# Patient Record
Sex: FEMALE | Race: WHITE | HISPANIC OR LATINO | Employment: OTHER | URBAN - METROPOLITAN AREA
[De-identification: names, ages, dates, MRNs, and addresses within clinical notes are randomized per-mention and may not be internally consistent; named-entity substitution may affect disease eponyms.]

---

## 2017-04-24 ENCOUNTER — TRANSCRIBE ORDERS (OUTPATIENT)
Dept: ADMINISTRATIVE | Facility: HOSPITAL | Age: 72
End: 2017-04-24

## 2017-04-24 DIAGNOSIS — R92.8 ABNORMAL MAMMOGRAM: Primary | ICD-10-CM

## 2017-05-01 ENCOUNTER — HOSPITAL ENCOUNTER (OUTPATIENT)
Dept: MAMMOGRAPHY | Facility: HOSPITAL | Age: 72
Discharge: HOME | End: 2017-05-01
Attending: INTERNAL MEDICINE | Admitting: INTERNAL MEDICINE

## 2017-05-01 DIAGNOSIS — R92.8 ABNORMAL MAMMOGRAM: ICD-10-CM

## 2017-05-01 PROCEDURE — G0206 DX MAMMO INCL CAD UNI: HCPCS

## 2017-06-26 ENCOUNTER — TRANSCRIBE ORDERS (OUTPATIENT)
Dept: ADMINISTRATIVE | Facility: HOSPITAL | Age: 72
End: 2017-06-26

## 2017-06-26 DIAGNOSIS — R06.09 DYSPNEA ON EXERTION: Primary | ICD-10-CM

## 2017-06-30 ENCOUNTER — HOSPITAL ENCOUNTER (OUTPATIENT)
Dept: CARDIOLOGY | Facility: HOSPITAL | Age: 72
Discharge: HOME | End: 2017-06-30
Attending: INTERNAL MEDICINE | Admitting: INTERNAL MEDICINE

## 2017-06-30 VITALS — WEIGHT: 202 LBS | BODY MASS INDEX: 39.66 KG/M2 | HEIGHT: 60 IN

## 2017-06-30 DIAGNOSIS — R06.09 DYSPNEA ON EXERTION: ICD-10-CM

## 2017-06-30 LAB
BH CV ECHO MEAS - ACS: 1.8 CM
BH CV ECHO MEAS - AO MAX PG (FULL): 3.1 MMHG
BH CV ECHO MEAS - AO MAX PG: 8.5 MMHG
BH CV ECHO MEAS - AO MEAN PG (FULL): 2 MMHG
BH CV ECHO MEAS - AO MEAN PG: 5 MMHG
BH CV ECHO MEAS - AO ROOT AREA (BSA CORRECTED): 1.7
BH CV ECHO MEAS - AO ROOT AREA: 8 CM^2
BH CV ECHO MEAS - AO ROOT DIAM: 3.2 CM
BH CV ECHO MEAS - AO V2 MAX: 146 CM/SEC
BH CV ECHO MEAS - AO V2 MEAN: 105 CM/SEC
BH CV ECHO MEAS - AO V2 VTI: 30.2 CM
BH CV ECHO MEAS - AVA(I,A): 3.5 CM^2
BH CV ECHO MEAS - AVA(I,D): 3.5 CM^2
BH CV ECHO MEAS - AVA(V,A): 3.3 CM^2
BH CV ECHO MEAS - AVA(V,D): 3.3 CM^2
BH CV ECHO MEAS - BSA(HAYCOCK): 2 M^2
BH CV ECHO MEAS - BSA: 1.9 M^2
BH CV ECHO MEAS - BZI_BMI: 39.5 KILOGRAMS/M^2
BH CV ECHO MEAS - BZI_METRIC_HEIGHT: 152.4 CM
BH CV ECHO MEAS - BZI_METRIC_WEIGHT: 91.6 KG
BH CV ECHO MEAS - CONTRAST EF 4CH: 53.6 ML/M^2
BH CV ECHO MEAS - EDV(CUBED): 29.8 ML
BH CV ECHO MEAS - EDV(MOD-SP4): 28 ML
BH CV ECHO MEAS - EDV(TEICH): 37.9 ML
BH CV ECHO MEAS - EF(CUBED): 59.2 %
BH CV ECHO MEAS - EF(MOD-SP4): 53.6 %
BH CV ECHO MEAS - EF(TEICH): 52.2 %
BH CV ECHO MEAS - ESV(CUBED): 12.2 ML
BH CV ECHO MEAS - ESV(MOD-SP4): 13 ML
BH CV ECHO MEAS - ESV(TEICH): 18.1 ML
BH CV ECHO MEAS - FS: 25.8 %
BH CV ECHO MEAS - IVS/LVPW: 0.92
BH CV ECHO MEAS - IVSD: 1.1 CM
BH CV ECHO MEAS - LA DIMENSION: 4.1 CM
BH CV ECHO MEAS - LA/AO: 1.3
BH CV ECHO MEAS - LAT PEAK E' VEL: 7.8 CM/SEC
BH CV ECHO MEAS - LV DIASTOLIC VOL/BSA (35-75): 14.9 ML/M^2
BH CV ECHO MEAS - LV MASS(C)D: 106.8 GRAMS
BH CV ECHO MEAS - LV MASS(C)DI: 57 GRAMS/M^2
BH CV ECHO MEAS - LV MAX PG: 5.5 MMHG
BH CV ECHO MEAS - LV MEAN PG: 3 MMHG
BH CV ECHO MEAS - LV SYSTOLIC VOL/BSA (12-30): 6.9 ML/M^2
BH CV ECHO MEAS - LV V1 MAX: 117 CM/SEC
BH CV ECHO MEAS - LV V1 MEAN: 79.5 CM/SEC
BH CV ECHO MEAS - LV V1 VTI: 25.2 CM
BH CV ECHO MEAS - LVIDD: 3.1 CM
BH CV ECHO MEAS - LVIDS: 2.3 CM
BH CV ECHO MEAS - LVLD AP4: 6.8 CM
BH CV ECHO MEAS - LVLS AP4: 6.1 CM
BH CV ECHO MEAS - LVOT AREA (M): 4.2 CM^2
BH CV ECHO MEAS - LVOT AREA: 4.2 CM^2
BH CV ECHO MEAS - LVOT DIAM: 2.3 CM
BH CV ECHO MEAS - LVPWD: 1.2 CM
BH CV ECHO MEAS - MED PEAK E' VEL: 5 CM/SEC
BH CV ECHO MEAS - MV A DUR: 0.17 SEC
BH CV ECHO MEAS - MV A MAX VEL: 121 CM/SEC
BH CV ECHO MEAS - MV DEC SLOPE: 337 CM/SEC^2
BH CV ECHO MEAS - MV DEC TIME: 0.17 SEC
BH CV ECHO MEAS - MV E MAX VEL: 83.9 CM/SEC
BH CV ECHO MEAS - MV E/A: 0.69
BH CV ECHO MEAS - MV MAX PG: 6.7 MMHG
BH CV ECHO MEAS - MV MEAN PG: 3 MMHG
BH CV ECHO MEAS - MV P1/2T MAX VEL: 93.7 CM/SEC
BH CV ECHO MEAS - MV P1/2T: 81.4 MSEC
BH CV ECHO MEAS - MV V2 MAX: 129 CM/SEC
BH CV ECHO MEAS - MV V2 MEAN: 78.4 CM/SEC
BH CV ECHO MEAS - MV V2 VTI: 25.7 CM
BH CV ECHO MEAS - MVA P1/2T LCG: 2.3 CM^2
BH CV ECHO MEAS - MVA(P1/2T): 2.7 CM^2
BH CV ECHO MEAS - MVA(VTI): 4.1 CM^2
BH CV ECHO MEAS - PA MAX PG (FULL): 3.2 MMHG
BH CV ECHO MEAS - PA MAX PG: 5.5 MMHG
BH CV ECHO MEAS - PA V2 MAX: 117 CM/SEC
BH CV ECHO MEAS - PULM A REVS DUR: 0.13 SEC
BH CV ECHO MEAS - PULM A REVS VEL: 25.3 CM/SEC
BH CV ECHO MEAS - PULM DIAS VEL: 30.8 CM/SEC
BH CV ECHO MEAS - PULM S/D: 1.7
BH CV ECHO MEAS - PULM SYS VEL: 52.3 CM/SEC
BH CV ECHO MEAS - PVA(V,A): 2.4 CM^2
BH CV ECHO MEAS - PVA(V,D): 2.4 CM^2
BH CV ECHO MEAS - QP/QS: 0.55
BH CV ECHO MEAS - RV MAX PG: 2.2 MMHG
BH CV ECHO MEAS - RV MEAN PG: 1 MMHG
BH CV ECHO MEAS - RV V1 MAX: 74.6 CM/SEC
BH CV ECHO MEAS - RV V1 MEAN: 54.7 CM/SEC
BH CV ECHO MEAS - RV V1 VTI: 15.2 CM
BH CV ECHO MEAS - RVDD: 2.4 CM
BH CV ECHO MEAS - RVOT AREA: 3.8 CM^2
BH CV ECHO MEAS - RVOT DIAM: 2.2 CM
BH CV ECHO MEAS - SI(AO): 129.6 ML/M^2
BH CV ECHO MEAS - SI(CUBED): 9.4 ML/M^2
BH CV ECHO MEAS - SI(LVOT): 55.9 ML/M^2
BH CV ECHO MEAS - SI(MOD-SP4): 8 ML/M^2
BH CV ECHO MEAS - SI(TEICH): 10.6 ML/M^2
BH CV ECHO MEAS - SV(AO): 242.9 ML
BH CV ECHO MEAS - SV(CUBED): 17.6 ML
BH CV ECHO MEAS - SV(LVOT): 104.7 ML
BH CV ECHO MEAS - SV(MOD-SP4): 15 ML
BH CV ECHO MEAS - SV(RVOT): 57.8 ML
BH CV ECHO MEAS - SV(TEICH): 19.8 ML
BH CV ECHO MEAS - TAPSE (>1.6): 1.5 CM2
BH CV XLRA - RV BASE: 2.6 CM
BH CV XLRA - RV LENGTH: 6.1 CM
BH CV XLRA - RV MID: 2.2 CM
LEFT ATRIUM VOLUME INDEX: 23 ML/M2
LV EF 2D ECHO EST: 60 %

## 2017-06-30 PROCEDURE — 93306 TTE W/DOPPLER COMPLETE: CPT | Performed by: INTERNAL MEDICINE

## 2017-06-30 PROCEDURE — 93306 TTE W/DOPPLER COMPLETE: CPT

## 2017-06-30 PROCEDURE — 0399T HC MYOCARDL STRAIN IMAG QUAN ASSMT PER SESS: CPT

## 2017-08-16 ENCOUNTER — LAB (OUTPATIENT)
Dept: LAB | Facility: HOSPITAL | Age: 72
End: 2017-08-16
Attending: INTERNAL MEDICINE

## 2017-08-16 ENCOUNTER — OFFICE VISIT (OUTPATIENT)
Dept: CARDIOLOGY | Facility: CLINIC | Age: 72
End: 2017-08-16

## 2017-08-16 VITALS
BODY MASS INDEX: 39.07 KG/M2 | HEIGHT: 60 IN | WEIGHT: 199 LBS | DIASTOLIC BLOOD PRESSURE: 78 MMHG | SYSTOLIC BLOOD PRESSURE: 122 MMHG | HEART RATE: 94 BPM

## 2017-08-16 DIAGNOSIS — R06.02 SOB (SHORTNESS OF BREATH): ICD-10-CM

## 2017-08-16 DIAGNOSIS — R94.31 ABNORMAL ECG: ICD-10-CM

## 2017-08-16 DIAGNOSIS — R07.2 PRECORDIAL PAIN: ICD-10-CM

## 2017-08-16 DIAGNOSIS — R07.2 PRECORDIAL PAIN: Primary | ICD-10-CM

## 2017-08-16 DIAGNOSIS — R93.1 ABNORMAL ECHOCARDIOGRAM: ICD-10-CM

## 2017-08-16 LAB
ALBUMIN SERPL-MCNC: 3.5 G/DL (ref 3.5–5.2)
ALBUMIN/GLOB SERPL: 1.1 G/DL
ALP SERPL-CCNC: 86 U/L (ref 40–129)
ALT SERPL W P-5'-P-CCNC: 15 U/L (ref 5–33)
ANION GAP SERPL CALCULATED.3IONS-SCNC: 13.2 MMOL/L
AST SERPL-CCNC: 12 U/L (ref 5–32)
BILIRUB SERPL-MCNC: 0.3 MG/DL (ref 0.2–1.2)
BUN BLD-MCNC: 31 MG/DL (ref 8–23)
BUN/CREAT SERPL: 30.4 (ref 7–25)
CALCIUM SPEC-SCNC: 9.1 MG/DL (ref 8.8–10.5)
CHLORIDE SERPL-SCNC: 102 MMOL/L (ref 98–107)
CO2 SERPL-SCNC: 25.8 MMOL/L (ref 22–29)
CREAT BLD-MCNC: 1.02 MG/DL (ref 0.57–1)
GFR SERPL CREATININE-BSD FRML MDRD: 53 ML/MIN/1.73
GLOBULIN UR ELPH-MCNC: 3.3 GM/DL
GLUCOSE BLD-MCNC: 86 MG/DL (ref 65–99)
NT-PROBNP SERPL-MCNC: 88.8 PG/ML (ref 5–125)
POTASSIUM BLD-SCNC: 3.5 MMOL/L (ref 3.5–5.2)
PROT SERPL-MCNC: 6.8 G/DL (ref 6–8.5)
SODIUM BLD-SCNC: 141 MMOL/L (ref 136–145)

## 2017-08-16 PROCEDURE — 80053 COMPREHEN METABOLIC PANEL: CPT

## 2017-08-16 PROCEDURE — 93000 ELECTROCARDIOGRAM COMPLETE: CPT | Performed by: INTERNAL MEDICINE

## 2017-08-16 PROCEDURE — 83880 ASSAY OF NATRIURETIC PEPTIDE: CPT

## 2017-08-16 PROCEDURE — 99204 OFFICE O/P NEW MOD 45 MIN: CPT | Performed by: INTERNAL MEDICINE

## 2017-08-16 PROCEDURE — 36415 COLL VENOUS BLD VENIPUNCTURE: CPT

## 2017-08-16 NOTE — PROGRESS NOTES
Subjective:     Encounter Date:2017      Patient ID: Blanca Lam is a 72 y.o. female.    Chief Complaint:  History of Present Illness    Dear Dr. Hall,    I had the pleasure of seeing this patient in the office today for initial evaluation.  She comes in with recent complaints of shortness of breath, swelling, and now an echocardiogram that showed grade 1 diastolic dysfunction.    Patient states that she has been on steroids because of temporal arteritis. This patient has no known cardiac history.  This patient has no history of coronary artery disease, congestive heart failure, rheumatic fever, rheumatic heart disease, congenital heart disease or heart murmur.  This patient has never required invasive cardiovascular evaluation.  She did start getting swelling in her face, hands, and feet after she was placed on steroids.  She does have some episodes of chest discomfort.  She is thought this was probably heartburn.  It does seem to be associated with activity.  She has not had it at rest.  She denies any sensation of bitterness or acid taste in her throat.  No sensation of palpitations or heart racing.  No presyncope or syncope.    She had an echocardiogram performed.  This was performed on .  She had grade 1 diastolic dysfunction with normal left ventricular systolic function and ejection fraction of 60%.  Mild mitral stenosis was seen with a mean gradient across the mitral valve with 3 mmHg.    The following portions of the patient's history were reviewed and updated as appropriate: allergies, current medications, past family history, past medical history, past social history, past surgical history and problem list.    Past Medical History:   Diagnosis Date   • Anemia    • Hyperlipidemia        Past Surgical History:   Procedure Laterality Date   • BREAST BIOPSY     •  SECTION         Social History     Social History   • Marital status:      Spouse name: N/A   • Number of  children: N/A   • Years of education: N/A     Occupational History   • Not on file.     Social History Main Topics   • Smoking status: Never Smoker   • Smokeless tobacco: Not on file      Comment: caffine use   • Alcohol use No   • Drug use: No   • Sexual activity: Not on file     Other Topics Concern   • Not on file     Social History Narrative       Review of Systems   Constitution: Negative for chills, decreased appetite, fever and night sweats.   HENT: Negative for ear discharge, ear pain, hearing loss, nosebleeds and sore throat.    Eyes: Negative for blurred vision, double vision and pain.   Cardiovascular: Negative for cyanosis.   Respiratory: Negative for hemoptysis and sputum production.    Endocrine: Negative for cold intolerance and heat intolerance.   Hematologic/Lymphatic: Negative for adenopathy.   Skin: Negative for dry skin, itching, nail changes, rash and suspicious lesions.   Musculoskeletal: Negative for arthritis, gout, muscle cramps, muscle weakness, myalgias and neck pain.   Gastrointestinal: Negative for anorexia, bowel incontinence, constipation, diarrhea, dysphagia, hematemesis and jaundice.   Genitourinary: Negative for bladder incontinence, dysuria, flank pain, frequency, hematuria and nocturia.   Neurological: Negative for focal weakness, numbness, paresthesias and seizures.   Psychiatric/Behavioral: Negative for altered mental status, hallucinations, hypervigilance, suicidal ideas and thoughts of violence.   Allergic/Immunologic: Negative for persistent infections.         ECG 12 Lead  Date/Time: 8/16/2017 2:32 PM  Performed by: GARRY SEAMAN III.  Authorized by: GARRY SEAMAN III   Comparison: compared with previous ECG   Similar to previous ECG  Rhythm: sinus rhythm  Rate: normal  Conduction: conduction normal  ST Segments: ST segments normal  T flattening: I and aVL  QRS axis: normal  Other: no other findings  Clinical impression: abnormal ECG               Objective:     Vitals:  "   08/16/17 1339   BP: 122/78   Pulse: 94   Weight: 199 lb (90.3 kg)   Height: 60\" (152.4 cm)         Physical Exam   Constitutional: She is oriented to person, place, and time. She appears well-developed and well-nourished. No distress.   HENT:   Head: Normocephalic and atraumatic.   Nose: Nose normal.   Mouth/Throat: Oropharynx is clear and moist.   Eyes: Conjunctivae and EOM are normal. Pupils are equal, round, and reactive to light. Right eye exhibits no discharge. Left eye exhibits no discharge.   Neck: Normal range of motion. Neck supple. No tracheal deviation present. No thyromegaly present.   Cardiovascular: Normal rate, regular rhythm, S1 normal, S2 normal and normal pulses.  Exam reveals no S3.    Murmur heard.  High-pitched blowing holosystolic murmur is present  at the apex  Pulmonary/Chest: Effort normal and breath sounds normal. No stridor. No respiratory distress. She exhibits no tenderness.   Abdominal: Soft. Bowel sounds are normal. She exhibits no distension and no mass. There is no tenderness. There is no rebound and no guarding.   Musculoskeletal: Normal range of motion. She exhibits no tenderness or deformity.   Lymphadenopathy:     She has no cervical adenopathy.   Neurological: She is alert and oriented to person, place, and time. She has normal reflexes.   Skin: Skin is warm and dry. No rash noted. She is not diaphoretic. No erythema.   Psychiatric: She has a normal mood and affect. Thought content normal.       Lab Review:             Performed        Assessment:          Diagnosis Plan   1. Precordial pain  Stress Test With Myocardial Perfusion One Day    BNP    Comprehensive Metabolic Panel   2. Abnormal ECG  Stress Test With Myocardial Perfusion One Day    BNP    Comprehensive Metabolic Panel   3. SOB (shortness of breath)  BNP    Comprehensive Metabolic Panel   4. Abnormal echocardiogram            Plan:       1.  This patient has chest discomfort with both typical and atypical " components-a stress Cardiolite will be obtained  2.  Patient has grade 1 diastolic dysfunction with some increased swelling.  However, the swelling seemed to occur after she was started on steroids.  I'm in to get blood work to check a CMP and BNP to investigate this further  3.  Obesity  4.  Diabetes mellitus  5.  Hypertension-blood pressures under good control on current regimen  6.  Hyperlipidemia on lipid-lowering therapy    Thank you very much for allowing us to participate in the care of this pleasant patient.  Please don't hesitate to call if I can be of assistance in any way.      Current Outpatient Prescriptions:   •  atorvastatin (LIPITOR) 20 MG tablet, Take 20 mg by mouth Daily., Disp: , Rfl:   •  bisoprolol (ZEBeta) 5 MG tablet, Take 5 mg by mouth Daily., Disp: , Rfl:   •  furosemide (LASIX) 40 MG tablet, Take 40 mg by mouth 2 (Two) Times a Day., Disp: , Rfl:   •  Insulin Degludec (TRESIBA FLEXTOUCH SC), Inject  under the skin., Disp: , Rfl:   •  Insulin Lispro (HUMALOG PEN SC), Inject  under the skin., Disp: , Rfl:   •  levocetirizine (XYZAL) 5 MG tablet, Take 5 mg by mouth Every Evening., Disp: , Rfl:   •  metFORMIN (GLUCOPHAGE) 1000 MG tablet, Take 1,000 mg by mouth 2 (Two) Times a Day With Meals., Disp: , Rfl:   •  montelukast (SINGULAIR) 10 MG tablet, Take 10 mg by mouth Every Night., Disp: , Rfl:   •  omeprazole (priLOSEC) 20 MG capsule, Take 20 mg by mouth Daily., Disp: , Rfl:   •  potassium chloride (K-DUR,KLOR-CON) 20 MEQ CR tablet, Take 20 mEq by mouth 2 (Two) Times a Day., Disp: , Rfl:   •  predniSONE (DELTASONE) 2.5 MG tablet, Take 2.5 mg by mouth Daily., Disp: , Rfl:   •  valsartan-hydrochlorothiazide (DIOVAN-HCT) 160-12.5 MG per tablet, Take 1 tablet by mouth., Disp: , Rfl:   •  vitamin B-12 (CYANOCOBALAMIN) 1000 MCG tablet, Take 1,000 mcg by mouth Daily., Disp: , Rfl:          EMR Dragon/Transcription disclaimer:    Much of this encounter note is an electronic transcription/translation of  spoken language to printed text. The electronic translation of spoken language may permit erroneous, or at times, nonsensical words or phrases to be inadvertently transcribed; Although I have reviewed the note for such errors, some may still exist.

## 2017-08-22 ENCOUNTER — HOSPITAL ENCOUNTER (OUTPATIENT)
Dept: CARDIOLOGY | Facility: HOSPITAL | Age: 72
Discharge: HOME | End: 2017-08-22
Attending: INTERNAL MEDICINE | Admitting: INTERNAL MEDICINE

## 2017-08-22 DIAGNOSIS — I31.39 PERICARDIAL EFFUSION: Primary | ICD-10-CM

## 2017-08-22 DIAGNOSIS — R07.2 PRECORDIAL PAIN: ICD-10-CM

## 2017-08-22 DIAGNOSIS — R94.31 ABNORMAL ECG: ICD-10-CM

## 2017-08-22 LAB
BH CV NUCLEAR PRIOR STUDY: 2
BH CV STRESS BP STAGE 1: NORMAL
BH CV STRESS DURATION MIN STAGE 1: 3
BH CV STRESS DURATION SEC STAGE 1: 0
BH CV STRESS DURATION SEC STAGE 2: 30
BH CV STRESS GRADE STAGE 1: 10
BH CV STRESS GRADE STAGE 2: 12
BH CV STRESS HR STAGE 1: 130
BH CV STRESS HR STAGE 2: 138
BH CV STRESS METS STAGE 1: 5
BH CV STRESS METS STAGE 2: 7.5
BH CV STRESS PROTOCOL 1: NORMAL
BH CV STRESS RECOVERY BP: NORMAL MMHG
BH CV STRESS RECOVERY HR: 99 BPM
BH CV STRESS SPEED STAGE 1: 1.7
BH CV STRESS SPEED STAGE 2: 2.5
BH CV STRESS STAGE 1: 1
BH CV STRESS STAGE 2: 2
LV EF NUC BP: 78 %
MAXIMAL PREDICTED HEART RATE: 149 BPM
PERCENT MAX PREDICTED HR: 92.62 %
STRESS BASELINE BP: NORMAL MMHG
STRESS BASELINE HR: 90 BPM
STRESS PERCENT HR: 109 %
STRESS POST ESTIMATED WORKLOAD: 4 METS
STRESS POST EXERCISE DUR MIN: 3 MIN
STRESS POST EXERCISE DUR SEC: 30 SEC
STRESS POST PEAK BP: NORMAL MMHG
STRESS POST PEAK HR: 138 BPM
STRESS TARGET HR: 127 BPM

## 2017-08-22 PROCEDURE — 93017 CV STRESS TEST TRACING ONLY: CPT

## 2017-08-22 PROCEDURE — 78452 HT MUSCLE IMAGE SPECT MULT: CPT

## 2017-08-22 PROCEDURE — 93016 CV STRESS TEST SUPVJ ONLY: CPT | Performed by: INTERNAL MEDICINE

## 2017-08-22 PROCEDURE — A9502 TC99M TETROFOSMIN: HCPCS | Performed by: INTERNAL MEDICINE

## 2017-08-22 PROCEDURE — 78452 HT MUSCLE IMAGE SPECT MULT: CPT | Performed by: INTERNAL MEDICINE

## 2017-08-22 PROCEDURE — 93018 CV STRESS TEST I&R ONLY: CPT | Performed by: INTERNAL MEDICINE

## 2017-08-22 PROCEDURE — 0 TECHNETIUM TETROFOSMIN KIT: Performed by: INTERNAL MEDICINE

## 2017-08-22 RX ADMIN — TETROFOSMIN 1 DOSE: 1.38 INJECTION, POWDER, LYOPHILIZED, FOR SOLUTION INTRAVENOUS at 07:15

## 2017-08-22 RX ADMIN — TETROFOSMIN 1 DOSE: 1.38 INJECTION, POWDER, LYOPHILIZED, FOR SOLUTION INTRAVENOUS at 08:25

## 2017-08-29 ENCOUNTER — HOSPITAL ENCOUNTER (OUTPATIENT)
Dept: CT IMAGING | Facility: HOSPITAL | Age: 72
Discharge: HOME | End: 2017-08-29
Attending: INTERNAL MEDICINE | Admitting: INTERNAL MEDICINE

## 2017-08-29 DIAGNOSIS — I31.39 PERICARDIAL EFFUSION: ICD-10-CM

## 2017-08-29 PROCEDURE — 71250 CT THORAX DX C-: CPT

## 2017-10-30 ENCOUNTER — HOSPITAL ENCOUNTER (OUTPATIENT)
Dept: GENERAL RADIOLOGY | Facility: HOSPITAL | Age: 72
Discharge: HOME | End: 2017-10-30
Attending: INTERNAL MEDICINE | Admitting: INTERNAL MEDICINE

## 2017-10-30 DIAGNOSIS — R07.9 CHEST PAIN: ICD-10-CM

## 2017-10-30 PROCEDURE — 71101 X-RAY EXAM UNILAT RIBS/CHEST: CPT

## 2018-01-17 ENCOUNTER — TRANSCRIBE ORDERS (OUTPATIENT)
Dept: ADMINISTRATIVE | Facility: HOSPITAL | Age: 73
End: 2018-01-17

## 2018-01-17 DIAGNOSIS — Z12.31 SCREENING MAMMOGRAM, ENCOUNTER FOR: Primary | ICD-10-CM

## 2018-02-05 ENCOUNTER — APPOINTMENT (OUTPATIENT)
Dept: MAMMOGRAPHY | Facility: HOSPITAL | Age: 73
End: 2018-02-05
Attending: INTERNAL MEDICINE

## 2018-02-12 ENCOUNTER — LAB (OUTPATIENT)
Dept: LAB | Facility: HOSPITAL | Age: 73
End: 2018-02-12

## 2018-02-12 ENCOUNTER — HOSPITAL ENCOUNTER (OUTPATIENT)
Dept: GENERAL RADIOLOGY | Facility: HOSPITAL | Age: 73
Discharge: HOME | End: 2018-02-12

## 2018-02-12 ENCOUNTER — TRANSCRIBE ORDERS (OUTPATIENT)
Dept: ADMINISTRATIVE | Facility: HOSPITAL | Age: 73
End: 2018-02-12

## 2018-02-12 ENCOUNTER — HOSPITAL ENCOUNTER (OUTPATIENT)
Dept: MAMMOGRAPHY | Facility: HOSPITAL | Age: 73
Discharge: HOME | End: 2018-02-12
Attending: INTERNAL MEDICINE | Admitting: INTERNAL MEDICINE

## 2018-02-12 ENCOUNTER — HOSPITAL ENCOUNTER (OUTPATIENT)
Dept: GENERAL RADIOLOGY | Facility: HOSPITAL | Age: 73
Discharge: HOME | End: 2018-02-12
Attending: INTERNAL MEDICINE

## 2018-02-12 DIAGNOSIS — R70.0 ELEVATED ERYTHROCYTE SEDIMENTATION RATE: ICD-10-CM

## 2018-02-12 DIAGNOSIS — R79.82 ELEVATED C-REACTIVE PROTEIN (CRP): ICD-10-CM

## 2018-02-12 DIAGNOSIS — M54.50 LOW BACK PAIN: ICD-10-CM

## 2018-02-12 DIAGNOSIS — M31.0 GOODPASTURE'S SYNDROME (HCC): Primary | ICD-10-CM

## 2018-02-12 DIAGNOSIS — R52 PAIN: ICD-10-CM

## 2018-02-12 DIAGNOSIS — M31.0 GOODPASTURE'S SYNDROME (HCC): ICD-10-CM

## 2018-02-12 DIAGNOSIS — Z12.31 SCREENING MAMMOGRAM, ENCOUNTER FOR: ICD-10-CM

## 2018-02-12 LAB
ALBUMIN SERPL-MCNC: 3.6 G/DL (ref 3.5–5.2)
ALBUMIN/GLOB SERPL: 1 G/DL
ALP SERPL-CCNC: 103 U/L (ref 39–117)
ALT SERPL W P-5'-P-CCNC: 16 U/L (ref 1–33)
ANION GAP SERPL CALCULATED.3IONS-SCNC: 13.7 MMOL/L
AST SERPL-CCNC: 9 U/L (ref 1–32)
BILIRUB SERPL-MCNC: 0.5 MG/DL (ref 0.1–1.2)
BUN BLD-MCNC: 25 MG/DL (ref 8–23)
BUN/CREAT SERPL: 22.9 (ref 7–25)
CALCIUM SPEC-SCNC: 9.4 MG/DL (ref 8.6–10.5)
CHLORIDE SERPL-SCNC: 95 MMOL/L (ref 98–107)
CO2 SERPL-SCNC: 26.3 MMOL/L (ref 22–29)
CREAT BLD-MCNC: 1.09 MG/DL (ref 0.57–1)
CRP SERPL-MCNC: 5.53 MG/DL (ref 0–0.5)
ERYTHROCYTE [SEDIMENTATION RATE] IN BLOOD: 46 MM/HR (ref 0–30)
GFR SERPL CREATININE-BSD FRML MDRD: 49 ML/MIN/1.73
GLOBULIN UR ELPH-MCNC: 3.5 GM/DL
GLUCOSE BLD-MCNC: 310 MG/DL (ref 65–99)
POTASSIUM BLD-SCNC: 4.4 MMOL/L (ref 3.5–5.2)
PROT SERPL-MCNC: 7.1 G/DL (ref 6–8.5)
SODIUM BLD-SCNC: 135 MMOL/L (ref 136–145)

## 2018-02-12 PROCEDURE — 72110 X-RAY EXAM L-2 SPINE 4/>VWS: CPT

## 2018-02-12 PROCEDURE — 85652 RBC SED RATE AUTOMATED: CPT

## 2018-02-12 PROCEDURE — 71101 X-RAY EXAM UNILAT RIBS/CHEST: CPT

## 2018-02-12 PROCEDURE — 86140 C-REACTIVE PROTEIN: CPT

## 2018-02-12 PROCEDURE — 80053 COMPREHEN METABOLIC PANEL: CPT

## 2018-02-12 PROCEDURE — 36415 COLL VENOUS BLD VENIPUNCTURE: CPT

## 2018-02-12 PROCEDURE — 77067 SCR MAMMO BI INCL CAD: CPT

## 2018-03-02 ENCOUNTER — HOSPITAL ENCOUNTER (EMERGENCY)
Facility: HOSPITAL | Age: 73
Discharge: HOME | End: 2018-03-03
Attending: EMERGENCY MEDICINE | Admitting: EMERGENCY MEDICINE

## 2018-03-02 DIAGNOSIS — R73.9 HYPERGLYCEMIA: ICD-10-CM

## 2018-03-02 DIAGNOSIS — S20.219A CONTUSION OF CHEST WALL, UNSPECIFIED LATERALITY, INITIAL ENCOUNTER: Primary | ICD-10-CM

## 2018-03-02 DIAGNOSIS — S30.1XXA CONTUSION OF ABDOMINAL WALL, INITIAL ENCOUNTER: ICD-10-CM

## 2018-03-02 PROCEDURE — 99283 EMERGENCY DEPT VISIT LOW MDM: CPT

## 2018-03-03 ENCOUNTER — APPOINTMENT (OUTPATIENT)
Dept: CT IMAGING | Facility: HOSPITAL | Age: 73
End: 2018-03-03

## 2018-03-03 ENCOUNTER — APPOINTMENT (OUTPATIENT)
Dept: GENERAL RADIOLOGY | Facility: HOSPITAL | Age: 73
End: 2018-03-03

## 2018-03-03 VITALS
BODY MASS INDEX: 37.89 KG/M2 | SYSTOLIC BLOOD PRESSURE: 153 MMHG | DIASTOLIC BLOOD PRESSURE: 57 MMHG | WEIGHT: 193 LBS | OXYGEN SATURATION: 95 % | RESPIRATION RATE: 16 BRPM | HEIGHT: 60 IN | HEART RATE: 101 BPM | TEMPERATURE: 98 F

## 2018-03-03 LAB
ALBUMIN SERPL-MCNC: 3.2 G/DL (ref 3.5–5.2)
ALBUMIN/GLOB SERPL: 1 G/DL
ALP SERPL-CCNC: 101 U/L (ref 39–117)
ALT SERPL W P-5'-P-CCNC: 10 U/L (ref 1–33)
ANION GAP SERPL CALCULATED.3IONS-SCNC: 11 MMOL/L
AST SERPL-CCNC: 11 U/L (ref 1–32)
BASOPHILS # BLD AUTO: 0.03 10*3/MM3 (ref 0–0.2)
BASOPHILS NFR BLD AUTO: 0.3 % (ref 0–1.5)
BILIRUB SERPL-MCNC: 0.2 MG/DL (ref 0.1–1.2)
BUN BLD-MCNC: 16 MG/DL (ref 8–23)
BUN/CREAT SERPL: 17.8 (ref 7–25)
CALCIUM SPEC-SCNC: 8.6 MG/DL (ref 8.6–10.5)
CHLORIDE SERPL-SCNC: 101 MMOL/L (ref 98–107)
CO2 SERPL-SCNC: 30 MMOL/L (ref 22–29)
CREAT BLD-MCNC: 0.9 MG/DL (ref 0.57–1)
DEPRECATED RDW RBC AUTO: 49.7 FL (ref 37–54)
EOSINOPHIL # BLD AUTO: 0.14 10*3/MM3 (ref 0–0.7)
EOSINOPHIL NFR BLD AUTO: 1.2 % (ref 0.3–6.2)
ERYTHROCYTE [DISTWIDTH] IN BLOOD BY AUTOMATED COUNT: 14.6 % (ref 11.7–13)
GFR SERPL CREATININE-BSD FRML MDRD: 62 ML/MIN/1.73
GLOBULIN UR ELPH-MCNC: 3.2 GM/DL
GLUCOSE BLD-MCNC: 201 MG/DL (ref 65–99)
HCT VFR BLD AUTO: 36.2 % (ref 35.6–45.5)
HGB BLD-MCNC: 10.5 G/DL (ref 11.9–15.5)
IMM GRANULOCYTES # BLD: 0.06 10*3/MM3 (ref 0–0.03)
IMM GRANULOCYTES NFR BLD: 0.5 % (ref 0–0.5)
LIPASE SERPL-CCNC: 20 U/L (ref 13–60)
LYMPHOCYTES # BLD AUTO: 1.81 10*3/MM3 (ref 0.9–4.8)
LYMPHOCYTES NFR BLD AUTO: 15.3 % (ref 19.6–45.3)
MCH RBC QN AUTO: 27 PG (ref 26.9–32)
MCHC RBC AUTO-ENTMCNC: 29 G/DL (ref 32.4–36.3)
MCV RBC AUTO: 93.1 FL (ref 80.5–98.2)
MONOCYTES # BLD AUTO: 0.7 10*3/MM3 (ref 0.2–1.2)
MONOCYTES NFR BLD AUTO: 5.9 % (ref 5–12)
NEUTROPHILS # BLD AUTO: 9.08 10*3/MM3 (ref 1.9–8.1)
NEUTROPHILS NFR BLD AUTO: 76.8 % (ref 42.7–76)
PLATELET # BLD AUTO: 289 10*3/MM3 (ref 140–500)
PMV BLD AUTO: 9.8 FL (ref 6–12)
POTASSIUM BLD-SCNC: 3.4 MMOL/L (ref 3.5–5.2)
PROT SERPL-MCNC: 6.4 G/DL (ref 6–8.5)
RBC # BLD AUTO: 3.89 10*6/MM3 (ref 3.9–5.2)
SODIUM BLD-SCNC: 142 MMOL/L (ref 136–145)
WBC NRBC COR # BLD: 11.82 10*3/MM3 (ref 4.5–10.7)

## 2018-03-03 PROCEDURE — 63710000001 PREDNISONE PER 5 MG: Performed by: EMERGENCY MEDICINE

## 2018-03-03 PROCEDURE — 74177 CT ABD & PELVIS W/CONTRAST: CPT

## 2018-03-03 PROCEDURE — 0 IOPAMIDOL 61 % SOLUTION: Performed by: EMERGENCY MEDICINE

## 2018-03-03 PROCEDURE — 83690 ASSAY OF LIPASE: CPT | Performed by: EMERGENCY MEDICINE

## 2018-03-03 PROCEDURE — 80053 COMPREHEN METABOLIC PANEL: CPT | Performed by: EMERGENCY MEDICINE

## 2018-03-03 PROCEDURE — 96361 HYDRATE IV INFUSION ADD-ON: CPT

## 2018-03-03 PROCEDURE — 94799 UNLISTED PULMONARY SVC/PX: CPT

## 2018-03-03 PROCEDURE — 85025 COMPLETE CBC W/AUTO DIFF WBC: CPT | Performed by: EMERGENCY MEDICINE

## 2018-03-03 PROCEDURE — 71046 X-RAY EXAM CHEST 2 VIEWS: CPT

## 2018-03-03 PROCEDURE — 96360 HYDRATION IV INFUSION INIT: CPT

## 2018-03-03 RX ORDER — PREDNISONE 10 MG/1
10 TABLET ORAL ONCE
Status: COMPLETED | OUTPATIENT
Start: 2018-03-03 | End: 2018-03-03

## 2018-03-03 RX ORDER — ACETAMINOPHEN 325 MG/1
650 TABLET ORAL ONCE
Status: COMPLETED | OUTPATIENT
Start: 2018-03-03 | End: 2018-03-03

## 2018-03-03 RX ORDER — SODIUM CHLORIDE 9 MG/ML
125 INJECTION, SOLUTION INTRAVENOUS CONTINUOUS
Status: DISCONTINUED | OUTPATIENT
Start: 2018-03-03 | End: 2018-03-03 | Stop reason: HOSPADM

## 2018-03-03 RX ORDER — SODIUM CHLORIDE 0.9 % (FLUSH) 0.9 %
10 SYRINGE (ML) INJECTION AS NEEDED
Status: DISCONTINUED | OUTPATIENT
Start: 2018-03-03 | End: 2018-03-03 | Stop reason: HOSPADM

## 2018-03-03 RX ADMIN — PREDNISONE 10 MG: 10 TABLET ORAL at 01:16

## 2018-03-03 RX ADMIN — ACETAMINOPHEN 650 MG: 325 TABLET, FILM COATED ORAL at 01:16

## 2018-03-03 RX ADMIN — SODIUM CHLORIDE 125 ML/HR: 9 INJECTION, SOLUTION INTRAVENOUS at 01:20

## 2018-03-03 RX ADMIN — IOPAMIDOL 85 ML: 612 INJECTION, SOLUTION INTRAVENOUS at 02:47

## 2018-03-03 NOTE — DISCHARGE INSTRUCTIONS
You are advised to follow closely with Dr Valenzuela in 2-3 days for recheck, final results of lab work and imaging testing, and further testing/treatment as needed.    Hold metformin for 3 days after your CT scan today  Take tylenol for your discomfort.    Please return to the emergency department immediately with chest pain different than usual for you, shortness of air, persistent or increasing abdominal pain, persistent vomiting/fever, blood in emesis or stool, lightheadedness/fainting, problems with speech, one sided weakness/numbness, new incontinence, problems with vision, altered mental status or for worsening of symptoms or other concerns.

## 2018-03-03 NOTE — ED NOTES
Pt reports she was walking and tripped over her feet landing on her abdomen and face.  Pt denies LOC.  Pt complains on pain to her cheek and all over her abdomen.  Pt reports 3 months ago she fell and fractured her ribs     Andressa Rodriguez RN  03/02/18 2008

## 2018-03-03 NOTE — ED PROVIDER NOTES
" EMERGENCY DEPARTMENT ENCOUNTER    CHIEF COMPLAINT  Chief Complaint: injuries s/p fall  History given by: pt and family  History limited by: nothing  Room Number:   PMD: Praneeth Valenzuela MD      HPI:  Pt is a 72 y.o. female who presents to ED s/p fall earlier this afternoon after tripping on a flat sidewalk. Pt reports hitting her chest and L cheek when she fell, but denies LOC, new neck pain, new weakness/numbness, SOA.  Pt states that her CP is worsened with exertion, when she is laying down, and inspiration. Pt also c/o diffuse abd pain. Pt has hx of fractured ribs several months ago. Pt reports she has not taken her usual dose of prednisone and insulin tonight.    Duration:  S/p fall  Onset: sudden  Timing: constant  Location: abd,  chest  Radiation: none stated  Quality: \"pain\"  Intensity/Severity: moderate  Progression: unchanged  Associated Symptoms: CP, abd pain  Aggravating Factors: Pt states that her CP is worsened with exertion, when she is laying down, and inspiration.   Alleviating Factors: None stated  Previous Episodes: Pt does not report any previous episodes.  Treatment before arrival: Pt does not report any treatment PTA.    PAST MEDICAL HISTORY  Active Ambulatory Problems     Diagnosis Date Noted   • No Active Ambulatory Problems     Resolved Ambulatory Problems     Diagnosis Date Noted   • No Resolved Ambulatory Problems     Past Medical History:   Diagnosis Date   • Anemia    • Asthma    • Diabetes mellitus    • Hyperlipidemia        PAST SURGICAL HISTORY  Past Surgical History:   Procedure Laterality Date   • BREAST BIOPSY     •  SECTION     • CHOLECYSTECTOMY         FAMILY HISTORY  Family History   Problem Relation Age of Onset   • Hypertension Father    • Diabetes Father    • Cancer Father    • Cancer Sister    • Diabetes Paternal Grandmother    • Hypertension Paternal Grandfather        SOCIAL HISTORY  Social History     Social History   • Marital status:      Spouse " name: N/A   • Number of children: N/A   • Years of education: N/A     Occupational History   • Not on file.     Social History Main Topics   • Smoking status: Never Smoker   • Smokeless tobacco: Not on file      Comment: caffine use   • Alcohol use No   • Drug use: No   • Sexual activity: Not on file     Other Topics Concern   • Not on file     Social History Narrative   • No narrative on file       ALLERGIES  Sulfa antibiotics    REVIEW OF SYSTEMS  Review of Systems   Constitutional: Negative for fever.   HENT: Negative for sore throat.    Eyes: Negative.    Respiratory: Negative for cough and shortness of breath.    Cardiovascular: Positive for chest pain.   Gastrointestinal: Positive for abdominal pain (diffuse). Negative for diarrhea and vomiting.   Genitourinary: Negative for dysuria.   Musculoskeletal: Negative for neck pain.   Skin: Negative for rash.   Allergic/Immunologic: Negative.    Neurological: Negative for weakness, numbness and headaches.   Hematological: Negative.    Psychiatric/Behavioral: Negative.    All other systems reviewed and are negative.      PHYSICAL EXAM  ED Triage Vitals   Temp Heart Rate Resp BP SpO2   03/02/18 1938 03/02/18 1938 03/02/18 1938 03/02/18 2008 03/02/18 1938   97.4 °F (36.3 °C) 104 16 161/68 99 %      Temp src Heart Rate Source Patient Position BP Location FiO2 (%)   03/02/18 1938 03/02/18 1938 03/02/18 2008 03/02/18 2008 --   Tympanic Monitor Sitting Right arm        Physical Exam   Constitutional: She is oriented to person, place, and time.  Non-toxic appearance. She appears distressed (mild).   HENT:   Head: Normocephalic and atraumatic.   No obvious bruising swelling/deformity of face   Eyes: EOM are normal. Pupils are equal, round, and reactive to light.   Neck: Normal range of motion, full passive range of motion without pain and phonation normal. Neck supple. No spinous process tenderness present.   Cardiovascular: Normal rate, regular rhythm, normal heart sounds  and intact distal pulses.    No murmur heard.  Pulses:       Posterior tibial pulses are 2+ on the right side, and 2+ on the left side.   Pulmonary/Chest: Effort normal and breath sounds normal. No respiratory distress. She exhibits bony tenderness (anterior/inferior ribs bilat). She exhibits no mass, no crepitus and no deformity.   No ecchymosis to trunk   Abdominal: Soft. Bowel sounds are normal. There is tenderness (upper mild). There is no rebound and no guarding.   Musculoskeletal: Normal range of motion. She exhibits edema (1+ edema bilaterally in lower extremities).   Neurological: She is alert and oriented to person, place, and time. She has normal sensation and normal strength.   Skin: Skin is warm and dry. No rash noted.   Psychiatric: Mood and affect normal.   Nursing note and vitals reviewed.      LAB RESULTS  Lab Results (last 24 hours)     Procedure Component Value Units Date/Time    CBC & Differential [[id number]] Collected:  03/03/18 0115    Specimen:  Blood Updated:  03/03/18 0139    Narrative:       The following orders were created for panel order CBC & Differential.  Procedure                               Abnormality         Status                     ---------                               -----------         ------                     CBC Auto Differential[[id number]]        Abnormal            Final result                 Please view results for these tests on the individual orders.    Comprehensive Metabolic Panel [[id number]]  (Abnormal) Collected:  03/03/18 0115    Specimen:  Blood Updated:  03/03/18 0156     Glucose 201 (H) mg/dL      BUN 16 mg/dL      Creatinine 0.90 mg/dL      Sodium 142 mmol/L      Potassium 3.4 (L) mmol/L      Chloride 101 mmol/L      CO2 30.0 (H) mmol/L      Calcium 8.6 mg/dL      Total Protein 6.4 g/dL      Albumin 3.20 (L) g/dL      ALT (SGPT) 10 U/L      AST (SGOT) 11 U/L      Alkaline Phosphatase 101 U/L      Total Bilirubin 0.2 mg/dL      eGFR Non  Amer 62  mL/min/1.73      Globulin 3.2 gm/dL      A/G Ratio 1.0 g/dL      BUN/Creatinine Ratio 17.8     Anion Gap 11.0 mmol/L     Narrative:       The MDRD GFR formula is only valid for adults with stable renal function between ages 18 and 70.    Lipase [[id number]]  (Normal) Collected:  03/03/18 0115    Specimen:  Blood Updated:  03/03/18 0156     Lipase 20 U/L     CBC Auto Differential [[id number]]  (Abnormal) Collected:  03/03/18 0115    Specimen:  Blood Updated:  03/03/18 0139     WBC 11.82 (H) 10*3/mm3      RBC 3.89 (L) 10*6/mm3      Hemoglobin 10.5 (L) g/dL      Hematocrit 36.2 %      MCV 93.1 fL      MCH 27.0 pg      MCHC 29.0 (L) g/dL      RDW 14.6 (H) %      RDW-SD 49.7 fl      MPV 9.8 fL      Platelets 289 10*3/mm3      Neutrophil % 76.8 (H) %      Lymphocyte % 15.3 (L) %      Monocyte % 5.9 %      Eosinophil % 1.2 %      Basophil % 0.3 %      Immature Grans % 0.5 %      Neutrophils, Absolute 9.08 (H) 10*3/mm3      Lymphocytes, Absolute 1.81 10*3/mm3      Monocytes, Absolute 0.70 10*3/mm3      Eosinophils, Absolute 0.14 10*3/mm3      Basophils, Absolute 0.03 10*3/mm3      Immature Grans, Absolute 0.06 (H) 10*3/mm3           I ordered the above labs and reviewed the results    RADIOLOGY  CT Abdomen Pelvis With Contrast   Final Result   IMPRESSION :    1. Mild diverticulosis, no acute inflammatory process demonstrated           This report was finalized on 3/3/2018 2:58 AM by Cameron Wyatt MD.          XR Chest 2 View   Final Result   Under aeration with minimal bibasilar atelectasis       This report was finalized on 3/3/2018 1:15 AM by Cameron Wyatt MD.               I ordered the above noted radiological studies. Interpreted by radiologist. Reviewed by me in PACS.       PROCEDURES  Procedures      PROGRESS AND CONSULTS  ED Course     0046 Pt was offered morphine for her pain, but she declined and also reports her nephrologist telling her that she should avoid anti-inflammatories.    0050 Ordered blood work, CXR,  CT abd/pelvis after bedside evaluation. Ordered tylenol for pain.    0315 Rechecked pt who is resting comfortably. Notified pt of unremarkable labs and imaging results, which show no acute fractures.      MEDICAL DECISION MAKING  Results were reviewed/discussed with the patient and they were also made aware of online access. Pt also made aware that some labs, such as cultures, will not be resulted during ER visit and follow up with PMD is necessary.     MDM  Number of Diagnoses or Management Options  Contusion of abdominal wall, initial encounter:   Contusion of chest wall, unspecified laterality, initial encounter:   Hyperglycemia:      Amount and/or Complexity of Data Reviewed  Clinical lab tests: ordered and reviewed (glucose=201)  Tests in the radiology section of CPT®: ordered and reviewed (CT abd/pelvis show nothing acute.)  Decide to obtain previous medical records or to obtain history from someone other than the patient: yes  Obtain history from someone other than the patient: yes ()  Review and summarize past medical records: yes (Pt has chronic swelling in both legs.)    Patient Progress  Patient progress: stable         DIAGNOSIS  Final diagnoses:   Contusion of chest wall, unspecified laterality, initial encounter   Contusion of abdominal wall, initial encounter   Hyperglycemia       DISPOSITION  DISCHARGE    Patient discharged in stable condition.    Reviewed implications of results, diagnosis, meds, responsibility to follow up, warning signs and symptoms of possible worsening, potential complications and reasons to return to ER.    Patient/Family voiced understanding of above instructions.    Discussed plan for discharge, as there is no emergent indication for admission.  Pt/family is agreeable and understands need for follow up and repeat testing.  Pt is aware that discharge does not mean that nothing is wrong but it indicates no emergency is present that requires admission and they must  continue care with follow-up as given below or physician of their choice.     FOLLOW-UP  Praneeth Valenzuela MD  [id number]  [place] [id number] [phone]    Schedule an appointment as soon as possible for a visit in 3 days  EVEN IF WELL         Medication List      Stop          furosemide 40 MG tablet   Commonly known as:  LASIX       potassium chloride 20 MEQ CR tablet   Commonly known as:  K-DUR,KLOR-CON               Latest Documented Vital Signs:  As of 3:11 AM  BP- 148/73 HR- 96 Temp- 98 °F (36.7 °C) O2 sat- 99%    --  Documentation assistance provided by marilu Hardin for Dr. Ruiz.  Information recorded by the scribe was done at my direction and has been verified and validated by me.     Jing Hardin  03/03/18 [id number]       Aubree Ruiz MD  03/05/18 [id number]

## 2018-03-03 NOTE — ED NOTES
"Pt ambulatory to room 38 with c/o mechanical fall prior to arrival.  Pt states she tripped over her own feet today, fell forward, onto abdomen and left cheek.  Pt had previous fall and sustained fractured left ribs at that time.  Pt c/o pain to bilateral ribs, and initially midsternal (this has resolved, but rib pain continues). Pt states she has a glucose \"monitor\" and is worried about that.  Pt states     Jihan Tolbert RN  03/03/18 0007    "

## 2018-03-13 ENCOUNTER — OFFICE VISIT (OUTPATIENT)
Dept: SURGERY | Facility: CLINIC | Age: 73
End: 2018-03-13

## 2018-03-13 VITALS — HEART RATE: 94 BPM | WEIGHT: 195.5 LBS | OXYGEN SATURATION: 95 % | BODY MASS INDEX: 38.38 KG/M2 | HEIGHT: 60 IN

## 2018-03-13 DIAGNOSIS — E66.09 OBESITY DUE TO EXCESS CALORIES WITH SERIOUS COMORBIDITY, UNSPECIFIED CLASSIFICATION: ICD-10-CM

## 2018-03-13 DIAGNOSIS — D12.6 ADENOMATOUS POLYP OF COLON, UNSPECIFIED PART OF COLON: Primary | ICD-10-CM

## 2018-03-13 PROCEDURE — 99204 OFFICE O/P NEW MOD 45 MIN: CPT | Performed by: SURGERY

## 2018-03-13 NOTE — PROGRESS NOTES
CC: Evaluate for diarrhea and nausea     HPI: The patient is a very pleasant 72-year-old Chinese-speaking female that was referred to me by Dr. Valenzuela for evaluation of diarrhea and nausea.  The patient has been having nausea that can happen at any time of the day and is usually related with spicy foods and meat intake.  She does not have any nausea with healthy diet.  She reports no episodes of vomiting.  She reports she has been taking omeprazole 20 mg now for several months that has not helped her.  She also reports loose bowel movements that happened after eating meats or spicy food.  She says she has diarrhea but she'll usually have one or 2 loose bowel movements that are self limited.  She denies any recent change in bowel habits.  She has otherwise regular bowel movements of 203 bowel movements a day.  She has not noticed any melena or bright red blood per rectum.  She reports no abdominal pain but some bloating.  She has history of intermittent episodes of rectal spasm that are usually self limiting and get better with Tylenol.  She is diabetic and her blood sugars has been poorly controlled.  The patient has been on prednisone since September 2016 when she was diagnosed with temporal artery arteritis.  Her initial dose was prednisone 60 mg a has been slowly weaned down to 10 mg daily.  She has a strong family history of GI cancers including gastric, colon cancer.  Her closest family member with colon cancer was her grandfather.  She reports having a colonoscopy 5 years ago where several polyps were removed.  She was recommended to have a colonoscopy in 2 years but is was unable to be performed due to the patient's respiratory status.     PMH: Hypertension, asthma, hereditary spherocytosis, anxiety, depression, temporary artery arteritis, poorly controlled type 2 diabetes     PSH: Cholecystectomy, hysterectomy, C-sections ×3, breast biopsy    MEDS: Bisoprolol, levocitrizine, metformin, montelukast,  omeprazole 20 mg twice a day, prednisone 10 mg daily, valsartan-hydrochlorothiazide, vitamin B12, atorvastatin.     ALL: Sulfa antibiotics and aspirin    FH and SH: She has family history of esophageal cancer on her father, throat cancer and her sister, colon cancer in her grandfather, gastric cancer in one of her calcium.  Heart disease of her mother.  She has gone through menopause.  The patient is  and has 3 kids.  She does not smoke or drink any alcohol intake any drugs.     ROS:   Constitutional: denies any weight changes or weakness.  Reports fatigue  Eyes: : denies blurred or double vision  Cardiovascular: denies chest pain, palpitations, edemas.   Respiratory: denies cough, sputum, reports shortness of breath  Gastrointestinal: Denies reflux or regurgitation  Genitourinary: denies dysuria, frequency.  Endocrine: denies cold intolerance, lethargy and flushing.  Hem: denies excessive bruising and postop bleeding.  Musculoskeletal: denies weakness, reports joint and back pain  Neuro: denies seizures, CVA, paresthesia, or peripheral neuropathy.   Skin: denies change in nevi, rashes, masses.  Psychiatric: Reports agitation, decreased concentration, anxiety, sleep disturbance E, depression     PE: The patient is afebrile, vital signs are stable  Alert and oriented ×3, no acute distress.  Head is normocephalic and atraumatic.  Neck is supple there is no thyroimegaly or lymphadenopathy  Chest is clear bilaterally there is no added sounds  Regular rate and rhythm, no murmurs  Abdomen is obese and soft and nontender, is nondistended, bowel sounds are positive.  There is no rebound or guarding is and there is no peritoneal signs.  There is a well-healed right upper quadrant incision there is no evidence of hernia.  There is a well-healed infraumbilical incision there is no evidence of hernias  No clubbing cyanosis or edema in lower or upper extremities  Rectal exam was deferred    Diagnostic studies:   3/13/18:    Glucose 65 - 99 mg/dL 201     BUN 8 - 23 mg/dL 16    Creatinine 0.57 - 1.00 mg/dL 0.90    Sodium 136 - 145 mmol/L 142    Potassium 3.5 - 5.2 mmol/L 3.4     Chloride 98 - 107 mmol/L 101    CO2 22.0 - 29.0 mmol/L 30.0     Calcium 8.6 - 10.5 mg/dL 8.6    Total Protein 6.0 - 8.5 g/dL 6.4    Albumin 3.50 - 5.20 g/dL 3.20     ALT (SGPT) 1 - 33 U/L 10    AST (SGOT) 1 - 32 U/L 11    Alkaline Phosphatase 39 - 117 U/L 101    Total Bilirubin 0.1 - 1.2 mg/dL 0.2      WBC 4.50 - 10.70 10*3/mm3 11.82     RBC 3.90 - 5.20 10*6/mm3 3.89     Hemoglobin 11.9 - 15.5 g/dL 10.5     Hematocrit 35.6 - 45.5 % 36.2     Assessment and plan    The patient is a very pleasant 72-year-old female with mainly nausea related to food intake of spicy foods and meats.  She has intermittent loose bowel movements that are likely diet related.  She does not have history of peptic ulcer disease but she is a high risk due to chronic steroids use.  Some of the symptoms may be explained by peptic ulcer disease but can also be caused by uncontrolled diabetes.  She has strong family history of cancer and specifically 1 second-degree relative with colon cancer.  She has history of colon polyps.  She has uncontrolled diabetes and I discussed with her about the need to improve this.  I think the patient needs to stick to a healthy diet and lose some weight.  She needs to have better control of her blood glucose.  I discussed with her about the need to perform an upper endoscopy and colonoscopy for surveillance of colon polyps.      Indications, risks and procedure were discussed with Her including but not limited to bleeding, infection, possibility of perforation and possible polypectomy. All of their questions were answered and  would like to proceed with the above recommendations.  Any additional follow-up will be discussed with Her after the results have been reviewed.    - Patient to start taking Metamucil 1 tablespoon with water daily  - Plan for upper  endoscopy and colonoscopy  - Better glucose control avoiding fatty foods and carbohydrates  - The patient verbalized understanding and agree with the plan     Ashwin Alvarado MD  General, Minimally Invasive and Endoscopic Surgery  [facility]     [address], [address]  [place], [place], [address]  P: [phone]  F: [phone]

## 2018-04-06 ENCOUNTER — HOSPITAL ENCOUNTER (EMERGENCY)
Facility: HOSPITAL | Age: 73
Discharge: HOME | End: 2018-04-06
Attending: EMERGENCY MEDICINE | Admitting: EMERGENCY MEDICINE

## 2018-04-06 VITALS
HEIGHT: 59 IN | RESPIRATION RATE: 18 BRPM | SYSTOLIC BLOOD PRESSURE: 162 MMHG | BODY MASS INDEX: 38.91 KG/M2 | DIASTOLIC BLOOD PRESSURE: 68 MMHG | OXYGEN SATURATION: 100 % | TEMPERATURE: 98.7 F | HEART RATE: 72 BPM | WEIGHT: 193 LBS

## 2018-04-06 DIAGNOSIS — Z79.4 TYPE 2 DIABETES MELLITUS WITH COMPLICATION, WITH LONG-TERM CURRENT USE OF INSULIN (HCC): ICD-10-CM

## 2018-04-06 DIAGNOSIS — R73.9 HYPERGLYCEMIA: Primary | ICD-10-CM

## 2018-04-06 DIAGNOSIS — E11.8 TYPE 2 DIABETES MELLITUS WITH COMPLICATION, WITH LONG-TERM CURRENT USE OF INSULIN (HCC): ICD-10-CM

## 2018-04-06 LAB
ALBUMIN SERPL-MCNC: 4.1 G/DL (ref 3.5–5.2)
ALBUMIN/GLOB SERPL: 1.2 G/DL
ALP SERPL-CCNC: 128 U/L (ref 39–117)
ALT SERPL W P-5'-P-CCNC: 10 U/L (ref 1–33)
ANION GAP SERPL CALCULATED.3IONS-SCNC: 13 MMOL/L
AST SERPL-CCNC: 10 U/L (ref 1–32)
B-OH-BUTYR SERPL-SCNC: 0.08 MMOL/L (ref 0.02–0.27)
BACTERIA UR QL AUTO: ABNORMAL /HPF
BASOPHILS # BLD AUTO: 0.02 10*3/MM3 (ref 0–0.2)
BASOPHILS NFR BLD AUTO: 0.1 % (ref 0–1.5)
BILIRUB SERPL-MCNC: 0.3 MG/DL (ref 0.1–1.2)
BILIRUB UR QL STRIP: NEGATIVE
BUN BLD-MCNC: 22 MG/DL (ref 8–23)
BUN/CREAT SERPL: 23.7 (ref 7–25)
CALCIUM SPEC-SCNC: 9.4 MG/DL (ref 8.6–10.5)
CHLORIDE SERPL-SCNC: 96 MMOL/L (ref 98–107)
CLARITY UR: CLEAR
CO2 SERPL-SCNC: 28 MMOL/L (ref 22–29)
COLOR UR: YELLOW
CREAT BLD-MCNC: 0.93 MG/DL (ref 0.57–1)
DEPRECATED RDW RBC AUTO: 48.1 FL (ref 37–54)
EOSINOPHIL # BLD AUTO: 0.03 10*3/MM3 (ref 0–0.7)
EOSINOPHIL NFR BLD AUTO: 0.2 % (ref 0.3–6.2)
ERYTHROCYTE [DISTWIDTH] IN BLOOD BY AUTOMATED COUNT: 14.4 % (ref 11.7–13)
GFR SERPL CREATININE-BSD FRML MDRD: 59 ML/MIN/1.73
GLOBULIN UR ELPH-MCNC: 3.3 GM/DL
GLUCOSE BLD-MCNC: 324 MG/DL (ref 65–99)
GLUCOSE BLDC GLUCOMTR-MCNC: 202 MG/DL (ref 70–130)
GLUCOSE BLDC GLUCOMTR-MCNC: 252 MG/DL (ref 70–130)
GLUCOSE UR STRIP-MCNC: ABNORMAL MG/DL
HCT VFR BLD AUTO: 37.9 % (ref 35.6–45.5)
HGB BLD-MCNC: 11.2 G/DL (ref 11.9–15.5)
HGB UR QL STRIP.AUTO: NEGATIVE
HOLD SPECIMEN: NORMAL
HOLD SPECIMEN: NORMAL
HYALINE CASTS UR QL AUTO: ABNORMAL /LPF
IMM GRANULOCYTES # BLD: 0.09 10*3/MM3 (ref 0–0.03)
IMM GRANULOCYTES NFR BLD: 0.6 % (ref 0–0.5)
KETONES UR QL STRIP: NEGATIVE
LEUKOCYTE ESTERASE UR QL STRIP.AUTO: ABNORMAL
LYMPHOCYTES # BLD AUTO: 1.07 10*3/MM3 (ref 0.9–4.8)
LYMPHOCYTES NFR BLD AUTO: 6.9 % (ref 19.6–45.3)
MCH RBC QN AUTO: 27.1 PG (ref 26.9–32)
MCHC RBC AUTO-ENTMCNC: 29.6 G/DL (ref 32.4–36.3)
MCV RBC AUTO: 91.5 FL (ref 80.5–98.2)
MONOCYTES # BLD AUTO: 0.47 10*3/MM3 (ref 0.2–1.2)
MONOCYTES NFR BLD AUTO: 3.1 % (ref 5–12)
NEUTROPHILS # BLD AUTO: 13.72 10*3/MM3 (ref 1.9–8.1)
NEUTROPHILS NFR BLD AUTO: 89.1 % (ref 42.7–76)
NITRITE UR QL STRIP: NEGATIVE
PH UR STRIP.AUTO: 5.5 [PH] (ref 5–8)
PLATELET # BLD AUTO: 319 10*3/MM3 (ref 140–500)
PMV BLD AUTO: 10.1 FL (ref 6–12)
POTASSIUM BLD-SCNC: 4.9 MMOL/L (ref 3.5–5.2)
PROT SERPL-MCNC: 7.4 G/DL (ref 6–8.5)
PROT UR QL STRIP: ABNORMAL
RBC # BLD AUTO: 4.14 10*6/MM3 (ref 3.9–5.2)
RBC # UR: ABNORMAL /HPF
REF LAB TEST METHOD: ABNORMAL
SODIUM BLD-SCNC: 137 MMOL/L (ref 136–145)
SP GR UR STRIP: 1.03 (ref 1–1.03)
SQUAMOUS #/AREA URNS HPF: ABNORMAL /HPF
UROBILINOGEN UR QL STRIP: ABNORMAL
WBC NRBC COR # BLD: 15.4 10*3/MM3 (ref 4.5–10.7)
WBC UR QL AUTO: ABNORMAL /HPF
WHOLE BLOOD HOLD SPECIMEN: NORMAL
WHOLE BLOOD HOLD SPECIMEN: NORMAL

## 2018-04-06 PROCEDURE — 80053 COMPREHEN METABOLIC PANEL: CPT

## 2018-04-06 PROCEDURE — 82010 KETONE BODYS QUAN: CPT

## 2018-04-06 PROCEDURE — 82962 GLUCOSE BLOOD TEST: CPT

## 2018-04-06 PROCEDURE — 99284 EMERGENCY DEPT VISIT MOD MDM: CPT

## 2018-04-06 PROCEDURE — 63710000001 INSULIN REGULAR HUMAN PER 5 UNITS: Performed by: EMERGENCY MEDICINE

## 2018-04-06 PROCEDURE — 81001 URINALYSIS AUTO W/SCOPE: CPT

## 2018-04-06 PROCEDURE — 85025 COMPLETE CBC W/AUTO DIFF WBC: CPT

## 2018-04-06 PROCEDURE — 96360 HYDRATION IV INFUSION INIT: CPT

## 2018-04-06 RX ORDER — SODIUM CHLORIDE 0.9 % (FLUSH) 0.9 %
10 SYRINGE (ML) INJECTION AS NEEDED
Status: DISCONTINUED | OUTPATIENT
Start: 2018-04-06 | End: 2018-04-06 | Stop reason: HOSPADM

## 2018-04-06 RX ADMIN — SODIUM CHLORIDE 1000 ML: 9 INJECTION, SOLUTION INTRAVENOUS at 10:01

## 2018-04-06 RX ADMIN — HUMAN INSULIN 4 UNITS: 100 INJECTION, SOLUTION SUBCUTANEOUS at 10:01

## 2018-04-06 NOTE — ED PROVIDER NOTES
" EMERGENCY DEPARTMENT ENCOUNTER    CHIEF COMPLAINT  Chief Complaint: Hyperglycemia  History given by: Pt with  in room  History limited by: Nothing  Room Number: 34/34  PMD: Praneeth Valenzuela MD      HPI:  Pt is a 72 y.o. female with a hx of diabetes who presents complaining of hyperglycemia over the last few days. Pt reports that at 0300 this morning she measured her BS at 368 and on recheck at 0700 it was still elevated. Pt also c/o polydipsia and polyuria. Pt denies dysuria, abd pain, fever, CP, or SOA.  She reports that she is \"naturally a nervous person\" and when she saw her BS she decided to come to the ER. She reports that she is not compliant with a strict diabetic diet, and does not eat at a consistent hour. Yesterday, she had coffee and bread for breakfast at 1100, and rice, salad, and water for her last meal of the day at 1700. She reports takes 1000 mg of metformin twice per day as well as 25 mg with breakfast, 20 mg at lunch, and 20 mg at dinner. She also reports taking toujeo 42 at night before bed. 1-2 weeks she was changed from tresiba to tuojeo by Dr. Valenzuela. She reports that her HgA1c was 19 a few months ago. She also reports that she has been taking prednisone for temporal arteritis for the lat 1.5 years. No visual change or change in frequent headaches.      Duration:  Since early this morning  Onset: gradual  Timing: constant  Quality: BS of 368 at 0300 this morning  Intensity/Severity: mild to moderate  Progression: unchanged  Associated Symptoms: polydipsia, polyuria, anxiety  Aggravating Factors: none  Alleviating Factors: none  Previous Episodes: Pt reports a hx of diabetes  Treatment before arrival: Pt reports that 1-2 weeks ago she was changed from tresiba to tuojeo by Dr. Valenzuela    PAST MEDICAL HISTORY  Active Ambulatory Problems     Diagnosis Date Noted   • No Active Ambulatory Problems     Resolved Ambulatory Problems     Diagnosis Date Noted   • No Resolved Ambulatory Problems "     Past Medical History:   Diagnosis Date   • Anemia    • Anxiety and depression    • Asthma    • Colon polyp    • Diabetes mellitus    • Hyperlipidemia        PAST SURGICAL HISTORY  Past Surgical History:   Procedure Laterality Date   • BREAST BIOPSY Left     benign   •  SECTION      x3   • CHOLECYSTECTOMY     • HYSTERECTOMY         FAMILY HISTORY  Family History   Problem Relation Age of Onset   • Hypertension Father    • Diabetes Father    • Cancer Father    • Cancer Sister    • Diabetes Paternal Grandmother    • Hypertension Paternal Grandfather    • Heart disease Mother        SOCIAL HISTORY  Social History     Social History   • Marital status:      Spouse name: N/A   • Number of children: N/A   • Years of education: N/A     Occupational History   • Not on file.     Social History Main Topics   • Smoking status: Never Smoker   • Smokeless tobacco: Not on file      Comment: caffine use   • Alcohol use No   • Drug use: No   • Sexual activity: Defer     Other Topics Concern   • Not on file     Social History Narrative   • No narrative on file       ALLERGIES  Aspirin and Sulfa antibiotics    REVIEW OF SYSTEMS  Review of Systems   Constitutional: Negative for fever.   HENT: Negative for sore throat.    Eyes: Negative.    Respiratory: Negative for cough and shortness of breath.    Cardiovascular: Negative for chest pain.   Gastrointestinal: Negative for abdominal pain, diarrhea and vomiting.   Endocrine: Positive for polydipsia and polyuria.   Genitourinary: Negative for dysuria.   Musculoskeletal: Negative for neck pain.   Skin: Negative for rash.   Allergic/Immunologic: Negative.    Neurological: Negative for weakness, numbness and headaches.   Hematological: Negative.    Psychiatric/Behavioral: The patient is nervous/anxious.    All other systems reviewed and are negative.      PHYSICAL EXAM  ED Triage Vitals   Temp Heart Rate Resp BP SpO2   18 0753 18 0753 18 0753 18 0757  04/06/18 0753   98.7 °F (37.1 °C) 97 20 163/76 99 %      Temp src Heart Rate Source Patient Position BP Location FiO2 (%)   04/06/18 0753 -- 04/06/18 0859 04/06/18 0859 --   Tympanic  Sitting Right arm        Physical Exam   Constitutional: She is oriented to person, place, and time and well-developed, well-nourished, and in no distress. No distress.   HENT:   Head: Normocephalic and atraumatic.   Eyes: EOM are normal. Pupils are equal, round, and reactive to light.   Neck: Normal range of motion. Neck supple.   Cardiovascular: Normal rate, regular rhythm and normal heart sounds.    Pulmonary/Chest: Effort normal and breath sounds normal. No respiratory distress.   Abdominal: Soft. There is no tenderness. There is no rebound and no guarding.   Obese   Musculoskeletal: Normal range of motion. She exhibits no edema.   Neurological: She is alert and oriented to person, place, and time. She has normal sensation and normal strength.   Skin: Skin is warm and dry. No rash noted.   Psychiatric: Mood and affect normal.   Nursing note and vitals reviewed.      LAB RESULTS  Lab Results (last 24 hours)     Procedure Component Value Units Date/Time    CBC & Differential [[id number]] Collected:  04/06/18 0814    Specimen:  Blood Updated:  04/06/18 0830    Narrative:       The following orders were created for panel order CBC & Differential.  Procedure                               Abnormality         Status                     ---------                               -----------         ------                     CBC Auto Differential[[id number]]        Abnormal            Final result                 Please view results for these tests on the individual orders.    Comprehensive Metabolic Panel [[id number]]  (Abnormal) Collected:  04/06/18 0814    Specimen:  Blood Updated:  04/06/18 0846     Glucose 324 (H) mg/dL      BUN 22 mg/dL      Creatinine 0.93 mg/dL      Sodium 137 mmol/L      Potassium 4.9 mmol/L      Chloride 96 (L) mmol/L       CO2 28.0 mmol/L      Calcium 9.4 mg/dL      Total Protein 7.4 g/dL      Albumin 4.10 g/dL      ALT (SGPT) 10 U/L      AST (SGOT) 10 U/L      Alkaline Phosphatase 128 (H) U/L      Total Bilirubin 0.3 mg/dL      eGFR Non African Amer 59 (L) mL/min/1.73      Globulin 3.3 gm/dL      A/G Ratio 1.2 g/dL      BUN/Creatinine Ratio 23.7     Anion Gap 13.0 mmol/L     Narrative:       The MDRD GFR formula is only valid for adults with stable renal function between ages 18 and 70.    Ketone Bodies, Serum (Not performed at [facility]) [[id number]] Collected:  04/06/18 0814    Specimen:  Blood Updated:  04/06/18 0847    Narrative:       The following orders were created for panel order Ketone Bodies, Serum (Not performed at [facility]).  Procedure                               Abnormality         Status                     ---------                               -----------         ------                     Beta Hydroxybutyrate Alex...[[id number]]  Normal              Final result                 Please view results for these tests on the individual orders.    Urinalysis With / Microscopic If Indicated - Urine, Clean Catch [[id number]]  (Abnormal) Collected:  04/06/18 0814    Specimen:  Urine from Urine, Clean Catch Updated:  04/06/18 0830     Color, UA Yellow     Appearance, UA Clear     pH, UA 5.5     Specific Gravity, UA 1.026     Glucose, UA >=1000 mg/dL (3+) (A)     Ketones, UA Negative     Bilirubin, UA Negative     Blood, UA Negative     Protein, UA 30 mg/dL (1+) (A)     Leuk Esterase, UA Small (1+) (A)     Nitrite, UA Negative     Urobilinogen, UA 0.2 E.U./dL    CBC Auto Differential [[id number]]  (Abnormal) Collected:  04/06/18 0814    Specimen:  Blood Updated:  04/06/18 0830     WBC 15.40 (H) 10*3/mm3      RBC 4.14 10*6/mm3      Hemoglobin 11.2 (L) g/dL      Hematocrit 37.9 %      MCV 91.5 fL      MCH 27.1 pg      MCHC 29.6 (L) g/dL      RDW 14.4 (H) %      RDW-SD 48.1 fl      MPV 10.1 fL      Platelets 319 10*3/mm3       Neutrophil % 89.1 (H) %      Lymphocyte % 6.9 (L) %      Monocyte % 3.1 (L) %      Eosinophil % 0.2 (L) %      Basophil % 0.1 %      Immature Grans % 0.6 (H) %      Neutrophils, Absolute 13.72 (H) 10*3/mm3      Lymphocytes, Absolute 1.07 10*3/mm3      Monocytes, Absolute 0.47 10*3/mm3      Eosinophils, Absolute 0.03 10*3/mm3      Basophils, Absolute 0.02 10*3/mm3      Immature Grans, Absolute 0.09 (H) 10*3/mm3     Beta Hydroxybutyrate Quantitative [[id number]]  (Normal) Collected:  04/06/18 0814    Specimen:  Blood Updated:  04/06/18 0847     Beta-Hydroxybutyrate Quant 0.080 mmol/L     Urinalysis, Microscopic Only - Urine, Clean Catch [[id number]]  (Abnormal) Collected:  04/06/18 0814    Specimen:  Urine from Urine, Clean Catch Updated:  04/06/18 0830     RBC, UA 0-2 /HPF      WBC, UA 6-12 (A) /HPF      Bacteria, UA None Seen /HPF      Squamous Epithelial Cells, UA 3-6 (A) /HPF      Hyaline Casts, UA 0-2 /LPF      Methodology Automated Microscopy    POC Glucose Once [[id number]]  (Abnormal) Collected:  04/06/18 0953    Specimen:  Blood Updated:  04/06/18 0954     Glucose 252 (H) mg/dL     Narrative:       Meter: [id number] : [id number] Arturo James RN    POC Glucose Once [[id number]]  (Abnormal) Collected:  04/06/18 1103    Specimen:  Blood Updated:  04/06/18 1105     Glucose 202 (H) mg/dL     Narrative:       Meter: [id number] : [id number] Arturo James RN          I ordered the above labs and reviewed the results    PROCEDURES  Procedures      PROGRESS AND CONSULTS  ED Course     0914 - Instructed pt about strategies for maintaining blood sugar levels. D/w pt the plan to consult with Dr. Valenzuela    0936 - IVF and insulin ordered.    1015 - Consult placed with Dr. Valenzuela (PCP).    1052 - Spoke with Dr. Valenzuela (PCP) who reports that he will see the pt after discharge in 3 days.     1054 - Rechecked pt. Pt is resting comfortably in NAD. D/w pt the consult with Dr. Valenzuela and plans to discharge home.  Instructed pt to stick to regular meal times and to avoid sweet foods and to stop taking triseba. Pt understands and agrees with plan. All questions answered.       MEDICAL DECISION MAKING  Results were reviewed/discussed with the patient and they were also made aware of online access. Pt also made aware that some labs, such as cultures, will not be resulted during ER visit and follow up with PMD is necessary.     MDM  Number of Diagnoses or Management Options     Amount and/or Complexity of Data Reviewed  Clinical lab tests: ordered and reviewed (Glucose - 324, 252)  Decide to obtain previous medical records or to obtain history from someone other than the patient: yes  Review and summarize past medical records: yes (Pt's glucose was 201 one month ago, and 300 two months ago.)  Discuss the patient with other providers: yes (Dr. Valenzuela (PCP))           DIAGNOSIS  Final diagnoses:   Hyperglycemia   Type 2 diabetes mellitus with complication, with long-term current use of insulin       DISPOSITION  DISCHARGE    Patient discharged in stable condition.    Reviewed implications of results, diagnosis, meds, responsibility to follow up, warning signs and symptoms of possible worsening, potential complications and reasons to return to ER.    Patient/Family voiced understanding of above instructions.    Discussed plan for discharge, as there is no emergent indication for admission. Patient referred to primary care provider for BP management due to today's BP. Pt/family is agreeable and understands need for follow up and repeat testing.  Pt is aware that discharge does not mean that nothing is wrong but it indicates no emergency is present that requires admission and they must continue care with follow-up as given below or physician of their choice.     FOLLOW-UP  Praneeth Valenzuela MD  [address] [id number] [phone]    In 3 days  He will see you on Monday. Call to get appointment. Return if any  pain, chest pain, shortness of breath, fever, any concerns         Medication List      Stop    TRESIBA FLEXTOUCH SC              Latest Documented Vital Signs:  As of 11:08 AM  BP- 164/74 HR- 77 Temp- 98.7 °F (37.1 °C) (Tympanic) O2 sat- 99%    --  Documentation assistance provided by marilu Jones for Dr. Evangelista.  Information recorded by the scribe was done at my direction and has been verified and validated by me.       Grey Jones  04/06/18 1100       Andrew Evangelista MD  04/06/18 1144

## 2018-04-10 ENCOUNTER — PATIENT OUTREACH (OUTPATIENT)
Dept: CASE MANAGEMENT | Facility: OTHER | Age: 73
End: 2018-04-10

## 2018-04-10 NOTE — OUTREACH NOTE
Had follow-up appointment with Dr. Valenzuela 4/9/18.  No questions related to medications or diet regimen.  Blood sugar this morning 115.  No concerns or issues voiced today.

## 2018-04-25 ENCOUNTER — TRANSCRIBE ORDERS (OUTPATIENT)
Dept: LAB | Facility: HOSPITAL | Age: 73
End: 2018-04-25

## 2018-04-25 ENCOUNTER — LAB (OUTPATIENT)
Dept: LAB | Facility: HOSPITAL | Age: 73
End: 2018-04-25
Attending: INTERNAL MEDICINE

## 2018-04-25 DIAGNOSIS — M31.5 GIANT CELL ARTERITIS WITH POLYMYALGIA RHEUMATICA (HCC): ICD-10-CM

## 2018-04-25 DIAGNOSIS — Z79.52 LONG TERM CURRENT USE OF SYSTEMIC STEROIDS: ICD-10-CM

## 2018-04-25 DIAGNOSIS — E08.618: ICD-10-CM

## 2018-04-25 DIAGNOSIS — E08.618: Primary | ICD-10-CM

## 2018-04-25 LAB
ALBUMIN SERPL-MCNC: 3.5 G/DL (ref 3.5–5.2)
ALBUMIN/GLOB SERPL: 1 G/DL
ALP SERPL-CCNC: 92 U/L (ref 39–117)
ALT SERPL W P-5'-P-CCNC: 10 U/L (ref 1–33)
ANION GAP SERPL CALCULATED.3IONS-SCNC: 10.7 MMOL/L
AST SERPL-CCNC: 13 U/L (ref 1–32)
BILIRUB SERPL-MCNC: 0.3 MG/DL (ref 0.1–1.2)
BUN BLD-MCNC: 20 MG/DL (ref 8–23)
BUN/CREAT SERPL: 21.7 (ref 7–25)
CALCIUM SPEC-SCNC: 9.6 MG/DL (ref 8.6–10.5)
CHLORIDE SERPL-SCNC: 98 MMOL/L (ref 98–107)
CO2 SERPL-SCNC: 28.3 MMOL/L (ref 22–29)
CREAT BLD-MCNC: 0.92 MG/DL (ref 0.57–1)
CRP SERPL-MCNC: 4.01 MG/DL (ref 0–0.5)
DEPRECATED RDW RBC AUTO: 47.5 FL (ref 37–54)
ERYTHROCYTE [DISTWIDTH] IN BLOOD BY AUTOMATED COUNT: 14.2 % (ref 11.7–13)
ERYTHROCYTE [SEDIMENTATION RATE] IN BLOOD: 44 MM/HR (ref 0–30)
GFR SERPL CREATININE-BSD FRML MDRD: 60 ML/MIN/1.73
GLOBULIN UR ELPH-MCNC: 3.5 GM/DL
GLUCOSE BLD-MCNC: 107 MG/DL (ref 65–99)
HBA1C MFR BLD: 9.1 % (ref 4.8–5.6)
HCT VFR BLD AUTO: 37.8 % (ref 35.6–45.5)
HGB BLD-MCNC: 11 G/DL (ref 11.9–15.5)
MCH RBC QN AUTO: 26.6 PG (ref 26.9–32)
MCHC RBC AUTO-ENTMCNC: 29.1 G/DL (ref 32.4–36.3)
MCV RBC AUTO: 91.5 FL (ref 80.5–98.2)
PLATELET # BLD AUTO: 297 10*3/MM3 (ref 140–500)
PMV BLD AUTO: 10.5 FL (ref 6–12)
POTASSIUM BLD-SCNC: 4.3 MMOL/L (ref 3.5–5.2)
PROT SERPL-MCNC: 7 G/DL (ref 6–8.5)
RBC # BLD AUTO: 4.13 10*6/MM3 (ref 3.9–5.2)
SODIUM BLD-SCNC: 137 MMOL/L (ref 136–145)
WBC NRBC COR # BLD: 13.05 10*3/MM3 (ref 4.5–10.7)

## 2018-04-25 PROCEDURE — 83036 HEMOGLOBIN GLYCOSYLATED A1C: CPT

## 2018-04-25 PROCEDURE — 85652 RBC SED RATE AUTOMATED: CPT

## 2018-04-25 PROCEDURE — 36415 COLL VENOUS BLD VENIPUNCTURE: CPT

## 2018-04-25 PROCEDURE — 80053 COMPREHEN METABOLIC PANEL: CPT

## 2018-04-25 PROCEDURE — 86140 C-REACTIVE PROTEIN: CPT

## 2018-04-25 PROCEDURE — 85027 COMPLETE CBC AUTOMATED: CPT

## 2018-09-18 ENCOUNTER — TRANSCRIBE ORDERS (OUTPATIENT)
Dept: ADMINISTRATIVE | Facility: HOSPITAL | Age: 73
End: 2018-09-18

## 2018-09-18 DIAGNOSIS — M81.0 OSTEOPOROSIS OF FOREARM: Primary | ICD-10-CM

## 2018-09-21 PROBLEM — M81.0 OSTEOPOROSIS: Status: ACTIVE | Noted: 2018-09-21

## 2018-09-25 ENCOUNTER — HOSPITAL ENCOUNTER (OUTPATIENT)
Dept: INFUSION THERAPY | Facility: HOSPITAL | Age: 73
Discharge: HOME | End: 2018-09-25
Attending: INTERNAL MEDICINE

## 2018-09-28 ENCOUNTER — HOSPITAL ENCOUNTER (OUTPATIENT)
Dept: GENERAL RADIOLOGY | Facility: HOSPITAL | Age: 73
Discharge: HOME | End: 2018-09-28
Attending: INTERNAL MEDICINE | Admitting: INTERNAL MEDICINE

## 2018-09-28 ENCOUNTER — HOSPITAL ENCOUNTER (OUTPATIENT)
Dept: GENERAL RADIOLOGY | Facility: HOSPITAL | Age: 73
Discharge: HOME | End: 2018-09-28
Attending: INTERNAL MEDICINE

## 2018-09-28 DIAGNOSIS — M25.552 LEFT HIP PAIN: ICD-10-CM

## 2018-09-28 DIAGNOSIS — M54.5 LOW BACK PAIN, UNSPECIFIED BACK PAIN LATERALITY, UNSPECIFIED CHRONICITY, WITH SCIATICA PRESENCE UNSPECIFIED: ICD-10-CM

## 2018-09-28 PROCEDURE — 72110 X-RAY EXAM L-2 SPINE 4/>VWS: CPT

## 2018-09-28 PROCEDURE — 73502 X-RAY EXAM HIP UNI 2-3 VIEWS: CPT

## 2018-10-10 ENCOUNTER — EPISODE CHANGES (OUTPATIENT)
Dept: CASE MANAGEMENT | Facility: OTHER | Age: 73
End: 2018-10-10

## 2018-10-29 ENCOUNTER — HOSPITAL ENCOUNTER (EMERGENCY)
Facility: HOSPITAL | Age: 73
Discharge: HOME | End: 2018-10-29
Attending: EMERGENCY MEDICINE | Admitting: EMERGENCY MEDICINE

## 2018-10-29 ENCOUNTER — APPOINTMENT (OUTPATIENT)
Dept: CARDIOLOGY | Facility: HOSPITAL | Age: 73
End: 2018-10-29

## 2018-10-29 VITALS
HEART RATE: 99 BPM | OXYGEN SATURATION: 98 % | SYSTOLIC BLOOD PRESSURE: 159 MMHG | DIASTOLIC BLOOD PRESSURE: 70 MMHG | WEIGHT: 190.04 LBS | HEIGHT: 60 IN | TEMPERATURE: 96.7 F | BODY MASS INDEX: 37.31 KG/M2 | RESPIRATION RATE: 16 BRPM

## 2018-10-29 DIAGNOSIS — I80.9 THROMBOPHLEBITIS: Primary | ICD-10-CM

## 2018-10-29 LAB
BH CV LOWER VASCULAR LEFT COMMON FEMORAL AUGMENT: NORMAL
BH CV LOWER VASCULAR LEFT COMMON FEMORAL COMPETENT: NORMAL
BH CV LOWER VASCULAR LEFT COMMON FEMORAL COMPRESS: NORMAL
BH CV LOWER VASCULAR LEFT COMMON FEMORAL PHASIC: NORMAL
BH CV LOWER VASCULAR LEFT COMMON FEMORAL SPONT: NORMAL
BH CV LOWER VASCULAR RIGHT COMMON FEMORAL AUGMENT: NORMAL
BH CV LOWER VASCULAR RIGHT COMMON FEMORAL COMPETENT: NORMAL
BH CV LOWER VASCULAR RIGHT COMMON FEMORAL COMPRESS: NORMAL
BH CV LOWER VASCULAR RIGHT COMMON FEMORAL PHASIC: NORMAL
BH CV LOWER VASCULAR RIGHT COMMON FEMORAL SPONT: NORMAL
BH CV LOWER VASCULAR RIGHT DISTAL FEMORAL COMPRESS: NORMAL
BH CV LOWER VASCULAR RIGHT GASTRONEMIUS COMPRESS: NORMAL
BH CV LOWER VASCULAR RIGHT GREATER SAPH AK COMPRESS: NORMAL
BH CV LOWER VASCULAR RIGHT GREATER SAPH BK COMPRESS: NORMAL
BH CV LOWER VASCULAR RIGHT LESSER SAPH COMPRESS: NORMAL
BH CV LOWER VASCULAR RIGHT MID FEMORAL AUGMENT: NORMAL
BH CV LOWER VASCULAR RIGHT MID FEMORAL COMPETENT: NORMAL
BH CV LOWER VASCULAR RIGHT MID FEMORAL COMPRESS: NORMAL
BH CV LOWER VASCULAR RIGHT MID FEMORAL PHASIC: NORMAL
BH CV LOWER VASCULAR RIGHT MID FEMORAL SPONT: NORMAL
BH CV LOWER VASCULAR RIGHT PERONEAL COMPRESS: NORMAL
BH CV LOWER VASCULAR RIGHT POPLITEAL AUGMENT: NORMAL
BH CV LOWER VASCULAR RIGHT POPLITEAL COMPETENT: NORMAL
BH CV LOWER VASCULAR RIGHT POPLITEAL COMPRESS: NORMAL
BH CV LOWER VASCULAR RIGHT POPLITEAL PHASIC: NORMAL
BH CV LOWER VASCULAR RIGHT POPLITEAL SPONT: NORMAL
BH CV LOWER VASCULAR RIGHT POSTERIOR TIBIAL COMPRESS: NORMAL
BH CV LOWER VASCULAR RIGHT PROXIMAL FEMORAL COMPRESS: NORMAL
BH CV LOWER VASCULAR RIGHT SAPHENOFEMORAL JUNCTION AUGMENT: NORMAL
BH CV LOWER VASCULAR RIGHT SAPHENOFEMORAL JUNCTION COMPETENT: NORMAL
BH CV LOWER VASCULAR RIGHT SAPHENOFEMORAL JUNCTION COMPRESS: NORMAL
BH CV LOWER VASCULAR RIGHT SAPHENOFEMORAL JUNCTION PHASIC: NORMAL
BH CV LOWER VASCULAR RIGHT SAPHENOFEMORAL JUNCTION SPONT: NORMAL

## 2018-10-29 PROCEDURE — 99283 EMERGENCY DEPT VISIT LOW MDM: CPT

## 2018-10-29 PROCEDURE — 93971 EXTREMITY STUDY: CPT

## 2019-01-25 ENCOUNTER — TRANSCRIBE ORDERS (OUTPATIENT)
Dept: ADMINISTRATIVE | Facility: HOSPITAL | Age: 74
End: 2019-01-25

## 2019-01-25 ENCOUNTER — LAB (OUTPATIENT)
Dept: LAB | Facility: HOSPITAL | Age: 74
End: 2019-01-25
Attending: INTERNAL MEDICINE

## 2019-01-25 DIAGNOSIS — IMO0002 TYPE II DIABETES MELLITUS WITH RENAL MANIFESTATIONS, UNCONTROLLED: ICD-10-CM

## 2019-01-25 DIAGNOSIS — I12.9 HYPERTENSIVE NEPHROPATHY: Primary | ICD-10-CM

## 2019-01-25 DIAGNOSIS — E55.9 VITAMIN D DEFICIENCY: ICD-10-CM

## 2019-01-25 DIAGNOSIS — I12.9 HYPERTENSIVE NEPHROPATHY: ICD-10-CM

## 2019-01-25 DIAGNOSIS — Z12.31 VISIT FOR SCREENING MAMMOGRAM: Primary | ICD-10-CM

## 2019-01-25 LAB
25(OH)D3 SERPL-MCNC: 18 NG/ML (ref 30–100)
ANION GAP SERPL CALCULATED.3IONS-SCNC: 10.6 MMOL/L
BACTERIA UR QL AUTO: NORMAL /HPF
BASOPHILS # BLD AUTO: 0.03 10*3/MM3 (ref 0–0.2)
BASOPHILS NFR BLD AUTO: 0.2 % (ref 0–1.5)
BILIRUB UR QL STRIP: NEGATIVE
BUN BLD-MCNC: 21 MG/DL (ref 8–23)
BUN/CREAT SERPL: 19.8 (ref 7–25)
CALCIUM SPEC-SCNC: 9 MG/DL (ref 8.6–10.5)
CHLORIDE SERPL-SCNC: 99 MMOL/L (ref 98–107)
CLARITY UR: CLEAR
CO2 SERPL-SCNC: 29.4 MMOL/L (ref 22–29)
COLOR UR: YELLOW
CREAT BLD-MCNC: 1.06 MG/DL (ref 0.57–1)
CREAT UR-MCNC: 73 MG/DL
DEPRECATED RDW RBC AUTO: 48.3 FL (ref 37–54)
EOSINOPHIL # BLD AUTO: 0.1 10*3/MM3 (ref 0–0.7)
EOSINOPHIL NFR BLD AUTO: 0.7 % (ref 0.3–6.2)
ERYTHROCYTE [DISTWIDTH] IN BLOOD BY AUTOMATED COUNT: 14.4 % (ref 11.7–13)
GFR SERPL CREATININE-BSD FRML MDRD: 51 ML/MIN/1.73
GLUCOSE BLD-MCNC: 115 MG/DL (ref 65–99)
GLUCOSE UR STRIP-MCNC: NEGATIVE MG/DL
HCT VFR BLD AUTO: 38.9 % (ref 35.6–45.5)
HGB BLD-MCNC: 11.8 G/DL (ref 11.9–15.5)
HGB UR QL STRIP.AUTO: NEGATIVE
HYALINE CASTS UR QL AUTO: NORMAL /LPF
IMM GRANULOCYTES # BLD AUTO: 0.05 10*3/MM3 (ref 0–0.03)
IMM GRANULOCYTES NFR BLD AUTO: 0.4 % (ref 0–0.5)
KETONES UR QL STRIP: NEGATIVE
LEUKOCYTE ESTERASE UR QL STRIP.AUTO: ABNORMAL
LYMPHOCYTES # BLD AUTO: 1.55 10*3/MM3 (ref 0.9–4.8)
LYMPHOCYTES NFR BLD AUTO: 11.3 % (ref 19.6–45.3)
MCH RBC QN AUTO: 27.8 PG (ref 26.9–32)
MCHC RBC AUTO-ENTMCNC: 30.3 G/DL (ref 32.4–36.3)
MCV RBC AUTO: 91.7 FL (ref 80.5–98.2)
MONOCYTES # BLD AUTO: 0.73 10*3/MM3 (ref 0.2–1.2)
MONOCYTES NFR BLD AUTO: 5.3 % (ref 5–12)
NEUTROPHILS # BLD AUTO: 11.26 10*3/MM3 (ref 1.9–8.1)
NEUTROPHILS NFR BLD AUTO: 82.5 % (ref 42.7–76)
NITRITE UR QL STRIP: NEGATIVE
PH UR STRIP.AUTO: 6.5 [PH] (ref 5–8)
PLATELET # BLD AUTO: 315 10*3/MM3 (ref 140–500)
PMV BLD AUTO: 10.5 FL (ref 6–12)
POTASSIUM BLD-SCNC: 4.1 MMOL/L (ref 3.5–5.2)
PROT UR QL STRIP: ABNORMAL
PROT UR-MCNC: 31 MG/DL
PROT/CREAT UR: 424.7 MG/G CREA (ref 0–200)
RBC # BLD AUTO: 4.24 10*6/MM3 (ref 3.9–5.2)
RBC # UR: NORMAL /HPF
REF LAB TEST METHOD: NORMAL
SODIUM BLD-SCNC: 139 MMOL/L (ref 136–145)
SP GR UR STRIP: 1.01 (ref 1–1.03)
SQUAMOUS #/AREA URNS HPF: NORMAL /HPF
UROBILINOGEN UR QL STRIP: ABNORMAL
WBC NRBC COR # BLD: 13.67 10*3/MM3 (ref 4.5–10.7)
WBC UR QL AUTO: NORMAL /HPF

## 2019-01-25 PROCEDURE — 81001 URINALYSIS AUTO W/SCOPE: CPT

## 2019-01-25 PROCEDURE — 82570 ASSAY OF URINE CREATININE: CPT

## 2019-01-25 PROCEDURE — 80048 BASIC METABOLIC PNL TOTAL CA: CPT

## 2019-01-25 PROCEDURE — 85025 COMPLETE CBC W/AUTO DIFF WBC: CPT

## 2019-01-25 PROCEDURE — 82306 VITAMIN D 25 HYDROXY: CPT

## 2019-01-25 PROCEDURE — 84156 ASSAY OF PROTEIN URINE: CPT

## 2019-01-25 PROCEDURE — 36415 COLL VENOUS BLD VENIPUNCTURE: CPT

## 2019-02-28 ENCOUNTER — HOSPITAL ENCOUNTER (OUTPATIENT)
Dept: MAMMOGRAPHY | Facility: HOSPITAL | Age: 74
Discharge: HOME | End: 2019-02-28
Attending: INTERNAL MEDICINE | Admitting: INTERNAL MEDICINE

## 2019-02-28 DIAGNOSIS — Z12.31 VISIT FOR SCREENING MAMMOGRAM: ICD-10-CM

## 2019-02-28 PROCEDURE — 77063 BREAST TOMOSYNTHESIS BI: CPT

## 2019-02-28 PROCEDURE — 77067 SCR MAMMO BI INCL CAD: CPT

## 2019-04-01 ENCOUNTER — TRANSCRIBE ORDERS (OUTPATIENT)
Dept: ADMINISTRATIVE | Facility: HOSPITAL | Age: 74
End: 2019-04-01

## 2019-04-01 DIAGNOSIS — R92.8 ABNORMAL MAMMOGRAM: Primary | ICD-10-CM

## 2019-04-01 DIAGNOSIS — Z12.31 VISIT FOR SCREENING MAMMOGRAM: Primary | ICD-10-CM

## 2019-04-15 ENCOUNTER — APPOINTMENT (OUTPATIENT)
Dept: MAMMOGRAPHY | Facility: HOSPITAL | Age: 74
End: 2019-04-15

## 2019-04-17 ENCOUNTER — LAB (OUTPATIENT)
Dept: LAB | Facility: HOSPITAL | Age: 74
End: 2019-04-17

## 2019-04-17 ENCOUNTER — TRANSCRIBE ORDERS (OUTPATIENT)
Dept: LAB | Facility: HOSPITAL | Age: 74
End: 2019-04-17

## 2019-04-17 DIAGNOSIS — E78.5 HYPERLIPIDEMIA, UNSPECIFIED HYPERLIPIDEMIA TYPE: ICD-10-CM

## 2019-04-17 DIAGNOSIS — E55.9 VITAMIN D DEFICIENCY, UNSPECIFIED: ICD-10-CM

## 2019-04-17 DIAGNOSIS — K72.01: ICD-10-CM

## 2019-04-17 DIAGNOSIS — E11.65 TYPE II DIABETES MELLITUS WITH HYPEROSMOLARITY, UNCONTROLLED (HCC): ICD-10-CM

## 2019-04-17 DIAGNOSIS — E11.65 TYPE II DIABETES MELLITUS WITH HYPEROSMOLARITY, UNCONTROLLED (HCC): Primary | ICD-10-CM

## 2019-04-17 DIAGNOSIS — M31.5 GIANT CELL ARTERITIS WITH POLYMYALGIA RHEUMATICA (HCC): ICD-10-CM

## 2019-04-17 DIAGNOSIS — Z79.52 LONG TERM CURRENT USE OF SYSTEMIC STEROIDS: ICD-10-CM

## 2019-04-17 DIAGNOSIS — R79.82 ELEVATED C-REACTIVE PROTEIN (CRP): Primary | ICD-10-CM

## 2019-04-17 DIAGNOSIS — R70.0 ELEVATED ERYTHROCYTE SEDIMENTATION RATE: ICD-10-CM

## 2019-04-17 DIAGNOSIS — E11.00 TYPE II DIABETES MELLITUS WITH HYPEROSMOLARITY, UNCONTROLLED (HCC): Primary | ICD-10-CM

## 2019-04-17 DIAGNOSIS — E11.00 TYPE II DIABETES MELLITUS WITH HYPEROSMOLARITY, UNCONTROLLED (HCC): ICD-10-CM

## 2019-04-17 DIAGNOSIS — R79.82 ELEVATED C-REACTIVE PROTEIN (CRP): ICD-10-CM

## 2019-04-17 LAB
25(OH)D3 SERPL-MCNC: 19.4 NG/ML (ref 30–100)
ALBUMIN SERPL-MCNC: 3.6 G/DL (ref 3.5–5.2)
ALBUMIN/GLOB SERPL: 1 G/DL
ALP SERPL-CCNC: 91 U/L (ref 39–117)
ALT SERPL W P-5'-P-CCNC: 15 U/L (ref 1–33)
ANION GAP SERPL CALCULATED.3IONS-SCNC: 11.7 MMOL/L
AST SERPL-CCNC: 15 U/L (ref 1–32)
BACTERIA UR QL AUTO: ABNORMAL /HPF
BASOPHILS # BLD AUTO: 0.02 10*3/MM3 (ref 0–0.2)
BASOPHILS NFR BLD AUTO: 0.2 % (ref 0–1.5)
BILIRUB SERPL-MCNC: 0.4 MG/DL (ref 0.2–1.2)
BILIRUB UR QL STRIP: NEGATIVE
BUN BLD-MCNC: 21 MG/DL (ref 8–23)
BUN/CREAT SERPL: 17.1 (ref 7–25)
CALCIUM SPEC-SCNC: 10.2 MG/DL (ref 8.6–10.5)
CHLORIDE SERPL-SCNC: 97 MMOL/L (ref 98–107)
CHOLEST SERPL-MCNC: 177 MG/DL (ref 0–200)
CLARITY UR: CLEAR
CO2 SERPL-SCNC: 28.3 MMOL/L (ref 22–29)
COLOR UR: YELLOW
CREAT BLD-MCNC: 1.23 MG/DL (ref 0.57–1)
CRP SERPL-MCNC: 2.59 MG/DL (ref 0–0.5)
DEPRECATED RDW RBC AUTO: 45.1 FL (ref 37–54)
EOSINOPHIL # BLD AUTO: 0.1 10*3/MM3 (ref 0–0.4)
EOSINOPHIL NFR BLD AUTO: 1.1 % (ref 0.3–6.2)
ERYTHROCYTE [DISTWIDTH] IN BLOOD BY AUTOMATED COUNT: 13.6 % (ref 12.3–15.4)
GFR SERPL CREATININE-BSD FRML MDRD: 43 ML/MIN/1.73
GLOBULIN UR ELPH-MCNC: 3.7 GM/DL
GLUCOSE BLD-MCNC: 219 MG/DL (ref 65–99)
GLUCOSE UR STRIP-MCNC: NEGATIVE MG/DL
HBA1C MFR BLD: 9.35 % (ref 4.8–5.6)
HCT VFR BLD AUTO: 39.2 % (ref 34–46.6)
HDLC SERPL-MCNC: 44 MG/DL (ref 40–60)
HGB BLD-MCNC: 11.6 G/DL (ref 12–15.9)
HGB UR QL STRIP.AUTO: NEGATIVE
HYALINE CASTS UR QL AUTO: ABNORMAL /LPF
IMM GRANULOCYTES # BLD AUTO: 0.06 10*3/MM3 (ref 0–0.05)
IMM GRANULOCYTES NFR BLD AUTO: 0.7 % (ref 0–0.5)
KETONES UR QL STRIP: NEGATIVE
LDLC SERPL CALC-MCNC: 95 MG/DL (ref 0–100)
LDLC/HDLC SERPL: 2.16 {RATIO}
LEUKOCYTE ESTERASE UR QL STRIP.AUTO: ABNORMAL
LYMPHOCYTES # BLD AUTO: 1.05 10*3/MM3 (ref 0.7–3.1)
LYMPHOCYTES NFR BLD AUTO: 11.9 % (ref 19.6–45.3)
MCH RBC QN AUTO: 26.7 PG (ref 26.6–33)
MCHC RBC AUTO-ENTMCNC: 29.6 G/DL (ref 31.5–35.7)
MCV RBC AUTO: 90.3 FL (ref 79–97)
MONOCYTES # BLD AUTO: 0.55 10*3/MM3 (ref 0.1–0.9)
MONOCYTES NFR BLD AUTO: 6.3 % (ref 5–12)
NEUTROPHILS # BLD AUTO: 7.02 10*3/MM3 (ref 1.4–7)
NEUTROPHILS NFR BLD AUTO: 79.8 % (ref 42.7–76)
NITRITE UR QL STRIP: NEGATIVE
NRBC BLD AUTO-RTO: 0 /100 WBC (ref 0–0)
PH UR STRIP.AUTO: <=5 [PH] (ref 5–8)
PLATELET # BLD AUTO: 274 10*3/MM3 (ref 140–450)
PMV BLD AUTO: 10.4 FL (ref 6–12)
POTASSIUM BLD-SCNC: 4.8 MMOL/L (ref 3.5–5.2)
PROT SERPL-MCNC: 7.3 G/DL (ref 6–8.5)
PROT UR QL STRIP: ABNORMAL
RBC # BLD AUTO: 4.34 10*6/MM3 (ref 3.77–5.28)
RBC # UR: ABNORMAL /HPF
REF LAB TEST METHOD: ABNORMAL
SODIUM BLD-SCNC: 137 MMOL/L (ref 136–145)
SP GR UR STRIP: 1.02 (ref 1–1.03)
SQUAMOUS #/AREA URNS HPF: ABNORMAL /HPF
TRIGL SERPL-MCNC: 189 MG/DL (ref 0–150)
UROBILINOGEN UR QL STRIP: ABNORMAL
VLDLC SERPL-MCNC: 37.8 MG/DL (ref 5–40)
WBC NRBC COR # BLD: 8.8 10*3/MM3 (ref 3.4–10.8)
WBC UR QL AUTO: ABNORMAL /HPF

## 2019-04-17 PROCEDURE — 81001 URINALYSIS AUTO W/SCOPE: CPT

## 2019-04-17 PROCEDURE — 85025 COMPLETE CBC W/AUTO DIFF WBC: CPT

## 2019-04-17 PROCEDURE — 87086 URINE CULTURE/COLONY COUNT: CPT

## 2019-04-17 PROCEDURE — 83036 HEMOGLOBIN GLYCOSYLATED A1C: CPT

## 2019-04-17 PROCEDURE — 86140 C-REACTIVE PROTEIN: CPT

## 2019-04-17 PROCEDURE — 80053 COMPREHEN METABOLIC PANEL: CPT

## 2019-04-17 PROCEDURE — 36415 COLL VENOUS BLD VENIPUNCTURE: CPT

## 2019-04-17 PROCEDURE — 80061 LIPID PANEL: CPT

## 2019-04-17 PROCEDURE — 82306 VITAMIN D 25 HYDROXY: CPT

## 2019-04-18 LAB — BACTERIA SPEC AEROBE CULT: NORMAL

## 2019-04-24 ENCOUNTER — TRANSCRIBE ORDERS (OUTPATIENT)
Dept: PHYSICAL THERAPY | Facility: HOSPITAL | Age: 74
End: 2019-04-24

## 2019-04-24 DIAGNOSIS — M62.81 MUSCLE WEAKNESS (GENERALIZED): Primary | ICD-10-CM

## 2019-04-30 ENCOUNTER — CONSULT (OUTPATIENT)
Dept: ONCOLOGY | Facility: CLINIC | Age: 74
End: 2019-04-30

## 2019-04-30 ENCOUNTER — LAB (OUTPATIENT)
Dept: LAB | Facility: HOSPITAL | Age: 74
End: 2019-04-30

## 2019-04-30 VITALS
TEMPERATURE: 98.4 F | SYSTOLIC BLOOD PRESSURE: 146 MMHG | HEIGHT: 59 IN | WEIGHT: 194.2 LBS | DIASTOLIC BLOOD PRESSURE: 77 MMHG | RESPIRATION RATE: 12 BRPM | BODY MASS INDEX: 39.15 KG/M2 | HEART RATE: 72 BPM | OXYGEN SATURATION: 98 %

## 2019-04-30 DIAGNOSIS — D58.2 OTHER HEMOGLOBINOPATHIES (HCC): ICD-10-CM

## 2019-04-30 DIAGNOSIS — D63.1 ANEMIA DUE TO STAGE 3 CHRONIC KIDNEY DISEASE (HCC): ICD-10-CM

## 2019-04-30 DIAGNOSIS — M48.02 SPINAL STENOSIS, CERVICAL REGION: Primary | ICD-10-CM

## 2019-04-30 DIAGNOSIS — R27.0 ATAXIA: ICD-10-CM

## 2019-04-30 DIAGNOSIS — R10.84 GENERALIZED ABDOMINAL PAIN: ICD-10-CM

## 2019-04-30 DIAGNOSIS — M80.00XD AGE-RELATED OSTEOPOROSIS WITH CURRENT PATHOLOGICAL FRACTURE WITH ROUTINE HEALING, SUBSEQUENT ENCOUNTER: Primary | ICD-10-CM

## 2019-04-30 DIAGNOSIS — D64.9 FATIGUE ASSOCIATED WITH ANEMIA: ICD-10-CM

## 2019-04-30 DIAGNOSIS — N18.30 ANEMIA DUE TO STAGE 3 CHRONIC KIDNEY DISEASE (HCC): ICD-10-CM

## 2019-04-30 LAB
ALBUMIN SERPL-MCNC: 3.8 G/DL (ref 3.5–5.2)
ALBUMIN/GLOB SERPL: 1.2 G/DL (ref 1.1–2.4)
ALP SERPL-CCNC: 115 U/L (ref 38–116)
ALT SERPL W P-5'-P-CCNC: 14 U/L (ref 0–33)
ANION GAP SERPL CALCULATED.3IONS-SCNC: 11.1 MMOL/L
AST SERPL-CCNC: 13 U/L (ref 0–32)
BASOPHILS # BLD AUTO: 0.05 10*3/MM3 (ref 0–0.2)
BASOPHILS NFR BLD AUTO: 0.4 % (ref 0–1.5)
BILIRUB SERPL-MCNC: 0.4 MG/DL (ref 0.2–1.2)
BUN BLD-MCNC: 24 MG/DL (ref 6–20)
BUN/CREAT SERPL: 19.8 (ref 7.3–30)
CALCIUM SPEC-SCNC: 9.6 MG/DL (ref 8.5–10.2)
CHLORIDE SERPL-SCNC: 97 MMOL/L (ref 98–107)
CO2 SERPL-SCNC: 29.9 MMOL/L (ref 22–29)
CREAT BLD-MCNC: 1.21 MG/DL (ref 0.6–1.1)
DEPRECATED RDW RBC AUTO: 43.6 FL (ref 37–54)
EOSINOPHIL # BLD AUTO: 0.15 10*3/MM3 (ref 0–0.4)
EOSINOPHIL NFR BLD AUTO: 1.1 % (ref 0.3–6.2)
ERYTHROCYTE [DISTWIDTH] IN BLOOD BY AUTOMATED COUNT: 13.4 % (ref 12.3–15.4)
ERYTHROCYTE [SEDIMENTATION RATE] IN BLOOD: 40 MM/HR (ref 0–30)
FERRITIN SERPL-MCNC: 25.8 NG/ML (ref 13–150)
GFR SERPL CREATININE-BSD FRML MDRD: 44 ML/MIN/1.73
GLOBULIN UR ELPH-MCNC: 3.2 GM/DL (ref 1.8–3.5)
GLUCOSE BLD-MCNC: 233 MG/DL (ref 74–124)
HCT VFR BLD AUTO: 38.5 % (ref 34–46.6)
HGB BLD-MCNC: 12 G/DL (ref 12–15.9)
HGB RETIC QN AUTO: 31.2 PG (ref 29.8–36.1)
IMM GRANULOCYTES # BLD AUTO: 0.1 10*3/MM3 (ref 0–0.05)
IMM GRANULOCYTES NFR BLD AUTO: 0.7 % (ref 0–0.5)
IMM RETICS NFR: 9.1 % (ref 3–15.8)
IRON 24H UR-MRATE: 48 MCG/DL (ref 37–145)
IRON SATN MFR SERPL: 13 % (ref 14–48)
LDH SERPL-CCNC: 197 U/L (ref 99–259)
LYMPHOCYTES # BLD AUTO: 1.57 10*3/MM3 (ref 0.7–3.1)
LYMPHOCYTES NFR BLD AUTO: 11.6 % (ref 19.6–45.3)
MCH RBC QN AUTO: 27.5 PG (ref 26.6–33)
MCHC RBC AUTO-ENTMCNC: 31.2 G/DL (ref 31.5–35.7)
MCV RBC AUTO: 88.3 FL (ref 79–97)
MONOCYTES # BLD AUTO: 0.71 10*3/MM3 (ref 0.1–0.9)
MONOCYTES NFR BLD AUTO: 5.3 % (ref 5–12)
NEUTROPHILS # BLD AUTO: 10.91 10*3/MM3 (ref 1.7–7)
NEUTROPHILS NFR BLD AUTO: 80.9 % (ref 42.7–76)
NRBC BLD AUTO-RTO: 0 /100 WBC (ref 0–0.2)
PLATELET # BLD AUTO: 254 10*3/MM3 (ref 140–450)
PMV BLD AUTO: 10.1 FL (ref 6–12)
POTASSIUM BLD-SCNC: 4.3 MMOL/L (ref 3.5–4.7)
PROT SERPL-MCNC: 7 G/DL (ref 6.3–8)
RBC # BLD AUTO: 4.36 10*6/MM3 (ref 3.77–5.28)
RETICS/RBC NFR AUTO: 1.19 % (ref 0.7–1.9)
SODIUM BLD-SCNC: 138 MMOL/L (ref 134–145)
TIBC SERPL-MCNC: 360 MCG/DL (ref 249–505)
TRANSFERRIN SERPL-MCNC: 257 MG/DL (ref 200–360)
VIT B12 BLD-MCNC: 384 PG/ML (ref 211–946)
WBC NRBC COR # BLD: 13.49 10*3/MM3 (ref 3.4–10.8)

## 2019-04-30 PROCEDURE — 82607 VITAMIN B-12: CPT | Performed by: INTERNAL MEDICINE

## 2019-04-30 PROCEDURE — 83615 LACTATE (LD) (LDH) ENZYME: CPT | Performed by: INTERNAL MEDICINE

## 2019-04-30 PROCEDURE — 85652 RBC SED RATE AUTOMATED: CPT | Performed by: INTERNAL MEDICINE

## 2019-04-30 PROCEDURE — 82728 ASSAY OF FERRITIN: CPT | Performed by: INTERNAL MEDICINE

## 2019-04-30 PROCEDURE — 84466 ASSAY OF TRANSFERRIN: CPT | Performed by: INTERNAL MEDICINE

## 2019-04-30 PROCEDURE — 99205 OFFICE O/P NEW HI 60 MIN: CPT | Performed by: INTERNAL MEDICINE

## 2019-04-30 PROCEDURE — 83540 ASSAY OF IRON: CPT | Performed by: INTERNAL MEDICINE

## 2019-04-30 PROCEDURE — 36415 COLL VENOUS BLD VENIPUNCTURE: CPT | Performed by: INTERNAL MEDICINE

## 2019-04-30 PROCEDURE — 85025 COMPLETE CBC W/AUTO DIFF WBC: CPT | Performed by: INTERNAL MEDICINE

## 2019-04-30 PROCEDURE — 85046 RETICYTE/HGB CONCENTRATE: CPT | Performed by: INTERNAL MEDICINE

## 2019-04-30 PROCEDURE — 80053 COMPREHEN METABOLIC PANEL: CPT | Performed by: INTERNAL MEDICINE

## 2019-04-30 RX ORDER — LOSARTAN POTASSIUM AND HYDROCHLOROTHIAZIDE 12.5; 5 MG/1; MG/1
1 TABLET ORAL DAILY
COMMUNITY
End: 2019-06-24

## 2019-05-01 LAB
ALBUMIN SERPL-MCNC: 3.2 G/DL (ref 2.9–4.4)
ALBUMIN/GLOB SERPL: 1.1 {RATIO} (ref 0.7–1.7)
ALPHA1 GLOB FLD ELPH-MCNC: 0.3 G/DL (ref 0–0.4)
ALPHA2 GLOB SERPL ELPH-MCNC: 0.9 G/DL (ref 0.4–1)
B-GLOBULIN SERPL ELPH-MCNC: 1.3 G/DL (ref 0.7–1.3)
ETHNIC BACKGROUND STATED: 18.4 MIU/ML (ref 2.6–18.5)
FOLATE BLD-MCNC: 402.3 NG/ML
FOLATE RBC-MCNC: 1096 NG/ML
GAMMA GLOB SERPL ELPH-MCNC: 0.7 G/DL (ref 0.4–1.8)
GLOBULIN SER CALC-MCNC: 3.1 G/DL (ref 2.2–3.9)
HCT VFR BLD AUTO: 36.7 % (ref 34–46.6)
HGB A MFR BLD: 98.1 % (ref 96.4–98.8)
HGB A2 MFR BLD COLUMN CHROM: 1.9 % (ref 1.8–3.2)
HGB C MFR BLD: 0 %
HGB F MFR BLD: 0 % (ref 0–2)
HGB FRACT BLD-IMP: NORMAL
HGB S BLD QL SOLY: NEGATIVE
HGB S MFR BLD: 0 %
IGA SERPL-MCNC: 313 MG/DL (ref 64–422)
IGG SERPL-MCNC: 724 MG/DL (ref 700–1600)
IGM SERPL-MCNC: 72 MG/DL (ref 26–217)
INTERPRETATION SERPL IEP-IMP: ABNORMAL
KAPPA LC SERPL-MCNC: 38.2 MG/L (ref 3.3–19.4)
KAPPA LC/LAMBDA SER: 1.46 {RATIO} (ref 0.26–1.65)
LAMBDA LC FREE SERPL-MCNC: 26.2 MG/L (ref 5.7–26.3)
Lab: ABNORMAL
M-SPIKE: ABNORMAL G/DL
PROT SERPL-MCNC: 6.3 G/DL (ref 6–8.5)

## 2019-05-03 ENCOUNTER — APPOINTMENT (OUTPATIENT)
Dept: MAMMOGRAPHY | Facility: HOSPITAL | Age: 74
End: 2019-05-03

## 2019-05-06 ENCOUNTER — HOSPITAL ENCOUNTER (OUTPATIENT)
Dept: PHYSICAL THERAPY | Facility: HOSPITAL | Age: 74
Setting detail: THERAPIES SERIES
Discharge: HOME | End: 2019-05-06

## 2019-05-06 DIAGNOSIS — R26.89 BALANCE PROBLEMS: ICD-10-CM

## 2019-05-06 DIAGNOSIS — M62.81 MUSCLE WEAKNESS (GENERALIZED): Primary | ICD-10-CM

## 2019-05-06 DIAGNOSIS — Z91.81 AT RISK FOR FALLS: ICD-10-CM

## 2019-05-06 LAB
ALBUMIN 24H MFR UR ELPH: 58 %
ALPHA1 GLOB 24H MFR UR ELPH: 5.3 %
ALPHA2 GLOB 24H MFR UR ELPH: 9.4 %
B-GLOBULIN MFR UR ELPH: 19.5 %
GAMMA GLOB 24H MFR UR ELPH: 7.8 %
HIV 1 & 2 AB SER-IMP: ABNORMAL
INTERPRETATION UR IFE-IMP: ABNORMAL
M PROTEIN 24H MFR UR ELPH: ABNORMAL %
PROT 24H UR-MRATE: 498 MG/24 HR (ref 30–150)
PROT UR-MCNC: 38.3 MG/DL

## 2019-05-06 PROCEDURE — 97162 PT EVAL MOD COMPLEX 30 MIN: CPT | Performed by: PHYSICAL THERAPIST

## 2019-05-06 PROCEDURE — 97110 THERAPEUTIC EXERCISES: CPT | Performed by: PHYSICAL THERAPIST

## 2019-05-06 NOTE — THERAPY EVALUATION
Outpatient Physical Therapy Ortho Initial Evaluation  [facility]     Patient Name: Blanca Lam  : 1945  MRN: [id number]  Today's Date: 2019      Visit Date: 2019    Patient Active Problem List   Diagnosis   • Osteoporosis        Past Medical History:   Diagnosis Date   • Anemia    • Anxiety and depression    • Asthma    • CKD (chronic kidney disease)    • Colon polyp    • Diabetes mellitus (CMS/HCC)     Type II   • Hyperlipidemia    • Hypertension    • Temporal arteritis (CMS/HCC)    • Thrombophlebitis         Past Surgical History:   Procedure Laterality Date   • BREAST BIOPSY Left     benign   •  SECTION      x3   • CHOLECYSTECTOMY     • HYSTERECTOMY         Visit Dx:     ICD-10-CM ICD-9-CM   1. Muscle weakness (generalized) M62.81 728.87   2. At risk for falls Z91.81 V15.88   3. Balance problems R26.89 781.99         Patient History     Row Name 19 1500             History    Chief Complaint  Balance Problems;Muscle weakness  -LC      Date Current Problem(s) Began  -- chronic  -LC      Brief Description of Current Complaint  Pt reports worsening balance deficits over the past year. She is very unsteady with all standing activities. She reports she has fallen 5 times in the past year resulting in several bruises and soft tissue soreness. She does not have symptoms of vertigo or dizziness. She reports just extreme feeling of weakness in bilateral lower extremities. She does not use walker currently, but reports she will be getting rollator soon because of multiple falls. Pt is attending PT to try to improve activity tolerance, strength, and safety with ADL's.  -LC      Patient/Caregiver Goals  Improve mobility;Improve strength;Other (comment) reduce risk of falls  -      Patient's Rating of General Health  Fair  -      Occupation/sports/leisure activities  walking  -      How has patient tried to help current problem?  trying to be very cautious  -         Pain      Pain Location  Generalized  -LC      Pain at Present  0  -LC      Pain at Best  0  -LC      Pain at Worst  0  -LC      What Performance Factors Make the Current Problem(s) WORSE?  all standing activities, prolonged activities  -LC      Difficulties with ADL's?  sit to stand, walking, ascending/descending stairs, transfers, response to unexpected challenges.  -LC         Fall Risk Assessment    Any falls in the past year:  Yes  -LC      Number of falls reported in the last 12 months  5  -LC      Factors that contributed to the fall:  Lost balance  -LC      Does patient have a fear of falling  Yes (comment)  -LC         Daily Activities    Primary Language  Omani  -LC      Action taken if English not primary language  patient speaks English fluently  -LC      Pt Participated in POC and Goals  Yes  -LC         Safety    Are you being hurt, hit, or frightened by anyone at home or in your life?  No  -LC      Are you being neglected by a caregiver  No  -LC        User Key  (r) = Recorded By, (t) = Taken By, (c) = Cosigned By    Initials Name Provider Type    LC Matheus Soria, PT DPT Physical Therapist          PT Ortho     Row Name 05/06/19 1600       MMT (Manual Muscle Testing)    Rt Lower Ext  Rt Hip Flexion;Rt Hip Extension;Rt Hip ABduction;Rt Hip ADduction;Rt Knee Extension;Rt Knee Flexion  -LC    Lt Lower Ext  Lt Hip Flexion;Lt Hip Extension;Lt Hip ABduction;Lt Hip ADduction;Lt Knee Extension;Lt Knee Flexion  -LC       MMT Right Lower Ext    Rt Hip Flexion MMT, Gross Movement  (3-/5) fair minus  -LC    Rt Hip Extension MMT, Gross Movement  (3-/5) fair minus  -LC    Rt Hip ABduction MMT, Gross Movement  (3-/5) fair minus  -LC    Rt Hip ADduction MMT, Gross Movement  (3-/5) fair minus  -LC    Rt Knee Extension MMT, Gross Movement  (3-/5) fair minus  -LC    Rt Knee Flexion MMT, Gross Movement  (3-/5) fair minus  -LC       MMT Left Lower Ext    Lt Hip Flexion MMT, Gross Movement  (3-/5) fair minus  -LC    Lt Hip  Extension MMT, Gross Movement  (3-/5) fair minus  -LC    Lt Hip ABduction MMT, Gross Movement  (3-/5) fair minus  -LC    Lt Hip ADduction MMT, Gross Movement  (3-/5) fair minus  -LC    Lt Knee Extension MMT, Gross Movement  (3-/5) fair minus  -LC    Lt Knee Flexion MMT, Gross Movement  (3-/5) fair minus  -LC       Balance Skills Training    Standing-Level of Assistance  Contact guard;Minimum assistance  -LC    Static Standing Balance Support  Right upper extremity supported;Left upper extremity supported  -LC    Standing-Balance Activities  Feet together;Forward lean;Reaching for objects;Retrieve object from floor;Single Limb Stance;Tandem Stance  -LC    Gait Balance-Level of Assistance  Close supervision  -LC       Gait/Stairs Assessment/Training    Parker Level (Gait)  supervision  -LC    Pattern (Gait)  step-to  -LC    Deviations/Abnormal Patterns (Gait)  base of support, wide;celestina decreased;festinating/shuffling;gait speed decreased  -LC    Bilateral Gait Deviations  weight shift ability decreased  -LC      User Key  (r) = Recorded By, (t) = Taken By, (c) = Cosigned By    Initials Name Provider Type    Matheus Holman, PT DPT Physical Therapist                      Therapy Education  Given: HEP, Symptoms/condition management, Pain management  Program: New  How Provided: Demonstration, Verbal, Written  Provided to: Patient  Level of Understanding: Teach back education performed, Verbalized, Demonstrated     PT OP Goals     Row Name 05/06/19 1600          PT Short Term Goals    STG 1  Pt will be independent with HEP to improve BLE strength, increase balance static and dynamic, and reduce her risk of falls.  -     STG 1 Progress  New  -     STG 2  Pt will be able to retieve object from floor without assistance.  -     STG 2 Progress  New  -     STG 3  Pt will be able to perform 5 sit to stand with no use of BUEs.  -     STG 3 Progress  New  -        Long Term Goals    LTG 1  Pt will have  "NG score > 60%  -     LTG 1 Progress  New  -     LTG 2  Pt will be able to maintain eyes cloesd static stand for >15 seconds without LOB.  -     LTG 2 Progress  New  -     LTG 3  Pt will be able to alternate feet on 6\" step without LOB.  -LC     LTG 3 Progress  New  -LC        Time Calculation    PT Goal Re-Cert Due Date  08/06/19  -       User Key  (r) = Recorded By, (t) = Taken By, (c) = Cosigned By    Initials Name Provider Type    Matheus Holman, PT DPT Physical Therapist          PT Assessment/Plan     Row Name 05/06/19 1605          PT Assessment    Functional Limitations  Impaired gait;Decreased safety during functional activities;Impaired locomotion;Performance in leisure activities;Limitations in functional capacity and performance  -     Impairments  Balance;Gait;Endurance;Impaired muscle power;Muscle strength  -     Assessment Comments  Pt is 73 y.o. female who presents to PT with chronic balance disturbances, generalized BLE weakness, and history of falls. She reports 5 falls recently and she wants to attend PT to try to improve balance and overall strenght. NG balance test places her at 22/56 which is at high risk for falls. she has BLE MMT grossly 3-/5. During ambulation she has toe out gait pattern with short shuffling steps and poor toe clearance. She has signficantly decresaed weight shifting ability in static and dynamic standing. Pt responded well to HEP and reported some improved walking ability upon standing. She was educated on use of RW and appropriate fitting for rollator she may be getting. She was issued written copy of HEP. Signs and symptoms indicate general BLE weakness and balance deficit. Pt's status is currently evolving.  -     Please refer to paper survey for additional self-reported information  Yes  -LC     Rehab Potential  Good  -LC     Patient/caregiver participated in establishment of treatment plan and goals  Yes  -LC     Patient would benefit from " skilled therapy intervention  Yes  -        PT Plan    PT Frequency  2x/week  -     Predicted Duration of Therapy Intervention (Therapy Eval)  4 to 6 weeks  -     Planned CPT's?  PT EVAL MOD COMPLELITY: 41004;PT RE-EVAL: 49622;PT THER ACT EA 15 MIN: 94633;PT NEUROMUSC RE-EDUCATION EA 15 MIN: 61340;PT MANUAL THERAPY EA 15 MIN: 17024;PT THER PROC EA 15 MIN: 99873;PT GAIT TRAINING EA 15 MIN: 12567  -     Physical Therapy Interventions (Optional Details)  balance training;gait training;home exercise program;manual therapy techniques;neuromuscular re-education;ROM (Range of Motion);stair training;stretching;patient/family education;strengthening  -     PT Plan Comments  Pt requires skilled therapy to improve overall strength, balance, and reduced risk of falls to improve her independence and safety with ADL's.  -       User Key  (r) = Recorded By, (t) = Taken By, (c) = Cosigned By    Initials Name Provider Type    Matheus Holman, PT DPT Physical Therapist            Exercises     Row Name 05/06/19 1600             Total Minutes    39230 - PT Therapeutic Exercise Minutes  15  -LC         Exercise 1    Exercise Name 1  SLR  -LC      Sets 1  2  -LC      Reps 1  6  -LC         Exercise 2    Exercise Name 2  heel slides supine  -LC      Sets 2  2  -LC      Reps 2  6  -LC         Exercise 3    Exercise Name 3  hip ADD hook  -LC      Sets 3  2  -LC      Reps 3  6  -LC      Time 3  3  -LC         Exercise 4    Exercise Name 4  hip ABD blue  -LC      Sets 4  2  -LC      Reps 4  6  -LC        User Key  (r) = Recorded By, (t) = Taken By, (c) = Cosigned By    Initials Name Provider Type    Matheus Holman, PT DPT Physical Therapist                        Outcome Measure Options: Hilario Balance, Tinetti  Hilario Balance Scale  Sitting to Standing: able to stand independently using hands  Standing Unsupported: able to stand 2 minutes with supervision  Sitting with Back Unsupported but Feet Supported on Floor or on Stool:  able to sit safely and securely for 2 minutes  Standing to Sitting: uses back of legs against chair to control descent  Transfers: able to transfer safely definite need of hands  Standing Unsupported with Eyes Closed: needs help to keep from falling  Standing Unsupported with Feet Together: needs help to attain position but able to stand 15 seconds feet together  Reaching Forward with Outstretched Arm While Standing: can reach forward 5 cm (2 inches)   Object From the Floor From a Standing Position: unable to  but reaches 2-5 cm(1-2 inches) from object and keeps balance independently  Turning to Look Behind Over Left and Right Shoulders While Standing: turns sideways only but maintains balance  Turn 360 Degrees: needs assistance while turning  Place Alternate Foot on Step or Stool While Standing Unsupported: needs assistance to keep from falling/unable to try  Standing Unsupported with One Foot in Front: loses balance while stepping or standing  Standing on One Leg: unable to try of needs assist to prevent fall  Hilario Total Score: 22         Time Calculation:     Start Time: 1500  Stop Time: 1545  Time Calculation (min): 45 min  Total Timed Code Minutes- PT: 45 minute(s)     Therapy Charges for Today     Code Description Service Date Service Provider Modifiers Qty    82152094651 HC PT THER PROC EA 15 MIN 5/6/2019 Matheus Soria, PT DPT GP 1    15810991529 HC PT EVAL MOD COMPLEXITY 2 5/6/2019 Matheus Soria, PT DPT GP 1          PT G-Codes  Outcome Measure Options: Hilario Balance, Tinetti  Hilario Total Score: 22         TREY BergeronT  5/6/2019

## 2019-05-08 ENCOUNTER — HOSPITAL ENCOUNTER (OUTPATIENT)
Dept: MRI IMAGING | Facility: HOSPITAL | Age: 74
Discharge: HOME | End: 2019-05-08
Admitting: INTERNAL MEDICINE

## 2019-05-08 ENCOUNTER — HOSPITAL ENCOUNTER (OUTPATIENT)
Dept: CT IMAGING | Facility: HOSPITAL | Age: 74
Discharge: HOME | End: 2019-05-08

## 2019-05-08 DIAGNOSIS — R27.0 ATAXIA: ICD-10-CM

## 2019-05-08 DIAGNOSIS — R10.84 GENERALIZED ABDOMINAL PAIN: ICD-10-CM

## 2019-05-08 DIAGNOSIS — M48.02 SPINAL STENOSIS, CERVICAL REGION: ICD-10-CM

## 2019-05-08 DIAGNOSIS — N18.30 ANEMIA DUE TO STAGE 3 CHRONIC KIDNEY DISEASE (HCC): ICD-10-CM

## 2019-05-08 DIAGNOSIS — D63.1 ANEMIA DUE TO STAGE 3 CHRONIC KIDNEY DISEASE (HCC): ICD-10-CM

## 2019-05-08 DIAGNOSIS — D64.9 FATIGUE ASSOCIATED WITH ANEMIA: ICD-10-CM

## 2019-05-08 PROCEDURE — 0 DIATRIZOATE MEGLUMINE & SODIUM PER 1 ML: Performed by: INTERNAL MEDICINE

## 2019-05-08 PROCEDURE — 74176 CT ABD & PELVIS W/O CONTRAST: CPT

## 2019-05-08 PROCEDURE — 72141 MRI NECK SPINE W/O DYE: CPT

## 2019-05-08 RX ADMIN — DIATRIZOATE MEGLUMINE AND DIATRIZOATE SODIUM 30 ML: 660; 100 LIQUID ORAL; RECTAL at 07:50

## 2019-05-09 ENCOUNTER — HOSPITAL ENCOUNTER (OUTPATIENT)
Dept: ULTRASOUND IMAGING | Facility: HOSPITAL | Age: 74
Discharge: HOME | End: 2019-05-09

## 2019-05-09 ENCOUNTER — HOSPITAL ENCOUNTER (OUTPATIENT)
Dept: MAMMOGRAPHY | Facility: HOSPITAL | Age: 74
Discharge: HOME | End: 2019-05-09
Admitting: INTERNAL MEDICINE

## 2019-05-09 DIAGNOSIS — R92.8 ABNORMAL MAMMOGRAM: ICD-10-CM

## 2019-05-09 PROCEDURE — 76642 ULTRASOUND BREAST LIMITED: CPT

## 2019-05-09 PROCEDURE — 77065 DX MAMMO INCL CAD UNI: CPT

## 2019-05-13 ENCOUNTER — HOSPITAL ENCOUNTER (OUTPATIENT)
Dept: PHYSICAL THERAPY | Facility: HOSPITAL | Age: 74
Setting detail: THERAPIES SERIES
Discharge: HOME | End: 2019-05-13

## 2019-05-13 DIAGNOSIS — M62.81 MUSCLE WEAKNESS (GENERALIZED): Primary | ICD-10-CM

## 2019-05-13 DIAGNOSIS — R26.89 BALANCE PROBLEMS: ICD-10-CM

## 2019-05-13 DIAGNOSIS — Z91.81 AT RISK FOR FALLS: ICD-10-CM

## 2019-05-13 PROCEDURE — 97110 THERAPEUTIC EXERCISES: CPT | Performed by: PHYSICAL THERAPIST

## 2019-05-13 PROCEDURE — 97112 NEUROMUSCULAR REEDUCATION: CPT | Performed by: PHYSICAL THERAPIST

## 2019-05-13 NOTE — THERAPY TREATMENT NOTE
Outpatient Physical Therapy Ortho Treatment Note  [facility]     Patient Name: Blanca Lam  : 1945  MRN: [id number]  Today's Date: 2019      Visit Date: 2019    Visit Dx:    ICD-10-CM ICD-9-CM   1. Muscle weakness (generalized) M62.81 728.87   2. At risk for falls Z91.81 V15.88   3. Balance problems R26.89 781.99       Patient Active Problem List   Diagnosis   • Osteoporosis        Past Medical History:   Diagnosis Date   • Anemia    • Anxiety and depression    • Asthma    • CKD (chronic kidney disease)    • Colon polyp    • Diabetes mellitus (CMS/HCC)     Type II   • Hyperlipidemia    • Hypertension    • Temporal arteritis (CMS/HCC)    • Thrombophlebitis         Past Surgical History:   Procedure Laterality Date   • BREAST BIOPSY Left     benign   •  SECTION      x3   • CHOLECYSTECTOMY     • HYSTERECTOMY         PT Ortho     Row Name 19 1500       Subjective Comments    Subjective Comments  Pt is very happy with HEP. she has received   -LC       Subjective Pain    Able to rate subjective pain?  yes  -LC    Pre-Treatment Pain Level  0  -LC    Post-Treatment Pain Level  0  -LC       MMT Right Lower Ext    Rt Hip Flexion MMT, Gross Movement  (3/5) fair  -LC    Rt Hip Extension MMT, Gross Movement  (3/5) fair  -LC    Rt Hip ABduction MMT, Gross Movement  (3/5) fair  -LC    Rt Hip ADduction MMT, Gross Movement  (3/5) fair  -LC    Rt Knee Extension MMT, Gross Movement  (3/5) fair  -LC    Rt Knee Flexion MMT, Gross Movement  (3/5) fair  -LC       MMT Left Lower Ext    Lt Hip Flexion MMT, Gross Movement  (3/5) fair  -LC    Lt Hip Extension MMT, Gross Movement  (3/5) fair  -LC    Lt Hip ABduction MMT, Gross Movement  (3/5) fair  -LC    Lt Hip ADduction MMT, Gross Movement  (3/5) fair  -LC    Lt Knee Extension MMT, Gross Movement  (3/5) fair  -LC    Lt Knee Flexion MMT, Gross Movement  (3/5) fair  -LC      User Key  (r) = Recorded By, (t) = Taken By, (c) = Cosigned By     Initials Name Provider Type    Matheus Holman, PT DPT Physical Therapist                      PT Assessment/Plan     Row Name 05/13/19 1601          PT Assessment    Functional Limitations  Impaired gait;Decreased safety during functional activities;Impaired locomotion;Performance in leisure activities;Limitations in functional capacity and performance  -     Impairments  Balance;Gait;Endurance;Impaired muscle power;Muscle strength  -     Assessment Comments  Pt brought rollator to therapy today and PT helped her adjust height to be appropriate. Discussed correct ambulation with rollator and she performed very well. She reports that she feels much less scared during walking and sit to stand. She tolerated new standing therex well and was issued written copy of knee exercises.  -        PT Plan    PT Plan Comments  Progress to increased dynamic stability standing therex and improved reaction to unexpected challenges.  -       User Key  (r) = Recorded By, (t) = Taken By, (c) = Cosigned By    Initials Name Provider Type    Matheus Holman, PT DPT Physical Therapist            Exercises     Row Name 05/13/19 1500             Subjective Comments    Subjective Comments  Pt is very happy with HEP. she has received   -         Subjective Pain    Able to rate subjective pain?  yes  -      Pre-Treatment Pain Level  0  -      Post-Treatment Pain Level  0  -         Total Minutes    40519 - PT Therapeutic Exercise Minutes  30  -      74646 -  PT Neuromuscular Reeducation Minutes  15  -        User Key  (r) = Recorded By, (t) = Taken By, (c) = Cosigned By    Initials Name Provider Type    Matheus Holman, PT DPT Physical Therapist                       PT OP Goals     Row Name 05/13/19 1500          PT Short Term Goals    STG 1  Pt will be independent with HEP to improve BLE strength, increase balance static and dynamic, and reduce her risk of falls.  -     STG 1 Progress  Progressing  -     STG  "2  Pt will be able to retieve object from floor without assistance.  -     STG 2 Progress  Progressing  -     STG 3  Pt will be able to perform 5 sit to stand with no use of BUEs.  -     STG 3 Progress  Progressing  -LC        Long Term Goals    LTG 1  Pt will have NG score > 60%  -     LTG 1 Progress  Progressing  -LC     LTG 2  Pt will be able to maintain eyes cloesd static stand for >15 seconds without LOB.  -     LTG 2 Progress  Progressing  -LC     LTG 3  Pt will be able to alternate feet on 6\" step without LOB.  -     LTG 3 Progress  Progressing  -LC       User Key  (r) = Recorded By, (t) = Taken By, (c) = Cosigned By    Initials Name Provider Type    Matheus Holman, PT DPT Physical Therapist                         Time Calculation:   Start Time: 1410  Stop Time: 1450  Time Calculation (min): 40 min  Total Timed Code Minutes- PT: 40 minute(s)  Therapy Charges for Today     Code Description Service Date Service Provider Modifiers Qty    54409254383  PT NEUROMUSC RE EDUCATION EA 15 MIN 5/13/2019 Matheus Soria, PT DPT GP 1    96949829675 HC PT THER PROC EA 15 MIN 5/13/2019 Matheus Soria, PT DPT GP 2                    Matheus Soria PT DPT  5/13/2019     "

## 2019-05-16 ENCOUNTER — HOSPITAL ENCOUNTER (OUTPATIENT)
Dept: PHYSICAL THERAPY | Facility: HOSPITAL | Age: 74
Setting detail: THERAPIES SERIES
Discharge: HOME | End: 2019-05-16

## 2019-05-16 DIAGNOSIS — Z91.81 AT RISK FOR FALLS: ICD-10-CM

## 2019-05-16 DIAGNOSIS — M62.81 MUSCLE WEAKNESS (GENERALIZED): Primary | ICD-10-CM

## 2019-05-16 DIAGNOSIS — R26.89 BALANCE PROBLEMS: ICD-10-CM

## 2019-05-16 PROCEDURE — 97110 THERAPEUTIC EXERCISES: CPT | Performed by: PHYSICAL THERAPIST

## 2019-05-16 NOTE — THERAPY TREATMENT NOTE
Outpatient Physical Therapy Ortho Treatment Note  [facility]     Patient Name: Blanca Lam  : 1945  MRN: [id number]  Today's Date: 2019      Visit Date: 2019    Visit Dx:    ICD-10-CM ICD-9-CM   1. Muscle weakness (generalized) M62.81 728.87   2. At risk for falls Z91.81 V15.88   3. Balance problems R26.89 781.99       Patient Active Problem List   Diagnosis   • Osteoporosis        Past Medical History:   Diagnosis Date   • Anemia    • Anxiety and depression    • Asthma    • CKD (chronic kidney disease)    • Colon polyp    • Diabetes mellitus (CMS/HCC)     Type II   • Hyperlipidemia    • Hypertension    • Temporal arteritis (CMS/HCC)    • Thrombophlebitis         Past Surgical History:   Procedure Laterality Date   • BREAST BIOPSY Left     benign   •  SECTION      x3   • CHOLECYSTECTOMY     • HYSTERECTOMY         PT Ortho     Row Name 19 1500       Subjective Comments    Subjective Comments  Pt continues to feel much more secure using rollator. She reports that she has found out she may have to pursue surgery regarding mass in L breast. She will keep PT update on progress of upcoming appointments as she may have to end therapy early due to surgery. She does feel much more safe walking.  -LC       Subjective Pain    Able to rate subjective pain?  yes  -LC    Pre-Treatment Pain Level  0  -LC    Post-Treatment Pain Level  0  -LC      User Key  (r) = Recorded By, (t) = Taken By, (c) = Cosigned By    Initials Name Provider Type    LC Matheus Soria, PT DPT Physical Therapist                      PT Assessment/Plan     Row Name 19 4430          PT Assessment    Functional Limitations  Impaired gait;Decreased safety during functional activities;Impaired locomotion;Performance in leisure activities;Limitations in functional capacity and performance  -     Impairments  Balance;Gait;Endurance;Impaired muscle power;Muscle strength  -LC     Assessment Comments  Pt is doing  excellent walking with rollator, upright, eyes up and scanning environment, good toe clearance and stride length. She felt a little upset today over recent medical diagnosis and did not feel up to performing standing dynamic balance exercises due to level of concentration required. She did tolerated BLE strengthening well with no pain and only muscle fatigue after multiple repetitions. Pt reports she is much less afraid of falling and feels much more stabl.e  -LC        PT Plan    PT Plan Comments  Continue to progress to improve BLE strength and dynamic stability in standing and transfers.  -LC       User Key  (r) = Recorded By, (t) = Taken By, (c) = Cosigned By    Initials Name Provider Type    Matheus Holmna, PT DPT Physical Therapist            Exercises     Row Name 05/16/19 1500             Subjective Comments    Subjective Comments  Pt continues to feel much more secure using rollator. She reports that she has found out she may have to pursue surgery regarding mass in L breast. She will keep PT update on progress of upcoming appointments as she may have to end therapy early due to surgery. She does feel much more safe walking.  -LC         Subjective Pain    Able to rate subjective pain?  yes  -LC      Pre-Treatment Pain Level  0  -LC      Post-Treatment Pain Level  0  -LC         Total Minutes    20634 - PT Therapeutic Exercise Minutes  45  -LC         Exercise 1    Exercise Name 1  Nu step  -LC      Time 1  5 min  -LC         Exercise 2    Exercise Name 2  SAQ 2#  -LC      Sets 2  2  -LC      Reps 2  10  -LC         Exercise 3    Exercise Name 3  hook hip ADD  -LC      Sets 3  2  -LC      Reps 3  10  -LC      Time 3  3  -LC         Exercise 4    Exercise Name 4  hook hip ABD blue  -LC      Sets 4  2  -LC      Reps 4  10  -LC         Exercise 5    Exercise Name 5  HS curl on ball  -LC      Sets 5  2  -LC      Reps 5  10  -LC         Exercise 6    Exercise Name 6  glute set on ball  -LC      Sets 6  2   "-LC      Reps 6  10  -LC         Exercise 7    Exercise Name 7  pelvic tilt  -LC      Sets 7  2  -LC      Reps 7  5  -LC      Time 7  5  -LC         Exercise 8    Exercise Name 8  pelvic tilt with march  -LC      Sets 8  4  -LC      Reps 8  5  -LC         Exercise 9    Exercise Name 9  isometric crunch into ball  -LC      Sets 9  2  -LC      Reps 9  5  -LC      Time 9  5  -LC        User Key  (r) = Recorded By, (t) = Taken By, (c) = Cosigned By    Initials Name Provider Type    Matheus Holman, PT DPT Physical Therapist                       PT OP Goals     Row Name 05/16/19 1500          PT Short Term Goals    STG 1  Pt will be independent with HEP to improve BLE strength, increase balance static and dynamic, and reduce her risk of falls.  -     STG 1 Progress  Progressing  -     STG 2  Pt will be able to retieve object from floor without assistance.  -     STG 2 Progress  Progressing  -     STG 3  Pt will be able to perform 5 sit to stand with no use of BUEs.  -     STG 3 Progress  Progressing  -        Long Term Goals    LTG 1  Pt will have NG score > 60%  -     LTG 1 Progress  Progressing  -LC     LTG 2  Pt will be able to maintain eyes cloesd static stand for >15 seconds without LOB.  -     LTG 2 Progress  Progressing  -     LTG 3  Pt will be able to alternate feet on 6\" step without LOB.  -     LTG 3 Progress  Progressing  -       User Key  (r) = Recorded By, (t) = Taken By, (c) = Cosigned By    Initials Name Provider Type    Matheus Holman, PT DPT Physical Therapist          Therapy Education  Given: HEP, Symptoms/condition management, Pain management  Program: Progressed  How Provided: Verbal, Demonstration, Written  Provided to: Patient  Level of Understanding: Teach back education performed, Verbalized, Demonstrated              Time Calculation:   Start Time: 1500  Stop Time: 1545  Time Calculation (min): 45 min  Total Timed Code Minutes- PT: 45 minute(s)  Therapy Charges " for Today     Code Description Service Date Service Provider Modifiers Qty    35327012337 HC PT THER PROC EA 15 MIN 5/16/2019 Matheus Soria, PT DPT GP 3                    Matheus Soria PT DPT  5/16/2019

## 2019-05-17 DIAGNOSIS — N63.20 MASS OF LEFT BREAST: ICD-10-CM

## 2019-05-17 DIAGNOSIS — N63.0 BREAST NODULE: Primary | ICD-10-CM

## 2019-05-20 ENCOUNTER — TELEPHONE (OUTPATIENT)
Dept: ONCOLOGY | Facility: CLINIC | Age: 74
End: 2019-05-20

## 2019-05-20 ENCOUNTER — APPOINTMENT (OUTPATIENT)
Dept: PHYSICAL THERAPY | Facility: HOSPITAL | Age: 74
End: 2019-05-20

## 2019-05-20 NOTE — TELEPHONE ENCOUNTER
----- Message from Yuliya Chin sent at 5/20/2019  8:32 AM EDT -----  Regarding: FW: abn US      ----- Message -----  From: Yuliya Chin  Sent: 5/17/2019   1:16 PM  To: Jean-Claude Farnsworth MD  Subject: abn US                                           Abn US they don't know what to do and they wan to know if they should  cancel procedure in [place]? I told them I will call them back.

## 2019-05-23 ENCOUNTER — APPOINTMENT (OUTPATIENT)
Dept: PHYSICAL THERAPY | Facility: HOSPITAL | Age: 74
End: 2019-05-23

## 2019-05-23 ENCOUNTER — HOSPITAL ENCOUNTER (OUTPATIENT)
Dept: ULTRASOUND IMAGING | Facility: HOSPITAL | Age: 74
Discharge: HOME | End: 2019-05-23
Admitting: RADIOLOGY

## 2019-05-23 VITALS
HEART RATE: 90 BPM | SYSTOLIC BLOOD PRESSURE: 160 MMHG | RESPIRATION RATE: 20 BRPM | OXYGEN SATURATION: 99 % | WEIGHT: 198 LBS | BODY MASS INDEX: 38.87 KG/M2 | HEIGHT: 60 IN | TEMPERATURE: 97 F | DIASTOLIC BLOOD PRESSURE: 74 MMHG

## 2019-05-23 DIAGNOSIS — N63.20 MASS OF LEFT BREAST: ICD-10-CM

## 2019-05-23 DIAGNOSIS — N63.0 BREAST NODULE: ICD-10-CM

## 2019-05-23 PROCEDURE — 88342 IMHCHEM/IMCYTCHM 1ST ANTB: CPT | Performed by: SURGERY

## 2019-05-23 PROCEDURE — 88341 IMHCHEM/IMCYTCHM EA ADD ANTB: CPT | Performed by: SURGERY

## 2019-05-23 PROCEDURE — 88360 TUMOR IMMUNOHISTOCHEM/MANUAL: CPT | Performed by: SURGERY

## 2019-05-23 PROCEDURE — 88305 TISSUE EXAM BY PATHOLOGIST: CPT | Performed by: SURGERY

## 2019-05-23 RX ORDER — LIDOCAINE HYDROCHLORIDE AND EPINEPHRINE 10; 10 MG/ML; UG/ML
10 INJECTION, SOLUTION INFILTRATION; PERINEURAL ONCE
Status: COMPLETED | OUTPATIENT
Start: 2019-05-23 | End: 2019-05-23

## 2019-05-23 RX ORDER — LIDOCAINE HYDROCHLORIDE 10 MG/ML
10 INJECTION, SOLUTION INFILTRATION; PERINEURAL ONCE
Status: COMPLETED | OUTPATIENT
Start: 2019-05-23 | End: 2019-05-23

## 2019-05-23 RX ORDER — DIAZEPAM 5 MG/1
5 TABLET ORAL ONCE AS NEEDED
Status: DISCONTINUED | OUTPATIENT
Start: 2019-05-23 | End: 2019-05-24 | Stop reason: HOSPADM

## 2019-05-23 RX ADMIN — LIDOCAINE HYDROCHLORIDE 10 ML: 10 INJECTION, SOLUTION INFILTRATION; PERINEURAL at 10:51

## 2019-05-23 RX ADMIN — LIDOCAINE HYDROCHLORIDE AND EPINEPHRINE 10 ML: 10; 10 INJECTION, SOLUTION INFILTRATION; PERINEURAL at 10:52

## 2019-05-23 NOTE — NURSING NOTE
Biopsy site to left lateral breast clear with Skin Affix dry and intact. No firmness or swelling noted at or around biopsy site. Denies pain. Ice pack with protective covering applied to biopsy site. Discharge instructions discussed with understanding voiced by patient, her  and her daughter. Copies provided to patient/. No distress noted. To home via private vehicle accompanied by her family.

## 2019-05-23 NOTE — H&P
Name: Blanca Lam ADMIT: 2019   : 1945  PCP: Praneeth Valenzuela MD    MRN: [id number] LOS: 0 days   AGE/SEX: 73 y.o. female  ROOM: Room/bed info not found       Chief complaint left breast calcifications    Present Illness or Internal History:  Patient is a 73 y.o. female presents with left breast calcifications and lesion.     Past Surgical History:  Past Surgical History:   Procedure Laterality Date   • BREAST BIOPSY Left     benign   •  SECTION      x3   • CHOLECYSTECTOMY     • HYSTERECTOMY         Past Medical History:  Past Medical History:   Diagnosis Date   • Anemia    • Anxiety and depression    • Asthma    • CKD (chronic kidney disease)    • Colon polyp    • Diabetes mellitus (CMS/HCC)     Type II   • Hyperlipidemia    • Hypertension    • Temporal arteritis (CMS/HCC)    • Thrombophlebitis        Home Medications:    (Not in a hospital admission)    Allergies:  Aspirin and Sulfa antibiotics    Family History:  Family History   Problem Relation Age of Onset   • Hypertension Father    • Stomach cancer Father    • Diabetes Father    • Esophageal cancer Father    • Throat cancer Sister    • Diabetes Paternal Grandmother    • Hypertension Paternal Grandfather    • Heart disease Mother        Social History:  Social History     Tobacco Use   • Smoking status: Never Smoker   • Tobacco comment: caffine use   Substance Use Topics   • Alcohol use: No   • Drug use: No        Objective     Physical Exam:    AOx3 in NAD, CN II-XII grossly intact    Vital Signs  Temp:  [97 °F (36.1 °C)] 97 °F (36.1 °C)  Heart Rate:  [88] 88  Resp:  [20] 20  BP: (154)/(88) 154/88    Anticipated Surgical Procedure:  Ultrasound guided left breast biopsy with clip placement    The risks, benefits and alternatives of this procedure have been discussed with the patient or responsible party: Yes        Daniel Gutierrez Jr., MD  19  11:15 AM

## 2019-05-24 LAB
CYTO UR: NORMAL
LAB AP CASE REPORT: NORMAL
LAB AP CLINICAL INFORMATION: NORMAL
LAB AP DIAGNOSIS COMMENT: NORMAL
LAB AP SPECIAL STAINS: NORMAL
LAB AP SYNOPTIC CHECKLIST: NORMAL
PATH REPORT.FINAL DX SPEC: NORMAL
PATH REPORT.GROSS SPEC: NORMAL

## 2019-05-28 ENCOUNTER — APPOINTMENT (OUTPATIENT)
Dept: PHYSICAL THERAPY | Facility: HOSPITAL | Age: 74
End: 2019-05-28

## 2019-05-31 ENCOUNTER — PREP FOR SURGERY (OUTPATIENT)
Dept: OTHER | Facility: HOSPITAL | Age: 74
End: 2019-05-31

## 2019-05-31 ENCOUNTER — APPOINTMENT (OUTPATIENT)
Dept: PHYSICAL THERAPY | Facility: HOSPITAL | Age: 74
End: 2019-05-31

## 2019-05-31 ENCOUNTER — OFFICE VISIT (OUTPATIENT)
Dept: SURGERY | Facility: CLINIC | Age: 74
End: 2019-05-31

## 2019-05-31 VITALS
HEIGHT: 60 IN | HEART RATE: 76 BPM | DIASTOLIC BLOOD PRESSURE: 80 MMHG | OXYGEN SATURATION: 98 % | WEIGHT: 194 LBS | SYSTOLIC BLOOD PRESSURE: 172 MMHG | BODY MASS INDEX: 38.09 KG/M2

## 2019-05-31 DIAGNOSIS — D05.12 BREAST NEOPLASM, TIS (DCIS), LEFT: Primary | ICD-10-CM

## 2019-05-31 DIAGNOSIS — M81.0 OSTEOPOROSIS, UNSPECIFIED OSTEOPOROSIS TYPE, UNSPECIFIED PATHOLOGICAL FRACTURE PRESENCE: ICD-10-CM

## 2019-05-31 PROCEDURE — 99215 OFFICE O/P EST HI 40 MIN: CPT | Performed by: SURGERY

## 2019-05-31 RX ORDER — DIAZEPAM 5 MG/1
10 TABLET ORAL ONCE
Status: CANCELLED | OUTPATIENT
Start: 2019-07-03 | End: 2019-05-31

## 2019-05-31 RX ORDER — METHYLPREDNISOLONE SODIUM SUCCINATE 125 MG/2ML
125 INJECTION, POWDER, LYOPHILIZED, FOR SOLUTION INTRAMUSCULAR; INTRAVENOUS ONCE
Status: CANCELLED | OUTPATIENT
Start: 2019-07-02

## 2019-05-31 RX ORDER — CEFAZOLIN SODIUM 2 G/100ML
2 INJECTION, SOLUTION INTRAVENOUS ONCE
Status: CANCELLED | OUTPATIENT
Start: 2019-07-03 | End: 2019-05-31

## 2019-05-31 RX ORDER — LIDOCAINE AND PRILOCAINE 25; 25 MG/G; MG/G
CREAM TOPICAL ONCE
Status: CANCELLED | OUTPATIENT
Start: 2019-07-03 | End: 2019-05-31

## 2019-05-31 RX ORDER — OXYCODONE HCL 10 MG/1
10 TABLET, FILM COATED, EXTENDED RELEASE ORAL ONCE
Status: CANCELLED | OUTPATIENT
Start: 2019-07-03 | End: 2019-05-31

## 2019-05-31 RX ORDER — ACETAMINOPHEN 500 MG
1000 TABLET ORAL ONCE
Status: CANCELLED | OUTPATIENT
Start: 2019-07-03 | End: 2019-05-31

## 2019-05-31 RX ORDER — SODIUM CHLORIDE 0.9 % (FLUSH) 0.9 %
3 SYRINGE (ML) INJECTION EVERY 12 HOURS SCHEDULED
Status: CANCELLED | OUTPATIENT
Start: 2019-07-03

## 2019-05-31 RX ORDER — SODIUM CHLORIDE 0.9 % (FLUSH) 0.9 %
3-10 SYRINGE (ML) INJECTION AS NEEDED
Status: CANCELLED | OUTPATIENT
Start: 2019-07-03

## 2019-05-31 NOTE — PROGRESS NOTES
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 female who happens to be  but has a very good recognition of the English language.  She is accompanied by her  and daughter.  She underwent a screening mammogram 2/28/2019 showing interval development of segmental microcalcifications in the left upper outer quadrant.  This involves a large area over 7-1/2 cm, but was tomography, and compared to her films from last year was a dramatic change.  She had a biopsy 10 years ago via stereotactic, and the clip is noted, but not in the area of the calcifications.  This was followed by diagnostic mammogram on 5/9/2019, and ultrasound showing an ultrasound correlate, with ultrasound-guided biopsy showing the path as noted above.    The past that showed both comedonecrosis and high-grade DCIS.  I reviewed the films with the family today, showing them the large area of the involvement, on the mammogram, and the ultrasound findings as well, showing the irregularity amongst the routine appearance of normal breast.    She is very concerned about her overall health and the ability to go through surgery.  She does have diabetes that is not well controlled with a hemoglobin A1c of 9.3, with her glucose is now running in the 300 range.  She is on steroids for what sounds like temporal arteritis, but has weaned down from 60 a day to 7-1/2 a day.  This clearly will impact her ability to heal, both diabetes and the steroids.  She has hypertension, hemolytic anemia, and vitamin D deficiency.  She does have asthma as well, but says it is better since she is been on steroids.    She does not have any family history of breast cancer reported.  She does have a duodenal polyp, 1.4 cm, which I was able to confirm, when I looked through care everywhere (without LOC) and then the media section to find a scanned  in report.  This is in the second portion of the duodenum.  I have told her that we can address this after we take care of the breast issue, biopsy of that showing adenomatous changes.  I told her I would have to see what it looks like in order to make a recommendation.  She was planning to go back to [place] to have this evaluated more fully.    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 Daniel Gutierrez, [facility]   •  SECTION N/A     x3   • CHOLECYSTECTOMY N/A    • COLONOSCOPY W/ POLYPECTOMY N/A 2019    Enlarged folds in the antrum: biopsied; duodenal, transverse colon x2, splenic flexure, descending colon and sigmoid colon polyps: biopsied (path: tubular adenoma x6)-Dr. Zak De Jesus, [facility]   • PARTIAL HYSTERECTOMY Bilateral     ovaries still in tact     Family History   Problem Relation Age of Onset   • Hypertension Father    • Stomach cancer Father    • Diabetes Father    • Esophageal cancer Father    • Throat cancer Sister    • Diabetes Paternal Grandmother    • Hypertension Paternal Grandfather    • Colon cancer Paternal Grandfather    • Heart disease Mother    • Colon cancer Paternal Uncle    • Colon cancer Paternal Uncle    • Colon cancer Paternal Uncle    • Ovarian cancer Cousin    • Stomach cancer Cousin      Social History     Socioeconomic History   •  Marital status:      Spouse name: Not on file   • Number of children: 3   • Years of education: College   • Highest education level: Not on file   Occupational History   • Occupation: POSTAL SERVICE     Employer: RETIRED   Social Needs   • Financial resource strain: Not hard at all   • Food insecurity:     Worry: Never true     Inability: Never true   • Transportation needs:     Medical: No     Non-medical: No   Tobacco Use   • Smoking status: Never Smoker   • Smokeless tobacco: Never Used   • Tobacco comment: caffine use   Substance and Sexual Activity   • Alcohol use: No   • Drug use: No   • Sexual activity: No     Occupation/Additional Social Hx: Accompanied by her daughter and       Current Outpatient Medications:   •  atorvastatin (LIPITOR) 20 MG tablet, Take 20 mg by mouth Daily., Disp: , Rfl:   •  bisoprolol (ZEBeta) 5 MG tablet, Take 5 mg by mouth Daily., Disp: , Rfl:   •  Cholecalciferol (VITAMIN D3 PO), Take 1 tablet by mouth Daily., Disp: , Rfl:   •  Insulin Glargine (TOUJEO SOLOSTAR SC), Inject 40 Units under the skin Every Night., Disp: , Rfl:   •  Insulin Lispro (HUMALOG PEN SC), Inject  under the skin into the appropriate area as directed 3 (Three) Times a Day With Meals. 25 units at breakfast, 20 units at lunch, 22 units at dinner, Disp: , Rfl:   •  levocetirizine (XYZAL) 5 MG tablet, Take 5 mg by mouth Every Evening., Disp: , Rfl:   •  losartan-hydrochlorothiazide (HYZAAR) 50-12.5 MG per tablet, Take 1 tablet by mouth Daily., Disp: , Rfl:   •  montelukast (SINGULAIR) 10 MG tablet, Take 10 mg by mouth Every Night., Disp: , Rfl:   •  omeprazole (priLOSEC) 20 MG capsule, Take 40 mg by mouth Daily., Disp: , Rfl:   •  predniSONE (DELTASONE) 2.5 MG tablet, Take 7.5 mg by mouth Daily., Disp: , Rfl:   •  valsartan-hydrochlorothiazide (DIOVAN-HCT) 160-12.5 MG per tablet, Take 1 tablet by mouth Daily., Disp: , Rfl:   •  vitamin B-12 (CYANOCOBALAMIN) 1000 MCG tablet, Take 1,000 mcg by mouth  "Daily., Disp: , Rfl:     Allergies   Allergen Reactions   • Aspirin Unknown (See Comments)     Low tolerance   • Sulfa Antibiotics Unknown (See Comments)     Happened as a child     Preventative Medicine  Colonoscopy: 2019  Review of Systems   Constitutional: Positive for fatigue.   HENT: Positive for congestion, postnasal drip and sinus pressure.    Eyes: Positive for visual disturbance.   Respiratory: Positive for shortness of breath.    Cardiovascular: Positive for leg swelling.   Gastrointestinal: Positive for diarrhea.   Genitourinary: Positive for frequency.   Musculoskeletal: Positive for back pain, myalgias, neck pain and neck stiffness.   Skin: Positive for rash.   Allergic/Immunologic: Positive for environmental allergies.   Neurological: Positive for tremors and weakness.   Psychiatric/Behavioral: Positive for agitation, confusion and decreased concentration. The patient is nervous/anxious.    All other systems reviewed and are negative.      Vitals:    05/31/19 1036   BP: 172/80   BP Location: Right arm   Patient Position: Sitting   Pulse: 76   SpO2: 98%   Weight: 88 kg (194 lb)   Height: 152.4 cm (60\")       PHYSICAL EXAM:    /80 (BP Location: Right arm, Patient Position: Sitting)   Pulse 76   Ht 152.4 cm (60\")   Wt 88 kg (194 lb)   SpO2 98%   BMI 37.89 kg/m²   Body mass index is 37.89 kg/m².    Constitutional: well developed, well nourished, Rounded face, consistent with steroid use  Eyes: sclera nonicteric, conjunctiva not injected   ENMT: Hearing intact, trachea midline, thyroid without masses, supraclavicular area with fatty deposition  CVS: RRR, no murmur, peripheral edema 1-2+  Respiratory: CTA, normal respiratory effort   Gastrointestinal: no hepatosplenomegaly, abdomen soft, nontender  Genitourinary: inguinal hernia not detected  Musculoskeletal: gait slowed with a walker use, muscle mass diminished  Skin: warm and dry, no rashes visible  Neurological: awake and alert, seems to have " reasonable capacity for understanding for medical decision making  Psychiatric: appears to have reasonable judgement, pleasant  Lymphatics: no cervical adenopathy or axillary adenopathy  Breast: With palpable firmness in the left upper outer quadrant, incision quite far from the areola for her percutaneous biopsy, may be 10 to 11 cm, see, left breast larger than the right, with truncal adipose extending around to the breast    Radiographic Findings: As above    Lab Findings: As above, hemoglobin A1c 9.3    Pamphlet reviewed: Breast cancer both in [place] and English    IMPRESSION:  · Large area of involvement left upper outer quadrant, greater than 7 cm, DCIS, high-grade with comedonecrosis.  We discussed that this is a sampling biopsy, and there might be torsten invasive cancer in the resected specimen, but as of now, this is not an invasive cancer.  We also discussed the fact that even though DCIS technically cannot have spread to the axilla, with the large nature of the involvement, and the high-grade nature, a sentinel node biopsy is appropriate we also discussed the fact that due to the large area of involvement that a lumpectomy would be not appropriate due to the risk of a deformed breast with a large area to accumulate fluid her blood and increased risk of infection.  · Asthma, improved on daily steroids  · Arteritis, on daily dose of 7.5 prednisone  · Diabetes poorly controlled with hemoglobin A1c of 9.3, on insulin glargine 40 units under the skin every night, insulin Lis pro under the skin 25 units at breakfast, 20 units at lunch, 22 units at dinner  · Hypertension on Zebeta 5 mg daily, Hyzaar 50-12-1/2, Diovan--12  · Vitamin D deficiency  · Hemolytic anemia.  · 1.4 cm duodenal polyp, second portion.  I have offered to follow that for her here with recommended treatment after an EGD or she can return to [place]..    PLAN:  · Recommended left mastectomy with sentinel node biopsy, possible axillary  dissection.  She will likely need a V-Y plasty closure as well.  I think this is her best option to avoid a large seroma rather than a lumpectomy.  · Solu-Medrol 125 and holding preop  · Asked that she see Dr. Valenzuela, and try to get her diabetes under better control prior to surgery to reduce her risk of perioperative infection.  · See Dr. Farnsworth next week, for confirmation of our approach and then they will call in to schedule.    Tahmina James MD  05/31/19  1:51 PM    Greater than 1 hour spent with over half in counseling

## 2019-06-03 ENCOUNTER — APPOINTMENT (OUTPATIENT)
Dept: LAB | Facility: HOSPITAL | Age: 74
End: 2019-06-03

## 2019-06-03 ENCOUNTER — OFFICE VISIT (OUTPATIENT)
Dept: ONCOLOGY | Facility: CLINIC | Age: 74
End: 2019-06-03

## 2019-06-03 VITALS
SYSTOLIC BLOOD PRESSURE: 150 MMHG | DIASTOLIC BLOOD PRESSURE: 81 MMHG | OXYGEN SATURATION: 96 % | WEIGHT: 194.7 LBS | HEART RATE: 80 BPM | TEMPERATURE: 98 F | HEIGHT: 59 IN | BODY MASS INDEX: 39.25 KG/M2 | RESPIRATION RATE: 16 BRPM

## 2019-06-03 DIAGNOSIS — D50.8 OTHER IRON DEFICIENCY ANEMIA: Primary | ICD-10-CM

## 2019-06-03 DIAGNOSIS — D05.12 BREAST NEOPLASM, TIS (DCIS), LEFT: ICD-10-CM

## 2019-06-03 DIAGNOSIS — R79.82 ELEVATED C-REACTIVE PROTEIN (CRP): Primary | ICD-10-CM

## 2019-06-03 PROBLEM — E11.21 DIABETIC NEPHROPATHY ASSOCIATED WITH TYPE 2 DIABETES MELLITUS (HCC): Status: ACTIVE | Noted: 2019-06-03

## 2019-06-03 PROBLEM — D50.9 IRON DEFICIENCY ANEMIA: Status: ACTIVE | Noted: 2019-06-03

## 2019-06-03 PROBLEM — N18.9 CKD (CHRONIC KIDNEY DISEASE): Status: ACTIVE | Noted: 2019-06-03

## 2019-06-03 LAB
BASOPHILS # BLD AUTO: 0.06 10*3/MM3 (ref 0–0.2)
BASOPHILS NFR BLD AUTO: 0.5 % (ref 0–1.5)
DEPRECATED RDW RBC AUTO: 44.1 FL (ref 37–54)
EOSINOPHIL # BLD AUTO: 0.15 10*3/MM3 (ref 0–0.4)
EOSINOPHIL NFR BLD AUTO: 1.2 % (ref 0.3–6.2)
ERYTHROCYTE [DISTWIDTH] IN BLOOD BY AUTOMATED COUNT: 13.6 % (ref 12.3–15.4)
HCT VFR BLD AUTO: 37.7 % (ref 34–46.6)
HGB BLD-MCNC: 11.6 G/DL (ref 12–15.9)
IMM GRANULOCYTES # BLD AUTO: 0.1 10*3/MM3 (ref 0–0.05)
IMM GRANULOCYTES NFR BLD AUTO: 0.8 % (ref 0–0.5)
LYMPHOCYTES # BLD AUTO: 1.52 10*3/MM3 (ref 0.7–3.1)
LYMPHOCYTES NFR BLD AUTO: 12 % (ref 19.6–45.3)
MCH RBC QN AUTO: 27.4 PG (ref 26.6–33)
MCHC RBC AUTO-ENTMCNC: 30.8 G/DL (ref 31.5–35.7)
MCV RBC AUTO: 89.1 FL (ref 79–97)
MONOCYTES # BLD AUTO: 0.79 10*3/MM3 (ref 0.1–0.9)
MONOCYTES NFR BLD AUTO: 6.3 % (ref 5–12)
NEUTROPHILS # BLD AUTO: 10 10*3/MM3 (ref 1.7–7)
NEUTROPHILS NFR BLD AUTO: 79.2 % (ref 42.7–76)
NRBC BLD AUTO-RTO: 0 /100 WBC (ref 0–0.2)
PLATELET # BLD AUTO: 210 10*3/MM3 (ref 140–450)
PMV BLD AUTO: 10.6 FL (ref 6–12)
RBC # BLD AUTO: 4.23 10*6/MM3 (ref 3.77–5.28)
WBC NRBC COR # BLD: 12.62 10*3/MM3 (ref 3.4–10.8)

## 2019-06-03 PROCEDURE — 36416 COLLJ CAPILLARY BLOOD SPEC: CPT | Performed by: INTERNAL MEDICINE

## 2019-06-03 PROCEDURE — 99215 OFFICE O/P EST HI 40 MIN: CPT | Performed by: INTERNAL MEDICINE

## 2019-06-03 PROCEDURE — 85025 COMPLETE CBC W/AUTO DIFF WBC: CPT | Performed by: INTERNAL MEDICINE

## 2019-06-03 NOTE — PROGRESS NOTES
Subjective     REASON FOR FOLLOW UP:  ANEMIA, FATIGUE, FREQUENT FALLS, AFRAID OF WALKING, ABDOMINAL PAIN AND CONTINUO'S  TURMOIL IN BOWEL FUNCTION AND DIGESTION      History of Present Illness I have been asked by Dr. Valenzuela to see this patient in consultation today who is originally from [place] and living in the [place] for most of her life and presents to the office in company of her . 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 She never required splenectomy. The patient states that she has had for the last 2 years significant headache presumed to be related to temporal artery inflammation, irritation, temporal arteritis, polymyalgia rheumatic even though a temporal artery biopsy was never performed. The patient has been on chronic steroid therapy by Rheumatologist. In any event, the patient besides the frequent headaches also complains of profound fatigue to the point that the only that she wants to do all day long is lay in bed and sleep. This is a new progressive symptomatology that has developed in the last several months and is making her life very much miserable. She does not want to do anything or walk around because the other phenomenon associated with the fatigue is tremendous instability to walk. She has had multiple falls with trauma. Fortunately, no fracture and she complains of sensation that she does not know where her feet are. She has tremendous inability to turn, to take steps and to change positions abruptly. The patient denies any vertigo or dizziness. She denies any visual changes. She has not had any nose alterations, epistaxis or alteration in nasal passages. She denies any alteration in her gums or her teeth. She has no difficulty swallowing. She has not had any choking sensation. She has had asthma in the past but not  recently while she has been taking prednisone for her presumed temporal arteritis. The patient denies any heartburn but she complains of persistent indigestion with turmoil in her gastrointestinal tract all the time, flatulence, sensation of fullness, discomfort, distention and irregularity in bowel activity. All this in spite of previous colonoscopy according to her being negative or normal. She has had removal of polyps in the stomach and she has another polyp in the duodenum that needs to be removed eventually. The patient denies any passage of blood in the stool, any passage of blood in her intestine. The patient denies any urinary symptomatology. She has not had any hematuria or vaginal bleeding. She complains of fatigue, throughout back pain. She denies any numbness or tingling in her feet or her hands. She does not know where her feet are; therefore, she falls frequently and for simple reasons. She is afraid of walking because she is afraid of falling. Her  has continued insisting to her to keep looking down when she is walking because it seems she has probably motor sensory deficit.     The patient denies specifically any alteration in the neck like neck pain or crepitation. She denies any back pain in the thoracic spine or lumbar spine. She denies any symptoms that would suggest any cauda equina syndrome.     The patient denies any fevers, chills or adenopathies. She has not had any rashes in the skin and she has not had any joint pain with the exception of occasional knee discomfort. She has no stiffness in elbows, shoulders, wrists or hands. No local inflammation. She denies any pruritus. She complains of some element of hair loss.    The patient was further reviewed along with her  and her daughter on 06/03/2019.  Since then the patient has undergone routine mammogram that documented calcifications with abnormalities in the left breast.  This triggered an ultrasound of the left breast and  this triggered a biopsy that showed DCIS high-grade.  It seems to me that the area of the DCIS is large and the patient has already been seen by Tahmina James MD and in anticipate of the need for a left-sided mastectomy.      Physically, the patient is in shock with so many things happening to her.  Her diabetes is out of control and her anxiety level is through the roof.  She does not know what she needs to do first.  In the meantime, we have multiple tests to be discussed today regarding radiological analysis of the MRI of the cervical spine, CT scan of the abdomen and pelvis, the laboratory workup and so forth that were done during the previous visit.      Physically the patient, besides being in shock, the worst other part is diabetes control is poor.  She is checking her sugar from time to time and hemoglobin A1c, as we mentioned before, is in the 9.3 category.      The patient has not had any difficulty with her walking now that she has a walker and is using flip-flops.  That is a major mistake in somebody who has peripheral neuropathy.    The patient has still has turmoil in the stomach that she relates very clearly to the turmoil that she has in her mind.  If her mind is good her stomach is good, if her mind is bad her stomach is bad.  It is very likely related to psychosomatic issues.    Otherwise, the patient has not had any fevers or infections, no cardiovascular problems. No respiratory issues at this time.  She is using the walker and actually feels very safe in her walking with this device at this time.        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 Daniel Gutierrez, [facility]   •  SECTION N/A     x3   • CHOLECYSTECTOMY N/A    • COLONOSCOPY W/ POLYPECTOMY N/A 2019    Enlarged folds in the antrum: biopsied; duodenal, transverse colon x2, splenic flexure, descending colon and sigmoid colon polyps: biopsied (path: tubular adenoma x6)-Dr. Zak De Jesus, [facility]   • PARTIAL HYSTERECTOMY Bilateral     ovaries still in tact        Current Outpatient Medications on File Prior to Visit   Medication Sig Dispense Refill   • atorvastatin (LIPITOR) 20 MG tablet Take 20 mg by mouth Daily.     • bisoprolol (ZEBeta) 5 MG tablet Take 5 mg by mouth Daily.     • Cholecalciferol (VITAMIN D3 PO) Take 1 tablet by mouth Daily.     • Insulin Glargine (TOUJEO SOLOSTAR SC) Inject 40 Units under the skin Every Night.     • Insulin Lispro (HUMALOG PEN SC) Inject  under the skin into the appropriate area as directed 3 (Three) Times a Day With Meals. 25 units at breakfast, 20 units at lunch, 22 units at dinner     • levocetirizine (XYZAL) 5 MG tablet Take 5 mg by mouth Every Evening.     • losartan-hydrochlorothiazide (HYZAAR) 50-12.5 MG per tablet Take 1 tablet by mouth Daily. Pt now taking 100-50mg     • montelukast (SINGULAIR) 10 MG tablet Take 10 mg by mouth Every Night.     • omeprazole (priLOSEC) 20 MG capsule Take 40 mg by mouth Daily.     • predniSONE (DELTASONE) 2.5 MG tablet Take 7.5 mg by mouth Daily.     • valsartan-hydrochlorothiazide (DIOVAN-HCT) 160-12.5 MG per tablet Take 1 tablet by mouth Daily.     • vitamin B-12 (CYANOCOBALAMIN) 1000 MCG tablet Take 1,000 mcg by mouth Daily.       No current facility-administered medications on file prior to visit.         ALLERGIES:     Allergies   Allergen Reactions   • Aspirin Unknown (See Comments)     Low tolerance   • Sulfa Antibiotics Unknown (See Comments)     Happened as a child        Social History     Socioeconomic History   • Marital status:      Spouse name: Not on file   • Number of children: 3   • Years of education: College   • Highest education level: Not on file   Occupational History   • Occupation: POSTAL SERVICE     Employer: RETIRED   Social Needs   • Financial resource strain: Not hard at all   • Food insecurity:     Worry: Never true     Inability: Never true   • Transportation needs:     Medical: No     Non-medical: No   Tobacco Use   • Smoking status: Never Smoker   • Smokeless tobacco: Never Used   • Tobacco comment: caffine use   Substance and Sexual Activity   • Alcohol use: No   • Drug use: No   • Sexual activity: No        Family History   Problem Relation Age of Onset   • Hypertension Father    • Stomach cancer Father    • Diabetes Father    • Esophageal cancer Father    • Throat cancer Sister    • Diabetes Paternal Grandmother    • Hypertension Paternal Grandfather    • Colon cancer Paternal Grandfather    • Heart disease Mother    • Colon cancer Paternal Uncle    • Colon cancer Paternal Uncle    • Colon cancer Paternal Uncle    • Ovarian cancer Cousin    • Stomach cancer Cousin         Review of Systems           General: no fever, no chills,  fatigue,no weight changes, no lack of appetite.  Eyes: no epiphora, xerophthalmia,conjunctivitis, pain, glaucoma, blurred vision, blindness, secretion, photophobia, proptosis, diplopia.  Ears: no otorrhea, tinnitus, otorrhagia, deafness, pain, vertigo.  Nose: no rhinorrhea, no epistaxis, no alteration in perception of odors, no sinuses pressure.  Mouth: no alteration in gums or teeth,  No ulcers, no difficulty with mastication or deglut ion, no odynophagia.  Neck: no masses or pain, no thyroid alterations, no pain in muscles or arteries, no carotid odynia, no  "crepitation.  Respiratory: no cough, no sputum production,no dyspnea,no trepopnea, no pleuritic pain,no hemoptysis.  Heart: no syncope, no irregularity, no palpitations, no angina,no orthopnea,no paroxysmal nocturnal dyspnea.  Vascular Venous: no tenderness,no edema,no palpable cords,no postphlebitic syndrome, no skin changes no ulcerations.  Vascular Arterial: no distal ischemia, noclaudication, no gangrene, no neuropathic ischemic pain, no skin ulcers, no paleness no cyanosis.  GI: no dysphagia, no odynophagia, no regurgitation, no heartburn,no indigestion,no nausea,no vomiting,no hematemesis ,no melena,no jaundice,no distention, no obstipation,no enterorrhagia,no proctalgia,no anal  lesions, no changes in bowel habits.  : no frequency, no hesitancy, no hematuria, no discharge,no  pain.  Musculoskeletal: no muscle or tendon pain or inflammation,no  joint pain, no edema, no functional limitation,no fasciculations, no mass.  Neurologic: no headache, no seizures, noalterations on Craneal nerves,  motor deficit,  sensory deficit, normal coordination, no alteration in memory,normal orientation, calculation,normal writting, verbal and written language.  Skin: no rashes,no pruritus no localized lesions.  Psychiatric: SEVERE anxiety, no depression,no agitation, no delusions, proper insight.      Objective     Vitals:    06/03/19 1044   BP: 150/81   Pulse: 80   Resp: 16   Temp: 98 °F (36.7 °C)   TempSrc: Oral   SpO2: 96%   Weight: 88.3 kg (194 lb 11.2 oz)   Height: 149 cm (58.66\")   PainSc: 0-No pain     Current Status 6/3/2019   ECOG score 1       Physical Exam        GENERAL:  Well-developed, well-nourished  Patient  in no acute distress.   SKIN:  Warm, dry ,NO rashes,NO purpura ,NO petechiae.  HEENT:  Pupils were equal and reactive to light and accomodation, conjunctivas non injected, no pterigion, normal extraocular movements, normal visual acuity.   Mouth mucosa was moist, no exudates in oropharynx, normal gum line, " normal roof of the mouth and pillars, normal papillations of the tongue.  NECK:  Supple with good range of motion; no thyromegaly or masses, no JVD or bruits, no cervical adenopathies.No carotid arteries pain, no carotid abnormal pulsation , NO arterial dance.  LYMPHATICS:  No cervical, NO supraclavicular, NO axillary,NO epitrochlear , NO inguinal adenopathy.  CHEST:  Normal excursion of both gina thoraces, normal voice fremitus, no subcutaneous emphysema, normal axillas, no rashes or acanthosis nigricans. Lungs clear to percussion and auscultation, normal breath sounds bilaterally, no wheezing,NO crackles NO ronchi, NO stridor, NO rubs.  CARDIAC AND VASCULAR:  normal rate and regular rhythm, without murmurs,NO rubs NO S3 NO S4 right or left . Normal femoral, popliteal, pedis, brachial and carotid pulses.  INSPECTION of  breast documented symmetry of the tissue per se and location and size of the nipple,no retractions or inversion of the nipple, normal skin without lesions, no erythema or nodules,no paud'orange, no prominence of superficial veins or chest wall collateral circulation.PALPATION of the breast documented normal skin turgor, no induration, alteration in local temperature, or pain, no palpable masses or nodules, normal mobility of the tissues,no fixation of the tissue or parenchyma to the chest wall, no alteration at the tail of the breasts or axillas, no adenopathies. Surgical site was well healed.No lymphedema in either extremity.  ABDOMEN:  Soft, nontender with no organomegaly or masses, no ascites, no collateral circulation,no distention,no Elio sign, no abdominal pain, no inguinal hernias,no umbilical hernia, no abdominal bruits. Normal bowel sounds.  GENITAL: Not  Performed.  EXTREMITIES  AND SPINE:  No clubbing, cyanosis or edema, no deformities or pain .No kyphosis, scoliosis, deformities or pain in spine, ribs or pelvic bone.  NEUROLOGICAL:  Patient was awake, alert, oriented to time, person  and place.ROMBERG POSITIVE              RECENT LABS:  Hematology WBC   Date Value Ref Range Status   06/03/2019 12.62 (H) 3.40 - 10.80 10*3/mm3 Final     RBC   Date Value Ref Range Status   06/03/2019 4.23 3.77 - 5.28 10*6/mm3 Final     Hemoglobin   Date Value Ref Range Status   06/03/2019 11.6 (L) 12.0 - 15.9 g/dL Final     Hematocrit   Date Value Ref Range Status   06/03/2019 37.7 34.0 - 46.6 % Final     Platelets   Date Value Ref Range Status   06/03/2019 210 140 - 450 10*3/mm3 Final        MRI OF THE CERVICAL SPINE WITHOUT CONTRAST 05/08/2019     CLINICAL HISTORY: Spinal stenosis cervical region. The patient has  anemia due to stage 3 chronic kidney disease, fatigue associated with  anemia, has ataxia.     TECHNIQUE: Sagittal T1, gradient echo T2 and fat-suppressed fast  spin-echo T2-weighted images were obtained of the cervical spine. In  addition axial gradient echo T2-weighted images were obtained from the  skull base to T1 and oblique sagittal T2-weighted images were obtained  angled through the right and left cervical neural foramina.     COMPARISON: There are no prior cervical spine imaging studies from  [facility] for comparison.     FINDINGS: The craniocervical junction is normal in appearance. The  cervical cord and upper thoracic cord are normal in signal intensity.     There is some mild T2 hyperintensity in the body of the C2 cervical  vertebra.  It is T1 isointense to hyperintense. It is probably due to an  underlying benign vertebral body hemangioma in the C2 vertebra of no  significance.     At C2-3, the disc space, facets and uncovertebral joints are normal with  no canal or foraminal narrowing.     At C3-4, there is a small posterior central disc osteophyte complex that  abuts and minimally deforms the ventral surface of the cord mildly  narrowing the central portion of the canal. The facets and uncovertebral  joints are normal with no foraminal narrowing.     At C4-5, there is a  small posterior central disc osteophyte complex that  abuts the ventral cord mildly narrowing the central portion of the  canal. Facets and uncovertebral joints are normal and there is no  foraminal narrowing.     At C5-6, there is very minimal posterior endplate spurring. The facets  and uncovertebral joints are normal. There is no canal or foraminal  narrowing.     At C6-7, the disc space, facets and uncovertebral joints are normal with  no canal or foraminal narrowing.     At C7-T1, the disc space, facets and uncovertebral joints are normal  with no canal or foraminal narrowing.     IMPRESSION:  1. There are small posterior central disc osteophyte complexes that abut  and mildly deform the ventral surface of the cord resulting in mild  canal narrowing at C3-4 and C4-5.  2. There is a 12 mm area of T2 hyperintensity in the central to left  side of the C2 cervical vertebra. It is isointense to mildly  hyperintense on the T1-weighted images thus it is most consistent with a  benign vertebral body hemangioma of no significance.  3. The remainder of the cervical spine MRI is normal.     This report was finalized on 5/9/2019 9:43 AM by Dr. Azael Roque M.D.   CT ABDOMEN AND PELVIS WITHOUT IV CONTRAST     HISTORY: 73-year-old female with abdominal pain and nausea. History of  anemia and stage 3 renal disease.     TECHNIQUE: CT abdomen and pelvis without IV contrast but with use of  oral contrast.     COMPARISON:  CT abdomen and pelvis 03/03/2018.     FINDINGS: Lung bases appear clear and there is no basilar effusion.  Liver, spleen, adrenal glands, and pancreas are with a normal CT  appearance. Kidneys appear within normal limits and there is no  hydronephrosis or evidence for change. There is no bowel dilatation or  evidence for bowel obstruction. Mild sigmoid diverticulosis is present.  There is no ascites. Within the L5 vertebral body there is an area of  sclerosis that was also evident on prior MRI 06/2011  supporting benign  etiology. Chronic compression deformity is present at T12.     IMPRESSION:  1. No evidence for acute abnormality of the abdomen or pelvis.  2. Chronic compression deformity at T12. Sclerotic lesion L5 vertebral  body is chronic and without change.     Radiation dose reduction techniques were utilized, including automated  exposure control and exposure modulation based on body size.     This report was finalized on 5/8/2019 4:55 PM by Dr. Tomi Dowd  Tissue Pathology Exam: [id number]   Order: [id number]   Collected:  5/23/2019 10:56   Status:  Final result   Visible to patient:  Yes (MyChart)   Dx:  Breast nodule   Component    Clinical Information    Left breast mass and calcifications.   Final Diagnosis   1.  Left Breast, 2 o'clock, 7 cm from the nipple, core biopsies for a mass with calcifications:  HIGH GRADE DUCTAL CARCINOMA IN SITU WITH APOCRINE FEATURES.               A.  Architectural types/Grade:  Solid and comedo, high nuclear grade (III).                B.  DCIS present in all cores with largest focus measuring 8.0mm.               C.  Central expansive comedonecrosis and associated microcalcifications identified.                D.  No evidence of invasion identified.      swm/brb    Electronically signed by Mabel Valle MD on 5/24/2019 at 1337   Synoptic Checklist   Breast Biomarker Reporting Template   Breast Bmk - All Specimens   Test(s) Performed     Estrogen Receptor (ER) Results  Negative (less than 1%)      Internal control cells present and stain as expected    Test Type  Food and Drug Administration (FDA) cleared (test / vendor): ventana    Primary Antibody  SP1    Scoring System  Leoncio    Proportion Score  0    Intensity Score  0    Total Leoncio Score  0    Test(s) Performed     Progesterone Receptor (PgR) Status  Negative (less than 1%)      Internal control cells present and stain as expected    Test Type  Food and Drug Administration (FDA) cleared (test /  "vendor): ventana    Primary Antibody  1E2    Scoring System  Leoncio    Proportion Score  0    Intensity Score  0    Total Leoncio Score  0    Cold Ischemia and Fixation Times  Meet requirements specified in latest version of the ASCO / CAP Guidelines    Cold Ischemia Time (minutes)  0 min   Fixation Time (hours)  10 hours   Testing Performed on Block Number(s)  1A    METHODS   Fixative  Formalin    Image Analysis  Not performed    .      Comment    This case was shared internally with Dr. Horner, who concurs.      SWM/brb   Gross Description    1. Received in formalin labeled \"left breast 2:00 7 cm from nipple\" are four fibrofatty cylindrical tissues ranging from 1.2 x 0.2 cm to 1.8 x 0.2 cm, which are inked black and submitted en toto as 1A.     Time collected: 10:56  Cold ischemia time:  0 seconds  Total 10% formalin fixation time:  10 hours      JAP/USO/GUERA/rosie    Special Stains    1. Utilizing appropriate controls, multiple immunohistochemical stains are performed including AE1/AE3, P63, smooth muscle myosin, ER, and AR.  Cytokeratin (AE1/3) stains benign ducts and DCIS without evidence of infiltrating tumor cells.  A P63 positive and smooth muscle positive myoepithelial layer are present around DCIS.  The results of hormone receptors are listed below in synoptic format.     ER / AR Scoring Guide:  Nuclear staining of tumor cells equal to or greater than 1%, of any intensity, is considered a positive result with less than 1% considered negative, when controls are adequate.  ER/AR Disclaimer: All breast markers (Confirm anti-estrogen receptor clone SP1 and Confirm anti-progesterone receptor clone 1E2) are performed utilizing the ultra-View DAB kit on the Benchmark Ultra platform, all manufactured by [employer], [place], [place].  Reagents utilized are FDA approved for in vitro diagnostic use.  These tests are not intended as a confirmation of the original diagnosis.  They should only be used in conjunction " with other established risk factors, clinical and diagnostic procedures.                 Assessment/Plan    1.  Patient’s anemia is a manifestation of multifactorial issues including iron deficiency with a low iron and low ferritin and total iron binding capacity that is elevated. This patient requires therapy with IV Injectafer and she will be scheduled to come to the office to receive this on 2 different occasions.  The degree of SPHEROCYTIC anemia she has is minimal, at least by peripheral blood examination and I do not require any other intervention for this purpose.  Her B12 level, folic acid level and reticulated hemoglobin were normal.  Her hemoglobin electrophoresis was normal and this will be watched in absence of any other intervention besides repeated ferritin and iron profile in 6 weeks.  2.  Patient has high-grade DCIS of the left breast with an extensive area that is compromised by this.  The tumor is ER negative, OH negative but analysis for receptors will be repeated on the surgical specimen from the mastectomy.  She has already talked with Dr. James in this regard and she will scheduled to have a mastectomy in the next several weeks once the patient achieves better diabetes control.    3.  Disbalance.  This is on the basis of peripheral neuropathy.  MRI of the cervical spine disclosed no evidence of spinal stenosis.  She now uses a walker.  The patient is in flip-flops today and I pinpointed to her that the worst thing she could have in the background of neuropathy is using this kind of shoes.  She needs to be fully protected regarding shoes and regarding her feet at this time and she understood this clearly.    4.  Turmoil in her gastrointestinal tract for a long time.  If this is psychosomatic or not remains to be seen. At least the CT scan of the abdomen and pelvis I have reviewed has nothing to do with anything in particular when showing no abnormalities in the pancreas, kidneys, liver, spleen  or any other intraabdominal organs.  5.  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  She will require reassessment of this and in the future she will require an upper GI endoscopy for removal.   6.  Terrible control of diabetes with hemoglobin A1c of 9.3.  An appointment was made to see Dr. Valenzuela in preparation of FreeStyle Michell and modification of her medication regimen.  She must decrease her blood sugar and A1c before proceeding with surgery for her breast cancer.  7.  Patient has multinodular goiter.  This is not an issue at this time.  This issue can wait for a long time before the patient needs to have any procedure or pertinent testing for this in some way or the other.    8.  Chronic kidney disease with creatinine of 1.3.  She has proteinuria 497 mg of protein in urine of 24-hours and with no monoclonal protein leakage.    9.  Serum protein electrophoresis that discloses no evidence of monoclonal protein.  10. History of temporal arteritis.  She is on chronic prednisone therapy.  Stress doses of prednisone will be given to her before proceeding with surgery for her mastectomy.      I discussed all these facts with the patient, her daughter and her  in detail and they were ready to proceed.  I made her an appointment to return to see me in 4-6 weeks and review a CBC at that time and go from there.      I discussed this with her in detail as well as the radiological analysis done by the radiologist at [facility].    11.  Patient’s anxiety has no definitive solution besides trying to put things in an organized way and take priorities regarding health care.  I did not prescribe any medicine for this.

## 2019-06-04 ENCOUNTER — LAB (OUTPATIENT)
Dept: LAB | Facility: HOSPITAL | Age: 74
End: 2019-06-04

## 2019-06-04 DIAGNOSIS — D50.8 OTHER IRON DEFICIENCY ANEMIA: ICD-10-CM

## 2019-06-04 DIAGNOSIS — D05.12 BREAST NEOPLASM, TIS (DCIS), LEFT: ICD-10-CM

## 2019-06-04 LAB
ALBUMIN SERPL-MCNC: 3.7 G/DL (ref 3.5–5.2)
ALBUMIN/GLOB SERPL: 1.2 G/DL (ref 1.1–2.4)
ALP SERPL-CCNC: 101 U/L (ref 38–116)
ALT SERPL W P-5'-P-CCNC: 13 U/L (ref 0–33)
ANION GAP SERPL CALCULATED.3IONS-SCNC: 11.2 MMOL/L
AST SERPL-CCNC: 11 U/L (ref 0–32)
BASOPHILS # BLD AUTO: 0.04 10*3/MM3 (ref 0–0.2)
BASOPHILS NFR BLD AUTO: 0.4 % (ref 0–1.5)
BILIRUB SERPL-MCNC: 0.3 MG/DL (ref 0.2–1.2)
BUN BLD-MCNC: 32 MG/DL (ref 6–20)
BUN/CREAT SERPL: 24.6 (ref 7.3–30)
CALCIUM SPEC-SCNC: 9.4 MG/DL (ref 8.5–10.2)
CHLORIDE SERPL-SCNC: 100 MMOL/L (ref 98–107)
CO2 SERPL-SCNC: 28.8 MMOL/L (ref 22–29)
CREAT BLD-MCNC: 1.3 MG/DL (ref 0.6–1.1)
DEPRECATED RDW RBC AUTO: 45.9 FL (ref 37–54)
EOSINOPHIL # BLD AUTO: 0.13 10*3/MM3 (ref 0–0.4)
EOSINOPHIL NFR BLD AUTO: 1.3 % (ref 0.3–6.2)
ERYTHROCYTE [DISTWIDTH] IN BLOOD BY AUTOMATED COUNT: 13.6 % (ref 12.3–15.4)
FERRITIN SERPL-MCNC: 28 NG/ML (ref 13–150)
GFR SERPL CREATININE-BSD FRML MDRD: 40 ML/MIN/1.73
GLOBULIN UR ELPH-MCNC: 3.1 GM/DL (ref 1.8–3.5)
GLUCOSE BLD-MCNC: 201 MG/DL (ref 74–124)
HCT VFR BLD AUTO: 38 % (ref 34–46.6)
HGB BLD-MCNC: 11.4 G/DL (ref 12–15.9)
IMM GRANULOCYTES # BLD AUTO: 0.06 10*3/MM3 (ref 0–0.05)
IMM GRANULOCYTES NFR BLD AUTO: 0.6 % (ref 0–0.5)
IRON 24H UR-MRATE: 42 MCG/DL (ref 37–145)
IRON SATN MFR SERPL: 11 % (ref 14–48)
LYMPHOCYTES # BLD AUTO: 1.14 10*3/MM3 (ref 0.7–3.1)
LYMPHOCYTES NFR BLD AUTO: 11.5 % (ref 19.6–45.3)
MCH RBC QN AUTO: 27.5 PG (ref 26.6–33)
MCHC RBC AUTO-ENTMCNC: 30 G/DL (ref 31.5–35.7)
MCV RBC AUTO: 91.8 FL (ref 79–97)
MONOCYTES # BLD AUTO: 0.49 10*3/MM3 (ref 0.1–0.9)
MONOCYTES NFR BLD AUTO: 4.9 % (ref 5–12)
NEUTROPHILS # BLD AUTO: 8.08 10*3/MM3 (ref 1.7–7)
NEUTROPHILS NFR BLD AUTO: 81.3 % (ref 42.7–76)
NRBC BLD AUTO-RTO: 0 /100 WBC (ref 0–0.2)
PLATELET # BLD AUTO: 268 10*3/MM3 (ref 140–450)
PMV BLD AUTO: 9.7 FL (ref 6–12)
POTASSIUM BLD-SCNC: 4.5 MMOL/L (ref 3.5–4.7)
PROT SERPL-MCNC: 6.8 G/DL (ref 6.3–8)
RBC # BLD AUTO: 4.14 10*6/MM3 (ref 3.77–5.28)
SODIUM BLD-SCNC: 140 MMOL/L (ref 134–145)
TIBC SERPL-MCNC: 391 MCG/DL (ref 249–505)
TRANSFERRIN SERPL-MCNC: 279 MG/DL (ref 200–360)
WBC NRBC COR # BLD: 9.94 10*3/MM3 (ref 3.4–10.8)

## 2019-06-04 PROCEDURE — 85025 COMPLETE CBC W/AUTO DIFF WBC: CPT | Performed by: INTERNAL MEDICINE

## 2019-06-04 PROCEDURE — 36415 COLL VENOUS BLD VENIPUNCTURE: CPT | Performed by: INTERNAL MEDICINE

## 2019-06-04 PROCEDURE — 82728 ASSAY OF FERRITIN: CPT | Performed by: INTERNAL MEDICINE

## 2019-06-04 PROCEDURE — 80053 COMPREHEN METABOLIC PANEL: CPT | Performed by: INTERNAL MEDICINE

## 2019-06-04 PROCEDURE — 83540 ASSAY OF IRON: CPT | Performed by: INTERNAL MEDICINE

## 2019-06-04 PROCEDURE — 84466 ASSAY OF TRANSFERRIN: CPT | Performed by: INTERNAL MEDICINE

## 2019-06-06 ENCOUNTER — INFUSION (OUTPATIENT)
Dept: ONCOLOGY | Facility: HOSPITAL | Age: 74
End: 2019-06-06

## 2019-06-06 VITALS
DIASTOLIC BLOOD PRESSURE: 78 MMHG | HEART RATE: 77 BPM | RESPIRATION RATE: 16 BRPM | SYSTOLIC BLOOD PRESSURE: 133 MMHG | WEIGHT: 192.4 LBS | BODY MASS INDEX: 39.31 KG/M2

## 2019-06-06 DIAGNOSIS — D50.8 OTHER IRON DEFICIENCY ANEMIA: Primary | ICD-10-CM

## 2019-06-06 PROCEDURE — 96374 THER/PROPH/DIAG INJ IV PUSH: CPT | Performed by: INTERNAL MEDICINE

## 2019-06-06 PROCEDURE — 25010000002 FERRIC CARBOXYMALTOSE 750 MG/15ML SOLUTION 15 ML VIAL: Performed by: INTERNAL MEDICINE

## 2019-06-06 PROCEDURE — 63710000001 PROCHLORPERAZINE MALEATE PER 5 MG: Performed by: INTERNAL MEDICINE

## 2019-06-06 RX ORDER — SODIUM CHLORIDE 9 MG/ML
250 INJECTION, SOLUTION INTRAVENOUS ONCE
Status: COMPLETED | OUTPATIENT
Start: 2019-06-06 | End: 2019-06-06

## 2019-06-06 RX ORDER — PROCHLORPERAZINE MALEATE 5 MG/1
10 TABLET ORAL ONCE
Status: COMPLETED | OUTPATIENT
Start: 2019-06-06 | End: 2019-06-06

## 2019-06-06 RX ADMIN — PROCHLORPERAZINE MALEATE 10 MG: 5 TABLET, FILM COATED ORAL at 12:48

## 2019-06-06 RX ADMIN — FERRIC CARBOXYMALTOSE INJECTION 750 MG: 50 INJECTION, SOLUTION INTRAVENOUS at 13:11

## 2019-06-06 RX ADMIN — SODIUM CHLORIDE 250 ML: 9 INJECTION, SOLUTION INTRAVENOUS at 12:45

## 2019-06-07 ENCOUNTER — TELEPHONE (OUTPATIENT)
Dept: OTHER | Facility: HOSPITAL | Age: 74
End: 2019-06-07

## 2019-06-07 NOTE — TELEPHONE ENCOUNTER
Referral received from Shayy Clark RN. Called Blanca and left a message introducing myself and navigational services. Asked her to call me back at her convenience to set up an appointment time to go over resources. I left my contact information.

## 2019-06-12 ENCOUNTER — TELEPHONE (OUTPATIENT)
Dept: OTHER | Facility: HOSPITAL | Age: 74
End: 2019-06-12

## 2019-06-12 NOTE — TELEPHONE ENCOUNTER
I called Blanca again and left a message introducing myself and navigational services. Asked her to call me back at her convenience and left my contact information.

## 2019-06-13 ENCOUNTER — INFUSION (OUTPATIENT)
Dept: ONCOLOGY | Facility: HOSPITAL | Age: 74
End: 2019-06-13

## 2019-06-13 VITALS
DIASTOLIC BLOOD PRESSURE: 69 MMHG | HEART RATE: 79 BPM | TEMPERATURE: 98.6 F | SYSTOLIC BLOOD PRESSURE: 156 MMHG | BODY MASS INDEX: 39.96 KG/M2 | WEIGHT: 195.6 LBS

## 2019-06-13 DIAGNOSIS — D50.8 OTHER IRON DEFICIENCY ANEMIA: Primary | ICD-10-CM

## 2019-06-13 PROCEDURE — 96374 THER/PROPH/DIAG INJ IV PUSH: CPT | Performed by: INTERNAL MEDICINE

## 2019-06-13 PROCEDURE — 63710000001 PROCHLORPERAZINE MALEATE PER 5 MG: Performed by: INTERNAL MEDICINE

## 2019-06-13 PROCEDURE — 25010000002 FERRIC CARBOXYMALTOSE 750 MG/15ML SOLUTION 15 ML VIAL: Performed by: INTERNAL MEDICINE

## 2019-06-13 RX ORDER — PROCHLORPERAZINE MALEATE 5 MG/1
10 TABLET ORAL ONCE
Status: COMPLETED | OUTPATIENT
Start: 2019-06-13 | End: 2019-06-13

## 2019-06-13 RX ORDER — SODIUM CHLORIDE 9 MG/ML
250 INJECTION, SOLUTION INTRAVENOUS ONCE
Status: COMPLETED | OUTPATIENT
Start: 2019-06-13 | End: 2019-06-13

## 2019-06-13 RX ADMIN — SODIUM CHLORIDE 250 ML: 9 INJECTION, SOLUTION INTRAVENOUS at 11:52

## 2019-06-13 RX ADMIN — PROCHLORPERAZINE MALEATE 10 MG: 5 TABLET, FILM COATED ORAL at 11:52

## 2019-06-13 RX ADMIN — FERRIC CARBOXYMALTOSE INJECTION 750 MG: 50 INJECTION, SOLUTION INTRAVENOUS at 11:57

## 2019-06-14 ENCOUNTER — APPOINTMENT (OUTPATIENT)
Dept: PHYSICAL THERAPY | Facility: HOSPITAL | Age: 74
End: 2019-06-14

## 2019-06-14 ENCOUNTER — OFFICE VISIT (OUTPATIENT)
Dept: ONCOLOGY | Facility: CLINIC | Age: 74
End: 2019-06-14

## 2019-06-14 ENCOUNTER — APPOINTMENT (OUTPATIENT)
Dept: LAB | Facility: HOSPITAL | Age: 74
End: 2019-06-14

## 2019-06-14 ENCOUNTER — TELEPHONE (OUTPATIENT)
Dept: OTHER | Facility: HOSPITAL | Age: 74
End: 2019-06-14

## 2019-06-14 VITALS
WEIGHT: 196.3 LBS | HEART RATE: 78 BPM | OXYGEN SATURATION: 97 % | DIASTOLIC BLOOD PRESSURE: 70 MMHG | SYSTOLIC BLOOD PRESSURE: 129 MMHG | TEMPERATURE: 98.3 F | HEIGHT: 59 IN | RESPIRATION RATE: 14 BRPM | BODY MASS INDEX: 39.57 KG/M2

## 2019-06-14 DIAGNOSIS — D50.0 IRON DEFICIENCY ANEMIA DUE TO CHRONIC BLOOD LOSS: Primary | ICD-10-CM

## 2019-06-14 DIAGNOSIS — D05.12 BREAST NEOPLASM, TIS (DCIS), LEFT: ICD-10-CM

## 2019-06-14 PROCEDURE — 99214 OFFICE O/P EST MOD 30 MIN: CPT | Performed by: INTERNAL MEDICINE

## 2019-06-14 PROCEDURE — G0463 HOSPITAL OUTPT CLINIC VISIT: HCPCS | Performed by: INTERNAL MEDICINE

## 2019-06-14 NOTE — TELEPHONE ENCOUNTER
Blanca's  called me back today and I introduced myself and navigational services. He stated the plan is to have a mastectomy on July 3rd. He stated they do not anticipate needing chemo or radiation, but will know for sure after the surgery pathology comes back. I asked how she is doing and he stated she is doing well, that some days are better than others. He stated they had a good understanding of their pathology and treatment options presented to them and they feel comfortable with their plan of care. He had no further questions at this time.    We discussed integrative therapies and other services at [facility] including the opportunity to speak with our Oncology Dietitian or Oncology Social Worker. He verbalized interest in receiving a navigation folder outlining services. I verified their mailing address and will send out a navigation folder with the following information:     [facility] Support Network(Serbian),  Sharing Our Stories Breast Cancer Support Group, Cancer and Restorative Exercise (CARE), [facility], Together for Breast Cancer Survival,  For Women Facing Breast Cancer(Serbian), Road to Recovery, [facility], Cancer [facility], Massage Therapy, Reiki Therapy, [facility] [place] (Serbian), Cancer Nutrition, Survivorship Clinic, [facility], [facility] (Serbian), What you need to know about having a Mastectomy (Serbian).       He verbalized appreciation for navigational services and he has my contact information and will call with any questions that arise.

## 2019-06-14 NOTE — PROGRESS NOTES
Subjective     REASON FOR FOLLOW UP:  ANEMIA, FATIGUE, FREQUENT FALLS, AFRAID OF WALKING, ABDOMINAL PAIN AND CONTINUO'S  TURMOIL IN BOWEL FUNCTION AND DIGESTION      History of Present Illness This patient is here today in company of her  by mistake. She got confused about her appointments but she wanted to be seen anyway because she had further questions in regard her mastectomy. In the interim she has undergone 2 Injectafer infusions and actually she feels better. Her sugar control is not ideal and we went on a long range today in regard discussion about these facts. Otherwise no other new problems. She already has appointment for her mastectomy on 2019.     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 Daniel Gutierrez, [facility]   •  SECTION N/A     x3   • CHOLECYSTECTOMY N/A    • COLONOSCOPY W/ POLYPECTOMY N/A 2019    Enlarged folds in the antrum: biopsied; duodenal, transverse colon x2, splenic flexure, descending colon and sigmoid colon polyps: biopsied (path: tubular adenoma x6)-[facility]. Zak De Jesus, [facility]   • PARTIAL HYSTERECTOMY Bilateral     ovaries still in tact        Current Outpatient Medications on File Prior to Visit   Medication Sig Dispense Refill   • Ascorbic Acid (VITAMIN C PO) Take  by mouth Every Other Day.     • atorvastatin  (LIPITOR) 20 MG tablet Take 20 mg by mouth Daily.     • bisoprolol (ZEBeta) 5 MG tablet Take 5 mg by mouth Daily.     • Cholecalciferol (VITAMIN D3 PO) Take 1 tablet by mouth Daily.     • Insulin Glargine (TOUJEO SOLOSTAR SC) Inject 40 Units under the skin Every Night.     • Insulin Lispro (HUMALOG PEN SC) Inject  under the skin into the appropriate area as directed 3 (Three) Times a Day With Meals. 25 units at breakfast, 20 units at lunch, 22 units at dinner     • levocetirizine (XYZAL) 5 MG tablet Take 5 mg by mouth Every Evening.     • losartan-hydrochlorothiazide (HYZAAR) 50-12.5 MG per tablet Take 1 tablet by mouth Daily. Pt now taking 100-50mg     • montelukast (SINGULAIR) 10 MG tablet Take 10 mg by mouth Every Night.     • omeprazole (priLOSEC) 20 MG capsule Take 40 mg by mouth Daily.     • predniSONE (DELTASONE) 2.5 MG tablet Take 7.5 mg by mouth Daily.     • valsartan-hydrochlorothiazide (DIOVAN-HCT) 160-12.5 MG per tablet Take 1 tablet by mouth Daily.     • [DISCONTINUED] vitamin B-12 (CYANOCOBALAMIN) 1000 MCG tablet Take 1,000 mcg by mouth Daily.       Current Facility-Administered Medications on File Prior to Visit   Medication Dose Route Frequency Provider Last Rate Last Dose   • [COMPLETED] ferric carboxymaltose (INJECTAFER) 750 mg in sodium chloride 0.9 % 100 mL IVPB  750 mg Intravenous Once Jean-Claude Farnsworth MD   Stopped at 06/13/19 1213   • [COMPLETED] prochlorperazine (COMPAZINE) tablet 10 mg  10 mg Oral Once Jean-Claude Farnsworth MD   10 mg at 06/13/19 1152   • [COMPLETED] sodium chloride 0.9 % infusion 250 mL  250 mL Intravenous Once Jean-Claude Farnsworth MD   Stopped at 06/13/19 1250        ALLERGIES:    Allergies   Allergen Reactions   • Aspirin Unknown (See Comments)     Low tolerance   • Sulfa Antibiotics Unknown (See Comments)     Happened as a child        Social History     Socioeconomic History   • Marital status:      Spouse name: Not on file   • Number of children: 3   • Years of education:  College   • Highest education level: Not on file   Occupational History   • Occupation: POSTAL SERVICE     Employer: RETIRED   Social Needs   • Financial resource strain: Not hard at all   • Food insecurity:     Worry: Never true     Inability: Never true   • Transportation needs:     Medical: No     Non-medical: No   Tobacco Use   • Smoking status: Never Smoker   • Smokeless tobacco: Never Used   • Tobacco comment: caffine use   Substance and Sexual Activity   • Alcohol use: No   • Drug use: No   • Sexual activity: No        Family History   Problem Relation Age of Onset   • Hypertension Father    • Stomach cancer Father    • Diabetes Father    • Esophageal cancer Father    • Throat cancer Sister    • Diabetes Paternal Grandmother    • Hypertension Paternal Grandfather    • Colon cancer Paternal Grandfather    • Heart disease Mother    • Colon cancer Paternal Uncle    • Colon cancer Paternal Uncle    • Colon cancer Paternal Uncle    • Ovarian cancer Cousin    • Stomach cancer Cousin         Review of Systems       General: no fever, no chills, no fatigue,no weight changes, no lack of appetite.  Eyes: no epiphora, xerophthalmia,conjunctivitis, pain, glaucoma, blurred vision, blindness, secretion, photophobia, proptosis, diplopia.  Ears: no otorrhea, tinnitus, otorrhagia, deafness, pain, vertigo.  Nose: no rhinorrhea, no epistaxis, no alteration in perception of odors, no sinuses pressure.  Mouth: no alteration in gums or teeth,  No ulcers, no difficulty with mastication or deglut ion, no odynophagia.  Neck: no masses or pain, no thyroid alterations, no pain in muscles or arteries, no carotid odynia, no crepitation.  Respiratory: no cough, no sputum production,no dyspnea,no trepopnea, no pleuritic pain,no hemoptysis.  Heart: no syncope, no irregularity, no palpitations, no angina,no orthopnea,no paroxysmal nocturnal dyspnea.  Vascular Venous: no tenderness,no edema,no palpable cords,no postphlebitic syndrome, no skin  "changes no ulcerations.  Vascular Arterial: no distal ischemia, noclaudication, no gangrene, no neuropathic ischemic pain, no skin ulcers, no paleness no cyanosis.  GI: no dysphagia, no odynophagia, no regurgitation, no heartburn,no indigestion,no nausea,no vomiting,no hematemesis ,no melena,no jaundice,no distention, no obstipation,no enterorrhagia,no proctalgia,no anal  lesions, no changes in bowel habits.  : no frequency, no hesitancy, no hematuria, no discharge,no  pain.  Musculoskeletal: no muscle or tendon pain or inflammation,no  joint pain, no edema, no functional limitation,no fasciculations, no mass.  Neurologic: no headache, no seizures, noalterations on Craneal nerves, no motor deficit, no sensory deficit, normal coordination, no alteration in memory,normal orientation, calculation,normal writting, verbal and written language.  Skin: no rashes,no pruritus no localized lesions.  Psychiatric: no anxiety, no depression,no agitation, no delusions, proper insight.        Objective     Vitals:    06/14/19 1124   BP: 129/70   Pulse: 78   Resp: 14   Temp: 98.3 °F (36.8 °C)   TempSrc: Oral   SpO2: 97%   Weight: 89 kg (196 lb 4.8 oz)   Height: 149 cm (58.66\")   PainSc: 0-No pain     Current Status 6/14/2019   ECOG score 1       Physical Exam        GENERAL:  Well-developed, well-nourished  Patient  in no acute distress.   SKIN:  Warm, dry ,NO rashes,NO purpura ,NO petechiae.  HEENT:  Pupils were equal and reactive to light and accomodation, conjunctivas non injected, no pterigion, normal extraocular movements, normal visual acuity.   Mouth mucosa was moist, no exudates in oropharynx, normal gum line, normal roof of the mouth and pillars, normal papillations of the tongue.  NECK:  Supple with good range of motion; no thyromegaly or masses, no JVD or bruits, no cervical adenopathies.No carotid arteries pain, no carotid abnormal pulsation , NO arterial dance.  LYMPHATICS:  No cervical, NO supraclavicular, NO " axillary,NO epitrochlear , NO inguinal adenopathy.  CHEST:  Normal excursion of both gina thoraces, normal voice fremitus, no subcutaneous emphysema, normal axillas, no rashes or acanthosis nigricans. Lungs clear to percussion and auscultation, normal breath sounds bilaterally, no wheezing,NO crackles NO ronchi, NO stridor, NO rubs.  CARDIAC AND VASCULAR:  normal rate and regular rhythm, without murmurs,NO rubs NO S3 NO S4 right or left . Normal femoral, popliteal, pedis, brachial and carotid pulses.  ABDOMEN:  Soft, nontender with no organomegaly or masses, no ascites, no collateral circulation,no distention,no Elio sign, no abdominal pain, no inguinal hernias,no umbilical hernia, no abdominal bruits. Normal bowel sounds.  GENITAL: Not  Performed.  EXTREMITIES  AND SPINE:  No clubbing, cyanosis or edema, no deformities or pain .No kyphosis, scoliosis, deformities or pain in spine, ribs or pelvic bone.  NEUROLOGICAL:  Patient was awake, alert, oriented to time, person and place.                        RECENT LABS:  Hematology WBC   Date Value Ref Range Status   06/04/2019 9.94 3.40 - 10.80 10*3/mm3 Final     RBC   Date Value Ref Range Status   06/04/2019 4.14 3.77 - 5.28 10*6/mm3 Final     Hemoglobin   Date Value Ref Range Status   06/04/2019 11.4 (L) 12.0 - 15.9 g/dL Final     Hematocrit   Date Value Ref Range Status   06/04/2019 38.0 34.0 - 46.6 % Final     Platelets   Date Value Ref Range Status   06/04/2019 268 140 - 450 10*3/mm3 Final            Assessment/Plan    1.  Patient’s anemia is a manifestation of multifactorial issues including iron deficiency with a low iron and low ferritin and total iron binding capacity that is elevated. This patient requires therapy with IV Injectafer and she will be scheduled to come to the office to receive this on 2 different occasions.  The degree of SPHEROCYTIC anemia she has is minimal, at least by peripheral blood examination and I do not require any other intervention  for this purpose.  Her B12 level, folic acid level and reticulated hemoglobin were normal.  Her hemoglobin electrophoresis was normal and this will be watched in absence of any other intervention besides repeated ferritin and iron profile in 6 weeks.  2.  Patient has high-grade DCIS of the left breast with an extensive area that is compromised by this.  The tumor is ER negative, RI negative but analysis for receptors will be repeated on the surgical specimen from the mastectomy.  She has already talked with Dr. James in this regard and she will scheduled to have a mastectomy in the next several weeks once the patient achieves better diabetes control.    3.  Disbalance.  This is on the basis of peripheral neuropathy.  MRI of the cervical spine disclosed no evidence of spinal stenosis.  She now uses a walker.  The patient is in flip-flops today and I pinpointed to her that the worst thing she could have in the background of neuropathy is using this kind of shoes.  She needs to be fully protected regarding shoes and regarding her feet at this time and she understood this clearly.    4.  Turmoil in her gastrointestinal tract for a long time.  If this is psychosomatic or not remains to be seen. At least the CT scan of the abdomen and pelvis I have reviewed has nothing to do with anything in particular when showing no abnormalities in the pancreas, kidneys, liver, spleen or any other intraabdominal organs.  5.  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  She will require reassessment of this and in the future she will require an upper GI endoscopy for removal.   6.  Terrible control of diabetes with hemoglobin A1c of 9.3.  An appointment was made to see Dr. Valenzuela in preparation of FreeStyle Michell and modification of her medication regimen.  She must decrease her blood sugar and A1c before proceeding with surgery for her breast cancer.  7.  Patient has multinodular  goiter.  This is not an issue at this time.  This issue can wait for a long time before the patient needs to have any procedure or pertinent testing for this in some way or the other.    8.  Chronic kidney disease with creatinine of 1.3.  She has proteinuria 497 mg of protein in urine of 24-hours and with no monoclonal protein leakage.    9.  Serum protein electrophoresis that discloses no evidence of monoclonal protein.  10. History of temporal arteritis.  She is on chronic prednisone therapy.  Stress doses of prednisone will be given to her before proceeding with surgery for her mastectomy.        On 06/14/2019 the patient had questions in regard to breast reconstruction. With diabetes and poor control of this I think this can become a hassle but if she wants to think about more she will need to talk with Tahmina James MD to be referred to be seen by plastic surgeon before she makes the final decision about her surgery on 07/03/2019. She is now into the idea that maybe she does not want to pursue anything else at this point.     I had a lengthy conversation today about the sinful foods of her life in regard to diabetes management. She eats quite an amount of bread and carbs all day long and I pointed out to her that this by the end of the day is going to be the food that is going to get her into more trouble if she does not correct this problem altogether. She eats copious amount of vegetables and she does not eat too much sweets. She does not eat chocolate neither, cokes or things of this nature and she does not do a lot of physical activity because of her immobility. Therefore burning calories is not an option for her. The only option is restriction of calories especially carb calories. I insisted in regard to eliminate bread from her diet, decrease the amount of potatoes that she eats and modify the amount of cheese that she eats. Maybe this will lead her into having proper sugar control.     I will review her  back after her surgery. She will have a repeat ferritin, iron profile, CBC at that point. I discussed all of these facts with her and her .

## 2019-06-17 ENCOUNTER — APPOINTMENT (OUTPATIENT)
Dept: PHYSICAL THERAPY | Facility: HOSPITAL | Age: 74
End: 2019-06-17

## 2019-06-17 ENCOUNTER — DOCUMENTATION (OUTPATIENT)
Dept: PHYSICAL THERAPY | Facility: HOSPITAL | Age: 74
End: 2019-06-17

## 2019-06-17 DIAGNOSIS — M62.81 MUSCLE WEAKNESS (GENERALIZED): Primary | ICD-10-CM

## 2019-06-17 DIAGNOSIS — Z91.81 AT RISK FOR FALLS: ICD-10-CM

## 2019-06-17 DIAGNOSIS — R26.89 BALANCE PROBLEMS: ICD-10-CM

## 2019-06-17 NOTE — THERAPY DISCHARGE NOTE
Outpatient Physical Therapy Ortho Progress Note/Discharge Summary       Patient Name: Blanca Lam  : 1945  MRN: [id number]  Today's Date: 2019      Visit Date: 2019    Visit Dx:    ICD-10-CM ICD-9-CM   1. Muscle weakness (generalized) M62.81 728.87   2. At risk for falls Z91.81 V15.88   3. 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 Daniel Gutierrez, [facility]   •  SECTION N/A     x3   • CHOLECYSTECTOMY N/A    • COLONOSCOPY W/ POLYPECTOMY N/A 2019    Enlarged folds in the antrum: biopsied; duodenal, transverse colon x2, splenic flexure, descending colon and sigmoid colon polyps: biopsied (path: tubular adenoma x6)-Dr. Zak De Jesus, [facility]   • PARTIAL HYSTERECTOMY Bilateral     ovaries still in tact                       PT Assessment/Plan     Row Name 19 0797          PT Assessment    Functional Limitations  Impaired gait;Decreased safety during functional activities;Impaired locomotion;Performance in leisure activities;Limitations in functional capacity and  "performance  -     Impairments  Balance;Gait;Endurance;Impaired muscle power;Muscle strength  -     Assessment Comments  Pt made good progress and was independent with HEP. She requires surgery for another medical condition and needs to cancel all remaining appointments. She will D/C from PT. Pt does understand importance of HEP and would like to resume therapy at a later date as she felt she was progressing.  -        PT Plan    PT Plan Comments  D/C to HEP  -       User Key  (r) = Recorded By, (t) = Taken By, (c) = Cosigned By    Initials Name Provider Type    Matheus Holman, PT DPT Physical Therapist                                 PT OP Goals     Row Name 06/17/19 1300          PT Short Term Goals    STG 1  Pt will be independent with HEP to improve BLE strength, increase balance static and dynamic, and reduce her risk of falls.  -     STG 1 Progress  Met  -     STG 2  Pt will be able to retieve object from floor without assistance.  -     STG 2 Progress  Progressing  -     STG 3  Pt will be able to perform 5 sit to stand with no use of BUEs.  -     STG 3 Progress  Progressing  -        Long Term Goals    LTG 1  Pt will have NG score > 60%  -     LTG 1 Progress  Progressing  -     LTG 2  Pt will be able to maintain eyes cloesd static stand for >15 seconds without LOB.  -     LTG 2 Progress  Progressing  -     LTG 3  Pt will be able to alternate feet on 6\" step without LOB.  -     LTG 3 Progress  Progressing  -       User Key  (r) = Recorded By, (t) = Taken By, (c) = Cosigned By    Initials Name Provider Type    Matheus Holman, PT DPT Physical Therapist                         Time Calculation:                OP PT Discharge Summary  Date of Discharge: 06/17/19  Reason for Discharge: Independent, Patient/Caregiver request  Outcomes Achieved: Patient able to partially acheive established goals  Discharge Destination: Home with home program      Matheus Soria, PT " DPT  6/17/2019

## 2019-06-20 ENCOUNTER — APPOINTMENT (OUTPATIENT)
Dept: PHYSICAL THERAPY | Facility: HOSPITAL | Age: 74
End: 2019-06-20

## 2019-06-24 ENCOUNTER — APPOINTMENT (OUTPATIENT)
Dept: PREADMISSION TESTING | Facility: HOSPITAL | Age: 74
End: 2019-06-24

## 2019-06-24 ENCOUNTER — HOSPITAL ENCOUNTER (OUTPATIENT)
Dept: GENERAL RADIOLOGY | Facility: HOSPITAL | Age: 74
Discharge: HOME | End: 2019-06-24
Admitting: SURGERY

## 2019-06-24 VITALS
WEIGHT: 192 LBS | SYSTOLIC BLOOD PRESSURE: 141 MMHG | HEART RATE: 80 BPM | TEMPERATURE: 97.9 F | HEIGHT: 60 IN | RESPIRATION RATE: 18 BRPM | DIASTOLIC BLOOD PRESSURE: 62 MMHG | BODY MASS INDEX: 37.69 KG/M2 | OXYGEN SATURATION: 99 %

## 2019-06-24 DIAGNOSIS — D05.12 BREAST NEOPLASM, TIS (DCIS), LEFT: ICD-10-CM

## 2019-06-24 LAB
ANION GAP SERPL CALCULATED.3IONS-SCNC: 7.5 MMOL/L
BUN BLD-MCNC: 32 MG/DL (ref 8–23)
BUN/CREAT SERPL: 25.6 (ref 7–25)
CALCIUM SPEC-SCNC: 8.8 MG/DL (ref 8.6–10.5)
CHLORIDE SERPL-SCNC: 102 MMOL/L (ref 98–107)
CO2 SERPL-SCNC: 26.5 MMOL/L (ref 22–29)
CREAT BLD-MCNC: 1.25 MG/DL (ref 0.57–1)
GFR SERPL CREATININE-BSD FRML MDRD: 42 ML/MIN/1.73
GLUCOSE BLD-MCNC: 310 MG/DL (ref 65–99)
POTASSIUM BLD-SCNC: 4.2 MMOL/L (ref 3.5–5.2)
SODIUM BLD-SCNC: 136 MMOL/L (ref 136–145)

## 2019-06-24 PROCEDURE — 93005 ELECTROCARDIOGRAM TRACING: CPT

## 2019-06-24 PROCEDURE — 36415 COLL VENOUS BLD VENIPUNCTURE: CPT

## 2019-06-24 PROCEDURE — 93010 ELECTROCARDIOGRAM REPORT: CPT | Performed by: INTERNAL MEDICINE

## 2019-06-24 PROCEDURE — 71046 X-RAY EXAM CHEST 2 VIEWS: CPT

## 2019-06-24 PROCEDURE — 80048 BASIC METABOLIC PNL TOTAL CA: CPT | Performed by: SURGERY

## 2019-06-24 RX ORDER — LOSARTAN POTASSIUM AND HYDROCHLOROTHIAZIDE 25; 100 MG/1; MG/1
1 TABLET ORAL NIGHTLY
COMMUNITY
End: 2019-12-16

## 2019-06-24 NOTE — DISCHARGE INSTRUCTIONS
Take the following medications the morning of surgery with a small sip of water:  NONE      General Instructions: CLEAR LIQUIDS UNTIL 6:00 AM MORNING OF SURGERY  • Do not eat solid food after midnight the night before surgery.  • You may drink clear liquids day of surgery but must stop at least one hour before your hospital arrival time.  • It is beneficial for you to have a clear drink that contains carbohydrates the day of surgery.  We suggest a 12 to 20 ounce bottle of Gatorade or Powerade for non-diabetic patients or a 12 to 20 ounce bottle of G2 or Powerade Zero for diabetic patients. (Pediatric patients, are not advised to drink a 12 to 20 ounce carbohydrate drink)    Clear liquids are liquids you can see through.  Nothing red in color.     Plain water                               Sports drinks  Sodas                                   Gelatin (Jell-O)  Fruit juices without pulp such as white grape juice and apple juice  Popsicles that contain no fruit or yogurt  Tea or coffee (no cream or milk added)  Gatorade / Powerade  G2 / Powerade Zero    • Infants may have breast milk up to four hours before surgery.  • Infants drinking formula may drink formula up to six hours before surgery.   • Patients who avoid smoking, chewing tobacco and alcohol for 4 weeks prior to surgery have a reduced risk of post-operative complications.  Quit smoking as many days before surgery as you can.  • Do not smoke, use chewing tobacco or drink alcohol the day of surgery.   • If applicable bring your C-PAP/ BI-PAP machine.  • Bring any papers given to you in the doctor’s office.  • Wear clean comfortable clothes and socks.  • Do not wear contact lenses, false eyelashes or make-up.  Bring a case for your glasses.   • Bring crutches or walker if applicable.  • Remove all piercings.  Leave jewelry and any other valuables at home.  • Hair extensions with metal clips must be removed prior to surgery.  • The Pre-Admission Testing nurse  will instruct you to bring medications if unable to obtain an accurate list in Pre-Admission Testing.        If you were given a blood bank ID arm band remember to bring it with you the day of surgery.    Preventing a Surgical Site Infection: CLOTHS GIVEN TO PATIENT  • For 2 to 3 days before surgery, avoid shaving with a razor because the razor can irritate skin and make it easier to develop an infection.    • Any areas of open skin can increase the risk of a post-operative wound infection by allowing bacteria to enter and travel throughout the body.  Notify your surgeon if you have any skin wounds / rashes even if it is not near the expected surgical site.  The area will need assessed to determine if surgery should be delayed until it is healed.  • The night prior to surgery sleep in a clean bed with clean clothing.  Do not allow pets to sleep with you.  • Shower on the morning of surgery using a fresh bar of anti-bacterial soap (such as Dial) and clean washcloth.  Dry with a clean towel and dress in clean clothing.  • Ask your surgeon if you will be receiving antibiotics prior to surgery.  • Make sure you, your family, and all healthcare providers clean their hands with soap and water or an alcohol based hand  before caring for you or your wound.    Day of surgery: 7/3/2019 ARRIVAL TIME 7:00 AM  Upon arrival, a Pre-op nurse and Anesthesiologist will review your health history, obtain vital signs, and answer questions you may have.  The only belongings needed at this time will be a list of your home medications and if applicable your C-PAP/BI-PAP machine.  If you are staying overnight your family can leave the rest of your belongings in the car and bring them to your room later.  A Pre-op nurse will start an IV and you may receive medication in preparation for surgery, including something to help you relax.  Your family will be able to see you in the Pre-op area.  While you are in surgery your family  should notify the waiting room  if they leave the waiting room area and provide a contact phone number.    Please be aware that surgery does come with discomfort.  We want to make every effort to control your discomfort so please discuss any uncontrolled symptoms with your nurse.   Your doctor will most likely have prescribed pain medications.      If you are going home after surgery you will receive individualized written care instructions before being discharged.  A responsible adult must drive you to and from the hospital on the day of your surgery and stay with you for 24 hours.    If you are staying overnight following surgery, you will be transported to your hospital room following the recovery period.  [facility] has all private rooms.    You have received a list of surgical assistants for your reference.  If you have any questions please call Pre-Admission Testing at [phone].  Deductibles and co-payments are collected on the day of service. Please be prepared to pay the required co-pay, deductible or deposit on the day of service as defined by your plan.  2% CHLORAHEXIDINE GLUCONATE* CLOTH  Preparing or “prepping” skin before surgery can reduce the risk of infection at the surgical site. To make the process easier, [facility] has chosen disposable cloths moistened with a rinse-free, 2% Chlorhexidine Gluconate (CHG) antiseptic solution. The steps below outline the prepping process and should be carefully followed.        Use the prep cloth on the area that is circled in the diagram             Directions Night before Surgery  1) Shower using a fresh bar of anti-bacterial soap (such as Dial) and clean washcloth.  Use a clean towel to completely dry your skin.  2) Do not use any lotions, oils or creams on your skin.  3) Open the package and remove 1 cloth, wipe your skin for 30 seconds in a circular motion.  Allow to dry for 3 minutes.  4) Repeat #3 with second  cloth.  5) Do not touch your eyes, ears, or mouth with the prep cloth.  6) Allow the wet prep solution to air dry.  7) Discard the prep cloth and wash your hands with soap and water.   8) Dress in clean bed clothes and sleep on fresh clean bed sheets.   9) You may experience some temporary itching after the prep.    Directions Day of Surgery  1) Repeat steps 1,2,3,4,5,6,7, and 9.   2) Dress in clean clothes before coming to the hospital.

## 2019-06-26 NOTE — PROGRESS NOTES
Mabel (surgery office liaison),    Please call patient to inform her that her glucose was extremely high on this lab check.  Encourage her to be sure she does not have foods with sugar on the two days prior to surgery.  She will be cancelled on the day of surgery if it is this high.

## 2019-06-27 ENCOUNTER — TELEPHONE (OUTPATIENT)
Dept: SURGERY | Facility: CLINIC | Age: 74
End: 2019-06-27

## 2019-06-27 NOTE — TELEPHONE ENCOUNTER
----- Message from Tahmina James MD sent at 6/26/2019  8:00 AM EDT -----  Mabel (surgery office liaison),    Please call patient to inform her that her glucose was extremely high on this lab check.  Encourage her to be sure she does not have foods with sugar on the two days prior to surgery.  She will be cancelled on the day of surgery if it is this high.

## 2019-07-03 ENCOUNTER — HOSPITAL ENCOUNTER (OUTPATIENT)
Facility: HOSPITAL | Age: 74
Discharge: HOME | End: 2019-07-04
Attending: SURGERY | Admitting: SURGERY

## 2019-07-03 ENCOUNTER — HOSPITAL ENCOUNTER (OUTPATIENT)
Dept: NUCLEAR MEDICINE | Facility: HOSPITAL | Age: 74
Discharge: HOME | End: 2019-07-03

## 2019-07-03 ENCOUNTER — ANESTHESIA (OUTPATIENT)
Dept: PERIOP | Facility: HOSPITAL | Age: 74
End: 2019-07-03

## 2019-07-03 ENCOUNTER — ANESTHESIA EVENT (OUTPATIENT)
Dept: PERIOP | Facility: HOSPITAL | Age: 74
End: 2019-07-03

## 2019-07-03 DIAGNOSIS — D05.12 BREAST NEOPLASM, TIS (DCIS), LEFT: ICD-10-CM

## 2019-07-03 PROBLEM — I77.6 ARTERITIS: Status: ACTIVE | Noted: 2019-07-03

## 2019-07-03 PROBLEM — D84.9 IMMUNOSUPPRESSION: Status: ACTIVE | Noted: 2019-07-03

## 2019-07-03 LAB
GLUCOSE BLDC GLUCOMTR-MCNC: 206 MG/DL (ref 70–130)
GLUCOSE BLDC GLUCOMTR-MCNC: 218 MG/DL (ref 70–130)
GLUCOSE BLDC GLUCOMTR-MCNC: 219 MG/DL (ref 70–130)
GLUCOSE BLDC GLUCOMTR-MCNC: 228 MG/DL (ref 70–130)
GLUCOSE BLDC GLUCOMTR-MCNC: 262 MG/DL (ref 70–130)
GLUCOSE BLDC GLUCOMTR-MCNC: 265 MG/DL (ref 70–130)
GLUCOSE BLDC GLUCOMTR-MCNC: 292 MG/DL (ref 70–130)
HBA1C MFR BLD: 8.16 % (ref 4.8–5.6)

## 2019-07-03 PROCEDURE — 25010000002 FENTANYL CITRATE (PF) 100 MCG/2ML SOLUTION: Performed by: NURSE ANESTHETIST, CERTIFIED REGISTERED

## 2019-07-03 PROCEDURE — 25010000002 NEOSTIGMINE PER 0.5 MG: Performed by: NURSE ANESTHETIST, CERTIFIED REGISTERED

## 2019-07-03 PROCEDURE — 88331 PATH CONSLTJ SURG 1 BLK 1SPC: CPT | Performed by: SURGERY

## 2019-07-03 PROCEDURE — 14301 TIS TRNFR ANY 30.1-60 SQ CM: CPT | Performed by: PHYSICIAN ASSISTANT

## 2019-07-03 PROCEDURE — 63710000001 MONTELUKAST 10 MG TABLET: Performed by: SURGERY

## 2019-07-03 PROCEDURE — 38525 BIOPSY/REMOVAL LYMPH NODES: CPT | Performed by: SURGERY

## 2019-07-03 PROCEDURE — 83036 HEMOGLOBIN GLYCOSYLATED A1C: CPT | Performed by: SURGERY

## 2019-07-03 PROCEDURE — 25010000002 PROPOFOL 10 MG/ML EMULSION: Performed by: NURSE ANESTHETIST, CERTIFIED REGISTERED

## 2019-07-03 PROCEDURE — 25010000003 CEFAZOLIN IN DEXTROSE 2-4 GM/100ML-% SOLUTION: Performed by: SURGERY

## 2019-07-03 PROCEDURE — A9541 TC99M SULFUR COLLOID: HCPCS | Performed by: SURGERY

## 2019-07-03 PROCEDURE — 38792 RA TRACER ID OF SENTINL NODE: CPT

## 2019-07-03 PROCEDURE — 63710000001 ATORVASTATIN 20 MG TABLET: Performed by: SURGERY

## 2019-07-03 PROCEDURE — 0 TECHNETIUM FILTERED SULFUR COLLOID: Performed by: SURGERY

## 2019-07-03 PROCEDURE — 88360 TUMOR IMMUNOHISTOCHEM/MANUAL: CPT | Performed by: SURGERY

## 2019-07-03 PROCEDURE — A9270 NON-COVERED ITEM OR SERVICE: HCPCS | Performed by: SURGERY

## 2019-07-03 PROCEDURE — 25010000002 PROMETHAZINE PER 50 MG: Performed by: NURSE ANESTHETIST, CERTIFIED REGISTERED

## 2019-07-03 PROCEDURE — 25010000002 ONDANSETRON PER 1 MG: Performed by: NURSE ANESTHETIST, CERTIFIED REGISTERED

## 2019-07-03 PROCEDURE — 88341 IMHCHEM/IMCYTCHM EA ADD ANTB: CPT | Performed by: SURGERY

## 2019-07-03 PROCEDURE — A9270 NON-COVERED ITEM OR SERVICE: HCPCS | Performed by: NURSE ANESTHETIST, CERTIFIED REGISTERED

## 2019-07-03 PROCEDURE — 63710000001 INSULIN LISPRO (HUMAN) PER 5 UNITS: Performed by: SURGERY

## 2019-07-03 PROCEDURE — 63710000001 HYDROCHLOROTHIAZIDE 25 MG TABLET 1,000 EACH BOTTLE: Performed by: SURGERY

## 2019-07-03 PROCEDURE — 25010000002 METHYLPREDNISOLONE PER 125 MG: Performed by: SURGERY

## 2019-07-03 PROCEDURE — 63710000001 BISOPROLOL 5 MG TABLET: Performed by: SURGERY

## 2019-07-03 PROCEDURE — 19303 MAST SIMPLE COMPLETE: CPT | Performed by: PHYSICIAN ASSISTANT

## 2019-07-03 PROCEDURE — 88307 TISSUE EXAM BY PATHOLOGIST: CPT | Performed by: SURGERY

## 2019-07-03 PROCEDURE — 63710000001 INSULIN REGULAR HUMAN PER 5 UNITS: Performed by: SURGERY

## 2019-07-03 PROCEDURE — 88342 IMHCHEM/IMCYTCHM 1ST ANTB: CPT | Performed by: SURGERY

## 2019-07-03 PROCEDURE — 25010000003 CEFAZOLIN 1-4 GM/50ML-% SOLUTION: Performed by: SURGERY

## 2019-07-03 PROCEDURE — 63710000001 LOSARTAN 50 MG TABLET 90 EACH BOTTLE: Performed by: SURGERY

## 2019-07-03 PROCEDURE — 88305 TISSUE EXAM BY PATHOLOGIST: CPT | Performed by: SURGERY

## 2019-07-03 PROCEDURE — 63710000001 INSULIN GLARGINE PER 5 UNITS: Performed by: SURGERY

## 2019-07-03 PROCEDURE — 19303 MAST SIMPLE COMPLETE: CPT | Performed by: SURGERY

## 2019-07-03 PROCEDURE — 82962 GLUCOSE BLOOD TEST: CPT

## 2019-07-03 PROCEDURE — 38900 IO MAP OF SENT LYMPH NODE: CPT | Performed by: SURGERY

## 2019-07-03 PROCEDURE — 63710000001 INSULIN ISOPHANE HUMAN PER 5 UNITS: Performed by: NURSE ANESTHETIST, CERTIFIED REGISTERED

## 2019-07-03 PROCEDURE — 14301 TIS TRNFR ANY 30.1-60 SQ CM: CPT | Performed by: SURGERY

## 2019-07-03 DEVICE — CLIP LIGAT VASC HORIZON TI SM/WD RD 6CT: Type: IMPLANTABLE DEVICE | Site: BREAST | Status: FUNCTIONAL

## 2019-07-03 DEVICE — CLIP LIGAT VASC HORIZON TI MD BLU 6CT: Type: IMPLANTABLE DEVICE | Site: BREAST | Status: FUNCTIONAL

## 2019-07-03 RX ORDER — ROCURONIUM BROMIDE 10 MG/ML
INJECTION, SOLUTION INTRAVENOUS AS NEEDED
Status: DISCONTINUED | OUTPATIENT
Start: 2019-07-03 | End: 2019-07-03 | Stop reason: SURG

## 2019-07-03 RX ORDER — ACETAMINOPHEN 500 MG
1000 TABLET ORAL ONCE
Status: COMPLETED | OUTPATIENT
Start: 2019-07-03 | End: 2019-07-03

## 2019-07-03 RX ORDER — MIDAZOLAM HYDROCHLORIDE 1 MG/ML
1 INJECTION INTRAMUSCULAR; INTRAVENOUS
Status: DISCONTINUED | OUTPATIENT
Start: 2019-07-03 | End: 2019-07-03 | Stop reason: HOSPADM

## 2019-07-03 RX ORDER — ATORVASTATIN CALCIUM 20 MG/1
20 TABLET, FILM COATED ORAL EVERY OTHER DAY
Status: DISCONTINUED | OUTPATIENT
Start: 2019-07-03 | End: 2019-07-04 | Stop reason: HOSPADM

## 2019-07-03 RX ORDER — MEPERIDINE HYDROCHLORIDE 25 MG/ML
12.5 INJECTION INTRAMUSCULAR; INTRAVENOUS; SUBCUTANEOUS
Status: DISCONTINUED | OUTPATIENT
Start: 2019-07-03 | End: 2019-07-03 | Stop reason: HOSPADM

## 2019-07-03 RX ORDER — HYDROMORPHONE HYDROCHLORIDE 1 MG/ML
0.5 INJECTION, SOLUTION INTRAMUSCULAR; INTRAVENOUS; SUBCUTANEOUS
Status: DISCONTINUED | OUTPATIENT
Start: 2019-07-03 | End: 2019-07-04 | Stop reason: HOSPADM

## 2019-07-03 RX ORDER — NALOXONE HCL 0.4 MG/ML
0.1 VIAL (ML) INJECTION
Status: DISCONTINUED | OUTPATIENT
Start: 2019-07-03 | End: 2019-07-04 | Stop reason: HOSPADM

## 2019-07-03 RX ORDER — NICOTINE POLACRILEX 4 MG
15 LOZENGE BUCCAL
Status: DISCONTINUED | OUTPATIENT
Start: 2019-07-03 | End: 2019-07-04 | Stop reason: HOSPADM

## 2019-07-03 RX ORDER — EPHEDRINE SULFATE 50 MG/ML
5 INJECTION, SOLUTION INTRAVENOUS ONCE AS NEEDED
Status: DISCONTINUED | OUTPATIENT
Start: 2019-07-03 | End: 2019-07-03 | Stop reason: HOSPADM

## 2019-07-03 RX ORDER — METHYLPREDNISOLONE SODIUM SUCCINATE 125 MG/2ML
125 INJECTION, POWDER, LYOPHILIZED, FOR SOLUTION INTRAMUSCULAR; INTRAVENOUS ONCE
Status: DISCONTINUED | OUTPATIENT
Start: 2019-07-03 | End: 2019-07-10

## 2019-07-03 RX ORDER — SODIUM CHLORIDE, SODIUM LACTATE, POTASSIUM CHLORIDE, CALCIUM CHLORIDE 600; 310; 30; 20 MG/100ML; MG/100ML; MG/100ML; MG/100ML
50 INJECTION, SOLUTION INTRAVENOUS CONTINUOUS
Status: DISCONTINUED | OUTPATIENT
Start: 2019-07-03 | End: 2019-07-04 | Stop reason: HOSPADM

## 2019-07-03 RX ORDER — FENTANYL CITRATE 50 UG/ML
INJECTION, SOLUTION INTRAMUSCULAR; INTRAVENOUS AS NEEDED
Status: DISCONTINUED | OUTPATIENT
Start: 2019-07-03 | End: 2019-07-03 | Stop reason: SURG

## 2019-07-03 RX ORDER — CEFAZOLIN SODIUM 1 G/50ML
1 INJECTION, SOLUTION INTRAVENOUS EVERY 8 HOURS
Status: COMPLETED | OUTPATIENT
Start: 2019-07-03 | End: 2019-07-04

## 2019-07-03 RX ORDER — MIDAZOLAM HYDROCHLORIDE 1 MG/ML
2 INJECTION INTRAMUSCULAR; INTRAVENOUS
Status: DISCONTINUED | OUTPATIENT
Start: 2019-07-03 | End: 2019-07-03 | Stop reason: HOSPADM

## 2019-07-03 RX ORDER — SODIUM CHLORIDE 0.9 % (FLUSH) 0.9 %
1-10 SYRINGE (ML) INJECTION AS NEEDED
Status: DISCONTINUED | OUTPATIENT
Start: 2019-07-03 | End: 2019-07-03 | Stop reason: HOSPADM

## 2019-07-03 RX ORDER — SODIUM CHLORIDE 0.9 % (FLUSH) 0.9 %
3 SYRINGE (ML) INJECTION EVERY 12 HOURS SCHEDULED
Status: DISCONTINUED | OUTPATIENT
Start: 2019-07-03 | End: 2019-07-03 | Stop reason: HOSPADM

## 2019-07-03 RX ORDER — PROMETHAZINE HYDROCHLORIDE 25 MG/ML
6.25 INJECTION, SOLUTION INTRAMUSCULAR; INTRAVENOUS
Status: DISCONTINUED | OUTPATIENT
Start: 2019-07-03 | End: 2019-07-03 | Stop reason: HOSPADM

## 2019-07-03 RX ORDER — HYDROCODONE BITARTRATE AND ACETAMINOPHEN 7.5; 325 MG/1; MG/1
1 TABLET ORAL ONCE AS NEEDED
Status: DISCONTINUED | OUTPATIENT
Start: 2019-07-03 | End: 2019-07-03 | Stop reason: HOSPADM

## 2019-07-03 RX ORDER — LABETALOL HYDROCHLORIDE 5 MG/ML
5 INJECTION, SOLUTION INTRAVENOUS
Status: DISCONTINUED | OUTPATIENT
Start: 2019-07-03 | End: 2019-07-03 | Stop reason: HOSPADM

## 2019-07-03 RX ORDER — ACETAMINOPHEN 325 MG/1
650 TABLET ORAL ONCE AS NEEDED
Status: DISCONTINUED | OUTPATIENT
Start: 2019-07-03 | End: 2019-07-03 | Stop reason: HOSPADM

## 2019-07-03 RX ORDER — OXYCODONE AND ACETAMINOPHEN 7.5; 325 MG/1; MG/1
1 TABLET ORAL ONCE AS NEEDED
Status: DISCONTINUED | OUTPATIENT
Start: 2019-07-03 | End: 2019-07-03 | Stop reason: HOSPADM

## 2019-07-03 RX ORDER — ONDANSETRON 2 MG/ML
4 INJECTION INTRAMUSCULAR; INTRAVENOUS ONCE AS NEEDED
Status: COMPLETED | OUTPATIENT
Start: 2019-07-03 | End: 2019-07-03

## 2019-07-03 RX ORDER — DIPHENHYDRAMINE HYDROCHLORIDE 50 MG/ML
12.5 INJECTION INTRAMUSCULAR; INTRAVENOUS
Status: DISCONTINUED | OUTPATIENT
Start: 2019-07-03 | End: 2019-07-03 | Stop reason: HOSPADM

## 2019-07-03 RX ORDER — LIDOCAINE HYDROCHLORIDE 20 MG/ML
INJECTION, SOLUTION INFILTRATION; PERINEURAL AS NEEDED
Status: DISCONTINUED | OUTPATIENT
Start: 2019-07-03 | End: 2019-07-03 | Stop reason: SURG

## 2019-07-03 RX ORDER — ONDANSETRON 4 MG/1
4 TABLET, ORALLY DISINTEGRATING ORAL EVERY 6 HOURS PRN
Status: DISCONTINUED | OUTPATIENT
Start: 2019-07-03 | End: 2019-07-04 | Stop reason: HOSPADM

## 2019-07-03 RX ORDER — METHYLPREDNISOLONE SODIUM SUCCINATE 125 MG/2ML
125 INJECTION, POWDER, LYOPHILIZED, FOR SOLUTION INTRAMUSCULAR; INTRAVENOUS ONCE
Status: COMPLETED | OUTPATIENT
Start: 2019-07-03 | End: 2019-07-03

## 2019-07-03 RX ORDER — MORPHINE SULFATE 2 MG/ML
4 INJECTION, SOLUTION INTRAMUSCULAR; INTRAVENOUS
Status: DISCONTINUED | OUTPATIENT
Start: 2019-07-03 | End: 2019-07-04 | Stop reason: HOSPADM

## 2019-07-03 RX ORDER — MONTELUKAST SODIUM 10 MG/1
10 TABLET ORAL NIGHTLY
Status: DISCONTINUED | OUTPATIENT
Start: 2019-07-03 | End: 2019-07-04 | Stop reason: HOSPADM

## 2019-07-03 RX ORDER — PROMETHAZINE HYDROCHLORIDE 25 MG/1
25 SUPPOSITORY RECTAL ONCE AS NEEDED
Status: DISCONTINUED | OUTPATIENT
Start: 2019-07-03 | End: 2019-07-03 | Stop reason: HOSPADM

## 2019-07-03 RX ORDER — MAGNESIUM HYDROXIDE 1200 MG/15ML
LIQUID ORAL AS NEEDED
Status: DISCONTINUED | OUTPATIENT
Start: 2019-07-03 | End: 2019-07-03 | Stop reason: HOSPADM

## 2019-07-03 RX ORDER — CETIRIZINE HYDROCHLORIDE 10 MG/1
5 TABLET ORAL DAILY
Status: DISCONTINUED | OUTPATIENT
Start: 2019-07-03 | End: 2019-07-04 | Stop reason: HOSPADM

## 2019-07-03 RX ORDER — FENTANYL CITRATE 50 UG/ML
50 INJECTION, SOLUTION INTRAMUSCULAR; INTRAVENOUS
Status: DISCONTINUED | OUTPATIENT
Start: 2019-07-03 | End: 2019-07-03 | Stop reason: HOSPADM

## 2019-07-03 RX ORDER — FAMOTIDINE 10 MG/ML
20 INJECTION, SOLUTION INTRAVENOUS ONCE
Status: COMPLETED | OUTPATIENT
Start: 2019-07-03 | End: 2019-07-03

## 2019-07-03 RX ORDER — LIDOCAINE HYDROCHLORIDE 10 MG/ML
0.5 INJECTION, SOLUTION EPIDURAL; INFILTRATION; INTRACAUDAL; PERINEURAL ONCE AS NEEDED
Status: DISCONTINUED | OUTPATIENT
Start: 2019-07-03 | End: 2019-07-03 | Stop reason: HOSPADM

## 2019-07-03 RX ORDER — PANTOPRAZOLE SODIUM 40 MG/1
40 TABLET, DELAYED RELEASE ORAL
Status: DISCONTINUED | OUTPATIENT
Start: 2019-07-04 | End: 2019-07-04 | Stop reason: HOSPADM

## 2019-07-03 RX ORDER — HYDROCODONE BITARTRATE AND ACETAMINOPHEN 10; 325 MG/1; MG/1
1 TABLET ORAL EVERY 6 HOURS PRN
Status: DISCONTINUED | OUTPATIENT
Start: 2019-07-03 | End: 2019-07-04 | Stop reason: HOSPADM

## 2019-07-03 RX ORDER — ONDANSETRON 2 MG/ML
INJECTION INTRAMUSCULAR; INTRAVENOUS AS NEEDED
Status: DISCONTINUED | OUTPATIENT
Start: 2019-07-03 | End: 2019-07-03 | Stop reason: SURG

## 2019-07-03 RX ORDER — HYDRALAZINE HYDROCHLORIDE 20 MG/ML
5 INJECTION INTRAMUSCULAR; INTRAVENOUS
Status: DISCONTINUED | OUTPATIENT
Start: 2019-07-03 | End: 2019-07-03 | Stop reason: HOSPADM

## 2019-07-03 RX ORDER — OXYCODONE HCL 10 MG/1
10 TABLET, FILM COATED, EXTENDED RELEASE ORAL ONCE
Status: COMPLETED | OUTPATIENT
Start: 2019-07-03 | End: 2019-07-03

## 2019-07-03 RX ORDER — HYDROMORPHONE HYDROCHLORIDE 1 MG/ML
0.5 INJECTION, SOLUTION INTRAMUSCULAR; INTRAVENOUS; SUBCUTANEOUS
Status: DISCONTINUED | OUTPATIENT
Start: 2019-07-03 | End: 2019-07-03 | Stop reason: HOSPADM

## 2019-07-03 RX ORDER — SODIUM CHLORIDE, SODIUM LACTATE, POTASSIUM CHLORIDE, CALCIUM CHLORIDE 600; 310; 30; 20 MG/100ML; MG/100ML; MG/100ML; MG/100ML
9 INJECTION, SOLUTION INTRAVENOUS CONTINUOUS
Status: DISCONTINUED | OUTPATIENT
Start: 2019-07-03 | End: 2019-07-03

## 2019-07-03 RX ORDER — SODIUM CHLORIDE 0.9 % (FLUSH) 0.9 %
3-10 SYRINGE (ML) INJECTION AS NEEDED
Status: DISCONTINUED | OUTPATIENT
Start: 2019-07-03 | End: 2019-07-03 | Stop reason: HOSPADM

## 2019-07-03 RX ORDER — NALOXONE HCL 0.4 MG/ML
0.2 VIAL (ML) INJECTION AS NEEDED
Status: DISCONTINUED | OUTPATIENT
Start: 2019-07-03 | End: 2019-07-03 | Stop reason: HOSPADM

## 2019-07-03 RX ORDER — INSULIN GLARGINE 100 [IU]/ML
40 INJECTION, SOLUTION SUBCUTANEOUS NIGHTLY
Status: DISCONTINUED | OUTPATIENT
Start: 2019-07-03 | End: 2019-07-04 | Stop reason: HOSPADM

## 2019-07-03 RX ORDER — PROMETHAZINE HYDROCHLORIDE 25 MG/ML
12.5 INJECTION, SOLUTION INTRAMUSCULAR; INTRAVENOUS ONCE AS NEEDED
Status: DISCONTINUED | OUTPATIENT
Start: 2019-07-03 | End: 2019-07-03 | Stop reason: HOSPADM

## 2019-07-03 RX ORDER — PROPOFOL 10 MG/ML
VIAL (ML) INTRAVENOUS AS NEEDED
Status: DISCONTINUED | OUTPATIENT
Start: 2019-07-03 | End: 2019-07-03 | Stop reason: SURG

## 2019-07-03 RX ORDER — FLUMAZENIL 0.1 MG/ML
0.2 INJECTION INTRAVENOUS AS NEEDED
Status: DISCONTINUED | OUTPATIENT
Start: 2019-07-03 | End: 2019-07-03 | Stop reason: HOSPADM

## 2019-07-03 RX ORDER — BISOPROLOL FUMARATE 5 MG/1
10 TABLET, FILM COATED ORAL NIGHTLY
Status: DISCONTINUED | OUTPATIENT
Start: 2019-07-03 | End: 2019-07-04 | Stop reason: HOSPADM

## 2019-07-03 RX ORDER — DEXTROSE MONOHYDRATE 25 G/50ML
25 INJECTION, SOLUTION INTRAVENOUS
Status: DISCONTINUED | OUTPATIENT
Start: 2019-07-03 | End: 2019-07-04 | Stop reason: HOSPADM

## 2019-07-03 RX ORDER — PROMETHAZINE HYDROCHLORIDE 25 MG/1
25 TABLET ORAL ONCE AS NEEDED
Status: DISCONTINUED | OUTPATIENT
Start: 2019-07-03 | End: 2019-07-03 | Stop reason: HOSPADM

## 2019-07-03 RX ORDER — CEFAZOLIN SODIUM 2 G/100ML
2 INJECTION, SOLUTION INTRAVENOUS ONCE
Status: COMPLETED | OUTPATIENT
Start: 2019-07-03 | End: 2019-07-03

## 2019-07-03 RX ORDER — DIAZEPAM 5 MG/1
10 TABLET ORAL ONCE
Status: COMPLETED | OUTPATIENT
Start: 2019-07-03 | End: 2019-07-03

## 2019-07-03 RX ORDER — HYDROCODONE BITARTRATE AND ACETAMINOPHEN 5; 325 MG/1; MG/1
1 TABLET ORAL EVERY 6 HOURS PRN
Status: DISCONTINUED | OUTPATIENT
Start: 2019-07-03 | End: 2019-07-04 | Stop reason: HOSPADM

## 2019-07-03 RX ORDER — DIPHENHYDRAMINE HCL 25 MG
25 CAPSULE ORAL
Status: DISCONTINUED | OUTPATIENT
Start: 2019-07-03 | End: 2019-07-03 | Stop reason: HOSPADM

## 2019-07-03 RX ORDER — LIDOCAINE AND PRILOCAINE 25; 25 MG/G; MG/G
CREAM TOPICAL ONCE
Status: COMPLETED | OUTPATIENT
Start: 2019-07-03 | End: 2019-07-03

## 2019-07-03 RX ORDER — NALOXONE HCL 0.4 MG/ML
0.4 VIAL (ML) INJECTION
Status: DISCONTINUED | OUTPATIENT
Start: 2019-07-03 | End: 2019-07-04 | Stop reason: HOSPADM

## 2019-07-03 RX ORDER — GLYCOPYRROLATE 0.2 MG/ML
INJECTION INTRAMUSCULAR; INTRAVENOUS AS NEEDED
Status: DISCONTINUED | OUTPATIENT
Start: 2019-07-03 | End: 2019-07-03 | Stop reason: SURG

## 2019-07-03 RX ADMIN — CEFAZOLIN SODIUM 2 G: 2 INJECTION, SOLUTION INTRAVENOUS at 09:41

## 2019-07-03 RX ADMIN — SODIUM CHLORIDE, POTASSIUM CHLORIDE, SODIUM LACTATE AND CALCIUM CHLORIDE 50 ML/HR: 600; 310; 30; 20 INJECTION, SOLUTION INTRAVENOUS at 18:15

## 2019-07-03 RX ADMIN — ONDANSETRON 4 MG: 2 INJECTION INTRAMUSCULAR; INTRAVENOUS at 12:15

## 2019-07-03 RX ADMIN — FENTANYL CITRATE 50 MCG: 50 INJECTION INTRAMUSCULAR; INTRAVENOUS at 10:26

## 2019-07-03 RX ADMIN — INSULIN GLARGINE 40 UNITS: 100 INJECTION, SOLUTION SUBCUTANEOUS at 21:53

## 2019-07-03 RX ADMIN — CEFAZOLIN SODIUM 1 G: 1 INJECTION, SOLUTION INTRAVENOUS at 18:27

## 2019-07-03 RX ADMIN — INSULIN HUMAN 4 UNITS: 100 INJECTION, SOLUTION PARENTERAL at 09:31

## 2019-07-03 RX ADMIN — TECHNETIUM TC 99M SULFUR COLLOID 1 DOSE: KIT at 08:43

## 2019-07-03 RX ADMIN — SODIUM CHLORIDE, POTASSIUM CHLORIDE, SODIUM LACTATE AND CALCIUM CHLORIDE 9 ML/HR: 600; 310; 30; 20 INJECTION, SOLUTION INTRAVENOUS at 08:52

## 2019-07-03 RX ADMIN — FAMOTIDINE 20 MG: 10 INJECTION INTRAVENOUS at 08:51

## 2019-07-03 RX ADMIN — LIDOCAINE HYDROCHLORIDE 100 MG: 20 INJECTION, SOLUTION INFILTRATION; PERINEURAL at 09:49

## 2019-07-03 RX ADMIN — NEOSTIGMINE METHYLSULFATE 4 MG: 1 INJECTION INTRAMUSCULAR; INTRAVENOUS; SUBCUTANEOUS at 11:19

## 2019-07-03 RX ADMIN — ACETAMINOPHEN 1000 MG: 500 TABLET, FILM COATED ORAL at 07:29

## 2019-07-03 RX ADMIN — INSULIN HUMAN 4 UNITS: 100 INJECTION, SOLUTION PARENTERAL at 08:25

## 2019-07-03 RX ADMIN — PROMETHAZINE HYDROCHLORIDE 6.25 MG: 25 INJECTION INTRAMUSCULAR; INTRAVENOUS at 12:37

## 2019-07-03 RX ADMIN — OXYCODONE HYDROCHLORIDE 10 MG: 10 TABLET, FILM COATED, EXTENDED RELEASE ORAL at 07:29

## 2019-07-03 RX ADMIN — BISOPROLOL FUMARATE 10 MG: 5 TABLET ORAL at 20:29

## 2019-07-03 RX ADMIN — LOSARTAN POTASSIUM: 50 TABLET, FILM COATED ORAL at 20:28

## 2019-07-03 RX ADMIN — DIAZEPAM 10 MG: 5 TABLET ORAL at 07:50

## 2019-07-03 RX ADMIN — ONDANSETRON 4 MG: 2 INJECTION INTRAMUSCULAR; INTRAVENOUS at 11:13

## 2019-07-03 RX ADMIN — INSULIN HUMAN 4 UNITS: 100 INJECTION, SUSPENSION SUBCUTANEOUS at 11:43

## 2019-07-03 RX ADMIN — INSULIN HUMAN 4 UNITS: 100 INJECTION, SOLUTION PARENTERAL at 09:04

## 2019-07-03 RX ADMIN — INSULIN HUMAN 8 UNITS: 100 INJECTION, SOLUTION PARENTERAL at 12:26

## 2019-07-03 RX ADMIN — SODIUM CHLORIDE, POTASSIUM CHLORIDE, SODIUM LACTATE AND CALCIUM CHLORIDE: 600; 310; 30; 20 INJECTION, SOLUTION INTRAVENOUS at 11:26

## 2019-07-03 RX ADMIN — INSULIN LISPRO 22 UNITS: 100 INJECTION, SOLUTION INTRAVENOUS; SUBCUTANEOUS at 18:26

## 2019-07-03 RX ADMIN — INSULIN LISPRO 8 UNITS: 100 INJECTION, SOLUTION INTRAVENOUS; SUBCUTANEOUS at 21:52

## 2019-07-03 RX ADMIN — METHYLPREDNISOLONE SODIUM SUCCINATE 125 MG: 125 INJECTION, POWDER, FOR SOLUTION INTRAMUSCULAR; INTRAVENOUS at 07:50

## 2019-07-03 RX ADMIN — FENTANYL CITRATE 100 MCG: 50 INJECTION INTRAMUSCULAR; INTRAVENOUS at 09:44

## 2019-07-03 RX ADMIN — LIDOCAINE AND PRILOCAINE: 25; 25 CREAM TOPICAL at 07:29

## 2019-07-03 RX ADMIN — ROCURONIUM BROMIDE 40 MG: 10 INJECTION INTRAVENOUS at 09:49

## 2019-07-03 RX ADMIN — ATORVASTATIN CALCIUM 20 MG: 20 TABLET, FILM COATED ORAL at 20:29

## 2019-07-03 RX ADMIN — PROPOFOL 150 MG: 10 INJECTION, EMULSION INTRAVENOUS at 09:49

## 2019-07-03 RX ADMIN — LABETALOL 20 MG/4 ML (5 MG/ML) INTRAVENOUS SYRINGE 5 MG: at 12:32

## 2019-07-03 RX ADMIN — GLYCOPYRROLATE 0.4 MG: 0.2 INJECTION INTRAMUSCULAR; INTRAVENOUS at 11:19

## 2019-07-03 NOTE — ANESTHESIA PROCEDURE NOTES
Airway  Urgency: elective    Date/Time: 7/3/2019 9:53 AM  Airway not difficult    General Information and Staff    Patient location during procedure: OR  Anesthesiologist: Gorge Huang MD  CRNA: Monisha Reyes CRNA    Indications and Patient Condition  Indications for airway management: airway protection    Preoxygenated: yes  MILS not maintained throughout  Mask difficulty assessment: 1 - vent by mask    Final Airway Details  Final airway type: endotracheal airway      Successful airway: ETT  Cuffed: yes   Successful intubation technique: direct laryngoscopy  Facilitating devices/methods: anterior pressure/BURP  Endotracheal tube insertion site: oral  Blade: Vanessa  Blade size: 3  ETT size (mm): 7.0  Cormack-Lehane Classification: grade I - full view of glottis  Placement verified by: chest auscultation and capnometry   Cuff volume (mL): 6  Measured from: lips  ETT to lips (cm): 21  Number of attempts at approach: 1    Additional Comments  Pt preoxygenated prior to induction, easy mask airway, atraumatic intubation,+ ETCO2, + bs bilat,  ETT secured and connected to ventilator.

## 2019-07-03 NOTE — ANESTHESIA PREPROCEDURE EVALUATION
Anesthesia Evaluation     Patient summary reviewed   NPO Solid Status: > 8 hours  NPO Liquid Status: > 4 hours           Airway   Mallampati: III  TM distance: >3 FB  Small opening  Dental      Pulmonary    (+) asthma,   Cardiovascular     ECG reviewed  Rhythm: regular  Rate: normal    (+) hypertension 2 medications or greater, PVD,       Neuro/Psych    ROS Comment: Temporal arteritis.  GI/Hepatic/Renal/Endo    (+) obesity,   renal disease CRI, diabetes mellitus poorly controlled using insulin,     Musculoskeletal     Abdominal    Substance History      OB/GYN          Other      history of cancer active                    Anesthesia Plan    ASA 3     general     intravenous induction   Anesthetic plan, all risks, benefits, and alternatives have been provided, discussed and informed consent has been obtained with: patient.

## 2019-07-03 NOTE — ANESTHESIA POSTPROCEDURE EVALUATION
Patient: Blanca Lam    Procedure Summary     Date:  07/03/19 Room / Location:  [facility] OR 06 / [facility] MAIN OR    Anesthesia Start:  0940 Anesthesia Stop:  1207    Procedure:  LEFT BREAST MASTECTOMY WITH SENTINEL NODE BIOPSY AND , v-y plasty closure (Left Breast) Diagnosis:       Breast neoplasm, Tis (DCIS), left      (Breast neoplasm, Tis (DCIS), left [D05.12])    Surgeon:  Tahmina James MD Provider:  Gorge Huang MD    Anesthesia Type:  general ASA Status:  3          Anesthesia Type: general  Last vitals  BP   169/70 (07/03/19 1220)   Temp   36.9 °C (98.4 °F) (07/03/19 1201)   Pulse   76 (07/03/19 1220)   Resp   16 (07/03/19 1220)     SpO2   97 % (07/03/19 1220)     Post Anesthesia Care and Evaluation    Patient location during evaluation: PACU  Patient participation: complete - patient participated  Level of consciousness: awake and alert  Pain management: adequate  Airway patency: patent  Anesthetic complications: No anesthetic complications    Cardiovascular status: acceptable  Respiratory status: acceptable  Hydration status: acceptable    Comments: --------------------            07/03/19               1220     --------------------   BP:       169/70     Pulse:      76       Resp:       16       Temp:                SpO2:      97%      --------------------

## 2019-07-04 VITALS
SYSTOLIC BLOOD PRESSURE: 164 MMHG | RESPIRATION RATE: 16 BRPM | DIASTOLIC BLOOD PRESSURE: 76 MMHG | WEIGHT: 193.5 LBS | HEIGHT: 60 IN | TEMPERATURE: 98 F | HEART RATE: 73 BPM | BODY MASS INDEX: 37.99 KG/M2 | OXYGEN SATURATION: 97 %

## 2019-07-04 LAB
GLUCOSE BLDC GLUCOMTR-MCNC: 118 MG/DL (ref 70–130)
GLUCOSE BLDC GLUCOMTR-MCNC: 94 MG/DL (ref 70–130)

## 2019-07-04 PROCEDURE — A9270 NON-COVERED ITEM OR SERVICE: HCPCS | Performed by: SURGERY

## 2019-07-04 PROCEDURE — 25010000003 CEFAZOLIN 1-4 GM/50ML-% SOLUTION: Performed by: SURGERY

## 2019-07-04 PROCEDURE — 63710000001 INSULIN LISPRO (HUMAN) PER 5 UNITS: Performed by: SURGERY

## 2019-07-04 PROCEDURE — 63710000001 CETIRIZINE 10 MG TABLET: Performed by: SURGERY

## 2019-07-04 PROCEDURE — 63710000001 PANTOPRAZOLE 40 MG TABLET DELAYED-RELEASE: Performed by: SURGERY

## 2019-07-04 PROCEDURE — 82962 GLUCOSE BLOOD TEST: CPT

## 2019-07-04 RX ORDER — HYDROCODONE BITARTRATE AND ACETAMINOPHEN 5; 325 MG/1; MG/1
1 TABLET ORAL EVERY 4 HOURS PRN
Qty: 16 TABLET | Refills: 0 | Status: SHIPPED | OUTPATIENT
Start: 2019-07-04 | End: 2019-07-10

## 2019-07-04 RX ADMIN — PANTOPRAZOLE SODIUM 40 MG: 40 TABLET, DELAYED RELEASE ORAL at 06:56

## 2019-07-04 RX ADMIN — CEFAZOLIN SODIUM 1 G: 1 INJECTION, SOLUTION INTRAVENOUS at 02:37

## 2019-07-04 RX ADMIN — INSULIN LISPRO 25 UNITS: 100 INJECTION, SOLUTION INTRAVENOUS; SUBCUTANEOUS at 08:05

## 2019-07-04 RX ADMIN — CETIRIZINE HYDROCHLORIDE 5 MG: 10 TABLET, FILM COATED ORAL at 08:05

## 2019-07-09 LAB
CYTO UR: NORMAL
LAB AP CASE REPORT: NORMAL
LAB AP CLINICAL INFORMATION: NORMAL
LAB AP DIAGNOSIS COMMENT: NORMAL
LAB AP SPECIAL STAINS: NORMAL
LAB AP SYNOPTIC CHECKLIST: NORMAL
Lab: NORMAL
PATH REPORT.FINAL DX SPEC: NORMAL
PATH REPORT.GROSS SPEC: NORMAL

## 2019-07-10 ENCOUNTER — OFFICE VISIT (OUTPATIENT)
Dept: SURGERY | Facility: CLINIC | Age: 74
End: 2019-07-10

## 2019-07-10 DIAGNOSIS — D05.12 BREAST NEOPLASM, TIS (DCIS), LEFT: Primary | ICD-10-CM

## 2019-07-10 PROCEDURE — 99024 POSTOP FOLLOW-UP VISIT: CPT | Performed by: SURGERY

## 2019-07-10 NOTE — PROGRESS NOTES
SURGERY: ALANNA  Post op Visit  Blancalourdes Lam  07/10/19    Ms Lam presents today after having undergone Surgery 7/3/2019, of a left mastectomy with sentinel node, closure with V-Y plasty.  This was done for extensive DCIS, but her final pathology did in fact show a microinvasion.  Her sentinel node on permanent was negative.  Her margins are close but negative.    Today she looks great.  Her drain outputs are reasonable and those are going to be removed.  She is only a week out and therefore I am going to take only every other staple out, which she were to return next week to get the remainder out.    She is going to follow-up with Dr. Yu, and I believe he is already talked to her about sending her for endoscopic treatment of the duodenal polyp which I think is very reasonable.  I will be available as needed.    Tahmina James MD  6:36 PM  7/10/2019

## 2019-07-17 ENCOUNTER — LAB (OUTPATIENT)
Dept: LAB | Facility: HOSPITAL | Age: 74
End: 2019-07-17

## 2019-07-17 ENCOUNTER — OFFICE VISIT (OUTPATIENT)
Dept: ONCOLOGY | Facility: CLINIC | Age: 74
End: 2019-07-17

## 2019-07-17 ENCOUNTER — CLINICAL SUPPORT (OUTPATIENT)
Dept: SURGERY | Facility: CLINIC | Age: 74
End: 2019-07-17

## 2019-07-17 VITALS
DIASTOLIC BLOOD PRESSURE: 79 MMHG | OXYGEN SATURATION: 97 % | WEIGHT: 190.8 LBS | BODY MASS INDEX: 38.47 KG/M2 | TEMPERATURE: 98.6 F | HEIGHT: 59 IN | SYSTOLIC BLOOD PRESSURE: 161 MMHG | HEART RATE: 76 BPM | RESPIRATION RATE: 14 BRPM

## 2019-07-17 DIAGNOSIS — D50.8 OTHER IRON DEFICIENCY ANEMIA: ICD-10-CM

## 2019-07-17 DIAGNOSIS — D05.12 BREAST NEOPLASM, TIS (DCIS), LEFT: ICD-10-CM

## 2019-07-17 DIAGNOSIS — R73.9 HYPERGLYCEMIA: ICD-10-CM

## 2019-07-17 DIAGNOSIS — D05.12 BREAST NEOPLASM, TIS (DCIS), LEFT: Primary | ICD-10-CM

## 2019-07-17 LAB
ALBUMIN SERPL-MCNC: 3.8 G/DL (ref 3.5–5.2)
ALBUMIN/GLOB SERPL: 1.2 G/DL (ref 1.1–2.4)
ALP SERPL-CCNC: 115 U/L (ref 38–116)
ALT SERPL W P-5'-P-CCNC: 15 U/L (ref 0–33)
ANION GAP SERPL CALCULATED.3IONS-SCNC: 12.1 MMOL/L (ref 5–15)
AST SERPL-CCNC: 12 U/L (ref 0–32)
BASOPHILS # BLD AUTO: 0.05 10*3/MM3 (ref 0–0.2)
BASOPHILS NFR BLD AUTO: 0.5 % (ref 0–1.5)
BILIRUB SERPL-MCNC: 0.3 MG/DL (ref 0.2–1.2)
BUN BLD-MCNC: 38 MG/DL (ref 6–20)
BUN/CREAT SERPL: 29.5 (ref 7.3–30)
CALCIUM SPEC-SCNC: 9.5 MG/DL (ref 8.5–10.2)
CHLORIDE SERPL-SCNC: 101 MMOL/L (ref 98–107)
CO2 SERPL-SCNC: 27.9 MMOL/L (ref 22–29)
CREAT BLD-MCNC: 1.29 MG/DL (ref 0.6–1.1)
DEPRECATED RDW RBC AUTO: 55 FL (ref 37–54)
EOSINOPHIL # BLD AUTO: 0.16 10*3/MM3 (ref 0–0.4)
EOSINOPHIL NFR BLD AUTO: 1.5 % (ref 0.3–6.2)
ERYTHROCYTE [DISTWIDTH] IN BLOOD BY AUTOMATED COUNT: 15.5 % (ref 12.3–15.4)
FERRITIN SERPL-MCNC: 849.5 NG/ML (ref 13–150)
GFR SERPL CREATININE-BSD FRML MDRD: 41 ML/MIN/1.73
GLOBULIN UR ELPH-MCNC: 3.2 GM/DL (ref 1.8–3.5)
GLUCOSE BLD-MCNC: 149 MG/DL (ref 74–124)
HCT VFR BLD AUTO: 41.2 % (ref 34–46.6)
HGB BLD-MCNC: 12.7 G/DL (ref 12–15.9)
HGB RETIC QN AUTO: 36.5 PG (ref 29.8–36.1)
IMM GRANULOCYTES # BLD AUTO: 0.15 10*3/MM3 (ref 0–0.05)
IMM GRANULOCYTES NFR BLD AUTO: 1.4 % (ref 0–0.5)
IMM RETICS NFR: 6.5 % (ref 3–15.8)
IRON 24H UR-MRATE: 73 MCG/DL (ref 37–145)
IRON SATN MFR SERPL: 26 % (ref 14–48)
LYMPHOCYTES # BLD AUTO: 1.07 10*3/MM3 (ref 0.7–3.1)
LYMPHOCYTES NFR BLD AUTO: 9.9 % (ref 19.6–45.3)
MCH RBC QN AUTO: 29.8 PG (ref 26.6–33)
MCHC RBC AUTO-ENTMCNC: 30.8 G/DL (ref 31.5–35.7)
MCV RBC AUTO: 96.7 FL (ref 79–97)
MONOCYTES # BLD AUTO: 0.7 10*3/MM3 (ref 0.1–0.9)
MONOCYTES NFR BLD AUTO: 6.5 % (ref 5–12)
NEUTROPHILS # BLD AUTO: 8.68 10*3/MM3 (ref 1.7–7)
NEUTROPHILS NFR BLD AUTO: 80.2 % (ref 42.7–76)
NRBC BLD AUTO-RTO: 0 /100 WBC (ref 0–0.2)
PLATELET # BLD AUTO: 251 10*3/MM3 (ref 140–450)
PMV BLD AUTO: 9.3 FL (ref 6–12)
POTASSIUM BLD-SCNC: 4.5 MMOL/L (ref 3.5–4.7)
PROT SERPL-MCNC: 7 G/DL (ref 6.3–8)
RBC # BLD AUTO: 4.26 10*6/MM3 (ref 3.77–5.28)
RETICS/RBC NFR AUTO: 1.49 % (ref 0.7–1.9)
SODIUM BLD-SCNC: 141 MMOL/L (ref 134–145)
TIBC SERPL-MCNC: 276 MCG/DL (ref 249–505)
TRANSFERRIN SERPL-MCNC: 197 MG/DL (ref 200–360)
WBC NRBC COR # BLD: 10.81 10*3/MM3 (ref 3.4–10.8)

## 2019-07-17 PROCEDURE — 82728 ASSAY OF FERRITIN: CPT | Performed by: INTERNAL MEDICINE

## 2019-07-17 PROCEDURE — 99215 OFFICE O/P EST HI 40 MIN: CPT | Performed by: INTERNAL MEDICINE

## 2019-07-17 PROCEDURE — 84466 ASSAY OF TRANSFERRIN: CPT | Performed by: INTERNAL MEDICINE

## 2019-07-17 PROCEDURE — 80053 COMPREHEN METABOLIC PANEL: CPT | Performed by: INTERNAL MEDICINE

## 2019-07-17 PROCEDURE — 36415 COLL VENOUS BLD VENIPUNCTURE: CPT | Performed by: INTERNAL MEDICINE

## 2019-07-17 PROCEDURE — 85025 COMPLETE CBC W/AUTO DIFF WBC: CPT | Performed by: INTERNAL MEDICINE

## 2019-07-17 PROCEDURE — 85046 RETICYTE/HGB CONCENTRATE: CPT | Performed by: INTERNAL MEDICINE

## 2019-07-17 PROCEDURE — 83540 ASSAY OF IRON: CPT | Performed by: INTERNAL MEDICINE

## 2019-07-17 NOTE — PROGRESS NOTES
Subjective     REASON FOR FOLLOW UP:  ANEMIA, FATIGUE, FREQUENT FALLS, AFRAID OF WALKING, ABDOMINAL PAIN AND CONTINUO'S  TURMOIL IN BOWEL FUNCTION AND DIGESTION,DCIS OF THE LEFT BREAST SP MASTECTOMY      History of Present Illness This patient returns today to the office in company of her  after she has undergone a left-sided mastectomy to treat DCIS of the left breast. The surgery underwent without any difficulties or complications and so far, she has been healing fine with no infections or significant pain. All the tubing has removed from the surgical site. The patient is starting to do some physical activity with the left shoulder. She has not had any need for antibiotics or pain medication. Other than that, she has modified her appetite, decreasing the amount of bread that she was eating to try to gain control back on her diabetes. Her appetite is excellent. Her bowel activity and urination remain normal. She has not had any cardiovascular or respiratory issues. Still some neuropathy in her feet and some balance issues pertinent to the neuropathy. The patient denies any other new issues. Her energy level is better though after she received her IV iron infusion.        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 Daniel Gutierrez, [facility]   • CATARACT EXTRACTION     •  SECTION N/A     x3   • CHOLECYSTECTOMY N/A    • COLONOSCOPY W/ POLYPECTOMY N/A 2019    Enlarged folds in the antrum: biopsied; duodenal, transverse colon x2, splenic flexure, descending colon and sigmoid colon polyps: biopsied (path: tubular adenoma x6)-Dr. Zak De Jesus, [facility]   • MASTECTOMY WITH SENTINEL NODE BIOPSY AND AXILLARY NODE DISSECTION Left 7/3/2019    Procedure: LEFT BREAST MASTECTOMY WITH SENTINEL NODE BIOPSY AND , v-y plasty closure;  Surgeon: Tahmina James MD;  Location: [facility];  Service: General   • PARTIAL HYSTERECTOMY Bilateral     ovaries still in tact        Current Outpatient Medications on File Prior to Visit   Medication Sig Dispense Refill   • atorvastatin (LIPITOR) 20 MG tablet Take 20 mg by mouth Every Other Day.     • bisoprolol (ZEBeta) 10 MG tablet Take 10 mg by mouth Every Night.     • Insulin Glargine (TOUJEO SOLOSTAR SC) Inject 40 Units under the skin into the appropriate area as directed Every Night. INSTRUCTED PT TO FOLLOW MD INSTRUCTIONS REGARDING DOSING BEFORE SURGERY     • Insulin Lispro (HUMALOG PEN SC) Inject  under the skin into the appropriate area as directed 3 (Three) Times a Day With Meals. 25 units at breakfast, 20 units at lunch, 22 units at dinner/INSTRUCTED PT TO FOLLOW MD INSTRUCTIONS REGARDING DOSING BEFORE SURGERY     • levocetirizine (XYZAL) 5 MG tablet Take 5 mg by mouth Every Evening.     • losartan-hydrochlorothiazide (HYZAAR) 100-25 MG per tablet Take 1 tablet by mouth Every Night.     • montelukast (SINGULAIR) 10 MG tablet Take 10 mg by mouth Every Night.     • NON FORMULARY Apply 1 each topically to the appropriate area as directed Daily. Cream to foot rash, unsure of name     • omeprazole (priLOSEC) 20 MG capsule Take 20 mg by mouth 2 (Two) Times a Day As Needed.     • predniSONE (DELTASONE) 2.5 MG tablet Take 7.5 mg by mouth Every Evening.        No current facility-administered medications on file prior to visit.         ALLERGIES:    Allergies   Allergen Reactions   • Aspirin Nausea Only     Low tolerance, abdominal pain   • Sulfa Antibiotics Unknown (See Comments)     Happened as a child        Social History     Socioeconomic History   • Marital status:      Spouse name: Not on file   • Number of children: 3   • Years of education: College   • Highest education level: Not on file   Occupational History   • Occupation: POSTAL SERVICE     Employer: RETIRED   Social Needs   • Financial resource strain: Not hard at all   • Food insecurity:     Worry: Never true     Inability: Never true   • Transportation needs:     Medical: No     Non-medical: No   Tobacco Use   • Smoking status: Never Smoker   • Smokeless tobacco: Never Used   • Tobacco comment: caffine use   Substance and Sexual Activity   • Alcohol use: No   • Drug use: No        Family History   Problem Relation Age of Onset   • Hypertension Father    • Stomach cancer Father    • Diabetes Father    • Esophageal cancer Father    • Throat cancer Sister    • Diabetes Paternal Grandmother    • Hypertension Paternal Grandfather    • Colon cancer Paternal Grandfather    • Heart disease Mother    • Colon cancer Paternal Uncle    • Colon cancer Paternal Uncle    • Colon cancer Paternal Uncle    • Ovarian cancer Cousin    • Stomach cancer Cousin    • Malig Hyperthermia Neg Hx         Review of Systems       General: no fever, no chills, no fatigue,no weight changes, no lack of appetite.  Eyes: no epiphora, xerophthalmia,conjunctivitis, pain, glaucoma, blurred vision, blindness, secretion, photophobia, proptosis, diplopia.  Ears: no otorrhea, tinnitus, otorrhagia, deafness, pain, vertigo.  Nose: no rhinorrhea, no epistaxis, no alteration in perception of odors, no sinuses pressure.  Mouth: no alteration in gums or teeth,  No ulcers, no difficulty with mastication or deglut ion, no odynophagia.  Neck:  "no masses or pain, no thyroid alterations, no pain in muscles or arteries, no carotid odynia, no crepitation.  Respiratory: no cough, no sputum production,no dyspnea,no trepopnea, no pleuritic pain,no hemoptysis.  Heart: no syncope, no irregularity, no palpitations, no angina,no orthopnea,no paroxysmal nocturnal dyspnea.  Vascular Venous: no tenderness,no edema,no palpable cords,no postphlebitic syndrome, no skin changes no ulcerations.  Vascular Arterial: no distal ischemia, noclaudication, no gangrene, no neuropathic ischemic pain, no skin ulcers, no paleness no cyanosis.  GI: no dysphagia, no odynophagia, no regurgitation, no heartburn,no indigestion,no nausea,no vomiting,no hematemesis ,no melena,no jaundice,no distention, no obstipation,no enterorrhagia,no proctalgia,no anal  lesions, no changes in bowel habits.  : no frequency, no hesitancy, no hematuria, no discharge,no  pain.  Musculoskeletal: no muscle or tendon pain or inflammation,no  joint pain, no edema, no functional limitation,no fasciculations, no mass.  Neurologic: no headache, no seizures, noalterations on Craneal nerves, no motor deficit, no sensory deficit, normal coordination, no alteration in memory,normal orientation, calculation,normal writting, verbal and written language.  Skin: no rashes,no pruritus no localized lesions.  Psychiatric: no anxiety, no depression,no agitation, no delusions, proper insight.          Objective     Vitals:    07/17/19 1512   BP: 161/79   Pulse: 76   Resp: 14   Temp: 98.6 °F (37 °C)   TempSrc: Oral   SpO2: 97%   Weight: 86.5 kg (190 lb 12.8 oz)   Height: 149 cm (58.66\")   PainSc: 8  Comment: back pain     Current Status 7/17/2019   ECOG score 0       Physical Exam    GENERAL:  Well-developed, well-nourished  Patient  in no acute distress.   SKIN:  Warm, dry ,NO rashes,NO purpura ,NO petechiae.  HEENT:  Pupils were equal and reactive to light and accomodation, conjunctivas non injected, no pterigion, normal " extraocular movements, normal visual acuity.   Mouth mucosa was moist, no exudates in oropharynx, normal gum line, normal roof of the mouth and pillars, normal papillations of the tongue.  NECK:  Supple with good range of motion; no thyromegaly or masses, no JVD or bruits, no cervical adenopathies.No carotid arteries pain, no carotid abnormal pulsation , NO arterial dance.  LYMPHATICS:  No cervical, NO supraclavicular, NO axillary,NO epitrochlear , NO inguinal adenopathy.  CHEST:  Normal excursion of both gina thoraces, normal voice fremitus, no subcutaneous emphysema, normal axillas, no rashes or acanthosis nigricans. Lungs clear to percussion and auscultation, normal breath sounds bilaterally, no wheezing,NO crackles NO ronchi, NO stridor, NO rubs.  CARDIAC AND VASCULAR:  normal rate and regular rhythm, without murmurs,NO rubs NO S3 NO S4 right or left . Normal femoral, popliteal, pedis, brachial and carotid pulses.  INSPECTION of  breast documented symmetry of the tissue per se and location and size of the nipple,no retractions or inversion of the nipple, normal skin without lesions, no erythema or nodules,no paud'orange, no prominence of superficial veins or chest wall collateral circulation.PALPATION of the breast documented normal skin turgor, no induration, alteration in local temperature, or pain, no palpable masses or nodules, normal mobility of the tissues,no fixation of the tissue or parenchyma to the chest wall, no alteration at the tail of the breasts or axillas, no adenopathies. LEFT MASTECTOMYSurgical site was well healed.No lymphedema in either extremity.  ABDOMEN:  Soft, nontender with no organomegaly or masses, no ascites, no collateral circulation,no distention,no Elio sign, no abdominal pain, no inguinal hernias,no umbilical hernia, no abdominal bruits. Normal bowel sounds.  GENITAL: Not  Performed.  EXTREMITIES  AND SPINE:  No clubbing, cyanosis or edema, no deformities or pain .No kyphosis,  scoliosis, deformities or pain in spine, ribs or pelvic bone.  NEUROLOGICAL:  Patient was awake, alert, oriented to time, person and place.NEUROPATHY IN FEET, POSITIVE ROMBERG                                  RECENT LABS:  Hematology WBC   Date Value Ref Range Status   07/17/2019 10.81 (H) 3.40 - 10.80 10*3/mm3 Final     RBC   Date Value Ref Range Status   07/17/2019 4.26 3.77 - 5.28 10*6/mm3 Final     Hemoglobin   Date Value Ref Range Status   07/17/2019 12.7 12.0 - 15.9 g/dL Final     Hematocrit   Date Value Ref Range Status   07/17/2019 41.2 34.0 - 46.6 % Final     Platelets   Date Value Ref Range Status   07/17/2019 251 140 - 450 10*3/mm3 Final      Tissue Pathology Exam: [id number]   Order: [id number]   Collected:  7/3/2019 10:33   Status:  Final result   Visible to patient:  Yes (MyChart)   Dx:  Breast neoplasm, Tis (DCIS), left   Component    Clinical Information    neoprobe # : [id number]  Or number- [id number]   Final Diagnosis   1. Left Breast [place] Lymph Node:               A. Single benign lymph node.               B. Multiple step sections are negative for tumor.     2. Left Simple Mastectomy (875 Grams):               A. SINGLE FOCUS OF MICROINVASIVE DUCTAL CARCINOMA (LESS THAN 1 MM, Tmi).               B. Extensive intraductal carcinoma, comedo type (high nuclear grade involving a region approximating 7.0 x 5.5 x                   5.0 cm in the upper outer quadrant).               C. DCIS extends to within 0.5 mm of the anterior margin of the upper outer quadrant.               D. 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               E. DCIS involving the lactiferous ducts.               F. Extensive dystrophic calcification within DCIS.               G. Cancerization of the lobules.               H. For receptor studies, see previous pathology report (ER/LA negative).                                                                                                                                             PT1mi,N0,Mx  3. Wide Excision, Skin of Left Breast:               A. Epidermal inclusion cyst, benign.               B. Benign hyperkeratosis.               C. Negative for neoplasm.     dma/jse       Electronically signed by Grey Novak MD on 7/9/2019 at 0821   Preliminary result electronically signed by Grey Novak MD on 7/5/2019 at 1440   Synoptic Checklist                 Assessment/Plan      1. This patient has undergone a left-sided mastectomy for DCIS of the left breast. The patient had a large tumor that was high grade with negative margins of resection, minimal microinvasion and negative sentinel lymph nodes. The patient’s estrogen receptor and progesterone receptor were negative and she was HER2 negative as well at the time of the original diagnosis. Under these premises, I think the patient is done with the treatment for this and I do not recommend any form of adjuvant chemotherapy, radiation treatment and obviously no hormonal therapy. There is no role for Herceptin or medicines of this nature either.     I suggested in a few more days to initiate a program of exercises to improve range of motion for the left shoulder. I talked with her about the need for protection of her left upper extremity when she is working in the garden or cooking to minimize any potential for lymphedema or cellulitis.     I discussed with her the need for her eventually in the future to have a repeat breast examination on the right side, mammogram and why not even ultrasound given the fact that was a lobular component of disease.   2. From the point of view of her anemia, iron deficiency, the patient has been replaced with IV iron. The hemoglobin has substantially improved and her ferritin and iron profile also have improved. The patient feels better in this regard. This will be reevaluated when she returns back in 2 months.   3. The patient has a polyp in the duodenum that eventually  will require removal. My advice to her is to wait until she recovers from her mastectomy, allow things to heal and improve and eventually proceed with endoscopy. She has been told by Dr. James that she has a candidate for the GI individual that will perform this kind of endoscopic resection. This will need to be discussed with Dr. James.     Finally, I had a lengthy conversation with the patient and her  in regard to proper management of her diabetes. I pointed out to her that none of the other things that we are doing, anemia, breast cancer management and so forth will have a positive impact on her unless that she has proper care of diabetes and minimizes her hemoglobin A1c. If this does not happen anything else that is done for any other disease that she has probably will become worthless. She recognizes the need to work into this process.     I will review her back in 2 months with a CBC, ferritin and iron profile. She asked me to do a hemoglobin A1c at that time and we will be glad to perform this.

## 2019-07-17 NOTE — PROGRESS NOTES
Pt returned to the office today for remainder of staples to be removed .All staples were removed and steri-strips were applied. Removed SARA drain site bandage, applied bacitracin ointment and large band aid to site.

## 2019-07-19 ENCOUNTER — TELEPHONE (OUTPATIENT)
Dept: ONCOLOGY | Facility: CLINIC | Age: 74
End: 2019-07-19

## 2019-07-19 NOTE — TELEPHONE ENCOUNTER
----- Message from Jean-Claude Farnsworth MD sent at 7/17/2019  5:28 PM EDT -----  Call her iron levels were great

## 2019-07-29 ENCOUNTER — LAB (OUTPATIENT)
Dept: LAB | Facility: HOSPITAL | Age: 74
End: 2019-07-29

## 2019-07-29 ENCOUNTER — TRANSCRIBE ORDERS (OUTPATIENT)
Dept: ADMINISTRATIVE | Facility: HOSPITAL | Age: 74
End: 2019-07-29

## 2019-07-29 DIAGNOSIS — E55.9 VITAMIN D DEFICIENCY: ICD-10-CM

## 2019-07-29 DIAGNOSIS — N18.30 CHRONIC KIDNEY DISEASE, STAGE III (MODERATE) (HCC): ICD-10-CM

## 2019-07-29 DIAGNOSIS — D63.1 ANEMIA IN END-STAGE RENAL DISEASE (HCC): ICD-10-CM

## 2019-07-29 DIAGNOSIS — I12.9 HYPERTENSIVE NEPHROPATHY: ICD-10-CM

## 2019-07-29 DIAGNOSIS — R70.0 ELEVATED SEDIMENTATION RATE: ICD-10-CM

## 2019-07-29 DIAGNOSIS — N18.6 ANEMIA IN END-STAGE RENAL DISEASE (HCC): ICD-10-CM

## 2019-07-29 DIAGNOSIS — N06.0 ISOLATED PROTEINURIA WITH MINOR GLOMERULAR ABNORMALITY: ICD-10-CM

## 2019-07-29 DIAGNOSIS — R79.82 ELEVATED C-REACTIVE PROTEIN (CRP): ICD-10-CM

## 2019-07-29 DIAGNOSIS — M31.5 GIANT CELL ARTERITIS WITH POLYMYALGIA RHEUMATICA (HCC): ICD-10-CM

## 2019-07-29 DIAGNOSIS — Z79.52 LONG TERM CURRENT USE OF SYSTEMIC STEROIDS: ICD-10-CM

## 2019-07-29 DIAGNOSIS — N18.30 CHRONIC KIDNEY DISEASE, STAGE III (MODERATE) (HCC): Primary | ICD-10-CM

## 2019-07-29 DIAGNOSIS — M31.5 GIANT CELL ARTERITIS WITH POLYMYALGIA RHEUMATICA (HCC): Primary | ICD-10-CM

## 2019-07-29 LAB
25(OH)D3 SERPL-MCNC: 21.2 NG/ML (ref 30–100)
ALBUMIN SERPL-MCNC: 3.9 G/DL (ref 3.5–5.2)
ALBUMIN/GLOB SERPL: 1.1 G/DL
ALP SERPL-CCNC: 125 U/L (ref 39–117)
ALT SERPL W P-5'-P-CCNC: 16 U/L (ref 1–33)
ANION GAP SERPL CALCULATED.3IONS-SCNC: 9.9 MMOL/L (ref 5–15)
AST SERPL-CCNC: 15 U/L (ref 1–32)
BACTERIA UR QL AUTO: NORMAL /HPF
BASOPHILS # BLD AUTO: 0.04 10*3/MM3 (ref 0–0.2)
BASOPHILS NFR BLD AUTO: 0.4 % (ref 0–1.5)
BILIRUB SERPL-MCNC: 0.4 MG/DL (ref 0.2–1.2)
BILIRUB UR QL STRIP: NEGATIVE
BUN BLD-MCNC: 25 MG/DL (ref 8–23)
BUN/CREAT SERPL: 24.3 (ref 7–25)
CALCIUM SPEC-SCNC: 9.4 MG/DL (ref 8.6–10.5)
CHLORIDE SERPL-SCNC: 100 MMOL/L (ref 98–107)
CLARITY UR: CLEAR
CO2 SERPL-SCNC: 28.1 MMOL/L (ref 22–29)
COLOR UR: YELLOW
CREAT BLD-MCNC: 1.03 MG/DL (ref 0.57–1)
CREAT UR-MCNC: 99.9 MG/DL
CRP SERPL-MCNC: 2.91 MG/DL (ref 0–0.5)
DEPRECATED RDW RBC AUTO: 54.3 FL (ref 37–54)
EOSINOPHIL # BLD AUTO: 0.1 10*3/MM3 (ref 0–0.4)
EOSINOPHIL NFR BLD AUTO: 0.9 % (ref 0.3–6.2)
ERYTHROCYTE [DISTWIDTH] IN BLOOD BY AUTOMATED COUNT: 14.9 % (ref 12.3–15.4)
FERRITIN SERPL-MCNC: 666 NG/ML (ref 13–150)
FOLATE SERPL-MCNC: 11.6 NG/ML (ref 4.78–24.2)
GFR SERPL CREATININE-BSD FRML MDRD: 53 ML/MIN/1.73
GLOBULIN UR ELPH-MCNC: 3.5 GM/DL
GLUCOSE BLD-MCNC: 202 MG/DL (ref 65–99)
GLUCOSE UR STRIP-MCNC: NEGATIVE MG/DL
HCT VFR BLD AUTO: 42.3 % (ref 34–46.6)
HGB BLD-MCNC: 12.7 G/DL (ref 12–15.9)
HGB UR QL STRIP.AUTO: NEGATIVE
HYALINE CASTS UR QL AUTO: NORMAL /LPF
IMM GRANULOCYTES # BLD AUTO: 0.07 10*3/MM3 (ref 0–0.05)
IMM GRANULOCYTES NFR BLD AUTO: 0.7 % (ref 0–0.5)
IRON 24H UR-MRATE: 83 MCG/DL (ref 37–145)
IRON SATN MFR SERPL: 28 % (ref 20–50)
KETONES UR QL STRIP: NEGATIVE
LEUKOCYTE ESTERASE UR QL STRIP.AUTO: NEGATIVE
LYMPHOCYTES # BLD AUTO: 1.02 10*3/MM3 (ref 0.7–3.1)
LYMPHOCYTES NFR BLD AUTO: 9.5 % (ref 19.6–45.3)
MCH RBC QN AUTO: 29.7 PG (ref 26.6–33)
MCHC RBC AUTO-ENTMCNC: 30 G/DL (ref 31.5–35.7)
MCV RBC AUTO: 98.8 FL (ref 79–97)
MONOCYTES # BLD AUTO: 0.61 10*3/MM3 (ref 0.1–0.9)
MONOCYTES NFR BLD AUTO: 5.7 % (ref 5–12)
NEUTROPHILS # BLD AUTO: 8.92 10*3/MM3 (ref 1.7–7)
NEUTROPHILS NFR BLD AUTO: 82.8 % (ref 42.7–76)
NITRITE UR QL STRIP: NEGATIVE
NRBC BLD AUTO-RTO: 0 /100 WBC (ref 0–0.2)
PH UR STRIP.AUTO: 5.5 [PH] (ref 5–8)
PLATELET # BLD AUTO: 267 10*3/MM3 (ref 140–450)
PMV BLD AUTO: 10.2 FL (ref 6–12)
POTASSIUM BLD-SCNC: 4.4 MMOL/L (ref 3.5–5.2)
PROT SERPL-MCNC: 7.4 G/DL (ref 6–8.5)
PROT UR QL STRIP: ABNORMAL
PROT UR-MCNC: 48 MG/DL
RBC # BLD AUTO: 4.28 10*6/MM3 (ref 3.77–5.28)
RBC # UR: NORMAL /HPF
REF LAB TEST METHOD: NORMAL
SODIUM BLD-SCNC: 138 MMOL/L (ref 136–145)
SP GR UR STRIP: 1.02 (ref 1–1.03)
SQUAMOUS #/AREA URNS HPF: NORMAL /HPF
TIBC SERPL-MCNC: 298 MCG/DL (ref 298–536)
TRANSFERRIN SERPL-MCNC: 200 MG/DL (ref 200–360)
UROBILINOGEN UR QL STRIP: ABNORMAL
WBC NRBC COR # BLD: 10.76 10*3/MM3 (ref 3.4–10.8)
WBC UR QL AUTO: NORMAL /HPF

## 2019-07-29 PROCEDURE — 86140 C-REACTIVE PROTEIN: CPT

## 2019-07-29 PROCEDURE — 84466 ASSAY OF TRANSFERRIN: CPT

## 2019-07-29 PROCEDURE — 82746 ASSAY OF FOLIC ACID SERUM: CPT

## 2019-07-29 PROCEDURE — 36415 COLL VENOUS BLD VENIPUNCTURE: CPT

## 2019-07-29 PROCEDURE — 82728 ASSAY OF FERRITIN: CPT

## 2019-07-29 PROCEDURE — 82570 ASSAY OF URINE CREATININE: CPT

## 2019-07-29 PROCEDURE — 80053 COMPREHEN METABOLIC PANEL: CPT

## 2019-07-29 PROCEDURE — 83540 ASSAY OF IRON: CPT

## 2019-07-29 PROCEDURE — 82306 VITAMIN D 25 HYDROXY: CPT

## 2019-07-29 PROCEDURE — 84156 ASSAY OF PROTEIN URINE: CPT

## 2019-07-29 PROCEDURE — 85025 COMPLETE CBC W/AUTO DIFF WBC: CPT

## 2019-07-29 PROCEDURE — 81001 URINALYSIS AUTO W/SCOPE: CPT

## 2019-09-11 ENCOUNTER — OFFICE VISIT (OUTPATIENT)
Dept: ONCOLOGY | Facility: CLINIC | Age: 74
End: 2019-09-11

## 2019-09-11 ENCOUNTER — LAB (OUTPATIENT)
Dept: LAB | Facility: HOSPITAL | Age: 74
End: 2019-09-11

## 2019-09-11 VITALS
OXYGEN SATURATION: 95 % | WEIGHT: 193.8 LBS | DIASTOLIC BLOOD PRESSURE: 91 MMHG | RESPIRATION RATE: 16 BRPM | SYSTOLIC BLOOD PRESSURE: 177 MMHG | HEART RATE: 83 BPM | HEIGHT: 59 IN | BODY MASS INDEX: 39.07 KG/M2 | TEMPERATURE: 98.6 F

## 2019-09-11 DIAGNOSIS — D05.12 BREAST NEOPLASM, TIS (DCIS), LEFT: Primary | ICD-10-CM

## 2019-09-11 DIAGNOSIS — D50.0 IRON DEFICIENCY ANEMIA DUE TO CHRONIC BLOOD LOSS: ICD-10-CM

## 2019-09-11 DIAGNOSIS — R73.09 OTHER ABNORMAL GLUCOSE: ICD-10-CM

## 2019-09-11 DIAGNOSIS — K31.89 DUODENAL MASS: Primary | ICD-10-CM

## 2019-09-11 DIAGNOSIS — R73.9 HYPERGLYCEMIA: ICD-10-CM

## 2019-09-11 DIAGNOSIS — D50.8 OTHER IRON DEFICIENCY ANEMIA: ICD-10-CM

## 2019-09-11 DIAGNOSIS — D05.12 BREAST NEOPLASM, TIS (DCIS), LEFT: ICD-10-CM

## 2019-09-11 PROBLEM — D64.9 ANEMIA: Status: ACTIVE | Noted: 2019-09-11

## 2019-09-11 LAB
ALBUMIN SERPL-MCNC: 3.5 G/DL (ref 3.5–5.2)
ALBUMIN/GLOB SERPL: 1 G/DL (ref 1.1–2.4)
ALP SERPL-CCNC: 107 U/L (ref 38–116)
ALT SERPL W P-5'-P-CCNC: 11 U/L (ref 0–33)
ANION GAP SERPL CALCULATED.3IONS-SCNC: 14.5 MMOL/L (ref 5–15)
AST SERPL-CCNC: 12 U/L (ref 0–32)
BASOPHILS # BLD AUTO: 0.07 10*3/MM3 (ref 0–0.2)
BASOPHILS NFR BLD AUTO: 0.6 % (ref 0–1.5)
BILIRUB SERPL-MCNC: 0.3 MG/DL (ref 0.2–1.2)
BUN BLD-MCNC: 33 MG/DL (ref 6–20)
BUN/CREAT SERPL: 27.7 (ref 7.3–30)
CALCIUM SPEC-SCNC: 9.1 MG/DL (ref 8.5–10.2)
CHLORIDE SERPL-SCNC: 101 MMOL/L (ref 98–107)
CO2 SERPL-SCNC: 21.5 MMOL/L (ref 22–29)
CREAT BLD-MCNC: 1.19 MG/DL (ref 0.6–1.1)
DEPRECATED RDW RBC AUTO: 49.8 FL (ref 37–54)
EOSINOPHIL # BLD AUTO: 0.19 10*3/MM3 (ref 0–0.4)
EOSINOPHIL NFR BLD AUTO: 1.5 % (ref 0.3–6.2)
ERYTHROCYTE [DISTWIDTH] IN BLOOD BY AUTOMATED COUNT: 13.2 % (ref 12.3–15.4)
FERRITIN SERPL-MCNC: 631.8 NG/ML (ref 13–150)
GFR SERPL CREATININE-BSD FRML MDRD: 44 ML/MIN/1.73
GLOBULIN UR ELPH-MCNC: 3.5 GM/DL (ref 1.8–3.5)
GLUCOSE BLD-MCNC: 219 MG/DL (ref 74–124)
HBA1C MFR BLD: 8.37 % (ref 4.8–5.6)
HCT VFR BLD AUTO: 44 % (ref 34–46.6)
HGB BLD-MCNC: 13.3 G/DL (ref 12–15.9)
HGB RETIC QN AUTO: 35.9 PG (ref 29.8–36.1)
IMM GRANULOCYTES # BLD AUTO: 0.08 10*3/MM3 (ref 0–0.05)
IMM GRANULOCYTES NFR BLD AUTO: 0.6 % (ref 0–0.5)
IMM RETICS NFR: 4 % (ref 3–15.8)
IRON 24H UR-MRATE: 77 MCG/DL (ref 37–145)
IRON SATN MFR SERPL: 28 % (ref 14–48)
LYMPHOCYTES # BLD AUTO: 1.04 10*3/MM3 (ref 0.7–3.1)
LYMPHOCYTES NFR BLD AUTO: 8.4 % (ref 19.6–45.3)
MCH RBC QN AUTO: 31.1 PG (ref 26.6–33)
MCHC RBC AUTO-ENTMCNC: 30.2 G/DL (ref 31.5–35.7)
MCV RBC AUTO: 102.8 FL (ref 79–97)
MONOCYTES # BLD AUTO: 0.76 10*3/MM3 (ref 0.1–0.9)
MONOCYTES NFR BLD AUTO: 6.1 % (ref 5–12)
NEUTROPHILS # BLD AUTO: 10.23 10*3/MM3 (ref 1.7–7)
NEUTROPHILS NFR BLD AUTO: 82.8 % (ref 42.7–76)
NRBC BLD AUTO-RTO: 0 /100 WBC (ref 0–0.2)
PLATELET # BLD AUTO: 229 10*3/MM3 (ref 140–450)
PMV BLD AUTO: 9.7 FL (ref 6–12)
POTASSIUM BLD-SCNC: 4.5 MMOL/L (ref 3.5–4.7)
PROT SERPL-MCNC: 7 G/DL (ref 6.3–8)
RBC # BLD AUTO: 4.28 10*6/MM3 (ref 3.77–5.28)
RETICS/RBC NFR AUTO: 1.32 % (ref 0.7–1.9)
SODIUM BLD-SCNC: 137 MMOL/L (ref 134–145)
TIBC SERPL-MCNC: 280 MCG/DL (ref 249–505)
TRANSFERRIN SERPL-MCNC: 200 MG/DL (ref 200–360)
WBC NRBC COR # BLD: 12.37 10*3/MM3 (ref 3.4–10.8)

## 2019-09-11 PROCEDURE — 82728 ASSAY OF FERRITIN: CPT | Performed by: INTERNAL MEDICINE

## 2019-09-11 PROCEDURE — 83036 HEMOGLOBIN GLYCOSYLATED A1C: CPT | Performed by: INTERNAL MEDICINE

## 2019-09-11 PROCEDURE — 80053 COMPREHEN METABOLIC PANEL: CPT | Performed by: INTERNAL MEDICINE

## 2019-09-11 PROCEDURE — G0463 HOSPITAL OUTPT CLINIC VISIT: HCPCS | Performed by: INTERNAL MEDICINE

## 2019-09-11 PROCEDURE — 99215 OFFICE O/P EST HI 40 MIN: CPT | Performed by: INTERNAL MEDICINE

## 2019-09-11 PROCEDURE — 85046 RETICYTE/HGB CONCENTRATE: CPT | Performed by: INTERNAL MEDICINE

## 2019-09-11 PROCEDURE — 36415 COLL VENOUS BLD VENIPUNCTURE: CPT | Performed by: INTERNAL MEDICINE

## 2019-09-11 PROCEDURE — 85025 COMPLETE CBC W/AUTO DIFF WBC: CPT | Performed by: INTERNAL MEDICINE

## 2019-09-11 PROCEDURE — 83540 ASSAY OF IRON: CPT | Performed by: INTERNAL MEDICINE

## 2019-09-11 PROCEDURE — 84466 ASSAY OF TRANSFERRIN: CPT | Performed by: INTERNAL MEDICINE

## 2019-09-11 RX ORDER — PREDNISONE 1 MG/1
7 TABLET ORAL DAILY
COMMUNITY

## 2019-09-11 RX ORDER — VALSARTAN AND HYDROCHLOROTHIAZIDE 160; 25 MG/1; MG/1
TABLET ORAL
COMMUNITY
End: 2020-08-26

## 2019-09-11 NOTE — PROGRESS NOTES
Subjective     REASON FOR FOLLOW UP:  ANEMIA, FATIGUE, FREQUENT FALLS, AFRAID OF WALKING, ABDOMINAL PAIN AND CONTINUO'S  TURMOIL IN BOWEL FUNCTION AND DIGESTION,DCIS OF THE LEFT BREAST SP MASTECTOMY      History of Present Illness This patient returns today to the office in company of her  stating that from the point of view of her left-sided mastectomy she has been feeling fine with sensation sometimes of phantom nipple. She has not had any alterations at the surgical site and the healing has been completed. Her range of motion in the left shoulder is fine. She continues having turmoil in the stomach with nausea, sometimes vomiting and abdominal pain and cramping. No hematemesis. No melena. She is not able to eat appropriately because of the persistency of the symptomatology. Urination is ongoing with no difficulties. She has no fevers or infection. Energy level is acceptable and she has no pica.            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 Daniel Gutierrez, [facility]   • CATARACT EXTRACTION     •  SECTION N/A     x3   • CHOLECYSTECTOMY N/A    • COLONOSCOPY W/ POLYPECTOMY N/A 2019    Enlarged folds in the antrum: biopsied; duodenal, transverse colon x2, splenic flexure, descending  colon and sigmoid colon polyps: biopsied (path: tubular adenoma x6)-Dr. Zak De Jesus, [facility]   • MASTECTOMY WITH SENTINEL NODE BIOPSY AND AXILLARY NODE DISSECTION Left 7/3/2019    Procedure: LEFT BREAST MASTECTOMY WITH SENTINEL NODE BIOPSY AND , v-y plasty closure;  Surgeon: Tahmina James MD;  Location: [facility];  Service: General   • PARTIAL HYSTERECTOMY Bilateral     ovaries still in tact        Current Outpatient Medications on File Prior to Visit   Medication Sig Dispense Refill   • atorvastatin (LIPITOR) 20 MG tablet Take 20 mg by mouth Every Other Day.     • bisoprolol (ZEBeta) 10 MG tablet Take 10 mg by mouth Every Night.     • Insulin Glargine (TOUJEO SOLOSTAR SC) Inject 40 Units under the skin into the appropriate area as directed Every Night. INSTRUCTED PT TO FOLLOW MD INSTRUCTIONS REGARDING DOSING BEFORE SURGERY     • Insulin Lispro (HUMALOG PEN SC) Inject  under the skin into the appropriate area as directed 3 (Three) Times a Day With Meals. 25 units at breakfast, 20 units at lunch, 22 units at dinner/INSTRUCTED PT TO FOLLOW MD INSTRUCTIONS REGARDING DOSING BEFORE SURGERY     • levocetirizine (XYZAL) 5 MG tablet Take 5 mg by mouth Every Evening.     • montelukast (SINGULAIR) 10 MG tablet Take 10 mg by mouth Every Night.     • NON FORMULARY Apply 1 each topically to the appropriate area as directed Daily. Cream to foot rash, unsure of name     • omeprazole (priLOSEC) 20 MG capsule Take 20 mg by mouth 2 (Two) Times a Day As Needed.     • predniSONE (DELTASONE) 2.5 MG tablet Take 7.5 mg by mouth Every Evening.     • predniSONE (DELTASONE) 5 MG tablet prednisone 5 mg tablet     • valsartan-hydrochlorothiazide (DIOVAN-HCT) 160-25 MG per tablet valsartan 160 mg-hydrochlorothiazide 25 mg tablet     • losartan-hydrochlorothiazide (HYZAAR) 100-25 MG per tablet Take 1 tablet by mouth Every Night.       No current facility-administered medications on file prior to visit.          ALLERGIES:    Allergies   Allergen Reactions   • Aspirin Nausea Only     Low tolerance, abdominal pain   • Sulfa Antibiotics Unknown (See Comments)     Happened as a child        Social History     Socioeconomic History   • Marital status:      Spouse name: Not on file   • Number of children: 3   • Years of education: College   • Highest education level: Not on file   Occupational History   • Occupation: POSTAL SERVICE     Employer: RETIRED   Social Needs   • Financial resource strain: Not hard at all   • Food insecurity:     Worry: Never true     Inability: Never true   • Transportation needs:     Medical: No     Non-medical: No   Tobacco Use   • Smoking status: Never Smoker   • Smokeless tobacco: Never Used   • Tobacco comment: caffine use   Substance and Sexual Activity   • Alcohol use: No   • Drug use: No        Family History   Problem Relation Age of Onset   • Hypertension Father    • Stomach cancer Father    • Diabetes Father    • Esophageal cancer Father    • Throat cancer Sister    • Diabetes Paternal Grandmother    • Hypertension Paternal Grandfather    • Colon cancer Paternal Grandfather    • Heart disease Mother    • Colon cancer Paternal Uncle    • Colon cancer Paternal Uncle    • Colon cancer Paternal Uncle    • Ovarian cancer Cousin    • Stomach cancer Cousin    • Malig Hyperthermia Neg Hx         Review of Systems       General: no fever, no chills,  fatigue,no weight changes, no lack of appetite.  Eyes: no epiphora, xerophthalmia,conjunctivitis, pain, glaucoma, blurred vision, blindness, secretion, photophobia, proptosis, diplopia.  Ears: no otorrhea, tinnitus, otorrhagia, deafness, pain, vertigo.  Nose: no rhinorrhea, no epistaxis, no alteration in perception of odors, no sinuses pressure.  Mouth: no alteration in gums or teeth,  No ulcers, no difficulty with mastication or deglut ion, no odynophagia.  Neck: no masses or pain, no thyroid alterations, no pain in muscles or arteries, no  "carotid odynia, no crepitation.  Respiratory: no cough, no sputum production,no dyspnea,no trepopnea, no pleuritic pain,no hemoptysis.  Heart: no syncope, no irregularity, no palpitations, no angina,no orthopnea,no paroxysmal nocturnal dyspnea.  Vascular Venous: no tenderness,no edema,no palpable cords,no postphlebitic syndrome, no skin changes no ulcerations.  Vascular Arterial: no distal ischemia, noclaudication, no gangrene, no neuropathic ischemic pain, no skin ulcers, no paleness no cyanosis.  GI: no dysphagia, no odynophagia, no regurgitation, no heartburn, indigestion, nausea,no vomiting,no hematemesis ,no melena,no jaundice, distention, no obstipation,no enterorrhagia,no proctalgia,no anal  lesions, no changes in bowel habits.abdominal epigastric cramping and belching burping all the time  : no frequency, no hesitancy, no hematuria, no discharge,no  pain.  Musculoskeletal: no muscle or tendon pain or inflammation,no  joint pain, no edema, no functional limitation,no fasciculations, no mass.  Neurologic: no headache, no seizures, noalterations on Craneal nerves, no motor deficit, no sensory deficit, normal coordination, no alteration in memory,normal orientation, calculation,normal writting, verbal and written language.  Skin: no rashes,no pruritus no localized lesions.  Psychiatric: no anxiety, no depression,no agitation, no delusions, proper insight.            Objective     Vitals:    09/11/19 1546   BP: 177/91   Pulse: 83   Resp: 16   Temp: 98.6 °F (37 °C)   TempSrc: Oral   SpO2: 95%   Weight: 87.9 kg (193 lb 12.8 oz)   Height: 149 cm (58.66\")   PainSc: 0-No pain     Current Status 9/11/2019   ECOG score 1       Physical Exam    GENERAL:  Well-developed, well-nourished  Patient  in no acute distress.   SKIN:  Warm, dry ,NO rashes,NO purpura ,NO petechiae.  HEENT:  Pupils were equal and reactive to light and accomodation, conjunctivas non injected, no pterigion, normal extraocular movements, normal " visual acuity.   Mouth mucosa was moist, no exudates in oropharynx, normal gum line, normal roof of the mouth and pillars, normal papillations of the tongue.  NECK:  Supple with good range of motion; no thyromegaly or masses, no JVD or bruits, no cervical adenopathies.No carotid arteries pain, no carotid abnormal pulsation , NO arterial dance.  LYMPHATICS:  No cervical, NO supraclavicular, NO axillary,NO epitrochlear , NO inguinal adenopathy.  CHEST:  Normal excursion of both gina thoraces, normal voice fremitus, no subcutaneous emphysema, normal axillas, no rashes or acanthosis nigricans. Lungs clear to percussion and auscultation, normal breath sounds bilaterally, no wheezing,NO crackles NO ronchi, NO stridor, NO rubs.  INSPECTION of  breast documented symmetry of the tissue per se and location and size of the nipple,no retractions or inversion of the nipple, normal skin without lesions, no erythema or nodules,no paud'orange, no prominence of superficial veins or chest wall collateral circulation.PALPATION of the breast documented normal skin turgor, no induration, alteration in local temperature, or pain, no palpable masses or nodules, normal mobility of the tissues,no fixation of the tissue or parenchyma to the chest wall, no alteration at the tail of the breasts or axillas, no adenopathies. mastectomySurgical site was well healed.No lymphedema in either extremity.  CARDIAC AND VASCULAR:  normal rate and regular rhythm, without murmurs,NO rubs NO S3 NO S4 right or left . Normal femoral, popliteal, pedis, brachial and carotid pulses.  ABDOMEN:  Soft, nontender with no organomegaly or masses, no ascites, no collateral circulation,no distention,no [place] sign, no abdominal pain, no inguinal hernias,no umbilical hernia, no abdominal bruits. Normal bowel sounds.  GENITAL: Not  Performed.  EXTREMITIES  AND SPINE:  No clubbing, cyanosis or edema, no deformities or pain .No kyphosis, scoliosis, deformities or pain in  spine, ribs or pelvic bone.  NEUROLOGICAL:  Patient was awake, alert, oriented to time, person and place.                                        RECENT LABS:  Hematology WBC   Date Value Ref Range Status   09/11/2019 12.37 (H) 3.40 - 10.80 10*3/mm3 Final     RBC   Date Value Ref Range Status   09/11/2019 4.28 3.77 - 5.28 10*6/mm3 Final     Hemoglobin   Date Value Ref Range Status   09/11/2019 13.3 12.0 - 15.9 g/dL Final     Hematocrit   Date Value Ref Range Status   09/11/2019 44.0 34.0 - 46.6 % Final     Platelets   Date Value Ref Range Status   09/11/2019 229 140 - 450 10*3/mm3 Final          Component      Latest Ref Rng & Units 6/3/2019 6/4/2019 7/17/2019 7/29/2019                WBC      3.40 - 10.80 10*3/mm3 12.62 (H) 9.94 10.81 (H) 10.76   RBC      3.77 - 5.28 10*6/mm3 4.23 4.14 4.26 4.28   Hemoglobin      12.0 - 15.9 g/dL 11.6 (L) 11.4 (L) 12.7 12.7   Hematocrit      34.0 - 46.6 % 37.7 38.0 41.2 42.3   MCV      79.0 - 97.0 fL 89.1 91.8 96.7 98.8 (H)   MCH      26.6 - 33.0 pg 27.4 27.5 29.8 29.7   MCHC      31.5 - 35.7 g/dL 30.8 (L) 30.0 (L) 30.8 (L) 30.0 (L)   RDW      12.3 - 15.4 % 13.6 13.6 15.5 (H) 14.9   RDW-SD      37.0 - 54.0 fl 44.1 45.9 55.0 (H) 54.3 (H)   MPV      6.0 - 12.0 fL 10.6 9.7 9.3 10.2   Platelets      140 - 450 10*3/mm3 210 268 251 267   Neutrophil Rel %      42.7 - 76.0 % 79.2 (H) 81.3 (H) 80.2 (H) 82.8 (H)   Lymphocyte Rel %      19.6 - 45.3 % 12.0 (L) 11.5 (L) 9.9 (L) 9.5 (L)   Monocyte Rel %      5.0 - 12.0 % 6.3 4.9 (L) 6.5 5.7   Eosinophil Rel %      0.3 - 6.2 % 1.2 1.3 1.5 0.9   Basophil Rel %      0.0 - 1.5 % 0.5 0.4 0.5 0.4   Immature Granulocyte Rel %      0.0 - 0.5 % 0.8 (H) 0.6 (H) 1.4 (H) 0.7 (H)   Neutrophils Absolute      1.70 - 7.00 10*3/mm3 10.00 (H) 8.08 (H) 8.68 (H) 8.92 (H)   Lymphocytes Absolute      0.70 - 3.10 10*3/mm3 1.52 1.14 1.07 1.02   Monocytes Absolute      0.10 - 0.90 10*3/mm3 0.79 0.49 0.70 0.61     Component      Latest Ref Rng & Units 9/11/2019              WBC      3.40 - 10.80 10*3/mm3 12.37 (H)   RBC      3.77 - 5.28 10*6/mm3 4.28   Hemoglobin      12.0 - 15.9 g/dL 13.3   Hematocrit      34.0 - 46.6 % 44.0   MCV      79.0 - 97.0 fL 102.8 (H)   MCH      26.6 - 33.0 pg 31.1   MCHC      31.5 - 35.7 g/dL 30.2 (L)   RDW      12.3 - 15.4 % 13.2   RDW-SD      37.0 - 54.0 fl 49.8   MPV      6.0 - 12.0 fL 9.7   Platelets      140 - 450 10*3/mm3 229   Neutrophil Rel %      42.7 - 76.0 % 82.8 (H)   Lymphocyte Rel %      19.6 - 45.3 % 8.4 (L)   Monocyte Rel %      5.0 - 12.0 % 6.1   Eosinophil Rel %      0.3 - 6.2 % 1.5   Basophil Rel %      0.0 - 1.5 % 0.6   Immature Granulocyte Rel %      0.0 - 0.5 % 0.6 (H)   Neutrophils Absolute      1.70 - 7.00 10*3/mm3 10.23 (H)   Lymphocytes Absolute      0.70 - 3.10 10*3/mm3 1.04   Monocytes Absolute      0.10 - 0.90 10*3/mm3 0.76           Assessment/Plan      1. This patient has undergone a left-sided mastectomy for DCIS of the left breast. The patient had a large tumor that was high grade with negative margins of resection, minimal microinvasion and negative sentinel lymph nodes. The patient’s estrogen receptor and progesterone receptor were negative and she was HER2 negative as well at the time of the original diagnosis. Under these premises, I think the patient is done with the treatment for this and I do not recommend any form of adjuvant chemotherapy, radiation treatment and obviously no hormonal therapy. There is no role for Herceptin or medicines of this nature either.           I discussed with her the need for her eventually in the future to have a repeat breast examination on the right side, mammogram and why not even ultrasound given the fact that was a lobular component of disease.     2. From the point of view of her anemia, iron deficiency, the patient has been replaced with IV iron. The hemoglobin has substantially improved and her ferritin and iron profile also have improved. The patient feels better in this regard.  This will be reevaluated when she returns back in 3 months.     3. The patient has a polyp in the duodenum that eventually will require removal. I discussed with Dr. James on the telephone the fact that I think she is becoming more symptomatic of this and it is time to do something about it. Dr. James is going to call the patient and schedule her to be seen for this endeavor.     Hopefully when she returns in 3 months this will be taken care of.     I discussed all these facts with the patient and her  in detail. I gave her copies of the reports of the CBC. She requested for me to do a hemoglobin A1c. This will be done and shared with her primary physician as well as discussed with the patient on the telephone.

## 2019-09-13 ENCOUNTER — TELEPHONE (OUTPATIENT)
Dept: ONCOLOGY | Facility: CLINIC | Age: 74
End: 2019-09-13

## 2019-09-13 NOTE — TELEPHONE ENCOUNTER
I spoke to the pt and informed her that her sugar is up and needs to get control of her diabetes. I did inform her that her iron is good. I advised that Dr. James's office will be calling soon to set up her endoscopy. The pt and her  v/u on all of this.

## 2019-10-01 ENCOUNTER — OFFICE VISIT (OUTPATIENT)
Dept: GASTROENTEROLOGY | Facility: CLINIC | Age: 74
End: 2019-10-01

## 2019-10-01 VITALS
BODY MASS INDEX: 38.42 KG/M2 | SYSTOLIC BLOOD PRESSURE: 122 MMHG | TEMPERATURE: 98 F | DIASTOLIC BLOOD PRESSURE: 70 MMHG | HEIGHT: 59 IN | WEIGHT: 190.6 LBS

## 2019-10-01 DIAGNOSIS — D13.2 DUODENAL ADENOMA: Primary | ICD-10-CM

## 2019-10-01 DIAGNOSIS — K58.0 IRRITABLE BOWEL SYNDROME WITH DIARRHEA: ICD-10-CM

## 2019-10-01 PROCEDURE — 99204 OFFICE O/P NEW MOD 45 MIN: CPT | Performed by: INTERNAL MEDICINE

## 2019-10-01 RX ORDER — ALBUTEROL SULFATE 90 UG/1
AEROSOL, METERED RESPIRATORY (INHALATION) EVERY 6 HOURS
COMMUNITY

## 2019-10-01 NOTE — H&P (VIEW-ONLY)
Chief Complaint   Patient presents with   • Duodenal polyp   • Bloated   • Constipation   • Diarrhea     Blanca Lam is a 74 y.o. female who presents with a history of duodenal adenoma and chronic diarrhea  HPI     74-year-old female with history of colon polyps in the past as well as diabetes hyperlipidemia hypertension status post mastectomy for breast cancer who was found with a duodenal adenoma.  Adenomatous polyp was biopsied but not taken.  Patient also with chronic complaints of diarrhea to soft serve type stools intermittently mixed with no bowel movements whatsoever.  Patient denies ever having hard stools.  Patient denies weight loss no fever chills here for further recommendations.    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, grade III, ER/DE negative   • CKD (chronic kidney disease), stage III (CMS/HCC)    • Colon polyp 03/12/2019   • Diabetes mellitus, type 2 (CMS/HCC)    • Hyperlipidemia    • Hypertension    • Swelling     IN LOWER EXTREMITIES   • Temporal arteritis (CMS/HCC)    • Thrombophlebitis        Current Outpatient Medications:   •  albuterol sulfate HFA (PROAIR HFA) 108 (90 Base) MCG/ACT inhaler, Every 6 (Six) Hours., Disp: , Rfl:   •  atorvastatin (LIPITOR) 20 MG tablet, Take 20 mg by mouth Every Other Day., Disp: , Rfl:   •  bisoprolol (ZEBeta) 10 MG tablet, Take 10 mg by mouth Every Night., Disp: , Rfl:   •  Insulin Glargine (TOUJEO SOLOSTAR SC), Inject 40 Units under the skin into the appropriate area as directed Every Night. INSTRUCTED PT TO FOLLOW MD INSTRUCTIONS REGARDING DOSING BEFORE SURGERY, Disp: , Rfl:   •  Insulin Lispro (HUMALOG PEN SC), Inject  under the skin into the appropriate area as directed 3 (Three) Times a Day With Meals. 25 units at breakfast, 20 units at lunch, 22 units at  dinner/INSTRUCTED PT TO FOLLOW MD INSTRUCTIONS REGARDING DOSING BEFORE SURGERY, Disp: , Rfl:   •  levocetirizine (XYZAL) 5 MG tablet, Take 5 mg by mouth Every Evening., Disp: , Rfl:   •  montelukast (SINGULAIR) 10 MG tablet, Take 10 mg by mouth Every Night., Disp: , Rfl:   •  NON FORMULARY, Apply 1 each topically to the appropriate area as directed Daily. Cream to foot rash, unsure of name, Disp: , Rfl:   •  omeprazole (priLOSEC) 20 MG capsule, Take 20 mg by mouth 2 (Two) Times a Day As Needed., Disp: , Rfl:   •  predniSONE (DELTASONE) 2.5 MG tablet, Take 7.5 mg by mouth Every Evening., Disp: , Rfl:   •  predniSONE (DELTASONE) 5 MG tablet, prednisone 5 mg tablet, Disp: , Rfl:   •  valsartan-hydrochlorothiazide (DIOVAN-HCT) 160-25 MG per tablet, valsartan 160 mg-hydrochlorothiazide 25 mg tablet, Disp: , Rfl:   •  losartan-hydrochlorothiazide (HYZAAR) 100-25 MG per tablet, Take 1 tablet by mouth Every Night., Disp: , Rfl:   •  rifaximin (XIFAXAN) 550 MG tablet, Take 1 tablet by mouth Every 8 (Eight) Hours., Disp: 42 tablet, Rfl: 2  Allergies   Allergen Reactions   • Aspirin Nausea Only     Low tolerance, abdominal pain   • Sulfa Antibiotics Unknown (See Comments)     Happened as a child     Social History     Socioeconomic History   • Marital status:      Spouse name: Not on file   • Number of children: 3   • Years of education: College   • Highest education level: Not on file   Occupational History   • Occupation: POSTAL SERVICE     Employer: RETIRED   Social Needs   • Financial resource strain: Not hard at all   • Food insecurity:     Worry: Never true     Inability: Never true   • Transportation needs:     Medical: No     Non-medical: No   Tobacco Use   • Smoking status: Never Smoker   • Smokeless tobacco: Never Used   • Tobacco comment: caffine use   Substance and Sexual Activity   • Alcohol use: No   • Drug use: No     Family History   Problem Relation Age of Onset   • Hypertension Father    • Stomach  cancer Father    • Diabetes Father    • Esophageal cancer Father    • Throat cancer Sister    • Diabetes Paternal Grandmother    • Hypertension Paternal Grandfather    • Colon cancer Paternal Grandfather    • Heart disease Mother    • Colon cancer Paternal Uncle    • Colon cancer Paternal Uncle    • Colon cancer Paternal Uncle    • Ovarian cancer Cousin    • Stomach cancer Cousin    • Malig Hyperthermia Neg Hx      Review of Systems   Constitutional: Negative.    HENT: Negative.    Eyes: Negative.    Respiratory: Negative.    Cardiovascular: Negative.    Gastrointestinal: Positive for diarrhea. Negative for abdominal distention, abdominal pain, anal bleeding, blood in stool, constipation, nausea, rectal pain and vomiting.   Endocrine: Negative.    Musculoskeletal: Negative.    Skin: Negative.    Allergic/Immunologic: Negative.    Hematological: Negative.      Vitals:    10/01/19 1400   BP: 122/70   Temp: 98 °F (36.7 °C)     Physical Exam   Constitutional: She is oriented to person, place, and time. She appears well-developed and well-nourished.   HENT:   Head: Normocephalic and atraumatic.   Eyes: Pupils are equal, round, and reactive to light. No scleral icterus.   Neck: Normal range of motion.   Cardiovascular: Normal rate, regular rhythm and normal heart sounds. Exam reveals no gallop and no friction rub.   No murmur heard.  Pulmonary/Chest: Effort normal and breath sounds normal. She has no wheezes. She has no rales.   Abdominal: Soft. Bowel sounds are normal. She exhibits no shifting dullness, no distension, no pulsatile liver, no fluid wave, no abdominal bruit, no ascites, no pulsatile midline mass and no mass. There is no hepatosplenomegaly. There is no tenderness. There is no rigidity and no guarding. No hernia.   Musculoskeletal: Normal range of motion. She exhibits no edema.   Lymphadenopathy:     She has no cervical adenopathy.   Neurological: She is alert and oriented to person, place, and time. No  cranial nerve deficit.   Skin: Skin is warm and dry. No rash noted.   Psychiatric: She has a normal mood and affect. Her behavior is normal.   Nursing note and vitals reviewed.    Diagnoses and all orders for this visit:    Duodenal adenoma  -     Case Request; Standing  -     Implement Anesthesia orders day of procedure.; Standing  -     Obtain informed consent; Standing  -     Case Request    Irritable bowel syndrome with diarrhea    Other orders  -     albuterol sulfate HFA (PROAIR HFA) 108 (90 Base) MCG/ACT inhaler; Every 6 (Six) Hours.  -     rifaximin (XIFAXAN) 550 MG tablet; Take 1 tablet by mouth Every 8 (Eight) Hours.    Patient 74-year-old female with history of breast cancer found with a duodenal adenoma status post biopsy referred now for polypectomy.  Patient also reports chronic but she caused diarrhea and constipation though actually it is diarrhea to loose stools intermittently with no stools at all.  Patient reports chronically had issues but the symptoms got much worse after the diagnosis of breast cancer.  Patient's duodenal adenoma was found in March but treatment delayed because of the breast cancer.  At this point would recommend patient trial Xifaxan 550 3 times daily for 14 days for IBS with diarrhea as well as arrange polypectomy.  We will arrange this under possible ERCP in case pancreatic stent needs to be placed to protect her ampulla.

## 2019-10-01 NOTE — PROGRESS NOTES
Chief Complaint   Patient presents with   • Duodenal polyp   • Bloated   • Constipation   • Diarrhea     Blanca Lam is a 74 y.o. female who presents with a history of duodenal adenoma and chronic diarrhea  HPI     74-year-old female with history of colon polyps in the past as well as diabetes hyperlipidemia hypertension status post mastectomy for breast cancer who was found with a duodenal adenoma.  Adenomatous polyp was biopsied but not taken.  Patient also with chronic complaints of diarrhea to soft serve type stools intermittently mixed with no bowel movements whatsoever.  Patient denies ever having hard stools.  Patient denies weight loss no fever chills here for further recommendations.    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   • CKD (chronic kidney disease), stage III (CMS/HCC)    • Colon polyp 03/12/2019   • Diabetes mellitus, type 2 (CMS/HCC)    • Hyperlipidemia    • Hypertension    • Swelling     IN LOWER EXTREMITIES   • Temporal arteritis (CMS/HCC)    • Thrombophlebitis        Current Outpatient Medications:   •  albuterol sulfate HFA (PROAIR HFA) 108 (90 Base) MCG/ACT inhaler, Every 6 (Six) Hours., Disp: , Rfl:   •  atorvastatin (LIPITOR) 20 MG tablet, Take 20 mg by mouth Every Other Day., Disp: , Rfl:   •  bisoprolol (ZEBeta) 10 MG tablet, Take 10 mg by mouth Every Night., Disp: , Rfl:   •  Insulin Glargine (TOUJEO SOLOSTAR SC), Inject 40 Units under the skin into the appropriate area as directed Every Night. INSTRUCTED PT TO FOLLOW MD INSTRUCTIONS REGARDING DOSING BEFORE SURGERY, Disp: , Rfl:   •  Insulin Lispro (HUMALOG PEN SC), Inject  under the skin into the appropriate area as directed 3 (Three) Times a Day With Meals. 25 units at breakfast, 20 units at lunch, 22 units at  dinner/INSTRUCTED PT TO FOLLOW MD INSTRUCTIONS REGARDING DOSING BEFORE SURGERY, Disp: , Rfl:   •  levocetirizine (XYZAL) 5 MG tablet, Take 5 mg by mouth Every Evening., Disp: , Rfl:   •  montelukast (SINGULAIR) 10 MG tablet, Take 10 mg by mouth Every Night., Disp: , Rfl:   •  NON FORMULARY, Apply 1 each topically to the appropriate area as directed Daily. Cream to foot rash, unsure of name, Disp: , Rfl:   •  omeprazole (priLOSEC) 20 MG capsule, Take 20 mg by mouth 2 (Two) Times a Day As Needed., Disp: , Rfl:   •  predniSONE (DELTASONE) 2.5 MG tablet, Take 7.5 mg by mouth Every Evening., Disp: , Rfl:   •  predniSONE (DELTASONE) 5 MG tablet, prednisone 5 mg tablet, Disp: , Rfl:   •  valsartan-hydrochlorothiazide (DIOVAN-HCT) 160-25 MG per tablet, valsartan 160 mg-hydrochlorothiazide 25 mg tablet, Disp: , Rfl:   •  losartan-hydrochlorothiazide (HYZAAR) 100-25 MG per tablet, Take 1 tablet by mouth Every Night., Disp: , Rfl:   •  rifaximin (XIFAXAN) 550 MG tablet, Take 1 tablet by mouth Every 8 (Eight) Hours., Disp: 42 tablet, Rfl: 2  Allergies   Allergen Reactions   • Aspirin Nausea Only     Low tolerance, abdominal pain   • Sulfa Antibiotics Unknown (See Comments)     Happened as a child     Social History     Socioeconomic History   • Marital status:      Spouse name: Not on file   • Number of children: 3   • Years of education: College   • Highest education level: Not on file   Occupational History   • Occupation: POSTAL SERVICE     Employer: RETIRED   Social Needs   • Financial resource strain: Not hard at all   • Food insecurity:     Worry: Never true     Inability: Never true   • Transportation needs:     Medical: No     Non-medical: No   Tobacco Use   • Smoking status: Never Smoker   • Smokeless tobacco: Never Used   • Tobacco comment: caffine use   Substance and Sexual Activity   • Alcohol use: No   • Drug use: No     Family History   Problem Relation Age of Onset   • Hypertension Father    • Stomach  cancer Father    • Diabetes Father    • Esophageal cancer Father    • Throat cancer Sister    • Diabetes Paternal Grandmother    • Hypertension Paternal Grandfather    • Colon cancer Paternal Grandfather    • Heart disease Mother    • Colon cancer Paternal Uncle    • Colon cancer Paternal Uncle    • Colon cancer Paternal Uncle    • Ovarian cancer Cousin    • Stomach cancer Cousin    • Malig Hyperthermia Neg Hx      Review of Systems   Constitutional: Negative.    HENT: Negative.    Eyes: Negative.    Respiratory: Negative.    Cardiovascular: Negative.    Gastrointestinal: Positive for diarrhea. Negative for abdominal distention, abdominal pain, anal bleeding, blood in stool, constipation, nausea, rectal pain and vomiting.   Endocrine: Negative.    Musculoskeletal: Negative.    Skin: Negative.    Allergic/Immunologic: Negative.    Hematological: Negative.      Vitals:    10/01/19 1400   BP: 122/70   Temp: 98 °F (36.7 °C)     Physical Exam   Constitutional: She is oriented to person, place, and time. She appears well-developed and well-nourished.   HENT:   Head: Normocephalic and atraumatic.   Eyes: Pupils are equal, round, and reactive to light. No scleral icterus.   Neck: Normal range of motion.   Cardiovascular: Normal rate, regular rhythm and normal heart sounds. Exam reveals no gallop and no friction rub.   No murmur heard.  Pulmonary/Chest: Effort normal and breath sounds normal. She has no wheezes. She has no rales.   Abdominal: Soft. Bowel sounds are normal. She exhibits no shifting dullness, no distension, no pulsatile liver, no fluid wave, no abdominal bruit, no ascites, no pulsatile midline mass and no mass. There is no hepatosplenomegaly. There is no tenderness. There is no rigidity and no guarding. No hernia.   Musculoskeletal: Normal range of motion. She exhibits no edema.   Lymphadenopathy:     She has no cervical adenopathy.   Neurological: She is alert and oriented to person, place, and time. No  cranial nerve deficit.   Skin: Skin is warm and dry. No rash noted.   Psychiatric: She has a normal mood and affect. Her behavior is normal.   Nursing note and vitals reviewed.    Diagnoses and all orders for this visit:    Duodenal adenoma  -     Case Request; Standing  -     Implement Anesthesia orders day of procedure.; Standing  -     Obtain informed consent; Standing  -     Case Request    Irritable bowel syndrome with diarrhea    Other orders  -     albuterol sulfate HFA (PROAIR HFA) 108 (90 Base) MCG/ACT inhaler; Every 6 (Six) Hours.  -     rifaximin (XIFAXAN) 550 MG tablet; Take 1 tablet by mouth Every 8 (Eight) Hours.    Patient 74-year-old female with history of breast cancer found with a duodenal adenoma status post biopsy referred now for polypectomy.  Patient also reports chronic but she caused diarrhea and constipation though actually it is diarrhea to loose stools intermittently with no stools at all.  Patient reports chronically had issues but the symptoms got much worse after the diagnosis of breast cancer.  Patient's duodenal adenoma was found in March but treatment delayed because of the breast cancer.  At this point would recommend patient trial Xifaxan 550 3 times daily for 14 days for IBS with diarrhea as well as arrange polypectomy.  We will arrange this under possible ERCP in case pancreatic stent needs to be placed to protect her ampulla.

## 2019-10-02 ENCOUNTER — TELEPHONE (OUTPATIENT)
Dept: GASTROENTEROLOGY | Facility: CLINIC | Age: 74
End: 2019-10-02

## 2019-10-02 NOTE — TELEPHONE ENCOUNTER
Returned patient's phone call. Patient states Xifaxan is not covered under her insurance plan. Her oop cost would be $2000. Advised will send an update to see if we can change the medication. She verb understanding.

## 2019-10-02 NOTE — TELEPHONE ENCOUNTER
Per verbal conversation with Nelida BARCENAS MA, she was able to get an approval through the patient's insurance for Xifaxan. oop cost for the patient is $8.50.  Patient called, advised Xifaxan has been approved and the oop will be $8.50. She verb understanding and will  the medication.

## 2019-10-02 NOTE — TELEPHONE ENCOUNTER
I actually have enough samples of Xifaxan for an entire course of therapy.  She can come by a pickup samples at the .

## 2019-10-02 NOTE — TELEPHONE ENCOUNTER
----- Message from Isabella Murillo sent at 10/2/2019  3:01 PM EDT -----  Regarding: FW: VOICEMAIL   Patient has questions regarding medication that Dr. Handley prescribed        ----- Message -----  From: Nuria Karimi  Sent: 10/2/2019   2:06 PM  To: Gur[facility]  Subject: VOICEMAIL                                        NO INFO LEFT

## 2019-10-09 PROBLEM — C50.919 PRIMARY MALIGNANT NEOPLASM OF FEMALE BREAST (HCC): Status: ACTIVE | Noted: 2019-05-28

## 2019-10-10 ENCOUNTER — OFFICE VISIT (OUTPATIENT)
Dept: OTHER | Facility: HOSPITAL | Age: 74
End: 2019-10-10

## 2019-10-10 VITALS
WEIGHT: 191 LBS | HEART RATE: 81 BPM | TEMPERATURE: 97.3 F | RESPIRATION RATE: 18 BRPM | DIASTOLIC BLOOD PRESSURE: 72 MMHG | SYSTOLIC BLOOD PRESSURE: 158 MMHG | BODY MASS INDEX: 38.58 KG/M2 | OXYGEN SATURATION: 97 %

## 2019-10-10 DIAGNOSIS — D05.12 BREAST NEOPLASM, TIS (DCIS), LEFT: Primary | ICD-10-CM

## 2019-10-10 PROCEDURE — 99215 OFFICE O/P EST HI 40 MIN: CPT | Performed by: NURSE PRACTITIONER

## 2019-10-10 PROCEDURE — G0463 HOSPITAL OUTPT CLINIC VISIT: HCPCS | Performed by: NURSE PRACTITIONER

## 2019-10-10 NOTE — PROGRESS NOTES
[facility] MULTIDISCIPLINARY CLINIC  SURVIVORSHIP VISIT    Blanca Lam is a pleasant 74 y.o. 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 Here today with her spouse Ashwin in our Cancer Survivorship Clinic, to review survivorship care plan.    TREATMENT HISTORY:        Primary malignant neoplasm of female breast (CMS/HCC)    5/23/2019 Initial Diagnosis     Primary malignant neoplasm of female breast (CMS/HCC)         5/23/2019 Biopsy     1.  Left Breast, 2 o'clock, 7 cm from the nipple, core biopsies for a mass with calcifications:  HIGH GRADE DUCTAL CARCINOMA IN SITU WITH APOCRINE FEATURES.               A.  Architectural types/Grade:  Solid and comedo, high nuclear grade (III).                B.  DCIS present in all cores with largest focus measuring 8.0mm.               C.  Central expansive comedonecrosis and associated microcalcifications identified.                D.  No evidence of invasion identified.     Estrogen receptor: Negative  Progesterone receptor: Negative         7/3/2019 Surgery     Left breast mastectomy with sentinel node biopsy and v-y plasty closure    1. Left Breast [place] Lymph Node:               A. Single benign lymph node.               B. Multiple step sections are negative for tumor.     2. Left Simple Mastectomy (875 Grams):               A. SINGLE FOCUS OF MICROINVASIVE DUCTAL CARCINOMA (LESS THAN 1 MM, Tmi).               B. Extensive intraductal carcinoma, comedo type (high nuclear grade involving a region approximating 7.0 x 5.5 x                   5.0 cm in the upper outer quadrant).               C. DCIS extends to within 0.5 mm of the anterior margin of the upper outer quadrant.               D. 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               E. DCIS involving the lactiferous ducts.                F. Extensive dystrophic calcification within DCIS.               G. Cancerization of the lobules.               H. For receptor studies, see previous pathology report (ER/OK negative).                                                                                                                                            PT1mi,N0,Mx  3. Wide Excision, Skin of Left Breast:               A. Epidermal inclusion cyst, benign.               B. Benign hyperkeratosis.               C. Negative for neoplasm.     pTmi pN0            Allergies as of 10/10/2019 - Reviewed 10/01/2019   Allergen Reaction Noted   • Aspirin Nausea Only 03/13/2018   • Sulfa antibiotics Unknown (See Comments) 08/08/2017       MEDICATIONS:  I have reviewed the medication list with the patient and I updated it in the electronic medical record. Medication dosages and frequencies were confirmed to be accurate with the patient.      Review of Systems   Constitutional: Positive for fatigue. Negative for activity change, appetite change and unexpected weight change.   Respiratory: Positive for shortness of breath. Negative for chest tightness.    Cardiovascular: Positive for leg swelling. Negative for chest pain.   Gastrointestinal: Negative for abdominal pain, blood in stool, constipation, diarrhea and nausea.   Genitourinary: Negative for dysuria and hematuria.   Musculoskeletal: Positive for joint swelling. Negative for arthralgias.   Neurological: Negative for dizziness, light-headedness and numbness.   Psychiatric/Behavioral: Positive for decreased concentration and sleep disturbance. Negative for dysphoric mood. The patient is not nervous/anxious.        DISTRESS QUESTIONNAIRE: 1/10 EFFECT TO LEVEL OF FUNCTIONING: none  Patient identified areas of distress: see flowsheet      /72   Pulse 81   Temp 97.3 °F (36.3 °C) (Oral)   Resp 18   Wt 86.6 kg (191 lb)   SpO2 97% Comment: room air  BMI 38.58 kg/m²     Wt Readings from Last 3  Encounters:   10/10/19 86.6 kg (191 lb)   10/01/19 86.5 kg (190 lb 9.6 oz)   09/11/19 87.9 kg (193 lb 12.8 oz)       Pain Score    10/10/19 1300   PainSc: 0-No pain         Physical Exam   Constitutional: She is oriented to person, place, and time. She appears well-developed and well-nourished. She is cooperative.   HENT:   Head: Normocephalic and atraumatic.   Mouth/Throat: Oropharynx is clear and moist and mucous membranes are normal.   Cardiovascular: Normal rate, regular rhythm, intact distal pulses and normal pulses.   1+ bilateral ankle and pretibial edema   Pulmonary/Chest: Effort normal. No respiratory distress.   Abdominal: Soft. Normal appearance.   Musculoskeletal: Normal range of motion.   Neurological: She is alert and oriented to person, place, and time.   Skin: Skin is warm, dry and intact.   Psychiatric: She has a normal mood and affect. Her speech is normal and behavior is normal. Judgment and thought content normal. Cognition and memory are normal.   Vitals reviewed.        Problem List Items Addressed This Visit        Active Problems    Breast neoplasm, Tis (DCIS), left - Primary            SURVIVORSHIP DISCUSSION HELD TODAY:   Primary patient goal(s): wellness     Management of disease and treatment related effects: Patient reports recovering well from left mastectomy.  No issues with limitation in range of motion.  No difficulties with pain.  We did review the lymphedema causes, prevention, and early signs and symptoms.  We discussed that with sentinel node biopsy and no radiation risk is likely extremely low (patient had one negative sentinel lymph node), however this is lifelong.  Patient states she sometimes experiences swelling generalized from chronic steroid therapy to manage her temporal arteritis however explained lymphedema related edema would be unilateral in nature on her surgical side.  I provided her with some Telugu language information from the American Cancer Society regarding  lymphedema prevention, as well as general exercises to maintain mobility after breast surgery.    In general both patient and her spouse Are pleased withTreatment course and outcome.    Patient has been experiencing some bloating and alternating bouts of constipation and diarrhea likely related to duodenal polyp.  She has been started on Xifaxan and is scheduled for polypectomy and possible ERCP tomorrow with Dr. Handley.  Does report her appetite is stable.  Bowels move today, normal soft consistency.  Denies blood in the urine or stools.  Denies abdominal pain.    Patient affirms some fatigue, memory changes and interruptions in sleep regularity which predate her mastectomy and are no worse than her baseline after surgery.    Psychosocial and spiritual: Patient reports distress score of 1.  She and her spouse are from [place].  [place] about 3 [place] ago to visit adult daughter and grandchildren and it was at that time that patient developed the temporal arteritis and she has yet to achieve clearance to return home.  Patient and spouse have adopted, currently renting a home here.  They have an adult son and patient's mother-in-law back home, and a community of tight knit neighbors who are looking after their home.      Advance Care Planning   Advance Care Planning Discussion:    Patient does not have advance care planning complete. Written information provided regarding advance care planning and appropriateness for all healthy adults, choosing a healthcare surrogate and upcoming Advance Care Planning classes offered monthly at the [facility].         Maintaining personal wellness: Discussed the value of regular physical exercise in the context of managing fatigue, sleep interruptions, and cognitive changes.  I provided them with some written information regarding the live strong program at the [facility] which she may explore.  We also discussed opportunities for community and support  available at [facility].  I provided her with multiple Lebanese language breast cancer survivorship resources and have encouraged her to call anytime in the future for additional information or support.             Discussed NCCN recommendations for all cancer survivors of 150 minutes/week moderate intensity exercise, achieve and maintain a healthy BMI, plants-based whole-foods diet, avoid tobacco and second hand smoke, avoid alcohol or minimize alcohol intake - no more than 1 drink in a day for women, 2 drinks in a day for men.     No orders of the defined types were placed in this encounter.      After review of the Survivorship Treatment Summary & Care Plan, the patient verbalized understanding of recommendations for follow-up. As outlined in the care plan, they were advised to continue with follow-up care in accordance with the NCCN surveillance guidelines while transitioning back to their primary care physician for continued general preventive and healthcare needs. We discussed the importance of healthy eating, exercise and weight management. We reviewed current guidelines for routine screening of other cancers.     A copy of the Survivorship Treatment Summary & Care Plan for Ms. Lam (see below) was provided to and forwarded to the providers identified on the care team.      Greater than 40 minutes spent with patient, more than half of that spent face-to-face counseling patient extensively on treatment summary, surveillance for recurrence, late and long-term effects of disease and treatment, intimacy and self-image, preventative screening for other cancers, achieving/maintaining healthy BMI and link to risk reduction, adherence to current therapies, management of anxiety/uncertainty and self care strategies.         Breast Cancer Survivorship Plan      General Information   Patient name Blanca Lam   Date of birth 1945    Phone Home Phone [phone]   Mobile [phone]      Email  [email or web address]      Cancer Treatment Team     Patient Care Team:  Jean-Claude Farnsworth MD as Consulting Physician (Hematology and Oncology)  Tahmina James MD as Surgeon (General Surgery)  Haile Handley MD as Consulting Physician (Gastroenterology)    Provider Phone numbers  Care Team Provider: Praneeth Valenzuela MD,  ([phone])  Care Team Provider: Jean-Claude Farnsworth MD,  ([phone])  Care Team Provider: Tahmina Brown MD,  ([phone])  Care Team Provider: Gretchen Mcginnis MD,  ([phone])  Care Team Provider: Tahmina James MD,  ([phone])  Care Team Provider: Haile Handley MD,  ([phone])     Post Treatment Care Team   Primary Care Physician Praneeth Valenzuela MD  [address] [address]   [phone]         Background Information   Medical history Past Medical History:   • Anemia   • Anxiety and depression   • Asthma   • Balance problem   • Breast cancer (CMS/HCC)    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, grade III, ER/NC negative   • CKD (chronic kidney disease), stage III (CMS/HCC)   • Colon polyp   • Diabetes mellitus, type 2 (CMS/HCC)   • Hyperlipidemia   • Hypertension   • Swelling    IN LOWER EXTREMITIES   • Temporal arteritis (CMS/HCC)   • Thrombophlebitis      Surgical history Past Surgical History:   • BREAST BIOPSY    benign pathology   • BREAST BIOPSY    Ultasound guided mammotome vacuum assisted left breast biopsy with placement of a metallic clip-Dr. Daniel Gutierrez, [facility]   • CATARACT EXTRACTION   •  SECTION    x3   • CHOLECYSTECTOMY   • COLONOSCOPY W/ POLYPECTOMY    Enlarged folds in the antrum: biopsied; duodenal, transverse colon x2, splenic flexure, descending colon and sigmoid colon polyps: biopsied (path: tubular adenoma x6)-Dr. Zak De Jesus, [facility]   • MASTECTOMY WITH SENTINEL NODE BIOPSY AND AXILLARY NODE DISSECTION    Procedure: LEFT BREAST MASTECTOMY WITH  SENTINEL NODE BIOPSY AND , v-y plasty closure;  Surgeon: Tahmina James MD;  Location: [facility] OR;  Service: General   • PARTIAL HYSTERECTOMY    ovaries still in tact      Tobacco use Social History     Tobacco Use   Smoking Status Never Smoker   Smokeless Tobacco Never Used   Tobacco Comment    caffine use      Family oncology history Cancer-related family history includes Colon cancer in her paternal grandfather, paternal uncle, paternal uncle, and paternal uncle; Esophageal cancer in her father; Ovarian cancer in her cousin; Stomach cancer in her cousin and father; Throat cancer in her sister.      Oncology Information      Primary malignant neoplasm of female breast (CMS/HCC)    5/23/2019 Initial Diagnosis     Primary malignant neoplasm of female breast (CMS/HCC)           5/23/2019 Biopsy     1.  Left Breast, 2 o'clock, 7 cm from the nipple, core biopsies for a mass with calcifications:  HIGH GRADE DUCTAL CARCINOMA IN SITU WITH APOCRINE FEATURES.               A.  Architectural types/Grade:  Solid and comedo, high nuclear grade (III).                B.  DCIS present in all cores with largest focus measuring 8.0mm.               C.  Central expansive comedonecrosis and associated microcalcifications identified.                D.  No evidence of invasion identified.     Estrogen receptor: Negative  Progesterone receptor: Negative           7/3/2019 Surgery     Left breast mastectomy with sentinel node biopsy and v-y plasty closure    1. Left Breast [place] Lymph Node:               A. Single benign lymph node.               B. Multiple step sections are negative for tumor.     2. Left Simple Mastectomy (875 Grams):               A. SINGLE FOCUS OF MICROINVASIVE DUCTAL CARCINOMA (LESS THAN 1 MM, Tmi).               B. Extensive intraductal carcinoma, comedo type (high nuclear grade involving a region approximating 7.0 x 5.5 x                   5.0 cm in the upper outer quadrant).               C. DCIS  extends to within 0.5 mm of the anterior margin of the upper outer quadrant.               D. 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               E. DCIS involving the lactiferous ducts.               F. Extensive dystrophic calcification within DCIS.               G. Cancerization of the lobules.               H. For receptor studies, see previous pathology report (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).                                                                                                                                            PT1mi,N0,Mx  3. Wide Excision, Skin of Left Breast:               A. Epidermal inclusion cyst, benign.               B. Benign hyperkeratosis.               C. Negative for neoplasm.     pTmi pN0                Complications during Therapy:   No concerns stated   Modification to Treatment Plan:  No Modifications      Lifetime Dose Tracking:   No doses have been documented on this patient for the following tracked chemicals: Doxorubicin, Epirubicin, Idarubicin, Daunorubicin, Mitoxantrone, Bleomycin, Mitomycin, Doxorubicin Liposomal             [No treatment plan]         Persistent Treatment-Associated Adverse Effects at Completion of Therapy   It is important to recognize that not every person experiences the following adverse events after treatment.  You may not have any of these issues, a few or many adverse effects.  Experiences are highly variable.  Please discuss any adverse effects of cancer treatment with your cancer care team.      After Surgical Therapy   Breast Conserving Surgery (Lumpectomy)     Breast conserving surgery (lumpectomy) involves the removal of the breast mass (cancer lump) and a surrounding area of normal tissue. After surgery, there may be pain and soreness in the chest, underarm or shoulder which should get better over time. Nerves may be damaged in the breast and areas of lymph node removal which may result in numbness and other changes in  sensation. Ask your doctor or nurse if you have issues with pain, numbness or tingling, or any changes in function or mobility.      Breast conserving surgery allows women to keep their breast but the breast may look different than it did before surgery. The breast may be smaller and may be different in size and shape. There will be a scar from the surgery and scar tissue may feel different. Radiation therapy can also affect the way the breast looks and feels. How you think and feel about your body is important and coping with changes after breast surgery takes time.  It's important to look at your scar, which should become less red and swollen over time. It's important to touch your scar, too.  Your physician may give you instructions about massaging the scar to help with healing and to soften scar tissue. If you have a partner, let your partner look at and feel the scar when you're ready.  Working through feelings about the cancer and changes as a result of surgery may take time and support. Talk with your doctor or nurse about any issues with body image and coping.      Survivors of breast cancer should speak with their health care provider regarding the possibility of a genetic or family syndrome. If there does appear to be a family history or possible genetic syndrome, genetic counseling and testing may be recommended.      [place] Node Biopsy   Removal of the sentinel lymph node is the removal of the first lymph node to which cancer cells are most likely to spread. After surgery, there may be pain and soreness in the area where the node was removed.  Nerves may be damaged which may result in numbness or other changes in sensation. While sentinel node biopsy (as opposed to a lymph node dissection where more lymph nodes are removed) decreases the risk of developing lymphedema, the risk is not completely gone.     Lymphedema   Removal of lymph nodes can slow the normal flow of lymph in the area which can lead to  swelling in that limb, also called lymphedema.  Survivors who also received radiation therapy to the area where a lymph node was removed may be at increased risk of developing lymphedema. In some cases, the lymphedema can occur years after cancer therapy was completed. Lymphedema can cause pain or discomfort, disfigurement, change in function, and increased risk of infection in the affected area and closest limb. Signs of lymphedema can include a feeling of fullness or heaviness, changes in the skin (red, thick, stiff), aching, tightness, and difficulty moving or flexing nearby joints.  Other signs could be that your jewelry or clothes, like socks, pants or sleeves, may begin to feel tight on the affected limb. As signs of lymphedema could develop months or years after treatment, continue to monitor for signs and notify your doctor.      Several steps can be taken to help prevent and control lymphedema. Survivors should protect the potentially affected limb by avoiding cuts, scrapes, burns, insect bites, shots/vaccines, blood draws, and IV sticks in order to decrease the risk of developing an infection in the limb. In addition, the survivor should protect the limb from the sun to avoid sunburn.  Lastly, survivors should avoid tight clothing and jewelry, and blood pressures in the affected limb that might further slow the normal flow of lymph.      Survivors of cancer, including those at risk for lymphedema, can and should exercise. Survivors should start slow and gradually increase intensity while monitoring your limb for changes in swelling or redness. If either occurs, stop exercising and notify your doctor for further direction.            After Chemotherapy   No chemotherapy received.      After Radiation Therapy   No radiation treatments received.      Hormone Therapy   Not receiving hormone therapy      Care of your Venous Access Device   No Venous Access Device currently in place      General After Cancer  Treatment        It is not uncommon for cancer to impact other areas of your life such as relationships, work and mental health.  If you develop financial concerns, resources are sometimes available to assist in these areas.  Depression and anxiety can present either during or after cancer diagnosis and treatment.  It is important to discuss with your physician any of these concerns so these resources can be made available to you.      General Cancer Support & Resources    [facility]    Cancer Resource Center & Multidisciplinary Clinic:  [address] [place] [address]  [address]  [place] [address]  [phone]    Survivorship Clinical Nurse Specialist  Noemi Phillipkavya APR - [phone]    Oncology Social Worker:  Lexy Gan [facility] - [phone]     Psychiatric Nurse Practitioner:  Laurita Kemp APR - [phone]    Financial Counselor and Contact Information:  [facility] - [phone]    Oncology Dietician Contact Information:  [address] - [phone]     Common Concerns After Treatment    Managing Anxiety or Depression:  Referral to support group, e.g. American Cancer Society (www.cancer.org, 139.625.9383), Cancer Support Community (www.wellnessandcancer.org, 958.882.2791)    Referral to a community provider for cognitive behavioral therapy or counseling.    Psychiatric care or counseling and/or initiation of pharmacotherapy are available at the [facility] . Please call [phone] or talk to a member of your treatment team about a referral.    For anonymous information, resource requests or support you can also call the 24-hour Crisis and Information Center at [phone]      Fear of Cancer Coming Back:  Patients need to know that these feelings are normal, and they won’t cause the cancer to come back.    • Referral for cognitive behavioral therapy or counseling    • Referral to support group, e.g. American Cancer  Society (www.cancer.org, 764.569.2928), Cancer Support Community (www.wellnessandcancer.org, 188.580.6079)      Fatigue:  Fatigue is one of the most common problems in patients with cancer. It may be related to the disease itself or cancer treatment and may continue beyond completion of treatment among long-term cancer survivors. Among people with cancer, 80% to 100% report fatigue.     Fatigue may be an isolated problem or occur as one part of a cluster of symptoms, such as pain, depression, dyspnea, anorexia, and sleep problems.    If you are taking an immunotherapy, fatigue may be a symptom of an endocrine disorder secondary to immunotherapy, such as thyroid or pituitary disorder, not just general cancer-related fatigue. It is important to report any changes to your doctor (ONS, 2017)    Regular physical activity (goal of 150 minutes of activity per week) is the most important thing you can do to manage fatigue. Other treatments that are likely to be helpful include:    · Progressive muscle relaxation and/or relaxation breathing with or without imagery or distraction  · Cognitive behavioral therapy for sleep  · Yoga  · Meditation, mindfulness based stress reduction, and cognitive behavioral stress management  · Cognitive behavioral therapy for fatigue, depression, and pain with or without hypnosis  · Evaluation for other causes of fatigue including hypothyroidism, anemia and depression      Financial Concerns:  The financial challenges that people with cancer and their families face are very real.     For hospital bills, you may want to talk with a hospital financial counselor. You may be able to work out a monthly payment plan or even get a reduced rate. You may also want to stay in touch with your insurance company to make sure costs are covered.    For information about resources that are available you can go to the NCI database, “Organizations That Offer Support Services,” at http://www.cancer.gov, search  terms “financial assistance.”     Or call the [facility] toll-free 0-480-7-CANCER ([phone]) to ask for help.      Managing Insomnia  · Mindfulness based stress reduction  · Practice good sleep hygiene (reduce/eliminate TV and electronic device screen time one hour before bed)  · Avoid stimulants like caffeine and nicotine close to bedtime  · Avoid alcohol close to bedtime. While alcohol is well-known to help you fall asleep faster, too much close to bedtime can disrupt sleep in the second half of the night as the body begins to process the alcohol.     · Evaluation and management of other symptoms (hot flashes, anxiety, and pain)  · Regular physical activity (goal of 150 minutes of activity per week)  · The supplement melatonin may help with sleep disruption    More information at www.sleepfoundation.org      Concerns about Sexuality, Intimacy and Body Image  · Ask your treatment team about referrals to counseling and/or support groups to address body image concerns.  · Non-hormonal remedies for vaginal dryness for sexual activity include water-based vaginal lubricants (Astroglide, [place] Jelly). Avoid products with spermicides or additives as they can be irritating.  · Some people use a non-hormonal vaginal moisturizer 1-3 times per week to improve general comfort (Replens, coconut oil).  · Discuss pain with intercourse or sexual activity with your doctor.    Look Good Feel Better is a non-medical, brand-neutral public service program that teaches beauty techniques to people with cancer to help them manage the appearance-related side effects of cancer treatment. The program includes lessons on skin and nail care, cosmetics, wigs and turbans, accessories and styling, helping people with cancer to find some normalcy in a life that is by no means normal.    For more information and helpful videos visit:  http://lookgoodfeelbetter.org/       About Lymphedema:  According to the [employer] [place], anywhere from 5-17%  "of women who have sentinel lymph node biopsy develops lymphedema. Among women who have axillary lymph node dissection, the percentage is higher - from 20-53% - and risk increases with the number of nodes taken out.     · Referral to lymphedema physical therapy specialist for lymphatic massage or proper fitting of compression garments or bandages  · Weight training and regular exercise  · Achieve and maintain a healthy weight    Tips to reduce the risk of injury or infection to the arm:  · Treat infections of the at-risk arm and hand right away.  · Wear gloves when doing house or garden work.  · Keep skin clean and well-moisturized.  · Use the arm not at risk when having blood drawn, getting injections or having blood pressure taken.  · Avoid sunburn and excess heat from saunas, hot baths, tanning and other sources.  · Don't cut nail cuticles.  · Use insect repellant when outdoors.  · Avoid injuries, including scratches and bruises, to the at-risk arm.  · Rest the at-risk arm in an elevated position (above the heart or shoulder).     If you have an infection, injury or any of the symptoms listed above, see your health care provider.        Changes in Memory or \"Brain Fog\"  Patients should know that 25% of cancer patients have cognitive dysfunction (changes in thinking or memory) after treatment and it usually gets better over time    Some things you can do to manage \"brain fog\" or memory problems include:  · Mental exercises (e.g., word games, crossword puzzles)   · Setting up and following routines  · Repeating information  · Keeping a memory journal  · Avoiding multitasking  · Exercising  · Maintaining a healthy diet.   · There is some evidence that Group Cognitive Training is also effective  · Rule out depression, sleep disturbance      Prevention and Wellness:  · Achieve and maintain a healthy body weight as weight gain is a risk factor for development or recurrence of some cancers (BMI between " 20-25m2).  · Current Body mass index is 38.5 kg/m².  · Regular physical activity (preferably daily). Strive for at least 150 minutes of moderate or 75 minutes of vigorous activity per week.  · Maintain a diet high in vegetables, fruits and whole grains and low in sugars and fats. Limit red meat and avoid processed foods.  · Avoid all tobacco and second hand smoke.  · Avoid all alcohol intake.  · If you do drink, minimize alcohol exposure - no more than one drink in a day for women and two drinks in a day for men.  · Continue all standard non-cancer related healthcare with your primary care provider. Any new, unusual, and/or persistent symptoms should be brought to the attention of your provider.            Surveillance    How Frequent?    Medical Oncology visits 1 - 4 times per year as clinically appropriate for 5 years, then annually      Lab tests As directed    Imaging exams      Mammography Annual clinical breast exam needed    Schedule a mammogram one year after the first mammogram that led to diagnosis, but no earlier than six months after radiation therapy. Obtain a mammogram every six to 12 months thereafter.    Per Dr Farnsworth, plan for annual right breast mammogram as well as right breast ultrasound    Lymphedema Monitor for lymphedema    Genetic Counseling Periodic screening for changes in family history and referral to genetic counseling as indicated    Gynecologic assessment Pap smear: Beginning at age 30, every three years if the last three PAP tests in a row were normal. Every year if the last PAP was abnormal. every 3 years as long as the last 3 were normal, every year if abnormal.     Women age 70 and older who have had 3 or more normal PAP tests in a row, and no abnormal PAP tests results in the last 10 years, may choose to stop having PAP tests.    Pelvic exam:  Continue to visit a gynecologist annually.      Bone Density study Bone densitometry (DEXA scan) every 1 to 2 years after initiation of  aromatase inhibitor (if applicable) and/or at age 65 or older.    Calcium and Vitamin D.    Weight-bearing exercise most days of the week e.g. moderate intensity walking (can have a conversation but cannot sing).    Avoid all smoke and second hand smoke.     Bisphosphonate, if indicated by your doctor based on bone densitometry (DEXA scan) results.      Immunizations       Influenza       Herpes Zoster       Pneumococcal Yearly  Once  As appropriate    Tobacco Cessation The patient is not currently a tobacco user.  Counseling given: Not Answered  Comment: caffine use

## 2019-10-11 ENCOUNTER — ANESTHESIA (OUTPATIENT)
Dept: GASTROENTEROLOGY | Facility: HOSPITAL | Age: 74
End: 2019-10-11

## 2019-10-11 ENCOUNTER — APPOINTMENT (OUTPATIENT)
Dept: GENERAL RADIOLOGY | Facility: HOSPITAL | Age: 74
End: 2019-10-11

## 2019-10-11 ENCOUNTER — ANESTHESIA EVENT (OUTPATIENT)
Dept: GASTROENTEROLOGY | Facility: HOSPITAL | Age: 74
End: 2019-10-11

## 2019-10-11 ENCOUNTER — HOSPITAL ENCOUNTER (OUTPATIENT)
Facility: HOSPITAL | Age: 74
Setting detail: HOSPITAL OUTPATIENT SURGERY
Discharge: HOME | End: 2019-10-11
Attending: INTERNAL MEDICINE | Admitting: INTERNAL MEDICINE

## 2019-10-11 VITALS
RESPIRATION RATE: 14 BRPM | BODY MASS INDEX: 31.65 KG/M2 | HEIGHT: 65 IN | TEMPERATURE: 98 F | DIASTOLIC BLOOD PRESSURE: 68 MMHG | HEART RATE: 70 BPM | SYSTOLIC BLOOD PRESSURE: 165 MMHG | WEIGHT: 190 LBS | OXYGEN SATURATION: 96 %

## 2019-10-11 DIAGNOSIS — D13.2 DUODENAL ADENOMA: ICD-10-CM

## 2019-10-11 LAB — GLUCOSE BLDC GLUCOMTR-MCNC: 202 MG/DL (ref 70–130)

## 2019-10-11 PROCEDURE — 25010000002 PROPOFOL 10 MG/ML EMULSION: Performed by: NURSE ANESTHETIST, CERTIFIED REGISTERED

## 2019-10-11 PROCEDURE — C1769 GUIDE WIRE: HCPCS | Performed by: INTERNAL MEDICINE

## 2019-10-11 PROCEDURE — 43251 EGD REMOVE LESION SNARE: CPT | Performed by: INTERNAL MEDICINE

## 2019-10-11 PROCEDURE — 88305 TISSUE EXAM BY PATHOLOGIST: CPT | Performed by: INTERNAL MEDICINE

## 2019-10-11 PROCEDURE — 82962 GLUCOSE BLOOD TEST: CPT

## 2019-10-11 RX ORDER — PROPOFOL 10 MG/ML
VIAL (ML) INTRAVENOUS CONTINUOUS PRN
Status: DISCONTINUED | OUTPATIENT
Start: 2019-10-11 | End: 2019-10-11 | Stop reason: SURG

## 2019-10-11 RX ORDER — PROPOFOL 10 MG/ML
VIAL (ML) INTRAVENOUS AS NEEDED
Status: DISCONTINUED | OUTPATIENT
Start: 2019-10-11 | End: 2019-10-11 | Stop reason: SURG

## 2019-10-11 RX ORDER — LIDOCAINE HYDROCHLORIDE 20 MG/ML
INJECTION, SOLUTION INFILTRATION; PERINEURAL AS NEEDED
Status: DISCONTINUED | OUTPATIENT
Start: 2019-10-11 | End: 2019-10-11 | Stop reason: SURG

## 2019-10-11 RX ORDER — SODIUM CHLORIDE 9 MG/ML
30 INJECTION, SOLUTION INTRAVENOUS CONTINUOUS PRN
Status: DISCONTINUED | OUTPATIENT
Start: 2019-10-11 | End: 2019-10-11 | Stop reason: HOSPADM

## 2019-10-11 RX ADMIN — LIDOCAINE HYDROCHLORIDE 40 MG: 20 INJECTION, SOLUTION INFILTRATION; PERINEURAL at 14:43

## 2019-10-11 RX ADMIN — PROPOFOL 300 MCG/KG/MIN: 10 INJECTION, EMULSION INTRAVENOUS at 14:43

## 2019-10-11 RX ADMIN — SODIUM CHLORIDE 30 ML/HR: 9 INJECTION, SOLUTION INTRAVENOUS at 13:26

## 2019-10-11 RX ADMIN — PROPOFOL 50 MG: 10 INJECTION, EMULSION INTRAVENOUS at 14:43

## 2019-10-11 NOTE — ANESTHESIA POSTPROCEDURE EVALUATION
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])    Surgeon:  Haile Handley MD Provider:  Rui Cruz MD    Anesthesia Type:  MAC ASA Status:  3          Anesthesia Type: MAC  Last vitals  BP   148/64 (10/11/19 1507)   Temp   36.7 °C (98 °F) (10/11/19 1315)   Pulse   75 (10/11/19 1507)   Resp   14 (10/11/19 1507)     SpO2   98 % (10/11/19 1507)     Post Anesthesia Care and Evaluation    Patient location during evaluation: bedside  Patient participation: complete - patient participated  Level of consciousness: awake and alert  Pain management: adequate  Airway patency: patent  Anesthetic complications: No anesthetic complications    Cardiovascular status: acceptable  Respiratory status: acceptable  Hydration status: acceptable    Comments: /64 (BP Location: Right arm, Patient Position: Sitting)   Pulse 75   Temp 36.7 °C (98 °F) (Oral)   Resp 14   Ht 165.1 cm (65\")   Wt 86.2 kg (190 lb)   SpO2 98%   BMI 31.62 kg/m²       "

## 2019-10-11 NOTE — ANESTHESIA PREPROCEDURE EVALUATION
Anesthesia Evaluation     Patient summary reviewed   NPO Solid Status: > 8 hours  NPO Liquid Status: > 4 hours           Airway   Mallampati: III  TM distance: >3 FB  Neck ROM: full  No difficulty expected  Dental - normal exam     Pulmonary     breath sounds clear to auscultation  Cardiovascular   Exercise tolerance: good (4-7 METS)    Rhythm: regular  Rate: normal        Neuro/Psych  GI/Hepatic/Renal/Endo      Musculoskeletal     Abdominal    Substance History      OB/GYN          Other                        Anesthesia Plan    ASA 3     MAC     intravenous induction   Anesthetic plan, all risks, benefits, and alternatives have been provided, discussed and informed consent has been obtained with: patient and spouse/significant other.

## 2019-10-11 NOTE — BRIEF OP NOTE
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      A Small Intestine, Duodenum Polyp · TISSUE PATHOLOGY EXAM   Haile Handley MD 10/11/19 [id number]      Description: polyps                Drains:   Closed/Suction Drain 1 Left Chest Bulb 19 Fr. (Active)       Closed/Suction Drain 2 Left Chest Bulb 19 Fr. (Active)       Findings: Polyps x3 first portion of the duodenum removed snare cautery.  Ampulla was within normal limits with no evidence of adenomatous change.  EGD with polypectomy was performed revealed mild gastritis otherwise negative.    Complications: None      Haile Handley MD     Date: 10/11/2019  Time: 3:11 PM

## 2019-10-14 LAB
CYTO UR: NORMAL
LAB AP CASE REPORT: NORMAL
PATH REPORT.FINAL DX SPEC: NORMAL
PATH REPORT.GROSS SPEC: NORMAL

## 2019-10-18 ENCOUNTER — TELEPHONE (OUTPATIENT)
Dept: GASTROENTEROLOGY | Facility: CLINIC | Age: 74
End: 2019-10-18

## 2019-10-18 DIAGNOSIS — K31.7 POLYP OF DUODENUM: ICD-10-CM

## 2019-10-18 DIAGNOSIS — K29.70 GASTRITIS WITHOUT BLEEDING, UNSPECIFIED CHRONICITY, UNSPECIFIED GASTRITIS TYPE: Primary | ICD-10-CM

## 2019-10-18 NOTE — TELEPHONE ENCOUNTER
----- Message from aHile Handley MD sent at 10/14/2019  1:53 PM EDT -----  Biopsies all benign.  Repeat EGD in 3 months to confirm healing

## 2019-10-22 PROBLEM — K31.7 POLYP OF DUODENUM: Status: ACTIVE | Noted: 2019-10-22

## 2019-10-22 PROBLEM — K29.70 GASTRITIS WITHOUT BLEEDING: Status: ACTIVE | Noted: 2019-10-22

## 2019-10-22 NOTE — TELEPHONE ENCOUNTER
Pt has not read her [employer] message.     Called and spoke with pt's  (ok per LARRY). Advised of the note from Dr Handley. He verb understanding and is agreeable with the plan.

## 2019-11-09 ENCOUNTER — HOSPITAL ENCOUNTER (OUTPATIENT)
Dept: GENERAL RADIOLOGY | Facility: HOSPITAL | Age: 74
Discharge: HOME | End: 2019-11-09

## 2019-11-09 DIAGNOSIS — J45.991 ASTHMA, COUGH VARIANT: ICD-10-CM

## 2019-11-09 PROCEDURE — 71046 X-RAY EXAM CHEST 2 VIEWS: CPT

## 2019-11-13 ENCOUNTER — TRANSCRIBE ORDERS (OUTPATIENT)
Dept: ADMINISTRATIVE | Facility: HOSPITAL | Age: 74
End: 2019-11-13

## 2019-11-13 ENCOUNTER — LAB (OUTPATIENT)
Dept: LAB | Facility: HOSPITAL | Age: 74
End: 2019-11-13

## 2019-11-13 DIAGNOSIS — R79.82 ELEVATED C-REACTIVE PROTEIN (CRP): ICD-10-CM

## 2019-11-13 DIAGNOSIS — Z79.52 LONG TERM CURRENT USE OF SYSTEMIC STEROIDS: ICD-10-CM

## 2019-11-13 DIAGNOSIS — R70.0 ELEVATED ERYTHROCYTE SEDIMENTATION RATE: ICD-10-CM

## 2019-11-13 DIAGNOSIS — M31.5 POLYMYALGIA ARTERITICA (HCC): Primary | ICD-10-CM

## 2019-11-13 DIAGNOSIS — M31.5 POLYMYALGIA ARTERITICA (HCC): ICD-10-CM

## 2019-11-13 LAB
ALBUMIN SERPL-MCNC: 3.8 G/DL (ref 3.5–5.2)
ALBUMIN/GLOB SERPL: 1.1 G/DL
ALP SERPL-CCNC: 114 U/L (ref 39–117)
ALT SERPL W P-5'-P-CCNC: 20 U/L (ref 1–33)
ANION GAP SERPL CALCULATED.3IONS-SCNC: 11.3 MMOL/L (ref 5–15)
AST SERPL-CCNC: 13 U/L (ref 1–32)
BASOPHILS # BLD AUTO: 0.06 10*3/MM3 (ref 0–0.2)
BASOPHILS NFR BLD AUTO: 0.4 % (ref 0–1.5)
BILIRUB SERPL-MCNC: 0.3 MG/DL (ref 0.2–1.2)
BUN BLD-MCNC: 24 MG/DL (ref 8–23)
BUN/CREAT SERPL: 20.9 (ref 7–25)
CALCIUM SPEC-SCNC: 9.4 MG/DL (ref 8.6–10.5)
CHLORIDE SERPL-SCNC: 98 MMOL/L (ref 98–107)
CO2 SERPL-SCNC: 26.7 MMOL/L (ref 22–29)
CREAT BLD-MCNC: 1.15 MG/DL (ref 0.57–1)
CRP SERPL-MCNC: 3.56 MG/DL (ref 0–0.5)
DEPRECATED RDW RBC AUTO: 42.3 FL (ref 37–54)
EOSINOPHIL # BLD AUTO: 0.31 10*3/MM3 (ref 0–0.4)
EOSINOPHIL NFR BLD AUTO: 2.3 % (ref 0.3–6.2)
ERYTHROCYTE [DISTWIDTH] IN BLOOD BY AUTOMATED COUNT: 12 % (ref 12.3–15.4)
ERYTHROCYTE [SEDIMENTATION RATE] IN BLOOD: 42 MM/HR (ref 0–30)
GFR SERPL CREATININE-BSD FRML MDRD: 46 ML/MIN/1.73
GLOBULIN UR ELPH-MCNC: 3.5 GM/DL
GLUCOSE BLD-MCNC: 190 MG/DL (ref 65–99)
HCT VFR BLD AUTO: 41 % (ref 34–46.6)
HGB BLD-MCNC: 13 G/DL (ref 12–15.9)
IMM GRANULOCYTES # BLD AUTO: 0.15 10*3/MM3 (ref 0–0.05)
IMM GRANULOCYTES NFR BLD AUTO: 1.1 % (ref 0–0.5)
LYMPHOCYTES # BLD AUTO: 1.17 10*3/MM3 (ref 0.7–3.1)
LYMPHOCYTES NFR BLD AUTO: 8.7 % (ref 19.6–45.3)
MCH RBC QN AUTO: 30.4 PG (ref 26.6–33)
MCHC RBC AUTO-ENTMCNC: 31.7 G/DL (ref 31.5–35.7)
MCV RBC AUTO: 95.8 FL (ref 79–97)
MONOCYTES # BLD AUTO: 0.75 10*3/MM3 (ref 0.1–0.9)
MONOCYTES NFR BLD AUTO: 5.6 % (ref 5–12)
NEUTROPHILS # BLD AUTO: 10.99 10*3/MM3 (ref 1.7–7)
NEUTROPHILS NFR BLD AUTO: 81.9 % (ref 42.7–76)
NRBC BLD AUTO-RTO: 0 /100 WBC (ref 0–0.2)
PLATELET # BLD AUTO: 264 10*3/MM3 (ref 140–450)
PMV BLD AUTO: 9.8 FL (ref 6–12)
POTASSIUM BLD-SCNC: 4.3 MMOL/L (ref 3.5–5.2)
PROT SERPL-MCNC: 7.3 G/DL (ref 6–8.5)
RBC # BLD AUTO: 4.28 10*6/MM3 (ref 3.77–5.28)
SODIUM BLD-SCNC: 136 MMOL/L (ref 136–145)
WBC NRBC COR # BLD: 13.43 10*3/MM3 (ref 3.4–10.8)

## 2019-11-13 PROCEDURE — 36415 COLL VENOUS BLD VENIPUNCTURE: CPT

## 2019-11-13 PROCEDURE — 85652 RBC SED RATE AUTOMATED: CPT

## 2019-11-13 PROCEDURE — 86140 C-REACTIVE PROTEIN: CPT

## 2019-11-13 PROCEDURE — 80053 COMPREHEN METABOLIC PANEL: CPT

## 2019-11-13 PROCEDURE — 85025 COMPLETE CBC W/AUTO DIFF WBC: CPT

## 2019-11-14 ENCOUNTER — TRANSCRIBE ORDERS (OUTPATIENT)
Dept: ADMINISTRATIVE | Facility: HOSPITAL | Age: 74
End: 2019-11-14

## 2019-11-14 DIAGNOSIS — E04.2 MULTINODULAR GOITER: Primary | ICD-10-CM

## 2019-11-20 ENCOUNTER — HOSPITAL ENCOUNTER (OUTPATIENT)
Dept: ULTRASOUND IMAGING | Facility: HOSPITAL | Age: 74
Discharge: HOME | End: 2019-11-20
Admitting: INTERNAL MEDICINE

## 2019-11-20 DIAGNOSIS — E04.2 MULTINODULAR GOITER: ICD-10-CM

## 2019-11-20 PROCEDURE — 76536 US EXAM OF HEAD AND NECK: CPT

## 2019-11-21 ENCOUNTER — TELEPHONE (OUTPATIENT)
Dept: ONCOLOGY | Facility: CLINIC | Age: 74
End: 2019-11-21

## 2019-11-21 NOTE — TELEPHONE ENCOUNTER
Dr. Farnsworth he is ok with vascular performing her temporal artery biopsy. Message left for Erica at [facility] letting her know.

## 2019-12-16 ENCOUNTER — LAB (OUTPATIENT)
Dept: LAB | Facility: HOSPITAL | Age: 74
End: 2019-12-16

## 2019-12-16 ENCOUNTER — OFFICE VISIT (OUTPATIENT)
Dept: ONCOLOGY | Facility: CLINIC | Age: 74
End: 2019-12-16

## 2019-12-16 VITALS
TEMPERATURE: 98 F | HEIGHT: 59 IN | WEIGHT: 189.7 LBS | DIASTOLIC BLOOD PRESSURE: 90 MMHG | BODY MASS INDEX: 38.24 KG/M2 | SYSTOLIC BLOOD PRESSURE: 153 MMHG | RESPIRATION RATE: 14 BRPM | HEART RATE: 104 BPM | OXYGEN SATURATION: 97 %

## 2019-12-16 DIAGNOSIS — D05.12 BREAST NEOPLASM, TIS (DCIS), LEFT: ICD-10-CM

## 2019-12-16 DIAGNOSIS — D50.8 OTHER IRON DEFICIENCY ANEMIA: ICD-10-CM

## 2019-12-16 DIAGNOSIS — D50.0 IRON DEFICIENCY ANEMIA DUE TO CHRONIC BLOOD LOSS: ICD-10-CM

## 2019-12-16 DIAGNOSIS — E11.21 DIABETIC NEPHROPATHY ASSOCIATED WITH TYPE 2 DIABETES MELLITUS (HCC): Primary | ICD-10-CM

## 2019-12-16 DIAGNOSIS — C50.919 PRIMARY MALIGNANT NEOPLASM OF FEMALE BREAST (HCC): Primary | ICD-10-CM

## 2019-12-16 LAB
ALBUMIN SERPL-MCNC: 3.8 G/DL (ref 3.5–5.2)
ALBUMIN/GLOB SERPL: 1.2 G/DL (ref 1.1–2.4)
ALP SERPL-CCNC: 112 U/L (ref 38–116)
ALT SERPL W P-5'-P-CCNC: 16 U/L (ref 0–33)
ANION GAP SERPL CALCULATED.3IONS-SCNC: 15.3 MMOL/L (ref 5–15)
AST SERPL-CCNC: 17 U/L (ref 0–32)
BASOPHILS # BLD AUTO: 0.05 10*3/MM3 (ref 0–0.2)
BASOPHILS NFR BLD AUTO: 0.4 % (ref 0–1.5)
BILIRUB SERPL-MCNC: 0.3 MG/DL (ref 0.2–1.2)
BUN BLD-MCNC: 30 MG/DL (ref 6–20)
BUN/CREAT SERPL: 25.9 (ref 7.3–30)
CALCIUM SPEC-SCNC: 9.1 MG/DL (ref 8.5–10.2)
CHLORIDE SERPL-SCNC: 102 MMOL/L (ref 98–107)
CO2 SERPL-SCNC: 22.7 MMOL/L (ref 22–29)
CREAT BLD-MCNC: 1.16 MG/DL (ref 0.6–1.1)
DEPRECATED RDW RBC AUTO: 46.3 FL (ref 37–54)
EOSINOPHIL # BLD AUTO: 0.19 10*3/MM3 (ref 0–0.4)
EOSINOPHIL NFR BLD AUTO: 1.7 % (ref 0.3–6.2)
ERYTHROCYTE [DISTWIDTH] IN BLOOD BY AUTOMATED COUNT: 12.3 % (ref 12.3–15.4)
FERRITIN SERPL-MCNC: 527.9 NG/ML (ref 13–150)
GFR SERPL CREATININE-BSD FRML MDRD: 46 ML/MIN/1.73
GLOBULIN UR ELPH-MCNC: 3.1 GM/DL (ref 1.8–3.5)
GLUCOSE BLD-MCNC: 191 MG/DL (ref 74–124)
HBA1C MFR BLD: 8.3 % (ref 4.8–5.6)
HCT VFR BLD AUTO: 40 % (ref 34–46.6)
HGB BLD-MCNC: 12.3 G/DL (ref 12–15.9)
HGB RETIC QN AUTO: 35.5 PG (ref 29.8–36.1)
IMM GRANULOCYTES # BLD AUTO: 0.1 10*3/MM3 (ref 0–0.05)
IMM GRANULOCYTES NFR BLD AUTO: 0.9 % (ref 0–0.5)
IMM RETICS NFR: 5.5 % (ref 3–15.8)
IRON 24H UR-MRATE: 64 MCG/DL (ref 37–145)
IRON SATN MFR SERPL: 23 % (ref 14–48)
LYMPHOCYTES # BLD AUTO: 1.11 10*3/MM3 (ref 0.7–3.1)
LYMPHOCYTES NFR BLD AUTO: 9.7 % (ref 19.6–45.3)
MCH RBC QN AUTO: 31.3 PG (ref 26.6–33)
MCHC RBC AUTO-ENTMCNC: 30.8 G/DL (ref 31.5–35.7)
MCV RBC AUTO: 101.8 FL (ref 79–97)
MONOCYTES # BLD AUTO: 0.66 10*3/MM3 (ref 0.1–0.9)
MONOCYTES NFR BLD AUTO: 5.8 % (ref 5–12)
NEUTROPHILS # BLD AUTO: 9.31 10*3/MM3 (ref 1.7–7)
NEUTROPHILS NFR BLD AUTO: 81.5 % (ref 42.7–76)
NRBC BLD AUTO-RTO: 0 /100 WBC (ref 0–0.2)
PLATELET # BLD AUTO: 258 10*3/MM3 (ref 140–450)
PMV BLD AUTO: 9.3 FL (ref 6–12)
POTASSIUM BLD-SCNC: 4.6 MMOL/L (ref 3.5–4.7)
PROT SERPL-MCNC: 6.9 G/DL (ref 6.3–8)
RBC # BLD AUTO: 3.93 10*6/MM3 (ref 3.77–5.28)
RETICS/RBC NFR AUTO: 1.44 % (ref 0.7–1.9)
SODIUM BLD-SCNC: 140 MMOL/L (ref 134–145)
TIBC SERPL-MCNC: 277 MCG/DL (ref 249–505)
TRANSFERRIN SERPL-MCNC: 198 MG/DL (ref 200–360)
WBC NRBC COR # BLD: 11.42 10*3/MM3 (ref 3.4–10.8)

## 2019-12-16 PROCEDURE — 83540 ASSAY OF IRON: CPT

## 2019-12-16 PROCEDURE — 85046 RETICYTE/HGB CONCENTRATE: CPT

## 2019-12-16 PROCEDURE — 82728 ASSAY OF FERRITIN: CPT

## 2019-12-16 PROCEDURE — 36415 COLL VENOUS BLD VENIPUNCTURE: CPT

## 2019-12-16 PROCEDURE — 85025 COMPLETE CBC W/AUTO DIFF WBC: CPT

## 2019-12-16 PROCEDURE — 84466 ASSAY OF TRANSFERRIN: CPT

## 2019-12-16 PROCEDURE — 80053 COMPREHEN METABOLIC PANEL: CPT

## 2019-12-16 PROCEDURE — 83036 HEMOGLOBIN GLYCOSYLATED A1C: CPT | Performed by: INTERNAL MEDICINE

## 2019-12-16 PROCEDURE — 99215 OFFICE O/P EST HI 40 MIN: CPT | Performed by: INTERNAL MEDICINE

## 2019-12-16 RX ORDER — ONDANSETRON 4 MG/1
TABLET, FILM COATED ORAL
COMMUNITY
End: 2021-05-04

## 2019-12-16 NOTE — PROGRESS NOTES
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 In the interim she has had an upper GI endoscopy by Haile Handley MD and also she has had bilateral temporal artery biopsies by Dr. GOVEA. She has had a diagnosis of vasculitis and actually the temporal artery biopsies have confirmed this and no question, she has temporal arteritis. She has not been seen back by her rheumatologist related to this. She is expecting to see her in 03/2020 and I do not believe it is necessary to wait for so long. In fact we have provided her with a quick appointment to see her rheumatologist under these premises.     In any event, the patient is having minimal pain at the postsurgical site close to the ears in the head. No infection. Her appetite is good. She has gotten rid of all the cokes and coffee that she was taking and since then her stomach and gastrointestinal turmoil has had a dramatic improvement. She has not had any nausea or vomiting. Minimal epigastric pain. Her bowel activity has become more regular. Urination is normal. No urinary tract infections.     In regard to her breast tissues, her mastectomy site has completely healed and her right breast has not had any new abnormalities.          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 Daniel Gutierrez, [facility]   • CATARACT EXTRACTION     •  SECTION N/A     x3   • CHOLECYSTECTOMY N/A    • COLONOSCOPY W/ POLYPECTOMY N/A 2019    Enlarged folds in the antrum: biopsied; duodenal, transverse colon x2, splenic flexure, descending colon and sigmoid colon polyps: biopsied (path: tubular adenoma x6)-Dr. Zak De Jesus, [facility]   • ENDOSCOPY  10/11/2019    Procedure: ESOPHAGOGASTRODUODENOSCOPY with hot snare polypectomy;  Surgeon: Haile Handley MD;  Location: [facility] ENDOSCOPY;  Service: Gastroenterology   • MASTECTOMY WITH SENTINEL NODE BIOPSY AND AXILLARY NODE DISSECTION Left 7/3/2019    Procedure: LEFT BREAST MASTECTOMY WITH SENTINEL NODE BIOPSY AND , v-y plasty closure;  Surgeon: Tahmina James MD;  Location: [facility] MAIN OR;  Service: General   • PARTIAL HYSTERECTOMY Bilateral     ovaries still in tact   • TEMPORAL ARTERY BIOPSY Bilateral 2019        Current Outpatient Medications on File Prior to Visit   Medication Sig Dispense Refill   • albuterol sulfate HFA (PROAIR HFA) 108 (90 Base) MCG/ACT inhaler Every 6 (Six) Hours.     • atorvastatin (LIPITOR) 20 MG tablet Take 20 mg by mouth Every Other Day.     • bisoprolol (ZEBeta) 10 MG tablet Take 10 mg by mouth Every Night.     • Insulin Glargine (TOUJEO SOLOSTAR SC) Inject 40 Units under the skin into the appropriate area as directed Every Night. INSTRUCTED PT TO FOLLOW MD INSTRUCTIONS REGARDING DOSING BEFORE SURGERY     • Insulin Lispro (HUMALOG PEN SC) Inject  under the skin into the appropriate area as directed 3 (Three) Times a Day With Meals. 25 units at breakfast, 20 units at lunch, 22 units at dinner/INSTRUCTED PT TO FOLLOW MD INSTRUCTIONS REGARDING DOSING BEFORE  SURGERY     • levocetirizine (XYZAL) 5 MG tablet Take 5 mg by mouth Every Evening.     • montelukast (SINGULAIR) 10 MG tablet Take 10 mg by mouth Every Night.     • NON FORMULARY Apply 1 each topically to the appropriate area as directed Daily. Cream to foot rash, unsure of name     • omeprazole (priLOSEC) 20 MG capsule Take 20 mg by mouth 2 (Two) Times a Day As Needed.     • ondansetron (ZOFRAN) 4 MG tablet ondansetron HCl 4 mg tablet     • predniSONE (DELTASONE) 2.5 MG tablet Take 7.5 mg by mouth Every Evening.     • predniSONE (DELTASONE) 5 MG tablet prednisone 5 mg tablet     • valsartan-hydrochlorothiazide (DIOVAN-HCT) 160-25 MG per tablet valsartan 160 mg-hydrochlorothiazide 25 mg tablet     • [DISCONTINUED] losartan-hydrochlorothiazide (HYZAAR) 100-25 MG per tablet Take 1 tablet by mouth Every Night.     • [DISCONTINUED] rifaximin (XIFAXAN) 550 MG tablet Take 1 tablet by mouth Every 8 (Eight) Hours. 42 tablet 2     No current facility-administered medications on file prior to visit.         ALLERGIES:    Allergies   Allergen Reactions   • Aspirin Nausea Only     Low tolerance, abdominal pain   • Sulfa Antibiotics Unknown (See Comments)     Happened as a child        Social History     Socioeconomic History   • Marital status:      Spouse name: Ashwin   • Number of children: 3   • Years of education: College   • Highest education level: Not on file   Occupational History   • Occupation: POSTAL SERVICE     Employer: RETIRED   Social Needs   • Financial resource strain: Not hard at all   • Food insecurity:     Worry: Never true     Inability: Never true   • Transportation needs:     Medical: No     Non-medical: No   Tobacco Use   • Smoking status: Never Smoker   • Smokeless tobacco: Never Used   • Tobacco comment: caffine use   Substance and Sexual Activity   • Alcohol use: No     Frequency: Monthly or less     Drinks per session: 1 or 2     Binge frequency: Never     Comment: one or two small drinks a  year   • Drug use: No   • Sexual activity: Defer     Comment: Spouse, Ashwin        Family History   Problem Relation Age of Onset   • Hypertension Father    • Stomach cancer Father    • Diabetes Father    • Esophageal cancer Father    • Throat cancer Sister    • Diabetes Paternal Grandmother    • Hypertension Paternal Grandfather    • Colon cancer Paternal Grandfather    • Heart disease Mother    • Colon cancer Paternal Uncle    • Colon cancer Paternal Uncle    • Colon cancer Paternal Uncle    • Ovarian cancer Cousin    • Stomach cancer Cousin    • Malig Hyperthermia Neg Hx         Review of Systems       General: no fever, no chills,  fatigue,no weight changes, no lack of appetite.  Eyes: no epiphora, xerophthalmia,conjunctivitis, pain, glaucoma, blurred vision, blindness, secretion, photophobia, proptosis, diplopia.  Ears: no otorrhea, tinnitus, otorrhagia, deafness, pain, vertigo.  Nose: no rhinorrhea, no epistaxis, no alteration in perception of odors, no sinuses pressure.  Mouth: no alteration in gums or teeth,  No ulcers, no difficulty with mastication or deglut ion, no odynophagia.  Neck: no masses or pain, no thyroid alterations, no pain in muscles or arteries, no carotid odynia, no crepitation.  Respiratory: no cough, no sputum production,no dyspnea,no trepopnea, no pleuritic pain,no hemoptysis.  Heart: no syncope, no irregularity, no palpitations, no angina,no orthopnea,no paroxysmal nocturnal dyspnea.  Vascular Venous: no tenderness,no edema,no palpable cords,no postphlebitic syndrome, no skin changes no ulcerations.  Vascular Arterial: no distal ischemia, noclaudication, no gangrene, no neuropathic ischemic pain, no skin ulcers, no paleness no cyanosis.  GI: no dysphagia, no odynophagia, no regurgitation, no heartburn,LESS indigestion,no nausea,no vomiting,no hematemesis ,no melena,no jaundice,no distention, no obstipation,no enterorrhagia,no proctalgia,no anal  lesions, no changes in bowel  "habits.  : no frequency, no hesitancy, no hematuria, no discharge,no  pain.  Musculoskeletal: no muscle or tendon pain or inflammation,no  joint pain, no edema, no functional limitation,no fasciculations, no mass.  Neurologic: no headache, no seizures, noalterations on Craneal nerves, no motor deficit, no sensory deficit, normal coordination, no alteration in memory,normal orientation, calculation,normal writting, verbal and written language.  Skin: no rashes,no pruritus no localized lesions.  Psychiatric: no anxiety, no depression,no agitation, no delusions, proper insight.                Objective     Vitals:    12/16/19 1456   BP: 153/90   Pulse: 104   Resp: 14   Temp: 98 °F (36.7 °C)   TempSrc: Oral   SpO2: 97%   Weight: 86 kg (189 lb 11.2 oz)   Height: 149 cm (58.66\")   PainSc: 3  Comment: pain in thr RT side of temple where she had surgery     Current Status 12/16/2019   ECOG score 0       Physical Exam      GENERAL:  Well-developed, well-nourished  Patient  in no acute distress.   SKIN:  Warm, dry ,NO rashes,NO purpura ,NO petechiae.  HEENT:  Pupils were equal and reactive to light and accomodation, conjunctivas non injected, no pterigion, normal extraocular movements, normal visual acuity.   Mouth mucosa was moist, no exudates in oropharynx, normal gum line, normal roof of the mouth and pillars, normal papillations of the tongue.SURGICAL SITES TEMPORAL AREAS ARE WELL HEALED  NECK:  Supple with good range of motion; no thyromegaly or masses, no JVD or bruits, no cervical adenopathies.No carotid arteries pain, no carotid abnormal pulsation , NO arterial dance.  LYMPHATICS:  No cervical, NO supraclavicular, NO axillary,NO epitrochlear , NO inguinal adenopathy.  CHEST:  Normal excursion of both gina thoraces, normal voice fremitus, no subcutaneous emphysema, normal axillas, no rashes or acanthosis nigricans. Lungs clear to percussion and auscultation, normal breath sounds bilaterally, no wheezing,NO crackles " NO ronchi, NO stridor, NO rubs.  CARDIAC AND VASCULAR:  normal rate and regular rhythm, without murmurs,NO rubs NO S3 NO S4 right or left . Normal femoral, popliteal, pedis, brachial and carotid pulses.  INSPECTION of  breast documented symmetry of the tissue per se and location and size of the nipple,no retractions or inversion of the nipple, normal skin without lesions, no erythema or nodules,no paud'orange, no prominence of superficial veins or chest wall collateral circulation.PALPATION of the breast documented normal skin turgor, no induration, alteration in local temperature, or pain, no palpable masses or nodules, normal mobility of the tissues,no fixation of the tissue or parenchyma to the chest wall, no alteration at the tail of the breasts or axillas, no adenopathies.LEFT BREAST MASTECTOMY Surgical site was well healed.No lymphedema in either extremity.  ABDOMEN:  Soft, nontender with no organomegaly or masses, no ascites, no collateral circulation,no distention,no [place] sign, no abdominal pain, no inguinal hernias,no umbilical hernia, no abdominal bruits. Normal bowel sounds.  GENITAL: Not  Performed.  EXTREMITIES  AND SPINE:  No clubbing, cyanosis or edema, no deformities or pain .No kyphosis, scoliosis, deformities or pain in spine, ribs or pelvic bone.  NEUROLOGICAL:  Patient was awake, alert, oriented to time, person and place.                                        RECENT LABS:  Hematology WBC   Date Value Ref Range Status   12/16/2019 11.42 (H) 3.40 - 10.80 10*3/mm3 Final     RBC   Date Value Ref Range Status   12/16/2019 3.93 3.77 - 5.28 10*6/mm3 Final     Hemoglobin   Date Value Ref Range Status   12/16/2019 12.3 12.0 - 15.9 g/dL Final     Hematocrit   Date Value Ref Range Status   12/16/2019 40.0 34.0 - 46.6 % Final     Platelets   Date Value Ref Range Status   12/16/2019 258 140 - 450 10*3/mm3 Final          Tissue Pathology Exam: [id number]   Order: [id number]   Status:  Final result   Visible  "to patient:  Yes (MyChart) Next appt:  None Dx:  Duodenal adenoma   Component    Case Report   Surgical Pathology Report                         Case: [id number]                                   Authorizing Provider:  Haile Handley MD    Collected:           10/11/2019 02:55 PM           Ordering Location:     [facility]  Received:            10/11/2019 09:42 PM                                  ENDO SUITES                                                                   Pathologist:           Mabel Valle MD                                                     Specimen:    Small Intestine, Duodenum, polyps                                                          Final Diagnosis   1.  Duodenum, Biopsy:                A.  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                 B.  Predominately retained villous architecture without increase in villous lymphocytes.                C.  Negative for dysplasia.       swm/brb   Electronically signed by Mabel Valle MD on 10/14/2019 at 1205   Gross Description    1. Received in formalin labeled \"small intestine, duodenum, polyps\" are 2 soft tan tissue fragments measuring 0.2 cm in and 0.7 cm in greatest dimension.  The margin of the largest fragment is inked black and the fragment is trisected.  The specimen is entirely submitted in cassette 1A.                      Assessment/Plan      1. This patient has undergone a left-sided mastectomy for DCIS of the left breast. The patient had a large tumor that was high grade with negative margins of resection, minimal microinvasion and negative sentinel lymph nodes. The patient’s estrogen receptor and progesterone receptor were negative and she was HER2 negative as well at the time of the original diagnosis. Under these premises, I think the patient is done with the treatment for this and I do not recommend any form of adjuvant chemotherapy, " radiation treatment and obviously no hormonal therapy. There is no role for Herceptin or medicines of this nature either.           I discussed with her the need for her eventually in the future to have a repeat breast examination on the right side, mammogram and why not even ultrasound given the fact that was a lobular component of disease.     2. From the point of view of her anemia, iron deficiency, the patient has been replaced with IV iron. The hemoglobin has substantially improved and her ferritin and iron profile also have improved. The patient feels better in this regard. This will be reevaluated when she returns back in 4 months.     3. The patient has a polyp in the duodenum . Since the previous visit, the patient has undergone endoscopy and removal of this. She had duodenitis as documented by the pathology, minimal Brunner gland hyperplasia but no obvious malignancy was found. The patient has had dramatic improvement in her upper GI symptoms since she stopped drinking coffee and drinking so many cokes through the day. Her abdominal examination besides showing obesity shows no areas of pain, tenderness or organomegaly. Therefore, I recommended for her to continue doing her diet in absence of all this junk food and this coffee that was basically killing her upper GI tract.     In regard to her temporal artery biopsies, 1 of them is positive for temporal arteritis, 1 of them is negative. The pathology report from [facility] has been placed in Epic. This was done by Dr. GOVEA a few days ago. The patient is supposed to see her rheumatologist in 03/2020. I do not think she needs to wait for 3 or 4 months to get this thing to happen. She has been on prednisone and I do believe that she needs to be seen by her rheumatologist ASAP and decide what to do with this pathological diagnosis. Very likely she will require modification in her medication regimen for this.     In regard to her thyroid function and  diabetes control, the status of this is unknown. She will be called at home with the report of her glucose and her TSH that have been obtained today.     I am planning to review her back in 4 months with a CBC, ferritin, iron, TIBC, CMP, reticulated hemoglobin as well as TSH and go from there.     I have not prescribed any medication for her at this time besides the need for her to take care of her personal issues.     I discussed all these facts with the patient and her  in detail.     I reviewed records from [facility], in regard to her temporal arteritis biopsy that were placed in Epic.

## 2019-12-17 ENCOUNTER — TELEPHONE (OUTPATIENT)
Dept: ONCOLOGY | Facility: CLINIC | Age: 74
End: 2019-12-17

## 2019-12-17 NOTE — TELEPHONE ENCOUNTER
----- Message from Jean-Claude Farnsworth MD sent at 12/16/2019  5:48 PM EST -----  CALL HER HB A1C IS STILL VERY HIGH SEND REPORT TO HER PRIMARY CARE

## 2020-01-08 ENCOUNTER — LAB (OUTPATIENT)
Dept: LAB | Facility: HOSPITAL | Age: 75
End: 2020-01-08

## 2020-01-08 ENCOUNTER — TRANSCRIBE ORDERS (OUTPATIENT)
Dept: LAB | Facility: HOSPITAL | Age: 75
End: 2020-01-08

## 2020-01-08 DIAGNOSIS — N25.81 SECONDARY HYPERPARATHYROIDISM (HCC): ICD-10-CM

## 2020-01-08 DIAGNOSIS — N18.30 CKD (CHRONIC KIDNEY DISEASE), STAGE III (HCC): Primary | ICD-10-CM

## 2020-01-08 DIAGNOSIS — I12.9 HYPERTENSIVE KIDNEY DISEASE: ICD-10-CM

## 2020-01-08 DIAGNOSIS — E55.9 VITAMIN D DEFICIENCY: ICD-10-CM

## 2020-01-08 DIAGNOSIS — N18.30 CKD (CHRONIC KIDNEY DISEASE), STAGE III (HCC): ICD-10-CM

## 2020-01-08 LAB
25(OH)D3 SERPL-MCNC: 19.9 NG/ML (ref 30–100)
ANION GAP SERPL CALCULATED.3IONS-SCNC: 12.4 MMOL/L (ref 5–15)
BILIRUB UR QL STRIP: NEGATIVE
BUN BLD-MCNC: 29 MG/DL (ref 8–23)
BUN/CREAT SERPL: 25.2 (ref 7–25)
CALCIUM SPEC-SCNC: 9.6 MG/DL (ref 8.6–10.5)
CHLORIDE SERPL-SCNC: 100 MMOL/L (ref 98–107)
CLARITY UR: CLEAR
CO2 SERPL-SCNC: 27.6 MMOL/L (ref 22–29)
COLOR UR: YELLOW
CREAT BLD-MCNC: 1.15 MG/DL (ref 0.57–1)
CREAT UR-MCNC: 72.8 MG/DL
DEPRECATED RDW RBC AUTO: 41.8 FL (ref 37–54)
ERYTHROCYTE [DISTWIDTH] IN BLOOD BY AUTOMATED COUNT: 11.8 % (ref 12.3–15.4)
GFR SERPL CREATININE-BSD FRML MDRD: 46 ML/MIN/1.73
GLUCOSE BLD-MCNC: 114 MG/DL (ref 65–99)
GLUCOSE UR STRIP-MCNC: NEGATIVE MG/DL
HCT VFR BLD AUTO: 39.1 % (ref 34–46.6)
HGB BLD-MCNC: 12.7 G/DL (ref 12–15.9)
HGB UR QL STRIP.AUTO: NEGATIVE
KETONES UR QL STRIP: NEGATIVE
LEUKOCYTE ESTERASE UR QL STRIP.AUTO: NEGATIVE
MCH RBC QN AUTO: 31.4 PG (ref 26.6–33)
MCHC RBC AUTO-ENTMCNC: 32.5 G/DL (ref 31.5–35.7)
MCV RBC AUTO: 96.5 FL (ref 79–97)
NITRITE UR QL STRIP: NEGATIVE
PH UR STRIP.AUTO: <=5 [PH] (ref 5–8)
PHOSPHATE SERPL-MCNC: 2.8 MG/DL (ref 2.5–4.5)
PLATELET # BLD AUTO: 260 10*3/MM3 (ref 140–450)
PMV BLD AUTO: 10.1 FL (ref 6–12)
POTASSIUM BLD-SCNC: 4.7 MMOL/L (ref 3.5–5.2)
PROT UR QL STRIP: NEGATIVE
PROT UR-MCNC: 18 MG/DL
PTH-INTACT SERPL-MCNC: 57 PG/ML (ref 15–65)
RBC # BLD AUTO: 4.05 10*6/MM3 (ref 3.77–5.28)
SODIUM BLD-SCNC: 140 MMOL/L (ref 136–145)
SP GR UR STRIP: 1.02 (ref 1–1.03)
UROBILINOGEN UR QL STRIP: NORMAL
WBC NRBC COR # BLD: 11.48 10*3/MM3 (ref 3.4–10.8)

## 2020-01-08 PROCEDURE — 82570 ASSAY OF URINE CREATININE: CPT

## 2020-01-08 PROCEDURE — 81003 URINALYSIS AUTO W/O SCOPE: CPT

## 2020-01-08 PROCEDURE — 83970 ASSAY OF PARATHORMONE: CPT

## 2020-01-08 PROCEDURE — 84100 ASSAY OF PHOSPHORUS: CPT

## 2020-01-08 PROCEDURE — 36415 COLL VENOUS BLD VENIPUNCTURE: CPT

## 2020-01-08 PROCEDURE — 85027 COMPLETE CBC AUTOMATED: CPT

## 2020-01-08 PROCEDURE — 84156 ASSAY OF PROTEIN URINE: CPT

## 2020-01-08 PROCEDURE — 82306 VITAMIN D 25 HYDROXY: CPT

## 2020-01-08 PROCEDURE — 80048 BASIC METABOLIC PNL TOTAL CA: CPT

## 2020-01-29 ENCOUNTER — ANESTHESIA EVENT (OUTPATIENT)
Dept: GASTROENTEROLOGY | Facility: HOSPITAL | Age: 75
End: 2020-01-29

## 2020-01-29 ENCOUNTER — HOSPITAL ENCOUNTER (OUTPATIENT)
Facility: HOSPITAL | Age: 75
Setting detail: HOSPITAL OUTPATIENT SURGERY
Discharge: HOME | End: 2020-01-29
Attending: INTERNAL MEDICINE | Admitting: INTERNAL MEDICINE

## 2020-01-29 ENCOUNTER — ANESTHESIA (OUTPATIENT)
Dept: GASTROENTEROLOGY | Facility: HOSPITAL | Age: 75
End: 2020-01-29

## 2020-01-29 VITALS
SYSTOLIC BLOOD PRESSURE: 152 MMHG | BODY MASS INDEX: 36.66 KG/M2 | RESPIRATION RATE: 16 BRPM | HEART RATE: 84 BPM | HEIGHT: 60 IN | TEMPERATURE: 98.6 F | OXYGEN SATURATION: 99 % | WEIGHT: 186.7 LBS | DIASTOLIC BLOOD PRESSURE: 69 MMHG

## 2020-01-29 LAB — GLUCOSE BLDC GLUCOMTR-MCNC: 225 MG/DL (ref 70–130)

## 2020-01-29 PROCEDURE — S0260 H&P FOR SURGERY: HCPCS | Performed by: INTERNAL MEDICINE

## 2020-01-29 PROCEDURE — 82962 GLUCOSE BLOOD TEST: CPT

## 2020-01-29 PROCEDURE — 43235 EGD DIAGNOSTIC BRUSH WASH: CPT | Performed by: INTERNAL MEDICINE

## 2020-01-29 PROCEDURE — 25010000002 PROPOFOL 10 MG/ML EMULSION: Performed by: NURSE ANESTHETIST, CERTIFIED REGISTERED

## 2020-01-29 RX ORDER — BUDESONIDE AND FORMOTEROL FUMARATE DIHYDRATE 160; 4.5 UG/1; UG/1
2 AEROSOL RESPIRATORY (INHALATION)
COMMUNITY
End: 2023-04-06

## 2020-01-29 RX ORDER — SODIUM CHLORIDE 0.9 % (FLUSH) 0.9 %
10 SYRINGE (ML) INJECTION AS NEEDED
Status: DISCONTINUED | OUTPATIENT
Start: 2020-01-29 | End: 2020-01-29 | Stop reason: HOSPADM

## 2020-01-29 RX ORDER — SODIUM CHLORIDE 9 MG/ML
30 INJECTION, SOLUTION INTRAVENOUS CONTINUOUS PRN
Status: DISCONTINUED | OUTPATIENT
Start: 2020-01-29 | End: 2020-01-29 | Stop reason: HOSPADM

## 2020-01-29 RX ORDER — LIDOCAINE HYDROCHLORIDE 20 MG/ML
INJECTION, SOLUTION INFILTRATION; PERINEURAL AS NEEDED
Status: DISCONTINUED | OUTPATIENT
Start: 2020-01-29 | End: 2020-01-29 | Stop reason: SURG

## 2020-01-29 RX ORDER — PROPOFOL 10 MG/ML
VIAL (ML) INTRAVENOUS CONTINUOUS PRN
Status: DISCONTINUED | OUTPATIENT
Start: 2020-01-29 | End: 2020-01-29 | Stop reason: SURG

## 2020-01-29 RX ADMIN — SODIUM CHLORIDE 30 ML/HR: 9 INJECTION, SOLUTION INTRAVENOUS at 10:32

## 2020-01-29 RX ADMIN — PROPOFOL 180 MCG/KG/MIN: 10 INJECTION, EMULSION INTRAVENOUS at 10:43

## 2020-01-29 RX ADMIN — LIDOCAINE HYDROCHLORIDE 100 MG: 20 INJECTION, SOLUTION INFILTRATION; PERINEURAL at 10:43

## 2020-01-29 NOTE — ANESTHESIA POSTPROCEDURE EVALUATION
Patient: Blanca Lam    Procedure Summary     Date:  01/29/20 Room / Location:   [facility]    Anesthesia Start:  1038 Anesthesia Stop:  1055    Procedure:  ESOPHAGOGASTRODUODENOSCOPY (N/A Esophagus) Diagnosis:       Gastritis without bleeding, unspecified chronicity, unspecified gastritis type      Polyp of duodenum      (Gastritis without bleeding, unspecified chronicity, unspecified gastritis type [K29.70])      (Polyp of duodenum [K31.7])    Surgeon:  Haile Handley MD Provider:  Kay Carpenter MD    Anesthesia Type:  MAC ASA Status:  3          Anesthesia Type: MAC    Vitals  Vitals Value Taken Time   /69 1/29/2020 11:14 AM   Temp     Pulse 84 1/29/2020 11:14 AM   Resp 16 1/29/2020 11:14 AM   SpO2 99 % 1/29/2020 11:14 AM           Post Anesthesia Care and Evaluation    Patient location during evaluation: PACU  Patient participation: complete - patient participated  Level of consciousness: awake and alert  Pain management: adequate  Airway patency: patent  Anesthetic complications: No anesthetic complications  PONV Status: none  Cardiovascular status: acceptable  Respiratory status: acceptable  Hydration status: acceptable

## 2020-01-29 NOTE — ANESTHESIA PREPROCEDURE EVALUATION
Anesthesia Evaluation     Patient summary reviewed and Nursing notes reviewed   NPO Solid Status: > 8 hours  NPO Liquid Status: > 8 hours           Airway   Mallampati: II  TM distance: >3 FB  Neck ROM: full  No difficulty expected  Dental - normal exam     Pulmonary - normal exam    breath sounds clear to auscultation  (+) asthma,sleep apnea,   Cardiovascular - normal exam    Rhythm: regular  Rate: normal    (+) hypertension 2 medications or greater,       Neuro/Psych  (+) psychiatric history Anxiety and Depression,     GI/Hepatic/Renal/Endo    (+)   renal disease CRI, diabetes mellitus type 2,     ROS Comment: Duodenal adenoma    Musculoskeletal (-) negative ROS    Abdominal   (+) obese,    Substance History - negative use     OB/GYN negative ob/gyn ROS         Other      history of cancer      Other Comment: Tempora arteritis                  Anesthesia Plan    ASA 3     MAC     intravenous induction     Anesthetic plan, all risks, benefits, and alternatives have been provided, discussed and informed consent has been obtained with: patient.

## 2020-04-14 ENCOUNTER — OFFICE VISIT (OUTPATIENT)
Dept: ONCOLOGY | Facility: CLINIC | Age: 75
End: 2020-04-14

## 2020-04-14 ENCOUNTER — APPOINTMENT (OUTPATIENT)
Dept: LAB | Facility: HOSPITAL | Age: 75
End: 2020-04-14

## 2020-04-14 ENCOUNTER — TELEPHONE (OUTPATIENT)
Dept: ONCOLOGY | Facility: CLINIC | Age: 75
End: 2020-04-14

## 2020-04-14 DIAGNOSIS — C50.919 PRIMARY MALIGNANT NEOPLASM OF FEMALE BREAST (HCC): Primary | ICD-10-CM

## 2020-04-14 DIAGNOSIS — D13.2 DUODENAL ADENOMA: ICD-10-CM

## 2020-04-14 DIAGNOSIS — K31.7 POLYP OF DUODENUM: ICD-10-CM

## 2020-04-14 DIAGNOSIS — D50.8 OTHER IRON DEFICIENCY ANEMIA: ICD-10-CM

## 2020-04-14 DIAGNOSIS — D64.9 FATIGUE ASSOCIATED WITH ANEMIA: ICD-10-CM

## 2020-04-14 DIAGNOSIS — R27.0 ATAXIA: ICD-10-CM

## 2020-04-14 DIAGNOSIS — D05.12 BREAST NEOPLASM, TIS (DCIS), LEFT: ICD-10-CM

## 2020-04-14 PROCEDURE — 99441 PR PHYS/QHP TELEPHONE EVALUATION 5-10 MIN: CPT | Performed by: INTERNAL MEDICINE

## 2020-04-14 NOTE — PROGRESS NOTES
Subjective     REASON FOR FOLLOW UP:  ANEMIA, FATIGUE, FREQUENT FALLS, AFRAID OF WALKING, ABDOMINAL PAIN AND CONTINUO'S  TURMOIL IN BOWEL FUNCTION AND DIGESTION,DCIS OF THE LEFT BREAST SP MASTECTOMY      History of Present Illness   You have chosen to receive care through a telephone visit. Do you consent to use a telephone visit for your medical care today? YES   I have performed an eVisit on this patient today. Overall she continues doing fine. She has had no issues in regard her surgical scar from the mastectomy and she has not developed any lymphedema in the left upper extremity. Her appetite has remained good, she is drinking minimal if any cokes and no coffee and actually her diabetes care seems to be that is better. Her weight is stable. Her bowel activity is normal, no passage of blood in the stools, urination is normal. No cardiovascular or respiratory issues. Her blood pressure has been swinging up and down depending on emotions given coronavirus issues. She is watching a lot of television and I have asked her to stop doing this.    Overall no other new problems. Her  is the one that is going out for groceries, he is taking all of the precautions in regard to coronavirus prevention.       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 Daniel Gutierrez, [facility]   •  SECTION N/A     x3   • CHOLECYSTECTOMY N/A    • COLONOSCOPY W/ POLYPECTOMY N/A 2019    Enlarged folds in the antrum: biopsied; duodenal, transverse colon x2, splenic flexure, descending colon and sigmoid colon polyps: biopsied (path: tubular adenoma x6)-Dr. Zak De Jesus, [facility]   • ENDOSCOPY  10/11/2019    Procedure: ESOPHAGOGASTRODUODENOSCOPY with hot snare polypectomy;  Surgeon: Haile Handley MD;  Location: [facility] ENDOSCOPY;  Service: Gastroenterology   • ENDOSCOPY N/A 2020    Procedure: ESOPHAGOGASTRODUODENOSCOPY;  Surgeon: Haile Handley MD;  Location: [facility] ENDOSCOPY;  Service: Gastroenterology   • MASTECTOMY WITH SENTINEL NODE BIOPSY AND AXILLARY NODE DISSECTION Left 7/3/2019    Procedure: LEFT BREAST MASTECTOMY WITH SENTINEL NODE BIOPSY AND , v-y plasty closure;  Surgeon: Tahmina James MD;  Location: [facility] MAIN OR;  Service: General   • SUBTOTAL HYSTERECTOMY Bilateral     ovaries still in tact   • TEMPORAL ARTERY BIOPSY Bilateral 2019        Current Outpatient Medications on File Prior to Visit   Medication Sig Dispense Refill   • albuterol sulfate HFA (PROAIR HFA) 108 (90 Base) MCG/ACT inhaler Every 6 (Six) Hours.     • atorvastatin (LIPITOR) 20 MG tablet Take 20 mg by mouth Every Other Day.     • bisoprolol (ZEBeta) 10 MG tablet Take 10 mg by mouth Every Night.     • budesonide-formoterol (SYMBICORT) 160-4.5 MCG/ACT inhaler Inhale 2 puffs 2 (Two) Times a Day.     • Insulin Glargine (TOUJEO SOLOSTAR SC) Inject 40 Units under the skin into the appropriate area as directed Every Night. INSTRUCTED PT TO FOLLOW MD INSTRUCTIONS REGARDING DOSING BEFORE SURGERY     • Insulin Lispro (HUMALOG PEN SC) Inject  under the skin into the appropriate area as directed 3 (Three) Times a Day With Meals. 25 units at breakfast, 20 units at lunch, 22 units at dinner/INSTRUCTED PT TO FOLLOW MD INSTRUCTIONS  REGARDING DOSING BEFORE SURGERY     • levocetirizine (XYZAL) 5 MG tablet Take 5 mg by mouth Every Evening.     • montelukast (SINGULAIR) 10 MG tablet Take 10 mg by mouth Every Night.     • NON FORMULARY Apply 1 each topically to the appropriate area as directed Daily. Cream to foot rash, unsure of name     • omeprazole (priLOSEC) 20 MG capsule Take 20 mg by mouth 2 (Two) Times a Day As Needed.     • ondansetron (ZOFRAN) 4 MG tablet ondansetron HCl 4 mg tablet     • predniSONE (DELTASONE) 2.5 MG tablet Take 7.5 mg by mouth Every Evening.     • predniSONE (DELTASONE) 5 MG tablet prednisone 5 mg tablet     • valsartan-hydrochlorothiazide (DIOVAN-HCT) 160-25 MG per tablet valsartan 160 mg-hydrochlorothiazide 25 mg tablet       No current facility-administered medications on file prior to visit.         ALLERGIES:    Allergies   Allergen Reactions   • Aspirin Nausea Only     Low tolerance, abdominal pain   • Sulfa Antibiotics Unknown (See Comments)     Happened as a child        Social History     Socioeconomic History   • Marital status:      Spouse name: Ashwin   • Number of children: 3   • Years of education: College   • Highest education level: Not on file   Occupational History   • Occupation: POSTAL SERVICE     Employer: RETIRED   Social Needs   • Financial resource strain: Not hard at all   • Food insecurity:     Worry: Never true     Inability: Never true   • Transportation needs:     Medical: No     Non-medical: No   Tobacco Use   • Smoking status: Never Smoker   • Smokeless tobacco: Never Used   • Tobacco comment: caffine use   Substance and Sexual Activity   • Alcohol use: No     Frequency: Monthly or less     Drinks per session: 1 or 2     Binge frequency: Never     Comment: one or two small drinks a year   • Drug use: No   • Sexual activity: Defer     Comment: Spouse, Ashwin        Family History   Problem Relation Age of Onset   • Hypertension Father    • Stomach cancer Father    • Diabetes Father     • Esophageal cancer Father    • Throat cancer Sister    • Diabetes Paternal Grandmother    • Hypertension Paternal Grandfather    • Colon cancer Paternal Grandfather    • Heart disease Mother    • Colon cancer Paternal Uncle    • Colon cancer Paternal Uncle    • Colon cancer Paternal Uncle    • Ovarian cancer Cousin    • Stomach cancer Cousin    • Malig Hyperthermia Neg Hx         Review of Systems           General: no fever, no chills, no fatigue,no weight changes, no lack of appetite.  Eyes: no epiphora, xerophthalmia,conjunctivitis, pain, glaucoma, blurred vision, blindness, secretion, photophobia, proptosis, diplopia.  Ears: no otorrhea, tinnitus, otorrhagia, deafness, pain, vertigo.  Nose: no rhinorrhea, no epistaxis, no alteration in perception of odors, no sinuses pressure.  Mouth: no alteration in gums or teeth,  No ulcers, no difficulty with mastication or deglut ion, no odynophagia.  Neck: no masses or pain, no thyroid alterations, no pain in muscles or arteries, no carotid odynia, no crepitation.  Respiratory: no cough, no sputum production,no dyspnea,no trepopnea, no pleuritic pain,no hemoptysis.  Heart: no syncope, no irregularity, no palpitations, no angina,no orthopnea,no paroxysmal nocturnal dyspnea.  Vascular Venous: no tenderness,no edema,no palpable cords,no postphlebitic syndrome, no skin changes no ulcerations.  Vascular Arterial: no distal ischemia, noclaudication, no gangrene, no neuropathic ischemic pain, no skin ulcers, no paleness no cyanosis.  GI: no dysphagia, no odynophagia, no regurgitation, no heartburn,no indigestion,no nausea,no vomiting,no hematemesis ,no melena,no jaundice,no distention, no obstipation,no enterorrhagia,no proctalgia,no anal  lesions, no changes in bowel habits.  : no frequency, no hesitancy, no hematuria, no discharge,no  pain.  Musculoskeletal: no muscle or tendon pain or inflammation,no  joint pain, no edema, no functional limitation,no fasciculations, no  mass.  Neurologic: no headache, no seizures, noalterations on Craneal nerves, no motor deficit, no sensory deficit, normal coordination, no alteration in memory,normal orientation, calculation,normal writting, verbal and written language.  Skin: no rashes,no pruritus no localized lesions.  Psychiatric:  anxiety, no depression,no agitation, no delusions, proper insight.                Objective     There were no vitals filed for this visit.  Current Status 12/16/2019   ECOG score 0       Physical Exam  NONE TELEPHONIC VISIT, SURGICAL SITE OF MASTECTOMY NORMAL NO LYMPHEDEMA                                          RECENT LABS:  Hematology WBC   Date Value Ref Range Status   01/08/2020 11.48 (H) 3.40 - 10.80 10*3/mm3 Final     RBC   Date Value Ref Range Status   01/08/2020 4.05 3.77 - 5.28 10*6/mm3 Final     Hemoglobin   Date Value Ref Range Status   01/08/2020 12.7 12.0 - 15.9 g/dL Final     Hematocrit   Date Value Ref Range Status   01/08/2020 39.1 34.0 - 46.6 % Final     Platelets   Date Value Ref Range Status   01/08/2020 260 140 - 450 10*3/mm3 Final                      Assessment/Plan      1. This patient has undergone a left-sided mastectomy for DCIS of the left breast. The patient had a large tumor that was high grade with negative margins of resection, minimal microinvasion and negative sentinel lymph nodes. The patient’s estrogen receptor and progesterone receptor were negative and she was HER2 negative as well at the time of the original diagnosis. Under these premises, I think the patient is done with the treatment for this and I do not recommend any form of adjuvant chemotherapy, radiation treatment and obviously no hormonal therapy. There is no role for Herceptin or medicines of this nature either.           I discussed with her the need for her eventually in the future to have a repeat breast examination on the right side, mammogram and why not even ultrasound given the fact that was a lobular component  of disease.     2. From the point of view of her anemia, iron deficiency, the patient has been replaced with IV iron. The hemoglobin has substantially improved and her ferritin and iron profile also have improved. The patient feels better in this regard. This will be reevaluated when she returns back in 3 MONTHS.     3. The patient has a polyp in the duodenum . Since the previous visit, the patient has undergone endoscopy and removal of this. She had duodenitis as documented by the pathology, minimal Brunner gland hyperplasia but no obvious malignancy was found. The patient has had dramatic improvement in her upper GI symptoms since she stopped drinking coffee and drinking so many cokes through the day. Her abdominal examination besides showing obesity shows no areas of pain, tenderness or organomegaly. Therefore, I recommended for her to continue doing her diet in absence of all this junk food and this coffee that was basically killing her upper GI tract.     In regard to her temporal artery biopsies, 1 of them is positive for temporal arteritis, 1 of them is negative. [facility]. This was done by Dr. GOVEA a few days ago. The patient is supposed to see her rheumatologist in 03/2020.    She was seen by rheumatology after the previous visit in 03/2020 and she was placed on prednisone that she continues taking at a low dose without any major difficulties. She has not had any other new health issues at this time. Overall she feels very well.    I discussed with her the need to review her back in a couple of months to check on her anemia issues and also to review her issues pertinent to her previous history of breast cancer.    Otherwise no other intervention from my point of view.     Duration of the eVisit 8 minutes 40 seconds.       I DISCUSSED WITH PATIENT IN DETAIL FORMS TO DECREASE CHANCES OF CORONAVIRUS INFECTION INCLUDING ISOLATION, PROPER HAND  LINDSAY, AVOID PUBLIC PLACES  WITH CROWDS, FOLLOW  CDC RECOMENDATIONS, AND KEEP PERSONAL AND SOCIAL RESPONSIBILITY.WARE A MASK IN PUBLIC OR MD VISITS.  PATIENT IS AWARE THIS INFECTION COULD HAVE SEVERE CONSEQUENCES TO PERSONAL HEALTH AND FAMILY RAMIFICATIONS OF THIS.

## 2020-06-26 ENCOUNTER — OFFICE VISIT (OUTPATIENT)
Dept: CARDIOLOGY | Facility: CLINIC | Age: 75
End: 2020-06-26

## 2020-06-26 VITALS
WEIGHT: 198 LBS | HEIGHT: 60 IN | BODY MASS INDEX: 38.87 KG/M2 | SYSTOLIC BLOOD PRESSURE: 142 MMHG | DIASTOLIC BLOOD PRESSURE: 80 MMHG | HEART RATE: 79 BPM

## 2020-06-26 DIAGNOSIS — R06.02 SOB (SHORTNESS OF BREATH): Primary | ICD-10-CM

## 2020-06-26 DIAGNOSIS — R07.2 PRECORDIAL PAIN: ICD-10-CM

## 2020-06-26 DIAGNOSIS — E66.01 MORBIDLY OBESE (HCC): ICD-10-CM

## 2020-06-26 DIAGNOSIS — I77.6 ARTERITIS (HCC): ICD-10-CM

## 2020-06-26 DIAGNOSIS — Z99.89 OSA ON CPAP: ICD-10-CM

## 2020-06-26 DIAGNOSIS — E11.21 DIABETIC NEPHROPATHY ASSOCIATED WITH TYPE 2 DIABETES MELLITUS (HCC): ICD-10-CM

## 2020-06-26 DIAGNOSIS — G47.33 OSA ON CPAP: ICD-10-CM

## 2020-06-26 PROCEDURE — 99214 OFFICE O/P EST MOD 30 MIN: CPT | Performed by: INTERNAL MEDICINE

## 2020-06-26 NOTE — PROGRESS NOTES
Subjective:     Encounter Date: 06/26/20        Patient ID: Blanca Lam is a 75 y.o. female.    Chief Complaint:  History of Present Illness    Dear Dr. Hall,    I had the pleasure of seeing this patient in the office today for evaluation.    I saw her approximately 3 years ago.  At that time we performed a echocardiogram and a Lexiscan Cardiolite.  At that point studies were normal except for grade 1 diastolic dysfunction.    Patient comes in because of worsening issues with dyspnea with activity.  She does get pressure and if fullness in her precordium when she gets the shortness of breath.  This tends to bother her when she is walking with her walker.  She does not tend to feel it when she sitting down or laying down.  She has had ongoing lower extremity edema.  She denies any orthopnea or PND.  She has not had a cough.  Sometimes she feels little lightheaded when she stands up.  No presyncope or syncope.  She has been very fatigued lately.  She was diagnosed with breast cancer in 2019.  She denies any palpitations or tachycardia.  She has not felt any irregularity to her heartbeat.    She had a left mastectomy for breast cancer in July 2019.  She follows with Dr. Farnsworth.     Interpretation Summary     · Left ventricular wall thickness is consistent with mild concentric hypertrophy.  · Left ventricular systolic function is normal. Estimated EF = 60%.  · Left atrial cavity size is borderline dilated.  · Left ventricular diastolic dysfunction (grade I) consistent with impaired relaxation.  · Mild mitral valve stenosis is present       She had an echocardiogram performed.  This was performed on June 30.  She had grade 1 diastolic dysfunction with normal left ventricular systolic function and ejection fraction of 60%.  Mild mitral stenosis was seen with a mean gradient across the mitral valve with 3 mmHg.    The following portions of the patient's history were reviewed and updated as appropriate:  allergies, current medications, past family history, past medical history, past social history, past surgical history and problem list.    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 Daniel Gutierrez, [facility]   •  SECTION N/A     x3   • CHOLECYSTECTOMY N/A    • COLONOSCOPY W/ POLYPECTOMY N/A 2019    Enlarged folds in the antrum: biopsied; duodenal, transverse colon x2, splenic flexure, descending colon and sigmoid colon polyps: biopsied (path: tubular adenoma x6)-Dr. Zak De Jesus, [facility]   • ENDOSCOPY  10/11/2019    Procedure: ESOPHAGOGASTRODUODENOSCOPY with hot snare polypectomy;  Surgeon: Haile Handley MD;  Location: [facility] ENDOSCOPY;  Service: Gastroenterology   • ENDOSCOPY N/A 2020    Procedure: ESOPHAGOGASTRODUODENOSCOPY;  Surgeon: Haile Handley MD;  Location: [facility] ENDOSCOPY;  Service: Gastroenterology   • MASTECTOMY WITH SENTINEL NODE BIOPSY AND AXILLARY NODE DISSECTION Left 7/3/2019    Procedure: LEFT BREAST MASTECTOMY WITH SENTINEL NODE BIOPSY AND , v-y plasty closure;  Surgeon: Tahmina James MD;  Location: [facility] MAIN OR;  Service: General   • SUBTOTAL HYSTERECTOMY Bilateral     ovaries still in tact   • TEMPORAL ARTERY BIOPSY Bilateral 2019       Social  History     Socioeconomic History   • Marital status:      Spouse name: Ashwin   • Number of children: 3   • Years of education: College   • Highest education level: Not on file   Occupational History   • Occupation: POSTAL SERVICE     Employer: RETIRED   Social Needs   • Financial resource strain: Not hard at all   • Food insecurity:     Worry: Never true     Inability: Never true   • Transportation needs:     Medical: No     Non-medical: No   Tobacco Use   • Smoking status: Never Smoker   • Smokeless tobacco: Never Used   • Tobacco comment: caffine use   Substance and Sexual Activity   • Alcohol use: No     Frequency: Monthly or less     Drinks per session: 1 or 2     Binge frequency: Never     Comment: one or two small drinks a year   • Drug use: No   • Sexual activity: Defer     Comment: Spouse, Ashwin       Review of Systems   Constitution: Negative for chills, decreased appetite, fever and night sweats.   HENT: Negative for ear discharge, ear pain, hearing loss, nosebleeds and sore throat.    Eyes: Negative for blurred vision, double vision and pain.   Cardiovascular: Negative for cyanosis.   Respiratory: Negative for hemoptysis and sputum production.    Endocrine: Negative for cold intolerance and heat intolerance.   Hematologic/Lymphatic: Negative for adenopathy.   Skin: Negative for dry skin, itching, nail changes, rash and suspicious lesions.   Musculoskeletal: Negative for arthritis, gout, muscle cramps, muscle weakness, myalgias and neck pain.   Gastrointestinal: Negative for anorexia, bowel incontinence, constipation, diarrhea, dysphagia, hematemesis and jaundice.   Genitourinary: Negative for bladder incontinence, dysuria, flank pain, frequency, hematuria and nocturia.   Neurological: Negative for focal weakness, numbness, paresthesias and seizures.   Psychiatric/Behavioral: Negative for altered mental status, hallucinations, hypervigilance, suicidal ideas and thoughts of violence.  "  Allergic/Immunologic: Negative for persistent infections.       Procedures       Objective:     Vitals:    06/26/20 1404   BP: 142/80   Pulse: 79   Weight: 89.8 kg (198 lb)   Height: 152.4 cm (60\")         Physical Exam   Constitutional: She is oriented to person, place, and time. She appears well-developed and well-nourished. No distress.   HENT:   Head: Normocephalic and atraumatic.   Nose: Nose normal.   Mouth/Throat: Oropharynx is clear and moist.   Eyes: Pupils are equal, round, and reactive to light. Conjunctivae and EOM are normal. Right eye exhibits no discharge. Left eye exhibits no discharge.   Neck: Normal range of motion. Neck supple. No tracheal deviation present. No thyromegaly present.   Cardiovascular: Normal rate, regular rhythm, S1 normal, S2 normal and normal pulses. Exam reveals no S3.   Murmur heard.  High-pitched blowing holosystolic murmur is present at the apex.  Pulmonary/Chest: Effort normal and breath sounds normal. No stridor. No respiratory distress. She exhibits no tenderness.   Abdominal: Soft. Bowel sounds are normal. She exhibits no distension and no mass. There is no tenderness. There is no rebound and no guarding.   Musculoskeletal: Normal range of motion. She exhibits edema. She exhibits no tenderness or deformity.   Lymphadenopathy:     She has no cervical adenopathy.   Neurological: She is alert and oriented to person, place, and time. She has normal reflexes.   Skin: Skin is warm and dry. No rash noted. She is not diaphoretic. No erythema.   Psychiatric: She has a normal mood and affect. Thought content normal.       Lab Review:             Performed        Assessment:          Diagnosis Plan   1. SOB (shortness of breath)     2. Precordial pain     3. Diabetic nephropathy associated with type 2 diabetes mellitus (CMS/HCC)     4. Arteritis (CMS/HCC)     5. Morbidly obese (CMS/HCC)     6. ANAYELI on CPAP            Plan:       1.  This patient has chest discomfort with both " typical and atypical components-a stress Cardiolite will be obtained  2.  Patient has grade 1 diastolic dysfunction with some increased swelling.  Now with worsened CARR, check an echocardiogram.  3.  Obesity  4.  Diabetes mellitus  5.  Hypertension-blood pressures under good control on current regimen  6.  Hyperlipidemia on lipid-lowering therapy    Thank you very much for allowing us to participate in the care of this pleasant patient.  Please don't hesitate to call if I can be of assistance in any way.      Current Outpatient Medications:   •  albuterol sulfate HFA (PROAIR HFA) 108 (90 Base) MCG/ACT inhaler, Every 6 (Six) Hours., Disp: , Rfl:   •  atorvastatin (LIPITOR) 20 MG tablet, Take 20 mg by mouth Every Other Day., Disp: , Rfl:   •  bisoprolol (ZEBeta) 10 MG tablet, Take 10 mg by mouth Every Night., Disp: , Rfl:   •  budesonide-formoterol (SYMBICORT) 160-4.5 MCG/ACT inhaler, Inhale 2 puffs 2 (Two) Times a Day., Disp: , Rfl:   •  Insulin Glargine (TOUJEO SOLOSTAR SC), Inject 40 Units under the skin into the appropriate area as directed Every Night. INSTRUCTED PT TO FOLLOW MD INSTRUCTIONS REGARDING DOSING BEFORE SURGERY, Disp: , Rfl:   •  Insulin Lispro (HUMALOG PEN SC), Inject  under the skin into the appropriate area as directed 3 (Three) Times a Day With Meals. 25 units at breakfast, 20 units at lunch, 22 units at dinner/INSTRUCTED PT TO FOLLOW MD INSTRUCTIONS REGARDING DOSING BEFORE SURGERY, Disp: , Rfl:   •  levocetirizine (XYZAL) 5 MG tablet, Take 5 mg by mouth Every Evening., Disp: , Rfl:   •  montelukast (SINGULAIR) 10 MG tablet, Take 10 mg by mouth Every Night., Disp: , Rfl:   •  NON FORMULARY, Apply 1 each topically to the appropriate area as directed Daily. Cream to foot rash, unsure of name, Disp: , Rfl:   •  omeprazole (priLOSEC) 20 MG capsule, Take 20 mg by mouth 2 (Two) Times a Day As Needed., Disp: , Rfl:   •  ondansetron (ZOFRAN) 4 MG tablet, ondansetron HCl 4 mg tablet, Disp: , Rfl:   •   predniSONE (DELTASONE) 2.5 MG tablet, Take 7.5 mg by mouth Every Evening., Disp: , Rfl:   •  predniSONE (DELTASONE) 5 MG tablet, prednisone 5 mg tablet, Disp: , Rfl:   •  valsartan-hydrochlorothiazide (DIOVAN-HCT) 160-25 MG per tablet, valsartan 160 mg-hydrochlorothiazide 25 mg tablet, Disp: , Rfl:          EMR Dragon/Transcription disclaimer:    Much of this encounter note is an electronic transcription/translation of spoken language to printed text. The electronic translation of spoken language may permit erroneous, or at times, nonsensical words or phrases to be inadvertently transcribed; Although I have reviewed the note for such errors, some may still exist.

## 2020-06-29 ENCOUNTER — LAB (OUTPATIENT)
Dept: LAB | Facility: HOSPITAL | Age: 75
End: 2020-06-29

## 2020-06-29 ENCOUNTER — OFFICE VISIT (OUTPATIENT)
Dept: ONCOLOGY | Facility: CLINIC | Age: 75
End: 2020-06-29

## 2020-06-29 VITALS
BODY MASS INDEX: 39.03 KG/M2 | DIASTOLIC BLOOD PRESSURE: 82 MMHG | HEIGHT: 60 IN | HEART RATE: 78 BPM | TEMPERATURE: 98.5 F | OXYGEN SATURATION: 99 % | WEIGHT: 198.8 LBS | RESPIRATION RATE: 18 BRPM | SYSTOLIC BLOOD PRESSURE: 180 MMHG

## 2020-06-29 DIAGNOSIS — D50.0 IRON DEFICIENCY ANEMIA DUE TO CHRONIC BLOOD LOSS: ICD-10-CM

## 2020-06-29 DIAGNOSIS — R27.0 ATAXIA: ICD-10-CM

## 2020-06-29 DIAGNOSIS — K31.7 POLYP OF DUODENUM: ICD-10-CM

## 2020-06-29 DIAGNOSIS — D64.9 FATIGUE ASSOCIATED WITH ANEMIA: ICD-10-CM

## 2020-06-29 DIAGNOSIS — D05.12 BREAST NEOPLASM, TIS (DCIS), LEFT: ICD-10-CM

## 2020-06-29 DIAGNOSIS — D50.8 OTHER IRON DEFICIENCY ANEMIA: ICD-10-CM

## 2020-06-29 DIAGNOSIS — D13.2 DUODENAL ADENOMA: ICD-10-CM

## 2020-06-29 DIAGNOSIS — C50.919 PRIMARY MALIGNANT NEOPLASM OF FEMALE BREAST (HCC): Primary | ICD-10-CM

## 2020-06-29 DIAGNOSIS — C50.919 PRIMARY MALIGNANT NEOPLASM OF FEMALE BREAST (HCC): ICD-10-CM

## 2020-06-29 LAB
ALBUMIN SERPL-MCNC: 3.8 G/DL (ref 3.5–5.2)
ALBUMIN/GLOB SERPL: 1.2 G/DL (ref 1.1–2.4)
ALP SERPL-CCNC: 104 U/L (ref 38–116)
ALT SERPL W P-5'-P-CCNC: 15 U/L (ref 0–33)
ANION GAP SERPL CALCULATED.3IONS-SCNC: 10.1 MMOL/L (ref 5–15)
AST SERPL-CCNC: 12 U/L (ref 0–32)
BASOPHILS # BLD AUTO: 0.04 10*3/MM3 (ref 0–0.2)
BASOPHILS NFR BLD AUTO: 0.3 % (ref 0–1.5)
BILIRUB SERPL-MCNC: 0.3 MG/DL (ref 0.2–1.2)
BUN SERPL-MCNC: 33 MG/DL (ref 6–20)
BUN/CREAT SERPL: 18.5 (ref 7.3–30)
CALCIUM SPEC-SCNC: 9.3 MG/DL (ref 8.5–10.2)
CHLORIDE SERPL-SCNC: 103 MMOL/L (ref 98–107)
CO2 SERPL-SCNC: 26.9 MMOL/L (ref 22–29)
CREAT SERPL-MCNC: 1.78 MG/DL (ref 0.6–1.1)
DEPRECATED RDW RBC AUTO: 46.2 FL (ref 37–54)
EOSINOPHIL # BLD AUTO: 0.18 10*3/MM3 (ref 0–0.4)
EOSINOPHIL NFR BLD AUTO: 1.4 % (ref 0.3–6.2)
ERYTHROCYTE [DISTWIDTH] IN BLOOD BY AUTOMATED COUNT: 12.5 % (ref 12.3–15.4)
FERRITIN SERPL-MCNC: 350.7 NG/ML (ref 13–150)
GFR SERPL CREATININE-BSD FRML MDRD: 28 ML/MIN/1.73
GLOBULIN UR ELPH-MCNC: 3.1 GM/DL (ref 1.8–3.5)
GLUCOSE SERPL-MCNC: 93 MG/DL (ref 74–124)
HCT VFR BLD AUTO: 38.2 % (ref 34–46.6)
HGB BLD-MCNC: 11.8 G/DL (ref 12–15.9)
HGB RETIC QN AUTO: 35.5 PG (ref 29.8–36.1)
IMM GRANULOCYTES # BLD AUTO: 0.11 10*3/MM3 (ref 0–0.05)
IMM GRANULOCYTES NFR BLD AUTO: 0.8 % (ref 0–0.5)
IMM RETICS NFR: 12.6 % (ref 3–15.8)
IRON 24H UR-MRATE: 54 MCG/DL (ref 37–145)
IRON SATN MFR SERPL: 19 % (ref 14–48)
LYMPHOCYTES # BLD AUTO: 1.08 10*3/MM3 (ref 0.7–3.1)
LYMPHOCYTES NFR BLD AUTO: 8.3 % (ref 19.6–45.3)
MCH RBC QN AUTO: 30.9 PG (ref 26.6–33)
MCHC RBC AUTO-ENTMCNC: 30.9 G/DL (ref 31.5–35.7)
MCV RBC AUTO: 100 FL (ref 79–97)
MONOCYTES # BLD AUTO: 0.9 10*3/MM3 (ref 0.1–0.9)
MONOCYTES NFR BLD AUTO: 6.9 % (ref 5–12)
NEUTROPHILS NFR BLD AUTO: 10.75 10*3/MM3 (ref 1.7–7)
NEUTROPHILS NFR BLD AUTO: 82.3 % (ref 42.7–76)
NRBC BLD AUTO-RTO: 0 /100 WBC (ref 0–0.2)
PLATELET # BLD AUTO: 227 10*3/MM3 (ref 140–450)
PMV BLD AUTO: 9.8 FL (ref 6–12)
POTASSIUM SERPL-SCNC: 4.8 MMOL/L (ref 3.5–4.7)
PROT SERPL-MCNC: 6.9 G/DL (ref 6.3–8)
RBC # BLD AUTO: 3.82 10*6/MM3 (ref 3.77–5.28)
RETICS/RBC NFR AUTO: 1.38 % (ref 0.7–1.9)
SODIUM SERPL-SCNC: 140 MMOL/L (ref 134–145)
TIBC SERPL-MCNC: 290 MCG/DL (ref 249–505)
TRANSFERRIN SERPL-MCNC: 207 MG/DL (ref 200–360)
WBC # BLD AUTO: 13.06 10*3/MM3 (ref 3.4–10.8)

## 2020-06-29 PROCEDURE — 84466 ASSAY OF TRANSFERRIN: CPT

## 2020-06-29 PROCEDURE — 99215 OFFICE O/P EST HI 40 MIN: CPT | Performed by: INTERNAL MEDICINE

## 2020-06-29 PROCEDURE — 80053 COMPREHEN METABOLIC PANEL: CPT

## 2020-06-29 PROCEDURE — 36415 COLL VENOUS BLD VENIPUNCTURE: CPT

## 2020-06-29 PROCEDURE — 85025 COMPLETE CBC W/AUTO DIFF WBC: CPT

## 2020-06-29 PROCEDURE — 82728 ASSAY OF FERRITIN: CPT

## 2020-06-29 PROCEDURE — 83540 ASSAY OF IRON: CPT

## 2020-06-29 PROCEDURE — 85046 RETICYTE/HGB CONCENTRATE: CPT

## 2020-06-29 RX ORDER — CETIRIZINE HYDROCHLORIDE 10 MG/1
10 TABLET ORAL DAILY
COMMUNITY
End: 2021-05-04

## 2020-06-29 NOTE — PROGRESS NOTES
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 SIMILAR QUESTIONING TODAY INCLUDING, CHILLS, FEVER, NEW COUGH, SHORTNESS OF BREATH, DIARRHEA,DIFFUSE BODY ACHES  AND CHANGES IN SMELL OR TASTE WERE NEGATIVE.DURING THE VISIT WITH THE PATIENT TODAY , PATIENT HAD FACE MASK, I HAD PROPPER PROTECTIVE EQUIPMENT, AND I DID HAND HYGIENE WITH SOAP AND WATER BEFORE AND AFTER THE VISIT.  This patient returns today to the office with fatigue and anxiety because her sister is being operated on at [facility] in [place] 11 hour surgery for head and neck cancer. Obviously this is giving her a tremendous degree of stress and anxiety. Other than that she has been seen by Cyrus Song MD, Cardiology and she is going to proceed with echocardiogram and stress testing this week given the fact of shortness of breath in the background of hypertension and diabetes. She is not experiencing any chest pain. Her appetite is acceptable, her weight is stable, her bowel function is normal with no melena. Urination is ongoing with no difficulties. She remains on prednisone 7.5 mg a day for her temporal arteritis. Otherwise the patient has no other new issues.       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 Daniel Gutierrez, [facility]   •  SECTION N/A     x3   • CHOLECYSTECTOMY N/A    • COLONOSCOPY W/ POLYPECTOMY N/A 2019    Enlarged folds in the antrum: biopsied; duodenal, transverse colon x2, splenic flexure, descending colon and sigmoid colon polyps: biopsied (path: tubular adenoma x6)-Dr. Zak De Jesus, [facility]   • ENDOSCOPY  10/11/2019    Procedure: ESOPHAGOGASTRODUODENOSCOPY with hot snare polypectomy;  Surgeon: Haile Handley MD;  Location: [facility] ENDOSCOPY;  Service: Gastroenterology   • ENDOSCOPY N/A 2020    Procedure: ESOPHAGOGASTRODUODENOSCOPY;  Surgeon: Haile Handley MD;  Location: [facility] ENDOSCOPY;  Service: Gastroenterology   • MASTECTOMY WITH SENTINEL NODE BIOPSY AND AXILLARY NODE DISSECTION Left 7/3/2019    Procedure: LEFT BREAST MASTECTOMY WITH SENTINEL NODE BIOPSY AND , v-y plasty closure;  Surgeon: Tahmina James MD;  Location: [facility] MAIN OR;  Service: General   • SUBTOTAL HYSTERECTOMY Bilateral     ovaries still in tact   • TEMPORAL ARTERY BIOPSY Bilateral 2019        Current Outpatient Medications on File Prior to Visit   Medication Sig Dispense Refill   • albuterol sulfate HFA (PROAIR HFA) 108 (90 Base) MCG/ACT inhaler Every 6 (Six) Hours.     • atorvastatin (LIPITOR) 20 MG tablet Take 20 mg by mouth Every Other Day.     • bisoprolol (ZEBeta) 10 MG tablet Take 10 mg by mouth Every Night.     • budesonide-formoterol (SYMBICORT) 160-4.5 MCG/ACT inhaler Inhale 2 puffs 2 (Two) Times a Day.     • cetirizine (zyrTEC) 10 MG tablet Take 10 mg by mouth Daily.     • Insulin Glargine (TOUJEO SOLOSTAR SC) Inject 40 Units  under the skin into the appropriate area as directed Every Night. INSTRUCTED PT TO FOLLOW MD INSTRUCTIONS REGARDING DOSING BEFORE SURGERY     • Insulin Lispro (HUMALOG PEN SC) Inject  under the skin into the appropriate area as directed 3 (Three) Times a Day With Meals. 25 units at breakfast, 20 units at lunch, 22 units at dinner/INSTRUCTED PT TO FOLLOW MD INSTRUCTIONS REGARDING DOSING BEFORE SURGERY     • montelukast (SINGULAIR) 10 MG tablet Take 10 mg by mouth Every Night.     • NON FORMULARY Apply 1 each topically to the appropriate area as directed Daily. Cream to foot rash, unsure of name     • omeprazole (priLOSEC) 20 MG capsule Take 20 mg by mouth 2 (Two) Times a Day As Needed.     • ondansetron (ZOFRAN) 4 MG tablet ondansetron HCl 4 mg tablet     • predniSONE (DELTASONE) 2.5 MG tablet Take 7.5 mg by mouth Every Evening.     • predniSONE (DELTASONE) 5 MG tablet prednisone 5 mg tablet     • valsartan-hydrochlorothiazide (DIOVAN-HCT) 160-25 MG per tablet valsartan 160 mg-hydrochlorothiazide 25 mg tablet     • [DISCONTINUED] levocetirizine (XYZAL) 5 MG tablet Take 5 mg by mouth Every Evening.       No current facility-administered medications on file prior to visit.         ALLERGIES:    Allergies   Allergen Reactions   • Aspirin Nausea Only     Low tolerance, abdominal pain   • Sulfa Antibiotics Unknown (See Comments)     Happened as a child        Social History     Socioeconomic History   • Marital status:      Spouse name: Ashwin   • Number of children: 3   • Years of education: College   • Highest education level: Not on file   Occupational History   • Occupation: POSTAL SERVICE     Employer: RETIRED   Social Needs   • Financial resource strain: Not hard at all   • Food insecurity:     Worry: Never true     Inability: Never true   • Transportation needs:     Medical: No     Non-medical: No   Tobacco Use   • Smoking status: Never Smoker   • Smokeless tobacco: Never Used   • Tobacco comment: caffine  use   Substance and Sexual Activity   • Alcohol use: No     Frequency: Monthly or less     Drinks per session: 1 or 2     Binge frequency: Never     Comment: one or two small drinks a year   • Drug use: No   • Sexual activity: Defer     Comment: Spouse, Ashwin        Family History   Problem Relation Age of Onset   • Hypertension Father    • Stomach cancer Father    • Diabetes Father    • Esophageal cancer Father    • Throat cancer Sister    • Diabetes Paternal Grandmother    • Hypertension Paternal Grandfather    • Colon cancer Paternal Grandfather    • Heart disease Mother    • Colon cancer Paternal Uncle    • Colon cancer Paternal Uncle    • Colon cancer Paternal Uncle    • Ovarian cancer Cousin    • Stomach cancer Cousin    • Malig Hyperthermia Neg Hx         Review of Systems               General: no fever, no chills,  fatigue,no weight changes, no lack of appetite.  Eyes: no epiphora, xerophthalmia,conjunctivitis, pain, glaucoma, blurred vision, blindness, secretion, photophobia, proptosis, diplopia.  Ears: no otorrhea, tinnitus, otorrhagia, deafness, pain, vertigo.  Nose: no rhinorrhea, no epistaxis, no alteration in perception of odors, no sinuses pressure.  Mouth: no alteration in gums or teeth,  No ulcers, no difficulty with mastication or deglut ion, no odynophagia.  Neck: no masses or pain, no thyroid alterations, no pain in muscles or arteries, no carotid odynia, no crepitation.  Respiratory: no cough, no sputum production, dyspnea,no trepopnea, no pleuritic pain,no hemoptysis.  Heart: no syncope, no irregularity, no palpitations, no angina,no orthopnea,no paroxysmal nocturnal dyspnea.  Vascular Venous: no tenderness,no edema,no palpable cords,no postphlebitic syndrome, no skin changes no ulcerations.  Vascular Arterial: no distal ischemia, noclaudication, no gangrene, no neuropathic ischemic pain, no skin ulcers, no paleness no cyanosis.  GI: no dysphagia, no odynophagia, no regurgitation, no  "heartburn,no indigestion,no nausea,no vomiting,no hematemesis ,no melena,no jaundice,no distention, no obstipation,no enterorrhagia,no proctalgia,no anal  lesions, no changes in bowel habits.  : no frequency, no hesitancy, no hematuria, no discharge,no  pain.  Musculoskeletal: no muscle or tendon pain or inflammation,no  joint pain, no edema, no functional limitation,no fasciculations, no mass.  Neurologic: no headache, no seizures, noalterations on Craneal nerves, no motor deficit, no sensory deficit, normal coordination, no alteration in memory,normal orientation, calculation,normal writting, verbal and written language.  Skin: no rashes,no pruritus no localized lesions.  Psychiatric:  anxiety, no depression,no agitation, no delusions, proper insight.              Objective     Vitals:    06/29/20 1531   BP: 180/82   Pulse: 78   Resp: 18   Temp: 98.5 °F (36.9 °C)   TempSrc: Skin   SpO2: 99%   Weight: 90.2 kg (198 lb 12.8 oz)   Height: 152.4 cm (60\")   PainSc: 0-No pain     Current Status 6/29/2020   ECOG score 0       Physical Exam    Patient screened negative for depression based on a PHQ-9 score of 1 on 6/29/2020.        This patient's ACP documentation is up to date, and there's nothing further left to document.    GENERAL ASPECT: IN GOOD HEALTH WALKING THROUGH TE OFFICE NO DIFFICULTIES, NO PAIN.PROPER NUTRITION.WELL KEPT PHYSICALLY.  EYES : NOT JAUNDICED, PUPILS WERE EQUAL.  HEART: REGULAR NO MURMURS , NO GALLOPS, NO RUBS.  ABDOMEN: NOT DISTENDED, NO PAIN, NO LIVER OR SPLEEN ENLARGEMENT, NO ASCITES, NO COLLATERAL CIRCULATION.  EXTREMITIES: NO EDEMA, NO PAIN, NO CLUBBING, NO DEFORMITIES.  NEUROLOGIC: NORMAL CONVERSATION, MEMORY , SPEECH AND GAIT.  SKIN: NO LESIONS.    INSPECTION of  breast documented symmetry of the tissue per se and location and size of the nipple,no retractions or inversion of the nipple, normal skin without lesions, no erythema or nodules,no paud'orange, no prominence of superficial veins " or chest wall collateral circulation.PALPATION of the breast documented normal skin turgor, no induration, alteration in local temperature, or pain, no palpable masses or nodules, normal mobility of the tissues,no fixation of the tissue or parenchyma to the chest wall, no alteration at the tail of the breasts or axillas, no adenopathies. Left mastectomy Surgical site was well healed.No lymphedema in either extremity.        RECENT LABS:  Hematology WBC   Date Value Ref Range Status   06/29/2020 13.06 (H) 3.40 - 10.80 10*3/mm3 Final     RBC   Date Value Ref Range Status   06/29/2020 3.82 3.77 - 5.28 10*6/mm3 Final     Hemoglobin   Date Value Ref Range Status   06/29/2020 11.8 (L) 12.0 - 15.9 g/dL Final     Hematocrit   Date Value Ref Range Status   06/29/2020 38.2 34.0 - 46.6 % Final     Platelets   Date Value Ref Range Status   06/29/2020 227 140 - 450 10*3/mm3 Final                      Assessment/Plan      1. This patient has undergone a left-sided mastectomy for DCIS of the left breast. The patient had a large tumor that was high grade with negative margins of resection, minimal microinvasion and negative sentinel lymph nodes. The patient’s estrogen receptor and progesterone receptor were negative and she was HER2 negative as well at the time of the original diagnosis. Under these premises, I think the patient is done with the treatment for this and I do not recommend any form of adjuvant chemotherapy, radiation treatment and obviously no hormonal therapy. There is no role for Herceptin or medicines of this nature either.           I discussed with her the need for her eventually in the future to have a repeat breast examination on the right side, mammogram and why not even ultrasound given the fact that was a lobular component of disease.     2. From the point of view of her anemia, iron deficiency, the patient has been replaced with IV iron. The hemoglobin has substantially improved and her ferritin and iron  profile also have improved. The patient feels better in this regard. This will be reevaluated when she returns back in 3 MONTHS.     3. The patient has a polyp in the duodenum . Since the previous visit, the patient has undergone endoscopy and removal of this. She had duodenitis as documented by the pathology, minimal Brunner gland hyperplasia but no obvious malignancy was found. The patient has had dramatic improvement in her upper GI symptoms since she stopped drinking coffee and drinking so many cokes through the day. Her abdominal examination besides showing obesity shows no areas of pain, tenderness or organomegaly. Therefore, I recommended for her to continue doing her diet in absence of all this junk food and this coffee that was basically killing her upper GI tract.     In regard to her temporal artery biopsies, 1 of them is positive for temporal arteritis, 1 of them is negative. The pathology report from [facility] has been placed in Epic. This was done by Dr. GOVEA a few days ago. The patient is supposed to see her rheumatologist in 03/2020.    She was seen by rheumatology after the previous visit in 03/2020 and she was placed on prednisone that she continues taking at a low dose without any major difficulties.   On 06/29/2020 given the shortness of breath that she has been experiencing in absence of any asthma, Cyrus Sogn MD, Cardiology has asked the patient to proceed with stress testing. This will be performed this week.     From the point of view of my issues including her iron deficiency anemia, hemolytic anemia, breast cancer, I find no other issues pertinent to her care that requires to be addressed at this point. I would like to review her back in 4 months with a CBC, CMP, reticulocyte count and sedimentation rate. She also will follow up with her dermatologist in regard to her temporal arteritis. She remains again on prednisone 7.5 mg oral daily.             I DISCUSSED WITH PATIENT IN  DETAIL FORMS TO DECREASE CHANCES OF CORONAVIRUS INFECTION INCLUDING ISOLATION, PROPER HAND HIGIENE, AVOID PUBLIC PLACES  WITH CROWDS, FOLLOW  CDC RECOMENDATIONS, AND KEEP PERSONAL AND SOCIAL RESPONSIBILITY.WARE A MASK IN PUBLIC OR MD VISITS.  PATIENT IS AWARE THIS INFECTION COULD HAVE SEVERE CONSEQUENCES TO PERSONAL HEALTH AND FAMILY RAMIFICATIONS OF THIS.

## 2020-07-01 DIAGNOSIS — C50.919 PRIMARY MALIGNANT NEOPLASM OF FEMALE BREAST (HCC): Primary | ICD-10-CM

## 2020-07-01 DIAGNOSIS — R79.89 ELEVATED SERUM CREATININE: ICD-10-CM

## 2020-07-02 ENCOUNTER — HOSPITAL ENCOUNTER (OUTPATIENT)
Dept: CARDIOLOGY | Facility: HOSPITAL | Age: 75
Discharge: HOME | End: 2020-07-02
Admitting: INTERNAL MEDICINE

## 2020-07-02 ENCOUNTER — TELEPHONE (OUTPATIENT)
Dept: CARDIOLOGY | Facility: CLINIC | Age: 75
End: 2020-07-02

## 2020-07-02 ENCOUNTER — TELEPHONE (OUTPATIENT)
Dept: ONCOLOGY | Facility: CLINIC | Age: 75
End: 2020-07-02

## 2020-07-02 VITALS
HEIGHT: 60 IN | WEIGHT: 198 LBS | SYSTOLIC BLOOD PRESSURE: 180 MMHG | DIASTOLIC BLOOD PRESSURE: 90 MMHG | OXYGEN SATURATION: 96 % | BODY MASS INDEX: 38.87 KG/M2 | HEART RATE: 98 BPM

## 2020-07-02 DIAGNOSIS — R07.2 PRECORDIAL PAIN: ICD-10-CM

## 2020-07-02 DIAGNOSIS — I77.6 ARTERITIS (HCC): ICD-10-CM

## 2020-07-02 DIAGNOSIS — E66.01 MORBIDLY OBESE (HCC): ICD-10-CM

## 2020-07-02 DIAGNOSIS — E11.21 DIABETIC NEPHROPATHY ASSOCIATED WITH TYPE 2 DIABETES MELLITUS (HCC): ICD-10-CM

## 2020-07-02 DIAGNOSIS — R06.02 SOB (SHORTNESS OF BREATH): ICD-10-CM

## 2020-07-02 DIAGNOSIS — G47.33 OSA ON CPAP: ICD-10-CM

## 2020-07-02 DIAGNOSIS — Z99.89 OSA ON CPAP: ICD-10-CM

## 2020-07-02 LAB
AORTIC ARCH: 2.3 CM
ASCENDING AORTA: 2.8 CM
BH CV ECHO MEAS - ACS: 1.8 CM
BH CV ECHO MEAS - AO MAX PG (FULL): 4.6 MMHG
BH CV ECHO MEAS - AO MAX PG: 9.5 MMHG
BH CV ECHO MEAS - AO MEAN PG (FULL): 2.7 MMHG
BH CV ECHO MEAS - AO MEAN PG: 5.7 MMHG
BH CV ECHO MEAS - AO ROOT AREA (BSA CORRECTED): 1.9
BH CV ECHO MEAS - AO ROOT AREA: 9.4 CM^2
BH CV ECHO MEAS - AO ROOT DIAM: 3.5 CM
BH CV ECHO MEAS - AO V2 MAX: 154.3 CM/SEC
BH CV ECHO MEAS - AO V2 MEAN: 113.9 CM/SEC
BH CV ECHO MEAS - AO V2 VTI: 35.8 CM
BH CV ECHO MEAS - ASC AORTA: 2.8 CM
BH CV ECHO MEAS - AVA(I,A): 1.6 CM^2
BH CV ECHO MEAS - AVA(I,D): 1.6 CM^2
BH CV ECHO MEAS - AVA(V,A): 1.8 CM^2
BH CV ECHO MEAS - AVA(V,D): 1.8 CM^2
BH CV ECHO MEAS - BSA(HAYCOCK): 2 M^2
BH CV ECHO MEAS - BSA: 1.9 M^2
BH CV ECHO MEAS - BZI_BMI: 38.7 KILOGRAMS/M^2
BH CV ECHO MEAS - BZI_METRIC_HEIGHT: 152.4 CM
BH CV ECHO MEAS - BZI_METRIC_WEIGHT: 89.8 KG
BH CV ECHO MEAS - EDV(MOD-SP2): 76 ML
BH CV ECHO MEAS - EDV(MOD-SP4): 74 ML
BH CV ECHO MEAS - EDV(TEICH): 55.1 ML
BH CV ECHO MEAS - EF(CUBED): 60.8 %
BH CV ECHO MEAS - EF(MOD-BP): 65.1 %
BH CV ECHO MEAS - EF(MOD-SP2): 64.5 %
BH CV ECHO MEAS - EF(MOD-SP4): 63.5 %
BH CV ECHO MEAS - EF(TEICH): 53.3 %
BH CV ECHO MEAS - ESV(MOD-SP2): 27 ML
BH CV ECHO MEAS - ESV(MOD-SP4): 27 ML
BH CV ECHO MEAS - ESV(TEICH): 25.7 ML
BH CV ECHO MEAS - FS: 26.8 %
BH CV ECHO MEAS - IVS/LVPW: 1.1
BH CV ECHO MEAS - IVSD: 1.3 CM
BH CV ECHO MEAS - LAT PEAK E' VEL: 5.9 CM/SEC
BH CV ECHO MEAS - LV DIASTOLIC VOL/BSA (35-75): 39.8 ML/M^2
BH CV ECHO MEAS - LV MASS(C)D: 144.6 GRAMS
BH CV ECHO MEAS - LV MASS(C)DI: 77.8 GRAMS/M^2
BH CV ECHO MEAS - LV MAX PG: 4.9 MMHG
BH CV ECHO MEAS - LV MEAN PG: 3 MMHG
BH CV ECHO MEAS - LV SYSTOLIC VOL/BSA (12-30): 14.5 ML/M^2
BH CV ECHO MEAS - LV V1 MAX: 111.1 CM/SEC
BH CV ECHO MEAS - LV V1 MEAN: 81.8 CM/SEC
BH CV ECHO MEAS - LV V1 VTI: 23.6 CM
BH CV ECHO MEAS - LVIDD: 3.6 CM
BH CV ECHO MEAS - LVIDS: 2.6 CM
BH CV ECHO MEAS - LVLD AP2: 7.4 CM
BH CV ECHO MEAS - LVLD AP4: 7.8 CM
BH CV ECHO MEAS - LVLS AP2: 6.2 CM
BH CV ECHO MEAS - LVLS AP4: 5.9 CM
BH CV ECHO MEAS - LVOT AREA (M): 2.5 CM^2
BH CV ECHO MEAS - LVOT AREA: 2.5 CM^2
BH CV ECHO MEAS - LVOT DIAM: 1.8 CM
BH CV ECHO MEAS - LVPWD: 1.2 CM
BH CV ECHO MEAS - MED PEAK E' VEL: 5.8 CM/SEC
BH CV ECHO MEAS - MV A DUR: 0.1 SEC
BH CV ECHO MEAS - MV A MAX VEL: 133.8 CM/SEC
BH CV ECHO MEAS - MV DEC SLOPE: 533.6 CM/SEC^2
BH CV ECHO MEAS - MV DEC TIME: 0.18 SEC
BH CV ECHO MEAS - MV E MAX VEL: 93 CM/SEC
BH CV ECHO MEAS - MV E/A: 0.69
BH CV ECHO MEAS - MV MAX PG: 8.8 MMHG
BH CV ECHO MEAS - MV MEAN PG: 4.5 MMHG
BH CV ECHO MEAS - MV P1/2T MAX VEL: 106.7 CM/SEC
BH CV ECHO MEAS - MV P1/2T: 58.6 MSEC
BH CV ECHO MEAS - MV V2 MAX: 148.6 CM/SEC
BH CV ECHO MEAS - MV V2 MEAN: 101.7 CM/SEC
BH CV ECHO MEAS - MV V2 VTI: 36 CM
BH CV ECHO MEAS - MVA P1/2T LCG: 2.1 CM^2
BH CV ECHO MEAS - MVA(P1/2T): 3.8 CM^2
BH CV ECHO MEAS - MVA(VTI): 1.6 CM^2
BH CV ECHO MEAS - PA ACC TIME: 0.08 SEC
BH CV ECHO MEAS - PA MAX PG (FULL): 2.9 MMHG
BH CV ECHO MEAS - PA MAX PG: 5.5 MMHG
BH CV ECHO MEAS - PA PR(ACCEL): 44.5 MMHG
BH CV ECHO MEAS - PA V2 MAX: 116.7 CM/SEC
BH CV ECHO MEAS - PULM A REVS DUR: 0.07 SEC
BH CV ECHO MEAS - PULM A REVS VEL: 27 CM/SEC
BH CV ECHO MEAS - PULM DIAS VEL: 45 CM/SEC
BH CV ECHO MEAS - PULM S/D: 1.8
BH CV ECHO MEAS - PULM SYS VEL: 78.8 CM/SEC
BH CV ECHO MEAS - PVA(V,A): 2.4 CM^2
BH CV ECHO MEAS - PVA(V,D): 2.4 CM^2
BH CV ECHO MEAS - QP/QS: 1
BH CV ECHO MEAS - RAP SYSTOLE: 3 MMHG
BH CV ECHO MEAS - RV BASE (<4.1) - OBSOLETE: 2.2 CM
BH CV ECHO MEAS - RV LENGTH (<8.5) - OBSOLETE: 6.5 CM
BH CV ECHO MEAS - RV MAX PG: 2.6 MMHG
BH CV ECHO MEAS - RV MEAN PG: 1.6 MMHG
BH CV ECHO MEAS - RV V1 MAX: 80.1 CM/SEC
BH CV ECHO MEAS - RV V1 MEAN: 59.7 CM/SEC
BH CV ECHO MEAS - RV V1 VTI: 17.3 CM
BH CV ECHO MEAS - RVOT AREA: 3.5 CM^2
BH CV ECHO MEAS - RVOT DIAM: 2.1 CM
BH CV ECHO MEAS - RVSP: 26.4 MMHG
BH CV ECHO MEAS - SI(AO): 180.9 ML/M^2
BH CV ECHO MEAS - SI(CUBED): 15.5 ML/M^2
BH CV ECHO MEAS - SI(LVOT): 31.2 ML/M^2
BH CV ECHO MEAS - SI(MOD-SP2): 26.4 ML/M^2
BH CV ECHO MEAS - SI(MOD-SP4): 25.3 ML/M^2
BH CV ECHO MEAS - SI(TEICH): 15.8 ML/M^2
BH CV ECHO MEAS - SUP REN AO DIAM: 2.2 CM
BH CV ECHO MEAS - SV(AO): 336.2 ML
BH CV ECHO MEAS - SV(CUBED): 28.8 ML
BH CV ECHO MEAS - SV(LVOT): 57.9 ML
BH CV ECHO MEAS - SV(MOD-SP2): 49 ML
BH CV ECHO MEAS - SV(MOD-SP4): 47 ML
BH CV ECHO MEAS - SV(RVOT): 60.7 ML
BH CV ECHO MEAS - SV(TEICH): 29.4 ML
BH CV ECHO MEAS - TAPSE (>1.6): 1.9 CM2
BH CV ECHO MEAS - TR MAX VEL: 241.9 CM/SEC
BH CV ECHO MEASUREMENTS AVERAGE E/E' RATIO: 15.9
BH CV NUCLEAR PRIOR STUDY: 3
BH CV STRESS BP STAGE 1: NORMAL
BH CV STRESS COMMENTS STAGE 1: NORMAL
BH CV STRESS DOSE REGADENOSON STAGE 1: 0.4
BH CV STRESS DURATION MIN STAGE 1: 0
BH CV STRESS DURATION SEC STAGE 1: 10
BH CV STRESS HR STAGE 1: 113
BH CV STRESS PROTOCOL 1: NORMAL
BH CV STRESS RECOVERY BP: NORMAL MMHG
BH CV STRESS RECOVERY HR: 107 BPM
BH CV STRESS STAGE 1: 1
BH CV VAS BP RIGHT ARM: NORMAL MMHG
BH CV XLRA - RV BASE: 2.2 CM
BH CV XLRA - RV LENGTH: 6.5 CM
BH CV XLRA - RV MID: 2.3 CM
BH CV XLRA - TDI S': 12 CM/SEC
LEFT ATRIUM VOLUME INDEX: 22 ML/M2
LV EF 2D ECHO EST: 65 %
LV EF NUC BP: 76 %
MAXIMAL PREDICTED HEART RATE: 146 BPM
MAXIMAL PREDICTED HEART RATE: 146 BPM
PERCENT MAX PREDICTED HR: 77.4 %
SINUS: 3 CM
STJ: 2.8 CM
STRESS BASELINE BP: NORMAL MMHG
STRESS BASELINE HR: 87 BPM
STRESS PERCENT HR: 91 %
STRESS POST EXERCISE DUR SEC: 10 SEC
STRESS POST PEAK BP: NORMAL MMHG
STRESS POST PEAK HR: 113 BPM
STRESS TARGET HR: 124 BPM
STRESS TARGET HR: 124 BPM

## 2020-07-02 PROCEDURE — 0 RUBIDIUM CHLORIDE: Performed by: INTERNAL MEDICINE

## 2020-07-02 PROCEDURE — 93018 CV STRESS TEST I&R ONLY: CPT | Performed by: INTERNAL MEDICINE

## 2020-07-02 PROCEDURE — 93306 TTE W/DOPPLER COMPLETE: CPT

## 2020-07-02 PROCEDURE — 93356 MYOCRD STRAIN IMG SPCKL TRCK: CPT | Performed by: INTERNAL MEDICINE

## 2020-07-02 PROCEDURE — 25010000002 REGADENOSON 0.4 MG/5ML SOLUTION: Performed by: INTERNAL MEDICINE

## 2020-07-02 PROCEDURE — 93356 MYOCRD STRAIN IMG SPCKL TRCK: CPT

## 2020-07-02 PROCEDURE — 93016 CV STRESS TEST SUPVJ ONLY: CPT | Performed by: INTERNAL MEDICINE

## 2020-07-02 PROCEDURE — A9555 RB82 RUBIDIUM: HCPCS | Performed by: INTERNAL MEDICINE

## 2020-07-02 PROCEDURE — 93306 TTE W/DOPPLER COMPLETE: CPT | Performed by: INTERNAL MEDICINE

## 2020-07-02 PROCEDURE — 78492 MYOCRD IMG PET MLT RST&STRS: CPT | Performed by: INTERNAL MEDICINE

## 2020-07-02 PROCEDURE — 93017 CV STRESS TEST TRACING ONLY: CPT

## 2020-07-02 PROCEDURE — 78492 MYOCRD IMG PET MLT RST&STRS: CPT

## 2020-07-02 RX ADMIN — REGADENOSON 0.4 MG: 0.08 INJECTION, SOLUTION INTRAVENOUS at 10:34

## 2020-07-02 NOTE — TELEPHONE ENCOUNTER
Pt states that she held her bisoprolol this morning. She forgot her HR reading this morning. But her BP reading yesterday 155/65 HR:88. Her PCP add HCTZ 25  MG BID but she did not start that the medication. She is waiting on you to ok it.      Thanks  Monica VALENTIN

## 2020-07-02 NOTE — TELEPHONE ENCOUNTER
Pt is here for her testing and she is c/o her BP being elevated this past few days. Today BP reading 198/72.    Current med list  Bisoprolol 10 mg PM  Valsartan- [id number] MG tab QD      Thanks  Monica VALENTIN

## 2020-07-06 ENCOUNTER — LAB (OUTPATIENT)
Dept: LAB | Facility: HOSPITAL | Age: 75
End: 2020-07-06

## 2020-07-06 ENCOUNTER — TELEPHONE (OUTPATIENT)
Dept: CARDIOLOGY | Facility: CLINIC | Age: 75
End: 2020-07-06

## 2020-07-06 DIAGNOSIS — R79.89 ELEVATED SERUM CREATININE: ICD-10-CM

## 2020-07-06 DIAGNOSIS — C50.919 PRIMARY MALIGNANT NEOPLASM OF FEMALE BREAST (HCC): ICD-10-CM

## 2020-07-06 LAB
ALBUMIN UR-MCNC: 13.1 MG/DL
ANION GAP SERPL CALCULATED.3IONS-SCNC: 10.5 MMOL/L (ref 5–15)
BILIRUB UR QL STRIP: NEGATIVE
BUN SERPL-MCNC: 30 MG/DL (ref 6–20)
BUN/CREAT SERPL: 25.2 (ref 7.3–30)
CALCIUM SPEC-SCNC: 9.6 MG/DL (ref 8.5–10.2)
CHLORIDE SERPL-SCNC: 105 MMOL/L (ref 98–107)
CLARITY UR: CLEAR
CO2 SERPL-SCNC: 26.5 MMOL/L (ref 22–29)
COLOR UR: YELLOW
CREAT SERPL-MCNC: 1.19 MG/DL (ref 0.6–1.1)
GFR SERPL CREATININE-BSD FRML MDRD: 44 ML/MIN/1.73
GLUCOSE SERPL-MCNC: 94 MG/DL (ref 74–124)
GLUCOSE UR STRIP-MCNC: NEGATIVE MG/DL
HGB UR QL STRIP.AUTO: NEGATIVE
KETONES UR QL STRIP: NEGATIVE
LEUKOCYTE ESTERASE UR QL STRIP.AUTO: NEGATIVE
NITRITE UR QL STRIP: NEGATIVE
PH UR STRIP.AUTO: 5.5 [PH] (ref 4.5–8)
POTASSIUM SERPL-SCNC: 4.8 MMOL/L (ref 3.5–4.7)
PROT UR QL STRIP: ABNORMAL
SODIUM SERPL-SCNC: 142 MMOL/L (ref 134–145)
SP GR UR STRIP: 1.02 (ref 1–1.03)
UROBILINOGEN UR QL STRIP: ABNORMAL

## 2020-07-06 PROCEDURE — 81003 URINALYSIS AUTO W/O SCOPE: CPT

## 2020-07-06 PROCEDURE — 36415 COLL VENOUS BLD VENIPUNCTURE: CPT

## 2020-07-06 PROCEDURE — 80048 BASIC METABOLIC PNL TOTAL CA: CPT

## 2020-07-06 PROCEDURE — 82043 UR ALBUMIN QUANTITATIVE: CPT | Performed by: INTERNAL MEDICINE

## 2020-07-06 RX ORDER — BISOPROLOL FUMARATE 10 MG/1
20 TABLET, FILM COATED ORAL DAILY
Qty: 60 TABLET | Refills: 6 | Status: SHIPPED | OUTPATIENT
Start: 2020-07-06 | End: 2021-04-08

## 2020-07-06 NOTE — TELEPHONE ENCOUNTER
Dr Song,  I called to discuss results and she mentioned she is still having issues with her bp.    July 2nd am 142/60 hr 95 pm 145/58 86  July 3rd am 157/67 hr 80 pm 147/69 hr 79  July 4th am 155/65 82     Pm 138/57 hr 87  July 5th am 150/70 81 1252 160/70 hr 82 2036 196/74 94, 2243 166/74 86  This am 191/77 87    No c/o headache or chest pain.  She is just feeling nauseous.    Cardiac meds reviewed  Bisoprolol 10mg nightly  Valsartan-hydrochlorothiazide 160-25mg daily  She also stated she is taking hydrochlorothiazide 25mg daily (not on med list)    Does she need a med adjustment?  Thanks  Marilyn Lees RN  Triage nurse

## 2020-07-06 NOTE — TELEPHONE ENCOUNTER
----- Message from Cyrus Song III, MD sent at 7/2/2020  4:40 PM EDT -----  Please call- normal results

## 2020-07-06 NOTE — TELEPHONE ENCOUNTER
Previous bisoprolol to 20 mg daily (2 tablets taken at the same time) and have her discontinue the additional hydrochlorothiazide tablet.  Report back in a week

## 2020-07-08 ENCOUNTER — TELEPHONE (OUTPATIENT)
Dept: ONCOLOGY | Facility: CLINIC | Age: 75
End: 2020-07-08

## 2020-07-08 NOTE — TELEPHONE ENCOUNTER
----- Message from Jean-Claude Farnsworth MD sent at 7/7/2020  5:58 PM EDT -----  CALL HER KIDNEY FUNCTION BACK TO BASELINE, GREAT, URINE TEST WAS FINE

## 2020-07-14 NOTE — TELEPHONE ENCOUNTER
Pt is calling back today with BP reading since she started bisoprolol to 20 mg daily (2 tablets taken at the same time) and discontinue the additional hydrochlorothiazide tablet.     7/7/20: 7:29 AM: 167/77 P: 75            10:15 PM: 143/66 P: 87    7/8/20: 7:01 AM: 154/66 P: 78    7/9/20: 9:05 AM: 163/75 P: 80            11:40 PM: 159/64 P: 78    7/10/20: 10:44 AM: 151/69 P: 83                10:53 PM: 127/73 P: 85    7/11/20: 10:41 AM: 178/76 P: 86    7/12/20: 9:30 AM: 165/81 P: 72    11:36 AM: 194/82 P: 76    7/13/20: 9:30 /94 P: 74    11:18PM: 170/85 P: 78    7/14/20: 8:34 AM: 165/69 P: 72     1:23 PM: 185/76 P: 82    Pt states that she is nausea and slight chest pressure.    Please Advise    PT #: [phone]    Thanks Ivonne

## 2020-07-15 RX ORDER — HYDRALAZINE HYDROCHLORIDE 25 MG/1
20 TABLET, FILM COATED ORAL 2 TIMES DAILY
COMMUNITY
End: 2020-07-31 | Stop reason: SDUPTHER

## 2020-07-15 NOTE — TELEPHONE ENCOUNTER
Spoke to patient and son.  There was a mixup in what the patient was taking due to some language difficulties.  Patient son stated Dr. child was started her on hydralazine 25 mg twice a day.  When patient spoke to Marilyn Lees, Marilyn thought she was taking an extra hydrochlorothiazide 25 mg once a day and that is not the case.  Dr. Ashford had started the hydralazine and it was not on her med list therefore we could not see it.  Patient did stop the hydralazine however.  Since doing so her blood pressures went up.  I have spoken to the patient and the son and we both repeated back that patient will continue her valsartan/HCTZ in the morning she will take 2, 10 mg tablets of her bisoprolol at night.  She will restart hydralazine 25 mg twice a day beginning this evening.  I have asked her to call me back in a week to 10 days with her blood pressures.  She and her son voiced understanding.

## 2020-07-24 ENCOUNTER — TRANSCRIBE ORDERS (OUTPATIENT)
Dept: ADMINISTRATIVE | Facility: HOSPITAL | Age: 75
End: 2020-07-24

## 2020-07-24 ENCOUNTER — LAB (OUTPATIENT)
Dept: LAB | Facility: HOSPITAL | Age: 75
End: 2020-07-24

## 2020-07-24 DIAGNOSIS — E66.1 DRUG-INDUCED OBESITY, UNSPECIFIED CLASSIFICATION, UNSPECIFIED WHETHER SERIOUS COMORBIDITY PRESENT: ICD-10-CM

## 2020-07-24 DIAGNOSIS — Z00.00 ROUTINE GENERAL MEDICAL EXAMINATION AT A HEALTH CARE FACILITY: ICD-10-CM

## 2020-07-24 DIAGNOSIS — Z13.6 SCREENING FOR ISCHEMIC HEART DISEASE: ICD-10-CM

## 2020-07-24 DIAGNOSIS — M31.6 GIANT CELL ARTERITIS (HCC): ICD-10-CM

## 2020-07-24 DIAGNOSIS — E11.65 TYPE II DIABETES MELLITUS WITH HYPEROSMOLARITY, UNCONTROLLED (HCC): ICD-10-CM

## 2020-07-24 DIAGNOSIS — E11.00 TYPE II DIABETES MELLITUS WITH HYPEROSMOLARITY, UNCONTROLLED (HCC): ICD-10-CM

## 2020-07-24 DIAGNOSIS — M31.5 POLYMYALGIA ARTERITICA (HCC): ICD-10-CM

## 2020-07-24 DIAGNOSIS — R79.9 ABNORMAL FINDING OF BLOOD CHEMISTRY, UNSPECIFIED: ICD-10-CM

## 2020-07-24 DIAGNOSIS — M31.5 POLYMYALGIA ARTERITICA (HCC): Primary | ICD-10-CM

## 2020-07-24 DIAGNOSIS — Z13.6 SCREENING FOR ISCHEMIC HEART DISEASE: Primary | ICD-10-CM

## 2020-07-24 LAB
ALBUMIN SERPL-MCNC: 3.6 G/DL (ref 3.5–5.2)
ALBUMIN UR-MCNC: 11.6 MG/DL
ALBUMIN/GLOB SERPL: 1.2 G/DL
ALP SERPL-CCNC: 100 U/L (ref 39–117)
ALT SERPL W P-5'-P-CCNC: 15 U/L (ref 1–33)
ANION GAP SERPL CALCULATED.3IONS-SCNC: 7.4 MMOL/L (ref 5–15)
AST SERPL-CCNC: 9 U/L (ref 1–32)
BACTERIA UR QL AUTO: ABNORMAL /HPF
BASOPHILS # BLD AUTO: 0.05 10*3/MM3 (ref 0–0.2)
BASOPHILS NFR BLD AUTO: 0.5 % (ref 0–1.5)
BILIRUB CONJ SERPL-MCNC: <0.2 MG/DL (ref 0–0.3)
BILIRUB SERPL-MCNC: 0.3 MG/DL (ref 0–1.2)
BILIRUB UR QL STRIP: NEGATIVE
BUN SERPL-MCNC: 32 MG/DL (ref 8–23)
BUN/CREAT SERPL: 26.9 (ref 7–25)
CALCIUM SPEC-SCNC: 9.1 MG/DL (ref 8.6–10.5)
CHLORIDE SERPL-SCNC: 103 MMOL/L (ref 98–107)
CLARITY UR: CLEAR
CO2 SERPL-SCNC: 27.6 MMOL/L (ref 22–29)
COLOR UR: YELLOW
CREAT SERPL-MCNC: 1.19 MG/DL (ref 0.57–1)
CREAT UR-MCNC: 82.4 MG/DL
DEPRECATED RDW RBC AUTO: 40.4 FL (ref 37–54)
EOSINOPHIL # BLD AUTO: 0.16 10*3/MM3 (ref 0–0.4)
EOSINOPHIL NFR BLD AUTO: 1.5 % (ref 0.3–6.2)
ERYTHROCYTE [DISTWIDTH] IN BLOOD BY AUTOMATED COUNT: 11.8 % (ref 12.3–15.4)
ERYTHROCYTE [SEDIMENTATION RATE] IN BLOOD: 34 MM/HR (ref 0–30)
GFR SERPL CREATININE-BSD FRML MDRD: 44 ML/MIN/1.73
GLOBULIN UR ELPH-MCNC: 3 GM/DL
GLUCOSE SERPL-MCNC: 169 MG/DL (ref 65–99)
GLUCOSE UR STRIP-MCNC: NEGATIVE MG/DL
HBA1C MFR BLD: 8.2 % (ref 4.8–5.6)
HCT VFR BLD AUTO: 37.8 % (ref 34–46.6)
HGB BLD-MCNC: 12.3 G/DL (ref 12–15.9)
HGB UR QL STRIP.AUTO: ABNORMAL
HYALINE CASTS UR QL AUTO: ABNORMAL /LPF
IMM GRANULOCYTES # BLD AUTO: 0.1 10*3/MM3 (ref 0–0.05)
IMM GRANULOCYTES NFR BLD AUTO: 0.9 % (ref 0–0.5)
KETONES UR QL STRIP: NEGATIVE
LEUKOCYTE ESTERASE UR QL STRIP.AUTO: NEGATIVE
LYMPHOCYTES # BLD AUTO: 0.97 10*3/MM3 (ref 0.7–3.1)
LYMPHOCYTES NFR BLD AUTO: 9 % (ref 19.6–45.3)
MCH RBC QN AUTO: 30.4 PG (ref 26.6–33)
MCHC RBC AUTO-ENTMCNC: 32.5 G/DL (ref 31.5–35.7)
MCV RBC AUTO: 93.6 FL (ref 79–97)
MICROALBUMIN/CREAT UR: 140.8 MG/G
MONOCYTES # BLD AUTO: 0.57 10*3/MM3 (ref 0.1–0.9)
MONOCYTES NFR BLD AUTO: 5.3 % (ref 5–12)
NEUTROPHILS NFR BLD AUTO: 8.92 10*3/MM3 (ref 1.7–7)
NEUTROPHILS NFR BLD AUTO: 82.8 % (ref 42.7–76)
NITRITE UR QL STRIP: NEGATIVE
NRBC BLD AUTO-RTO: 0 /100 WBC (ref 0–0.2)
PH UR STRIP.AUTO: 5.5 [PH] (ref 5–8)
PLATELET # BLD AUTO: 230 10*3/MM3 (ref 140–450)
PMV BLD AUTO: 10.2 FL (ref 6–12)
POTASSIUM SERPL-SCNC: 4.9 MMOL/L (ref 3.5–5.2)
PROT SERPL-MCNC: 6.6 G/DL (ref 6–8.5)
PROT UR QL STRIP: NEGATIVE
RBC # BLD AUTO: 4.04 10*6/MM3 (ref 3.77–5.28)
RBC # UR: ABNORMAL /HPF
REF LAB TEST METHOD: ABNORMAL
SODIUM SERPL-SCNC: 138 MMOL/L (ref 136–145)
SP GR UR STRIP: 1.02 (ref 1–1.03)
SQUAMOUS #/AREA URNS HPF: ABNORMAL /HPF
TSH SERPL DL<=0.05 MIU/L-ACNC: 0.61 UIU/ML (ref 0.27–4.2)
UROBILINOGEN UR QL STRIP: ABNORMAL
WBC # BLD AUTO: 10.77 10*3/MM3 (ref 3.4–10.8)
WBC UR QL AUTO: ABNORMAL /HPF

## 2020-07-24 PROCEDURE — 84443 ASSAY THYROID STIM HORMONE: CPT | Performed by: INTERNAL MEDICINE

## 2020-07-24 PROCEDURE — 85652 RBC SED RATE AUTOMATED: CPT

## 2020-07-24 PROCEDURE — 80061 LIPID PANEL: CPT

## 2020-07-24 PROCEDURE — 82248 BILIRUBIN DIRECT: CPT

## 2020-07-24 PROCEDURE — 80053 COMPREHEN METABOLIC PANEL: CPT

## 2020-07-24 PROCEDURE — 82570 ASSAY OF URINE CREATININE: CPT

## 2020-07-24 PROCEDURE — 82043 UR ALBUMIN QUANTITATIVE: CPT

## 2020-07-24 PROCEDURE — 36415 COLL VENOUS BLD VENIPUNCTURE: CPT

## 2020-07-24 PROCEDURE — 81001 URINALYSIS AUTO W/SCOPE: CPT | Performed by: INTERNAL MEDICINE

## 2020-07-24 PROCEDURE — 83036 HEMOGLOBIN GLYCOSYLATED A1C: CPT

## 2020-07-24 PROCEDURE — 85025 COMPLETE CBC W/AUTO DIFF WBC: CPT | Performed by: INTERNAL MEDICINE

## 2020-07-24 PROCEDURE — 86803 HEPATITIS C AB TEST: CPT

## 2020-07-25 LAB
CHOLEST SERPL-MCNC: 155 MG/DL (ref 100–199)
CHOLEST/HDLC SERPL: 3.7 RATIO (ref 0–4.4)
HCV AB S/CO SERPL IA: <0.1 S/CO RATIO (ref 0–0.9)
HDLC SERPL-MCNC: 42 MG/DL
LABORATORY COMMENT REPORT: NORMAL
LDLC SERPL CALC-MCNC: 86 MG/DL (ref 0–99)
NONHDLC SERPL-MCNC: 113 MG/DL (ref 0–129)
TRIGL SERPL-MCNC: 136 MG/DL (ref 0–149)

## 2020-07-31 ENCOUNTER — TELEPHONE (OUTPATIENT)
Dept: CARDIOLOGY | Facility: CLINIC | Age: 75
End: 2020-07-31

## 2020-07-31 RX ORDER — HYDRALAZINE HYDROCHLORIDE 25 MG/1
50 TABLET, FILM COATED ORAL 2 TIMES DAILY
Qty: 120 TABLET | Refills: 3 | Status: SHIPPED | OUTPATIENT
Start: 2020-07-31 | End: 2020-10-19 | Stop reason: DRUGHIGH

## 2020-07-31 NOTE — TELEPHONE ENCOUNTER
The patient's spouse is calling to provide a list of her recent blood pressures.  Please contact Mr. Suárezyes at [phone]

## 2020-07-31 NOTE — TELEPHONE ENCOUNTER
This is Dr Song pt. Called and s/w pt .       See 7/6/2020 telephone encounter.      Thanks  Monica VALENTIN

## 2020-07-31 NOTE — TELEPHONE ENCOUNTER
Pt  is calling for BP updates     BP readings    7- 9AM 166/73 HR:70 12:38RQ014/66 HR:79     7- 10AM 162/70 HR:67  12:15PM 159/65 HR:75    7-  9:45 /77 HR:71 12PM 141/65 HR:75    7- 9:30 /79 HR:81   12:30PM 155/90 HR:80    7- 9:50 /77 HR:72   12 /65 HR:76    7- 9AM 166/75 HR:74 12:40 /62 HR:80    7-  8:22 /80 HR:70 12:21 /63 HR:86 12:30AM 151/61 HR:80    7- 11:03 AM  163/76 HR:76 10:30PM  161/76 HR:71      Current med list  Valsartan- HCTZ 160-25 MG QD AM  BISOPROLOL 10 MG  2 TAB QD PM  HYDRALAZINE 25 MG BID      THANKS  Monica VALENTIN

## 2020-08-17 ENCOUNTER — TRANSCRIBE ORDERS (OUTPATIENT)
Dept: ADMINISTRATIVE | Facility: HOSPITAL | Age: 75
End: 2020-08-17

## 2020-08-17 ENCOUNTER — LAB (OUTPATIENT)
Dept: LAB | Facility: HOSPITAL | Age: 75
End: 2020-08-17

## 2020-08-17 DIAGNOSIS — N18.30 CHRONIC KIDNEY DISEASE, STAGE III (MODERATE) (HCC): ICD-10-CM

## 2020-08-17 DIAGNOSIS — I12.9 HYPERTENSIVE NEPHROPATHY: ICD-10-CM

## 2020-08-17 DIAGNOSIS — E55.9 VITAMIN D DEFICIENCY: ICD-10-CM

## 2020-08-17 DIAGNOSIS — N18.30 CHRONIC KIDNEY DISEASE, STAGE III (MODERATE) (HCC): Primary | ICD-10-CM

## 2020-08-17 LAB
25(OH)D3 SERPL-MCNC: 22.6 NG/ML (ref 30–100)
ANION GAP SERPL CALCULATED.3IONS-SCNC: 9.6 MMOL/L (ref 5–15)
BACTERIA UR QL AUTO: ABNORMAL /HPF
BILIRUB UR QL STRIP: NEGATIVE
BUN SERPL-MCNC: 29 MG/DL (ref 8–23)
BUN/CREAT SERPL: 26.4 (ref 7–25)
CALCIUM SPEC-SCNC: 9.3 MG/DL (ref 8.6–10.5)
CHLORIDE SERPL-SCNC: 103 MMOL/L (ref 98–107)
CLARITY UR: CLEAR
CO2 SERPL-SCNC: 26.4 MMOL/L (ref 22–29)
COLOR UR: YELLOW
CREAT SERPL-MCNC: 1.1 MG/DL (ref 0.57–1)
DEPRECATED RDW RBC AUTO: 42.3 FL (ref 37–54)
ERYTHROCYTE [DISTWIDTH] IN BLOOD BY AUTOMATED COUNT: 12 % (ref 12.3–15.4)
GFR SERPL CREATININE-BSD FRML MDRD: 49 ML/MIN/1.73
GLUCOSE SERPL-MCNC: 189 MG/DL (ref 65–99)
GLUCOSE UR STRIP-MCNC: NEGATIVE MG/DL
HCT VFR BLD AUTO: 38.1 % (ref 34–46.6)
HGB BLD-MCNC: 12.2 G/DL (ref 12–15.9)
HGB UR QL STRIP.AUTO: NEGATIVE
HYALINE CASTS UR QL AUTO: ABNORMAL /LPF
KETONES UR QL STRIP: NEGATIVE
LEUKOCYTE ESTERASE UR QL STRIP.AUTO: NEGATIVE
MCH RBC QN AUTO: 30.8 PG (ref 26.6–33)
MCHC RBC AUTO-ENTMCNC: 32 G/DL (ref 31.5–35.7)
MCV RBC AUTO: 96.2 FL (ref 79–97)
NITRITE UR QL STRIP: NEGATIVE
PH UR STRIP.AUTO: 5.5 [PH] (ref 5–8)
PLATELET # BLD AUTO: 232 10*3/MM3 (ref 140–450)
PMV BLD AUTO: 10.1 FL (ref 6–12)
POTASSIUM SERPL-SCNC: 5 MMOL/L (ref 3.5–5.2)
PROT UR QL STRIP: ABNORMAL
RBC # BLD AUTO: 3.96 10*6/MM3 (ref 3.77–5.28)
RBC # UR: ABNORMAL /HPF
REF LAB TEST METHOD: ABNORMAL
SODIUM SERPL-SCNC: 139 MMOL/L (ref 136–145)
SP GR UR STRIP: 1.02 (ref 1–1.03)
SQUAMOUS #/AREA URNS HPF: ABNORMAL /HPF
UROBILINOGEN UR QL STRIP: ABNORMAL
WBC # BLD AUTO: 10.9 10*3/MM3 (ref 3.4–10.8)
WBC UR QL AUTO: ABNORMAL /HPF

## 2020-08-17 PROCEDURE — 82306 VITAMIN D 25 HYDROXY: CPT

## 2020-08-17 PROCEDURE — 80048 BASIC METABOLIC PNL TOTAL CA: CPT

## 2020-08-17 PROCEDURE — 85027 COMPLETE CBC AUTOMATED: CPT

## 2020-08-17 PROCEDURE — 81001 URINALYSIS AUTO W/SCOPE: CPT

## 2020-08-17 PROCEDURE — 36415 COLL VENOUS BLD VENIPUNCTURE: CPT

## 2020-08-19 ENCOUNTER — TELEPHONE (OUTPATIENT)
Dept: ONCOLOGY | Facility: CLINIC | Age: 75
End: 2020-08-19

## 2020-08-19 ENCOUNTER — TRANSCRIBE ORDERS (OUTPATIENT)
Dept: ADMINISTRATIVE | Facility: HOSPITAL | Age: 75
End: 2020-08-19

## 2020-08-19 DIAGNOSIS — Z12.31 ENCOUNTER FOR SCREENING MAMMOGRAM FOR MALIGNANT NEOPLASM OF BREAST: ICD-10-CM

## 2020-08-19 DIAGNOSIS — R10.12 LEFT UPPER QUADRANT PAIN: Primary | ICD-10-CM

## 2020-08-19 DIAGNOSIS — C50.919 PRIMARY MALIGNANT NEOPLASM OF FEMALE BREAST (HCC): Primary | ICD-10-CM

## 2020-08-19 DIAGNOSIS — Z85.3 PERSONAL HISTORY OF MALIGNANT NEOPLASM OF BREAST: ICD-10-CM

## 2020-08-25 ENCOUNTER — HOSPITAL ENCOUNTER (OUTPATIENT)
Dept: CT IMAGING | Facility: HOSPITAL | Age: 75
Discharge: HOME | End: 2020-08-25
Admitting: INTERNAL MEDICINE

## 2020-08-25 DIAGNOSIS — R10.12 LEFT UPPER QUADRANT PAIN: ICD-10-CM

## 2020-08-25 PROCEDURE — 74176 CT ABD & PELVIS W/O CONTRAST: CPT

## 2020-08-26 DIAGNOSIS — I10 BENIGN ESSENTIAL HTN: Primary | ICD-10-CM

## 2020-08-26 RX ORDER — VALSARTAN AND HYDROCHLOROTHIAZIDE 320; 25 MG/1; MG/1
1 TABLET, FILM COATED ORAL DAILY
Qty: 30 TABLET | Refills: 11 | Status: SHIPPED | OUTPATIENT
Start: 2020-08-26 | End: 2022-10-03

## 2020-08-27 ENCOUNTER — OFFICE VISIT (OUTPATIENT)
Dept: GASTROENTEROLOGY | Facility: CLINIC | Age: 75
End: 2020-08-27

## 2020-08-27 VITALS — HEIGHT: 60 IN | WEIGHT: 198.4 LBS | TEMPERATURE: 97 F | BODY MASS INDEX: 38.95 KG/M2

## 2020-08-27 DIAGNOSIS — K31.7 POLYP OF DUODENUM: Primary | ICD-10-CM

## 2020-08-27 PROCEDURE — 99213 OFFICE O/P EST LOW 20 MIN: CPT | Performed by: INTERNAL MEDICINE

## 2020-08-27 NOTE — PROGRESS NOTES
Chief Complaint   Patient presents with   • Constipation   • Diarrhea       Blanca Lam is a  75 y.o. female here for a follow up visit for diarrhea and history of duodenal adenoma.    HPI     Patient 75-year-old female with history of diabetes, hypertension, temporal arteritis and duodenal adenoma here for follow-up.  Patient is due this year for repeat EGD to evaluate for any further polyp tissue.  Patient also complaining of difficulty tolerating her diet.  Patient reports some left upper quadrant pain some constipation and diarrhea.  Patient did undergo CT and labs now here for further recommendations.    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, grade III, ER/[place] negative   • CKD (chronic kidney disease), stage III (CMS/HCC)    • Colon polyp 03/12/2019   • Diabetes mellitus, type 2 (CMS/HCC)    • Hypertension    • Sleep apnea    • Swelling     IN LOWER EXTREMITIES   • Temporal arteritis (CMS/HCC)    • Thrombophlebitis          Current Outpatient Medications:   •  albuterol sulfate HFA (PROAIR HFA) 108 (90 Base) MCG/ACT inhaler, Every 6 (Six) Hours., Disp: , Rfl:   •  atorvastatin (LIPITOR) 20 MG tablet, Take 20 mg by mouth Every Other Day., Disp: , Rfl:   •  bisoprolol (ZEBeta) 10 MG tablet, Take 2 tablets by mouth Daily., Disp: 60 tablet, Rfl: 6  •  budesonide-formoterol (SYMBICORT) 160-4.5 MCG/ACT inhaler, Inhale 2 puffs 2 (Two) Times a Day., Disp: , Rfl:   •  cetirizine (zyrTEC) 10 MG tablet, Take 10 mg by mouth Daily., Disp: , Rfl:   •  hydrALAZINE (APRESOLINE) 25 MG tablet, Take 2 tablets by mouth 2 (two) times a day., Disp: 120 tablet, Rfl: 3  •  Insulin Glargine (TOUJEO SOLOSTAR SC), Inject 40 Units under the skin into the appropriate area as directed Every Night. INSTRUCTED PT TO FOLLOW MD INSTRUCTIONS REGARDING  DOSING BEFORE SURGERY, Disp: , Rfl:   •  Insulin Lispro (HUMALOG PEN SC), Inject  under the skin into the appropriate area as directed 3 (Three) Times a Day With Meals. 25 units at breakfast, 20 units at lunch, 22 units at dinner/INSTRUCTED PT TO FOLLOW MD INSTRUCTIONS REGARDING DOSING BEFORE SURGERY, Disp: , Rfl:   •  montelukast (SINGULAIR) 10 MG tablet, Take 10 mg by mouth Every Night., Disp: , Rfl:   •  NON FORMULARY, Apply 1 each topically to the appropriate area as directed Daily. Cream to foot rash, unsure of name, Disp: , Rfl:   •  ondansetron (ZOFRAN) 4 MG tablet, ondansetron HCl 4 mg tablet, Disp: , Rfl:   •  predniSONE (DELTASONE) 2.5 MG tablet, Take 7.5 mg by mouth Every Evening., Disp: , Rfl:   •  predniSONE (DELTASONE) 5 MG tablet, prednisone 5 mg tablet, Disp: , Rfl:   •  valsartan-hydrochlorothiazide (DIOVAN-HCT) 320-25 MG per tablet, Take 1 tablet by mouth Daily., Disp: 30 tablet, Rfl: 11    Allergies   Allergen Reactions   • Aspirin Nausea Only     Low tolerance, abdominal pain   • Sulfa Antibiotics Unknown (See Comments)     Happened as a child       Social History     Socioeconomic History   • Marital status:      Spouse name: Ashwin   • Number of children: 3   • Years of education: College   • Highest education level: Not on file   Occupational History   • Occupation: POSTAL SERVICE     Employer: RETIRED   Social Needs   • Financial resource strain: Not hard at all   • Food insecurity:     Worry: Never true     Inability: Never true   • Transportation needs:     Medical: No     Non-medical: No   Tobacco Use   • Smoking status: Never Smoker   • Smokeless tobacco: Never Used   • Tobacco comment: caffine use   Substance and Sexual Activity   • Alcohol use: No     Frequency: Monthly or less     Drinks per session: 1 or 2     Binge frequency: Never     Comment: one or two small drinks a year   • Drug use: No   • Sexual activity: Defer     Comment: Spouse, Ashwin       Family History    Problem Relation Age of Onset   • Hypertension Father    • Stomach cancer Father    • Diabetes Father    • Esophageal cancer Father    • Throat cancer Sister    • Diabetes Paternal Grandmother    • Hypertension Paternal Grandfather    • Colon cancer Paternal Grandfather    • Heart disease Mother    • Colon cancer Paternal Uncle    • Colon cancer Paternal Uncle    • Colon cancer Paternal Uncle    • Ovarian cancer Cousin    • Stomach cancer Cousin    • Malig Hyperthermia Neg Hx        Review of Systems   Constitutional: Negative.    Respiratory: Negative.    Cardiovascular: Negative.    Gastrointestinal: Positive for abdominal pain and diarrhea. Negative for abdominal distention, anal bleeding, blood in stool, constipation, nausea and vomiting.   Musculoskeletal: Negative.    Skin: Negative.    Hematological: Negative.        Vitals:    08/27/20 1108   Temp: 97 °F (36.1 °C)       Physical Exam   Constitutional: She is oriented to person, place, and time. She appears well-developed and well-nourished.   HENT:   Head: Normocephalic and atraumatic.   Eyes: Pupils are equal, round, and reactive to light. No scleral icterus.   Cardiovascular: Normal rate, regular rhythm and normal heart sounds.   Pulmonary/Chest: Effort normal and breath sounds normal. No respiratory distress.   Abdominal: Soft. Bowel sounds are normal. She exhibits no distension and no mass. There is no tenderness. No hernia.   Neurological: She is alert and oriented to person, place, and time.   Skin: Skin is warm and dry. No rash noted.   Psychiatric: She has a normal mood and affect. Her behavior is normal.   Vitals reviewed.      Lab on 08/17/2020   Component Date Value Ref Range Status   • Color, UA 08/17/2020 Yellow  Yellow, Straw Final   • Appearance, UA 08/17/2020 Clear  Clear Final   • pH, UA 08/17/2020 5.5  5.0 - 8.0 Final   • Specific Gravity, UA 08/17/2020 1.019  1.005 - 1.030 Final   • Glucose, UA 08/17/2020 Negative  Negative Final   •  Ketones, UA 08/17/2020 Negative  Negative Final   • Bilirubin, UA 08/17/2020 Negative  Negative Final   • Blood, UA 08/17/2020 Negative  Negative Final   • Protein, UA 08/17/2020 30 mg/dL (1+)* Negative Final   • Leuk Esterase, UA 08/17/2020 Negative  Negative Final   • Nitrite, UA 08/17/2020 Negative  Negative Final   • Urobilinogen, UA 08/17/2020 0.2 E.U./dL  0.2 - 1.0 E.U./dL Final   • WBC 08/17/2020 10.90* 3.40 - 10.80 10*3/mm3 Final   • RBC 08/17/2020 3.96  3.77 - 5.28 10*6/mm3 Final   • Hemoglobin 08/17/2020 12.2  12.0 - 15.9 g/dL Final   • Hematocrit 08/17/2020 38.1  34.0 - 46.6 % Final   • MCV 08/17/2020 96.2  79.0 - 97.0 fL Final   • MCH 08/17/2020 30.8  26.6 - 33.0 pg Final   • MCHC 08/17/2020 32.0  31.5 - 35.7 g/dL Final   • RDW 08/17/2020 12.0* 12.3 - 15.4 % Final   • RDW-SD 08/17/2020 42.3  37.0 - 54.0 fl Final   • MPV 08/17/2020 10.1  6.0 - 12.0 fL Final   • Platelets 08/17/2020 232  140 - 450 10*3/mm3 Final   • Glucose 08/17/2020 189* 65 - 99 mg/dL Final   • BUN 08/17/2020 29* 8 - 23 mg/dL Final   • Creatinine 08/17/2020 1.10* 0.57 - 1.00 mg/dL Final   • Sodium 08/17/2020 139  136 - 145 mmol/L Final   • Potassium 08/17/2020 5.0  3.5 - 5.2 mmol/L Final   • Chloride 08/17/2020 103  98 - 107 mmol/L Final   • CO2 08/17/2020 26.4  22.0 - 29.0 mmol/L Final   • Calcium 08/17/2020 9.3  8.6 - 10.5 mg/dL Final   • eGFR Non African Amer 08/17/2020 49* >60 mL/min/1.73 Final   • BUN/Creatinine Ratio 08/17/2020 26.4* 7.0 - 25.0 Final   • Anion Gap 08/17/2020 9.6  5.0 - 15.0 mmol/L Final   • 25 Hydroxy, Vitamin D 08/17/2020 22.6* 30.0 - 100.0 ng/ml Final   • RBC, UA 08/17/2020 0-2  None Seen, 0-2 /HPF Final   • WBC, UA 08/17/2020 3-5* None Seen, 0-2 /HPF Final   • Bacteria, UA 08/17/2020 1+* None Seen /HPF Final   • Squamous Epithelial Cells, UA 08/17/2020 3-6* None Seen, 0-2 /HPF Final   • Hyaline Casts, UA 08/17/2020 None Seen  None Seen /LPF Final   • Methodology 08/17/2020 Automated Microscopy   Final   Lab on  07/24/2020   Component Date Value Ref Range Status   • Sed Rate 07/24/2020 34* 0 - 30 mm/hr Final   • Microalbumin/Creatinine Ratio 07/24/2020 140.8  mg/g Final   • Creatinine, Urine 07/24/2020 82.4  mg/dL Final   • Microalbumin, Urine 07/24/2020 11.6  mg/dL Final   • Hemoglobin A1C 07/24/2020 8.20* 4.80 - 5.60 % Final   • Hepatitis C Ab 07/24/2020 <0.1  0.0 - 0.9 s/co ratio Final   • Glucose 07/24/2020 169* 65 - 99 mg/dL Final   • BUN 07/24/2020 32* 8 - 23 mg/dL Final   • Creatinine 07/24/2020 1.19* 0.57 - 1.00 mg/dL Final   • Sodium 07/24/2020 138  136 - 145 mmol/L Final   • Potassium 07/24/2020 4.9  3.5 - 5.2 mmol/L Final   • Chloride 07/24/2020 103  98 - 107 mmol/L Final   • CO2 07/24/2020 27.6  22.0 - 29.0 mmol/L Final   • Calcium 07/24/2020 9.1  8.6 - 10.5 mg/dL Final   • Total Protein 07/24/2020 6.6  6.0 - 8.5 g/dL Final   • Albumin 07/24/2020 3.60  3.50 - 5.20 g/dL Final   • ALT (SGPT) 07/24/2020 15  1 - 33 U/L Final   • AST (SGOT) 07/24/2020 9  1 - 32 U/L Final   • Alkaline Phosphatase 07/24/2020 100  39 - 117 U/L Final   • Total Bilirubin 07/24/2020 0.3  0.0 - 1.2 mg/dL Final   • eGFR Non African Amer 07/24/2020 44* >60 mL/min/1.73 Final   • Globulin 07/24/2020 3.0  gm/dL Final   • A/G Ratio 07/24/2020 1.2  g/dL Final   • BUN/Creatinine Ratio 07/24/2020 26.9* 7.0 - 25.0 Final   • Anion Gap 07/24/2020 7.4  5.0 - 15.0 mmol/L Final   • Total Cholesterol 07/24/2020 155  100 - 199 mg/dL Final   • Triglycerides 07/24/2020 136  0 - 149 mg/dL Final   • HDL Cholesterol 07/24/2020 42  >39 mg/dL Final   • LDL Cholesterol  07/24/2020 86  0 - 99 mg/dL Final   • Chol/HDL Ratio 07/24/2020 3.7  0.0 - 4.4 ratio Final   • Non HDL Cholesterol (LDL+VLDL) 07/24/2020 113  0 - 129 mg/dL Final   • Bilirubin, Direct 07/24/2020 <0.2  0.0 - 0.3 mg/dL Final   • Comment 07/24/2020 Comment   Final   Transcribe Orders on 07/24/2020   Component Date Value Ref Range Status   • TSH 07/24/2020 0.614  0.270 - 4.200 uIU/mL Final   • Color, UA  07/24/2020 Yellow  Yellow, Straw Final   • Appearance, UA 07/24/2020 Clear  Clear Final   • pH, UA 07/24/2020 5.5  5.0 - 8.0 Final   • Specific Gravity, UA 07/24/2020 1.025  1.005 - 1.030 Final   • Glucose, UA 07/24/2020 Negative  Negative Final   • Ketones, UA 07/24/2020 Negative  Negative Final   • Bilirubin, UA 07/24/2020 Negative  Negative Final   • Blood, UA 07/24/2020 Trace* Negative Final   • Protein, UA 07/24/2020 Negative  Negative Final   • Leuk Esterase, UA 07/24/2020 Negative  Negative Final   • Nitrite, UA 07/24/2020 Negative  Negative Final   • Urobilinogen, UA 07/24/2020 0.2 E.U./dL  0.2 - 1.0 E.U./dL Final   • WBC 07/24/2020 10.77  3.40 - 10.80 10*3/mm3 Final   • RBC 07/24/2020 4.04  3.77 - 5.28 10*6/mm3 Final   • Hemoglobin 07/24/2020 12.3  12.0 - 15.9 g/dL Final   • Hematocrit 07/24/2020 37.8  34.0 - 46.6 % Final   • MCV 07/24/2020 93.6  79.0 - 97.0 fL Final   • MCH 07/24/2020 30.4  26.6 - 33.0 pg Final   • MCHC 07/24/2020 32.5  31.5 - 35.7 g/dL Final   • RDW 07/24/2020 11.8* 12.3 - 15.4 % Final   • RDW-SD 07/24/2020 40.4  37.0 - 54.0 fl Final   • MPV 07/24/2020 10.2  6.0 - 12.0 fL Final   • Platelets 07/24/2020 230  140 - 450 10*3/mm3 Final   • Neutrophil % 07/24/2020 82.8* 42.7 - 76.0 % Final   • Lymphocyte % 07/24/2020 9.0* 19.6 - 45.3 % Final   • Monocyte % 07/24/2020 5.3  5.0 - 12.0 % Final   • Eosinophil % 07/24/2020 1.5  0.3 - 6.2 % Final   • Basophil % 07/24/2020 0.5  0.0 - 1.5 % Final   • Immature Grans % 07/24/2020 0.9* 0.0 - 0.5 % Final   • Neutrophils, Absolute 07/24/2020 8.92* 1.70 - 7.00 10*3/mm3 Final   • Lymphocytes, Absolute 07/24/2020 0.97  0.70 - 3.10 10*3/mm3 Final   • Monocytes, Absolute 07/24/2020 0.57  0.10 - 0.90 10*3/mm3 Final   • Eosinophils, Absolute 07/24/2020 0.16  0.00 - 0.40 10*3/mm3 Final   • Basophils, Absolute 07/24/2020 0.05  0.00 - 0.20 10*3/mm3 Final   • Immature Grans, Absolute 07/24/2020 0.10* 0.00 - 0.05 10*3/mm3 Final   • nRBC 07/24/2020 0.0  0.0 - 0.2 /100  WBC Final   • RBC, UA 07/24/2020 0-2  None Seen, 0-2 /HPF Final   • WBC, UA 07/24/2020 3-5* None Seen, 0-2 /HPF Final   • Bacteria, UA 07/24/2020 Trace* None Seen /HPF Final   • Squamous Epithelial Cells, UA 07/24/2020 3-6* None Seen, 0-2 /HPF Final   • Hyaline Casts, UA 07/24/2020 None Seen  None Seen /LPF Final   • Methodology 07/24/2020 Automated Microscopy   Final   Lab on 07/06/2020   Component Date Value Ref Range Status   • Glucose 07/06/2020 94  74 - 124 mg/dL Final   • BUN 07/06/2020 30* 6 - 20 mg/dL Final   • Creatinine 07/06/2020 1.19* 0.60 - 1.10 mg/dL Final   • Sodium 07/06/2020 142  134 - 145 mmol/L Final   • Potassium 07/06/2020 4.8* 3.5 - 4.7 mmol/L Final   • Chloride 07/06/2020 105  98 - 107 mmol/L Final   • CO2 07/06/2020 26.5  22.0 - 29.0 mmol/L Final   • Calcium 07/06/2020 9.6  8.5 - 10.2 mg/dL Final   • eGFR Non African Amer 07/06/2020 44* >60 mL/min/1.73 Final   • BUN/Creatinine Ratio 07/06/2020 25.2  7.3 - 30.0 Final   • Anion Gap 07/06/2020 10.5  5.0 - 15.0 mmol/L Final   • Microalbumin, Urine 07/06/2020 13.1  mg/dL Final   • Color, UA 07/06/2020 Yellow  Yellow, Straw Final   • Appearance, UA 07/06/2020 Clear  Clear Final   • pH, UA 07/06/2020 5.5  4.5 - 8.0 Final   • Specific Gravity, UA 07/06/2020 1.020  1.002 - 1.030 Final   • Glucose, UA 07/06/2020 Negative  Negative Final   • Ketones, UA 07/06/2020 Negative  Negative Final   • Bilirubin, UA 07/06/2020 Negative  Negative Final   • Blood, UA 07/06/2020 Negative  Negative Final   • Protein, UA 07/06/2020 30 mg/dL (1+)* Negative Final   • Leuk Esterase, UA 07/06/2020 Negative  Negative Final   • Nitrite, UA 07/06/2020 Negative  Negative Final   • Urobilinogen, UA 07/06/2020 0.2 E.U./dL  0.2 - 1.0 E.U./dL Final   Hospital Outpatient Visit on 07/02/2020   Component Date Value Ref Range Status   • BSA 07/02/2020 1.9  m^2 Final   • IVSd 07/02/2020 1.3  cm Final   • LVIDd 07/02/2020 3.6  cm Final   • LVIDs 07/02/2020 2.6  cm Final   • LVPWd  07/02/2020 1.2  cm Final   • IVS/LVPW 07/02/2020 1.1   Final   • FS 07/02/2020 26.8  % Final   • EDV(Teich) 07/02/2020 55.1  ml Final   • ESV(Teich) 07/02/2020 25.7  ml Final   • EF(Teich) 07/02/2020 53.3  % Final   • EF(cubed) 07/02/2020 60.8  % Final   • LV mass(C)d 07/02/2020 144.6  grams Final   • LV mass(C)dI 07/02/2020 77.8  grams/m^2 Final   • SV(Teich) 07/02/2020 29.4  ml Final   • SI(Teich) 07/02/2020 15.8  ml/m^2 Final   • SV(cubed) 07/02/2020 28.8  ml Final   • SI(cubed) 07/02/2020 15.5  ml/m^2 Final   • Ao root diam 07/02/2020 3.5  cm Final   • Ao root area 07/02/2020 9.4  cm^2 Final   • ACS 07/02/2020 1.8  cm Final   • asc Aorta Diam 07/02/2020 2.8  cm Final   • LVOT diam 07/02/2020 1.8  cm Final   • LVOT area 07/02/2020 2.5  cm^2 Final   • LVOT area(traced) 07/02/2020 2.5  cm^2 Final   • RVOT diam 07/02/2020 2.1  cm Final   • RVOT area 07/02/2020 3.5  cm^2 Final   • LVLd ap4 07/02/2020 7.8  cm Final   • EDV(MOD-sp4) 07/02/2020 74.0  ml Final   • LVLs ap4 07/02/2020 5.9  cm Final   • ESV(MOD-sp4) 07/02/2020 27.0  ml Final   • EF(MOD-sp4) 07/02/2020 63.5  % Final   • LVLd ap2 07/02/2020 7.4  cm Final   • EDV(MOD-sp2) 07/02/2020 76.0  ml Final   • LVLs ap2 07/02/2020 6.2  cm Final   • ESV(MOD-sp2) 07/02/2020 27.0  ml Final   • EF(MOD-sp2) 07/02/2020 64.5  % Final   • SV(MOD-sp4) 07/02/2020 47.0  ml Final   • SI(MOD-sp4) 07/02/2020 25.3  ml/m^2 Final   • SV(MOD-sp2) 07/02/2020 49.0  ml Final   • SI(MOD-sp2) 07/02/2020 26.4  ml/m^2 Final   • Ao root area (BSA corrected) 07/02/2020 1.9   Final   • LV Mccaulye Vol (BSA corrected) 07/02/2020 39.8  ml/m^2 Final   • LV Sys Vol (BSA corrected) 07/02/2020 14.5  ml/m^2 Final   • EF(MOD-bp) 07/02/2020 65.1  % Final   • MV A dur 07/02/2020 0.1  sec Final   • MV E max charlie 07/02/2020 93.0  cm/sec Final   • MV A max charlie 07/02/2020 133.8  cm/sec Final   • MV E/A 07/02/2020 0.69   Final   • MV V2 max 07/02/2020 148.6  cm/sec Final   • MV max PG 07/02/2020 8.8  mmHg Final   •  MV V2 mean 07/02/2020 101.7  cm/sec Final   • MV mean PG 07/02/2020 4.5  mmHg Final   • MV V2 VTI 07/02/2020 36.0  cm Final   • MVA(VTI) 07/02/2020 1.6  cm^2 Final   • MV P1/2t max charlie 07/02/2020 106.7  cm/sec Final   • MV P1/2t 07/02/2020 58.6  msec Final   • MVA(P1/2t) 07/02/2020 3.8  cm^2 Final   • MV dec slope 07/02/2020 533.6  cm/sec^2 Final   • MV dec time 07/02/2020 0.18  sec Final   • Ao pk charlie 07/02/2020 154.3  cm/sec Final   • Ao max PG 07/02/2020 9.5  mmHg Final   • Ao max PG (full) 07/02/2020 4.6  mmHg Final   • Ao V2 mean 07/02/2020 113.9  cm/sec Final   • Ao mean PG 07/02/2020 5.7  mmHg Final   • Ao mean PG (full) 07/02/2020 2.7  mmHg Final   • Ao V2 VTI 07/02/2020 35.8  cm Final   • LEW(I,A) 07/02/2020 1.6  cm^2 Final   • LEW(I,D) 07/02/2020 1.6  cm^2 Final   • LEW(V,A) 07/02/2020 1.8  cm^2 Final   • LEW(V,D) 07/02/2020 1.8  cm^2 Final   • LV V1 max PG 07/02/2020 4.9  mmHg Final   • LV V1 mean PG 07/02/2020 3.0  mmHg Final   • LV V1 max 07/02/2020 111.1  cm/sec Final   • LV V1 mean 07/02/2020 81.8  cm/sec Final   • LV V1 VTI 07/02/2020 23.6  cm Final   • SV(Ao) 07/02/2020 336.2  ml Final   • SI(Ao) 07/02/2020 180.9  ml/m^2 Final   • SV(LVOT) 07/02/2020 57.9  ml Final   • SV(RVOT) 07/02/2020 60.7  ml Final   • SI(LVOT) 07/02/2020 31.2  ml/m^2 Final   • PA V2 max 07/02/2020 116.7  cm/sec Final   • PA max PG 07/02/2020 5.5  mmHg Final   • PA max PG (full) 07/02/2020 2.9  mmHg Final   • BH CV ECHO SHILA - PVA(V,A) 07/02/2020 2.4  cm^2 Final   • BH CV ECHO SHILA - PVA(V,D) 07/02/2020 2.4  cm^2 Final   • PA acc time 07/02/2020 0.08  sec Final   • RV V1 max PG 07/02/2020 2.6  mmHg Final   • RV V1 mean PG 07/02/2020 1.6  mmHg Final   • RV V1 max 07/02/2020 80.1  cm/sec Final   • RV V1 mean 07/02/2020 59.7  cm/sec Final   • RV V1 VTI 07/02/2020 17.3  cm Final   • TR max charlie 07/02/2020 241.9  cm/sec Final   • RVSP(TR) 07/02/2020 26.4  mmHg Final   • RAP systole 07/02/2020 3.0  mmHg Final   • PA pr(Accel)  07/02/2020 44.5  mmHg Final   • Pulm Sys Abhay 07/02/2020 78.8  cm/sec Final   • Pulm M[place]y Abhay 07/02/2020 45.0  cm/sec Final   • Pulm S/D 07/02/2020 1.8   Final   • Qp/Qs 07/02/2020 1.0   Final   • Pulm A Revs Dur 07/02/2020 0.07  sec Final   • Pulm A Revs Abhay 07/02/2020 27.0  cm/sec Final   • MVA P1/2T LCG 07/02/2020 2.1  cm^2 Final   • RV BASE (<4.1) - obsolete 07/02/2020 2.2  cm Final   • RV LENGTH (<8.5) - obsolete 07/02/2020 6.5  cm Final   • RV Mid 07/02/2020 2.3  cm Final   • Lat Peak E' Abhay 07/02/2020 5.9  cm/sec Final   • Med Peak E' Abhay 07/02/2020 5.8  cm/sec Final   • BH CV ECHO SHILA - BZI_BMI 07/02/2020 38.7  kilograms/m^2 Final   • BH CV ECHO SHILA - BSA([place]OCK) 07/02/2020 2.0  m^2 Final   • BH CV ECHO SHILA - BZI_METRIC_WEIGHT 07/02/2020 89.8  kg Final   • BH CV ECHO SHILA - BZI_METRIC_HEIGHT 07/02/2020 152.4  cm Final   • Avg E/e' ratio 07/02/2020 15.90   Final   • Target HR (85%) 07/02/2020 124  bpm Final   • Max. Pred. HR (100%) 07/02/2020 146  bpm Final   • BH CV VAS BP RIGHT ARM 07/02/2020 180/90  mmHg Final   • RV S' 07/02/2020 12.00  cm/sec Final   • RV Base 07/02/2020 2.20  cm Final   • RV Length 07/02/2020 6.50  cm Final   • Sinus 07/02/2020 3.00  cm Final   • STJ 07/02/2020 2.80  cm Final   • Ascending aorta 07/02/2020 2.80  cm Final   • Aortic arch 07/02/2020 2.30  cm Final   • LA Volume Index 07/02/2020 22.0  mL/m2 Final   • Abdo Ao Diam 07/02/2020 2.20  cm Final   • TAPSE (>1.6) 07/02/2020 1.90  cm2 Final   • Echo EF Estimated 07/02/2020 65  % Final   Hospital Outpatient Visit on 07/02/2020   Component Date Value Ref Range Status   • Nuclear Prior Study 07/02/2020 3   Final   •  CV STRESS PROTOCOL 1 07/02/2020 Pharmacologic   Final   • Stage 1 07/02/2020 1   Final   • HR Stage 1 07/02/2020 113   Final   • BP Stage 1 07/02/2020 185/74   Final   • Duration Min Stage 1 07/02/2020 0   Final   • Duration Sec Stage 1 07/02/2020 10   Final   • Stress Dose Regadenoson Stage 1 07/02/2020 0.4   Final    • Stress Comments Stage 1 07/02/2020 10 sec bolus injection   Final   • Baseline HR 07/02/2020 87  bpm Final   • Baseline BP 07/02/2020 198/72  mmHg Final   • Peak HR 07/02/2020 113  bpm Final   • Percent Max Pred HR 07/02/2020 77.40  % Final   • Percent Target HR 07/02/2020 91  % Final   • Peak BP 07/02/2020 185/74  mmHg Final   • Recovery HR 07/02/2020 107  bpm Final   • Recovery BP 07/02/2020 182/77  mmHg Final   • Target HR (85%) 07/02/2020 124  bpm Final   • Max. Pred. HR (100%) 07/02/2020 146  bpm Final   • Exercise duration (sec) 07/02/2020 10  sec Final   • Nuc Stress EF 07/02/2020 76  % Final   Lab on 06/29/2020   Component Date Value Ref Range Status   • Glucose 06/29/2020 93  74 - 124 mg/dL Final   • BUN 06/29/2020 33* 6 - 20 mg/dL Final   • Creatinine 06/29/2020 1.78* 0.60 - 1.10 mg/dL Final   • Sodium 06/29/2020 140  134 - 145 mmol/L Final   • Potassium 06/29/2020 4.8* 3.5 - 4.7 mmol/L Final   • Chloride 06/29/2020 103  98 - 107 mmol/L Final   • CO2 06/29/2020 26.9  22.0 - 29.0 mmol/L Final   • Calcium 06/29/2020 9.3  8.5 - 10.2 mg/dL Final   • Total Protein 06/29/2020 6.9  6.3 - 8.0 g/dL Final   • Albumin 06/29/2020 3.80  3.50 - 5.20 g/dL Final   • ALT (SGPT) 06/29/2020 15  0 - 33 U/L Final   • AST (SGOT) 06/29/2020 12  0 - 32 U/L Final   • Alkaline Phosphatase 06/29/2020 104  38 - 116 U/L Final   • Total Bilirubin 06/29/2020 0.3  0.2 - 1.2 mg/dL Final   • eGFR Non African Amer 06/29/2020 28* >60 mL/min/1.73 Final   • Globulin 06/29/2020 3.1  1.8 - 3.5 gm/dL Final   • A/G Ratio 06/29/2020 1.2  1.1 - 2.4 g/dL Final   • BUN/Creatinine Ratio 06/29/2020 18.5  7.3 - 30.0 Final   • Anion Gap 06/29/2020 10.1  5.0 - 15.0 mmol/L Final   • Iron 06/29/2020 54  37 - 145 mcg/dL Final   • Iron Saturation 06/29/2020 19  14 - 48 % Final   • Transferrin 06/29/2020 207  200 - 360 mg/dL Final   • TIBC 06/29/2020 290  249 - 505 mcg/dL Final   • Immature Reticulocyte Fraction 06/29/2020 12.6  3.0 - 15.8 % Final   •  Reticulocyte % 06/29/2020 1.38  0.70 - 1.90 % Final   • Reticulocyte Hgb 06/29/2020 35.5  29.8 - 36.1 pg Final   • Ferritin 06/29/2020 350.70* 13.00 - 150.00 ng/mL Final   • WBC 06/29/2020 13.06* 3.40 - 10.80 10*3/mm3 Final   • RBC 06/29/2020 3.82  3.77 - 5.28 10*6/mm3 Final   • Hemoglobin 06/29/2020 11.8* 12.0 - 15.9 g/dL Final   • Hematocrit 06/29/2020 38.2  34.0 - 46.6 % Final   • MCV 06/29/2020 100.0* 79.0 - 97.0 fL Final   • MCH 06/29/2020 30.9  26.6 - 33.0 pg Final   • MCHC 06/29/2020 30.9* 31.5 - 35.7 g/dL Final   • RDW 06/29/2020 12.5  12.3 - 15.4 % Final   • RDW-SD 06/29/2020 46.2  37.0 - 54.0 fl Final   • MPV 06/29/2020 9.8  6.0 - 12.0 fL Final   • Platelets 06/29/2020 227  140 - 450 10*3/mm3 Final   • Neutrophil % 06/29/2020 82.3* 42.7 - 76.0 % Final   • Lymphocyte % 06/29/2020 8.3* 19.6 - 45.3 % Final   • Monocyte % 06/29/2020 6.9  5.0 - 12.0 % Final   • Eosinophil % 06/29/2020 1.4  0.3 - 6.2 % Final   • Basophil % 06/29/2020 0.3  0.0 - 1.5 % Final   • Immature Grans % 06/29/2020 0.8* 0.0 - 0.5 % Final   • Neutrophils, Absolute 06/29/2020 10.75* 1.70 - 7.00 10*3/mm3 Final   • Lymphocytes, Absolute 06/29/2020 1.08  0.70 - 3.10 10*3/mm3 Final   • Monocytes, Absolute 06/29/2020 0.90  0.10 - 0.90 10*3/mm3 Final   • Eosinophils, Absolute 06/29/2020 0.18  0.00 - 0.40 10*3/mm3 Final   • Basophils, Absolute 06/29/2020 0.04  0.00 - 0.20 10*3/mm3 Final   • Immature Grans, Absolute 06/29/2020 0.11* 0.00 - 0.05 10*3/mm3 Final   • nRBC 06/29/2020 0.0  0.0 - 0.2 /100 WBC Final       Blanca was seen today for constipation and diarrhea.    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  Patient due for repeat EGD to confirm no residual polyp.  Patient also complaining of alternating dyspepsia and diarrhea.  Patient reports had colonoscopy and EGD together  about a year and a half ago there were unremarkable but CT was sent for this epigastric left upper quadrant discomfort and difficulty eating foods.  Report was negative but on review there is significant amount of fatty replacement in the head of the pancreas.  Will arrange EGD to evaluate the bowel but also recommend trial of Creon to see if pancreatic exocrine insufficiency could be part of her symptom issue.  We will follow-up clinically based on findings.

## 2020-09-02 ENCOUNTER — TRANSCRIBE ORDERS (OUTPATIENT)
Dept: SLEEP MEDICINE | Facility: HOSPITAL | Age: 75
End: 2020-09-02

## 2020-09-02 DIAGNOSIS — Z01.818 OTHER SPECIFIED PRE-OPERATIVE EXAMINATION: Primary | ICD-10-CM

## 2020-09-03 ENCOUNTER — APPOINTMENT (OUTPATIENT)
Dept: SLEEP MEDICINE | Facility: HOSPITAL | Age: 75
End: 2020-09-03

## 2020-09-03 ENCOUNTER — HOSPITAL ENCOUNTER (OUTPATIENT)
Dept: ULTRASOUND IMAGING | Facility: HOSPITAL | Age: 75
Discharge: HOME | End: 2020-09-03

## 2020-09-03 ENCOUNTER — HOSPITAL ENCOUNTER (OUTPATIENT)
Dept: MAMMOGRAPHY | Facility: HOSPITAL | Age: 75
Discharge: HOME | End: 2020-09-03
Admitting: INTERNAL MEDICINE

## 2020-09-03 DIAGNOSIS — Z85.3 PERSONAL HISTORY OF MALIGNANT NEOPLASM OF BREAST: ICD-10-CM

## 2020-09-03 DIAGNOSIS — C50.919 PRIMARY MALIGNANT NEOPLASM OF FEMALE BREAST (HCC): ICD-10-CM

## 2020-09-03 DIAGNOSIS — Z12.31 ENCOUNTER FOR SCREENING MAMMOGRAM FOR MALIGNANT NEOPLASM OF BREAST: ICD-10-CM

## 2020-09-03 PROCEDURE — 77063 BREAST TOMOSYNTHESIS BI: CPT

## 2020-09-03 PROCEDURE — 76641 ULTRASOUND BREAST COMPLETE: CPT

## 2020-09-03 PROCEDURE — 77067 SCR MAMMO BI INCL CAD: CPT

## 2020-09-07 ENCOUNTER — LAB (OUTPATIENT)
Dept: LAB | Facility: HOSPITAL | Age: 75
End: 2020-09-07

## 2020-09-07 DIAGNOSIS — Z01.818 OTHER SPECIFIED PRE-OPERATIVE EXAMINATION: ICD-10-CM

## 2020-09-07 PROCEDURE — U0004 COV-19 TEST NON-CDC HGH THRU: HCPCS

## 2020-09-07 PROCEDURE — C9803 HOPD COVID-19 SPEC COLLECT: HCPCS

## 2020-09-08 LAB — SARS-COV-2 RNA RESP QL NAA+PROBE: NOT DETECTED

## 2020-09-09 ENCOUNTER — ANESTHESIA (OUTPATIENT)
Dept: GASTROENTEROLOGY | Facility: HOSPITAL | Age: 75
End: 2020-09-09

## 2020-09-09 ENCOUNTER — HOSPITAL ENCOUNTER (OUTPATIENT)
Facility: HOSPITAL | Age: 75
Setting detail: HOSPITAL OUTPATIENT SURGERY
Discharge: HOME | End: 2020-09-09
Attending: INTERNAL MEDICINE | Admitting: INTERNAL MEDICINE

## 2020-09-09 ENCOUNTER — ANESTHESIA EVENT (OUTPATIENT)
Dept: GASTROENTEROLOGY | Facility: HOSPITAL | Age: 75
End: 2020-09-09

## 2020-09-09 VITALS
RESPIRATION RATE: 16 BRPM | TEMPERATURE: 98.4 F | DIASTOLIC BLOOD PRESSURE: 80 MMHG | HEART RATE: 74 BPM | BODY MASS INDEX: 38.28 KG/M2 | SYSTOLIC BLOOD PRESSURE: 142 MMHG | WEIGHT: 196 LBS | OXYGEN SATURATION: 99 %

## 2020-09-09 DIAGNOSIS — K31.7 POLYP OF DUODENUM: ICD-10-CM

## 2020-09-09 LAB — GLUCOSE BLDC GLUCOMTR-MCNC: 220 MG/DL (ref 70–130)

## 2020-09-09 PROCEDURE — 88305 TISSUE EXAM BY PATHOLOGIST: CPT | Performed by: INTERNAL MEDICINE

## 2020-09-09 PROCEDURE — 43239 EGD BIOPSY SINGLE/MULTIPLE: CPT | Performed by: INTERNAL MEDICINE

## 2020-09-09 PROCEDURE — 25010000002 PROPOFOL 10 MG/ML EMULSION: Performed by: NURSE ANESTHETIST, CERTIFIED REGISTERED

## 2020-09-09 PROCEDURE — 82962 GLUCOSE BLOOD TEST: CPT

## 2020-09-09 RX ORDER — SODIUM CHLORIDE 9 MG/ML
30 INJECTION, SOLUTION INTRAVENOUS CONTINUOUS PRN
Status: DISCONTINUED | OUTPATIENT
Start: 2020-09-09 | End: 2020-09-09 | Stop reason: HOSPADM

## 2020-09-09 RX ORDER — PROMETHAZINE HYDROCHLORIDE 25 MG/1
25 SUPPOSITORY RECTAL ONCE AS NEEDED
Status: DISCONTINUED | OUTPATIENT
Start: 2020-09-09 | End: 2020-09-09 | Stop reason: HOSPADM

## 2020-09-09 RX ORDER — LIDOCAINE HYDROCHLORIDE 20 MG/ML
INJECTION, SOLUTION INFILTRATION; PERINEURAL AS NEEDED
Status: DISCONTINUED | OUTPATIENT
Start: 2020-09-09 | End: 2020-09-09 | Stop reason: SURG

## 2020-09-09 RX ORDER — PROPOFOL 10 MG/ML
VIAL (ML) INTRAVENOUS CONTINUOUS PRN
Status: DISCONTINUED | OUTPATIENT
Start: 2020-09-09 | End: 2020-09-09 | Stop reason: SURG

## 2020-09-09 RX ORDER — PANTOPRAZOLE SODIUM 40 MG/1
40 TABLET, DELAYED RELEASE ORAL DAILY
Qty: 90 TABLET | Refills: 3 | Status: SHIPPED | OUTPATIENT
Start: 2020-09-09 | End: 2021-05-04

## 2020-09-09 RX ORDER — PROPOFOL 10 MG/ML
VIAL (ML) INTRAVENOUS AS NEEDED
Status: DISCONTINUED | OUTPATIENT
Start: 2020-09-09 | End: 2020-09-09 | Stop reason: SURG

## 2020-09-09 RX ORDER — NORTRIPTYLINE HYDROCHLORIDE 10 MG/1
10 CAPSULE ORAL NIGHTLY
Qty: 90 CAPSULE | Refills: 3 | Status: SHIPPED | OUTPATIENT
Start: 2020-09-09 | End: 2021-05-04

## 2020-09-09 RX ORDER — PROMETHAZINE HYDROCHLORIDE 25 MG/1
25 TABLET ORAL ONCE AS NEEDED
Status: DISCONTINUED | OUTPATIENT
Start: 2020-09-09 | End: 2020-09-09 | Stop reason: HOSPADM

## 2020-09-09 RX ADMIN — LIDOCAINE HYDROCHLORIDE 100 MG: 20 INJECTION, SOLUTION INFILTRATION; PERINEURAL at 08:15

## 2020-09-09 RX ADMIN — SODIUM CHLORIDE 30 ML/HR: 9 INJECTION, SOLUTION INTRAVENOUS at 07:48

## 2020-09-09 RX ADMIN — PROPOFOL 50 MG: 10 INJECTION, EMULSION INTRAVENOUS at 08:15

## 2020-09-09 RX ADMIN — PROPOFOL 150 MCG/KG/MIN: 10 INJECTION, EMULSION INTRAVENOUS at 08:15

## 2020-09-09 NOTE — BRIEF OP NOTE
ESOPHAGOGASTRODUODENOSCOPY  Progress Note    Blanca I Ortirooseveltez  9/9/2020    Pre-op Diagnosis:   Polyp of duodenum [K31.7]       Post-Op Diagnosis Codes:     * Polyp of duodenum [K31.7]     * Gastritis [K29.70]    Procedure/CPT® Codes:        Procedure(s):  ESOPHAGOGASTRODUODENOSCOPY    Surgeon(s):  Haile Handley MD    Anesthesia: Monitored Anesthesia Care    Staff:   Endo Technician: Tessa Cao  Endo Nurse: Jennyfer Perez RN         Estimated Blood Loss: minimal    Urine Voided: * No values recorded between 9/9/2020  7:52 AM and 9/9/2020  8:23 AM *    Specimens:                Specimens     ID Source Type Tests Collected By Collected At Frozen?      A Small Intestine, Duodenum Polyp · TISSUE PATHOLOGY EXAM   Haile Handley MD 9/9/20 0822      Description: duodenal polyp    This specimen was not marked as sent.    B Stomach Tissue · TISSUE PATHOLOGY EXAM   Haile Handley MD 9/9/20 0823      Description: antral biopsies    This specimen was not marked as sent.                Drains:   Closed/Suction Drain 1 Left Chest Bulb 19 Fr. (Active)       Closed/Suction Drain 2 Left Chest Bulb 19 Fr. (Active)       Findings: EGD with biopsy revealed duodenal polyp, antral gastritis with normal retroflexion in normal esophagus.    Complications: None          Haile Handley MD     Date: 9/9/2020  Time: 08:24

## 2020-09-09 NOTE — ANESTHESIA PREPROCEDURE EVALUATION
Anesthesia Evaluation     Patient summary reviewed and Nursing notes reviewed   NPO Solid Status: > 8 hours  NPO Liquid Status: > 8 hours           Airway   Mallampati: II  TM distance: >3 FB  Neck ROM: full  No difficulty expected  Dental - normal exam     Pulmonary - normal exam    breath sounds clear to auscultation  (+) asthma,sleep apnea,   Cardiovascular - normal exam    Rhythm: regular  Rate: normal    (+) hypertension 2 medications or greater,       Neuro/Psych  (+) psychiatric history Anxiety and Depression,     GI/Hepatic/Renal/Endo    (+) obesity, morbid obesity,  renal disease CRI, diabetes mellitus type 2,     ROS Comment: Duodenal adenoma    Musculoskeletal (-) negative ROS    Abdominal   (+) obese,    Substance History - negative use     OB/GYN negative ob/gyn ROS         Other      history of cancer      Other Comment: Tempora arteritis                    Anesthesia Plan    ASA 3     MAC     intravenous induction     Anesthetic plan, all risks, benefits, and alternatives have been provided, discussed and informed consent has been obtained with: patient.

## 2020-09-09 NOTE — DISCHARGE INSTRUCTIONS
For the next 24 hours patient needs to be with a responsible adult.    For 24 hours DO NOT drive, operate machinery, appliances, drink alcohol, make important decisions or sign legal documents.    Start with a light or bland diet if you are feeling sick to your stomach otherwise advance to regular diet as tolerated.    Follow recommendations on procedure report if provided by your doctor.    Call Dr Handley for problems [phone]    Problems may include but not limited to: large amounts of bleeding, trouble breathing, repeated vomiting, severe unrelieved pain, fever or chills.

## 2020-09-09 NOTE — ANESTHESIA POSTPROCEDURE EVALUATION
Patient: Blanca DE LA VEGA Ornickez    Procedure Summary     Date:  09/09/20 Room / Location:  [facility] ENDOSCOPY 8 /  [facility]    Anesthesia Start:  0812 Anesthesia Stop:  0834    Procedure:  ESOPHAGOGASTRODUODENOSCOPY WITH BIOPSIES AND COLD BIOPSY POLYPECTOMY (N/A Esophagus) Diagnosis:       Polyp of duodenum      Gastritis      (Polyp of duodenum [K31.7])    Surgeon:  Haile Handley MD Provider:  Rui Call MD    Anesthesia Type:  MAC ASA Status:  3          Anesthesia Type: MAC    Vitals  Vitals Value Taken Time   /80 9/9/2020  8:52 AM   Temp     Pulse 74 9/9/2020  8:52 AM   Resp 16 9/9/2020  8:52 AM   SpO2 99 % 9/9/2020  8:52 AM           Post Anesthesia Care and Evaluation    Patient location during evaluation: bedside  Patient participation: complete - patient participated  Level of consciousness: awake and alert  Pain management: adequate  Airway patency: patent  Anesthetic complications: No anesthetic complications    Cardiovascular status: acceptable  Respiratory status: acceptable  Hydration status: acceptable    Comments: /80 (BP Location: Right arm, Patient Position: Sitting)   Pulse 74   Temp 36.9 °C (98.4 °F) (Oral)   Resp 16   Wt 88.9 kg (196 lb)   SpO2 99%   BMI 38.28 kg/m²

## 2020-09-16 ENCOUNTER — TRANSCRIBE ORDERS (OUTPATIENT)
Dept: ADMINISTRATIVE | Facility: HOSPITAL | Age: 75
End: 2020-09-16

## 2020-09-16 ENCOUNTER — LAB (OUTPATIENT)
Dept: LAB | Facility: HOSPITAL | Age: 75
End: 2020-09-16

## 2020-09-16 DIAGNOSIS — N18.30 CKD (CHRONIC KIDNEY DISEASE) STAGE 3, GFR 30-59 ML/MIN (HCC): Primary | ICD-10-CM

## 2020-09-16 DIAGNOSIS — N18.30 CKD (CHRONIC KIDNEY DISEASE) STAGE 3, GFR 30-59 ML/MIN (HCC): ICD-10-CM

## 2020-09-16 LAB
ANION GAP SERPL CALCULATED.3IONS-SCNC: 8.3 MMOL/L (ref 5–15)
BUN SERPL-MCNC: 58 MG/DL (ref 8–23)
BUN/CREAT SERPL: 39.7 (ref 7–25)
CALCIUM SPEC-SCNC: 9.3 MG/DL (ref 8.6–10.5)
CHLORIDE SERPL-SCNC: 95 MMOL/L (ref 98–107)
CO2 SERPL-SCNC: 29.7 MMOL/L (ref 22–29)
CREAT SERPL-MCNC: 1.46 MG/DL (ref 0.57–1)
GFR SERPL CREATININE-BSD FRML MDRD: 35 ML/MIN/1.73
GLUCOSE SERPL-MCNC: 279 MG/DL (ref 65–99)
POTASSIUM SERPL-SCNC: 5.5 MMOL/L (ref 3.5–5.2)
SODIUM SERPL-SCNC: 133 MMOL/L (ref 136–145)

## 2020-09-16 PROCEDURE — 80048 BASIC METABOLIC PNL TOTAL CA: CPT

## 2020-09-16 PROCEDURE — 36415 COLL VENOUS BLD VENIPUNCTURE: CPT

## 2020-10-19 ENCOUNTER — LAB (OUTPATIENT)
Dept: LAB | Facility: HOSPITAL | Age: 75
End: 2020-10-19

## 2020-10-19 ENCOUNTER — TELEPHONE (OUTPATIENT)
Dept: ONCOLOGY | Facility: CLINIC | Age: 75
End: 2020-10-19

## 2020-10-19 ENCOUNTER — OFFICE VISIT (OUTPATIENT)
Dept: ONCOLOGY | Facility: CLINIC | Age: 75
End: 2020-10-19

## 2020-10-19 VITALS
WEIGHT: 199.8 LBS | SYSTOLIC BLOOD PRESSURE: 148 MMHG | HEIGHT: 60 IN | DIASTOLIC BLOOD PRESSURE: 76 MMHG | TEMPERATURE: 97.9 F | RESPIRATION RATE: 20 BRPM | HEART RATE: 74 BPM | OXYGEN SATURATION: 100 % | BODY MASS INDEX: 39.23 KG/M2

## 2020-10-19 DIAGNOSIS — D50.0 IRON DEFICIENCY ANEMIA DUE TO CHRONIC BLOOD LOSS: ICD-10-CM

## 2020-10-19 DIAGNOSIS — C50.919 PRIMARY MALIGNANT NEOPLASM OF FEMALE BREAST (HCC): Primary | ICD-10-CM

## 2020-10-19 DIAGNOSIS — K31.7 POLYP OF DUODENUM: ICD-10-CM

## 2020-10-19 DIAGNOSIS — D05.12 BREAST NEOPLASM, TIS (DCIS), LEFT: ICD-10-CM

## 2020-10-19 DIAGNOSIS — D50.8 OTHER IRON DEFICIENCY ANEMIA: ICD-10-CM

## 2020-10-19 DIAGNOSIS — D13.2 DUODENAL ADENOMA: ICD-10-CM

## 2020-10-19 DIAGNOSIS — C50.919 PRIMARY MALIGNANT NEOPLASM OF FEMALE BREAST (HCC): ICD-10-CM

## 2020-10-19 LAB
ALBUMIN SERPL-MCNC: 3.7 G/DL (ref 3.5–5.2)
ALBUMIN/GLOB SERPL: 1.2 G/DL (ref 1.1–2.4)
ALP SERPL-CCNC: 98 U/L (ref 38–116)
ALT SERPL W P-5'-P-CCNC: 14 U/L (ref 0–33)
ANION GAP SERPL CALCULATED.3IONS-SCNC: 8.2 MMOL/L (ref 5–15)
AST SERPL-CCNC: 14 U/L (ref 0–32)
BASOPHILS # BLD AUTO: 0.04 10*3/MM3 (ref 0–0.2)
BASOPHILS NFR BLD AUTO: 0.3 % (ref 0–1.5)
BILIRUB SERPL-MCNC: 0.3 MG/DL (ref 0.2–1.2)
BUN SERPL-MCNC: 34 MG/DL (ref 6–20)
BUN/CREAT SERPL: 25.8 (ref 7.3–30)
CALCIUM SPEC-SCNC: 9 MG/DL (ref 8.5–10.2)
CHLORIDE SERPL-SCNC: 103 MMOL/L (ref 98–107)
CO2 SERPL-SCNC: 26.8 MMOL/L (ref 22–29)
CREAT SERPL-MCNC: 1.32 MG/DL (ref 0.6–1.1)
DEPRECATED RDW RBC AUTO: 45.7 FL (ref 37–54)
EOSINOPHIL # BLD AUTO: 0.17 10*3/MM3 (ref 0–0.4)
EOSINOPHIL NFR BLD AUTO: 1.4 % (ref 0.3–6.2)
ERYTHROCYTE [DISTWIDTH] IN BLOOD BY AUTOMATED COUNT: 12.3 % (ref 12.3–15.4)
ERYTHROCYTE [SEDIMENTATION RATE] IN BLOOD: 52 MM/HR (ref 0–30)
FERRITIN SERPL-MCNC: 299.5 NG/ML (ref 13–150)
GFR SERPL CREATININE-BSD FRML MDRD: 39 ML/MIN/1.73
GLOBULIN UR ELPH-MCNC: 3 GM/DL (ref 1.8–3.5)
GLUCOSE SERPL-MCNC: 137 MG/DL (ref 74–124)
HCT VFR BLD AUTO: 38.6 % (ref 34–46.6)
HGB BLD-MCNC: 11.7 G/DL (ref 12–15.9)
HGB RETIC QN AUTO: 35.5 PG (ref 29.8–36.1)
IMM GRANULOCYTES # BLD AUTO: 0.1 10*3/MM3 (ref 0–0.05)
IMM GRANULOCYTES NFR BLD AUTO: 0.8 % (ref 0–0.5)
IMM RETICS NFR: 7.4 % (ref 3–15.8)
IRON 24H UR-MRATE: 75 MCG/DL (ref 37–145)
IRON SATN MFR SERPL: 25 % (ref 14–48)
LYMPHOCYTES # BLD AUTO: 0.88 10*3/MM3 (ref 0.7–3.1)
LYMPHOCYTES NFR BLD AUTO: 7.4 % (ref 19.6–45.3)
MCH RBC QN AUTO: 30.5 PG (ref 26.6–33)
MCHC RBC AUTO-ENTMCNC: 30.3 G/DL (ref 31.5–35.7)
MCV RBC AUTO: 100.8 FL (ref 79–97)
MONOCYTES # BLD AUTO: 0.76 10*3/MM3 (ref 0.1–0.9)
MONOCYTES NFR BLD AUTO: 6.4 % (ref 5–12)
NEUTROPHILS NFR BLD AUTO: 83.7 % (ref 42.7–76)
NEUTROPHILS NFR BLD AUTO: 9.94 10*3/MM3 (ref 1.7–7)
NRBC BLD AUTO-RTO: 0 /100 WBC (ref 0–0.2)
PLATELET # BLD AUTO: 224 10*3/MM3 (ref 140–450)
PMV BLD AUTO: 9.4 FL (ref 6–12)
POTASSIUM SERPL-SCNC: 4.4 MMOL/L (ref 3.5–4.7)
PROT SERPL-MCNC: 6.7 G/DL (ref 6.3–8)
RBC # BLD AUTO: 3.83 10*6/MM3 (ref 3.77–5.28)
RETICS # AUTO: 0.05 10*6/MM3 (ref 0.02–0.13)
RETICS/RBC NFR AUTO: 1.43 % (ref 0.7–1.9)
SODIUM SERPL-SCNC: 138 MMOL/L (ref 134–145)
TIBC SERPL-MCNC: 300 MCG/DL (ref 249–505)
TRANSFERRIN SERPL-MCNC: 214 MG/DL (ref 200–360)
WBC # BLD AUTO: 11.89 10*3/MM3 (ref 3.4–10.8)

## 2020-10-19 PROCEDURE — 85025 COMPLETE CBC W/AUTO DIFF WBC: CPT

## 2020-10-19 PROCEDURE — 36415 COLL VENOUS BLD VENIPUNCTURE: CPT

## 2020-10-19 PROCEDURE — 85652 RBC SED RATE AUTOMATED: CPT | Performed by: INTERNAL MEDICINE

## 2020-10-19 PROCEDURE — 80053 COMPREHEN METABOLIC PANEL: CPT

## 2020-10-19 PROCEDURE — 99214 OFFICE O/P EST MOD 30 MIN: CPT | Performed by: INTERNAL MEDICINE

## 2020-10-19 PROCEDURE — 85046 RETICYTE/HGB CONCENTRATE: CPT

## 2020-10-19 PROCEDURE — 82728 ASSAY OF FERRITIN: CPT

## 2020-10-19 PROCEDURE — 83540 ASSAY OF IRON: CPT

## 2020-10-19 PROCEDURE — 84466 ASSAY OF TRANSFERRIN: CPT

## 2020-10-19 RX ORDER — HYDRALAZINE HYDROCHLORIDE 50 MG/1
100 TABLET, FILM COATED ORAL
COMMUNITY
End: 2021-07-20

## 2020-10-19 RX ORDER — BUMETANIDE 1 MG/1
1 TABLET ORAL EVERY OTHER DAY
COMMUNITY
Start: 2020-08-21 | End: 2022-07-05

## 2020-10-19 NOTE — PROGRESS NOTES
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 SIMILAR QUESTIONING TODAY INCLUDING, CHILLS, FEVER, NEW COUGH, SHORTNESS OF BREATH, DIARRHEA,DIFFUSE BODY ACHES  AND CHANGES IN SMELL OR TASTE WERE NEGATIVE.DURING THE VISIT WITH THE PATIENT TODAY , PATIENT HAD FACE MASK, I HAD PROPPER PROTECTIVE EQUIPMENT, AND I DID HAND HYGIENE WITH SOAP AND WATER BEFORE AND AFTER THE VISIT.                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 Daniel Gutierrez, [facility]   •  SECTION N/A     x3   • CHOLECYSTECTOMY N/A    • COLONOSCOPY     • COLONOSCOPY W/ POLYPECTOMY N/A 2019    Enlarged folds in the antrum: biopsied; duodenal, transverse colon x2, splenic flexure, descending colon and sigmoid colon polyps: biopsied (path: tubular adenoma x6)-Dr. Zak De Jesus, [facility]   • ENDOSCOPY  10/11/2019    Procedure: ESOPHAGOGASTRODUODENOSCOPY with hot snare  polypectomy;  Surgeon: Haile Handley MD;  Location: [facility] ENDOSCOPY;  Service: Gastroenterology   • ENDOSCOPY N/A 1/29/2020    Procedure: ESOPHAGOGASTRODUODENOSCOPY;  Surgeon: Haile Handley MD;  Location: [facility] ENDOSCOPY;  Service: Gastroenterology   • ENDOSCOPY N/A 9/9/2020    Procedure: ESOPHAGOGASTRODUODENOSCOPY WITH BIOPSIES AND COLD BIOPSY POLYPECTOMY;  Surgeon: Haile Handley MD;  Location: [facility] ENDOSCOPY;  Service: Gastroenterology;  Laterality: N/A;  pre: dyspepsia and diarrhea  post: duodenal polyp and gastritis   • MASTECTOMY Left    • MASTECTOMY WITH SENTINEL NODE BIOPSY AND AXILLARY NODE DISSECTION Left 7/3/2019    Procedure: LEFT BREAST MASTECTOMY WITH SENTINEL NODE BIOPSY AND , v-y plasty closure;  Surgeon: Tahmina James MD;  Location: [facility] MAIN OR;  Service: General   • SUBTOTAL HYSTERECTOMY Bilateral     ovaries still in tact   • TEMPORAL ARTERY BIOPSY Bilateral 12/05/2019        Current Outpatient Medications on File Prior to Visit   Medication Sig Dispense Refill   • albuterol sulfate HFA (PROAIR HFA) 108 (90 Base) MCG/ACT inhaler Every 6 (Six) Hours.     • atorvastatin (LIPITOR) 20 MG tablet Take 20 mg by mouth Every Other Day.     • bisoprolol (ZEBeta) 10 MG tablet Take 2 tablets by mouth Daily. 60 tablet 6   • budesonide-formoterol (SYMBICORT) 160-4.5 MCG/ACT inhaler Inhale 2 puffs 2 (Two) Times a Day.     • bumetanide (BUMEX) 1 MG tablet bumetanide 1 mg tablet     • cetirizine (zyrTEC) 10 MG tablet Take 10 mg by mouth Daily.     • hydrALAZINE (APRESOLINE) 50 MG tablet 100 mg.     • Insulin Glargine (TOUJEO SOLOSTAR SC) Inject 40 Units under the skin into the appropriate area as directed Every Night. INSTRUCTED PT TO FOLLOW MD INSTRUCTIONS REGARDING DOSING BEFORE SURGERY     • Insulin Lispro (HUMALOG PEN SC) Inject  under the skin into the appropriate area as directed 3 (Three) Times a Day With Meals. 25 units at breakfast, 20 units at lunch, 22 units at  dinner/INSTRUCTED PT TO FOLLOW MD INSTRUCTIONS REGARDING DOSING BEFORE SURGERY     • montelukast (SINGULAIR) 10 MG tablet Take 10 mg by mouth Every Night.     • NON FORMULARY Apply 1 each topically to the appropriate area as directed Daily. Cream to foot rash, unsure of name     • nortriptyline (PAMELOR) 10 MG capsule Take 1 capsule by mouth Every Night. 90 capsule 3   • ondansetron (ZOFRAN) 4 MG tablet ondansetron HCl 4 mg tablet     • Pancrelipase, Lip-Prot-Amyl, (ZENPEP PO) Take  by mouth.     • pantoprazole (PROTONIX) 40 MG EC tablet Take 1 tablet by mouth Daily. 90 tablet 3   • predniSONE (DELTASONE) 2.5 MG tablet Take 7.5 mg by mouth Every Evening.     • predniSONE (DELTASONE) 5 MG tablet prednisone 5 mg tablet     • valsartan-hydrochlorothiazide (DIOVAN-HCT) 320-25 MG per tablet Take 1 tablet by mouth Daily. 30 tablet 11   • [DISCONTINUED] hydrALAZINE (APRESOLINE) 25 MG tablet Take 2 tablets by mouth 2 (two) times a day. 120 tablet 3   • [DISCONTINUED] Pancrelipase, Lip-Prot-Amyl, 94454-70227 units capsule delayed-release particles Take  by mouth.       No current facility-administered medications on file prior to visit.         ALLERGIES:    Allergies   Allergen Reactions   • Aspirin Nausea Only     Low tolerance, abdominal pain   • Sulfa Antibiotics Unknown (See Comments)     Happened as a child        Social History     Socioeconomic History   • Marital status:      Spouse name: Ashwin   • Number of children: 3   • Years of education: College   • Highest education level: Not on file   Occupational History   • Occupation: POSTAL SERVICE     Employer: RETIRED   Social Needs   • Financial resource strain: Not hard at all   • Food insecurity     Worry: Never true     Inability: Never true   • Transportation needs     Medical: No     Non-medical: No   Tobacco Use   • Smoking status: Never Smoker   • Smokeless tobacco: Never Used   • Tobacco comment: caffine use   Substance and Sexual Activity   •  Alcohol use: No     Frequency: Monthly or less     Drinks per session: 1 or 2     Binge frequency: Never     Comment: one or two small drinks a year   • Drug use: No   • Sexual activity: Defer     Comment: Spouse, Ashwin        Family History   Problem Relation Age of Onset   • Hypertension Father    • Stomach cancer Father    • Diabetes Father    • Esophageal cancer Father    • Throat cancer Sister    • Diabetes Paternal Grandmother    • Hypertension Paternal Grandfather    • Colon cancer Paternal Grandfather    • Heart disease Mother    • Colon cancer Paternal Uncle    • Colon cancer Paternal Uncle    • Colon cancer Paternal Uncle    • Ovarian cancer Cousin    • Stomach cancer Cousin    • Malig Hyperthermia Neg Hx         Review of Systems   General: no fever, no chills, no fatigue,no weight changes, no lack of appetite.  Eyes: no epiphora, xerophthalmia,conjunctivitis, pain, glaucoma, blurred vision, blindness, secretion, photophobia, proptosis, diplopia.  Ears: no otorrhea, tinnitus, otorrhagia, deafness, pain, vertigo.  Nose: no rhinorrhea, no epistaxis, no alteration in perception of odors, no sinuses pressure.  Mouth: no alteration in gums or teeth,  No ulcers, no difficulty with mastication or deglut ion, no odynophagia.  Neck: no masses or pain, no thyroid alterations, no pain in muscles or arteries, no carotid odynia, no crepitation.  Respiratory: no cough, no sputum production,no dyspnea,no trepopnea, no pleuritic pain,no hemoptysis.  Heart: no syncope, no irregularity, no palpitations, no angina,no orthopnea,no paroxysmal nocturnal dyspnea.  Vascular Venous: no tenderness,no edema,no palpable cords,no postphlebitic syndrome, no skin changes no ulcerations.  Vascular Arterial: no distal ischemia, noclaudication, no gangrene, no neuropathic ischemic pain, no skin ulcers, no paleness no cyanosis.  GI: no dysphagia, no odynophagia, no regurgitation, no heartburn,no indigestion,no nausea,no vomiting,no  "hematemesis ,no melena,no jaundice,no distention, no obstipation,no enterorrhagia,no proctalgia,no anal  lesions, no changes in bowel habits.  : no frequency, no hesitancy, no hematuria, no discharge,no  pain.  Musculoskeletal: no muscle or tendon pain or inflammation,no  joint pain, no edema, no functional limitation,no fasciculations, no mass.  Neurologic: no headache, no seizures, noalterations on Craneal nerves, no motor deficit, no sensory deficit, normal coordination, no alteration in memory,normal orientation, calculation,normal writting, verbal and written language.  Skin: no rashes,no pruritus no localized lesions.  Psychiatric: no anxiety, no depression,no agitation, no delusions, proper insight.                            Objective     Vitals:    10/19/20 1339   BP: 148/76   Pulse: 74   Resp: 20   Temp: 97.9 °F (36.6 °C)   TempSrc: Temporal   SpO2: 100%   Weight: 90.6 kg (199 lb 12.8 oz)   Height: 152.4 cm (60\")   PainSc: 0-No pain     Current Status 10/19/2020   ECOG score 0       Physical Exam  I HAVE PERSONALLY REVIEWED THE HISTORY OF THE PRESENT ILLNESS, PAST MEDICAL HISTORY, FAMILY HISTORY, SOCIAL HISTORY, ALLERGIES, MEDICATIONS STATED ABOVE IN THE OFFICE NOTE FROM TODAY.        GENERAL:  Well-developed, well-nourished  Patient  in no acute distress.   SKIN:  Warm, dry ,NO rashes,NO purpura ,NO petechiae.  HEENT:  Pupils were equal and reactive to light and accomodation, conjunctivae noninjected, no pterigion, normal extraocular movements, normal visual acuity.   NECK:  Supple with good range of motion; no thyromegaly or masses, no JVD or bruits, no cervical adenopathies.No carotid artery pain, no carotid abnormal pulsation , NO arterial dance.  LYMPHATICS:  No cervical, NO supraclavicular, NO axillary,NO epitrochlear , NO inguinal adenopathy.  CARDIAC   normal rate and regular rhythm, without murmur,NO rubs NO S3 NO S4 right or left . Normal femoral, popliteal, pedis, brachial and carotid " pulses.  INSPECTION of  breast documented symmetry of the tissue per se and location and size of the nipple,no retractions or inversion of the nipple, normal skin without lesions, no erythema or nodules,no paud'orange, no prominence of superficial veins or chest wall collateral circulation.PALPATION of the breast documented normal skin turgor, no induration, alteration in local temperature, or pain, no palpable masses or nodules, normal mobility of the tissues,no fixation of the tissue or parenchyma to the chest wall, no alteration at the tail of the breasts or axillas, no adenopathies. LEFT MASTECTOMY Surgical site was well healed.No lymphedema in either extremity.  VASCULAR ARTERIAL: normal carotids,brachial,radial,femoral,popliteal, pedis pulses , no bruits.no paleness or cyanosis, no pain, no edema, no numbness, no gangrene.  VASCULAR VENOUS: no cyanosis, collateral circulation, varicosities, edema, palpable cords, pain, erythema.  ABDOMEN:  Soft, nontender with no hepatomegaly, no splenomegaly,no masses, no ascites, no collateral circulation,no distention,no Elio sign, no abdominal pain, no inguinal hernias,no umbilical hernia, no abdominal bruits. Normal bowel sounds.  GENITAL: Not  Performed.  EXTREMITIES  AND SPINE:  No clubbing, cyanosis or edema, no deformities or pain .No kyphosis, scoliosis, deformities or pain in spine, ribs or pelvic bone.  NEUROLOGICAL:  Patient was awake, alert, oriented to time, person and place.                        RECENT LABS:  Hematology WBC   Date Value Ref Range Status   10/19/2020 11.89 (H) 3.40 - 10.80 10*3/mm3 Final     RBC   Date Value Ref Range Status   10/19/2020 3.83 3.77 - 5.28 10*6/mm3 Final     Hemoglobin   Date Value Ref Range Status   10/19/2020 11.7 (L) 12.0 - 15.9 g/dL Final     Hematocrit   Date Value Ref Range Status   10/19/2020 38.6 34.0 - 46.6 % Final     Platelets   Date Value Ref Range Status   10/19/2020 224 140 - 450 10*3/mm3 Final            Tissue  Pathology Exam: [id number]  Order: [id number]  Status:  Final result   Visible to patient:  Yes (MyChart)   Next appt:  None   Dx:  Polyp of duodenum  Specimen Information: A: Small Intestine, Duodenum; Polyp    B: Stomach; Tissue        Component    Case Report   Surgical Pathology Report                         Case: [id number]                                   Authorizing Provider:  Haile Handley MD    Collected:           09/09/2020 08:22 AM           Ordering Location:     [facility]  Received:            09/09/2020 09:50 AM                                  ENDO SUITES                                                                   Pathologist:           Azael Horner MD                                                          Specimens:   1) - Small Intestine, Duodenum, duodenal polyp                                                       2) - Stomach, antral biopsies                                                              Final Diagnosis   1. Duodenum, Biopsy:                A. Tubular adenoma.     2. Antrum, Biopsy: Benign gastric mucosa with                A. Mild chronic inflammation               B. Focal reactive changes.               C. No intestinal metaplasia.               D. No Helicobacter pylori.      mahogany/pkm    Electronically signed by Azael Horner MD on 9/10/2020 at 0904           Examination: Unilateral right digital mammography with R2 computer  detection and digital Tomosynthesis and limited right breast sonography     HISTORY: 75-year-old asymptomatic female status post prior left  mastectomy for breast cancer     FINDINGS: Unilateral right digital CC and MLO mammographic and  tomosynthesis images were obtained. Comparison is made to prior  mammography dated 2/28/2019 and 2/12/2018. Scattered fibroglandular  densities are seen throughout the right breast in a pattern that is not  appreciably changed. I see no new or dominant masses or  suspicious  calcifications. There is no evidence for architectural distortion or  deformity.     ULTRASOUND: Sonographic evaluation of the entirety of the right breast  and right axilla was performed. No sonographic abnormality is  appreciated.     IMPRESSION:  There are no findings suspicious for malignancy in the right  breast. Routine follow-up mammography is recommended.     BI-RADS Category 1: Negative.     This report was finalized on 9/3/2020 1:04 PM by Dr. Daniel Gutierrez M.D.      CT ABDOMEN AND PELVIS WITHOUT CONTRAST     HISTORY: Left upper quadrant abdominal pain.     TECHNIQUE: Axial CT images of the abdomen and pelvis were obtained  without administration of intravenous contrast. The patient was not  given oral contrast. Coronal and sagittal reformats were obtained.     COMPARISON: 05/08/2019     FINDINGS: The spleen is normal in size and attenuation. No perisplenic  fluid is identified. The pancreas is normal without ductal dilatation.  Noncontrast attenuation of the liver is normal. Bilateral adrenal glands  are normal. Both kidneys are normal in size and attenuation. No renal  calculi or hydronephrosis. Urinary bladder is partially distended and  normal. Uterus and adnexa are unremarkable for age. The small and large  bowel loops demonstrate normal caliber. Colonic diverticulosis is  present. Appendix is normal. No pathological retroperitoneal  lymphadenopathy. No ascites is seen. Stable sclerotic abnormality within  the L5 vertebral body. Chronic compression deformity within T12 is  without interim change.     IMPRESSION:  No acute abnormality within the abdomen and pelvis.     Radiation dose reduction techniques were utilized, including automated  exposure control and exposure modulation based on body size.     This report was finalized on 8/26/2020 1:39 PM by Dr. Emmanuelle Lim M.D.    Assessment/Plan      1. This patient has undergone a left-sided mastectomy for DCIS of the left breast. The  patient had a large tumor that was high grade with negative margins of resection, minimal microinvasion and negative sentinel lymph nodes. The patient’s estrogen receptor and progesterone receptor were negative and she was HER2 negative as well at the time of the original diagnosis. Under these premises, I think the patient is done with the treatment for this and I do not recommend any form of adjuvant chemotherapy, radiation treatment and obviously no hormonal therapy. There is no role for Herceptin or medicines of this nature either.     The patient was reviewed on 10/19/2020. Her right breast examination is normal. Her left mastectomy site is normal and her right side ultrasound and mammogram are normal. Therefore from the breast cancer point of view I think her breast cancer is not an issue. The left breast was the one that was compromised, she had a total mastectomy and that took care of the problem. She is not undergoing any form of adjuvant therapy given the characteristics of the disease as stated above.             2. From the point of view of her anemia, iron deficiency, the patient has been replaced with IV iron. As far as the patient can tell on review on 10/19/2020 she has not had any evidence of blood loss in the gastrointestinal tract. Her diet is appropriate but the only thing that she is not following through is that she is back onto diet soft drinks Pepsi for example. She is not drinking any coffee that she used to drink copious amount of it. According to her, her diet again remains appropriate. It calls my attention the hemoglobin of 11.7, her reticulated hemoglobin is normal at 35 therefore probably anemia from today is reflection of some other condition.    The patient has mentioned to me today that her creatinine and BUN have bumped up recently. She was seen by Gretchen Mcginnis MD, Nephrologist, Bumex has been given to her 3 times a week and her degree of swelling in her lower extremities has  improved. This raises the question if the patient is carrying her diet appropriately. I went through this in detail with her and it seems to be that that is the case. Therefore the patient will be advised in regard to the creatinine and the rest of the chemistry profile once that the report becomes available and this will be shared with her Nephrologist, Gretchen Mcginnis MD.     Today on her physical examination the patient has 1-2+ edema in both lower extremities and she has significant puffiness in her upper and lower lids leading me to believe that she has copious amount of fluid retention and the hypertension that she has is a reflection of excess volume. She expresses on the other hand that when she takes diuretics this makes her ill and she has to lie in bed because it makes her dizzy. Therefore she is between a rock and a hard place from this point of view.     The patient asked me the favor to review the report of the upper endoscopy done recently by Vega Handley MD. He found that the patient had a normal esophagus, maybe mild gastritis and a polyp was removed from the duodenum. This represented a tubular adenoma and the pathologist did not mention any dysplasia. She had minimal degree of gastritis. The patient has no symptoms pertinent to this. I gave her a copy of the report of the pathology.    Therefore I will see how things evolve. I will review her back in 4 months with a CBC, CMP, reticulated hemoglobin and ferritin, iron profile.     The patient will be called at home with the report of the rest of the test done on her blood today.                I DISCUSSED WITH PATIENT IN DETAIL FORMS TO DECREASE CHANCES OF CORONAVIRUS INFECTION INCLUDING ISOLATION, PROPER HAND HIGIENE, AVOID PUBLIC PLACES  WITH CROWDS, FOLLOW  CDC RECOMENDATIONS, AND KEEP PERSONAL AND SOCIAL RESPONSIBILITY.WARE A MASK IN PUBLIC OR MD VISITS.  PATIENT IS AWARE THIS INFECTION COULD HAVE SEVERE CONSEQUENCES TO PERSONAL HEALTH AND  FAMILY RAMIFICATIONS OF THIS.

## 2020-10-19 NOTE — TELEPHONE ENCOUNTER
Blood         Component   Ref Range & Units 13:24  (10/19/20) 1mo ago  (9/16/20) 1mo ago  (9/9/20)   Glucose   74 - 124 mg/dL 137High   279High  R  220High  R    BUN   6 - 20 mg/dL 34High   58High  R     Creatinine   0.60 - 1.10 mg/dL 1.32High   1.46High  R     Sodium   134 - 145 mmol/L 138  133Low  R     Potassium   3.5 - 4.7 mmol/L 4.4  5.5High  R     Chloride   98 - 107 mmol/L 103  95Low      CO2   22.0 - 29.0 mmol/L 26.8  29.7High      Calcium   8.5 - 10.2 mg/dL 9.0  9.3 R     Total Protein   6.3 - 8.0 g/dL 6.7      Albumin   3.50 - 5.20 g/dL 3.70      ALT (SGPT)   0 - 33 U/L 14      AST (SGOT)   0 - 32 U/L 14      Alkaline Phosphatase   38 - 116 U/L 98      Total Bilirubin   0.2 - 1.2 mg/dL 0.3      eGFR Non African Amer   >60 mL/min/1.73 39Low   35Low      Globulin   1.8 - 3.5 gm/dL 3.0              I have called this patient today and notified her that her BUN and creatinine are better and her potassium level is also better in comparison of how she was a month ago.     In regard to her ferritin her level remains excellent and there is no need for infusion. Her total iron is normal and her TIBC and reticulated hemoglobin are normal.     From my point of view I find no need for her to have any other infusion with IV iron.    I will send the report of this note and my previous note today to her Nephrologist, Gretchen Mcginnis MD.

## 2020-11-16 ENCOUNTER — TRANSCRIBE ORDERS (OUTPATIENT)
Dept: ADMINISTRATIVE | Facility: HOSPITAL | Age: 75
End: 2020-11-16

## 2020-11-16 ENCOUNTER — LAB (OUTPATIENT)
Dept: LAB | Facility: HOSPITAL | Age: 75
End: 2020-11-16

## 2020-11-16 DIAGNOSIS — I13.10 MALIGNANT HYPERTENSIVE HEART AND CHRONIC KIDNEY DISEASE STAGE III (HCC): Primary | ICD-10-CM

## 2020-11-16 DIAGNOSIS — N18.30 MALIGNANT HYPERTENSIVE HEART AND CHRONIC KIDNEY DISEASE STAGE III (HCC): ICD-10-CM

## 2020-11-16 DIAGNOSIS — N18.30 MALIGNANT HYPERTENSIVE HEART AND CHRONIC KIDNEY DISEASE STAGE III (HCC): Primary | ICD-10-CM

## 2020-11-16 DIAGNOSIS — I13.10 MALIGNANT HYPERTENSIVE HEART AND CHRONIC KIDNEY DISEASE STAGE III (HCC): ICD-10-CM

## 2020-11-16 LAB
ALBUMIN SERPL-MCNC: 4 G/DL (ref 3.5–5.2)
ALBUMIN/GLOB SERPL: 1.4 G/DL
ALP SERPL-CCNC: 106 U/L (ref 39–117)
ALT SERPL W P-5'-P-CCNC: 14 U/L (ref 1–33)
ANION GAP SERPL CALCULATED.3IONS-SCNC: 7.6 MMOL/L (ref 5–15)
AST SERPL-CCNC: 12 U/L (ref 1–32)
BACTERIA UR QL AUTO: ABNORMAL /HPF
BILIRUB SERPL-MCNC: 0.4 MG/DL (ref 0–1.2)
BILIRUB UR QL STRIP: NEGATIVE
BUN SERPL-MCNC: 32 MG/DL (ref 8–23)
BUN/CREAT SERPL: 27.6 (ref 7–25)
CALCIUM SPEC-SCNC: 9.1 MG/DL (ref 8.6–10.5)
CHLORIDE SERPL-SCNC: 99 MMOL/L (ref 98–107)
CLARITY UR: ABNORMAL
CO2 SERPL-SCNC: 27.4 MMOL/L (ref 22–29)
COLOR UR: YELLOW
CREAT SERPL-MCNC: 1.16 MG/DL (ref 0.57–1)
CREAT UR-MCNC: 128.6 MG/DL
GFR SERPL CREATININE-BSD FRML MDRD: 46 ML/MIN/1.73
GLOBULIN UR ELPH-MCNC: 2.9 GM/DL
GLUCOSE SERPL-MCNC: 200 MG/DL (ref 65–99)
GLUCOSE UR STRIP-MCNC: NEGATIVE MG/DL
HGB UR QL STRIP.AUTO: NEGATIVE
HYALINE CASTS UR QL AUTO: ABNORMAL /LPF
KETONES UR QL STRIP: NEGATIVE
LEUKOCYTE ESTERASE UR QL STRIP.AUTO: ABNORMAL
NITRITE UR QL STRIP: NEGATIVE
PH UR STRIP.AUTO: <=5 [PH] (ref 5–8)
PHOSPHATE SERPL-MCNC: 3.3 MG/DL (ref 2.5–4.5)
POTASSIUM SERPL-SCNC: 5 MMOL/L (ref 3.5–5.2)
PROT SERPL-MCNC: 6.9 G/DL (ref 6–8.5)
PROT UR QL STRIP: ABNORMAL
PROT UR-MCNC: 25 MG/DL
PTH-INTACT SERPL-MCNC: 64.9 PG/ML (ref 15–65)
RBC # UR: ABNORMAL /HPF
REF LAB TEST METHOD: ABNORMAL
SODIUM SERPL-SCNC: 134 MMOL/L (ref 136–145)
SP GR UR STRIP: 1.02 (ref 1–1.03)
SQUAMOUS #/AREA URNS HPF: ABNORMAL /HPF
UROBILINOGEN UR QL STRIP: ABNORMAL
WBC UR QL AUTO: ABNORMAL /HPF

## 2020-11-16 PROCEDURE — 83970 ASSAY OF PARATHORMONE: CPT

## 2020-11-16 PROCEDURE — 84100 ASSAY OF PHOSPHORUS: CPT

## 2020-11-16 PROCEDURE — 84156 ASSAY OF PROTEIN URINE: CPT

## 2020-11-16 PROCEDURE — 82570 ASSAY OF URINE CREATININE: CPT

## 2020-11-16 PROCEDURE — 81001 URINALYSIS AUTO W/SCOPE: CPT

## 2020-11-16 PROCEDURE — 36415 COLL VENOUS BLD VENIPUNCTURE: CPT

## 2020-11-16 PROCEDURE — 80053 COMPREHEN METABOLIC PANEL: CPT

## 2020-12-08 ENCOUNTER — TELEPHONE (OUTPATIENT)
Dept: GASTROENTEROLOGY | Facility: CLINIC | Age: 75
End: 2020-12-08

## 2020-12-08 NOTE — TELEPHONE ENCOUNTER
----- Message from Haile Handley MD sent at 9/30/2020  3:24 PM EDT -----  Polyp benign repeat: 5 years as discussed

## 2020-12-08 NOTE — TELEPHONE ENCOUNTER
Called and spoke with patient and .  They verbally understand results.  Updated health maintenance.

## 2021-01-28 ENCOUNTER — TELEPHONE (OUTPATIENT)
Dept: CARDIOLOGY | Facility: CLINIC | Age: 76
End: 2021-01-28

## 2021-02-16 ENCOUNTER — APPOINTMENT (OUTPATIENT)
Dept: LAB | Facility: HOSPITAL | Age: 76
End: 2021-02-16

## 2021-02-22 ENCOUNTER — APPOINTMENT (OUTPATIENT)
Dept: LAB | Facility: HOSPITAL | Age: 76
End: 2021-02-22

## 2021-03-03 DIAGNOSIS — Z23 IMMUNIZATION DUE: ICD-10-CM

## 2021-03-15 ENCOUNTER — TRANSCRIBE ORDERS (OUTPATIENT)
Dept: ADMINISTRATIVE | Facility: HOSPITAL | Age: 76
End: 2021-03-15

## 2021-03-15 ENCOUNTER — LAB (OUTPATIENT)
Dept: LAB | Facility: HOSPITAL | Age: 76
End: 2021-03-15

## 2021-03-15 DIAGNOSIS — R82.90 NONSPECIFIC FINDING ON EXAMINATION OF URINE: Primary | ICD-10-CM

## 2021-03-15 DIAGNOSIS — N18.30 MALIGNANT HYPERTENSIVE HEART AND CHRONIC KIDNEY DISEASE STAGE III (HCC): Primary | ICD-10-CM

## 2021-03-15 DIAGNOSIS — I13.10 MALIGNANT HYPERTENSIVE HEART AND CHRONIC KIDNEY DISEASE STAGE III (HCC): Primary | ICD-10-CM

## 2021-03-15 DIAGNOSIS — N18.30 MALIGNANT HYPERTENSIVE HEART AND CHRONIC KIDNEY DISEASE STAGE III (HCC): ICD-10-CM

## 2021-03-15 DIAGNOSIS — I13.10 MALIGNANT HYPERTENSIVE HEART AND CHRONIC KIDNEY DISEASE STAGE III (HCC): ICD-10-CM

## 2021-03-15 DIAGNOSIS — R82.90 UNSPECIFIED ABNORMAL FINDINGS IN URINE: ICD-10-CM

## 2021-03-15 LAB
ALBUMIN SERPL-MCNC: 3.9 G/DL (ref 3.5–5.2)
ALBUMIN/GLOB SERPL: 1.2 G/DL
ALP SERPL-CCNC: 121 U/L (ref 39–117)
ALT SERPL W P-5'-P-CCNC: 18 U/L (ref 1–33)
ANION GAP SERPL CALCULATED.3IONS-SCNC: 7.6 MMOL/L (ref 5–15)
AST SERPL-CCNC: 20 U/L (ref 1–32)
BASOPHILS # BLD AUTO: 0.05 10*3/MM3 (ref 0–0.2)
BASOPHILS NFR BLD AUTO: 0.4 % (ref 0–1.5)
BILIRUB SERPL-MCNC: 0.3 MG/DL (ref 0–1.2)
BILIRUB UR QL STRIP: NEGATIVE
BUN SERPL-MCNC: 30 MG/DL (ref 8–23)
BUN/CREAT SERPL: 26.5 (ref 7–25)
CALCIUM SPEC-SCNC: 8.8 MG/DL (ref 8.6–10.5)
CHLORIDE SERPL-SCNC: 105 MMOL/L (ref 98–107)
CLARITY UR: CLEAR
CO2 SERPL-SCNC: 24.4 MMOL/L (ref 22–29)
COLOR UR: YELLOW
CREAT SERPL-MCNC: 1.13 MG/DL (ref 0.57–1)
DEPRECATED RDW RBC AUTO: 41 FL (ref 37–54)
EOSINOPHIL # BLD AUTO: 0.11 10*3/MM3 (ref 0–0.4)
EOSINOPHIL NFR BLD AUTO: 1 % (ref 0.3–6.2)
ERYTHROCYTE [DISTWIDTH] IN BLOOD BY AUTOMATED COUNT: 12.1 % (ref 12.3–15.4)
GFR SERPL CREATININE-BSD FRML MDRD: 47 ML/MIN/1.73
GLOBULIN UR ELPH-MCNC: 3.2 GM/DL
GLUCOSE SERPL-MCNC: 165 MG/DL (ref 65–99)
GLUCOSE UR STRIP-MCNC: NEGATIVE MG/DL
HCT VFR BLD AUTO: 35.8 % (ref 34–46.6)
HGB BLD-MCNC: 11.6 G/DL (ref 12–15.9)
HGB UR QL STRIP.AUTO: NEGATIVE
IMM GRANULOCYTES # BLD AUTO: 0.21 10*3/MM3 (ref 0–0.05)
IMM GRANULOCYTES NFR BLD AUTO: 1.8 % (ref 0–0.5)
KETONES UR QL STRIP: NEGATIVE
LEUKOCYTE ESTERASE UR QL STRIP.AUTO: NEGATIVE
LYMPHOCYTES # BLD AUTO: 1.03 10*3/MM3 (ref 0.7–3.1)
LYMPHOCYTES NFR BLD AUTO: 8.9 % (ref 19.6–45.3)
MCH RBC QN AUTO: 30.1 PG (ref 26.6–33)
MCHC RBC AUTO-ENTMCNC: 32.4 G/DL (ref 31.5–35.7)
MCV RBC AUTO: 92.7 FL (ref 79–97)
MONOCYTES # BLD AUTO: 0.65 10*3/MM3 (ref 0.1–0.9)
MONOCYTES NFR BLD AUTO: 5.6 % (ref 5–12)
NEUTROPHILS NFR BLD AUTO: 82.3 % (ref 42.7–76)
NEUTROPHILS NFR BLD AUTO: 9.51 10*3/MM3 (ref 1.7–7)
NITRITE UR QL STRIP: NEGATIVE
NRBC BLD AUTO-RTO: 0 /100 WBC (ref 0–0.2)
PH UR STRIP.AUTO: <=5 [PH] (ref 5–8)
PHOSPHATE SERPL-MCNC: 3.2 MG/DL (ref 2.5–4.5)
PLATELET # BLD AUTO: 268 10*3/MM3 (ref 140–450)
PMV BLD AUTO: 9.8 FL (ref 6–12)
POTASSIUM SERPL-SCNC: 5.3 MMOL/L (ref 3.5–5.2)
PROT SERPL-MCNC: 7.1 G/DL (ref 6–8.5)
PROT UR QL STRIP: ABNORMAL
PTH-INTACT SERPL-MCNC: 62.5 PG/ML (ref 15–65)
RBC # BLD AUTO: 3.86 10*6/MM3 (ref 3.77–5.28)
SODIUM SERPL-SCNC: 137 MMOL/L (ref 136–145)
SP GR UR STRIP: 1.02 (ref 1–1.03)
UROBILINOGEN UR QL STRIP: ABNORMAL
WBC # BLD AUTO: 11.56 10*3/MM3 (ref 3.4–10.8)

## 2021-03-15 PROCEDURE — 81003 URINALYSIS AUTO W/O SCOPE: CPT

## 2021-03-15 PROCEDURE — 80053 COMPREHEN METABOLIC PANEL: CPT

## 2021-03-15 PROCEDURE — 84100 ASSAY OF PHOSPHORUS: CPT

## 2021-03-15 PROCEDURE — 83970 ASSAY OF PARATHORMONE: CPT

## 2021-03-15 PROCEDURE — 36415 COLL VENOUS BLD VENIPUNCTURE: CPT

## 2021-03-15 PROCEDURE — 87086 URINE CULTURE/COLONY COUNT: CPT | Performed by: INTERNAL MEDICINE

## 2021-03-15 PROCEDURE — 85025 COMPLETE CBC W/AUTO DIFF WBC: CPT

## 2021-03-16 LAB — BACTERIA SPEC AEROBE CULT: NORMAL

## 2021-03-17 ENCOUNTER — APPOINTMENT (OUTPATIENT)
Dept: LAB | Facility: HOSPITAL | Age: 76
End: 2021-03-17

## 2021-03-30 ENCOUNTER — OFFICE VISIT (OUTPATIENT)
Dept: ONCOLOGY | Facility: CLINIC | Age: 76
End: 2021-03-30

## 2021-03-30 ENCOUNTER — LAB (OUTPATIENT)
Dept: LAB | Facility: HOSPITAL | Age: 76
End: 2021-03-30

## 2021-03-30 VITALS
BODY MASS INDEX: 39.83 KG/M2 | OXYGEN SATURATION: 95 % | HEIGHT: 60 IN | RESPIRATION RATE: 20 BRPM | SYSTOLIC BLOOD PRESSURE: 147 MMHG | DIASTOLIC BLOOD PRESSURE: 81 MMHG | HEART RATE: 77 BPM | TEMPERATURE: 97.7 F | WEIGHT: 202.9 LBS

## 2021-03-30 DIAGNOSIS — D05.12 BREAST NEOPLASM, TIS (DCIS), LEFT: ICD-10-CM

## 2021-03-30 DIAGNOSIS — C50.919 PRIMARY MALIGNANT NEOPLASM OF FEMALE BREAST (HCC): ICD-10-CM

## 2021-03-30 DIAGNOSIS — R06.09 DYSPNEA ON EXERTION: ICD-10-CM

## 2021-03-30 DIAGNOSIS — D50.0 IRON DEFICIENCY ANEMIA DUE TO CHRONIC BLOOD LOSS: ICD-10-CM

## 2021-03-30 DIAGNOSIS — C50.919 PRIMARY MALIGNANT NEOPLASM OF FEMALE BREAST (HCC): Primary | ICD-10-CM

## 2021-03-30 DIAGNOSIS — I11.0 HYPERTENSIVE HEART DISEASE WITH HEART FAILURE (HCC): ICD-10-CM

## 2021-03-30 DIAGNOSIS — D13.2 DUODENAL ADENOMA: ICD-10-CM

## 2021-03-30 DIAGNOSIS — D50.8 OTHER IRON DEFICIENCY ANEMIA: ICD-10-CM

## 2021-03-30 LAB
ALBUMIN SERPL-MCNC: 3.7 G/DL (ref 3.5–5.2)
ALBUMIN/GLOB SERPL: 1.3 G/DL (ref 1.1–2.4)
ALP SERPL-CCNC: 113 U/L (ref 38–116)
ALT SERPL W P-5'-P-CCNC: 12 U/L (ref 0–33)
ANION GAP SERPL CALCULATED.3IONS-SCNC: 10.3 MMOL/L (ref 5–15)
AST SERPL-CCNC: 15 U/L (ref 0–32)
BASOPHILS # BLD AUTO: 0.07 10*3/MM3 (ref 0–0.2)
BASOPHILS NFR BLD AUTO: 0.5 % (ref 0–1.5)
BILIRUB SERPL-MCNC: 0.3 MG/DL (ref 0.2–1.2)
BUN SERPL-MCNC: 38 MG/DL (ref 6–20)
BUN/CREAT SERPL: 23.9 (ref 7.3–30)
CALCIUM SPEC-SCNC: 9.1 MG/DL (ref 8.5–10.2)
CHLORIDE SERPL-SCNC: 102 MMOL/L (ref 98–107)
CO2 SERPL-SCNC: 24.7 MMOL/L (ref 22–29)
CREAT SERPL-MCNC: 1.59 MG/DL (ref 0.6–1.1)
DEPRECATED RDW RBC AUTO: 44.3 FL (ref 37–54)
EOSINOPHIL # BLD AUTO: 0.22 10*3/MM3 (ref 0–0.4)
EOSINOPHIL NFR BLD AUTO: 1.6 % (ref 0.3–6.2)
ERYTHROCYTE [DISTWIDTH] IN BLOOD BY AUTOMATED COUNT: 12.7 % (ref 12.3–15.4)
FERRITIN SERPL-MCNC: 398.3 NG/ML (ref 13–150)
GFR SERPL CREATININE-BSD FRML MDRD: 32 ML/MIN/1.73
GLOBULIN UR ELPH-MCNC: 2.8 GM/DL (ref 1.8–3.5)
GLUCOSE SERPL-MCNC: 104 MG/DL (ref 74–124)
HCT VFR BLD AUTO: 38.3 % (ref 34–46.6)
HGB BLD-MCNC: 11.9 G/DL (ref 12–15.9)
HGB RETIC QN AUTO: 32.9 PG (ref 29.8–36.1)
IMM GRANULOCYTES # BLD AUTO: 0.14 10*3/MM3 (ref 0–0.05)
IMM GRANULOCYTES NFR BLD AUTO: 1 % (ref 0–0.5)
IMM RETICS NFR: 10.4 % (ref 3–15.8)
IRON 24H UR-MRATE: 56 MCG/DL (ref 37–145)
IRON SATN MFR SERPL: 18 % (ref 14–48)
LYMPHOCYTES # BLD AUTO: 1.32 10*3/MM3 (ref 0.7–3.1)
LYMPHOCYTES NFR BLD AUTO: 9.8 % (ref 19.6–45.3)
MCH RBC QN AUTO: 29.8 PG (ref 26.6–33)
MCHC RBC AUTO-ENTMCNC: 31.1 G/DL (ref 31.5–35.7)
MCV RBC AUTO: 95.8 FL (ref 79–97)
MONOCYTES # BLD AUTO: 1 10*3/MM3 (ref 0.1–0.9)
MONOCYTES NFR BLD AUTO: 7.4 % (ref 5–12)
NEUTROPHILS NFR BLD AUTO: 10.74 10*3/MM3 (ref 1.7–7)
NEUTROPHILS NFR BLD AUTO: 79.7 % (ref 42.7–76)
NRBC BLD AUTO-RTO: 0 /100 WBC (ref 0–0.2)
NT-PROBNP SERPL-MCNC: 309.7 PG/ML (ref 0–900)
PLATELET # BLD AUTO: 226 10*3/MM3 (ref 140–450)
PMV BLD AUTO: 10.1 FL (ref 6–12)
POTASSIUM SERPL-SCNC: 5.4 MMOL/L (ref 3.5–4.7)
PROT SERPL-MCNC: 6.5 G/DL (ref 6.3–8)
RBC # BLD AUTO: 4 10*6/MM3 (ref 3.77–5.28)
RETICS # AUTO: 0.06 10*6/MM3 (ref 0.02–0.13)
RETICS/RBC NFR AUTO: 1.48 % (ref 0.7–1.9)
SODIUM SERPL-SCNC: 137 MMOL/L (ref 134–145)
TIBC SERPL-MCNC: 314 MCG/DL (ref 249–505)
TRANSFERRIN SERPL-MCNC: 224 MG/DL (ref 200–360)
WBC # BLD AUTO: 13.49 10*3/MM3 (ref 3.4–10.8)

## 2021-03-30 PROCEDURE — 83880 ASSAY OF NATRIURETIC PEPTIDE: CPT | Performed by: INTERNAL MEDICINE

## 2021-03-30 PROCEDURE — 99215 OFFICE O/P EST HI 40 MIN: CPT | Performed by: INTERNAL MEDICINE

## 2021-03-30 PROCEDURE — 83540 ASSAY OF IRON: CPT

## 2021-03-30 PROCEDURE — 82728 ASSAY OF FERRITIN: CPT

## 2021-03-30 PROCEDURE — 84466 ASSAY OF TRANSFERRIN: CPT

## 2021-03-30 PROCEDURE — 80053 COMPREHEN METABOLIC PANEL: CPT

## 2021-03-30 PROCEDURE — 85046 RETICYTE/HGB CONCENTRATE: CPT

## 2021-03-30 PROCEDURE — 36415 COLL VENOUS BLD VENIPUNCTURE: CPT

## 2021-03-30 PROCEDURE — 85025 COMPLETE CBC W/AUTO DIFF WBC: CPT

## 2021-03-30 NOTE — PROGRESS NOTES
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 SIMILAR QUESTIONING TODAY INCLUDING, CHILLS, FEVER, NEW COUGH, SHORTNESS OF BREATH, DIARRHEA,DIFFUSE BODY ACHES  AND CHANGES IN SMELL OR TASTE WERE NEGATIVE.DURING THE VISIT WITH THE PATIENT TODAY , PATIENT HAD FACE MASK, I HAD PROPPER PROTECTIVE EQUIPMENT, AND I DID HAND HYGIENE WITH SOAP AND WATER BEFORE AND AFTER THE VISIT.    This patient returns today to the office for followup in company of her  stating that she has continued having more fluid accumulation in her legs. The diuretic makes her very ill and she believes the hydralazine that she takes makes her very ill. She has significant shortness of breath with minimal exertion. She has orthopnea and she has not had any cough or wheezing. She has not had any upper respiratory symptomatology. She denies any chest pain or palpitations. She is profoundly weak and walking 10-15 feet takes all her air out of her body. She remains sitting in the chair most of the time. Taking a shower and getting dressed is another major event. She has good bowel activity. Continues having good appetite. She has proper urination even though she admits the volume of urine is not large enough to help her to get rid of so much fluid. The patient is requesting me to change her cardiologist because formerly she has seen a different cardiologist and she is not in agreement in regard to what the plain general plan of care has been.     She has completed also 2 COVID vaccinations. She is not using excessive amount of salt. She used to be very heavy on this but this is  not the case. Her blood sugar is staying around 150. She used to be very heavy in sugar consumption but that is not the case either anymore.                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 Daniel Gutierrez, [facility]   •  SECTION N/A     x3   • CHOLECYSTECTOMY N/A    • COLONOSCOPY     • COLONOSCOPY W/ POLYPECTOMY N/A 2019    Enlarged folds in the antrum: biopsied; duodenal, transverse colon x2, splenic flexure, descending colon and sigmoid colon polyps: biopsied (path: tubular adenoma x6)-Dr. Zak De Jesus, [facility]   • ENDOSCOPY  10/11/2019    Procedure: ESOPHAGOGASTRODUODENOSCOPY with hot snare polypectomy;  Surgeon: Haile Hadnley MD;  Location: [facility] ENDOSCOPY;  Service: Gastroenterology   • ENDOSCOPY N/A 2020    Procedure: ESOPHAGOGASTRODUODENOSCOPY;  Surgeon: Haile Handley MD;  Location: [facility] ENDOSCOPY;  Service: Gastroenterology   • ENDOSCOPY N/A 2020    Procedure: ESOPHAGOGASTRODUODENOSCOPY WITH BIOPSIES AND COLD BIOPSY POLYPECTOMY;  Surgeon: Haile Handley MD;  Location: [facility] ENDOSCOPY;  Service: Gastroenterology;  Laterality: N/A;  pre: dyspepsia and diarrhea  post: duodenal polyp and gastritis   • MASTECTOMY Left    • MASTECTOMY WITH SENTINEL  NODE BIOPSY AND AXILLARY NODE DISSECTION Left 7/3/2019    Procedure: LEFT BREAST MASTECTOMY WITH SENTINEL NODE BIOPSY AND , v-y plasty closure;  Surgeon: Tahmina James MD;  Location: [facility];  Service: General   • SUBTOTAL HYSTERECTOMY Bilateral     ovaries still in tact   • TEMPORAL ARTERY BIOPSY Bilateral 12/05/2019        Current Outpatient Medications on File Prior to Visit   Medication Sig Dispense Refill   • albuterol sulfate HFA (PROAIR HFA) 108 (90 Base) MCG/ACT inhaler Every 6 (Six) Hours.     • atorvastatin (LIPITOR) 20 MG tablet Take 20 mg by mouth Every Other Day.     • bisoprolol (ZEBeta) 10 MG tablet Take 2 tablets by mouth Daily. 60 tablet 6   • budesonide-formoterol (SYMBICORT) 160-4.5 MCG/ACT inhaler Inhale 2 puffs 2 (Two) Times a Day.     • bumetanide (BUMEX) 1 MG tablet bumetanide 1 mg tablet     • cetirizine (zyrTEC) 10 MG tablet Take 10 mg by mouth Daily.     • hydrALAZINE (APRESOLINE) 50 MG tablet 100 mg.     • Insulin Glargine (TOUJEO SOLOSTAR SC) Inject 40 Units under the skin into the appropriate area as directed Every Night. INSTRUCTED PT TO FOLLOW MD INSTRUCTIONS REGARDING DOSING BEFORE SURGERY     • Insulin Lispro (HUMALOG PEN SC) Inject  under the skin into the appropriate area as directed 3 (Three) Times a Day With Meals. 25 units at breakfast, 20 units at lunch, 22 units at dinner/INSTRUCTED PT TO FOLLOW MD INSTRUCTIONS REGARDING DOSING BEFORE SURGERY     • montelukast (SINGULAIR) 10 MG tablet Take 10 mg by mouth Every Night.     • NON FORMULARY Apply 1 each topically to the appropriate area as directed Daily. Cream to foot rash, unsure of name     • nortriptyline (PAMELOR) 10 MG capsule Take 1 capsule by mouth Every Night. 90 capsule 3   • ondansetron (ZOFRAN) 4 MG tablet ondansetron HCl 4 mg tablet     • Pancrelipase, Lip-Prot-Amyl, (ZENPEP PO) Take  by mouth.     • pantoprazole (PROTONIX) 40 MG EC tablet Take 1 tablet by mouth Daily. 90 tablet 3   • predniSONE (DELTASONE)  "2.5 MG tablet Take 7.5 mg by mouth Every Evening.     • predniSONE (DELTASONE) 5 MG tablet prednisone 5 mg tablet     • valsartan-hydrochlorothiazide (DIOVAN-HCT) 320-25 MG per tablet Take 1 tablet by mouth Daily. 30 tablet 11     No current facility-administered medications on file prior to visit.        ALLERGIES:    Allergies   Allergen Reactions   • Aspirin Nausea Only     Low tolerance, abdominal pain   • Sulfa Antibiotics Unknown (See Comments)     Happened as a child        Social History     Socioeconomic History   • Marital status:      Spouse name: Ashwin   • Number of children: 3   • Years of education: College   • Highest education level: Not on file   Tobacco Use   • Smoking status: Never Smoker   • Smokeless tobacco: Never Used   • Tobacco comment: caffine use   Substance and Sexual Activity   • Alcohol use: No     Comment: one or two small drinks a year   • Drug use: No   • Sexual activity: Defer     Comment: Spouse, Ashwin        Family History   Problem Relation Age of Onset   • Hypertension Father    • Stomach cancer Father    • Diabetes Father    • Esophageal cancer Father    • Throat cancer Sister    • Diabetes Paternal Grandmother    • Hypertension Paternal Grandfather    • Colon cancer Paternal Grandfather    • Heart disease Mother    • Colon cancer Paternal Uncle    • Colon cancer Paternal Uncle    • Colon cancer Paternal Uncle    • Ovarian cancer Cousin    • Stomach cancer Cousin    • Malig Hyperthermia Neg Hx           Objective     Vitals:    03/30/21 1505   BP: 147/81   Pulse: 77   Resp: 20   Temp: 97.7 °F (36.5 °C)   TempSrc: Temporal   SpO2: 95%   Weight: 92 kg (202 lb 14.4 oz)   Height: 152.4 cm (60\")   PainSc: 0-No pain     Current Status 3/30/2021   ECOG score 0       Physical Exam    I HAVE PERSONALLY REVIEWED THE HISTORY OF THE PRESENT ILLNESS, PAST MEDICAL HISTORY, FAMILY HISTORY, SOCIAL HISTORY, ALLERGIES, MEDICATIONS STATED ABOVE IN THE OFFICE NOTE FROM TODAY.    Patient " screened  for depression based on a PHQ-9 score of 0 on 3/30/2021. GENERAL:  Well-developed, well-nourished  Patient  in no acute distress.   SKIN:  Warm, dry ,NO rashes,NO purpura ,NO petechiae.  HEENT:  Pupils were equal and reactive to light and accomodation, conjunctivae noninjected, no pterygium, normal extraocular movements, normal visual acuity.   NECK:  Supple with good range of motion; no thyromegaly or masses, no JVD or bruits, no cervical adenopathies.No carotid artery pain, no carotid abnormal pulsation , NO arterial dance.  LYMPHATICS:  No cervical, NO supraclavicular, NO axillary,NO epitrochlear , NO inguinal adenopathy.  CARDIAC   normal rate and regular rhythm, without murmur,VERY MUFFLED SOUNDS,NO rubs, SUGGESTION OF S3  . Normal femoral, popliteal, pedis, brachial and carotid pulses.  LUNGS: GOOD BS BILATERALLY NO WHEEZING OR CRACKLES.  INSPECTION of  breast documented symmetry of the tissue per se and location and size of the nipple,no retractions or inversion of the nipple, normal skin without lesions, no erythema or nodules,no peau d'orange, no prominence of superficial veins or chest wall collateral circulation.PALPATION of the breast documented normal skin turgor, no induration, alteration in local temperature, or pain, no palpable masses or nodules, normal mobility of the tissues,no fixation of the tissue or parenchyma to the chest wall, no alteration at the tail of the breasts or axillas, no adenopathies. LEFT MASTECTOMY Surgical site was well healed.No lymphedema in either extremity.  VASCULAR ARTERIAL: normal carotids,brachial,radial,femoral,popliteal, pedis pulses , no bruits.no paleness or cyanosis, no pain,  no numbness, no gangrene.  VASCULAR VENOUS: no cyanosis, collateral circulation, varicosities, palpable cords, pain, erythema.  ABDOMEN:  Soft, nontender with no hepatomegaly, no splenomegaly,no masses, no ascites, no collateral circulation,no distention,no [place] sign, no abdominal  pain, no inguinal hernias,no umbilical hernia, no abdominal bruits. Normal bowel sounds.  GENITAL: Not  Performed.  EXTREMITIES  AND SPINE:  No clubbing, cyanosis 2+ BOTH LE edema, no deformities or pain .No kyphosis, scoliosis, deformities or pain in spine, ribs or pelvic bone.  NEUROLOGICAL:  Patient was awake, alert, oriented to time, person and place.                        RECENT LABS:  Hematology WBC   Date Value Ref Range Status   03/30/2021 13.49 (H) 3.40 - 10.80 10*3/mm3 Final     RBC   Date Value Ref Range Status   03/30/2021 4.00 3.77 - 5.28 10*6/mm3 Final     Hemoglobin   Date Value Ref Range Status   03/30/2021 11.9 (L) 12.0 - 15.9 g/dL Final     Hematocrit   Date Value Ref Range Status   03/30/2021 38.3 34.0 - 46.6 % Final     Platelets   Date Value Ref Range Status   03/30/2021 226 140 - 450 10*3/mm3 Final                  Assessment/Plan      1. This patient has undergone a left-sided mastectomy for DCIS of the left breast. The patient had a large tumor that was high grade with negative margins of resection, minimal microinvasion and negative sentinel lymph nodes. The patient’s estrogen receptor and progesterone receptor were negative and she was HER2 negative as well at the time of the original diagnosis. Under these premises, I think the patient is done with the treatment for this and I do not recommend any form of adjuvant chemotherapy, radiation treatment and obviously no hormonal therapy. There is no role for Herceptin or medicines of this nature either.     The patient was further reviewed on 03/30/2021 in regard to her breast cancer. Her right breast examination is normal. Her mammogram is up-to-date and been normal and her left mastectomy site is normal. I see nothing to suggest breast cancer recurrence. As we mentioned before the patient never required adjuvant therapy because she had triple negative disease.               2. From the point of view of her anemia, iron deficiency, the patient  has been replaced with IV iron. As far as the patient can tell on review on 10/19/2020 she has not had any evidence of blood loss in the gastrointestinal tract. Her diet is appropriate but the only thing that she is not following through is that she is back onto diet soft drinks Pepsi for example. She is not drinking any coffee that she used to drink copious amount of it. According to her, her diet again remains appropriate. It calls my attention the hemoglobin of 11.7, her reticulated hemoglobin is normal at 35 therefore probably anemia from today is reflection of some other condition.    2. Her hemoglobin and MCV are stable. The patient has no obvious blood loss in the genitourinary tract or gastrointestinal tract. Her reticulated hemoglobin is stable at 32 and I doubt the anemia is a contributing factor to her process. We have a ferritin level and iron profile pending today. She will be called at home with the report of this.     3. The patient has very significant degree of shortness of breath upon minimal exertion. I asked her to walk. It took her almost 3 minutes to walk 30 feet. She was very short winded. We started with an O2 saturation of 100% on room air and a heart rate of 73. Her O2 saturation dropped to 97 and heart rate never raised.     The patient has significant fluid accumulation in her lower extremities. She takes Bumex and this made her ill and she takes the hydralazine and according to her this makes her ill. The most typical thing that leads me to think that this patient with orthopnea, the fluid accumulation, the muffled sounds in the heart, the possible S3 is that all this is a constellation of symptoms consistent with congestive heart failure. The patient does not want to see Dr. Song. According to her he did not do too much for her in the past. She wants for me to refer her to see a new cardiologist by [facility]. They have in mind somebody who is able to speak U[place] and she  will prefer somebody in that arena. She will let us know who is this individual at some point.     I want for her to go ahead and have a proBNP done today. She will be informed about this number and will share this with cardiology doctors as well. I think she needs to be seen very soon by Cardiology in regard to all these issues.     I insisted for her in regard to proper sodium restriction and she is doing that and the  confirms that that is the case. I do believe that the pandemia also has triggered deconditioning on her and she agrees with that as well.     I will see her back in 4 months with a CBC, ferritin, iron, TIBC, CMP. I find no need for radiological assessment at this point in regard to her previous history of breast cancer. Again, she is up to date in her mammogram.    All these issues were discussed with the patient and her  in the room. She has completed 2 COVID vaccinations already.

## 2021-03-31 ENCOUNTER — TELEPHONE (OUTPATIENT)
Dept: ONCOLOGY | Facility: CLINIC | Age: 76
End: 2021-03-31

## 2021-04-01 ENCOUNTER — TELEPHONE (OUTPATIENT)
Dept: CARDIOLOGY | Facility: CLINIC | Age: 76
End: 2021-04-01

## 2021-04-01 NOTE — TELEPHONE ENCOUNTER
Dr. Song,    Ms. Ordonezernestowil is asking to switch to Dr. Rhodes as she would feel mor comfortable with a female cardiologist.  Are you ok with this change?        Dr. Rhodes,    Are you ok with taking her on as a patient?      Thank you,  Ruchi

## 2021-04-06 ENCOUNTER — TELEPHONE (OUTPATIENT)
Dept: ONCOLOGY | Facility: CLINIC | Age: 76
End: 2021-04-06

## 2021-04-06 NOTE — TELEPHONE ENCOUNTER
Caller: TRAVIS TORREZ    Relationship to patient: SELF    Best call back number: [phone]    Patient is needing: REQUESTING PHONE CALL FROM TEJAL MEJIA. DR. MCLEOD'S NURSE. PT SAYS SHE IS HAVING AN ISSUE WITH CARDIOLOGY.

## 2021-04-08 RX ORDER — BISOPROLOL FUMARATE 10 MG/1
TABLET, FILM COATED ORAL
Qty: 60 TABLET | Refills: 0 | Status: SHIPPED | OUTPATIENT
Start: 2021-04-08 | End: 2022-07-20 | Stop reason: SDUPTHER

## 2021-05-04 ENCOUNTER — OFFICE VISIT (OUTPATIENT)
Dept: CARDIOLOGY | Facility: CLINIC | Age: 76
End: 2021-05-04

## 2021-05-04 VITALS
HEIGHT: 60 IN | WEIGHT: 203 LBS | HEART RATE: 83 BPM | SYSTOLIC BLOOD PRESSURE: 150 MMHG | RESPIRATION RATE: 18 BRPM | BODY MASS INDEX: 39.85 KG/M2 | OXYGEN SATURATION: 97 % | DIASTOLIC BLOOD PRESSURE: 80 MMHG

## 2021-05-04 DIAGNOSIS — D50.0 IRON DEFICIENCY ANEMIA DUE TO CHRONIC BLOOD LOSS: ICD-10-CM

## 2021-05-04 DIAGNOSIS — Z79.4 TYPE 2 DIABETES MELLITUS WITH STAGE 3B CHRONIC KIDNEY DISEASE, WITH LONG-TERM CURRENT USE OF INSULIN (HCC): ICD-10-CM

## 2021-05-04 DIAGNOSIS — E66.01 MORBIDLY OBESE (HCC): Chronic | ICD-10-CM

## 2021-05-04 DIAGNOSIS — I10 ESSENTIAL HYPERTENSION: Chronic | ICD-10-CM

## 2021-05-04 DIAGNOSIS — N18.32 STAGE 3B CHRONIC KIDNEY DISEASE (HCC): Chronic | ICD-10-CM

## 2021-05-04 DIAGNOSIS — R06.09 DYSPNEA ON EXERTION: Primary | Chronic | ICD-10-CM

## 2021-05-04 DIAGNOSIS — N18.32 TYPE 2 DIABETES MELLITUS WITH STAGE 3B CHRONIC KIDNEY DISEASE, WITH LONG-TERM CURRENT USE OF INSULIN (HCC): ICD-10-CM

## 2021-05-04 DIAGNOSIS — E78.5 HYPERLIPIDEMIA, UNSPECIFIED HYPERLIPIDEMIA TYPE: ICD-10-CM

## 2021-05-04 DIAGNOSIS — E11.22 TYPE 2 DIABETES MELLITUS WITH STAGE 3B CHRONIC KIDNEY DISEASE, WITH LONG-TERM CURRENT USE OF INSULIN (HCC): ICD-10-CM

## 2021-05-04 PROBLEM — N18.9 CKD (CHRONIC KIDNEY DISEASE): Chronic | Status: ACTIVE | Noted: 2019-06-03

## 2021-05-04 PROCEDURE — 99215 OFFICE O/P EST HI 40 MIN: CPT | Performed by: INTERNAL MEDICINE

## 2021-05-04 PROCEDURE — G2212 PROLONG OUTPT/OFFICE VIS: HCPCS | Performed by: INTERNAL MEDICINE

## 2021-05-05 RX ORDER — DOXAZOSIN MESYLATE 1 MG/1
1 TABLET ORAL NIGHTLY
Qty: 30 TABLET | Refills: 5 | Status: SHIPPED | OUTPATIENT
Start: 2021-05-05 | End: 2021-10-27

## 2021-05-05 RX ORDER — DOXAZOSIN MESYLATE 1 MG/1
1 TABLET ORAL NIGHTLY
COMMUNITY
End: 2021-05-05

## 2021-05-05 NOTE — TELEPHONE ENCOUNTER
Per CNA, he spoke with Dr. Mcginnis (nephro) and we will be starting Doxazosin 1mg qd. Pt notified as well. Eli

## 2021-05-05 NOTE — PROGRESS NOTES
Chief Complaint  Establish Care (Palpatations, SOB)    Subjective    History of Present Illness      I saw Blanca Lam today for cardiovascular evaluation.  She is a pleasant 75-year-old female who observes the CDC guidelines during the coronavirus pandemic.  She has received her COVID-19 vaccine.  She is accompanied by her  who assists in interpretation as she is from [place] and relocated to the [place] in 2014.  Her primary complaint is that of dyspnea on exertion fatigue and occasional dizziness.  Blanca has a history of breast carcinoma in 2019.  She underwent left mastectomy July 2019.  She has a diagnosis of temporal arteritis by bilateral biopsy 12/5/2019.  She is maintained on prednisone 7-1/2 mg daily.  She has a history of hypertension, type 2 diabetes but no history of hyperlipidemia tobacco use or family history of premature heart disease.  She reports progressive symptoms of dyspnea on exertion and fatigue.  She sleeps with 2-3 pillows and does not experience orthopnea or PND.  She has chronic bilateral lower extremity edema.  She does report occasional discomfort in her chest that she describes as a pressure this seems to occur primarily at rest.  She does not report any exertional chest discomfort.  She is free of syncope but does report mild dizziness and occasional palpitations.  She has had approximately 20 pound weight gain over the last year.  She does have obstructive sleep apnea and uses CPAP which was initiated approximately a year ago.  She is obese with a BMI of 39.65.  Her blood pressure is elevated in the office today 150/80.  She has known history of asthma and is followed by Dr. Lang of pulmonary medicine.  She has a history of chronic kidney disease 3B.  She is anemic with a hemoglobin of 11.9 on 3/30/21.   Previous cardiovascular evaluation includes echocardiogram obtained 7/2/2020 which demonstrated preserved left ventricular function grade 1 diastolic dysfunction,  "no significant valvular abnormality, the estimated right ventricular systolic pressure less than 35 mmHg.  Nuclear stress test on 7/2/2020 also revealed preserved left ventricular function no evidence of ischemia.    Objective   Vital Signs:   /80 (BP Location: Right arm, Patient Position: Sitting, Cuff Size: Large Adult)   Pulse 83   Resp 18   Ht 152.4 cm (60\")   Wt 92.1 kg (203 lb)   SpO2 97%   BMI 39.65 kg/m²     Constitutional:       Appearance: Well-developed. Morbidly obese.   Eyes:      Conjunctiva/sclera: Conjunctivae normal.      Pupils: Pupils are equal, round, and reactive to light.   HENT:      Head: Normocephalic and atraumatic.   Neck:      Thyroid: No thyromegaly.   Pulmonary:      Effort: Pulmonary effort is normal. No respiratory distress.      Breath sounds: Decreased air movement present. No wheezing. No rales.   Cardiovascular:      Normal rate. Regular rhythm. distant S1. distant S2.      S4 Gallop. No S3 gallop. Midsystolic click click.   Edema:     Peripheral edema present.     Pretibial: bilateral 1+ edema of the pretibial area.  Abdominal:      General: Bowel sounds are normal. There is no distension.      Palpations: Abdomen is soft. There is no abdominal mass.      Tenderness: There is no abdominal tenderness.   Musculoskeletal: Normal range of motion.      Cervical back: Neck supple. Skin:     General: Skin is warm and dry.      Findings: No erythema.   Neurological:      Mental Status: Alert and oriented to person, place, and time.         Result Review :     Common labs    Common Labsle 11/16/20 3/15/21 3/15/21 3/30/21 3/30/21     1048 1048 1443 1509   Glucose 200 (A)  165 (A) 104    BUN 32 (A)  30 (A) 38 (A)    Creatinine 1.16 (A)  1.13 (A) 1.59 (A)    eGFR Non  Am 46 (A)  47 (A) 32 (A)    Sodium 134 (A)  137 137    Potassium 5.0  5.3 (A) 5.4 (A)    Chloride 99  105 102    Calcium 9.1  8.8 9.1    Albumin 4.00  3.90 3.70    Total Bilirubin 0.4  0.3 0.3    Alkaline " Phosphatase 106  121 (A) 113    AST (SGOT) 12  20 15    ALT (SGPT) 14  18 12    WBC  11.56 (A)   13.49 (A)   Hemoglobin  11.6 (A)   11.9 (A)   Hematocrit  35.8   38.3   Platelets  268   226   (A) Abnormal value       Comments are available for some flowsheets but are not being displayed.           Data reviewed: Cardiology studies Echocardiogram and nuclear stress test from 7/2/2020          Assessment and Plan    1. Dyspnea on exertion  Multifactorial including age, weight and deconditioning but also reactive airway disease, uncontrolled hypertension with diastolic dysfunction and anemia.  - Adult Transthoracic Echo Complete W/ Cont if Necessary Per Protocol; Future    2. Essential hypertension  Target less than 130/80    3. Hyperlipidemia, unspecified hyperlipidemia type  Controlled    4. Type 2 diabetes mellitus with stage 3b chronic kidney disease, with long-term current use of insulin (CMS/[facility])  Diet and insulin control and encourage weight reduction    5. Morbidly obese (CMS/[facility])  Encourage weight reduction    6. Stage 3b chronic kidney disease (CMS/[facility])      7. Iron deficiency anemia     8.  Temporal arteritis on chronic steroid therapy    9.  Asthma    10.  Breast carcinoma status post left mastectomy    I have recommended a blood pressure of less than 130/80.  I made no changes today but will discuss her case with her nephrologist Dr. Davis.  Her hydralazine she is currently taking 25 mg twice a day as opposed to 50 mg 3 times a day.  She associates nausea continue hydralazine.  She uses CPAP consistently.  I have explained to her that there are many reasons for her fatigue and exertional dyspnea as outlined above.  I have explained to her the significance of diastolic dysfunction.  I have recommended salt restriction is close to 2000 mg a day as possible.  I will obtain an echocardiogram to reassess left ventricular size wall thickness systolic and diastolic function, valvular function and right-sided  pressures.  I certainly feel she would benefit from weight reduction and blood pressure control under 130/80.  I will avoid amlodipine secondary to her lower extremity edema.  I will not advance her diuretic therapy without nephrology's approval.  I feel further afterload reduction will be of benefit to her but her valsartan and HCTZ is at a maximum dose currently and she is not tolerating hydralazine.  We may consider Terazosin starting at 2 mg daily, as a peripheral alpha blocking agent.  I will plan to see her in follow-up in 3 weeks but will communicate with her via telephone for medication adjustments.      I spent 75 minutes caring for Blanca on this date of service. This time includes time spent by me in the following activities:preparing for the visit, reviewing tests, obtaining and/or reviewing a separately obtained history, performing a medically appropriate examination and/or evaluation , counseling and educating the patient/family/caregiver, ordering medications, tests, or procedures, referring and communicating with other health care professionals , documenting information in the medical record, independently interpreting results and communicating that information with the patient/family/caregiver and care coordination  Follow Up   No follow-ups on file.  Patient was given instructions and counseling regarding her condition or for health maintenance advice. Please see specific information pulled into the AVS if appropriate.

## 2021-06-09 ENCOUNTER — HOSPITAL ENCOUNTER (OUTPATIENT)
Dept: CARDIOLOGY | Facility: HOSPITAL | Age: 76
Discharge: HOME | End: 2021-06-09
Admitting: INTERNAL MEDICINE

## 2021-06-09 VITALS
BODY MASS INDEX: 39.85 KG/M2 | DIASTOLIC BLOOD PRESSURE: 75 MMHG | HEART RATE: 81 BPM | HEIGHT: 60 IN | WEIGHT: 203 LBS | SYSTOLIC BLOOD PRESSURE: 170 MMHG

## 2021-06-09 DIAGNOSIS — R06.09 DYSPNEA ON EXERTION: ICD-10-CM

## 2021-06-09 LAB
AORTIC ARCH: 2.5 CM
AORTIC DIMENSIONLESS INDEX: 0.7 (DI)
BH CV ECHO MEAS - ACS: 1.8 CM
BH CV ECHO MEAS - AO ARCH DIAM (PROXIMAL TRANS.): 2.5 CM
BH CV ECHO MEAS - AO MAX PG (FULL): 7.1 MMHG
BH CV ECHO MEAS - AO MAX PG: 13.7 MMHG
BH CV ECHO MEAS - AO MEAN PG (FULL): 4 MMHG
BH CV ECHO MEAS - AO MEAN PG: 7 MMHG
BH CV ECHO MEAS - AO ROOT AREA (BSA CORRECTED): 1.7
BH CV ECHO MEAS - AO ROOT AREA: 8 CM^2
BH CV ECHO MEAS - AO ROOT DIAM: 3.2 CM
BH CV ECHO MEAS - AO V2 MAX: 185 CM/SEC
BH CV ECHO MEAS - AO V2 MEAN: 127 CM/SEC
BH CV ECHO MEAS - AO V2 VTI: 40.2 CM
BH CV ECHO MEAS - ASC AORTA: 2.6 CM
BH CV ECHO MEAS - AVA(I,A): 1.8 CM^2
BH CV ECHO MEAS - AVA(I,D): 1.8 CM^2
BH CV ECHO MEAS - AVA(V,A): 1.8 CM^2
BH CV ECHO MEAS - AVA(V,D): 1.8 CM^2
BH CV ECHO MEAS - BSA(HAYCOCK): 2 M^2
BH CV ECHO MEAS - BSA: 1.9 M^2
BH CV ECHO MEAS - BZI_BMI: 39.6 KILOGRAMS/M^2
BH CV ECHO MEAS - BZI_METRIC_HEIGHT: 152.4 CM
BH CV ECHO MEAS - BZI_METRIC_WEIGHT: 92.1 KG
BH CV ECHO MEAS - EDV(CUBED): 97.3 ML
BH CV ECHO MEAS - EDV(MOD-SP2): 41 ML
BH CV ECHO MEAS - EDV(MOD-SP4): 44 ML
BH CV ECHO MEAS - EDV(TEICH): 97.3 ML
BH CV ECHO MEAS - EF(CUBED): 77.4 %
BH CV ECHO MEAS - EF(MOD-BP): 70.3 %
BH CV ECHO MEAS - EF(MOD-SP2): 70.7 %
BH CV ECHO MEAS - EF(MOD-SP4): 68.2 %
BH CV ECHO MEAS - EF(TEICH): 69.6 %
BH CV ECHO MEAS - ESV(CUBED): 22 ML
BH CV ECHO MEAS - ESV(MOD-SP2): 12 ML
BH CV ECHO MEAS - ESV(MOD-SP4): 14 ML
BH CV ECHO MEAS - ESV(TEICH): 29.6 ML
BH CV ECHO MEAS - FS: 39.1 %
BH CV ECHO MEAS - IVS/LVPW: 0.82
BH CV ECHO MEAS - IVSD: 0.9 CM
BH CV ECHO MEAS - LAT PEAK E' VEL: 7.1 CM/SEC
BH CV ECHO MEAS - LV DIASTOLIC VOL/BSA (35-75): 23.4 ML/M^2
BH CV ECHO MEAS - LV MASS(C)D: 158.8 GRAMS
BH CV ECHO MEAS - LV MASS(C)DI: 84.5 GRAMS/M^2
BH CV ECHO MEAS - LV MAX PG: 6.6 MMHG
BH CV ECHO MEAS - LV MEAN PG: 3 MMHG
BH CV ECHO MEAS - LV SYSTOLIC VOL/BSA (12-30): 7.5 ML/M^2
BH CV ECHO MEAS - LV V1 MAX: 128 CM/SEC
BH CV ECHO MEAS - LV V1 MEAN: 82.1 CM/SEC
BH CV ECHO MEAS - LV V1 VTI: 28.3 CM
BH CV ECHO MEAS - LVIDD: 4.6 CM
BH CV ECHO MEAS - LVIDS: 2.8 CM
BH CV ECHO MEAS - LVLD AP2: 6.7 CM
BH CV ECHO MEAS - LVLD AP4: 6.3 CM
BH CV ECHO MEAS - LVLS AP2: 5.4 CM
BH CV ECHO MEAS - LVLS AP4: 5.4 CM
BH CV ECHO MEAS - LVOT AREA (M): 2.5 CM^2
BH CV ECHO MEAS - LVOT AREA: 2.5 CM^2
BH CV ECHO MEAS - LVOT DIAM: 1.8 CM
BH CV ECHO MEAS - LVPWD: 1.1 CM
BH CV ECHO MEAS - MED PEAK E' VEL: 7.3 CM/SEC
BH CV ECHO MEAS - MR MAX PG: 39.4 MMHG
BH CV ECHO MEAS - MR MAX VEL: 314 CM/SEC
BH CV ECHO MEAS - MV A DUR: 0.11 SEC
BH CV ECHO MEAS - MV A MAX VEL: 154 CM/SEC
BH CV ECHO MEAS - MV DEC SLOPE: 542 CM/SEC^2
BH CV ECHO MEAS - MV DEC TIME: 273 SEC
BH CV ECHO MEAS - MV E MAX VEL: 146 CM/SEC
BH CV ECHO MEAS - MV E/A: 0.95
BH CV ECHO MEAS - MV MAX PG: 9.9 MMHG
BH CV ECHO MEAS - MV MEAN PG: 5 MMHG
BH CV ECHO MEAS - MV P1/2T MAX VEL: 164 CM/SEC
BH CV ECHO MEAS - MV P1/2T: 88.6 MSEC
BH CV ECHO MEAS - MV V2 MAX: 157 CM/SEC
BH CV ECHO MEAS - MV V2 MEAN: 104 CM/SEC
BH CV ECHO MEAS - MV V2 VTI: 42 CM
BH CV ECHO MEAS - MVA P1/2T LCG: 1.3 CM^2
BH CV ECHO MEAS - MVA(P1/2T): 2.5 CM^2
BH CV ECHO MEAS - MVA(VTI): 1.7 CM^2
BH CV ECHO MEAS - PA ACC TIME: 0.07 SEC
BH CV ECHO MEAS - PA MAX PG (FULL): 0.47 MMHG
BH CV ECHO MEAS - PA MAX PG: 5.9 MMHG
BH CV ECHO MEAS - PA PR(ACCEL): 47.1 MMHG
BH CV ECHO MEAS - PA V2 MAX: 121 CM/SEC
BH CV ECHO MEAS - PULM A REVS DUR: 0.12 SEC
BH CV ECHO MEAS - PULM A REVS VEL: 37.6 CM/SEC
BH CV ECHO MEAS - PULM DIAS VEL: 49 CM/SEC
BH CV ECHO MEAS - PULM S/D: 1.6
BH CV ECHO MEAS - PULM SYS VEL: 80.5 CM/SEC
BH CV ECHO MEAS - PVA(V,A): 2.2 CM^2
BH CV ECHO MEAS - PVA(V,D): 2.2 CM^2
BH CV ECHO MEAS - QP/QS: 0.69
BH CV ECHO MEAS - RAP SYSTOLE: 3 MMHG
BH CV ECHO MEAS - RV MAX PG: 5.4 MMHG
BH CV ECHO MEAS - RV MEAN PG: 3 MMHG
BH CV ECHO MEAS - RV V1 MAX: 116 CM/SEC
BH CV ECHO MEAS - RV V1 MEAN: 78 CM/SEC
BH CV ECHO MEAS - RV V1 VTI: 21.8 CM
BH CV ECHO MEAS - RVOT AREA: 2.3 CM^2
BH CV ECHO MEAS - RVOT DIAM: 1.7 CM
BH CV ECHO MEAS - RVSP: 41 MMHG
BH CV ECHO MEAS - SI(AO): 172.1 ML/M^2
BH CV ECHO MEAS - SI(CUBED): 40.1 ML/M^2
BH CV ECHO MEAS - SI(LVOT): 38.3 ML/M^2
BH CV ECHO MEAS - SI(MOD-SP2): 15.4 ML/M^2
BH CV ECHO MEAS - SI(MOD-SP4): 16 ML/M^2
BH CV ECHO MEAS - SI(TEICH): 36.1 ML/M^2
BH CV ECHO MEAS - SV(AO): 323.3 ML
BH CV ECHO MEAS - SV(CUBED): 75.4 ML
BH CV ECHO MEAS - SV(LVOT): 72 ML
BH CV ECHO MEAS - SV(MOD-SP2): 29 ML
BH CV ECHO MEAS - SV(MOD-SP4): 30 ML
BH CV ECHO MEAS - SV(RVOT): 49.5 ML
BH CV ECHO MEAS - SV(TEICH): 67.8 ML
BH CV ECHO MEAS - TAPSE (>1.6): 1.6 CM
BH CV ECHO MEAS - TR MAX PG: 38 MMHG
BH CV ECHO MEAS - TR MAX VEL: 308 CM/SEC
BH CV ECHO MEASUREMENTS AVERAGE E/E' RATIO: 20.28
BH CV XLRA - RV BASE: 2.8 CM
BH CV XLRA - RV LENGTH: 5.2 CM
BH CV XLRA - RV MID: 2.6 CM
BH CV XLRA - TDI S': 12.2 CM/SEC
LEFT ATRIUM VOLUME INDEX: 20.6 ML/M2
MAXIMAL PREDICTED HEART RATE: 146 BPM
STRESS TARGET HR: 124 BPM

## 2021-06-09 PROCEDURE — 93306 TTE W/DOPPLER COMPLETE: CPT | Performed by: INTERNAL MEDICINE

## 2021-06-09 PROCEDURE — 82270 OCCULT BLOOD FECES: CPT

## 2021-06-09 PROCEDURE — 93306 TTE W/DOPPLER COMPLETE: CPT

## 2021-06-10 ENCOUNTER — LAB (OUTPATIENT)
Dept: LAB | Facility: HOSPITAL | Age: 76
End: 2021-06-10

## 2021-06-10 ENCOUNTER — TRANSCRIBE ORDERS (OUTPATIENT)
Dept: ADMINISTRATIVE | Facility: HOSPITAL | Age: 76
End: 2021-06-10

## 2021-06-10 DIAGNOSIS — D64.9 ANEMIA, UNSPECIFIED TYPE: Primary | ICD-10-CM

## 2021-06-10 LAB
HEMOCCULT STL QL: NEGATIVE
HEMOCCULT STL QL: NEGATIVE

## 2021-06-10 PROCEDURE — 82270 OCCULT BLOOD FECES: CPT

## 2021-06-21 ENCOUNTER — OFFICE VISIT (OUTPATIENT)
Dept: CARDIOLOGY | Facility: CLINIC | Age: 76
End: 2021-06-21

## 2021-06-21 VITALS
SYSTOLIC BLOOD PRESSURE: 110 MMHG | RESPIRATION RATE: 18 BRPM | WEIGHT: 209.2 LBS | DIASTOLIC BLOOD PRESSURE: 52 MMHG | OXYGEN SATURATION: 99 % | BODY MASS INDEX: 41.07 KG/M2 | HEART RATE: 82 BPM | HEIGHT: 60 IN

## 2021-06-21 DIAGNOSIS — M31.6 TEMPORAL ARTERITIS (HCC): ICD-10-CM

## 2021-06-21 DIAGNOSIS — R06.09 DYSPNEA ON EXERTION: ICD-10-CM

## 2021-06-21 DIAGNOSIS — N18.32 TYPE 2 DIABETES MELLITUS WITH STAGE 3B CHRONIC KIDNEY DISEASE, WITH LONG-TERM CURRENT USE OF INSULIN (HCC): ICD-10-CM

## 2021-06-21 DIAGNOSIS — Z79.4 TYPE 2 DIABETES MELLITUS WITH STAGE 3B CHRONIC KIDNEY DISEASE, WITH LONG-TERM CURRENT USE OF INSULIN (HCC): ICD-10-CM

## 2021-06-21 DIAGNOSIS — I27.20 PULMONARY HYPERTENSION (HCC): ICD-10-CM

## 2021-06-21 DIAGNOSIS — E11.22 TYPE 2 DIABETES MELLITUS WITH STAGE 3B CHRONIC KIDNEY DISEASE, WITH LONG-TERM CURRENT USE OF INSULIN (HCC): ICD-10-CM

## 2021-06-21 DIAGNOSIS — G47.33 OSA ON CPAP: ICD-10-CM

## 2021-06-21 DIAGNOSIS — E66.01 MORBIDLY OBESE (HCC): ICD-10-CM

## 2021-06-21 DIAGNOSIS — N18.32 STAGE 3B CHRONIC KIDNEY DISEASE (HCC): ICD-10-CM

## 2021-06-21 DIAGNOSIS — R60.0 BILATERAL LOWER EXTREMITY EDEMA: ICD-10-CM

## 2021-06-21 DIAGNOSIS — I10 ESSENTIAL HYPERTENSION: Primary | ICD-10-CM

## 2021-06-21 DIAGNOSIS — E78.5 HYPERLIPIDEMIA, UNSPECIFIED HYPERLIPIDEMIA TYPE: ICD-10-CM

## 2021-06-21 DIAGNOSIS — D50.0 IRON DEFICIENCY ANEMIA DUE TO CHRONIC BLOOD LOSS: ICD-10-CM

## 2021-06-21 DIAGNOSIS — Z99.89 OSA ON CPAP: ICD-10-CM

## 2021-06-21 PROCEDURE — 99215 OFFICE O/P EST HI 40 MIN: CPT | Performed by: INTERNAL MEDICINE

## 2021-06-21 NOTE — PROGRESS NOTES
"Chief Complaint  Hospital Follow Up Visit ([facility])    Subjective    History of Present Illness      I saw Blanca Harden today for continued cardiovascular care.  She is a pleasant 76-year-old female who continues to report dyspnea on exertion and generalized fatigue.  She has chronic bilateral lower extremity edema.  She does not report any current chest pain pressure tightness.  Nuclear stress test from 7/2/2020 demonstrated preserved left ventricular function no evidence of ischemia, low risk study.Echocardiogram reviewed from 6/9/2021 revealed normal left ventricular contractility, grade 1 diastolic dysfunction, estimated right ventricular systolic pressure 35 to 45 mmHg no significant valvular abnormality.    Blanca has obstructive sleep apnea and uses CPAP.  She has a history of asthma followed by pulmonary medicine.  She has chronic kidney disease followed by nephrology.  She is morbidly obese with a BMI of 40.86.  She has a history of temporal arteritis on chronic steroid therapy.  Her blood pressure is well controlled.  Her weight is increased from 203 to 209.2 pounds    Objective   Vital Signs:   /52 (BP Location: Left arm, Patient Position: Sitting)   Pulse 82   Resp 18   Ht 152.4 cm (60\")   Wt 94.9 kg (209 lb 3.2 oz)   SpO2 99%   BMI 40.86 kg/m²     Constitutional:       Appearance: Well-developed. Morbidly obese.   Eyes:      Conjunctiva/sclera: Conjunctivae normal.      Pupils: Pupils are equal, round, and reactive to light.   HENT:      Head: Normocephalic and atraumatic.   Neck:      Thyroid: No thyromegaly.   Pulmonary:      Effort: Pulmonary effort is normal. No respiratory distress.      Breath sounds: Decreased air movement present. No wheezing. No rales.   Cardiovascular:      Normal rate. Regular rhythm. distant S1. distant S2.      S4 Gallop. No S3 gallop. Midsystolic click click.   Edema:     Peripheral edema present.  Abdominal:      General: Bowel sounds are normal. There is " no distension.      Palpations: Abdomen is soft. There is no abdominal mass.      Tenderness: There is no abdominal tenderness.   Musculoskeletal: Normal range of motion.      Cervical back: Neck supple. Skin:     General: Skin is warm and dry.      Findings: No erythema.   Neurological:      Mental Status: Alert and oriented to person, place, and time.     No change in today's exam  Result Review :     Common labs    Common Labsle 11/16/20 3/15/21 3/15/21 3/30/21 3/30/21     1048 1048 1443 1509   Glucose 200 (A)  165 (A) 104    BUN 32 (A)  30 (A) 38 (A)    Creatinine 1.16 (A)  1.13 (A) 1.59 (A)    eGFR Non  Am 46 (A)  47 (A) 32 (A)    Sodium 134 (A)  137 137    Potassium 5.0  5.3 (A) 5.4 (A)    Chloride 99  105 102    Calcium 9.1  8.8 9.1    Albumin 4.00  3.90 3.70    Total Bilirubin 0.4  0.3 0.3    Alkaline Phosphatase 106  121 (A) 113    AST (SGOT) 12  20 15    ALT (SGPT) 14  18 12    WBC  11.56 (A)   13.49 (A)   Hemoglobin  11.6 (A)   11.9 (A)   Hematocrit  35.8   38.3   Platelets  268   226   (A) Abnormal value       Comments are available for some flowsheets but are not being displayed.                     Assessment and Plan    1. Essential hypertension  Controlled    2. Hyperlipidemia, unspecified hyperlipidemia type  Controlled, with grade 1A diastolic dysfunction    3. Type 2 diabetes mellitus with stage 3b chronic kidney disease, with long-term current use of insulin (CMS/HCC)  Weight reduction    4. Morbidly obese (CMS/HCC)  Weight reduction    5. ANAYELI on CPAP  Weight reduction    6. Dyspnea on exertion  Multifactorial including age weight deconditioning hypervolemia and pulmonary hypertension    7. Temporal arteritis (CMS/HCC)  Stable    8. Stage 3b chronic kidney disease (CMS/HCC)  Stable    9. Iron deficiency anemia due to chronic blood loss  Stable    10. Bilateral lower extremity edema  Persistent    11. Pulmonary hypertension (CMS/HCC)  Mild to moderate    Blanca has chronic bilateral lower  extremity edema and persistent respiratory insufficiency which is multifactorial in its etiology.  I discussed at length the importance of salt restriction is close to 2000 mg a day as possible and the reduction in her fluid intake by 10 to 12 ounces daily.  Of encouraged her to keep her lower extremities elevated while seated.  I have encouraged her to utilize her compression stockings while ambulating.  She is to see her nephrologist in the near future and will discuss advancing her diuretic therapy.  She will continue to follow up with pulmonary medicine for treatment of her asthma.  She is demonstrated preserved left ventricular function, no significant valvular abnormality or evidence of ischemia.  I will see her in follow-up in 3 months sooner if needed.      I spent 40 minutes caring for Blanca on this date of service. This time includes time spent by me in the following activities:preparing for the visit, reviewing tests, performing a medically appropriate examination and/or evaluation , counseling and educating the patient/family/caregiver, ordering medications, tests, or procedures, referring and communicating with other health care professionals , documenting information in the medical record and care coordination  Follow Up   No follow-ups on file.  Patient was given instructions and counseling regarding her condition or for health maintenance advice. Please see specific information pulled into the AVS if appropriate.

## 2021-07-20 ENCOUNTER — OFFICE VISIT (OUTPATIENT)
Dept: ONCOLOGY | Facility: CLINIC | Age: 76
End: 2021-07-20

## 2021-07-20 ENCOUNTER — LAB (OUTPATIENT)
Dept: LAB | Facility: HOSPITAL | Age: 76
End: 2021-07-20

## 2021-07-20 VITALS
SYSTOLIC BLOOD PRESSURE: 131 MMHG | HEART RATE: 75 BPM | BODY MASS INDEX: 41.11 KG/M2 | DIASTOLIC BLOOD PRESSURE: 72 MMHG | TEMPERATURE: 97.5 F | HEIGHT: 60 IN | WEIGHT: 209.4 LBS | RESPIRATION RATE: 20 BRPM | OXYGEN SATURATION: 96 %

## 2021-07-20 DIAGNOSIS — C50.919 PRIMARY MALIGNANT NEOPLASM OF FEMALE BREAST (HCC): Primary | ICD-10-CM

## 2021-07-20 DIAGNOSIS — D05.12 BREAST NEOPLASM, TIS (DCIS), LEFT: ICD-10-CM

## 2021-07-20 DIAGNOSIS — C50.919 PRIMARY MALIGNANT NEOPLASM OF FEMALE BREAST (HCC): ICD-10-CM

## 2021-07-20 DIAGNOSIS — D50.0 IRON DEFICIENCY ANEMIA DUE TO CHRONIC BLOOD LOSS: ICD-10-CM

## 2021-07-20 DIAGNOSIS — D50.9 IRON DEFICIENCY ANEMIA, UNSPECIFIED IRON DEFICIENCY ANEMIA TYPE: ICD-10-CM

## 2021-07-20 LAB
ALBUMIN SERPL-MCNC: 3.6 G/DL (ref 3.5–5.2)
ALBUMIN/GLOB SERPL: 1.2 G/DL (ref 1.1–2.4)
ALP SERPL-CCNC: 108 U/L (ref 38–116)
ALT SERPL W P-5'-P-CCNC: 14 U/L (ref 0–33)
ANION GAP SERPL CALCULATED.3IONS-SCNC: 10.5 MMOL/L (ref 5–15)
AST SERPL-CCNC: 12 U/L (ref 0–32)
BASOPHILS # BLD AUTO: 0.04 10*3/MM3 (ref 0–0.2)
BASOPHILS NFR BLD AUTO: 0.4 % (ref 0–1.5)
BILIRUB SERPL-MCNC: 0.3 MG/DL (ref 0.2–1.2)
BUN SERPL-MCNC: 42 MG/DL (ref 6–20)
BUN/CREAT SERPL: 31.1 (ref 7.3–30)
CALCIUM SPEC-SCNC: 9 MG/DL (ref 8.5–10.2)
CHLORIDE SERPL-SCNC: 102 MMOL/L (ref 98–107)
CO2 SERPL-SCNC: 24.5 MMOL/L (ref 22–29)
CREAT SERPL-MCNC: 1.35 MG/DL (ref 0.6–1.1)
DEPRECATED RDW RBC AUTO: 45.4 FL (ref 37–54)
EOSINOPHIL # BLD AUTO: 0.15 10*3/MM3 (ref 0–0.4)
EOSINOPHIL NFR BLD AUTO: 1.3 % (ref 0.3–6.2)
ERYTHROCYTE [DISTWIDTH] IN BLOOD BY AUTOMATED COUNT: 12.7 % (ref 12.3–15.4)
FERRITIN SERPL-MCNC: 237 NG/ML (ref 13–150)
GFR SERPL CREATININE-BSD FRML MDRD: 38 ML/MIN/1.73
GLOBULIN UR ELPH-MCNC: 3 GM/DL (ref 1.8–3.5)
GLUCOSE SERPL-MCNC: 194 MG/DL (ref 74–124)
HCT VFR BLD AUTO: 35.4 % (ref 34–46.6)
HGB BLD-MCNC: 10.8 G/DL (ref 12–15.9)
HGB RETIC QN AUTO: 34 PG (ref 29.8–36.1)
IMM GRANULOCYTES # BLD AUTO: 0.09 10*3/MM3 (ref 0–0.05)
IMM GRANULOCYTES NFR BLD AUTO: 0.8 % (ref 0–0.5)
IMM RETICS NFR: 7.5 % (ref 3–15.8)
IRON 24H UR-MRATE: 72 MCG/DL (ref 37–145)
IRON SATN MFR SERPL: 24 % (ref 14–48)
LYMPHOCYTES # BLD AUTO: 0.78 10*3/MM3 (ref 0.7–3.1)
LYMPHOCYTES NFR BLD AUTO: 6.8 % (ref 19.6–45.3)
MCH RBC QN AUTO: 29.8 PG (ref 26.6–33)
MCHC RBC AUTO-ENTMCNC: 30.5 G/DL (ref 31.5–35.7)
MCV RBC AUTO: 97.5 FL (ref 79–97)
MONOCYTES # BLD AUTO: 0.74 10*3/MM3 (ref 0.1–0.9)
MONOCYTES NFR BLD AUTO: 6.5 % (ref 5–12)
NEUTROPHILS NFR BLD AUTO: 84.2 % (ref 42.7–76)
NEUTROPHILS NFR BLD AUTO: 9.59 10*3/MM3 (ref 1.7–7)
NRBC BLD AUTO-RTO: 0 /100 WBC (ref 0–0.2)
PLATELET # BLD AUTO: 217 10*3/MM3 (ref 140–450)
PMV BLD AUTO: 9.6 FL (ref 6–12)
POTASSIUM SERPL-SCNC: 4.5 MMOL/L (ref 3.5–4.7)
PROT SERPL-MCNC: 6.6 G/DL (ref 6.3–8)
RBC # BLD AUTO: 3.63 10*6/MM3 (ref 3.77–5.28)
RETICS # AUTO: 0.04 10*6/MM3 (ref 0.02–0.13)
RETICS/RBC NFR AUTO: 1.09 % (ref 0.7–1.9)
SODIUM SERPL-SCNC: 137 MMOL/L (ref 134–145)
TIBC SERPL-MCNC: 294 MCG/DL (ref 249–505)
TRANSFERRIN SERPL-MCNC: 210 MG/DL (ref 200–360)
WBC # BLD AUTO: 11.39 10*3/MM3 (ref 3.4–10.8)

## 2021-07-20 PROCEDURE — 85046 RETICYTE/HGB CONCENTRATE: CPT

## 2021-07-20 PROCEDURE — 36415 COLL VENOUS BLD VENIPUNCTURE: CPT

## 2021-07-20 PROCEDURE — 82728 ASSAY OF FERRITIN: CPT

## 2021-07-20 PROCEDURE — 99214 OFFICE O/P EST MOD 30 MIN: CPT | Performed by: INTERNAL MEDICINE

## 2021-07-20 PROCEDURE — 84466 ASSAY OF TRANSFERRIN: CPT

## 2021-07-20 PROCEDURE — 83540 ASSAY OF IRON: CPT

## 2021-07-20 PROCEDURE — 80053 COMPREHEN METABOLIC PANEL: CPT

## 2021-07-20 PROCEDURE — 85025 COMPLETE CBC W/AUTO DIFF WBC: CPT

## 2021-07-20 NOTE — PROGRESS NOTES
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 She is not on oxygen yet. She has been seen by Dr. Power, Cardiologist. She was extremely satisfied with the evaluation and echocardiogram has been done to be reviewed below. She has significant degree of fatigue. She remains anemic. Her appetite is acceptable, no nausea or vomiting, no passage of blood in the stools, no hematuria, no vaginal bleeding. Her diet seems to be that is right at this point with not too much salt excess. The patient is not eating sugary food that she loves to have anymore given her diabetes issues. The patient denies any other new problems at this time with no fever or chills. In the surgical site of the mastectomy she has not had any new problems.                 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 Daniel Gutierrez, [facility]   •  SECTION N/A      x3   • CHOLECYSTECTOMY N/A    • COLONOSCOPY     • COLONOSCOPY W/ POLYPECTOMY N/A 03/12/2019    Enlarged folds in the antrum: biopsied; duodenal, transverse colon x2, splenic flexure, descending colon and sigmoid colon polyps: biopsied (path: tubular adenoma x6)-Dr. Zak De Jesus, [facility]   • ENDOSCOPY  10/11/2019    Procedure: ESOPHAGOGASTRODUODENOSCOPY with hot snare polypectomy;  Surgeon: Haile Handley MD;  [place] [facility] [place] [facility];  Service: Gastroenterology   • ENDOSCOPY N/A 1/29/2020    Procedure: ESOPHAGOGASTRODUODENOSCOPY;  Surgeon: Haile Handley MD;  [place] [facility];  Service: Gastroenterology   • ENDOSCOPY N/A 9/9/2020    Procedure: ESOPHAGOGASTRODUODENOSCOPY WITH BIOPSIES AND COLD BIOPSY POLYPECTOMY;  Surgeon: Haile Handley MD;  [place] [facility];  Service: Gastroenterology;  Laterality: N/A;  pre: dyspepsia and diarrhea  post: duodenal polyp and gastritis   • MASTECTOMY Left    • MASTECTOMY WITH SENTINEL NODE BIOPSY AND AXILLARY NODE DISSECTION Left 7/3/2019    Procedure: LEFT BREAST MASTECTOMY WITH SENTINEL NODE BIOPSY AND , v-y plasty closure;  Surgeon: Tahmina James MD;  [place] [facility] [place] [facility];  Service: General   • SUBTOTAL HYSTERECTOMY Bilateral     ovaries still in tact   • TEMPORAL ARTERY BIOPSY Bilateral 12/05/2019        Current Outpatient Medications on File Prior to Visit   Medication Sig Dispense Refill   • albuterol sulfate HFA (PROAIR HFA) 108 (90 Base) MCG/ACT inhaler Every 6 (Six) Hours.     • atorvastatin (LIPITOR) 20 MG tablet Take 20 mg by mouth Every Other Day.     • bisoprolol (ZEBeta) 10 MG tablet Take 2 tablets by mouth once daily 60 tablet 0   • budesonide-formoterol (SYMBICORT) 160-4.5 MCG/ACT inhaler Inhale 2 puffs 2 (Two) Times a Day.     • bumetanide (BUMEX) 1 MG tablet bumetanide 1 mg tablet     • doxazosin (Cardura) 1 MG tablet Take 1 tablet by mouth Every Night. 30 tablet 5   • fecal microbiota (FMT) Use utd 1  each 0   • Insulin Glargine (TOUJEO SOLOSTAR SC) Inject 40 Units under the skin into the appropriate area as directed Every Night. INSTRUCTED PT TO FOLLOW MD INSTRUCTIONS REGARDING DOSING BEFORE SURGERY     • Insulin Lispro (HUMALOG PEN SC) Inject  under the skin into the appropriate area as directed 3 (Three) Times a Day With Meals. 25 units at breakfast, 20 units at lunch, 22 units at dinner/INSTRUCTED PT TO FOLLOW MD INSTRUCTIONS REGARDING DOSING BEFORE SURGERY     • NON FORMULARY Apply 1 each topically to the appropriate area as directed Daily. Cream to foot rash, unsure of name     • predniSONE (DELTASONE) 2.5 MG tablet Take 7.5 mg by mouth Every Evening.     • predniSONE (DELTASONE) 5 MG tablet prednisone 5 mg tablet     • valsartan-hydrochlorothiazide (DIOVAN-HCT) 320-25 MG per tablet Take 1 tablet by mouth Daily. 30 tablet 11   • [DISCONTINUED] hydrALAZINE (APRESOLINE) 50 MG tablet 100 mg.       No current facility-administered medications on file prior to visit.        ALLERGIES:    Allergies   Allergen Reactions   • Aspirin Nausea Only     Low tolerance, abdominal pain   • Sulfa Antibiotics Unknown (See Comments)     Happened as a child        Social History     Socioeconomic History   • Marital status:      Spouse name: Ashwin   • Number of children: 3   • Years of education: College   • Highest education level: Not on file   Tobacco Use   • Smoking status: Never Smoker   • Smokeless tobacco: Never Used   • Tobacco comment: caffine use   Substance and Sexual Activity   • Alcohol use: No     Comment: one or two small drinks a year   • Drug use: No   • Sexual activity: Defer     Comment: Spouse, Ashwin        Family History   Problem Relation Age of Onset   • Hypertension Father    • Stomach cancer Father    • Diabetes Father    • Esophageal cancer Father    • Throat cancer Sister    • Diabetes Paternal Grandmother    • Hypertension Paternal Grandfather    • Colon cancer Paternal Grandfather    •  "Heart disease Mother    • Colon cancer Paternal Uncle    • Colon cancer Paternal Uncle    • Colon cancer Paternal Uncle    • Ovarian cancer Cousin    • Stomach cancer Cousin    • Malig Hyperthermia Neg Hx           Objective     Vitals:    07/20/21 1503   BP: 131/72   Pulse: 75   Resp: 20   Temp: 97.5 °F (36.4 °C)   TempSrc: Temporal   SpO2: 96%   Weight: 95 kg (209 lb 6.4 oz)   Height: 152.4 cm (60\")   PainSc: 0-No pain     Current Status 7/20/2021   ECOG score 0       Physical Exam    I HAVE PERSONALLY REVIEWED THE HISTORY OF THE PRESENT ILLNESS, PAST MEDICAL HISTORY, FAMILY HISTORY, SOCIAL HISTORY, ALLERGIES, MEDICATIONS STATED ABOVE IN THE OFFICE NOTE FROM TODAY.        GENERAL:  Well-developed, well-nourished  Patient  in no acute distress.   SKIN:  Warm, dry ,NO rashes,NO purpura ,NO petechiae.  HEENT:  Pupils were equal and reactive to light and accomodation, conjunctivae noninjected, no pterygium, normal extraocular movements, normal visual acuity.   NECK:  Supple with good range of motion; no thyromegaly or masses, no JVD or bruits, no cervical adenopathies.No carotid artery pain, no carotid abnormal pulsation , NO arterial dance.  LYMPHATICS:  No cervical, NO supraclavicular, NO axillary,NO epitrochlear , NO inguinal adenopathy.  CARDIAC   normal rate and regular rhythm, without murmur,NO rubs NO S3 NO S4 right or left .  LUNGS: DECREASED BS BILATERAL NO WHEEZING OR CRACKLES OR RUBS  INSPECTION of  breast documented symmetry of the tissue per se and location and size of the nipple,no retractions or inversion of the nipple, normal skin without lesions, no erythema or nodules,no peau d'orange, no prominence of superficial veins or chest wall collateral circulation.PALPATION of the breast documented normal skin turgor, no induration, alteration in local temperature, or pain, no palpable masses or nodules, normal mobility of the tissues,no fixation of the tissue or parenchyma to the chest wall, no alteration " at the tail of the breasts or axillas, no adenopathies. LEFT MASTECTOMY Surgical site was well healed.No lymphedema in either extremity.  VASCULAR VENOUS: no cyanosis, collateral circulation, varicosities,  palpable cords, pain, erythema.  ABDOMEN:  Soft, nontender with no hepatomegaly, no splenomegaly,no masses, no ascites, no collateral circulation,no distention,no Elio sign, no abdominal pain, no inguinal hernias,no umbilical hernia, no abdominal bruits. Normal bowel sounds.  GENITAL: Not  Performed.  EXTREMITIES  AND SPINE:  No clubbing, cyanosis or 3 + BILATERAL edema, no deformities or pain .No kyphosis, scoliosis, deformities or pain in spine, ribs or pelvic bone.  NEUROLOGICAL:  Patient was awake, alert, oriented to time, person and place.                      RECENT LABS:  Hematology WBC   Date Value Ref Range Status   07/20/2021 11.39 (H) 3.40 - 10.80 10*3/mm3 Final     RBC   Date Value Ref Range Status   07/20/2021 3.63 (L) 3.77 - 5.28 10*6/mm3 Final     Hemoglobin   Date Value Ref Range Status   07/20/2021 10.8 (L) 12.0 - 15.9 g/dL Final     Hematocrit   Date Value Ref Range Status   07/20/2021 35.4 34.0 - 46.6 % Final     Platelets   Date Value Ref Range Status   07/20/2021 217 140 - 450 10*3/mm3 Final                  Assessment/Plan      1. This patient has undergone a left-sided mastectomy for DCIS of the left breast. The patient had a large tumor that was high grade with negative margins of resection, minimal microinvasion and negative sentinel lymph nodes. The patient’s estrogen receptor and progesterone receptor were negative and she was HER2 negative as well at the time of the original diagnosis. Under these premises, I think the patient is done with the treatment for this and I do not recommend any form of adjuvant chemotherapy, radiation treatment and obviously no hormonal therapy. There is no role for Herceptin or medicines of this nature either.     The patient was further reviewed on  03/30/2021 in regard to her breast cancer. Her right breast examination is normal. Her mammogram is up-to-date and been normal and her left mastectomy site is normal. I see nothing to suggest breast cancer recurrence. As we mentioned before the patient never required adjuvant therapy because she had triple negative disease.         The patient was further reviewed in regard to her breast issue on 07/20/2021. Her mastectomy on the left side is well healed, the right breast is unremarkable, there is no lymphedema in either extremity and the patient is doing fine from this point of view.     ·       2. From the point of view of her anemia, iron deficiency, the patient has been replaced with IV iron. As far as the patient can tell on review on 10/19/2020 she has not had any evidence of blood loss in the gastrointestinal tract. Her diet is appropriate but the only thing that she is not following through is that she is back onto diet soft drinks Pepsi for example. She is not drinking any coffee that she used to drink copious amount of it. According to her, her diet again remains appropriate. It calls my attention the hemoglobin of 11.7, her reticulated hemoglobin is normal at 35 therefore probably anemia from today is reflection of some other condition.  The patient remains anemic today and we have a multitude of testing including ferritin, iron, TIBC, B12, folic acid and reticulated hemoglobin to further analyze. This will be discussed with her on the telephone once that these reports become available. Also I have asked the lab to run peripheral blood slide for me to review given her previous history of hemolytic anemia. I will review her chemistry profile, bilirubin and along with the retic count this will give me an idea if hemolytic anemia is an issue at this point.     ·     3. The patient has very significant degree of shortness of breath upon minimal exertion. I asked her to walk. It took her almost 3 minutes to  walk 30 feet. She was very short winded. We started with an O2 saturation of 100% on room air and a heart rate of 73. Her O2 saturation dropped to 97 and heart rate never raised.     I reviewed this issue with her again today. Her cardiac auscultation is very much normal and her echocardiogram done not too long ago did not document too much of major abnormality besides pulmonary hypertension. Her lung auscultation is abnormal with decreased breath sounds bilaterally. I do not think she has effusions but I think she needs to be seen by pulmonology. I asked her to review this information with her pulmonologist as soon as possible. I still wonder why this patient is not receiving at least oxygen at nighttime given the fact that she has sleep apnea and she has very likely chronic lung disease.  She dd not know that she has pulmonary hypertension and this will be a first start in the treatment of this condition anyway.     Depending on the laboratory testing today we will decide how to proceed in regard the management of her anemia. Otherwise tentative appointment to come back and see us in 4 months with a CBC and a CMP. I have not modified any medicines that she is taking neither provided her any new medicines of any nature.     I discussed all of these facts with the patient and her  in the room.     ·

## 2021-07-23 DIAGNOSIS — D50.9 IRON DEFICIENCY ANEMIA, UNSPECIFIED IRON DEFICIENCY ANEMIA TYPE: Primary | ICD-10-CM

## 2021-07-23 LAB
FOLATE SERPL-MCNC: 10.7 NG/ML (ref 4.78–24.2)
VIT B12 BLD-MCNC: 406 PG/ML (ref 211–946)

## 2021-07-23 PROCEDURE — 82607 VITAMIN B-12: CPT | Performed by: INTERNAL MEDICINE

## 2021-07-23 PROCEDURE — 82746 ASSAY OF FOLIC ACID SERUM: CPT | Performed by: INTERNAL MEDICINE

## 2021-07-23 PROCEDURE — 36415 COLL VENOUS BLD VENIPUNCTURE: CPT | Performed by: INTERNAL MEDICINE

## 2021-07-26 ENCOUNTER — TELEPHONE (OUTPATIENT)
Dept: ONCOLOGY | Facility: CLINIC | Age: 76
End: 2021-07-26

## 2021-07-26 LAB — EPO SERPL-ACNC: 12.7 MIU/ML (ref 2.6–18.5)

## 2021-07-26 NOTE — TELEPHONE ENCOUNTER
I have called this patient on the telephone today to notify that her laboratory testing that we have done on her included normal ferritin, normal iron, normal reticulated hemoglobin and normal B12, normal folic acid. Her erythropoietin level is a little bit depressed, 12. Her creatinine is 1.8; therefore part of her anemia is anemia of chronic kidney disease. She has no need for any intervention including no need for initiation of erythropoietin therapy. She wants for me to share this information with her nephrologist. We will be glad to do this. We will see her in the next appointment. She will continue taking her multivitamin.     Finally, I also pointed out to her that I reviewed her peripheral blood examination given her previous history of hemolytic anemia, I do not see anything that suggests ongoing hemolysis. The morphology of the red cells is normal. The morphology of the white cells is normal with occasional hypersegmented poly. There is no nucleated red cells in circulation, there is no schistocytes, there is no ovalocytes and there is no spherocytes.

## 2021-08-10 ENCOUNTER — TELEPHONE (OUTPATIENT)
Dept: ONCOLOGY | Facility: CLINIC | Age: 76
End: 2021-08-10

## 2021-08-10 ENCOUNTER — TRANSCRIBE ORDERS (OUTPATIENT)
Dept: ADMINISTRATIVE | Facility: HOSPITAL | Age: 76
End: 2021-08-10

## 2021-08-10 DIAGNOSIS — Z12.31 VISIT FOR SCREENING MAMMOGRAM: Primary | ICD-10-CM

## 2021-08-10 NOTE — TELEPHONE ENCOUNTER
Caller: Blanca Johnson    Relationship to patient: Self    Best call back number: [phone]    Chief complaint: CANC./RADHA. DUE TO MAMMO. NOT RIRI. UNTIL 11/12/2021.    Type of visit: LAB/FOLLOW UP 2    Requested date: AFTER 11/12/2021    If rescheduling, when is the original appointment: 11/9/2021

## 2021-09-13 ENCOUNTER — TRANSCRIBE ORDERS (OUTPATIENT)
Dept: ADMINISTRATIVE | Facility: HOSPITAL | Age: 76
End: 2021-09-13

## 2021-09-13 ENCOUNTER — LAB (OUTPATIENT)
Dept: LAB | Facility: HOSPITAL | Age: 76
End: 2021-09-13

## 2021-09-13 DIAGNOSIS — N18.30 MALIGNANT HYPERTENSIVE HEART AND CHRONIC KIDNEY DISEASE STAGE III (HCC): Primary | ICD-10-CM

## 2021-09-13 DIAGNOSIS — I13.10 MALIGNANT HYPERTENSIVE HEART AND CHRONIC KIDNEY DISEASE STAGE III (HCC): Primary | ICD-10-CM

## 2021-09-13 DIAGNOSIS — I13.10 MALIGNANT HYPERTENSIVE HEART AND CHRONIC KIDNEY DISEASE STAGE III (HCC): ICD-10-CM

## 2021-09-13 DIAGNOSIS — N18.30 MALIGNANT HYPERTENSIVE HEART AND CHRONIC KIDNEY DISEASE STAGE III (HCC): ICD-10-CM

## 2021-09-13 LAB
ALBUMIN SERPL-MCNC: 3.8 G/DL (ref 3.5–5.2)
ALBUMIN/GLOB SERPL: 1.3 G/DL
ALP SERPL-CCNC: 112 U/L (ref 39–117)
ALT SERPL W P-5'-P-CCNC: 16 U/L (ref 1–33)
ANION GAP SERPL CALCULATED.3IONS-SCNC: 10.5 MMOL/L (ref 5–15)
AST SERPL-CCNC: 13 U/L (ref 1–32)
BASOPHILS # BLD AUTO: 0.05 10*3/MM3 (ref 0–0.2)
BASOPHILS NFR BLD AUTO: 0.5 % (ref 0–1.5)
BILIRUB SERPL-MCNC: 0.3 MG/DL (ref 0–1.2)
BILIRUB UR QL STRIP: NEGATIVE
BUN SERPL-MCNC: 35 MG/DL (ref 8–23)
BUN/CREAT SERPL: 28.5 (ref 7–25)
CALCIUM SPEC-SCNC: 8.9 MG/DL (ref 8.6–10.5)
CHLORIDE SERPL-SCNC: 101 MMOL/L (ref 98–107)
CLARITY UR: CLEAR
CO2 SERPL-SCNC: 23.5 MMOL/L (ref 22–29)
COLOR UR: YELLOW
CREAT SERPL-MCNC: 1.23 MG/DL (ref 0.57–1)
DEPRECATED RDW RBC AUTO: 41.8 FL (ref 37–54)
EOSINOPHIL # BLD AUTO: 0.11 10*3/MM3 (ref 0–0.4)
EOSINOPHIL NFR BLD AUTO: 1 % (ref 0.3–6.2)
ERYTHROCYTE [DISTWIDTH] IN BLOOD BY AUTOMATED COUNT: 12.4 % (ref 12.3–15.4)
GFR SERPL CREATININE-BSD FRML MDRD: 43 ML/MIN/1.73
GLOBULIN UR ELPH-MCNC: 3 GM/DL
GLUCOSE SERPL-MCNC: 163 MG/DL (ref 65–99)
GLUCOSE UR STRIP-MCNC: NEGATIVE MG/DL
HCT VFR BLD AUTO: 34 % (ref 34–46.6)
HGB BLD-MCNC: 10.8 G/DL (ref 12–15.9)
HGB UR QL STRIP.AUTO: NEGATIVE
IMM GRANULOCYTES # BLD AUTO: 0.1 10*3/MM3 (ref 0–0.05)
IMM GRANULOCYTES NFR BLD AUTO: 0.9 % (ref 0–0.5)
KETONES UR QL STRIP: NEGATIVE
LEUKOCYTE ESTERASE UR QL STRIP.AUTO: NEGATIVE
LYMPHOCYTES # BLD AUTO: 0.82 10*3/MM3 (ref 0.7–3.1)
LYMPHOCYTES NFR BLD AUTO: 7.7 % (ref 19.6–45.3)
MCH RBC QN AUTO: 29.4 PG (ref 26.6–33)
MCHC RBC AUTO-ENTMCNC: 31.8 G/DL (ref 31.5–35.7)
MCV RBC AUTO: 92.6 FL (ref 79–97)
MONOCYTES # BLD AUTO: 0.54 10*3/MM3 (ref 0.1–0.9)
MONOCYTES NFR BLD AUTO: 5.1 % (ref 5–12)
NEUTROPHILS NFR BLD AUTO: 84.8 % (ref 42.7–76)
NEUTROPHILS NFR BLD AUTO: 9.03 10*3/MM3 (ref 1.7–7)
NITRITE UR QL STRIP: NEGATIVE
NRBC BLD AUTO-RTO: 0 /100 WBC (ref 0–0.2)
PH UR STRIP.AUTO: 6 [PH] (ref 5–8)
PHOSPHATE SERPL-MCNC: 3.3 MG/DL (ref 2.5–4.5)
PLATELET # BLD AUTO: 226 10*3/MM3 (ref 140–450)
PMV BLD AUTO: 9.8 FL (ref 6–12)
POTASSIUM SERPL-SCNC: 4.8 MMOL/L (ref 3.5–5.2)
PROT SERPL-MCNC: 6.8 G/DL (ref 6–8.5)
PROT UR QL STRIP: ABNORMAL
PTH-INTACT SERPL-MCNC: 89.4 PG/ML (ref 15–65)
RBC # BLD AUTO: 3.67 10*6/MM3 (ref 3.77–5.28)
SODIUM SERPL-SCNC: 135 MMOL/L (ref 136–145)
SP GR UR STRIP: 1.02 (ref 1–1.03)
UROBILINOGEN UR QL STRIP: ABNORMAL
WBC # BLD AUTO: 10.65 10*3/MM3 (ref 3.4–10.8)

## 2021-09-13 PROCEDURE — 83970 ASSAY OF PARATHORMONE: CPT

## 2021-09-13 PROCEDURE — 80053 COMPREHEN METABOLIC PANEL: CPT

## 2021-09-13 PROCEDURE — 81003 URINALYSIS AUTO W/O SCOPE: CPT | Performed by: INTERNAL MEDICINE

## 2021-09-13 PROCEDURE — 85025 COMPLETE CBC W/AUTO DIFF WBC: CPT | Performed by: INTERNAL MEDICINE

## 2021-09-13 PROCEDURE — 84100 ASSAY OF PHOSPHORUS: CPT

## 2021-09-13 PROCEDURE — 36415 COLL VENOUS BLD VENIPUNCTURE: CPT

## 2021-09-21 ENCOUNTER — OFFICE VISIT (OUTPATIENT)
Dept: CARDIOLOGY | Facility: CLINIC | Age: 76
End: 2021-09-21

## 2021-09-21 VITALS
SYSTOLIC BLOOD PRESSURE: 122 MMHG | HEART RATE: 82 BPM | DIASTOLIC BLOOD PRESSURE: 60 MMHG | RESPIRATION RATE: 20 BRPM | WEIGHT: 208 LBS | BODY MASS INDEX: 40.84 KG/M2 | OXYGEN SATURATION: 97 % | HEIGHT: 60 IN

## 2021-09-21 DIAGNOSIS — Z79.4 TYPE 2 DIABETES MELLITUS WITH STAGE 3B CHRONIC KIDNEY DISEASE, WITH LONG-TERM CURRENT USE OF INSULIN (HCC): ICD-10-CM

## 2021-09-21 DIAGNOSIS — Z99.89 OSA ON CPAP: ICD-10-CM

## 2021-09-21 DIAGNOSIS — I10 ESSENTIAL HYPERTENSION: Primary | ICD-10-CM

## 2021-09-21 DIAGNOSIS — E66.01 MORBIDLY OBESE (HCC): ICD-10-CM

## 2021-09-21 DIAGNOSIS — I27.20 PULMONARY HYPERTENSION (HCC): ICD-10-CM

## 2021-09-21 DIAGNOSIS — E78.5 HYPERLIPIDEMIA, UNSPECIFIED HYPERLIPIDEMIA TYPE: ICD-10-CM

## 2021-09-21 DIAGNOSIS — N18.32 TYPE 2 DIABETES MELLITUS WITH STAGE 3B CHRONIC KIDNEY DISEASE, WITH LONG-TERM CURRENT USE OF INSULIN (HCC): ICD-10-CM

## 2021-09-21 DIAGNOSIS — E11.22 TYPE 2 DIABETES MELLITUS WITH STAGE 3B CHRONIC KIDNEY DISEASE, WITH LONG-TERM CURRENT USE OF INSULIN (HCC): ICD-10-CM

## 2021-09-21 DIAGNOSIS — G47.33 OSA ON CPAP: ICD-10-CM

## 2021-09-21 DIAGNOSIS — N18.32 STAGE 3B CHRONIC KIDNEY DISEASE (HCC): ICD-10-CM

## 2021-09-21 PROCEDURE — 99214 OFFICE O/P EST MOD 30 MIN: CPT | Performed by: INTERNAL MEDICINE

## 2021-09-21 NOTE — PROGRESS NOTES
"Chief Complaint  No chief complaint on file.    Subjective    History of Present Illness      I saw Blanca Harden today for continued cardiovascular care.  She is a very pleasant 76-year-old female with mild to moderate pulmonary hypertension and chronic bilateral lower extremity edema.  She feels that her lower extremity edema is somewhat improved.  She denies chest pain pressure tightness but states her dyspnea on exertion persists but is stable.  She does not report any significant orthopnea or PND.  She denies syncope near syncope or palpitations.  Her blood pressure is controlled.  She remains morbidly obese with a BMI of 40.62.  Her weight is stable at 208 pounds.  She has known obstructive sleep apnea and uses CPAP she utilizes compression stockings and keeps her lower extremities elevated when she can while seated.  She is followed by nephrology and pulmonary medicine for treatment of her asthma.    Objective   Vital Signs:   /60 (BP Location: Left arm, Patient Position: Sitting, Cuff Size: Large Adult)   Pulse 82   Resp 20   Ht 152.4 cm (60\")   Wt 94.3 kg (208 lb)   SpO2 97%   BMI 40.62 kg/m²     Constitutional:       Appearance: Well-developed. Morbidly obese.   Eyes:      Conjunctiva/sclera: Conjunctivae normal.      Pupils: Pupils are equal, round, and reactive to light.   HENT:      Head: Normocephalic and atraumatic.   Neck:      Thyroid: No thyromegaly.   Pulmonary:      Effort: Pulmonary effort is normal. No respiratory distress.      Breath sounds: Decreased air movement present. No wheezing. No rales.   Cardiovascular:      Normal rate. Regular rhythm. distant S1. distant S2.      S4 Gallop. No S3 gallop. Midsystolic click click.   Edema:     Peripheral edema present.  Abdominal:      General: Bowel sounds are normal. There is no distension.      Palpations: Abdomen is soft. There is no abdominal mass.      Tenderness: There is no abdominal tenderness.   Musculoskeletal: Normal " range of motion.      Cervical back: Neck supple. Skin:     General: Skin is warm and dry.      Findings: No erythema.   Neurological:      Mental Status: Alert and oriented to person, place, and time.         Result Review :     Common labs    Common Labsle 3/30/21 3/30/21 7/20/21 7/20/21 9/13/21 9/13/21    1443 1509 1426 1426 1057 1057   Glucose 104   194 (A)  163 (A)   BUN 38 (A)   42 (A)  35 (A)   Creatinine 1.59 (A)   1.35 (A)  1.23 (A)   eGFR Non  Am 32 (A)   38 (A)  43 (A)   Sodium 137   137  135 (A)   Potassium 5.4 (A)   4.5  4.8   Chloride 102   102  101   Calcium 9.1   9.0  8.9   Albumin 3.70   3.60  3.80   Total Bilirubin 0.3   0.3  0.3   Alkaline Phosphatase 113   108  112   AST (SGOT) 15   12  13   ALT (SGPT) 12   14  16   WBC  13.49 (A) 11.39 (A)  10.65    Hemoglobin  11.9 (A) 10.8 (A)  10.8 (A)    Hematocrit  38.3 35.4  34.0    Platelets  226 217  226    (A) Abnormal value       Comments are available for some flowsheets but are not being displayed.                     Assessment and Plan    1. Essential hypertension  Stable    2. Hyperlipidemia, unspecified hyperlipidemia type  Stable    3. Type 2 diabetes mellitus with stage 3b chronic kidney disease, with long-term current use of insulin (CMS/HCC)  Weight reduction    4. ANAYELI on CPAP  Weight reduction    5. Stage 3b chronic kidney disease (CMS/HCC)      6. Pulmonary hypertension (CMS/HCC)  Persistent but stable    7. Morbidly obese (CMS/HCC)  Encourage weight reduction    Blanca overall is stable.  She is to follow-up with her nephrologist this week.  She apparently has been found to have an elevated parathyroid level.  I have encouraged her to restrict her salt intake is close to 2000 mg a day as possible and maintain her fluid intake no more than 64 ounces daily.  Plan to see him in follow-up in 6 months sooner if needed.        Follow Up   No follow-ups on file.  Patient was given instructions and counseling regarding her condition or for  health maintenance advice. Please see specific information pulled into the AVS if appropriate.

## 2021-10-15 ENCOUNTER — HOSPITAL ENCOUNTER (OUTPATIENT)
Dept: GENERAL RADIOLOGY | Facility: HOSPITAL | Age: 76
Discharge: HOME | End: 2021-10-15
Admitting: INTERNAL MEDICINE

## 2021-10-15 DIAGNOSIS — M25.552 PAIN IN LEFT HIP: ICD-10-CM

## 2021-10-15 PROCEDURE — 73502 X-RAY EXAM HIP UNI 2-3 VIEWS: CPT

## 2021-10-27 RX ORDER — DOXAZOSIN MESYLATE 1 MG/1
TABLET ORAL
Qty: 30 TABLET | Refills: 5 | Status: SHIPPED | OUTPATIENT
Start: 2021-10-27 | End: 2022-04-26

## 2021-10-27 NOTE — TELEPHONE ENCOUNTER
Patient needs refill of:   - Doxazosin 1 MG   Please send to [facility] pharmacy on file     ThanksAbhinav

## 2021-11-12 ENCOUNTER — HOSPITAL ENCOUNTER (OUTPATIENT)
Dept: MAMMOGRAPHY | Facility: HOSPITAL | Age: 76
Discharge: HOME | End: 2021-11-12
Admitting: INTERNAL MEDICINE

## 2021-11-12 DIAGNOSIS — Z12.31 VISIT FOR SCREENING MAMMOGRAM: ICD-10-CM

## 2021-11-12 PROCEDURE — 77063 BREAST TOMOSYNTHESIS BI: CPT

## 2021-11-12 PROCEDURE — 77067 SCR MAMMO BI INCL CAD: CPT

## 2021-11-16 ENCOUNTER — LAB (OUTPATIENT)
Dept: LAB | Facility: HOSPITAL | Age: 76
End: 2021-11-16

## 2021-11-16 ENCOUNTER — OFFICE VISIT (OUTPATIENT)
Dept: ONCOLOGY | Facility: CLINIC | Age: 76
End: 2021-11-16

## 2021-11-16 VITALS
HEIGHT: 60 IN | WEIGHT: 207.8 LBS | HEART RATE: 87 BPM | BODY MASS INDEX: 40.8 KG/M2 | RESPIRATION RATE: 16 BRPM | DIASTOLIC BLOOD PRESSURE: 72 MMHG | TEMPERATURE: 96.3 F | SYSTOLIC BLOOD PRESSURE: 135 MMHG | OXYGEN SATURATION: 94 %

## 2021-11-16 DIAGNOSIS — D05.12 BREAST NEOPLASM, TIS (DCIS), LEFT: ICD-10-CM

## 2021-11-16 DIAGNOSIS — D50.0 IRON DEFICIENCY ANEMIA DUE TO CHRONIC BLOOD LOSS: ICD-10-CM

## 2021-11-16 DIAGNOSIS — C50.919 PRIMARY MALIGNANT NEOPLASM OF FEMALE BREAST (HCC): ICD-10-CM

## 2021-11-16 DIAGNOSIS — D05.12 BREAST NEOPLASM, TIS (DCIS), LEFT: Primary | ICD-10-CM

## 2021-11-16 LAB
ALBUMIN SERPL-MCNC: 3.6 G/DL (ref 3.5–5.2)
ALBUMIN/GLOB SERPL: 1.2 G/DL (ref 1.1–2.4)
ALP SERPL-CCNC: 99 U/L (ref 38–116)
ALT SERPL W P-5'-P-CCNC: 10 U/L (ref 0–33)
ANION GAP SERPL CALCULATED.3IONS-SCNC: 11.8 MMOL/L (ref 5–15)
AST SERPL-CCNC: 11 U/L (ref 0–32)
BASOPHILS # BLD AUTO: 0.04 10*3/MM3 (ref 0–0.2)
BASOPHILS NFR BLD AUTO: 0.4 % (ref 0–1.5)
BILIRUB SERPL-MCNC: 0.3 MG/DL (ref 0.2–1.2)
BUN SERPL-MCNC: 33 MG/DL (ref 6–20)
BUN/CREAT SERPL: 23.2 (ref 7.3–30)
CALCIUM SPEC-SCNC: 9 MG/DL (ref 8.5–10.2)
CHLORIDE SERPL-SCNC: 104 MMOL/L (ref 98–107)
CO2 SERPL-SCNC: 25.2 MMOL/L (ref 22–29)
CREAT SERPL-MCNC: 1.42 MG/DL (ref 0.6–1.1)
DEPRECATED RDW RBC AUTO: 44.8 FL (ref 37–54)
EOSINOPHIL # BLD AUTO: 0.11 10*3/MM3 (ref 0–0.4)
EOSINOPHIL NFR BLD AUTO: 1.1 % (ref 0.3–6.2)
ERYTHROCYTE [DISTWIDTH] IN BLOOD BY AUTOMATED COUNT: 12.8 % (ref 12.3–15.4)
FERRITIN SERPL-MCNC: 210.2 NG/ML (ref 13–150)
GFR SERPL CREATININE-BSD FRML MDRD: 36 ML/MIN/1.73
GLOBULIN UR ELPH-MCNC: 3 GM/DL (ref 1.8–3.5)
GLUCOSE SERPL-MCNC: 211 MG/DL (ref 74–124)
HCT VFR BLD AUTO: 35.9 % (ref 34–46.6)
HGB BLD-MCNC: 10.9 G/DL (ref 12–15.9)
HGB RETIC QN AUTO: 32.7 PG (ref 29.8–36.1)
IMM GRANULOCYTES # BLD AUTO: 0.09 10*3/MM3 (ref 0–0.05)
IMM GRANULOCYTES NFR BLD AUTO: 0.9 % (ref 0–0.5)
IMM RETICS NFR: 9.3 % (ref 3–15.8)
IRON 24H UR-MRATE: 46 MCG/DL (ref 37–145)
IRON SATN MFR SERPL: 16 % (ref 14–48)
LYMPHOCYTES # BLD AUTO: 0.73 10*3/MM3 (ref 0.7–3.1)
LYMPHOCYTES NFR BLD AUTO: 7 % (ref 19.6–45.3)
MCH RBC QN AUTO: 29.1 PG (ref 26.6–33)
MCHC RBC AUTO-ENTMCNC: 30.4 G/DL (ref 31.5–35.7)
MCV RBC AUTO: 95.7 FL (ref 79–97)
MONOCYTES # BLD AUTO: 0.48 10*3/MM3 (ref 0.1–0.9)
MONOCYTES NFR BLD AUTO: 4.6 % (ref 5–12)
NEUTROPHILS NFR BLD AUTO: 8.97 10*3/MM3 (ref 1.7–7)
NEUTROPHILS NFR BLD AUTO: 86 % (ref 42.7–76)
NRBC BLD AUTO-RTO: 0 /100 WBC (ref 0–0.2)
PLATELET # BLD AUTO: 236 10*3/MM3 (ref 140–450)
PMV BLD AUTO: 9.5 FL (ref 6–12)
POTASSIUM SERPL-SCNC: 4.5 MMOL/L (ref 3.5–4.7)
PROT SERPL-MCNC: 6.6 G/DL (ref 6.3–8)
RBC # BLD AUTO: 3.75 10*6/MM3 (ref 3.77–5.28)
RETICS # AUTO: 0.05 10*6/MM3 (ref 0.02–0.13)
RETICS/RBC NFR AUTO: 1.25 % (ref 0.7–1.9)
SODIUM SERPL-SCNC: 141 MMOL/L (ref 134–145)
TIBC SERPL-MCNC: 288 MCG/DL (ref 249–505)
TRANSFERRIN SERPL-MCNC: 206 MG/DL (ref 200–360)
WBC # BLD AUTO: 10.42 10*3/MM3 (ref 3.4–10.8)

## 2021-11-16 PROCEDURE — 99214 OFFICE O/P EST MOD 30 MIN: CPT | Performed by: INTERNAL MEDICINE

## 2021-11-16 PROCEDURE — 82728 ASSAY OF FERRITIN: CPT

## 2021-11-16 PROCEDURE — 85025 COMPLETE CBC W/AUTO DIFF WBC: CPT

## 2021-11-16 PROCEDURE — 84466 ASSAY OF TRANSFERRIN: CPT

## 2021-11-16 PROCEDURE — 83540 ASSAY OF IRON: CPT

## 2021-11-16 PROCEDURE — 85046 RETICYTE/HGB CONCENTRATE: CPT

## 2021-11-16 PROCEDURE — 36415 COLL VENOUS BLD VENIPUNCTURE: CPT

## 2021-11-16 PROCEDURE — 80053 COMPREHEN METABOLIC PANEL: CPT

## 2021-11-16 RX ORDER — UREA 200 MG/G
GEL TOPICAL
COMMUNITY
End: 2022-07-05

## 2021-11-16 RX ORDER — OMEPRAZOLE 20 MG/1
40 CAPSULE, DELAYED RELEASE ORAL
COMMUNITY
Start: 2021-08-23

## 2021-11-16 RX ORDER — POTASSIUM CHLORIDE 20 MEQ/1
TABLET, EXTENDED RELEASE ORAL DAILY
COMMUNITY
End: 2022-07-05

## 2021-11-16 RX ORDER — LEFLUNOMIDE 10 MG/1
TABLET ORAL
COMMUNITY
End: 2022-07-20 | Stop reason: SDDI

## 2021-11-16 RX ORDER — TORSEMIDE 20 MG/1
TABLET ORAL
COMMUNITY
Start: 2021-10-11

## 2021-11-16 NOTE — PROGRESS NOTES
Subjective     REASON FOR FOLLOW UP:  ANEMIA, FATIGUE, FREQUENT FALLS, AFRAID OF WALKING, ABDOMINAL PAIN AND CONTINUO'S  TURMOIL IN BOWEL FUNCTION AND DIGESTION,DCIS OF THE LEFT BREAST SP MASTECTOMY      History of Present Illness DURING THE VISIT WITH THE PATIENT TODAY , PATIENT HAD FACE MASK, I HAD PROPER PROTECTIVE EQUIPMENT, AND I DID HAND HYGIENE WITH SOAP AND WATER BEFORE AND AFTER THE VISIT.      This patient returns today to the office for follow up of the above diagnosis. She is here stating that she feels still fatigued and tired and having shortness of breath upon exertion. She has been seen by Dmitry Lang MD, pulmonary function is slowly declining. She is not having any issues in regard to her sleep apnea machine. It is functioning fine. She has minimal if any cough, no shortness of breath, no chest pain, no palpitation. Minimal swelling in her legs. Her  states that she continues eating too much bread and the portions of food that she gets are too large. Her weight is not changing 207 pounds. She denies any issues in the left chest wall and she has had a recent right sided mammogram. Her kidney function remains marginal as well and she remains to be seen by nephrologist. She remains on prednisone for her temporal arteritis by dermatologist.                       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 Daniel Gutierrez, [facility]   •  SECTION N/A     x3   • CHOLECYSTECTOMY N/A    • COLONOSCOPY     • COLONOSCOPY W/ POLYPECTOMY N/A 2019    Enlarged folds in the antrum: biopsied; duodenal, transverse colon x2, splenic flexure, descending colon and sigmoid colon polyps: biopsied (path: tubular adenoma x6)-Dr. Zak De Jesus, [facility]   • ENDOSCOPY  10/11/2019    Procedure: ESOPHAGOGASTRODUODENOSCOPY with hot snare polypectomy;  Surgeon: Haile Handley MD;  Location: [facility] ENDOSCOPY;  Service: Gastroenterology   • ENDOSCOPY N/A 2020    Procedure: ESOPHAGOGASTRODUODENOSCOPY;  Surgeon: Haile Handley MD;  Location: [facility] ENDOSCOPY;  Service: Gastroenterology   • ENDOSCOPY N/A 2020    Procedure: ESOPHAGOGASTRODUODENOSCOPY WITH BIOPSIES AND COLD BIOPSY POLYPECTOMY;  Surgeon: Haile Handley MD;  Location: [facility] ENDOSCOPY;  Service: Gastroenterology;  Laterality: N/A;  pre: dyspepsia and diarrhea  post: duodenal polyp and gastritis   • MASTECTOMY Left    • MASTECTOMY WITH SENTINEL NODE BIOPSY AND AXILLARY NODE DISSECTION Left 7/3/2019    Procedure: LEFT BREAST MASTECTOMY WITH SENTINEL NODE BIOPSY AND , v-y plasty closure;  Surgeon: Tahmina James MD;  Location: [facility] MAIN OR;  Service: General   • SUBTOTAL HYSTERECTOMY Bilateral     ovaries still in tact   • TEMPORAL ARTERY BIOPSY Bilateral 2019        Current Outpatient Medications on File Prior to Visit   Medication Sig Dispense Refill   • albuterol sulfate HFA (PROAIR HFA) 108 (90 Base) MCG/ACT inhaler Every 6 (Six) Hours.     • atorvastatin (LIPITOR) 20 MG tablet Take 20 mg by mouth Every Other Day.     • bisoprolol (ZEBeta) 10 MG tablet Take 2 tablets by mouth once daily (Patient taking differently: Take 10 mg by mouth 2 (Two) Times a Day.) 60 tablet 0   • budesonide-formoterol (SYMBICORT) 160-4.5 MCG/ACT inhaler  Inhale 2 puffs 2 (Two) Times a Day.     • bumetanide (BUMEX) 1 MG tablet Take 1 mg by mouth Every Other Day.     • doxazosin (CARDURA) 1 MG tablet TAKE 1 TABLET BY MOUTH ONCE DAILY AT NIGHT 30 tablet 5   • Insulin Glargine (TOUJEO SOLOSTAR SC) Inject 40 Units under the skin into the appropriate area as directed Every Night. INSTRUCTED PT TO FOLLOW MD INSTRUCTIONS REGARDING DOSING BEFORE SURGERY     • Insulin Lispro (HUMALOG PEN SC) Inject  under the skin into the appropriate area as directed 3 (Three) Times a Day With Meals. 25 units at breakfast, 20 units at lunch, 22 units at dinner/INSTRUCTED PT TO FOLLOW MD INSTRUCTIONS REGARDING DOSING BEFORE SURGERY     • leflunomide (ARAVA) 10 MG tablet leflunomide 10 mg tablet   TAKE 1 TABLET BY MOUTH ONCE DAILY     • NON FORMULARY Apply 1 each topically to the appropriate area as directed Daily. Cream to foot rash, unsure of name     • omeprazole (priLOSEC) 20 MG capsule      • potassium chloride (K-DUR,KLOR-CON) 20 MEQ CR tablet Daily.     • predniSONE (DELTASONE) 2.5 MG tablet Take 2.5 mg by mouth Daily.     • predniSONE (DELTASONE) 5 MG tablet prednisone 5 mg tablet     • torsemide (DEMADEX) 20 MG tablet      • urea (CARMOL) 20 % cream urea 20 % topical cream   APPLY 1 GRAM ON THE SKIN DAILY.     • valsartan-hydrochlorothiazide (DIOVAN-HCT) 320-25 MG per tablet Take 1 tablet by mouth Daily. 30 tablet 11     No current facility-administered medications on file prior to visit.        ALLERGIES:    Allergies   Allergen Reactions   • Aspirin Nausea Only     Low tolerance, abdominal pain   • Sulfa Antibiotics Unknown (See Comments)     Happened as a child        Social History     Socioeconomic History   • Marital status:      Spouse name: Ashwin   • Number of children: 3   • Years of education: College   Tobacco Use   • Smoking status: Never Smoker   • Smokeless tobacco: Never Used   • Tobacco comment: caffine use   Substance and Sexual Activity   • Alcohol use: No  "    Comment: one or two small drinks a year   • Drug use: No   • Sexual activity: Defer     Comment: Spouse, Ashwin        Family History   Problem Relation Age of Onset   • Hypertension Father    • Stomach cancer Father    • Diabetes Father    • Esophageal cancer Father    • Throat cancer Sister    • Diabetes Paternal Grandmother    • Hypertension Paternal Grandfather    • Colon cancer Paternal Grandfather    • Heart disease Mother    • Colon cancer Paternal Uncle    • Colon cancer Paternal Uncle    • Colon cancer Paternal Uncle    • Ovarian cancer Cousin    • Stomach cancer Cousin    • Malig Hyperthermia Neg Hx           Objective     Vitals:    11/16/21 1354   BP: 135/72   Pulse: 87   Resp: 16   Temp: 96.3 °F (35.7 °C)   TempSrc: Temporal   SpO2: 94%   Weight: 94.3 kg (207 lb 12.8 oz)   Height: 152.4 cm (60\")   PainSc: 0-No pain     Current Status 11/16/2021   ECOG score 1       Physical Exam      I HAVE PERSONALLY REVIEWED THE HISTORY OF THE PRESENT ILLNESS, PAST MEDICAL HISTORY, FAMILY HISTORY, SOCIAL HISTORY, ALLERGIES, MEDICATIONS STATED ABOVE IN THE  NOTE FROM TODAY.        GENERAL:  Well-developed, well-nourished  Patient  in no acute distress.   SKIN:  Warm, dry ,NO rashes,NO purpura ,NO petechiae.  HEENT:  Pupils were equal and reactive to light and accomodation, conjunctivae noninjected, no pterygium, normal extraocular movements, normal visual acuity.   NECK:  Supple with good range of motion; no thyromegaly , no other masses, no JVD or bruits, no cervical adenopathies.No carotid artery pain, no carotid abnormal pulsation , NO arterial dance.  LYMPHATICS:  No cervical, NO supraclavicular, NO axillary,NO epitrochlear , NO inguinal adenopathy.  CARDIAC   normal rate and regular rhythm, without murmur,NO rubs NO S3 NO S4 right or left .  LUNGS: normal breath sounds bilateral, no wheezing, rhonchi, crackles or rubs.LEFT CHEST WALL NO MASSES OR NODULARITY NO AXILLARY ADENOPATHIES, LEFT MASTECTOMY  VASCULAR " VENOUS: no cyanosis, collateral circulation, varicosities, 1+ BOTH LE edema, NO palpable cords, pain, erythema.  ABDOMEN:  Soft, nontender with no hepatomegaly, no splenomegaly,no masses, no ascites, no collateral circulation,no distention,no Elio sign.  EXTREMITIES  AND SPINE:  No clubbing, cyanosis or edema, no deformities , no pain .No kyphosis, scoliosis, no other deformities, no pain in spine, no pain in ribs , no pain inpelvic bone.  NEUROLOGICAL:  Patient was awake, alert, oriented to time, person and place.                        RECENT LABS:  Hematology WBC   Date Value Ref Range Status   11/16/2021 10.42 3.40 - 10.80 10*3/mm3 Final     RBC   Date Value Ref Range Status   11/16/2021 3.75 (L) 3.77 - 5.28 10*6/mm3 Final     Hemoglobin   Date Value Ref Range Status   11/16/2021 10.9 (L) 12.0 - 15.9 g/dL Final     Hematocrit   Date Value Ref Range Status   11/16/2021 35.9 34.0 - 46.6 % Final     Platelets   Date Value Ref Range Status   11/16/2021 236 140 - 450 10*3/mm3 Final          Narrative & Impression   Examination: Unilateral right digital mammography with R2 computer  detection and digital Tomosynthesis     HISTORY: 76-year-old asymptomatic female status post prior left  mastectomy for breast cancer     FINDINGS: Unilateral right digital CC and MLO mammographic and  tomosynthesis images were obtained. Comparison is made to prior  mammography dated 9/3/2020 and 2/28/2019. Scattered fibroglandular  densities are seen throughout the right breast in a pattern that is not  appreciably changed. I see no new or dominant masses or suspicious  calcifications. There is no evidence for architectural distortion or  deformity.     IMPRESSION:  There are no findings suspicious for malignancy in the right  breast. Routine follow-up mammography is recommended.     BI-RADS Category 1: Negative.     This report was finalized on 11/12/2021 3:20 PM by Dr. Daniel Gutierrez M.D.                Assessment/Plan      1. This  patient has undergone a left-sided mastectomy for DCIS of the left breast. The patient had a large tumor that was high grade with negative margins of resection, minimal microinvasion and negative sentinel lymph nodes. The patient’s estrogen receptor and progesterone receptor were negative and she was HER2 negative as well at the time of the original diagnosis. Under these premises, I think the patient is done with the treatment for this and I do not recommend any form of adjuvant chemotherapy, radiation treatment and obviously no hormonal therapy. There is no role for Herceptin or medicines of this nature either.     The patient was further reviewed on 03/30/2021 in regard to her breast cancer. Her right breast examination is normal. Her mammogram is up-to-date and been normal and her left mastectomy site is normal. I see nothing to suggest breast cancer recurrence. As we mentioned before the patient never required adjuvant therapy because she had triple negative disease.         The patient was further reviewed in regard to her breast issue on 07/20/2021. Her mastectomy on the left side is well healed, the right breast is unremarkable, there is no lymphedema in either extremity and the patient is doing fine from this point of view.   The patient is reviewed on 11/16/2021 in regard to her left side mastectomy for DCIS of the left breast. So far the mastectomy site is unremarkable, surgical site is well healed. There is no lymphedema in the left upper extremity, the left axilla is normal, right breast is normal. Right breast mammogram recently performed as stated above disclosed no abnormalities. This will be watched in absence of any other intervention.       ·       2. From the point of view of her anemia, iron deficiency, the patient has been replaced with IV iron. As far as the patient can tell on review on 10/19/2020 she has not had any evidence of blood loss in the gastrointestinal tract. Her diet is  appropriate but the only thing that she is not following through is that she is back onto diet soft drinks Pepsi for example. She is not drinking any coffee that she used to drink copious amount of it. According to her, her diet again remains appropriate. It calls my attention the hemoglobin of 11.7, her reticulated hemoglobin is normal at 35 therefore probably anemia from today is reflection of some other condition.  The patient remains anemic today and we have a multitude of testing including ferritin, iron, TIBC, B12, folic acid and reticulated hemoglobin to further analyze. This will be discussed with her on the telephone once that these reports become available. Also I have asked the lab to run peripheral blood slide for me to review given her previous history of hemolytic anemia. I will review her chemistry profile, bilirubin and along with the retic count this will give me an idea if hemolytic anemia is an issue at this point.   On 11/16/2021 the patient remains mildly anemic with a hemoglobin of 10.9. Six months ago we did ferritin, iron, TIBC, B12, folic acid levels all of them normal. Her erythropoietin level was low consistent with anemia of chronic kidney disease. She has not undergone any erythropoietin therapy at this point.       ·     3. The patient has very significant degree of shortness of breath upon minimal exertion. I asked her to walk. It took her almost 3 minutes to walk 30 feet. She was very short winded. We started with an O2 saturation of 100% on room air and a heart rate of 73. Her O2 saturation dropped to 97 and heart rate never raised.     I reviewed this issue with her again today. Her cardiac auscultation is very much normal and her echocardiogram done not too long ago did not document too much of major abnormality besides pulmonary hypertension. Her lung auscultation is abnormal with decreased breath sounds bilaterally. I do not think she has effusions but I think she needs to be  seen by pulmonology. I asked her to review this information with her pulmonologist as soon as possible. I still wonder why this patient is not receiving at least oxygen at nighttime given the fact that she has sleep apnea and she has very likely chronic lung disease.  She dd not know that she has pulmonary hypertension and this will be a first start in the treatment of this condition anyway.   On 11/16/2021 the cardiac auscultation remains unremarkable, no obvious gallops. Pulmonary auscultation shows decrease of breath sounds bilaterally. We advised her to do exercises that maybe in the long run could have impact on her and I again advised her that it will be tremendously consequential to her to lose 20-30 pounds of weight. This will allow less oxygen consumption by fat and more oxygen consumption by her normal weight, normal other parts including muscles, heart, kidneys and so forth. In the long run this could have positive consequences on her.     I will review her back in 6 months with a CBC and CMP at that time.     I find no reason for this patient to proceed with erythropoietin therapy with a hemoglobin of 10.9.            I discussed all of these facts with the patient and her  in the room.     ·

## 2022-02-14 ENCOUNTER — LAB (OUTPATIENT)
Dept: LAB | Facility: HOSPITAL | Age: 77
End: 2022-02-14

## 2022-02-14 ENCOUNTER — TRANSCRIBE ORDERS (OUTPATIENT)
Dept: ADMINISTRATIVE | Facility: HOSPITAL | Age: 77
End: 2022-02-14

## 2022-02-14 DIAGNOSIS — R20.2 TINGLING: Primary | ICD-10-CM

## 2022-02-14 DIAGNOSIS — M31.5 GIANT CELL ARTERITIS WITH POLYMYALGIA RHEUMATICA: ICD-10-CM

## 2022-02-14 DIAGNOSIS — E11.65 TYPE II DIABETES MELLITUS WITH HYPEROSMOLARITY, UNCONTROLLED: ICD-10-CM

## 2022-02-14 DIAGNOSIS — E11.00 TYPE II DIABETES MELLITUS WITH HYPEROSMOLARITY, UNCONTROLLED: ICD-10-CM

## 2022-02-14 DIAGNOSIS — N18.30 STAGE 3 CHRONIC KIDNEY DISEASE, UNSPECIFIED WHETHER STAGE 3A OR 3B CKD: Primary | ICD-10-CM

## 2022-02-14 DIAGNOSIS — D64.9 ANEMIA, UNSPECIFIED TYPE: ICD-10-CM

## 2022-02-14 DIAGNOSIS — N18.30 STAGE 3 CHRONIC KIDNEY DISEASE, UNSPECIFIED WHETHER STAGE 3A OR 3B CKD: ICD-10-CM

## 2022-02-14 DIAGNOSIS — Z79.52 LONG TERM CURRENT USE OF SYSTEMIC STEROIDS: ICD-10-CM

## 2022-02-14 DIAGNOSIS — M31.5 GIANT CELL ARTERITIS WITH POLYMYALGIA RHEUMATICA: Primary | ICD-10-CM

## 2022-02-14 DIAGNOSIS — R20.2 TINGLING: ICD-10-CM

## 2022-02-14 LAB
25(OH)D3 SERPL-MCNC: 16.4 NG/ML (ref 30–100)
ALBUMIN SERPL-MCNC: 4 G/DL (ref 3.5–5.2)
ALBUMIN/GLOB SERPL: 1.4 G/DL
ALP SERPL-CCNC: 110 U/L (ref 39–117)
ALT SERPL W P-5'-P-CCNC: 10 U/L (ref 1–33)
ANION GAP SERPL CALCULATED.3IONS-SCNC: 9 MMOL/L (ref 5–15)
AST SERPL-CCNC: 12 U/L (ref 1–32)
BACTERIA UR QL AUTO: ABNORMAL /HPF
BASOPHILS # BLD AUTO: 0.03 10*3/MM3 (ref 0–0.2)
BASOPHILS NFR BLD AUTO: 0.3 % (ref 0–1.5)
BILIRUB SERPL-MCNC: 0.5 MG/DL (ref 0–1.2)
BILIRUB UR QL STRIP: NEGATIVE
BUN SERPL-MCNC: 24 MG/DL (ref 8–23)
BUN/CREAT SERPL: 21.1 (ref 7–25)
CALCIUM SPEC-SCNC: 8.8 MG/DL (ref 8.6–10.5)
CHLORIDE SERPL-SCNC: 102 MMOL/L (ref 98–107)
CLARITY UR: CLEAR
CO2 SERPL-SCNC: 25 MMOL/L (ref 22–29)
COLOR UR: YELLOW
CREAT SERPL-MCNC: 1.14 MG/DL (ref 0.57–1)
CREAT UR-MCNC: 112 MG/DL
DEPRECATED RDW RBC AUTO: 42.7 FL (ref 37–54)
EOSINOPHIL # BLD AUTO: 0.08 10*3/MM3 (ref 0–0.4)
EOSINOPHIL NFR BLD AUTO: 0.9 % (ref 0.3–6.2)
ERYTHROCYTE [DISTWIDTH] IN BLOOD BY AUTOMATED COUNT: 12.3 % (ref 12.3–15.4)
ERYTHROCYTE [SEDIMENTATION RATE] IN BLOOD: 32 MM/HR (ref 0–30)
FERRITIN SERPL-MCNC: 167 NG/ML (ref 13–150)
FOLATE SERPL-MCNC: 14.8 NG/ML (ref 4.78–24.2)
GFR SERPL CREATININE-BSD FRML MDRD: 46 ML/MIN/1.73
GLOBULIN UR ELPH-MCNC: 2.9 GM/DL
GLUCOSE SERPL-MCNC: 217 MG/DL (ref 65–99)
GLUCOSE UR STRIP-MCNC: NEGATIVE MG/DL
HBA1C MFR BLD: 7.8 % (ref 4.8–5.6)
HCT VFR BLD AUTO: 35.7 % (ref 34–46.6)
HGB BLD-MCNC: 11.1 G/DL (ref 12–15.9)
HGB UR QL STRIP.AUTO: NEGATIVE
HYALINE CASTS UR QL AUTO: ABNORMAL /LPF
IMM GRANULOCYTES # BLD AUTO: 0.08 10*3/MM3 (ref 0–0.05)
IMM GRANULOCYTES NFR BLD AUTO: 0.9 % (ref 0–0.5)
IRON 24H UR-MRATE: 47 MCG/DL (ref 37–145)
IRON SATN MFR SERPL: 14 % (ref 20–50)
KETONES UR QL STRIP: NEGATIVE
LEUKOCYTE ESTERASE UR QL STRIP.AUTO: NEGATIVE
LYMPHOCYTES # BLD AUTO: 0.59 10*3/MM3 (ref 0.7–3.1)
LYMPHOCYTES NFR BLD AUTO: 6.4 % (ref 19.6–45.3)
MCH RBC QN AUTO: 29.3 PG (ref 26.6–33)
MCHC RBC AUTO-ENTMCNC: 31.1 G/DL (ref 31.5–35.7)
MCV RBC AUTO: 94.2 FL (ref 79–97)
MONOCYTES # BLD AUTO: 0.45 10*3/MM3 (ref 0.1–0.9)
MONOCYTES NFR BLD AUTO: 4.9 % (ref 5–12)
NEUTROPHILS NFR BLD AUTO: 7.99 10*3/MM3 (ref 1.7–7)
NEUTROPHILS NFR BLD AUTO: 86.6 % (ref 42.7–76)
NITRITE UR QL STRIP: NEGATIVE
NRBC BLD AUTO-RTO: 0 /100 WBC (ref 0–0.2)
PH UR STRIP.AUTO: <=5 [PH] (ref 5–8)
PLATELET # BLD AUTO: 222 10*3/MM3 (ref 140–450)
PMV BLD AUTO: 10.3 FL (ref 6–12)
POTASSIUM SERPL-SCNC: 4.7 MMOL/L (ref 3.5–5.2)
PROT ?TM UR-MCNC: 39.7 MG/DL
PROT SERPL-MCNC: 6.9 G/DL (ref 6–8.5)
PROT UR QL STRIP: ABNORMAL
PROT/CREAT UR: 354.5 MG/G CREA (ref 0–200)
PTH-INTACT SERPL-MCNC: 54.5 PG/ML (ref 15–65)
RBC # BLD AUTO: 3.79 10*6/MM3 (ref 3.77–5.28)
RBC # UR STRIP: ABNORMAL /HPF
REF LAB TEST METHOD: ABNORMAL
SODIUM SERPL-SCNC: 136 MMOL/L (ref 136–145)
SP GR UR STRIP: 1.02 (ref 1–1.03)
SQUAMOUS #/AREA URNS HPF: ABNORMAL /HPF
TIBC SERPL-MCNC: 346 MCG/DL (ref 298–536)
TRANSFERRIN SERPL-MCNC: 232 MG/DL (ref 200–360)
UROBILINOGEN UR QL STRIP: ABNORMAL
VIT B12 BLD-MCNC: 431 PG/ML (ref 211–946)
WBC # UR STRIP: ABNORMAL /HPF
WBC NRBC COR # BLD: 9.22 10*3/MM3 (ref 3.4–10.8)

## 2022-02-14 PROCEDURE — 81001 URINALYSIS AUTO W/SCOPE: CPT

## 2022-02-14 PROCEDURE — 82728 ASSAY OF FERRITIN: CPT

## 2022-02-14 PROCEDURE — 82570 ASSAY OF URINE CREATININE: CPT

## 2022-02-14 PROCEDURE — 84156 ASSAY OF PROTEIN URINE: CPT

## 2022-02-14 PROCEDURE — 84466 ASSAY OF TRANSFERRIN: CPT

## 2022-02-14 PROCEDURE — 83036 HEMOGLOBIN GLYCOSYLATED A1C: CPT

## 2022-02-14 PROCEDURE — 83970 ASSAY OF PARATHORMONE: CPT

## 2022-02-14 PROCEDURE — 83540 ASSAY OF IRON: CPT

## 2022-02-14 PROCEDURE — 85025 COMPLETE CBC W/AUTO DIFF WBC: CPT

## 2022-02-14 PROCEDURE — 80053 COMPREHEN METABOLIC PANEL: CPT

## 2022-02-14 PROCEDURE — 36415 COLL VENOUS BLD VENIPUNCTURE: CPT

## 2022-02-14 PROCEDURE — 82306 VITAMIN D 25 HYDROXY: CPT

## 2022-02-14 PROCEDURE — 85652 RBC SED RATE AUTOMATED: CPT

## 2022-02-14 PROCEDURE — 82607 VITAMIN B-12: CPT

## 2022-02-14 PROCEDURE — 82746 ASSAY OF FOLIC ACID SERUM: CPT

## 2022-03-18 NOTE — PROGRESS NOTES
"Chief Complaint  No chief complaint on file.    Subjective    History of Present Illness      I saw Blanca Harden today for continued cardiovascular care.  She is a very sweet 76-year-old female who uses a walker for ambulation.  She is now on supplemental oxygen at 2 L by nasal cannula during sleep with CPAP and with increased activity.  She has had some chest discomfort which is in the low parasternal region and reproduced with palpation most consistent with musculoskeletal etiology.  She does not report any specific exertional chest pain pressure or tightness.  Her blood pressure is well controlled.  Her lipids are monitored by her primary care physician, Dr. Praneeth Valenzuela.  She remains morbidly obese with a BMI of 40.82.Previous echocardiogram obtained 6/9/2021 revealed preserved left ventricular contractility, right ventricular systolic pressure mildly elevated at 35 to 45 mmHg, grade 1 diastolic dysfunction no significant valvular abnormality.  Stress test with PET imaging revealed preserved left ventricular function no evidence of ischemia, low risk study on 7/2/2020.    Objective   Vital Signs:   /68   Pulse 80   Ht 152.4 cm (60\")   Wt 94.8 kg (209 lb)   BMI 40.82 kg/m²     Constitutional:       Appearance: Well-developed. Morbidly obese.   Eyes:      Conjunctiva/sclera: Conjunctivae normal.      Pupils: Pupils are equal, round, and reactive to light.   HENT:      Head: Normocephalic and atraumatic.   Neck:      Thyroid: No thyromegaly.   Pulmonary:      Effort: Pulmonary effort is normal. No respiratory distress.      Breath sounds: Normal breath sounds. No wheezing. No rales.   Cardiovascular:      Normal rate. Regular rhythm. distant S1. distant S2.      S4 Gallop. No S3 gallop. No rub.   Edema:     Peripheral edema absent.   Abdominal:      General: Bowel sounds are normal. There is no distension.      Palpations: Abdomen is soft. There is no abdominal mass.      Tenderness: There is no " abdominal tenderness.   Musculoskeletal: Normal range of motion.      Cervical back: Neck supple. Skin:     General: Skin is warm and dry.      Findings: No erythema.   Neurological:      Mental Status: Alert and oriented to person, place, and time.         Result Review :     Common labs    Common Labsle 9/13/21 9/13/21 11/16/21 11/16/21 2/14/22 2/14/22 2/14/22    1057 1057 1341 1341 1139 1139 1140   Glucose  163 (A)  211 (A)  217 (A)    BUN  35 (A)  33 (A)  24 (A)    Creatinine  1.23 (A)  1.42 (A)  1.14 (A)    eGFR Non  Am  43 (A)  36 (A)  46 (A)    Sodium  135 (A)  141  136    Potassium  4.8  4.5  4.7    Chloride  101  104  102    Calcium  8.9  9.0  8.8    Albumin  3.80  3.60  4.00    Total Bilirubin  0.3  0.3  0.5    Alkaline Phosphatase  112  99  110    AST (SGOT)  13  11  12    ALT (SGPT)  16  10  10    WBC 10.65  10.42  9.22     Hemoglobin 10.8 (A)  10.9 (A)  11.1 (A)     Hematocrit 34.0  35.9  35.7     Platelets 226  236  222     Hemoglobin A1C       7.80 (A)   (A) Abnormal value                      Assessment and Plan    1. Chest pain, atypical  Most consistent with musculoskeletal etiology    2. Essential hypertension  Controlled    3. Pulmonary hypertension (HCC)  Mild to moderate    4. Bilateral lower extremity edema  Stable    5. Hyperlipidemia, unspecified hyperlipidemia type  Stable    6. Type 2 diabetes mellitus with stage 3b chronic kidney disease, with long-term current use of insulin (HCC)  Weight reduction    7. Morbidly obese (HCC)  Weight reduction    8. Obstructive sleep apnea on CPAP  Weight reduction    9. Stage 3a chronic kidney disease (HCC)      Blanca remains relatively stable from the cardiovascular standpoint.  I have encouraged her to continue to restrict her salt and fluid intake.  She will continue her current medical regimen.  We discussed further noninvasive ischemic assessment but she is reluctant to pursue any further testing at present.  I will see her in follow-up in  3 months sooner if needed.        Follow Up   Return in about 3 months (around 6/21/2022).  Patient was given instructions and counseling regarding her condition or for health maintenance advice. Please see specific information pulled into the AVS if appropriate.

## 2022-03-21 ENCOUNTER — OFFICE VISIT (OUTPATIENT)
Dept: CARDIOLOGY | Facility: CLINIC | Age: 77
End: 2022-03-21

## 2022-03-21 VITALS
BODY MASS INDEX: 41.03 KG/M2 | SYSTOLIC BLOOD PRESSURE: 128 MMHG | DIASTOLIC BLOOD PRESSURE: 68 MMHG | WEIGHT: 209 LBS | HEIGHT: 60 IN | HEART RATE: 80 BPM

## 2022-03-21 DIAGNOSIS — N18.31 STAGE 3A CHRONIC KIDNEY DISEASE: ICD-10-CM

## 2022-03-21 DIAGNOSIS — N18.32 TYPE 2 DIABETES MELLITUS WITH STAGE 3B CHRONIC KIDNEY DISEASE, WITH LONG-TERM CURRENT USE OF INSULIN: ICD-10-CM

## 2022-03-21 DIAGNOSIS — I27.20 PULMONARY HYPERTENSION: ICD-10-CM

## 2022-03-21 DIAGNOSIS — Z99.89 OBSTRUCTIVE SLEEP APNEA ON CPAP: ICD-10-CM

## 2022-03-21 DIAGNOSIS — G47.33 OBSTRUCTIVE SLEEP APNEA ON CPAP: ICD-10-CM

## 2022-03-21 DIAGNOSIS — R60.0 BILATERAL LOWER EXTREMITY EDEMA: ICD-10-CM

## 2022-03-21 DIAGNOSIS — I10 ESSENTIAL HYPERTENSION: ICD-10-CM

## 2022-03-21 DIAGNOSIS — E66.01 MORBIDLY OBESE: ICD-10-CM

## 2022-03-21 DIAGNOSIS — Z79.4 TYPE 2 DIABETES MELLITUS WITH STAGE 3B CHRONIC KIDNEY DISEASE, WITH LONG-TERM CURRENT USE OF INSULIN: ICD-10-CM

## 2022-03-21 DIAGNOSIS — E11.22 TYPE 2 DIABETES MELLITUS WITH STAGE 3B CHRONIC KIDNEY DISEASE, WITH LONG-TERM CURRENT USE OF INSULIN: ICD-10-CM

## 2022-03-21 DIAGNOSIS — R07.89 CHEST PAIN, ATYPICAL: Primary | ICD-10-CM

## 2022-03-21 DIAGNOSIS — E78.5 HYPERLIPIDEMIA, UNSPECIFIED HYPERLIPIDEMIA TYPE: ICD-10-CM

## 2022-03-21 PROCEDURE — 99214 OFFICE O/P EST MOD 30 MIN: CPT | Performed by: INTERNAL MEDICINE

## 2022-03-21 RX ORDER — VALSARTAN AND HYDROCHLOROTHIAZIDE 160; 25 MG/1; MG/1
1 TABLET ORAL DAILY
COMMUNITY
End: 2022-07-05

## 2022-04-26 RX ORDER — DOXAZOSIN MESYLATE 1 MG/1
TABLET ORAL
Qty: 30 TABLET | Refills: 5 | Status: SHIPPED | OUTPATIENT
Start: 2022-04-26 | End: 2022-05-18 | Stop reason: DRUGHIGH

## 2022-04-26 NOTE — TELEPHONE ENCOUNTER
22-Dr Sb Power  Pt: Blanca Lam  : 1945  Phone: [phone]  Reason for Call:  Pt. Needs refill on Doxazosin, 1mg. Tabs.  Please send to [facility]  P: [phone]

## 2022-05-12 ENCOUNTER — LAB (OUTPATIENT)
Dept: LAB | Facility: HOSPITAL | Age: 77
End: 2022-05-12

## 2022-05-12 ENCOUNTER — OFFICE VISIT (OUTPATIENT)
Dept: ONCOLOGY | Facility: CLINIC | Age: 77
End: 2022-05-12

## 2022-05-12 VITALS
DIASTOLIC BLOOD PRESSURE: 67 MMHG | BODY MASS INDEX: 41.31 KG/M2 | RESPIRATION RATE: 16 BRPM | HEIGHT: 60 IN | WEIGHT: 210.4 LBS | HEART RATE: 91 BPM | TEMPERATURE: 97.8 F | OXYGEN SATURATION: 95 % | SYSTOLIC BLOOD PRESSURE: 170 MMHG

## 2022-05-12 DIAGNOSIS — R60.0 BILATERAL LOWER EXTREMITY EDEMA: ICD-10-CM

## 2022-05-12 DIAGNOSIS — D50.0 IRON DEFICIENCY ANEMIA DUE TO CHRONIC BLOOD LOSS: Primary | ICD-10-CM

## 2022-05-12 DIAGNOSIS — D05.12 BREAST NEOPLASM, TIS (DCIS), LEFT: ICD-10-CM

## 2022-05-12 DIAGNOSIS — R94.31 ABNORMAL ELECTROCARDIOGRAM (ECG) (EKG): ICD-10-CM

## 2022-05-12 DIAGNOSIS — I13.0 HYPERTENSIVE HEART AND CHRONIC KIDNEY DISEASE WITH HEART FAILURE AND STAGE 1 THROUGH STAGE 4 CHRONIC KIDNEY DISEASE, OR UNSPECIFIED CHRONIC KIDNEY DISEASE: ICD-10-CM

## 2022-05-12 DIAGNOSIS — D50.0 IRON DEFICIENCY ANEMIA DUE TO CHRONIC BLOOD LOSS: ICD-10-CM

## 2022-05-12 LAB
ALBUMIN SERPL-MCNC: 3.6 G/DL (ref 3.5–5.2)
ALBUMIN/GLOB SERPL: 1.2 G/DL (ref 1.1–2.4)
ALP SERPL-CCNC: 129 U/L (ref 38–116)
ALT SERPL W P-5'-P-CCNC: 18 U/L (ref 0–33)
ANION GAP SERPL CALCULATED.3IONS-SCNC: 9.7 MMOL/L (ref 5–15)
AST SERPL-CCNC: 13 U/L (ref 0–32)
BASOPHILS # BLD AUTO: 0.04 10*3/MM3 (ref 0–0.2)
BASOPHILS NFR BLD AUTO: 0.4 % (ref 0–1.5)
BILIRUB SERPL-MCNC: 0.2 MG/DL (ref 0.2–1.2)
BUN SERPL-MCNC: 23 MG/DL (ref 6–20)
BUN/CREAT SERPL: 19.8 (ref 7.3–30)
CALCIUM SPEC-SCNC: 9.2 MG/DL (ref 8.5–10.2)
CHLORIDE SERPL-SCNC: 102 MMOL/L (ref 98–107)
CO2 SERPL-SCNC: 25.3 MMOL/L (ref 22–29)
CREAT SERPL-MCNC: 1.16 MG/DL (ref 0.6–1.1)
CRP SERPL-MCNC: 2.23 MG/DL (ref 0–0.5)
DEPRECATED RDW RBC AUTO: 46 FL (ref 37–54)
EGFRCR SERPLBLD CKD-EPI 2021: 49.3 ML/MIN/1.73
EOSINOPHIL # BLD AUTO: 0.18 10*3/MM3 (ref 0–0.4)
EOSINOPHIL NFR BLD AUTO: 1.7 % (ref 0.3–6.2)
ERYTHROCYTE [DISTWIDTH] IN BLOOD BY AUTOMATED COUNT: 13 % (ref 12.3–15.4)
FERRITIN SERPL-MCNC: 121.7 NG/ML (ref 13–150)
GLOBULIN UR ELPH-MCNC: 3 GM/DL (ref 1.8–3.5)
GLUCOSE SERPL-MCNC: 283 MG/DL (ref 74–124)
HCT VFR BLD AUTO: 34.5 % (ref 34–46.6)
HGB BLD-MCNC: 10.7 G/DL (ref 12–15.9)
HGB RETIC QN AUTO: 33.5 PG (ref 29.8–36.1)
IMM GRANULOCYTES # BLD AUTO: 0.1 10*3/MM3 (ref 0–0.05)
IMM GRANULOCYTES NFR BLD AUTO: 0.9 % (ref 0–0.5)
IMM RETICS NFR: 12 % (ref 3–15.8)
IRON 24H UR-MRATE: 43 MCG/DL (ref 37–145)
IRON SATN MFR SERPL: 15 % (ref 14–48)
LYMPHOCYTES # BLD AUTO: 0.68 10*3/MM3 (ref 0.7–3.1)
LYMPHOCYTES NFR BLD AUTO: 6.2 % (ref 19.6–45.3)
MCH RBC QN AUTO: 30.4 PG (ref 26.6–33)
MCHC RBC AUTO-ENTMCNC: 31 G/DL (ref 31.5–35.7)
MCV RBC AUTO: 98 FL (ref 79–97)
MONOCYTES # BLD AUTO: 0.68 10*3/MM3 (ref 0.1–0.9)
MONOCYTES NFR BLD AUTO: 6.2 % (ref 5–12)
NEUTROPHILS NFR BLD AUTO: 84.6 % (ref 42.7–76)
NEUTROPHILS NFR BLD AUTO: 9.22 10*3/MM3 (ref 1.7–7)
NRBC BLD AUTO-RTO: 0 /100 WBC (ref 0–0.2)
NT-PROBNP SERPL-MCNC: 540 PG/ML (ref 0–1800)
PLATELET # BLD AUTO: 223 10*3/MM3 (ref 140–450)
PMV BLD AUTO: 9.6 FL (ref 6–12)
POTASSIUM SERPL-SCNC: 4.4 MMOL/L (ref 3.5–4.7)
PROT SERPL-MCNC: 6.6 G/DL (ref 6.3–8)
RBC # BLD AUTO: 3.52 10*6/MM3 (ref 3.77–5.28)
RETICS # AUTO: 0.05 10*6/MM3 (ref 0.02–0.13)
RETICS/RBC NFR AUTO: 1.36 % (ref 0.7–1.9)
SODIUM SERPL-SCNC: 137 MMOL/L (ref 134–145)
TIBC SERPL-MCNC: 281 MCG/DL (ref 249–505)
TRANSFERRIN SERPL-MCNC: 201 MG/DL (ref 200–360)
WBC NRBC COR # BLD: 10.9 10*3/MM3 (ref 3.4–10.8)

## 2022-05-12 PROCEDURE — 82728 ASSAY OF FERRITIN: CPT

## 2022-05-12 PROCEDURE — 83540 ASSAY OF IRON: CPT

## 2022-05-12 PROCEDURE — 99214 OFFICE O/P EST MOD 30 MIN: CPT | Performed by: INTERNAL MEDICINE

## 2022-05-12 PROCEDURE — 85025 COMPLETE CBC W/AUTO DIFF WBC: CPT

## 2022-05-12 PROCEDURE — 86140 C-REACTIVE PROTEIN: CPT | Performed by: INTERNAL MEDICINE

## 2022-05-12 PROCEDURE — 36415 COLL VENOUS BLD VENIPUNCTURE: CPT

## 2022-05-12 PROCEDURE — 84466 ASSAY OF TRANSFERRIN: CPT

## 2022-05-12 PROCEDURE — 85046 RETICYTE/HGB CONCENTRATE: CPT

## 2022-05-12 PROCEDURE — 83880 ASSAY OF NATRIURETIC PEPTIDE: CPT | Performed by: INTERNAL MEDICINE

## 2022-05-12 PROCEDURE — 80053 COMPREHEN METABOLIC PANEL: CPT

## 2022-05-12 RX ORDER — DOXAZOSIN MESYLATE 1 MG/1
1 TABLET ORAL DAILY
COMMUNITY
End: 2022-05-18 | Stop reason: DRUGHIGH

## 2022-05-12 RX ORDER — ERGOCALCIFEROL 1.25 MG/1
50000 CAPSULE ORAL WEEKLY
COMMUNITY
Start: 2022-02-21 | End: 2022-07-05

## 2022-05-12 RX ORDER — DOXYCYCLINE HYCLATE 50 MG/1
CAPSULE, GELATIN COATED ORAL
COMMUNITY
Start: 2022-02-21 | End: 2022-07-05

## 2022-05-12 RX ORDER — LEFLUNOMIDE 10 MG/1
1 TABLET ORAL DAILY
COMMUNITY
Start: 2022-01-25 | End: 2022-07-05

## 2022-05-12 NOTE — PROGRESS NOTES
Subjective     REASON FOR FOLLOW UP:  ANEMIA, FATIGUE, FREQUENT FALLS, AFRAID OF WALKING, ABDOMINAL PAIN AND CONTINUO'S  TURMOIL IN BOWEL FUNCTION AND DIGESTION,DCIS OF THE LEFT BREAST SP MASTECTOMY      History of Present Illness DURING THE VISIT WITH THE PATIENT TODAY , PATIENT HAD FACE MASK, I HAD PROPER PROTECTIVE EQUIPMENT, AND I DID HAND HYGIENE WITH SOAP AND WATER BEFORE AND AFTER THE VISIT.    This patient returns today to the office in company of her  complaining that she has developed very significant amount of shortness of breath to the point that she is now receiving oxygen. She has no cough, no wheezing, no sputum production, no pleuritic pain or hemoptysis. She also complains of pain in the epigastric area that is worse upon certain movements and better upon resting. She complains of progressive amount of swelling in her lower extremities to the point that in spite of taking diuretics she has developed now fluid blister in her left lower extremity. She is profoundly fatigued. She is barely able to do anything and in order to take a shower it takes her 1-1/2 hours or so to do the whole thing and to get dressed.     She denies any fevers or chills. Her appetite is acceptable but she has a lot of epigastric tenderness, especially when she takes vitamin D. This makes her very ill on the stomach. The patient refuses to take this medicine. She denies the passage of any blood in the stool. Urination in volume is appropriate but the swelling in the legs is progressively worse.     She has not had any other new rashes.                    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 Daniel Gutierrez, [facility]   •  SECTION N/A     x3   • CHOLECYSTECTOMY N/A    • COLONOSCOPY     • COLONOSCOPY W/ POLYPECTOMY N/A 2019    Enlarged folds in the antrum: biopsied; duodenal, transverse colon x2, splenic flexure, descending colon and sigmoid colon polyps: biopsied (path: tubular adenoma x6)-Dr. Zak De Jesus, [facility]   • ENDOSCOPY  10/11/2019    Procedure: ESOPHAGOGASTRODUODENOSCOPY with hot snare polypectomy;  Surgeon: Haile Handley MD;  Location: [facility] ENDOSCOPY;  Service: Gastroenterology   • ENDOSCOPY N/A 2020    Procedure: ESOPHAGOGASTRODUODENOSCOPY;  Surgeon: Haile Handley MD;  Location: [facility] ENDOSCOPY;  Service: Gastroenterology   • ENDOSCOPY N/A 2020    Procedure: ESOPHAGOGASTRODUODENOSCOPY WITH BIOPSIES AND COLD BIOPSY POLYPECTOMY;  Surgeon: Haile Handley MD;  Location: [facility] ENDOSCOPY;  Service: Gastroenterology;  Laterality: N/A;  pre: dyspepsia and diarrhea  post: duodenal polyp and gastritis   • MASTECTOMY Left    • MASTECTOMY WITH SENTINEL NODE BIOPSY AND AXILLARY NODE DISSECTION Left 7/3/2019    Procedure: LEFT BREAST MASTECTOMY WITH SENTINEL NODE BIOPSY AND , v-y plasty closure;  Surgeon: Tahmina James MD;  Location: [facility] OR;  Service: General   • SUBTOTAL HYSTERECTOMY Bilateral     ovaries still in tact   • TEMPORAL ARTERY BIOPSY Bilateral 2019        Current Outpatient Medications on File Prior to Visit   Medication Sig Dispense Refill   • albuterol sulfate  (90 Base) MCG/ACT inhaler Every 6 (Six) Hours.     • atorvastatin (LIPITOR) 20 MG  tablet Take 20 mg by mouth Every Other Day.     • bisoprolol (ZEBeta) 10 MG tablet Take 2 tablets by mouth once daily (Patient taking differently: Take 20 mg by mouth Every Night.) 60 tablet 0   • budesonide-formoterol (SYMBICORT) 160-4.5 MCG/ACT inhaler Inhale 2 puffs 2 (Two) Times a Day.     • bumetanide (BUMEX) 1 MG tablet Take 1 mg by mouth Every Other Day.     • doxazosin (CARDURA) 1 MG tablet TAKE 1 TABLET BY MOUTH ONCE DAILY AT NIGHT 30 tablet 5   • doxazosin (CARDURA) 1 MG tablet Take 1 tablet by mouth Daily.     • ferrous gluconate (FERGON) 324 MG tablet ferrous gluconate 324 mg (38 mg iron) tablet   TAKE 1 TABLET BY MOUTH ONCE DAILY WITH BREAKFAST     • Insulin Glargine (TOUJEO SOLOSTAR SC) Inject 40 Units under the skin into the appropriate area as directed Every Night. INSTRUCTED PT TO FOLLOW MD INSTRUCTIONS REGARDING DOSING BEFORE SURGERY     • Insulin Lispro (HUMALOG PEN SC) Inject  under the skin into the appropriate area as directed 3 (Three) Times a Day With Meals. 25 units at breakfast, 20 units at lunch, 22 units at dinner/INSTRUCTED PT TO FOLLOW MD INSTRUCTIONS REGARDING DOSING BEFORE SURGERY     • leflunomide (ARAVA) 10 MG tablet leflunomide 10 mg tablet   TAKE 1 TABLET BY MOUTH ONCE DAILY     • leflunomide (ARAVA) 10 MG tablet Take 1 tablet by mouth Daily.     • NON FORMULARY Apply 1 each topically to the appropriate area as directed Daily. Cream to foot rash, unsure of name     • omeprazole (priLOSEC) 20 MG capsule      • potassium chloride (K-DUR,KLOR-CON) 20 MEQ CR tablet Daily.     • predniSONE (DELTASONE) 2.5 MG tablet Take 2.5 mg by mouth Daily.     • predniSONE (DELTASONE) 5 MG tablet prednisone 5 mg tablet     • predniSONE 2 MG tablet delayed-release Take  by mouth.     • torsemide (DEMADEX) 20 MG tablet      • urea (CARMOL) 20 % cream urea 20 % topical cream   APPLY 1 GRAM ON THE SKIN DAILY.     • valsartan-hydrochlorothiazide (DIOVAN-HCT) 160-25 MG per tablet Take 1 tablet by mouth  "Daily.     • vitamin D (ERGOCALCIFEROL) 1.25 MG (41915 UT) capsule capsule Take 50,000 Units by mouth 1 (One) Time Per Week.     • valsartan-hydrochlorothiazide (DIOVAN-HCT) 320-25 MG per tablet Take 1 tablet by mouth Daily. 30 tablet 11     No current facility-administered medications on file prior to visit.        ALLERGIES:    Allergies   Allergen Reactions   • Aspirin Nausea Only     Low tolerance, abdominal pain   • Sulfa Antibiotics Unknown (See Comments)     Happened as a child        Social History     Socioeconomic History   • Marital status:      Spouse name: Ashwin   • Number of children: 3   • Years of education: College   Tobacco Use   • Smoking status: Never Smoker   • Smokeless tobacco: Never Used   • Tobacco comment: caffine use   Substance and Sexual Activity   • Alcohol use: No     Comment: one or two small drinks a year   • Drug use: No   • Sexual activity: Defer     Comment: Spouse, Ashwin        Family History   Problem Relation Age of Onset   • Hypertension Father    • Stomach cancer Father    • Diabetes Father    • Esophageal cancer Father    • Throat cancer Sister    • Diabetes Paternal Grandmother    • Hypertension Paternal Grandfather    • Colon cancer Paternal Grandfather    • Heart disease Mother    • Colon cancer Paternal Uncle    • Colon cancer Paternal Uncle    • Colon cancer Paternal Uncle    • Ovarian cancer Cousin    • Stomach cancer Cousin    • Malig Hyperthermia Neg Hx           Objective     Vitals:    05/12/22 1429   BP: 170/67   Pulse: 91   Resp: 16   Temp: 97.8 °F (36.6 °C)   TempSrc: Temporal   SpO2: 95%   Weight: 95.4 kg (210 lb 6.4 oz)   Height: 152.4 cm (60\")   PainSc: 0-No pain     Current Status 11/16/2021   ECOG score 1       Physical Exam      I HAVE PERSONALLY REVIEWED THE HISTORY OF THE PRESENT ILLNESS, PAST MEDICAL HISTORY, FAMILY HISTORY, SOCIAL HISTORY, ALLERGIES, MEDICATIONS STATED ABOVE IN THE  NOTE FROM TODAY.        GENERAL:  Well-developed, " well-nourished  Patient  in no acute distress.   SKIN:  Warm, dry ,NO purpura ,NO petechiae, no rash.  HEENT:  Pupils were equal and reactive to light and accomodation, conjunctivae noninjected, no pterygium, normal extraocular movements, normal visual acuity.   NECK:  Supple with good range of motion; no thyromegaly , no other masses, no JVD or bruits,.No carotid artery pain, no carotid abnormal pulsation , NO arterial dance.  LYMPHATICS:  No cervical, NO supraclavicular, NO axillary,NO epitrochlear , NO inguinal adenopathies.  CARDIAC   normal rate , regular rhythm, without murmur,NO rubs , typical  S 3, NO S4   LUNGS: normal breath sounds bilateral, no wheezing, NO rhonchi, NO crackles ,NO rubs.  INSPECTION of  breast documented symmetry of the tissue per se and location and size of the nipple,no retractions or inversion of the nipple, normal skin without lesions, no erythema or nodules,no peau d'orange, no prominence of superficial veins or chest wall collateral circulation.PALPATION of the breast documented normal skin turgor, no induration, alteration in local temperature, or pain, no palpable masses or nodules, normal mobility of the tissues,no fixation of the tissue or parenchyma to the chest wall, no alteration at the tail of the breasts or axillas, no adenopathies. Left mastectomy Surgical site was well healed.No lymphedema in either extremity.  VASCULAR VENOUS: no cyanosis, NO collateral circulation, NO varicosities, 3+bilateral edema, NO palpable cords, NO pain,NO erythema, NO pigmentation of the skin.  ABDOMEN:  Soft, NO pain,no hepatomegaly, no splenomegaly,no masses, no ascites, no collateral circulation,no distention,no Elio sign.  EXTREMITIES  AND SPINE:  No clubbing, no cyanosis ,no deformities , no pain .No kyphosis,  no pain in spine, no pain in ribs , no pain in pelvic bone.  NEUROLOGICAL:  Patient was awake, alert, oriented to time, person and place.                          RECENT  LABS:  Hematology WBC   Date Value Ref Range Status   05/12/2022 10.90 (H) 3.40 - 10.80 10*3/mm3 Final     RBC   Date Value Ref Range Status   05/12/2022 3.52 (L) 3.77 - 5.28 10*6/mm3 Final     Hemoglobin   Date Value Ref Range Status   05/12/2022 10.7 (L) 12.0 - 15.9 g/dL Final     Hematocrit   Date Value Ref Range Status   05/12/2022 34.5 34.0 - 46.6 % Final     Platelets   Date Value Ref Range Status   05/12/2022 223 140 - 450 10*3/mm3 Final            Assessment & Plan      1. This patient has undergone a left-sided mastectomy for DCIS of the left breast. The patient had a large tumor that was high grade with negative margins of resection, minimal microinvasion and negative sentinel lymph nodes. The patient’s estrogen receptor and progesterone receptor were negative and she was HER2 negative as well at the time of the original diagnosis. Under these premises, I think the patient is done with the treatment for this and I do not recommend any form of adjuvant chemotherapy, radiation treatment and obviously no hormonal therapy. There is no role for Herceptin or medicines of this nature either.     The patient was further reviewed on 03/30/2021 in regard to her breast cancer. Her right breast examination is normal. Her mammogram is up-to-date and been normal and her left mastectomy site is normal. I see nothing to suggest breast cancer recurrence. As we mentioned before the patient never required adjuvant therapy because she had triple negative disease.         The patient was further reviewed in regard to her breast issue on 07/20/2021. Her mastectomy on the left side is well healed, the right breast is unremarkable, there is no lymphedema in either extremity and the patient is doing fine from this point of view.   The patient is reviewed on 11/16/2021 in regard to her left side mastectomy for DCIS of the left breast. So far the mastectomy site is unremarkable, surgical site is well healed. There is no lymphedema  in the left upper extremity, the left axilla is normal, right breast is normal. Right breast mammogram recently performed as stated above disclosed no abnormalities. This will be watched in absence of any other intervention.   The patient was reviewed on 05/12/2022. I find no symptoms or signs that would indicate breast cancer recurrence. Her right breast exam is normal. Her left side mastectomy discloses no areas of induration, tenderness, nodularity. Her left axilla is unremarkable. She has no lymphedema in either upper extremity. She will be watched in absence of any other intervention.        ·       2. From the point of view of her anemia, iron deficiency, the patient has been replaced with IV iron. As far as the patient can tell on review on 10/19/2020 she has not had any evidence of blood loss in the gastrointestinal tract. Her diet is appropriate but the only thing that she is not following through is that she is back onto diet soft drinks Pepsi for example. She is not drinking any coffee that she used to drink copious amount of it. According to her, her diet again remains appropriate. It calls my attention the hemoglobin of 11.7, her reticulated hemoglobin is normal at 35 therefore probably anemia from today is reflection of some other condition.  The patient remains anemic today and we have a multitude of testing including ferritin, iron, TIBC, B12, folic acid and reticulated hemoglobin to further analyze. This will be discussed with her on the telephone once that these reports become available. Also I have asked the lab to run peripheral blood slide for me to review given her previous history of hemolytic anemia. I will review her chemistry profile, bilirubin and along with the retic count this will give me an idea if hemolytic anemia is an issue at this point.   On 11/16/2021 the patient remains mildly anemic with a hemoglobin of 10.9. Six months ago we did ferritin, iron, TIBC, B12, folic acid levels  all of them normal. Her erythropoietin level was low consistent with anemia of chronic kidney disease. She has not undergone any erythropoietin therapy at this point.   On 05/12/2022, the patient has become anemic again in spite of not losing blood in the gastrointestinal tract. Reticulated hemoglobin is 33; therefore it is likely that she does not have iron deficiency anemia but her ferritin, iron profile are pending and she has had B12 and folic acid levels measured not too far ago that were normal. This leads me to believe that very likely the anemia component of this patient at this time is probably associated with chronic illness, inflammatory condition and chronic kidney disease, especially if we account for the progressive amount of anasarca that this patient has. She will be called at home with the report of the rest of laboratory testing pertinent to this.        ·     3. The patient has very significant degree of shortness of breath upon minimal exertion. I asked her to walk. It took her almost 3 minutes to walk 30 feet. She was very short winded. We started with an O2 saturation of 100% on room air and a heart rate of 73. Her O2 saturation dropped to 97 and heart rate never raised.     I reviewed this issue with her again today. Her cardiac auscultation is very much normal and her echocardiogram done not too long ago did not document too much of major abnormality besides pulmonary hypertension. Her lung auscultation is abnormal with decreased breath sounds bilaterally. I do not think she has effusions but I think she needs to be seen by pulmonology. I asked her to review this information with her pulmonologist as soon as possible. I still wonder why this patient is not receiving at least oxygen at nighttime given the fact that she has sleep apnea and she has very likely chronic lung disease.  She dd not know that she has pulmonary hypertension and this will be a first start in the treatment of this  condition anyway.   On 11/16/2021 the cardiac auscultation remains unremarkable, no obvious gallops. Pulmonary auscultation shows decrease of breath sounds bilaterally. We advised her to do exercises that maybe in the long run could have impact on her and I again advised her that it will be tremendously consequential to her to lose 20-30 pounds of weight. This will allow less oxygen consumption by fat and more oxygen consumption by her normal weight, normal other parts including muscles, heart, kidneys and so forth. In the long run this could have positive consequences on her.     I will review her back in 6 months with a CBC and CMP at that time.     I find no reason for this patient to proceed with erythropoietin therapy with a hemoglobin of 10.9.   The patient on 05/12/2022 is more short of breath than usual. She is barely able to walk 10 feet without becoming hypoxemic and she has in her cardiac auscultation today a gallop that is more than typical. Her lung auscultation is clear. She has no crackles. She has 3+ edema in both lower extremities in spite of having elastic support in place and she has a blister in the left lower extremity with no obvious secondary infection. I do believe that her shortness of breath is very worrisome for congestive heart failure, fluid accumulation, anasarca and is very worrisome as well in the background of chronic kidney disease.     On top of that she is having pain in the epigastric area that sometimes looks to me and sounds to me like angina and I think she needs to be seen by Cardiology right away. I sent a note to Dr. Power, the patient’s cardiologist, to review this. I went ahead and ordered an echocardiogram for completeness and I ordered a proBNP today. I did not change any medicines on her. My office will be calling Dr. Power’ office to arrange for a follow-up visit quit.        4.The patient states the swelling in the lower extremities along with the blister in  the left lower extremity is very worrisome for her in the background of diabetes. She has elastic support and the legs are huge. She has probably in her body no less than 20 pounds worth of water, I will guess. I would not be surprised if she needs to end up in the hospital with aggressive management of water and profuse diuresis that is necessary.     I went ahead and referred her to be seen by Vascular Surgery. Given the fact that she has had diabetes for so long, she will require Doppler studies of the lower extremities, both for the venous system and also for the arterial system.     I will review her back in a couple of months with a CBC, reticulated hemoglobin, ferritin, iron, TIBC and CMP.     I will forward to Dr. Power, her proBNP.     This patient is very ill. I would not be surprised if she needs to end up in the hospital.     I discussed all these facts with the patient and her  in the room.

## 2022-05-13 ENCOUNTER — HOSPITAL ENCOUNTER (OUTPATIENT)
Dept: CARDIOLOGY | Facility: HOSPITAL | Age: 77
Discharge: HOME | End: 2022-05-13
Admitting: INTERNAL MEDICINE

## 2022-05-13 ENCOUNTER — TELEPHONE (OUTPATIENT)
Dept: CARDIOLOGY | Facility: CLINIC | Age: 77
End: 2022-05-13

## 2022-05-13 VITALS — BODY MASS INDEX: 41.23 KG/M2 | WEIGHT: 210 LBS | HEART RATE: 99 BPM | HEIGHT: 60 IN

## 2022-05-13 DIAGNOSIS — R94.31 ABNORMAL ELECTROCARDIOGRAM (ECG) (EKG): ICD-10-CM

## 2022-05-13 DIAGNOSIS — D50.0 IRON DEFICIENCY ANEMIA DUE TO CHRONIC BLOOD LOSS: ICD-10-CM

## 2022-05-13 DIAGNOSIS — D05.12 BREAST NEOPLASM, TIS (DCIS), LEFT: ICD-10-CM

## 2022-05-13 PROCEDURE — 25010000002 PERFLUTREN (DEFINITY) 8.476 MG IN SODIUM CHLORIDE (PF) 0.9 % 10 ML INJECTION: Performed by: INTERNAL MEDICINE

## 2022-05-13 PROCEDURE — 93356 MYOCRD STRAIN IMG SPCKL TRCK: CPT | Performed by: INTERNAL MEDICINE

## 2022-05-13 PROCEDURE — 93356 MYOCRD STRAIN IMG SPCKL TRCK: CPT

## 2022-05-13 PROCEDURE — 93306 TTE W/DOPPLER COMPLETE: CPT

## 2022-05-13 PROCEDURE — 93306 TTE W/DOPPLER COMPLETE: CPT | Performed by: INTERNAL MEDICINE

## 2022-05-13 RX ADMIN — PERFLUTREN 1.5 ML: 6.52 INJECTION, SUSPENSION INTRAVENOUS at 15:40

## 2022-05-13 NOTE — TELEPHONE ENCOUNTER
CNA pt    Patient was seen by her oncologist Dr. Farnsworth yesterday and was told she should see her cardiologist as soon as she can. She is experiencing SOB, lower extremity edema, fatigue and fluid blisters in her lower legs. Would like to know if she can be seen as soon as possible. Is it okay to double book patient on Monday 05/17/2022?

## 2022-05-16 ENCOUNTER — LAB (OUTPATIENT)
Dept: LAB | Facility: HOSPITAL | Age: 77
End: 2022-05-16

## 2022-05-16 ENCOUNTER — TRANSCRIBE ORDERS (OUTPATIENT)
Dept: ADMINISTRATIVE | Facility: HOSPITAL | Age: 77
End: 2022-05-16

## 2022-05-16 DIAGNOSIS — E55.9 VITAMIN D DEFICIENCY: Primary | ICD-10-CM

## 2022-05-16 DIAGNOSIS — N18.6 ANEMIA IN END-STAGE RENAL DISEASE: ICD-10-CM

## 2022-05-16 DIAGNOSIS — D63.1 ANEMIA IN END-STAGE RENAL DISEASE: ICD-10-CM

## 2022-05-16 DIAGNOSIS — N18.30 STAGE 3 CHRONIC KIDNEY DISEASE, UNSPECIFIED WHETHER STAGE 3A OR 3B CKD: ICD-10-CM

## 2022-05-16 DIAGNOSIS — E55.9 VITAMIN D DEFICIENCY: ICD-10-CM

## 2022-05-16 LAB
25(OH)D3 SERPL-MCNC: 19.4 NG/ML (ref 30–100)
ALBUMIN SERPL-MCNC: 3.5 G/DL (ref 3.5–5.2)
ANION GAP SERPL CALCULATED.3IONS-SCNC: 11.9 MMOL/L (ref 5–15)
AORTIC ARCH: 2.5 CM
ASCENDING AORTA: 2.2 CM
BACTERIA UR QL AUTO: ABNORMAL /HPF
BH CV ECHO LEFT VENTRICLE GLOBAL LONGITUDINAL STRAIN: -21.8 %
BH CV ECHO MEAS - ACS: 1.54 CM
BH CV ECHO MEAS - AO MAX PG: 20.2 MMHG
BH CV ECHO MEAS - AO MEAN PG: 11.6 MMHG
BH CV ECHO MEAS - AO ROOT DIAM: 3 CM
BH CV ECHO MEAS - AO V2 MAX: 224.7 CM/SEC
BH CV ECHO MEAS - AO V2 VTI: 42.2 CM
BH CV ECHO MEAS - AVA(I,D): 1.46 CM2
BH CV ECHO MEAS - EDV(CUBED): 171.1 ML
BH CV ECHO MEAS - EDV(MOD-SP2): 70 ML
BH CV ECHO MEAS - EDV(MOD-SP4): 88 ML
BH CV ECHO MEAS - EF(MOD-BP): 66.7 %
BH CV ECHO MEAS - EF(MOD-SP2): 62.9 %
BH CV ECHO MEAS - EF(MOD-SP4): 68.2 %
BH CV ECHO MEAS - ESV(CUBED): 39.2 ML
BH CV ECHO MEAS - ESV(MOD-SP2): 26 ML
BH CV ECHO MEAS - ESV(MOD-SP4): 28 ML
BH CV ECHO MEAS - FS: 38.8 %
BH CV ECHO MEAS - IVS/LVPW: 1.01 CM
BH CV ECHO MEAS - IVSD: 1.17 CM
BH CV ECHO MEAS - LAT PEAK E' VEL: 5.8 CM/SEC
BH CV ECHO MEAS - LV DIASTOLIC VOL/BSA (35-75): 46.2 CM2
BH CV ECHO MEAS - LV MASS(C)D: 265.4 GRAMS
BH CV ECHO MEAS - LV MAX PG: 6.4 MMHG
BH CV ECHO MEAS - LV MEAN PG: 3.4 MMHG
BH CV ECHO MEAS - LV SYSTOLIC VOL/BSA (12-30): 14.7 CM2
BH CV ECHO MEAS - LV V1 MAX: 126.5 CM/SEC
BH CV ECHO MEAS - LV V1 VTI: 25.7 CM
BH CV ECHO MEAS - LVIDD: 5.6 CM
BH CV ECHO MEAS - LVIDS: 3.4 CM
BH CV ECHO MEAS - LVOT AREA: 2.4 CM2
BH CV ECHO MEAS - LVOT DIAM: 1.75 CM
BH CV ECHO MEAS - LVPWD: 1.16 CM
BH CV ECHO MEAS - MED PEAK E' VEL: 5.1 CM/SEC
BH CV ECHO MEAS - MR MAX PG: 83.7 MMHG
BH CV ECHO MEAS - MR MAX VEL: 457.4 CM/SEC
BH CV ECHO MEAS - MV A DUR: 0.11 SEC
BH CV ECHO MEAS - MV A MAX VEL: 159.7 CM/SEC
BH CV ECHO MEAS - MV DEC SLOPE: 630.5 CM/SEC2
BH CV ECHO MEAS - MV DEC TIME: 0.31 MSEC
BH CV ECHO MEAS - MV E MAX VEL: 119 CM/SEC
BH CV ECHO MEAS - MV E/A: 0.75
BH CV ECHO MEAS - MV MAX PG: 10.6 MMHG
BH CV ECHO MEAS - MV MEAN PG: 5.3 MMHG
BH CV ECHO MEAS - MV P1/2T: 61.2 MSEC
BH CV ECHO MEAS - MV V2 VTI: 40.2 CM
BH CV ECHO MEAS - MVA(P1/2T): 3.6 CM2
BH CV ECHO MEAS - MVA(VTI): 1.53 CM2
BH CV ECHO MEAS - PA ACC TIME: 0.11 SEC
BH CV ECHO MEAS - PA PR(ACCEL): 31.5 MMHG
BH CV ECHO MEAS - PA V2 MAX: 115.4 CM/SEC
BH CV ECHO MEAS - PULM A REVS DUR: 0.11 SEC
BH CV ECHO MEAS - PULM A REVS VEL: 28 CM/SEC
BH CV ECHO MEAS - PULM DIAS VEL: 46.3 CM/SEC
BH CV ECHO MEAS - PULM S/D: 1.64
BH CV ECHO MEAS - PULM SYS VEL: 75.7 CM/SEC
BH CV ECHO MEAS - QP/QS: 1.21
BH CV ECHO MEAS - RAP SYSTOLE: 3 MMHG
BH CV ECHO MEAS - RV MAX PG: 3.5 MMHG
BH CV ECHO MEAS - RV V1 MAX: 93.4 CM/SEC
BH CV ECHO MEAS - RV V1 VTI: 16.6 CM
BH CV ECHO MEAS - RVOT DIAM: 2.39 CM
BH CV ECHO MEAS - RVSP: 37.7 MMHG
BH CV ECHO MEAS - SI(MOD-SP2): 23.1 ML/M2
BH CV ECHO MEAS - SI(MOD-SP4): 31.5 ML/M2
BH CV ECHO MEAS - SUP REN AO DIAM: 2 CM
BH CV ECHO MEAS - SV(LVOT): 61.6 ML
BH CV ECHO MEAS - SV(MOD-SP2): 44 ML
BH CV ECHO MEAS - SV(MOD-SP4): 60 ML
BH CV ECHO MEAS - SV(RVOT): 74.5 ML
BH CV ECHO MEAS - TAPSE (>1.6): 2.07 CM
BH CV ECHO MEAS - TR MAX PG: 34.7 MMHG
BH CV ECHO MEAS - TR MAX VEL: 294.5 CM/SEC
BH CV ECHO MEASUREMENTS AVERAGE E/E' RATIO: 21.83
BH CV XLRA - RV BASE: 2.6 CM
BH CV XLRA - RV LENGTH: 6.4 CM
BH CV XLRA - RV MID: 2.18 CM
BH CV XLRA - TDI S': 14 CM/SEC
BILIRUB UR QL STRIP: NEGATIVE
BUN SERPL-MCNC: 31 MG/DL (ref 8–23)
BUN/CREAT SERPL: 24.4 (ref 7–25)
CALCIUM SPEC-SCNC: 8.8 MG/DL (ref 8.6–10.5)
CHLORIDE SERPL-SCNC: 101 MMOL/L (ref 98–107)
CLARITY UR: CLEAR
CO2 SERPL-SCNC: 23.1 MMOL/L (ref 22–29)
COLOR UR: YELLOW
CREAT SERPL-MCNC: 1.27 MG/DL (ref 0.57–1)
CREAT UR-MCNC: 89.7 MG/DL
DEPRECATED RDW RBC AUTO: 41.8 FL (ref 37–54)
EGFRCR SERPLBLD CKD-EPI 2021: 44.2 ML/MIN/1.73
ERYTHROCYTE [DISTWIDTH] IN BLOOD BY AUTOMATED COUNT: 12.1 % (ref 12.3–15.4)
FERRITIN SERPL-MCNC: 110 NG/ML (ref 13–150)
GLUCOSE SERPL-MCNC: 187 MG/DL (ref 65–99)
GLUCOSE UR STRIP-MCNC: NEGATIVE MG/DL
HCT VFR BLD AUTO: 34.7 % (ref 34–46.6)
HGB BLD-MCNC: 10.8 G/DL (ref 12–15.9)
HGB UR QL STRIP.AUTO: NEGATIVE
HYALINE CASTS UR QL AUTO: ABNORMAL /LPF
IRON 24H UR-MRATE: 40 MCG/DL (ref 37–145)
IRON SATN MFR SERPL: 12 % (ref 20–50)
KETONES UR QL STRIP: NEGATIVE
LEFT ATRIUM VOLUME INDEX: 22 ML/M2
LEUKOCYTE ESTERASE UR QL STRIP.AUTO: ABNORMAL
MAXIMAL PREDICTED HEART RATE: 145 BPM
MCH RBC QN AUTO: 29.3 PG (ref 26.6–33)
MCHC RBC AUTO-ENTMCNC: 31.1 G/DL (ref 31.5–35.7)
MCV RBC AUTO: 94.3 FL (ref 79–97)
NITRITE UR QL STRIP: NEGATIVE
PH UR STRIP.AUTO: 5.5 [PH] (ref 5–8)
PHOSPHATE SERPL-MCNC: 3.8 MG/DL (ref 2.5–4.5)
PLATELET # BLD AUTO: 221 10*3/MM3 (ref 140–450)
PMV BLD AUTO: 9.8 FL (ref 6–12)
POTASSIUM SERPL-SCNC: 4.8 MMOL/L (ref 3.5–5.2)
PROT ?TM UR-MCNC: 27.6 MG/DL
PROT UR QL STRIP: ABNORMAL
PROT/CREAT UR: 307.7 MG/G CREA (ref 0–200)
RBC # BLD AUTO: 3.68 10*6/MM3 (ref 3.77–5.28)
RBC # UR STRIP: ABNORMAL /HPF
REF LAB TEST METHOD: ABNORMAL
SINUS: 2.7 CM
SODIUM SERPL-SCNC: 136 MMOL/L (ref 136–145)
SP GR UR STRIP: 1.02 (ref 1–1.03)
SQUAMOUS #/AREA URNS HPF: ABNORMAL /HPF
STJ: 2.48 CM
STRESS TARGET HR: 123 BPM
TIBC SERPL-MCNC: 326 MCG/DL (ref 298–536)
TRANSFERRIN SERPL-MCNC: 219 MG/DL (ref 200–360)
UROBILINOGEN UR QL STRIP: ABNORMAL
WBC # UR STRIP: ABNORMAL /HPF
WBC NRBC COR # BLD: 11.69 10*3/MM3 (ref 3.4–10.8)

## 2022-05-16 PROCEDURE — 84156 ASSAY OF PROTEIN URINE: CPT

## 2022-05-16 PROCEDURE — 36415 COLL VENOUS BLD VENIPUNCTURE: CPT

## 2022-05-16 PROCEDURE — 82728 ASSAY OF FERRITIN: CPT

## 2022-05-16 PROCEDURE — 82570 ASSAY OF URINE CREATININE: CPT

## 2022-05-16 PROCEDURE — 82306 VITAMIN D 25 HYDROXY: CPT

## 2022-05-16 PROCEDURE — 81001 URINALYSIS AUTO W/SCOPE: CPT

## 2022-05-16 PROCEDURE — 80069 RENAL FUNCTION PANEL: CPT | Performed by: NURSE PRACTITIONER

## 2022-05-16 PROCEDURE — 85027 COMPLETE CBC AUTOMATED: CPT

## 2022-05-16 PROCEDURE — 84466 ASSAY OF TRANSFERRIN: CPT

## 2022-05-16 PROCEDURE — 83540 ASSAY OF IRON: CPT

## 2022-05-17 ENCOUNTER — TELEPHONE (OUTPATIENT)
Dept: ONCOLOGY | Facility: CLINIC | Age: 77
End: 2022-05-17

## 2022-05-17 NOTE — PROGRESS NOTES
"Chief Complaint  No chief complaint on file.    Subjective    History of Present Illness      I saw Blanca Harden today for continued cardiovascular care.  She is a sweet 76-year-old female who reports occasional chest tightness.  In addition she has increased dyspnea on exertion and lower extremity bilateral weeping edema.  She denies syncope near syncope or palpitations.  She does have obstructive sleep apnea and uses CPAP routinely.  Her blood pressure is elevated in the office today at 158/82.  She has a history of a decreased hemoglobin and is followed by Dr. Jean-Claude Farnsworth, hematologist.Hemoglobin 5/16/2022 was 10.8 and stable.  Creatinine 1.1, blood sugar elevated 286, proBNP 580.  Potassium 4.8, BUN 31.    Echocardiogram 5/13/2022 revealed left ventricular ejection fraction 66.7%, grade 1A diastolic dysfunction with high left atrial pressure, right ventricular systolic pressure 37.7 mmHg, no significant valvular abnormality.    Blanca reports drinking less than 48 ounces daily and attempts to restrict her salt intake.  Her BMI is 41.01 her weight has not changed.  She is currently not on diuretic therapy.    Objective   Vital Signs:   /82   Pulse 84   Ht 152.4 cm (60\")   Wt 95.3 kg (210 lb)   BMI 41.01 kg/m²     Constitutional:       Appearance: Well-developed. Morbidly obese.   Eyes:      Conjunctiva/sclera: Conjunctivae normal.      Pupils: Pupils are equal, round, and reactive to light.   HENT:      Head: Normocephalic and atraumatic.   Neck:      Thyroid: No thyromegaly.   Pulmonary:      Effort: Pulmonary effort is normal. No respiratory distress.      Breath sounds: Normal breath sounds. No wheezing. No rales.   Cardiovascular:      Normal rate. Regular rhythm.      S4 Gallop. No S3 gallop. No rub.   Edema:     Peripheral edema present.     Pretibial: bilateral 1+ edema of the pretibial area.  Abdominal:      General: Bowel sounds are normal. There is no distension.      Palpations: " Abdomen is soft. There is no abdominal mass.      Tenderness: There is no abdominal tenderness.   Musculoskeletal: Normal range of motion.      Cervical back: Neck supple. Skin:     General: Skin is warm and dry.      Findings: No erythema.   Neurological:      Mental Status: Alert and oriented to person, place, and time.         Result Review :     Common labs    Common Labsle 2/14/22 2/14/22 2/14/22 5/12/22 5/12/22 5/16/22 5/16/22    1139 1139 1140 1422 1422 0906 0906   Glucose  217 (A)   283 (A)  187 (A)   BUN  24 (A)   23 (A)  31 (A)   Creatinine  1.14 (A)   1.16 (A)  1.27 (A)   eGFR Non  Am  46 (A)        Sodium  136   137  136   Potassium  4.7   4.4  4.8   Chloride  102   102  101   Calcium  8.8   9.2  8.8   Albumin  4.00   3.60  3.50   Total Bilirubin  0.5   0.2     Alkaline Phosphatase  110   129 (A)     AST (SGOT)  12   13     ALT (SGPT)  10   18     WBC 9.22   10.90 (A)  11.69 (A)    Hemoglobin 11.1 (A)   10.7 (A)  10.8 (A)    Hematocrit 35.7   34.5  34.7    Platelets 222   223  221    Hemoglobin A1C   7.80 (A)       (A) Abnormal value                      Assessment and Plan    1. Dyspnea on exertion  Progressive with increased fluid retention, diastolic dysfunction  - Stress Test With Myocardial Perfusion One Day; Future    2. Bilateral lower extremity edema  Continue salt and fluid restriction, resume Demadex 20 mg daily, patient to follow-up with nephrologist Dr. Gretchen Mcginnis this Friday, continue compression stockings    3. Pulmonary hypertension (HCC)  Mild to moderate    4. Essential hypertension  Target less than 130/80  - Stress Test With Myocardial Perfusion One Day; Future  - doxazosin (Cardura) 2 MG tablet; Take 1 tablet by mouth Every Night.  Dispense: 90 tablet; Refill: 3    5. Hyperlipidemia, unspecified hyperlipidemia type  Low-cholesterol low saturated fat weight reduction diet continue atorvastatin 20 mg daily    6. Type 2 diabetes mellitus with stage 3b chronic kidney disease,  with long-term current use of insulin (HCC)  Weight reduction  - Stress Test With Myocardial Perfusion One Day; Future    7. Morbidly obese (HCC)  Weight reduction    8. Obstructive sleep apnea on CPAP  Weight reduction    9. Stage 3a chronic kidney disease ([facility])  Follow-up with nephrology  - Stress Test With Myocardial Perfusion One Day; Future    10.  Recurrent chest discomfort    Blanca reports recurrent chest discomfort, dyspnea on exertion, increased lower extremity edema.  We will obtain a Lexiscan Cardiolite noninvasive ischemic assessment.  I have reemphasized importance of salt and fluid restriction less than 48 ounces daily.  We will reinstitute Demadex 20 mg daily.  She will follow-up with nephrology this week.  I have advanced her Cardura to 2 mg daily for further blood pressure control.  I have encouraged her to continue utilizing compression stockings and keeping her lower extremities elevated while seated.  I will see her in follow-up in 1 month sooner if needed.      I spent 45 minutes caring for Blanca on this date of service. This time includes time spent by me in the following activities:preparing for the visit, reviewing tests, performing a medically appropriate examination and/or evaluation , counseling and educating the patient/family/caregiver, ordering medications, tests, or procedures, referring and communicating with other health care professionals , documenting information in the medical record, independently interpreting results and communicating that information with the patient/family/caregiver and care coordination  Follow Up   No follow-ups on file.  Patient was given instructions and counseling regarding her condition or for health maintenance advice. Please see specific information pulled into the AVS if appropriate.

## 2022-05-17 NOTE — TELEPHONE ENCOUNTER
I have called this patient to go over laboratory testing that was done in the office a few days ago along with echocardiogram that has become available in regard to report.     First of all her creatinine remains at 1.1 that for her is very good. Her blood sugar was 286. Her albumin level remains normal. Her proBNP was in the 580. All this is not consistent with congestive heart failure and it is difficult to know why she has so much fluid accumulation in her lower extremities. She will require to be seen by Nephrology and now she has an appointment to be seen by Dr. Power, the patient's cardiologist tomorrow.     I also discussed with her the fact that her iron and ferritin level were normal. The reticulated hemoglobin was normal. At this point she has no evidence of iron deficiency anemia and she has no need for IV iron therapy.     I also reviewed with her her echocardiogram as posted in cardiological procedures that does not look as bad as it could be. She has evidence of pulmonary hypertension and part of the problem is there. The left ventricular ejection fraction is not that bad and there is no obvious pericardial effusion. Again, this information will be available to Dr. Power tomorrow.     I discussed all these facts with the patient to try to bring some clarity into all of her medical problems. She was very appreciative of this phone call.

## 2022-05-18 ENCOUNTER — OFFICE VISIT (OUTPATIENT)
Dept: CARDIOLOGY | Facility: CLINIC | Age: 77
End: 2022-05-18

## 2022-05-18 VITALS
DIASTOLIC BLOOD PRESSURE: 82 MMHG | HEART RATE: 84 BPM | HEIGHT: 60 IN | BODY MASS INDEX: 41.23 KG/M2 | SYSTOLIC BLOOD PRESSURE: 158 MMHG | WEIGHT: 210 LBS

## 2022-05-18 DIAGNOSIS — I10 ESSENTIAL HYPERTENSION: ICD-10-CM

## 2022-05-18 DIAGNOSIS — E11.22 TYPE 2 DIABETES MELLITUS WITH STAGE 3B CHRONIC KIDNEY DISEASE, WITH LONG-TERM CURRENT USE OF INSULIN: ICD-10-CM

## 2022-05-18 DIAGNOSIS — R06.09 DYSPNEA ON EXERTION: Primary | ICD-10-CM

## 2022-05-18 DIAGNOSIS — I27.20 PULMONARY HYPERTENSION: ICD-10-CM

## 2022-05-18 DIAGNOSIS — E78.5 HYPERLIPIDEMIA, UNSPECIFIED HYPERLIPIDEMIA TYPE: ICD-10-CM

## 2022-05-18 DIAGNOSIS — R60.0 BILATERAL LOWER EXTREMITY EDEMA: ICD-10-CM

## 2022-05-18 DIAGNOSIS — Z99.89 OBSTRUCTIVE SLEEP APNEA ON CPAP: ICD-10-CM

## 2022-05-18 DIAGNOSIS — N18.32 TYPE 2 DIABETES MELLITUS WITH STAGE 3B CHRONIC KIDNEY DISEASE, WITH LONG-TERM CURRENT USE OF INSULIN: ICD-10-CM

## 2022-05-18 DIAGNOSIS — E66.01 MORBIDLY OBESE: ICD-10-CM

## 2022-05-18 DIAGNOSIS — N18.31 STAGE 3A CHRONIC KIDNEY DISEASE: ICD-10-CM

## 2022-05-18 DIAGNOSIS — Z79.4 TYPE 2 DIABETES MELLITUS WITH STAGE 3B CHRONIC KIDNEY DISEASE, WITH LONG-TERM CURRENT USE OF INSULIN: ICD-10-CM

## 2022-05-18 DIAGNOSIS — G47.33 OBSTRUCTIVE SLEEP APNEA ON CPAP: ICD-10-CM

## 2022-05-18 PROCEDURE — 99215 OFFICE O/P EST HI 40 MIN: CPT | Performed by: INTERNAL MEDICINE

## 2022-05-18 RX ORDER — DOXAZOSIN 2 MG/1
2 TABLET ORAL NIGHTLY
Qty: 90 TABLET | Refills: 3 | Status: SHIPPED | OUTPATIENT
Start: 2022-05-18 | End: 2022-07-20 | Stop reason: SDUPTHER

## 2022-05-23 ENCOUNTER — TELEPHONE (OUTPATIENT)
Dept: ONCOLOGY | Facility: CLINIC | Age: 77
End: 2022-05-23

## 2022-05-23 NOTE — TELEPHONE ENCOUNTER
Caller: TRAVIS    Relationship to patient: SELF    Best call back number: [phone]    Patient is needing: TO CANCEL 6-2-2022 VASCULAR APPT. PT CANNOT GO TO [place]. PT REQUEST CALL FROM RN.

## 2022-05-23 NOTE — TELEPHONE ENCOUNTER
Patient has a referral to see vascular surgeon in [place] on 6/2/2022.  Patient states that she cannot go to [place] for appointment and wants appointment cancelled.  She wants to discuss with Dr. Farnsworth nurse. Please advise.

## 2022-07-05 ENCOUNTER — HOSPITAL ENCOUNTER (OUTPATIENT)
Dept: NUCLEAR MEDICINE | Facility: HOSPITAL | Age: 77
Discharge: HOME | End: 2022-07-05

## 2022-07-05 ENCOUNTER — TELEPHONE (OUTPATIENT)
Dept: CARDIOLOGY | Facility: CLINIC | Age: 77
End: 2022-07-05

## 2022-07-05 DIAGNOSIS — E11.22 TYPE 2 DIABETES MELLITUS WITH STAGE 3B CHRONIC KIDNEY DISEASE, WITH LONG-TERM CURRENT USE OF INSULIN: ICD-10-CM

## 2022-07-05 DIAGNOSIS — N18.31 STAGE 3A CHRONIC KIDNEY DISEASE: ICD-10-CM

## 2022-07-05 DIAGNOSIS — N18.32 TYPE 2 DIABETES MELLITUS WITH STAGE 3B CHRONIC KIDNEY DISEASE, WITH LONG-TERM CURRENT USE OF INSULIN: ICD-10-CM

## 2022-07-05 DIAGNOSIS — I10 ESSENTIAL HYPERTENSION: ICD-10-CM

## 2022-07-05 DIAGNOSIS — Z79.4 TYPE 2 DIABETES MELLITUS WITH STAGE 3B CHRONIC KIDNEY DISEASE, WITH LONG-TERM CURRENT USE OF INSULIN: ICD-10-CM

## 2022-07-05 DIAGNOSIS — R06.09 DYSPNEA ON EXERTION: ICD-10-CM

## 2022-07-05 LAB
BH CV REST NUCLEAR ISOTOPE DOSE: 11.1 MCI
BH CV STRESS COMMENTS STAGE 1: NORMAL
BH CV STRESS DOSE REGADENOSON STAGE 1: 0.4
BH CV STRESS DURATION MIN STAGE 1: 0
BH CV STRESS DURATION SEC STAGE 1: 10
BH CV STRESS NUCLEAR ISOTOPE DOSE: 32 MCI
BH CV STRESS PROTOCOL 1: NORMAL
BH CV STRESS RECOVERY BP: NORMAL MMHG
BH CV STRESS RECOVERY HR: 85 BPM
BH CV STRESS STAGE 1: 1
MAXIMAL PREDICTED HEART RATE: 144 BPM
PERCENT MAX PREDICTED HR: 72.92 %
STRESS BASELINE BP: NORMAL MMHG
STRESS BASELINE HR: 74 BPM
STRESS PERCENT HR: 86 %
STRESS POST PEAK BP: NORMAL MMHG
STRESS POST PEAK HR: 105 BPM
STRESS TARGET HR: 122 BPM

## 2022-07-05 PROCEDURE — A9500 TC99M SESTAMIBI: HCPCS | Performed by: INTERNAL MEDICINE

## 2022-07-05 PROCEDURE — 78452 HT MUSCLE IMAGE SPECT MULT: CPT

## 2022-07-05 PROCEDURE — 93018 CV STRESS TEST I&R ONLY: CPT | Performed by: INTERNAL MEDICINE

## 2022-07-05 PROCEDURE — 93017 CV STRESS TEST TRACING ONLY: CPT

## 2022-07-05 PROCEDURE — 0 TECHNETIUM SESTAMIBI: Performed by: INTERNAL MEDICINE

## 2022-07-05 PROCEDURE — 78452 HT MUSCLE IMAGE SPECT MULT: CPT | Performed by: INTERNAL MEDICINE

## 2022-07-05 PROCEDURE — 25010000002 REGADENOSON 0.4 MG/5ML SOLUTION: Performed by: INTERNAL MEDICINE

## 2022-07-05 RX ADMIN — TECHNETIUM TC 99M SESTAMIBI 1 DOSE: 1 INJECTION INTRAVENOUS at 09:20

## 2022-07-05 RX ADMIN — REGADENOSON 0.4 MG: 0.08 INJECTION, SOLUTION INTRAVENOUS at 09:20

## 2022-07-05 RX ADMIN — TECHNETIUM TC 99M SESTAMIBI 1 DOSE: 1 INJECTION INTRAVENOUS at 07:49

## 2022-07-05 NOTE — TELEPHONE ENCOUNTER
Sandra, can you please call pt with the below information regarding her stress test results?    Thank you,  Eli        ----- Message from Sb Power MD sent at 7/5/2022 12:27 PM EDT -----  Please tell patient stress test showed normal pumping function no evidence of inadequate blood flow.

## 2022-07-06 ENCOUNTER — TRANSCRIBE ORDERS (OUTPATIENT)
Dept: WOUND CARE | Facility: HOSPITAL | Age: 77
End: 2022-07-06

## 2022-07-06 DIAGNOSIS — I87.2 VENOUS INSUFFICIENCY: Primary | ICD-10-CM

## 2022-07-11 ENCOUNTER — TRANSCRIBE ORDERS (OUTPATIENT)
Dept: ADMINISTRATIVE | Facility: HOSPITAL | Age: 77
End: 2022-07-11

## 2022-07-11 ENCOUNTER — LAB (OUTPATIENT)
Dept: LAB | Facility: HOSPITAL | Age: 77
End: 2022-07-11

## 2022-07-11 DIAGNOSIS — N18.30 STAGE 3 CHRONIC KIDNEY DISEASE, UNSPECIFIED WHETHER STAGE 3A OR 3B CKD: ICD-10-CM

## 2022-07-11 DIAGNOSIS — N18.30 STAGE 3 CHRONIC KIDNEY DISEASE, UNSPECIFIED WHETHER STAGE 3A OR 3B CKD: Primary | ICD-10-CM

## 2022-07-11 LAB
ALBUMIN SERPL-MCNC: 3.5 G/DL (ref 3.5–5.2)
ALBUMIN/GLOB SERPL: 1.2 G/DL
ALP SERPL-CCNC: 102 U/L (ref 39–117)
ALT SERPL W P-5'-P-CCNC: 14 U/L (ref 1–33)
ANION GAP SERPL CALCULATED.3IONS-SCNC: 10.6 MMOL/L (ref 5–15)
AST SERPL-CCNC: 11 U/L (ref 1–32)
BASOPHILS # BLD AUTO: 0.04 10*3/MM3 (ref 0–0.2)
BASOPHILS NFR BLD AUTO: 0.3 % (ref 0–1.5)
BILIRUB SERPL-MCNC: 0.4 MG/DL (ref 0–1.2)
BILIRUB UR QL STRIP: NEGATIVE
BUN SERPL-MCNC: 40 MG/DL (ref 8–23)
BUN/CREAT SERPL: 28.6 (ref 7–25)
CALCIUM SPEC-SCNC: 8.6 MG/DL (ref 8.6–10.5)
CHLORIDE SERPL-SCNC: 101 MMOL/L (ref 98–107)
CLARITY UR: CLEAR
CO2 SERPL-SCNC: 24.4 MMOL/L (ref 22–29)
COLOR UR: YELLOW
CREAT SERPL-MCNC: 1.4 MG/DL (ref 0.57–1)
CREAT UR-MCNC: 99.5 MG/DL
DEPRECATED RDW RBC AUTO: 43.3 FL (ref 37–54)
EGFRCR SERPLBLD CKD-EPI 2021: 39.1 ML/MIN/1.73
EOSINOPHIL # BLD AUTO: 0.13 10*3/MM3 (ref 0–0.4)
EOSINOPHIL NFR BLD AUTO: 1.1 % (ref 0.3–6.2)
ERYTHROCYTE [DISTWIDTH] IN BLOOD BY AUTOMATED COUNT: 12.7 % (ref 12.3–15.4)
GLOBULIN UR ELPH-MCNC: 2.9 GM/DL
GLUCOSE SERPL-MCNC: 212 MG/DL (ref 65–99)
GLUCOSE UR STRIP-MCNC: NEGATIVE MG/DL
HCT VFR BLD AUTO: 34.2 % (ref 34–46.6)
HGB BLD-MCNC: 10.9 G/DL (ref 12–15.9)
HGB UR QL STRIP.AUTO: NEGATIVE
IMM GRANULOCYTES # BLD AUTO: 0.12 10*3/MM3 (ref 0–0.05)
IMM GRANULOCYTES NFR BLD AUTO: 1 % (ref 0–0.5)
KETONES UR QL STRIP: NEGATIVE
LEUKOCYTE ESTERASE UR QL STRIP.AUTO: NEGATIVE
LYMPHOCYTES # BLD AUTO: 0.76 10*3/MM3 (ref 0.7–3.1)
LYMPHOCYTES NFR BLD AUTO: 6.6 % (ref 19.6–45.3)
MCH RBC QN AUTO: 29.7 PG (ref 26.6–33)
MCHC RBC AUTO-ENTMCNC: 31.9 G/DL (ref 31.5–35.7)
MCV RBC AUTO: 93.2 FL (ref 79–97)
MONOCYTES # BLD AUTO: 0.57 10*3/MM3 (ref 0.1–0.9)
MONOCYTES NFR BLD AUTO: 4.9 % (ref 5–12)
NEUTROPHILS NFR BLD AUTO: 86.1 % (ref 42.7–76)
NEUTROPHILS NFR BLD AUTO: 9.94 10*3/MM3 (ref 1.7–7)
NITRITE UR QL STRIP: NEGATIVE
NRBC BLD AUTO-RTO: 0 /100 WBC (ref 0–0.2)
PH UR STRIP.AUTO: 5.5 [PH] (ref 5–8)
PHOSPHATE SERPL-MCNC: 3.4 MG/DL (ref 2.5–4.5)
PLATELET # BLD AUTO: 213 10*3/MM3 (ref 140–450)
PMV BLD AUTO: 9.7 FL (ref 6–12)
POTASSIUM SERPL-SCNC: 5.4 MMOL/L (ref 3.5–5.2)
PROT ?TM UR-MCNC: 12.8 MG/DL
PROT SERPL-MCNC: 6.4 G/DL (ref 6–8.5)
PROT UR QL STRIP: NEGATIVE
PROT/CREAT UR: 128.6 MG/G CREA (ref 0–200)
PTH-INTACT SERPL-MCNC: 73.8 PG/ML (ref 15–65)
RBC # BLD AUTO: 3.67 10*6/MM3 (ref 3.77–5.28)
SODIUM SERPL-SCNC: 136 MMOL/L (ref 136–145)
SP GR UR STRIP: 1.02 (ref 1–1.03)
UROBILINOGEN UR QL STRIP: NORMAL
WBC NRBC COR # BLD: 11.56 10*3/MM3 (ref 3.4–10.8)

## 2022-07-11 PROCEDURE — 36415 COLL VENOUS BLD VENIPUNCTURE: CPT

## 2022-07-11 PROCEDURE — 84100 ASSAY OF PHOSPHORUS: CPT

## 2022-07-11 PROCEDURE — 83970 ASSAY OF PARATHORMONE: CPT

## 2022-07-11 PROCEDURE — 81003 URINALYSIS AUTO W/O SCOPE: CPT

## 2022-07-11 PROCEDURE — 82570 ASSAY OF URINE CREATININE: CPT

## 2022-07-11 PROCEDURE — 80053 COMPREHEN METABOLIC PANEL: CPT

## 2022-07-11 PROCEDURE — 84156 ASSAY OF PROTEIN URINE: CPT

## 2022-07-11 PROCEDURE — 85025 COMPLETE CBC W/AUTO DIFF WBC: CPT

## 2022-07-15 ENCOUNTER — HOSPITAL ENCOUNTER (OUTPATIENT)
Dept: CARDIOLOGY | Facility: HOSPITAL | Age: 77
Discharge: HOME | End: 2022-07-15

## 2022-07-15 DIAGNOSIS — I87.2 VENOUS INSUFFICIENCY: ICD-10-CM

## 2022-07-15 LAB
BH CV LEFT LOWER VAS GSV BELOW KNEE REFLUX TIME: 1203 SEC
BH CV LEFT LOWER VAS GSV KNEE REFLUX TIME: 1049 SEC
BH CV LEFT LOWER VAS GSV KNEE TRANSVERSE DIAMETER: 0.3 CM
BH CV LEFT LOWER VAS GSV MID CALF TRANS DIAMETER: 0.12 CM
BH CV LEFT LOWER VAS GSV MID REFLUX TIME: 675 SEC
BH CV LEFT LOWER VAS GSV MID TRANSVERSE DIAMETER: 0.4 CM
BH CV LEFT LOWER VAS GSVBELOW KNEE TRANSVERSE DIAMETER: 0.3 CM
BH CV LOW VAS LEFT GREATER SAPH BK VESSEL: 1
BH CV LOW VAS LEFT GSV MID THIGH VESSEL: 1
BH CV LOW VAS RIGHT GREATER SAPH AK VESSEL: 1
BH CV LOW VAS RIGHT GSV DIST CALF VESSEL: 1
BH CV LOW VAS RIGHT GSV DIST THIGH VESSEL: 1
BH CV LOW VAS RIGHT GSV MID CALF VESSEL: 1
BH CV LOW VAS RIGHT GSV MID THIGH VESSEL: 1
BH CV LOWER ARTERIAL LEFT ABI RATIO: 1.02
BH CV LOWER ARTERIAL LEFT DORSALIS PEDIS SYS MAX: NORMAL
BH CV LOWER ARTERIAL LEFT POST TIBIAL SYS MAX: 202
BH CV LOWER ARTERIAL LEFT TBI RATIO: 0.74
BH CV LOWER ARTERIAL RIGHT ABI RATIO: 0.93
BH CV LOWER ARTERIAL RIGHT DORSALIS PEDIS SYS MAX: NORMAL
BH CV LOWER ARTERIAL RIGHT POST TIBIAL SYS MAX: 182
BH CV LOWER ARTERIAL RIGHT TBI RATIO: 0.61
BH CV LOWER VAS LEFT GSV DIST THIGH COMPRESSIBILTY: NORMAL
BH CV LOWER VAS LEFT GSV MID CALF COMPRESSIBILTY: NORMAL
BH CV LOWER VAS LEFT GSV MID THIGH COMPRESSIBILTY: NORMAL
BH CV LOWER VAS RIGHT GSV DIST THIGH COMPRESSIBILTY: NORMAL
BH CV LOWER VAS RIGHT GSV MID CALF COMPRESSIBILTY: NORMAL
BH CV LOWER VAS RIGHT GSV MID THIGH COMPRESSIBILTY: NORMAL
BH CV LOWER VASCULAR LEFT COMMON FEMORAL AUGMENT: NORMAL
BH CV LOWER VASCULAR LEFT COMMON FEMORAL COMPETENT: NORMAL
BH CV LOWER VASCULAR LEFT COMMON FEMORAL COMPRESS: NORMAL
BH CV LOWER VASCULAR LEFT COMMON FEMORAL PHASIC: NORMAL
BH CV LOWER VASCULAR LEFT COMMON FEMORAL SPONT: NORMAL
BH CV LOWER VASCULAR LEFT DISTAL FEMORAL COMPRESS: NORMAL
BH CV LOWER VASCULAR LEFT GASTRONEMIUS COMPRESS: NORMAL
BH CV LOWER VASCULAR LEFT GREATER SAPH AK COMPETENT: NORMAL
BH CV LOWER VASCULAR LEFT GREATER SAPH AK COMPRESS: NORMAL
BH CV LOWER VASCULAR LEFT GREATER SAPH BK COMPETENT: NORMAL
BH CV LOWER VASCULAR LEFT GREATER SAPH BK COMPRESS: NORMAL
BH CV LOWER VASCULAR LEFT GSV DIST THIGH COMPETENT: NORMAL
BH CV LOWER VASCULAR LEFT GSV MID CALF COMPETENT: NORMAL
BH CV LOWER VASCULAR LEFT GSV MID THIGH COMPETENT: NORMAL
BH CV LOWER VASCULAR LEFT MID FEMORAL AUGMENT: NORMAL
BH CV LOWER VASCULAR LEFT MID FEMORAL COMPETENT: NORMAL
BH CV LOWER VASCULAR LEFT MID FEMORAL COMPRESS: NORMAL
BH CV LOWER VASCULAR LEFT MID FEMORAL PHASIC: NORMAL
BH CV LOWER VASCULAR LEFT MID FEMORAL SPONT: NORMAL
BH CV LOWER VASCULAR LEFT PERONEAL COMPRESS: NORMAL
BH CV LOWER VASCULAR LEFT POPLITEAL AUGMENT: NORMAL
BH CV LOWER VASCULAR LEFT POPLITEAL COMPETENT: NORMAL
BH CV LOWER VASCULAR LEFT POPLITEAL COMPRESS: NORMAL
BH CV LOWER VASCULAR LEFT POPLITEAL PHASIC: NORMAL
BH CV LOWER VASCULAR LEFT POPLITEAL SPONT: NORMAL
BH CV LOWER VASCULAR LEFT POSTERIOR TIBIAL COMPRESS: NORMAL
BH CV LOWER VASCULAR LEFT PROFUNDA FEMORAL COMPRESS: NORMAL
BH CV LOWER VASCULAR LEFT PROXIMAL FEMORAL COMPRESS: NORMAL
BH CV LOWER VASCULAR LEFT SAPHENOFEMORAL JUNCTION AUGMENT: NORMAL
BH CV LOWER VASCULAR LEFT SAPHENOFEMORAL JUNCTION COMPETENT: NORMAL
BH CV LOWER VASCULAR LEFT SAPHENOFEMORAL JUNCTION COMPRESS: NORMAL
BH CV LOWER VASCULAR LEFT SAPHENOFEMORAL JUNCTION PHASIC: NORMAL
BH CV LOWER VASCULAR LEFT SAPHENOFEMORAL JUNCTION SPONT: NORMAL
BH CV LOWER VASCULAR RIGHT COMMON FEMORAL AUGMENT: NORMAL
BH CV LOWER VASCULAR RIGHT COMMON FEMORAL COMPETENT: NORMAL
BH CV LOWER VASCULAR RIGHT COMMON FEMORAL COMPRESS: NORMAL
BH CV LOWER VASCULAR RIGHT COMMON FEMORAL PHASIC: NORMAL
BH CV LOWER VASCULAR RIGHT COMMON FEMORAL SPONT: NORMAL
BH CV LOWER VASCULAR RIGHT DISTAL FEMORAL COMPRESS: NORMAL
BH CV LOWER VASCULAR RIGHT GASTRONEMIUS COMPRESS: NORMAL
BH CV LOWER VASCULAR RIGHT GREATER SAPH AK COMPETENT: NORMAL
BH CV LOWER VASCULAR RIGHT GSV DIST THIGH COMPETENT: NORMAL
BH CV LOWER VASCULAR RIGHT GSV MID CALF COMPETENT: NORMAL
BH CV LOWER VASCULAR RIGHT GSV MID THIGH COMPETENT: NORMAL
BH CV LOWER VASCULAR RIGHT LESSER SAPH COMPETENT: NORMAL
BH CV LOWER VASCULAR RIGHT LESSER SAPH COMPRESS: NORMAL
BH CV LOWER VASCULAR RIGHT MID FEMORAL AUGMENT: NORMAL
BH CV LOWER VASCULAR RIGHT MID FEMORAL COMPETENT: NORMAL
BH CV LOWER VASCULAR RIGHT MID FEMORAL COMPRESS: NORMAL
BH CV LOWER VASCULAR RIGHT MID FEMORAL PHASIC: NORMAL
BH CV LOWER VASCULAR RIGHT MID FEMORAL SPONT: NORMAL
BH CV LOWER VASCULAR RIGHT PERONEAL COMPRESS: NORMAL
BH CV LOWER VASCULAR RIGHT POPLITEAL AUGMENT: NORMAL
BH CV LOWER VASCULAR RIGHT POPLITEAL COMPETENT: NORMAL
BH CV LOWER VASCULAR RIGHT POPLITEAL COMPRESS: NORMAL
BH CV LOWER VASCULAR RIGHT POPLITEAL PHASIC: NORMAL
BH CV LOWER VASCULAR RIGHT POPLITEAL SPONT: NORMAL
BH CV LOWER VASCULAR RIGHT POSTERIOR TIBIAL COMPRESS: NORMAL
BH CV LOWER VASCULAR RIGHT PROFUNDA FEMORAL COMPRESS: NORMAL
BH CV LOWER VASCULAR RIGHT PROXIMAL FEMORAL COMPRESS: NORMAL
BH CV LOWER VASCULAR RIGHT SAPHENOFEMORAL JUNCTION AUGMENT: NORMAL
BH CV LOWER VASCULAR RIGHT SAPHENOFEMORAL JUNCTION COMPETENT: NORMAL
BH CV LOWER VASCULAR RIGHT SAPHENOFEMORAL JUNCTION COMPRESS: NORMAL
BH CV LOWER VASCULAR RIGHT SAPHENOFEMORAL JUNCTION PHASIC: NORMAL
BH CV LOWER VASCULAR RIGHT SAPHENOFEMORAL JUNCTION SPONT: NORMAL
BH CV RIGHT LOWER VAS GSV KNEE REFLUX TIME: 1394 SEC
BH CV RIGHT LOWER VAS GSV KNEE TRANS DIAMETER: 0.41 CM
BH CV RIGHT LOWER VAS GSV MID CALF REFLUX TIME: 1020 SEC
BH CV RIGHT LOWER VAS GSV MID CALF TRANS DIAMETER: 0.32 CM
BH CV RIGHT LOWER VAS GSV MID THIGH REFLUX TIME: 1394 SEC
BH CV RIGHT LOWER VAS GSV MID THIGH TRANS DIAMETER: 0.43 CM
BH CV RIGHT LOWER VAS GSV PROX THIGH REFLUX TIME: 1614 SEC
BH CV RIGHT LOWER VAS GSV PROX THIGH TRANS DIAMETER: 0.47 CM
BH CV VAS RIGHT GSV PROXIMAL HIDDEN LRR COMPRESSIBILTY: NORMAL
MAXIMAL PREDICTED HEART RATE: 144 BPM
MAXIMAL PREDICTED HEART RATE: 144 BPM
STRESS TARGET HR: 122 BPM
STRESS TARGET HR: 122 BPM
UPPER ARTERIAL RIGHT ARM BRACHIAL SYS MAX: 195 MMHG

## 2022-07-15 PROCEDURE — 93970 EXTREMITY STUDY: CPT

## 2022-07-15 PROCEDURE — 93922 UPR/L XTREMITY ART 2 LEVELS: CPT

## 2022-07-20 ENCOUNTER — LAB (OUTPATIENT)
Dept: LAB | Facility: HOSPITAL | Age: 77
End: 2022-07-20

## 2022-07-20 ENCOUNTER — OFFICE VISIT (OUTPATIENT)
Dept: ONCOLOGY | Facility: CLINIC | Age: 77
End: 2022-07-20

## 2022-07-20 VITALS
DIASTOLIC BLOOD PRESSURE: 76 MMHG | TEMPERATURE: 96.6 F | RESPIRATION RATE: 16 BRPM | SYSTOLIC BLOOD PRESSURE: 147 MMHG | HEIGHT: 60 IN | WEIGHT: 205.7 LBS | BODY MASS INDEX: 40.38 KG/M2 | OXYGEN SATURATION: 97 % | HEART RATE: 79 BPM

## 2022-07-20 DIAGNOSIS — R60.0 BILATERAL LOWER EXTREMITY EDEMA: ICD-10-CM

## 2022-07-20 DIAGNOSIS — D05.12 BREAST NEOPLASM, TIS (DCIS), LEFT: ICD-10-CM

## 2022-07-20 DIAGNOSIS — D50.0 IRON DEFICIENCY ANEMIA DUE TO CHRONIC BLOOD LOSS: Primary | ICD-10-CM

## 2022-07-20 DIAGNOSIS — Z12.31 ENCOUNTER FOR SCREENING MAMMOGRAM FOR MALIGNANT NEOPLASM OF BREAST: ICD-10-CM

## 2022-07-20 DIAGNOSIS — D50.0 IRON DEFICIENCY ANEMIA DUE TO CHRONIC BLOOD LOSS: ICD-10-CM

## 2022-07-20 LAB
ALBUMIN SERPL-MCNC: 3.7 G/DL (ref 3.5–5.2)
ALBUMIN/GLOB SERPL: 1.2 G/DL (ref 1.1–2.4)
ALP SERPL-CCNC: 116 U/L (ref 38–116)
ALT SERPL W P-5'-P-CCNC: 17 U/L (ref 0–33)
ANION GAP SERPL CALCULATED.3IONS-SCNC: 13.8 MMOL/L (ref 5–15)
AST SERPL-CCNC: 13 U/L (ref 0–32)
BASOPHILS # BLD AUTO: 0.04 10*3/MM3 (ref 0–0.2)
BASOPHILS NFR BLD AUTO: 0.3 % (ref 0–1.5)
BILIRUB SERPL-MCNC: 0.3 MG/DL (ref 0.2–1.2)
BUN SERPL-MCNC: 45 MG/DL (ref 6–20)
BUN/CREAT SERPL: 27.1 (ref 7.3–30)
CALCIUM SPEC-SCNC: 9.1 MG/DL (ref 8.5–10.2)
CHLORIDE SERPL-SCNC: 100 MMOL/L (ref 98–107)
CO2 SERPL-SCNC: 25.2 MMOL/L (ref 22–29)
CREAT SERPL-MCNC: 1.66 MG/DL (ref 0.6–1.1)
DEPRECATED RDW RBC AUTO: 48.4 FL (ref 37–54)
EGFRCR SERPLBLD CKD-EPI 2021: 31.8 ML/MIN/1.73
EOSINOPHIL # BLD AUTO: 0.18 10*3/MM3 (ref 0–0.4)
EOSINOPHIL NFR BLD AUTO: 1.4 % (ref 0.3–6.2)
ERYTHROCYTE [DISTWIDTH] IN BLOOD BY AUTOMATED COUNT: 13.3 % (ref 12.3–15.4)
FERRITIN SERPL-MCNC: 183.2 NG/ML (ref 13–150)
GLOBULIN UR ELPH-MCNC: 3.2 GM/DL (ref 1.8–3.5)
GLUCOSE SERPL-MCNC: 210 MG/DL (ref 74–124)
HCT VFR BLD AUTO: 34.9 % (ref 34–46.6)
HGB BLD-MCNC: 10.5 G/DL (ref 12–15.9)
HGB RETIC QN AUTO: 34.5 PG (ref 29.8–36.1)
IMM GRANULOCYTES # BLD AUTO: 0.21 10*3/MM3 (ref 0–0.05)
IMM GRANULOCYTES NFR BLD AUTO: 1.7 % (ref 0–0.5)
IMM RETICS NFR: 15.6 % (ref 3–15.8)
IRON 24H UR-MRATE: 54 MCG/DL (ref 37–145)
IRON SATN MFR SERPL: 18 % (ref 14–48)
LYMPHOCYTES # BLD AUTO: 0.85 10*3/MM3 (ref 0.7–3.1)
LYMPHOCYTES NFR BLD AUTO: 6.8 % (ref 19.6–45.3)
MCH RBC QN AUTO: 29.2 PG (ref 26.6–33)
MCHC RBC AUTO-ENTMCNC: 30.1 G/DL (ref 31.5–35.7)
MCV RBC AUTO: 97.2 FL (ref 79–97)
MONOCYTES # BLD AUTO: 0.77 10*3/MM3 (ref 0.1–0.9)
MONOCYTES NFR BLD AUTO: 6.2 % (ref 5–12)
NEUTROPHILS NFR BLD AUTO: 10.37 10*3/MM3 (ref 1.7–7)
NEUTROPHILS NFR BLD AUTO: 83.6 % (ref 42.7–76)
NRBC BLD AUTO-RTO: 0 /100 WBC (ref 0–0.2)
PLATELET # BLD AUTO: 236 10*3/MM3 (ref 140–450)
PMV BLD AUTO: 9.6 FL (ref 6–12)
POTASSIUM SERPL-SCNC: 5.3 MMOL/L (ref 3.5–4.7)
PROT SERPL-MCNC: 6.9 G/DL (ref 6.3–8)
RBC # BLD AUTO: 3.59 10*6/MM3 (ref 3.77–5.28)
RETICS # AUTO: 0.06 10*6/MM3 (ref 0.02–0.13)
RETICS/RBC NFR AUTO: 1.63 % (ref 0.7–1.9)
SODIUM SERPL-SCNC: 139 MMOL/L (ref 134–145)
TIBC SERPL-MCNC: 300 MCG/DL (ref 249–505)
TRANSFERRIN SERPL-MCNC: 214 MG/DL (ref 200–360)
WBC NRBC COR # BLD: 12.42 10*3/MM3 (ref 3.4–10.8)

## 2022-07-20 PROCEDURE — 82728 ASSAY OF FERRITIN: CPT

## 2022-07-20 PROCEDURE — 80053 COMPREHEN METABOLIC PANEL: CPT

## 2022-07-20 PROCEDURE — 83540 ASSAY OF IRON: CPT

## 2022-07-20 PROCEDURE — 36415 COLL VENOUS BLD VENIPUNCTURE: CPT

## 2022-07-20 PROCEDURE — 99213 OFFICE O/P EST LOW 20 MIN: CPT | Performed by: INTERNAL MEDICINE

## 2022-07-20 PROCEDURE — 84466 ASSAY OF TRANSFERRIN: CPT

## 2022-07-20 PROCEDURE — 85046 RETICYTE/HGB CONCENTRATE: CPT

## 2022-07-20 PROCEDURE — 85025 COMPLETE CBC W/AUTO DIFF WBC: CPT

## 2022-07-20 RX ORDER — DAPAGLIFLOZIN 10 MG/1
TABLET, FILM COATED ORAL
COMMUNITY
Start: 2022-06-16 | End: 2022-07-20 | Stop reason: SDDI

## 2022-07-20 RX ORDER — BISOPROLOL FUMARATE 10 MG/1
20 TABLET, FILM COATED ORAL
COMMUNITY

## 2022-07-20 RX ORDER — CLOTRIMAZOLE AND BETAMETHASONE DIPROPIONATE 10; .64 MG/G; MG/G
CREAM TOPICAL
COMMUNITY
Start: 2022-06-11 | End: 2022-07-20 | Stop reason: SDDI

## 2022-07-20 RX ORDER — ERGOCALCIFEROL 1.25 MG/1
1 CAPSULE ORAL
COMMUNITY
End: 2022-07-20 | Stop reason: SDDI

## 2022-07-20 RX ORDER — DOXAZOSIN MESYLATE 1 MG/1
1 TABLET ORAL NIGHTLY
COMMUNITY
Start: 2022-06-27 | End: 2022-07-25

## 2022-07-25 ENCOUNTER — OFFICE VISIT (OUTPATIENT)
Dept: CARDIOLOGY | Facility: CLINIC | Age: 77
End: 2022-07-25

## 2022-07-25 VITALS
SYSTOLIC BLOOD PRESSURE: 166 MMHG | WEIGHT: 211 LBS | HEART RATE: 80 BPM | BODY MASS INDEX: 41.43 KG/M2 | DIASTOLIC BLOOD PRESSURE: 68 MMHG | HEIGHT: 60 IN

## 2022-07-25 DIAGNOSIS — I27.20 PULMONARY HYPERTENSION: ICD-10-CM

## 2022-07-25 DIAGNOSIS — E78.5 HYPERLIPIDEMIA, UNSPECIFIED HYPERLIPIDEMIA TYPE: ICD-10-CM

## 2022-07-25 DIAGNOSIS — Z99.89 OBSTRUCTIVE SLEEP APNEA ON CPAP: ICD-10-CM

## 2022-07-25 DIAGNOSIS — N18.31 STAGE 3A CHRONIC KIDNEY DISEASE: ICD-10-CM

## 2022-07-25 DIAGNOSIS — R60.0 BILATERAL LOWER EXTREMITY EDEMA: ICD-10-CM

## 2022-07-25 DIAGNOSIS — G47.33 OBSTRUCTIVE SLEEP APNEA ON CPAP: ICD-10-CM

## 2022-07-25 DIAGNOSIS — N18.32 TYPE 2 DIABETES MELLITUS WITH STAGE 3B CHRONIC KIDNEY DISEASE, WITH LONG-TERM CURRENT USE OF INSULIN: ICD-10-CM

## 2022-07-25 DIAGNOSIS — E11.22 TYPE 2 DIABETES MELLITUS WITH STAGE 3B CHRONIC KIDNEY DISEASE, WITH LONG-TERM CURRENT USE OF INSULIN: ICD-10-CM

## 2022-07-25 DIAGNOSIS — E66.01 MORBIDLY OBESE: ICD-10-CM

## 2022-07-25 DIAGNOSIS — I10 ESSENTIAL HYPERTENSION: Primary | ICD-10-CM

## 2022-07-25 DIAGNOSIS — R06.09 DYSPNEA ON EXERTION: ICD-10-CM

## 2022-07-25 DIAGNOSIS — Z79.4 TYPE 2 DIABETES MELLITUS WITH STAGE 3B CHRONIC KIDNEY DISEASE, WITH LONG-TERM CURRENT USE OF INSULIN: ICD-10-CM

## 2022-07-25 PROCEDURE — 99215 OFFICE O/P EST HI 40 MIN: CPT | Performed by: INTERNAL MEDICINE

## 2022-07-25 RX ORDER — DOXAZOSIN MESYLATE 1 MG/1
2 TABLET ORAL NIGHTLY
Status: SHIPPED | COMMUNITY
Start: 2022-07-25 | End: 2022-07-25

## 2022-07-25 RX ORDER — DOXAZOSIN 2 MG/1
2 TABLET ORAL NIGHTLY
Status: SHIPPED | COMMUNITY
Start: 2022-07-25

## 2022-07-25 NOTE — PROGRESS NOTES
"Chief Complaint  Follow-up    Subjective    History of Present Illness      I saw Blanca Zhu today for continued cardiovascular care.  She is a very sweet 77-year-old female accompanied by her .  She denies chest pain pressure or tightness.  She underwent nuclear stress testing 7/5/2022.  This demonstrated hyperdynamic left ventricular contractility with ejection fraction 94%.  No evidence of significant ischemia.  Inferior lateral fixed defect possibly prior infarction versus diaphragmatic attenuation.  Echocardiogram 5/13/2022 reveals preserved left ventricular contractility, grade 1A diastolic dysfunction, no significant valvular abnormality.  Right ventricular systolic pressure 37.7 mmHg she does report significant dyspnea on exertion but overall this is stable.  She has obstructive sleep apnea and uses CPAP routinely.  She will utilize supplemental oxygen at 2 L by nasal cannula as needed.  She has chronic bilateral lower extremity edema.She underwent bilateral lower extremity ABIs on 7/15/2022 found to be normal bilaterally.  Her blood pressure is elevated in the office at 166/68.  She is checked it at home and it remains consistently above 130/80.    Objective   Vital Signs:   /68   Pulse 80   Ht 152.4 cm (60\")   Wt 95.7 kg (211 lb)   BMI 41.21 kg/m²     Constitutional:       Appearance: Well-developed.   Eyes:      Conjunctiva/sclera: Conjunctivae normal.      Pupils: Pupils are equal, round, and reactive to light.   HENT:      Head: Normocephalic and atraumatic.   Neck:      Thyroid: No thyromegaly.   Pulmonary:      Effort: Pulmonary effort is normal. No respiratory distress.      Breath sounds: Normal breath sounds. No wheezing. No rales.   Cardiovascular:      Normal rate. Regular rhythm.      S4 Gallop. No S3 gallop. No rub.   Edema:     Peripheral edema present.     Pretibial: bilateral 1+ edema of the pretibial area.  Abdominal:      General: Bowel sounds are normal. There is " no distension.      Palpations: Abdomen is soft. There is no abdominal mass.      Tenderness: There is no abdominal tenderness.   Musculoskeletal: Normal range of motion.      Cervical back: Neck supple. Skin:     General: Skin is warm and dry.      Findings: No erythema.   Neurological:      Mental Status: Alert and oriented to person, place, and time.         Result Review :     Common labs    Common Labsle 5/16/22 5/16/22 7/11/22 7/11/22 7/20/22 7/20/22    0906 0906 1002 1002 1453 1453   Glucose  187 (A)  212 (A)  210 (A)   BUN  31 (A)  40 (A)  45 (A)   Creatinine  1.27 (A)  1.40 (A)  1.66 (A)   Sodium  136  136  139   Potassium  4.8  5.4 (A)  5.3 (A)   Chloride  101  101  100   Calcium  8.8  8.6  9.1   Albumin  3.50  3.50  3.70   Total Bilirubin    0.4  0.3   Alkaline Phosphatase    102  116   AST (SGOT)    11  13   ALT (SGPT)    14  17   WBC 11.69 (A)  11.56 (A)  12.42 (A)    Hemoglobin 10.8 (A)  10.9 (A)  10.5 (A)    Hematocrit 34.7  34.2  34.9    Platelets 221  213  236    (A) Abnormal value                      Assessment and Plan    1. Essential hypertension  Target less than 130/80    2. Pulmonary hypertension (HCC)  Mild    3. Bilateral lower extremity edema  Persistent, followed by vascular surgery    4. Hyperlipidemia, unspecified hyperlipidemia type  Continue atorvastatin 20 mg daily    5. Type 2 diabetes mellitus with stage 3b chronic kidney disease, with long-term current use of insulin (HCC)  Weight reduction continue diet and insulin control    6. Obstructive sleep apnea on CPAP  Weight reduction    7. Morbidly obese (HCC)  Weight reduction    8. Dyspnea on exertion  Multifactorial    9. Stage 3a chronic kidney disease (HCC)  Monitor    Overall Blossom has a compromised but stable respiratory status.  I will advance Cardura to 2 mg daily for further blood pressure control.  We will continue to monitor her blood pressure at home Monday Wednesday and Friday with a target of less than 130/80.  I  reviewed her diagnostic testing with her and her  today.  I will plan to see her in follow-up in 3 months sooner if needed.      I spent 45 minutes caring for Blanca on this date of service. This time includes time spent by me in the following activities:preparing for the visit, reviewing tests, performing a medically appropriate examination and/or evaluation , counseling and educating the patient/family/caregiver, ordering medications, tests, or procedures, documenting information in the medical record, independently interpreting results and communicating that information with the patient/family/caregiver and care coordination  Follow Up   No follow-ups on file.  Patient was given instructions and counseling regarding her condition or for health maintenance advice. Please see specific information pulled into the AVS if appropriate.

## 2022-09-19 ENCOUNTER — TRANSCRIBE ORDERS (OUTPATIENT)
Dept: ADMINISTRATIVE | Facility: HOSPITAL | Age: 77
End: 2022-09-19

## 2022-09-19 DIAGNOSIS — Z78.0 ASYMPTOMATIC MENOPAUSAL STATE: Primary | ICD-10-CM

## 2022-10-03 ENCOUNTER — OFFICE VISIT (OUTPATIENT)
Dept: CARDIOLOGY | Facility: CLINIC | Age: 77
End: 2022-10-03

## 2022-10-03 VITALS
SYSTOLIC BLOOD PRESSURE: 130 MMHG | WEIGHT: 211 LBS | HEART RATE: 60 BPM | DIASTOLIC BLOOD PRESSURE: 58 MMHG | BODY MASS INDEX: 41.43 KG/M2 | HEIGHT: 60 IN

## 2022-10-03 DIAGNOSIS — E66.01 MORBIDLY OBESE: ICD-10-CM

## 2022-10-03 DIAGNOSIS — Z99.89 OBSTRUCTIVE SLEEP APNEA ON CPAP: ICD-10-CM

## 2022-10-03 DIAGNOSIS — Z79.4 TYPE 2 DIABETES MELLITUS WITH STAGE 3B CHRONIC KIDNEY DISEASE, WITH LONG-TERM CURRENT USE OF INSULIN: ICD-10-CM

## 2022-10-03 DIAGNOSIS — N18.31 STAGE 3A CHRONIC KIDNEY DISEASE: ICD-10-CM

## 2022-10-03 DIAGNOSIS — E11.22 TYPE 2 DIABETES MELLITUS WITH STAGE 3B CHRONIC KIDNEY DISEASE, WITH LONG-TERM CURRENT USE OF INSULIN: ICD-10-CM

## 2022-10-03 DIAGNOSIS — I10 ESSENTIAL HYPERTENSION: Primary | ICD-10-CM

## 2022-10-03 DIAGNOSIS — N18.32 TYPE 2 DIABETES MELLITUS WITH STAGE 3B CHRONIC KIDNEY DISEASE, WITH LONG-TERM CURRENT USE OF INSULIN: ICD-10-CM

## 2022-10-03 DIAGNOSIS — I27.20 PULMONARY HYPERTENSION: ICD-10-CM

## 2022-10-03 DIAGNOSIS — R06.09 DYSPNEA ON EXERTION: ICD-10-CM

## 2022-10-03 DIAGNOSIS — E78.5 HYPERLIPIDEMIA, UNSPECIFIED HYPERLIPIDEMIA TYPE: ICD-10-CM

## 2022-10-03 DIAGNOSIS — R60.0 BILATERAL LOWER EXTREMITY EDEMA: ICD-10-CM

## 2022-10-03 DIAGNOSIS — G47.33 OBSTRUCTIVE SLEEP APNEA ON CPAP: ICD-10-CM

## 2022-10-03 PROCEDURE — 99214 OFFICE O/P EST MOD 30 MIN: CPT | Performed by: INTERNAL MEDICINE

## 2022-10-14 ENCOUNTER — LAB (OUTPATIENT)
Dept: LAB | Facility: HOSPITAL | Age: 77
End: 2022-10-14

## 2022-10-14 ENCOUNTER — TRANSCRIBE ORDERS (OUTPATIENT)
Dept: ADMINISTRATIVE | Facility: HOSPITAL | Age: 77
End: 2022-10-14

## 2022-10-14 DIAGNOSIS — E11.65 TYPE II DIABETES MELLITUS WITH HYPEROSMOLARITY, UNCONTROLLED: ICD-10-CM

## 2022-10-14 DIAGNOSIS — M31.5 POLYMYALGIA ARTERITICA: ICD-10-CM

## 2022-10-14 DIAGNOSIS — E08.21 DIABETES MELLITUS DUE TO UNDERLYING CONDITION WITH DIABETIC NEPHROPATHY, UNSPECIFIED WHETHER LONG TERM INSULIN USE: ICD-10-CM

## 2022-10-14 DIAGNOSIS — R82.71 BACTERIURIA: ICD-10-CM

## 2022-10-14 DIAGNOSIS — R53.83 TIREDNESS: ICD-10-CM

## 2022-10-14 DIAGNOSIS — Z00.00 ROUTINE GENERAL MEDICAL EXAMINATION AT A HEALTH CARE FACILITY: Primary | ICD-10-CM

## 2022-10-14 DIAGNOSIS — E11.00 TYPE II DIABETES MELLITUS WITH HYPEROSMOLARITY, UNCONTROLLED: ICD-10-CM

## 2022-10-14 DIAGNOSIS — N18.30 STAGE 3 CHRONIC KIDNEY DISEASE, UNSPECIFIED WHETHER STAGE 3A OR 3B CKD: ICD-10-CM

## 2022-10-14 DIAGNOSIS — Z79.52 LONG TERM CURRENT USE OF SYSTEMIC STEROIDS: ICD-10-CM

## 2022-10-14 DIAGNOSIS — R10.13 ABDOMINAL PAIN, EPIGASTRIC: ICD-10-CM

## 2022-10-14 DIAGNOSIS — E78.5 HYPERLIPIDEMIA, UNSPECIFIED HYPERLIPIDEMIA TYPE: ICD-10-CM

## 2022-10-14 DIAGNOSIS — N18.30 STAGE 3 CHRONIC KIDNEY DISEASE, UNSPECIFIED WHETHER STAGE 3A OR 3B CKD: Primary | ICD-10-CM

## 2022-10-14 DIAGNOSIS — Z00.00 ROUTINE GENERAL MEDICAL EXAMINATION AT A HEALTH CARE FACILITY: ICD-10-CM

## 2022-10-14 DIAGNOSIS — M31.5 POLYMYALGIA ARTERITICA: Primary | ICD-10-CM

## 2022-10-14 LAB
ALBUMIN SERPL-MCNC: 4 G/DL (ref 3.5–5.2)
ALBUMIN UR-MCNC: 9.6 MG/DL
ALBUMIN/GLOB SERPL: 1.4 G/DL
ALP SERPL-CCNC: 98 U/L (ref 39–117)
ALT SERPL W P-5'-P-CCNC: 18 U/L (ref 1–33)
AMYLASE SERPL-CCNC: 48 U/L (ref 28–100)
ANION GAP SERPL CALCULATED.3IONS-SCNC: 9 MMOL/L (ref 5–15)
AST SERPL-CCNC: 20 U/L (ref 1–32)
BASOPHILS # BLD AUTO: 0.04 10*3/MM3 (ref 0–0.2)
BASOPHILS NFR BLD AUTO: 0.3 % (ref 0–1.5)
BILIRUB SERPL-MCNC: 0.4 MG/DL (ref 0–1.2)
BILIRUB UR QL STRIP: NEGATIVE
BUN SERPL-MCNC: 24 MG/DL (ref 8–23)
BUN/CREAT SERPL: 18.9 (ref 7–25)
CALCIUM SPEC-SCNC: 8.9 MG/DL (ref 8.6–10.5)
CHLORIDE SERPL-SCNC: 100 MMOL/L (ref 98–107)
CHOLEST SERPL-MCNC: 133 MG/DL (ref 0–200)
CLARITY UR: CLEAR
CO2 SERPL-SCNC: 28 MMOL/L (ref 22–29)
COLOR UR: YELLOW
CREAT SERPL-MCNC: 1.27 MG/DL (ref 0.57–1)
CREAT UR-MCNC: 94.7 MG/DL
DEPRECATED RDW RBC AUTO: 39.5 FL (ref 37–54)
EGFRCR SERPLBLD CKD-EPI 2021: 43.9 ML/MIN/1.73
EOSINOPHIL # BLD AUTO: 0.22 10*3/MM3 (ref 0–0.4)
EOSINOPHIL NFR BLD AUTO: 1.9 % (ref 0.3–6.2)
ERYTHROCYTE [DISTWIDTH] IN BLOOD BY AUTOMATED COUNT: 11.7 % (ref 12.3–15.4)
GLOBULIN UR ELPH-MCNC: 2.8 GM/DL
GLUCOSE SERPL-MCNC: 192 MG/DL (ref 65–99)
GLUCOSE UR STRIP-MCNC: NEGATIVE MG/DL
HBA1C MFR BLD: 8.4 % (ref 4.8–5.6)
HCT VFR BLD AUTO: 32.1 % (ref 34–46.6)
HDLC SERPL-MCNC: 46 MG/DL (ref 40–60)
HGB BLD-MCNC: 10.5 G/DL (ref 12–15.9)
HGB UR QL STRIP.AUTO: NEGATIVE
IMM GRANULOCYTES # BLD AUTO: 0.09 10*3/MM3 (ref 0–0.05)
IMM GRANULOCYTES NFR BLD AUTO: 0.8 % (ref 0–0.5)
KETONES UR QL STRIP: NEGATIVE
LDLC SERPL CALC-MCNC: 68 MG/DL (ref 0–100)
LDLC/HDLC SERPL: 1.43 {RATIO}
LEUKOCYTE ESTERASE UR QL STRIP.AUTO: NEGATIVE
LIPASE SERPL-CCNC: 14 U/L (ref 13–60)
LYMPHOCYTES # BLD AUTO: 0.78 10*3/MM3 (ref 0.7–3.1)
LYMPHOCYTES NFR BLD AUTO: 6.8 % (ref 19.6–45.3)
MCH RBC QN AUTO: 30.5 PG (ref 26.6–33)
MCHC RBC AUTO-ENTMCNC: 32.7 G/DL (ref 31.5–35.7)
MCV RBC AUTO: 93.3 FL (ref 79–97)
MICROALBUMIN/CREAT UR: 101.4 MG/G
MONOCYTES # BLD AUTO: 0.72 10*3/MM3 (ref 0.1–0.9)
MONOCYTES NFR BLD AUTO: 6.3 % (ref 5–12)
NEUTROPHILS NFR BLD AUTO: 83.9 % (ref 42.7–76)
NEUTROPHILS NFR BLD AUTO: 9.65 10*3/MM3 (ref 1.7–7)
NITRITE UR QL STRIP: NEGATIVE
NRBC BLD AUTO-RTO: 0 /100 WBC (ref 0–0.2)
PH UR STRIP.AUTO: 6.5 [PH] (ref 5–8)
PLATELET # BLD AUTO: 219 10*3/MM3 (ref 140–450)
PMV BLD AUTO: 9.9 FL (ref 6–12)
POTASSIUM SERPL-SCNC: 5 MMOL/L (ref 3.5–5.2)
PROT SERPL-MCNC: 6.8 G/DL (ref 6–8.5)
PROT UR QL STRIP: ABNORMAL
RBC # BLD AUTO: 3.44 10*6/MM3 (ref 3.77–5.28)
SODIUM SERPL-SCNC: 137 MMOL/L (ref 136–145)
SP GR UR STRIP: 1.02 (ref 1–1.03)
TRIGL SERPL-MCNC: 105 MG/DL (ref 0–150)
TSH SERPL DL<=0.05 MIU/L-ACNC: 1.32 UIU/ML (ref 0.27–4.2)
UROBILINOGEN UR QL STRIP: ABNORMAL
VLDLC SERPL-MCNC: 19 MG/DL (ref 5–40)
WBC NRBC COR # BLD: 11.5 10*3/MM3 (ref 3.4–10.8)

## 2022-10-14 PROCEDURE — 83036 HEMOGLOBIN GLYCOSYLATED A1C: CPT

## 2022-10-14 PROCEDURE — 82150 ASSAY OF AMYLASE: CPT

## 2022-10-14 PROCEDURE — 82043 UR ALBUMIN QUANTITATIVE: CPT

## 2022-10-14 PROCEDURE — 80061 LIPID PANEL: CPT

## 2022-10-14 PROCEDURE — 87086 URINE CULTURE/COLONY COUNT: CPT

## 2022-10-14 PROCEDURE — 36415 COLL VENOUS BLD VENIPUNCTURE: CPT

## 2022-10-14 PROCEDURE — 81003 URINALYSIS AUTO W/O SCOPE: CPT

## 2022-10-14 PROCEDURE — 83690 ASSAY OF LIPASE: CPT

## 2022-10-14 PROCEDURE — 84443 ASSAY THYROID STIM HORMONE: CPT

## 2022-10-14 PROCEDURE — 82570 ASSAY OF URINE CREATININE: CPT

## 2022-10-14 PROCEDURE — 80053 COMPREHEN METABOLIC PANEL: CPT

## 2022-10-14 PROCEDURE — 85025 COMPLETE CBC W/AUTO DIFF WBC: CPT

## 2022-10-15 LAB — BACTERIA SPEC AEROBE CULT: NO GROWTH

## 2022-10-20 NOTE — PROGRESS NOTES
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 Also she had originally anemia due to iron deficiency, previous duodenal alterations including polyps and inflammatory changes. She was replaced with IV iron and the hemoglobin improved and now she has anemia probably a component of chronic kidney disease with a hemoglobin lingering around 10.5 to 11. She has not had any passage of blood in the stool. Her diet seems to be appropriate in regard to nutrients. She remains mildly obese. She has had during the previous visit significant swelling in the lower extremities and I asked her to be seen by her nephrologist and cardiologist. She already has had modification in her medicines and her legs are substantially better and she has lesser degree of shortness of breath. In the interim she has developed a stye in the upper lid right eye and she has been seen by ophthalmology. She has had Doppler studies of the lower extremities arterial and venous today and they have not demonstrated any alteration.     Physically otherwise the patient feels well. She has lost her sister who battled with cancer for 30 years a couple of weeks ago. She had head and neck cancer.     Interval History:  The patient returns today for triage visit due to new onset right breast pain.  She is seen with her  present today.  Approximately 4 weeks ago she started to notice some right-sided breast pain located at the bottom left and right quadrants of  her breast.  She also started to notice some nipple itching, no discharge.  She does not have pain in her right breast daily, but reports it occurs most days.  She denies any palpable abnormalities the right breast or left chest wall.  Denies fever or chills.  Denies nausea or vomiting.  Denies bone pain.      Past Medical History:   Diagnosis Date   • Anemia    • Anxiety and depression    • Asthma    • Balance problem    • Breast cancer (HCC) 2019    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, grade III, ER/LA negative   • CKD (chronic kidney disease), stage III (HCC)    • Colon polyp 2019   • Diabetes mellitus, type 2 (HCC)    • Hypertension    • Sleep apnea    • Swelling     IN LOWER EXTREMITIES   • Temporal arteritis (HCC)    • Thrombophlebitis         Past Surgical History:   Procedure Laterality Date   • BREAST BIOPSY Left  approx    benign pathology   • BREAST BIOPSY Left 2019    Ultasound guided mammotome vacuum assisted left breast biopsy with placement of a metallic clip-Dr. Daniel Gutierrez, [facility]   •  SECTION N/A     x3   • CHOLECYSTECTOMY N/A    • COLONOSCOPY     • COLONOSCOPY W/ POLYPECTOMY N/A 2019    Enlarged folds in the antrum: biopsied; duodenal, transverse colon x2, splenic flexure, descending colon and sigmoid colon polyps: biopsied (path: tubular adenoma x6)-Dr. Zak De Jesus, [facility]   • ENDOSCOPY  10/11/2019    Procedure: ESOPHAGOGASTRODUODENOSCOPY with hot snare polypectomy;  Surgeon: Haile Handley MD;  Location: [facility] ENDOSCOPY;  Service: Gastroenterology   • ENDOSCOPY N/A 2020    Procedure: ESOPHAGOGASTRODUODENOSCOPY;  Surgeon: Haile Handley MD;  Location: [facility] ENDOSCOPY;  Service: Gastroenterology   • ENDOSCOPY N/A 2020    Procedure: ESOPHAGOGASTRODUODENOSCOPY WITH BIOPSIES AND COLD BIOPSY POLYPECTOMY;  Surgeon: Haile Handley MD;  Location: [facility] ENDOSCOPY;  Service:  Gastroenterology;  Laterality: N/A;  pre: dyspepsia and diarrhea  post: duodenal polyp and gastritis   • MASTECTOMY Left    • MASTECTOMY WITH SENTINEL NODE BIOPSY AND AXILLARY NODE DISSECTION Left 7/3/2019    Procedure: LEFT BREAST MASTECTOMY WITH SENTINEL NODE BIOPSY AND , v-y plasty closure;  Surgeon: Tahmina James MD;  Location: [facility];  Service: General   • SUBTOTAL HYSTERECTOMY Bilateral     ovaries still in tact   • TEMPORAL ARTERY BIOPSY Bilateral 12/05/2019        Current Outpatient Medications on File Prior to Visit   Medication Sig Dispense Refill   • albuterol sulfate  (90 Base) MCG/ACT inhaler Every 6 (Six) Hours.     • bisoprolol (ZEBeta) 10 MG tablet Take 20 mg by mouth.     • budesonide-formoterol (SYMBICORT) 160-4.5 MCG/ACT inhaler Inhale 2 puffs 2 (Two) Times a Day.     • doxazosin (CARDURA) 2 MG tablet Take 1 tablet by mouth Every Night.     • Insulin Glargine (TOUJEO SOLOSTAR SC) Inject 40 Units under the skin into the appropriate area as directed Every Night. INSTRUCTED PT TO FOLLOW MD INSTRUCTIONS REGARDING DOSING BEFORE SURGERY     • Insulin Lispro (HUMALOG PEN SC) Inject  under the skin into the appropriate area as directed 3 (Three) Times a Day With Meals. 25 units at breakfast, 20 units at lunch, 22 units at dinner/INSTRUCTED PT TO FOLLOW MD INSTRUCTIONS REGARDING DOSING BEFORE SURGERY     • omeprazole (priLOSEC) 20 MG capsule 40 mg.     • predniSONE (DELTASONE) 5 MG tablet Take 7 mg by mouth Daily.     • torsemide (DEMADEX) 20 MG tablet      • valsartan-hydrochlorothiazide (DIOVAN-HCT) 320-25 MG per tablet Take 1 tablet by mouth Daily. (Patient taking differently: Take 0.5 tablets by mouth Daily.) 30 tablet 11     No current facility-administered medications on file prior to visit.        ALLERGIES:    Allergies   Allergen Reactions   • Aspirin Nausea Only     Low tolerance, abdominal pain   • Sulfa Antibiotics Unknown (See Comments)     Happened as a child        Social  "History     Socioeconomic History   • Marital status:      Spouse name: Ashwin   • Number of children: 3   • Years of education: College   Tobacco Use   • Smoking status: Never   • Smokeless tobacco: Never   • Tobacco comments:     caffine use   Substance and Sexual Activity   • Alcohol use: No     Comment: one or two small drinks a year   • Drug use: No   • Sexual activity: Defer     Comment: Spouse, Ashwin        Family History   Problem Relation Age of Onset   • Hypertension Father    • Stomach cancer Father    • Diabetes Father    • Esophageal cancer Father    • Throat cancer Sister    • Diabetes Paternal Grandmother    • Hypertension Paternal Grandfather    • Colon cancer Paternal Grandfather    • Heart disease Mother    • Colon cancer Paternal Uncle    • Colon cancer Paternal Uncle    • Colon cancer Paternal Uncle    • Ovarian cancer Cousin    • Stomach cancer Cousin    • Malig Hyperthermia Neg Hx       Objective     Vitals:    10/21/22 1506   BP: 155/79   Pulse: 89   Resp: 18   Temp: 97.1 °F (36.2 °C)   TempSrc: Temporal   SpO2: 98%  Comment: on 2 liters oxygen   Weight: 96.4 kg (212 lb 8 oz)   Height: 152.4 cm (60\")   PainSc:   6   PainLoc: Breast     Current Status 10/21/2022   ECOG score 1     Physical Exam   Constitutional: She is oriented to person, place, and time.   HENT:   Head: Normocephalic.   Nose: Nose normal.   Mouth/Throat: Mucous membranes are moist. Oropharynx is clear.   Eyes: Pupils are equal, round, and reactive to light. Conjunctivae are normal.   Cardiovascular: Normal rate and normal heart sounds.   Pulmonary/Chest: Effort normal and breath sounds normal.   Abdominal: Bowel sounds are normal.   Musculoskeletal: Normal range of motion.   Neurological: She is alert and oriented to person, place, and time. She displays no weakness.   Skin: Skin is warm and dry. No rash noted.   Psychiatric: Her behavior is normal. Mood normal.   Vitals reviewed.  BREAST: left mastectomy, no chest " wall abnormalities.  Right breast without any palpable abnormalities, no tenderness on exam.  Underneath the right breast, yeast infection present.    RECENT LABS:  Hematology WBC   Date Value Ref Range Status   10/21/2022 12.22 (H) 3.40 - 10.80 10*3/mm3 Final     RBC   Date Value Ref Range Status   10/21/2022 3.49 (L) 3.77 - 5.28 10*6/mm3 Final     Hemoglobin   Date Value Ref Range Status   10/21/2022 10.6 (L) 12.0 - 15.9 g/dL Final     Hematocrit   Date Value Ref Range Status   10/21/2022 33.4 (L) 34.0 - 46.6 % Final     Platelets   Date Value Ref Range Status   10/21/2022 214 140 - 450 10*3/mm3 Final            Assessment & Plan      *Left breast DCIS  · undergone a left-sided mastectomy   · The patient had a large tumor that was high grade with negative margins of resection, minimal microinvasion and negative sentinel lymph nodes.   · The patient’s estrogen receptor and progesterone receptor were negative and she was HER2 negative as well at the time of the original diagnosis.   · Under these premises, I think the patient is done with the treatment for this and we do not recommend any form of adjuvant chemotherapy, radiation treatment and obviously no hormonal therapy. There is no role for Herceptin or medicines of this nature either.   The patient was further reviewed in regard to her breast issue on 07/20/2021. Her mastectomy on the left side is well healed, the right breast is unremarkable, there is no lymphedema in either extremity and the patient is doing fine from this point of view.   · Screening mammogram 11/12/2021 negative.    · 10/21/2022: Seen for triage visit concerning right sided breast pain.  No palpable abnormalities on exam today.  We will schedule her for diagnostic mammogram and ultrasound for further evaluation.  Left chest wall examined today without evidence of recurrent disease.  She does appear to have some irritation under her breast from moisture, recommended micotin cream and trying  to avoid excess moisture to this area.    *Iron Deficiency Anemia  · The patient has been replaced with IV iron.   · 10/19/2020 hemoglobin of 11.7, her reticulated hemoglobin is normal at 35 therefore probably anemia from today is reflection of some other condition.  We have completed a multitude of testing including ferritin, iron, TIBC, B12, folic acid and reticulated hemoglobin to further analyze. We have asked the lab to run peripheral blood slide for review given her previous history of hemolytic anemia.   On 11/16/2021 the patient remains mildly anemic with a hemoglobin of 10.9. Six months ago we did ferritin, iron, TIBC, B12, folic acid levels all of them normal. Her erythropoietin level was low consistent with anemia of chronic kidney disease. She has not undergone any erythropoietin therapy at this point.   On 05/12/2022, the patient has become anemic again in spite of not losing blood in the gastrointestinal tract. Reticulated hemoglobin is 33; therefore it is likely that she does not have iron deficiency anemia but her ferritin, iron profile are pending and she has had B12 and folic acid levels measured not too far ago that were normal. This leads me to believe that very likely the anemia component of this patient at this time is probably associated with chronic illness, inflammatory condition and chronic kidney disease, especially if we account for the progressive amount of anasarca that this patient has.   On 07/20/2022 the patient still has a mild component of anemia. During the previous visit we documented normal ferritin, iron, TIBC, B12, folic acid level and normal reticulated hemoglobin. Today the numbers remain about the same. She is not having any blood loss. We advised her to remain in observation. Given her creatinine clearance it is likely that part of the anemia corresponds to anemia associated with chronic renal disease given the fact that the hemoglobin stays above 10 she does not qualify  for Procrit.  10/21/2022: hemoglobin today 10.6.    *Shortness of breath  · The patient has very significant degree of shortness of breath upon minimal exertion.   · We evaluated her walking in clinic. It took her almost 3 minutes to walk 30 feet. She was very short winded. We started with an O2 saturation of 100% on room air and a heart rate of 73. Her O2 saturation dropped to 97 and heart rate never raised.   Her echocardiogram done not too long ago did not document too much of major abnormality besides pulmonary hypertension. Her lung auscultation is abnormal with decreased breath sounds bilaterally. We do not think she has effusions but she needs to be seen by pulmonology.   On 11/16/2021 the cardiac auscultation remains unremarkable, no obvious gallops. Pulmonary auscultation shows decrease of breath sounds bilaterally. We advised her to do exercises that maybe in the long run could have impact on her and again advised her that it will be tremendously consequential to her to lose 20-30 pounds of weight. This will allow less oxygen consumption by fat and more oxygen consumption by her normal weight, normal other parts including muscles, heart, kidneys and so forth. In the long run this could have positive consequences on her.   The patient on 05/12/2022 is more short of breath than usual. She is barely able to walk 10 feet without becoming hypoxemic and she has in her cardiac auscultation today a gallop that is more than typical. Her lung auscultation is clear. She has no crackles. She has 3+ edema in both lower extremities in spite of having elastic support in place and she has a blister in the left lower extremity with no obvious secondary infection. I do believe that her shortness of breath is very worrisome for congestive heart failure, fluid accumulation, anasarca and is very worrisome as well in the background of chronic kidney disease.     *Epigastric Pain  · she is having pain in the epigastric area that  sometimes looks to and sounds like angina   · She needs to be seen by cardiology right away. Dr. Farnsworth sent a note to Dr. Power, the patient’s cardiologist, to review this.  Ordered an echocardiogram and proBNP today.   On 07/20/2022 after visits to her nephrologist and cardiologist the patient's volume status has improved, she has lesser degree of anasarca, she feels better. Medications have been modified and overall she is doing better.    *Lower Extremity Edema  · The patient states the swelling in the lower extremities along with the blister in the left lower extremity is very worrisome for her in the background of diabetes.   · She has elastic support and the legs are huge.   · Referred her to be seen by Vascular Surgery. Given the fact that she has had diabetes for so long, she will require Doppler studies of the lower extremities, both for the venous system and also for the arterial system.   On 07/20/2022 the patient had vascular studies in the lower extremities both venous and arterial failing to document any abnormality to justify the swelling in the lower extremities. Given this finding, advised the patient that this is good news and she will follow up with vascular surgery in the future. Elastic support could be something that she needs to wear.     PLAN:   · US/Diagnostic mammo ordered today to be done soonest available.  · Start micotin cream beneath the right breast.  Prescription sent to pharmacy today.  · Return 1/27/2023 for 6 month MD follow-up with CBC, CMP, ferritin, iron profile, vitamin B12, folic acid level.  Will move up this appointment if needed, pending US/Mammo results.  Call/return sooner if needed.    I spent 35 minutes caring for Blanca on this date of service. This time includes time spent by me in the following activities: preparing for the visit, reviewing tests, obtaining and/or reviewing a separately obtained history, performing a medically appropriate examination and/or  evaluation, counseling and educating the patient/family/caregiver, ordering medications, tests, or procedures, documenting information in the medical record and care coordination.     Missy Claire, APRN  10/21/22

## 2022-10-21 ENCOUNTER — LAB (OUTPATIENT)
Dept: LAB | Facility: HOSPITAL | Age: 77
End: 2022-10-21

## 2022-10-21 ENCOUNTER — OFFICE VISIT (OUTPATIENT)
Dept: ONCOLOGY | Facility: CLINIC | Age: 77
End: 2022-10-21

## 2022-10-21 VITALS
WEIGHT: 212.5 LBS | TEMPERATURE: 97.1 F | DIASTOLIC BLOOD PRESSURE: 79 MMHG | BODY MASS INDEX: 41.72 KG/M2 | RESPIRATION RATE: 18 BRPM | OXYGEN SATURATION: 98 % | HEART RATE: 89 BPM | SYSTOLIC BLOOD PRESSURE: 155 MMHG | HEIGHT: 60 IN

## 2022-10-21 DIAGNOSIS — D50.0 IRON DEFICIENCY ANEMIA DUE TO CHRONIC BLOOD LOSS: Primary | ICD-10-CM

## 2022-10-21 DIAGNOSIS — N64.4 BREAST PAIN, RIGHT: Primary | ICD-10-CM

## 2022-10-21 DIAGNOSIS — B37.2 YEAST INFECTION OF THE SKIN: ICD-10-CM

## 2022-10-21 DIAGNOSIS — D05.12 BREAST NEOPLASM, TIS (DCIS), LEFT: ICD-10-CM

## 2022-10-21 DIAGNOSIS — D50.0 IRON DEFICIENCY ANEMIA DUE TO CHRONIC BLOOD LOSS: ICD-10-CM

## 2022-10-21 LAB
BASOPHILS # BLD AUTO: 0.05 10*3/MM3 (ref 0–0.2)
BASOPHILS NFR BLD AUTO: 0.4 % (ref 0–1.5)
DEPRECATED RDW RBC AUTO: 43.9 FL (ref 37–54)
EOSINOPHIL # BLD AUTO: 0.27 10*3/MM3 (ref 0–0.4)
EOSINOPHIL NFR BLD AUTO: 2.2 % (ref 0.3–6.2)
ERYTHROCYTE [DISTWIDTH] IN BLOOD BY AUTOMATED COUNT: 12.6 % (ref 12.3–15.4)
HCT VFR BLD AUTO: 33.4 % (ref 34–46.6)
HGB BLD-MCNC: 10.6 G/DL (ref 12–15.9)
IMM GRANULOCYTES # BLD AUTO: 0.1 10*3/MM3 (ref 0–0.05)
IMM GRANULOCYTES NFR BLD AUTO: 0.8 % (ref 0–0.5)
LYMPHOCYTES # BLD AUTO: 0.98 10*3/MM3 (ref 0.7–3.1)
LYMPHOCYTES NFR BLD AUTO: 8 % (ref 19.6–45.3)
MCH RBC QN AUTO: 30.4 PG (ref 26.6–33)
MCHC RBC AUTO-ENTMCNC: 31.7 G/DL (ref 31.5–35.7)
MCV RBC AUTO: 95.7 FL (ref 79–97)
MONOCYTES # BLD AUTO: 0.79 10*3/MM3 (ref 0.1–0.9)
MONOCYTES NFR BLD AUTO: 6.5 % (ref 5–12)
NEUTROPHILS NFR BLD AUTO: 10.03 10*3/MM3 (ref 1.7–7)
NEUTROPHILS NFR BLD AUTO: 82.1 % (ref 42.7–76)
NRBC BLD AUTO-RTO: 0 /100 WBC (ref 0–0.2)
PLATELET # BLD AUTO: 214 10*3/MM3 (ref 140–450)
PMV BLD AUTO: 10.1 FL (ref 6–12)
RBC # BLD AUTO: 3.49 10*6/MM3 (ref 3.77–5.28)
WBC NRBC COR # BLD: 12.22 10*3/MM3 (ref 3.4–10.8)

## 2022-10-21 PROCEDURE — 85025 COMPLETE CBC W/AUTO DIFF WBC: CPT

## 2022-10-21 PROCEDURE — 36415 COLL VENOUS BLD VENIPUNCTURE: CPT

## 2022-10-21 PROCEDURE — 99214 OFFICE O/P EST MOD 30 MIN: CPT | Performed by: NURSE PRACTITIONER

## 2022-11-01 ENCOUNTER — HOSPITAL ENCOUNTER (OUTPATIENT)
Dept: MAMMOGRAPHY | Facility: HOSPITAL | Age: 77
Discharge: HOME | End: 2022-11-01

## 2022-11-01 ENCOUNTER — HOSPITAL ENCOUNTER (OUTPATIENT)
Dept: ULTRASOUND IMAGING | Facility: HOSPITAL | Age: 77
Discharge: HOME | End: 2022-11-01

## 2022-11-01 DIAGNOSIS — D05.12 BREAST NEOPLASM, TIS (DCIS), LEFT: ICD-10-CM

## 2022-11-01 DIAGNOSIS — N64.4 BREAST PAIN, RIGHT: ICD-10-CM

## 2022-11-01 PROCEDURE — 77065 DX MAMMO INCL CAD UNI: CPT

## 2022-11-01 PROCEDURE — 76642 ULTRASOUND BREAST LIMITED: CPT

## 2022-11-15 ENCOUNTER — APPOINTMENT (OUTPATIENT)
Dept: MAMMOGRAPHY | Facility: HOSPITAL | Age: 77
End: 2022-11-15

## 2023-01-17 ENCOUNTER — TELEPHONE (OUTPATIENT)
Dept: ONCOLOGY | Facility: CLINIC | Age: 78
End: 2023-01-17
Payer: MEDICARE

## 2023-01-17 NOTE — TELEPHONE ENCOUNTER
Caller: Blanca Zhu    Relationship to patient: Self    Best call back number: [phone]    Chief complaint: PT CALLED TO RESCHEDULE    Type of visit: LAB AND FOLLOW UP    Requested date: THE FIRST WEEK OF February     If rescheduling, when is the original appointment: 1-27-23     Additional notes:PLEASE CALL TO CONFIRM

## 2023-02-09 ENCOUNTER — TELEPHONE (OUTPATIENT)
Dept: ONCOLOGY | Facility: CLINIC | Age: 78
End: 2023-02-09
Payer: MEDICARE

## 2023-02-09 NOTE — TELEPHONE ENCOUNTER
Caller: Blanca Zhu    Relationship to patient: Self    Best call back number: [phone]    Chief complaint: PATIENT TO RESCHEDULE 2/23/23 APPT    Type of visit: LAB AND FU    Requested date: PATIENT CANNOT SCHEDULE FOR TUESDAYS FOR THURSDAYS. REQUESTING 2/24/23 ANYTIME

## 2023-03-01 ENCOUNTER — LAB (OUTPATIENT)
Dept: LAB | Facility: HOSPITAL | Age: 78
End: 2023-03-01
Payer: MEDICARE

## 2023-03-01 ENCOUNTER — OFFICE VISIT (OUTPATIENT)
Dept: ONCOLOGY | Facility: CLINIC | Age: 78
End: 2023-03-01
Payer: MEDICARE

## 2023-03-01 VITALS
DIASTOLIC BLOOD PRESSURE: 70 MMHG | WEIGHT: 208.9 LBS | BODY MASS INDEX: 41.01 KG/M2 | HEIGHT: 60 IN | HEART RATE: 79 BPM | TEMPERATURE: 97.1 F | OXYGEN SATURATION: 96 % | SYSTOLIC BLOOD PRESSURE: 186 MMHG

## 2023-03-01 DIAGNOSIS — D05.12 BREAST NEOPLASM, TIS (DCIS), LEFT: Primary | ICD-10-CM

## 2023-03-01 DIAGNOSIS — K31.7 POLYP OF DUODENUM: ICD-10-CM

## 2023-03-01 DIAGNOSIS — D50.0 IRON DEFICIENCY ANEMIA DUE TO CHRONIC BLOOD LOSS: ICD-10-CM

## 2023-03-01 DIAGNOSIS — D13.2 DUODENAL ADENOMA: ICD-10-CM

## 2023-03-01 DIAGNOSIS — D05.12 BREAST NEOPLASM, TIS (DCIS), LEFT: ICD-10-CM

## 2023-03-01 PROBLEM — K90.9 IRON MALABSORPTION: Status: ACTIVE | Noted: 2023-03-01

## 2023-03-01 LAB
ALBUMIN SERPL-MCNC: 3.7 G/DL (ref 3.5–5.2)
ALBUMIN/GLOB SERPL: 1.3 G/DL (ref 1.1–2.4)
ALP SERPL-CCNC: 103 U/L (ref 38–116)
ALT SERPL W P-5'-P-CCNC: 17 U/L (ref 0–33)
ANION GAP SERPL CALCULATED.3IONS-SCNC: 10 MMOL/L (ref 5–15)
AST SERPL-CCNC: 11 U/L (ref 0–32)
BASOPHILS # BLD AUTO: 0.03 10*3/MM3 (ref 0–0.2)
BASOPHILS NFR BLD AUTO: 0.3 % (ref 0–1.5)
BILIRUB SERPL-MCNC: 0.3 MG/DL (ref 0.2–1.2)
BUN SERPL-MCNC: 36 MG/DL (ref 6–20)
BUN/CREAT SERPL: 31 (ref 7.3–30)
CALCIUM SPEC-SCNC: 9.1 MG/DL (ref 8.5–10.2)
CHLORIDE SERPL-SCNC: 103 MMOL/L (ref 98–107)
CO2 SERPL-SCNC: 24 MMOL/L (ref 22–29)
CREAT SERPL-MCNC: 1.16 MG/DL (ref 0.6–1.1)
DEPRECATED RDW RBC AUTO: 47 FL (ref 37–54)
EGFRCR SERPLBLD CKD-EPI 2021: 49 ML/MIN/1.73
EOSINOPHIL # BLD AUTO: 0.09 10*3/MM3 (ref 0–0.4)
EOSINOPHIL NFR BLD AUTO: 1 % (ref 0.3–6.2)
ERYTHROCYTE [DISTWIDTH] IN BLOOD BY AUTOMATED COUNT: 13.3 % (ref 12.3–15.4)
FERRITIN SERPL-MCNC: 108.3 NG/ML (ref 13–150)
GLOBULIN UR ELPH-MCNC: 2.9 GM/DL (ref 1.8–3.5)
GLUCOSE SERPL-MCNC: 238 MG/DL (ref 74–124)
HCT VFR BLD AUTO: 33.3 % (ref 34–46.6)
HGB BLD-MCNC: 9.9 G/DL (ref 12–15.9)
HGB RETIC QN AUTO: 30.5 PG (ref 29.8–36.1)
IMM GRANULOCYTES # BLD AUTO: 0.04 10*3/MM3 (ref 0–0.05)
IMM GRANULOCYTES NFR BLD AUTO: 0.4 % (ref 0–0.5)
IMM RETICS NFR: 10.8 % (ref 3–15.8)
IRON 24H UR-MRATE: 38 MCG/DL (ref 37–145)
IRON SATN MFR SERPL: 12 % (ref 14–48)
LYMPHOCYTES # BLD AUTO: 0.49 10*3/MM3 (ref 0.7–3.1)
LYMPHOCYTES NFR BLD AUTO: 5.4 % (ref 19.6–45.3)
MCH RBC QN AUTO: 28.4 PG (ref 26.6–33)
MCHC RBC AUTO-ENTMCNC: 29.7 G/DL (ref 31.5–35.7)
MCV RBC AUTO: 95.7 FL (ref 79–97)
MONOCYTES # BLD AUTO: 0.49 10*3/MM3 (ref 0.1–0.9)
MONOCYTES NFR BLD AUTO: 5.4 % (ref 5–12)
NEUTROPHILS NFR BLD AUTO: 7.98 10*3/MM3 (ref 1.7–7)
NEUTROPHILS NFR BLD AUTO: 87.5 % (ref 42.7–76)
NRBC BLD AUTO-RTO: 0 /100 WBC (ref 0–0.2)
PLATELET # BLD AUTO: 214 10*3/MM3 (ref 140–450)
PMV BLD AUTO: 9.3 FL (ref 6–12)
POTASSIUM SERPL-SCNC: 4.8 MMOL/L (ref 3.5–4.7)
PROT SERPL-MCNC: 6.6 G/DL (ref 6.3–8)
RBC # BLD AUTO: 3.48 10*6/MM3 (ref 3.77–5.28)
RETICS # AUTO: 0.04 10*6/MM3 (ref 0.02–0.13)
RETICS/RBC NFR AUTO: 1.13 % (ref 0.7–1.9)
SODIUM SERPL-SCNC: 137 MMOL/L (ref 134–145)
TIBC SERPL-MCNC: 315 MCG/DL (ref 249–505)
TRANSFERRIN SERPL-MCNC: 225 MG/DL (ref 200–360)
VIT B12 BLD-MCNC: 399 PG/ML (ref 211–946)
WBC NRBC COR # BLD: 9.12 10*3/MM3 (ref 3.4–10.8)

## 2023-03-01 PROCEDURE — 85046 RETICYTE/HGB CONCENTRATE: CPT

## 2023-03-01 PROCEDURE — 99214 OFFICE O/P EST MOD 30 MIN: CPT | Performed by: INTERNAL MEDICINE

## 2023-03-01 PROCEDURE — 82728 ASSAY OF FERRITIN: CPT

## 2023-03-01 PROCEDURE — 83540 ASSAY OF IRON: CPT

## 2023-03-01 PROCEDURE — 82607 VITAMIN B-12: CPT | Performed by: INTERNAL MEDICINE

## 2023-03-01 PROCEDURE — 85025 COMPLETE CBC W/AUTO DIFF WBC: CPT

## 2023-03-01 PROCEDURE — 80053 COMPREHEN METABOLIC PANEL: CPT

## 2023-03-01 PROCEDURE — 36415 COLL VENOUS BLD VENIPUNCTURE: CPT

## 2023-03-01 PROCEDURE — 84466 ASSAY OF TRANSFERRIN: CPT

## 2023-03-01 NOTE — PROGRESS NOTES
Subjective     REASON FOR FOLLOW UP:  ANEMIA, FATIGUE, FREQUENT FALLS, AFRAID OF WALKING, ABDOMINAL PAIN AND CONTINUO'S  TURMOIL IN BOWEL FUNCTION AND DIGESTION,DCIS OF THE LEFT BREAST SP MASTECTOMY      History of Present Illness DURING THE VISIT WITH THE PATIENT TODAY , PATIENT HAD FACE MASK, I HAD PROPER PROTECTIVE EQUIPMENT, AND I DID HAND HYGIENE WITH SOAP AND WATER BEFORE AND AFTER THE VISIT.    On 03/01/2023 this 77-year-old female returns to the office for follow up of her previous history of left breast cancer status post mastectomy, no need for adjuvant therapy and also previous history of iron deficiency anemia. She has had gastritis, stomach and duodenal polyps in the past. The patient went to [place] through the holidays, she developed COVID infection, mostly fatigue, low grade temperature and GI upset with no major symptomatology in the respiratory tract and she has been seen by Dmitry Lang MD, a few days ago who documented very significant worsening pulmonary function. The patient has become more short of breath than usual and her swelling in her lower extremities is a little bit worse. She has not had any obvious passage of blood in the stool, no hematemesis, no melena, no hematuria and no vaginal bleeding. Her diet remains rich in carbohydrates. She has also history of temporal arteritis and she has been advised by her rheumatologist to go back on [facility]. She was also found in [place] to have vitamin D deficiency. This medicine produces turmoil in her stomach, she has not started to take it yet.     She has episodes of constipation and diarrhea, she admits that she does not eat a lot of fiber in her diet in the form of vegetables or fruit. She does not drink a lot of liquids neither. She is very good in drinking milk.                       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 Daniel Gutierrez, [facility]   •  SECTION N/A     x3   • CHOLECYSTECTOMY N/A    • COLONOSCOPY     • COLONOSCOPY W/ POLYPECTOMY N/A 2019    Enlarged folds in the antrum: biopsied; duodenal, transverse colon x2, splenic flexure, descending colon and sigmoid colon polyps: biopsied (path: tubular adenoma x6)-Dr. Zak De Jesus, [facility]   • ENDOSCOPY  10/11/2019    Procedure: ESOPHAGOGASTRODUODENOSCOPY with hot snare polypectomy;  Surgeon: Haile Handley MD;  Location: [facility] ENDOSCOPY;  Service: Gastroenterology   • ENDOSCOPY N/A 2020    Procedure: ESOPHAGOGASTRODUODENOSCOPY;  Surgeon: Haile Handley MD;  Location: [facility] ENDOSCOPY;  Service: Gastroenterology   • ENDOSCOPY N/A 2020    Procedure: ESOPHAGOGASTRODUODENOSCOPY WITH BIOPSIES AND COLD BIOPSY POLYPECTOMY;  Surgeon: Haile Handley MD;  Location: [facility] ENDOSCOPY;  Service: Gastroenterology;  Laterality: N/A;  pre: dyspepsia and diarrhea  post: duodenal polyp and gastritis   • MASTECTOMY Left    • MASTECTOMY WITH SENTINEL NODE BIOPSY AND AXILLARY NODE DISSECTION Left 7/3/2019    Procedure: LEFT BREAST MASTECTOMY WITH SENTINEL NODE BIOPSY AND , v-y plasty closure;  Surgeon: Tahmina James MD;  Location: [facility] MAIN OR;  Service: General   • SUBTOTAL HYSTERECTOMY Bilateral     ovaries still in tact   • TEMPORAL ARTERY BIOPSY Bilateral  12/05/2019        Current Outpatient Medications on File Prior to Visit   Medication Sig Dispense Refill   • albuterol sulfate  (90 Base) MCG/ACT inhaler Every 6 (Six) Hours.     • bisoprolol (ZEBeta) 10 MG tablet Take 2 tablets by mouth.     • doxazosin (CARDURA) 2 MG tablet Take 1 tablet by mouth Every Night.     • Insulin Glargine (TOUJEO SOLOSTAR SC) Inject 40 Units under the skin into the appropriate area as directed Every Night. INSTRUCTED PT TO FOLLOW MD INSTRUCTIONS REGARDING DOSING BEFORE SURGERY     • Insulin Lispro (HUMALOG PEN SC) Inject  under the skin into the appropriate area as directed 3 (Three) Times a Day With Meals. 25 units at breakfast, 20 units at lunch, 22 units at dinner/INSTRUCTED PT TO FOLLOW MD INSTRUCTIONS REGARDING DOSING BEFORE SURGERY     • miconazole (Micatin) 2 % cream Apply 1 application topically to the appropriate area as directed 2 (Two) Times a Day. 14 g 1   • omeprazole (priLOSEC) 20 MG capsule 2 capsules.     • predniSONE (DELTASONE) 5 MG tablet Take 7 mg by mouth Daily.     • torsemide (DEMADEX) 20 MG tablet      • budesonide-formoterol (SYMBICORT) 160-4.5 MCG/ACT inhaler Inhale 2 puffs 2 (Two) Times a Day.     • valsartan-hydrochlorothiazide (DIOVAN-HCT) 320-25 MG per tablet Take 1 tablet by mouth Daily. (Patient taking differently: Take 0.5 tablets by mouth Daily.) 30 tablet 11     No current facility-administered medications on file prior to visit.        ALLERGIES:    Allergies   Allergen Reactions   • Aspirin Nausea Only     Low tolerance, abdominal pain   • Sulfa Antibiotics Unknown (See Comments)     Happened as a child        Social History     Socioeconomic History   • Marital status:      Spouse name: Ashwin   • Number of children: 3   • Years of education: College   Tobacco Use   • Smoking status: Never   • Smokeless tobacco: Never   • Tobacco comments:     caffine use   Substance and Sexual Activity   • Alcohol use: No     Comment: one or two small  "drinks a year   • Drug use: No   • Sexual activity: Defer     Comment: Spouse, Ashwin        Family History   Problem Relation Age of Onset   • Hypertension Father    • Stomach cancer Father    • Diabetes Father    • Esophageal cancer Father    • Throat cancer Sister    • Diabetes Paternal Grandmother    • Hypertension Paternal Grandfather    • Colon cancer Paternal Grandfather    • Heart disease Mother    • Colon cancer Paternal Uncle    • Colon cancer Paternal Uncle    • Colon cancer Paternal Uncle    • Ovarian cancer Cousin    • Stomach cancer Cousin    • Malig Hyperthermia Neg Hx           Objective     Vitals:    03/01/23 1319   BP: (!) 186/70  Comment: Pt forgot to take her b/p medicine today   Pulse: 79   Temp: 97.1 °F (36.2 °C)   TempSrc: Temporal   SpO2: 96%   Weight: 94.8 kg (208 lb 14.4 oz)   Height: 152.4 cm (60\")   PainSc: 0-No pain   PainLoc: Comment: Pt has an old injury that gives her pain from time to time     Current Status 3/1/2023   ECOG score 1       Physical Exam              GENERAL:  Well-developed, Patient  in no acute distress.   SKIN:  Warm, dry ,NO purpura ,no rash.  HEENT:  Pupils were equal and reactive to light and accomodation, conjunctivae noninjected,  normal visual acuity.   NECK:  Supple with good range of motion; no thyromegaly , no JVD or bruits,.No carotid artery pain, no carotid abnormal pulsation   LYMPHATICS:  No cervical, NO supraclavicular, NO axillary, NO inguinal adenopathies.  CARDIAC   normal rate , regular rhythm, without murmur,NO rubs NO S3 NO S4   LUNGS: normal breath sounds bilateral, no wheezing, NO rhonchi, NO crackles ,NO rubs.  VASCULAR VENOUS: no cyanosis, NO collateral circulation, NO varicosities, 2+ both le edema, NO palpable cords, NO pain,NO erythema, NO pigmentation of the skin.  ABDOMEN:  Soft, NO pain,no hepatomegaly, no splenomegaly,no masses, no ascites, no collateral circulation,no distention.  EXTREMITIES  AND SPINE:  No clubbing, no cyanosis ,no " deformities , no pain .No kyphosis,  no pain in spine, no pain in ribs , no pain in pelvic bone.  NEUROLOGICAL:  Patient was awake, alert, oriented to time, person and place.                        RECENT LABS:  Hematology WBC   Date Value Ref Range Status   03/01/2023 9.12 3.40 - 10.80 10*3/mm3 Final     RBC   Date Value Ref Range Status   03/01/2023 3.48 (L) 3.77 - 5.28 10*6/mm3 Final     Hemoglobin   Date Value Ref Range Status   03/01/2023 9.9 (L) 12.0 - 15.9 g/dL Final     Hematocrit   Date Value Ref Range Status   03/01/2023 33.3 (L) 34.0 - 46.6 % Final     Platelets   Date Value Ref Range Status   03/01/2023 214 140 - 450 10*3/mm3 Final            Assessment & Plan      1. This patient has undergone a left-sided mastectomy for DCIS of the left breast. The patient had a large tumor that was high grade with negative margins of resection, minimal microinvasion and negative sentinel lymph nodes. The patient’s estrogen receptor and progesterone receptor were negative and she was HER2 negative as well at the time of the original diagnosis. Under these premises, I think the patient is done with the treatment for this and I do not recommend any form of adjuvant chemotherapy, radiation treatment and obviously no hormonal therapy. There is no role for Herceptin or medicines of this nature either.     The patient was further reviewed on 03/30/2021 in regard to her breast cancer. Her right breast examination is normal. Her mammogram is up-to-date and been normal and her left mastectomy site is normal. I see nothing to suggest breast cancer recurrence. As we mentioned before the patient never required adjuvant therapy because she had triple negative disease.         The patient was further reviewed in regard to her breast issue on 07/20/2021. Her mastectomy on the left side is well healed, the right breast is unremarkable, there is no lymphedema in either extremity and the patient is doing fine from this point of view.    The patient is reviewed on 11/16/2021 in regard to her left side mastectomy for DCIS of the left breast. So far the mastectomy site is unremarkable, surgical site is well healed. There is no lymphedema in the left upper extremity, the left axilla is normal, right breast is normal. Right breast mammogram recently performed as stated above disclosed no abnormalities. This will be watched in absence of any other intervention.   The patient was reviewed on 05/12/2022. I find no symptoms or signs that would indicate breast cancer recurrence. Her right breast exam is normal. Her left side mastectomy discloses no areas of induration, tenderness, nodularity. Her left axilla is unremarkable. She has no lymphedema in either upper extremity. She will be watched in absence of any other intervention.  On 07/20/2022 the patient has no symptoms or signs of breast cancer recurrence. Her left chest wall is unremarkable, left axilla is normal. No lymphedema in the left upper extremity. Right breast examination is normal, no lymphedema in the right upper extremity, normal right axilla.     Mammogram requested to be done in 6 months after she comes back from [place] in 01/2023.   On 03/01/2023 the patient has no symptoms or signs of breast cancer recurrence, her left mastectomy site is normal, her right breast has been examined during the previous visit. I have not examined the right breast today. She is up to date on her right sided mammogram. She will remain in observation from this point of view.            ·       2. From the point of view of her anemia, iron deficiency, the patient has been replaced with IV iron. As far as the patient can tell on review on 10/19/2020 she has not had any evidence of blood loss in the gastrointestinal tract. Her diet is appropriate but the only thing that she is not following through is that she is back onto diet soft drinks Pepsi for example. She is not drinking any coffee that she used to drink  copious amount of it. According to her, her diet again remains appropriate. It calls my attention the hemoglobin of 11.7, her reticulated hemoglobin is normal at 35 therefore probably anemia from today is reflection of some other condition.  The patient remains anemic today and we have a multitude of testing including ferritin, iron, TIBC, B12, folic acid and reticulated hemoglobin to further analyze. This will be discussed with her on the telephone once that these reports become available. Also I have asked the lab to run peripheral blood slide for me to review given her previous history of hemolytic anemia. I will review her chemistry profile, bilirubin and along with the retic count this will give me an idea if hemolytic anemia is an issue at this point.   On 11/16/2021 the patient remains mildly anemic with a hemoglobin of 10.9. Six months ago we did ferritin, iron, TIBC, B12, folic acid levels all of them normal. Her erythropoietin level was low consistent with anemia of chronic kidney disease. She has not undergone any erythropoietin therapy at this point.   On 05/12/2022, the patient has become anemic again in spite of not losing blood in the gastrointestinal tract. Reticulated hemoglobin is 33; therefore it is likely that she does not have iron deficiency anemia but her ferritin, iron profile are pending and she has had B12 and folic acid levels measured not too far ago that were normal. This leads me to believe that very likely the anemia component of this patient at this time is probably associated with chronic illness, inflammatory condition and chronic kidney disease, especially if we account for the progressive amount of anasarca that this patient has. She will be called at home with the report of the rest of laboratory testing pertinent to this.  On 07/20/2022 the patient still has a mild component of anemia. During the previous visit we documented normal ferritin, iron, TIBC, B12, folic acid level and  normal reticulated hemoglobin. Today the numbers remain about the same. She is not having any blood loss. We advised her to remain in observation. Given her creatinine clearance it is likely that part of the anemia corresponds to anemia associated with chronic renal disease given the fact that the hemoglobin stays above 10 she does not qualify for Procrit.  On 03/01/2023 the patient has had a lot of Gi issues since her COVID infection in [place]. She has not noticed any passage of blood in the stool but she has had constipation and she has had gastric irritation. Her hemoglobin is lower than usual and her reticulated hemoglobin has dropped to 3 telling me that very likely she has iron deficiency again. This raises the question if what she had in the stomach before, polyps in the stomach and duodenum has anything to do with this and if she is having any other new symptomatology pertinent to the anemia and what we need to do. She has not seen Vega Handley MD, in a good while and I went ahead and scheduled her to be seen by him. I pointed out to her that if she is documented to have low ferritin, low iron profile she will require IV iron therapy because she cannot handle oral iron therapy number one and she will require hemoccult testing. Very likely she will have an obvious indication to pursue further upper and lower endoscopies. Assuming that she has low iron she will be scheduled to receive IV iron therapy in the form of Venofer times 3 at 300 mg per dose and review her back in a couple of months to see how she evolves from that point of view.             ·     3. The patient has very significant degree of shortness of breath upon minimal exertion. I asked her to walk. It took her almost 3 minutes to walk 30 feet. She was very short winded. We started with an O2 saturation of 100% on room air and a heart rate of 73. Her O2 saturation dropped to 97 and heart rate never raised.     I reviewed this issue with her  again today. Her cardiac auscultation is very much normal and her echocardiogram done not too long ago did not document too much of major abnormality besides pulmonary hypertension. Her lung auscultation is abnormal with decreased breath sounds bilaterally. I do not think she has effusions but I think she needs to be seen by pulmonology. I asked her to review this information with her pulmonologist as soon as possible. I still wonder why this patient is not receiving at least oxygen at nighttime given the fact that she has sleep apnea and she has very likely chronic lung disease.  She dd not know that she has pulmonary hypertension and this will be a first start in the treatment of this condition anyway.   On 11/16/2021 the cardiac auscultation remains unremarkable, no obvious gallops. Pulmonary auscultation shows decrease of breath sounds bilaterally. We advised her to do exercises that maybe in the long run could have impact on her and I again advised her that it will be tremendously consequential to her to lose 20-30 pounds of weight. This will allow less oxygen consumption by fat and more oxygen consumption by her normal weight, normal other parts including muscles, heart, kidneys and so forth. In the long run this could have positive consequences on her.     I will review her back in 6 months with a CBC and CMP at that time.     I find no reason for this patient to proceed with erythropoietin therapy with a hemoglobin of 10.9.   The patient on 05/12/2022 is more short of breath than usual. She is barely able to walk 10 feet without becoming hypoxemic and she has in her cardiac auscultation today a gallop that is more than typical. Her lung auscultation is clear. She has no crackles. She has 3+ edema in both lower extremities in spite of having elastic support in place and she has a blister in the left lower extremity with no obvious secondary infection. I do believe that her shortness of breath is very  worrisome for congestive heart failure, fluid accumulation, anasarca and is very worrisome as well in the background of chronic kidney disease.     On top of that she is having pain in the epigastric area that sometimes looks to me and sounds to me like angina and I think she needs to be seen by Cardiology right away. I sent a note to Dr. Power, the patient’s cardiologist, to review this. I went ahead and ordered an echocardiogram for completeness and I ordered a proBNP today. I did not change any medicines on her. My office will be calling Dr. Power’ office to arrange for a follow-up visit quit.  On 07/20/2022 after visits to her nephrologist and cardiologist the patient's volume status has improved, she has lesser degree of anasarca, she feels better. Medications have been modified and overall she is doing better.  On 03/01/2023 her shortness of breath is very significant upon exertion. Her pulmonary auscultation shows very poor breath sounds bilaterally. Dr. Dmitry Lang has seen her. Radiological assessment is coming up in that regard. I have nothing to add to this situation, Dr. Lang will follow up with her.             4.The patient states the swelling in the lower extremities along with the blister in the left lower extremity is very worrisome for her in the background of diabetes. She has elastic support and the legs are huge. She has probably in her body no less than 20 pounds worth of water, I will guess. I would not be surprised if she needs to end up in the hospital with aggressive management of water and profuse diuresis that is necessary.     I went ahead and referred her to be seen by Vascular Surgery. Given the fact that she has had diabetes for so long, she will require Doppler studies of the lower extremities, both for the venous system and also for the arterial system.     On 07/20/2022 the patient had vascular studies in the lower extremities both venous and arterial failing to document any  abnormality to justify the swelling in the lower extremities. Given this finding I advised the patient that this is good news and she will follow up with vascular surgery in the future. Elastic support could be something that she needs to wear.     I will review her back in 6 months with a CBC, CMP, ferritin, iron, TIBC, B12, folic acid level and a mammogram done after she returns from [place] in 01/2023.  On 03/01/2023 the swelling in her lower extremities is a combination of factors. I would not be surprised if she has a component of right sided heart failure at the same time.     The patient will be called at home with the report of her iron and ferritin levels and proBNP.     Planning to see her back in a couple of months repeating CBC, ferritin, iron profile, CMP and proceed with Venofer on 3 different occasions.    I went ahead and made a referral to see Vega Handley MD, who has seen her before in regard to her multiple GI symptoms.       ·

## 2023-03-02 LAB
FOLATE BLD-MCNC: 374 NG/ML
FOLATE RBC-MCNC: 1195 NG/ML
HCT VFR BLD AUTO: 31.3 % (ref 34–46.6)

## 2023-03-10 ENCOUNTER — INFUSION (OUTPATIENT)
Dept: ONCOLOGY | Facility: HOSPITAL | Age: 78
End: 2023-03-10
Payer: MEDICARE

## 2023-03-10 VITALS
DIASTOLIC BLOOD PRESSURE: 79 MMHG | WEIGHT: 205.6 LBS | TEMPERATURE: 97.8 F | RESPIRATION RATE: 18 BRPM | SYSTOLIC BLOOD PRESSURE: 156 MMHG | HEART RATE: 75 BPM | OXYGEN SATURATION: 97 % | BODY MASS INDEX: 40.15 KG/M2

## 2023-03-10 DIAGNOSIS — D50.8 OTHER IRON DEFICIENCY ANEMIA: ICD-10-CM

## 2023-03-10 DIAGNOSIS — K90.9 IRON MALABSORPTION: Primary | ICD-10-CM

## 2023-03-10 PROCEDURE — 96365 THER/PROPH/DIAG IV INF INIT: CPT

## 2023-03-10 PROCEDURE — 63710000001 ACETAMINOPHEN 325 MG TABLET: Performed by: INTERNAL MEDICINE

## 2023-03-10 PROCEDURE — A9270 NON-COVERED ITEM OR SERVICE: HCPCS | Performed by: INTERNAL MEDICINE

## 2023-03-10 PROCEDURE — 25010000002 IRON SUCROSE PER 1 MG: Performed by: INTERNAL MEDICINE

## 2023-03-10 PROCEDURE — 63710000001 DIPHENHYDRAMINE PER 50 MG: Performed by: INTERNAL MEDICINE

## 2023-03-10 RX ORDER — ACETAMINOPHEN 325 MG/1
650 TABLET ORAL ONCE
Status: COMPLETED | OUTPATIENT
Start: 2023-03-10 | End: 2023-03-10

## 2023-03-10 RX ORDER — SODIUM CHLORIDE 9 MG/ML
250 INJECTION, SOLUTION INTRAVENOUS ONCE
Status: COMPLETED | OUTPATIENT
Start: 2023-03-10 | End: 2023-03-10

## 2023-03-10 RX ORDER — DIPHENHYDRAMINE HCL 25 MG
25 CAPSULE ORAL ONCE
Status: COMPLETED | OUTPATIENT
Start: 2023-03-10 | End: 2023-03-10

## 2023-03-10 RX ADMIN — SODIUM CHLORIDE 300 MG: 900 INJECTION, SOLUTION INTRAVENOUS at 15:10

## 2023-03-10 RX ADMIN — SODIUM CHLORIDE 250 ML: 9 INJECTION, SOLUTION INTRAVENOUS at 15:10

## 2023-03-10 RX ADMIN — ACETAMINOPHEN 650 MG: 325 TABLET ORAL at 14:41

## 2023-03-10 RX ADMIN — DIPHENHYDRAMINE HYDROCHLORIDE 25 MG: 25 CAPSULE ORAL at 14:41

## 2023-03-17 ENCOUNTER — INFUSION (OUTPATIENT)
Dept: ONCOLOGY | Facility: HOSPITAL | Age: 78
End: 2023-03-17
Payer: MEDICARE

## 2023-03-17 VITALS
OXYGEN SATURATION: 94 % | HEART RATE: 74 BPM | BODY MASS INDEX: 40.15 KG/M2 | TEMPERATURE: 97.2 F | DIASTOLIC BLOOD PRESSURE: 79 MMHG | SYSTOLIC BLOOD PRESSURE: 176 MMHG | RESPIRATION RATE: 18 BRPM | WEIGHT: 205.6 LBS

## 2023-03-17 DIAGNOSIS — K90.9 IRON MALABSORPTION: Primary | ICD-10-CM

## 2023-03-17 DIAGNOSIS — D50.8 OTHER IRON DEFICIENCY ANEMIA: ICD-10-CM

## 2023-03-17 PROCEDURE — A9270 NON-COVERED ITEM OR SERVICE: HCPCS | Performed by: INTERNAL MEDICINE

## 2023-03-17 PROCEDURE — 96365 THER/PROPH/DIAG IV INF INIT: CPT

## 2023-03-17 PROCEDURE — 63710000001 DIPHENHYDRAMINE PER 50 MG: Performed by: INTERNAL MEDICINE

## 2023-03-17 PROCEDURE — 63710000001 ACETAMINOPHEN 325 MG TABLET: Performed by: INTERNAL MEDICINE

## 2023-03-17 PROCEDURE — 25010000002 IRON SUCROSE PER 1 MG: Performed by: INTERNAL MEDICINE

## 2023-03-17 RX ORDER — DIPHENHYDRAMINE HCL 25 MG
25 CAPSULE ORAL ONCE
Status: COMPLETED | OUTPATIENT
Start: 2023-03-17 | End: 2023-03-17

## 2023-03-17 RX ORDER — ACETAMINOPHEN 325 MG/1
650 TABLET ORAL ONCE
Status: COMPLETED | OUTPATIENT
Start: 2023-03-17 | End: 2023-03-17

## 2023-03-17 RX ORDER — SODIUM CHLORIDE 9 MG/ML
250 INJECTION, SOLUTION INTRAVENOUS ONCE
Status: COMPLETED | OUTPATIENT
Start: 2023-03-17 | End: 2023-03-17

## 2023-03-17 RX ADMIN — SODIUM CHLORIDE 300 MG: 900 INJECTION, SOLUTION INTRAVENOUS at 14:51

## 2023-03-17 RX ADMIN — DIPHENHYDRAMINE HYDROCHLORIDE 25 MG: 25 CAPSULE ORAL at 14:25

## 2023-03-17 RX ADMIN — SODIUM CHLORIDE 250 ML: 9 INJECTION, SOLUTION INTRAVENOUS at 14:24

## 2023-03-17 RX ADMIN — ACETAMINOPHEN 650 MG: 325 TABLET ORAL at 14:25

## 2023-03-24 ENCOUNTER — INFUSION (OUTPATIENT)
Dept: ONCOLOGY | Facility: HOSPITAL | Age: 78
End: 2023-03-24
Payer: MEDICARE

## 2023-03-24 VITALS
WEIGHT: 208.6 LBS | RESPIRATION RATE: 16 BRPM | OXYGEN SATURATION: 90 % | BODY MASS INDEX: 40.74 KG/M2 | HEART RATE: 80 BPM | TEMPERATURE: 97.3 F | DIASTOLIC BLOOD PRESSURE: 68 MMHG | SYSTOLIC BLOOD PRESSURE: 146 MMHG

## 2023-03-24 DIAGNOSIS — K90.9 IRON MALABSORPTION: Primary | ICD-10-CM

## 2023-03-24 DIAGNOSIS — D50.8 OTHER IRON DEFICIENCY ANEMIA: ICD-10-CM

## 2023-03-24 PROCEDURE — A9270 NON-COVERED ITEM OR SERVICE: HCPCS | Performed by: INTERNAL MEDICINE

## 2023-03-24 PROCEDURE — 25010000002 IRON SUCROSE PER 1 MG: Performed by: INTERNAL MEDICINE

## 2023-03-24 PROCEDURE — 96366 THER/PROPH/DIAG IV INF ADDON: CPT

## 2023-03-24 PROCEDURE — 63710000001 ACETAMINOPHEN 325 MG TABLET: Performed by: INTERNAL MEDICINE

## 2023-03-24 PROCEDURE — 96365 THER/PROPH/DIAG IV INF INIT: CPT

## 2023-03-24 PROCEDURE — 63710000001 DIPHENHYDRAMINE PER 50 MG: Performed by: INTERNAL MEDICINE

## 2023-03-24 RX ORDER — DIPHENHYDRAMINE HCL 25 MG
25 CAPSULE ORAL ONCE
Status: COMPLETED | OUTPATIENT
Start: 2023-03-24 | End: 2023-03-24

## 2023-03-24 RX ORDER — ACETAMINOPHEN 325 MG/1
650 TABLET ORAL ONCE
Status: COMPLETED | OUTPATIENT
Start: 2023-03-24 | End: 2023-03-24

## 2023-03-24 RX ORDER — SODIUM CHLORIDE 9 MG/ML
250 INJECTION, SOLUTION INTRAVENOUS ONCE
Status: COMPLETED | OUTPATIENT
Start: 2023-03-24 | End: 2023-03-24

## 2023-03-24 RX ADMIN — SODIUM CHLORIDE 250 ML: 9 INJECTION, SOLUTION INTRAVENOUS at 14:17

## 2023-03-24 RX ADMIN — SODIUM CHLORIDE 300 MG: 900 INJECTION, SOLUTION INTRAVENOUS at 14:17

## 2023-03-24 RX ADMIN — ACETAMINOPHEN 650 MG: 325 TABLET ORAL at 14:10

## 2023-03-24 RX ADMIN — DIPHENHYDRAMINE HYDROCHLORIDE 25 MG: 25 CAPSULE ORAL at 14:10

## 2023-03-29 ENCOUNTER — HOSPITAL ENCOUNTER (OUTPATIENT)
Dept: BONE DENSITY | Facility: HOSPITAL | Age: 78
Discharge: HOME | End: 2023-03-29
Payer: MEDICARE

## 2023-03-29 DIAGNOSIS — Z78.0 ASYMPTOMATIC MENOPAUSAL STATE: ICD-10-CM

## 2023-04-06 ENCOUNTER — TELEPHONE (OUTPATIENT)
Dept: GASTROENTEROLOGY | Facility: CLINIC | Age: 78
End: 2023-04-06
Payer: MEDICARE

## 2023-04-06 ENCOUNTER — OFFICE VISIT (OUTPATIENT)
Dept: GASTROENTEROLOGY | Facility: CLINIC | Age: 78
End: 2023-04-06
Payer: MEDICARE

## 2023-04-06 VITALS
HEIGHT: 60 IN | HEART RATE: 73 BPM | SYSTOLIC BLOOD PRESSURE: 173 MMHG | DIASTOLIC BLOOD PRESSURE: 70 MMHG | OXYGEN SATURATION: 98 % | BODY MASS INDEX: 41.03 KG/M2 | TEMPERATURE: 96.6 F | WEIGHT: 209 LBS

## 2023-04-06 DIAGNOSIS — D50.0 IRON DEFICIENCY ANEMIA DUE TO CHRONIC BLOOD LOSS: Primary | ICD-10-CM

## 2023-04-06 PROCEDURE — 1160F RVW MEDS BY RX/DR IN RCRD: CPT | Performed by: INTERNAL MEDICINE

## 2023-04-06 PROCEDURE — 99213 OFFICE O/P EST LOW 20 MIN: CPT | Performed by: INTERNAL MEDICINE

## 2023-04-06 PROCEDURE — 1159F MED LIST DOCD IN RCRD: CPT | Performed by: INTERNAL MEDICINE

## 2023-04-06 PROCEDURE — 3077F SYST BP >= 140 MM HG: CPT | Performed by: INTERNAL MEDICINE

## 2023-04-06 PROCEDURE — 3078F DIAST BP <80 MM HG: CPT | Performed by: INTERNAL MEDICINE

## 2023-04-06 RX ORDER — NEOMYCIN SULFATE, POLYMYXIN B SULFATE AND HYDROCORTISONE 10; 3.5; 1 MG/ML; MG/ML; [USP'U]/ML
SUSPENSION/ DROPS AURICULAR (OTIC)
COMMUNITY
End: 2023-04-06

## 2023-04-06 RX ORDER — LEFLUNOMIDE 10 MG/1
10 TABLET ORAL
COMMUNITY
Start: 2023-02-14

## 2023-04-06 RX ORDER — AMLODIPINE BESYLATE 5 MG/1
TABLET ORAL
COMMUNITY
Start: 2022-12-15

## 2023-04-06 RX ORDER — DOXAZOSIN MESYLATE 1 MG/1
1 TABLET ORAL EVERY 24 HOURS
COMMUNITY
End: 2023-04-06

## 2023-04-06 RX ORDER — DULOXETIN HYDROCHLORIDE 30 MG/1
CAPSULE, DELAYED RELEASE ORAL
COMMUNITY

## 2023-04-06 RX ORDER — CLOTRIMAZOLE AND BETAMETHASONE DIPROPIONATE 10; .64 MG/G; MG/G
CREAM TOPICAL
COMMUNITY

## 2023-04-06 RX ORDER — POTASSIUM CHLORIDE 20 MEQ/1
TABLET, EXTENDED RELEASE ORAL EVERY 24 HOURS
COMMUNITY
End: 2023-04-06

## 2023-04-06 RX ORDER — OMEPRAZOLE 40 MG/1
CAPSULE, DELAYED RELEASE ORAL
COMMUNITY
End: 2023-04-06

## 2023-04-06 RX ORDER — ERGOCALCIFEROL 1.25 MG/1
1 CAPSULE ORAL
COMMUNITY

## 2023-04-06 RX ORDER — NIRMATRELVIR AND RITONAVIR 300-100 MG
KIT ORAL
COMMUNITY
Start: 2023-01-05

## 2023-04-06 RX ORDER — AZITHROMYCIN 250 MG/1
TABLET, FILM COATED ORAL
COMMUNITY
Start: 2023-01-05

## 2023-04-06 NOTE — TELEPHONE ENCOUNTER
OK FOR HUB TO READ  STANTON patient via telephone for EGD AND COLONOSCOPY. Scheduled 5/10/23 with arrival time of 7AM. Prep paperwork mailed to verified address on file. Patient advised arrival time may change based on [facility] guidelines. STANTON BROWN

## 2023-04-06 NOTE — PROGRESS NOTES
Chief Complaint   Patient presents with   • Nausea       Blanca Zhu is a  77 y.o. female here for a follow up visit for iron deficiency anemia with constipation and decreased appetite.    HPI     Patient 77-year-old female with history diabetes, hypertension, temporal arteritis presenting with iron deficiency anemia reporting issues with constipation and decreased appetite.  Patient reports can go 3 to 4 days without a bowel movement with resulting decreased appetite here for further recommendations.  Patient denies any vomiting no melena or bright red blood per rectum.    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   • CKD (chronic kidney disease), stage III    • Colon polyp 03/12/2019   • COVID-19 01/2023   • Diabetes mellitus, type 2    • Hypertension    • Sleep apnea    • Swelling     IN LOWER EXTREMITIES   • Temporal arteritis    • Thrombophlebitis          Current Outpatient Medications:   •  albuterol sulfate  (90 Base) MCG/ACT inhaler, Every 6 (Six) Hours., Disp: , Rfl:   •  amLODIPine (NORVASC) 5 MG tablet, amlodipine 5 mg tablet  TAKE 1 TABLET BY MOUTH ONCE DAILY IN THE EVENING, Disp: , Rfl:   •  azithromycin (ZITHROMAX) 250 MG tablet, azithromycin 250 mg tablet, Disp: , Rfl:   •  bisoprolol (ZEBeta) 10 MG tablet, Take 2 tablets by mouth., Disp: , Rfl:   •  Cholecalciferol 125 MCG (5000 UT) tablet, cholecalciferol (vitamin D3) 125 mcg (5,000 unit) tablet  TAKE 1 TABLET BY MOUTH ONCE DAILY FOR 30 DAYS, Disp: , Rfl:   •  clotrimazole-betamethasone (LOTRISONE) 1-0.05 % cream, clotrimazole-betamethasone 1 %-0.05 % topical cream  APPLY CREAM TOPICALLY TO AFFECTED AND SURROUNDING AREAS OF SKIN TWICE A DAY FOR 2 WEEKS (IN THE MORNING AND EVENING), Disp: , Rfl:   •  doxazosin (CARDURA) 2 MG tablet, Take  1 tablet by mouth Every Night., Disp: , Rfl:   •  DULoxetine (CYMBALTA) 30 MG capsule, duloxetine 30 mg capsule,delayed release  Take 1 capsule every day by oral route for 30 days., Disp: , Rfl:   •  ergocalciferol (ERGOCALCIFEROL) 1.25 MG (03769 UT) capsule, Take 1 capsule by mouth Every 7 (Seven) Days., Disp: , Rfl:   •  hydrocortisone 2.5 % cream, hydrocortisone 2.5 % topical cream  APPLY CREAM TWICE DAILY TO AFFECTED AREA, Disp: , Rfl:   •  Insulin Glargine (TOUJEO SOLOSTAR SC), Inject 40 Units under the skin into the appropriate area as directed Every Night. INSTRUCTED PT TO FOLLOW MD INSTRUCTIONS REGARDING DOSING BEFORE SURGERY, Disp: , Rfl:   •  Insulin Lispro (HUMALOG PEN SC), Inject  under the skin into the appropriate area as directed 3 (Three) Times a Day With Meals. 25 units at breakfast, 20 units at lunch, 22 units at dinner/INSTRUCTED PT TO FOLLOW MD INSTRUCTIONS REGARDING DOSING BEFORE SURGERY, Disp: , Rfl:   •  leflunomide (ARAVA) 10 MG tablet, Take 1 tablet by mouth., Disp: , Rfl:   •  miconazole (Micatin) 2 % cream, Apply 1 application topically to the appropriate area as directed 2 (Two) Times a Day., Disp: 14 g, Rfl: 1  •  O2 (OXYGEN), Inhale 1 (One) Time., Disp: , Rfl:   •  omeprazole (priLOSEC) 20 MG capsule, 2 capsules., Disp: , Rfl:   •  Paxlovid, 300/100, 20 x 150 MG & 10 x 100MG tablet therapy pack tablet, , Disp: , Rfl:   •  predniSONE (DELTASONE) 5 MG tablet, Take 7 mg by mouth Daily., Disp: , Rfl:   •  torsemide (DEMADEX) 20 MG tablet, , Disp: , Rfl:   •  valsartan-hydrochlorothiazide (DIOVAN-HCT) 320-25 MG per tablet, Take 1 tablet by mouth Daily. (Patient taking differently: Take 0.5 tablets by mouth Daily.), Disp: 30 tablet, Rfl: 11    Allergies   Allergen Reactions   • Aspirin Nausea Only     Low tolerance, abdominal pain   • Sulfa Antibiotics Unknown (See Comments)     Happened as a child       Social History     Socioeconomic History   • Marital status:      Spouse name:  Ashwin   • Number of children: 3   • Years of education: College   Tobacco Use   • Smoking status: Never   • Smokeless tobacco: Never   • Tobacco comments:     caffine use   Substance and Sexual Activity   • Alcohol use: No     Comment: one or two small drinks a year   • Drug use: No   • Sexual activity: Not Currently     Comment: Spouse, Ashwin       Family History   Problem Relation Age of Onset   • Hypertension Father    • Stomach cancer Father    • Diabetes Father    • Esophageal cancer Father    • Throat cancer Sister    • Diabetes Paternal Grandmother    • Hypertension Paternal Grandfather    • Colon cancer Paternal Grandfather    • Heart disease Mother    • Colon cancer Paternal Uncle    • Colon cancer Paternal Uncle    • Colon cancer Paternal Uncle    • Ovarian cancer Cousin    • Stomach cancer Cousin    • Malig Hyperthermia Neg Hx        Review of Systems   Constitutional: Positive for appetite change. Negative for activity change, chills, diaphoresis, fatigue, fever and unexpected weight change.   Respiratory: Negative.    Cardiovascular: Negative.    Gastrointestinal: Positive for abdominal pain, constipation and nausea. Negative for abdominal distention, anal bleeding, blood in stool, diarrhea, rectal pain and vomiting.   Skin: Negative.    Hematological: Negative.        Vitals:    04/06/23 1216   BP: 173/70   Pulse: 73   Temp: 96.6 °F (35.9 °C)   SpO2: 98%       Physical Exam  Vitals reviewed.   Constitutional:       Appearance: Normal appearance. She is well-developed.   HENT:      Head: Normocephalic and atraumatic.   Eyes:      General: No scleral icterus.     Pupils: Pupils are equal, round, and reactive to light.   Cardiovascular:      Rate and Rhythm: Normal rate and regular rhythm.      Heart sounds: Normal heart sounds.   Pulmonary:      Effort: Pulmonary effort is normal. No respiratory distress.      Breath sounds: Normal breath sounds. No wheezing or rales.   Abdominal:      General: Bowel  sounds are normal. There is no distension.      Palpations: Abdomen is soft.      Tenderness: There is no abdominal tenderness.   Skin:     General: Skin is warm and dry.      Coloration: Skin is not jaundiced.      Findings: No rash.   Neurological:      General: No focal deficit present.      Mental Status: She is alert and oriented to person, place, and time.      Cranial Nerves: No cranial nerve deficit.   Psychiatric:         Mood and Affect: Mood normal.         Behavior: Behavior normal.         Thought Content: Thought content normal.         Lab on 03/01/2023   Component Date Value Ref Range Status   • Glucose 03/01/2023 238 (H)  74 - 124 mg/dL Final   • BUN 03/01/2023 36 (H)  6 - 20 mg/dL Final   • Creatinine 03/01/2023 1.16 (H)  0.60 - 1.10 mg/dL Final   • Sodium 03/01/2023 137  134 - 145 mmol/L Final   • Potassium 03/01/2023 4.8 (H)  3.5 - 4.7 mmol/L Final   • Chloride 03/01/2023 103  98 - 107 mmol/L Final   • CO2 03/01/2023 24.0  22.0 - 29.0 mmol/L Final   • Calcium 03/01/2023 9.1  8.5 - 10.2 mg/dL Final   • Total Protein 03/01/2023 6.6  6.3 - 8.0 g/dL Final   • Albumin 03/01/2023 3.7  3.5 - 5.2 g/dL Final   • ALT (SGPT) 03/01/2023 17  0 - 33 U/L Final   • AST (SGOT) 03/01/2023 11  0 - 32 U/L Final   • Alkaline Phosphatase 03/01/2023 103  38 - 116 U/L Final   • Total Bilirubin 03/01/2023 0.3  0.2 - 1.2 mg/dL Final   • Globulin 03/01/2023 2.9  1.8 - 3.5 gm/dL Final   • A/G Ratio 03/01/2023 1.3  1.1 - 2.4 g/dL Final   • BUN/Creatinine Ratio 03/01/2023 31.0 (H)  7.3 - 30.0 Final   • Anion Gap 03/01/2023 10.0  5.0 - 15.0 mmol/L Final   • eGFR 03/01/2023 49.0 (L)  >60.0 mL/min/1.73 Final   • Ferritin 03/01/2023 108.30  13.00 - 150.00 ng/mL Final   • Iron 03/01/2023 38  37 - 145 mcg/dL Final   • Iron Saturation 03/01/2023 12 (L)  14 - 48 % Final   • Transferrin 03/01/2023 225  200 - 360 mg/dL Final   • TIBC 03/01/2023 315  249 - 505 mcg/dL Final   • Immature Reticulocyte Fraction 03/01/2023 10.8  3.0 - 15.8 %  Final   • Reticulocyte % 03/01/2023 1.13  0.70 - 1.90 % Final   • Reticulocyte Absolute 03/01/2023 0.0393  0.0200 - 0.1300 10*6/mm3 Final   • Reticulocyte Hgb 03/01/2023 30.5  29.8 - 36.1 pg Final   • Vitamin B-12 03/01/2023 399  211 - 946 pg/mL Final   • Folate, Hemolysate 03/01/2023 374.0  Not Estab. ng/mL Final   • Hematocrit 03/01/2023 31.3 (L)  34.0 - 46.6 % Final   • RBC Folate 03/01/2023 1195  >498 ng/mL Final   • WBC 03/01/2023 9.12  3.40 - 10.80 10*3/mm3 Final   • RBC 03/01/2023 3.48 (L)  3.77 - 5.28 10*6/mm3 Final   • Hemoglobin 03/01/2023 9.9 (L)  12.0 - 15.9 g/dL Final   • Hematocrit 03/01/2023 33.3 (L)  34.0 - 46.6 % Final   • MCV 03/01/2023 95.7  79.0 - 97.0 fL Final   • MCH 03/01/2023 28.4  26.6 - 33.0 pg Final   • MCHC 03/01/2023 29.7 (L)  31.5 - 35.7 g/dL Final   • RDW 03/01/2023 13.3  12.3 - 15.4 % Final   • RDW-SD 03/01/2023 47.0  37.0 - 54.0 fl Final   • MPV 03/01/2023 9.3  6.0 - 12.0 fL Final   • Platelets 03/01/2023 214  140 - 450 10*3/mm3 Final   • Neutrophil % 03/01/2023 87.5 (H)  42.7 - 76.0 % Final   • Lymphocyte % 03/01/2023 5.4 (L)  19.6 - 45.3 % Final   • Monocyte % 03/01/2023 5.4  5.0 - 12.0 % Final   • Eosinophil % 03/01/2023 1.0  0.3 - 6.2 % Final   • Basophil % 03/01/2023 0.3  0.0 - 1.5 % Final   • Immature Grans % 03/01/2023 0.4  0.0 - 0.5 % Final   • Neutrophils, Absolute 03/01/2023 7.98 (H)  1.70 - 7.00 10*3/mm3 Final   • Lymphocytes, Absolute 03/01/2023 0.49 (L)  0.70 - 3.10 10*3/mm3 Final   • Monocytes, Absolute 03/01/2023 0.49  0.10 - 0.90 10*3/mm3 Final   • Eosinophils, Absolute 03/01/2023 0.09  0.00 - 0.40 10*3/mm3 Final   • Basophils, Absolute 03/01/2023 0.03  0.00 - 0.20 10*3/mm3 Final   • Immature Grans, Absolute 03/01/2023 0.04  0.00 - 0.05 10*3/mm3 Final   • nRBC 03/01/2023 0.0  0.0 - 0.2 /100 WBC Final       Diagnoses and all orders for this visit:    1. Iron deficiency anemia due to chronic blood loss (Primary)  -     Case Request; Standing  -     Implement Anesthesia  orders day of procedure.; Standing  -     Obtain informed consent; Standing  -     Case Request      Patient 77-year-old female with history diabetes, hypertension, history colon polyps and duodenal polyps as well as temporal arteritis presenting in follow-up with iron deficiency anemia.  Patient last colonoscopy 2019 and EGD 2020.  Patient has done well recently suffering from COVID but that has improved.  Patient is having issues with constipation but no bright red blood per rectum or melena but has noted decreased appetite with constipation.  At this point we will arrange EGD and colonoscopy due to the iron deficiency and follow-up clinically.

## 2023-05-09 ENCOUNTER — TELEPHONE (OUTPATIENT)
Dept: GASTROENTEROLOGY | Facility: CLINIC | Age: 78
End: 2023-05-09
Payer: MEDICARE

## 2023-05-09 NOTE — TELEPHONE ENCOUNTER
Hub staff attempted to follow warm transfer process and was unsuccessful     Caller: Blanca Zhu    Relationship to patient: Self    Best call back number: [phone]    Patient is needing: PT'S  CALLING BACK CONCERNED THEY WILL NOT GET THE PROCEDURE RESCHEDULED BY TOMORROW. PLEASE CALL BACK AND ADVISE.

## 2023-05-09 NOTE — TELEPHONE ENCOUNTER
Caller: Blanca Zhu    Relationship to patient: Self    Best call back number: [phone]    Chief complaint: PT WANTS TO RESCHEDULE COLONSCOPY    Type of visit: COLONOSCOPY     Requested date: FIRST AVAILABLE     If rescheduling, when is the original appointment: 5/10

## 2023-05-09 NOTE — TELEPHONE ENCOUNTER
Caller: Blanca Zhu    Relationship to patient: Self    Best call back number: [phone]   Patient is needing: RECEIVED CALL THE PATIENT IS NOT FEELING WELL, SHE NEEDS TO RESCHEDULE THE COLONOSCOPY SCHEDULED FOR TOMORROW 5-. THE PATIENT CAN BE REACHED AT THE NUMBER ABOVE

## 2023-05-10 ENCOUNTER — OFFICE VISIT (OUTPATIENT)
Dept: ONCOLOGY | Facility: CLINIC | Age: 78
End: 2023-05-10
Payer: MEDICARE

## 2023-05-10 ENCOUNTER — LAB (OUTPATIENT)
Dept: LAB | Facility: HOSPITAL | Age: 78
End: 2023-05-10
Payer: MEDICARE

## 2023-05-10 VITALS
HEART RATE: 83 BPM | WEIGHT: 206.2 LBS | BODY MASS INDEX: 40.48 KG/M2 | HEIGHT: 60 IN | SYSTOLIC BLOOD PRESSURE: 170 MMHG | DIASTOLIC BLOOD PRESSURE: 67 MMHG | TEMPERATURE: 97.5 F | OXYGEN SATURATION: 93 %

## 2023-05-10 DIAGNOSIS — K31.7 POLYP OF DUODENUM: ICD-10-CM

## 2023-05-10 DIAGNOSIS — D05.12 BREAST NEOPLASM, TIS (DCIS), LEFT: ICD-10-CM

## 2023-05-10 DIAGNOSIS — D50.8 OTHER IRON DEFICIENCY ANEMIA: Primary | ICD-10-CM

## 2023-05-10 DIAGNOSIS — D13.2 DUODENAL ADENOMA: ICD-10-CM

## 2023-05-10 DIAGNOSIS — D50.0 IRON DEFICIENCY ANEMIA DUE TO CHRONIC BLOOD LOSS: ICD-10-CM

## 2023-05-10 LAB
ALBUMIN SERPL-MCNC: 3.7 G/DL (ref 3.5–5.2)
ALBUMIN/GLOB SERPL: 1.3 G/DL (ref 1.1–2.4)
ALP SERPL-CCNC: 138 U/L (ref 38–116)
ALT SERPL W P-5'-P-CCNC: 13 U/L (ref 0–33)
ANION GAP SERPL CALCULATED.3IONS-SCNC: 14.8 MMOL/L (ref 5–15)
AST SERPL-CCNC: 22 U/L (ref 0–32)
BASOPHILS # BLD AUTO: 0.06 10*3/MM3 (ref 0–0.2)
BASOPHILS NFR BLD AUTO: 0.5 % (ref 0–1.5)
BILIRUB SERPL-MCNC: 0.2 MG/DL (ref 0.2–1.2)
BUN SERPL-MCNC: 41 MG/DL (ref 6–20)
BUN/CREAT SERPL: 30.8 (ref 7.3–30)
CALCIUM SPEC-SCNC: 8.6 MG/DL (ref 8.5–10.2)
CHLORIDE SERPL-SCNC: 102 MMOL/L (ref 98–107)
CO2 SERPL-SCNC: 23.2 MMOL/L (ref 22–29)
CREAT SERPL-MCNC: 1.33 MG/DL (ref 0.6–1.1)
DEPRECATED RDW RBC AUTO: 49.2 FL (ref 37–54)
EGFRCR SERPLBLD CKD-EPI 2021: 41.6 ML/MIN/1.73
EOSINOPHIL # BLD AUTO: 0.16 10*3/MM3 (ref 0–0.4)
EOSINOPHIL NFR BLD AUTO: 1.4 % (ref 0.3–6.2)
ERYTHROCYTE [DISTWIDTH] IN BLOOD BY AUTOMATED COUNT: 14 % (ref 12.3–15.4)
FERRITIN SERPL-MCNC: 327.8 NG/ML (ref 13–150)
GLOBULIN UR ELPH-MCNC: 2.8 GM/DL (ref 1.8–3.5)
GLUCOSE SERPL-MCNC: 238 MG/DL (ref 74–124)
HCT VFR BLD AUTO: 37.3 % (ref 34–46.6)
HGB BLD-MCNC: 11.6 G/DL (ref 12–15.9)
HGB RETIC QN AUTO: 32.5 PG (ref 29.8–36.1)
IMM GRANULOCYTES # BLD AUTO: 0.12 10*3/MM3 (ref 0–0.05)
IMM GRANULOCYTES NFR BLD AUTO: 1 % (ref 0–0.5)
IMM RETICS NFR: 9.5 % (ref 3–15.8)
IRON 24H UR-MRATE: 56 MCG/DL (ref 37–145)
IRON SATN MFR SERPL: 20 % (ref 14–48)
LYMPHOCYTES # BLD AUTO: 0.72 10*3/MM3 (ref 0.7–3.1)
LYMPHOCYTES NFR BLD AUTO: 6.2 % (ref 19.6–45.3)
MCH RBC QN AUTO: 30 PG (ref 26.6–33)
MCHC RBC AUTO-ENTMCNC: 31.1 G/DL (ref 31.5–35.7)
MCV RBC AUTO: 96.4 FL (ref 79–97)
MONOCYTES # BLD AUTO: 0.72 10*3/MM3 (ref 0.1–0.9)
MONOCYTES NFR BLD AUTO: 6.2 % (ref 5–12)
NEUTROPHILS NFR BLD AUTO: 84.7 % (ref 42.7–76)
NEUTROPHILS NFR BLD AUTO: 9.81 10*3/MM3 (ref 1.7–7)
NRBC BLD AUTO-RTO: 0 /100 WBC (ref 0–0.2)
PLATELET # BLD AUTO: 199 10*3/MM3 (ref 140–450)
PMV BLD AUTO: 10.4 FL (ref 6–12)
POTASSIUM SERPL-SCNC: 4.8 MMOL/L (ref 3.5–4.7)
PROT SERPL-MCNC: 6.5 G/DL (ref 6.3–8)
RBC # BLD AUTO: 3.87 10*6/MM3 (ref 3.77–5.28)
RETICS # AUTO: 0.05 10*6/MM3 (ref 0.02–0.13)
RETICS/RBC NFR AUTO: 1.36 % (ref 0.7–1.9)
SODIUM SERPL-SCNC: 140 MMOL/L (ref 134–145)
TIBC SERPL-MCNC: 281 MCG/DL (ref 249–505)
TRANSFERRIN SERPL-MCNC: 201 MG/DL (ref 200–360)
WBC NRBC COR # BLD: 11.59 10*3/MM3 (ref 3.4–10.8)

## 2023-05-10 PROCEDURE — 82728 ASSAY OF FERRITIN: CPT

## 2023-05-10 PROCEDURE — 36415 COLL VENOUS BLD VENIPUNCTURE: CPT

## 2023-05-10 PROCEDURE — 83540 ASSAY OF IRON: CPT

## 2023-05-10 PROCEDURE — 80053 COMPREHEN METABOLIC PANEL: CPT

## 2023-05-10 PROCEDURE — 85025 COMPLETE CBC W/AUTO DIFF WBC: CPT

## 2023-05-10 PROCEDURE — 85046 RETICYTE/HGB CONCENTRATE: CPT

## 2023-05-10 PROCEDURE — 84466 ASSAY OF TRANSFERRIN: CPT

## 2023-05-10 NOTE — PROGRESS NOTES
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      History of Present Illness   On 05/10/2023 this 77-year-old  female returns to the office for follow-up of her iron deficiency anemia. During the previous visit we documented low iron, low ferritin, low saturation. She received IV iron infusion. This gave her some benefit in regard to temperature production, lesser degree of fatigue, lesser degree of shortness of breath, and more ability to function. In the interim of time she has seen Dr. Haile Handley in preparation for upper and lower endoscopies. She has had lesser degree of heartburn and indigestion now that she is taking PPI on routine basis and she is not having any issues in regard to passage of blood in the stool as far as she can tell. She remains constipated. Cardiac disease seems to be relatively quiet. She remains on prednisone for her temporal arteritis. She refuses to take Arava. She has been seen by Dr. Lang. Trelegy inhaler was given. She had severe pain in her throat with this, discontinued, and back on Symbicort.                          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 Daniel Gutierrez, [facility]   •  SECTION N/A     x3   • CHOLECYSTECTOMY N/A    • COLONOSCOPY     • COLONOSCOPY W/ POLYPECTOMY N/A 2019    Enlarged folds in the antrum: biopsied; duodenal, transverse colon x2, splenic flexure, descending colon and sigmoid colon polyps: biopsied (path: tubular adenoma x6)-Dr. Zak De Jesus, [facility]   • ENDOSCOPY  10/11/2019    Procedure: ESOPHAGOGASTRODUODENOSCOPY with hot snare polypectomy;  Surgeon: Haile Handley MD;  Location: [facility] ENDOSCOPY;  Service: Gastroenterology   • ENDOSCOPY N/A 2020    Procedure: ESOPHAGOGASTRODUODENOSCOPY;  Surgeon: Haile Handley MD;  Location: [facility] ENDOSCOPY;  Service: Gastroenterology   • ENDOSCOPY N/A 2020    Procedure: ESOPHAGOGASTRODUODENOSCOPY WITH BIOPSIES AND COLD BIOPSY POLYPECTOMY;  Surgeon: Haile Handley MD;  Location: [facility] ENDOSCOPY;  Service: Gastroenterology;  Laterality: N/A;  pre: dyspepsia and diarrhea  post: duodenal polyp and gastritis   • MASTECTOMY Left    • MASTECTOMY WITH SENTINEL NODE BIOPSY AND AXILLARY NODE DISSECTION Left 7/3/2019    Procedure: LEFT BREAST MASTECTOMY WITH SENTINEL NODE BIOPSY AND , v-y plasty closure;  Surgeon: Tahmina James MD;  Location: [facility] MAIN OR;  Service: General   • SUBTOTAL HYSTERECTOMY Bilateral     ovaries still in tact   • TEMPORAL ARTERY BIOPSY Bilateral 2019        Current Outpatient Medications on File Prior to Visit   Medication Sig Dispense Refill   • albuterol sulfate  (90 Base) MCG/ACT inhaler Every 6 (Six) Hours.     • amLODIPine (NORVASC) 5 MG tablet amlodipine 5 mg tablet   TAKE 1 TABLET BY MOUTH ONCE DAILY IN THE EVENING     • azithromycin (ZITHROMAX) 250 MG tablet azithromycin 250 mg tablet     • bisoprolol (ZEBeta) 10 MG tablet Take 2 tablets by mouth.     • Cholecalciferol 125 MCG (5000 UT) tablet cholecalciferol (vitamin D3) 125 mcg (5,000 unit)  tablet   TAKE 1 TABLET BY MOUTH ONCE DAILY FOR 30 DAYS     • clotrimazole-betamethasone (LOTRISONE) 1-0.05 % cream clotrimazole-betamethasone 1 %-0.05 % topical cream   APPLY CREAM TOPICALLY TO AFFECTED AND SURROUNDING AREAS OF SKIN TWICE A DAY FOR 2 WEEKS (IN THE MORNING AND EVENING)     • doxazosin (CARDURA) 2 MG tablet Take 1 tablet by mouth Every Night.     • DULoxetine (CYMBALTA) 30 MG capsule duloxetine 30 mg capsule,delayed release   Take 1 capsule every day by oral route for 30 days.     • ergocalciferol (ERGOCALCIFEROL) 1.25 MG (10274 UT) capsule Take 1 capsule by mouth Every 7 (Seven) Days.     • hydrocortisone 2.5 % cream hydrocortisone 2.5 % topical cream   APPLY CREAM TWICE DAILY TO AFFECTED AREA     • Insulin Glargine (TOUJEO SOLOSTAR SC) Inject 40 Units under the skin into the appropriate area as directed Every Night. INSTRUCTED PT TO FOLLOW MD INSTRUCTIONS REGARDING DOSING BEFORE SURGERY     • Insulin Lispro (HUMALOG PEN SC) Inject  under the skin into the appropriate area as directed 3 (Three) Times a Day With Meals. 25 units at breakfast, 20 units at lunch, 22 units at dinner/INSTRUCTED PT TO FOLLOW MD INSTRUCTIONS REGARDING DOSING BEFORE SURGERY     • leflunomide (ARAVA) 10 MG tablet Take 1 tablet by mouth.     • miconazole (Micatin) 2 % cream Apply 1 application topically to the appropriate area as directed 2 (Two) Times a Day. 14 g 1   • O2 (OXYGEN) Inhale 1 (One) Time.     • omeprazole (priLOSEC) 20 MG capsule 2 capsules.     • Paxlovid, 300/100, 20 x 150 MG & 10 x 100MG tablet therapy pack tablet      • predniSONE (DELTASONE) 5 MG tablet Take 7 mg by mouth Daily.     • torsemide (DEMADEX) 20 MG tablet      • valsartan-hydrochlorothiazide (DIOVAN-HCT) 320-25 MG per tablet Take 1 tablet by mouth Daily. (Patient taking differently: Take 0.5 tablets by mouth Daily.) 30 tablet 11     No current facility-administered medications on file prior to visit.        ALLERGIES:    Allergies   Allergen  "Reactions   • Aspirin Nausea Only     Low tolerance, abdominal pain   • Sulfa Antibiotics Unknown (See Comments)     Happened as a child        Social History     Socioeconomic History   • Marital status:      Spouse name: Ashwin   • Number of children: 3   • Years of education: College   Tobacco Use   • Smoking status: Never   • Smokeless tobacco: Never   • Tobacco comments:     caffine use   Substance and Sexual Activity   • Alcohol use: No     Comment: one or two small drinks a year   • Drug use: No   • Sexual activity: Not Currently     Comment: Spouse, Ashwin        Family History   Problem Relation Age of Onset   • Hypertension Father    • Stomach cancer Father    • Diabetes Father    • Esophageal cancer Father    • Throat cancer Sister    • Diabetes Paternal Grandmother    • Hypertension Paternal Grandfather    • Colon cancer Paternal Grandfather    • Heart disease Mother    • Colon cancer Paternal Uncle    • Colon cancer Paternal Uncle    • Colon cancer Paternal Uncle    • Ovarian cancer Cousin    • Stomach cancer Cousin    • Malig Hyperthermia Neg Hx           Objective     Vitals:    05/10/23 1655   BP: 170/67   Pulse: 83   Temp: 97.5 °F (36.4 °C)   TempSrc: Temporal   SpO2: 93%   Weight: 93.5 kg (206 lb 3.2 oz)   Height: 152.4 cm (60\")   PainSc: 0-No pain         5/10/2023     4:45 PM   Current Status   ECOG score 1       Physical Exam                  GENERAL:  Well-developed, Patient  in no acute distress. USING A WALKER  SKIN:  Warm, dry ,NO purpura ,no rash.PALE  HEENT:  Pupils were equal and reactive to light and accomodation, conjunctivae noninjected,  normal visual acuity.   NECK:  Supple with good range of motion; no thyromegaly , no JVD or bruits,.No carotid artery pain, no carotid abnormal pulsation   LYMPHATICS:  No cervical, NO supraclavicular, NO axillary, NO inguinal adenopathies.  CARDIAC   normal rate , regular rhythm, without murmur,NO rubs NO S3 NO S4   LUNGS: normal breath " sounds bilateral, no wheezing, NO rhonchi, NO crackles ,NO rubs.  VASCULAR VENOUS: no cyanosis, NO collateral circulation, NO varicosities, GRADE 2 BOTH LE edema, NO palpable cords, NO pain,NO erythema, NO pigmentation of the skin.  ABDOMEN:  Soft, NO pain,no hepatomegaly, no splenomegaly,no masses, no ascites, no collateral circulation,no distention.  EXTREMITIES  AND SPINE:  No clubbing, no cyanosis ,no deformities , no pain .No kyphosis,  no pain in spine, no pain in ribs , no pain in pelvic bone.  NEUROLOGICAL:  Patient was awake, alert, oriented to time, person and place.NEUROPATHY IN FEET                      RECENT LABS:  Hematology WBC   Date Value Ref Range Status   05/10/2023 11.59 (H) 3.40 - 10.80 10*3/mm3 Final     RBC   Date Value Ref Range Status   05/10/2023 3.87 3.77 - 5.28 10*6/mm3 Final     Hemoglobin   Date Value Ref Range Status   05/10/2023 11.6 (L) 12.0 - 15.9 g/dL Final     Hematocrit   Date Value Ref Range Status   05/10/2023 37.3 34.0 - 46.6 % Final     Platelets   Date Value Ref Range Status   05/10/2023 199 140 - 450 10*3/mm3 Final            Assessment & Plan      1. This patient has undergone a left-sided mastectomy for DCIS of the left breast. The patient had a large tumor that was high grade with negative margins of resection, minimal microinvasion and negative sentinel lymph nodes. The patient’s estrogen receptor and progesterone receptor were negative and she was HER2 negative as well at the time of the original diagnosis. Under these premises, I think the patient is done with the treatment for this and I do not recommend any form of adjuvant chemotherapy, radiation treatment and obviously no hormonal therapy. There is no role for Herceptin or medicines of this nature either.     The patient was further reviewed on 03/30/2021 in regard to her breast cancer. Her right breast examination is normal. Her mammogram is up-to-date and been normal and her left mastectomy site is normal. I  see nothing to suggest breast cancer recurrence. As we mentioned before the patient never required adjuvant therapy because she had triple negative disease.         The patient was further reviewed in regard to her breast issue on 07/20/2021. Her mastectomy on the left side is well healed, the right breast is unremarkable, there is no lymphedema in either extremity and the patient is doing fine from this point of view.   The patient is reviewed on 11/16/2021 in regard to her left side mastectomy for DCIS of the left breast. So far the mastectomy site is unremarkable, surgical site is well healed. There is no lymphedema in the left upper extremity, the left axilla is normal, right breast is normal. Right breast mammogram recently performed as stated above disclosed no abnormalities. This will be watched in absence of any other intervention.   The patient was reviewed on 05/12/2022. I find no symptoms or signs that would indicate breast cancer recurrence. Her right breast exam is normal. Her left side mastectomy discloses no areas of induration, tenderness, nodularity. Her left axilla is unremarkable. She has no lymphedema in either upper extremity. She will be watched in absence of any other intervention.  On 07/20/2022 the patient has no symptoms or signs of breast cancer recurrence. Her left chest wall is unremarkable, left axilla is normal. No lymphedema in the left upper extremity. Right breast examination is normal, no lymphedema in the right upper extremity, normal right axilla.     Mammogram requested to be done in 6 months after she comes back from [place] in 01/2023.   On 03/01/2023 the patient has no symptoms or signs of breast cancer recurrence, her left mastectomy site is normal, her right breast has been examined during the previous visit. I have not examined the right breast today. She is up to date on her right sided mammogram. She will remain in observation from this point of view.  On 05/10/2023 from  the point of view of her mastectomy for DCIS, she has no issues pertinent to this. This will be watched in absence of any other intervention.              ·       2. From the point of view of her anemia, iron deficiency, the patient has been replaced with IV iron. As far as the patient can tell on review on 10/19/2020 she has not had any evidence of blood loss in the gastrointestinal tract. Her diet is appropriate but the only thing that she is not following through is that she is back onto diet soft drinks Pepsi for example. She is not drinking any coffee that she used to drink copious amount of it. According to her, her diet again remains appropriate. It calls my attention the hemoglobin of 11.7, her reticulated hemoglobin is normal at 35 therefore probably anemia from today is reflection of some other condition.  The patient remains anemic today and we have a multitude of testing including ferritin, iron, TIBC, B12, folic acid and reticulated hemoglobin to further analyze. This will be discussed with her on the telephone once that these reports become available. Also I have asked the lab to run peripheral blood slide for me to review given her previous history of hemolytic anemia. I will review her chemistry profile, bilirubin and along with the retic count this will give me an idea if hemolytic anemia is an issue at this point.   On 11/16/2021 the patient remains mildly anemic with a hemoglobin of 10.9. Six months ago we did ferritin, iron, TIBC, B12, folic acid levels all of them normal. Her erythropoietin level was low consistent with anemia of chronic kidney disease. She has not undergone any erythropoietin therapy at this point.   On 05/12/2022, the patient has become anemic again in spite of not losing blood in the gastrointestinal tract. Reticulated hemoglobin is 33; therefore it is likely that she does not have iron deficiency anemia but her ferritin, iron profile are pending and she has had B12 and folic  acid levels measured not too far ago that were normal. This leads me to believe that very likely the anemia component of this patient at this time is probably associated with chronic illness, inflammatory condition and chronic kidney disease, especially if we account for the progressive amount of anasarca that this patient has. She will be called at home with the report of the rest of laboratory testing pertinent to this.  On 07/20/2022 the patient still has a mild component of anemia. During the previous visit we documented normal ferritin, iron, TIBC, B12, folic acid level and normal reticulated hemoglobin. Today the numbers remain about the same. She is not having any blood loss. We advised her to remain in observation. Given her creatinine clearance it is likely that part of the anemia corresponds to anemia associated with chronic renal disease given the fact that the hemoglobin stays above 10 she does not qualify for Procrit.  On 03/01/2023 the patient has had a lot of Gi issues since her COVID infection in [place]. She has not noticed any passage of blood in the stool but she has had constipation and she has had gastric irritation. Her hemoglobin is lower than usual and her reticulated hemoglobin has dropped to 3 telling me that very likely she has iron deficiency again. This raises the question if what she had in the stomach before, polyps in the stomach and duodenum has anything to do with this and if she is having any other new symptomatology pertinent to the anemia and what we need to do. She has not seen Vega Handley MD, in a good while and I went ahead and scheduled her to be seen by him. I pointed out to her that if she is documented to have low ferritin, low iron profile she will require IV iron therapy because she cannot handle oral iron therapy number one and she will require hemoccult testing. Very likely she will have an obvious indication to pursue further upper and lower endoscopies. Assuming  that she has low iron she will be scheduled to receive IV iron therapy in the form of Venofer times 3 at 300 mg per dose and review her back in a couple of months to see how she evolves from that point of view.   On 05/10/2023 the patient received IV iron therapy almost 6 weeks ago. Her hemoglobin is 11.6 today. Other indices are waiting to be reviewed. The white count and the platelet count are normal. The white count differential is normal. Reticulated hemoglobin is pending. We tried to obtain another blood sample with her, extremely difficult to measure ferritin. We are not sure that this will be appropriate for measurement of this, waiting to be reviewed by the lab techs.              ·     3. The patient has very significant degree of shortness of breath upon minimal exertion. I asked her to walk. It took her almost 3 minutes to walk 30 feet. She was very short winded. We started with an O2 saturation of 100% on room air and a heart rate of 73. Her O2 saturation dropped to 97 and heart rate never raised.     I reviewed this issue with her again today. Her cardiac auscultation is very much normal and her echocardiogram done not too long ago did not document too much of major abnormality besides pulmonary hypertension. Her lung auscultation is abnormal with decreased breath sounds bilaterally. I do not think she has effusions but I think she needs to be seen by pulmonology. I asked her to review this information with her pulmonologist as soon as possible. I still wonder why this patient is not receiving at least oxygen at nighttime given the fact that she has sleep apnea and she has very likely chronic lung disease.  She dd not know that she has pulmonary hypertension and this will be a first start in the treatment of this condition anyway.   On 11/16/2021 the cardiac auscultation remains unremarkable, no obvious gallops. Pulmonary auscultation shows decrease of breath sounds bilaterally. We advised her to do  exercises that maybe in the long run could have impact on her and I again advised her that it will be tremendously consequential to her to lose 20-30 pounds of weight. This will allow less oxygen consumption by fat and more oxygen consumption by her normal weight, normal other parts including muscles, heart, kidneys and so forth. In the long run this could have positive consequences on her.     I will review her back in 6 months with a CBC and CMP at that time.     I find no reason for this patient to proceed with erythropoietin therapy with a hemoglobin of 10.9.   The patient on 05/12/2022 is more short of breath than usual. She is barely able to walk 10 feet without becoming hypoxemic and she has in her cardiac auscultation today a gallop that is more than typical. Her lung auscultation is clear. She has no crackles. She has 3+ edema in both lower extremities in spite of having elastic support in place and she has a blister in the left lower extremity with no obvious secondary infection. I do believe that her shortness of breath is very worrisome for congestive heart failure, fluid accumulation, anasarca and is very worrisome as well in the background of chronic kidney disease.     On top of that she is having pain in the epigastric area that sometimes looks to me and sounds to me like angina and I think she needs to be seen by Cardiology right away. I sent a note to Dr. Power, the patient’s cardiologist, to review this. I went ahead and ordered an echocardiogram for completeness and I ordered a proBNP today. I did not change any medicines on her. My office will be calling Dr. Power’ office to arrange for a follow-up visit quit.  On 07/20/2022 after visits to her nephrologist and cardiologist the patient's volume status has improved, she has lesser degree of anasarca, she feels better. Medications have been modified and overall she is doing better.  On 03/01/2023 her shortness of breath is very significant  upon exertion. Her pulmonary auscultation shows very poor breath sounds bilaterally. Dr. Dmitry Lang has seen her. Radiological assessment is coming up in that regard. I have nothing to add to this situation, Dr. Lang will follow up with her.   On 05/10/2023 shortness of breath is less. She has not had the need for oxygen in the last 3 days. She is on Symbicort. She has not had any respiratory infection and her volume in regard to water control seems to be better controlled at this time. The patient has no new cardiac issues. She does not have new pulmonary issues besides the discontinuation of Trelegy and going back into Symbicort on her own.     I do believe that the IV iron infusion also improves cardiac function and maybe that is another reason why she feels better.              4.The patient states the swelling in the lower extremities along with the blister in the left lower extremity is very worrisome for her in the background of diabetes. She has elastic support and the legs are huge. She has probably in her body no less than 20 pounds worth of water, I will guess. I would not be surprised if she needs to end up in the hospital with aggressive management of water and profuse diuresis that is necessary.     I went ahead and referred her to be seen by Vascular Surgery. Given the fact that she has had diabetes for so long, she will require Doppler studies of the lower extremities, both for the venous system and also for the arterial system.     On 07/20/2022 the patient had vascular studies in the lower extremities both venous and arterial failing to document any abnormality to justify the swelling in the lower extremities. Given this finding I advised the patient that this is good news and she will follow up with vascular surgery in the future. Elastic support could be something that she needs to wear.     I will review her back in 6 months with a CBC, CMP, ferritin, iron, TIBC, B12, folic acid level and a  mammogram done after she returns from [place] in 01/2023.  On 03/01/2023 the swelling in her lower extremities is a combination of factors. I would not be surprised if she has a component of right sided heart failure at the same time.     The patient will be called at home with the report of her iron and ferritin levels and proBNP.     Planning to see her back in a couple of months repeating CBC, ferritin, iron profile, CMP and proceed with Venofer on 3 different occasions.    On 05/10/2023 the patient will have endoscopies by the end of 05/2023. I asked her to call to notify the time and date of this to review what is found and what the pathology shows. Otherwise, we are planning to review her back in 4 months with a CBC, reticulated hemoglobin, CMP, ferritin, iron profile.     I discussed all these facts with her and her  in [place]ian. They were grateful about the conversation.      ·

## 2023-05-21 ENCOUNTER — APPOINTMENT (OUTPATIENT)
Dept: GENERAL RADIOLOGY | Facility: HOSPITAL | Age: 78
End: 2023-05-21
Payer: MEDICARE

## 2023-05-21 ENCOUNTER — HOSPITAL ENCOUNTER (INPATIENT)
Facility: HOSPITAL | Age: 78
LOS: 13 days | Discharge: NURSING, REHAB OR LONG-TERM CARE | End: 2023-06-04
Attending: EMERGENCY MEDICINE | Admitting: INTERNAL MEDICINE
Payer: MEDICARE

## 2023-05-21 DIAGNOSIS — R06.03 RESPIRATORY DISTRESS: Primary | ICD-10-CM

## 2023-05-21 DIAGNOSIS — I16.0 HYPERTENSIVE URGENCY: ICD-10-CM

## 2023-05-21 DIAGNOSIS — R09.89 PULMONARY VASCULAR CONGESTION: ICD-10-CM

## 2023-05-21 DIAGNOSIS — R06.2 WHEEZING: ICD-10-CM

## 2023-05-21 LAB
ALBUMIN SERPL-MCNC: 3.8 G/DL (ref 3.5–5.2)
ALBUMIN/GLOB SERPL: 1.2 G/DL
ALP SERPL-CCNC: 109 U/L (ref 39–117)
ALT SERPL W P-5'-P-CCNC: 10 U/L (ref 1–33)
ANION GAP SERPL CALCULATED.3IONS-SCNC: 8.8 MMOL/L (ref 5–15)
AST SERPL-CCNC: 13 U/L (ref 1–32)
BASOPHILS # BLD AUTO: 0.05 10*3/MM3 (ref 0–0.2)
BASOPHILS NFR BLD AUTO: 0.5 % (ref 0–1.5)
BILIRUB SERPL-MCNC: 0.7 MG/DL (ref 0–1.2)
BUN SERPL-MCNC: 19 MG/DL (ref 8–23)
BUN/CREAT SERPL: 17.1 (ref 7–25)
CALCIUM SPEC-SCNC: 8.9 MG/DL (ref 8.6–10.5)
CHLORIDE SERPL-SCNC: 94 MMOL/L (ref 98–107)
CO2 SERPL-SCNC: 29.2 MMOL/L (ref 22–29)
CREAT SERPL-MCNC: 1.11 MG/DL (ref 0.57–1)
DEPRECATED RDW RBC AUTO: 47.3 FL (ref 37–54)
EGFRCR SERPLBLD CKD-EPI 2021: 51.6 ML/MIN/1.73
EOSINOPHIL # BLD AUTO: 0.09 10*3/MM3 (ref 0–0.4)
EOSINOPHIL NFR BLD AUTO: 1 % (ref 0.3–6.2)
ERYTHROCYTE [DISTWIDTH] IN BLOOD BY AUTOMATED COUNT: 13 % (ref 12.3–15.4)
FLUAV SUBTYP SPEC NAA+PROBE: NOT DETECTED
FLUBV RNA ISLT QL NAA+PROBE: NOT DETECTED
GEN 5 2HR TROPONIN T REFLEX: 28 NG/L
GLOBULIN UR ELPH-MCNC: 3.1 GM/DL
GLUCOSE BLDC GLUCOMTR-MCNC: 164 MG/DL (ref 70–130)
GLUCOSE BLDC GLUCOMTR-MCNC: 290 MG/DL (ref 70–130)
GLUCOSE SERPL-MCNC: 176 MG/DL (ref 65–99)
HCT VFR BLD AUTO: 42.4 % (ref 34–46.6)
HGB BLD-MCNC: 12.9 G/DL (ref 12–15.9)
IMM GRANULOCYTES # BLD AUTO: 0.05 10*3/MM3 (ref 0–0.05)
IMM GRANULOCYTES NFR BLD AUTO: 0.5 % (ref 0–0.5)
LYMPHOCYTES # BLD AUTO: 0.53 10*3/MM3 (ref 0.7–3.1)
LYMPHOCYTES NFR BLD AUTO: 5.8 % (ref 19.6–45.3)
MCH RBC QN AUTO: 29.8 PG (ref 26.6–33)
MCHC RBC AUTO-ENTMCNC: 30.4 G/DL (ref 31.5–35.7)
MCV RBC AUTO: 97.9 FL (ref 79–97)
MONOCYTES # BLD AUTO: 0.77 10*3/MM3 (ref 0.1–0.9)
MONOCYTES NFR BLD AUTO: 8.4 % (ref 5–12)
NEUTROPHILS NFR BLD AUTO: 7.72 10*3/MM3 (ref 1.7–7)
NEUTROPHILS NFR BLD AUTO: 83.8 % (ref 42.7–76)
NT-PROBNP SERPL-MCNC: 720.4 PG/ML (ref 0–1800)
PLATELET # BLD AUTO: 202 10*3/MM3 (ref 140–450)
PMV BLD AUTO: 9.8 FL (ref 6–12)
POTASSIUM SERPL-SCNC: 4.4 MMOL/L (ref 3.5–5.2)
PROT SERPL-MCNC: 6.9 G/DL (ref 6–8.5)
RBC # BLD AUTO: 4.33 10*6/MM3 (ref 3.77–5.28)
SARS-COV-2 RNA RESP QL NAA+PROBE: NOT DETECTED
SODIUM SERPL-SCNC: 132 MMOL/L (ref 136–145)
TROPONIN T DELTA: 3 NG/L
TROPONIN T SERPL HS-MCNC: 25 NG/L
WBC NRBC COR # BLD: 9.21 10*3/MM3 (ref 3.4–10.8)

## 2023-05-21 PROCEDURE — 99285 EMERGENCY DEPT VISIT HI MDM: CPT | Performed by: EMERGENCY MEDICINE

## 2023-05-21 PROCEDURE — 93005 ELECTROCARDIOGRAM TRACING: CPT | Performed by: EMERGENCY MEDICINE

## 2023-05-21 PROCEDURE — 81003 URINALYSIS AUTO W/O SCOPE: CPT | Performed by: EMERGENCY MEDICINE

## 2023-05-21 PROCEDURE — 93010 ELECTROCARDIOGRAM REPORT: CPT | Performed by: INTERNAL MEDICINE

## 2023-05-21 PROCEDURE — 25010000002 ONDANSETRON PER 1 MG: Performed by: INTERNAL MEDICINE

## 2023-05-21 PROCEDURE — 85025 COMPLETE CBC W/AUTO DIFF WBC: CPT | Performed by: EMERGENCY MEDICINE

## 2023-05-21 PROCEDURE — 71045 X-RAY EXAM CHEST 1 VIEW: CPT

## 2023-05-21 PROCEDURE — 63710000001 INSULIN LISPRO (HUMAN) PER 5 UNITS: Performed by: INTERNAL MEDICINE

## 2023-05-21 PROCEDURE — 83880 ASSAY OF NATRIURETIC PEPTIDE: CPT | Performed by: EMERGENCY MEDICINE

## 2023-05-21 PROCEDURE — G0378 HOSPITAL OBSERVATION PER HR: HCPCS

## 2023-05-21 PROCEDURE — 94660 CPAP INITIATION&MGMT: CPT

## 2023-05-21 PROCEDURE — 87636 SARSCOV2 & INF A&B AMP PRB: CPT | Performed by: EMERGENCY MEDICINE

## 2023-05-21 PROCEDURE — 25010000002 METHYLPREDNISOLONE PER 125 MG: Performed by: EMERGENCY MEDICINE

## 2023-05-21 PROCEDURE — 82948 REAGENT STRIP/BLOOD GLUCOSE: CPT

## 2023-05-21 PROCEDURE — 25010000002 FUROSEMIDE PER 20 MG: Performed by: EMERGENCY MEDICINE

## 2023-05-21 PROCEDURE — 99285 EMERGENCY DEPT VISIT HI MDM: CPT

## 2023-05-21 PROCEDURE — 84484 ASSAY OF TROPONIN QUANT: CPT | Performed by: EMERGENCY MEDICINE

## 2023-05-21 PROCEDURE — 25010000002 HYDRALAZINE PER 20 MG: Performed by: INTERNAL MEDICINE

## 2023-05-21 PROCEDURE — 80053 COMPREHEN METABOLIC PANEL: CPT | Performed by: EMERGENCY MEDICINE

## 2023-05-21 PROCEDURE — 94799 UNLISTED PULMONARY SVC/PX: CPT

## 2023-05-21 PROCEDURE — 94640 AIRWAY INHALATION TREATMENT: CPT

## 2023-05-21 PROCEDURE — 25010000002 FUROSEMIDE PER 20 MG: Performed by: INTERNAL MEDICINE

## 2023-05-21 RX ORDER — IPRATROPIUM BROMIDE AND ALBUTEROL SULFATE 2.5; .5 MG/3ML; MG/3ML
3 SOLUTION RESPIRATORY (INHALATION)
Status: DISCONTINUED | OUTPATIENT
Start: 2023-05-21 | End: 2023-06-04 | Stop reason: HOSPADM

## 2023-05-21 RX ORDER — ACETAMINOPHEN 325 MG/1
650 TABLET ORAL ONCE
Status: COMPLETED | OUTPATIENT
Start: 2023-05-21 | End: 2023-05-21

## 2023-05-21 RX ORDER — TERAZOSIN 2 MG/1
2 CAPSULE ORAL NIGHTLY
Status: DISCONTINUED | OUTPATIENT
Start: 2023-05-21 | End: 2023-05-22

## 2023-05-21 RX ORDER — ALBUTEROL SULFATE 2.5 MG/3ML
2.5 SOLUTION RESPIRATORY (INHALATION) ONCE
Status: COMPLETED | OUTPATIENT
Start: 2023-05-21 | End: 2023-05-21

## 2023-05-21 RX ORDER — METHYLPREDNISOLONE SODIUM SUCCINATE 125 MG/2ML
60 INJECTION, POWDER, LYOPHILIZED, FOR SOLUTION INTRAMUSCULAR; INTRAVENOUS ONCE
Status: COMPLETED | OUTPATIENT
Start: 2023-05-21 | End: 2023-05-21

## 2023-05-21 RX ORDER — LEFLUNOMIDE 20 MG/1
10 TABLET ORAL DAILY
Status: DISCONTINUED | OUTPATIENT
Start: 2023-05-22 | End: 2023-06-04 | Stop reason: HOSPADM

## 2023-05-21 RX ORDER — IBUPROFEN 600 MG/1
1 TABLET ORAL
Status: DISCONTINUED | OUTPATIENT
Start: 2023-05-21 | End: 2023-06-04 | Stop reason: HOSPADM

## 2023-05-21 RX ORDER — BISOPROLOL FUMARATE 5 MG/1
20 TABLET, FILM COATED ORAL
Status: DISCONTINUED | OUTPATIENT
Start: 2023-05-22 | End: 2023-06-04 | Stop reason: HOSPADM

## 2023-05-21 RX ORDER — AMLODIPINE BESYLATE 5 MG/1
5 TABLET ORAL
Status: DISCONTINUED | OUTPATIENT
Start: 2023-05-22 | End: 2023-06-04 | Stop reason: HOSPADM

## 2023-05-21 RX ORDER — IPRATROPIUM BROMIDE AND ALBUTEROL SULFATE 2.5; .5 MG/3ML; MG/3ML
3 SOLUTION RESPIRATORY (INHALATION) ONCE
Status: COMPLETED | OUTPATIENT
Start: 2023-05-21 | End: 2023-05-21

## 2023-05-21 RX ORDER — VALSARTAN 320 MG/1
320 TABLET ORAL
Status: DISCONTINUED | OUTPATIENT
Start: 2023-05-22 | End: 2023-05-22

## 2023-05-21 RX ORDER — FUROSEMIDE 10 MG/ML
40 INJECTION INTRAMUSCULAR; INTRAVENOUS ONCE
Status: COMPLETED | OUTPATIENT
Start: 2023-05-21 | End: 2023-05-21

## 2023-05-21 RX ORDER — HYDRALAZINE HYDROCHLORIDE 20 MG/ML
10 INJECTION INTRAMUSCULAR; INTRAVENOUS EVERY 6 HOURS PRN
Status: DISCONTINUED | OUTPATIENT
Start: 2023-05-21 | End: 2023-06-04 | Stop reason: HOSPADM

## 2023-05-21 RX ORDER — FUROSEMIDE 10 MG/ML
40 INJECTION INTRAMUSCULAR; INTRAVENOUS EVERY 8 HOURS
Status: DISCONTINUED | OUTPATIENT
Start: 2023-05-21 | End: 2023-05-22

## 2023-05-21 RX ORDER — ONDANSETRON 2 MG/ML
4 INJECTION INTRAMUSCULAR; INTRAVENOUS EVERY 6 HOURS PRN
Status: DISCONTINUED | OUTPATIENT
Start: 2023-05-21 | End: 2023-06-04 | Stop reason: HOSPADM

## 2023-05-21 RX ORDER — PANTOPRAZOLE SODIUM 40 MG/1
40 TABLET, DELAYED RELEASE ORAL
Status: DISCONTINUED | OUTPATIENT
Start: 2023-05-22 | End: 2023-05-27

## 2023-05-21 RX ORDER — DULOXETIN HYDROCHLORIDE 30 MG/1
30 CAPSULE, DELAYED RELEASE ORAL DAILY
Status: DISCONTINUED | OUTPATIENT
Start: 2023-05-22 | End: 2023-06-04 | Stop reason: HOSPADM

## 2023-05-21 RX ORDER — DEXTROSE MONOHYDRATE 25 G/50ML
25 INJECTION, SOLUTION INTRAVENOUS
Status: DISCONTINUED | OUTPATIENT
Start: 2023-05-21 | End: 2023-06-01

## 2023-05-21 RX ORDER — INSULIN LISPRO 100 [IU]/ML
2-9 INJECTION, SOLUTION INTRAVENOUS; SUBCUTANEOUS
Status: DISCONTINUED | OUTPATIENT
Start: 2023-05-21 | End: 2023-05-28

## 2023-05-21 RX ORDER — NICOTINE POLACRILEX 4 MG
15 LOZENGE BUCCAL
Status: DISCONTINUED | OUTPATIENT
Start: 2023-05-21 | End: 2023-06-01

## 2023-05-21 RX ORDER — SODIUM CHLORIDE 0.9 % (FLUSH) 0.9 %
10 SYRINGE (ML) INJECTION AS NEEDED
Status: DISCONTINUED | OUTPATIENT
Start: 2023-05-21 | End: 2023-06-04 | Stop reason: HOSPADM

## 2023-05-21 RX ORDER — HYDROCHLOROTHIAZIDE 25 MG/1
25 TABLET ORAL
Status: DISCONTINUED | OUTPATIENT
Start: 2023-05-22 | End: 2023-05-22

## 2023-05-21 RX ADMIN — IPRATROPIUM BROMIDE AND ALBUTEROL SULFATE 3 ML: .5; 3 SOLUTION RESPIRATORY (INHALATION) at 15:40

## 2023-05-21 RX ADMIN — NITROGLYCERIN 1 INCH: 20 OINTMENT TOPICAL at 15:59

## 2023-05-21 RX ADMIN — INSULIN LISPRO 6 UNITS: 100 INJECTION, SOLUTION INTRAVENOUS; SUBCUTANEOUS at 22:09

## 2023-05-21 RX ADMIN — FUROSEMIDE 40 MG: 10 INJECTION, SOLUTION INTRAMUSCULAR; INTRAVENOUS at 23:25

## 2023-05-21 RX ADMIN — ONDANSETRON 4 MG: 2 INJECTION INTRAMUSCULAR; INTRAVENOUS at 23:25

## 2023-05-21 RX ADMIN — ACETAMINOPHEN 650 MG: 325 TABLET, FILM COATED ORAL at 17:28

## 2023-05-21 RX ADMIN — ALBUTEROL SULFATE 2.5 MG: 2.5 SOLUTION RESPIRATORY (INHALATION) at 15:40

## 2023-05-21 RX ADMIN — METHYLPREDNISOLONE SODIUM SUCCINATE 60 MG: 125 INJECTION, POWDER, FOR SOLUTION INTRAMUSCULAR; INTRAVENOUS at 15:40

## 2023-05-21 RX ADMIN — HYDRALAZINE HYDROCHLORIDE 10 MG: 20 INJECTION INTRAMUSCULAR; INTRAVENOUS at 19:36

## 2023-05-21 RX ADMIN — FUROSEMIDE 40 MG: 10 INJECTION, SOLUTION INTRAMUSCULAR; INTRAVENOUS at 17:28

## 2023-05-21 RX ADMIN — IPRATROPIUM BROMIDE AND ALBUTEROL SULFATE 3 ML: 2.5; .5 SOLUTION RESPIRATORY (INHALATION) at 20:53

## 2023-05-21 NOTE — CONSULTS
Patient Care Team:  Praneeth Valenzuela MD as PCP - General (Internal Medicine)  Jean-Claude Farnsworth MD as Consulting Physician (Hematology and Oncology)  Praneeth Valenzuela MD as Referring Physician (Internal Medicine)  Tahmina Brown MD as Consulting Physician (Rheumatology)  Gretchen Mcginnis MD as Consulting Physician (Nephrology)  Tahmina James MD as Surgeon (General Surgery)  Haile Handley MD as Consulting Physician (Gastroenterology)  Sb Power MD as Consulting Physician (Cardiology)      Subjective     Patient is a 77 y.o. female.  Consulted for respiratory distress, patient was a direct admit from the freestanding ER for respiratory failure comes in on noninvasive ventilation.  She is a morbidly obese lady with diabetes mellitus type 2, hypertension, chronic kidney disease stage III, sleep apnea, temporal arteritis and a history of asthma.  She has a history of diastolic dysfunction grade 1A by echocardiogram in 5 of 22 she also had mild pulmonary hypertension by echocardiogram with an RVSP of about 37 at that time.  Patient follows with Dr. Lang in our office I looked up the records he follows for moderate to severe persistent asthma she had a COVID-19 infection about 4 months ago.  Looks like her FEV1 is about 45% of predicted but her FVC is about 40% looking back to 9 of 21 she had moderate to severe restrictive process with a total lung capacity of 59%.  I really did not see comments on the etiology of her restriction.  No diffusion studies.  Patient reports she is on 3 L O2 continuously and has been for quite some time she uses a CPAP machine for sleep apnea every night.  She has had chronic shortness of breath for several years at least but acute change over the last several days no fevers, maybe some chills but she is cold all the time.  No significant cough no chest pressure pain or soreness.  No sore throat or runny nose.  She has had chronic lower extremity edema it has been  worse the last few days.  She is having great difficulties lying down and sleeping she is having to sleep sitting up.  She has a little bit of this but it is worse now.  No history is of DVT PE or blood clots.      Review of Systems:  No acute headaches or visual changes no difficulty swallowing no bad heartburn or indigestion no melena medic easier change in bowel habits recently no dysuria hematuria Grant urgency or frequency she think she has been making urine normally.  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 Daniel Gutierrez, [facility]   •  SECTION N/A     x3   • CHOLECYSTECTOMY N/A    • COLONOSCOPY     • COLONOSCOPY W/ POLYPECTOMY N/A 2019    Enlarged folds in the antrum: biopsied; duodenal, transverse colon x2, splenic flexure, descending colon and sigmoid colon polyps: biopsied (path: tubular adenoma x6)-Dr. Zak De Jesus, [facility]   • ENDOSCOPY  10/11/2019    Procedure: ESOPHAGOGASTRODUODENOSCOPY with hot snare polypectomy;  Surgeon: Haile Handley MD;  Location: [facility] ENDOSCOPY;  Service: Gastroenterology   • ENDOSCOPY N/A 2020    Procedure: ESOPHAGOGASTRODUODENOSCOPY;  Surgeon: Haile Handley MD;   Location: [facility] ENDOSCOPY;  Service: Gastroenterology   • ENDOSCOPY N/A 9/9/2020    Procedure: ESOPHAGOGASTRODUODENOSCOPY WITH BIOPSIES AND COLD BIOPSY POLYPECTOMY;  Surgeon: Haile Handley MD;  Location: [facility] ENDOSCOPY;  Service: Gastroenterology;  Laterality: N/A;  pre: dyspepsia and diarrhea  post: duodenal polyp and gastritis   • MASTECTOMY Left    • MASTECTOMY WITH SENTINEL NODE BIOPSY AND AXILLARY NODE DISSECTION Left 7/3/2019    Procedure: LEFT BREAST MASTECTOMY WITH SENTINEL NODE BIOPSY AND , v-y plasty closure;  Surgeon: Tahmina James MD;  Location: [facility] MAIN OR;  Service: General   • SUBTOTAL HYSTERECTOMY Bilateral     ovaries still in tact   • TEMPORAL ARTERY BIOPSY Bilateral 12/05/2019     Social History     Socioeconomic History   • Marital status:      Spouse name: Ashwin   • Number of children: 3   • Years of education: College   Tobacco Use   • Smoking status: Never   • Smokeless tobacco: Never   • Tobacco comments:     caffine use   Substance and Sexual Activity   • Alcohol use: No     Comment: one or two small drinks a year   • Drug use: No   • Sexual activity: Not Currently     Comment: Spouse, Ashwin     Family History   Problem Relation Age of Onset   • Hypertension Father    • Stomach cancer Father    • Diabetes Father    • Esophageal cancer Father    • Throat cancer Sister    • Diabetes Paternal Grandmother    • Hypertension Paternal Grandfather    • Colon cancer Paternal Grandfather    • Heart disease Mother    • Colon cancer Paternal Uncle    • Colon cancer Paternal Uncle    • Colon cancer Paternal Uncle    • Ovarian cancer Cousin    • Stomach cancer Cousin    • Malig Hyperthermia Neg Hx          Allergies:  Aspirin and Sulfa antibiotics    Medications:  Prior to Admission medications    Medication Sig Start Date End Date Taking? Authorizing Provider   albuterol sulfate  (90 Base) MCG/ACT inhaler Every 6 (Six) Hours.   Yes Provider, MD Rhea   bisoprolol  (ZEBeta) 10 MG tablet Take 2 tablets by mouth.   Yes Rhea Page MD   doxazosin (CARDURA) 2 MG tablet Take 1 tablet by mouth Every Night. 7/25/22  Yes Sb Power MD   Insulin Glargine (TOUJEO SOLOSTAR SC) Inject 40 Units under the skin into the appropriate area as directed Every Night. INSTRUCTED PT TO FOLLOW MD INSTRUCTIONS REGARDING DOSING BEFORE SURGERY   Yes Rhea Page MD   Insulin Lispro (HUMALOG PEN SC) Inject  under the skin into the appropriate area as directed 3 (Three) Times a Day With Meals. 25 units at breakfast, 20 units at lunch, 22 units at dinner/INSTRUCTED PT TO FOLLOW MD INSTRUCTIONS REGARDING DOSING BEFORE SURGERY   Yes Rhea Page MD   O2 (OXYGEN) Inhale 1 (One) Time.   Yes Rhea Page MD   omeprazole (priLOSEC) 20 MG capsule 2 capsules. 8/23/21  Yes Rhea Page MD   predniSONE (DELTASONE) 5 MG tablet Take 7 mg by mouth Daily.   Yes Rhea Page MD   torsemide (DEMADEX) 20 MG tablet  10/11/21  Yes Rhea Page MD   amLODIPine (NORVASC) 5 MG tablet amlodipine 5 mg tablet   TAKE 1 TABLET BY MOUTH ONCE DAILY IN THE EVENING 12/15/22   Rhea Page MD   azithromycin (ZITHROMAX) 250 MG tablet azithromycin 250 mg tablet 1/5/23   Rhea Page MD   Cholecalciferol 125 MCG (5000 UT) tablet cholecalciferol (vitamin D3) 125 mcg (5,000 unit) tablet   TAKE 1 TABLET BY MOUTH ONCE DAILY FOR 30 DAYS    Rhea Page MD   clotrimazole-betamethasone (LOTRISONE) 1-0.05 % cream clotrimazole-betamethasone 1 %-0.05 % topical cream   APPLY CREAM TOPICALLY TO AFFECTED AND SURROUNDING AREAS OF SKIN TWICE A DAY FOR 2 WEEKS (IN THE MORNING AND EVENING)    Rhea Page MD   DULoxetine (CYMBALTA) 30 MG capsule duloxetine 30 mg capsule,delayed release   Take 1 capsule every day by oral route for 30 days.    Rhea Page MD   ergocalciferol (ERGOCALCIFEROL) 1.25 MG (51791 UT) capsule Take 1 capsule by mouth  "Every 7 (Seven) Days.    Rhea Page MD   hydrocortisone 2.5 % cream hydrocortisone 2.5 % topical cream   APPLY CREAM TWICE DAILY TO AFFECTED AREA 10/17/22   Rhea Page MD   leflunomide (ARAVA) 10 MG tablet Take 1 tablet by mouth. 2/14/23   Rhea Page MD   miconazole (Micatin) 2 % cream Apply 1 application topically to the appropriate area as directed 2 (Two) Times a Day. 10/21/22   Missy Claire APRN   Paxlovid, 300/100, 20 x 150 MG & 10 x 100MG tablet therapy pack tablet  1/5/23   Rhea Page MD   valsartan-hydrochlorothiazide (DIOVAN-HCT) 320-25 MG per tablet Take 1 tablet by mouth Daily.  Patient taking differently: Take 0.5 tablets by mouth Daily. 8/26/20 10/3/22  Cyrus Song III, MD             Objective     Vital Signs  Vital Sign Min/Max for last 24 hours  Temp  Min: 98 °F (36.7 °C)  Max: 98.1 °F (36.7 °C)   BP  Min: 197/68  Max: 217/76   Pulse  Min: 82  Max: 103   Resp  Min: 28  Max: 35   SpO2  Min: 80 %  Max: 98 %   Flow (L/min)  Min: 3  Max: 6   Weight  Min: 90.7 kg (200 lb)  Max: 90.7 kg (200 lb)     No intake or output data in the 24 hours ending 05/21/23 1957  No intake/output data recorded.  Last Weight and Admission Weight        05/21/23 1550   Weight: 90.7 kg (200 lb)     Flowsheet Rows    Flowsheet Row First Filed Value   Admission Height 152.4 cm (60\") Documented at 05/21/2023 1550   Admission Weight 90.7 kg (200 lb) Documented at 05/21/2023 1550          Body mass index is 39.06 kg/m².           Physical Exam:  General Appearance: Morbidly obese white female sitting on the side of the bed she is on 6 L nasal cannula O2 oxygen saturations about 95 to 96% she is awake and alert and does not appear in acute distress.  Eyes: Conjunctiva are clear and anicteric pupils are equal  ENT: Mucous membranes are moist no erythema no exudates she has a Mallampati type III airway and nasal septum midline  Neck: Short obese no visible jugular venous tension or " hepatojugular reflux, trachea midline  Lungs: Little decreased but clear I do not hear wheezes rales or rhonchi she does not appear to be in any particular distress she is not labored talking on O2  Cardiac: Regular rate rhythm there may be a split S2, no murmur  Abdomen: Obese soft nontender no palpable hepatosplenomegaly or masses  : Not examined  Musculoskeletal: Mild thoracic kyphosis  Skin: Warm and dry no jaundice no petechiae she has chronic venous stasis type changes in both lower extremities  Neuro: She is alert and oriented she is cooperative following commands moving all extremities and grossly intact  Extremities/P Vascular: No clubbing no cyanosis she does have 4+ tense lower extremity edema even some weeping from the skin.  Palpable radial pulses bilaterally I think I feel dorsalis pedis pulses through the edema but I am not certain feet are definitely warm with capillary refill  MSE: Pleasant lady appropriate      Labs:  Results from last 7 days   Lab Units 05/21/23  1615   GLUCOSE mg/dL 176*   SODIUM mmol/L 132*   POTASSIUM mmol/L 4.4   CO2 mmol/L 29.2*   CHLORIDE mmol/L 94*   ANION GAP mmol/L 8.8   CREATININE mg/dL 1.11*   BUN mg/dL 19   BUN / CREAT RATIO  17.1   CALCIUM mg/dL 8.9   ALK PHOS U/L 109   TOTAL PROTEIN g/dL 6.9   ALT (SGPT) U/L 10   AST (SGOT) U/L 13   BILIRUBIN mg/dL 0.7   ALBUMIN g/dL 3.8   GLOBULIN gm/dL 3.1     Estimated Creatinine Clearance: 43.3 mL/min (A) (by C-G formula based on SCr of 1.11 mg/dL (H)).      Results from last 7 days   Lab Units 05/21/23  1540   WBC 10*3/mm3 9.21   RBC 10*6/mm3 4.33   HEMOGLOBIN g/dL 12.9   HEMATOCRIT % 42.4   MCV fL 97.9*   MCH pg 29.8   MCHC g/dL 30.4*   RDW % 13.0   RDW-SD fl 47.3   MPV fL 9.8   PLATELETS 10*3/mm3 202   NEUTROPHIL % % 83.8*   LYMPHOCYTE % % 5.8*   MONOCYTES % % 8.4   EOSINOPHIL % % 1.0   BASOPHIL % % 0.5   IMM GRAN % % 0.5   NEUTROS ABS 10*3/mm3 7.72*   LYMPHS ABS 10*3/mm3 0.53*   MONOS ABS 10*3/mm3 0.77   EOS ABS 10*3/mm3  0.09   BASOS ABS 10*3/mm3 0.05   IMMATURE GRANS (ABS) 10*3/mm3 0.05         Results from last 7 days   Lab Units 05/21/23  1615   HSTROP T ng/L 25*     Results from last 7 days   Lab Units 05/21/23  1615   PROBNP pg/mL 720.4                 Microbiology Results (last 10 days)     Procedure Component Value - Date/Time    COVID-19 and FLU A/B PCR - Swab, Nasopharynx [[id number]]  (Normal) Collected: 05/21/23 1547    Lab Status: Final result Specimen: Swab from Nasopharynx Updated: 05/21/23 1609     COVID19 Not Detected     Influenza A PCR Not Detected     Influenza B PCR Not Detected    Narrative:      Fact sheet for providers: https://www.fda.gov/media/812618/download    Fact sheet for patients: https://www.fda.gov/media/364331/download    Test performed by PCR.            Diagnostics:  XR Chest 1 View    Result Date: 5/21/2023  XR CHEST 1 VW-  HISTORY: Female who is 77 years-old,  chest pain  TECHNIQUE: Frontal view of the chest  COMPARISON: 11/9/2019  FINDINGS: The heart size is borderline. Pulmonary vasculature is congested. Minimal likely atelectasis in the lower lungs. No pleural effusion or pneumothorax. No acute osseous process.      Borderline heart size with pulmonary vascular congestion. Minimal likely atelectasis in the lower lungs.  This report was finalized on 5/21/2023 3:48 PM by Dr. Galindo Malik M.D.      Results for orders placed during the hospital encounter of 05/13/22    Adult Transthoracic Echo Complete W/ Cont if Necessary Per Protocol    Interpretation Summary  · Estimated right ventricular systolic pressure from tricuspid regurgitation is mildly elevated (35-45 mmHg). Calculated right ventricular systolic pressure from tricuspid regurgitation is 37.7 mmHg.  · Mild pulmonary hypertension is present.  · Left ventricular wall thickness is consistent with mild concentric hypertrophy.  · Calculated left ventricular EF = 66.7% Estimated left ventricular EF was in agreement with the calculated  left ventricular EF. Left ventricular systolic function is normal.  · Left ventricular diastolic function is consistent with (grade Ia w/high LAP) impaired relaxation.  · C/w baseline study, there is no change in LV systolic function.      Chest x-ray reviewed I definitely agree with radiology there is certainly pulmonary vascular prominence and congestion with cephalization the only old film I see is from 2019 certainly did not have this lung volumes are little bit lower on this film as well.  I cannot say for sure whether she has any mild interstitial edema or not.  No pleural effusions are noted.    Assessment & Plan     1. Acute on chronic hypoxemic and question hypercapnic respiratory failure she has an elevated PCO2 which might suggest chronic hypercapnia will need to get a blood gas.  She is wide-awake alert on 6 L O2.  She uses Pap with sleep and will have machine available here for her to use tonight.  Try and treat the underlying cause  2. Question CHF her chest x-ray certainly looks like vascular congestion and may be even some early edema she has known diastolic dysfunction although her proBNP was normal and she has trivially elevated high-sensitivity troponin.  I think we should try and diurese her she definitely has orthopnea she has 4+ lower extremity edema as well.   3. Question asthma versus restrictive lung disease or both her pulmonary functions that I have seen have shown an increased FEV1 FVC ratio and her total lung capacity was moderate to severely reduced this may all be due to body habitus she did have some improvement in her FEV1 between her last couple of spirometry's which might suggest some underlying obstructive disease as well.  I am not sure that she is in a significant exacerbation I really do not hear any wheezing she is moving air.  Continue bronchodilators Dr. Lang is on tomorrow he follows her regularly he may be able to shed more light on the situation.  4. Obstructive sleep  apnea she will need Pap machine with sleep  5. Pulmonary hypertension by previous echocardiogram with evidence of diastolic dysfunction this may be playing a role in her chronic hypoxia and she may have some cor pulmonale with her lower extremity edema I do not know see how she responds she may benefit from a repeat echocardiogram evaluating RV size and function.  Pulmonary pressures.  6. Hyponatremia mild see if corrects with diuresis  7. Diabetes mellitus type 2 with mild hyperglycemia per primary service  8. Hypertension blood pressures are very high systolics over 200 and this could be a cause of her heart failure I will defer management to the primary service  9. Morbid obesity certainly weight loss would be beneficial  10. History of breast carcinoma she follows with oncology they have seen no evidence of recurrent disease  11. History of temporal arteritis sounds like that is quiescent as well ?still on some chronic steroids for this?      Abran Soto Jr, MD  05/21/23  19:57 EDT    Time:

## 2023-05-21 NOTE — ED NOTES
Pt presents to facility with reports of SOA and cough since yesterday and worse today. PT reports hx of SOA and has home oxygen as needed. Pt has been on 3L N.C since yesterday. Pt oxygen at 81%at triage and pt placed in bed and on high-flow. MD aware and at bedside.     Patient was placed in face mask during first look triage.  Patient was wearing a face mask throughout encounter.  I wore personal protective equipment throughout the encounter.  Hand hygiene was performed before and after patient encounter.      Margaret Irby RN

## 2023-05-21 NOTE — ED NOTES
Nursing report ED to floor  Blanca Zhu  77 y.o.  female    HPI :   Chief Complaint   Patient presents with    Shortness of Breath    Hypertension       Admitting doctor:   Praneeth Valenzuela MD    Admitting diagnosis:   The primary encounter diagnosis was Respiratory distress. Diagnoses of Wheezing, Pulmonary vascular congestion, and Hypertensive urgency were also pertinent to this visit.    Code status:   Current Code Status       Date Active Code Status Order ID Comments User Context       Prior            Allergies:   Aspirin and Sulfa antibiotics    Isolation:   Enhanced Droplet/Contact     Intake and Output  No intake or output data in the 24 hours ending 05/21/23 1745    Weight:       05/21/23  1550   Weight: 90.7 kg (200 lb)       Most recent vitals:   Vitals:    05/21/23 1552 05/21/23 1559 05/21/23 1600 05/21/23 1728   BP:  (!) 214/79  (!) 197/68   BP Location:       Patient Position:       Pulse: 98 96 96 91   Resp:       Temp:       SpO2: 93%  98%    Weight:       Height:           Active LDAs/IV Access:   Lines, Drains & Airways       Active LDAs       Name Placement date Placement time Site Days    Peripheral IV 05/21/23 1538 Right Antecubital 05/21/23  1538  Antecubital  less than 1    Peripheral IV 05/21/23 1605 Anterior;Left Forearm 05/21/23  1605  Forearm  less than 1    Closed/Suction Drain 1 Left Chest Bulb 19 Fr. 07/03/19  1117  Chest  1418    Closed/Suction Drain 2 Left Chest Bulb 19 Fr. 07/03/19  1118  Chest  1418                    Labs (abnormal labs have a star):   Labs Reviewed   COMPREHENSIVE METABOLIC PANEL - Abnormal; Notable for the following components:       Result Value    Glucose 176 (*)     Creatinine 1.11 (*)     Sodium 132 (*)     Chloride 94 (*)     CO2 29.2 (*)     eGFR 51.6 (*)     All other components within normal limits    Narrative:     GFR Normal >60  Chronic Kidney Disease <60  Kidney Failure <15    The GFR formula is only valid for adults with stable renal  function between ages 18 and 70.   TROPONIN - Abnormal; Notable for the following components:    HS Troponin T 25 (*)     All other components within normal limits    Narrative:     High Sensitive Troponin T Reference Range:  <10.0 ng/L- Negative Female for AMI  <15.0 ng/L- Negative Male for AMI  >=10 - Abnormal Female indicating possible myocardial injury.  >=15 - Abnormal Male indicating possible myocardial injury.   Clinicians would have to utilize clinical acumen, EKG, Troponin, and serial changes to determine if it is an Acute Myocardial Infarction or myocardial injury due to an underlying chronic condition.        CBC WITH AUTO DIFFERENTIAL - Abnormal; Notable for the following components:    MCV 97.9 (*)     MCHC 30.4 (*)     Neutrophil % 83.8 (*)     Lymphocyte % 5.8 (*)     Neutrophils, Absolute 7.72 (*)     Lymphocytes, Absolute 0.53 (*)     All other components within normal limits   POCT GLUCOSE FINGERSTICK - Abnormal; Notable for the following components:    Glucose 164 (*)     All other components within normal limits   COVID-19 AND FLU A/B, NP SWAB IN TRANSPORT MEDIA 8-12 HR TAT - Normal    Narrative:     Fact sheet for providers: https://www.fda.gov/media/906478/download    Fact sheet for patients: https://www.fda.gov/media/372997/download    Test performed by PCR.   BNP (IN-HOUSE) - Normal    Narrative:     Among patients with dyspnea, NT-proBNP is highly sensitive for the detection of acute congestive heart failure. In addition NT-proBNP of <300 pg/ml effectively rules out acute congestive heart failure with 99% negative predictive value.    Results may be falsely decreased if patient taking Biotin.     URINALYSIS WITHOUT MICROSCOPIC (NO CULTURE)   HIGH SENSITIVITIY TROPONIN T 2HR   CBC AND DIFFERENTIAL    Narrative:     The following orders were created for panel order CBC & Differential.  Procedure                               Abnormality         Status                     ---------                                -----------         ------                     CBC Auto Differential[[id number]]        Abnormal            Final result                 Please view results for these tests on the individual orders.       EKG:   ECG 12 Lead ED Triage Standing Order; Chest Pain    (Results Pending)       Meds given in ED:   Medications   sodium chloride 0.9 % flush 10 mL (has no administration in time range)   ipratropium-albuterol (DUO-NEB) nebulizer solution 3 mL (3 mL Nebulization Given 5/21/23 1540)   albuterol (PROVENTIL) nebulizer solution 0.083% 2.5 mg/3mL (2.5 mg Nebulization Given 5/21/23 1540)   methylPREDNISolone sodium succinate (SOLU-Medrol) injection 60 mg (60 mg Intravenous Given 5/21/23 1540)   nitroglycerin (NITROSTAT) ointment 1 inch (1 inch Topical Given 5/21/23 1559)   furosemide (LASIX) injection 40 mg (40 mg Intravenous Given 5/21/23 1728)   acetaminophen (TYLENOL) tablet 650 mg (650 mg Oral Given 5/21/23 1728)       Imaging results:  XR Chest 1 View    Result Date: 5/21/2023  Borderline heart size with pulmonary vascular congestion. Minimal likely atelectasis in the lower lungs.  This report was finalized on 5/21/2023 3:48 PM by Dr. Galindo Malik M.D.       Ambulatory status:   - bed rest    Social issues:   Social History     Socioeconomic History    Marital status:      Spouse name: Ashwin    Number of children: 3    Years of education: College   Tobacco Use    Smoking status: Never    Smokeless tobacco: Never    Tobacco comments:     caffine use   Substance and Sexual Activity    Alcohol use: No     Comment: one or two small drinks a year    Drug use: No    Sexual activity: Not Currently     Comment: Spouse, Ashwin       NIH Stroke Scale:         Margaret Irby RN  05/21/23 17:45 EDT

## 2023-05-21 NOTE — PLAN OF CARE
Goal Outcome Evaluation:  Plan of Care Reviewed With: patient        Progress: improving  Outcome Evaluation: Pt is a 77 year old female admitted for acute respiratory distress. Direct admit from [facility]. Transported via EMS while on EMS BIPAP. Currently on 6L NC. [place] notified. Pulmonology consulted for BIPAP machine settings. [place] also notified of high BP. Patient is A&Ox4. Speaks english well.  at bedside to translate anything not understood. BP elevated but other vital signs are stable. heart rate occasionally tachycardic. External female catheter on patient currently. C-xray completed at [facility] ER.

## 2023-05-21 NOTE — FSED PROVIDER NOTE
Subjective   History of Present Illness  Patient arrives today with respiratory distress, via private vehicle with  at bedside.  Patient has significant medical problems.  Complaining of fatigue, dyspnea on exertion and orthopnea.  She does wear oxygen at home intermittently as needed.  She arrives today hypoxic at 80% and dyspneic.   is okay with interpreting as patient is as well.  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   • CKD (chronic kidney disease), stage III    • Colon polyp 2019   • COVID-19 2023   • Diabetes mellitus, type 2    • Hypertension    • Sleep apnea    • Swelling     IN LOWER EXTREMITIES   • Temporal arteritis    • Thrombophlebitis        Allergies   Allergen Reactions   • Aspirin Nausea Only     Low tolerance, abdominal pain   • Sulfa Antibiotics Unknown (See Comments)     Happened as a child       Past Surgical History:   Procedure Laterality Date   • BREAST BIOPSY Left  approx    benign pathology   • BREAST BIOPSY Left 2019    Ultasound guided mammotome vacuum assisted left breast biopsy with placement of a metallic clip-Dr. Daniel Gutierrez, [facility]   •  SECTION N/A     x3   • CHOLECYSTECTOMY N/A    • COLONOSCOPY     • COLONOSCOPY W/ POLYPECTOMY N/A 2019    Enlarged folds in the antrum: biopsied; duodenal, transverse colon x2, splenic flexure, descending colon and sigmoid colon polyps: biopsied (path: tubular adenoma x6)-Dr. Zak De Jesus, [facility]   • ENDOSCOPY  10/11/2019    Procedure: ESOPHAGOGASTRODUODENOSCOPY with hot snare polypectomy;  Surgeon: Haile Handley MD;  Location: [facility] ENDOSCOPY;  Service: Gastroenterology   • ENDOSCOPY N/A 2020     Procedure: ESOPHAGOGASTRODUODENOSCOPY;  Surgeon: Haile Handley MD;  Location: [facility] ENDOSCOPY;  Service: Gastroenterology   • ENDOSCOPY N/A 9/9/2020    Procedure: ESOPHAGOGASTRODUODENOSCOPY WITH BIOPSIES AND COLD BIOPSY POLYPECTOMY;  Surgeon: Haile Handley MD;  Location: [facility] ENDOSCOPY;  Service: Gastroenterology;  Laterality: N/A;  pre: dyspepsia and diarrhea  post: duodenal polyp and gastritis   • MASTECTOMY Left    • MASTECTOMY WITH SENTINEL NODE BIOPSY AND AXILLARY NODE DISSECTION Left 7/3/2019    Procedure: LEFT BREAST MASTECTOMY WITH SENTINEL NODE BIOPSY AND , v-y plasty closure;  Surgeon: Tahmina James MD;  Location: [facility] MAIN OR;  Service: General   • SUBTOTAL HYSTERECTOMY Bilateral     ovaries still in tact   • TEMPORAL ARTERY BIOPSY Bilateral 12/05/2019       Family History   Problem Relation Age of Onset   • Hypertension Father    • Stomach cancer Father    • Diabetes Father    • Esophageal cancer Father    • Throat cancer Sister    • Diabetes Paternal Grandmother    • Hypertension Paternal Grandfather    • Colon cancer Paternal Grandfather    • Heart disease Mother    • Colon cancer Paternal Uncle    • Colon cancer Paternal Uncle    • Colon cancer Paternal Uncle    • Ovarian cancer Cousin    • Stomach cancer Cousin    • Malig Hyperthermia Neg Hx        Social History     Socioeconomic History   • Marital status:      Spouse name: Ashwin   • Number of children: 3   • Years of education: College   Tobacco Use   • Smoking status: Never   • Smokeless tobacco: Never   • Tobacco comments:     caffine use   Substance and Sexual Activity   • Alcohol use: No     Comment: one or two small drinks a year   • Drug use: No   • Sexual activity: Not Currently     Comment: Spouse, Ashwin           Objective   Physical Exam  Vitals and nursing note reviewed.   Constitutional:       General: She is in acute distress.      Appearance: Normal appearance. She is obese. She is ill-appearing and  toxic-appearing.   HENT:      Head: Normocephalic.      Right Ear: External ear normal.      Left Ear: External ear normal.      Nose: Nose normal.   Eyes:      Conjunctiva/sclera: Conjunctivae normal.   Cardiovascular:      Rate and Rhythm: Tachycardia present.   Pulmonary:      Effort: Pulmonary effort is normal. Tachypnea present.      Breath sounds: Decreased breath sounds, wheezing and rales present.   Abdominal:      General: There is no distension.   Musculoskeletal:      Cervical back: Normal range of motion.   Skin:     Findings: No rash.   Neurological:      Mental Status: She is alert and oriented to person, place, and time.   Psychiatric:         Mood and Affect: Mood normal. Mood is not anxious.         Procedures           ED Course  ED Course as of 05/21/23 1705   Sun May 21, 2023   1544 Pt doing better on neb.  Now at 93% in bed [FK]   1553 Doing better on BIPAP [FK]   1644 Pts BP much improved after BiPAP, rest, Nitropaste.... and Lasix order [FK]      ED Course User Index  [FK] Barry Fitch MD                                           Medical Decision Making  1520:Patient able to stand, converse.  She does not appear in severe distress but she does look uncomfortable.  She is 80% on nasal cannula.  We will apply a nebulizer, BiPAP and arrange transfer.    1700: See ED course for more information.  Patient doing much better overall.  Vitals have stabilized/improved.  She is more comfortable from a respiratory standpoint.  We tried to decrease the FiO2 but she did desaturate.  Case discussed with patient's primary doctor who referral pattern is to self for admission.    Hypertensive urgency: acute illness or injury  Pulmonary vascular congestion: acute illness or injury  Respiratory distress: acute illness or injury  Wheezing: acute illness or injury  Amount and/or Complexity of Data Reviewed  Labs: ordered.  Radiology: ordered.  ECG/medicine tests: ordered.      Risk  OTC  drugs.  Prescription drug management.  Decision regarding hospitalization.          Final diagnoses:   Respiratory distress   Wheezing   Pulmonary vascular congestion   Hypertensive urgency       ED Disposition  ED Disposition     ED Disposition   Decision to Admit    Condition   --    Comment   Level of Care: Stepdown [25]   Diagnosis: Respiratory distress [055447]   Admitting Physician: ELSY HAINES [[id number]]   Attending Physician: ELSY HAINES [[id number]]               No follow-up provider specified.       Medication List      No changes were made to your prescriptions during this visit.

## 2023-05-22 ENCOUNTER — TELEPHONE (OUTPATIENT)
Dept: GASTROENTEROLOGY | Facility: CLINIC | Age: 78
End: 2023-05-22
Payer: MEDICARE

## 2023-05-22 LAB
ANION GAP SERPL CALCULATED.3IONS-SCNC: 10.2 MMOL/L (ref 5–15)
BILIRUB UR QL STRIP: NEGATIVE
BUN SERPL-MCNC: 28 MG/DL (ref 8–23)
BUN/CREAT SERPL: 15.6 (ref 7–25)
CALCIUM SPEC-SCNC: 9.2 MG/DL (ref 8.6–10.5)
CHLORIDE SERPL-SCNC: 91 MMOL/L (ref 98–107)
CLARITY UR: CLEAR
CO2 SERPL-SCNC: 27.8 MMOL/L (ref 22–29)
COLOR UR: ABNORMAL
CREAT SERPL-MCNC: 1.8 MG/DL (ref 0.57–1)
DEPRECATED RDW RBC AUTO: 43.5 FL (ref 37–54)
EGFRCR SERPLBLD CKD-EPI 2021: 28.9 ML/MIN/1.73
ERYTHROCYTE [DISTWIDTH] IN BLOOD BY AUTOMATED COUNT: 12.8 % (ref 12.3–15.4)
GLUCOSE BLDC GLUCOMTR-MCNC: 169 MG/DL (ref 70–130)
GLUCOSE BLDC GLUCOMTR-MCNC: 202 MG/DL (ref 70–130)
GLUCOSE BLDC GLUCOMTR-MCNC: 291 MG/DL (ref 70–130)
GLUCOSE BLDC GLUCOMTR-MCNC: 301 MG/DL (ref 70–130)
GLUCOSE BLDC GLUCOMTR-MCNC: 77 MG/DL (ref 70–130)
GLUCOSE SERPL-MCNC: 309 MG/DL (ref 65–99)
GLUCOSE UR STRIP-MCNC: NEGATIVE MG/DL
HCT VFR BLD AUTO: 38.8 % (ref 34–46.6)
HGB BLD-MCNC: 12.4 G/DL (ref 12–15.9)
HGB UR QL STRIP.AUTO: ABNORMAL
KETONES UR QL STRIP: NEGATIVE
LEUKOCYTE ESTERASE UR QL STRIP.AUTO: NEGATIVE
MCH RBC QN AUTO: 30 PG (ref 26.6–33)
MCHC RBC AUTO-ENTMCNC: 32 G/DL (ref 31.5–35.7)
MCV RBC AUTO: 93.9 FL (ref 79–97)
NITRITE UR QL STRIP: NEGATIVE
PH UR STRIP.AUTO: 5.5 [PH] (ref 5–8)
PLATELET # BLD AUTO: 187 10*3/MM3 (ref 140–450)
PMV BLD AUTO: 9.8 FL (ref 6–12)
POTASSIUM SERPL-SCNC: 5.8 MMOL/L (ref 3.5–5.2)
PROT UR QL STRIP: ABNORMAL
QT INTERVAL: 330 MS
QT INTERVAL: 357 MS
RBC # BLD AUTO: 4.13 10*6/MM3 (ref 3.77–5.28)
SODIUM SERPL-SCNC: 129 MMOL/L (ref 136–145)
SP GR UR STRIP: 1.01 (ref 1–1.03)
UROBILINOGEN UR QL STRIP: ABNORMAL
WBC NRBC COR # BLD: 7.55 10*3/MM3 (ref 3.4–10.8)

## 2023-05-22 PROCEDURE — 94799 UNLISTED PULMONARY SVC/PX: CPT

## 2023-05-22 PROCEDURE — 80048 BASIC METABOLIC PNL TOTAL CA: CPT | Performed by: INTERNAL MEDICINE

## 2023-05-22 PROCEDURE — 94761 N-INVAS EAR/PLS OXIMETRY MLT: CPT

## 2023-05-22 PROCEDURE — 63710000001 PREDNISONE PER 5 MG: Performed by: INTERNAL MEDICINE

## 2023-05-22 PROCEDURE — 94660 CPAP INITIATION&MGMT: CPT

## 2023-05-22 PROCEDURE — 82948 REAGENT STRIP/BLOOD GLUCOSE: CPT

## 2023-05-22 PROCEDURE — 94664 DEMO&/EVAL PT USE INHALER: CPT

## 2023-05-22 PROCEDURE — 25010000002 ENOXAPARIN PER 10 MG: Performed by: INTERNAL MEDICINE

## 2023-05-22 PROCEDURE — 93010 ELECTROCARDIOGRAM REPORT: CPT | Performed by: INTERNAL MEDICINE

## 2023-05-22 PROCEDURE — 63710000001 INSULIN LISPRO (HUMAN) PER 5 UNITS: Performed by: INTERNAL MEDICINE

## 2023-05-22 PROCEDURE — 25010000002 ONDANSETRON PER 1 MG: Performed by: INTERNAL MEDICINE

## 2023-05-22 PROCEDURE — 85027 COMPLETE CBC AUTOMATED: CPT | Performed by: INTERNAL MEDICINE

## 2023-05-22 PROCEDURE — 25010000002 PROCHLORPERAZINE 10 MG/2ML SOLUTION: Performed by: INTERNAL MEDICINE

## 2023-05-22 PROCEDURE — 93005 ELECTROCARDIOGRAM TRACING: CPT | Performed by: INTERNAL MEDICINE

## 2023-05-22 RX ORDER — ENOXAPARIN SODIUM 100 MG/ML
30 INJECTION SUBCUTANEOUS EVERY 24 HOURS
Status: DISCONTINUED | OUTPATIENT
Start: 2023-05-22 | End: 2023-05-26

## 2023-05-22 RX ORDER — BENZONATATE 100 MG/1
200 CAPSULE ORAL 3 TIMES DAILY PRN
Status: DISCONTINUED | OUTPATIENT
Start: 2023-05-22 | End: 2023-06-04 | Stop reason: HOSPADM

## 2023-05-22 RX ORDER — ENOXAPARIN SODIUM 100 MG/ML
40 INJECTION SUBCUTANEOUS EVERY 24 HOURS
Status: DISCONTINUED | OUTPATIENT
Start: 2023-05-22 | End: 2023-05-22

## 2023-05-22 RX ORDER — GUAIFENESIN 600 MG/1
1200 TABLET, EXTENDED RELEASE ORAL EVERY 12 HOURS SCHEDULED
Status: DISCONTINUED | OUTPATIENT
Start: 2023-05-22 | End: 2023-05-27 | Stop reason: ALTCHOICE

## 2023-05-22 RX ORDER — METHYLPREDNISOLONE SODIUM SUCCINATE 125 MG/2ML
60 INJECTION, POWDER, LYOPHILIZED, FOR SOLUTION INTRAMUSCULAR; INTRAVENOUS EVERY 8 HOURS
Status: DISCONTINUED | OUTPATIENT
Start: 2023-05-22 | End: 2023-05-22

## 2023-05-22 RX ORDER — HYDROCHLOROTHIAZIDE 25 MG/1
25 TABLET ORAL
Status: DISCONTINUED | OUTPATIENT
Start: 2023-05-23 | End: 2023-05-22

## 2023-05-22 RX ORDER — PROCHLORPERAZINE EDISYLATE 5 MG/ML
5 INJECTION INTRAMUSCULAR; INTRAVENOUS EVERY 6 HOURS PRN
Status: DISCONTINUED | OUTPATIENT
Start: 2023-05-22 | End: 2023-06-04 | Stop reason: HOSPADM

## 2023-05-22 RX ORDER — METHYLPREDNISOLONE SODIUM SUCCINATE 40 MG/ML
40 INJECTION, POWDER, LYOPHILIZED, FOR SOLUTION INTRAMUSCULAR; INTRAVENOUS EVERY 12 HOURS
Status: COMPLETED | OUTPATIENT
Start: 2023-05-23 | End: 2023-05-25

## 2023-05-22 RX ORDER — VALSARTAN 160 MG/1
160 TABLET ORAL
Status: DISCONTINUED | OUTPATIENT
Start: 2023-05-23 | End: 2023-05-23

## 2023-05-22 RX ADMIN — INSULIN LISPRO 4 UNITS: 100 INJECTION, SOLUTION INTRAVENOUS; SUBCUTANEOUS at 13:50

## 2023-05-22 RX ADMIN — INSULIN LISPRO 2 UNITS: 100 INJECTION, SOLUTION INTRAVENOUS; SUBCUTANEOUS at 16:40

## 2023-05-22 RX ADMIN — ENOXAPARIN SODIUM 30 MG: 100 INJECTION SUBCUTANEOUS at 16:40

## 2023-05-22 RX ADMIN — INSULIN GLARGINE-YFGN 20 UNITS: 100 INJECTION, SOLUTION SUBCUTANEOUS at 10:07

## 2023-05-22 RX ADMIN — LEFLUNOMIDE 10 MG: 20 TABLET ORAL at 10:02

## 2023-05-22 RX ADMIN — IPRATROPIUM BROMIDE AND ALBUTEROL SULFATE 3 ML: 2.5; .5 SOLUTION RESPIRATORY (INHALATION) at 11:50

## 2023-05-22 RX ADMIN — PROCHLORPERAZINE EDISYLATE 5 MG: 5 INJECTION INTRAMUSCULAR; INTRAVENOUS at 19:00

## 2023-05-22 RX ADMIN — PANTOPRAZOLE SODIUM 40 MG: 40 TABLET, DELAYED RELEASE ORAL at 06:57

## 2023-05-22 RX ADMIN — ONDANSETRON 4 MG: 2 INJECTION INTRAMUSCULAR; INTRAVENOUS at 10:11

## 2023-05-22 RX ADMIN — INSULIN LISPRO 7 UNITS: 100 INJECTION, SOLUTION INTRAVENOUS; SUBCUTANEOUS at 10:08

## 2023-05-22 RX ADMIN — IPRATROPIUM BROMIDE AND ALBUTEROL SULFATE 3 ML: 2.5; .5 SOLUTION RESPIRATORY (INHALATION) at 07:09

## 2023-05-22 RX ADMIN — AMLODIPINE BESYLATE 5 MG: 5 TABLET ORAL at 10:01

## 2023-05-22 RX ADMIN — DULOXETINE HYDROCHLORIDE 30 MG: 30 CAPSULE, DELAYED RELEASE ORAL at 10:02

## 2023-05-22 RX ADMIN — GUAIFENESIN 1200 MG: 600 TABLET, EXTENDED RELEASE ORAL at 22:05

## 2023-05-22 RX ADMIN — BISOPROLOL FUMARATE 20 MG: 5 TABLET, FILM COATED ORAL at 10:01

## 2023-05-22 RX ADMIN — PREDNISONE 7 MG: 10 TABLET ORAL at 10:02

## 2023-05-22 RX ADMIN — BENZONATATE 200 MG: 100 CAPSULE ORAL at 16:40

## 2023-05-22 RX ADMIN — ONDANSETRON 4 MG: 2 INJECTION INTRAMUSCULAR; INTRAVENOUS at 16:40

## 2023-05-22 NOTE — CASE MANAGEMENT/SOCIAL WORK
Discharge Planning Assessment  [facility]     Patient Name: Blanca Zhu  MRN: [id number]  Today's Date: 5/22/2023    Admit Date: 5/21/2023    Plan: Home with spouse and possible HH.   Discharge Needs Assessment     Row Name 05/22/23 0825       Living Environment    People in Home spouse    Current Living Arrangements apartment    Potentially Unsafe Housing Conditions none    Primary Care Provided by self;spouse/significant other    Provides Primary Care For no one, unable/limited ability to care for self    Family Caregiver if Needed spouse;child(ester), adult    Quality of Family Relationships helpful;involved;supportive    Able to Return to Prior Arrangements yes       Resource/Environmental Concerns    Resource/Environmental Concerns none       Transition Planning    Patient/Family Anticipates Transition to home with help/services    Patient/Family Anticipated Services at Transition home health care    Transportation Anticipated family or friend will provide       Discharge Needs Assessment    Readmission Within the Last 30 Days no previous admission in last 30 days    Equipment Currently Used at Home cpap;oxygen;nebulizer;rollator    Concerns to be Addressed discharge planning    Anticipated Changes Related to Illness none    Equipment Needed After Discharge none    Provided Post Acute Provider List? Yes    Post Acute Provider List Home Health    Provided Post Acute Provider Quality & Resource List? Yes    Post Acute Provider Quality and Resource List Home Health    Delivered To Patient;Support Person    Method of Delivery In person               Discharge Plan     Row Name 05/22/23 0827       Plan    Plan Home with spouse and possible HH.    Plan Comments S/W pt and her spouse Ashwin at bedside. Facesheet info confirmed.  Pt lives w/ Ashwin in a 1st floor apartment w/ 7 steps to enter.  Their dtr lives locally and checks on them daily.  Home DME includes a rollator, CPAP, home O2 thru [employer] and a  nebulizer.  Ashwin provides transport as needed.  Pt has used H in the past, but does not recall which gency.  No hx of SNF.  Pt feels she might need HH upon DC and has no preference of HH agency. Referral sent to [facility] is pending.  CCP will continue to follow and assist if needed. .........Mabel WEEMS/ DAVID              Continued Care and Services - Admitted Since 5/21/2023     Home Medical Care     Service Provider Request Status Selected Services Address Phone Fax Patient Preferred    [facility] Pending - Request Sent N/A [address] [address] [phone] --                 Demographic Summary     Row Name 05/22/23 0824       General Information    Admission Type observation    Arrived From home    Required Notices Provided Observation Status Notice    Referral Source admission list    Reason for Consult discharge planning    Preferred Language English               Functional Status     Row Name 05/22/23 0824       Functional Status    Usual Activity Tolerance moderate    Current Activity Tolerance moderate       Functional Status, IADL    Medications independent    Meal Preparation assistive person    Housekeeping assistive person    Laundry assistive person    Shopping assistive person       Mental Status    General Appearance WDL WDL       Mental Status Summary    Recent Changes in Mental Status/Cognitive Functioning no changes       Employment/    Employment Status retired                      Mabel Mondragon RN

## 2023-05-22 NOTE — H&P
HISTORY AND PHYSICAL   [facility], [facility]      2023    Patient Identification:    Name: Blanca Zhu  Age: 77 y.o.  Sex: female  :  1945  MRN: [id number]                       Primary Care Physician: Praneeth Valenzuela MD    Chief Complaint:      Chief Complaint   Patient presents with   • Shortness of Breath   • Hypertension         History of Present Illness:     Patient is a 77-year-old female, patient office, patient has history of asthma, chronic kidney disease stage IIIa, morbid obesity, chronic  diastolic congestive heart failure, diabetes mellitus type 2, hypertension, hyperlipidemia, sleep apnea, temporal arteritis.  Patient states that about 4 days ago she started developing upper respiratory symptoms, she developed sneezing, runny nose, nose congestion.  Patient was seen by her allergist and was told that she had a virus infection.  Nothing specifically was given to the patient.  Over the next few days, patient's symptoms became worse, having worsening shortness of breath, wheezing, decreased oxygenation.  She denies having any worsening lower extremity edema.  Patient was brought to the emergency room per family.  In the emergency room, patient was found to have: Troponin 25 and then 28, creatinine 1.11, potassium 4.4, COVID-19 test was negative, chest x-ray showed borderline heart size with mild vascular congestion, EKG showed sinus tachycardia.  Blood pressure in the emergency room was 217/76.  In the emergency room, patient was placed on a BiPAP, was given Lasix, patient was admitted to the hospital for further management.          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 Daniel Gutierrez, [facility]   •  SECTION N/A     x3   • CHOLECYSTECTOMY N/A    • COLONOSCOPY     • COLONOSCOPY W/ POLYPECTOMY N/A 2019    Enlarged folds in the antrum: biopsied; duodenal, transverse colon x2, splenic flexure, descending colon and sigmoid colon polyps: biopsied (path: tubular adenoma x6)-Dr. Zak De Jesus, [facility]   • ENDOSCOPY  10/11/2019    Procedure: ESOPHAGOGASTRODUODENOSCOPY with hot snare polypectomy;  Surgeon: Haile Handley MD;  Location: [facility] ENDOSCOPY;  Service: Gastroenterology   • ENDOSCOPY N/A 2020    Procedure: ESOPHAGOGASTRODUODENOSCOPY;  Surgeon: Haile Handley MD;  Location: [facility] ENDOSCOPY;  Service: Gastroenterology   • ENDOSCOPY N/A 2020    Procedure: ESOPHAGOGASTRODUODENOSCOPY WITH BIOPSIES AND COLD BIOPSY POLYPECTOMY;  Surgeon: Haile Handley MD;  Location: [facility] ENDOSCOPY;  Service: Gastroenterology;  Laterality: N/A;  pre: dyspepsia and diarrhea  post: duodenal polyp and gastritis   • MASTECTOMY Left    • MASTECTOMY WITH SENTINEL NODE BIOPSY AND AXILLARY NODE DISSECTION Left 7/3/2019    Procedure: LEFT BREAST MASTECTOMY WITH SENTINEL NODE BIOPSY AND , v-y plasty closure;  Surgeon: Tahmina James MD;  Location: [facility] OR;  Service: General   • SUBTOTAL HYSTERECTOMY Bilateral     ovaries still in tact   • TEMPORAL ARTERY BIOPSY Bilateral 2019      Home Meds:  Medications Prior to Admission   Medication Sig Dispense Refill Last Dose   • albuterol sulfate  (90 Base) MCG/ACT  inhaler Every 6 (Six) Hours.   5/21/2023   • bisoprolol (ZEBeta) 10 MG tablet Take 2 tablets by mouth.   5/20/2023   • doxazosin (CARDURA) 2 MG tablet Take 1 tablet by mouth Every Night.   5/20/2023   • Insulin Glargine (TOUJEO SOLOSTAR SC) Inject 40 Units under the skin into the appropriate area as directed Every Night. INSTRUCTED PT TO FOLLOW MD INSTRUCTIONS REGARDING DOSING BEFORE SURGERY   5/20/2023   • Insulin Lispro (HUMALOG PEN SC) Inject  under the skin into the appropriate area as directed 3 (Three) Times a Day With Meals. 25 units at breakfast, 20 units at lunch, 22 units at dinner/INSTRUCTED PT TO FOLLOW MD INSTRUCTIONS REGARDING DOSING BEFORE SURGERY   5/20/2023   • O2 (OXYGEN) Inhale 1 (One) Time.   5/21/2023   • omeprazole (priLOSEC) 20 MG capsule 2 capsules.   5/20/2023   • predniSONE (DELTASONE) 5 MG tablet Take 7 mg by mouth Daily.   5/20/2023   • torsemide (DEMADEX) 20 MG tablet    Past Week   • amLODIPine (NORVASC) 5 MG tablet amlodipine 5 mg tablet   TAKE 1 TABLET BY MOUTH ONCE DAILY IN THE EVENING   More than a month   • azithromycin (ZITHROMAX) 250 MG tablet azithromycin 250 mg tablet      • Cholecalciferol 125 MCG (5000 UT) tablet cholecalciferol (vitamin D3) 125 mcg (5,000 unit) tablet   TAKE 1 TABLET BY MOUTH ONCE DAILY FOR 30 DAYS   Unknown   • clotrimazole-betamethasone (LOTRISONE) 1-0.05 % cream clotrimazole-betamethasone 1 %-0.05 % topical cream   APPLY CREAM TOPICALLY TO AFFECTED AND SURROUNDING AREAS OF SKIN TWICE A DAY FOR 2 WEEKS (IN THE MORNING AND EVENING)   Unknown   • DULoxetine (CYMBALTA) 30 MG capsule duloxetine 30 mg capsule,delayed release   Take 1 capsule every day by oral route for 30 days.      • ergocalciferol (ERGOCALCIFEROL) 1.25 MG (22753 UT) capsule Take 1 capsule by mouth Every 7 (Seven) Days.   Unknown   • hydrocortisone 2.5 % cream hydrocortisone 2.5 % topical cream   APPLY CREAM TWICE DAILY TO AFFECTED AREA   Unknown   • leflunomide (ARAVA) 10 MG tablet Take 1  tablet by mouth.   Unknown   • miconazole (Micatin) 2 % cream Apply 1 application topically to the appropriate area as directed 2 (Two) Times a Day. 14 g 1    • Paxlovid, 300/100, 20 x 150 MG & 10 x 100MG tablet therapy pack tablet       • valsartan-hydrochlorothiazide (DIOVAN-HCT) 320-25 MG per tablet Take 1 tablet by mouth Daily. (Patient taking differently: Take 0.5 tablets by mouth Daily.) 30 tablet 11        Allergies:  Allergies   Allergen Reactions   • Aspirin Nausea Only     Low tolerance, abdominal pain   • Sulfa Antibiotics Unknown (See Comments)     Happened as a child     Immunizations:  Immunization History   Administered Date(s) Administered   • COVID-19 (PFIZER) BIVALENT 12+YRS 10/29/2022   • Fluad Quad 65+ 10/13/2021   • Fluzone High Dose =>65 Years ([employer] ONLY) 10/04/2016, 11/17/2017, 09/25/2018, 01/31/2020, 09/29/2020, 10/12/2021   • Fluzone High-Dose 65+yrs 09/29/2020, 10/12/2021   • Fluzone Quad >6mos (Multi-dose) 09/29/2020   • Hepatitis A 05/03/2018, 11/09/2018, 12/16/2019   • INFLUENZA SPLIT TRI 10/11/2016   • Influenza, Unspecified 09/25/2018   • Pneumococcal Conjugate 13-Valent (PCV13) 10/04/2016, 10/12/2016   • Pneumococcal Polysaccharide (PPSV23) 10/12/2016, 01/01/2017, 09/25/2018   • Pneumococcal, Unspecified 01/01/2017     Social History:   Social History     Social History Narrative   • Not on file     Social History     Tobacco Use   • Smoking status: Never   • Smokeless tobacco: Never   • Tobacco comments:     caffine use   Substance Use Topics   • Alcohol use: No     Comment: one or two small drinks a year     Family History:  Family History   Problem Relation Age of Onset   • Hypertension Father    • Stomach cancer Father    • Diabetes Father    • Esophageal cancer Father    • Throat cancer Sister    • Diabetes Paternal Grandmother    • Hypertension Paternal Grandfather    • Colon cancer Paternal Grandfather    • Heart disease Mother    • Colon cancer Paternal Uncle    • Colon  "cancer Paternal Uncle    • Colon cancer Paternal Uncle    • Ovarian cancer Cousin    • Stomach cancer Cousin    • Malig Hyperthermia Neg Hx         Review of Systems  See history of present illness and past medical history.    No acute headaches or visual changes no difficulty swallowing no bad heartburn or indigestion.  No melena medic easier change in bowel habits recently  No dysuria hematuria, urgency or frequency she think she has been making urine normally.    No polyuria or polydipsia she does have again feeling of cold all the time       Objective:    Exam:    T MAX 24 hrs: Temp (24hrs), Av.1 °F (36.7 °C), Min:97.9 °F (36.6 °C), Max:98.3 °F (36.8 °C)    Vitals Ranges:   Temp:  [97.9 °F (36.6 °C)-98.3 °F (36.8 °C)] 98.1 °F (36.7 °C)  Heart Rate:  [] 87  Resp:  [20-35] 20  BP: (123-217)/(58-98) 138/65    /65 (BP Location: Left arm, Patient Position: Sitting)   Pulse 87   Temp 98.1 °F (36.7 °C) (Oral)   Resp 20   Ht 152.4 cm (60\")   Wt 90.7 kg (200 lb)   LMP  (LMP Unknown)   SpO2 96%   BMI 39.06 kg/m²     General: Alert, oriented x  3. Cooperative, no distress, appears stated age.  Morbid obese.  On 6 L nasal cannula.  HEENT:    Head: Normocephalic, without obvious abnormality, atraumatic  Eyes: EOM are normal. Pupils are equal  Oropharynx: Mucosa and tongue normal  Neck: Supple, symmetrical, trachea midline, no adenopathy;              thyroid:  no enlargement/tenderness/nodules;              no carotid bruit or JVD  Cardiovascular: Normal rate, regular rhythm and intact distal pulses.              Exam reveals no gallop and no friction rub. No murmur heard  Chest wall: No tenderness or deformity  Pulmonary: Very diminished breath sounds, very limited air movement.  Wheezing scattered throughout both fields.  Rhonchi at bases.  No rales.    Abdominal: Soft, nontender, bowel sounds active all four quadrants,     no masses, no hepatomegaly, no splenomegaly.   Extremities: Normal.  2+ " pitting edema of the lower extremity, actually this looks better than her baseline.  Brown discoloration from chronic stasis dermatitis.  Pulses: 2 + symmetric all extremities  Neurological: Patient is alert and oriented to person, place, and time.  No new focal sensorimotor deficit.  Skin: Skin color, texture, normal. Turgor is decreased. No rashes or lesions      Data Review:    Results from last 7 days   Lab Units 05/22/23  0643 05/21/23  1540   WBC 10*3/mm3 7.55 9.21   HEMOGLOBIN g/dL 12.4 12.9   HEMATOCRIT % 38.8 42.4   PLATELETS 10*3/mm3 187 202       Results from last 7 days   Lab Units 05/22/23  0643 05/21/23  1615   SODIUM mmol/L 129* 132*   POTASSIUM mmol/L 5.8* 4.4   CHLORIDE mmol/L 91* 94*   CO2 mmol/L 27.8 29.2*   BUN mg/dL 28* 19   CREATININE mg/dL 1.80* 1.11*   CALCIUM mg/dL 9.2 8.9   BILIRUBIN mg/dL  --  0.7   ALK PHOS U/L  --  109   ALT (SGPT) U/L  --  10   AST (SGOT) U/L  --  13   GLUCOSE mg/dL 309* 176*                 Lab Results   Lab Value Date/Time    TROPONINT 28 (H) 05/21/2023 1920    TROPONINT 25 (H) 05/21/2023 1615       Brief Urine Lab Results  (Last result in the past 365 days)      Color   Clarity   Blood   Leuk Est   Nitrite   Protein   CREAT   Urine HCG        05/21/23 2325 Straw   Clear   Trace   Negative   Negative   30 mg/dL (1+)                  Imaging Results (All)     Procedure Component Value Units Date/Time    XR Chest 1 View [[id number]] Collected: 05/21/23 1547     Updated: 05/21/23 1551    Narrative:      XR CHEST 1 VW-     HISTORY: Female who is 77 years-old,  chest pain     TECHNIQUE: Frontal view of the chest     COMPARISON: 11/9/2019     FINDINGS: The heart size is borderline. Pulmonary vasculature is  congested. Minimal likely atelectasis in the lower lungs. No pleural  effusion or pneumothorax. No acute osseous process.       Impression:      Borderline heart size with pulmonary vascular congestion.  Minimal likely atelectasis in the lower lungs.     This report was  finalized on 5/21/2023 3:48 PM by Dr. Galindo Malik M.D.             Assessment:    Acute on chronic hypoxic respiratory failure  Asthma exacerbation  Hypertensive urgency  Hyponatremia  Hyperkalemia  Acute kidney injury  Diabetic nephropathy with chronic kidney disease stage IIIa  Lower extremity edema, chronic  Uncontrolled diabetes mellitus  Obstructive sleep apnea  Generalized anxiety disorder  Iron deficiency anemia  Morbid obesity    Plan:    Inpatient admission  Patient was given IV diuretics since yesterday, however, she looks euvolemic today, her creatinine bumped up to 1.8 today.  We will hold diuretics today, again she looks euvolemic.  I believe patient probably has asthma exacerbation that in addition to her morbid obesity and obstructive sleep apnea are making her difficult to treat.  We will avoid hyperoxia  We will start IV steroids, continue DuoNeb mini nebs.  We will follow closely.  Pulmonary following.  Patient does have chronic diastolic congestive heart failure, but appears to be not in exacerbation, however, will agree to recheck 2D echo to look for severity of her pulmonary hypertension.  Patient to bring her CPAP from home  We will monitor and correct electrolytes  We will monitor renal function as well  Lower extremity edema slightly better than her baseline, will do ace wrapping.  We will start Accu-Chek and SSI  Monitor mental status, at baseline  Home medications  DVT/stress ulcer prophylaxis  PT consult  Labs in am        Praneeth Valenzuela MD  5/22/2023  14:15 EDT       I wore protective equipment throughout this patient encounter including a face mask, gloves, and protective eyewear.  Hand hygiene was performed before donning protective equipment and after removal when leaving the room.

## 2023-05-22 NOTE — PROGRESS NOTES
Dr. SAI Lang    [address]        Patient ID:  Name:  Blanca Zhu  MRN:  [id number]  1945  77 y.o.  female            CC/Reason for visit: Cough, acute on chronic respiratory failure, bronchospasm    Interval hx: She is coughing.  She says she is trying to produce some sputum, scant yellow but feels like there is a lot of secretions in her lungs which she cannot expectorate.  She is short of breath with minimal exertion.  She is on 4 L of oxygen right now saturating 93%, usually on 3 L at home.  Denies chest pain at this time.  I reviewed notes by Dr. Soto and other medical providers    ROS: Some chest soreness.  Some cough, some sputum.  No palpitations, no fever    I reviewed old medical records.  Past medical history, social history and family history: Unchanged from admission H&P.      Vitals:  Vitals:    05/22/23 0709 05/22/23 0727 05/22/23 1150 05/22/23 1300   BP:  147/60  138/65   BP Location:  Left arm  Left arm   Patient Position:  Lying  Sitting   Pulse:  107 81 87   Resp: 20 20 20 20   Temp:  98.3 °F (36.8 °C)  98.1 °F (36.7 °C)   TempSrc:  Oral  Oral   SpO2:  90% 97% 96%   Weight:       Height:               Body mass index is 39.06 kg/m².    Intake/Output Summary (Last 24 hours) at 5/22/2023 1524  Last data filed at 5/22/2023 1300  Gross per 24 hour   Intake 240 ml   Output 800 ml   Net -560 ml       Exam:  GEN:  No distress  Alert, oriented x 3.   LUNGS: Some scattered rhonchi bilat, no use of accessory muscles  CV:  Normal S1S2, without murmur, 1+ edema, hyperpigmented legs  ABD:  Non tender, obesity hampers rest of the exam      Scheduled meds:  amLODIPine, 5 mg, Oral, Q24H  bisoprolol, 20 mg, Oral, Q24H  DULoxetine, 30 mg, Oral, Daily  enoxaparin, 30 mg, Subcutaneous, Q24H  guaiFENesin, 1,200 mg, Oral, Q12H  insulin glargine, 20 Units, Subcutaneous, Daily  insulin lispro, 2-9 Units, Subcutaneous, 4x Daily AC & at Bedtime  ipratropium-albuterol, 3 mL,  Nebulization, 4x Daily - RT  leflunomide, 10 mg, Oral, Daily  methylPREDNISolone sodium succinate, 60 mg, Intravenous, Q8H  O2, 2 L/min, Inhalation, Once  pantoprazole, 40 mg, Oral, Q AM  [START ON 5/23/2023] valsartan, 160 mg, Oral, Q24H      IV meds:                           Data Review:   I reviewed the patient's medications and new clinical results.    COVID19   Date Value Ref Range Status   05/21/2023 Not Detected Not Detected - Ref. Range Final         Lab Results   Component Value Date    CALCIUM 9.2 05/22/2023    PHOS 3.4 07/11/2022     Results from last 7 days   Lab Units 05/22/23  0643 05/21/23  1615 05/21/23  1540   SODIUM mmol/L 129* 132*  --    POTASSIUM mmol/L 5.8* 4.4  --    CHLORIDE mmol/L 91* 94*  --    CO2 mmol/L 27.8 29.2*  --    BUN mg/dL 28* 19  --    CREATININE mg/dL 1.80* 1.11*  --    CALCIUM mg/dL 9.2 8.9  --    BILIRUBIN mg/dL  --  0.7  --    ALK PHOS U/L  --  109  --    ALT (SGPT) U/L  --  10  --    AST (SGOT) U/L  --  13  --    GLUCOSE mg/dL 309* 176*  --    WBC 10*3/mm3 7.55  --  9.21   HEMOGLOBIN g/dL 12.4  --  12.9   PLATELETS 10*3/mm3 187  --  202   PROBNP pg/mL  --  720.4  --                ASSESSMENT:   Acute hypoxic respiratory failure  Chronic hypercapnic respiratory failure  Acute bronchitis  Bronchospasm, possible asthma exacerbation  Pulmonary hypertension  Obstructive sleep apnea  Hyponatremia  Probable volume overload.  Venous insufficiency legs with chronic edema  Diabetes mellitus type 2  Acute kidney injury  Hyperkalemia  Hyperglycemia due to treatment with corticosteroids, exacerbation diabetes mellitus          PLAN:  Patient and all problems new to me during this admission.  According to family she does not seem much more volume overloaded than usual.  Her daughter says that her legs are actually less swollen than usual.  She may have viral bronchitis.  Monitor white count, procalcitonin and temperature curve closely.  Continue with beta agonist bronchodilator nebulized  treatments.  I will reduce the IV Solu-Medrol dose which is significantly affecting her diabetes.  She is immunocompromised.  Continue with home CPAP at night.  Wean oxygen as tolerated.  Avoid hyperoxia, aim for saturation between 88 and 92%.      Dmitry Lang MD  5/22/2023

## 2023-05-22 NOTE — PLAN OF CARE
Cataracts OUDiscussed diagnosis with patient. Reviewed symptoms related to cataract progression. Discussed various treatment options with patient. No treatment required at this time. Continue to monitor. MGD/STUART OUDiscussed findings in detail w/ pt todaySigns/symptoms associated discussedAdvised to continue hot moist compresses dailyAdvised to continue ATs OU BID PRNContineu to monitor PRNFamily hx of ARMDNo findings on exam todayMonitor for changes PRNMyopia/astig/presbyDiscussed refractive status in detail with patient. New glasses Rx given today. Continue to monitor. Patient A&O x4. BP decreased after PRN dose of Hydralazine given at beginning of shift. On 4LNC before sleep, slept comfortably on bipap satting between 90-94%. Spouse at bedside. Call light within reach.    Goal Outcome Evaluation:            Problem: Adult Inpatient Plan of Care  Goal: Plan of Care Review  5/22/2023 0620 by Ydaira Mon RN  Outcome: Ongoing, Progressing  Flowsheets (Taken 5/22/2023 0620)  Progress: improving  Plan of Care Reviewed With:   patient   spouse  5/22/2023 0619 by Yadira Mon RN  Outcome: Ongoing, Progressing  Flowsheets (Taken 5/21/2023 1908 by Sachin Riley RN)  Plan of Care Reviewed With: patient  Goal: Patient-Specific Goal (Individualized)  5/22/2023 0620 by Yadira Mon RN  Outcome: Ongoing, Progressing  5/22/2023 0619 by Yadira Mon RN  Outcome: Ongoing, Progressing  Goal: Absence of Hospital-Acquired Illness or Injury  5/22/2023 0620 by Yadira Mon RN  Outcome: Ongoing, Progressing  5/22/2023 0619 by Yadira Mon RN  Outcome: Ongoing, Progressing  Intervention: Identify and Manage Fall Risk  Description: Perform standard risk assessment on admission using a validated tool or comprehensive approach appropriate to the patient; reassess fall risk frequently, with change in status or transfer to another level of care.  Communicate fall injury risk to interprofessional healthcare team.  Determine need for increased observation, equipment and environmental modification, such as low bed, signage and supportive, nonskid footwear.  Adjust safety measures to individual developmental age, stage and identified risk factors.  Reinforce the importance of safety and physical activity with patient and family.  Perform regular intentional rounding to assess need for position change, pain assessment and personal needs, including assistance with toileting.  Recent Flowsheet Documentation  Taken 5/22/2023 0400 by Yadira Mon RN  Safety Promotion/Fall Prevention:  safety round/check completed  Taken 5/22/2023 0232 by Yadira Mon RN  Safety Promotion/Fall Prevention: safety round/check completed  Taken 5/22/2023 0012 by Yadira Mon RN  Safety Promotion/Fall Prevention:   activity supervised   assistive device/personal items within reach   clutter free environment maintained   fall prevention program maintained   gait belt   lighting adjusted   muscle strengthening facilitated   nonskid shoes/slippers when out of bed   room organization consistent   safety round/check completed  Taken 5/21/2023 2208 by Yadira Mon RN  Safety Promotion/Fall Prevention: safety round/check completed  Taken 5/21/2023 2030 by Yadira Mon RN  Safety Promotion/Fall Prevention:   activity supervised   assistive device/personal items within reach   clutter free environment maintained   fall prevention program maintained   gait belt   lighting adjusted   muscle strengthening facilitated   nonskid shoes/slippers when out of bed   room organization consistent   safety round/check completed  Intervention: Prevent Skin Injury  Description: Perform a screening for skin injury risk, such as pressure or moisture associated skin damage on admission and at regular intervals throughout hospital stay.  Keep all areas of skin (especially folds) clean and dry.  Maintain adequate skin hydration.  Relieve and redistribute pressure and protect bony prominences; implement measures based on patient-specific risk factors.  Match turning and repositioning schedule to clinical condition.  Encourage weight shift frequently; assist with reposition if unable to complete independently.  Float heels off bed; avoid pressure on the Achilles tendon.  Keep skin free from extended contact with medical devices.  Encourage functional activity and mobility, as early as tolerated.  Use aids (e.g., slide boards, mechanical lift) during transfer.  Recent Flowsheet Documentation  Taken 5/22/2023 0400 by Elieser  TEJAL May  Body Position: position changed independently  Taken 5/22/2023 0232 by Yadira Mon RN  Body Position: position changed independently  Taken 5/22/2023 0012 by Yadira Mon RN  Body Position: position changed independently  Skin Protection: adhesive use limited  Taken 5/21/2023 2208 by Yadira Mon RN  Body Position: position changed independently  Taken 5/21/2023 2030 by Yadira Mon RN  Body Position: position changed independently  Skin Protection:   adhesive use limited   transparent dressing maintained   tubing/devices free from skin contact  Intervention: Prevent and Manage VTE (Venous Thromboembolism) Risk  Description: Assess for VTE (venous thromboembolism) risk.  Encourage and assist with early ambulation.  Initiate and maintain compression or other therapy, as indicated, based on identified risk in accordance with organizational protocol and provider order.  Encourage both active and passive leg exercises while in bed, if unable to ambulate.  Recent Flowsheet Documentation  Taken 5/22/2023 0400 by Yadira Mon RN  Activity Management: activity encouraged  Taken 5/22/2023 0232 by Yadira Mon RN  Activity Management: activity encouraged  Taken 5/22/2023 0012 by Yadira Mon RN  Activity Management: activity encouraged  VTE Prevention/Management:   bilateral   sequential compression devices off   patient refused intervention  Range of Motion: active ROM (range of motion) encouraged  Taken 5/21/2023 2208 by Yadira Mon RN  Activity Management: up in chair  Taken 5/21/2023 2030 by Yadira Mon RN  Activity Management: ambulated in room  VTE Prevention/Management:   bilateral   sequential compression devices off   patient refused intervention  Range of Motion: ROM (range of motion) performed  Intervention: Prevent Infection  Description: Maintain skin and mucous membrane integrity; promote hand, oral and pulmonary hygiene.  Optimize fluid balance,  nutrition, sleep and glycemic control to maximize infection resistance.  Identify potential sources of infection early to prevent or mitigate progression of infection (e.g., wound, lines, devices).  Evaluate ongoing need for invasive devices; remove promptly when no longer indicated.  Recent Flowsheet Documentation  Taken 5/22/2023 0400 by Yadira Mon RN  Infection Prevention: rest/sleep promoted  Taken 5/22/2023 0232 by Yadira Mon RN  Infection Prevention: rest/sleep promoted  Taken 5/22/2023 0012 by Yadira Mon RN  Infection Prevention:   hand hygiene promoted   rest/sleep promoted   single patient room provided   visitors restricted/screened  Taken 5/21/2023 2208 by Yadira Mon RN  Infection Prevention: rest/sleep promoted  Taken 5/21/2023 2030 by Yadira Mon RN  Infection Prevention:   hand hygiene promoted   rest/sleep promoted   single patient room provided   visitors restricted/screened  Goal: Optimal Comfort and Wellbeing  5/22/2023 0620 by Yadira Mon RN  Outcome: Ongoing, Progressing  5/22/2023 0619 by Yadira Mon RN  Outcome: Ongoing, Progressing  Intervention: Provide Person-Centered Care  Description: Use a family-focused approach to care.  Develop trust and rapport by proactively providing information, encouraging questions, addressing concerns and offering reassurance.  Acknowledge emotional response to hospitalization.  Recognize and utilize personal coping strategies.  Honor spiritual and cultural preferences.  Recent Flowsheet Documentation  Taken 5/22/2023 0012 by Yadira Mon RN  Trust Relationship/Rapport:   care explained   choices provided  Taken 5/21/2023 2030 by Yadira Mon RN  Trust Relationship/Rapport:   care explained   choices provided   emotional support provided   empathic listening provided   questions encouraged   questions answered   reassurance provided   thoughts/feelings acknowledged  Goal: Readiness for Transition of  Care  5/22/2023 0620 by Yadira Mon, RN  Outcome: Ongoing, Progressing  5/22/2023 0619 by Yadira Mon, RN  Outcome: Ongoing, Progressing

## 2023-05-22 NOTE — DISCHARGE PLACEMENT REQUEST
"Travis Zhu (77 y.o. Female)     Date of Birth   1945    Social Security Number       Address   [address] [place] [address] [address] [address]    Home Phone   [phone]    MRN   [id number]       Yazidism   Hoahaoism    Marital Status                               Admission Date   5/21/23    Admission Type   Urgent    Admitting Provider   Praneeth Valenzuela MD    Attending Provider   Praneeth Valenzuela MD    Department, Room/Bed   [address], S[address]/1       Discharge Date       Discharge Disposition       Discharge Destination                               Attending Provider: Praneeth Valenzuela MD    Allergies: Aspirin, Sulfa Antibiotics    Isolation: Enh Drop/Con   Infection: COVID (rule out) (05/21/23)   Code Status: Prior    Ht: 152.4 cm (60\")   Wt: 90.7 kg (200 lb)    Admission Cmt: None   Principal Problem: Respiratory distress [R06.03]                 Active Insurance as of 5/21/2023     Primary Coverage     Payor Plan Insurance Group Employer/Plan Group    MEDICARE MEDICARE A & B      Payor Plan Address Payor Plan Phone Number Payor Plan Fax Number Effective Dates    [address] [phone]  6/1/2011 - None Entered    [facility] [id number]       Subscriber Name Subscriber Birth Date Member ID       TRAVIS ZHU 1945 [id number]           Secondary Coverage     Payor Plan Insurance Group Employer/Plan Group    AARP [employer] A[employer] HEALTH CARE OPTIONS [id number]     Payor Plan Address Payor Plan Phone Number Payor Plan Fax Number Effective Dates    [facility] [phone]  1/1/2016 - None Entered    [address]       [facility] [id number]       Subscriber Name Subscriber Birth Date Member ID       TRAVIS ZHU 1945 [id number]                 Emergency Contacts      (Rel.) Home Phone Work Phone Mobile Phone    Reyes,Alberto (Spouse) [phone] -- [phone]              "

## 2023-05-23 LAB
ANION GAP SERPL CALCULATED.3IONS-SCNC: 7 MMOL/L (ref 5–15)
ANION GAP SERPL CALCULATED.3IONS-SCNC: 9 MMOL/L (ref 5–15)
B PARAPERT DNA SPEC QL NAA+PROBE: NOT DETECTED
B PERT DNA SPEC QL NAA+PROBE: NOT DETECTED
BUN SERPL-MCNC: 47 MG/DL (ref 8–23)
BUN SERPL-MCNC: 56 MG/DL (ref 8–23)
BUN/CREAT SERPL: 25.1 (ref 7–25)
BUN/CREAT SERPL: 28.4 (ref 7–25)
C PNEUM DNA NPH QL NAA+NON-PROBE: NOT DETECTED
CALCIUM SPEC-SCNC: 7.7 MG/DL (ref 8.6–10.5)
CALCIUM SPEC-SCNC: 8.2 MG/DL (ref 8.6–10.5)
CHLORIDE SERPL-SCNC: 92 MMOL/L (ref 98–107)
CHLORIDE SERPL-SCNC: 93 MMOL/L (ref 98–107)
CO2 SERPL-SCNC: 26 MMOL/L (ref 22–29)
CO2 SERPL-SCNC: 28 MMOL/L (ref 22–29)
CREAT SERPL-MCNC: 1.87 MG/DL (ref 0.57–1)
CREAT SERPL-MCNC: 1.97 MG/DL (ref 0.57–1)
DEPRECATED RDW RBC AUTO: 43.8 FL (ref 37–54)
EGFRCR SERPLBLD CKD-EPI 2021: 25.9 ML/MIN/1.73
EGFRCR SERPLBLD CKD-EPI 2021: 27.6 ML/MIN/1.73
ERYTHROCYTE [DISTWIDTH] IN BLOOD BY AUTOMATED COUNT: 12.8 % (ref 12.3–15.4)
FLUAV SUBTYP SPEC NAA+PROBE: NOT DETECTED
FLUBV RNA ISLT QL NAA+PROBE: NOT DETECTED
GLUCOSE BLDC GLUCOMTR-MCNC: 113 MG/DL (ref 70–130)
GLUCOSE BLDC GLUCOMTR-MCNC: 138 MG/DL (ref 70–130)
GLUCOSE BLDC GLUCOMTR-MCNC: 155 MG/DL (ref 70–130)
GLUCOSE BLDC GLUCOMTR-MCNC: 156 MG/DL (ref 70–130)
GLUCOSE SERPL-MCNC: 114 MG/DL (ref 65–99)
GLUCOSE SERPL-MCNC: 166 MG/DL (ref 65–99)
HADV DNA SPEC NAA+PROBE: NOT DETECTED
HCOV 229E RNA SPEC QL NAA+PROBE: NOT DETECTED
HCOV HKU1 RNA SPEC QL NAA+PROBE: NOT DETECTED
HCOV NL63 RNA SPEC QL NAA+PROBE: NOT DETECTED
HCOV OC43 RNA SPEC QL NAA+PROBE: NOT DETECTED
HCT VFR BLD AUTO: 39.9 % (ref 34–46.6)
HGB BLD-MCNC: 12.6 G/DL (ref 12–15.9)
HMPV RNA NPH QL NAA+NON-PROBE: NOT DETECTED
HPIV1 RNA ISLT QL NAA+PROBE: NOT DETECTED
HPIV2 RNA SPEC QL NAA+PROBE: NOT DETECTED
HPIV3 RNA NPH QL NAA+PROBE: DETECTED
HPIV4 P GENE NPH QL NAA+PROBE: NOT DETECTED
M PNEUMO IGG SER IA-ACNC: NOT DETECTED
MCH RBC QN AUTO: 29.5 PG (ref 26.6–33)
MCHC RBC AUTO-ENTMCNC: 31.6 G/DL (ref 31.5–35.7)
MCV RBC AUTO: 93.4 FL (ref 79–97)
PLATELET # BLD AUTO: 177 10*3/MM3 (ref 140–450)
PMV BLD AUTO: 9.4 FL (ref 6–12)
POTASSIUM SERPL-SCNC: 5.9 MMOL/L (ref 3.5–5.2)
POTASSIUM SERPL-SCNC: 6 MMOL/L (ref 3.5–5.2)
PROCALCITONIN SERPL-MCNC: 0.25 NG/ML (ref 0–0.25)
RBC # BLD AUTO: 4.27 10*6/MM3 (ref 3.77–5.28)
RHINOVIRUS RNA SPEC NAA+PROBE: NOT DETECTED
RSV RNA NPH QL NAA+NON-PROBE: NOT DETECTED
SARS-COV-2 RNA NPH QL NAA+NON-PROBE: NOT DETECTED
SODIUM SERPL-SCNC: 127 MMOL/L (ref 136–145)
SODIUM SERPL-SCNC: 128 MMOL/L (ref 136–145)
WBC NRBC COR # BLD: 13.3 10*3/MM3 (ref 3.4–10.8)

## 2023-05-23 PROCEDURE — 80048 BASIC METABOLIC PNL TOTAL CA: CPT | Performed by: INTERNAL MEDICINE

## 2023-05-23 PROCEDURE — 25010000002 METHYLPREDNISOLONE PER 40 MG: Performed by: INTERNAL MEDICINE

## 2023-05-23 PROCEDURE — 94664 DEMO&/EVAL PT USE INHALER: CPT

## 2023-05-23 PROCEDURE — 25010000002 ONDANSETRON PER 1 MG: Performed by: INTERNAL MEDICINE

## 2023-05-23 PROCEDURE — 25010000002 PROCHLORPERAZINE 10 MG/2ML SOLUTION: Performed by: INTERNAL MEDICINE

## 2023-05-23 PROCEDURE — 0202U NFCT DS 22 TRGT SARS-COV-2: CPT | Performed by: INTERNAL MEDICINE

## 2023-05-23 PROCEDURE — 94799 UNLISTED PULMONARY SVC/PX: CPT

## 2023-05-23 PROCEDURE — 94761 N-INVAS EAR/PLS OXIMETRY MLT: CPT

## 2023-05-23 PROCEDURE — 94660 CPAP INITIATION&MGMT: CPT

## 2023-05-23 PROCEDURE — 25010000002 ENOXAPARIN PER 10 MG: Performed by: INTERNAL MEDICINE

## 2023-05-23 PROCEDURE — 82948 REAGENT STRIP/BLOOD GLUCOSE: CPT

## 2023-05-23 PROCEDURE — 85027 COMPLETE CBC AUTOMATED: CPT | Performed by: INTERNAL MEDICINE

## 2023-05-23 PROCEDURE — 84145 PROCALCITONIN (PCT): CPT | Performed by: INTERNAL MEDICINE

## 2023-05-23 RX ORDER — SODIUM CHLORIDE 9 MG/ML
100 INJECTION, SOLUTION INTRAVENOUS CONTINUOUS
Status: DISCONTINUED | OUTPATIENT
Start: 2023-05-23 | End: 2023-05-26

## 2023-05-23 RX ADMIN — SODIUM CHLORIDE 100 ML/HR: 9 INJECTION, SOLUTION INTRAVENOUS at 16:11

## 2023-05-23 RX ADMIN — AMLODIPINE BESYLATE 5 MG: 5 TABLET ORAL at 09:41

## 2023-05-23 RX ADMIN — METHYLPREDNISOLONE SODIUM SUCCINATE 40 MG: 40 INJECTION, POWDER, FOR SOLUTION INTRAMUSCULAR; INTRAVENOUS at 16:12

## 2023-05-23 RX ADMIN — LEFLUNOMIDE 10 MG: 20 TABLET ORAL at 09:41

## 2023-05-23 RX ADMIN — PANTOPRAZOLE SODIUM 40 MG: 40 TABLET, DELAYED RELEASE ORAL at 06:36

## 2023-05-23 RX ADMIN — BENZONATATE 200 MG: 100 CAPSULE ORAL at 09:40

## 2023-05-23 RX ADMIN — INSULIN GLARGINE-YFGN 20 UNITS: 100 INJECTION, SOLUTION SUBCUTANEOUS at 09:41

## 2023-05-23 RX ADMIN — BISOPROLOL FUMARATE 20 MG: 5 TABLET, FILM COATED ORAL at 09:41

## 2023-05-23 RX ADMIN — ENOXAPARIN SODIUM 30 MG: 100 INJECTION SUBCUTANEOUS at 16:19

## 2023-05-23 RX ADMIN — SODIUM ZIRCONIUM CYCLOSILICATE 10 G: 10 POWDER, FOR SUSPENSION ORAL at 12:48

## 2023-05-23 RX ADMIN — IPRATROPIUM BROMIDE AND ALBUTEROL SULFATE 3 ML: 2.5; .5 SOLUTION RESPIRATORY (INHALATION) at 10:59

## 2023-05-23 RX ADMIN — PROCHLORPERAZINE EDISYLATE 5 MG: 5 INJECTION INTRAMUSCULAR; INTRAVENOUS at 06:38

## 2023-05-23 RX ADMIN — IPRATROPIUM BROMIDE AND ALBUTEROL SULFATE 3 ML: 2.5; .5 SOLUTION RESPIRATORY (INHALATION) at 15:40

## 2023-05-23 RX ADMIN — IPRATROPIUM BROMIDE AND ALBUTEROL SULFATE 3 ML: 2.5; .5 SOLUTION RESPIRATORY (INHALATION) at 20:25

## 2023-05-23 RX ADMIN — VALSARTAN 160 MG: 160 TABLET, FILM COATED ORAL at 09:41

## 2023-05-23 RX ADMIN — ONDANSETRON 4 MG: 2 INJECTION INTRAMUSCULAR; INTRAVENOUS at 09:40

## 2023-05-23 RX ADMIN — ONDANSETRON 4 MG: 2 INJECTION INTRAMUSCULAR; INTRAVENOUS at 16:12

## 2023-05-23 RX ADMIN — GUAIFENESIN 1200 MG: 600 TABLET, EXTENDED RELEASE ORAL at 23:27

## 2023-05-23 RX ADMIN — DULOXETINE HYDROCHLORIDE 30 MG: 30 CAPSULE, DELAYED RELEASE ORAL at 09:41

## 2023-05-23 RX ADMIN — METHYLPREDNISOLONE SODIUM SUCCINATE 40 MG: 40 INJECTION, POWDER, FOR SOLUTION INTRAMUSCULAR; INTRAVENOUS at 04:14

## 2023-05-23 RX ADMIN — GUAIFENESIN 1200 MG: 600 TABLET, EXTENDED RELEASE ORAL at 09:40

## 2023-05-23 RX ADMIN — PROCHLORPERAZINE EDISYLATE 5 MG: 5 INJECTION INTRAMUSCULAR; INTRAVENOUS at 12:48

## 2023-05-23 NOTE — PROGRESS NOTES
DAILY PROGRESS NOTE  [facility], [facility]    2023    Patient Identification:  Name: Blanca Zhu  Age: 77 y.o.  Sex: female  :  1945  MRN: [id number]           Primary Care Physician: Praneeth Valenzuela MD    Subjective:    Interval History:    Patient is feeling better today, better air entry, less cough.  More awake.  Vital signs has been stable.  Only on 2 L of oxygen, saturation 91- 93%.  Afebrile  Potassium today 6.0, sodium 128, creatinine 1.7.  WBC 13.30, hemoglobin 12.6.  Very tired, at times nervous as well.  Used to work with physical therapy today.    ROS:     No nausea, vomiting, diarrhea, constipation, chest pain, shortness of breath.    Objective:    Scheduled Meds:  amLODIPine, 5 mg, Oral, Q24H  bisoprolol, 20 mg, Oral, Q24H  DULoxetine, 30 mg, Oral, Daily  enoxaparin, 30 mg, Subcutaneous, Q24H  guaiFENesin, 1,200 mg, Oral, Q12H  insulin glargine, 20 Units, Subcutaneous, Daily  insulin lispro, 2-9 Units, Subcutaneous, 4x Daily AC & at Bedtime  ipratropium-albuterol, 3 mL, Nebulization, 4x Daily - RT  leflunomide, 10 mg, Oral, Daily  methylPREDNISolone sodium succinate, 40 mg, Intravenous, Q12H  O2, 2 L/min, Inhalation, Once  pantoprazole, 40 mg, Oral, Q AM        Continuous Infusions:       PRN Meds:  •  benzonatate  •  dextrose  •  dextrose  •  glucagon (human recombinant)  •  hydrALAZINE  •  ondansetron  •  prochlorperazine  •  sodium chloride    Intake/Output:    Intake/Output Summary (Last 24 hours) at 2023 1349  Last data filed at 2023 1800  Gross per 24 hour   Intake 0 ml   Output --   Net 0 ml         Exam:    T MAX 24 hrs: Temp (24hrs), Av.8 °F (36.6 °C), Min:97.4 °F (36.3 °C), Max:98.1 °F (36.7 °C)    Vitals Ranges:   Temp:  [97.4 °F (36.3 °C)-98.1 °F (36.7 °C)] 97.4 °F (36.3 °C)  Heart Rate:  [64-90] 76  Resp:  [16-18] 18  BP: (131-149)/(64-70) 149/68    /68 (BP Location: Right arm, Patient Position: Lying)    "Pulse 76   Temp 97.4 °F (36.3 °C) (Oral)   Resp 18   Ht 152.4 cm (60\")   Wt 90.7 kg (200 lb)   LMP  (LMP Unknown)   SpO2 96%   BMI 39.06 kg/m²     General: Alert, oriented x  3. Cooperative, no distress, appears stated age.  Morbid obese.  On 2 L nasal cannula.  Neck: Supple, symmetrical, trachea midline, no adenopathy;              thyroid:  no enlargement/tenderness/nodules;              no carotid bruit or JVD  Cardiovascular: Normal rate, regular rhythm and intact distal pulses.              Exam reveals no gallop and no friction rub. No murmur heard  Pulmonary: Very diminished breath sounds, very limited air movement.  Wheezing scattered throughout both fields.  Rhonchi at bases.  No rales.    Abdominal: Soft, nontender, bowel sounds active all four quadrants,     no masses, no hepatomegaly, no splenomegaly.   Extremities: Normal.  2+ pitting edema of the lower extremity, actually this looks better than her baseline.  Brown discoloration from chronic stasis dermatitis.  Pulses: 2 + symmetric all extremities  Neurological: Patient is alert and oriented to person, place, and time.  No new focal sensorimotor deficit.  Skin: Skin color, texture, normal. Turgor is decreased. No rashes or lesions         Data Review:    Results from last 7 days   Lab Units 05/23/23  0859 05/22/23  0643 05/21/23  1540   WBC 10*3/mm3 13.30* 7.55 9.21   HEMOGLOBIN g/dL 12.6 12.4 12.9   HEMATOCRIT % 39.9 38.8 42.4   PLATELETS 10*3/mm3 177 187 202       Results from last 7 days   Lab Units 05/23/23  0859 05/22/23  0643 05/21/23  1615   SODIUM mmol/L 128* 129* 132*   POTASSIUM mmol/L 6.0* 5.8* 4.4   CHLORIDE mmol/L 93* 91* 94*   CO2 mmol/L 28.0 27.8 29.2*   BUN mg/dL 47* 28* 19   CREATININE mg/dL 1.87* 1.80* 1.11*   CALCIUM mg/dL 8.2* 9.2 8.9   BILIRUBIN mg/dL  --   --  0.7   ALK PHOS U/L  --   --  109   ALT (SGPT) U/L  --   --  10   AST (SGOT) U/L  --   --  13   GLUCOSE mg/dL 114* 309* 176*                 Lab Results   Lab Value " Date/Time    TROPONINT 28 (H) 05/21/2023 1920    TROPONINT 25 (H) 05/21/2023 1615       Microbiology Results (last 10 days)     Procedure Component Value - Date/Time    COVID-19 and FLU A/B PCR - Swab, Nasopharynx [[id number]]  (Normal) Collected: 05/21/23 1547    Lab Status: Final result Specimen: Swab from Nasopharynx Updated: 05/21/23 1609     COVID19 Not Detected     Influenza A PCR Not Detected     Influenza B PCR Not Detected    Narrative:      Fact sheet for providers: https://www.fda.gov/media/112275/download    Fact sheet for patients: https://www.fda.gov/media/972479/download    Test performed by PCR.           Imaging Results (Last 7 Days)     Procedure Component Value Units Date/Time    XR Chest 1 View [[id number]] Collected: 05/21/23 1547     Updated: 05/21/23 1551    Narrative:      XR CHEST 1 VW-     HISTORY: Female who is 77 years-old,  chest pain     TECHNIQUE: Frontal view of the chest     COMPARISON: 11/9/2019     FINDINGS: The heart size is borderline. Pulmonary vasculature is  congested. Minimal likely atelectasis in the lower lungs. No pleural  effusion or pneumothorax. No acute osseous process.       Impression:      Borderline heart size with pulmonary vascular congestion.  Minimal likely atelectasis in the lower lungs.     This report was finalized on 5/21/2023 3:48 PM by Dr. Galindo Malik M.D.               Assessment:      Acute on chronic hypoxic respiratory failure  Asthma exacerbation  Probably acute viral bronchitis  Hypertensive urgency  Hyponatremia  Hyperkalemia  Acute kidney injury  Diabetic nephropathy with chronic kidney disease stage IIIa  Lower extremity edema, chronic  Uncontrolled diabetes mellitus  Obstructive sleep apnea  Generalized anxiety disorder  Iron deficiency anemia  Morbid obesity       Plan:    Patient creatinine is still elevated, she has not been eating much, will give IV fluids for 1 day.  We will recheck renal function tomorrow.  Continue steroids, DuoNeb  mini nebs for asthma exacerbation.  Patient probably has a viral bronchitis exacerbating her asthma, monitor check respiratory panel.  Patient has developed hyponatremia and hypokalemia, will give 1 dose of Lokelma.  Sodium will improve with IV fluids.  We will continue to monitor.  We will check BMP later on today.   Adjust insulin as needed  Monitor mental status, at baseline  Physical therapy to see patient  Continue DVT/stress ulcer prophylaxis  Labs in am      Praneeth Valenzuela MD  5/23/2023  13:49 EDT         I wore protective equipment throughout this patient encounter including a face mask, gloves, and protective eyewear.  Hand hygiene was performed before donning protective equipment and after removal when leaving the room.

## 2023-05-23 NOTE — PROGRESS NOTES
Dr. SAI Lang    [address]        Patient ID:  Name:  Blanca Zhu  MRN:  [id number]  1945  77 y.o.  female            CC/Reason for visit: Cough, bronchospasm, chronic respiratory    Interval hx: Patient feels a little worse.  Coughing.  Difficulty producing sputum.  Still having some wheezing, still on oxygen    ROS: No fever, no hemoptysis, no abdominal pain    Vitals:  Vitals:    05/23/23 1059 05/23/23 1300 05/23/23 1419 05/23/23 1540   BP:  131/56     BP Location:  Left arm     Patient Position:  Sitting     Pulse: 76 68  72   Resp: 18 18  20   Temp:  98.4 °F (36.9 °C)     TempSrc:  Oral     SpO2: 96% 91% 90% 91%   Weight:       Height:               Body mass index is 39.06 kg/m².    Intake/Output Summary (Last 24 hours) at 5/23/2023 1615  Last data filed at 5/23/2023 1200  Gross per 24 hour   Intake 240 ml   Output --   Net 240 ml       Exam:  GEN:  No distress  Alert, oriented x 3.   LUNGS: Scattered rhonchi and wheezing bilat, no use of accessory muscles  CV:  Normal S1S2, without murmur, 1+ leg edema, hyperpigmentation from venous insufficiency  ABD:  Non tender, obesity hampers exam      Scheduled meds:  amLODIPine, 5 mg, Oral, Q24H  bisoprolol, 20 mg, Oral, Q24H  DULoxetine, 30 mg, Oral, Daily  enoxaparin, 30 mg, Subcutaneous, Q24H  guaiFENesin, 1,200 mg, Oral, Q12H  insulin glargine, 20 Units, Subcutaneous, Daily  insulin lispro, 2-9 Units, Subcutaneous, 4x Daily AC & at Bedtime  ipratropium-albuterol, 3 mL, Nebulization, 4x Daily - RT  leflunomide, 10 mg, Oral, Daily  methylPREDNISolone sodium succinate, 40 mg, Intravenous, Q12H  O2, 2 L/min, Inhalation, Once  pantoprazole, 40 mg, Oral, Q AM      IV meds:                      sodium chloride, 100 mL/hr, Last Rate: 100 mL/hr (05/23/23 1611)        Data Review:   I reviewed the patient's medications and new clinical results.    COVID19   Date Value Ref Range Status   05/21/2023 Not Detected Not Detected - Ref. Range  Final         Lab Results   Component Value Date    CALCIUM 8.2 (L) 05/23/2023    PHOS 3.4 07/11/2022     Results from last 7 days   Lab Units 05/23/23  0859 05/22/23  0643 05/21/23  1615 05/21/23  1540   SODIUM mmol/L 128* 129* 132*  --    POTASSIUM mmol/L 6.0* 5.8* 4.4  --    CHLORIDE mmol/L 93* 91* 94*  --    CO2 mmol/L 28.0 27.8 29.2*  --    BUN mg/dL 47* 28* 19  --    CREATININE mg/dL 1.87* 1.80* 1.11*  --    CALCIUM mg/dL 8.2* 9.2 8.9  --    BILIRUBIN mg/dL  --   --  0.7  --    ALK PHOS U/L  --   --  109  --    ALT (SGPT) U/L  --   --  10  --    AST (SGOT) U/L  --   --  13  --    GLUCOSE mg/dL 114* 309* 176*  --    WBC 10*3/mm3 13.30* 7.55  --  9.21   HEMOGLOBIN g/dL 12.6 12.4  --  12.9   PLATELETS 10*3/mm3 177 187  --  202   PROBNP pg/mL  --   --  720.4  --    PROCALCITONIN ng/mL 0.25  --   --   --              ASSESSMENT:   Acute hypoxic respiratory failure  Chronic hypercapnic respiratory failure  Acute bronchitis  Bronchospasm, possible asthma exacerbation  Pulmonary hypertension  Obstructive sleep apnea  Hyponatremia  Probable volume overload.  Venous insufficiency legs with chronic edema  Diabetes mellitus type 2  Acute kidney injury  Hyperkalemia  Hyperglycemia due to treatment with corticosteroids, exacerbation diabetes mellitus      PLAN:  Check respiratory viral panel PCR testing.  This is likely viral bronchitis.  Continue nebulized DuoNeb.  Continue IV Solu-Medrol and consider transitioning to oral prednisone tomorrow.  Procalcitonin is low.  No indication of bacterial infection.  Hold off on diuretics.  Treat hyperkalemia.  Adjust insulin for hyperglycemia coverage        Dmitry Lang MD  5/23/2023

## 2023-05-24 ENCOUNTER — APPOINTMENT (OUTPATIENT)
Dept: ULTRASOUND IMAGING | Facility: HOSPITAL | Age: 78
End: 2023-05-24
Payer: MEDICARE

## 2023-05-24 LAB
ANION GAP SERPL CALCULATED.3IONS-SCNC: 13.4 MMOL/L (ref 5–15)
BUN SERPL-MCNC: 57 MG/DL (ref 8–23)
BUN/CREAT SERPL: 26.3 (ref 7–25)
CALCIUM SPEC-SCNC: 7.9 MG/DL (ref 8.6–10.5)
CHLORIDE SERPL-SCNC: 94 MMOL/L (ref 98–107)
CK SERPL-CCNC: 51 U/L (ref 20–180)
CO2 SERPL-SCNC: 22.6 MMOL/L (ref 22–29)
CREAT SERPL-MCNC: 2.17 MG/DL (ref 0.57–1)
DEPRECATED RDW RBC AUTO: 44.1 FL (ref 37–54)
EGFRCR SERPLBLD CKD-EPI 2021: 23.1 ML/MIN/1.73
ERYTHROCYTE [DISTWIDTH] IN BLOOD BY AUTOMATED COUNT: 12.9 % (ref 12.3–15.4)
GLUCOSE BLDC GLUCOMTR-MCNC: 111 MG/DL (ref 70–130)
GLUCOSE BLDC GLUCOMTR-MCNC: 118 MG/DL (ref 70–130)
GLUCOSE BLDC GLUCOMTR-MCNC: 127 MG/DL (ref 70–130)
GLUCOSE BLDC GLUCOMTR-MCNC: 242 MG/DL (ref 70–130)
GLUCOSE BLDC GLUCOMTR-MCNC: 90 MG/DL (ref 70–130)
GLUCOSE SERPL-MCNC: 122 MG/DL (ref 65–99)
HCT VFR BLD AUTO: 39.5 % (ref 34–46.6)
HGB BLD-MCNC: 12.7 G/DL (ref 12–15.9)
MCH RBC QN AUTO: 30 PG (ref 26.6–33)
MCHC RBC AUTO-ENTMCNC: 32.2 G/DL (ref 31.5–35.7)
MCV RBC AUTO: 93.2 FL (ref 79–97)
PLATELET # BLD AUTO: 202 10*3/MM3 (ref 140–450)
PMV BLD AUTO: 9.5 FL (ref 6–12)
POTASSIUM SERPL-SCNC: 5.3 MMOL/L (ref 3.5–5.2)
POTASSIUM SERPL-SCNC: 5.4 MMOL/L (ref 3.5–5.2)
POTASSIUM SERPL-SCNC: 5.6 MMOL/L (ref 3.5–5.2)
QT INTERVAL: 367 MS
RBC # BLD AUTO: 4.24 10*6/MM3 (ref 3.77–5.28)
SODIUM SERPL-SCNC: 130 MMOL/L (ref 136–145)
WBC NRBC COR # BLD: 15.05 10*3/MM3 (ref 3.4–10.8)

## 2023-05-24 PROCEDURE — 82550 ASSAY OF CK (CPK): CPT | Performed by: INTERNAL MEDICINE

## 2023-05-24 PROCEDURE — 94799 UNLISTED PULMONARY SVC/PX: CPT

## 2023-05-24 PROCEDURE — 63710000001 INSULIN REGULAR HUMAN PER 5 UNITS: Performed by: INTERNAL MEDICINE

## 2023-05-24 PROCEDURE — 94664 DEMO&/EVAL PT USE INHALER: CPT

## 2023-05-24 PROCEDURE — 25010000002 PROCHLORPERAZINE 10 MG/2ML SOLUTION: Performed by: INTERNAL MEDICINE

## 2023-05-24 PROCEDURE — 93005 ELECTROCARDIOGRAM TRACING: CPT | Performed by: INTERNAL MEDICINE

## 2023-05-24 PROCEDURE — 82948 REAGENT STRIP/BLOOD GLUCOSE: CPT

## 2023-05-24 PROCEDURE — 97110 THERAPEUTIC EXERCISES: CPT

## 2023-05-24 PROCEDURE — 63710000001 INSULIN LISPRO (HUMAN) PER 5 UNITS: Performed by: INTERNAL MEDICINE

## 2023-05-24 PROCEDURE — 97162 PT EVAL MOD COMPLEX 30 MIN: CPT

## 2023-05-24 PROCEDURE — 84132 ASSAY OF SERUM POTASSIUM: CPT | Performed by: INTERNAL MEDICINE

## 2023-05-24 PROCEDURE — 80048 BASIC METABOLIC PNL TOTAL CA: CPT | Performed by: INTERNAL MEDICINE

## 2023-05-24 PROCEDURE — 25010000002 ENOXAPARIN PER 10 MG: Performed by: INTERNAL MEDICINE

## 2023-05-24 PROCEDURE — 94761 N-INVAS EAR/PLS OXIMETRY MLT: CPT

## 2023-05-24 PROCEDURE — 25010000002 METHYLPREDNISOLONE PER 40 MG: Performed by: INTERNAL MEDICINE

## 2023-05-24 PROCEDURE — 85027 COMPLETE CBC AUTOMATED: CPT | Performed by: INTERNAL MEDICINE

## 2023-05-24 PROCEDURE — 76775 US EXAM ABDO BACK WALL LIM: CPT

## 2023-05-24 PROCEDURE — 25010000002 ONDANSETRON PER 1 MG: Performed by: INTERNAL MEDICINE

## 2023-05-24 RX ORDER — DEXTROSE MONOHYDRATE 25 G/50ML
50 INJECTION, SOLUTION INTRAVENOUS ONCE
Status: COMPLETED | OUTPATIENT
Start: 2023-05-24 | End: 2023-05-24

## 2023-05-24 RX ADMIN — METHYLPREDNISOLONE SODIUM SUCCINATE 40 MG: 40 INJECTION, POWDER, FOR SOLUTION INTRAMUSCULAR; INTRAVENOUS at 17:02

## 2023-05-24 RX ADMIN — IPRATROPIUM BROMIDE AND ALBUTEROL SULFATE 3 ML: 2.5; .5 SOLUTION RESPIRATORY (INHALATION) at 20:04

## 2023-05-24 RX ADMIN — SODIUM BICARBONATE 50 MEQ: 84 INJECTION INTRAVENOUS at 10:48

## 2023-05-24 RX ADMIN — LEFLUNOMIDE 10 MG: 20 TABLET ORAL at 09:50

## 2023-05-24 RX ADMIN — IPRATROPIUM BROMIDE AND ALBUTEROL SULFATE 3 ML: 2.5; .5 SOLUTION RESPIRATORY (INHALATION) at 15:59

## 2023-05-24 RX ADMIN — INSULIN GLARGINE-YFGN 20 UNITS: 100 INJECTION, SOLUTION SUBCUTANEOUS at 09:58

## 2023-05-24 RX ADMIN — PROCHLORPERAZINE EDISYLATE 5 MG: 5 INJECTION INTRAMUSCULAR; INTRAVENOUS at 05:54

## 2023-05-24 RX ADMIN — SODIUM CHLORIDE 100 ML/HR: 9 INJECTION, SOLUTION INTRAVENOUS at 09:58

## 2023-05-24 RX ADMIN — DEXTROSE MONOHYDRATE 50 ML: 25 INJECTION, SOLUTION INTRAVENOUS at 11:04

## 2023-05-24 RX ADMIN — METHYLPREDNISOLONE SODIUM SUCCINATE 40 MG: 40 INJECTION, POWDER, FOR SOLUTION INTRAMUSCULAR; INTRAVENOUS at 05:21

## 2023-05-24 RX ADMIN — ONDANSETRON 4 MG: 2 INJECTION INTRAMUSCULAR; INTRAVENOUS at 10:05

## 2023-05-24 RX ADMIN — ENOXAPARIN SODIUM 30 MG: 100 INJECTION SUBCUTANEOUS at 17:02

## 2023-05-24 RX ADMIN — IPRATROPIUM BROMIDE AND ALBUTEROL SULFATE 3 ML: 2.5; .5 SOLUTION RESPIRATORY (INHALATION) at 06:39

## 2023-05-24 RX ADMIN — IPRATROPIUM BROMIDE AND ALBUTEROL SULFATE 3 ML: 2.5; .5 SOLUTION RESPIRATORY (INHALATION) at 11:43

## 2023-05-24 RX ADMIN — SODIUM CHLORIDE 100 ML/HR: 9 INJECTION, SOLUTION INTRAVENOUS at 00:06

## 2023-05-24 RX ADMIN — SODIUM CHLORIDE 100 ML/HR: 9 INJECTION, SOLUTION INTRAVENOUS at 20:16

## 2023-05-24 RX ADMIN — SODIUM ZIRCONIUM CYCLOSILICATE 10 G: 10 POWDER, FOR SUSPENSION ORAL at 12:11

## 2023-05-24 RX ADMIN — INSULIN HUMAN 10 UNITS: 100 INJECTION, SOLUTION PARENTERAL at 11:04

## 2023-05-24 RX ADMIN — INSULIN LISPRO 4 UNITS: 100 INJECTION, SOLUTION INTRAVENOUS; SUBCUTANEOUS at 12:12

## 2023-05-24 RX ADMIN — BISOPROLOL FUMARATE 20 MG: 5 TABLET, FILM COATED ORAL at 09:50

## 2023-05-24 NOTE — PROGRESS NOTES
PROGRESS NOTE  Patient Name: Blanca Zhu  Age/Sex: 77 y.o. female  : 1945  MRN: [id number]    Date of Admission: 2023  Date of Encounter Visit: 23   LOS: 2 days   Patient Care Team:  Praneeth Valenzuela MD as PCP - General (Internal Medicine)  Jean-Claude Farnsworth MD as Consulting Physician (Hematology and Oncology)  Praneeth Valenzuela MD as Referring Physician (Internal Medicine)  Tahmina Brown MD as Consulting Physician (Rheumatology)  Gretchen Mcginnis MD as Consulting Physician (Nephrology)  Tahmina James MD as Surgeon (General Surgery)  Haile Handley MD as Consulting Physician (Gastroenterology)  Sb Power MD as Consulting Physician (Cardiology)    Chief Complaint: Bronchospasm    Hospital course: Patient has positive parainfluenza viral infection on her respiratory panel which probably explains her bronchospasm.  She is on the steroid and she is on the bronchodilators, she does have hypercapnia she does have some possible obesity hypoventilation and sleep apnea she has a BiPAP to be used.  She is on IV fluid and she is followed by renal and by the primary team, her creatinine is slightly trending up, according to the family she is slightly better each day compared to the day prior.        REVIEW OF SYSTEMS:   CONSTITUTIONAL: no fever or chills  CARDIOVASCULAR: No chest pain, chest pressure or chest discomfort. No palpitations positive edema  RESPIRATORY: Shortness of breath wheezing and cough m.   GASTROINTESTINAL: No anorexia, nausea, vomiting or diarrhea. No abdominal pain or blood.   HEMATOLOGIC: No bleeding or bruising.     Ventilator/Non-Invasive Ventilation Settings (From admission, onward)     Start     Ordered    23 1619  NIPPV (CPAP or BIPAP)  At Bedtime As Needed-RT        Comments: May adjust pressures to best tolerance and effect.  Patient is used to using a Pap machine and may bleed in supplemental O2 to maintain saturations greater than 90% less than 95  "when she is on the PAP machine.   Question Answer Comment   Type BIPAP    IPAP 18    EPAP 10        05/23/23 1618    05/21/23 2042  NIPPV (CPAP or BIPAP)  Until Discontinued,   Status:  Canceled        Comments: May adjust pressures to best tolerance and effect.  Patient is used to using a Pap machine   Question Answer Comment   Type BIPAP    IPAP 18    EPAP 10        05/21/23 2041 05/21/23 2042  NIPPV (CPAP or BIPAP)  Until Discontinued,   Status:  Canceled        Comments: May adjust pressures to best tolerance and effect.  Patient is used to using a Pap machine and may bleed in supplemental O2 to maintain saturations greater than 90% less than 95 when she is on the PAP machine.   Question Answer Comment   Type BIPAP    IPAP 18    EPAP 10        05/21/23 2042    05/21/23 1542  NIPPV (CPAP or BIPAP)  Until Discontinued,   Status:  Canceled        Question:  Type  Answer:  BIPAP    05/21/23 1541                  Vital Signs  Temp:  [97.5 °F (36.4 °C)-97.8 °F (36.6 °C)] 97.5 °F (36.4 °C)  Heart Rate:  [70-77] 72  Resp:  [18-23] 20  BP: (130-145)/(54-62) 130/61  SpO2:  [91 %-99 %] 94 %  on  Flow (L/min):  [2-3] 2 Device (Oxygen Therapy): NPPV/NIV    Intake/Output Summary (Last 24 hours) at 5/24/2023 1429  Last data filed at 5/24/2023 0342  Gross per 24 hour   Intake 0 ml   Output 800 ml   Net -800 ml     Flowsheet Rows    Flowsheet Row First Filed Value   Admission Height 152.4 cm (60\") Documented at 05/21/2023 1550   Admission Weight 90.7 kg (200 lb) Documented at 05/21/2023 1550        Body mass index is 39.06 kg/m².      05/21/23 1550   Weight: 90.7 kg (200 lb)       Physical Exam:  GEN:  No acute distress, awake, cooperative, well developed, confused, obese  EYES:   Sclerae clear. No icterus. PERRL. Normal EOM  ENT:   External ears/nose normal, no oral lesions, no thrush, mucous membranes moist  NECK:  Supple, midline trachea, no JVD  LUNGS: Normal chest on inspection, positive expiratory wheezes but moving " decent breath sounds bilaterally, on nasal cannula oxygen. Respirations regular, even and unlabored.   CV:  Regular rhythm and rate. Normal S1/S2. No murmurs, gallops, or rubs noted.  ABD:  Soft, nontender and nondistended. Normal bowel sounds. No guarding truncal obesity  EXT:  Moves all extremities well. No cyanosis. No redness.  2+ pitting edema with chronic venous stasis hyperpigmentation.   Skin: Dry, intact, no bleeding    Results Review:    Results From Last 14 Days   Lab Units 05/10/23  1627   FERRITIN ng/mL 327.80*     Results from last 7 days   Lab Units 05/24/23  0707 05/23/23  2111 05/23/23  0859 05/22/23  0643 05/21/23  1615   SODIUM mmol/L 130* 127* 128* 129* 132*   POTASSIUM mmol/L 5.6* 5.9* 6.0* 5.8* 4.4   CHLORIDE mmol/L 94* 92* 93* 91* 94*   CO2 mmol/L 22.6 26.0 28.0 27.8 29.2*   BUN mg/dL 57* 56* 47* 28* 19   CREATININE mg/dL 2.17* 1.97* 1.87* 1.80* 1.11*   CALCIUM mg/dL 7.9* 7.7* 8.2* 9.2 8.9   AST (SGOT) U/L  --   --   --   --  13   ALT (SGPT) U/L  --   --   --   --  10   ANION GAP mmol/L 13.4 9.0 7.0 10.2 8.8   ALBUMIN g/dL  --   --   --   --  3.8     Results from last 7 days   Lab Units 05/24/23  0707 05/21/23  1920 05/21/23  1615   CK TOTAL U/L 51  --   --    HSTROP T ng/L  --  28* 25*         Results from last 7 days   Lab Units 05/21/23  1615   PROBNP pg/mL 720.4     Results from last 7 days   Lab Units 05/24/23  0707 05/23/23  0859 05/22/23  0643 05/21/23  1540   WBC 10*3/mm3 15.05* 13.30* 7.55 9.21   HEMOGLOBIN g/dL 12.7 12.6 12.4 12.9   HEMATOCRIT % 39.5 39.9 38.8 42.4   PLATELETS 10*3/mm3 202 177 187 202   MCV fL 93.2 93.4 93.9 97.9*   NEUTROPHIL % %  --   --   --  83.8*   LYMPHOCYTE % %  --   --   --  5.8*   MONOCYTES % %  --   --   --  8.4   EOSINOPHIL % %  --   --   --  1.0   BASOPHIL % %  --   --   --  0.5   IMM GRAN % %  --   --   --  0.5                   Invalid input(s): LDLCALC          Glucose   Date/Time Value Ref Range Status   05/24/2023 1124 242 (H) 70 - 130 mg/dL Final      Comment:     Meter: [id number] : [id number] Lilia Ester NA   05/24/2023 1059 118 70 - 130 mg/dL Final     Comment:     Meter: [id number] : [id number] Veniceteelena Ester NA   05/24/2023 0601 127 70 - 130 mg/dL Final     Comment:     Meter: [id number] : [id number] Connor Parkerite NA   05/23/2023 2059 155 (H) 70 - 130 mg/dL Final     Comment:     Meter: [id number] : [id number] Connor Parkerite NA   05/23/2023 1619 138 (H) 70 - 130 mg/dL Final     Comment:     Meter: [id number] : [id number] Safranki Cheunguna NA   05/23/2023 1152 156 (H) 70 - 130 mg/dL Final     Comment:     Meter: [id number] : [id number] Huyen Cheunguna NA   05/23/2023 0615 113 70 - 130 mg/dL Final     Comment:     Meter: [id number] : [id number] Connor Parkerite NA   05/22/2023 2221 77 70 - 130 mg/dL Final     Comment:     Meter: [id number] : [id number] Cha Worrell NA     Results from last 7 days   Lab Units 05/23/23  0859   PROCALCITONIN ng/mL 0.25         Results from last 7 days   Lab Units 05/21/23  2325   NITRITE UA  Negative     Results from last 7 days   Lab Units 05/23/23  1625   COVID19  Not Detected   ADENOVIRUS DETECTION BY PCR  Not Detected   CORONAVIRUS 229E  Not Detected   CORONAVIRUS HKU1  Not Detected   CORONAVIRUS NL63  Not Detected   CORONAVIRUS OC43  Not Detected   HUMAN METAPNEUMOVIRUS  Not Detected   HUMAN RHINOVIRUS/ENTEROVIRUS  Not Detected   INFLUENZA B PCR  Not Detected   PARAINFLUENZA 1  Not Detected   PARAINFLUENZA VIRUS 2  Not Detected   PARAINFLUENZA VIRUS 3  Detected*   PARAINFLUENZA VIRUS 4  Not Detected   BORDETELLA PERTUSSIS PCR  Not Detected   BORDETELLA PARAPERTUSSIS PCR  Not Detected   CHLAMYDOPHILA PNEUMONIAE PCR  Not Detected   MYCOPLAMA PNEUMO PCR  Not Detected   RSV, PCR  Not Detected               Imaging:   Imaging Results (All)     Procedure Component Value Units Date/Time    US Renal Bilateral [[id number]] Collected: 05/24/23 1303     Updated: 05/24/23 1310    Narrative:       US RENAL BILATERAL-  05/24/2023     HISTORY: Acute renal failure.     Right kidney measures 10.3 cm in length. Left kidney measures 9.8 cm in  length.     No hydronephrosis, renal masses or stones are seen. Urinary bladder is  incompletely distended.       Impression:      1. Normal bilateral renal ultrasound.     This report was finalized on 5/24/2023 1:04 PM by Dr. Elliot Mike M.D.       XR Chest 1 View [[id number]] Collected: 05/21/23 1547     Updated: 05/21/23 1551    Narrative:      XR CHEST 1 VW-     HISTORY: Female who is 77 years-old,  chest pain     TECHNIQUE: Frontal view of the chest     COMPARISON: 11/9/2019     FINDINGS: The heart size is borderline. Pulmonary vasculature is  congested. Minimal likely atelectasis in the lower lungs. No pleural  effusion or pneumothorax. No acute osseous process.       Impression:      Borderline heart size with pulmonary vascular congestion.  Minimal likely atelectasis in the lower lungs.     This report was finalized on 5/21/2023 3:48 PM by Dr. Galindo Malik M.D.             I reviewed the patient's new clinical results.  I personally viewed and interpreted the patient's imaging results:        Medication Review:   amLODIPine, 5 mg, Oral, Q24H  bisoprolol, 20 mg, Oral, Q24H  DULoxetine, 30 mg, Oral, Daily  enoxaparin, 30 mg, Subcutaneous, Q24H  guaiFENesin, 1,200 mg, Oral, Q12H  insulin glargine, 20 Units, Subcutaneous, Daily  insulin lispro, 2-9 Units, Subcutaneous, 4x Daily AC & at Bedtime  ipratropium-albuterol, 3 mL, Nebulization, 4x Daily - RT  leflunomide, 10 mg, Oral, Daily  methylPREDNISolone sodium succinate, 40 mg, Intravenous, Q12H  O2, 2 L/min, Inhalation, Once  pantoprazole, 40 mg, Oral, Q AM        sodium chloride, 100 mL/hr, Last Rate: 100 mL/hr (05/24/23 0958)        ASSESSMENT:   Acute hypoxic respiratory failure  Chronic hypercapnic respiratory failure  Acute bronchitis caused by parainfluenza infection  Bronchospasm, possible asthma  exacerbation   Pulmonary hypertension  Obstructive sleep apnea  Hyponatremia  Probable volume overload.  Venous insufficiency legs with chronic edema  Diabetes mellitus type 2  Acute kidney injury  Hyperkalemia  Hyperglycemia due to treatment with corticosteroids, exacerbation diabetes mellitus    PLAN:  She is down to 2 L nasal cannula, she is still wheezing but she is gradually getting better according to the family, she is moving air on exam, the patient will be kept on current supportive measures with bronchodilators, we will continue with the same dose of the IV steroids and we will consider transition to oral prednisone if she continues to improve by tomorrow her respiratory panel came back positive for parainfluenza virus likely the cause of her bronchospasm  Discussed with family at the bedside    Disposition: Per primary team    Belem Thomas MD  05/24/23  14:29 EDT       Dictated utilizing Dragon dictation

## 2023-05-24 NOTE — CONSULTS
[facility]                                                                                             Nephrology Initial Consult Note    Patient Identification:  Name: Blanca Zhu MRN: [id number]  Age: 77 y.o. : 1945  Sex: female  Date:2023    Requesting Physician: As per consult order.  Reason for Consultation: Acute kidney injury  Information from:patient/ family/ chart      History of Present Illness: This is a 77 y.o. year old female with a history of chronic kidney disease, well-known to our group and followed in the office by Dr. Gretchen Mcginnis.  She had a telehealth visit in February and creatinine was stable, at baseline 1.1-1.2 at that time.  Dietary salt restriction was recommended with plans for additional follow-up and repeat lab testing.  Her medical history is otherwise significant for severe asthma followed by pulmonary, obesity diastolic CHF diabetes hypertension and sleep apnea.  She was admitted on  with increasing upper respiratory symptoms and dyspnea.  She has chronic lower extremity edema which she feels is about the same as her baseline.  She is on 2 to 4 L of oxygen at home and currently O2 sats upper 90s on 2 L.  She was quite hypertensive initially, 217/76 but BP has been stable for last 4 days.  Pulmonary is following and is treated her for an acute asthma exacerbation with IV steroids.  She was initially diuresed but due to worsening renal function, diuretics were stopped and she was started on normal saline today.  She received a total of 80 mg of IV Lasix on .  She was on Diovan/HCTZ, given yesterday but held today.  Lokelma was given yesterday for hyperkalemia, no recent potassium supplementation.   She says she does not feel very well today, mainly some respiratory complaints, cough, congestion and viral PCR testing is pending.  No sign of any  infection and she is not on any antibiotics.  She states she is urinating well.      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 Daniel Gutierrez, [facility]   •  SECTION N/A     x3   • CHOLECYSTECTOMY N/A    • COLONOSCOPY     • COLONOSCOPY W/ POLYPECTOMY N/A 2019    Enlarged folds in the antrum: biopsied; duodenal, transverse colon x2, splenic flexure, descending colon and sigmoid colon polyps: biopsied (path: tubular adenoma x6)-Dr. Zak De Jesus, [facility]   • ENDOSCOPY  10/11/2019    Procedure: ESOPHAGOGASTRODUODENOSCOPY with hot snare polypectomy;  Surgeon: Haile Handley MD;  Location: [facility] ENDOSCOPY;  Service: Gastroenterology   • ENDOSCOPY N/A 2020    Procedure: ESOPHAGOGASTRODUODENOSCOPY;  Surgeon: Haile Handley MD;  Location: [facility] ENDOSCOPY;  Service: Gastroenterology   • ENDOSCOPY N/A 2020    Procedure: ESOPHAGOGASTRODUODENOSCOPY WITH BIOPSIES AND COLD BIOPSY POLYPECTOMY;  Surgeon: Haile Handley MD;  Location: [facility] ENDOSCOPY;  Service: Gastroenterology;  Laterality: N/A;  pre: dyspepsia and diarrhea  post:  duodenal polyp and gastritis   • MASTECTOMY Left    • MASTECTOMY WITH SENTINEL NODE BIOPSY AND AXILLARY NODE DISSECTION Left 7/3/2019    Procedure: LEFT BREAST MASTECTOMY WITH SENTINEL NODE BIOPSY AND , v-y plasty closure;  Surgeon: Tahmina James MD;  Location: [facility] OR;  Service: General   • SUBTOTAL HYSTERECTOMY Bilateral     ovaries still in tact   • TEMPORAL ARTERY BIOPSY Bilateral 12/05/2019        Home Meds:   Medications Prior to Admission   Medication Sig Dispense Refill Last Dose   • albuterol sulfate  (90 Base) MCG/ACT inhaler Every 6 (Six) Hours.   5/21/2023   • bisoprolol (ZEBeta) 10 MG tablet Take 2 tablets by mouth.   5/20/2023   • doxazosin (CARDURA) 2 MG tablet Take 1 tablet by mouth Every Night.   5/20/2023   • Insulin Glargine (TOUJEO SOLOSTAR SC) Inject 40 Units under the skin into the appropriate area as directed Every Night. INSTRUCTED PT TO FOLLOW MD INSTRUCTIONS REGARDING DOSING BEFORE SURGERY   5/20/2023   • Insulin Lispro (HUMALOG PEN SC) Inject  under the skin into the appropriate area as directed 3 (Three) Times a Day With Meals. 25 units at breakfast, 20 units at lunch, 22 units at dinner/INSTRUCTED PT TO FOLLOW MD INSTRUCTIONS REGARDING DOSING BEFORE SURGERY   5/20/2023   • O2 (OXYGEN) Inhale 1 (One) Time.   5/21/2023   • omeprazole (priLOSEC) 20 MG capsule 2 capsules.   5/20/2023   • predniSONE (DELTASONE) 5 MG tablet Take 7 mg by mouth Daily.   5/20/2023   • torsemide (DEMADEX) 20 MG tablet    Past Week   • amLODIPine (NORVASC) 5 MG tablet amlodipine 5 mg tablet   TAKE 1 TABLET BY MOUTH ONCE DAILY IN THE EVENING   More than a month   • azithromycin (ZITHROMAX) 250 MG tablet azithromycin 250 mg tablet      • Cholecalciferol 125 MCG (5000 UT) tablet cholecalciferol (vitamin D3) 125 mcg (5,000 unit) tablet   TAKE 1 TABLET BY MOUTH ONCE DAILY FOR 30 DAYS   Unknown   • clotrimazole-betamethasone (LOTRISONE) 1-0.05 % cream clotrimazole-betamethasone 1 %-0.05 % topical cream    APPLY CREAM TOPICALLY TO AFFECTED AND SURROUNDING AREAS OF SKIN TWICE A DAY FOR 2 WEEKS (IN THE MORNING AND EVENING)   Unknown   • DULoxetine (CYMBALTA) 30 MG capsule duloxetine 30 mg capsule,delayed release   Take 1 capsule every day by oral route for 30 days.      • ergocalciferol (ERGOCALCIFEROL) 1.25 MG (12678 UT) capsule Take 1 capsule by mouth Every 7 (Seven) Days.   Unknown   • hydrocortisone 2.5 % cream hydrocortisone 2.5 % topical cream   APPLY CREAM TWICE DAILY TO AFFECTED AREA   Unknown   • leflunomide (ARAVA) 10 MG tablet Take 1 tablet by mouth.   Unknown   • miconazole (Micatin) 2 % cream Apply 1 application topically to the appropriate area as directed 2 (Two) Times a Day. 14 g 1    • Paxlovid, 300/100, 20 x 150 MG & 10 x 100MG tablet therapy pack tablet       • valsartan-hydrochlorothiazide (DIOVAN-HCT) 320-25 MG per tablet Take 1 tablet by mouth Daily. (Patient taking differently: Take 0.5 tablets by mouth Daily.) 30 tablet 11        Current Meds:   Current Facility-Administered Medications   Medication Dose Route Frequency Provider Last Rate Last Admin   • amLODIPine (NORVASC) tablet 5 mg  5 mg Oral Q24H Praneeth Valenzuela MD   5 mg at 05/23/23 0941   • benzonatate (TESSALON) capsule 200 mg  200 mg Oral TID Praneeth Singer MD   200 mg at 05/23/23 0940   • bisoprolol (ZEBeta) tablet 20 mg  20 mg Oral Q24H Praneeth Valenzuela MD   20 mg at 05/24/23 0950   • dextrose (D50W) (25 g/50 mL) IV injection 25 g  25 g Intravenous Q15 Min Praneeth Singer MD       • dextrose (GLUTOSE) oral gel 15 g  15 g Oral Q15 Min Praneeth Singer MD       • DULoxetine (CYMBALTA) DR capsule 30 mg  30 mg Oral Daily Praneeth Valenzuela MD   30 mg at 05/23/23 0941   • Enoxaparin Sodium (LOVENOX) syringe 30 mg  30 mg Subcutaneous Q24H Praneeth Valenzuela MD   30 mg at 05/23/23 1619   • glucagon (GLUCAGEN) injection 1 mg  1 mg Intramuscular Q15 Min Praneeth Singer MD       • guaiFENesin (MUCINEX) 12 hr tablet 1,200  mg  1,200 mg Oral Q12H Praneeth Valenzuela MD   1,200 mg at 05/23/23 2327   • hydrALAZINE (APRESOLINE) injection 10 mg  10 mg Intravenous Q6H EVANSN Praneeth Valenzuela MD   10 mg at 05/21/23 1936   • insulin glargine (LANTUS, SEMGLEE) injection 20 Units  20 Units Subcutaneous Daily Praneeth Valenzuela MD   20 Units at 05/24/23 0958   • insulin lispro (HUMALOG/ADMELOG) injection 2-9 Units  2-9 Units Subcutaneous 4x Daily AC & at Bedtime Praneeth Valenzuela MD   2 Units at 05/22/23 1640   • ipratropium-albuterol (DUO-NEB) nebulizer solution 3 mL  3 mL Nebulization 4x Daily - RT Praneeth Valenzuela MD   3 mL at 05/24/23 0639   • leflunomide (ARAVA) tablet 10 mg  10 mg Oral Daily Praneeth Valenzuela MD   10 mg at 05/24/23 0950   • methylPREDNISolone sodium succinate (SOLU-Medrol) injection 40 mg  40 mg Intravenous Q12H Dmitry Lang MD   40 mg at 05/24/23 0521   • O2 (OXYGEN)  2 L/min Inhalation Once Praneeth Valenzuela MD       • ondansetron (ZOFRAN) injection 4 mg  4 mg Intravenous Q6H EVANSN Praneeth Valenzuela MD   4 mg at 05/24/23 1005   • pantoprazole (PROTONIX) EC tablet 40 mg  40 mg Oral Q AM Praneeth Valenzuela MD   40 mg at 05/23/23 0636   • prochlorperazine (COMPAZINE) injection 5 mg  5 mg Intravenous Q6H EVANSN Praneeth Valenzuela MD   5 mg at 05/24/23 0554   • sodium chloride 0.9 % flush 10 mL  10 mL Intravenous PRN Barry Fitch MD       • sodium chloride 0.9 % infusion  100 mL/hr Intravenous Continuous Praneeth Valenzuela  mL/hr at 05/24/23 0958 100 mL/hr at 05/24/23 0958       Allergies:  Allergies   Allergen Reactions   • Aspirin Nausea Only     Low tolerance, abdominal pain   • Sulfa Antibiotics Unknown (See Comments)     Happened as a child       Social History:   Social History     Socioeconomic History   • Marital status:      Spouse name: Ashwin   • Number of children: 3   • Years of education: College   Tobacco Use   • Smoking status: Never   • Smokeless tobacco: Never   • Tobacco comments:     caffine  use   Vaping Use   • Vaping Use: Never used   Substance and Sexual Activity   • Alcohol use: No     Comment: one or two small drinks a year   • Drug use: No   • Sexual activity: Not Currently     Comment: Spouse, Ashwin        Family History:  Family History   Problem Relation Age of Onset   • Hypertension Father    • Stomach cancer Father    • Diabetes Father    • Esophageal cancer Father    • Throat cancer Sister    • Diabetes Paternal Grandmother    • Hypertension Paternal Grandfather    • Colon cancer Paternal Grandfather    • Heart disease Mother    • Colon cancer Paternal Uncle    • Colon cancer Paternal Uncle    • Colon cancer Paternal Uncle    • Ovarian cancer Cousin    • Stomach cancer Cousin    • Malig Hyperthermia Neg Hx         Review of Systems: as per HPI, in addition:    General:      + Weakness / fatigue,                       No fevers / chills                       no weight loss  HEENT:       no dysphagia / odynophagia  Neck:           normal range of motion, no swelling  Respiratory: Complains of cough / congestion                      Mild, chronic shortness of air                       No wheezing  CV:              No chest pain                       No palpitations  Abdomen/GI: no nausea / vomiting                      No diarrhea / constipation                      No abdominal pain  :             no dysuria / urinary frequency                       No urgency, normal output  Endocrine:   no polyuria / polydipsia,                      No heat or cold intolerance  Skin:           no rashes or skin breakdown   Vascular:   No edema                     No claudication  Psych:        no depression/ anxiety  Neuro:        no focal weakness, no seizures  Musculoskeletal: no joint pain or deformities      Physical Exam:  Vitals:   Temp (24hrs), Av.9 °F (36.6 °C), Min:97.6 °F (36.4 °C), Max:98.4 °F (36.9 °C)    /54 (BP Location: Right arm, Patient Position: Lying)   Pulse 76   Temp 97.8  "°F (36.6 °C) (Oral)   Resp 20   Ht 152.4 cm (60\")   Wt 90.7 kg (200 lb)   LMP  (LMP Unknown)   SpO2 97%   BMI 39.06 kg/m²   Intake/Output:     Intake/Output Summary (Last 24 hours) at 5/24/2023 1006  Last data filed at 5/24/2023 0342  Gross per 24 hour   Intake 120 ml   Output 800 ml   Net -680 ml        Wt Readings from Last 1 Encounters:   05/21/23 1550 90.7 kg (200 lb)       Exam:    General Appearance:  Awake, alert, oriented x3, no acute distress  Chronically ill-appearing   Head and Face:  Normocephalic, atraumatic, mucus membranes moist, oropharynx clear   Eyes:  No icterus, pupils equal round and reactive to light, extraocular movements intact    ENMT: Moist mucosa, tongue symmetric    Neck: Supple  no jugular venous distention  no thyromegaly   Pulmonary:  Respiratory effort: Normal  Auscultation of lungs: Clear bilaterally  Few scattered wheezes  No rhonchi  Good air movement, good expansion   Chest wall:  No tenderness or deformity   Cardiovascular:  Auscultation of the heart: Normal rhythm, no murmurs  1-2+ edema of bilateral lower extremities   Abdomen:  Abdomen: soft, non-tender, normal bowel sounds all four quadrants, no masses   Liver and spleen: no hepatosplenomegaly   Musculoskeletal: Digits and nails: normal  Normal range of motion  No joint swelling or gross deformities    Skin: Skin inspection: color normal, no visible rashes or lesions  Skin palpation: texture, turgor normal, no palpable lesions   Lymphatic:  no cervical lymphadenopathy    Psychiatric: Judgement and insight: normal  Orientation to person place and time: normal  Mood and affect: normal       DATA:  Radiology and Labs:  The following labs independently reviewed by me, additional AM labs ordered  Old records independently reviewed showing CKD as detailed above  The following radiologic studies independently viewed by me, findings chest x-ray on admission showed borderline heart size with pulmonary vascular " congestion  Interval notes, chart personally reviewed by me.  I have reviewed and summarized old records as detailed above  Plan of care discussed with patient herself at bedside  New problems include FEDERICA      Risk/ complexity of medical care/ medical decision making: High complexity, FEDERICA on CKD, acute asthma exacerbation requiring IV steroids, new hyperkalemia    Chronic illness with severe exacerbation or progression: CKD, asthma      Labs:   Recent Results (from the past 24 hour(s))   POC Glucose Once    Collection Time: 05/23/23 11:52 AM    Specimen: Blood   Result Value Ref Range    Glucose 156 (H) 70 - 130 mg/dL   POC Glucose Once    Collection Time: 05/23/23  4:19 PM    Specimen: Blood   Result Value Ref Range    Glucose 138 (H) 70 - 130 mg/dL   Respiratory Panel PCR w/COVID-19(SARS-CoV-2) [facility]/RANDA/RINA/PAD/COR/MAD/DESTINI In-House, NP Swab in UTM/VTM, 3-4 HR TAT - Swab, Nasopharynx    Collection Time: 05/23/23  4:25 PM    Specimen: Nasopharynx; Swab   Result Value Ref Range    ADENOVIRUS, PCR Not Detected Not Detected    Coronavirus 229E Not Detected Not Detected    Coronavirus HKU1 Not Detected Not Detected    Coronavirus NL63 Not Detected Not Detected    Coronavirus OC43 Not Detected Not Detected    COVID19 Not Detected Not Detected - Ref. Range    Human Metapneumovirus Not Detected Not Detected    Human Rhinovirus/Enterovirus Not Detected Not Detected    Influenza A PCR Not Detected Not Detected    Influenza B PCR Not Detected Not Detected    Parainfluenza Virus 1 Not Detected Not Detected    Parainfluenza Virus 2 Not Detected Not Detected    Parainfluenza Virus 3 Detected (A) Not Detected    Parainfluenza Virus 4 Not Detected Not Detected    RSV, PCR Not Detected Not Detected    Bordetella pertussis pcr Not Detected Not Detected    Bordetella parapertussis PCR Not Detected Not Detected    Chlamydophila pneumoniae PCR Not Detected Not Detected    Mycoplasma pneumo by PCR Not Detected Not Detected   POC Glucose  Once    Collection Time: 05/23/23  8:59 PM    Specimen: Blood   Result Value Ref Range    Glucose 155 (H) 70 - 130 mg/dL   Basic Metabolic Panel    Collection Time: 05/23/23  9:11 PM    Specimen: Blood   Result Value Ref Range    Glucose 166 (H) 65 - 99 mg/dL    BUN 56 (H) 8 - 23 mg/dL    Creatinine 1.97 (H) 0.57 - 1.00 mg/dL    Sodium 127 (L) 136 - 145 mmol/L    Potassium 5.9 (H) 3.5 - 5.2 mmol/L    Chloride 92 (L) 98 - 107 mmol/L    CO2 26.0 22.0 - 29.0 mmol/L    Calcium 7.7 (L) 8.6 - 10.5 mg/dL    BUN/Creatinine Ratio 28.4 (H) 7.0 - 25.0    Anion Gap 9.0 5.0 - 15.0 mmol/L    eGFR 25.9 (L) >60.0 mL/min/1.73   POC Glucose Once    Collection Time: 05/24/23  6:01 AM    Specimen: Blood   Result Value Ref Range    Glucose 127 70 - 130 mg/dL   CBC (No Diff)    Collection Time: 05/24/23  7:07 AM    Specimen: Blood   Result Value Ref Range    WBC 15.05 (H) 3.40 - 10.80 10*3/mm3    RBC 4.24 3.77 - 5.28 10*6/mm3    Hemoglobin 12.7 12.0 - 15.9 g/dL    Hematocrit 39.5 34.0 - 46.6 %    MCV 93.2 79.0 - 97.0 fL    MCH 30.0 26.6 - 33.0 pg    MCHC 32.2 31.5 - 35.7 g/dL    RDW 12.9 12.3 - 15.4 %    RDW-SD 44.1 37.0 - 54.0 fl    MPV 9.5 6.0 - 12.0 fL    Platelets 202 140 - 450 10*3/mm3   Basic Metabolic Panel    Collection Time: 05/24/23  7:07 AM    Specimen: Blood   Result Value Ref Range    Glucose 122 (H) 65 - 99 mg/dL    BUN 57 (H) 8 - 23 mg/dL    Creatinine 2.17 (H) 0.57 - 1.00 mg/dL    Sodium 130 (L) 136 - 145 mmol/L    Potassium 5.6 (H) 3.5 - 5.2 mmol/L    Chloride 94 (L) 98 - 107 mmol/L    CO2 22.6 22.0 - 29.0 mmol/L    Calcium 7.9 (L) 8.6 - 10.5 mg/dL    BUN/Creatinine Ratio 26.3 (H) 7.0 - 25.0    Anion Gap 13.4 5.0 - 15.0 mmol/L    eGFR 23.1 (L) >60.0 mL/min/1.73       Radiology:  Imaging Results (Last 24 Hours)     ** No results found for the last 24 hours. **             ASSESSMENT:   Acute kidney injury, likely hypovolemic, ARB and diuretic currently on hold, received IV Lasix earlier this admission and currently on  normal saline  Hyperkalemia from FEDERICA and ARB  Acute hypoxic respiratory failure, pulmonary following  Chronic asthma with acute exacerbation, on IV steroids  Hyperkalemia  Probable viral bronchitis, PCR pending  Hypertensive urgency on admission, improved  Hyponatremia, improved  Hypocalcemia: Check albumin  Leukocytosis from steroids  Mild proteinuria      DISCUSSION/PLAN:   Agree with IV fluids and will continue to treat potassium medically  Repeat Lokelma, D50/insulin and serial potassium until normal twice  Placed on low potassium diet  Continue to hold ARB and diuretics  Check renal panel including albumin and phosphorus, magnesium in a.m.  Check urine studies  Renal ultrasound today, I do not see any prior renal ultrasound in her records  Will follow-up a.m. labs and monitor closely    Continue to monitor electrolytes and volume closely, avoid IV contrast and nephrotoxic medications     I appreciate the consult request  Please send me a secure chat message if any questions regarding patient care.      Santosh Dhillon M.D  [facility]  Office phone number: [phone]  Answering service phone number: [phone]      5/24/2023        Dictation via Dragon dictation software

## 2023-05-24 NOTE — PLAN OF CARE
Goal Outcome Evaluation:  Plan of Care Reviewed With: patient, daughter           Outcome Evaluation: Pt adm with acute resp distress, chronic hypercapnic resp failure, viral bronchitis. Pt lives with her spouse in a one level apartment with no stairs to enter, has a rollator, but it is too small, usually is independent with household mobility and uses O2 prn. Pt presents with impaired functional mobility and activity tolerance and she will benefit from PT to address, goal is home with family, PT will follow. Today pt was lethargic with delayed motor planning, needed moderate assist to get out of bed, stand, and take a few steps to a chair. RN came in room to put pt on BIPAP while seated in chair.

## 2023-05-24 NOTE — CASE MANAGEMENT/SOCIAL WORK
Continued Stay Note  [facility]     Patient Name: Blanca Zhu  MRN: [id number]  Today's Date: 5/24/2023    Admit Date: 5/21/2023    Plan: Home with spouse and [facility].   Discharge Plan     Row Name 05/24/23 3249       Plan    Plan Home with spouse and [facility].    Plan Comments CCP spoke w/ pt and her  Ashwin at bedside.  Discussed PT notes from today and recommended SNF.  Pt declines SNF and Ashwin is in agreement.  He can be with her at home at all times. She wants a larger size rollator for home.  Msg sent to Eli/ [facility] to followup for [place]. .........Mabel WEEMS/ DAVID               Discharge Codes    No documentation.               Expected Discharge Date and Time     Expected Discharge Date Expected Discharge Time    May 25, 2023             Mabel Mondragon RN

## 2023-05-24 NOTE — THERAPY TREATMENT NOTE
Patient Name: Blanca Zhu  : 1945    MRN: [id number]                              Today's Date: 2023       Admit Date: 2023    Visit Dx:     ICD-10-CM ICD-9-CM   1. Respiratory distress  R06.03 786.09   2. Wheezing  R06.2 786.07   3. Pulmonary vascular congestion  R09.89 514   4. Hypertensive urgency  I16.0 401.9     Patient Active Problem List   Diagnosis   • Osteoporosis   • Breast neoplasm, Tis (DCIS), left   • Iron deficiency anemia   • CKD (chronic kidney disease)   • Diabetic nephropathy associated with type 2 diabetes mellitus   • Temporal arteritis   • Immunosuppression   • Anemia   • Duodenal adenoma   • Gastritis without bleeding   • Polyp of duodenum   • Morbidly obese   • Dyspnea on exertion   • Hyperlipidemia   • Type 2 diabetes mellitus with stage 3b chronic kidney disease, with long-term current use of insulin   • Essential hypertension   • Bilateral lower extremity edema   • Pulmonary hypertension   • Obstructive sleep apnea on CPAP   • Stage 3a chronic kidney disease   • Iron malabsorption   • Respiratory distress     Past Medical History:   Diagnosis Date   • Anemia    • Anxiety and depression    • Asthma    • Balance problem    • Breast cancer 2019    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 Daniel Gutierrez, [facility]   •  SECTION N/A     x3   • CHOLECYSTECTOMY N/A    • COLONOSCOPY     • COLONOSCOPY W/ POLYPECTOMY N/A  03/12/2019    Enlarged folds in the antrum: biopsied; duodenal, transverse colon x2, splenic flexure, descending colon and sigmoid colon polyps: biopsied (path: tubular adenoma x6)-Dr. Zak De Jesus, [facility]   • ENDOSCOPY  10/11/2019    Procedure: ESOPHAGOGASTRODUODENOSCOPY with hot snare polypectomy;  Surgeon: Haile Handley MD;  Location: [facility] ENDOSCOPY;  Service: Gastroenterology   • ENDOSCOPY N/A 1/29/2020    Procedure: ESOPHAGOGASTRODUODENOSCOPY;  Surgeon: Haile Handley MD;  Location: [facility] ENDOSCOPY;  Service: Gastroenterology   • ENDOSCOPY N/A 9/9/2020    Procedure: ESOPHAGOGASTRODUODENOSCOPY WITH BIOPSIES AND COLD BIOPSY POLYPECTOMY;  Surgeon: Haile Handley MD;  Location: [facility] ENDOSCOPY;  Service: Gastroenterology;  Laterality: N/A;  pre: dyspepsia and diarrhea  post: duodenal polyp and gastritis   • MASTECTOMY Left    • MASTECTOMY WITH SENTINEL NODE BIOPSY AND AXILLARY NODE DISSECTION Left 7/3/2019    Procedure: LEFT BREAST MASTECTOMY WITH SENTINEL NODE BIOPSY AND , v-y plasty closure;  Surgeon: Tahmina James MD;  Location: [facility] MAIN OR;  Service: General   • SUBTOTAL HYSTERECTOMY Bilateral     ovaries still in tact   • TEMPORAL ARTERY BIOPSY Bilateral 12/05/2019      General Information     Row Name 05/24/23 0854          Physical Therapy Time and Intention    Document Type evaluation  -PC     Mode of Treatment physical therapy  -PC     Row Name 05/24/23 1037          General Information    Patient Profile Reviewed yes  -PC     Prior Level of Function independent:  -PC     Existing Precautions/Restrictions oxygen therapy device and L/min  -PC     Barriers to Rehab medically complex;previous functional deficit  -PC     Row Name 05/24/23 1558          Living Environment    People in Home spouse  -PC     Row Name 05/24/23 1550          Home Main Entrance    Number of Stairs, Main Entrance none  -PC     Row Name 05/24/23 1550          Stairs Within Home, Primary     Number of Stairs, Within Home, Primary none  -PC     Row Name 05/24/23 1550          Cognition    Orientation Status (Cognition) oriented x 3  -PC     Row Name 05/24/23 1550          Safety Issues, Functional Mobility    Safety Issues Affecting Function (Mobility) insight into deficits/self-awareness  -     Impairments Affecting Function (Mobility) endurance/activity tolerance;strength;cognition  -PC           User Key  (r) = Recorded By, (t) = Taken By, (c) = Cosigned By    Initials Name Provider Type    PC Belia Heath PT Physical Therapist               Mobility     Row Name 05/24/23 1552          Bed Mobility    Bed Mobility sit-supine  -PC     Sit-Supine [place] (Bed Mobility) moderate assist (50% patient effort)  -PC     Row Name 05/24/23 1552          Sit-Stand Transfer    Sit-Stand [place] (Transfers) minimum assist (75% patient effort)  -PC     Row Name 05/24/23 1552          Gait/Stairs (Locomotion)    [place] Level (Gait) minimum assist (75% patient effort)  -PC     Assistive Device (Gait) walker, front-wheeled  -PC     Distance in Feet (Gait) 3-4 steps bed to chair  -PC           User Key  (r) = Recorded By, (t) = Taken By, (c) = Cosigned By    Initials Name Provider Type    PC Belia Heath PT Physical Therapist               Obj/Interventions     Row Name 05/24/23 1553          Range of Motion Comprehensive    General Range of Motion bilateral lower extremity ROM WFL  -     Row Name 05/24/23 1553          Strength Comprehensive (MMT)    Comment, General Manual Muscle Testing (MMT) Assessment no focal deficits, general weakness  -     Row Name 05/24/23 1553          Balance    Balance Assessment sitting static balance;sitting dynamic balance;standing static balance;standing dynamic balance  -PC     Static Sitting Balance supervision  -PC     Dynamic Sitting Balance supervision  -PC     Static Standing Balance contact guard  -PC     Dynamic Standing Balance minimal assist   -PC     Position/Device Used, Standing Balance walker, rolling  -PC           User Key  (r) = Recorded By, (t) = Taken By, (c) = Cosigned By    Initials Name Provider Type    PC Belia Heath, PT Physical Therapist               Goals/Plan     Row Name 05/24/23 1600          Bed Mobility Goal 1 (PT)    Activity/Assistive Device (Bed Mobility Goal 1, PT) sit to supine/supine to sit  -PC     [place] Level/Cues Needed (Bed Mobility Goal 1, PT) contact guard required  -PC     Time Frame (Bed Mobility Goal 1, PT) 1 week  -PC     Row Name 05/24/23 1600          Transfer Goal 1 (PT)    Activity/Assistive Device (Transfer Goal 1, PT) sit-to-stand/stand-to-sit  -PC     [place] Level/Cues Needed (Transfer Goal 1, PT) contact guard required  -PC     Time Frame (Transfer Goal 1, PT) 1 week  -PC     Row Name 05/24/23 1600          Gait Training Goal 1 (PT)    Activity/Assistive Device (Gait Training Goal 1, PT) gait (walking locomotion);assistive device use  -PC     [place] Level (Gait Training Goal 1, PT) contact guard required  -PC     Distance (Gait Training Goal 1, PT) 50 ft  -PC     Time Frame (Gait Training Goal 1, PT) 1 week  -PC     Row Name 05/24/23 1600          Therapy Assessment/Plan (PT)    Planned Therapy Interventions (PT) bed mobility training;gait training;transfer training;strengthening  -PC           User Key  (r) = Recorded By, (t) = Taken By, (c) = Cosigned By    Initials Name Provider Type    PC Belia Heath PT Physical Therapist               Clinical Impression     Row Name 05/24/23 1556          Pain    Pretreatment Pain Rating 0/10 - no pain  -PC     Row Name 05/24/23 1552          Plan of Care Review    Plan of Care Reviewed With patient;daughter  -PC     Outcome Evaluation Pt adm with acute resp distress, chronic hypercapnic resp failure, viral bronchitis. Pt lives with her spouse in a one level apartment with no stairs to enter, has a rollator, but it is too small, usually is  independent with household mobility and uses O2 prn. Pt presents with impaired functional mobility and activity tolerance and she will benefit from PT to address, goal is home with family, PT will follow. Today pt was lethargic with delayed motor planning, needed moderate assist to get out of bed, stand, and take a few steps to a chair. RN came in room to put pt on BIPAP while seated in chair.  -PC     Row Name 05/24/23 1553          Therapy Assessment/Plan (PT)    Rehab Potential (PT) good, to achieve stated therapy goals  -PC     Criteria for Skilled Interventions Met (PT) yes;meets criteria  -PC     Therapy Frequency (PT) 6 times/wk  -PC     Row Name 05/24/23 2144          Positioning and Restraints    Pre-Treatment Position in bed  -PC     Post Treatment Position chair  -PC     In Chair reclined;call light within reach;encouraged to call for assist;exit alarm on  -PC           User Key  (r) = Recorded By, (t) = Taken By, (c) = Cosigned By    Initials Name Provider Type    PC Belia Heath PT Physical Therapist               Outcome Measures     Row Name 05/24/23 1600          How much help from another person do you currently need...    Turning from your back to your side while in flat bed without using bedrails? 3  -PC     Moving from lying on back to sitting on the side of a flat bed without bedrails? 2  -PC     Moving to and from a bed to a chair (including a wheelchair)? 3  -PC     Standing up from a chair using your arms (e.g., wheelchair, bedside chair)? 3  -PC     Climbing 3-5 steps with a railing? 2  -PC     To walk in hospital room? 3  -PC     AM-PAC 6 Clicks Score (PT) 16  -PC     Highest level of mobility 5 --> Static standing  -PC           User Key  (r) = Recorded By, (t) = Taken By, (c) = Cosigned By    Initials Name Provider Type    PC Belia Heath PT Physical Therapist                             Physical Therapy Education     Title: PT OT SLP Therapies (In Progress)     Topic: Physical  Therapy (In Progress)     Point: Mobility training (In Progress)     Learning Progress Summary           Patient Acceptance, E,D, NR by PC at 5/24/2023 1601                   Point: Home exercise program (In Progress)     Learning Progress Summary           Patient Acceptance, E,D, NR by PC at 5/24/2023 1601                   Point: Body mechanics (In Progress)     Learning Progress Summary           Patient Acceptance, E,D, NR by PC at 5/24/2023 1601                   Point: Precautions (In Progress)     Learning Progress Summary           Patient Acceptance, E,D, NR by PC at 5/24/2023 1601                               User Key     Initials Effective Dates Name Provider Type Discipline     06/16/21 -  Belia Heath, PT Physical Therapist PT              PT Recommendation and Plan  Planned Therapy Interventions (PT): bed mobility training, gait training, transfer training, strengthening  Plan of Care Reviewed With: patient, daughter  Outcome Evaluation: Pt adm with acute resp distress, chronic hypercapnic resp failure, viral bronchitis. Pt lives with her spouse in a one level apartment with no stairs to enter, has a rollator, but it is too small, usually is independent with household mobility and uses O2 prn. Pt presents with impaired functional mobility and activity tolerance and she will benefit from PT to address, goal is home with family, PT will follow. Today pt was lethargic with delayed motor planning, needed moderate assist to get out of bed, stand, and take a few steps to a chair. RN came in room to put pt on BIPAP while seated in chair.     Time Calculation:    PT Charges     Row Name 05/24/23 1602             Time Calculation    Start Time 1459  -PC      Stop Time 1512  -PC      Time Calculation (min) 13 min  -PC      PT Received On 05/24/23  -      PT - Next Appointment 05/25/23  -      PT Goal Re-Cert Due Date 05/31/23  -            User Key  (r) = Recorded By, (t) = Taken By, (c) = Cosigned  By    Initials Name Provider Type    PC Belia Heath, PT Physical Therapist              Therapy Charges for Today     Code Description Service Date Service Provider Modifiers Qty    12261123607 HC PT EVAL MOD COMPLEXITY 2 5/24/2023 Belia eHath, PT GP 1    20610922281 HC PT THER PROC EA 15 MIN 5/24/2023 Belia Heath, PT GP 1          PT G-Codes  AM-PAC 6 Clicks Score (PT): 16  PT Discharge Summary  Anticipated Discharge Disposition (PT): home with assist    Belia Heath, PT  5/24/2023

## 2023-05-25 ENCOUNTER — APPOINTMENT (OUTPATIENT)
Dept: GENERAL RADIOLOGY | Facility: HOSPITAL | Age: 78
End: 2023-05-25
Payer: MEDICARE

## 2023-05-25 LAB
ALBUMIN SERPL-MCNC: 3.5 G/DL (ref 3.5–5.2)
ANION GAP SERPL CALCULATED.3IONS-SCNC: 11 MMOL/L (ref 5–15)
BACTERIA UR QL AUTO: ABNORMAL /HPF
BILIRUB UR QL STRIP: NEGATIVE
BUN SERPL-MCNC: 65 MG/DL (ref 8–23)
BUN/CREAT SERPL: 33.2 (ref 7–25)
CALCIUM SPEC-SCNC: 7 MG/DL (ref 8.6–10.5)
CHLORIDE SERPL-SCNC: 97 MMOL/L (ref 98–107)
CHLORIDE UR-SCNC: <60 MMOL/L
CLARITY UR: CLEAR
CO2 SERPL-SCNC: 22 MMOL/L (ref 22–29)
COLOR UR: YELLOW
CREAT SERPL-MCNC: 1.96 MG/DL (ref 0.57–1)
CREAT UR-MCNC: 152 MG/DL
DEPRECATED RDW RBC AUTO: 44.6 FL (ref 37–54)
EGFRCR SERPLBLD CKD-EPI 2021: 26.1 ML/MIN/1.73
EOSINOPHIL SPEC QL MICRO: 0 % EOS/100 CELLS (ref 0–0)
ERYTHROCYTE [DISTWIDTH] IN BLOOD BY AUTOMATED COUNT: 12.9 % (ref 12.3–15.4)
GLUCOSE BLDC GLUCOMTR-MCNC: 118 MG/DL (ref 70–130)
GLUCOSE BLDC GLUCOMTR-MCNC: 149 MG/DL (ref 70–130)
GLUCOSE BLDC GLUCOMTR-MCNC: 161 MG/DL (ref 70–130)
GLUCOSE BLDC GLUCOMTR-MCNC: 223 MG/DL (ref 70–130)
GLUCOSE BLDC GLUCOMTR-MCNC: 69 MG/DL (ref 70–130)
GLUCOSE SERPL-MCNC: 71 MG/DL (ref 65–99)
GLUCOSE UR STRIP-MCNC: NEGATIVE MG/DL
HCT VFR BLD AUTO: 40.3 % (ref 34–46.6)
HGB BLD-MCNC: 12.6 G/DL (ref 12–15.9)
HGB UR QL STRIP.AUTO: NEGATIVE
HYALINE CASTS UR QL AUTO: ABNORMAL /LPF
KETONES UR QL STRIP: NEGATIVE
LEUKOCYTE ESTERASE UR QL STRIP.AUTO: ABNORMAL
MCH RBC QN AUTO: 29.5 PG (ref 26.6–33)
MCHC RBC AUTO-ENTMCNC: 31.3 G/DL (ref 31.5–35.7)
MCV RBC AUTO: 94.4 FL (ref 79–97)
NITRITE UR QL STRIP: NEGATIVE
PH UR STRIP.AUTO: <=5 [PH] (ref 5–8)
PHOSPHATE SERPL-MCNC: 5.1 MG/DL (ref 2.5–4.5)
PLATELET # BLD AUTO: 202 10*3/MM3 (ref 140–450)
PMV BLD AUTO: 9.4 FL (ref 6–12)
POTASSIUM SERPL-SCNC: 5.1 MMOL/L (ref 3.5–5.2)
POTASSIUM SERPL-SCNC: 5.3 MMOL/L (ref 3.5–5.2)
POTASSIUM SERPL-SCNC: 5.4 MMOL/L (ref 3.5–5.2)
POTASSIUM SERPL-SCNC: 5.8 MMOL/L (ref 3.5–5.2)
POTASSIUM SERPL-SCNC: 6.3 MMOL/L (ref 3.5–5.2)
POTASSIUM SERPL-SCNC: 6.4 MMOL/L (ref 3.5–5.2)
PROT ?TM UR-MCNC: 18.5 MG/DL
PROT UR QL STRIP: ABNORMAL
RBC # BLD AUTO: 4.27 10*6/MM3 (ref 3.77–5.28)
RBC # UR STRIP: ABNORMAL /HPF
REF LAB TEST METHOD: ABNORMAL
SODIUM SERPL-SCNC: 130 MMOL/L (ref 136–145)
SODIUM UR-SCNC: <20 MMOL/L
SP GR UR STRIP: 1.02 (ref 1–1.03)
SQUAMOUS #/AREA URNS HPF: ABNORMAL /HPF
URATE SERPL-MCNC: 9.5 MG/DL (ref 2.4–5.7)
UROBILINOGEN UR QL STRIP: ABNORMAL
WBC # UR STRIP: ABNORMAL /HPF
WBC NRBC COR # BLD: 12.5 10*3/MM3 (ref 3.4–10.8)

## 2023-05-25 PROCEDURE — 94660 CPAP INITIATION&MGMT: CPT

## 2023-05-25 PROCEDURE — 25010000002 METHYLPREDNISOLONE PER 40 MG: Performed by: INTERNAL MEDICINE

## 2023-05-25 PROCEDURE — 94760 N-INVAS EAR/PLS OXIMETRY 1: CPT

## 2023-05-25 PROCEDURE — 84132 ASSAY OF SERUM POTASSIUM: CPT | Performed by: INTERNAL MEDICINE

## 2023-05-25 PROCEDURE — 94799 UNLISTED PULMONARY SVC/PX: CPT

## 2023-05-25 PROCEDURE — 71045 X-RAY EXAM CHEST 1 VIEW: CPT

## 2023-05-25 PROCEDURE — 63710000001 INSULIN LISPRO (HUMAN) PER 5 UNITS: Performed by: INTERNAL MEDICINE

## 2023-05-25 PROCEDURE — 25010000002 CALCIUM GLUCONATE-NACL 1-0.675 GM/50ML-% SOLUTION: Performed by: INTERNAL MEDICINE

## 2023-05-25 PROCEDURE — 85027 COMPLETE CBC AUTOMATED: CPT | Performed by: INTERNAL MEDICINE

## 2023-05-25 PROCEDURE — 82570 ASSAY OF URINE CREATININE: CPT | Performed by: INTERNAL MEDICINE

## 2023-05-25 PROCEDURE — 25010000002 FUROSEMIDE PER 20 MG: Performed by: INTERNAL MEDICINE

## 2023-05-25 PROCEDURE — 94761 N-INVAS EAR/PLS OXIMETRY MLT: CPT

## 2023-05-25 PROCEDURE — 25010000002 ENOXAPARIN PER 10 MG: Performed by: INTERNAL MEDICINE

## 2023-05-25 PROCEDURE — 84300 ASSAY OF URINE SODIUM: CPT | Performed by: INTERNAL MEDICINE

## 2023-05-25 PROCEDURE — 81001 URINALYSIS AUTO W/SCOPE: CPT | Performed by: INTERNAL MEDICINE

## 2023-05-25 PROCEDURE — 87205 SMEAR GRAM STAIN: CPT | Performed by: INTERNAL MEDICINE

## 2023-05-25 PROCEDURE — 82436 ASSAY OF URINE CHLORIDE: CPT | Performed by: INTERNAL MEDICINE

## 2023-05-25 PROCEDURE — 84156 ASSAY OF PROTEIN URINE: CPT | Performed by: INTERNAL MEDICINE

## 2023-05-25 PROCEDURE — 82948 REAGENT STRIP/BLOOD GLUCOSE: CPT

## 2023-05-25 PROCEDURE — 80069 RENAL FUNCTION PANEL: CPT | Performed by: INTERNAL MEDICINE

## 2023-05-25 PROCEDURE — 84550 ASSAY OF BLOOD/URIC ACID: CPT | Performed by: INTERNAL MEDICINE

## 2023-05-25 PROCEDURE — 94664 DEMO&/EVAL PT USE INHALER: CPT

## 2023-05-25 RX ORDER — FUROSEMIDE 10 MG/ML
20 INJECTION INTRAMUSCULAR; INTRAVENOUS ONCE
Status: COMPLETED | OUTPATIENT
Start: 2023-05-25 | End: 2023-05-25

## 2023-05-25 RX ORDER — DEXTROSE MONOHYDRATE 25 G/50ML
50 INJECTION, SOLUTION INTRAVENOUS ONCE
Status: DISCONTINUED | OUTPATIENT
Start: 2023-05-25 | End: 2023-06-04 | Stop reason: HOSPADM

## 2023-05-25 RX ORDER — CALCIUM GLUCONATE 20 MG/ML
1 INJECTION, SOLUTION INTRAVENOUS ONCE
Status: COMPLETED | OUTPATIENT
Start: 2023-05-25 | End: 2023-05-25

## 2023-05-25 RX ORDER — PREDNISONE 20 MG/1
40 TABLET ORAL
Status: DISCONTINUED | OUTPATIENT
Start: 2023-05-26 | End: 2023-05-26

## 2023-05-25 RX ADMIN — METHYLPREDNISOLONE SODIUM SUCCINATE 40 MG: 40 INJECTION, POWDER, FOR SOLUTION INTRAMUSCULAR; INTRAVENOUS at 16:19

## 2023-05-25 RX ADMIN — GUAIFENESIN 1200 MG: 600 TABLET, EXTENDED RELEASE ORAL at 21:53

## 2023-05-25 RX ADMIN — METHYLPREDNISOLONE SODIUM SUCCINATE 40 MG: 40 INJECTION, POWDER, FOR SOLUTION INTRAMUSCULAR; INTRAVENOUS at 02:30

## 2023-05-25 RX ADMIN — IPRATROPIUM BROMIDE AND ALBUTEROL SULFATE 3 ML: 2.5; .5 SOLUTION RESPIRATORY (INHALATION) at 08:40

## 2023-05-25 RX ADMIN — IPRATROPIUM BROMIDE AND ALBUTEROL SULFATE 3 ML: 2.5; .5 SOLUTION RESPIRATORY (INHALATION) at 20:18

## 2023-05-25 RX ADMIN — Medication 10 ML: at 11:52

## 2023-05-25 RX ADMIN — SODIUM ZIRCONIUM CYCLOSILICATE 10 G: 10 POWDER, FOR SUSPENSION ORAL at 02:30

## 2023-05-25 RX ADMIN — AMLODIPINE BESYLATE 5 MG: 5 TABLET ORAL at 08:05

## 2023-05-25 RX ADMIN — CALCIUM GLUCONATE 1 G: 20 INJECTION, SOLUTION INTRAVENOUS at 13:03

## 2023-05-25 RX ADMIN — IPRATROPIUM BROMIDE AND ALBUTEROL SULFATE 3 ML: 2.5; .5 SOLUTION RESPIRATORY (INHALATION) at 11:34

## 2023-05-25 RX ADMIN — FUROSEMIDE 20 MG: 10 INJECTION, SOLUTION INTRAMUSCULAR; INTRAVENOUS at 11:51

## 2023-05-25 RX ADMIN — SODIUM ZIRCONIUM CYCLOSILICATE 10 G: 10 POWDER, FOR SUSPENSION ORAL at 16:20

## 2023-05-25 RX ADMIN — GUAIFENESIN 1200 MG: 600 TABLET, EXTENDED RELEASE ORAL at 08:05

## 2023-05-25 RX ADMIN — IPRATROPIUM BROMIDE AND ALBUTEROL SULFATE 3 ML: 2.5; .5 SOLUTION RESPIRATORY (INHALATION) at 16:08

## 2023-05-25 RX ADMIN — PANTOPRAZOLE SODIUM 40 MG: 40 TABLET, DELAYED RELEASE ORAL at 08:04

## 2023-05-25 RX ADMIN — SODIUM BICARBONATE 50 MEQ: 84 INJECTION INTRAVENOUS at 11:52

## 2023-05-25 RX ADMIN — SODIUM ZIRCONIUM CYCLOSILICATE 10 G: 10 POWDER, FOR SUSPENSION ORAL at 13:03

## 2023-05-25 RX ADMIN — ENOXAPARIN SODIUM 30 MG: 100 INJECTION SUBCUTANEOUS at 16:20

## 2023-05-25 RX ADMIN — DULOXETINE HYDROCHLORIDE 30 MG: 30 CAPSULE, DELAYED RELEASE ORAL at 08:05

## 2023-05-25 RX ADMIN — SODIUM ZIRCONIUM CYCLOSILICATE 10 G: 10 POWDER, FOR SUSPENSION ORAL at 21:53

## 2023-05-25 RX ADMIN — INSULIN GLARGINE-YFGN 20 UNITS: 100 INJECTION, SOLUTION SUBCUTANEOUS at 08:05

## 2023-05-25 RX ADMIN — INSULIN LISPRO 4 UNITS: 100 INJECTION, SOLUTION INTRAVENOUS; SUBCUTANEOUS at 21:53

## 2023-05-25 RX ADMIN — BISOPROLOL FUMARATE 20 MG: 5 TABLET, FILM COATED ORAL at 08:05

## 2023-05-25 RX ADMIN — LEFLUNOMIDE 10 MG: 20 TABLET ORAL at 08:04

## 2023-05-25 NOTE — PROGRESS NOTES
DAILY PROGRESS NOTE  [facility], [facility]    2023    Patient Identification:  Name: Blanca Zhu  Age: 77 y.o.  Sex: female  :  1945  MRN: [id number]           Primary Care Physician: Praneeth Valenzuela MD    Subjective:    Interval History:    Patient is feeling about the same, tolerating BiPAP, saturation in 2 L is 96%.  Afebrile.  On IV fluids.  Potassium today is 5.6, creatinine went up to 2.17.  Renal consulted.  To start work with physical therapy today.  Sleepy during the daytime and awake at nighttime.    ROS:     No nausea, vomiting, diarrhea, constipation, chest pain, shortness of breath.    Objective:    Scheduled Meds:  amLODIPine, 5 mg, Oral, Q24H  bisoprolol, 20 mg, Oral, Q24H  DULoxetine, 30 mg, Oral, Daily  enoxaparin, 30 mg, Subcutaneous, Q24H  guaiFENesin, 1,200 mg, Oral, Q12H  insulin glargine, 20 Units, Subcutaneous, Daily  insulin lispro, 2-9 Units, Subcutaneous, 4x Daily AC & at Bedtime  ipratropium-albuterol, 3 mL, Nebulization, 4x Daily - RT  leflunomide, 10 mg, Oral, Daily  methylPREDNISolone sodium succinate, 40 mg, Intravenous, Q12H  O2, 2 L/min, Inhalation, Once  pantoprazole, 40 mg, Oral, Q AM        Continuous Infusions:  sodium chloride, 100 mL/hr, Last Rate: 100 mL/hr (23)        PRN Meds:  •  benzonatate  •  dextrose  •  dextrose  •  glucagon (human recombinant)  •  hydrALAZINE  •  ondansetron  •  prochlorperazine  •  sodium chloride    Intake/Output:    Intake/Output Summary (Last 24 hours) at 2023 7223  Last data filed at 2023  Gross per 24 hour   Intake 600 ml   Output 400 ml   Net 200 ml         Exam:    T MAX 24 hrs: Temp (24hrs), Av.6 °F (36.4 °C), Min:97.4 °F (36.3 °C), Max:97.8 °F (36.6 °C)    Vitals Ranges:   Temp:  [97.4 °F (36.3 °C)-97.8 °F (36.6 °C)] 97.4 °F (36.3 °C)  Heart Rate:  [72-77] 74  Resp:  [18-23] 18  BP: (130-150)/(54-71) 150/71    /71 (BP Location: Left arm,  "Patient Position: Sitting)   Pulse 74   Temp 97.4 °F (36.3 °C) (Oral)   Resp 18   Ht 152.4 cm (60\")   Wt 90.7 kg (200 lb)   LMP  (LMP Unknown)   SpO2 96%   BMI 39.06 kg/m²     General: Sleepy, arousable.  Oriented x3 when awake.  Cooperative, no distress, appears stated age.  Morbid obese.  On 2 L nasal cannula.  Neck: Supple, symmetrical, trachea midline, no adenopathy;              thyroid:  no enlargement/tenderness/nodules;              no carotid bruit or JVD  Cardiovascular: Normal rate, regular rhythm and intact distal pulses.              Exam reveals no gallop and no friction rub. No murmur heard  Pulmonary: Very diminished breath sounds, very limited air movement.  Wheezing scattered throughout both fields.  Rhonchi at bases.  No rales.    Abdominal: Soft, nontender, bowel sounds active all four quadrants,     no masses, no hepatomegaly, no splenomegaly.   Extremities: Normal.  2+ pitting edema of the lower extremity, actually this looks better than her baseline.  Brown discoloration from chronic stasis dermatitis.  Pulses: 2 + symmetric all extremities  Neurological: Patient is sleepy, arousable.  Oriented x3 when awake.  No new focal sensorimotor deficit.  Skin: Skin color, texture, normal. Turgor is decreased. No rashes or lesions         Data Review:    Results from last 7 days   Lab Units 05/24/23  0707 05/23/23  0859 05/22/23  0643   WBC 10*3/mm3 15.05* 13.30* 7.55   HEMOGLOBIN g/dL 12.7 12.6 12.4   HEMATOCRIT % 39.5 39.9 38.8   PLATELETS 10*3/mm3 202 177 187       Results from last 7 days   Lab Units 05/24/23  2148 05/24/23  1715 05/24/23  0707 05/23/23  2111 05/23/23  0859 05/22/23  0643 05/21/23  1615   SODIUM mmol/L  --   --  130* 127* 128*   < > 132*   POTASSIUM mmol/L 5.4* 5.3* 5.6* 5.9* 6.0*   < > 4.4   CHLORIDE mmol/L  --   --  94* 92* 93*   < > 94*   CO2 mmol/L  --   --  22.6 26.0 28.0   < > 29.2*   BUN mg/dL  --   --  57* 56* 47*   < > 19   CREATININE mg/dL  --   --  2.17* 1.97* " 1.87*   < > 1.11*   CALCIUM mg/dL  --   --  7.9* 7.7* 8.2*   < > 8.9   BILIRUBIN mg/dL  --   --   --   --   --   --  0.7   ALK PHOS U/L  --   --   --   --   --   --  109   ALT (SGPT) U/L  --   --   --   --   --   --  10   AST (SGOT) U/L  --   --   --   --   --   --  13   GLUCOSE mg/dL  --   --  122* 166* 114*   < > 176*    < > = values in this interval not displayed.                 Lab Results   Lab Value Date/Time    TROPONINT 28 (H) 05/21/2023 1920    TROPONINT 25 (H) 05/21/2023 1615       Microbiology Results (last 10 days)     Procedure Component Value - Date/Time    Respiratory Panel PCR w/COVID-19(SARS-CoV-2) [facility]DA/RINA/[facility]STINI In-House, NP Swab in UTM/VTM, 3-4 HR TAT - Swab, Nasopharynx [[id number]]  (Abnormal) Collected: 05/23/23 1625    Lab Status: Final result Specimen: Swab from Nasopharynx Updated: 05/23/23 4309     ADENOVIRUS, PCR Not Detected     Coronavirus 229E Not Detected     Coronavirus HKU1 Not Detected     Coronavirus NL63 Not Detected     Coronavirus OC43 Not Detected     COVID19 Not Detected     Human Metapneumovirus Not Detected     Human Rhinovirus/Enterovirus Not Detected     Influenza A PCR Not Detected     Influenza B PCR Not Detected     Parainfluenza Virus 1 Not Detected     Parainfluenza Virus 2 Not Detected     Parainfluenza Virus 3 Detected     Parainfluenza Virus 4 Not Detected     RSV, PCR Not Detected     Bordetella pertussis pcr Not Detected     Bordetella parapertussis PCR Not Detected     Chlamydophila pneumoniae PCR Not Detected     Mycoplasma pneumo by PCR Not Detected    Narrative:      In the setting of a positive respiratory panel with a viral infection PLUS a negative procalcitonin without other underlying concern for bacterial infection, consider observing off antibiotics or discontinuation of antibiotics and continue supportive care. If the respiratory panel is positive for atypical bacterial infection (Bordetella pertussis, Chlamydophila pneumoniae, or  Mycoplasma pneumoniae), consider antibiotic de-escalation to target atypical bacterial infection.    COVID-19 and FLU A/B PCR - Swab, Nasopharynx [[id number]]  (Normal) Collected: 05/21/23 1547    Lab Status: Final result Specimen: Swab from Nasopharynx Updated: 05/21/23 1609     COVID19 Not Detected     Influenza A PCR Not Detected     Influenza B PCR Not Detected    Narrative:      Fact sheet for providers: https://www.fda.gov/media/126921/download    Fact sheet for patients: https://www.fda.gov/media/117591/download    Test performed by PCR.           Imaging Results (Last 7 Days)     Procedure Component Value Units Date/Time    US Renal Bilateral [[id number]] Collected: 05/24/23 1303     Updated: 05/24/23 1310    Narrative:      US RENAL BILATERAL-  05/24/2023     HISTORY: Acute renal failure.     Right kidney measures 10.3 cm in length. Left kidney measures 9.8 cm in  length.     No hydronephrosis, renal masses or stones are seen. Urinary bladder is  incompletely distended.       Impression:      1. Normal bilateral renal ultrasound.     This report was finalized on 5/24/2023 1:04 PM by Dr. Elliot Mike M.D.       XR Chest 1 View [[id number]] Collected: 05/21/23 1547     Updated: 05/21/23 1551    Narrative:      XR CHEST 1 VW-     HISTORY: Female who is 77 years-old,  chest pain     TECHNIQUE: Frontal view of the chest     COMPARISON: 11/9/2019     FINDINGS: The heart size is borderline. Pulmonary vasculature is  congested. Minimal likely atelectasis in the lower lungs. No pleural  effusion or pneumothorax. No acute osseous process.       Impression:      Borderline heart size with pulmonary vascular congestion.  Minimal likely atelectasis in the lower lungs.     This report was finalized on 5/21/2023 3:48 PM by Dr. Galindo Malik M.D.               Assessment:      Acute on chronic hypoxic respiratory failure  Asthma exacerbation  Acute parainfluenza viral bronchitis  Hypertensive  urgency  Hyponatremia  Hyperkalemia  Acute kidney injury  Diabetic nephropathy with chronic kidney disease stage IIIa  Lower extremity edema, chronic  Uncontrolled diabetes mellitus  Obstructive sleep apnea  Generalized anxiety disorder  Iron deficiency anemia  Morbid obesity       Plan:    Creatinine a little higher today, continue IV fluids, renal consulted.  Monitor renal function  Potassium is still 5.6, will give another dose of Lokelma.  Await renal recommendation.  We will need to have an ultrasound checked  Continue steroids, DuoNeb mini nebs for asthma exacerbation.  Patient probably has parainfluenza viral bronchitis exacerbating her asthma  Electrolytes improving slowly.  Continue treatment.    Adjust insulin as needed, blood sugar okay  Monitor mental status, at baseline  Physical therapy to see patient  Continue DVT/stress ulcer prophylaxis  Labs in am      Praneeth Valenzuela MD  5/24/2023         I wore protective equipment throughout this patient encounter including a face mask, gloves, and protective eyewear.  Hand hygiene was performed before donning protective equipment and after removal when leaving the room.

## 2023-05-25 NOTE — PROGRESS NOTES
LOS: 3 days     Chief Complaint/ Reason for encounter: Hyperkalemia    Subjective   05/25/23 : Patient is doing well today with no new complaints  Good appetite with no nausea or vomiting  No shortness of breath chest pain or edema  Voiding well with no dysuria  On CPAP doing well no complaints        Medical history reviewed:  History of Present Illness    Subjective    History taken from: Patient and chart    Vital Signs  Temp:  [96.1 °F (35.6 °C)-98.5 °F (36.9 °C)] 98.5 °F (36.9 °C)  Heart Rate:  [67-78] 71  Resp:  [16-20] 18  BP: (129-150)/(54-71) 129/54       Wt Readings from Last 1 Encounters:   05/21/23 1550 90.7 kg (200 lb)       Objective    Objective:  General Appearance:  Comfortable, obese, well-appearing, in no acute distress and not in pain.  Awake, alert, oriented  HEENT: Mucous membranes moist, no injury, oropharynx clear  Lungs:  Normal effort and normal respiratory rate.  Breath sounds clear to auscultation.  No  respiratory distress.  No rales, decreased breath sounds or rhonchi.    Heart: Normal rate.  Regular rhythm.  S1, S2 normal.  No murmur.   Abdomen: Abdomen is soft.  Bowel sounds are normal, no abdominal tenderness.  There is no rebound or guarding  Extremities: 1+ chronic edema of bilateral lower extremities  Neurological: No focal motor or sensory deficits, pupils reactive  Skin:  Warm and dry.  No rash or cyanosis.       Results Review:    Intake/Output:     Intake/Output Summary (Last 24 hours) at 5/25/2023 1523  Last data filed at 5/25/2023 1400  Gross per 24 hour   Intake 720 ml   Output --   Net 720 ml         DATA:  Radiology and Labs:  The following labs independently reviewed by me. Additional labs ordered for tomorrow a.m.  Interval notes, chart personally reviewed by me.   Old records independently reviewed showing chronic edema, history of CKD followed by Dr. Mcginnis  The following radiologic studies independently viewed by me, findings renal ultrasound showed borderline  small kidneys, 10.3 x 9.8 cm no hydronephrosis masses or stones  New problems include persistent hyperkalemia which had improved but is worse today, persistently low hyponatremia despite fluids  Discussed with patient, Dr. Valenzuela, family    Risk/ complexity of medical care/ medical decision making moderate complexity, continued hyperkalemia management    Labs:   Recent Results (from the past 24 hour(s))   Potassium    Collection Time: 05/24/23  5:15 PM    Specimen: Blood   Result Value Ref Range    Potassium 5.3 (H) 3.5 - 5.2 mmol/L   POC Glucose Once    Collection Time: 05/24/23  9:25 PM    Specimen: Blood   Result Value Ref Range    Glucose 90 70 - 130 mg/dL   Potassium    Collection Time: 05/24/23  9:48 PM    Specimen: Blood   Result Value Ref Range    Potassium 5.4 (H) 3.5 - 5.2 mmol/L   Potassium    Collection Time: 05/25/23 12:15 AM    Specimen: Blood   Result Value Ref Range    Potassium 6.4 (C) 3.5 - 5.2 mmol/L   Uric Acid    Collection Time: 05/25/23  4:04 AM    Specimen: Blood   Result Value Ref Range    Uric Acid 9.5 (H) 2.4 - 5.7 mg/dL   Renal Function Panel    Collection Time: 05/25/23  4:04 AM    Specimen: Blood   Result Value Ref Range    Glucose 71 65 - 99 mg/dL    BUN 65 (H) 8 - 23 mg/dL    Creatinine 1.96 (H) 0.57 - 1.00 mg/dL    Sodium 130 (L) 136 - 145 mmol/L    Potassium 5.4 (H) 3.5 - 5.2 mmol/L    Chloride 97 (L) 98 - 107 mmol/L    CO2 22.0 22.0 - 29.0 mmol/L    Calcium 7.0 (L) 8.6 - 10.5 mg/dL    Albumin 3.5 3.5 - 5.2 g/dL    Phosphorus 5.1 (H) 2.5 - 4.5 mg/dL    Anion Gap 11.0 5.0 - 15.0 mmol/L    BUN/Creatinine Ratio 33.2 (H) 7.0 - 25.0    eGFR 26.1 (L) >60.0 mL/min/1.73   CBC (No Diff)    Collection Time: 05/25/23  4:04 AM    Specimen: Blood   Result Value Ref Range    WBC 12.50 (H) 3.40 - 10.80 10*3/mm3    RBC 4.27 3.77 - 5.28 10*6/mm3    Hemoglobin 12.6 12.0 - 15.9 g/dL    Hematocrit 40.3 34.0 - 46.6 %    MCV 94.4 79.0 - 97.0 fL    MCH 29.5 26.6 - 33.0 pg    MCHC 31.3 (L) 31.5 - 35.7 g/dL     RDW 12.9 12.3 - 15.4 %    RDW-SD 44.6 37.0 - 54.0 fl    MPV 9.4 6.0 - 12.0 fL    Platelets 202 140 - 450 10*3/mm3   POC Glucose Once    Collection Time: 05/25/23  6:28 AM    Specimen: Blood   Result Value Ref Range    Glucose 69 (L) 70 - 130 mg/dL   Eosinophil Smear - Urine, Urine, Clean Catch    Collection Time: 05/25/23  7:00 AM    Specimen: Urine, Clean Catch   Result Value Ref Range    Eosinophil Smear 0 0 - 0 % EOS/100 Cells   Urinalysis With Microscopic If Indicated (No Culture) - Urine, Clean Catch    Collection Time: 05/25/23  7:01 AM    Specimen: Urine, Clean Catch   Result Value Ref Range    Color, UA Yellow Yellow, Straw    Appearance, UA Clear Clear    pH, UA <=5.0 5.0 - 8.0    Specific Gravity, UA 1.019 1.005 - 1.030    Glucose, UA Negative Negative    Ketones, UA Negative Negative    Bilirubin, UA Negative Negative    Blood, UA Negative Negative    Protein, UA Trace (A) Negative    Leuk Esterase, UA Trace (A) Negative    Nitrite, UA Negative Negative    Urobilinogen, UA 1.0 E.U./dL 0.2 - 1.0 E.U./dL   Sodium, Urine, Random - Urine, Clean Catch    Collection Time: 05/25/23  7:01 AM    Specimen: Urine, Clean Catch   Result Value Ref Range    Sodium, Urine <20 mmol/L   Protein, Urine, Random - Urine, Clean Catch    Collection Time: 05/25/23  7:01 AM    Specimen: Urine, Clean Catch   Result Value Ref Range    Total Protein, Urine 18.5 mg/dL   Creatinine Urine Random (kidney function) GFR component - Urine, Clean Catch    Collection Time: 05/25/23  7:01 AM    Specimen: Urine, Clean Catch   Result Value Ref Range    Creatinine, Urine 152.0 mg/dL   Chloride, Urine, Random - Urine, Clean Catch    Collection Time: 05/25/23  7:01 AM    Specimen: Urine, Clean Catch   Result Value Ref Range    Chloride, Urine <60 mmol/L   Urinalysis, Microscopic Only - Urine, Clean Catch    Collection Time: 05/25/23  7:01 AM    Specimen: Urine, Clean Catch   Result Value Ref Range    RBC, UA None Seen None Seen, 0-2 /HPF     WBC, UA 0-2 None Seen, 0-2 /HPF    Bacteria, UA None Seen None Seen /HPF    Squamous Epithelial Cells, UA 3-6 (A) None Seen, 0-2 /HPF    Hyaline Casts, UA None Seen None Seen /LPF    Methodology Manual Light Microscopy    POC Glucose Once    Collection Time: 05/25/23  7:11 AM    Specimen: Blood   Result Value Ref Range    Glucose 118 70 - 130 mg/dL   Potassium    Collection Time: 05/25/23  7:58 AM    Specimen: Blood   Result Value Ref Range    Potassium 6.3 (C) 3.5 - 5.2 mmol/L   POC Glucose Once    Collection Time: 05/25/23 11:45 AM    Specimen: Blood   Result Value Ref Range    Glucose 161 (H) 70 - 130 mg/dL   Potassium    Collection Time: 05/25/23 12:17 PM    Specimen: Blood   Result Value Ref Range    Potassium 5.8 (H) 3.5 - 5.2 mmol/L       Radiology:  Pertinent radiology studies were reviewed as described above      Medications have been reviewed separately in chart overview      ASSESSMENT:  Acute kidney injury, likely hypovolemic, ARB and diuretic currently on hold, mildly improved over the last 24 hours  Hyperkalemia from [place] and ARB, had improved but back up to 6.3, repeat medical management ordered  Acute hypoxic respiratory failure, pulmonary following, on CPAP  Chronic asthma with acute exacerbation, on IV steroids  Hyperkalemia  Probable viral bronchitis, PCR pending  Hypertensive urgency on admission, improved  Hyponatremia, 130 today  Hypocalcemia: Check albumin  Leukocytosis from steroids  Mild proteinuria        DISCUSSION/PLAN:   Renal function marginally better today but potassium rebounded up to 6.3  Repeat medical management for potassium including calcium, D50/insulin, Lasix and IV fluids  Maintain low potassium diet.  Family states she is eating very little  Continue to hold ARB and diuretics  Continue Lokelma 3 times daily  Serial potassium levels  Urine studies prerenal and renal ultrasound with no obstruction  Will follow-up a.m. labs and monitor closely     Continue to monitor  electrolytes and volume closely, avoid IV contrast and nephrotoxic medications     Santosh Dhillon MD   [facility]   Office phone number: [phone]  Answering service phone number: [phone]    05/25/23  15:23 EDT    Dictation performed using Dragon dictation software

## 2023-05-25 NOTE — PLAN OF CARE
Goal Outcome Evaluation:    Patient A&O x4.Bipap for a few hours of sleep in chair. Elevated potassium= lokelma TID. IVF infusing. . Call light within reach. Spouse at bedside. Calcium gluconate iv.  Pt has poor appetite and drowsy for shift.  Can make needs known.                      Problem: Adult Inpatient Plan of Care  Goal: Plan of Care Review  Outcome: Ongoing, Progressing  Goal: Patient-Specific Goal (Individualized)  Outcome: Ongoing, Progressing  Goal: Absence of Hospital-Acquired Illness or Injury  Outcome: Ongoing, Progressing  Intervention: Identify and Manage Fall Risk  Recent Flowsheet Documentation  Taken 5/25/2023 1200 by Ayo Lobato RN  Safety Promotion/Fall Prevention: safety round/check completed  Taken 5/25/2023 1000 by Ayo Lobato RN  Safety Promotion/Fall Prevention: safety round/check completed  Taken 5/25/2023 0800 by Ayo Lobato RN  Safety Promotion/Fall Prevention: safety round/check completed  Intervention: Prevent Skin Injury  Recent Flowsheet Documentation  Taken 5/25/2023 1200 by Ayo Lobato RN  Body Position: (in chair)   lower extremity elevated   legs elevated   neutral body alignment   neutral head position   supine   upper extremity elevated   weight shifting   other (see comments)  Taken 5/25/2023 1000 by Ayo Lobato RN  Body Position: (in chair)   position changed independently   neutral body alignment   neutral head position   weight shifting   supine   other (see comments)  Taken 5/25/2023 0800 by Ayo Lobato RN  Body Position: (in chair)   other (see comments)   position changed independently   neutral body alignment   neutral head position   supine   weight shifting  Skin Protection:   adhesive use limited   transparent dressing maintained  Intervention: Prevent and Manage VTE (Venous Thromboembolism) Risk  Recent Flowsheet Documentation  Taken 5/25/2023 1200 by Ayo Lobato RN  Activity Management: up in chair  Taken 5/25/2023 1000 by Luis Alfredo  TEJAL Rollins  Activity Management: up in chair  Taken 5/25/2023 0800 by Ayo Lobato RN  Activity Management: up in chair  VTE Prevention/Management:   bilateral   sequential compression devices off   patient refused intervention  Range of Motion: active ROM (range of motion) encouraged  Intervention: Prevent Infection  Recent Flowsheet Documentation  Taken 5/25/2023 1200 by Ayo Lobato RN  Infection Prevention: single patient room provided  Taken 5/25/2023 1000 by Ayo Lobato RN  Infection Prevention: single patient room provided  Taken 5/25/2023 0800 by Ayo Lobato RN  Infection Prevention: single patient room provided  Goal: Optimal Comfort and Wellbeing  Outcome: Ongoing, Progressing  Intervention: Monitor Pain and Promote Comfort  Recent Flowsheet Documentation  Taken 5/25/2023 0800 by Ayo Lobato RN  Pain Management Interventions:   see MAR   quiet environment facilitated   position adjusted   pillow support provided   care clustered  Intervention: Provide Person-Centered Care  Recent Flowsheet Documentation  Taken 5/25/2023 0800 by Ayo Lobato RN  Trust Relationship/Rapport: care explained  Goal: Readiness for Transition of Care  Outcome: Ongoing, Progressing     Problem: Fall Injury Risk  Goal: Absence of Fall and Fall-Related Injury  Outcome: Ongoing, Progressing  Intervention: Identify and Manage Contributors  Recent Flowsheet Documentation  Taken 5/25/2023 1200 by Ayo Lobato RN  Medication Review/Management: medications reviewed  Taken 5/25/2023 1000 by Ayo Lobato RN  Medication Review/Management: medications reviewed  Taken 5/25/2023 0800 by Ayo Lobato RN  Medication Review/Management: medications reviewed  Intervention: Promote Injury-Free Environment  Recent Flowsheet Documentation  Taken 5/25/2023 1200 by Ayo Lobato RN  Safety Promotion/Fall Prevention: safety round/check completed  Taken 5/25/2023 1000 by Ayo Lobato RN  Safety Promotion/Fall Prevention:  safety round/check completed  Taken 5/25/2023 0800 by Ayo Lobato RN  Safety Promotion/Fall Prevention: safety round/check completed     Problem: Adjustment to Illness (Sepsis/Septic Shock)  Goal: Optimal Coping  Outcome: Ongoing, Progressing  Intervention: Optimize Psychosocial Adjustment to Illness  Recent Flowsheet Documentation  Taken 5/25/2023 0800 by Ayo Lobato RN  Family/Support System Care: self-care encouraged     Problem: Bleeding (Sepsis/Septic Shock)  Goal: Absence of Bleeding  Outcome: Ongoing, Progressing     Problem: Glycemic Control Impaired (Sepsis/Septic Shock)  Goal: Blood Glucose Level Within Desired Range  Outcome: Ongoing, Progressing     Problem: Infection Progression (Sepsis/Septic Shock)  Goal: Absence of Infection Signs and Symptoms  Outcome: Ongoing, Progressing  Intervention: Initiate Sepsis Management  Recent Flowsheet Documentation  Taken 5/25/2023 1200 by Ayo Lobato RN  Infection Prevention: single patient room provided  Taken 5/25/2023 1000 by Ayo Lobato RN  Infection Prevention: single patient room provided  Isolation Precautions: precautions maintained  Taken 5/25/2023 0800 by Ayo Lobato RN  Infection Prevention: single patient room provided  Isolation Precautions: precautions maintained  Intervention: Promote Recovery  Recent Flowsheet Documentation  Taken 5/25/2023 1200 by Ayo Lobato RN  Activity Management: up in chair  Taken 5/25/2023 1000 by Ayo Lobato RN  Activity Management: up in chair  Taken 5/25/2023 0800 by Ayo Lobato RN  Activity Management: up in chair  Sleep/Rest Enhancement: awakenings minimized     Problem: Nutrition Impaired (Sepsis/Septic Shock)  Goal: Optimal Nutrition Intake  Outcome: Ongoing, Progressing     Problem: Asthma Comorbidity  Goal: Maintenance of Asthma Control  Outcome: Ongoing, Progressing  Intervention: Maintain Asthma Symptom Control  Recent Flowsheet Documentation  Taken 5/25/2023 1200 by Ayo Lobato  RN  Medication Review/Management: medications reviewed  Taken 5/25/2023 1000 by Ayo Lobato RN  Medication Review/Management: medications reviewed  Taken 5/25/2023 0800 by Ayo Lobato RN  Medication Review/Management: medications reviewed     Problem: Behavioral Health Comorbidity  Goal: Maintenance of Behavioral Health Symptom Control  Outcome: Ongoing, Progressing  Intervention: Maintain Behavioral Health Symptom Control  Recent Flowsheet Documentation  Taken 5/25/2023 1200 by Ayo Lobato RN  Medication Review/Management: medications reviewed  Taken 5/25/2023 1000 by Ayo Lobato RN  Medication Review/Management: medications reviewed  Taken 5/25/2023 0800 by Ayo Lobato RN  Medication Review/Management: medications reviewed     Problem: COPD (Chronic Obstructive Pulmonary Disease) Comorbidity  Goal: Maintenance of COPD Symptom Control  Outcome: Ongoing, Progressing  Intervention: Maintain COPD-Symptom Control  Recent Flowsheet Documentation  Taken 5/25/2023 1200 by Ayo Lobato RN  Medication Review/Management: medications reviewed  Taken 5/25/2023 1000 by Ayo Lobato RN  Medication Review/Management: medications reviewed  Taken 5/25/2023 0800 by Ayo Lobato RN  Medication Review/Management: medications reviewed     Problem: Diabetes Comorbidity  Goal: Blood Glucose Level Within Targeted Range  Outcome: Ongoing, Progressing     Problem: Heart Failure Comorbidity  Goal: Maintenance of Heart Failure Symptom Control  Outcome: Ongoing, Progressing  Intervention: Maintain Heart Failure-Management  Recent Flowsheet Documentation  Taken 5/25/2023 1200 by Ayo Lobato RN  Medication Review/Management: medications reviewed  Taken 5/25/2023 1000 by Ayo Lobato RN  Medication Review/Management: medications reviewed  Taken 5/25/2023 0800 by Ayo Lobato RN  Medication Review/Management: medications reviewed     Problem: Hypertension Comorbidity  Goal: Blood Pressure in Desired  Range  Outcome: Ongoing, Progressing  Intervention: Maintain Blood Pressure Management  Recent Flowsheet Documentation  Taken 5/25/2023 1200 by Ayo Lobato RN  Medication Review/Management: medications reviewed  Taken 5/25/2023 1000 by Ayo Lobato RN  Medication Review/Management: medications reviewed  Taken 5/25/2023 0800 by Ayo Lobato RN  Medication Review/Management: medications reviewed     Problem: Obstructive Sleep Apnea Risk or Actual Comorbidity Management  Goal: Unobstructed Breathing During Sleep  Outcome: Ongoing, Progressing     Problem: Osteoarthritis Comorbidity  Goal: Maintenance of Osteoarthritis Symptom Control  Outcome: Ongoing, Progressing  Intervention: Maintain Osteoarthritis Symptom Control  Recent Flowsheet Documentation  Taken 5/25/2023 1200 by Ayo Lobato RN  Activity Management: up in chair  Medication Review/Management: medications reviewed  Taken 5/25/2023 1000 by Ayo Lobato RN  Activity Management: up in chair  Medication Review/Management: medications reviewed  Taken 5/25/2023 0800 by Ayo Lobato RN  Activity Management: up in chair  Medication Review/Management: medications reviewed     Problem: Pain Chronic (Persistent) (Comorbidity Management)  Goal: Acceptable Pain Control and Functional Ability  Outcome: Ongoing, Progressing  Intervention: Manage Persistent Pain  Recent Flowsheet Documentation  Taken 5/25/2023 1200 by Ayo Lobato RN  Medication Review/Management: medications reviewed  Taken 5/25/2023 1000 by Ayo Lobato RN  Medication Review/Management: medications reviewed  Taken 5/25/2023 0800 by Ayo Lobato RN  Sleep/Rest Enhancement: awakenings minimized  Medication Review/Management: medications reviewed  Intervention: Develop Pain Management Plan  Recent Flowsheet Documentation  Taken 5/25/2023 0800 by Ayo Lobato RN  Pain Management Interventions:   see MAR   quiet environment facilitated   position adjusted   pillow support  provided   care clustered  Intervention: Optimize Psychosocial Wellbeing  Recent Flowsheet Documentation  Taken 5/25/2023 0800 by Ayo Lobato RN  Diversional Activities: television  Family/Support System Care: self-care encouraged     Problem: Seizure Disorder Comorbidity  Goal: Maintenance of Seizure Control  Outcome: Ongoing, Progressing     Problem: Gas Exchange Impaired  Goal: Optimal Gas Exchange  Outcome: Ongoing, Progressing  Intervention: Optimize Oxygenation and Ventilation  Recent Flowsheet Documentation  Taken 5/25/2023 1200 by Ayo Lobato RN  Head of Bed (HOB) Positioning: HOB at 30-45 degrees  Taken 5/25/2023 1000 by Ayo Lobato RN  Head of Bed (HOB) Positioning: HOB at 30 degrees  Taken 5/25/2023 0800 by Ayo Lobato RN  Head of Bed (HOB) Positioning: HOB at 30 degrees     Problem: Skin Injury Risk Increased  Goal: Skin Health and Integrity  Outcome: Ongoing, Progressing  Intervention: Optimize Skin Protection  Recent Flowsheet Documentation  Taken 5/25/2023 1200 by Ayo Lobato RN  Head of Bed (HOB) Positioning: HOB at 30-45 degrees  Taken 5/25/2023 1000 by Aoy Lobato RN  Head of Bed (HOB) Positioning: HOB at 30 degrees  Taken 5/25/2023 0800 by Ayo Lobato RN  Pressure Reduction Techniques:   frequent weight shift encouraged   weight shift assistance provided   pressure points protected  Head of Bed (HOB) Positioning: HOB at 30 degrees  Pressure Reduction Devices:   pressure-redistributing mattress utilized   specialty bed utilized  Skin Protection:   adhesive use limited   transparent dressing maintained

## 2023-05-25 NOTE — PLAN OF CARE
Patient A&O x4. Tolerated bipap for a few hours of sleep, asked to be switched to NC when woken up for meds. Patient c/o dizziness one time this shift after wearing bipap, resolved on its own within a minute. Critical potassium resulted this shift, orders received to give one time dose of Lokelma and recheck potassium in AM. IVF infusing. IV Solu Medrol given. Call light within reach. Spouse at bedside.    Goal Outcome Evaluation:  Plan of Care Reviewed With: patient, spouse    Progress: no change        Problem: Adult Inpatient Plan of Care  Goal: Plan of Care Review  Outcome: Ongoing, Progressing  Flowsheets (Taken 5/25/2023 0451)  Progress: no change  Plan of Care Reviewed With:   patient   spouse  Goal: Patient-Specific Goal (Individualized)  Outcome: Ongoing, Progressing  Goal: Absence of Hospital-Acquired Illness or Injury  Outcome: Ongoing, Progressing  Intervention: Identify and Manage Fall Risk  Description: Perform standard risk assessment on admission using a validated tool or comprehensive approach appropriate to the patient; reassess fall risk frequently, with change in status or transfer to another level of care.  Communicate fall injury risk to interprofessional healthcare team.  Determine need for increased observation, equipment and environmental modification, such as low bed, signage and supportive, nonskid footwear.  Adjust safety measures to individual developmental age, stage and identified risk factors.  Reinforce the importance of safety and physical activity with patient and family.  Perform regular intentional rounding to assess need for position change, pain assessment and personal needs, including assistance with toileting.  Recent Flowsheet Documentation  Taken 5/25/2023 0230 by Yadira Mon, RN  Safety Promotion/Fall Prevention: safety round/check completed  Taken 5/25/2023 0036 by Yadira Mon, RN  Safety Promotion/Fall Prevention:   activity supervised   assistive  device/personal items within reach   clutter free environment maintained   fall prevention program maintained   gait belt   lighting adjusted   muscle strengthening facilitated   nonskid shoes/slippers when out of bed   room organization consistent   safety round/check completed  Taken 5/24/2023 2200 by Yadira Mon RN  Safety Promotion/Fall Prevention: safety round/check completed  Taken 5/24/2023 2015 by Yadira Mon RN  Safety Promotion/Fall Prevention:   activity supervised   assistive device/personal items within reach   clutter free environment maintained   fall prevention program maintained   gait belt   lighting adjusted   muscle strengthening facilitated   nonskid shoes/slippers when out of bed   room organization consistent   safety round/check completed  Intervention: Prevent Skin Injury  Description: Perform a screening for skin injury risk, such as pressure or moisture associated skin damage on admission and at regular intervals throughout hospital stay.  Keep all areas of skin (especially folds) clean and dry.  Maintain adequate skin hydration.  Relieve and redistribute pressure and protect bony prominences; implement measures based on patient-specific risk factors.  Match turning and repositioning schedule to clinical condition.  Encourage weight shift frequently; assist with reposition if unable to complete independently.  Float heels off bed; avoid pressure on the Achilles tendon.  Keep skin free from extended contact with medical devices.  Encourage functional activity and mobility, as early as tolerated.  Use aids (e.g., slide boards, mechanical lift) during transfer.  Recent Flowsheet Documentation  Taken 5/25/2023 0230 by Yadira Mon RN  Body Position: position changed independently  Taken 5/25/2023 0036 by Yadira Mon RN  Body Position: position changed independently  Skin Protection:   adhesive use limited   tubing/devices free from skin contact  Taken 5/24/2023 2200 by  Yadira Mon RN  Body Position: position changed independently  Taken 5/24/2023 2015 by Yadira Mon RN  Body Position: position changed independently  Skin Protection:   adhesive use limited   tubing/devices free from skin contact  Intervention: Prevent and Manage VTE (Venous Thromboembolism) Risk  Description: Assess for VTE (venous thromboembolism) risk.  Encourage and assist with early ambulation.  Initiate and maintain compression or other therapy, as indicated, based on identified risk in accordance with organizational protocol and provider order.  Encourage both active and passive leg exercises while in bed, if unable to ambulate.  Recent Flowsheet Documentation  Taken 5/25/2023 0230 by Yadira Mon RN  Activity Management: activity encouraged  Taken 5/25/2023 0036 by Yadira Mon RN  Activity Management: activity encouraged  VTE Prevention/Management:   bilateral   sequential compression devices off   patient refused intervention  Range of Motion: active ROM (range of motion) encouraged  Taken 5/24/2023 2200 by Yadira Mon RN  Activity Management: up in chair  Taken 5/24/2023 2015 by Yadira Mon RN  Activity Management: up in chair  VTE Prevention/Management:   bilateral   sequential compression devices off   patient refused intervention  Range of Motion: active ROM (range of motion) encouraged  Intervention: Prevent Infection  Description: Maintain skin and mucous membrane integrity; promote hand, oral and pulmonary hygiene.  Optimize fluid balance, nutrition, sleep and glycemic control to maximize infection resistance.  Identify potential sources of infection early to prevent or mitigate progression of infection (e.g., wound, lines, devices).  Evaluate ongoing need for invasive devices; remove promptly when no longer indicated.  Recent Flowsheet Documentation  Taken 5/25/2023 0230 by Yadira Mon RN  Infection Prevention: rest/sleep promoted  Taken 5/25/2023 0036 by  Yadira Mon, RN  Infection Prevention: rest/sleep promoted  Taken 5/24/2023 2200 by Yadira Mon RN  Infection Prevention: rest/sleep promoted  Taken 5/24/2023 2015 by Yadira Mon RN  Infection Prevention:   hand hygiene promoted   single patient room provided   rest/sleep promoted   visitors restricted/screened  Goal: Optimal Comfort and Wellbeing  Outcome: Ongoing, Progressing  Intervention: Provide Person-Centered Care  Description: Use a family-focused approach to care.  Develop trust and rapport by proactively providing information, encouraging questions, addressing concerns and offering reassurance.  Acknowledge emotional response to hospitalization.  Recognize and utilize personal coping strategies.  Honor spiritual and cultural preferences.  Recent Flowsheet Documentation  Taken 5/25/2023 0036 by Yadira Mon RN  Trust Relationship/Rapport:   care explained   choices provided  Taken 5/24/2023 2015 by Yadira Mon RN  Trust Relationship/Rapport:   care explained   choices provided   emotional support provided   empathic listening provided   questions encouraged   questions answered   reassurance provided   thoughts/feelings acknowledged  Goal: Readiness for Transition of Care  Outcome: Ongoing, Progressing     Problem: Adjustment to Illness (Sepsis/Septic Shock)  Goal: Optimal Coping  Outcome: Ongoing, Progressing  Intervention: Optimize Psychosocial Adjustment to Illness  Description: Acknowledge, normalize, validate intensity and complexity of patient and support system response to situation.  Provide opportunity for expression of thoughts, feelings and concerns; respond with compassion and reassurance.  Decrease stress and anxiety by providing information about patient’s status and treatment.  Facilitate support system presence and participation in care; consider providing a diary in intensive care situation.  Support coping by recognizing current coping strategies; provide aid in  developing new strategies.  Acknowledge and normalize difficulty in managing life-long lifestyle changes and expectations.  Assess and monitor for signs and symptoms of psychologic distress, anxiety and depression.  Consider palliative care consult for goals of care conversation, if the condition is worsening despite treatment.  Recent Flowsheet Documentation  Taken 5/25/2023 0036 by Yadira Mon RN  Supportive Measures:   relaxation techniques promoted   self-care encouraged  Family/Support System Care: self-care encouraged  Taken 5/24/2023 2015 by Yadira Mon RN  Supportive Measures:   active listening utilized   relaxation techniques promoted   self-care encouraged   verbalization of feelings encouraged  Family/Support System Care:   self-care encouraged   support provided     Problem: COPD (Chronic Obstructive Pulmonary Disease) Comorbidity  Goal: Maintenance of COPD Symptom Control  Outcome: Ongoing, Progressing  Intervention: Maintain COPD-Symptom Control  Description: Evaluate adherence to management plan (e.g., medication, trigger avoidance, infection prevention, self-monitoring).  Advocate for continuation of home regimen, including medication, method of delivery, schedule and symptom monitoring.  Anticipate the need for breathing techniques and activity pacing to minimize fatigue and breathlessness.  Assess for proper use of inhaled medication and delivery technique; assist or reinstruct if needed.  Evaluate effectiveness of coping skills; encourage expression of feelings, expectations and concerns related to disease management and quality of life; reinforce education to enhance management plan and wellbeing.  Recent Flowsheet Documentation  Taken 5/25/2023 0036 by Yadira Mon RN  Supportive Measures:   relaxation techniques promoted   self-care encouraged  Taken 5/24/2023 2015 by Yadira Mon RN  Supportive Measures:   active listening utilized   relaxation techniques promoted    self-care encouraged   verbalization of feelings encouraged  Medication Review/Management: medications reviewed     Problem: Pain Chronic (Persistent) (Comorbidity Management)  Goal: Acceptable Pain Control and Functional Ability  Outcome: Ongoing, Progressing  Intervention: Manage Persistent Pain  Description: Evaluate pain level, effect of treatment and patient response at regular intervals.  Minimize pain stimuli; coordinate care and adjust environment (e.g., light, noise, unnecessary movement); promote sleep/rest.  Match pharmacologic analgesia to severity and type of pain mechanism (e.g., neuropathic, muscle, inflammatory); consider multimodal approach (e.g., nonopioid, opioid, adjuvant).  Provide medication at regular intervals; titrate to patient response.  Manage breakthrough pain with additional doses; consider rotation or switching medication.  Monitor for signs of substance tolerance (increased dose to reach desired effect, decreased effect with same dose).  Avoid abrupt withdrawal of medication, especially agents capable of causing physical dependence.  Manage medication-induced effects, such as constipation, nausea, pruritus, urinary retention, somnolence and dizziness.  Provide multimodal treatment interventions, such as physical activity, therapeutic exercise, yoga, TENS (transcutaneous electrical nerve stimulation) and manual therapy.  Train in functional activity modifications, such as body mechanics, posture, ergonomics, energy conservation and activity pacing.  Consider addition of complementary or alternative therapy, such as acupuncture, hypnosis or therapeutic touch.  Recent Flowsheet Documentation  Taken 5/25/2023 0036 by Yadira Mon RN  Sleep/Rest Enhancement: awakenings minimized  Taken 5/24/2023 2015 by Yadira Mon RN  Sleep/Rest Enhancement: awakenings minimized  Medication Review/Management: medications reviewed  Intervention: Optimize Psychosocial Wellbeing  Description:  Facilitate patient’s self-control over pain by providing pain information and allowing choices in treatment.  Consider and address emotional response to pain.  Explore and promote use of coping strategies; address barriers to successful coping.  Evaluate and assist with psychosocial, cultural and spiritual factors impacting pain.  Modify pain perception by using techniques, such as distraction, mindfulness, guided imagery, meditation or music.  Assess and monitor for signs and symptoms of behavioral health concerns, such as unhealthy substance use, depression and suicidal ideation.  Consider referral for ongoing coping support, such as cognitive behavioral therapy and mindfulness-based stress reduction.  Recent Flowsheet Documentation  Taken 5/25/2023 0036 by Yadira Mon, RN  Supportive Measures:   relaxation techniques promoted   self-care encouraged  Diversional Activities: television  Family/Support System Care: self-care encouraged  Taken 5/24/2023 2015 by Yadira Mon RN  Supportive Measures:   active listening utilized   relaxation techniques promoted   self-care encouraged   verbalization of feelings encouraged  Diversional Activities: television  Family/Support System Care:   self-care encouraged   support provided     Problem: Skin Injury Risk Increased  Goal: Skin Health and Integrity  Outcome: Ongoing, Progressing  Intervention: Optimize Skin Protection  Description: Perform a full pressure injury risk assessment, as indicated by screening, upon admission to care unit.  Reassess skin (injury risk, full inspection) frequently (e.g., scheduled interval, with change in condition) to provide optimal early detection and prevention.  Maintain adequate tissue perfusion (e.g., encourage fluid balance; avoid crossing legs, constrictive clothing or devices) to promote tissue oxygenation.  Maintain head of bed at lowest degree of elevation tolerated, considering medical condition and other  restrictions.  Avoid positioning onto an area that remains reddened.  Minimize incontinence and moisture (e.g., toileting schedule; moisture-wicking pad, diaper or incontinence collection device; skin moisture barrier).  Cleanse skin promptly and gently when soiled utilizing a pH-balanced cleanser.  Relieve and redistribute pressure (e.g., scheduled position changes, weight shifts, use of support surface, medical device repositioning, protective dressing application, use of positioning device, microclimate control, use of pressure-injury-monitor  Encourage increased activity, such as sitting in a chair at the bedside or early mobilization, when able to tolerate.  Recent Flowsheet Documentation  Taken 5/25/2023 0230 by Yadira Mon RN  Head of Bed (HOB) Positioning: HOB at 20-30 degrees  Taken 5/25/2023 0036 by Yadira Mon RN  Pressure Reduction Techniques: frequent weight shift encouraged  Head of Bed (HOB) Positioning: HOB at 30-45 degrees  Pressure Reduction Devices: specialty bed utilized  Skin Protection:   adhesive use limited   tubing/devices free from skin contact  Taken 5/24/2023 2200 by Yadira Mon RN  Head of Bed (HOB) Positioning: HOB at 30-45 degrees  Taken 5/24/2023 2015 by Yadira Mon RN  Pressure Reduction Techniques: frequent weight shift encouraged  Head of Bed (HOB) Positioning: HOB at 30-45 degrees  Pressure Reduction Devices: specialty bed utilized  Skin Protection:   adhesive use limited   tubing/devices free from skin contact

## 2023-05-25 NOTE — PROGRESS NOTES
Loyd Huff MD                          [phone]            Patient ID:    Name:  Blanca Zhu    MRN:  [id number]    1945   77 y.o.  female            Patient Care Team:  Praneeth Valenzuela MD as PCP - General (Internal Medicine)  Jean-Claude Farnsworth MD as Consulting Physician (Hematology and Oncology)  Praneeth Valenzuela MD as Referring Physician (Internal Medicine)  Tahmina Brown MD as Consulting Physician (Rheumatology)  Gretchen Mcginnis MD as Consulting Physician (Nephrology)  Tahmina James MD as Surgeon (General Surgery)  Haile Handley MD as Consulting Physician (Gastroenterology)  Sb Power MD as Consulting Physician (Cardiology)    CC/ Reason for visit: Ac parainfluenza URTI, CHF, [place]     Subjective: Pt seen and examined this AM. No acute overnight events noted. Doing better. Sitting up in chair. Needed Bipap for naps. Renal on board. HyperK noted    ROS: Denies any subjective fevers, syncope or presyncopal events, new neurological deficits, nausea or vomiting currently    Objective     Vital Signs past 24hrs    BP range: BP: (129-150)/(54-71) 129/54  Pulse range: Heart Rate:  [67-78] 74  Resp rate range: Resp:  [16-20] 16  Temp range: Temp (24hrs), Av.3 °F (36.3 °C), Min:96.1 °F (35.6 °C), Max:98.5 °F (36.9 °C)      Ventilator/Non-Invasive Ventilation Settings (From admission, onward)     Start     Ordered    23 1619  NIPPV (CPAP or BIPAP)  At Bedtime As Needed-RT        Comments: May adjust pressures to best tolerance and effect.  Patient is used to using a Pap machine and may bleed in supplemental O2 to maintain saturations greater than 90% less than 95 when she is on the PAP machine.   Question Answer Comment   Type BIPAP    IPAP 18    EPAP 10        23 1618    23 2042  NIPPV (CPAP or BIPAP)  Until Discontinued,   Status:  Canceled        Comments: May adjust pressures to best tolerance and effect.  Patient is used  to using a Pap machine   Question Answer Comment   Type BIPAP    IPAP 18    EPAP 10        05/21/23 2041    05/21/23 2042  NIPPV (CPAP or BIPAP)  Until Discontinued,   Status:  Canceled        Comments: May adjust pressures to best tolerance and effect.  Patient is used to using a Pap machine and may bleed in supplemental O2 to maintain saturations greater than 90% less than 95 when she is on the PAP machine.   Question Answer Comment   Type BIPAP    IPAP 18    EPAP 10        05/21/23 2042    05/21/23 1542  NIPPV (CPAP or BIPAP)  Until Discontinued,   Status:  Canceled        Question:  Type  Answer:  BIPAP    05/21/23 1541                Vent Settings                                         Device (Oxygen Therapy): NPPV/NIV       90.7 kg (200 lb); Body mass index is 39.06 kg/m².      Intake/Output Summary (Last 24 hours) at 5/25/2023 1852  Last data filed at 5/25/2023 1400  Gross per 24 hour   Intake 600 ml   Output --   Net 600 ml       PHYSICAL EXAM   Constitutional: Middle-aged obese F pt in bed, + acute respiratory distress, + accessory muscle use  Head: - NCAT  Eyes: No pallor.  Anicteric sclerae, EOMI.  ENMT:  Mallampati 4, no oral thrush. Moist MM.   NECK: Trachea midline, No thyromegaly, no palpable cervical lymphadenopathy  Heart: RRR, no murmur. 1+ pedal edema   Lungs: SAV +, Dec BS @ bases +. No wheezes/ crackles heard    Abdomen: Soft. Obese, No tenderness, guarding or rigidity. No palpable masses  Extremities: Extremities warm and well perfused. No cyanosis/ clubbing  Neuro: Conscious, answers appropriately, no gross focal neuro deficits  Psych: Mood and affect appropriate    PPE recommended per [facility] infectious disease Isolation protocol for the current clinical scenario (as mentioned below) was followed.     Scheduled meds:  amLODIPine, 5 mg, Oral, Q24H  bisoprolol, 20 mg, Oral, Q24H  dextrose, 50 mL, Intravenous, Once   And  insulin regular, 10 Units, Intravenous, Once  DULoxetine, 30 mg,  Oral, Daily  enoxaparin, 30 mg, Subcutaneous, Q24H  guaiFENesin, 1,200 mg, Oral, Q12H  insulin glargine, 20 Units, Subcutaneous, Daily  insulin lispro, 2-9 Units, Subcutaneous, 4x Daily AC & at Bedtime  ipratropium-albuterol, 3 mL, Nebulization, 4x Daily - RT  leflunomide, 10 mg, Oral, Daily  O2, 2 L/min, Inhalation, Once  pantoprazole, 40 mg, Oral, Q AM  [START ON 5/26/2023] predniSONE, 40 mg, Oral, Daily With Breakfast  sodium zirconium cyclosilicate, 10 g, Oral, TID        IV meds:                      sodium chloride, 100 mL/hr, Last Rate: 100 mL/hr (05/24/23 2016)        Data Review:      Results from last 7 days   Lab Units 05/25/23  1616 05/25/23  1217 05/25/23  0758 05/25/23  0404 05/24/23  1715 05/24/23  0707 05/23/23  2111 05/23/23  0859 05/22/23  0643 05/21/23  1615   SODIUM mmol/L  --   --   --  130*  --  130* 127* 128*   < > 132*   POTASSIUM mmol/L 5.1 5.8* 6.3* 5.4*   < > 5.6* 5.9* 6.0*   < > 4.4   CHLORIDE mmol/L  --   --   --  97*  --  94* 92* 93*   < > 94*   CO2 mmol/L  --   --   --  22.0  --  22.6 26.0 28.0   < > 29.2*   BUN mg/dL  --   --   --  65*  --  57* 56* 47*   < > 19   CREATININE mg/dL  --   --   --  1.96*  --  2.17* 1.97* 1.87*   < > 1.11*   CALCIUM mg/dL  --   --   --  7.0*  --  7.9* 7.7* 8.2*   < > 8.9   BILIRUBIN mg/dL  --   --   --   --   --   --   --   --   --  0.7   ALK PHOS U/L  --   --   --   --   --   --   --   --   --  109   ALT (SGPT) U/L  --   --   --   --   --   --   --   --   --  10   AST (SGOT) U/L  --   --   --   --   --   --   --   --   --  13   GLUCOSE mg/dL  --   --   --  71  --  122* 166* 114*   < > 176*   WBC 10*3/mm3  --   --   --  12.50*  --  15.05*  --  13.30*   < >  --    HEMOGLOBIN g/dL  --   --   --  12.6  --  12.7  --  12.6   < >  --    PLATELETS 10*3/mm3  --   --   --  202  --  202  --  177   < >  --    PROBNP pg/mL  --   --   --   --   --   --   --   --   --  720.4   PROCALCITONIN ng/mL  --   --   --   --   --   --   --  0.25  --   --     < > = values in this  interval not displayed.       Lab Results   Component Value Date    CALCIUM 7.0 (L) 05/25/2023    PHOS 5.1 (H) 05/25/2023                    I have personally reviewed the results from the time of this admission to 5/25/2023 18:52 EDT and agree with these findings:  [x]  Laboratory  [x]  Microbiology  [x]  Radiology  []  EKG/Telemetry   [x]  Cardiology/Vascular   []  Pathology  []  Old records    ASSESSMENT   Acute on chronic hypoxic respiratory failure(2L)  Chronic hypercapnic respiratory failure  Acute on chronic congestive heart failure; diastolic  Acute para influenza URTI  Acute asthma exacerbation  Acute kidney injury  Hyperkalemia  Diabetes  Pulmonary hypertension  ANAYELI on CPAP  Morbid obesity    PLAN:  All problems are new to me.  Work-up done so far as well as recommendations from prior pulmonologist noted.  Patient remains on 2 L nasal cannula.  Bronchospasm is improved.  Will narrow down steroids and potentially short course  Suspect pulm overload is playing a significant role.  Nephrology now on board and defer diuretics to them  Continue with nocturnal noninvasive ventilator being used in the hospital.  Suspect will need to upgrade patient's home CPAP to move towards volume assured ventilation  Recommended to be out of bed and work with therapy  [facility] DVT ppx - hep rec'd if ok per all   Guarded prognosis    Discussed with patient, family and nurse at bedside.      Loyd Huff MD  5/25/2023

## 2023-05-26 ENCOUNTER — HOME HEALTH ADMISSION (OUTPATIENT)
Dept: HOME HEALTH SERVICES | Facility: HOME HEALTHCARE | Age: 78
End: 2023-05-26
Payer: MEDICARE

## 2023-05-26 ENCOUNTER — APPOINTMENT (OUTPATIENT)
Dept: GENERAL RADIOLOGY | Facility: HOSPITAL | Age: 78
End: 2023-05-26
Payer: MEDICARE

## 2023-05-26 ENCOUNTER — APPOINTMENT (OUTPATIENT)
Dept: CARDIOLOGY | Facility: HOSPITAL | Age: 78
End: 2023-05-26
Payer: MEDICARE

## 2023-05-26 LAB
ALBUMIN SERPL-MCNC: 3.5 G/DL (ref 3.5–5.2)
ANION GAP SERPL CALCULATED.3IONS-SCNC: 13 MMOL/L (ref 5–15)
ANION GAP SERPL CALCULATED.3IONS-SCNC: 13 MMOL/L (ref 5–15)
ARTERIAL PATENCY WRIST A: ABNORMAL
ATMOSPHERIC PRESS: 750.3 MMHG
ATMOSPHERIC PRESS: 751.9 MMHG
BASE EXCESS BLDA CALC-SCNC: -0.7 MMOL/L (ref 0–2)
BASE EXCESS BLDV CALC-SCNC: -3.8 MMOL/L (ref -2–2)
BASOPHILS # BLD AUTO: 0.01 10*3/MM3 (ref 0–0.2)
BASOPHILS NFR BLD AUTO: 0.1 % (ref 0–1.5)
BDY SITE: ABNORMAL
BDY SITE: ABNORMAL
BH CV LOWER VASCULAR LEFT COMMON FEMORAL AUGMENT: NORMAL
BH CV LOWER VASCULAR LEFT COMMON FEMORAL COMPETENT: NORMAL
BH CV LOWER VASCULAR LEFT COMMON FEMORAL COMPRESS: NORMAL
BH CV LOWER VASCULAR LEFT COMMON FEMORAL PHASIC: NORMAL
BH CV LOWER VASCULAR LEFT COMMON FEMORAL SPONT: NORMAL
BH CV LOWER VASCULAR LEFT DISTAL FEMORAL COMPRESS: NORMAL
BH CV LOWER VASCULAR LEFT GASTRONEMIUS COMPRESS: NORMAL
BH CV LOWER VASCULAR LEFT GREATER SAPH AK COMPRESS: NORMAL
BH CV LOWER VASCULAR LEFT GREATER SAPH BK COMPRESS: NORMAL
BH CV LOWER VASCULAR LEFT LESSER SAPH COMPRESS: NORMAL
BH CV LOWER VASCULAR LEFT MID FEMORAL AUGMENT: NORMAL
BH CV LOWER VASCULAR LEFT MID FEMORAL COMPETENT: NORMAL
BH CV LOWER VASCULAR LEFT MID FEMORAL COMPRESS: NORMAL
BH CV LOWER VASCULAR LEFT MID FEMORAL PHASIC: NORMAL
BH CV LOWER VASCULAR LEFT MID FEMORAL SPONT: NORMAL
BH CV LOWER VASCULAR LEFT PERONEAL COMPRESS: NORMAL
BH CV LOWER VASCULAR LEFT POPLITEAL AUGMENT: NORMAL
BH CV LOWER VASCULAR LEFT POPLITEAL COMPETENT: NORMAL
BH CV LOWER VASCULAR LEFT POPLITEAL COMPRESS: NORMAL
BH CV LOWER VASCULAR LEFT POPLITEAL PHASIC: NORMAL
BH CV LOWER VASCULAR LEFT POPLITEAL SPONT: NORMAL
BH CV LOWER VASCULAR LEFT POSTERIOR TIBIAL COMPRESS: NORMAL
BH CV LOWER VASCULAR LEFT PROFUNDA FEMORAL COMPRESS: NORMAL
BH CV LOWER VASCULAR LEFT PROXIMAL FEMORAL COMPRESS: NORMAL
BH CV LOWER VASCULAR LEFT SAPHENOFEMORAL JUNCTION COMPRESS: NORMAL
BH CV LOWER VASCULAR RIGHT COMMON FEMORAL AUGMENT: NORMAL
BH CV LOWER VASCULAR RIGHT COMMON FEMORAL COMPETENT: NORMAL
BH CV LOWER VASCULAR RIGHT COMMON FEMORAL COMPRESS: NORMAL
BH CV LOWER VASCULAR RIGHT COMMON FEMORAL PHASIC: NORMAL
BH CV LOWER VASCULAR RIGHT COMMON FEMORAL SPONT: NORMAL
BH CV LOWER VASCULAR RIGHT DISTAL FEMORAL COMPRESS: NORMAL
BH CV LOWER VASCULAR RIGHT GASTRONEMIUS COMPRESS: NORMAL
BH CV LOWER VASCULAR RIGHT GREATER SAPH AK COMPRESS: NORMAL
BH CV LOWER VASCULAR RIGHT GREATER SAPH BK COMPRESS: NORMAL
BH CV LOWER VASCULAR RIGHT LESSER SAPH COMPRESS: NORMAL
BH CV LOWER VASCULAR RIGHT MID FEMORAL AUGMENT: NORMAL
BH CV LOWER VASCULAR RIGHT MID FEMORAL COMPETENT: NORMAL
BH CV LOWER VASCULAR RIGHT MID FEMORAL COMPRESS: NORMAL
BH CV LOWER VASCULAR RIGHT MID FEMORAL PHASIC: NORMAL
BH CV LOWER VASCULAR RIGHT MID FEMORAL SPONT: NORMAL
BH CV LOWER VASCULAR RIGHT PERONEAL COMPRESS: NORMAL
BH CV LOWER VASCULAR RIGHT POPLITEAL AUGMENT: NORMAL
BH CV LOWER VASCULAR RIGHT POPLITEAL COMPETENT: NORMAL
BH CV LOWER VASCULAR RIGHT POPLITEAL COMPRESS: NORMAL
BH CV LOWER VASCULAR RIGHT POPLITEAL PHASIC: NORMAL
BH CV LOWER VASCULAR RIGHT POPLITEAL SPONT: NORMAL
BH CV LOWER VASCULAR RIGHT POSTERIOR TIBIAL COMPRESS: NORMAL
BH CV LOWER VASCULAR RIGHT PROFUNDA FEMORAL COMPRESS: NORMAL
BH CV LOWER VASCULAR RIGHT PROXIMAL FEMORAL COMPRESS: NORMAL
BH CV LOWER VASCULAR RIGHT SAPHENOFEMORAL JUNCTION COMPRESS: NORMAL
BUN SERPL-MCNC: 73 MG/DL (ref 8–23)
BUN SERPL-MCNC: 76 MG/DL (ref 8–23)
BUN/CREAT SERPL: 37.1 (ref 7–25)
BUN/CREAT SERPL: 38.8 (ref 7–25)
CALCIUM SPEC-SCNC: 7 MG/DL (ref 8.6–10.5)
CALCIUM SPEC-SCNC: 7.4 MG/DL (ref 8.6–10.5)
CHLORIDE SERPL-SCNC: 98 MMOL/L (ref 98–107)
CHLORIDE SERPL-SCNC: 99 MMOL/L (ref 98–107)
CO2 SERPL-SCNC: 22 MMOL/L (ref 22–29)
CO2 SERPL-SCNC: 24 MMOL/L (ref 22–29)
CORTIS SERPL-MCNC: 12.4 MCG/DL
CREAT SERPL-MCNC: 1.96 MG/DL (ref 0.57–1)
CREAT SERPL-MCNC: 1.97 MG/DL (ref 0.57–1)
DEPRECATED RDW RBC AUTO: 43.2 FL (ref 37–54)
EGFRCR SERPLBLD CKD-EPI 2021: 25.9 ML/MIN/1.73
EGFRCR SERPLBLD CKD-EPI 2021: 26.1 ML/MIN/1.73
EOSINOPHIL # BLD AUTO: 0 10*3/MM3 (ref 0–0.4)
EOSINOPHIL NFR BLD AUTO: 0 % (ref 0.3–6.2)
ERYTHROCYTE [DISTWIDTH] IN BLOOD BY AUTOMATED COUNT: 12.8 % (ref 12.3–15.4)
GLUCOSE BLDC GLUCOMTR-MCNC: 119 MG/DL (ref 70–130)
GLUCOSE BLDC GLUCOMTR-MCNC: 120 MG/DL (ref 70–130)
GLUCOSE BLDC GLUCOMTR-MCNC: 143 MG/DL (ref 70–130)
GLUCOSE BLDC GLUCOMTR-MCNC: 169 MG/DL (ref 70–130)
GLUCOSE BLDC GLUCOMTR-MCNC: 178 MG/DL (ref 70–130)
GLUCOSE SERPL-MCNC: 134 MG/DL (ref 65–99)
GLUCOSE SERPL-MCNC: 173 MG/DL (ref 65–99)
HCO3 BLDA-SCNC: 27.3 MMOL/L (ref 22–28)
HCO3 BLDV-SCNC: 30.2 MMOL/L (ref 22–28)
HCT VFR BLD AUTO: 36.5 % (ref 34–46.6)
HGB BLD-MCNC: 11.6 G/DL (ref 12–15.9)
IMM GRANULOCYTES # BLD AUTO: 0.2 10*3/MM3 (ref 0–0.05)
IMM GRANULOCYTES NFR BLD AUTO: 1.8 % (ref 0–0.5)
INHALED O2 CONCENTRATION: 100 %
INHALED O2 CONCENTRATION: 70 %
LYMPHOCYTES # BLD AUTO: 0.12 10*3/MM3 (ref 0.7–3.1)
LYMPHOCYTES NFR BLD AUTO: 1.1 % (ref 19.6–45.3)
MAXIMAL PREDICTED HEART RATE: 144 BPM
MCH RBC QN AUTO: 29.6 PG (ref 26.6–33)
MCHC RBC AUTO-ENTMCNC: 31.8 G/DL (ref 31.5–35.7)
MCV RBC AUTO: 93.1 FL (ref 79–97)
MODALITY: ABNORMAL
MODALITY: ABNORMAL
MONOCYTES # BLD AUTO: 0.7 10*3/MM3 (ref 0.1–0.9)
MONOCYTES NFR BLD AUTO: 6.3 % (ref 5–12)
MRSA DNA SPEC QL NAA+PROBE: NORMAL
NEUTROPHILS NFR BLD AUTO: 10.05 10*3/MM3 (ref 1.7–7)
NEUTROPHILS NFR BLD AUTO: 90.7 % (ref 42.7–76)
NRBC BLD AUTO-RTO: 0.1 /100 WBC (ref 0–0.2)
O2 A-A PPRESDIFF RESPIRATORY: 0.2 MMHG
PCO2 BLDA: 58.3 MM HG (ref 35–45)
PCO2 BLDV: 112.4 MM HG (ref 41–51)
PEEP RESPIRATORY: 10 CM[H2O]
PEEP RESPIRATORY: 10 CM[H2O]
PH BLDA: 7.28 PH UNITS (ref 7.35–7.45)
PH BLDV: 7.04 PH UNITS (ref 7.31–7.41)
PHOSPHATE SERPL-MCNC: 5.1 MG/DL (ref 2.5–4.5)
PLATELET # BLD AUTO: 200 10*3/MM3 (ref 140–450)
PMV BLD AUTO: 9.8 FL (ref 6–12)
PO2 BLDA: 96 MM HG (ref 80–100)
PO2 BLDV: 50.6 MM HG (ref 35–45)
POTASSIUM SERPL-SCNC: 4.6 MMOL/L (ref 3.5–5.2)
POTASSIUM SERPL-SCNC: 5.2 MMOL/L (ref 3.5–5.2)
POTASSIUM SERPL-SCNC: 5.5 MMOL/L (ref 3.5–5.2)
POTASSIUM SERPL-SCNC: 5.6 MMOL/L (ref 3.5–5.2)
POTASSIUM SERPL-SCNC: 5.7 MMOL/L (ref 3.5–5.2)
PROCALCITONIN SERPL-MCNC: 0.17 NG/ML (ref 0–0.25)
RBC # BLD AUTO: 3.92 10*6/MM3 (ref 3.77–5.28)
SAO2 % BLDCOA: 64.3 % (ref 92–99)
SAO2 % BLDCOA: 96.2 % (ref 92–99)
SET MECH RESP RATE: 18
SET MECH RESP RATE: 24
SODIUM SERPL-SCNC: 134 MMOL/L (ref 136–145)
SODIUM SERPL-SCNC: 135 MMOL/L (ref 136–145)
STRESS TARGET HR: 122 BPM
TOTAL RATE: 18 BREATHS/MINUTE
TOTAL RATE: 24 BREATHS/MINUTE
VENTILATOR MODE: AC
VENTILATOR MODE: AC
WBC NRBC COR # BLD: 11.08 10*3/MM3 (ref 3.4–10.8)

## 2023-05-26 PROCEDURE — 94002 VENT MGMT INPAT INIT DAY: CPT

## 2023-05-26 PROCEDURE — 97110 THERAPEUTIC EXERCISES: CPT

## 2023-05-26 PROCEDURE — 94761 N-INVAS EAR/PLS OXIMETRY MLT: CPT

## 2023-05-26 PROCEDURE — 82533 TOTAL CORTISOL: CPT | Performed by: INTERNAL MEDICINE

## 2023-05-26 PROCEDURE — 25010000002 PROPOFOL 10 MG/ML EMULSION: Performed by: INTERNAL MEDICINE

## 2023-05-26 PROCEDURE — 0B9J8ZX DRAINAGE OF LEFT LOWER LUNG LOBE, VIA NATURAL OR ARTIFICIAL OPENING ENDOSCOPIC, DIAGNOSTIC: ICD-10-PCS | Performed by: INTERNAL MEDICINE

## 2023-05-26 PROCEDURE — 63710000001 INSULIN LISPRO (HUMAN) PER 5 UNITS: Performed by: INTERNAL MEDICINE

## 2023-05-26 PROCEDURE — 84145 PROCALCITONIN (PCT): CPT | Performed by: INTERNAL MEDICINE

## 2023-05-26 PROCEDURE — 63710000001 PREDNISONE PER 1 MG: Performed by: INTERNAL MEDICINE

## 2023-05-26 PROCEDURE — 87070 CULTURE OTHR SPECIMN AEROBIC: CPT | Performed by: INTERNAL MEDICINE

## 2023-05-26 PROCEDURE — 0BH17EZ INSERTION OF ENDOTRACHEAL AIRWAY INTO TRACHEA, VIA NATURAL OR ARTIFICIAL OPENING: ICD-10-PCS | Performed by: INTERNAL MEDICINE

## 2023-05-26 PROCEDURE — 25010000002 FUROSEMIDE PER 20 MG: Performed by: INTERNAL MEDICINE

## 2023-05-26 PROCEDURE — 94799 UNLISTED PULMONARY SVC/PX: CPT

## 2023-05-26 PROCEDURE — 31500 INSERT EMERGENCY AIRWAY: CPT | Performed by: INTERNAL MEDICINE

## 2023-05-26 PROCEDURE — 82948 REAGENT STRIP/BLOOD GLUCOSE: CPT

## 2023-05-26 PROCEDURE — 25010000002 VANCOMYCIN 10 G RECONSTITUTED SOLUTION: Performed by: INTERNAL MEDICINE

## 2023-05-26 PROCEDURE — 94664 DEMO&/EVAL PT USE INHALER: CPT

## 2023-05-26 PROCEDURE — 25010000002 FENTANYL CITRATE (PF) 50 MCG/ML SOLUTION: Performed by: INTERNAL MEDICINE

## 2023-05-26 PROCEDURE — 87641 MR-STAPH DNA AMP PROBE: CPT | Performed by: INTERNAL MEDICINE

## 2023-05-26 PROCEDURE — 94760 N-INVAS EAR/PLS OXIMETRY 1: CPT

## 2023-05-26 PROCEDURE — 82803 BLOOD GASES ANY COMBINATION: CPT

## 2023-05-26 PROCEDURE — 93970 EXTREMITY STUDY: CPT

## 2023-05-26 PROCEDURE — 5A1945Z RESPIRATORY VENTILATION, 24-96 CONSECUTIVE HOURS: ICD-10-PCS | Performed by: INTERNAL MEDICINE

## 2023-05-26 PROCEDURE — 80069 RENAL FUNCTION PANEL: CPT | Performed by: INTERNAL MEDICINE

## 2023-05-26 PROCEDURE — 71045 X-RAY EXAM CHEST 1 VIEW: CPT

## 2023-05-26 PROCEDURE — 84132 ASSAY OF SERUM POTASSIUM: CPT | Performed by: INTERNAL MEDICINE

## 2023-05-26 PROCEDURE — 36600 WITHDRAWAL OF ARTERIAL BLOOD: CPT

## 2023-05-26 PROCEDURE — 80048 BASIC METABOLIC PNL TOTAL CA: CPT | Performed by: INTERNAL MEDICINE

## 2023-05-26 PROCEDURE — 87205 SMEAR GRAM STAIN: CPT | Performed by: INTERNAL MEDICINE

## 2023-05-26 PROCEDURE — 63710000001 INSULIN REGULAR HUMAN PER 5 UNITS: Performed by: INTERNAL MEDICINE

## 2023-05-26 PROCEDURE — 25010000002 HEPARIN (PORCINE) PER 1000 UNITS: Performed by: INTERNAL MEDICINE

## 2023-05-26 PROCEDURE — 25010000002 PIPERACILLIN SOD-TAZOBACTAM PER 1 G: Performed by: INTERNAL MEDICINE

## 2023-05-26 PROCEDURE — 85025 COMPLETE CBC W/AUTO DIFF WBC: CPT | Performed by: INTERNAL MEDICINE

## 2023-05-26 RX ORDER — PROPOFOL 10 MG/ML
100 VIAL (ML) INTRAVENOUS ONCE
Status: COMPLETED | OUTPATIENT
Start: 2023-05-26 | End: 2023-05-26

## 2023-05-26 RX ORDER — HEPARIN SODIUM 5000 [USP'U]/ML
5000 INJECTION, SOLUTION INTRAVENOUS; SUBCUTANEOUS EVERY 8 HOURS SCHEDULED
Status: DISCONTINUED | OUTPATIENT
Start: 2023-05-26 | End: 2023-06-04 | Stop reason: HOSPADM

## 2023-05-26 RX ORDER — FUROSEMIDE 10 MG/ML
80 INJECTION INTRAMUSCULAR; INTRAVENOUS ONCE
Status: COMPLETED | OUTPATIENT
Start: 2023-05-26 | End: 2023-05-26

## 2023-05-26 RX ORDER — DEXTROSE MONOHYDRATE 25 G/50ML
50 INJECTION, SOLUTION INTRAVENOUS ONCE
Status: COMPLETED | OUTPATIENT
Start: 2023-05-26 | End: 2023-05-26

## 2023-05-26 RX ORDER — PREDNISONE 20 MG/1
20 TABLET ORAL
Status: COMPLETED | OUTPATIENT
Start: 2023-05-27 | End: 2023-05-28

## 2023-05-26 RX ORDER — FENTANYL CITRATE 50 UG/ML
50 INJECTION, SOLUTION INTRAMUSCULAR; INTRAVENOUS
Status: DISCONTINUED | OUTPATIENT
Start: 2023-05-26 | End: 2023-05-31

## 2023-05-26 RX ORDER — PROPOFOL 10 MG/ML
50 VIAL (ML) INTRAVENOUS ONCE
Status: COMPLETED | OUTPATIENT
Start: 2023-05-26 | End: 2023-05-26

## 2023-05-26 RX ADMIN — INSULIN HUMAN 10 UNITS: 100 INJECTION, SOLUTION PARENTERAL at 18:11

## 2023-05-26 RX ADMIN — FENTANYL CITRATE 50 MCG: 50 INJECTION, SOLUTION INTRAMUSCULAR; INTRAVENOUS at 14:30

## 2023-05-26 RX ADMIN — GUAIFENESIN 1200 MG: 600 TABLET, EXTENDED RELEASE ORAL at 08:44

## 2023-05-26 RX ADMIN — Medication 50 MEQ: at 15:22

## 2023-05-26 RX ADMIN — TAZOBACTAM SODIUM AND PIPERACILLIN SODIUM 3.38 G: 375; 3 INJECTION, SOLUTION INTRAVENOUS at 17:07

## 2023-05-26 RX ADMIN — PREDNISONE 40 MG: 20 TABLET ORAL at 08:44

## 2023-05-26 RX ADMIN — DEXTROSE MONOHYDRATE 50 ML: 25 INJECTION, SOLUTION INTRAVENOUS at 18:11

## 2023-05-26 RX ADMIN — PROPOFOL INJECTABLE EMULSION 25 MCG/KG/MIN: 10 INJECTION, EMULSION INTRAVENOUS at 14:28

## 2023-05-26 RX ADMIN — SODIUM ZIRCONIUM CYCLOSILICATE 10 G: 10 POWDER, FOR SUSPENSION ORAL at 08:44

## 2023-05-26 RX ADMIN — PROPOFOL 50 MG: 10 INJECTION, EMULSION INTRAVENOUS at 14:32

## 2023-05-26 RX ADMIN — BISOPROLOL FUMARATE 20 MG: 5 TABLET, FILM COATED ORAL at 08:44

## 2023-05-26 RX ADMIN — IPRATROPIUM BROMIDE AND ALBUTEROL SULFATE 3 ML: 2.5; .5 SOLUTION RESPIRATORY (INHALATION) at 15:34

## 2023-05-26 RX ADMIN — SODIUM CHLORIDE 100 ML/HR: 9 INJECTION, SOLUTION INTRAVENOUS at 05:07

## 2023-05-26 RX ADMIN — IPRATROPIUM BROMIDE AND ALBUTEROL SULFATE 3 ML: 2.5; .5 SOLUTION RESPIRATORY (INHALATION) at 19:31

## 2023-05-26 RX ADMIN — DULOXETINE HYDROCHLORIDE 30 MG: 30 CAPSULE, DELAYED RELEASE ORAL at 08:44

## 2023-05-26 RX ADMIN — LEFLUNOMIDE 10 MG: 20 TABLET ORAL at 08:44

## 2023-05-26 RX ADMIN — FUROSEMIDE 80 MG: 40 INJECTION, SOLUTION INTRAMUSCULAR; INTRAVENOUS at 18:11

## 2023-05-26 RX ADMIN — HEPARIN SODIUM 5000 UNITS: 5000 INJECTION INTRAVENOUS; SUBCUTANEOUS at 17:07

## 2023-05-26 RX ADMIN — SODIUM BICARBONATE 50 MEQ: 84 INJECTION INTRAVENOUS at 15:22

## 2023-05-26 RX ADMIN — IPRATROPIUM BROMIDE AND ALBUTEROL SULFATE 3 ML: 2.5; .5 SOLUTION RESPIRATORY (INHALATION) at 07:12

## 2023-05-26 RX ADMIN — PROPOFOL 100 MG: 10 INJECTION, EMULSION INTRAVENOUS at 14:26

## 2023-05-26 RX ADMIN — VANCOMYCIN HYDROCHLORIDE 1750 MG: 10 INJECTION, POWDER, LYOPHILIZED, FOR SOLUTION INTRAVENOUS at 17:07

## 2023-05-26 RX ADMIN — PROPOFOL INJECTABLE EMULSION 20 MCG/KG/MIN: 10 INJECTION, EMULSION INTRAVENOUS at 20:15

## 2023-05-26 RX ADMIN — INSULIN LISPRO 2 UNITS: 100 INJECTION, SOLUTION INTRAVENOUS; SUBCUTANEOUS at 17:08

## 2023-05-26 RX ADMIN — IPRATROPIUM BROMIDE AND ALBUTEROL SULFATE 3 ML: 2.5; .5 SOLUTION RESPIRATORY (INHALATION) at 11:44

## 2023-05-26 RX ADMIN — PANTOPRAZOLE SODIUM 40 MG: 40 TABLET, DELAYED RELEASE ORAL at 05:52

## 2023-05-26 RX ADMIN — TAZOBACTAM SODIUM AND PIPERACILLIN SODIUM 3.38 G: 375; 3 INJECTION, SOLUTION INTRAVENOUS at 21:33

## 2023-05-26 RX ADMIN — SODIUM CHLORIDE 500 ML: 9 INJECTION, SOLUTION INTRAVENOUS at 15:03

## 2023-05-26 RX ADMIN — HEPARIN SODIUM 5000 UNITS: 5000 INJECTION INTRAVENOUS; SUBCUTANEOUS at 21:06

## 2023-05-26 RX ADMIN — AMLODIPINE BESYLATE 5 MG: 5 TABLET ORAL at 08:44

## 2023-05-26 NOTE — CODE DOCUMENTATION
Patient Name:  Blanca Zhu  YOB: 1945  MRN:  [id number]  Admit Date:  5/21/2023    Visit Diagnoses:     ICD-10-CM ICD-9-CM   1. Respiratory distress  R06.03 786.09   2. Wheezing  R06.2 786.07   3. Pulmonary vascular congestion  R09.89 514   4. Hypertensive urgency  I16.0 401.9       Reason For Rapid:   O2 sats dropped to 76%  Primary RN placed pt on bipap prior to rapid team arrival    RN Communicated With:  Dr Huff    Rapid Outcome:  Transfer to CCU room 325    Communication From Rapid Team:   Upon rapid team arrival pt was unresponsive to all stimuli with no resp effort evident.  Dr Huff called and pt emergently transferred to CCU for intubation.  Bagged per ambu @ 100%    Most Recent Vital Signs  Temp:  [94.5 °F (34.7 °C)-97.9 °F (36.6 °C)] 94.5 °F (34.7 °C)  Heart Rate:  [58-77] 67  Resp:  [16-27] 24  BP: ()/(46-89) 138/83  FiO2 (%):  [71 %-100 %] 71 %  SpO2:  [86 %-100 %] 100 %  on  Flow (L/min):  [3-15] 15;   Device (Oxygen Therapy): ventilator    Labs:  Results from last 7 days   Lab Units 05/23/23  1625   COVID19  Not Detected     Glucose   Date/Time Value Ref Range Status   05/26/2023 1453 178 (H) 70 - 130 mg/dL Final     Comment:     Meter: [id number] : [id number] Aj IBRAHIM RN   05/26/2023 1348 169 (H) 70 - 130 mg/dL Final     Comment:     Meter: [id number] : [id number] Abdirahman Kirk NA   05/26/2023 1120 119 70 - 130 mg/dL Final     Comment:     Meter: [id number] : [id number] Jairo Durant NA   05/26/2023 0634 120 70 - 130 mg/dL Final     Comment:     Meter: [id number] : [id number] Jasmine Mcallister NA   05/25/2023 2126 223 (H) 70 - 130 mg/dL Final     Comment:     Meter: [id number] : [id number] Jasmine MCKEON   05/25/2023 1624 149 (H) 70 - 130 mg/dL Final     Comment:     Meter: [id number] : [id number] Dong MCKEON   05/25/2023 1145 161 (H) 70 - 130 mg/dL Final     Comment:     Meter: [id number] : [id number] Dong MCKEON      Site   Date Value Ref Range Status   05/26/2023 Arterial: right brachial  Final     Barometric Pressure for Blood Gas   Date Value Ref Range Status   05/26/2023 751.9 mmHg Final     Modality   Date Value Ref Range Status   05/26/2023 Adult Vent  Final     FIO2   Date Value Ref Range Status   05/26/2023 100 % Final     Results from last 7 days   Lab Units 05/26/23  1545 05/25/23  0404 05/24/23  0707 05/23/23  0859   WBC 10*3/mm3 11.08* 12.50* 15.05* 13.30*   HEMOGLOBIN g/dL 11.6* 12.6 12.7 12.6   PLATELETS 10*3/mm3 200 202 202 177     Results from last 7 days   Lab Units 05/26/23  1208 05/26/23  0827 05/26/23  0413 05/25/23  0758 05/25/23  0404 05/24/23  1715 05/24/23  0707 05/22/23  0643 05/21/23  1615   SODIUM mmol/L  --   --  135*  --  130*  --  130*   < > 132*   POTASSIUM mmol/L 5.2 5.5* 5.5*  5.5*   < > 5.4*   < > 5.6*   < > 4.4   CHLORIDE mmol/L  --   --  98  --  97*  --  94*   < > 94*   CO2 mmol/L  --   --  24.0  --  22.0  --  22.6   < > 29.2*   BUN mg/dL  --   --  73*  --  65*  --  57*   < > 19   CREATININE mg/dL  --   --  1.97*  --  1.96*  --  2.17*   < > 1.11*   GLUCOSE mg/dL  --   --  134*  --  71  --  122*   < > 176*   ALBUMIN g/dL  --   --  3.5  --  3.5  --   --   --  3.8   BILIRUBIN mg/dL  --   --   --   --   --   --   --   --  0.7   ALK PHOS U/L  --   --   --   --   --   --   --   --  109   AST (SGOT) U/L  --   --   --   --   --   --   --   --  13   ALT (SGPT) U/L  --   --   --   --   --   --   --   --  10    < > = values in this interval not displayed.   Estimated Creatinine Clearance: 24.4 mL/min (A) (by C-G formula based on SCr of 1.97 mg/dL (H)).  Results from last 7 days   Lab Units 05/25/23  0404 05/24/23  0707 05/21/23  1920 05/21/23  1615   CK TOTAL U/L  --  51  --   --    HSTROP T ng/L  --   --  28* 25*   PROBNP pg/mL  --   --   --  720.4   URIC ACID mg/dL 9.5*  --   --   --      Results from last 7 days   Lab Units 05/23/23  0859   PROCALCITONIN ng/mL 0.25     Results from last 7 days    Lab Units 05/26/23  1452   MODALITY  Adult Vent   No results found for: STREPPNEUAG, LEGANTIGENUR    Results from last 7 days   Lab Units 05/23/23  1625   ADENOVIRUS DETECTION BY PCR  Not Detected   CORONAVIRUS 229E  Not Detected   CORONAVIRUS HKU1  Not Detected   CORONAVIRUS NL63  Not Detected   CORONAVIRUS OC43  Not Detected   HUMAN METAPNEUMOVIRUS  Not Detected   HUMAN RHINOVIRUS/ENTEROVIRUS  Not Detected   INFLUENZA B PCR  Not Detected   PARAINFLUENZA 1  Not Detected   PARAINFLUENZA VIRUS 2  Not Detected   PARAINFLUENZA VIRUS 3  Detected*   PARAINFLUENZA VIRUS 4  Not Detected   BORDETELLA PERTUSSIS PCR  Not Detected   CHLAMYDOPHILA PNEUMONIAE PCR  Not Detected   MYCOPLAMA PNEUMO PCR  Not Detected   INFLUENZA A PCR  Not Detected   RSV, PCR  Not Detected         Please refer to full rapid documentation on summary page under Index / Code Timeline

## 2023-05-26 NOTE — OP NOTE
Bronchoscopy Procedure Note     Procedure:  1. Bronchoscopy, Diagnostic  2. Bronchoscopy, Therapeutic  3. Bronchoalveolar lavage, BAL     Pre-Operative Diagnosis: Severe hypoxemia     Post-Operative Diagnosis: Same     Anesthesia: Moderate Sedation     Procedure Details: Patient/ family was unable to be consented for the procedure due to emergent situation. The bronchoscope was inserted into the main airway via the ETT. A detailed anatomical survey was done of the airways from the trachea till  the visualized subsegmental bronchi and the findings are as reported below.     PPE recommended per [facility] infectious disease Isolation protocol for the current clinical scenario(as mentioned below) was followed.      Findings:  1) Normal distal trachea, bilateral mainstem, lobar and visualized segmental bronchi other than mentioned below  2) Thick mucoPURULENT secretions noted. Therapeutic aspiration of secretions performed.  3) Successful BAL of the LLL done. The bronchoscope was wedged in the left lower lobe and 60 mL of saline was pushed 25 mL was aspirated and looked cloudy.  No bleeding noted     Estimated Blood Loss: Minimal                Complications: None;   Patient tolerated the procedure well          Disposition: ICU.

## 2023-05-26 NOTE — PROGRESS NOTES
DAILY PROGRESS NOTE  [facility], [facility]    2023    Patient Identification:  Name: Blanca Zhu  Age: 77 y.o.  Sex: female  :  1945  MRN: [id number]           Primary Care Physician: Praneeth Valenzuela MD    Subjective:    Interval History:    Patient was somnolent this morning, improved after being on BiPAP.  More awake and alert this afternoon.  No other events.  Still having elevated potassium, this morning it was up to 6.3, renal following.  Better after given Lokelma.  Appreciate renal input  BiPAP  ROS:     No nausea, vomiting, diarrhea, constipation, chest pain, shortness of breath.    Objective:    Scheduled Meds:  amLODIPine, 5 mg, Oral, Q24H  bisoprolol, 20 mg, Oral, Q24H  dextrose, 50 mL, Intravenous, Once   And  insulin regular, 10 Units, Intravenous, Once  DULoxetine, 30 mg, Oral, Daily  enoxaparin, 30 mg, Subcutaneous, Q24H  guaiFENesin, 1,200 mg, Oral, Q12H  insulin glargine, 20 Units, Subcutaneous, Daily  insulin lispro, 2-9 Units, Subcutaneous, 4x Daily AC & at Bedtime  ipratropium-albuterol, 3 mL, Nebulization, 4x Daily - RT  leflunomide, 10 mg, Oral, Daily  O2, 2 L/min, Inhalation, Once  pantoprazole, 40 mg, Oral, Q AM  predniSONE, 40 mg, Oral, Daily With Breakfast  sodium zirconium cyclosilicate, 10 g, Oral, TID        Continuous Infusions:  sodium chloride, 100 mL/hr, Last Rate: 100 mL/hr (23)        PRN Meds:  •  benzonatate  •  dextrose  •  dextrose  •  glucagon (human recombinant)  •  hydrALAZINE  •  ondansetron  •  prochlorperazine  •  sodium chloride    Intake/Output:    Intake/Output Summary (Last 24 hours) at 2023 0102  Last data filed at 2023 1400  Gross per 24 hour   Intake 360 ml   Output --   Net 360 ml         Exam:    T MAX 24 hrs: Temp (24hrs), Av.3 °F (36.3 °C), Min:96.1 °F (35.6 °C), Max:98.5 °F (36.9 °C)    Vitals Ranges:   Temp:  [96.1 °F (35.6 °C)-98.5 °F (36.9 °C)] 96.1 °F (35.6 °C)  Heart  "Rate:  [67-78] 75  Resp:  [16-20] 18  BP: (129-157)/(54-69) 157/69    /69 (BP Location: Right arm, Patient Position: Sitting)   Pulse 75   Temp 96.1 °F (35.6 °C) (Oral)   Resp 18   Ht 152.4 cm (60\")   Wt 90.7 kg (200 lb)   LMP  (LMP Unknown)   SpO2 94%   BMI 39.06 kg/m²     General: Sleepy, arousable.  Oriented x3 when awake.  Cooperative, no distress, appears stated age.  Morbid obese.  On BiPAP with oxygen.  Neck: Supple, symmetrical, trachea midline, no adenopathy;              thyroid:  no enlargement/tenderness/nodules;              no carotid bruit or JVD  Cardiovascular: Normal rate, regular rhythm and intact distal pulses.              Exam reveals no gallop and no friction rub. No murmur heard  Pulmonary: Very diminished breath sounds, very limited air movement.  Wheezing scattered throughout both fields.  Rhonchi at bases.  No rales.    Abdominal: Soft, nontender, bowel sounds active all four quadrants,     no masses, no hepatomegaly, no splenomegaly.   Extremities: Normal.  2+ pitting edema of the lower extremity, actually this looks better than her baseline.  Brown discoloration from chronic stasis dermatitis.  Pulses: 2 + symmetric all extremities  Neurological: Patient is sleepy, arousable.  Oriented x3 when awake.  No new focal sensorimotor deficit.  Skin: Skin color, texture, normal. Turgor is decreased. No rashes or lesions         Data Review:    Results from last 7 days   Lab Units 05/25/23  0404 05/24/23  0707 05/23/23  0859   WBC 10*3/mm3 12.50* 15.05* 13.30*   HEMOGLOBIN g/dL 12.6 12.7 12.6   HEMATOCRIT % 40.3 39.5 39.9   PLATELETS 10*3/mm3 202 202 177       Results from last 7 days   Lab Units 05/25/23  2351 05/25/23  2022 05/25/23  1616 05/25/23  0758 05/25/23  0404 05/24/23  1715 05/24/23  0707 05/23/23  2111 05/22/23  0643 05/21/23  1615   SODIUM mmol/L  --   --   --   --  130*  --  130* 127*   < > 132*   POTASSIUM mmol/L 5.6* 5.3* 5.1   < > 5.4*   < > 5.6* 5.9*   < > 4.4 "   CHLORIDE mmol/L  --   --   --   --  97*  --  94* 92*   < > 94*   CO2 mmol/L  --   --   --   --  22.0  --  22.6 26.0   < > 29.2*   BUN mg/dL  --   --   --   --  65*  --  57* 56*   < > 19   CREATININE mg/dL  --   --   --   --  1.96*  --  2.17* 1.97*   < > 1.11*   CALCIUM mg/dL  --   --   --   --  7.0*  --  7.9* 7.7*   < > 8.9   BILIRUBIN mg/dL  --   --   --   --   --   --   --   --   --  0.7   ALK PHOS U/L  --   --   --   --   --   --   --   --   --  109   ALT (SGPT) U/L  --   --   --   --   --   --   --   --   --  10   AST (SGOT) U/L  --   --   --   --   --   --   --   --   --  13   GLUCOSE mg/dL  --   --   --   --  71  --  122* 166*   < > 176*    < > = values in this interval not displayed.                 Lab Results   Lab Value Date/Time    TROPONINT 28 (H) 05/21/2023 1920    TROPONINT 25 (H) 05/21/2023 1615       Microbiology Results (last 10 days)     Procedure Component Value - Date/Time    Eosinophil Smear - Urine, Urine, Clean Catch [[id number]]  (Normal) Collected: 05/25/23 0700    Lab Status: Final result Specimen: Urine, Clean Catch Updated: 05/25/23 0820     Eosinophil Smear 0 % EOS/100 Cells     Respiratory Panel PCR w/COVID-19(SARS-CoV-2) [facility]/RINA/[facility]/DESTINI In-House, NP Swab in UTM/VTM, 3-4 HR TAT - Swab, Nasopharynx [[id number]]  (Abnormal) Collected: 05/23/23 1625    Lab Status: Final result Specimen: Swab from Nasopharynx Updated: 05/23/23 1752     ADENOVIRUS, PCR Not Detected     Coronavirus 229E Not Detected     Coronavirus HKU1 Not Detected     Coronavirus NL63 Not Detected     Coronavirus OC43 Not Detected     COVID19 Not Detected     Human Metapneumovirus Not Detected     Human Rhinovirus/Enterovirus Not Detected     Influenza A PCR Not Detected     Influenza B PCR Not Detected     Parainfluenza Virus 1 Not Detected     Parainfluenza Virus 2 Not Detected     Parainfluenza Virus 3 Detected     Parainfluenza Virus 4 Not Detected     RSV, PCR Not Detected     Bordetella pertussis pcr Not  Detected     Bordetella parapertussis PCR Not Detected     Chlamydophila pneumoniae PCR Not Detected     Mycoplasma pneumo by PCR Not Detected    Narrative:      In the setting of a positive respiratory panel with a viral infection PLUS a negative procalcitonin without other underlying concern for bacterial infection, consider observing off antibiotics or discontinuation of antibiotics and continue supportive care. If the respiratory panel is positive for atypical bacterial infection (Bordetella pertussis, Chlamydophila pneumoniae, or Mycoplasma pneumoniae), consider antibiotic de-escalation to target atypical bacterial infection.    COVID-19 and FLU A/B PCR - Swab, Nasopharynx [[id number]]  (Normal) Collected: 05/21/23 1547    Lab Status: Final result Specimen: Swab from Nasopharynx Updated: 05/21/23 1609     COVID19 Not Detected     Influenza A PCR Not Detected     Influenza B PCR Not Detected    Narrative:      Fact sheet for providers: https://www.fda.gov/media/811961/download    Fact sheet for patients: https://www.fda.gov/media/326420/download    Test performed by PCR.           Imaging Results (Last 7 Days)     Procedure Component Value Units Date/Time    XR Chest 1 View [[id number]] Collected: 05/25/23 2137     Updated: 05/25/23 2144    Narrative:      Portable chest radiograph     HISTORY:Respiratory distress     TECHNIQUE: Single AP portable radiograph of the chest     COMPARISON:Chest radiograph 05/21/2023       Impression:      FINDINGS AND IMPRESSION:  There is bilateral pulmonary vascular congestion with superimposed  interstitial thickening within the bilateral lungs, as before. The lungs  are hypoinflated. Cardiac silhouette is accentuated by low lung volumes.  No pneumothorax is seen. Somewhat nodular opacification overlying the  left upper lung is present, not definitely seen on prior imaging.  Further evaluation with CT chest is recommended to exclude underlying  pulmonary nodule and establish  appropriate follow-up.     This report was finalized on 5/25/2023 9:40 PM by Dr. Bhupinder Lucero M.D.       US Renal Bilateral [[id number]] Collected: 05/24/23 1303     Updated: 05/24/23 1310    Narrative:      US RENAL BILATERAL-  05/24/2023     HISTORY: Acute renal failure.     Right kidney measures 10.3 cm in length. Left kidney measures 9.8 cm in  length.     No hydronephrosis, renal masses or stones are seen. Urinary bladder is  incompletely distended.       Impression:      1. Normal bilateral renal ultrasound.     This report was finalized on 5/24/2023 1:04 PM by Dr. Elliot Mike M.D.       XR Chest 1 View [[id number]] Collected: 05/21/23 1547     Updated: 05/21/23 1551    Narrative:      XR CHEST 1 VW-     HISTORY: Female who is 77 years-old,  chest pain     TECHNIQUE: Frontal view of the chest     COMPARISON: 11/9/2019     FINDINGS: The heart size is borderline. Pulmonary vasculature is  congested. Minimal likely atelectasis in the lower lungs. No pleural  effusion or pneumothorax. No acute osseous process.       Impression:      Borderline heart size with pulmonary vascular congestion.  Minimal likely atelectasis in the lower lungs.     This report was finalized on 5/21/2023 3:48 PM by Dr. Galindo Malik M.D.               Assessment:      Acute on chronic hypoxic respiratory failure  Asthma exacerbation  Acute parainfluenza viral bronchitis  Hypertensive urgency  Hyponatremia  Hyperkalemia  Acute kidney injury  Diabetic nephropathy with chronic kidney disease stage IIIa  Lower extremity edema, chronic  Uncontrolled diabetes mellitus  Obstructive sleep apnea  Generalized anxiety disorder  Iron deficiency anemia  Morbid obesity       Plan:    Creatinine improving.  Potassium still elevated this morning, adjust medication.  Ultrasound of the kidneys reviewed.    Continue steroids, DuoNeb mini nebs for asthma exacerbation.  Patient probably has parainfluenza viral bronchitis exacerbating her  asthma  Electrolytes improving slowly.  Continue treatment.    Adjust insulin as needed, blood sugar okay  Monitor mental status, at baseline  Physical therapy to see patient  Continue DVT/stress ulcer prophylaxis  Labs in am      Praneeth Valenzuela MD           I wore protective equipment throughout this patient encounter including a face mask, gloves, and protective eyewear.  Hand hygiene was performed before donning protective equipment and after removal when leaving the room.

## 2023-05-26 NOTE — THERAPY TREATMENT NOTE
Patient Name: Blanca Zhu  : 1945    MRN: [id number]                              Today's Date: 2023       Admit Date: 2023    Visit Dx:     ICD-10-CM ICD-9-CM   1. Respiratory distress  R06.03 786.09   2. Wheezing  R06.2 786.07   3. Pulmonary vascular congestion  R09.89 514   4. Hypertensive urgency  I16.0 401.9     Patient Active Problem List   Diagnosis   • Osteoporosis   • Breast neoplasm, Tis (DCIS), left   • Iron deficiency anemia   • CKD (chronic kidney disease)   • Diabetic nephropathy associated with type 2 diabetes mellitus   • Temporal arteritis   • Immunosuppression   • Anemia   • Duodenal adenoma   • Gastritis without bleeding   • Polyp of duodenum   • Morbidly obese   • Dyspnea on exertion   • Hyperlipidemia   • Type 2 diabetes mellitus with stage 3b chronic kidney disease, with long-term current use of insulin   • Essential hypertension   • Bilateral lower extremity edema   • Pulmonary hypertension   • Obstructive sleep apnea on CPAP   • Stage 3a chronic kidney disease   • Iron malabsorption   • Respiratory distress     Past Medical History:   Diagnosis Date   • Anemia    • Anxiety and depression    • Asthma    • Balance problem    • Breast cancer 2019    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 Daniel Gutierrez, [facility]   •  SECTION N/A     x3   • CHOLECYSTECTOMY N/A    • COLONOSCOPY     • COLONOSCOPY W/ POLYPECTOMY N/A  03/12/2019    Enlarged folds in the antrum: biopsied; duodenal, transverse colon x2, splenic flexure, descending colon and sigmoid colon polyps: biopsied (path: tubular adenoma x6)-Dr. Zak De Jesus, [facility]   • ENDOSCOPY  10/11/2019    Procedure: ESOPHAGOGASTRODUODENOSCOPY with hot snare polypectomy;  Surgeon: Haile Handley MD;  Location: [facility] ENDOSCOPY;  Service: Gastroenterology   • ENDOSCOPY N/A 1/29/2020    Procedure: ESOPHAGOGASTRODUODENOSCOPY;  Surgeon: Haile Handley MD;  Location: [facility] ENDOSCOPY;  Service: Gastroenterology   • ENDOSCOPY N/A 9/9/2020    Procedure: ESOPHAGOGASTRODUODENOSCOPY WITH BIOPSIES AND COLD BIOPSY POLYPECTOMY;  Surgeon: Haile Handley MD;  Location: [facility] ENDOSCOPY;  Service: Gastroenterology;  Laterality: N/A;  pre: dyspepsia and diarrhea  post: duodenal polyp and gastritis   • MASTECTOMY Left    • MASTECTOMY WITH SENTINEL NODE BIOPSY AND AXILLARY NODE DISSECTION Left 7/3/2019    Procedure: LEFT BREAST MASTECTOMY WITH SENTINEL NODE BIOPSY AND , v-y plasty closure;  Surgeon: Tahmina James MD;  Location: [facility] MAIN OR;  Service: General   • SUBTOTAL HYSTERECTOMY Bilateral     ovaries still in tact   • TEMPORAL ARTERY BIOPSY Bilateral 12/05/2019      General Information     [facility] Name 05/26/23 1547          Physical Therapy Time and Intention    Document Type therapy note (daily note)  -     Mode of Treatment individual therapy;physical therapy  -     Row Name 05/26/23 1547          General Information    Patient Profile Reviewed yes  -     Existing Precautions/Restrictions fall;oxygen therapy device and L/min  -     Row Name 05/26/23 1547          Cognition    Orientation Status (Cognition) unable/difficult to assess  pt too lethargic, barely had eyes open  -     Row Name 05/26/23 1547          Safety Issues, Functional Mobility    Impairments Affecting Function (Mobility) strength;endurance/activity tolerance;range of motion (ROM)   "-           User Key  (r) = Recorded By, (t) = Taken By, (c) = Cosigned By    Initials Name Provider Type    Polina Jacome PTA Physical Therapist Assistant               Mobility     Row Name 05/26/23 1549          Bed Mobility    Comment, (Bed Mobility) not tested , up in chair w/nsg  -           User Key  (r) = Recorded By, (t) = Taken By, (c) = Cosigned By    Initials Name Provider Type    Polina Jacome PTA Physical Therapist Assistant               Obj/Interventions     Row Name 05/26/23 1550          Motor Skills    Therapeutic Exercise --  PROM all extremities x10 reps, only command pt followed was to open eyes wider and pt perf x1  -           User Key  (r) = Recorded By, (t) = Taken By, (c) = Cosigned By    Initials Name Provider Type    Polina Jacome PTA Physical Therapist Assistant               Goals/Plan    No documentation.                Clinical Impression     [facility] Name 05/26/23 1551          Pain    Pre/Posttreatment Pain Comment no grimace w/ROM exer, no verbalization, son attempted to ask her about pain  -[facility] Name 05/26/23 1551          Plan of Care Review    Plan of Care Reviewed With patient;son  -     Outcome Evaluation PT performed PROM all 4 extremities x10 reps, no verbalization, only response was to open eyes wider on command, son in room and attempted getting pt to respond , but did not, asked son if she was this groggy and he said \"pretty much since admission\", too early to determine DC plan as pt is lethargic  -           User Key  (r) = Recorded By, (t) = Taken By, (c) = Cosigned By    Initials Name Provider Type    Polina Jacome PTA Physical Therapist Assistant               Outcome Measures     Row Name 05/26/23 0800          How much help from another person do you currently need...    Turning from your back to your side while in flat bed without using bedrails? 3  -BR     Moving from lying on back to sitting on the side of a flat bed " without bedrails? 2  -BR     Moving to and from a bed to a chair (including a wheelchair)? 3  -BR     Standing up from a chair using your arms (e.g., wheelchair, bedside chair)? 3  -BR     Climbing 3-5 steps with a railing? 2  -BR     To walk in hospital room? 3  -BR     AM-PAC 6 Clicks Score (PT) 16  -BR     Highest level of mobility 5 --> Static standing  -BR           User Key  (r) = Recorded By, (t) = Taken By, (c) = Cosigned By    Initials Name Provider Type    BR Marva Shanks, RN Registered Nurse                             Physical Therapy Education     Title: PT OT SLP Therapies (In Progress)     Topic: Physical Therapy (In Progress)     Point: Mobility training (In Progress)     Learning Progress Summary           Patient Acceptance, E,D, NL by  at 5/26/2023 1559    Acceptance, E,D, NR by  at 5/24/2023 1601   Family Acceptance, E,D, NL by  at 5/26/2023 1559                   Point: Home exercise program (In Progress)     Learning Progress Summary           Patient Acceptance, E,D, NL by  at 5/26/2023 1559    Acceptance, E,D, NR by  at 5/24/2023 1601   Family Acceptance, E,D, NL by  at 5/26/2023 1559                   Point: Body mechanics (In Progress)     Learning Progress Summary           Patient Acceptance, E,D, NL by  at 5/26/2023 1559    Acceptance, E,D, NR by  at 5/24/2023 1601   Family Acceptance, E,D, NL by  at 5/26/2023 1559                   Point: Precautions (In Progress)     Learning Progress Summary           Patient Acceptance, E,D, NL by  at 5/26/2023 1559    Acceptance, E,D, NR by  at 5/24/2023 1601   Family Acceptance, E,D, NL by  at 5/26/2023 1559                               User Key     Initials Effective Dates Name Provider Type Discipline    PC 06/16/21 -  Belia Heath, PT Physical Therapist PT     03/07/18 -  Polina Stark, JYOTI Physical Therapist Assistant PT              PT Recommendation and Plan     Plan of Care Reviewed With: patient,  "son  Outcome Evaluation: PT performed PROM all 4 extremities x10 reps, no verbalization, only response was to open eyes wider on command, son in room and attempted getting pt to respond , but did not, asked son if she was this groggy and he said \"pretty much since admission\", too early to determine DC plan as pt is lethargic     Time Calculation:    PT Charges     Row Name 05/26/23 1555             Time Calculation    Start Time 1215  -      Stop Time 1232  -      Time Calculation (min) 17 min  -      PT Received On 05/26/23  -KISHAN      PT - Next Appointment 05/30/23  -KISHAN            User Key  (r) = Recorded By, (t) = Taken By, (c) = Cosigned By    Initials Name Provider Type    Polina Jacome PTA Physical Therapist Assistant              Therapy Charges for Today     Code Description Service Date Service Provider Modifiers Qty    06520473423 HC PT THER PROC EA 15 MIN 5/26/2023 Polina Stark PTA GP 1          PT G-Codes  AM-PAC 6 Clicks Score (PT): 16       Polina Stark PTA  5/26/2023    "

## 2023-05-26 NOTE — PROGRESS NOTES
Loyd Huff MD                          [phone]            Patient ID:    Name:  Blanca Zhu    MRN:  [id number]    1945   77 y.o.  female            Patient Care Team:  Praneeth Valenzuela MD as PCP - General (Internal Medicine)  Jean-Claude Farnsworth MD as Consulting Physician (Hematology and Oncology)  Praneeth Valenzuela MD as Referring Physician (Internal Medicine)  Tahmina Brown MD as Consulting Physician (Rheumatology)  Gretchen Mcginnis MD as Consulting Physician (Nephrology)  Tahmina James MD as Surgeon (General Surgery)  Haile Handley MD as Consulting Physician (Gastroenterology)  Sb Power MD as Consulting Physician (Cardiology)    CC/ Reason for visit: Ac parainfluenza URTI, CHF, FEDERICA     Subjective: Pt seen and examined this AM.  Patient continued decline this AM.  Reportedly more drowsy and having persistent hypoxemia and had to be on noninvasive ventilator all a.m. but now became unresponsive and rapid response was called and I discussed with the charge nurse and recommended patient to be transferred urgently to the ICU.    Patient evaluated in the ICU and noted to be unresponsive while on noninvasive ventilator.  Decision made to intubate the patient and placed on mechanical ventilator after discussion with the family about goals of care and confirming CODE STATUS.  Primary team notified as well.    Patient intubated uneventfully and placed on mechanical ventilator.  Thick purulent secretions suctioned by the respiratory therapist with mucous plugging concerns and had to be urgently cleared with bedside bronchoscopic lavage.  Patient doing better now.  Stat ABG ordered.  Stat chest x-ray ordered.  Having some hypotension and will give small bolus and bicarb push for severe respiratory acidosis    ROS: Unable to obtain due to acute illness  Objective     Vital Signs past 24hrs    BP range: BP: ()/(46-89) 79/46  Pulse range: Heart  Rate:  [58-77] 58  Resp rate range: Resp:  [16-27] 16  Temp range: Temp (24hrs), Av.6 °F (35.9 °C), Min:94.5 °F (34.7 °C), Max:97.9 °F (36.6 °C)      Ventilator/Non-Invasive Ventilation Settings (From admission, onward)     Start     Ordered    23 1619  NIPPV (CPAP or BIPAP)  At Bedtime As Needed-RT        Comments: May adjust pressures to best tolerance and effect.  Patient is used to using a Pap machine and may bleed in supplemental O2 to maintain saturations greater than 90% less than 95 when she is on the PAP machine.   Question Answer Comment   Type BIPAP    IPAP 18    EPAP 10        23 1618    23 2042  NIPPV (CPAP or BIPAP)  Until Discontinued,   Status:  Canceled        Comments: May adjust pressures to best tolerance and effect.  Patient is used to using a Pap machine   Question Answer Comment   Type BIPAP    IPAP 18    EPAP 10        23 2041    23 2042  NIPPV (CPAP or BIPAP)  Until Discontinued,   Status:  Canceled        Comments: May adjust pressures to best tolerance and effect.  Patient is used to using a Pap machine and may bleed in supplemental O2 to maintain saturations greater than 90% less than 95 when she is on the PAP machine.   Question Answer Comment   Type BIPAP    IPAP 18    EPAP 10        23 20423 1542  NIPPV (CPAP or BIPAP)  Until Discontinued,   Status:  Canceled        Question:  Type  Answer:  BIPAP    23 1541                Vent Settings        Resp Rate (Set): 24     FiO2 (%): 100 %  PEEP/CPAP (cm H2O): 10 cm H20  Minute Ventilation (L/min) (Obs): 9.71 L/min  Resp Rate (Observed) Vent: 24  I:E Ratio (Set): 1:2.13  I:E Ratio (Obs): 1:2.1  PIP Observed (cm H2O): 39 cm H2O  Plateau Pressure (cm H2O): 0 cm H2O  Driving Pressure (cm H2O): -10 cm H2O  Device (Oxygen Therapy): manual resuscitator FiO2 (%): 100 %     90.7 kg (200 lb); Body mass index is 39.06 kg/m².      Intake/Output Summary (Last 24 hours) at 2023 1521  Last data  filed at 5/26/2023 0800  Gross per 24 hour   Intake 360 ml   Output --   Net 360 ml       PHYSICAL EXAM   Constitutional: Middle-aged obese F pt in bed, unresponsive while on the mechanical ventilator  Head: - NCAT  Eyes: No pallor.  Anicteric sclerae, EOMI.  ENMT: Endotracheal tube in place, no oral thrush. Moist MM.   NECK: Trachea midline, No thyromegaly, no palpable cervical lymphadenopathy  Heart: RRR, no murmur.  Trace pedal edema   Lungs: SAV +, Dec BS @ bases +. No wheezes/ crackles heard    Abdomen: Soft. Obese, No tenderness, guarding or rigidity. No palpable masses  Extremities: Extremities warm and well perfused. No cyanosis/ clubbing  Neuro: Unresponsive  Psych: Mood and affect -unable to obtain    PPE recommended per [facility] infectious disease Isolation protocol for the current clinical scenario (as mentioned below) was followed.     Scheduled meds:  amLODIPine, 5 mg, Oral, Q24H  bisoprolol, 20 mg, Oral, Q24H  dextrose, 50 mL, Intravenous, Once   And  insulin regular, 10 Units, Intravenous, Once  DULoxetine, 30 mg, Oral, Daily  enoxaparin, 30 mg, Subcutaneous, Q24H  guaiFENesin, 1,200 mg, Oral, Q12H  insulin glargine, 20 Units, Subcutaneous, Daily  insulin lispro, 2-9 Units, Subcutaneous, 4x Daily AC & at Bedtime  ipratropium-albuterol, 3 mL, Nebulization, 4x Daily - RT  leflunomide, 10 mg, Oral, Daily  O2, 2 L/min, Inhalation, Once  pantoprazole, 40 mg, Oral, Q AM  predniSONE, 40 mg, Oral, Daily With Breakfast  sodium bicarbonate, , ,   sodium bicarbonate, 50 mEq, Intravenous, Once  sodium chloride, 500 mL, Intravenous, Once  sodium zirconium cyclosilicate, 10 g, Oral, TID        IV meds:                      propofol, 5-50 mcg/kg/min, Last Rate: 25 mcg/kg/min (05/26/23 1428)        Data Review:      Results from last 7 days   Lab Units 05/26/23  1208 05/26/23  0827 05/26/23  0413 05/25/23  0758 05/25/23  0404 05/24/23  1715 05/24/23  0707 05/23/23  2111 05/23/23  0859 05/22/23  0643  05/21/23  1615   SODIUM mmol/L  --   --  135*  --  130*  --  130*   < > 128*   < > 132*   POTASSIUM mmol/L 5.2 5.5* 5.5*  5.5*   < > 5.4*   < > 5.6*   < > 6.0*   < > 4.4   CHLORIDE mmol/L  --   --  98  --  97*  --  94*   < > 93*   < > 94*   CO2 mmol/L  --   --  24.0  --  22.0  --  22.6   < > 28.0   < > 29.2*   BUN mg/dL  --   --  73*  --  65*  --  57*   < > 47*   < > 19   CREATININE mg/dL  --   --  1.97*  --  1.96*  --  2.17*   < > 1.87*   < > 1.11*   CALCIUM mg/dL  --   --  7.0*  --  7.0*  --  7.9*   < > 8.2*   < > 8.9   BILIRUBIN mg/dL  --   --   --   --   --   --   --   --   --   --  0.7   ALK PHOS U/L  --   --   --   --   --   --   --   --   --   --  109   ALT (SGPT) U/L  --   --   --   --   --   --   --   --   --   --  10   AST (SGOT) U/L  --   --   --   --   --   --   --   --   --   --  13   GLUCOSE mg/dL  --   --  134*  --  71  --  122*   < > 114*   < > 176*   WBC 10*3/mm3  --   --   --   --  12.50*  --  15.05*  --  13.30*   < >  --    HEMOGLOBIN g/dL  --   --   --   --  12.6  --  12.7  --  12.6   < >  --    PLATELETS 10*3/mm3  --   --   --   --  202  --  202  --  177   < >  --    PROBNP pg/mL  --   --   --   --   --   --   --   --   --   --  720.4   PROCALCITONIN ng/mL  --   --   --   --   --   --   --   --  0.25  --   --     < > = values in this interval not displayed.       Lab Results   Component Value Date    CALCIUM 7.0 (L) 05/26/2023    PHOS 5.1 (H) 05/26/2023                    I have personally reviewed the results from the time of this admission to 5/26/2023 15:21 EDT and agree with these findings:  [x]  Laboratory  [x]  Microbiology  [x]  Radiology  []  EKG/Telemetry   [x]  Cardiology/Vascular   []  Pathology  []  Old records    ASSESSMENT   Acute on chronic hypoxic/hypercapnic respiratory failure(2L) -noninvasive ventilator dependent s/p intubation-5/26  Suspected secondary bacterial pneumonia s/p bronch - 5/26  Acute metabolic encephalopathy  Acute on chronic congestive heart failure;  diastolic  Acute para influenza URTI  Acute asthma exacerbation  Acute kidney injury  Hyperkalemia  Diabetes  Pulmonary hypertension  ANAYELI on CPAP  Morbid obesity    PLAN:  Patient worsened this morning and had to be intubated and placed on mechanical ventilator in the afternoon for airway protection given progressive encephalopathy. Stat VBG confirms acute hypercapnic respiratory failure on chronic disease. Ventilator settings were adjusted and repeat venous gas.  Stat chest x-ray ordered and pending.  Bronchoscopic BAL cultures will be sent out for evaluation.  We will start empiric antibiotics.  Cannot rule out pulmonary embolism -we will get lower extremity venous duplex.  volume status is difficult to assess but does seem to be intravascularly volume depleted.  We will give low-dose IV fluid bolus and defer further volume management to nephrology.  Will finish steroid taper for asthma exacerbation  Will likely need nocturnal noninvasive ventilator if able to get off mechanical ventilator successfully  We will start heparin subcu for now  Poor prognosis    CC-60 minutes not including procedures    Loyd Huff MD  5/26/2023

## 2023-05-26 NOTE — PLAN OF CARE
Goal Outcome Evaluation:                          Pt arrived to CCU around 1500 in respiratory distress- bagging by RT from the floor. Pt was intubated at bedside and bronched with a specimen sent down to lab. Pt moves all extremities purposefully but is not following commands at this moment. Pt placed on bear hugger/warming blanket for temperature of 94 degrees on arrival to unit.     Pt potassium is still over 5.0- insulin, lasix and d50 given- BMP to be rechecked at 2000 with results to be called if K remains over 5.5 and/or UOP is inadequate.     Pt  and daughter updated at bedside on Pt condition. Phone number to unit and Pt access code given to .

## 2023-05-26 NOTE — PROGRESS NOTES
"[facility] Clinical Pharmacy Services: Vancomycin Pharmacokinetic Initial Consult Note    Blanca Zhu is a 77 y.o. female who is on day 1 of pharmacy to dose vancomycin.    Indication: Sepsis  Consulting Provider: Dr. Huff  Planned Duration of Therapy: 7 days   Loading Dose Ordered or Given: 1750 mg on 5/23 at 1530  MRSA PCR performed: ordered; Result: n/a  Culture/Source: 5/26 sputum/bronch wash pending  Target: Dose by Levels  Other Antimicrobials: zosyn    Vitals/Labs  Ht: 152.4 cm (60\"); Wt: 90.7 kg (200 lb)  Temp Readings from Last 1 Encounters:   05/26/23 94.5 °F (34.7 °C)    Estimated Creatinine Clearance: 24.4 mL/min (A) (by C-G formula based on SCr of 1.97 mg/dL (H)).  No current plans for HD. Nephrology is following for FEDERICA.     Results from last 7 days   Lab Units 05/26/23  0413 05/25/23  0404 05/24/23  0707 05/23/23  2111 05/23/23  0859   CREATININE mg/dL 1.97* 1.96* 2.17*   < > 1.87*   WBC 10*3/mm3  --  12.50* 15.05*  --  13.30*    < > = values in this interval not displayed.     Assessment/Plan:    Vancomycin Dose:   will dose intermittently d/t federica.  Load with 1750 mg (19 mg/kg) now.  Vanc Random has been ordered for 5/27 w/ am labs.     Pharmacy will follow patient's kidney function and will adjust doses and obtain levels as necessary. Thank you for involving pharmacy in this patient's care. Please contact pharmacy with any questions or concerns.                           Gorge Calvert, [facility]  Clinical Pharmacist    "

## 2023-05-26 NOTE — PROGRESS NOTES
DAILY PROGRESS NOTE  [facility], [facility]    2023    Patient Identification:  Name: Blanca Zhu  Age: 77 y.o.  Sex: female  :  1945  MRN: [id number]           Primary Care Physician: Praneeth Valenzuela MD    Subjective:    Interval History:    Events noted, developed acute respiratory failure, had to be transferred to CCU intubated and placed on the ventilator.  ICU team on board.  Probably she has developed a bacterial pneumonia now, started on antibiotics.  Potassium slowly coming down, appreciate renal input.  Was encephalopathic prior to intubation  Was not tolerating BiPAP well when wearing it.    ROS:     No nausea, vomiting, diarrhea, constipation, chest pain, shortness of breath.    Objective:    Scheduled Meds:  amLODIPine, 5 mg, Oral, Q24H  bisoprolol, 20 mg, Oral, Q24H  dextrose, 50 mL, Intravenous, Once   And  insulin regular, 10 Units, Intravenous, Once  DULoxetine, 30 mg, Oral, Daily  enoxaparin, 30 mg, Subcutaneous, Q24H  guaiFENesin, 1,200 mg, Oral, Q12H  insulin glargine, 20 Units, Subcutaneous, Daily  insulin lispro, 2-9 Units, Subcutaneous, 4x Daily AC & at Bedtime  ipratropium-albuterol, 3 mL, Nebulization, 4x Daily - RT  leflunomide, 10 mg, Oral, Daily  O2, 2 L/min, Inhalation, Once  pantoprazole, 40 mg, Oral, Q AM  predniSONE, 40 mg, Oral, Daily With Breakfast  Propofol, 100 mg, Intravenous, Once  sodium zirconium cyclosilicate, 10 g, Oral, TID        Continuous Infusions:  propofol, 5-50 mcg/kg/min        PRN Meds:  •  benzonatate  •  dextrose  •  dextrose  •  glucagon (human recombinant)  •  hydrALAZINE  •  ondansetron  •  prochlorperazine  •  sodium chloride    Intake/Output:    Intake/Output Summary (Last 24 hours) at 2023 1425  Last data filed at 2023 0800  Gross per 24 hour   Intake 360 ml   Output --   Net 360 ml         Exam:    T MAX 24 hrs: Temp (24hrs), Av.2 °F (36.2 °C), Min:96.1 °F (35.6 °C), Max:97.9 °F  "(36.6 °C)    Vitals Ranges:   Temp:  [96.1 °F (35.6 °C)-97.9 °F (36.6 °C)] 97.9 °F (36.6 °C)  Heart Rate:  [67-77] 67  Resp:  [16-27] 16  BP: (125-157)/(56-69) 125/61    /61 (BP Location: Right arm, Patient Position: Sitting)   Pulse 67   Temp 97.9 °F (36.6 °C) (Oral)   Resp 16   Ht 152.4 cm (60\")   Wt 90.7 kg (200 lb)   LMP  (LMP Unknown)   SpO2 91%   BMI 39.06 kg/m²     General: Intubated, sedated, placed on the ventilator.    Neck: Supple, symmetrical, trachea midline, no adenopathy;              thyroid:  no enlargement/tenderness/nodules;              no carotid bruit or JVD  Cardiovascular: Normal rate, regular rhythm and intact distal pulses.              Exam reveals no gallop and no friction rub. No murmur heard  Pulmonary: Very diminished breath sounds, very limited air movement.  Wheezing scattered throughout both fields.  Rhonchi at bases.  No rales.    Abdominal: Soft, nontender, bowel sounds active all four quadrants,     no masses, no hepatomegaly, no splenomegaly.   Extremities:  3+ pitting edema of the lower extremity, actually this looks better than her baseline.  Brown discoloration from chronic stasis dermatitis.  Pulses: 2 + symmetric all extremities  Neurological: Patient is sleepy, arousable.  Oriented x3 when awake.  No new focal sensorimotor deficit.  Skin: Skin color, texture, normal. Turgor is decreased. No rashes or lesions         Data Review:    Results from last 7 days   Lab Units 05/25/23  0404 05/24/23  0707 05/23/23  0859   WBC 10*3/mm3 12.50* 15.05* 13.30*   HEMOGLOBIN g/dL 12.6 12.7 12.6   HEMATOCRIT % 40.3 39.5 39.9   PLATELETS 10*3/mm3 202 202 177       Results from last 7 days   Lab Units 05/26/23  1208 05/26/23  0827 05/26/23  0413 05/25/23  0758 05/25/23  0404 05/24/23  1715 05/24/23  0707 05/22/23  0643 05/21/23  1615   SODIUM mmol/L  --   --  135*  --  130*  --  130*   < > 132*   POTASSIUM mmol/L 5.2 5.5* 5.5*  5.5*   < > 5.4*   < > 5.6*   < > 4.4   CHLORIDE " mmol/L  --   --  98  --  97*  --  94*   < > 94*   CO2 mmol/L  --   --  24.0  --  22.0  --  22.6   < > 29.2*   BUN mg/dL  --   --  73*  --  65*  --  57*   < > 19   CREATININE mg/dL  --   --  1.97*  --  1.96*  --  2.17*   < > 1.11*   CALCIUM mg/dL  --   --  7.0*  --  7.0*  --  7.9*   < > 8.9   BILIRUBIN mg/dL  --   --   --   --   --   --   --   --  0.7   ALK PHOS U/L  --   --   --   --   --   --   --   --  109   ALT (SGPT) U/L  --   --   --   --   --   --   --   --  10   AST (SGOT) U/L  --   --   --   --   --   --   --   --  13   GLUCOSE mg/dL  --   --  134*  --  71  --  122*   < > 176*    < > = values in this interval not displayed.                 Lab Results   Lab Value Date/Time    TROPONINT 28 (H) 05/21/2023 1920    TROPONINT 25 (H) 05/21/2023 1615       Microbiology Results (last 10 days)     Procedure Component Value - Date/Time    Eosinophil Smear - Urine, Urine, Clean Catch [[id number]]  (Normal) Collected: 05/25/23 0700    Lab Status: Final result Specimen: Urine, Clean Catch Updated: 05/25/23 0820     Eosinophil Smear 0 % EOS/100 Cells     Respiratory Panel PCR w/COVID-19(SARS-CoV-2) [facility]/RANDA/RINA/PAD/[facility]/MAD/DESTINI In-House, NP Swab in UTM/VTM, 3-4 HR TAT - Swab, Nasopharynx [[id number]]  (Abnormal) Collected: 05/23/23 1625    Lab Status: Final result Specimen: Swab from Nasopharynx Updated: 05/23/23 1758     ADENOVIRUS, PCR Not Detected     Coronavirus 229E Not Detected     Coronavirus HKU1 Not Detected     Coronavirus NL63 Not Detected     Coronavirus OC43 Not Detected     COVID19 Not Detected     Human Metapneumovirus Not Detected     Human Rhinovirus/Enterovirus Not Detected     Influenza A PCR Not Detected     Influenza B PCR Not Detected     Parainfluenza Virus 1 Not Detected     Parainfluenza Virus 2 Not Detected     Parainfluenza Virus 3 Detected     Parainfluenza Virus 4 Not Detected     RSV, PCR Not Detected     Bordetella pertussis pcr Not Detected     Bordetella parapertussis PCR Not Detected      Chlamydophila pneumoniae PCR Not Detected     Mycoplasma pneumo by PCR Not Detected    Narrative:      In the setting of a positive respiratory panel with a viral infection PLUS a negative procalcitonin without other underlying concern for bacterial infection, consider observing off antibiotics or discontinuation of antibiotics and continue supportive care. If the respiratory panel is positive for atypical bacterial infection (Bordetella pertussis, Chlamydophila pneumoniae, or Mycoplasma pneumoniae), consider antibiotic de-escalation to target atypical bacterial infection.    COVID-19 and FLU A/B PCR - Swab, Nasopharynx [[id number]]  (Normal) Collected: 05/21/23 1547    Lab Status: Final result Specimen: Swab from Nasopharynx Updated: 05/21/23 1609     COVID19 Not Detected     Influenza A PCR Not Detected     Influenza B PCR Not Detected    Narrative:      Fact sheet for providers: https://www.fda.gov/media/361876/download    Fact sheet for patients: https://www.fda.gov/media/514121/download    Test performed by PCR.           Imaging Results (Last 7 Days)     Procedure Component Value Units Date/Time    XR Chest 1 View [[id number]] Collected: 05/25/23 2137     Updated: 05/25/23 2144    Narrative:      Portable chest radiograph     HISTORY:Respiratory distress     TECHNIQUE: Single AP portable radiograph of the chest     COMPARISON:Chest radiograph 05/21/2023       Impression:      FINDINGS AND IMPRESSION:  There is bilateral pulmonary vascular congestion with superimposed  interstitial thickening within the bilateral lungs, as before. The lungs  are hypoinflated. Cardiac silhouette is accentuated by low lung volumes.  No pneumothorax is seen. Somewhat nodular opacification overlying the  left upper lung is present, not definitely seen on prior imaging.  Further evaluation with CT chest is recommended to exclude underlying  pulmonary nodule and establish appropriate follow-up.     This report was finalized on  5/25/2023 9:40 PM by Dr. Bhupinder Lucero M.D.       US Renal Bilateral [[id number]] Collected: 05/24/23 1303     Updated: 05/24/23 1310    Narrative:      US RENAL BILATERAL-  05/24/2023     HISTORY: Acute renal failure.     Right kidney measures 10.3 cm in length. Left kidney measures 9.8 cm in  length.     No hydronephrosis, renal masses or stones are seen. Urinary bladder is  incompletely distended.       Impression:      1. Normal bilateral renal ultrasound.     This report was finalized on 5/24/2023 1:04 PM by Dr. Elliot Mike M.D.       XR Chest 1 View [[id number]] Collected: 05/21/23 1547     Updated: 05/21/23 1551    Narrative:      XR CHEST 1 VW-     HISTORY: Female who is 77 years-old,  chest pain     TECHNIQUE: Frontal view of the chest     COMPARISON: 11/9/2019     FINDINGS: The heart size is borderline. Pulmonary vasculature is  congested. Minimal likely atelectasis in the lower lungs. No pleural  effusion or pneumothorax. No acute osseous process.       Impression:      Borderline heart size with pulmonary vascular congestion.  Minimal likely atelectasis in the lower lungs.     This report was finalized on 5/21/2023 3:48 PM by Dr. Galindo Malik M.D.               Assessment:      Acute on chronic hypoxic respiratory failure requiring ventilatory management  Asthma exacerbation  Acute parainfluenza viral bronchitis  Hypertensive urgency  Hyponatremia  Hyperkalemia  Acute kidney injury  Diabetic nephropathy with chronic kidney disease stage IIIa  Lower extremity edema, chronic  Uncontrolled diabetes mellitus  Obstructive sleep apnea  Generalized anxiety disorder  Iron deficiency anemia  Morbid obesity       Plan:    Ventilatory management/supportive therapy per ICU team  Developed acute respiratory failure, now intubated on the ventilator.  Probably developed bacterial superimposed infection on top of virus bronchitis, versus aspiration pneumonia since patient was more confused today and  yesterday  Continue steroids, DuoNeb mini nebs for asthma exacerbation.  Electrolytes improving slowly.  Continue treatment.    Adjust insulin as needed, blood sugar fairly okay  Monitor mental status, has been more confused this morning  Continue DVT/stress ulcer prophylaxis  Labs in am      Praneeth Valenzuela MD           I wore protective equipment throughout this patient encounter including a face mask, gloves, and protective eyewear.  Hand hygiene was performed before donning protective equipment and after removal when leaving the room.

## 2023-05-26 NOTE — PROGRESS NOTES
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    Lab Results   Component Value Date    WBC 12.50 (H) 2023     Temp Readings from Last 3 Encounters:   23 94.5 °F (34.7 °C)   05/10/23 97.5 °F (36.4 °C) (Temporal)   23 96.6 °F (35.9 °C)      Assessment/Plan  Estimated CrCl >20 mL/min at this time; BMI 39 kg/m2  Will start piperacillin-tazobactam 3.375 g IV every 8 hours     Pharmacy will continue to follow daily while on piperacillin-tazobactam and adjust as needed. Thank you for this consult.    Gorge Calvert [facility]  Clinical Pharmacist

## 2023-05-26 NOTE — PROGRESS NOTES
LOS: 4 days     Chief Complaint/ Reason for encounter: Hyperkalemia    Subjective   05/25/23 : Patient is doing well today with no new complaints  Good appetite with no nausea or vomiting  No shortness of breath chest pain or edema  Voiding well with no dysuria  On CPAP doing well no complaints    5/26: Clinically about the same, still not eating or drinking very much  Denies any shortness of breath or chest pain, no fevers or chills no nausea      Medical history reviewed:  History of Present Illness    Subjective    History taken from: Patient and chart    Vital Signs  Temp:  [96.1 °F (35.6 °C)-97.9 °F (36.6 °C)] 97.9 °F (36.6 °C)  Heart Rate:  [67-77] 67  Resp:  [16-27] 16  BP: (125-157)/(56-69) 125/61       Wt Readings from Last 1 Encounters:   05/21/23 1550 90.7 kg (200 lb)       Objective    Objective:  General Appearance:  Comfortable, obese, well-appearing, in no acute distress and not in pain.  Awake, alert, oriented  HEENT: Mucous membranes moist, no injury, oropharynx clear  Lungs:  Normal effort and normal respiratory rate.  Breath sounds clear to auscultation.  No  respiratory distress.  No rales, decreased breath sounds or rhonchi.    Heart: Normal rate.  Regular rhythm.  S1, S2 normal.  No murmur.   Abdomen: Abdomen is soft.  Bowel sounds are normal, no abdominal tenderness.  There is no rebound or guarding  Extremities: 1+ chronic edema of bilateral lower extremities  Neurological: No focal motor or sensory deficits, pupils reactive  Skin:  Warm and dry.  No rash or cyanosis.       Results Review:    Intake/Output:     Intake/Output Summary (Last 24 hours) at 5/26/2023 1334  Last data filed at 5/26/2023 0800  Gross per 24 hour   Intake 600 ml   Output --   Net 600 ml         DATA:  Radiology and Labs:  The following labs independently reviewed by me. Additional labs ordered for tomorrow a.m.  Interval notes, chart personally reviewed by me.   Old records independently reviewed showing chronic  edema, history of CKD followed by Dr. Mcginnis  Discussed with patient, Dr. Valenzuela, family    Risk/ complexity of medical care/ medical decision making moderate complexity, continued hyperkalemia management    Labs:   Recent Results (from the past 24 hour(s))   Potassium    Collection Time: 05/25/23  4:16 PM    Specimen: Blood   Result Value Ref Range    Potassium 5.1 3.5 - 5.2 mmol/L   POC Glucose Once    Collection Time: 05/25/23  4:24 PM    Specimen: Blood   Result Value Ref Range    Glucose 149 (H) 70 - 130 mg/dL   Potassium    Collection Time: 05/25/23  8:22 PM    Specimen: Blood   Result Value Ref Range    Potassium 5.3 (H) 3.5 - 5.2 mmol/L   POC Glucose Once    Collection Time: 05/25/23  9:26 PM    Specimen: Blood   Result Value Ref Range    Glucose 223 (H) 70 - 130 mg/dL   Potassium    Collection Time: 05/25/23 11:51 PM    Specimen: Blood   Result Value Ref Range    Potassium 5.6 (H) 3.5 - 5.2 mmol/L   Potassium    Collection Time: 05/26/23  4:13 AM    Specimen: Blood   Result Value Ref Range    Potassium 5.5 (H) 3.5 - 5.2 mmol/L   Renal Function Panel    Collection Time: 05/26/23  4:13 AM    Specimen: Blood   Result Value Ref Range    Glucose 134 (H) 65 - 99 mg/dL    BUN 73 (H) 8 - 23 mg/dL    Creatinine 1.97 (H) 0.57 - 1.00 mg/dL    Sodium 135 (L) 136 - 145 mmol/L    Potassium 5.5 (H) 3.5 - 5.2 mmol/L    Chloride 98 98 - 107 mmol/L    CO2 24.0 22.0 - 29.0 mmol/L    Calcium 7.0 (L) 8.6 - 10.5 mg/dL    Albumin 3.5 3.5 - 5.2 g/dL    Phosphorus 5.1 (H) 2.5 - 4.5 mg/dL    Anion Gap 13.0 5.0 - 15.0 mmol/L    BUN/Creatinine Ratio 37.1 (H) 7.0 - 25.0    eGFR 25.9 (L) >60.0 mL/min/1.73   POC Glucose Once    Collection Time: 05/26/23  6:34 AM    Specimen: Blood   Result Value Ref Range    Glucose 120 70 - 130 mg/dL   Potassium    Collection Time: 05/26/23  8:27 AM    Specimen: Blood   Result Value Ref Range    Potassium 5.5 (H) 3.5 - 5.2 mmol/L   POC Glucose Once    Collection Time: 05/26/23 11:20 AM    Specimen:  Blood   Result Value Ref Range    Glucose 119 70 - 130 mg/dL   Potassium    Collection Time: 05/26/23 12:08 PM    Specimen: Blood   Result Value Ref Range    Potassium 5.2 3.5 - 5.2 mmol/L       Radiology:  Pertinent radiology studies were reviewed as described above      Medications have been reviewed separately in chart overview      ASSESSMENT:  Acute kidney injury, likely hypovolemic, ARB and diuretic were contributing.  Creatinine stable today, euvolemic   Hyperkalemia from [place] and ARB, slowly better, 5.2 today  Acute hypoxic respiratory failure, pulmonary following, on CPAP  Chronic asthma with acute exacerbation, on IV steroids  Hyperkalemia, improved  Probable viral bronchitis, PCR pending  Hypertensive urgency on admission, improved  Hyponatremia, 135 today  Hypocalcemia: Check albumin  Leukocytosis from steroids  Mild proteinuria        DISCUSSION/PLAN:   Renal function remains stable  Potassium slowly improved, down to 5.2 today  Will continue Lokelma for another 24 hours  I am hesitant with her underlying CHF to continue to leave her on IV fluids so I will go ahead and DC the fluids at this time  Continue to encourage oral hydration and nutrition  Recheck chemistries in a.m.    Continue to hold ARB and diuretics  Monitor respiratory status closely  Continue Lokelma 3 times daily  Will follow-up a.m. labs and monitor closely       Santosh Dhillon MD   [facility]   Office phone number: [phone]  Answering service phone number: [phone]    05/26/23  13:34 EDT    Dictation performed using Dragon dictation software

## 2023-05-26 NOTE — PLAN OF CARE
"Goal Outcome Evaluation:  Plan of Care Reviewed With: patient, son           Outcome Evaluation: PT performed PROM all 4 extremities x10 reps, no verbalization, only response was to open eyes wider on command, son in room and attempted getting pt to respond , but did not, asked son if she was this groggy and he said \"pretty much since admission\", too early to determine DC plan as pt is lethargic         "

## 2023-05-26 NOTE — PROCEDURES
"Intubation    Date/Time: 5/26/2023 3:29 PM  Performed by: Loyd Huff MD  Authorized by: Loyd Huff MD   Consent: The procedure was performed in an emergent situation.  Risks and benefits: risks, benefits and alternatives were discussed  Consent given by: spouse  Procedure consent: procedure consent matches procedure scheduled  Relevant documents: relevant documents present and verified  Test results: test results available and properly labeled  Site marked: the operative site was marked  Imaging studies: imaging studies available  Required items: required blood products, implants, devices, and special equipment available  Patient identity confirmed: arm band and hospital-assigned identification number  Time out: Immediately prior to procedure a \"time out\" was called to verify the correct patient, procedure, equipment, support staff and site/side marked as required.  Indications: respiratory failure,  airway protection,  hypoxemia and  hypercapnia  Intubation method: video-assisted  Patient status: sedated  Preoxygenation: BVM  Sedatives: propofol  Paralytic: none  Laryngoscope size: Mac 3  Tube size: 7.5 mm  Tube type: cuffed  Number of attempts: 2  Ventilation between attempts: BVM  Cricoid pressure: no  Cords visualized: yes  Post-procedure assessment: chest rise and CO2 detector  Breath sounds: equal  Cuff inflated: yes  ETT to lip: 24 cm  Tube secured with: ETT marsh  Chest x-ray interpreted by radiologist.  Chest x-ray findings: endotracheal tube in appropriate position  Patient tolerance: patient tolerated the procedure well with no immediate complications  Comments: Bronch confirmed position        "

## 2023-05-27 ENCOUNTER — APPOINTMENT (OUTPATIENT)
Dept: GENERAL RADIOLOGY | Facility: HOSPITAL | Age: 78
End: 2023-05-27
Payer: MEDICARE

## 2023-05-27 LAB
ALBUMIN SERPL-MCNC: 2.6 G/DL (ref 3.5–5.2)
ANION GAP SERPL CALCULATED.3IONS-SCNC: 17.7 MMOL/L (ref 5–15)
ARTERIAL PATENCY WRIST A: ABNORMAL
ATMOSPHERIC PRESS: 753.1 MMHG
BASE EXCESS BLDA CALC-SCNC: 3.4 MMOL/L (ref 0–2)
BDY SITE: ABNORMAL
BUN SERPL-MCNC: 69 MG/DL (ref 8–23)
BUN/CREAT SERPL: 34.7 (ref 7–25)
CALCIUM SPEC-SCNC: 7.3 MG/DL (ref 8.6–10.5)
CHLORIDE SERPL-SCNC: 89 MMOL/L (ref 98–107)
CO2 SERPL-SCNC: 23.3 MMOL/L (ref 22–29)
CREAT SERPL-MCNC: 1.99 MG/DL (ref 0.57–1)
DEPRECATED RDW RBC AUTO: 43.5 FL (ref 37–54)
EGFRCR SERPLBLD CKD-EPI 2021: 25.6 ML/MIN/1.73
ERYTHROCYTE [DISTWIDTH] IN BLOOD BY AUTOMATED COUNT: 13.1 % (ref 12.3–15.4)
GLUCOSE BLDC GLUCOMTR-MCNC: 128 MG/DL (ref 70–130)
GLUCOSE BLDC GLUCOMTR-MCNC: 135 MG/DL (ref 70–130)
GLUCOSE BLDC GLUCOMTR-MCNC: 136 MG/DL (ref 70–130)
GLUCOSE BLDC GLUCOMTR-MCNC: 140 MG/DL (ref 70–130)
GLUCOSE BLDC GLUCOMTR-MCNC: 168 MG/DL (ref 70–130)
GLUCOSE SERPL-MCNC: 129 MG/DL (ref 65–99)
HCO3 BLDA-SCNC: 28.7 MMOL/L (ref 22–28)
HCT VFR BLD AUTO: 36.4 % (ref 34–46.6)
HGB BLD-MCNC: 12.6 G/DL (ref 12–15.9)
INHALED O2 CONCENTRATION: 40 %
MAGNESIUM SERPL-MCNC: 1.6 MG/DL (ref 1.6–2.4)
MCH RBC QN AUTO: 31.7 PG (ref 26.6–33)
MCHC RBC AUTO-ENTMCNC: 34.6 G/DL (ref 31.5–35.7)
MCV RBC AUTO: 91.5 FL (ref 79–97)
MODALITY: ABNORMAL
O2 A-A PPRESDIFF RESPIRATORY: 0.3 MMHG
PCO2 BLDA: 44.9 MM HG (ref 35–45)
PEEP RESPIRATORY: 5 CM[H2O]
PH BLDA: 7.41 PH UNITS (ref 7.35–7.45)
PHOSPHATE SERPL-MCNC: 1.7 MG/DL (ref 2.5–4.5)
PLATELET # BLD AUTO: 179 10*3/MM3 (ref 140–450)
PMV BLD AUTO: 10.3 FL (ref 6–12)
PO2 BLDA: 69 MM HG (ref 80–100)
POTASSIUM SERPL-SCNC: 3.8 MMOL/L (ref 3.5–5.2)
QT INTERVAL: 367 MS
RBC # BLD AUTO: 3.98 10*6/MM3 (ref 3.77–5.28)
SAO2 % BLDCOA: 93.6 % (ref 92–99)
SET MECH RESP RATE: 18
SODIUM SERPL-SCNC: 130 MMOL/L (ref 136–145)
TOTAL RATE: 19 BREATHS/MINUTE
VANCOMYCIN SERPL-MCNC: 18.9 MCG/ML (ref 5–40)
VENTILATOR MODE: ABNORMAL
VT ON VENT VENT: 406 ML
WBC NRBC COR # BLD: 19.26 10*3/MM3 (ref 3.4–10.8)

## 2023-05-27 PROCEDURE — 63710000001 PREDNISONE PER 1 MG: Performed by: INTERNAL MEDICINE

## 2023-05-27 PROCEDURE — 94799 UNLISTED PULMONARY SVC/PX: CPT

## 2023-05-27 PROCEDURE — 36600 WITHDRAWAL OF ARTERIAL BLOOD: CPT

## 2023-05-27 PROCEDURE — 82803 BLOOD GASES ANY COMBINATION: CPT

## 2023-05-27 PROCEDURE — 94760 N-INVAS EAR/PLS OXIMETRY 1: CPT

## 2023-05-27 PROCEDURE — 25010000002 PROPOFOL 10 MG/ML EMULSION: Performed by: INTERNAL MEDICINE

## 2023-05-27 PROCEDURE — 25010000002 PIPERACILLIN SOD-TAZOBACTAM PER 1 G: Performed by: INTERNAL MEDICINE

## 2023-05-27 PROCEDURE — 94761 N-INVAS EAR/PLS OXIMETRY MLT: CPT

## 2023-05-27 PROCEDURE — 80202 ASSAY OF VANCOMYCIN: CPT | Performed by: INTERNAL MEDICINE

## 2023-05-27 PROCEDURE — 85027 COMPLETE CBC AUTOMATED: CPT | Performed by: INTERNAL MEDICINE

## 2023-05-27 PROCEDURE — 02HV33Z INSERTION OF INFUSION DEVICE INTO SUPERIOR VENA CAVA, PERCUTANEOUS APPROACH: ICD-10-PCS | Performed by: INTERNAL MEDICINE

## 2023-05-27 PROCEDURE — 74018 RADEX ABDOMEN 1 VIEW: CPT

## 2023-05-27 PROCEDURE — 25010000002 FUROSEMIDE PER 20 MG: Performed by: INTERNAL MEDICINE

## 2023-05-27 PROCEDURE — C1751 CATH, INF, PER/CENT/MIDLINE: HCPCS

## 2023-05-27 PROCEDURE — 93010 ELECTROCARDIOGRAM REPORT: CPT | Performed by: STUDENT IN AN ORGANIZED HEALTH CARE EDUCATION/TRAINING PROGRAM

## 2023-05-27 PROCEDURE — 83735 ASSAY OF MAGNESIUM: CPT | Performed by: INTERNAL MEDICINE

## 2023-05-27 PROCEDURE — 94664 DEMO&/EVAL PT USE INHALER: CPT

## 2023-05-27 PROCEDURE — 93005 ELECTROCARDIOGRAM TRACING: CPT | Performed by: INTERNAL MEDICINE

## 2023-05-27 PROCEDURE — 82948 REAGENT STRIP/BLOOD GLUCOSE: CPT

## 2023-05-27 PROCEDURE — 94003 VENT MGMT INPAT SUBQ DAY: CPT

## 2023-05-27 PROCEDURE — 25010000002 FENTANYL CITRATE (PF) 50 MCG/ML SOLUTION: Performed by: INTERNAL MEDICINE

## 2023-05-27 PROCEDURE — 25010000002 HEPARIN (PORCINE) PER 1000 UNITS: Performed by: INTERNAL MEDICINE

## 2023-05-27 PROCEDURE — 80069 RENAL FUNCTION PANEL: CPT | Performed by: INTERNAL MEDICINE

## 2023-05-27 RX ORDER — GUAIFENESIN 200 MG/10ML
400 LIQUID ORAL EVERY 4 HOURS
Status: DISCONTINUED | OUTPATIENT
Start: 2023-05-27 | End: 2023-05-30

## 2023-05-27 RX ORDER — SODIUM PHOSPHATE IN 0.9 % NACL 15MMOL/100
30 PLASTIC BAG, INJECTION (ML) INTRAVENOUS ONCE
Status: COMPLETED | OUTPATIENT
Start: 2023-05-27 | End: 2023-05-27

## 2023-05-27 RX ORDER — SODIUM CHLORIDE 0.9 % (FLUSH) 0.9 %
10 SYRINGE (ML) INJECTION EVERY 12 HOURS SCHEDULED
Status: DISCONTINUED | OUTPATIENT
Start: 2023-05-27 | End: 2023-05-30

## 2023-05-27 RX ORDER — SODIUM CHLORIDE 0.9 % (FLUSH) 0.9 %
10 SYRINGE (ML) INJECTION AS NEEDED
Status: DISCONTINUED | OUTPATIENT
Start: 2023-05-27 | End: 2023-06-04 | Stop reason: HOSPADM

## 2023-05-27 RX ORDER — SODIUM CHLORIDE 0.9 % (FLUSH) 0.9 %
20 SYRINGE (ML) INJECTION AS NEEDED
Status: DISCONTINUED | OUTPATIENT
Start: 2023-05-27 | End: 2023-06-04 | Stop reason: HOSPADM

## 2023-05-27 RX ORDER — FUROSEMIDE 10 MG/ML
40 INJECTION INTRAMUSCULAR; INTRAVENOUS ONCE
Status: COMPLETED | OUTPATIENT
Start: 2023-05-27 | End: 2023-05-27

## 2023-05-27 RX ORDER — SODIUM CHLORIDE 9 MG/ML
40 INJECTION, SOLUTION INTRAVENOUS AS NEEDED
Status: DISCONTINUED | OUTPATIENT
Start: 2023-05-27 | End: 2023-06-04 | Stop reason: HOSPADM

## 2023-05-27 RX ORDER — SODIUM CHLORIDE 0.9 % (FLUSH) 0.9 %
10 SYRINGE (ML) INJECTION EVERY 12 HOURS SCHEDULED
Status: DISCONTINUED | OUTPATIENT
Start: 2023-05-27 | End: 2023-06-04 | Stop reason: HOSPADM

## 2023-05-27 RX ORDER — ACETAMINOPHEN 650 MG/1
650 SUPPOSITORY RECTAL EVERY 4 HOURS PRN
Status: DISCONTINUED | OUTPATIENT
Start: 2023-05-27 | End: 2023-06-01

## 2023-05-27 RX ORDER — SODIUM CHLORIDE 0.9 % (FLUSH) 0.9 %
10 SYRINGE (ML) INJECTION AS NEEDED
Status: DISCONTINUED | OUTPATIENT
Start: 2023-05-27 | End: 2023-05-30

## 2023-05-27 RX ORDER — GUAIFENESIN 200 MG/10ML
400 LIQUID ORAL EVERY 4 HOURS
Status: DISCONTINUED | OUTPATIENT
Start: 2023-05-27 | End: 2023-05-27

## 2023-05-27 RX ORDER — CHLORHEXIDINE GLUCONATE 0.12 MG/ML
15 RINSE ORAL EVERY 12 HOURS SCHEDULED
Status: DISCONTINUED | OUTPATIENT
Start: 2023-05-27 | End: 2023-05-30

## 2023-05-27 RX ADMIN — HEPARIN SODIUM 5000 UNITS: 5000 INJECTION INTRAVENOUS; SUBCUTANEOUS at 21:12

## 2023-05-27 RX ADMIN — Medication 10 ML: at 21:27

## 2023-05-27 RX ADMIN — HEPARIN SODIUM 5000 UNITS: 5000 INJECTION INTRAVENOUS; SUBCUTANEOUS at 16:00

## 2023-05-27 RX ADMIN — LEFLUNOMIDE 10 MG: 20 TABLET ORAL at 15:44

## 2023-05-27 RX ADMIN — FUROSEMIDE 40 MG: 40 INJECTION, SOLUTION INTRAMUSCULAR; INTRAVENOUS at 17:06

## 2023-05-27 RX ADMIN — LANSOPRAZOLE 30 MG: 15 TABLET, ORALLY DISINTEGRATING, DELAYED RELEASE ORAL at 16:14

## 2023-05-27 RX ADMIN — CHLORHEXIDINE GLUCONATE 15 ML: 1.2 RINSE ORAL at 20:09

## 2023-05-27 RX ADMIN — TAZOBACTAM SODIUM AND PIPERACILLIN SODIUM 3.38 G: 375; 3 INJECTION, SOLUTION INTRAVENOUS at 05:58

## 2023-05-27 RX ADMIN — GUAIFENESIN 400 MG: 200 SOLUTION ORAL at 17:50

## 2023-05-27 RX ADMIN — CHLORHEXIDINE GLUCONATE 15 ML: 1.2 RINSE ORAL at 09:30

## 2023-05-27 RX ADMIN — PROPOFOL INJECTABLE EMULSION 20 MCG/KG/MIN: 10 INJECTION, EMULSION INTRAVENOUS at 03:19

## 2023-05-27 RX ADMIN — FENTANYL CITRATE 50 MCG: 50 INJECTION, SOLUTION INTRAMUSCULAR; INTRAVENOUS at 08:36

## 2023-05-27 RX ADMIN — PROPOFOL INJECTABLE EMULSION 25 MCG/KG/MIN: 10 INJECTION, EMULSION INTRAVENOUS at 22:33

## 2023-05-27 RX ADMIN — IPRATROPIUM BROMIDE AND ALBUTEROL SULFATE 3 ML: 2.5; .5 SOLUTION RESPIRATORY (INHALATION) at 19:23

## 2023-05-27 RX ADMIN — Medication 10 ML: at 20:04

## 2023-05-27 RX ADMIN — BISOPROLOL FUMARATE 10 MG: 5 TABLET, FILM COATED ORAL at 15:42

## 2023-05-27 RX ADMIN — FENTANYL CITRATE 50 MCG: 50 INJECTION, SOLUTION INTRAMUSCULAR; INTRAVENOUS at 18:03

## 2023-05-27 RX ADMIN — IPRATROPIUM BROMIDE AND ALBUTEROL SULFATE 3 ML: 2.5; .5 SOLUTION RESPIRATORY (INHALATION) at 06:41

## 2023-05-27 RX ADMIN — Medication 10 ML: at 20:03

## 2023-05-27 RX ADMIN — HEPARIN SODIUM 5000 UNITS: 5000 INJECTION INTRAVENOUS; SUBCUTANEOUS at 05:58

## 2023-05-27 RX ADMIN — GUAIFENESIN 400 MG: 200 SOLUTION ORAL at 20:03

## 2023-05-27 RX ADMIN — Medication 10 ML: at 21:28

## 2023-05-27 RX ADMIN — SODIUM PHOSPHATE, MONOBASIC, MONOHYDRATE AND SODIUM PHOSPHATE, DIBASIC, ANHYDROUS 30 MMOL: 276; 142 INJECTION, SOLUTION INTRAVENOUS at 08:38

## 2023-05-27 RX ADMIN — PROPOFOL INJECTABLE EMULSION 20 MCG/KG/MIN: 10 INJECTION, EMULSION INTRAVENOUS at 15:41

## 2023-05-27 RX ADMIN — PREDNISONE 20 MG: 20 TABLET ORAL at 15:42

## 2023-05-27 RX ADMIN — FENTANYL CITRATE 50 MCG: 50 INJECTION, SOLUTION INTRAMUSCULAR; INTRAVENOUS at 22:10

## 2023-05-27 RX ADMIN — ACETAMINOPHEN 650 MG: 650 SUPPOSITORY RECTAL at 00:18

## 2023-05-27 RX ADMIN — DULOXETINE HYDROCHLORIDE 30 MG: 30 CAPSULE, DELAYED RELEASE ORAL at 15:43

## 2023-05-27 RX ADMIN — TAZOBACTAM SODIUM AND PIPERACILLIN SODIUM 3.38 G: 375; 3 INJECTION, SOLUTION INTRAVENOUS at 21:30

## 2023-05-27 RX ADMIN — IPRATROPIUM BROMIDE AND ALBUTEROL SULFATE 3 ML: 2.5; .5 SOLUTION RESPIRATORY (INHALATION) at 10:50

## 2023-05-27 RX ADMIN — IPRATROPIUM BROMIDE AND ALBUTEROL SULFATE 3 ML: 2.5; .5 SOLUTION RESPIRATORY (INHALATION) at 15:22

## 2023-05-27 RX ADMIN — AMLODIPINE BESYLATE 5 MG: 5 TABLET ORAL at 15:42

## 2023-05-27 NOTE — PROGRESS NOTES
"   LOS: 5 days     Chief Complaint/ Reason for encounter: Hyperkalemia    Subjective   05/25/23 : Patient is doing well today with no new complaints  Good appetite with no nausea or vomiting  No shortness of breath chest pain or edema  Voiding well with no dysuria  On CPAP doing well no complaints    5/26: Clinically about the same, still not eating or drinking very much  Denies any shortness of breath or chest pain, no fevers or chills no nausea    5/27: patient progressively decompensated from respiratory standpoint yesterday. She became hypoxic and lethargic. Rapid was called and she was transferred to ICU where she was intubated and underwent bronchoscopy with lavage due to purulent secretions and concerns for mucous plug. She was also hypothermic and placed on bear hugger. Her K had been elevated and was give insulin/dextrose and well as lasix. She remains in the ICU, intubated. No pressors.       Medical history reviewed:  History of Present Illness    Subjective:  Symptoms:  She reports shortness of breath.        History taken from: Patient and chart    Vital Signs  Temp:  [94.5 °F (34.7 °C)-100.4 °F (38 °C)] 99.2 °F (37.3 °C)  Heart Rate:  [] 89  Resp:  [16-27] 24  BP: ()/(46-89) 158/57  FiO2 (%):  [40 %-100 %] 42 %       Wt Readings from Last 1 Encounters:   05/21/23 1550 90.7 kg (200 lb)       Objective:  Vital signs: (most recent): Blood pressure 158/57, pulse 89, temperature 99.2 °F (37.3 °C), temperature source Oral, resp. rate 24, height 152.4 cm (60\"), weight 90.7 kg (200 lb), SpO2 100 %, not currently breastfeeding.              Objective:  General Appearance:  Critically ill, in icu, intubated  HEENT: NC/AT. ETT in place  Lungs:  Coarse breath sounds. On mechanical ventilation. PEEP  5   FiO2 30%  Heart: Normal rate.  Regular rhythm.  S1, S2 normal.  No murmur.   Abdomen: Abdomen is soft.  Bowel sounds are normal, no abdominal tenderness.  There is no rebound or guarding  Extremities: " 1+ chronic edema of bilateral lower extremities  Neurological: sedated, but opens eyes when her name is called.   Skin:  Warm and dry.  No rash or cyanosis.       Results Review:    Intake/Output:     Intake/Output Summary (Last 24 hours) at 5/27/2023 0729  Last data filed at 5/27/2023 0000  Gross per 24 hour   Intake 533 ml   Output 550 ml   Net -17 ml         DATA:  Radiology and Labs:  The following labs independently reviewed by me. Additional labs ordered for tomorrow a.m.  Interval notes, chart personally reviewed by me.   Old records independently reviewed showing chronic edema, history of CKD followed by Dr. Mcginnis  Discussed with patient, Dr. Valenzuela, family    Risk/ complexity of medical care/ medical decision making moderate complexity, continued hyperkalemia management    Labs:   Recent Results (from the past 24 hour(s))   Potassium    Collection Time: 05/26/23  8:27 AM    Specimen: Blood   Result Value Ref Range    Potassium 5.5 (H) 3.5 - 5.2 mmol/L   POC Glucose Once    Collection Time: 05/26/23 11:20 AM    Specimen: Blood   Result Value Ref Range    Glucose 119 70 - 130 mg/dL   Potassium    Collection Time: 05/26/23 12:08 PM    Specimen: Blood   Result Value Ref Range    Potassium 5.2 3.5 - 5.2 mmol/L   Cortisol    Collection Time: 05/26/23 12:08 PM    Specimen: Blood   Result Value Ref Range    Cortisol 12.40   mcg/dL   POC Glucose Once    Collection Time: 05/26/23  1:48 PM    Specimen: Blood   Result Value Ref Range    Glucose 169 (H) 70 - 130 mg/dL   Blood Gas, Venous -    Collection Time: 05/26/23  2:52 PM    Specimen: Venous Blood   Result Value Ref Range    Site Arterial: right brachial     pH, Venous 7.038 (C) 7.310 - 7.410 pH Units    pCO2, Venous 112.4 (H) 41.0 - 51.0 mm Hg    pO2, Venous 50.6 (H) 35.0 - 45.0 mm Hg    HCO3, Venous 30.2 (H) 22.0 - 28.0 mmol/L    Base Excess, Venous -3.8 (L) -2.0 - 2.0 mmol/L    O2 Saturation Calculated 64.3 (L) 92.0 - 99.0 %    Barometric Pressure for Blood Gas  751.9 mmHg    Modality Adult Vent     FIO2 100 %    Ventilator Mode AC     Set Mech Resp Rate 18     Rate 18 Breaths/minute    PEEP 10    POC Glucose Once    Collection Time: 05/26/23  2:53 PM    Specimen: Blood   Result Value Ref Range    Glucose 178 (H) 70 - 130 mg/dL   Procalcitonin    Collection Time: 05/26/23  3:38 PM    Specimen: Blood   Result Value Ref Range    Procalcitonin 0.17 0.00 - 0.25 ng/mL   CBC Auto Differential    Collection Time: 05/26/23  3:45 PM    Specimen: Blood   Result Value Ref Range    WBC 11.08 (H) 3.40 - 10.80 10*3/mm3    RBC 3.92 3.77 - 5.28 10*6/mm3    Hemoglobin 11.6 (L) 12.0 - 15.9 g/dL    Hematocrit 36.5 34.0 - 46.6 %    MCV 93.1 79.0 - 97.0 fL    MCH 29.6 26.6 - 33.0 pg    MCHC 31.8 31.5 - 35.7 g/dL    RDW 12.8 12.3 - 15.4 %    RDW-SD 43.2 37.0 - 54.0 fl    MPV 9.8 6.0 - 12.0 fL    Platelets 200 140 - 450 10*3/mm3    Neutrophil % 90.7 (H) 42.7 - 76.0 %    Lymphocyte % 1.1 (L) 19.6 - 45.3 %    Monocyte % 6.3 5.0 - 12.0 %    Eosinophil % 0.0 (L) 0.3 - 6.2 %    Basophil % 0.1 0.0 - 1.5 %    Immature Grans % 1.8 (H) 0.0 - 0.5 %    Neutrophils, Absolute 10.05 (H) 1.70 - 7.00 10*3/mm3    Lymphocytes, Absolute 0.12 (L) 0.70 - 3.10 10*3/mm3    Monocytes, Absolute 0.70 0.10 - 0.90 10*3/mm3    Eosinophils, Absolute 0.00 0.00 - 0.40 10*3/mm3    Basophils, Absolute 0.01 0.00 - 0.20 10*3/mm3    Immature Grans, Absolute 0.20 (H) 0.00 - 0.05 10*3/mm3    nRBC 0.1 0.0 - 0.2 /100 WBC   MRSA Screen, PCR (Inpatient) - Swab, Nares    Collection Time: 05/26/23  3:47 PM    Specimen: Nares; Swab   Result Value Ref Range    MRSA PCR No MRSA Detected No MRSA Detected   Blood Gas, Arterial -    Collection Time: 05/26/23  4:22 PM    Specimen: Arterial Blood   Result Value Ref Range    Site Arterial: left brachial     Norman's Test N/A     pH, Arterial 7.278 (C) 7.350 - 7.450 pH units    pCO2, Arterial 58.3 (C) 35.0 - 45.0 mm Hg    pO2, Arterial 96.0 80.0 - 100.0 mm Hg    HCO3, Arterial 27.3 22.0 - 28.0 mmol/L     Base Excess, Arterial -0.7 (L) 0.0 - 2.0 mmol/L    O2 Saturation Calculated 96.2 92.0 - 99.0 %    A-a DO2 0.2 mmHg    Barometric Pressure for Blood Gas 750.3 mmHg    Modality Adult Vent     FIO2 70 %    Ventilator Mode AC     Set [facility] Resp Rate 24     Rate 24 Breaths/minute    PEEP 10    Basic Metabolic Panel    Collection Time: 05/26/23  4:53 PM    Specimen: Blood   Result Value Ref Range    Glucose 173 (H) 65 - 99 mg/dL    BUN 76 (H) 8 - 23 mg/dL    Creatinine 1.96 (H) 0.57 - 1.00 mg/dL    Sodium 134 (L) 136 - 145 mmol/L    Potassium 5.7 (H) 3.5 - 5.2 mmol/L    Chloride 99 98 - 107 mmol/L    CO2 22.0 22.0 - 29.0 mmol/L    Calcium 7.4 (L) 8.6 - 10.5 mg/dL    BUN/Creatinine Ratio 38.8 (H) 7.0 - 25.0    Anion Gap 13.0 5.0 - 15.0 mmol/L    eGFR 26.1 (L) >60.0 mL/min/1.73   Duplex Venous Lower Extremity - Bilateral CAR    Collection Time: 05/26/23  5:47 PM   Result Value Ref Range    Target HR (85%) 122 bpm    Max. Pred. HR (100%) 144 bpm    Right Common Femoral Spont Y     Right Common Femoral Competent Y     Right Common Femoral Phasic Y     Right Common Femoral Compress C     Right Common Femoral Augment Y     Right Saphenofemoral Junction Compress C     Right Profunda Femoral Compress C     Right Proximal Femoral Compress C     Right Mid Femoral Spont Y     Right Mid Femoral Competent Y     Right Mid Femoral Phasic Y     Right Mid Femoral Compress C     Right Mid Femoral Augment Y     Right Distal Femoral Compress C     Right Popliteal Spont Y     Right Popliteal Competent Y     Right Popliteal Phasic Y     Right Popliteal Compress C     Right Popliteal Augment Y     Right Posterior Tibial Compress C     Right Peroneal Compress C     Right Gastronemius Compress C     Right Greater Saph AK Compress C     Right Greater Saph BK Compress C     Right Lesser Saph Compress C     Left Common Femoral Spont Y     Left Common Femoral Competent Y     Left Common Femoral Phasic Y     Left Common Femoral Compress C     Left  Common Femoral Augment Y     Left Saphenofemoral Junction Compress C     Left Profunda Femoral Compress C     Left Proximal Femoral Compress C     Left Mid Femoral Spont Y     Left Mid Femoral Competent Y     Left Mid Femoral Phasic Y     Left Mid Femoral Compress C     Left Mid Femoral Augment Y     Left Distal Femoral Compress C     Left Popliteal Spont Y     Left Popliteal Competent Y     Left Popliteal Phasic Y     Left Popliteal Compress C     Left Popliteal Augment Y     Left Posterior Tibial Compress C     Left Peroneal Compress C     Left Gastronemius Compress C     Left Greater Saph AK Compress C     Left Greater Saph BK Compress C     Left Lesser Saph Compress C    Potassium    Collection Time: 05/26/23  7:53 PM    Specimen: Blood   Result Value Ref Range    Potassium 4.6 3.5 - 5.2 mmol/L   POC Glucose Once    Collection Time: 05/26/23  9:13 PM    Specimen: Blood   Result Value Ref Range    Glucose 143 (H) 70 - 130 mg/dL   Renal Function Panel    Collection Time: 05/27/23  3:17 AM    Specimen: Blood   Result Value Ref Range    Glucose 129 (H) 65 - 99 mg/dL    BUN 69 (H) 8 - 23 mg/dL    Creatinine 1.99 (H) 0.57 - 1.00 mg/dL    Sodium 130 (L) 136 - 145 mmol/L    Potassium 3.8 3.5 - 5.2 mmol/L    Chloride 89 (L) 98 - 107 mmol/L    CO2 23.3 22.0 - 29.0 mmol/L    Calcium 7.3 (L) 8.6 - 10.5 mg/dL    Albumin 2.6 (L) 3.5 - 5.2 g/dL    Phosphorus 1.7 (C) 2.5 - 4.5 mg/dL    Anion Gap 17.7 (H) 5.0 - 15.0 mmol/L    BUN/Creatinine Ratio 34.7 (H) 7.0 - 25.0    eGFR 25.6 (L) >60.0 mL/min/1.73   Vancomycin, Random    Collection Time: 05/27/23  3:17 AM    Specimen: Blood   Result Value Ref Range    Vancomycin Random 18.90 5.00 - 40.00 mcg/mL   CBC (No Diff)    Collection Time: 05/27/23  3:17 AM    Specimen: Blood   Result Value Ref Range    WBC 19.26 (H) 3.40 - 10.80 10*3/mm3    RBC 3.98 3.77 - 5.28 10*6/mm3    Hemoglobin 12.6 12.0 - 15.9 g/dL    Hematocrit 36.4 34.0 - 46.6 %    MCV 91.5 79.0 - 97.0 fL    MCH 31.7 26.6 -  33.0 pg    MCHC 34.6 31.5 - 35.7 g/dL    RDW 13.1 12.3 - 15.4 %    RDW-SD 43.5 37.0 - 54.0 fl    MPV 10.3 6.0 - 12.0 fL    Platelets 179 140 - 450 10*3/mm3   POC Glucose Once    Collection Time: 05/27/23  7:09 AM    Specimen: Blood   Result Value Ref Range    Glucose 140 (H) 70 - 130 mg/dL       Radiology:  Pertinent radiology studies were reviewed as described above      Medications have been reviewed separately in chart overview      ASSESSMENT:  Acute kidney injury, likely hypovolemic, ARB and diuretic were contributing.  Creatinine stable today, euvolemic   Hyperkalemia from [place] and ARB, slowly better, 5.2 today  Acute hypoxic respiratory failure, pulmonary following, on CPAP  Chronic asthma with acute exacerbation, on IV steroids  Hyperkalemia, improved  Probable viral bronchitis, PCR pending  Hypertensive urgency on admission, improved  Hyponatremia, 135 today  Hypocalcemia: Check albumin  Leukocytosis from steroids  Mild proteinuria        DISCUSSION/PLAN:   Renal function remains stable, slightly above baseline, but stable.   Potassium improved now. Likely some component of cellular shift with her acidosis from yesterday  Hold lokelma  Continue off IVF  Give a dose of diuretics today, she is overloaded.   OK for PICC    Discussed with  and daughter     Recheck chemistries in a.m.      Gretchen Mcginnis MD  [facility]   Office phone number: [phone]  Answering service phone number: [phone]    05/27/23  07:29 EDT

## 2023-05-27 NOTE — PROGRESS NOTES
Loyd Huff MD                          [phone]            Patient ID:    Name:  Blanca Zhu    MRN:  [id number]    1945   77 y.o.  female            Patient Care Team:  Praneeth Valenzuela MD as PCP - General (Internal Medicine)  Jean-Claude Farnsworth MD as Consulting Physician (Hematology and Oncology)  Praneeth Valenzuela MD as Referring Physician (Internal Medicine)  Tahmina Brown MD as Consulting Physician (Rheumatology)  Gretchen Mcginnis MD as Consulting Physician (Nephrology)  Tahmina James MD as Surgeon (General Surgery)  Haile Handley MD as Consulting Physician (Gastroenterology)  Sb Power MD as Consulting Physician (Cardiology)    CC/ Reason for visit: Ac parainfluenza URTI, CHF, [place]     Subjective: Pt seen and examined this AM.  Patient remains on mechanical ventilator.  Improvement in ventilator support overnight.  Had some hyperkalemia and other electrolyte issues.  Doing better. Got Lasix yesterday.  Repeat ABG shows improvement in respiratory acidosis.  Antibiotic started     ROS: Unable to obtain due to acute illness  Objective     Vital Signs past 24hrs    BP range: BP: ()/(46-89) 158/57  Pulse range: Heart Rate:  [] 89  Resp rate range: Resp:  [16-24] 24  Temp range: Temp (24hrs), Av.9 °F (36.6 °C), Min:94.5 °F (34.7 °C), Max:100.4 °F (38 °C)      Ventilator/Non-Invasive Ventilation Settings (From admission, onward)     Start     Ordered    23 1619  NIPPV (CPAP or BIPAP)  At Bedtime As Needed-RT        Comments: May adjust pressures to best tolerance and effect.  Patient is used to using a Pap machine and may bleed in supplemental O2 to maintain saturations greater than 90% less than 95 when she is on the PAP machine.   Question Answer Comment   Type BIPAP    IPAP 18    EPAP 10        23 1618    23 2042  NIPPV (CPAP or BIPAP)  Until Discontinued,   Status:  Canceled        Comments: May adjust  pressures to best tolerance and effect.  Patient is used to using a Pap machine   Question Answer Comment   Type BIPAP    IPAP 18    EPAP 10        05/21/23 2041    05/21/23 2042  NIPPV (CPAP or BIPAP)  Until Discontinued,   Status:  Canceled        Comments: May adjust pressures to best tolerance and effect.  Patient is used to using a Pap machine and may bleed in supplemental O2 to maintain saturations greater than 90% less than 95 when she is on the PAP machine.   Question Answer Comment   Type BIPAP    IPAP 18    EPAP 10        05/21/23 2042    05/21/23 1542  NIPPV (CPAP or BIPAP)  Until Discontinued,   Status:  Canceled        Question:  Type  Answer:  BIPAP    05/21/23 1541                Vent Settings        Resp Rate (Set): 24     FiO2 (%): 42 %  PEEP/CPAP (cm H2O): 7.5 cm H20  Minute Ventilation (L/min) (Obs): 13.8 L/min  Resp Rate (Observed) Vent: 24  I:E Ratio (Set): 1:2.13  I:E Ratio (Obs): 1:2.1  PIP Observed (cm H2O): 36 cm H2O  Plateau Pressure (cm H2O): 0 cm H2O  Driving Pressure (cm H2O): -7.7 cm H2O  Device (Oxygen Therapy): ventilator FiO2 (%): 42 %     90.7 kg (200 lb); Body mass index is 39.06 kg/m².      Intake/Output Summary (Last 24 hours) at 5/27/2023 0837  Last data filed at 5/27/2023 0000  Gross per 24 hour   Intake 173 ml   Output 550 ml   Net -377 ml       PHYSICAL EXAM   Constitutional: Middle-aged obese F pt in bed, unresponsive while on the mechanical ventilator  Head: - NCAT  Eyes: No pallor.  Anicteric sclerae, EOMI.  ENMT: Endotracheal tube in place, no oral thrush. Moist MM.   NECK: Trachea midline, No thyromegaly, no palpable cervical lymphadenopathy  Heart: RRR, no murmur.  Trace pedal edema   Lungs: SAV +, Dec BS @ bases +. No wheezes/ crackles heard    Abdomen: Soft. Obese, No tenderness, guarding or rigidity. No palpable masses  Extremities: Extremities warm and well perfused. No cyanosis/ clubbing  Neuro: Unresponsive  Psych: Mood and affect -unable to obtain    PPE  recommended per [facility] infectious disease Isolation protocol for the current clinical scenario (as mentioned below) was followed.     Scheduled meds:  amLODIPine, 5 mg, Oral, Q24H  bisoprolol, 20 mg, Oral, Q24H  dextrose, 50 mL, Intravenous, Once   And  insulin regular, 10 Units, Intravenous, Once  DULoxetine, 30 mg, Oral, Daily  guaifenesin, 400 mg, Nasogastric, Q4H  heparin (porcine), 5,000 Units, Subcutaneous, Q8H  insulin glargine, 20 Units, Subcutaneous, Daily  insulin lispro, 2-9 Units, Subcutaneous, 4x Daily AC & at Bedtime  ipratropium-albuterol, 3 mL, Nebulization, 4x Daily - RT  leflunomide, 10 mg, Oral, Daily  O2, 2 L/min, Inhalation, Once  pantoprazole, 40 mg, Oral, Q AM  piperacillin-tazobactam, 3.375 g, Intravenous, Q8H  predniSONE, 20 mg, Oral, Daily With Breakfast  sodium phosphate, 30 mmol, Intravenous, Once  sodium zirconium cyclosilicate, 10 g, Oral, TID        IV meds:                      propofol, 5-50 mcg/kg/min, Last Rate: 20 mcg/kg/min (05/27/23 0828)        Data Review:      Results from last 7 days   Lab Units 05/27/23  0317 05/26/23  1953 05/26/23  1653 05/26/23  1545 05/26/23  1538 05/26/23  0827 05/26/23  0413 05/25/23  0758 05/25/23  0404 05/23/23  2111 05/23/23  0859 05/22/23  0643 05/21/23  1615   SODIUM mmol/L 130*  --  134*  --   --   --  135*  --  130*   < > 128*   < > 132*   POTASSIUM mmol/L 3.8 4.6 5.7*  --   --    < > 5.5*  5.5*   < > 5.4*   < > 6.0*   < > 4.4   CHLORIDE mmol/L 89*  --  99  --   --   --  98  --  97*   < > 93*   < > 94*   CO2 mmol/L 23.3  --  22.0  --   --   --  24.0  --  22.0   < > 28.0   < > 29.2*   BUN mg/dL 69*  --  76*  --   --   --  73*  --  65*   < > 47*   < > 19   CREATININE mg/dL 1.99*  --  1.96*  --   --   --  1.97*  --  1.96*   < > 1.87*   < > 1.11*   CALCIUM mg/dL 7.3*  --  7.4*  --   --   --  7.0*  --  7.0*   < > 8.2*   < > 8.9   BILIRUBIN mg/dL  --   --   --   --   --   --   --   --   --   --   --   --  0.7   ALK PHOS U/L  --   --   --    --   --   --   --   --   --   --   --   --  109   ALT (SGPT) U/L  --   --   --   --   --   --   --   --   --   --   --   --  10   AST (SGOT) U/L  --   --   --   --   --   --   --   --   --   --   --   --  13   GLUCOSE mg/dL 129*  --  173*  --   --   --  134*  --  71   < > 114*   < > 176*   WBC 10*3/mm3 19.26*  --   --  11.08*  --   --   --   --  12.50*   < > 13.30*   < >  --    HEMOGLOBIN g/dL 12.6  --   --  11.6*  --   --   --   --  12.6   < > 12.6   < >  --    PLATELETS 10*3/mm3 179  --   --  200  --   --   --   --  202   < > 177   < >  --    PROBNP pg/mL  --   --   --   --   --   --   --   --   --   --   --   --  720.4   PROCALCITONIN ng/mL  --   --   --   --  0.17  --   --   --   --   --  0.25  --   --     < > = values in this interval not displayed.       Lab Results   Component Value Date    CALCIUM 7.3 (L) 05/27/2023    PHOS 1.7 (C) 05/27/2023             Results from last 7 days   Lab Units 05/26/23  1622   PH, ARTERIAL pH units 7.278*   PO2 ART mm Hg 96.0   PCO2, ARTERIAL mm Hg 58.3*   HCO3 ART mmol/L 27.3        I have personally reviewed the results from the time of this admission to 5/27/2023 08:37 EDT and agree with these findings:  [x]  Laboratory  [x]  Microbiology  [x]  Radiology  []  EKG/Telemetry   [x]  Cardiology/Vascular   []  Pathology  []  Old records    ASSESSMENT   Acute on chronic hypoxic/hypercapnic respiratory failure(2L) -noninvasive ventilator dependent s/p intubation-5/26  Suspected secondary bacterial pneumonia s/p bronch - 5/26  Acute metabolic encephalopathy  Acute on chronic congestive heart failure; diastolic  Acute para influenza URTI  Acute asthma exacerbation  Acute kidney injury  Hyperkalemia  Diabetes  Pulmonary hypertension  ANAYELI on CPAP  Morbid obesity    PLAN:  Patient remains on mechanical ventilator and ventilator support has improved.  We will continue to wean down as tolerated.  We will repeat chest x-ray and ABG today.  BAL cultures are pending.  Procalcitonin remains  negative but we will continue with Zosyn for now given some thick muco purulent secretions yesterday.   Nephrology managing volume status.  Does seem to be volume overloaded today. Defer electrolyte management to them.  Will finish steroid taper for asthma exacerbation.  Continue bronchodilators for asthma.  Will likely need nocturnal noninvasive ventilator if able to get off mechanical ventilator successfully  Will start tube feeds today if unable to be extubated  Blood sugar control with subcutaneous insulin  C/w heparin subcu   Poor prognosis    CC-36 minutes not including procedures    Loyd Huff MD  5/27/2023

## 2023-05-27 NOTE — SIGNIFICANT NOTE
"   05/27/23 1642   PICC Triple Lumen 05/27/23 Right Basilic   Placement date: If unknown, DO NOT use \"Add Comment\" note/Placement time: If unknown, DO NOT use \"Add Comment\" note: 05/27/23 1639   Hand Hygiene Completed: Yes  Size (Fr): 5  Description (optional): Lot#[id number]  EXP:04-  Length (cm): 41 cm  O...   Site Assessment Clean;Dry;Intact   #1 Lumen Status Blood return noted;Capped;Flushed;Normal saline locked   #2 Lumen Status Blood return noted;Capped;Flushed;Normal saline locked   #3 Lumen Status Blood return noted;Capped;Flushed;Normal saline locked   Length angel (cm) 41 cm   Extremity Circumference (cm) 30 cm   Dressing Type Securing device;Antimicrobial dressing/disc   Dressing Status Clean;Dry;Intact   Dressing Intervention New dressing   Dressing Change Due 06/03/23   Indication/Daily Review of Necessity long-term IV access >7 days     Sharp Count Correct  3 needles, 2 guidewires, 1 scalpel    "

## 2023-05-27 NOTE — PROGRESS NOTES
DAILY PROGRESS NOTE  [facility], [facility]    2023    Patient Identification:  Name: Blanca Zhu  Age: 77 y.o.  Sex: female  :  1945  MRN: [id number]           Primary Care Physician: Praneeth Valenzuela MD    Subjective:    Interval History:    Patient remained stable in the ICU, however, failed weaning parameters today.  Having good urine output.  Low-grade fever last night otherwise vital signs stable.  Saturation 100% on 30% of FiO2.  Difficult to obtain IV access on the right upper extremity  Sodium 130, creatinine 1.9, phosphorus 1.7, WBC 19.26.  Per nurse, heart rate went up to 140, appeared to be irregular.  Respiratory culture positive for haemophilus influenza.    ROS:     No nausea, vomiting, diarrhea, constipation, chest pain, shortness of breath.    Objective:    Scheduled Meds:  amLODIPine, 5 mg, Oral, Q24H  bisoprolol, 20 mg, Oral, Q24H  chlorhexidine, 15 mL, Mouth/Throat, Q12H  dextrose, 50 mL, Intravenous, Once   And  insulin regular, 10 Units, Intravenous, Once  DULoxetine, 30 mg, Oral, Daily  guaifenesin, 400 mg, Nasogastric, Q4H  heparin (porcine), 5,000 Units, Subcutaneous, Q8H  insulin glargine, 20 Units, Subcutaneous, Daily  insulin lispro, 2-9 Units, Subcutaneous, 4x Daily AC & at Bedtime  ipratropium-albuterol, 3 mL, Nebulization, 4x Daily - RT  leflunomide, 10 mg, Oral, Daily  O2, 2 L/min, Inhalation, Once  pantoprazole, 40 mg, Oral, Q AM  piperacillin-tazobactam, 3.375 g, Intravenous, Q8H  predniSONE, 20 mg, Oral, Daily With Breakfast  sodium zirconium cyclosilicate, 10 g, Oral, TID        Continuous Infusions:  propofol, 5-50 mcg/kg/min, Last Rate: 20 mcg/kg/min (23)        PRN Meds:  •  acetaminophen  •  benzonatate  •  dextrose  •  dextrose  •  fentaNYL citrate (PF)  •  glucagon (human recombinant)  •  hydrALAZINE  •  ondansetron  •  prochlorperazine  •  sodium chloride    Intake/Output:    Intake/Output Summary  "(Last 24 hours) at 2023 1152  Last data filed at 2023 0000  Gross per 24 hour   Intake 173 ml   Output 550 ml   Net -377 ml         Exam:    T MAX 24 hrs: Temp (24hrs), Av °F (36.7 °C), Min:94.5 °F (34.7 °C), Max:100.4 °F (38 °C)    Vitals Ranges:   Temp:  [94.5 °F (34.7 °C)-100.4 °F (38 °C)] 98.3 °F (36.8 °C)  Heart Rate:  [] 91  Resp:  [16-24] 18  BP: ()/(46-89) 158/57  FiO2 (%):  [40 %-100 %] 42 %    /57   Pulse 91   Temp 98.3 °F (36.8 °C) (Oral)   Resp 18   Ht 152.4 cm (60\")   Wt 90.7 kg (200 lb)   LMP  (LMP Unknown)   SpO2 100%   BMI 39.06 kg/m²     General: Intubated, sedated, placed on the ventilator.    Neck: Supple, symmetrical, trachea midline, no adenopathy;              thyroid:  no enlargement/tenderness/nodules;              no carotid bruit or JVD  Cardiovascular: Normal rate, regular rhythm and intact distal pulses.              Exam reveals no gallop and no friction rub. No murmur heard  Pulmonary: Very diminished breath sounds, very limited air movement.  Wheezing scattered throughout both fields.  Rhonchi at bases.  No rales.    Abdominal: Soft, nontender, bowel sounds active all four quadrants,     no masses, no hepatomegaly, no splenomegaly.   Extremities:  3+ pitting edema of the lower extremity, actually this looks better than her baseline.  Brown discoloration from chronic stasis dermatitis.  Pulses: 2 + symmetric all extremities  Neurological: Patient is sleepy, arousable.  Oriented x3 when awake.  No new focal sensorimotor deficit.  Skin: Skin color, texture, normal. Turgor is decreased. No rashes or lesions         Data Review:    Results from last 7 days   Lab Units 23  0317 23  1545 23  0404   WBC 10*3/mm3 19.26* 11.08* 12.50*   HEMOGLOBIN g/dL 12.6 11.6* 12.6   HEMATOCRIT % 36.4 36.5 40.3   PLATELETS 10*3/mm3 179 200 202       Results from last 7 days   Lab Units 23  0317 23  1653 05/26/23  0827 " 05/26/23  0413 05/22/23  0643 05/21/23  1615   SODIUM mmol/L 130*  --  134*  --  135*   < > 132*   POTASSIUM mmol/L 3.8 4.6 5.7*   < > 5.5*  5.5*   < > 4.4   CHLORIDE mmol/L 89*  --  99  --  98   < > 94*   CO2 mmol/L 23.3  --  22.0  --  24.0   < > 29.2*   BUN mg/dL 69*  --  76*  --  73*   < > 19   CREATININE mg/dL 1.99*  --  1.96*  --  1.97*   < > 1.11*   CALCIUM mg/dL 7.3*  --  7.4*  --  7.0*   < > 8.9   BILIRUBIN mg/dL  --   --   --   --   --   --  0.7   ALK PHOS U/L  --   --   --   --   --   --  109   ALT (SGPT) U/L  --   --   --   --   --   --  10   AST (SGOT) U/L  --   --   --   --   --   --  13   GLUCOSE mg/dL 129*  --  173*  --  134*   < > 176*    < > = values in this interval not displayed.                 Lab Results   Lab Value Date/Time    TROPONINT 28 (H) 05/21/2023 1920    TROPONINT 25 (H) 05/21/2023 1615       Microbiology Results (last 10 days)     Procedure Component Value - Date/Time    MRSA Screen, PCR (Inpatient) - Swab, Nares [[id number]]  (Normal) Collected: 05/26/23 1547    Lab Status: Final result Specimen: Swab from Nares Updated: 05/26/23 8969     MRSA PCR No MRSA Detected    Narrative:      The negative predictive value of this diagnostic test is high and should only be used to consider de-escalating anti-MRSA therapy. A positive result may indicate colonization with MRSA and must be correlated clinically.    Respiratory Culture - Bronchial Wash, ET Suction [[id number]]  (Abnormal) Collected: 05/26/23 1438    Lab Status: Preliminary result Specimen: Bronchial Wash from ET Suction Updated: 05/27/23 1058     Respiratory Culture Heavy growth (4+) Haemophilus influenzae     Comment: This isolate is beta-lactamase NEGATIVE and are routinely susceptible to ampicillin and other beta-lactams.           Light growth (2+) The culture consists of normal respiratory hannah. This is a preliminary report; final report to follow.    Eosinophil Smear - Urine, Urine, Clean Catch [[id number]]  (Normal)  Collected: 05/25/23 0700    Lab Status: Final result Specimen: Urine, Clean Catch Updated: 05/25/23 0820     Eosinophil Smear 0 % EOS/100 Cells     Respiratory Panel PCR w/COVID-19(SARS-CoV-2) MILTON/RANDA/RINA/PAD/COR/MAD/DESTINI In-House, NP Swab in UTM/VTM, 3-4 HR TAT - Swab, Nasopharynx [[id number]]  (Abnormal) Collected: 05/23/23 1625    Lab Status: Final result Specimen: Swab from Nasopharynx Updated: 05/23/23 1756     ADENOVIRUS, PCR Not Detected     Coronavirus 229E Not Detected     Coronavirus HKU1 Not Detected     Coronavirus NL63 Not Detected     Coronavirus OC43 Not Detected     COVID19 Not Detected     Human Metapneumovirus Not Detected     Human Rhinovirus/Enterovirus Not Detected     Influenza A PCR Not Detected     Influenza B PCR Not Detected     Parainfluenza Virus 1 Not Detected     Parainfluenza Virus 2 Not Detected     Parainfluenza Virus 3 Detected     Parainfluenza Virus 4 Not Detected     RSV, PCR Not Detected     Bordetella pertussis pcr Not Detected     Bordetella parapertussis PCR Not Detected     Chlamydophila pneumoniae PCR Not Detected     Mycoplasma pneumo by PCR Not Detected    Narrative:      In the setting of a positive respiratory panel with a viral infection PLUS a negative procalcitonin without other underlying concern for bacterial infection, consider observing off antibiotics or discontinuation of antibiotics and continue supportive care. If the respiratory panel is positive for atypical bacterial infection (Bordetella pertussis, Chlamydophila pneumoniae, or Mycoplasma pneumoniae), consider antibiotic de-escalation to target atypical bacterial infection.    COVID-19 and FLU A/B PCR - Swab, Nasopharynx [[id number]]  (Normal) Collected: 05/21/23 1547    Lab Status: Final result Specimen: Swab from Nasopharynx Updated: 05/21/23 1609     COVID19 Not Detected     Influenza A PCR Not Detected     Influenza B PCR Not Detected    Narrative:      Fact sheet for providers:  https://www.fda.gov/media/872915/download    Fact sheet for patients: https://www.fda.gov/media/718252/download    Test performed by PCR.           Imaging Results (Last 7 Days)     Procedure Component Value Units Date/Time    XR Chest 1 View [[id number]] Collected: 05/26/23 1510     Updated: 05/26/23 1516    Narrative:      XR CHEST 1 VW-     HISTORY:  Status post intubation.     COMPARISON:  Chest radiograph 05/25/2023     FINDINGS:    2 views of the chest were obtained. There is a new endotracheal tube  with the tip terminating approximately 2.3 cm above the nagi. The  cardiac silhouette is enlarged. There is calcific aortic  atherosclerosis. Central pulmonary venous congestion has slightly  progressed. Interstitial opacities are not significantly changed. There  is new or increased airspace opacity at the lung bases with suspected  small layering pleural effusions. Pulmonary opacities could reflect  pulmonary edema and atelectasis with pneumonia not excluded. There is  multilevel degenerative disc disease.     This report was finalized on 5/26/2023 3:13 PM by Dr. Marcia Yang M.D.       XR Chest 1 View [[id number]] Collected: 05/25/23 2137     Updated: 05/25/23 2144    Narrative:      Portable chest radiograph     HISTORY:Respiratory distress     TECHNIQUE: Single AP portable radiograph of the chest     COMPARISON:Chest radiograph 05/21/2023       Impression:      FINDINGS AND IMPRESSION:  There is bilateral pulmonary vascular congestion with superimposed  interstitial thickening within the bilateral lungs, as before. The lungs  are hypoinflated. Cardiac silhouette is accentuated by low lung volumes.  No pneumothorax is seen. Somewhat nodular opacification overlying the  left upper lung is present, not definitely seen on prior imaging.  Further evaluation with CT chest is recommended to exclude underlying  pulmonary nodule and establish appropriate follow-up.     This report was finalized on 5/25/2023 9:40 PM  by Dr. Bhupinder Lucero M.D.       US Renal Bilateral [[id number]] Collected: 05/24/23 1303     Updated: 05/24/23 1310    Narrative:      US RENAL BILATERAL-  05/24/2023     HISTORY: Acute renal failure.     Right kidney measures 10.3 cm in length. Left kidney measures 9.8 cm in  length.     No hydronephrosis, renal masses or stones are seen. Urinary bladder is  incompletely distended.       Impression:      1. Normal bilateral renal ultrasound.     This report was finalized on 5/24/2023 1:04 PM by Dr. Elliot Mike M.D.       XR Chest 1 View [[id number]] Collected: 05/21/23 1547     Updated: 05/21/23 1551    Narrative:      XR CHEST 1 VW-     HISTORY: Female who is 77 years-old,  chest pain     TECHNIQUE: Frontal view of the chest     COMPARISON: 11/9/2019     FINDINGS: The heart size is borderline. Pulmonary vasculature is  congested. Minimal likely atelectasis in the lower lungs. No pleural  effusion or pneumothorax. No acute osseous process.       Impression:      Borderline heart size with pulmonary vascular congestion.  Minimal likely atelectasis in the lower lungs.     This report was finalized on 5/21/2023 3:48 PM by Dr. Galindo Malik M.D.               Assessment:      Acute on chronic hypoxic respiratory failure requiring ventilatory management  Probably Hemohes influenza pneumonia  Asthma exacerbation  Acute parainfluenza viral bronchitis  Hypertensive urgency  Hyponatremia  Hyperkalemia  Acute kidney injury  Diabetic nephropathy with chronic kidney disease stage IIIa  Lower extremity edema, chronic  Uncontrolled diabetes mellitus  Obstructive sleep apnea  Generalized anxiety disorder  Iron deficiency anemia  Morbid obesity  Difficult IV access       Plan:    Ventilatory management/supportive therapy per ICU team, failed weaning parameters.  Start tube feedings  Continue IV antibiotics, to cover haemophilus influenza.  Complete steroids  Continue DuoNeb mini nebs  Monitor and correct electrolytes,  renal following   Monitor renal function, renal following.  We will ask ICU team if okay with central line placement for IV access and drawing blood for labs.  Adjust insulin as needed, blood sugar fairly okay  Continue DVT/stress ulcer prophylaxis  Labs in am      Praneeth Valenzuela MD  05/27/23  12:36 EDT           I wore protective equipment throughout this patient encounter including a face mask, gloves, and protective eyewear.  Hand hygiene was performed before donning protective equipment and after removal when leaving the room.

## 2023-05-28 ENCOUNTER — APPOINTMENT (OUTPATIENT)
Dept: GENERAL RADIOLOGY | Facility: HOSPITAL | Age: 78
End: 2023-05-28
Payer: MEDICARE

## 2023-05-28 LAB
ALBUMIN SERPL-MCNC: 2.7 G/DL (ref 3.5–5.2)
ANION GAP SERPL CALCULATED.3IONS-SCNC: 16 MMOL/L (ref 5–15)
ARTERIAL PATENCY WRIST A: POSITIVE
ATMOSPHERIC PRESS: 751.4 MMHG
BACTERIA SPEC RESP CULT: ABNORMAL
BACTERIA SPEC RESP CULT: ABNORMAL
BASE EXCESS BLDA CALC-SCNC: 8.7 MMOL/L (ref 0–2)
BDY SITE: ABNORMAL
BUN SERPL-MCNC: 58 MG/DL (ref 8–23)
BUN/CREAT SERPL: 34.9 (ref 7–25)
CALCIUM SPEC-SCNC: 7.5 MG/DL (ref 8.6–10.5)
CHLORIDE SERPL-SCNC: 96 MMOL/L (ref 98–107)
CO2 SERPL-SCNC: 29 MMOL/L (ref 22–29)
CREAT SERPL-MCNC: 1.66 MG/DL (ref 0.57–1)
EGFRCR SERPLBLD CKD-EPI 2021: 31.8 ML/MIN/1.73
GLUCOSE BLDC GLUCOMTR-MCNC: 181 MG/DL (ref 70–130)
GLUCOSE BLDC GLUCOMTR-MCNC: 191 MG/DL (ref 70–130)
GLUCOSE BLDC GLUCOMTR-MCNC: 204 MG/DL (ref 70–130)
GLUCOSE BLDC GLUCOMTR-MCNC: 257 MG/DL (ref 70–130)
GLUCOSE BLDC GLUCOMTR-MCNC: 277 MG/DL (ref 70–130)
GLUCOSE SERPL-MCNC: 204 MG/DL (ref 65–99)
GRAM STN SPEC: ABNORMAL
HCO3 BLDA-SCNC: 34.6 MMOL/L (ref 22–28)
INHALED O2 CONCENTRATION: 25 %
MODALITY: ABNORMAL
O2 A-A PPRESDIFF RESPIRATORY: 0.4 MMHG
PCO2 BLDA: 51.6 MM HG (ref 35–45)
PEEP RESPIRATORY: 5 CM[H2O]
PH BLDA: 7.43 PH UNITS (ref 7.35–7.45)
PHOSPHATE SERPL-MCNC: 4.2 MG/DL (ref 2.5–4.5)
PO2 BLDA: 54.9 MM HG (ref 80–100)
POTASSIUM SERPL-SCNC: 2.9 MMOL/L (ref 3.5–5.2)
POTASSIUM SERPL-SCNC: 3.8 MMOL/L (ref 3.5–5.2)
PSV: 8 CMH2O
QT INTERVAL: 358 MS
SAO2 % BLDCOA: 88.4 % (ref 92–99)
SODIUM SERPL-SCNC: 141 MMOL/L (ref 136–145)
TOTAL RATE: 20 BREATHS/MINUTE
VENTILATOR MODE: ABNORMAL

## 2023-05-28 PROCEDURE — 25010000002 CALCIUM GLUCONATE 2-0.675 GM/100ML-% SOLUTION: Performed by: INTERNAL MEDICINE

## 2023-05-28 PROCEDURE — 25010000002 PIPERACILLIN SOD-TAZOBACTAM PER 1 G: Performed by: INTERNAL MEDICINE

## 2023-05-28 PROCEDURE — 94664 DEMO&/EVAL PT USE INHALER: CPT

## 2023-05-28 PROCEDURE — 82803 BLOOD GASES ANY COMBINATION: CPT

## 2023-05-28 PROCEDURE — 63710000001 INSULIN REGULAR HUMAN PER 5 UNITS: Performed by: INTERNAL MEDICINE

## 2023-05-28 PROCEDURE — 94003 VENT MGMT INPAT SUBQ DAY: CPT

## 2023-05-28 PROCEDURE — 25010000002 FENTANYL CITRATE (PF) 50 MCG/ML SOLUTION: Performed by: INTERNAL MEDICINE

## 2023-05-28 PROCEDURE — 71045 X-RAY EXAM CHEST 1 VIEW: CPT

## 2023-05-28 PROCEDURE — 94799 UNLISTED PULMONARY SVC/PX: CPT

## 2023-05-28 PROCEDURE — 94761 N-INVAS EAR/PLS OXIMETRY MLT: CPT

## 2023-05-28 PROCEDURE — 80069 RENAL FUNCTION PANEL: CPT | Performed by: INTERNAL MEDICINE

## 2023-05-28 PROCEDURE — 0 POTASSIUM CHLORIDE PER 2 MEQ: Performed by: INTERNAL MEDICINE

## 2023-05-28 PROCEDURE — 84132 ASSAY OF SERUM POTASSIUM: CPT | Performed by: INTERNAL MEDICINE

## 2023-05-28 PROCEDURE — 25010000002 PROPOFOL 10 MG/ML EMULSION: Performed by: INTERNAL MEDICINE

## 2023-05-28 PROCEDURE — 25010000002 FUROSEMIDE PER 20 MG: Performed by: INTERNAL MEDICINE

## 2023-05-28 PROCEDURE — 63710000001 INSULIN LISPRO (HUMAN) PER 5 UNITS: Performed by: INTERNAL MEDICINE

## 2023-05-28 PROCEDURE — 94760 N-INVAS EAR/PLS OXIMETRY 1: CPT

## 2023-05-28 PROCEDURE — 25010000002 HEPARIN (PORCINE) PER 1000 UNITS: Performed by: INTERNAL MEDICINE

## 2023-05-28 PROCEDURE — 63710000001 PREDNISONE PER 1 MG: Performed by: INTERNAL MEDICINE

## 2023-05-28 PROCEDURE — 36600 WITHDRAWAL OF ARTERIAL BLOOD: CPT

## 2023-05-28 PROCEDURE — 25010000002 CEFTRIAXONE PER 250 MG: Performed by: INTERNAL MEDICINE

## 2023-05-28 PROCEDURE — 82948 REAGENT STRIP/BLOOD GLUCOSE: CPT

## 2023-05-28 RX ORDER — NICOTINE POLACRILEX 4 MG
15 LOZENGE BUCCAL
Status: DISCONTINUED | OUTPATIENT
Start: 2023-05-28 | End: 2023-06-04 | Stop reason: HOSPADM

## 2023-05-28 RX ORDER — POTASSIUM CHLORIDE 1.5 G/1.77G
40 POWDER, FOR SOLUTION ORAL ONCE
Status: COMPLETED | OUTPATIENT
Start: 2023-05-28 | End: 2023-05-28

## 2023-05-28 RX ORDER — FUROSEMIDE 10 MG/ML
40 INJECTION INTRAMUSCULAR; INTRAVENOUS ONCE
Status: COMPLETED | OUTPATIENT
Start: 2023-05-28 | End: 2023-05-28

## 2023-05-28 RX ORDER — POTASSIUM CHLORIDE 750 MG/1
40 TABLET, FILM COATED, EXTENDED RELEASE ORAL ONCE
Status: DISCONTINUED | OUTPATIENT
Start: 2023-05-28 | End: 2023-05-28 | Stop reason: ALTCHOICE

## 2023-05-28 RX ORDER — CALCIUM GLUCONATE 20 MG/ML
2 INJECTION, SOLUTION INTRAVENOUS ONCE
Status: COMPLETED | OUTPATIENT
Start: 2023-05-28 | End: 2023-05-28

## 2023-05-28 RX ORDER — POTASSIUM CHLORIDE 29.8 MG/ML
20 INJECTION INTRAVENOUS
Status: COMPLETED | OUTPATIENT
Start: 2023-05-28 | End: 2023-05-28

## 2023-05-28 RX ORDER — DEXTROSE MONOHYDRATE 25 G/50ML
25 INJECTION, SOLUTION INTRAVENOUS
Status: DISCONTINUED | OUTPATIENT
Start: 2023-05-28 | End: 2023-06-04 | Stop reason: HOSPADM

## 2023-05-28 RX ADMIN — FENTANYL CITRATE 50 MCG: 50 INJECTION, SOLUTION INTRAMUSCULAR; INTRAVENOUS at 13:43

## 2023-05-28 RX ADMIN — INSULIN HUMAN 4 UNITS: 100 INJECTION, SOLUTION PARENTERAL at 17:23

## 2023-05-28 RX ADMIN — IPRATROPIUM BROMIDE AND ALBUTEROL SULFATE 3 ML: 2.5; .5 SOLUTION RESPIRATORY (INHALATION) at 15:30

## 2023-05-28 RX ADMIN — INSULIN GLARGINE-YFGN 20 UNITS: 100 INJECTION, SOLUTION SUBCUTANEOUS at 09:34

## 2023-05-28 RX ADMIN — IPRATROPIUM BROMIDE AND ALBUTEROL SULFATE 3 ML: 2.5; .5 SOLUTION RESPIRATORY (INHALATION) at 19:07

## 2023-05-28 RX ADMIN — HEPARIN SODIUM 5000 UNITS: 5000 INJECTION INTRAVENOUS; SUBCUTANEOUS at 21:02

## 2023-05-28 RX ADMIN — IPRATROPIUM BROMIDE AND ALBUTEROL SULFATE 3 ML: 2.5; .5 SOLUTION RESPIRATORY (INHALATION) at 06:45

## 2023-05-28 RX ADMIN — FUROSEMIDE 40 MG: 40 INJECTION, SOLUTION INTRAMUSCULAR; INTRAVENOUS at 08:46

## 2023-05-28 RX ADMIN — CALCIUM GLUCONATE 2 G: 20 INJECTION, SOLUTION INTRAVENOUS at 06:07

## 2023-05-28 RX ADMIN — PREDNISONE 20 MG: 20 TABLET ORAL at 08:47

## 2023-05-28 RX ADMIN — Medication 10 ML: at 08:50

## 2023-05-28 RX ADMIN — POTASSIUM CHLORIDE 20 MEQ: 29.8 INJECTION, SOLUTION INTRAVENOUS at 05:53

## 2023-05-28 RX ADMIN — Medication 10 ML: at 21:03

## 2023-05-28 RX ADMIN — CEFTRIAXONE SODIUM 1 G: 1 INJECTION, POWDER, FOR SOLUTION INTRAMUSCULAR; INTRAVENOUS at 08:48

## 2023-05-28 RX ADMIN — CHLORHEXIDINE GLUCONATE 15 ML: 1.2 RINSE ORAL at 21:16

## 2023-05-28 RX ADMIN — HEPARIN SODIUM 5000 UNITS: 5000 INJECTION INTRAVENOUS; SUBCUTANEOUS at 13:32

## 2023-05-28 RX ADMIN — INSULIN HUMAN 3 UNITS: 100 INJECTION, SOLUTION PARENTERAL at 11:36

## 2023-05-28 RX ADMIN — GUAIFENESIN 400 MG: 200 SOLUTION ORAL at 08:46

## 2023-05-28 RX ADMIN — Medication 10 ML: at 21:04

## 2023-05-28 RX ADMIN — POTASSIUM CHLORIDE 20 MEQ: 29.8 INJECTION, SOLUTION INTRAVENOUS at 08:48

## 2023-05-28 RX ADMIN — HEPARIN SODIUM 5000 UNITS: 5000 INJECTION INTRAVENOUS; SUBCUTANEOUS at 05:37

## 2023-05-28 RX ADMIN — TAZOBACTAM SODIUM AND PIPERACILLIN SODIUM 3.38 G: 375; 3 INJECTION, SOLUTION INTRAVENOUS at 05:38

## 2023-05-28 RX ADMIN — DULOXETINE HYDROCHLORIDE 30 MG: 30 CAPSULE, DELAYED RELEASE ORAL at 08:48

## 2023-05-28 RX ADMIN — POTASSIUM CHLORIDE 40 MEQ: 1.5 POWDER, FOR SOLUTION ORAL at 08:47

## 2023-05-28 RX ADMIN — AMLODIPINE BESYLATE 5 MG: 5 TABLET ORAL at 08:47

## 2023-05-28 RX ADMIN — INSULIN LISPRO 2 UNITS: 100 INJECTION, SOLUTION INTRAVENOUS; SUBCUTANEOUS at 06:31

## 2023-05-28 RX ADMIN — FENTANYL CITRATE 50 MCG: 50 INJECTION, SOLUTION INTRAMUSCULAR; INTRAVENOUS at 16:16

## 2023-05-28 RX ADMIN — GUAIFENESIN 400 MG: 200 SOLUTION ORAL at 13:32

## 2023-05-28 RX ADMIN — IPRATROPIUM BROMIDE AND ALBUTEROL SULFATE 3 ML: 2.5; .5 SOLUTION RESPIRATORY (INHALATION) at 10:08

## 2023-05-28 RX ADMIN — POTASSIUM CHLORIDE 20 MEQ: 29.8 INJECTION, SOLUTION INTRAVENOUS at 06:48

## 2023-05-28 RX ADMIN — CEFTRIAXONE SODIUM 1 G: 1 INJECTION, POWDER, FOR SOLUTION INTRAMUSCULAR; INTRAVENOUS at 09:34

## 2023-05-28 RX ADMIN — GUAIFENESIN 400 MG: 200 SOLUTION ORAL at 21:01

## 2023-05-28 RX ADMIN — Medication 10 ML: at 08:49

## 2023-05-28 RX ADMIN — Medication 10 ML: at 21:05

## 2023-05-28 RX ADMIN — PROPOFOL INJECTABLE EMULSION 15 MCG/KG/MIN: 10 INJECTION, EMULSION INTRAVENOUS at 05:55

## 2023-05-28 RX ADMIN — FENTANYL CITRATE 50 MCG: 50 INJECTION, SOLUTION INTRAMUSCULAR; INTRAVENOUS at 01:42

## 2023-05-28 RX ADMIN — GUAIFENESIN 400 MG: 200 SOLUTION ORAL at 05:36

## 2023-05-28 RX ADMIN — GUAIFENESIN 400 MG: 200 SOLUTION ORAL at 00:17

## 2023-05-28 RX ADMIN — LANSOPRAZOLE 30 MG: 15 TABLET, ORALLY DISINTEGRATING, DELAYED RELEASE ORAL at 05:37

## 2023-05-28 RX ADMIN — GUAIFENESIN 400 MG: 200 SOLUTION ORAL at 17:24

## 2023-05-28 RX ADMIN — PROPOFOL INJECTABLE EMULSION 10 MCG/KG/MIN: 10 INJECTION, EMULSION INTRAVENOUS at 17:17

## 2023-05-28 RX ADMIN — BISOPROLOL FUMARATE 20 MG: 5 TABLET, FILM COATED ORAL at 08:47

## 2023-05-28 RX ADMIN — LEFLUNOMIDE 10 MG: 20 TABLET ORAL at 08:46

## 2023-05-28 RX ADMIN — CHLORHEXIDINE GLUCONATE 15 ML: 1.2 RINSE ORAL at 09:38

## 2023-05-28 NOTE — CONSULTS
Nutrition Services    Patient Name:  Blanca Zhu  YOB: 1945  MRN: [id number]  Admit Date:  2023    Assessment Date:  23    Comment: Consulted for TF Assessment:  Consulted for TF Assessment. Pt had RR called  and transferred to CCU, intubated and ventilated. S/p bronch . Attempting to wean vent.   Propofol off currently.   OGT in place and TF's started of Fibersource HN @ 20 ml/hr with 50ml free water flushes q 2 hrs.    Recommend:  1. Diabetisource AC @ 20 ml/hr and increase 20 ml q 6 hrs as tolerates to goal rate of 60 ml/hr with 20 ml/hr free water flushes (or per MD recs).    Will follow per protocol.    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 Daniel Gutierrez, [facility]   •  SECTION N/A     x3   • CHOLECYSTECTOMY N/A    • COLONOSCOPY     • COLONOSCOPY W/ POLYPECTOMY N/A 2019    Enlarged folds in the antrum: biopsied; duodenal, transverse colon  "x2, splenic flexure, descending colon and sigmoid colon polyps: biopsied (path: tubular adenoma x6)-Dr. Zak De Jesus, [facility]   • ENDOSCOPY  10/11/2019    Procedure: ESOPHAGOGASTRODUODENOSCOPY with hot snare polypectomy;  Surgeon: Haile Handley MD;  Location: [facility] ENDOSCOPY;  Service: Gastroenterology   • ENDOSCOPY N/A 1/29/2020    Procedure: ESOPHAGOGASTRODUODENOSCOPY;  Surgeon: Haile Handley MD;  Location: [facility] ENDOSCOPY;  Service: Gastroenterology   • ENDOSCOPY N/A 9/9/2020    Procedure: ESOPHAGOGASTRODUODENOSCOPY WITH BIOPSIES AND COLD BIOPSY POLYPECTOMY;  Surgeon: Haile Handley MD;  Location: [facility] ENDOSCOPY;  Service: Gastroenterology;  Laterality: N/A;  pre: dyspepsia and diarrhea  post: duodenal polyp and gastritis   • MASTECTOMY Left    • MASTECTOMY WITH SENTINEL NODE BIOPSY AND AXILLARY NODE DISSECTION Left 7/3/2019    Procedure: LEFT BREAST MASTECTOMY WITH SENTINEL NODE BIOPSY AND , v-y plasty closure;  Surgeon: Tahmina James MD;  Location: [facility] MAIN OR;  Service: General   • SUBTOTAL HYSTERECTOMY Bilateral     ovaries still in tact   • TEMPORAL ARTERY BIOPSY Bilateral 12/05/2019        Encounter Information        Nutrition History:  Consulted for TF Assessment. Pt had RR called 5/26 and transferred to CCU, intubated and ventilated. S/p bronch 5/26. Attempting to wean vent. Propofol off currently. OGT in place.    Food Preferences:    Supplements:    Factors Affecting Intake: altered respiratory status     Anthropometrics        Current Height  Current Weight  BMI kg/m2 Height: 152.4 cm (60\")  Weight: 90.7 kg (200 lb) (05/21/23 1550)  Body mass index is 39.06 kg/m².   Adjusted BMI (if applicable)    BMI Category Obese, Class II (35 - 39.9)       Admission Weight Weight: 90.7 kg (200 lb)       Ideal Body Weight (IBW) 100 lb   Adjusted IBW (if applicable)        Usual Body Weight (UBW) 200-208 lb   Weight Change/Trend Stable       Weight History Wt Readings " from Last 30 Encounters:   05/21/23 1550 90.7 kg (200 lb)   05/10/23 1655 93.5 kg (206 lb 3.2 oz)   04/06/23 1216 94.8 kg (209 lb)   03/24/23 1406 94.6 kg (208 lb 9.6 oz)   03/17/23 1406 93.3 kg (205 lb 9.6 oz)   03/10/23 1428 93.3 kg (205 lb 9.6 oz)   03/01/23 1319 94.8 kg (208 lb 14.4 oz)   10/21/22 1506 96.4 kg (212 lb 8 oz)   10/03/22 1430 95.7 kg (211 lb)   07/25/22 1400 95.7 kg (211 lb)   07/20/22 1521 93.3 kg (205 lb 11.2 oz)   05/18/22 1021 95.3 kg (210 lb)   05/13/22 1542 95.3 kg (210 lb)   05/12/22 1429 95.4 kg (210 lb 6.4 oz)   03/21/22 1339 94.8 kg (209 lb)   11/16/21 1354 94.3 kg (207 lb 12.8 oz)   09/21/21 1415 94.3 kg (208 lb)   07/20/21 1503 95 kg (209 lb 6.4 oz)   06/21/21 1418 94.9 kg (209 lb 3.2 oz)   06/09/21 1400 92.1 kg (203 lb)   05/04/21 1317 92.1 kg (203 lb)   03/30/21 1505 92 kg (202 lb 14.4 oz)   10/19/20 1339 90.6 kg (199 lb 12.8 oz)   09/09/20 0713 88.9 kg (196 lb)   08/27/20 1108 90 kg (198 lb 6.4 oz)   07/02/20 0902 89.8 kg (198 lb)   06/29/20 1531 90.2 kg (198 lb 12.8 oz)   06/26/20 1404 89.8 kg (198 lb)   01/29/20 1016 84.7 kg (186 lb 11.2 oz)   12/16/19 1456 86 kg (189 lb 11.2 oz)           --  Estimated/Assessed Needs       Energy Requirements    Weight for Calculation Weight: 90.7 kg (200 lb)   Method for Estimation  18 kcal/kg, 20 kcal/kg   EST Needs (kcal/day) 3548-9941 kcals       Protein Requirements    Weight for Calculation Weight: 90.7 kg (200 lb)   EST Protein Needs (g/kg) 1.0 - 1.2 gm/kg   EST Daily Needs (g/day)  g       Fluid Requirements     Method for Estimation Defer to physician    Estimated Needs (mL/day)      Tests/Procedures        Tests/Procedures X-Ray     Labs       Pertinent Labs    Results from last 7 days   Lab Units 05/28/23  0305 05/27/23  0317 05/26/23  1953 05/26/23  1653 05/22/23  0643 05/21/23  1615   SODIUM mmol/L 141 130*  --  134*   < > 132*   POTASSIUM mmol/L 2.9* 3.8 4.6 5.7*   < > 4.4   CHLORIDE mmol/L 96* 89*  --  99   < > 94*   CO2  mmol/L 29.0 23.3  --  22.0   < > 29.2*   BUN mg/dL 58* 69*  --  76*   < > 19   CREATININE mg/dL 1.66* 1.99*  --  1.96*   < > 1.11*   CALCIUM mg/dL 7.5* 7.3*  --  7.4*   < > 8.9   BILIRUBIN mg/dL  --   --   --   --   --  0.7   ALK PHOS U/L  --   --   --   --   --  109   ALT (SGPT) U/L  --   --   --   --   --  10   AST (SGOT) U/L  --   --   --   --   --  13   GLUCOSE mg/dL 204* 129*  --  173*   < > 176*    < > = values in this interval not displayed.     Results from last 7 days   Lab Units 05/28/23  0305 05/27/23  0317   MAGNESIUM mg/dL  --  1.6   PHOSPHORUS mg/dL 4.2 1.7*   HEMOGLOBIN g/dL  --  12.6   HEMATOCRIT %  --  36.4   WBC 10*3/mm3  --  19.26*   ALBUMIN g/dL 2.7* 2.6*     Results from last 7 days   Lab Units 05/27/23  0317 05/26/23  1545 05/25/23  0404 05/24/23  0707 05/23/23  0859   PLATELETS 10*3/mm3 179 200 202 202 177     COVID19   Date Value Ref Range Status   05/23/2023 Not Detected Not Detected - Ref. Range Final     Lab Results   Component Value Date    HGBA1C 8.40 (H) 10/14/2022          Medications           Scheduled Medications amLODIPine, 5 mg, Oral, Q24H  bisoprolol, 20 mg, Oral, Q24H  [START ON 5/29/2023] cefTRIAXone, 2 g, Intravenous, Q24H  chlorhexidine, 15 mL, Mouth/Throat, Q12H  dextrose, 50 mL, Intravenous, Once   And  insulin regular, 10 Units, Intravenous, Once  DULoxetine, 30 mg, Oral, Daily  guaifenesin, 400 mg, Nasogastric, Q4H  heparin (porcine), 5,000 Units, Subcutaneous, Q8H  insulin glargine, 20 Units, Subcutaneous, Daily  insulin regular, 2-7 Units, Subcutaneous, Q6H  ipratropium-albuterol, 3 mL, Nebulization, 4x Daily - RT  lansoprazole, 30 mg, Nasogastric, Q AM  leflunomide, 10 mg, Oral, Daily  O2, 2 L/min, Inhalation, Once  sodium chloride, 10 mL, Intravenous, Q12H  sodium chloride, 10 mL, Intravenous, Q12H  sodium chloride, 10 mL, Intravenous, Q12H  sodium chloride, 10 mL, Intravenous, Q12H  sodium chloride, 10 mL, Intravenous, Q12H  sodium chloride, 10 mL, Intravenous,  Q12H       Infusions propofol, 5-50 mcg/kg/min, Last Rate: 10 mcg/kg/min (05/28/23 0628)       PRN Medications •  acetaminophen  •  benzonatate  •  dextrose  •  dextrose  •  dextrose  •  dextrose  •  fentaNYL citrate (PF)  •  glucagon (human recombinant)  •  hydrALAZINE  •  ondansetron  •  prochlorperazine  •  sodium chloride  •  sodium chloride  •  sodium chloride  •  sodium chloride  •  sodium chloride  •  sodium chloride     Physical Findings          Physical Appearance obese, sedate, ventilator support   Oral/Mouth Cavity WNL   Edema  generalized, lower extremity , upper extremity, 3+ (moderate)   Gastrointestinal normoactive, last bowel movement:5/22   Skin  skin tear   Tubes/Drains/Lines OG tube   NFPE Not indicated at this time   --  Current Nutrition Orders & Evaluation of Intake       Oral Nutrition     Food Allergies NKFA   Current PO Diet NPO Diet NPO Type: Strict NPO   Supplement n/a   PO Evaluation     % PO Intake     # of Days Evaluated    --  PES STATEMENT / NUTRITION DIAGNOSIS      Nutrition Dx Problem  Problem: Inadequate Intake/Infusion  Etiology: Factors Affecting Nutrition intubated/vent  Signs/Symptoms: NPO    Comment:    --  NUTRITION INTERVENTION / PLAN OF CARE      Intervention Goal(s) Maintain nutrition status, Nutrition support treatment, Meet estimated needs, Disease management/therapy, Initiate TF/PN, Tolerate TF/PN at goal and Appropriate weight loss         RD Intervention/Action Await initiation of EN/PN, Follow Tx Progress, Care plan reviewed and Recommend/ordered:          Prescription/Orders:       PO Diet       Supplements       Snacks       Enteral Nutrition See below      Parenteral Nutrition    New Prescription Ordered? Yes   --   Enteral Prescription:     Enteral Route OG    TF Delivery Method Continuous    Enteral Product Diabetisource AC    Modular None    Propofol Rate/Kcal Off now    TF Start Rate (mL/hr) 25 ml/hr    TF Goal Rate (mL/hr) 60 ml/hr    Free Water Flush (mL)  20 ml/hr    TF Provision at Goal: 1728 kcal, 86 gm protein, 1175mL free water + 480mL water flushes         Calories 100% needs met         Protein  96% needs met         Fluid (mL)  1655 mL total     Prescription Ordered Yes         Monitor/Evaluation Per protocol   Discharge Plan/Needs Pending clinical course   Education Education not appropriate at this time   --    RD to follow per protocol.      Electronically signed by:  Yanira Arredondo RD  05/28/23 12:47 EDT

## 2023-05-28 NOTE — PROGRESS NOTES
Loyd Huff MD                          [phone]            Patient ID:    Name:  lBanca Zhu    MRN:  [id number]    1945   77 y.o.  female            Patient Care Team:  Praneeth Valenzuela MD as PCP - General (Internal Medicine)  Jean-Claude Farnsworth MD as Consulting Physician (Hematology and Oncology)  Praneeth Valenzuela MD as Referring Physician (Internal Medicine)  Tahmina Brown MD as Consulting Physician (Rheumatology)  Gretchen Mcginnis MD as Consulting Physician (Nephrology)  Tahmina James MD as Surgeon (General Surgery)  Haile Handley MD as Consulting Physician (Gastroenterology)  Sb Power MD as Consulting Physician (Cardiology)    CC/ Reason for visit: Ac parainfluenza URTI, CHF, FEDERICA     Subjective: Pt seen and examined this AM.  Patient remains on mechanical ventilator.  Much improved respiratory support.  No further fevers.  Tolerating antibiotics.  Respiratory cultures growing haemophilus.  Diuresing well.    ROS: Unable to obtain due to acute illness  Objective     Vital Signs past 24hrs    BP range: BP: (119-158)/(46-82) 148/60  Pulse range: Heart Rate:  [] 103  Resp rate range: Resp:  [18-20] 18  Temp range: Temp (24hrs), Av.2 °F (36.8 °C), Min:97.8 °F (36.6 °C), Max:98.8 °F (37.1 °C)      Ventilator/Non-Invasive Ventilation Settings (From admission, onward)     Start     Ordered    23 1619  NIPPV (CPAP or BIPAP)  At Bedtime As Needed-RT        Comments: May adjust pressures to best tolerance and effect.  Patient is used to using a Pap machine and may bleed in supplemental O2 to maintain saturations greater than 90% less than 95 when she is on the PAP machine.   Question Answer Comment   Type BIPAP    IPAP 18    EPAP 10        23 1618    23 2042  NIPPV (CPAP or BIPAP)  Until Discontinued,   Status:  Canceled        Comments: May adjust pressures to best tolerance and effect.  Patient is used to using a Pap  machine   Question Answer Comment   Type BIPAP    IPAP 18    EPAP 10        05/21/23 2041    05/21/23 2042  NIPPV (CPAP or BIPAP)  Until Discontinued,   Status:  Canceled        Comments: May adjust pressures to best tolerance and effect.  Patient is used to using a Pap machine and may bleed in supplemental O2 to maintain saturations greater than 90% less than 95 when she is on the PAP machine.   Question Answer Comment   Type BIPAP    IPAP 18    EPAP 10        05/21/23 2042    05/21/23 1542  NIPPV (CPAP or BIPAP)  Until Discontinued,   Status:  Canceled        Question:  Type  Answer:  BIPAP    05/21/23 1541                Vent Settings        Resp Rate (Set): 18  Pressure Support (cm H2O): 8 cm H20  FiO2 (%): 26 %  PEEP/CPAP (cm H2O): 5 cm H20  Minute Ventilation (L/min) (Obs): 9.66 L/min  Resp Rate (Observed) Vent: 18  I:E Ratio (Set): 1:2.70  I:E Ratio (Obs): 1:2.7  PIP Observed (cm H2O): 26 cm H2O  Plateau Pressure (cm H2O): 24 cm H2O  Driving Pressure (cm H2O): 18.9 cm H2O  Device (Oxygen Therapy): ventilator FiO2 (%): 26 %     90.7 kg (200 lb); Body mass index is 39.06 kg/m².      Intake/Output Summary (Last 24 hours) at 5/28/2023 1055  Last data filed at 5/28/2023 0934  Gross per 24 hour   Intake 721.77 ml   Output 3250 ml   Net -2528.23 ml       PHYSICAL EXAM   Constitutional: Middle-aged obese F pt in bed, opens eyes and nodding to questions while on the mechanical ventilator  Head: - NCAT  Eyes: No pallor.  Anicteric sclerae, EOMI.  ENMT: Endotracheal tube in place, no oral thrush. Moist MM.   NECK: Trachea midline, No thyromegaly, no palpable cervical lymphadenopathy  Heart: RRR, no murmur.  Trace pedal edema   Lungs: SAV +, Dec BS @ bases +. No wheezes/ crackles heard    Abdomen: Soft. Obese, No tenderness, guarding or rigidity. No palpable masses  Extremities: Extremities warm and well perfused. No cyanosis/ clubbing  Neuro: Awake, moving all extremities  Psych: Mood and affect -unable to obtain    PPE  recommended per [facility] infectious disease Isolation protocol for the current clinical scenario (as mentioned below) was followed.     Scheduled meds:  amLODIPine, 5 mg, Oral, Q24H  bisoprolol, 20 mg, Oral, Q24H  [START ON 5/29/2023] cefTRIAXone, 2 g, Intravenous, Q24H  chlorhexidine, 15 mL, Mouth/Throat, Q12H  dextrose, 50 mL, Intravenous, Once   And  insulin regular, 10 Units, Intravenous, Once  DULoxetine, 30 mg, Oral, Daily  guaifenesin, 400 mg, Nasogastric, Q4H  heparin (porcine), 5,000 Units, Subcutaneous, Q8H  insulin glargine, 20 Units, Subcutaneous, Daily  insulin lispro, 2-9 Units, Subcutaneous, 4x Daily AC & at Bedtime  ipratropium-albuterol, 3 mL, Nebulization, 4x Daily - RT  lansoprazole, 30 mg, Nasogastric, Q AM  leflunomide, 10 mg, Oral, Daily  O2, 2 L/min, Inhalation, Once  sodium chloride, 10 mL, Intravenous, Q12H  sodium chloride, 10 mL, Intravenous, Q12H  sodium chloride, 10 mL, Intravenous, Q12H  sodium chloride, 10 mL, Intravenous, Q12H  sodium chloride, 10 mL, Intravenous, Q12H  sodium chloride, 10 mL, Intravenous, Q12H        IV meds:                      propofol, 5-50 mcg/kg/min, Last Rate: 10 mcg/kg/min (05/28/23 0628)        Data Review:      Results from last 7 days   Lab Units 05/28/23  0305 05/27/23  0317 05/26/23  1953 05/26/23  1653 05/26/23  1545 05/26/23  1538 05/25/23  0758 05/25/23  0404 05/23/23  2111 05/23/23  0859 05/22/23  0643 05/21/23  1615   SODIUM mmol/L 141 130*  --  134*  --   --    < > 130*   < > 128*   < > 132*   POTASSIUM mmol/L 2.9* 3.8 4.6 5.7*  --   --    < > 5.4*   < > 6.0*   < > 4.4   CHLORIDE mmol/L 96* 89*  --  99  --   --    < > 97*   < > 93*   < > 94*   CO2 mmol/L 29.0 23.3  --  22.0  --   --    < > 22.0   < > 28.0   < > 29.2*   BUN mg/dL 58* 69*  --  76*  --   --    < > 65*   < > 47*   < > 19   CREATININE mg/dL 1.66* 1.99*  --  1.96*  --   --    < > 1.96*   < > 1.87*   < > 1.11*   CALCIUM mg/dL 7.5* 7.3*  --  7.4*  --   --    < > 7.0*   < > 8.2*   <  > 8.9   BILIRUBIN mg/dL  --   --   --   --   --   --   --   --   --   --   --  0.7   ALK PHOS U/L  --   --   --   --   --   --   --   --   --   --   --  109   ALT (SGPT) U/L  --   --   --   --   --   --   --   --   --   --   --  10   AST (SGOT) U/L  --   --   --   --   --   --   --   --   --   --   --  13   GLUCOSE mg/dL 204* 129*  --  173*  --   --    < > 71   < > 114*   < > 176*   WBC 10*3/mm3  --  19.26*  --   --  11.08*  --   --  12.50*   < > 13.30*   < >  --    HEMOGLOBIN g/dL  --  12.6  --   --  11.6*  --   --  12.6   < > 12.6   < >  --    PLATELETS 10*3/mm3  --  179  --   --  200  --   --  202   < > 177   < >  --    PROBNP pg/mL  --   --   --   --   --   --   --   --   --   --   --  720.4   PROCALCITONIN ng/mL  --   --   --   --   --  0.17  --   --   --  0.25  --   --     < > = values in this interval not displayed.       Lab Results   Component Value Date    CALCIUM 7.5 (L) 05/28/2023    PHOS 4.2 05/28/2023       Results from last 7 days   Lab Units 05/26/23  1438   RESPCX  Heavy growth (4+) Haemophilus influenzae*  Light growth (2+) Normal respiratory hananh. No S. aureus or Pseudomonas aeruginosa detected. Final report.       Results from last 7 days   Lab Units 05/28/23  0851 05/27/23  0916 05/26/23  1622   PH, ARTERIAL pH units 7.435 7.413 7.278*   PO2 ART mm Hg 54.9* 69.0* 96.0   PCO2, ARTERIAL mm Hg 51.6* 44.9 58.3*   HCO3 ART mmol/L 34.6* 28.7* 27.3        I have personally reviewed the results from the time of this admission to 5/28/2023 10:55 EDT and agree with these findings:  [x]  Laboratory  [x]  Microbiology  [x]  Radiology  []  EKG/Telemetry   [x]  Cardiology/Vascular   []  Pathology  []  Old records    ASSESSMENT   Acute on chronic hypoxic/hypercapnic respiratory failure(2L) -noninvasive ventilator dependent s/p intubation-5/26  Haemophilus pneumonia s/p bronch - 5/26  Acute metabolic encephalopathy  Acute on chronic congestive heart failure; diastolic  Acute para influenza URTI  Acute asthma  exacerbation  Acute kidney injury  Hyperkalemia  Diabetes  Pulmonary hypertension  ANAYELI on CPAP  Morbid obesity    PLAN:  Patient remains on mechanical ventilator and ventilator support has improved.  We will continue to wean down as tolerated.  SBT this AM.  Continues to be volume overloaded and we will diurese before considering further ventilator weaning.  Adjust antibiotics for BAL cultures growing haemophilus now.  Nephrology managing volume status. Defer electrolyte management to them.  Will finish steroid taper for asthma exacerbation.  Continue bronchodilators for asthma.  Will likely need nocturnal noninvasive ventilator if able to get off mechanical ventilator successfully  Continue with tube feeds  Blood sugar control with subcutaneous insulin  C/w heparin subcu   Guarded prognosis    CC-36 minutes not including procedures    Loyd Huff MD  5/28/2023

## 2023-05-28 NOTE — PROGRESS NOTES
[facility]     Progress Note    Patient Name: Blanca Zhu  : 1945  MRN: [id number]  Primary Care Physician:  Praneeth Valenzuela MD  Date of admission: 2023    Subjective   Subjective     Chief Complaint: hyperkalemia and federica    History of Present Illness  Patient ith htn, copd, with federica and hyperkalemia    Review of Systems    Objective   Objective     Vitals:   Temp:  [97.8 °F (36.6 °C)-98.8 °F (37.1 °C)] 97.8 °F (36.6 °C)  Heart Rate:  [] 86  Resp:  [18-20] 18  BP: (119-175)/(46-82) 158/65  FiO2 (%):  [30 %-42 %] 31 %    Physical Exam   General Appearance:  In no acute distress.    HEENT: Normal HEENT exam.     Extremities: .  2+ dependent edema.    Neurological: Patient is sedate on vent    Result Review    Result Review:  I have personally reviewed the results from the time of this admission to 2023 07:28 EDT and agree with these findings:  []  Laboratory list / accordion  []  Microbiology  []  Radiology  []  EKG/Telemetry   []  Cardiology/Vascular   []  Pathology  []  Old records  []  Other:        Assessment & Plan   Assessment / Plan     Brief Patient Summary:  Blanca Zhu is a 77 y.o. female who has federica and hyperkalemia    Active Hospital Problems:  Active Hospital Problems    Diagnosis    • **Respiratory distress      Plan:   Acute kidney injury, likely hypovolemic, ARB and diuretic were contributing.  Creatinine stable today, euvolemic   Hyperkalemia from FEDERICA and ARB, slowly better, 5.2 today  Acute hypoxic respiratory failure, pulmonary following, on CPAP  Chronic asthma with acute exacerbation, on IV steroids  Hyperkalemia, improved  Probable viral bronchitis, PCR pending  Hypertensive urgency on admission, improved  Hyponatremia, 135 today  Hypocalcemia: Check albumin  Leukocytosis from steroids  Mild proteinuria    Patient seen and examined. Labs and chart reviewed. Renal function improved with cr of 1.6.  Edema noted. On iv lasix.  Potassium now low. D/w  nurse. Ok to replace per protocol. D/w family. Will follow      Yas Bustos MD

## 2023-05-28 NOTE — SIGNIFICANT NOTE
05/28/23 0645   Readiness to Wean Daily Screen   Daily Screen Complete Y   Daily Screen Outcome   (placed pt on PS 8 at this time)

## 2023-05-28 NOTE — PLAN OF CARE
Problem: Aspiration (Enteral Nutrition)  Goal: Absence of Aspiration Signs and Symptoms  Outcome: Ongoing, Progressing     Problem: Device-Related Complication Risk (Enteral Nutrition)  Goal: Safe, Effective Therapy Delivery  Outcome: Ongoing, Progressing     Problem: Feeding Intolerance (Enteral Nutrition)  Goal: Feeding Tolerance  Outcome: Ongoing, Progressing   Goal Outcome Evaluation:   Start Diabetisource AC @ 20 ml/hr and increase 20ml q 6 hrs as tolerates to goal rate of 60 ml/hr with 20 ml/hr free water flushes (or per MD recs).

## 2023-05-28 NOTE — PROGRESS NOTES
DAILY PROGRESS NOTE  [facility], [facility]    2023    Patient Identification:  Name: Blanca Zhu  Age: 77 y.o.  Sex: female  :  1945  MRN: [id number]           Primary Care Physician: Praneeth Valenzuela MD    Subjective:    Interval History:    Patient is having weaning trial at this time, probably to be extubated this morning.  Saturation is 100% on 30% FiO2.  On the ventilator.  Afebrile since yesterday.  Appreciate all consult recommendation  Potassium today is 2.9, creatinine 1.66, renal following, electrolytes on replacement protocol.  On antibiotics for haemophilus influenza pneumonia.    ROS:     No nausea, vomiting, diarrhea, constipation, chest pain, shortness of breath.    Objective:    Scheduled Meds:  amLODIPine, 5 mg, Oral, Q24H  bisoprolol, 20 mg, Oral, Q24H  cefTRIAXone, 1 g, Intravenous, Q24H  chlorhexidine, 15 mL, Mouth/Throat, Q12H  dextrose, 50 mL, Intravenous, Once   And  insulin regular, 10 Units, Intravenous, Once  DULoxetine, 30 mg, Oral, Daily  furosemide, 40 mg, Intravenous, Once  guaifenesin, 400 mg, Nasogastric, Q4H  heparin (porcine), 5,000 Units, Subcutaneous, Q8H  insulin glargine, 20 Units, Subcutaneous, Daily  insulin lispro, 2-9 Units, Subcutaneous, 4x Daily AC & at Bedtime  ipratropium-albuterol, 3 mL, Nebulization, 4x Daily - RT  lansoprazole, 30 mg, Nasogastric, Q AM  leflunomide, 10 mg, Oral, Daily  O2, 2 L/min, Inhalation, Once  potassium chloride, 40 mEq, Oral, Once  potassium chloride, 20 mEq, Intravenous, Q1H  predniSONE, 20 mg, Oral, Daily With Breakfast  sodium chloride, 10 mL, Intravenous, Q12H  sodium chloride, 10 mL, Intravenous, Q12H  sodium chloride, 10 mL, Intravenous, Q12H  sodium chloride, 10 mL, Intravenous, Q12H  sodium chloride, 10 mL, Intravenous, Q12H  sodium chloride, 10 mL, Intravenous, Q12H        Continuous Infusions:  propofol, 5-50 mcg/kg/min, Last Rate: 10 mcg/kg/min (23)        PRN  "Meds:  •  acetaminophen  •  benzonatate  •  dextrose  •  dextrose  •  fentaNYL citrate (PF)  •  glucagon (human recombinant)  •  hydrALAZINE  •  ondansetron  •  prochlorperazine  •  sodium chloride  •  sodium chloride  •  sodium chloride  •  sodium chloride  •  sodium chloride  •  sodium chloride    Intake/Output:    Intake/Output Summary (Last 24 hours) at 2023 0846  Last data filed at 2023 0700  Gross per 24 hour   Intake 471.77 ml   Output 2500 ml   Net -2028.23 ml         Exam:    T MAX 24 hrs: Temp (24hrs), Av.2 °F (36.8 °C), Min:97.8 °F (36.6 °C), Max:98.8 °F (37.1 °C)    Vitals Ranges:   Temp:  [97.8 °F (36.6 °C)-98.8 °F (37.1 °C)] 98.3 °F (36.8 °C)  Heart Rate:  [] 98  Resp:  [18-20] 18  BP: (119-158)/(46-82) 149/52  FiO2 (%):  [30 %-42 %] 31 %    /52   Pulse 98   Temp 98.3 °F (36.8 °C) (Oral)   Resp 18   Ht 152.4 cm (60\")   Wt 90.7 kg (200 lb)   LMP  (LMP Unknown)   SpO2 94%   BMI 39.06 kg/m²     General: Intubated, awake, answer questions, following commands.    Neck: Supple, symmetrical, trachea midline, no adenopathy;              thyroid:  no enlargement/tenderness/nodules;              no carotid bruit or JVD  Cardiovascular: Normal rate, regular rhythm and intact distal pulses.              Exam reveals no gallop and no friction rub. No murmur heard  Pulmonary: Very diminished breath sounds, very limited air movement.  Wheezing scattered throughout both fields.  Rhonchi at bases.  No rales.    Abdominal: Soft, nontender, bowel sounds active all four quadrants,     no masses, no hepatomegaly, no splenomegaly.   Extremities:  3+ pitting edema of the lower extremity, actually this looks better than her baseline.  Brown discoloration from chronic stasis dermatitis.  Pulses: 2 + symmetric all extremities  Neurological: Patient is intubated, but awake, follow commands, answer questions. No new focal sensorimotor deficit.  Skin: Skin color, texture, normal. Turgor is " decreased. No rashes or lesions         Data Review:    Results from last 7 days   Lab Units 05/27/23  0317 05/26/23  1545 05/25/23  0404   WBC 10*3/mm3 19.26* 11.08* 12.50*   HEMOGLOBIN g/dL 12.6 11.6* 12.6   HEMATOCRIT % 36.4 36.5 40.3   PLATELETS 10*3/mm3 179 200 202       Results from last 7 days   Lab Units 05/28/23  0305 05/27/23  0317 05/26/23  1953 05/26/23  1653 05/22/23  0643 05/21/23  1615   SODIUM mmol/L 141 130*  --  134*   < > 132*   POTASSIUM mmol/L 2.9* 3.8 4.6 5.7*   < > 4.4   CHLORIDE mmol/L 96* 89*  --  99   < > 94*   CO2 mmol/L 29.0 23.3  --  22.0   < > 29.2*   BUN mg/dL 58* 69*  --  76*   < > 19   CREATININE mg/dL 1.66* 1.99*  --  1.96*   < > 1.11*   CALCIUM mg/dL 7.5* 7.3*  --  7.4*   < > 8.9   BILIRUBIN mg/dL  --   --   --   --   --  0.7   ALK PHOS U/L  --   --   --   --   --  109   ALT (SGPT) U/L  --   --   --   --   --  10   AST (SGOT) U/L  --   --   --   --   --  13   GLUCOSE mg/dL 204* 129*  --  173*   < > 176*    < > = values in this interval not displayed.                 Lab Results   Lab Value Date/Time    TROPONINT 28 (H) 05/21/2023 1920    TROPONINT 25 (H) 05/21/2023 1615       Microbiology Results (last 10 days)     Procedure Component Value - Date/Time    MRSA Screen, PCR (Inpatient) - Swab, Nares [[id number]]  (Normal) Collected: 05/26/23 1547    Lab Status: Final result Specimen: Swab from Nares Updated: 05/26/23 1739     MRSA PCR No MRSA Detected    Narrative:      The negative predictive value of this diagnostic test is high and should only be used to consider de-escalating anti-MRSA therapy. A positive result may indicate colonization with MRSA and must be correlated clinically.    Respiratory Culture - Bronchial Wash, ET Suction [[id number]]  (Abnormal) Collected: 05/26/23 1438    Lab Status: Preliminary result Specimen: Bronchial Wash from ET Suction Updated: 05/27/23 1720     Respiratory Culture Heavy growth (4+) Haemophilus influenzae     Comment: This isolate is  beta-lactamase NEGATIVE and are routinely susceptible to ampicillin and other beta-lactams.           Light growth (2+) The culture consists of normal respiratory hannah. This is a preliminary report; final report to follow.     Gram Stain Moderate (3+) WBCs seen      No epithelial cells seen      Moderate (3+) Gram negative coccobacilli      Mixed bacterial morphotypes seen on Gram Stain    Eosinophil Smear - Urine, Urine, Clean Catch [[id number]]  (Normal) Collected: 05/25/23 0700    Lab Status: Final result Specimen: Urine, Clean Catch Updated: 05/25/23 0820     Eosinophil Smear 0 % EOS/100 Cells     Respiratory Panel PCR w/COVID-19(SARS-CoV-2) [facility]/RANDA/RINA/PAD/COR/MAD/DESTINI In-House, NP Swab in UTM/VTM, 3-4 HR TAT - Swab, Nasopharynx [[id number]]  (Abnormal) Collected: 05/23/23 1625    Lab Status: Final result Specimen: Swab from Nasopharynx Updated: 05/23/23 1756     ADENOVIRUS, PCR Not Detected     Coronavirus 229E Not Detected     Coronavirus HKU1 Not Detected     Coronavirus NL63 Not Detected     Coronavirus OC43 Not Detected     COVID19 Not Detected     Human Metapneumovirus Not Detected     Human Rhinovirus/Enterovirus Not Detected     Influenza A PCR Not Detected     Influenza B PCR Not Detected     Parainfluenza Virus 1 Not Detected     Parainfluenza Virus 2 Not Detected     Parainfluenza Virus 3 Detected     Parainfluenza Virus 4 Not Detected     RSV, PCR Not Detected     Bordetella pertussis pcr Not Detected     Bordetella parapertussis PCR Not Detected     Chlamydophila pneumoniae PCR Not Detected     Mycoplasma pneumo by PCR Not Detected    Narrative:      In the setting of a positive respiratory panel with a viral infection PLUS a negative procalcitonin without other underlying concern for bacterial infection, consider observing off antibiotics or discontinuation of antibiotics and continue supportive care. If the respiratory panel is positive for atypical bacterial infection (Bordetella pertussis,  Chlamydophila pneumoniae, or Mycoplasma pneumoniae), consider antibiotic de-escalation to target atypical bacterial infection.    COVID-19 and FLU A/B PCR - Swab, Nasopharynx [[id number]]  (Normal) Collected: 05/21/23 1547    Lab Status: Final result Specimen: Swab from Nasopharynx Updated: 05/21/23 1609     COVID19 Not Detected     Influenza A PCR Not Detected     Influenza B PCR Not Detected    Narrative:      Fact sheet for providers: https://www.fda.gov/media/488921/download    Fact sheet for patients: https://www.fda.gov/media/172778/download    Test performed by PCR.           Imaging Results (Last 7 Days)     Procedure Component Value Units Date/Time    XR Chest 1 View [[id number]] Collected: 05/28/23 0558     Updated: 05/28/23 0602    Narrative:      SINGLE VIEW OF THE CHEST     HISTORY: Respiratory failure     COMPARISON: 05/26/2023     FINDINGS:  Right-sided PICC appears to terminate within the superior vena cava.  Weighted enteric feeding tube extends into the upper abdomen.  Endotracheal tube terminates in satisfactory position. Cardiomegaly is  present. There is vascular congestion. There is elevation of the right  hemidiaphragm. Bibasilar consolidation and bilateral effusions are  unchanged.       Impression:      Persistent vascular congestion and bibasilar consolidation.     This report was finalized on 5/28/2023 5:59 AM by Dr. Tanya Prince M.D.       XR Abdomen KUB [[id number]] Collected: 05/27/23 1434     Updated: 05/27/23 1439    Narrative:      PROCEDURE:  XR ABDOMEN KUB-     HISTORY: Tube placement.     COMPARISON: Chest radiograph 05/26/2023     FINDINGS: A single view of the upper abdomen was obtained. There is a  new enteric tube with the tip projecting over the expected location of  the gastric antrum or proximal portion of the duodenum. Correlate for  desired positioning within the stomach or small bowel. There is oral  contrast within portions of the colon. There are no dilated small  bowel  loops. There is multilevel degenerative disc disease.     This report was finalized on 5/27/2023 2:36 PM by Dr. Marcia Yang M.D.       XR Chest 1 View [[id number]] Collected: 05/26/23 1510     Updated: 05/26/23 1516    Narrative:      XR CHEST 1 VW-     HISTORY:  Status post intubation.     COMPARISON:  Chest radiograph 05/25/2023     FINDINGS:    2 views of the chest were obtained. There is a new endotracheal tube  with the tip terminating approximately 2.3 cm above the nagi. The  cardiac silhouette is enlarged. There is calcific aortic  atherosclerosis. Central pulmonary venous congestion has slightly  progressed. Interstitial opacities are not significantly changed. There  is new or increased airspace opacity at the lung bases with suspected  small layering pleural effusions. Pulmonary opacities could reflect  pulmonary edema and atelectasis with pneumonia not excluded. There is  multilevel degenerative disc disease.     This report was finalized on 5/26/2023 3:13 PM by Dr. Marcia Yang M.D.       XR Chest 1 View [[id number]] Collected: 05/25/23 2137     Updated: 05/25/23 2144    Narrative:      Portable chest radiograph     HISTORY:Respiratory distress     TECHNIQUE: Single AP portable radiograph of the chest     COMPARISON:Chest radiograph 05/21/2023       Impression:      FINDINGS AND IMPRESSION:  There is bilateral pulmonary vascular congestion with superimposed  interstitial thickening within the bilateral lungs, as before. The lungs  are hypoinflated. Cardiac silhouette is accentuated by low lung volumes.  No pneumothorax is seen. Somewhat nodular opacification overlying the  left upper lung is present, not definitely seen on prior imaging.  Further evaluation with CT chest is recommended to exclude underlying  pulmonary nodule and establish appropriate follow-up.     This report was finalized on 5/25/2023 9:40 PM by Dr. Bhupinder Lucero M.D.       US Renal Bilateral [[id number]] Collected: 05/24/23  1303     Updated: 05/24/23 1310    Narrative:      US RENAL BILATERAL-  05/24/2023     HISTORY: Acute renal failure.     Right kidney measures 10.3 cm in length. Left kidney measures 9.8 cm in  length.     No hydronephrosis, renal masses or stones are seen. Urinary bladder is  incompletely distended.       Impression:      1. Normal bilateral renal ultrasound.     This report was finalized on 5/24/2023 1:04 PM by Dr. Elliot Mike M.D.       XR Chest 1 View [[id number]] Collected: 05/21/23 1547     Updated: 05/21/23 1551    Narrative:      XR CHEST 1 VW-     HISTORY: Female who is 77 years-old,  chest pain     TECHNIQUE: Frontal view of the chest     COMPARISON: 11/9/2019     FINDINGS: The heart size is borderline. Pulmonary vasculature is  congested. Minimal likely atelectasis in the lower lungs. No pleural  effusion or pneumothorax. No acute osseous process.       Impression:      Borderline heart size with pulmonary vascular congestion.  Minimal likely atelectasis in the lower lungs.     This report was finalized on 5/21/2023 3:48 PM by Dr. Galindo Malik M.D.               Assessment:      Acute on chronic hypoxic respiratory failure requiring ventilatory management  Haemophilus influenza pneumonia  Asthma exacerbation  Acute parainfluenza viral bronchitis  Hypertensive urgency  Hyponatremia  Hyperkalemia  Acute kidney injury  Diabetic nephropathy with chronic kidney disease stage IIIa  Lower extremity edema, chronic  Uncontrolled diabetes mellitus  Obstructive sleep apnea  Generalized anxiety disorder  Iron deficiency anemia  Morbid obesity  Difficult IV access       Plan:    Ventilatory management/supportive therapy per ICU team, weaning trial per pulmonary/ICU team.  Continue  tube feedings in the meantime  Continue IV antibiotics, to cover haemophilus influenza.  Continue DuoNeb mini nebs  Monitor and correct electrolytes, potassium again low today, to replace.    Monitor renal function, improving  renal following.  Adjust insulin as needed, blood sugar fairly okay  Continue DVT/stress ulcer prophylaxis  Labs in am      Praneeth Valenzuela MD  05/28/23  08:51 EDT             I wore protective equipment throughout this patient encounter including a face mask, gloves, and protective eyewear.  Hand hygiene was performed before donning protective equipment and after removal when leaving the room.

## 2023-05-29 ENCOUNTER — APPOINTMENT (OUTPATIENT)
Dept: GENERAL RADIOLOGY | Facility: HOSPITAL | Age: 78
End: 2023-05-29
Payer: MEDICARE

## 2023-05-29 LAB
ALBUMIN SERPL-MCNC: 2.6 G/DL (ref 3.5–5.2)
ANION GAP SERPL CALCULATED.3IONS-SCNC: 13.1 MMOL/L (ref 5–15)
ATMOSPHERIC PRESS: 747.7 MMHG
BASE EXCESS BLDV CALC-SCNC: 11.4 MMOL/L (ref -2–2)
BDY SITE: ABNORMAL
BUN SERPL-MCNC: 47 MG/DL (ref 8–23)
BUN/CREAT SERPL: 30.1 (ref 7–25)
CALCIUM SPEC-SCNC: 8.5 MG/DL (ref 8.6–10.5)
CHLORIDE SERPL-SCNC: 98 MMOL/L (ref 98–107)
CO2 SERPL-SCNC: 32.9 MMOL/L (ref 22–29)
CREAT SERPL-MCNC: 1.56 MG/DL (ref 0.57–1)
DEPRECATED RDW RBC AUTO: 45.5 FL (ref 37–54)
EGFRCR SERPLBLD CKD-EPI 2021: 34.3 ML/MIN/1.73
ERYTHROCYTE [DISTWIDTH] IN BLOOD BY AUTOMATED COUNT: 13.4 % (ref 12.3–15.4)
GLUCOSE BLDC GLUCOMTR-MCNC: 174 MG/DL (ref 70–130)
GLUCOSE BLDC GLUCOMTR-MCNC: 304 MG/DL (ref 70–130)
GLUCOSE BLDC GLUCOMTR-MCNC: 379 MG/DL (ref 70–130)
GLUCOSE SERPL-MCNC: 332 MG/DL (ref 65–99)
HCO3 BLDV-SCNC: 37.9 MMOL/L (ref 22–28)
HCT VFR BLD AUTO: 34.1 % (ref 34–46.6)
HGB BLD-MCNC: 11 G/DL (ref 12–15.9)
INHALED O2 CONCENTRATION: 30 %
MCH RBC QN AUTO: 30.2 PG (ref 26.6–33)
MCHC RBC AUTO-ENTMCNC: 32.3 G/DL (ref 31.5–35.7)
MCV RBC AUTO: 93.7 FL (ref 79–97)
MODALITY: ABNORMAL
PCO2 BLDV: 56.4 MM HG (ref 41–51)
PEEP RESPIRATORY: 5 CM[H2O]
PH BLDV: 7.43 PH UNITS (ref 7.31–7.41)
PHOSPHATE SERPL-MCNC: 1.7 MG/DL (ref 2.5–4.5)
PLATELET # BLD AUTO: 166 10*3/MM3 (ref 140–450)
PMV BLD AUTO: 10.2 FL (ref 6–12)
PO2 BLDV: 42.3 MM HG (ref 35–45)
POTASSIUM SERPL-SCNC: 3.3 MMOL/L (ref 3.5–5.2)
POTASSIUM SERPL-SCNC: 3.3 MMOL/L (ref 3.5–5.2)
PSV: 8 CMH2O
RBC # BLD AUTO: 3.64 10*6/MM3 (ref 3.77–5.28)
SAO2 % BLDCOA: 77.8 % (ref 92–99)
SODIUM SERPL-SCNC: 144 MMOL/L (ref 136–145)
TOTAL RATE: 22 BREATHS/MINUTE
VENTILATOR MODE: ABNORMAL
WBC NRBC COR # BLD: 15.42 10*3/MM3 (ref 3.4–10.8)

## 2023-05-29 PROCEDURE — 82803 BLOOD GASES ANY COMBINATION: CPT

## 2023-05-29 PROCEDURE — 25010000002 FENTANYL CITRATE (PF) 50 MCG/ML SOLUTION: Performed by: INTERNAL MEDICINE

## 2023-05-29 PROCEDURE — 25010000002 PROPOFOL 10 MG/ML EMULSION: Performed by: INTERNAL MEDICINE

## 2023-05-29 PROCEDURE — 25010000002 HEPARIN (PORCINE) PER 1000 UNITS: Performed by: INTERNAL MEDICINE

## 2023-05-29 PROCEDURE — 94799 UNLISTED PULMONARY SVC/PX: CPT

## 2023-05-29 PROCEDURE — 94760 N-INVAS EAR/PLS OXIMETRY 1: CPT

## 2023-05-29 PROCEDURE — 80069 RENAL FUNCTION PANEL: CPT | Performed by: INTERNAL MEDICINE

## 2023-05-29 PROCEDURE — 94761 N-INVAS EAR/PLS OXIMETRY MLT: CPT

## 2023-05-29 PROCEDURE — 84132 ASSAY OF SERUM POTASSIUM: CPT | Performed by: INTERNAL MEDICINE

## 2023-05-29 PROCEDURE — 94003 VENT MGMT INPAT SUBQ DAY: CPT

## 2023-05-29 PROCEDURE — 92610 EVALUATE SWALLOWING FUNCTION: CPT

## 2023-05-29 PROCEDURE — 82948 REAGENT STRIP/BLOOD GLUCOSE: CPT

## 2023-05-29 PROCEDURE — 94660 CPAP INITIATION&MGMT: CPT

## 2023-05-29 PROCEDURE — 94664 DEMO&/EVAL PT USE INHALER: CPT

## 2023-05-29 PROCEDURE — 25010000002 CEFTRIAXONE PER 250 MG: Performed by: INTERNAL MEDICINE

## 2023-05-29 PROCEDURE — 85027 COMPLETE CBC AUTOMATED: CPT | Performed by: INTERNAL MEDICINE

## 2023-05-29 PROCEDURE — 25010000002 FUROSEMIDE PER 20 MG: Performed by: INTERNAL MEDICINE

## 2023-05-29 PROCEDURE — 71045 X-RAY EXAM CHEST 1 VIEW: CPT

## 2023-05-29 PROCEDURE — 63710000001 INSULIN REGULAR HUMAN PER 5 UNITS: Performed by: INTERNAL MEDICINE

## 2023-05-29 RX ORDER — FUROSEMIDE 10 MG/ML
40 INJECTION INTRAMUSCULAR; INTRAVENOUS ONCE
Status: COMPLETED | OUTPATIENT
Start: 2023-05-29 | End: 2023-05-29

## 2023-05-29 RX ORDER — POTASSIUM CHLORIDE 1.5 G/1.77G
40 POWDER, FOR SOLUTION ORAL EVERY 4 HOURS
Status: COMPLETED | OUTPATIENT
Start: 2023-05-29 | End: 2023-05-29

## 2023-05-29 RX ADMIN — INSULIN HUMAN 7 UNITS: 100 INJECTION, SOLUTION PARENTERAL at 12:06

## 2023-05-29 RX ADMIN — HEPARIN SODIUM 5000 UNITS: 5000 INJECTION INTRAVENOUS; SUBCUTANEOUS at 21:06

## 2023-05-29 RX ADMIN — Medication 10 ML: at 08:35

## 2023-05-29 RX ADMIN — Medication 10 ML: at 21:07

## 2023-05-29 RX ADMIN — INSULIN HUMAN 6 UNITS: 100 INJECTION, SOLUTION PARENTERAL at 06:53

## 2023-05-29 RX ADMIN — INSULIN HUMAN 4 UNITS: 100 INJECTION, SOLUTION PARENTERAL at 00:19

## 2023-05-29 RX ADMIN — POTASSIUM CHLORIDE 40 MEQ: 1.5 POWDER, FOR SOLUTION ORAL at 05:31

## 2023-05-29 RX ADMIN — CHLORHEXIDINE GLUCONATE 15 ML: 1.2 RINSE ORAL at 08:35

## 2023-05-29 RX ADMIN — IPRATROPIUM BROMIDE AND ALBUTEROL SULFATE 3 ML: 2.5; .5 SOLUTION RESPIRATORY (INHALATION) at 10:48

## 2023-05-29 RX ADMIN — FENTANYL CITRATE 50 MCG: 50 INJECTION, SOLUTION INTRAMUSCULAR; INTRAVENOUS at 02:02

## 2023-05-29 RX ADMIN — HEPARIN SODIUM 5000 UNITS: 5000 INJECTION INTRAVENOUS; SUBCUTANEOUS at 05:30

## 2023-05-29 RX ADMIN — IPRATROPIUM BROMIDE AND ALBUTEROL SULFATE 3 ML: 2.5; .5 SOLUTION RESPIRATORY (INHALATION) at 06:33

## 2023-05-29 RX ADMIN — CEFTRIAXONE 2 G: 2 INJECTION, POWDER, FOR SOLUTION INTRAMUSCULAR; INTRAVENOUS at 08:34

## 2023-05-29 RX ADMIN — LANSOPRAZOLE 30 MG: 15 TABLET, ORALLY DISINTEGRATING, DELAYED RELEASE ORAL at 05:31

## 2023-05-29 RX ADMIN — GUAIFENESIN 400 MG: 200 SOLUTION ORAL at 00:20

## 2023-05-29 RX ADMIN — BISOPROLOL FUMARATE 20 MG: 5 TABLET, FILM COATED ORAL at 08:34

## 2023-05-29 RX ADMIN — PROPOFOL INJECTABLE EMULSION 25 MCG/KG/MIN: 10 INJECTION, EMULSION INTRAVENOUS at 00:20

## 2023-05-29 RX ADMIN — Medication 10 ML: at 21:08

## 2023-05-29 RX ADMIN — Medication 2 PACKET: at 05:31

## 2023-05-29 RX ADMIN — FENTANYL CITRATE 50 MCG: 50 INJECTION, SOLUTION INTRAMUSCULAR; INTRAVENOUS at 05:30

## 2023-05-29 RX ADMIN — PROPOFOL INJECTABLE EMULSION 10 MCG/KG/MIN: 10 INJECTION, EMULSION INTRAVENOUS at 05:30

## 2023-05-29 RX ADMIN — POTASSIUM CHLORIDE 40 MEQ: 1.5 POWDER, FOR SOLUTION ORAL at 09:54

## 2023-05-29 RX ADMIN — HEPARIN SODIUM 5000 UNITS: 5000 INJECTION INTRAVENOUS; SUBCUTANEOUS at 13:25

## 2023-05-29 RX ADMIN — LEFLUNOMIDE 10 MG: 20 TABLET ORAL at 09:53

## 2023-05-29 RX ADMIN — CHLORHEXIDINE GLUCONATE 15 ML: 1.2 RINSE ORAL at 21:20

## 2023-05-29 RX ADMIN — INSULIN GLARGINE-YFGN 20 UNITS: 100 INJECTION, SOLUTION SUBCUTANEOUS at 08:34

## 2023-05-29 RX ADMIN — DULOXETINE HYDROCHLORIDE 30 MG: 30 CAPSULE, DELAYED RELEASE ORAL at 08:34

## 2023-05-29 RX ADMIN — GUAIFENESIN 400 MG: 200 SOLUTION ORAL at 05:30

## 2023-05-29 RX ADMIN — IPRATROPIUM BROMIDE AND ALBUTEROL SULFATE 3 ML: 2.5; .5 SOLUTION RESPIRATORY (INHALATION) at 14:28

## 2023-05-29 RX ADMIN — IPRATROPIUM BROMIDE AND ALBUTEROL SULFATE 3 ML: 2.5; .5 SOLUTION RESPIRATORY (INHALATION) at 19:47

## 2023-05-29 RX ADMIN — AMLODIPINE BESYLATE 5 MG: 5 TABLET ORAL at 08:34

## 2023-05-29 RX ADMIN — GUAIFENESIN 400 MG: 200 SOLUTION ORAL at 21:06

## 2023-05-29 RX ADMIN — Medication 10 ML: at 21:06

## 2023-05-29 RX ADMIN — INSULIN HUMAN 2 UNITS: 100 INJECTION, SOLUTION PARENTERAL at 17:51

## 2023-05-29 RX ADMIN — GUAIFENESIN 400 MG: 200 SOLUTION ORAL at 08:34

## 2023-05-29 RX ADMIN — FUROSEMIDE 40 MG: 40 INJECTION, SOLUTION INTRAMUSCULAR; INTRAVENOUS at 10:09

## 2023-05-29 NOTE — PROGRESS NOTES
Loyd Huff MD                          [phone]            Patient ID:    Name:  Blanca Zhu    MRN:  [id number]    1945   77 y.o.  female            Patient Care Team:  Praneeth Valenzuela MD as PCP - General (Internal Medicine)  Jean-Claude Farnsworth MD as Consulting Physician (Hematology and Oncology)  Praneeth Valenzuela MD as Referring Physician (Internal Medicine)  Tahmina Brown MD as Consulting Physician (Rheumatology)  Gretchen Mcginnis MD as Consulting Physician (Nephrology)  Tahmina James MD as Surgeon (General Surgery)  Haile Handley MD as Consulting Physician (Gastroenterology)  Sb Power MD as Consulting Physician (Cardiology)    CC/ Reason for visit: Ac parainfluenza URTI, CHF, FEDERICA     Subjective: Pt seen and examined this AM.  Patient remains on mechanical ventilator.  Much improved respiratory support.  No further fevers. Tolerating antibiotics. Diuresing well.    ROS: Unable to obtain due to acute illness    Objective     Vital Signs past 24hrs    BP range: BP: (109-166)/() 146/69  Pulse range: Heart Rate:  [] 118  Resp rate range: Resp:  [18-25] 18  Temp range: Temp (24hrs), Av.9 °F (37.2 °C), Min:98.5 °F (36.9 °C), Max:99.5 °F (37.5 °C)      Ventilator/Non-Invasive Ventilation Settings (From admission, onward)     Start     Ordered    23 1619  NIPPV (CPAP or BIPAP)  At Bedtime As Needed-RT        Comments: May adjust pressures to best tolerance and effect.  Patient is used to using a Pap machine and may bleed in supplemental O2 to maintain saturations greater than 90% less than 95 when she is on the PAP machine.   Question Answer Comment   Type BIPAP    IPAP 18    EPAP 10        23 1618    23 2042  NIPPV (CPAP or BIPAP)  Until Discontinued,   Status:  Canceled        Comments: May adjust pressures to best tolerance and effect.  Patient is used to using a Pap machine   Question Answer Comment   Type  BIPAP    IPAP 18    EPAP 10        05/21/23 2041    05/21/23 2042  NIPPV (CPAP or BIPAP)  Until Discontinued,   Status:  Canceled        Comments: May adjust pressures to best tolerance and effect.  Patient is used to using a Pap machine and may bleed in supplemental O2 to maintain saturations greater than 90% less than 95 when she is on the PAP machine.   Question Answer Comment   Type BIPAP    IPAP 18    EPAP 10        05/21/23 2042    05/21/23 1542  NIPPV (CPAP or BIPAP)  Until Discontinued,   Status:  Canceled        Question:  Type  Answer:  BIPAP    05/21/23 1541                Vent Settings        Resp Rate (Set): 18  Pressure Support (cm H2O): 8 cm H20  FiO2 (%): 30 %  PEEP/CPAP (cm H2O): 5 cm H20  Minute Ventilation (L/min) (Obs): 7.77 L/min  Resp Rate (Observed) Vent: 21  I:E Ratio (Set): 1:3.2  I:E Ratio (Obs): 1:2.6  PIP Observed (cm H2O): 14 cm H2O  Plateau Pressure (cm H2O): 24 cm H2O  Driving Pressure (cm H2O): 18.6 cm H2O  Device (Oxygen Therapy): ventilator FiO2 (%): 30 %     92.6 kg (204 lb 2.3 oz); Body mass index is 39.87 kg/m².      Intake/Output Summary (Last 24 hours) at 5/29/2023 0955  Last data filed at 5/29/2023 0542  Gross per 24 hour   Intake 1161.68 ml   Output 2850 ml   Net -1688.32 ml       PHYSICAL EXAM   Constitutional: Middle-aged obese F pt in bed, opens eyes and nodding to questions while on the mechanical ventilator  Head: - NCAT  Eyes: No pallor.  Anicteric sclerae, EOMI.  ENMT: Endotracheal tube in place, no oral thrush. Moist MM.   NECK: Trachea midline, No thyromegaly, no palpable cervical lymphadenopathy  Heart: RRR, no murmur.  Trace pedal edema   Lungs: SAV +, Dec BS @ bases +. No wheezes/ crackles heard    Abdomen: Soft. Obese, No tenderness, guarding or rigidity. No palpable masses  Extremities: Extremities warm and well perfused. No cyanosis/ clubbing  Neuro: Awake, moving all extremities  Psych: Mood and affect -unable to obtain    PPE recommended per [facility]  infectious disease Isolation protocol for the current clinical scenario (as mentioned below) was followed.     Scheduled meds:  amLODIPine, 5 mg, Oral, Q24H  bisoprolol, 20 mg, Oral, Q24H  cefTRIAXone, 2 g, Intravenous, Q24H  chlorhexidine, 15 mL, Mouth/Throat, Q12H  dextrose, 50 mL, Intravenous, Once   And  insulin regular, 10 Units, Intravenous, Once  DULoxetine, 30 mg, Oral, Daily  guaifenesin, 400 mg, Nasogastric, Q4H  heparin (porcine), 5,000 Units, Subcutaneous, Q8H  insulin glargine, 20 Units, Subcutaneous, Daily  insulin regular, 2-7 Units, Subcutaneous, Q6H  ipratropium-albuterol, 3 mL, Nebulization, 4x Daily - RT  lansoprazole, 30 mg, Nasogastric, Q AM  leflunomide, 10 mg, Oral, Daily  O2, 2 L/min, Inhalation, Once  sodium chloride, 10 mL, Intravenous, Q12H  sodium chloride, 10 mL, Intravenous, Q12H  sodium chloride, 10 mL, Intravenous, Q12H  sodium chloride, 10 mL, Intravenous, Q12H  sodium chloride, 10 mL, Intravenous, Q12H  sodium chloride, 10 mL, Intravenous, Q12H        IV meds:                      propofol, 5-50 mcg/kg/min, Last Rate: Stopped (05/29/23 0630)        Data Review:      Results from last 7 days   Lab Units 05/29/23  0337 05/28/23  1350 05/28/23  0305 05/27/23  0317 05/26/23  1653 05/26/23  1545 05/26/23  1538 05/23/23  2111 05/23/23  0859   SODIUM mmol/L 144  --  141 130*   < >  --   --    < > 128*   POTASSIUM mmol/L 3.3*  3.3* 3.8 2.9* 3.8   < >  --   --    < > 6.0*   CHLORIDE mmol/L 98  --  96* 89*   < >  --   --    < > 93*   CO2 mmol/L 32.9*  --  29.0 23.3   < >  --   --    < > 28.0   BUN mg/dL 47*  --  58* 69*   < >  --   --    < > 47*   CREATININE mg/dL 1.56*  --  1.66* 1.99*   < >  --   --    < > 1.87*   CALCIUM mg/dL 8.5*  --  7.5* 7.3*   < >  --   --    < > 8.2*   GLUCOSE mg/dL 332*  --  204* 129*   < >  --   --    < > 114*   WBC 10*3/mm3 15.42*  --   --  19.26*  --  11.08*  --    < > 13.30*   HEMOGLOBIN g/dL 11.0*  --   --  12.6  --  11.6*  --    < > 12.6   PLATELETS 10*3/mm3  166  --   --  179  --  200  --    < > 177   PROCALCITONIN ng/mL  --   --   --   --   --   --  0.17  --  0.25    < > = values in this interval not displayed.       Lab Results   Component Value Date    CALCIUM 8.5 (L) 05/29/2023    PHOS 1.7 (C) 05/29/2023       Results from last 7 days   Lab Units 05/26/23  1438   RESPCX  Heavy growth (4+) Haemophilus influenzae*  Light growth (2+) Normal respiratory hannah. No S. aureus or Pseudomonas aeruginosa detected. Final report.       Results from last 7 days   Lab Units 05/28/23  0851 05/27/23  0916 05/26/23  1622   PH, ARTERIAL pH units 7.435 7.413 7.278*   PO2 ART mm Hg 54.9* 69.0* 96.0   PCO2, ARTERIAL mm Hg 51.6* 44.9 58.3*   HCO3 ART mmol/L 34.6* 28.7* 27.3        I have personally reviewed the results from the time of this admission to 5/29/2023 09:55 EDT and agree with these findings:  [x]  Laboratory  [x]  Microbiology  [x]  Radiology  []  EKG/Telemetry   [x]  Cardiology/Vascular   []  Pathology  []  Old records    ASSESSMENT   Acute on chronic hypoxic/hypercapnic respiratory failure(2L) -noninvasive ventilator dependent s/p intubation-5/26  Haemophilus pneumonia s/p bronch - 5/26  Acute metabolic encephalopathy  Acute on chronic congestive heart failure; diastolic  Acute para influenza URTI  Acute asthma exacerbation  Acute kidney injury  Hyperkalemia  Diabetes  Pulmonary hypertension  ANAYELI on CPAP  Morbid obesity    PLAN:  Patient remains on mechanical ventilator and we will continue to wean down as tolerated.  SBT this AM.  Continues to be volume overloaded and we will diurese. Would consider extubation to NIV  C/w abx  Nephrology managing volume status. Defer electrolyte management to them.  Continue bronchodilators for asthma.  Will need nocturnal noninvasive ventilator @ dc.   Continue with tube feeds  Blood sugar control with subcutaneous insulin  C/w heparin subcu   Guarded prognosis    CC-36 minutes not including procedures    Loyd Huff,  MD  5/29/2023

## 2023-05-29 NOTE — THERAPY EVALUATION
Acute Care - Speech Language Pathology   Swallow Initial Evaluation [facility]     Patient Name: Blanca Zhu  : 1945  MRN: [id number]  Today's Date: 2023               Admit Date: 2023    Visit Dx:     ICD-10-CM ICD-9-CM   1. Respiratory distress  R06.03 786.09   2. Wheezing  R06.2 786.07   3. Pulmonary vascular congestion  R09.89 514   4. Hypertensive urgency  I16.0 401.9     Patient Active Problem List   Diagnosis   • Osteoporosis   • Breast neoplasm, Tis (DCIS), left   • Iron deficiency anemia   • CKD (chronic kidney disease)   • Diabetic nephropathy associated with type 2 diabetes mellitus   • Temporal arteritis   • Immunosuppression   • Anemia   • Duodenal adenoma   • Gastritis without bleeding   • Polyp of duodenum   • Morbidly obese   • Dyspnea on exertion   • Hyperlipidemia   • Type 2 diabetes mellitus with stage 3b chronic kidney disease, with long-term current use of insulin   • Essential hypertension   • Bilateral lower extremity edema   • Pulmonary hypertension   • Obstructive sleep apnea on CPAP   • Stage 3a chronic kidney disease   • Iron malabsorption   • Respiratory distress     Past Medical History:   Diagnosis Date   • Anemia    • Anxiety and depression    • Asthma    • Balance problem    • Breast cancer 2019    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 Daniel Gutierrez, [facility]   •  SECTION N/A     x3   • CHOLECYSTECTOMY N/A     • COLONOSCOPY     • COLONOSCOPY W/ POLYPECTOMY N/A 03/12/2019    Enlarged folds in the antrum: biopsied; duodenal, transverse colon x2, splenic flexure, descending colon and sigmoid colon polyps: biopsied (path: tubular adenoma x6)-Dr. Zak De Jesus, [facility]   • ENDOSCOPY  10/11/2019    Procedure: ESOPHAGOGASTRODUODENOSCOPY with hot snare polypectomy;  Surgeon: Haile Handley MD;  Location: [facility] ENDOSCOPY;  Service: Gastroenterology   • ENDOSCOPY N/A 1/29/2020    Procedure: ESOPHAGOGASTRODUODENOSCOPY;  Surgeon: Haile Handley MD;  Location: [facility] ENDOSCOPY;  Service: Gastroenterology   • ENDOSCOPY N/A 9/9/2020    Procedure: ESOPHAGOGASTRODUODENOSCOPY WITH BIOPSIES AND COLD BIOPSY POLYPECTOMY;  Surgeon: Haile Handley MD;  Location: [facility] ENDOSCOPY;  Service: Gastroenterology;  Laterality: N/A;  pre: dyspepsia and diarrhea  post: duodenal polyp and gastritis   • MASTECTOMY Left    • MASTECTOMY WITH SENTINEL NODE BIOPSY AND AXILLARY NODE DISSECTION Left 7/3/2019    Procedure: LEFT BREAST MASTECTOMY WITH SENTINEL NODE BIOPSY AND , v-y plasty closure;  Surgeon: Tahmina James MD;  Location: [facility] MAIN OR;  Service: General   • SUBTOTAL HYSTERECTOMY Bilateral     ovaries still in tact   • TEMPORAL ARTERY BIOPSY Bilateral 12/05/2019       SLP Recommendation and Plan  SLP Swallowing Diagnosis: R/O pharyngeal dysphagia (05/29/23 1700)  SLP Diet Recommendation: regular textures, nectar thick liquids (05/29/23 1700)  Recommended Precautions and Strategies: upright posture during/after eating, small bites of food and sips of liquid, no straw, general aspiration precautions (05/29/23 1700)  SLP Rec. for Method of Medication Administration: meds crushed, meds whole, with thick liquids, with puree (05/29/23 1700)     Monitor for Signs of Aspiration: yes, notify SLP if any concerns (05/29/23 1700)  Recommended Diagnostics: reassess via clinical swallow evaluation, reassess via VFSS  (MBS) (05/29/23 1700)  Swallow Criteria for Skilled Therapeutic Interventions Met: demonstrates skilled criteria (05/29/23 1700)     Rehab Potential/Prognosis, Swallowing: good, to achieve stated therapy goals (05/29/23 1700)  Therapy Frequency (Swallow): PRN (05/29/23 1700)                                           Plan of Care Reviewed With: patient  Outcome Evaluation: Swallow evaluated at bedside.  Mastication is slow but adequate when indicated.  No oral residuals noted.  The pharyngeal swallow reflex is occasionally mistimed with possible reduced laryngeal elevation upon palpation.  No coughing or choking noted.  Intermittent throat clearing and gurgly vocal quality noted with ice and thin liquids as well as the juice from peaches.  Pt is very aphonic due to reduced vocal fold adduction which may be hindering the swallow function.  No other overt s/s of aspiration noted.  Recommend a regular texture diet with nectar thick liquids.  Meds may be given whole or crushed in puree or nectar thick liquids.  Swallow precautions include sit upright and small bites and sips.  Recommend reassess in 1-2 days to determine diet tolerance and need for a possible VFSS or FEES.      SWALLOW EVALUATION (last 72 hours)     SLP Adult Swallow Evaluation     Row Name 05/29/23 1700                   Rehab Evaluation    Document Type evaluation  -NR        Subjective Information no complaints  -NR        Patient Observations alert;cooperative;agree to therapy  -NR        Patient Effort good  -NR        Symptoms Noted During/After Treatment none  -NR           General Information    Patient Profile Reviewed yes  -NR        Pertinent History Of Current Problem Per H and P :  Patient is a 77-year-old female, patient office, patient has history of asthma, chronic kidney disease stage IIIa, morbid obesity, chronic  diastolic congestive heart failure, diabetes mellitus type 2, hypertension, hyperlipidemia, sleep apnea, temporal arteritis.   Patient states that about 4 days ago she started developing upper respiratory symptoms, she developed sneezing, runny nose, nose congestion.  Patient was seen by her allergist and was told that she had a virus infection.  Nothing specifically was given to the patient.  Over the next few days, patient's symptoms became worse, having worsening shortness of breath, wheezing, decreased oxygenation.  She denies having any worsening lower extremity edema.  Patient was brought to the emergency room per family.  In the emergency room, patient was found to have: Troponin 25 and then 28, creatinine 1.11, potassium 4.4, COVID-19 test was negative, chest x-ray showed borderline heart size with mild vascular congestion, EKG showed sinus tachycardia.  Blood pressure in the emergency room was 217/76.  In the emergency room, patient was placed on a BiPAP, was given Lasix, patient was admitted to the hospital for further management.  Pt extubated this date at approximately 1000  -NR        Current Method of Nutrition NPO  -NR        Precautions/Limitations, Vision WFL;for purposes of eval  -NR        Precautions/Limitations, Hearing WFL;for purposes of eval  -NR        Prior Level of Function-Communication WFL  -NR        Prior Level of Function-Swallowing safe, efficient swallowing in all situations  -NR        Plans/Goals Discussed with patient;agreed upon  -NR        Barriers to Rehab none identified  -NR        Patient's Goals for Discharge return to PO diet  -NR           Pain    Additional Documentation Pain Scale: Numbers Pre/Post-Treatment (Group)  -NR           Pain Scale: Numbers Pre/Post-Treatment    Pretreatment Pain Rating 0/10 - no pain  -NR        Posttreatment Pain Rating 0/10 - no pain  -NR           Oral Motor Structure and Function    Dentition Assessment natural, present and adequate  -NR        Secretion Management WNL/WFL  -NR        Mucosal Quality moist, healthy  -NR        Volitional Swallow weak  -NR            Oral Musculature and Cranial Nerve Assessment    Oral Motor General Assessment generalized oral motor weakness  -NR           General Eating/Swallowing Observations    Eating/Swallowing Skills fed by SLP  -NR        Positioning During Eating upright in bed  -NR        Utensils Used spoon;cup;straw  -NR        Consistencies Trialed regular textures;chopped;mixed consistency;pureed;ice chips;thin liquids;nectar/syrup-thick liquids  -NR           Clinical Swallow Eval    Clinical Swallow Evaluation Summary Swallow evaluated at bedside.  Mastication is slow but adequate when indicated.  No oral residuals noted.  The pharyngeal swallow reflex is occasionally mistimed with possible reduced laryngeal elevation upon palpation.  No coughing or choking noted.  Intermittent throat clearing and gurgly vocal quality noted with ice and thin liquids as well as the juice from peaches.  Pt is very aphonic due to reduced vocal fold adduction which may be hindering the swallow function.  No other overt s/s of aspiration noted.  Recommend a regular texture diet with nectar thick liquids.  Meds may be given whole or crushed in puree or nectar thick liquids.  Swallow precautions include sit upright and small bites and sips.  Recommend reassess in 1-2 days to determine diet tolerance and need for a possible VFSS or FEES.  -NR           SLP Evaluation Clinical Impression    SLP Swallowing Diagnosis R/O pharyngeal dysphagia  -NR        Functional Impact risk of aspiration/pneumonia  -NR        Rehab Potential/Prognosis, Swallowing good, to achieve stated therapy goals  -NR        Swallow Criteria for Skilled Therapeutic Interventions Met demonstrates skilled criteria  -NR           Recommendations    Therapy Frequency (Swallow) PRN  -NR        SLP Diet Recommendation regular textures;nectar thick liquids  -NR        Recommended Diagnostics reassess via clinical swallow evaluation;reassess via VFSS (MBS)  -NR        Recommended  Precautions and Strategies upright posture during/after eating;small bites of food and sips of liquid;no straw;general aspiration precautions  -NR        Oral Care Recommendations Oral Care before breakfast, after meals and PRN  -NR        SLP Rec. for Method of Medication Administration meds crushed;meds whole;with thick liquids;with puree  -NR        Monitor for Signs of Aspiration yes;notify SLP if any concerns  -NR           Swallow Goals (SLP)    Swallow LTGs Patient will demonstrate functional swallow for  -NR        Swallow STGs diet tolerance goal selection (SLP)  -NR        Diet Tolerance Goal Selection (SLP) Patient will tolerate trials of  -NR           (LTG) Patient will demonstrate functional swallow for    Diet Texture (Demonstrate functional swallow) regular textures  -NR        Liquid viscosity (Demonstrate functional swallow) nectar/ mildly thick liquids  -NR        [place] (Demonstrate functional swallow) with minimal cues (75-90% accuracy)  -NR        Time Frame (Demonstrate functional swallow) by discharge  -NR           (STG) Patient will tolerate trials of    Consistencies Trialed (Tolerate trials) thin liquids  -NR        Desired Outcome (Tolerate trials) without signs/symptoms of aspiration  -NR        [place] (Tolerate trials) with minimal cues (75-90% accuracy)  -NR        Time Frame (Tolerate trials) 1 week  -NR              User Key  (r) = Recorded By, (t) = Taken By, (c) = Cosigned By    Initials Name Effective Dates    NR Ania Saavedra MA,CCC-SLP 06/16/21 -                 EDUCATION  The patient has been educated in the following areas:   Dysphagia (Swallowing Impairment).        SLP GOALS     Row Name 05/29/23 1700             (LTG) Patient will demonstrate functional swallow for    Diet Texture (Demonstrate functional swallow) regular textures  -NR      Liquid viscosity (Demonstrate functional swallow) nectar/ mildly thick liquids  -NR      [place] (Demonstrate  functional swallow) with minimal cues (75-90% accuracy)  -NR      Time Frame (Demonstrate functional swallow) by discharge  -NR         (STG) Patient will tolerate trials of    Consistencies Trialed (Tolerate trials) thin liquids  -NR      Desired Outcome (Tolerate trials) without signs/symptoms of aspiration  -NR      [place] (Tolerate trials) with minimal cues (75-90% accuracy)  -NR      Time Frame (Tolerate trials) 1 week  -NR            User Key  (r) = Recorded By, (t) = Taken By, (c) = Cosigned By    Initials Name Provider Type    NR Ania Saavedra MA,CCC-SLP Speech and Language Pathologist              SLP Outcome Measures (last 72 hours)     SLP Outcome Measures     Row Name 05/29/23 1700             SLP Outcome Measures    Outcome Measure Used? Adult NOMS  -NR         Adult FCM Scores    FCM Chosen Swallowing  -NR      Swallowing FCM Score 4  -NR            User Key  (r) = Recorded By, (t) = Taken By, (c) = Cosigned By    Initials Name Effective Dates    NR Ania Saavedra MA,CCC-SLP 06/16/21 -                  Time Calculation:    Time Calculation- SLP     Row Name 05/29/23 1734             Time Calculation- SLP    SLP Start Time 1430  -NR      SLP Stop Time 1530  -NR      SLP Time Calculation (min) 60 min  -NR      SLP Received On 05/29/23  -NR            User Key  (r) = Recorded By, (t) = Taken By, (c) = Cosigned By    Initials Name Provider Type    NR Ania Saavedra MA,CCC-SLP Speech and Language Pathologist                Therapy Charges for Today     Code Description Service Date Service Provider Modifiers Qty    [id number]  ST EVAL ORAL PHARYNG SWALLOW 4 5/29/2023 Ania Saavedra MA,CCC-SLP GN 1               Ania Saavedra MA,CCC-SLP  5/29/2023

## 2023-05-29 NOTE — PROGRESS NOTES
DAILY PROGRESS NOTE  [facility], [facility]    2023    Patient Identification:  Name: Blanca Zhu  Age: 77 y.o.  Sex: female  :  1945  MRN: [id number]           Primary Care Physician: Praneeth Valenzuela MD    Subjective:    Interval History:    Patient failed weaning trial yesterday, having weaning trial again today.  Oriented and following commands when awake.    Saturation 100% on 30% FiO2.  Afebrile.  Potassium 3.3, creatinine 1.56, phosphorus 1.7, WBC 15.42.  On electrolyte replacement protocol  On antibiotics for haemophilus influenza pneumonia.    ROS:     No nausea, vomiting, diarrhea, constipation, chest pain, shortness of breath.    Objective:    Scheduled Meds:  amLODIPine, 5 mg, Oral, Q24H  bisoprolol, 20 mg, Oral, Q24H  cefTRIAXone, 2 g, Intravenous, Q24H  chlorhexidine, 15 mL, Mouth/Throat, Q12H  dextrose, 50 mL, Intravenous, Once   And  insulin regular, 10 Units, Intravenous, Once  DULoxetine, 30 mg, Oral, Daily  furosemide, 40 mg, Intravenous, Once  guaifenesin, 400 mg, Nasogastric, Q4H  heparin (porcine), 5,000 Units, Subcutaneous, Q8H  insulin glargine, 20 Units, Subcutaneous, Daily  insulin regular, 2-7 Units, Subcutaneous, Q6H  ipratropium-albuterol, 3 mL, Nebulization, 4x Daily - RT  lansoprazole, 30 mg, Nasogastric, Q AM  leflunomide, 10 mg, Oral, Daily  O2, 2 L/min, Inhalation, Once  sodium chloride, 10 mL, Intravenous, Q12H  sodium chloride, 10 mL, Intravenous, Q12H  sodium chloride, 10 mL, Intravenous, Q12H  sodium chloride, 10 mL, Intravenous, Q12H  sodium chloride, 10 mL, Intravenous, Q12H  sodium chloride, 10 mL, Intravenous, Q12H        Continuous Infusions:  propofol, 5-50 mcg/kg/min, Last Rate: Stopped (23 0630)        PRN Meds:  •  acetaminophen  •  benzonatate  •  dextrose  •  dextrose  •  dextrose  •  dextrose  •  fentaNYL citrate (PF)  •  glucagon (human recombinant)  •  hydrALAZINE  •  ondansetron  •  Phosphorus  "Replacement - Follow Nurse / BPA Driven Protocol  •  Potassium Replacement - Follow Nurse / BPA Driven Protocol  •  prochlorperazine  •  sodium chloride  •  sodium chloride  •  sodium chloride  •  sodium chloride  •  sodium chloride  •  sodium chloride    Intake/Output:    Intake/Output Summary (Last 24 hours) at 2023 0959  Last data filed at 2023 0542  Gross per 24 hour   Intake 1161.68 ml   Output 2850 ml   Net -1688.32 ml         Exam:    T MAX 24 hrs: Temp (24hrs), Av.9 °F (37.2 °C), Min:98.5 °F (36.9 °C), Max:99.5 °F (37.5 °C)    Vitals Ranges:   Temp:  [98.5 °F (36.9 °C)-99.5 °F (37.5 °C)] 98.9 °F (37.2 °C)  Heart Rate:  [] 118  Resp:  [18-25] 18  BP: (109-166)/() 146/69  FiO2 (%):  [25 %-98 %] 30 %    /69   Pulse 118   Temp 98.9 °F (37.2 °C) (Oral)   Resp 18   Ht 152.4 cm (60\")   Wt 92.6 kg (204 lb 2.3 oz)   LMP  (LMP Unknown)   SpO2 94%   BMI 39.87 kg/m²     General: Intubated, awake, answer questions, following commands.  Off sedation at this time  Neck: Supple, symmetrical, trachea midline, no adenopathy;              thyroid:  no enlargement/tenderness/nodules;              no carotid bruit or JVD  Cardiovascular: Normal rate, regular rhythm and intact distal pulses.              Exam reveals no gallop and no friction rub. No murmur heard  Pulmonary: Very diminished breath sounds, very limited air movement.  Wheezing scattered throughout both fields.  Rhonchi at bases.  No rales.    Abdominal: Soft, nontender, bowel sounds active all four quadrants,     no masses, no hepatomegaly, no splenomegaly.   Extremities:  3+ pitting edema of the lower extremity, actually this looks better than her baseline.  Brown discoloration from chronic stasis dermatitis.  Pulses: 2 + symmetric all extremities  Neurological: Patient is intubated, but awake, follow commands, answer questions. No new focal sensorimotor deficit.  Skin: Skin color, texture, normal. Turgor is decreased. No " rashes or lesions         Data Review:    Results from last 7 days   Lab Units 05/29/23  0337 05/27/23  0317 05/26/23  1545   WBC 10*3/mm3 15.42* 19.26* 11.08*   HEMOGLOBIN g/dL 11.0* 12.6 11.6*   HEMATOCRIT % 34.1 36.4 36.5   PLATELETS 10*3/mm3 166 179 200       Results from last 7 days   Lab Units 05/29/23  0337 05/28/23  1350 05/28/23  0305 05/27/23  0317   SODIUM mmol/L 144  --  141 130*   POTASSIUM mmol/L 3.3*  3.3* 3.8 2.9* 3.8   CHLORIDE mmol/L 98  --  96* 89*   CO2 mmol/L 32.9*  --  29.0 23.3   BUN mg/dL 47*  --  58* 69*   CREATININE mg/dL 1.56*  --  1.66* 1.99*   CALCIUM mg/dL 8.5*  --  7.5* 7.3*   GLUCOSE mg/dL 332*  --  204* 129*                 Lab Results   Lab Value Date/Time    TROPONINT 28 (H) 05/21/2023 1920    TROPONINT 25 (H) 05/21/2023 1615       Microbiology Results (last 10 days)     Procedure Component Value - Date/Time    MRSA Screen, PCR (Inpatient) - Swab, Nares [[id number]]  (Normal) Collected: 05/26/23 1547    Lab Status: Final result Specimen: Swab from Nares Updated: 05/26/23 1739     MRSA PCR No MRSA Detected    Narrative:      The negative predictive value of this diagnostic test is high and should only be used to consider de-escalating anti-MRSA therapy. A positive result may indicate colonization with MRSA and must be correlated clinically.    Respiratory Culture - Bronchial Wash, ET Suction [[id number]]  (Abnormal) Collected: 05/26/23 1438    Lab Status: Final result Specimen: Bronchial Wash from ET Suction Updated: 05/28/23 0856     Respiratory Culture Heavy growth (4+) Haemophilus influenzae     Comment: This isolate is beta-lactamase NEGATIVE and are routinely susceptible to ampicillin and other beta-lactams.           Light growth (2+) Normal respiratory hannah. No S. aureus or Pseudomonas aeruginosa detected. Final report.     Gram Stain Moderate (3+) WBCs seen      No epithelial cells seen      Moderate (3+) Gram negative coccobacilli      Mixed bacterial morphotypes seen on  Gram Stain    Eosinophil Smear - Urine, Urine, Clean Catch [[id number]]  (Normal) Collected: 05/25/23 0700    Lab Status: Final result Specimen: Urine, Clean Catch Updated: 05/25/23 0820     Eosinophil Smear 0 % EOS/100 Cells     Respiratory Panel PCR w/COVID-19(SARS-CoV-2) [facility]/RANDA/RINA/PAD/COR/MAD/DESTINI In-House, NP Swab in UTM/VTM, 3-4 HR TAT - Swab, Nasopharynx [[id number]]  (Abnormal) Collected: 05/23/23 1625    Lab Status: Final result Specimen: Swab from Nasopharynx Updated: 05/23/23 1756     ADENOVIRUS, PCR Not Detected     Coronavirus 229E Not Detected     Coronavirus HKU1 Not Detected     Coronavirus NL63 Not Detected     Coronavirus OC43 Not Detected     COVID19 Not Detected     Human Metapneumovirus Not Detected     Human Rhinovirus/Enterovirus Not Detected     Influenza A PCR Not Detected     Influenza B PCR Not Detected     Parainfluenza Virus 1 Not Detected     Parainfluenza Virus 2 Not Detected     Parainfluenza Virus 3 Detected     Parainfluenza Virus 4 Not Detected     RSV, PCR Not Detected     Bordetella pertussis pcr Not Detected     Bordetella parapertussis PCR Not Detected     Chlamydophila pneumoniae PCR Not Detected     Mycoplasma pneumo by PCR Not Detected    Narrative:      In the setting of a positive respiratory panel with a viral infection PLUS a negative procalcitonin without other underlying concern for bacterial infection, consider observing off antibiotics or discontinuation of antibiotics and continue supportive care. If the respiratory panel is positive for atypical bacterial infection (Bordetella pertussis, Chlamydophila pneumoniae, or Mycoplasma pneumoniae), consider antibiotic de-escalation to target atypical bacterial infection.    COVID-19 and FLU A/B PCR - Swab, Nasopharynx [[id number]]  (Normal) Collected: 05/21/23 1547    Lab Status: Final result Specimen: Swab from Nasopharynx Updated: 05/21/23 1609     COVID19 Not Detected     Influenza A PCR Not Detected     Influenza B PCR Not  Detected    Narrative:      Fact sheet for providers: https://www.fda.gov/media/598746/download    Fact sheet for patients: https://www.fda.gov/media/330856/download    Test performed by PCR.           Imaging Results (Last 7 Days)     Procedure Component Value Units Date/Time    XR Chest 1 View [[id number]] Collected: 05/29/23 0545     Updated: 05/29/23 0549    Narrative:      SINGLE VIEW OF THE CHEST     HISTORY: Respiratory failure     COMPARISON: 05/28/2023     FINDINGS:  Tubes and lines appear stable position. Cardiac silhouette is stable.  Bilateral alveolar and interstitial tracing noted. Aeration at the lung  bases appears improved. No pneumothorax is seen. There may be a small  right pleural effusion.       Impression:      Interval improvement in aeration at the lung bases.      This report was finalized on 5/29/2023 5:46 AM by Dr. Tanya Prince M.D.       XR Chest 1 View [[id number]] Collected: 05/28/23 0558     Updated: 05/28/23 0602    Narrative:      SINGLE VIEW OF THE CHEST     HISTORY: Respiratory failure     COMPARISON: 05/26/2023     FINDINGS:  Right-sided PICC appears to terminate within the superior vena cava.  Weighted enteric feeding tube extends into the upper abdomen.  Endotracheal tube terminates in satisfactory position. Cardiomegaly is  present. There is vascular congestion. There is elevation of the right  hemidiaphragm. Bibasilar consolidation and bilateral effusions are  unchanged.       Impression:      Persistent vascular congestion and bibasilar consolidation.     This report was finalized on 5/28/2023 5:59 AM by Dr. Tanya Prince M.D.       XR Abdomen KUB [[id number]] Collected: 05/27/23 1434     Updated: 05/27/23 1439    Narrative:      PROCEDURE:  XR ABDOMEN KUB-     HISTORY: Tube placement.     COMPARISON: Chest radiograph 05/26/2023     FINDINGS: A single view of the upper abdomen was obtained. There is a  new enteric tube with the tip projecting over the expected  location of  the gastric antrum or proximal portion of the duodenum. Correlate for  desired positioning within the stomach or small bowel. There is oral  contrast within portions of the colon. There are no dilated small bowel  loops. There is multilevel degenerative disc disease.     This report was finalized on 5/27/2023 2:36 PM by Dr. Marcia Yang M.D.       XR Chest 1 View [[id number]] Collected: 05/26/23 1510     Updated: 05/26/23 1516    Narrative:      XR CHEST 1 VW-     HISTORY:  Status post intubation.     COMPARISON:  Chest radiograph 05/25/2023     FINDINGS:    2 views of the chest were obtained. There is a new endotracheal tube  with the tip terminating approximately 2.3 cm above the nagi. The  cardiac silhouette is enlarged. There is calcific aortic  atherosclerosis. Central pulmonary venous congestion has slightly  progressed. Interstitial opacities are not significantly changed. There  is new or increased airspace opacity at the lung bases with suspected  small layering pleural effusions. Pulmonary opacities could reflect  pulmonary edema and atelectasis with pneumonia not excluded. There is  multilevel degenerative disc disease.     This report was finalized on 5/26/2023 3:13 PM by Dr. Marcia Yang M.D.       XR Chest 1 View [[id number]] Collected: 05/25/23 2137     Updated: 05/25/23 2144    Narrative:      Portable chest radiograph     HISTORY:Respiratory distress     TECHNIQUE: Single AP portable radiograph of the chest     COMPARISON:Chest radiograph 05/21/2023       Impression:      FINDINGS AND IMPRESSION:  There is bilateral pulmonary vascular congestion with superimposed  interstitial thickening within the bilateral lungs, as before. The lungs  are hypoinflated. Cardiac silhouette is accentuated by low lung volumes.  No pneumothorax is seen. Somewhat nodular opacification overlying the  left upper lung is present, not definitely seen on prior imaging.  Further evaluation with CT chest is  recommended to exclude underlying  pulmonary nodule and establish appropriate follow-up.     This report was finalized on 5/25/2023 9:40 PM by Dr. Bhupinder Lucero M.D.       US Renal Bilateral [[id number]] Collected: 05/24/23 1303     Updated: 05/24/23 1310    Narrative:      US RENAL BILATERAL-  05/24/2023     HISTORY: Acute renal failure.     Right kidney measures 10.3 cm in length. Left kidney measures 9.8 cm in  length.     No hydronephrosis, renal masses or stones are seen. Urinary bladder is  incompletely distended.       Impression:      1. Normal bilateral renal ultrasound.     This report was finalized on 5/24/2023 1:04 PM by Dr. Elliot Mike M.D.               Assessment:      Acute on chronic hypoxic respiratory failure requiring ventilatory management  Haemophilus influenza pneumonia  Asthma exacerbation  Acute parainfluenza viral bronchitis  Hypertensive urgency  Hyponatremia  Hyperkalemia  Acute kidney injury  Diabetic nephropathy with chronic kidney disease stage IIIa  Lower extremity edema, chronic  Uncontrolled diabetes mellitus  Obstructive sleep apnea  Generalized anxiety disorder  Iron deficiency anemia  Morbid obesity  Difficult IV access       Plan:    Ventilatory management/supportive therapy per ICU team, weaning trial per pulmonary/ICU team.  Continue  tube feedings in the meantime  Continue IV antibiotics, to cover haemophilus influenza.  Continue DuoNeb mini nebs  Monitor and correct electrolytes, potassium again low today, to replace.    Monitor renal function, improving.  Renal following.  Adjust insulin as needed, blood sugar fairly okay  Continue DVT/stress ulcer prophylaxis  Labs in am      Praneeth Valenzuela MD  05/29/23  10:29 EDT               I wore protective equipment throughout this patient encounter including a face mask, gloves, and protective eyewear.  Hand hygiene was performed before donning protective equipment and after removal when leaving the room.

## 2023-05-29 NOTE — PLAN OF CARE
Goal Outcome Evaluation:  Pt remained in the CCU overnight. Propofol drip continued and weaned as pt tolerated. PRN fentanyl given x 2. Tube feeds continued; advanced to goal rate. Potassium and phosphorous replacement started per protocol. Wound charted and wound consult placed. 500 mL u/o; 3 BM. Pt alert ad follows commands. Afebrile overnight. Blood pressures normotensive overnight.

## 2023-05-29 NOTE — PROGRESS NOTES
[facility]     Progress Note    Patient Name: Blanca Zhu  : 1945  MRN: [id number]  Primary Care Physician:  Praneeth Valenzuela MD  Date of admission: 2023    Subjective   Subjective     Chief Complaint: hyperkalemia and federica    History of Present Illness    Patient ith htn, copd, with federica and hyperkalemia    Review of Systems      Objective   Objective     Vitals:   Temp:  [98.5 °F (36.9 °C)-99.5 °F (37.5 °C)] 98.9 °F (37.2 °C)  Heart Rate:  [] 118  Resp:  [18-25] 18  BP: (109-166)/() 146/69  FiO2 (%):  [25 %-98 %] 31 %    Physical Exam     General Appearance:  In no acute distress.    HEENT: Normal HEENT exam.     Extremities: .  2+ dependent edema.    Neurological: Patient is sedate on vent    Result Review    Result Review:  I have personally reviewed the results from the time of this admission to 2023 07:24 EDT and agree with these findings:  []  Laboratory list / accordion  []  Microbiology  []  Radiology  []  EKG/Telemetry   []  Cardiology/Vascular   []  Pathology  []  Old records  []  Other:        Assessment & Plan   Assessment / Plan     Brief Patient Summary:  Blanca Zhu is a 77 y.o. female who has federica and hyperkalemia    Active Hospital Problems:  Active Hospital Problems    Diagnosis    • **Respiratory distress      Plan:   Acute kidney injury, likely hypovolemic, ARB and diuretic were contributing.  Creatinine stable today, euvolemic   Hyperkalemia from FEDERICA and ARB, slowly better, 5.2 today  Acute hypoxic respiratory failure, pulmonary following, on CPAP  Chronic asthma with acute exacerbation, on IV steroids  Hyperkalemia, improved  Probable viral bronchitis, PCR pending  Hypertensive urgency on admission, improved  Hyponatremia, 135 today  Hypocalcemia: Check albumin  Leukocytosis from steroids  Mild proteinuria    Patient seen and examined. Labs and chart reviewed. Renal function continues to improve with cr of 1.5.  Still on vent. Hoping to  extubate. Continue diuretics. Will follow      Ysa Bustos MD

## 2023-05-29 NOTE — PLAN OF CARE
Problem: Adult Inpatient Plan of Care  Goal: Plan of Care Review  Flowsheets (Taken 5/29/2023 1725)  Plan of Care Reviewed With: patient  Outcome Evaluation: Swallow evaluated at bedside.  Mastication is slow but adequate when indicated.  No oral residuals noted.  The pharyngeal swallow reflex is occasionally mistimed with possible reduced laryngeal elevation upon palpation.  No coughing or choking noted.  Intermittent throat clearing and gurgly vocal quality noted with ice and thin liquids as well as the juice from peaches.  Pt is very aphonic due to reduced vocal fold adduction which may be hindering the swallow function.  No other overt s/s of aspiration noted.  Recommend a regular texture diet with nectar thick liquids.  Meds may be given whole or crushed in puree or nectar thick liquids.  Swallow precautions include sit upright and small bites and sips.  Recommend reassess in 1-2 days to determine diet tolerance and need for a possible VFSS or FEES.   Goal Outcome Evaluation:  Plan of Care Reviewed With: patient           Outcome Evaluation: Swallow evaluated at bedside.  Mastication is slow but adequate when indicated.  No oral residuals noted.  The pharyngeal swallow reflex is occasionally mistimed with possible reduced laryngeal elevation upon palpation.  No coughing or choking noted.  Intermittent throat clearing and gurgly vocal quality noted with ice and thin liquids as well as the juice from peaches.  Pt is very aphonic due to reduced vocal fold adduction which may be hindering the swallow function.  No other overt s/s of aspiration noted.  Recommend a regular texture diet with nectar thick liquids.  Meds may be given whole or crushed in puree or nectar thick liquids.  Swallow precautions include sit upright and small bites and sips.  Recommend reassess in 1-2 days to determine diet tolerance and need for a possible VFSS or FEES.

## 2023-05-30 ENCOUNTER — APPOINTMENT (OUTPATIENT)
Dept: GENERAL RADIOLOGY | Facility: HOSPITAL | Age: 78
End: 2023-05-30
Payer: MEDICARE

## 2023-05-30 LAB
ALBUMIN SERPL-MCNC: 2.6 G/DL (ref 3.5–5.2)
ANION GAP SERPL CALCULATED.3IONS-SCNC: 8.5 MMOL/L (ref 5–15)
BUN SERPL-MCNC: 34 MG/DL (ref 8–23)
BUN/CREAT SERPL: 30.9 (ref 7–25)
CALCIUM SPEC-SCNC: 9 MG/DL (ref 8.6–10.5)
CHLORIDE SERPL-SCNC: 103 MMOL/L (ref 98–107)
CO2 SERPL-SCNC: 38.5 MMOL/L (ref 22–29)
CREAT SERPL-MCNC: 1.1 MG/DL (ref 0.57–1)
DEPRECATED RDW RBC AUTO: 43.5 FL (ref 37–54)
EGFRCR SERPLBLD CKD-EPI 2021: 52.2 ML/MIN/1.73
ERYTHROCYTE [DISTWIDTH] IN BLOOD BY AUTOMATED COUNT: 12.8 % (ref 12.3–15.4)
GLUCOSE BLDC GLUCOMTR-MCNC: 138 MG/DL (ref 70–130)
GLUCOSE BLDC GLUCOMTR-MCNC: 145 MG/DL (ref 70–130)
GLUCOSE BLDC GLUCOMTR-MCNC: 196 MG/DL (ref 70–130)
GLUCOSE BLDC GLUCOMTR-MCNC: 212 MG/DL (ref 70–130)
GLUCOSE BLDC GLUCOMTR-MCNC: 232 MG/DL (ref 70–130)
GLUCOSE SERPL-MCNC: 159 MG/DL (ref 65–99)
HCT VFR BLD AUTO: 34.2 % (ref 34–46.6)
HGB BLD-MCNC: 11 G/DL (ref 12–15.9)
MCH RBC QN AUTO: 29.8 PG (ref 26.6–33)
MCHC RBC AUTO-ENTMCNC: 32.2 G/DL (ref 31.5–35.7)
MCV RBC AUTO: 92.7 FL (ref 79–97)
PHOSPHATE SERPL-MCNC: 2.7 MG/DL (ref 2.5–4.5)
PLATELET # BLD AUTO: 135 10*3/MM3 (ref 140–450)
PMV BLD AUTO: 10 FL (ref 6–12)
POTASSIUM SERPL-SCNC: 4.1 MMOL/L (ref 3.5–5.2)
RBC # BLD AUTO: 3.69 10*6/MM3 (ref 3.77–5.28)
SODIUM SERPL-SCNC: 150 MMOL/L (ref 136–145)
WBC NRBC COR # BLD: 10.59 10*3/MM3 (ref 3.4–10.8)

## 2023-05-30 PROCEDURE — 82948 REAGENT STRIP/BLOOD GLUCOSE: CPT

## 2023-05-30 PROCEDURE — 25010000002 HEPARIN (PORCINE) PER 1000 UNITS: Performed by: INTERNAL MEDICINE

## 2023-05-30 PROCEDURE — 94760 N-INVAS EAR/PLS OXIMETRY 1: CPT

## 2023-05-30 PROCEDURE — 94799 UNLISTED PULMONARY SVC/PX: CPT

## 2023-05-30 PROCEDURE — 94660 CPAP INITIATION&MGMT: CPT

## 2023-05-30 PROCEDURE — 97164 PT RE-EVAL EST PLAN CARE: CPT

## 2023-05-30 PROCEDURE — 85027 COMPLETE CBC AUTOMATED: CPT | Performed by: INTERNAL MEDICINE

## 2023-05-30 PROCEDURE — 92526 ORAL FUNCTION THERAPY: CPT

## 2023-05-30 PROCEDURE — 80069 RENAL FUNCTION PANEL: CPT | Performed by: INTERNAL MEDICINE

## 2023-05-30 PROCEDURE — 97110 THERAPEUTIC EXERCISES: CPT

## 2023-05-30 PROCEDURE — 25010000002 CEFTRIAXONE PER 250 MG: Performed by: INTERNAL MEDICINE

## 2023-05-30 PROCEDURE — 94664 DEMO&/EVAL PT USE INHALER: CPT

## 2023-05-30 PROCEDURE — 63710000001 INSULIN REGULAR HUMAN PER 5 UNITS: Performed by: INTERNAL MEDICINE

## 2023-05-30 PROCEDURE — 94761 N-INVAS EAR/PLS OXIMETRY MLT: CPT

## 2023-05-30 RX ORDER — PANTOPRAZOLE SODIUM 40 MG/1
40 TABLET, DELAYED RELEASE ORAL
Status: DISCONTINUED | OUTPATIENT
Start: 2023-05-31 | End: 2023-06-04 | Stop reason: HOSPADM

## 2023-05-30 RX ORDER — GUAIFENESIN 600 MG/1
1200 TABLET, EXTENDED RELEASE ORAL EVERY 12 HOURS SCHEDULED
Status: DISCONTINUED | OUTPATIENT
Start: 2023-05-30 | End: 2023-05-30

## 2023-05-30 RX ORDER — GUAIFENESIN AND DEXTROMETHORPHAN HYDROBROMIDE 600; 30 MG/1; MG/1
2 TABLET, EXTENDED RELEASE ORAL 2 TIMES DAILY
Status: DISCONTINUED | OUTPATIENT
Start: 2023-05-30 | End: 2023-06-04 | Stop reason: HOSPADM

## 2023-05-30 RX ADMIN — Medication 10 ML: at 08:25

## 2023-05-30 RX ADMIN — LEFLUNOMIDE 10 MG: 20 TABLET ORAL at 08:16

## 2023-05-30 RX ADMIN — GUAIFENESIN 400 MG: 200 SOLUTION ORAL at 00:16

## 2023-05-30 RX ADMIN — Medication 10 ML: at 21:48

## 2023-05-30 RX ADMIN — HEPARIN SODIUM 5000 UNITS: 5000 INJECTION INTRAVENOUS; SUBCUTANEOUS at 21:47

## 2023-05-30 RX ADMIN — Medication 10 ML: at 08:26

## 2023-05-30 RX ADMIN — INSULIN HUMAN 4 UNITS: 100 INJECTION, SOLUTION PARENTERAL at 21:46

## 2023-05-30 RX ADMIN — IPRATROPIUM BROMIDE AND ALBUTEROL SULFATE 3 ML: 2.5; .5 SOLUTION RESPIRATORY (INHALATION) at 07:07

## 2023-05-30 RX ADMIN — INSULIN HUMAN 2 UNITS: 100 INJECTION, SOLUTION PARENTERAL at 17:39

## 2023-05-30 RX ADMIN — IPRATROPIUM BROMIDE AND ALBUTEROL SULFATE 3 ML: 2.5; .5 SOLUTION RESPIRATORY (INHALATION) at 15:01

## 2023-05-30 RX ADMIN — CEFTRIAXONE 2 G: 2 INJECTION, POWDER, FOR SOLUTION INTRAMUSCULAR; INTRAVENOUS at 07:50

## 2023-05-30 RX ADMIN — BISOPROLOL FUMARATE 20 MG: 5 TABLET, FILM COATED ORAL at 08:16

## 2023-05-30 RX ADMIN — INSULIN HUMAN 4 UNITS: 100 INJECTION, SOLUTION PARENTERAL at 11:31

## 2023-05-30 RX ADMIN — ANORECTAL OINTMENT 1 APPLICATION: 15.7; .44; 24; 20.6 OINTMENT TOPICAL at 17:40

## 2023-05-30 RX ADMIN — LANSOPRAZOLE 30 MG: 15 TABLET, ORALLY DISINTEGRATING, DELAYED RELEASE ORAL at 06:43

## 2023-05-30 RX ADMIN — IPRATROPIUM BROMIDE AND ALBUTEROL SULFATE 3 ML: 2.5; .5 SOLUTION RESPIRATORY (INHALATION) at 20:33

## 2023-05-30 RX ADMIN — INSULIN GLARGINE-YFGN 20 UNITS: 100 INJECTION, SOLUTION SUBCUTANEOUS at 08:16

## 2023-05-30 RX ADMIN — Medication 10 ML: at 21:47

## 2023-05-30 RX ADMIN — IPRATROPIUM BROMIDE AND ALBUTEROL SULFATE 3 ML: 2.5; .5 SOLUTION RESPIRATORY (INHALATION) at 10:43

## 2023-05-30 RX ADMIN — ANORECTAL OINTMENT 1 APPLICATION: 15.7; .44; 24; 20.6 OINTMENT TOPICAL at 21:47

## 2023-05-30 RX ADMIN — HEPARIN SODIUM 5000 UNITS: 5000 INJECTION INTRAVENOUS; SUBCUTANEOUS at 06:43

## 2023-05-30 RX ADMIN — GUAIFENESIN AND DEXTROMETHORPHAN HYDROBROMIDE 2 TABLET: 600; 30 TABLET, EXTENDED RELEASE ORAL at 21:47

## 2023-05-30 RX ADMIN — HEPARIN SODIUM 5000 UNITS: 5000 INJECTION INTRAVENOUS; SUBCUTANEOUS at 13:24

## 2023-05-30 RX ADMIN — CHLORHEXIDINE GLUCONATE 15 ML: 1.2 RINSE ORAL at 08:25

## 2023-05-30 RX ADMIN — AMLODIPINE BESYLATE 5 MG: 5 TABLET ORAL at 08:16

## 2023-05-30 RX ADMIN — DULOXETINE HYDROCHLORIDE 30 MG: 30 CAPSULE, DELAYED RELEASE ORAL at 08:16

## 2023-05-30 NOTE — PROGRESS NOTES
DAILY PROGRESS NOTE  [facility], [facility]    2023    Patient Identification:  Name: Blanca Zhu  Age: 77 y.o.  Sex: female  :  1945  MRN: [id number]           Primary Care Physician: Praneeth Valenzuela MD    Subjective:    Interval History:    Patient was successfully extubated yesterday, doing okay.  Continue to be diuresed.  Saturation in 2 L is 96% at this time.  Vital signs stable otherwise.  Sodium today is 150, potassium 4.1, creatinine 1.10  On electrolyte replacement protocol  On antibiotics for haemophilus influenza pneumonia.    ROS:     No nausea, vomiting, diarrhea, constipation, chest pain, shortness of breath.    Objective:    Scheduled Meds:  amLODIPine, 5 mg, Oral, Q24H  bisoprolol, 20 mg, Oral, Q24H  cefTRIAXone, 2 g, Intravenous, Q24H  dextrose, 50 mL, Intravenous, Once   And  insulin regular, 10 Units, Intravenous, Once  DULoxetine, 30 mg, Oral, Daily  guaifenesin-dextromethorphan, 2 tablet, Oral, BID  heparin (porcine), 5,000 Units, Subcutaneous, Q8H  insulin glargine, 20 Units, Subcutaneous, Daily  insulin regular, 2-9 Units, Subcutaneous, 4x Daily AC & at Bedtime  ipratropium-albuterol, 3 mL, Nebulization, 4x Daily - RT  leflunomide, 10 mg, Oral, Daily  Menthol-Zinc Oxide, 1 application, Topical, BID  O2, 2 L/min, Inhalation, Once  [START ON 2023] pantoprazole, 40 mg, Oral, Q AM  sodium chloride, 10 mL, Intravenous, Q12H  sodium chloride, 10 mL, Intravenous, Q12H  sodium chloride, 10 mL, Intravenous, Q12H        Continuous Infusions:       PRN Meds:  •  acetaminophen  •  benzonatate  •  dextrose  •  dextrose  •  dextrose  •  dextrose  •  fentaNYL citrate (PF)  •  glucagon (human recombinant)  •  hydrALAZINE  •  ondansetron  •  Phosphorus Replacement - Follow Nurse / BPA Driven Protocol  •  Potassium Replacement - Follow Nurse / BPA Driven Protocol  •  prochlorperazine  •  sodium chloride  •  sodium chloride  •  sodium  "chloride  •  sodium chloride  •  sodium chloride    Intake/Output:    Intake/Output Summary (Last 24 hours) at 2023 1201  Last data filed at 2023 0900  Gross per 24 hour   Intake 360 ml   Output 2800 ml   Net -2440 ml         Exam:    T MAX 24 hrs: Temp (24hrs), Av.6 °F (37 °C), Min:98.4 °F (36.9 °C), Max:98.7 °F (37.1 °C)    Vitals Ranges:   Temp:  [98.4 °F (36.9 °C)-98.7 °F (37.1 °C)] 98.5 °F (36.9 °C)  Heart Rate:  [] 96  Resp:  [18] 18  BP: (120-180)/(47-83) 169/73    /73   Pulse 96   Temp 98.5 °F (36.9 °C) (Oral)   Resp 18   Ht 152.4 cm (60\")   Wt 92.6 kg (204 lb 2.3 oz)   LMP  (LMP Unknown)   SpO2 94%   BMI 39.87 kg/m²     General: Awake, extubated, following commands very well, on 2 L of oxygen, saturation 94-96%.  Neck: Supple, symmetrical, trachea midline, no adenopathy;              thyroid:  no enlargement/tenderness/nodules;              no carotid bruit or JVD  Cardiovascular: Normal rate, regular rhythm and intact distal pulses.              Exam reveals no gallop and no friction rub. No murmur heard  Pulmonary:  Better air entry.  No wheezing.  Mild rhonchi at bases.    Abdominal: Soft, nontender, bowel sounds active all four quadrants,     no masses, no hepatomegaly, no splenomegaly.   Extremities:  2+ pitting edema of the lower extremity, actually this looks better than her baseline.  Brown discoloration from chronic stasis dermatitis.  Pulses: 2 + symmetric all extremities  Neurological: Patient is intubated, but awake, follow commands, answer questions. No new focal sensorimotor deficit.  Skin: Skin color, texture, normal. Turgor is decreased. No rashes or lesions         Data Review:    Results from last 7 days   Lab Units 23  0354 23  0337 23  0317   WBC 10*3/mm3 10.59 15.42* 19.26*   HEMOGLOBIN g/dL 11.0* 11.0* 12.6   HEMATOCRIT % 34.2 34.1 36.4   PLATELETS 10*3/mm3 135* 166 179       Results from last 7 days   Lab Units 23  0354 " 05/29/23  0337 05/28/23  1350 05/28/23  0305   SODIUM mmol/L 150* 144  --  141   POTASSIUM mmol/L 4.1 3.3*  3.3* 3.8 2.9*   CHLORIDE mmol/L 103 98  --  96*   CO2 mmol/L 38.5* 32.9*  --  29.0   BUN mg/dL 34* 47*  --  58*   CREATININE mg/dL 1.10* 1.56*  --  1.66*   CALCIUM mg/dL 9.0 8.5*  --  7.5*   GLUCOSE mg/dL 159* 332*  --  204*                 Lab Results   Lab Value Date/Time    TROPONINT 28 (H) 05/21/2023 1920    TROPONINT 25 (H) 05/21/2023 1615       Microbiology Results (last 10 days)     Procedure Component Value - Date/Time    MRSA Screen, PCR (Inpatient) - Swab, Nares [[id number]]  (Normal) Collected: 05/26/23 1547    Lab Status: Final result Specimen: Swab from Nares Updated: 05/26/23 1739     MRSA PCR No MRSA Detected    Narrative:      The negative predictive value of this diagnostic test is high and should only be used to consider de-escalating anti-MRSA therapy. A positive result may indicate colonization with MRSA and must be correlated clinically.    Respiratory Culture - Bronchial Wash, ET Suction [[id number]]  (Abnormal) Collected: 05/26/23 1438    Lab Status: Final result Specimen: Bronchial Wash from ET Suction Updated: 05/28/23 0856     Respiratory Culture Heavy growth (4+) Haemophilus influenzae     Comment: This isolate is beta-lactamase NEGATIVE and are routinely susceptible to ampicillin and other beta-lactams.           Light growth (2+) Normal respiratory hannah. No S. aureus or Pseudomonas aeruginosa detected. Final report.     Gram Stain Moderate (3+) WBCs seen      No epithelial cells seen      Moderate (3+) Gram negative coccobacilli      Mixed bacterial morphotypes seen on Gram Stain    Eosinophil Smear - Urine, Urine, Clean Catch [[id number]]  (Normal) Collected: 05/25/23 0700    Lab Status: Final result Specimen: Urine, Clean Catch Updated: 05/25/23 0820     Eosinophil Smear 0 % EOS/100 Cells     Respiratory Panel PCR w/COVID-19(SARS-CoV-2) [facility]DA/RINA/[facility]/DESTINI In-House, NP  Swab in UTM/VTM, 3-4 HR TAT - Swab, Nasopharynx [[id number]]  (Abnormal) Collected: 05/23/23 1625    Lab Status: Final result Specimen: Swab from Nasopharynx Updated: 05/23/23 1756     ADENOVIRUS, PCR Not Detected     Coronavirus 229E Not Detected     Coronavirus HKU1 Not Detected     Coronavirus NL63 Not Detected     Coronavirus OC43 Not Detected     COVID19 Not Detected     Human Metapneumovirus Not Detected     Human Rhinovirus/Enterovirus Not Detected     Influenza A PCR Not Detected     Influenza B PCR Not Detected     Parainfluenza Virus 1 Not Detected     Parainfluenza Virus 2 Not Detected     Parainfluenza Virus 3 Detected     Parainfluenza Virus 4 Not Detected     RSV, PCR Not Detected     Bordetella pertussis pcr Not Detected     Bordetella parapertussis PCR Not Detected     Chlamydophila pneumoniae PCR Not Detected     Mycoplasma pneumo by PCR Not Detected    Narrative:      In the setting of a positive respiratory panel with a viral infection PLUS a negative procalcitonin without other underlying concern for bacterial infection, consider observing off antibiotics or discontinuation of antibiotics and continue supportive care. If the respiratory panel is positive for atypical bacterial infection (Bordetella pertussis, Chlamydophila pneumoniae, or Mycoplasma pneumoniae), consider antibiotic de-escalation to target atypical bacterial infection.    COVID-19 and FLU A/B PCR - Swab, Nasopharynx [[id number]]  (Normal) Collected: 05/21/23 1547    Lab Status: Final result Specimen: Swab from Nasopharynx Updated: 05/21/23 1609     COVID19 Not Detected     Influenza A PCR Not Detected     Influenza B PCR Not Detected    Narrative:      Fact sheet for providers: https://www.fda.gov/media/377272/download    Fact sheet for patients: https://www.fda.gov/media/720598/download    Test performed by PCR.           Imaging Results (Last 7 Days)     Procedure Component Value Units Date/Time    XR Chest 1 View [[id number]]  Collected: 05/29/23 0545     Updated: 05/29/23 0549    Narrative:      SINGLE VIEW OF THE CHEST     HISTORY: Respiratory failure     COMPARISON: 05/28/2023     FINDINGS:  Tubes and lines appear stable position. Cardiac silhouette is stable.  Bilateral alveolar and interstitial tracing noted. Aeration at the lung  bases appears improved. No pneumothorax is seen. There may be a small  right pleural effusion.       Impression:      Interval improvement in aeration at the lung bases.      This report was finalized on 5/29/2023 5:46 AM by Dr. Tanya Prince M.D.       XR Chest 1 View [[id number]] Collected: 05/28/23 0558     Updated: 05/28/23 0602    Narrative:      SINGLE VIEW OF THE CHEST     HISTORY: Respiratory failure     COMPARISON: 05/26/2023     FINDINGS:  Right-sided PICC appears to terminate within the superior vena cava.  Weighted enteric feeding tube extends into the upper abdomen.  Endotracheal tube terminates in satisfactory position. Cardiomegaly is  present. There is vascular congestion. There is elevation of the right  hemidiaphragm. Bibasilar consolidation and bilateral effusions are  unchanged.       Impression:      Persistent vascular congestion and bibasilar consolidation.     This report was finalized on 5/28/2023 5:59 AM by Dr. Tanya Prince M.D.       XR Abdomen KUB [[id number]] Collected: 05/27/23 1434     Updated: 05/27/23 1439    Narrative:      PROCEDURE:  XR ABDOMEN KUB-     HISTORY: Tube placement.     COMPARISON: Chest radiograph 05/26/2023     FINDINGS: A single view of the upper abdomen was obtained. There is a  new enteric tube with the tip projecting over the expected location of  the gastric antrum or proximal portion of the duodenum. Correlate for  desired positioning within the stomach or small bowel. There is oral  contrast within portions of the colon. There are no dilated small bowel  loops. There is multilevel degenerative disc disease.     This report was finalized on  5/27/2023 2:36 PM by Dr. Marcia Yang M.D.       XR Chest 1 View [[id number]] Collected: 05/26/23 1510     Updated: 05/26/23 1516    Narrative:      XR CHEST 1 VW-     HISTORY:  Status post intubation.     COMPARISON:  Chest radiograph 05/25/2023     FINDINGS:    2 views of the chest were obtained. There is a new endotracheal tube  with the tip terminating approximately 2.3 cm above the nagi. The  cardiac silhouette is enlarged. There is calcific aortic  atherosclerosis. Central pulmonary venous congestion has slightly  progressed. Interstitial opacities are not significantly changed. There  is new or increased airspace opacity at the lung bases with suspected  small layering pleural effusions. Pulmonary opacities could reflect  pulmonary edema and atelectasis with pneumonia not excluded. There is  multilevel degenerative disc disease.     This report was finalized on 5/26/2023 3:13 PM by Dr. Marcia Yang M.D.       XR Chest 1 View [[id number]] Collected: 05/25/23 2137     Updated: 05/25/23 2144    Narrative:      Portable chest radiograph     HISTORY:Respiratory distress     TECHNIQUE: Single AP portable radiograph of the chest     COMPARISON:Chest radiograph 05/21/2023       Impression:      FINDINGS AND IMPRESSION:  There is bilateral pulmonary vascular congestion with superimposed  interstitial thickening within the bilateral lungs, as before. The lungs  are hypoinflated. Cardiac silhouette is accentuated by low lung volumes.  No pneumothorax is seen. Somewhat nodular opacification overlying the  left upper lung is present, not definitely seen on prior imaging.  Further evaluation with CT chest is recommended to exclude underlying  pulmonary nodule and establish appropriate follow-up.     This report was finalized on 5/25/2023 9:40 PM by Dr. Bhupinder Lucero M.D.       US Renal Bilateral [[id number]] Collected: 05/24/23 1303     Updated: 05/24/23 1310    Narrative:      US RENAL BILATERAL-  05/24/2023      HISTORY: Acute renal failure.     Right kidney measures 10.3 cm in length. Left kidney measures 9.8 cm in  length.     No hydronephrosis, renal masses or stones are seen. Urinary bladder is  incompletely distended.       Impression:      1. Normal bilateral renal ultrasound.     This report was finalized on 5/24/2023 1:04 PM by Dr. Elliot Mike M.D.               Assessment:      Acute on chronic hypoxic respiratory failure requiring ventilatory management  Haemophilus influenza pneumonia  Asthma exacerbation  Acute parainfluenza viral bronchitis  Hypertensive urgency  Hyponatremia  Hyperkalemia  Acute kidney injury  Diabetic nephropathy with chronic kidney disease stage IIIa  Lower extremity edema, chronic  Uncontrolled diabetes mellitus  Obstructive sleep apnea  Generalized anxiety disorder  Iron deficiency anemia  Morbid obesity  Difficult IV access       Plan:    Patient was extubated yesterday, now out of the ICU.  Doing better.  Tolerating p.o. intake  Continue IV antibiotics, to cover haemophilus influenza.  Continue DuoNeb mini nebs  Monitor and correct electrolytes, potassium again low today, to replace.  Sodium elevated, needs free water.  Renal following.  Monitor renal function, improving.  Renal following.  Adjust insulin as needed, blood sugar fairly okay  Continue DVT/stress ulcer prophylaxis  PT to work with her.  Labs in am      Praneeth Valenzuela MD  05/30/23  10:29 EDT               I wore protective equipment throughout this patient encounter including a face mask, gloves, and protective eyewear.  Hand hygiene was performed before donning protective equipment and after removal when leaving the room.

## 2023-05-30 NOTE — NURSING NOTE
Wound/Ostomy: Consult received about skin problems on Buttocks/Inner Thigh. Patient is alert, oriented, upon assessment we could see red discoloration, blanchable area to Inner/Post Thigh and Buttocks, possibly relate with moisture and friction/Shear, no pressure injury, is currently on Progressa bed, but when she is transferred out of ICU she need low air loss mattress for adequate pressure redistribution and pressure relief.    In addition small blister are seen on lower abdominal area and partial-thickens skin loss to bilateral groin, (Fissure).  Wound care order and pressure ulcer preventive measures  placed into Epic.  Please re-consult for any additional need.

## 2023-05-30 NOTE — THERAPY RE-EVALUATION
Patient Name: Blanca Zhu  : 1945    MRN: [id number]                              Today's Date: 2023       Admit Date: 2023    Visit Dx:     ICD-10-CM ICD-9-CM   1. Respiratory distress  R06.03 786.09   2. Wheezing  R06.2 786.07   3. Pulmonary vascular congestion  R09.89 514   4. Hypertensive urgency  I16.0 401.9     Patient Active Problem List   Diagnosis   • Osteoporosis   • Breast neoplasm, Tis (DCIS), left   • Iron deficiency anemia   • CKD (chronic kidney disease)   • Diabetic nephropathy associated with type 2 diabetes mellitus   • Temporal arteritis   • Immunosuppression   • Anemia   • Duodenal adenoma   • Gastritis without bleeding   • Polyp of duodenum   • Morbidly obese   • Dyspnea on exertion   • Hyperlipidemia   • Type 2 diabetes mellitus with stage 3b chronic kidney disease, with long-term current use of insulin   • Essential hypertension   • Bilateral lower extremity edema   • Pulmonary hypertension   • Obstructive sleep apnea on CPAP   • Stage 3a chronic kidney disease   • Iron malabsorption   • Respiratory distress     Past Medical History:   Diagnosis Date   • Anemia    • Anxiety and depression    • Asthma    • Balance problem    • Breast cancer 2019    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 Daniel Gutierrez, [facility]   •  SECTION N/A     x3   • CHOLECYSTECTOMY N/A    • COLONOSCOPY     • COLONOSCOPY W/ POLYPECTOMY N/A  03/12/2019    Enlarged folds in the antrum: biopsied; duodenal, transverse colon x2, splenic flexure, descending colon and sigmoid colon polyps: biopsied (path: tubular adenoma x6)-Dr. Zak De Jesus, [facility]   • ENDOSCOPY  10/11/2019    Procedure: ESOPHAGOGASTRODUODENOSCOPY with hot snare polypectomy;  Surgeon: Haile Handley MD;  Location: [facility] ENDOSCOPY;  Service: Gastroenterology   • ENDOSCOPY N/A 1/29/2020    Procedure: ESOPHAGOGASTRODUODENOSCOPY;  Surgeon: Haile Handley MD;  Location: [facility] ENDOSCOPY;  Service: Gastroenterology   • ENDOSCOPY N/A 9/9/2020    Procedure: ESOPHAGOGASTRODUODENOSCOPY WITH BIOPSIES AND COLD BIOPSY POLYPECTOMY;  Surgeon: Haile Handley MD;  Location: [facility] ENDOSCOPY;  Service: Gastroenterology;  Laterality: N/A;  pre: dyspepsia and diarrhea  post: duodenal polyp and gastritis   • MASTECTOMY Left    • MASTECTOMY WITH SENTINEL NODE BIOPSY AND AXILLARY NODE DISSECTION Left 7/3/2019    Procedure: LEFT BREAST MASTECTOMY WITH SENTINEL NODE BIOPSY AND , v-y plasty closure;  Surgeon: Tahmina James MD;  Location: [facility] MAIN OR;  Service: General   • SUBTOTAL HYSTERECTOMY Bilateral     ovaries still in tact   • TEMPORAL ARTERY BIOPSY Bilateral 12/05/2019      General Information     Row Name 05/30/23 1529          Physical Therapy Time and Intention    Document Type re-evaluation  -EJ     Mode of Treatment physical therapy  -     Row Name 05/30/23 1529          General Information    Existing Precautions/Restrictions fall;oxygen therapy device and L/min  -EJ     Row Name 05/30/23 1529          Safety Issues, Functional Mobility    Impairments Affecting Function (Mobility) strength;endurance/activity tolerance;range of motion (ROM)  -EJ           User Key  (r) = Recorded By, (t) = Taken By, (c) = Cosigned By    Initials Name Provider Type    EJ Oneyda Serra PT Physical Therapist               Mobility     Row Name 05/30/23 1505          Bed  Mobility    Sit-Supine Avis (Bed Mobility) unable to assess  -EJ     Comment, (Bed Mobility) pt declines sitting EOB today  -EJ     Row Name 05/30/23 1530          Bed-Chair Transfer    Bed-Chair Avis (Transfers) unable to assess  -EJ     Row Name 05/30/23 1530          Sit-Stand Transfer    Sit-Stand Avis (Transfers) unable to assess  -EJ           User Key  (r) = Recorded By, (t) = Taken By, (c) = Cosigned By    Initials Name Provider Type     Oneyda Serra, PT Physical Therapist               Obj/Interventions     Row Name 05/30/23 1530          Range of Motion Comprehensive    General Range of Motion no range of motion deficits identified  -EJ     Row Name 05/30/23 1530          Strength Comprehensive (MMT)    Comment, General Manual Muscle Testing (MMT) Assessment generalized weakness  -EJ     Row Name 05/30/23 1530          Motor Skills    Therapeutic Exercise --  BLE supine ther ex x 10 reps with min A  -EJ           User Key  (r) = Recorded By, (t) = Taken By, (c) = Cosigned By    Initials Name Provider Type     Oneyda Serra, PT Physical Therapist               Goals/Plan     Row Name 05/30/23 1537          Bed Mobility Goal 1 (PT)    Activity/Assistive Device (Bed Mobility Goal 1, PT) sit to supine/supine to sit  -EJ     Avis Level/Cues Needed (Bed Mobility Goal 1, PT) contact guard required  -EJ     Time Frame (Bed Mobility Goal 1, PT) 1 week  -EJ     Progress/Outcomes (Bed Mobility Goal 1, PT) goal ongoing  -EJ     Row Name 05/30/23 1537          Transfer Goal 1 (PT)    Activity/Assistive Device (Transfer Goal 1, PT) sit-to-stand/stand-to-sit;walker, rolling  -EJ     Avis Level/Cues Needed (Transfer Goal 1, PT) contact guard required  -EJ     Time Frame (Transfer Goal 1, PT) 1 week  -EJ     Progress/Outcome (Transfer Goal 1, PT) goal ongoing  -EJ     Row Name 05/30/23 1537          Gait Training Goal 1 (PT)    Activity/Assistive Device (Gait Training  Goal 1, PT) gait (walking locomotion);assistive device use  -EJ     Scott Level (Gait Training Goal 1, PT) contact guard required  -EJ     Distance (Gait Training Goal 1, PT) 50 ft  -EJ     Time Frame (Gait Training Goal 1, PT) 1 week  -EJ     Progress/Outcome (Gait Training Goal 1, PT) goal ongoing  -EJ     Row Name 05/30/23 1537          Therapy Assessment/Plan (PT)    Planned Therapy Interventions (PT) balance training;bed mobility training;gait training;home exercise program;transfer training;stretching;patient/family education;strengthening;stair training;ROM (range of motion)  -EJ           User Key  (r) = Recorded By, (t) = Taken By, (c) = Cosigned By    Initials Name Provider Type    EJ Oneyda Serra, PT Physical Therapist               Clinical Impression     Row Name 05/30/23 1531          Pain    Pretreatment Pain Rating 0/10 - no pain  -EJ     Row Name 05/30/23 1531          Plan of Care Review    Plan of Care Reviewed With patient  -EJ     Outcome Evaluation Pt seen for PT this afternoon. Pt agreeable to therapy, but declines attempt to sit EOB today. She is now alert and oriented x 3. She states she does feel weak. Pt moved up to 4N today from ICU.  Pt able to tolerate supine BLE ther ex x 10 reps w min A. Encouraged pt to attempt sitting today, but she continues to decline and states she will hopefully be able to do so tomorrow. Pt may need SNU upon DC pending progress. She will continue to benefit from skilled PT to maximize safety, strength, and independence w mobility.  -EJ     Row Name 05/30/23 1531          Therapy Assessment/Plan (PT)    Rehab Potential (PT) good, to achieve stated therapy goals  -EJ     Criteria for Skilled Interventions Met (PT) yes  -EJ     Therapy Frequency (PT) 6 times/wk  -EJ     Row Name 05/30/23 1531          Positioning and Restraints    Pre-Treatment Position in bed  -EJ     Post Treatment Position bed  -EJ     In Bed notified nsg;supine;call light within  reach;encouraged to call for assist;exit alarm on  -EJ           User Key  (r) = Recorded By, (t) = Taken By, (c) = Cosigned By    Initials Name Provider Type    Oneyda Welch, TREY Physical Therapist               Outcome Measures     Row Name 05/30/23 1537          How much help from another person do you currently need...    Turning from your back to your side while in flat bed without using bedrails? 2  -EJ     Moving from lying on back to sitting on the side of a flat bed without bedrails? 2  -EJ     Moving to and from a bed to a chair (including a wheelchair)? 1  -EJ     Standing up from a chair using your arms (e.g., wheelchair, bedside chair)? 1  -EJ     Climbing 3-5 steps with a railing? 1  -EJ     To walk in hospital room? 1  -EJ     AM-PAC 6 Clicks Score (PT) 8  -EJ     Highest level of mobility 3 --> Sat at edge of bed  -EJ           User Key  (r) = Recorded By, (t) = Taken By, (c) = Cosigned By    Initials Name Provider Type    Oneyda Welch, PT Physical Therapist                             Physical Therapy Education     Title: PT OT SLP Therapies (In Progress)     Topic: Physical Therapy (In Progress)     Point: Mobility training (In Progress)     Learning Progress Summary           Patient Acceptance, E,D, NL by KISHAN at 5/26/2023 1559    Acceptance, E,D, NR by PC at 5/24/2023 1601   Family Acceptance, E,D, NL by KISHAN at 5/26/2023 1559                   Point: Home exercise program (In Progress)     Learning Progress Summary           Patient Acceptance, E,D, NL by KISHAN at 5/26/2023 1559    Acceptance, E,D, NR by PC at 5/24/2023 1601   Family Acceptance, E,D, NL by KISHAN at 5/26/2023 1559                   Point: Body mechanics (In Progress)     Learning Progress Summary           Patient Acceptance, E,D, NL by KISHAN at 5/26/2023 1559    Acceptance, E,D, NR by PC at 5/24/2023 1601   Family Acceptance, E,D, NL by KISHAN at 5/26/2023 1559                   Point: Precautions (In Progress)     Learning  Progress Summary           Patient Acceptance, E,D, NL by KISHAN at 5/26/2023 1559    Acceptance, E,D, NR by MARY at 5/24/2023 1601   Family Acceptance, E,D, NL by  at 5/26/2023 1559                               User Key     Initials Effective Dates Name Provider Type Discipline    PC 06/16/21 -  Belia Heath PT Physical Therapist PT    KISHAN 03/07/18 -  Polina Stark PTA Physical Therapist Assistant PT              PT Recommendation and Plan  Planned Therapy Interventions (PT): balance training, bed mobility training, gait training, home exercise program, transfer training, stretching, patient/family education, strengthening, stair training, ROM (range of motion)  Plan of Care Reviewed With: patient  Outcome Evaluation: Pt seen for PT this afternoon. Pt agreeable to therapy, but declines attempt to sit EOB today. She is now alert and oriented x 3. She states she does feel weak. Pt moved up to 4N today from ICU.  Pt able to tolerate supine BLE ther ex x 10 reps w min A. Encouraged pt to attempt sitting today, but she continues to decline and states she will hopefully be able to do so tomorrow. Pt may need SNU upon DC pending progress. She will continue to benefit from skilled PT to maximize safety, strength, and independence w mobility.     Time Calculation:    PT Charges     Row Name 05/30/23 1538             Time Calculation    Start Time 1515  -EJ      Stop Time 1526  -EJ      Time Calculation (min) 11 min  -EJ      PT Received On 05/30/23  -EJ      PT - Next Appointment 05/31/23  -EJ      PT Goal Re-Cert Due Date 06/06/23  -EJ         Time Calculation- PT    Total Timed Code Minutes- PT 8 minute(s)  -EJ            User Key  (r) = Recorded By, (t) = Taken By, (c) = Cosigned By    Initials Name Provider Type    EJ Oneyda Serra, PT Physical Therapist              Therapy Charges for Today     Code Description Service Date Service Provider Modifiers Qty    90177329633  PT RE-EVAL ESTABLISHED PLAN 2  5/30/2023 Oneyda Serra, PT GP 1    19246138052 HC PT THER PROC EA 15 MIN 5/30/2023 Oneyda Serra, PT GP 1          PT G-Codes  AM-PAC 6 Clicks Score (PT): 8  PT Discharge Summary  Anticipated Discharge Disposition (PT): skilled nursing facility (pending progress)    Oneyda Serra, PT  5/30/2023

## 2023-05-30 NOTE — PLAN OF CARE
Goal Outcome Evaluation:  Plan of Care Reviewed With: patient, daughter        Progress: improving  Outcome Evaluation: Patient has been alert and oriented, Maori speaking. Understands most English, with daughter's also translating in Cymraes to confirm understanding. Patient transferred to this unit around lunch time. Daughter assisted with feeding. Ext cath in place with adequate urine output. Fluids encouraged. Tolerating NTL fluids well. Complaints of generalized pain/aches. Mario Alberto heels elevated. PICC in place to RUE with all three lumens flushing well with good blood return. Vital signs stable with 2L NC, Call light in reach. Safety maintained. Duaghter will be staying with patient.

## 2023-05-30 NOTE — PROGRESS NOTES
Loyd Huff MD                          [phone]            Patient ID:    Name:  Blanca Zhu    MRN:  [id number]    1945   77 y.o.  female            Patient Care Team:  Praneeth Valenzuela MD as PCP - General (Internal Medicine)  Jean-Claude Farnsworth MD as Consulting Physician (Hematology and Oncology)  Praneeth Valenzuela MD as Referring Physician (Internal Medicine)  Tahmina Brown MD as Consulting Physician (Rheumatology)  Gretchen Mcginnis MD as Consulting Physician (Nephrology)  Tahmina James MD as Surgeon (General Surgery)  Haile Handley MD as Consulting Physician (Gastroenterology)  Sb Power MD as Consulting Physician (Cardiology)    CC/ Reason for visit: Ac parainfluenza URTI, CHF, [place]     Subjective: Pt seen and examined this AM.  Patient successfully extubated to nasal cannula yesterday.  Doing better now.  No new fevers.  Awake and interactive.  Able to tolerate modified diet.  Diuresis ongoing as needed.     ROS: Denies any persistent fevers or chills, worsening cough or worsening sputum production, nausea vomiting or diarrhea    Objective     Vital Signs past 24hrs    BP range: BP: (120-163)/(47-98) 150/65  Pulse range: Heart Rate:  [] 101  Resp rate range: Resp:  [18] 18  Temp range: Temp (24hrs), Av.5 °F (36.9 °C), Min:98.3 °F (36.8 °C), Max:98.7 °F (37.1 °C)      Ventilator/Non-Invasive Ventilation Settings (From admission, onward)     Start     Ordered    23 1619  NIPPV (CPAP or BIPAP)  At Bedtime As Needed-RT        Comments: May adjust pressures to best tolerance and effect.  Patient is used to using a Pap machine and may bleed in supplemental O2 to maintain saturations greater than 90% less than 95 when she is on the PAP machine.   Question Answer Comment   Type BIPAP    IPAP 18    EPAP 10        23 1618    23 2042  NIPPV (CPAP or BIPAP)  Until Discontinued,   Status:  Canceled        Comments: May  adjust pressures to best tolerance and effect.  Patient is used to using a Pap machine   Question Answer Comment   Type BIPAP    IPAP 18    EPAP 10        05/21/23 2041    05/21/23 2042  NIPPV (CPAP or BIPAP)  Until Discontinued,   Status:  Canceled        Comments: May adjust pressures to best tolerance and effect.  Patient is used to using a Pap machine and may bleed in supplemental O2 to maintain saturations greater than 90% less than 95 when she is on the PAP machine.   Question Answer Comment   Type BIPAP    IPAP 18    EPAP 10        05/21/23 2042    05/21/23 1542  NIPPV (CPAP or BIPAP)  Until Discontinued,   Status:  Canceled        Question:  Type  Answer:  BIPAP    05/21/23 1541                Vent Settings        Resp Rate (Set): 18  Pressure Support (cm H2O): 10 cm H20  FiO2 (%): 30 %  PEEP/CPAP (cm H2O): 5 cm H20  Minute Ventilation (L/min) (Obs): 8 L/min  Resp Rate (Observed) Vent: 26  I:E Ratio (Set): 1:3.2  I:E Ratio (Obs): 1:2.2  PIP Observed (cm H2O): 16 cm H2O  Plateau Pressure (cm H2O): 24 cm H2O  Driving Pressure (cm H2O): 19.2 cm H2O  Device (Oxygen Therapy): nasal cannula FiO2 (%): 30 %     92.6 kg (204 lb 2.3 oz); Body mass index is 39.87 kg/m².      Intake/Output Summary (Last 24 hours) at 5/30/2023 0837  Last data filed at 5/30/2023 0800  Gross per 24 hour   Intake 120 ml   Output 2800 ml   Net -2680 ml       PHYSICAL EXAM   Constitutional: Middle-aged obese F pt in bed, awake and interactive, some accessory muscle use.  Head: - NCAT  Eyes: No pallor.  Anicteric sclerae, EOMI.  ENMT: Mallampati 4, no oral thrush. Moist MM.   NECK: Trachea midline, No thyromegaly, no palpable cervical lymphadenopathy  Heart: RRR, no murmur.  Trace pedal edema   Lungs: SAV +, Dec BS @ bases +. No wheezes/ crackles heard    Abdomen: Soft. Obese, No tenderness, guarding or rigidity. No palpable masses  Extremities: Extremities warm and well perfused. No cyanosis/ clubbing  Neuro: Awake, interactive, moving all  extremities -generally weak  Psych: Mood and affect -appropriate    PPE recommended per [facility] infectious disease Isolation protocol for the current clinical scenario (as mentioned below) was followed.     Scheduled meds:  amLODIPine, 5 mg, Oral, Q24H  bisoprolol, 20 mg, Oral, Q24H  cefTRIAXone, 2 g, Intravenous, Q24H  chlorhexidine, 15 mL, Mouth/Throat, Q12H  dextrose, 50 mL, Intravenous, Once   And  insulin regular, 10 Units, Intravenous, Once  DULoxetine, 30 mg, Oral, Daily  guaifenesin-dextromethorphan, 2 tablet, Oral, BID  heparin (porcine), 5,000 Units, Subcutaneous, Q8H  insulin glargine, 20 Units, Subcutaneous, Daily  insulin regular, 2-9 Units, Subcutaneous, 4x Daily AC & at Bedtime  ipratropium-albuterol, 3 mL, Nebulization, 4x Daily - RT  leflunomide, 10 mg, Oral, Daily  O2, 2 L/min, Inhalation, Once  [START ON 5/31/2023] pantoprazole, 40 mg, Oral, Q AM  sodium chloride, 10 mL, Intravenous, Q12H  sodium chloride, 10 mL, Intravenous, Q12H  sodium chloride, 10 mL, Intravenous, Q12H        IV meds:                           Data Review:      Results from last 7 days   Lab Units 05/30/23  0354 05/29/23  0337 05/28/23  1350 05/28/23  0305 05/27/23  0317 05/26/23  1545 05/26/23  1538 05/23/23  2111 05/23/23  0859   SODIUM mmol/L 150* 144  --  141 130*   < >  --    < > 128*   POTASSIUM mmol/L 4.1 3.3*  3.3* 3.8 2.9* 3.8   < >  --    < > 6.0*   CHLORIDE mmol/L 103 98  --  96* 89*   < >  --    < > 93*   CO2 mmol/L 38.5* 32.9*  --  29.0 23.3   < >  --    < > 28.0   BUN mg/dL 34* 47*  --  58* 69*   < >  --    < > 47*   CREATININE mg/dL 1.10* 1.56*  --  1.66* 1.99*   < >  --    < > 1.87*   CALCIUM mg/dL 9.0 8.5*  --  7.5* 7.3*   < >  --    < > 8.2*   GLUCOSE mg/dL 159* 332*  --  204* 129*   < >  --    < > 114*   WBC 10*3/mm3 10.59 15.42*  --   --  19.26*   < >  --    < > 13.30*   HEMOGLOBIN g/dL 11.0* 11.0*  --   --  12.6   < >  --    < > 12.6   PLATELETS 10*3/mm3 135* 166  --   --  179   < >  --    < >  177   PROCALCITONIN ng/mL  --   --   --   --   --   --  0.17  --  0.25    < > = values in this interval not displayed.       Lab Results   Component Value Date    CALCIUM 9.0 05/30/2023    PHOS 2.7 05/30/2023       Results from last 7 days   Lab Units 05/26/23  1438   RESPCX  Heavy growth (4+) Haemophilus influenzae*  Light growth (2+) Normal respiratory hannah. No S. aureus or Pseudomonas aeruginosa detected. Final report.       Results from last 7 days   Lab Units 05/28/23  0851 05/27/23  0916 05/26/23  1622   PH, ARTERIAL pH units 7.435 7.413 7.278*   PO2 ART mm Hg 54.9* 69.0* 96.0   PCO2, ARTERIAL mm Hg 51.6* 44.9 58.3*   HCO3 ART mmol/L 34.6* 28.7* 27.3        I have personally reviewed the results from the time of this admission to 5/30/2023 08:37 EDT and agree with these findings:  [x]  Laboratory  [x]  Microbiology  [x]  Radiology  []  EKG/Telemetry   [x]  Cardiology/Vascular   []  Pathology  []  Old records    ASSESSMENT   Acute on chronic hypoxic/hypercapnic respiratory failure(2L) -noninvasive ventilator dependent s/p intubation-5/26 - 5/29  Haemophilus pneumonia s/p bronch - 5/26  Acute metabolic encephalopathy  Acute on chronic congestive heart failure; diastolic  Acute para influenza URTI  Acute asthma exacerbation  Acute kidney injury  Hyperkalemia  Diabetes  Pulmonary hypertension  ANAYELI on CPAP  Morbid obesity    PLAN:  Patient successfully extubated yesterday and is on nasal cannula now.  Does seem to be just mildly volume overloaded still.  Does have some hypernatremia today so we will hold off on diuresis until we can correct.  Await nephrology input.  Continue antibiotic course for haemophilus pneumonia  Continue bronchodilators for asthma.  Will need nocturnal noninvasive ventilator now and@ dc for chronic hypercapnia from ANAYELI/OHS.   Advance diet as tolerated  Blood sugar control with subcutaneous insulin  Therapy evaluation  C/w heparin subcu    Guarded prognosis    Transfer the tele  [facility]    Loyd Huff MD  5/30/2023

## 2023-05-30 NOTE — PLAN OF CARE
Goal Outcome Evaluation:              Outcome Evaluation: Clinical swallow re-evaluation completed. Patient with weak and strained vocal quality which was difficult to assess throughout evaluation. No overt s/s of aspiration demonstrated with ice chips, nectar thick liquid by spoon/cup, puree, soft/chopped solids, or regular solids. Wet vocal quality with thins by spoon/cup. Delayed cough with nectar by straw. Recommend patient participate in VFSS to determine safest diet. Pending VFSS, recommend patient continue nectar thick liquid and regular diet. No straws. Meds whole or crushed with thickened liquids or puree, as tolerated. Sitting upright, slow rate, small bites/sips. Speech to follow with VFSS.

## 2023-05-30 NOTE — PLAN OF CARE
Goal Outcome Evaluation:  Plan of Care Reviewed With: patient           Outcome Evaluation: Pt seen for PT this afternoon. Pt agreeable to therapy, but declines attempt to sit EOB today. She is now alert and oriented x 3. She states she does feel weak. Pt moved up to 4N today from ICU.  Pt able to tolerate supine BLE ther ex x 10 reps w min A. Encouraged pt to attempt sitting today, but she continues to decline and states she will hopefully be able to do so tomorrow. Pt may need SNU upon DC pending progress. She will continue to benefit from skilled PT to maximize safety, strength, and independence w mobility.

## 2023-05-30 NOTE — PROGRESS NOTES
Nutrition Services    Patient Name:  Blanca Zhu  YOB: 1945  MRN: [id number]  Admit Date:  2023    Assessment Date:  23    Comment: Nutrition follow up  Pt extubated , diet advanced to regular with NTL. Pt and family member at bedside report pt not eating much solid food right now (~25% of breakfast) but drinking a lot of liquids. Declined adding nutritional supplements. Labs and meds reviewed. Encouraged PO intake as tolerated.     Recommend:  1.  Encouraged PO intake as tolerated, diet advancement per SLP    Will follow per protocol.    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 Daniel Gutierrez, [facility]   •  SECTION N/A     x3   • CHOLECYSTECTOMY N/A    • COLONOSCOPY     • COLONOSCOPY W/ POLYPECTOMY N/A 2019    Enlarged folds in the antrum: biopsied; duodenal, transverse colon x2, splenic flexure, descending colon and sigmoid colon polyps: biopsied (path: tubular adenoma  "x6)-Dr. Zak De Jesus, [facility]   • ENDOSCOPY  10/11/2019    Procedure: ESOPHAGOGASTRODUODENOSCOPY with hot snare polypectomy;  Surgeon: Haile Handley MD;  Location[facility] END[facility];  Service: Gastroenterology   • ENDOSCOPY N/A 1/29/2020    Procedure: ESOPHAGOGASTRODUODENOSCOPY;  Surgeon: Haile Handley MD;  Location: [facility] ENDOSCOPY;  Service: Gastroenterology   • ENDOSCOPY N/A 9/9/2020    Procedure: ESOPHAGOGASTRODUODENOSCOPY WITH BIOPSIES AND COLD BIOPSY POLYPECTOMY;  Surgeon: Haile Handley MD;  Location: [facility] ENDOSCOPY;  Service: Gastroenterology;  Laterality: N/A;  pre: dyspepsia and diarrhea  post: duodenal polyp and gastritis   • MASTECTOMY Left    • MASTECTOMY WITH SENTINEL NODE BIOPSY AND AXILLARY NODE DISSECTION Left 7/3/2019    Procedure: LEFT BREAST MASTECTOMY WITH SENTINEL NODE BIOPSY AND , v-y plasty closure;  Surgeon: Tahmina James MD;  Location: [facility] MAIN OR;  Service: General   • SUBTOTAL HYSTERECTOMY Bilateral     ovaries still in tact   • TEMPORAL ARTERY BIOPSY Bilateral 12/05/2019        Encounter Information        Nutrition History:  Pt had RR called 5/26 and transferred to CCU, intubated and ventilated. S/p bronch 5/26. Extubated 5/29.    Food Preferences:    Supplements:    Factors Affecting Intake: decreased appetite     Anthropometrics        Current Height  Current Weight  BMI kg/m2 Height: 152.4 cm (60\")  Weight: 92.6 kg (204 lb 2.3 oz) (05/29/23 0542)  Body mass index is 39.87 kg/m².   Adjusted BMI (if applicable)    BMI Category Obese, Class II (35 - 39.9)       Admission Weight Weight: 90.7 kg (200 lb)       Ideal Body Weight (IBW) 100 lb   Adjusted IBW (if applicable)        Usual Body Weight (UBW) 200-208 lb   Weight Change/Trend Stable       Weight History Wt Readings from Last 30 Encounters:   05/29/23 0542 92.6 kg (204 lb 2.3 oz)   05/21/23 1550 90.7 kg (200 lb)   05/10/23 1655 93.5 kg (206 lb 3.2 oz)   04/06/23 1216 94.8 kg (209 lb) "   03/24/23 1406 94.6 kg (208 lb 9.6 oz)   03/17/23 1406 93.3 kg (205 lb 9.6 oz)   03/10/23 1428 93.3 kg (205 lb 9.6 oz)   03/01/23 1319 94.8 kg (208 lb 14.4 oz)   10/21/22 1506 96.4 kg (212 lb 8 oz)   10/03/22 1430 95.7 kg (211 lb)   07/25/22 1400 95.7 kg (211 lb)   07/20/22 1521 93.3 kg (205 lb 11.2 oz)   05/18/22 1021 95.3 kg (210 lb)   05/13/22 1542 95.3 kg (210 lb)   05/12/22 1429 95.4 kg (210 lb 6.4 oz)   03/21/22 1339 94.8 kg (209 lb)   11/16/21 1354 94.3 kg (207 lb 12.8 oz)   09/21/21 1415 94.3 kg (208 lb)   07/20/21 1503 95 kg (209 lb 6.4 oz)   06/21/21 1418 94.9 kg (209 lb 3.2 oz)   06/09/21 1400 92.1 kg (203 lb)   05/04/21 1317 92.1 kg (203 lb)   03/30/21 1505 92 kg (202 lb 14.4 oz)   10/19/20 1339 90.6 kg (199 lb 12.8 oz)   09/09/20 0713 88.9 kg (196 lb)   08/27/20 1108 90 kg (198 lb 6.4 oz)   07/02/20 0902 89.8 kg (198 lb)   06/29/20 1531 90.2 kg (198 lb 12.8 oz)   06/26/20 1404 89.8 kg (198 lb)   01/29/20 1016 84.7 kg (186 lb 11.2 oz)   12/16/19 1456 86 kg (189 lb 11.2 oz)           --  Estimated/Assessed Needs       Energy Requirements    Weight for Calculation Weight: 90.7 kg (200 lb)   Method for Estimation  18 kcal/kg, 20 kcal/kg   EST Needs (kcal/day) 6948-6435 kcals       Protein Requirements    Weight for Calculation Weight: 90.7 kg (200 lb)   EST Protein Needs (g/kg) 1.0 - 1.2 gm/kg   EST Daily Needs (g/day)  g       Fluid Requirements     Method for Estimation Defer to physician    Estimated Needs (mL/day)      Tests/Procedures        Tests/Procedures X-Ray     Labs       Pertinent Labs    Results from last 7 days   Lab Units 05/30/23  0354 05/29/23  0337 05/28/23  1350 05/28/23  0305   SODIUM mmol/L 150* 144  --  141   POTASSIUM mmol/L 4.1 3.3*  3.3* 3.8 2.9*   CHLORIDE mmol/L 103 98  --  96*   CO2 mmol/L 38.5* 32.9*  --  29.0   BUN mg/dL 34* 47*  --  58*   CREATININE mg/dL 1.10* 1.56*  --  1.66*   CALCIUM mg/dL 9.0 8.5*  --  7.5*   GLUCOSE mg/dL 159* 332*  --  204*     Results from  last 7 days   Lab Units 05/30/23  0354 05/28/23  0305 05/27/23  0317   MAGNESIUM mg/dL  --   --  1.6   PHOSPHORUS mg/dL 2.7   < > 1.7*   HEMOGLOBIN g/dL 11.0*   < > 12.6   HEMATOCRIT % 34.2   < > 36.4   WBC 10*3/mm3 10.59   < > 19.26*   ALBUMIN g/dL 2.6*   < > 2.6*    < > = values in this interval not displayed.     Results from last 7 days   Lab Units 05/30/23  0354 05/29/23  0337 05/27/23  0317 05/26/23  1545 05/25/23  0404   PLATELETS 10*3/mm3 135* 166 179 200 202     COVID19   Date Value Ref Range Status   05/23/2023 Not Detected Not Detected - Ref. Range Final     Lab Results   Component Value Date    HGBA1C 8.40 (H) 10/14/2022          Medications           Scheduled Medications amLODIPine, 5 mg, Oral, Q24H  bisoprolol, 20 mg, Oral, Q24H  cefTRIAXone, 2 g, Intravenous, Q24H  dextrose, 50 mL, Intravenous, Once   And  insulin regular, 10 Units, Intravenous, Once  DULoxetine, 30 mg, Oral, Daily  guaifenesin-dextromethorphan, 2 tablet, Oral, BID  heparin (porcine), 5,000 Units, Subcutaneous, Q8H  insulin glargine, 20 Units, Subcutaneous, Daily  insulin regular, 2-9 Units, Subcutaneous, 4x Daily AC & at Bedtime  ipratropium-albuterol, 3 mL, Nebulization, 4x Daily - RT  leflunomide, 10 mg, Oral, Daily  Menthol-Zinc Oxide, 1 application, Topical, BID  O2, 2 L/min, Inhalation, Once  [START ON 5/31/2023] pantoprazole, 40 mg, Oral, Q AM  sodium chloride, 10 mL, Intravenous, Q12H  sodium chloride, 10 mL, Intravenous, Q12H  sodium chloride, 10 mL, Intravenous, Q12H       Infusions     PRN Medications •  acetaminophen  •  benzonatate  •  dextrose  •  dextrose  •  dextrose  •  dextrose  •  fentaNYL citrate (PF)  •  glucagon (human recombinant)  •  hydrALAZINE  •  ondansetron  •  Phosphorus Replacement - Follow Nurse / BPA Driven Protocol  •  Potassium Replacement - Follow Nurse / BPA Driven Protocol  •  prochlorperazine  •  sodium chloride  •  sodium chloride  •  sodium chloride  •  sodium chloride  •  sodium chloride      Physical Findings          Physical Appearance obese, on oxygen therapy   Oral/Mouth Cavity WNL   Edema  generalized, lower extremity , upper extremity, 2+ (mild)   Gastrointestinal normoactive, last bowel movement:5/30   Skin  pressure injury, skin tear gavino medial perinuem PI   Tubes/Drains/Lines central line/PICC   NFPE Not indicated at this time   --  Current Nutrition Orders & Evaluation of Intake       Oral Nutrition     Food Allergies NKFA   Current PO Diet Diet: Cardiac Diets, Diabetic Diets; Healthy Heart (2-3 Na+); Consistent Carbohydrate; No Straw, No Mixed Consistencies; Texture: Regular Texture (IDDSI 7); Fluid Consistency: Nectar Thick   Supplement n/a   PO Evaluation     % PO Intake 25%    # of Days Evaluated 1   --  PES STATEMENT / NUTRITION DIAGNOSIS      Nutrition Dx Problem  Problem: Swallowing Difficulty  Etiology: Medical Diagnosis and Factors Affecting Nutrition   Signs/Symptoms: SLP/Swallow Evaluation    Comment:    --  NUTRITION INTERVENTION / PLAN OF CARE      Intervention Goal(s) Maintain nutrition status, Increase intake and Appropriate weight loss         RD Intervention/Action Follow Tx Progress, Care plan reviewed and Recommend/ordered:          Prescription/Orders:       PO Diet       Supplements       Snacks       Enteral Nutrition       Parenteral Nutrition    New Prescription Ordered? No changes at this time   --        Monitor/Evaluation Per protocol   Discharge Plan/Needs Pending clinical course   Education Will instruct as appropriate   --    RD to follow per protocol.      Electronically signed by:  Emerald Huynh RD  05/30/23 12:08 EDT

## 2023-05-30 NOTE — PROGRESS NOTES
[facility]     Progress Note    Patient Name: Blanca Zhu  : 1945  MRN: [id number]  Primary Care Physician:  Praneeth Valenzuela MD  Date of admission: 2023    Subjective   Subjective     Chief Complaint: hyperkalemia and federica    History of Present Illness    Patient ith htn, copd, with federica and hyperkalemia    Review of Systems      Objective   Objective     Vitals:   Temp:  [98.3 °F (36.8 °C)-98.9 °F (37.2 °C)] 98.7 °F (37.1 °C)  Heart Rate:  [] 105  Resp:  [18] 18  BP: (120-163)/(47-98) 163/76  FiO2 (%):  [30 %] 30 %    Physical Exam     General Appearance:  In no acute distress.    HEENT: Normal HEENT exam.     Extremities: .  1+ dependent edema.    Neurological: Patient is awake, alert    Result Review    Result Review:  I have personally reviewed the results from the time of this admission to 2023 06:50 EDT and agree with these findings:  []  Laboratory list / accordion  []  Microbiology  []  Radiology  []  EKG/Telemetry   []  Cardiology/Vascular   []  Pathology  []  Old records  []  Other:        Assessment & Plan   Assessment / Plan     Brief Patient Summary:  Blanca Zhu is a 77 y.o. female who has federica and hyperkalemia    Active Hospital Problems:  Active Hospital Problems    Diagnosis    • **Respiratory distress      Plan:   Acute kidney injury, likely hypovolemic, ARB and diuretic were contributing.  Creatinine stable today, euvolemic   Hyperkalemia from FEDERICA and ARB, slowly better, 5.2 today  Acute hypoxic respiratory failure, pulmonary following, on CPAP  Chronic asthma with acute exacerbation, on IV steroids  Hyperkalemia, improved  Probable viral bronchitis, PCR pending  Hypertensive urgency on admission, improved  Hyponatremia, 135 today  Hypocalcemia: Check albumin  Leukocytosis from steroids  Mild proteinuria    Patient seen and examined. Labs and chart reviewed. Patient extubated. No complaints. Labs and chart reviewed. Renal function improved with cr of  1.1.  Sodium up today. D/w family. Encourage po intake. On thickened liquids. If continues to rise, may need ivf      Yas Bustos MD

## 2023-05-30 NOTE — THERAPY RE-EVALUATION
Acute Care - Speech Language Pathology   Swallow Re-Evaluation [facility]     Patient Name: Blanca Zhu  : 1945  MRN: [id number]  Today's Date: 2023               Admit Date: 2023    Visit Dx:     ICD-10-CM ICD-9-CM   1. Respiratory distress  R06.03 786.09   2. Wheezing  R06.2 786.07   3. Pulmonary vascular congestion  R09.89 514   4. Hypertensive urgency  I16.0 401.9     Patient Active Problem List   Diagnosis   • Osteoporosis   • Breast neoplasm, Tis (DCIS), left   • Iron deficiency anemia   • CKD (chronic kidney disease)   • Diabetic nephropathy associated with type 2 diabetes mellitus   • Temporal arteritis   • Immunosuppression   • Anemia   • Duodenal adenoma   • Gastritis without bleeding   • Polyp of duodenum   • Morbidly obese   • Dyspnea on exertion   • Hyperlipidemia   • Type 2 diabetes mellitus with stage 3b chronic kidney disease, with long-term current use of insulin   • Essential hypertension   • Bilateral lower extremity edema   • Pulmonary hypertension   • Obstructive sleep apnea on CPAP   • Stage 3a chronic kidney disease   • Iron malabsorption   • Respiratory distress     Past Medical History:   Diagnosis Date   • Anemia    • Anxiety and depression    • Asthma    • Balance problem    • Breast cancer 2019    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 Daniel Gutierrez, [facility]   •  SECTION N/A     x3   • CHOLECYSTECTOMY N/A    •  COLONOSCOPY     • COLONOSCOPY W/ POLYPECTOMY N/A 03/12/2019    Enlarged folds in the antrum: biopsied; duodenal, transverse colon x2, splenic flexure, descending colon and sigmoid colon polyps: biopsied (path: tubular adenoma x6)-Dr. Zak De Jesus, [facility]   • ENDOSCOPY  10/11/2019    Procedure: ESOPHAGOGASTRODUODENOSCOPY with hot snare polypectomy;  Surgeon: Haile Handley MD;  Location: [facility] ENDOSCOPY;  Service: Gastroenterology   • ENDOSCOPY N/A 1/29/2020    Procedure: ESOPHAGOGASTRODUODENOSCOPY;  Surgeon: Haile Handley MD;  Location: [facility] ENDOSCOPY;  Service: Gastroenterology   • ENDOSCOPY N/A 9/9/2020    Procedure: ESOPHAGOGASTRODUODENOSCOPY WITH BIOPSIES AND COLD BIOPSY POLYPECTOMY;  Surgeon: Haile Handley MD;  Location: [facility] ENDOSCOPY;  Service: Gastroenterology;  Laterality: N/A;  pre: dyspepsia and diarrhea  post: duodenal polyp and gastritis   • MASTECTOMY Left    • MASTECTOMY WITH SENTINEL NODE BIOPSY AND AXILLARY NODE DISSECTION Left 7/3/2019    Procedure: LEFT BREAST MASTECTOMY WITH SENTINEL NODE BIOPSY AND , v-y plasty closure;  Surgeon: Tahmina James MD;  Location: [facility] MAIN OR;  Service: General   • SUBTOTAL HYSTERECTOMY Bilateral     ovaries still in tact   • TEMPORAL ARTERY BIOPSY Bilateral 12/05/2019       SLP Recommendation and Plan  SLP Swallowing Diagnosis: R/O pharyngeal dysphagia (05/30/23 1100)  SLP Diet Recommendation: nectar thick liquids, regular textures, water between meals after oral care, with supervision, ice chips between meals after oral care, with supervision (05/30/23 1100)  Recommended Precautions and Strategies: upright posture during/after eating, small bites of food and sips of liquid, no straw, general aspiration precautions (05/30/23 1100)  SLP Rec. for Method of Medication Administration: meds crushed, meds whole, with thick liquids, with puree (05/30/23 1100)     Monitor for Signs of Aspiration: yes, notify SLP if any  concerns (05/30/23 1100)  Recommended Diagnostics: VFSS (MBS) (05/30/23 1100)  Swallow Criteria for Skilled Therapeutic Interventions Met: demonstrates skilled criteria (05/30/23 1100)     Rehab Potential/Prognosis, Swallowing: good, to achieve stated therapy goals (05/30/23 1100)  Therapy Frequency (Swallow): PRN (05/30/23 1100)  Predicted Duration Therapy Intervention (Days): until discharge (05/30/23 1100)                                        Outcome Evaluation: Clinical swallow re-evaluation completed. Patient with weak and strained vocal quality which was difficult to assess throughout evaluation. No overt s/s of aspiration demonstrated with ice chips, nectar thick liquid by spoon/cup, puree, soft/chopped solids, or regular solids. Wet vocal quality with thins by spoon/cup. Delayed cough with nectar by straw. Recommend patient participate in VFSS to determine safest diet. Pending VFSS, recommend patient continue nectar thick liquid and regular diet. No straws. Meds whole or crushed with thickened liquids or puree, as tolerated. Sitting upright, slow rate, small bites/sips. Speech to follow with VFSS.      SWALLOW EVALUATION (last 72 hours)     SLP Adult Swallow Evaluation     Row Name 05/30/23 1100 05/29/23 1700                Rehab Evaluation    Document Type re-evaluation  -CR evaluation  -NR       Subjective Information no complaints  -CR no complaints  -NR       Patient Observations alert;cooperative  -CR alert;cooperative;agree to therapy  -NR       Patient Effort good  -CR good  -NR       Symptoms Noted During/After Treatment none  -CR none  -NR          General Information    Patient Profile Reviewed yes  -CR yes  -NR       Pertinent History Of Current Problem -- Per H and P :  Patient is a 77-year-old female, patient office, patient has history of asthma, chronic kidney disease stage IIIa, morbid obesity, chronic  diastolic congestive heart failure, diabetes mellitus type 2, hypertension,  hyperlipidemia, sleep apnea, temporal arteritis.  Patient states that about 4 days ago she started developing upper respiratory symptoms, she developed sneezing, runny nose, nose congestion.  Patient was seen by her allergist and was told that she had a virus infection.  Nothing specifically was given to the patient.  Over the next few days, patient's symptoms became worse, having worsening shortness of breath, wheezing, decreased oxygenation.  She denies having any worsening lower extremity edema.  Patient was brought to the emergency room per family.  In the emergency room, patient was found to have: Troponin 25 and then 28, creatinine 1.11, potassium 4.4, COVID-19 test was negative, chest x-ray showed borderline heart size with mild vascular congestion, EKG showed sinus tachycardia.  Blood pressure in the emergency room was 217/76.  In the emergency room, patient was placed on a BiPAP, was given Lasix, patient was admitted to the hospital for further management.  Pt extubated this date at approximately 1000  -NR       Current Method of Nutrition nectar/syrup-thick liquids;regular textures  -CR NPO  -NR       Precautions/Limitations, Vision -- WFL;for purposes of eval  -NR       Precautions/Limitations, Hearing -- WFL;for purposes of eval  -NR       Prior Level of Function-Communication -- WFL  -NR       Prior Level of Function-Swallowing -- safe, efficient swallowing in all situations  -NR       Plans/Goals Discussed with -- patient;agreed upon  -NR       Barriers to Rehab -- none identified  -NR       Patient's Goals for Discharge -- return to PO diet  -NR          Pain    Additional Documentation -- Pain Scale: Numbers Pre/Post-Treatment (Group)  -NR          Pain Scale: Numbers Pre/Post-Treatment    Pretreatment Pain Rating 10/10  -CR 0/10 - no pain  -NR       Posttreatment Pain Rating 10/10  -CR 0/10 - no pain  -NR       Pain Location - Side/Orientation Right  -CR --       Pain Location - other (see comments)   leg  -CR --          Oral Motor Structure and Function    Dentition Assessment natural, present and adequate  -CR natural, present and adequate  -NR       Secretion Management WNL/WFL  -CR WNL/WFL  -NR       Mucosal Quality moist, healthy  -CR moist, healthy  -NR       Volitional Swallow -- weak  -NR          Oral Musculature and Cranial Nerve Assessment    Oral Motor General Assessment -- generalized oral motor weakness  -NR       Vocal Impairment, Detail. Cranial Nerve X (Vagus) impaired throat clear/cough (see comments);other (see comments)  weak and strained  -CR --          General Eating/Swallowing Observations    Eating/Swallowing Skills -- fed by SLP  -NR       Positioning During Eating -- upright in bed  -NR       Utensils Used -- spoon;cup;straw  -NR       Consistencies Trialed -- regular textures;chopped;mixed consistency;pureed;ice chips;thin liquids;nectar/syrup-thick liquids  -NR          Clinical Swallow Eval    Clinical Swallow Evaluation Summary -- Swallow evaluated at bedside.  Mastication is slow but adequate when indicated.  No oral residuals noted.  The pharyngeal swallow reflex is occasionally mistimed with possible reduced laryngeal elevation upon palpation.  No coughing or choking noted.  Intermittent throat clearing and gurgly vocal quality noted with ice and thin liquids as well as the juice from peaches.  Pt is very aphonic due to reduced vocal fold adduction which may be hindering the swallow function.  No other overt s/s of aspiration noted.  Recommend a regular texture diet with nectar thick liquids.  Meds may be given whole or crushed in puree or nectar thick liquids.  Swallow precautions include sit upright and small bites and sips.  Recommend reassess in 1-2 days to determine diet tolerance and need for a possible VFSS or FEES.  -NR          SLP Evaluation Clinical Impression    SLP Swallowing Diagnosis R/O pharyngeal dysphagia  -CR R/O pharyngeal dysphagia  -NR       Functional  Impact risk of aspiration/pneumonia  -CR risk of aspiration/pneumonia  -NR       Rehab Potential/Prognosis, Swallowing good, to achieve stated therapy goals  -CR good, to achieve stated therapy goals  -NR       Swallow Criteria for Skilled Therapeutic Interventions Met demonstrates skilled criteria  -CR demonstrates skilled criteria  -NR          Recommendations    Therapy Frequency (Swallow) PRN  -CR PRN  -NR       Predicted Duration Therapy Intervention (Days) until discharge  -CR --       SLP Diet Recommendation nectar thick liquids;regular textures;water between meals after oral care, with supervision;ice chips between meals after oral care, with supervision  -CR regular textures;nectar thick liquids  -NR       Recommended Diagnostics VFSS (MBS)  -CR reassess via clinical swallow evaluation;reassess via VFSS (MBS)  -NR       Recommended Precautions and Strategies upright posture during/after eating;small bites of food and sips of liquid;no straw;general aspiration precautions  -CR upright posture during/after eating;small bites of food and sips of liquid;no straw;general aspiration precautions  -NR       Oral Care Recommendations Oral Care BID/PRN  -CR Oral Care before breakfast, after meals and PRN  -NR       SLP Rec. for Method of Medication Administration meds crushed;meds whole;with thick liquids;with puree  -CR meds crushed;meds whole;with thick liquids;with puree  -NR       Monitor for Signs of Aspiration yes;notify SLP if any concerns  -CR yes;notify SLP if any concerns  -NR          Swallow Goals (SLP)    Swallow LTGs -- Patient will demonstrate functional swallow for  -NR       Swallow STGs -- diet tolerance goal selection (SLP)  -NR       Diet Tolerance Goal Selection (SLP) -- Patient will tolerate trials of  -NR          (LTG) Patient will demonstrate functional swallow for    Diet Texture (Demonstrate functional swallow) -- regular textures  -NR       Liquid viscosity (Demonstrate functional swallow)  -- nectar/ mildly thick liquids  -NR       [place] (Demonstrate functional swallow) -- with minimal cues (75-90% accuracy)  -NR       Time Frame (Demonstrate functional swallow) -- by discharge  -NR          ([facility]) Patient will tolerate trials of    Consistencies Trialed (Tolerate trials) thin liquids  -CR thin liquids  -NR       Desired Outcome (Tolerate trials) without signs/symptoms of aspiration  -CR without signs/symptoms of aspiration  -NR       Williamson (Tolerate trials) with minimal cues (75-90% accuracy)  -CR with minimal cues (75-90% accuracy)  -NR       Time Frame (Tolerate trials) 1 week  -CR 1 week  -NR       Comment (Tolerate trials) Clinical swallow re-evaluation completed. Patient with weak and strained vocal quality which was difficult to assess throughout evaluation. No overt s/s of aspiration demonstrated with ice chips, nectar thick liquid by spoon/cup, puree, soft/chopped solids, or regular solids. Wet vocal quality with thins by spoon. Delayed cough with nectar by straw. Recommend patient participate in VFSS to determine safest diet. Pending VFSS, recommend patient continue nectar thick liquid and regular diet. No straws. Free water protocol with ice/water sips between meals. Meds whole or crushed with thickened liquids or puree, as tolerated. Sitting upright, slow rate, small bites/sips. Speech to follow with VFSS.  -CR --             User Key  (r) = Recorded By, (t) = Taken By, (c) = Cosigned By    Initials Name Effective Dates    NR Ania Saavedra MA,CCC-SLP 06/16/21 -     Shayna Capps CF-SLP 11/10/22 -                 EDUCATION  The patient has been educated in the following areas:   Dysphagia (Swallowing Impairment).        SLP GOALS     Row Name 05/30/23 1100 05/29/23 1700          (LTG) Patient will demonstrate functional swallow for    Diet Texture (Demonstrate functional swallow) -- regular textures  -NR     Liquid viscosity (Demonstrate functional swallow) -- nectar/  mildly thick liquids  -NR     [place] (Demonstrate functional swallow) -- with minimal cues (75-90% accuracy)  -NR     Time Frame (Demonstrate functional swallow) -- by discharge  -NR        (STG) Patient will tolerate trials of    Consistencies Trialed (Tolerate trials) thin liquids  -CR thin liquids  -NR     Desired Outcome (Tolerate trials) without signs/symptoms of aspiration  -CR without signs/symptoms of aspiration  -NR     [place] (Tolerate trials) with minimal cues (75-90% accuracy)  -CR with minimal cues (75-90% accuracy)  -NR     Time Frame (Tolerate trials) 1 week  -CR 1 week  -NR     Comment (Tolerate trials) Clinical swallow re-evaluation completed. Patient with weak and strained vocal quality which was difficult to assess throughout evaluation. No overt s/s of aspiration demonstrated with ice chips, nectar thick liquid by spoon/cup, puree, soft/chopped solids, or regular solids. Wet vocal quality with thins by spoon. Delayed cough with nectar by straw. Recommend patient participate in VFSS to determine safest diet. Pending VFSS, recommend patient continue nectar thick liquid and regular diet. No straws. Free water protocol with ice/water sips between meals. Meds whole or crushed with thickened liquids or puree, as tolerated. Sitting upright, slow rate, small bites/sips. Speech to follow with VFSS.  -CR --           User Key  (r) = Recorded By, (t) = Taken By, (c) = Cosigned By    Initials Name Provider Type    NR Ania Saavedra MA,CCC-SLP Speech and Language Pathologist    Shayna Capps CF-SLP Speech and Language Pathologist              SLP Outcome Measures (last 72 hours)     SLP Outcome Measures     Row Name 05/29/23 3192             SLP Outcome Measures    Outcome Measure Used? Adult NOMS  -NR         Adult FCM Scores    FCM Chosen Swallowing  -NR      Swallowing FCM Score 4  -NR            User Key  (r) = Recorded By, (t) = Taken By, (c) = Cosigned By    Initials Name Effective  Dates    NR Ania Saavedra MA,CCC-SLP 06/16/21 -                  Time Calculation:    Time Calculation- SLP     Row Name 05/30/23 1110             Time Calculation- SLP    SLP Start Time 0945  -CR      SLP Received On 05/30/23  -CR            User Key  (r) = Recorded By, (t) = Taken By, (c) = Cosigned By    Initials Name Provider Type    Shayna Capps CF-SLP Speech and Language Pathologist                Therapy Charges for Today     Code Description Service Date Service Provider Modifiers Qty    [id number] HC ST TREATMENT SWALLOW 3 5/30/2023 Shayna Espitia CF-SLP GN 1               CAMPBELL Easley  5/30/2023

## 2023-05-31 LAB
ALBUMIN SERPL-MCNC: 2.8 G/DL (ref 3.5–5.2)
ANION GAP SERPL CALCULATED.3IONS-SCNC: 4 MMOL/L (ref 5–15)
BUN SERPL-MCNC: 25 MG/DL (ref 8–23)
BUN/CREAT SERPL: 28.4 (ref 7–25)
CALCIUM SPEC-SCNC: 8.3 MG/DL (ref 8.6–10.5)
CHLORIDE SERPL-SCNC: 101 MMOL/L (ref 98–107)
CO2 SERPL-SCNC: 41 MMOL/L (ref 22–29)
CREAT SERPL-MCNC: 0.88 MG/DL (ref 0.57–1)
DEPRECATED RDW RBC AUTO: 42.7 FL (ref 37–54)
EGFRCR SERPLBLD CKD-EPI 2021: 68.2 ML/MIN/1.73
ERYTHROCYTE [DISTWIDTH] IN BLOOD BY AUTOMATED COUNT: 12.4 % (ref 12.3–15.4)
GLUCOSE BLDC GLUCOMTR-MCNC: 144 MG/DL (ref 70–130)
GLUCOSE BLDC GLUCOMTR-MCNC: 148 MG/DL (ref 70–130)
GLUCOSE BLDC GLUCOMTR-MCNC: 196 MG/DL (ref 70–130)
GLUCOSE BLDC GLUCOMTR-MCNC: 206 MG/DL (ref 70–130)
GLUCOSE SERPL-MCNC: 124 MG/DL (ref 65–99)
HCT VFR BLD AUTO: 35.6 % (ref 34–46.6)
HGB BLD-MCNC: 11.2 G/DL (ref 12–15.9)
MCH RBC QN AUTO: 29.4 PG (ref 26.6–33)
MCHC RBC AUTO-ENTMCNC: 31.5 G/DL (ref 31.5–35.7)
MCV RBC AUTO: 93.4 FL (ref 79–97)
PHOSPHATE SERPL-MCNC: 2.2 MG/DL (ref 2.5–4.5)
PHOSPHATE SERPL-MCNC: 2.2 MG/DL (ref 2.5–4.5)
PLATELET # BLD AUTO: 138 10*3/MM3 (ref 140–450)
PMV BLD AUTO: 10.8 FL (ref 6–12)
POTASSIUM SERPL-SCNC: 4 MMOL/L (ref 3.5–5.2)
QT INTERVAL: 339 MS
RBC # BLD AUTO: 3.81 10*6/MM3 (ref 3.77–5.28)
SODIUM SERPL-SCNC: 146 MMOL/L (ref 136–145)
WBC NRBC COR # BLD: 9.6 10*3/MM3 (ref 3.4–10.8)

## 2023-05-31 PROCEDURE — 94664 DEMO&/EVAL PT USE INHALER: CPT

## 2023-05-31 PROCEDURE — 63710000001 INSULIN REGULAR HUMAN PER 5 UNITS: Performed by: INTERNAL MEDICINE

## 2023-05-31 PROCEDURE — 93005 ELECTROCARDIOGRAM TRACING: CPT | Performed by: EMERGENCY MEDICINE

## 2023-05-31 PROCEDURE — 25010000002 CEFTRIAXONE PER 250 MG: Performed by: INTERNAL MEDICINE

## 2023-05-31 PROCEDURE — 85027 COMPLETE CBC AUTOMATED: CPT | Performed by: INTERNAL MEDICINE

## 2023-05-31 PROCEDURE — 94799 UNLISTED PULMONARY SVC/PX: CPT

## 2023-05-31 PROCEDURE — 80069 RENAL FUNCTION PANEL: CPT | Performed by: INTERNAL MEDICINE

## 2023-05-31 PROCEDURE — 94761 N-INVAS EAR/PLS OXIMETRY MLT: CPT

## 2023-05-31 PROCEDURE — 94760 N-INVAS EAR/PLS OXIMETRY 1: CPT

## 2023-05-31 PROCEDURE — 94660 CPAP INITIATION&MGMT: CPT

## 2023-05-31 PROCEDURE — 25010000002 HEPARIN (PORCINE) PER 1000 UNITS: Performed by: INTERNAL MEDICINE

## 2023-05-31 PROCEDURE — 82948 REAGENT STRIP/BLOOD GLUCOSE: CPT

## 2023-05-31 PROCEDURE — 84100 ASSAY OF PHOSPHORUS: CPT | Performed by: INTERNAL MEDICINE

## 2023-05-31 PROCEDURE — 93010 ELECTROCARDIOGRAM REPORT: CPT | Performed by: INTERNAL MEDICINE

## 2023-05-31 RX ORDER — ACETAMINOPHEN 325 MG/1
650 TABLET ORAL EVERY 6 HOURS PRN
Status: DISCONTINUED | OUTPATIENT
Start: 2023-05-31 | End: 2023-06-04 | Stop reason: HOSPADM

## 2023-05-31 RX ADMIN — ACETAMINOPHEN 650 MG: 325 TABLET, FILM COATED ORAL at 06:40

## 2023-05-31 RX ADMIN — PANTOPRAZOLE SODIUM 40 MG: 40 TABLET, DELAYED RELEASE ORAL at 06:27

## 2023-05-31 RX ADMIN — INSULIN HUMAN 2 UNITS: 100 INJECTION, SOLUTION PARENTERAL at 21:49

## 2023-05-31 RX ADMIN — Medication 10 ML: at 08:53

## 2023-05-31 RX ADMIN — Medication 10 ML: at 21:50

## 2023-05-31 RX ADMIN — GUAIFENESIN AND DEXTROMETHORPHAN HYDROBROMIDE 2 TABLET: 600; 30 TABLET, EXTENDED RELEASE ORAL at 08:52

## 2023-05-31 RX ADMIN — HEPARIN SODIUM 5000 UNITS: 5000 INJECTION INTRAVENOUS; SUBCUTANEOUS at 06:27

## 2023-05-31 RX ADMIN — IPRATROPIUM BROMIDE AND ALBUTEROL SULFATE 3 ML: 2.5; .5 SOLUTION RESPIRATORY (INHALATION) at 07:35

## 2023-05-31 RX ADMIN — GUAIFENESIN AND DEXTROMETHORPHAN HYDROBROMIDE 2 TABLET: 600; 30 TABLET, EXTENDED RELEASE ORAL at 21:49

## 2023-05-31 RX ADMIN — DULOXETINE HYDROCHLORIDE 30 MG: 30 CAPSULE, DELAYED RELEASE ORAL at 08:51

## 2023-05-31 RX ADMIN — INSULIN HUMAN 4 UNITS: 100 INJECTION, SOLUTION PARENTERAL at 12:41

## 2023-05-31 RX ADMIN — ANORECTAL OINTMENT 1 APPLICATION: 15.7; .44; 24; 20.6 OINTMENT TOPICAL at 21:51

## 2023-05-31 RX ADMIN — LEFLUNOMIDE 10 MG: 20 TABLET ORAL at 08:51

## 2023-05-31 RX ADMIN — AMLODIPINE BESYLATE 5 MG: 5 TABLET ORAL at 08:51

## 2023-05-31 RX ADMIN — HEPARIN SODIUM 5000 UNITS: 5000 INJECTION INTRAVENOUS; SUBCUTANEOUS at 16:35

## 2023-05-31 RX ADMIN — ANORECTAL OINTMENT 1 APPLICATION: 15.7; .44; 24; 20.6 OINTMENT TOPICAL at 08:53

## 2023-05-31 RX ADMIN — Medication 2 PACKET: at 11:54

## 2023-05-31 RX ADMIN — IPRATROPIUM BROMIDE AND ALBUTEROL SULFATE 3 ML: 2.5; .5 SOLUTION RESPIRATORY (INHALATION) at 20:28

## 2023-05-31 RX ADMIN — INSULIN GLARGINE-YFGN 20 UNITS: 100 INJECTION, SOLUTION SUBCUTANEOUS at 08:52

## 2023-05-31 RX ADMIN — IPRATROPIUM BROMIDE AND ALBUTEROL SULFATE 3 ML: 2.5; .5 SOLUTION RESPIRATORY (INHALATION) at 11:27

## 2023-05-31 RX ADMIN — BISOPROLOL FUMARATE 20 MG: 5 TABLET, FILM COATED ORAL at 08:51

## 2023-05-31 RX ADMIN — CEFTRIAXONE 2 G: 2 INJECTION, POWDER, FOR SOLUTION INTRAMUSCULAR; INTRAVENOUS at 08:51

## 2023-05-31 NOTE — PROGRESS NOTES
Loyd Huff MD                          [phone]            Patient ID:    Name:  Blanca Zhu    MRN:  [id number]    1945   77 y.o.  female            Patient Care Team:  Praneeth Valenzuela MD as PCP - General (Internal Medicine)  Jean-Claude Farnsworth MD as Consulting Physician (Hematology and Oncology)  Praneeth Valenzuela MD as Referring Physician (Internal Medicine)  Tahmina Brown MD as Consulting Physician (Rheumatology)  Gretchen Mcginnis MD as Consulting Physician (Nephrology)  Tahmina James MD as Surgeon (General Surgery)  Haile Handley MD as Consulting Physician (Gastroenterology)  Sb Power MD as Consulting Physician (Cardiology)    CC/ Reason for visit: Ac parainfluenza URTI, CHF, [place]     Subjective: Pt seen and examined this AM. Patient states that she is feeling better.  Could not sleep much as the noninvasive ventilator was alarming all night.  States that she understands that she needs to be on it and family is concerned that she is very weak.  Discussed about potential need for rehab    ROS: Denies any persistent fevers or chills, worsening cough or worsening sputum production, nausea vomiting or diarrhea    Objective     Vital Signs past 24hrs    BP range: BP: (138-148)/(60-89) 148/89  Pulse range: Heart Rate:  [] 81  Resp rate range: Resp:  [18-20] 20  Temp range: Temp (24hrs), Av.3 °F (36.8 °C), Min:97.9 °F (36.6 °C), Max:98.7 °F (37.1 °C)      Ventilator/Non-Invasive Ventilation Settings (From admission, onward)     Start     Ordered    23 1619  NIPPV (CPAP or BIPAP)  At Bedtime As Needed-RT        Comments: May adjust pressures to best tolerance and effect.  Patient is used to using a Pap machine and may bleed in supplemental O2 to maintain saturations greater than 90% less than 95 when she is on the PAP machine.   Question Answer Comment   Type BIPAP    IPAP 18    EPAP 10        23 1618    23 2042  NIPPV  (CPAP or BIPAP)  Until Discontinued,   Status:  Canceled        Comments: May adjust pressures to best tolerance and effect.  Patient is used to using a Pap machine   Question Answer Comment   Type BIPAP    IPAP 18    EPAP 10        05/21/23 2041    05/21/23 2042  NIPPV (CPAP or BIPAP)  Until Discontinued,   Status:  Canceled        Comments: May adjust pressures to best tolerance and effect.  Patient is used to using a Pap machine and may bleed in supplemental O2 to maintain saturations greater than 90% less than 95 when she is on the PAP machine.   Question Answer Comment   Type BIPAP    IPAP 18    EPAP 10        05/21/23 2042    05/21/23 1542  NIPPV (CPAP or BIPAP)  Until Discontinued,   Status:  Canceled        Question:  Type  Answer:  BIPAP    05/21/23 1541                Vent Settings        Resp Rate (Set): 18  Pressure Support (cm H2O): 10 cm H20  FiO2 (%): 30 %  PEEP/CPAP (cm H2O): 5 cm H20  Minute Ventilation (L/min) (Obs): 8 L/min  Resp Rate (Observed) Vent: 26  I:E Ratio (Set): 1:3.2  I:E Ratio (Obs): 1:2.2  PIP Observed (cm H2O): 16 cm H2O  Plateau Pressure (cm H2O): 24 cm H2O  Driving Pressure (cm H2O): 19.2 cm H2O  Device (Oxygen Therapy): nasal cannula FiO2 (%): 30 %     92.6 kg (204 lb 2.3 oz); Body mass index is 39.87 kg/m².      Intake/Output Summary (Last 24 hours) at 5/31/2023 1435  Last data filed at 5/31/2023 0640  Gross per 24 hour   Intake 480 ml   Output 650 ml   Net -170 ml       PHYSICAL EXAM   Constitutional: Middle-aged obese F pt in bed, awake and interactive, some accessory muscle use.  Head: - NCAT  Eyes: No pallor.  Anicteric sclerae, EOMI.  ENMT: Mallampati 4, no oral thrush. Moist MM.   NECK: Trachea midline, No thyromegaly, no palpable cervical lymphadenopathy  Heart: RRR, no murmur.  Trace pedal edema   Lungs: SAV +, Dec BS @ bases +. No wheezes/ crackles heard    Abdomen: Soft. Obese, No tenderness, guarding or rigidity. No palpable masses  Extremities: Extremities warm and  well perfused. No cyanosis/ clubbing  Neuro: Awake, interactive, moving all extremities -generally weak  Psych: Mood and affect -appropriate    PPE recommended per [facility] [place], operated by [facility] infectious disease Isolation protocol for the current clinical scenario (as mentioned below) was followed.     Scheduled meds:  amLODIPine, 5 mg, Oral, Q24H  bisoprolol, 20 mg, Oral, Q24H  dextrose, 50 mL, Intravenous, Once   And  insulin regular, 10 Units, Intravenous, Once  DULoxetine, 30 mg, Oral, Daily  guaifenesin-dextromethorphan, 2 tablet, Oral, BID  heparin (porcine), 5,000 Units, Subcutaneous, Q8H  insulin glargine, 20 Units, Subcutaneous, Daily  insulin regular, 2-9 Units, Subcutaneous, 4x Daily AC & at Bedtime  ipratropium-albuterol, 3 mL, Nebulization, 4x Daily - RT  leflunomide, 10 mg, Oral, Daily  Menthol-Zinc Oxide, 1 application, Topical, BID  O2, 2 L/min, Inhalation, Once  pantoprazole, 40 mg, Oral, Q AM  sodium chloride, 10 mL, Intravenous, Q12H  sodium chloride, 10 mL, Intravenous, Q12H  sodium chloride, 10 mL, Intravenous, Q12H        IV meds:                           Data Review:      Results from last 7 days   Lab Units 05/31/23  0642 05/30/23  0354 05/29/23  0337 05/26/23  1545 05/26/23  1538   SODIUM mmol/L 146* 150* 144   < >  --    POTASSIUM mmol/L 4.0 4.1 3.3*  3.3*   < >  --    CHLORIDE mmol/L 101 103 98   < >  --    CO2 mmol/L 41.0* 38.5* 32.9*   < >  --    BUN mg/dL 25* 34* 47*   < >  --    CREATININE mg/dL 0.88 1.10* 1.56*   < >  --    CALCIUM mg/dL 8.3* 9.0 8.5*   < >  --    GLUCOSE mg/dL 124* 159* 332*   < >  --    WBC 10*3/mm3 9.60 10.59 15.42*   < >  --    HEMOGLOBIN g/dL 11.2* 11.0* 11.0*   < >  --    PLATELETS 10*3/mm3 138* 135* 166   < >  --    PROCALCITONIN ng/mL  --   --   --   --  0.17    < > = values in this interval not displayed.       Lab Results   Component Value Date    CALCIUM 8.3 (L) 05/31/2023    PHOS 2.2 (L) 05/31/2023    PHOS 2.2 (L) 05/31/2023       Results from last 7 days   Lab  Units 05/26/23  1438   RESPCX  Heavy growth (4+) Haemophilus influenzae*  Light growth (2+) Normal respiratory hannah. No S. aureus or Pseudomonas aeruginosa detected. Final report.       Results from last 7 days   Lab Units 05/28/23  0851 05/27/23  0916 05/26/23  1622   PH, ARTERIAL pH units 7.435 7.413 7.278*   PO2 ART mm Hg 54.9* 69.0* 96.0   PCO2, ARTERIAL mm Hg 51.6* 44.9 58.3*   HCO3 ART mmol/L 34.6* 28.7* 27.3        I have personally reviewed the results from the time of this admission to 5/31/2023 14:35 EDT and agree with these findings:  [x]  Laboratory  [x]  Microbiology  [x]  Radiology  []  EKG/Telemetry   [x]  Cardiology/Vascular   []  Pathology  []  Old records    ASSESSMENT   Acute on chronic hypoxic/hypercapnic respiratory failure(2L) -noninvasive ventilator dependent s/p intubation-5/26 - 5/29  Haemophilus pneumonia s/p bronch - 5/26  Acute metabolic encephalopathy  Acute on chronic congestive heart failure; diastolic  Acute para influenza URTI  Acute asthma exacerbation  Acute kidney injury  Hyperkalemia  Diabetes  Pulmonary hypertension  ANAYELI on CPAP  Morbid obesity    PLAN:  Patient stable from a respiratory standpoint on minimal supplemental oxygen.  Continue with antibiotic course and bronchodilators  As needed diuretics to keep the patient as negative as possible-Per nephrology  Will need nocturnal noninvasive ventilator now and@ dc for chronic hypercapnia from ANAYELI/OHS.  Patient's home CPAP is not appropriate and BiPAP but has already failed ending up in intubation mechanical ventilation.   Therapy evaluation  C/w heparin subcu    Guarded prognosis    Patient should be able to be discharged from my standpoint to rehab facility with nocturnal noninvasive ventilator    Loyd Huff MD  5/31/2023

## 2023-05-31 NOTE — CASE MANAGEMENT/SOCIAL WORK
Continued Stay Note  [facility]     Patient Name: Blanca Zhu  MRN: [id number]  Today's Date: 5/31/2023    Admit Date: 5/21/2023    Plan: SNF vs home, will need ambulance transport   Discharge Plan     Row Name 05/31/23 1047       Plan    Plan SNF vs home, will need ambulance transport    Roadmap to Recovery Yes    Patient/Family in Agreement with Plan yes    Provided Post Acute Provider List? Yes    Post Acute Provider List --  Subacute rehab    Provided Post Acute Provider Quality & Resource List? Yes    Post Acute Provider Quality and Resource List --  Road to Recovery    Delivered To Patient;Support Person    Support Person DaughterGaye    Method of Delivery In person    Plan Comments Introduced self to pt and daughter at bedside. Explained role of CCP. Discussed disposition plans. DaughterGaye, stated that family has been discussion that rehab may be the best plan at this time vs home. Daughter stated that they would be interested in some of Dr. Valenzuela's facilities, as she is a pt of Dr. Valenzuela's. Informed daughter that CCP will contact Dr. Valenzuela, and determine which facility he may like her placed in, then will inform family, so that they can make an informed decision. Road to Recovery book at bedside.               Discharge Codes    No documentation.               Expected Discharge Date and Time     Expected Discharge Date Expected Discharge Time    Jun 2, 2023             Mabel Stokes RN

## 2023-05-31 NOTE — PROGRESS NOTES
DAILY PROGRESS NOTE  [facility], [facility]    2023    Patient Identification:  Name: Blanca Zhu  Age: 77 y.o.  Sex: female  :  1945  MRN: [id number]           Primary Care Physician: Praneeth Valenzuela MD    Subjective:    Interval History:    Patient was transferred out of the ICU yesterday, oxygen weaning down, saturation in 1 L is 94%.  Diuresis per renal  Vital signs stable otherwise.  Potassium 4.0, sodium 146, blood sugar better.  Hemoglobin 11.2.  On electrolyte replacement protocol  On antibiotics for haemophilus influenza pneumonia.    ROS:     No nausea, vomiting, diarrhea, constipation, chest pain, shortness of breath.    Objective:    Scheduled Meds:  amLODIPine, 5 mg, Oral, Q24H  bisoprolol, 20 mg, Oral, Q24H  dextrose, 50 mL, Intravenous, Once   And  insulin regular, 10 Units, Intravenous, Once  DULoxetine, 30 mg, Oral, Daily  guaifenesin-dextromethorphan, 2 tablet, Oral, BID  heparin (porcine), 5,000 Units, Subcutaneous, Q8H  insulin glargine, 20 Units, Subcutaneous, Daily  insulin regular, 2-9 Units, Subcutaneous, 4x Daily AC & at Bedtime  ipratropium-albuterol, 3 mL, Nebulization, 4x Daily - RT  leflunomide, 10 mg, Oral, Daily  Menthol-Zinc Oxide, 1 application, Topical, BID  O2, 2 L/min, Inhalation, Once  pantoprazole, 40 mg, Oral, Q AM  potassium & sodium phosphates, 2 packet, Oral, Once  sodium chloride, 10 mL, Intravenous, Q12H  sodium chloride, 10 mL, Intravenous, Q12H  sodium chloride, 10 mL, Intravenous, Q12H        Continuous Infusions:       PRN Meds:  •  acetaminophen  •  acetaminophen  •  benzonatate  •  dextrose  •  dextrose  •  dextrose  •  dextrose  •  fentaNYL citrate (PF)  •  glucagon (human recombinant)  •  hydrALAZINE  •  ondansetron  •  Phosphorus Replacement - Follow Nurse / BPA Driven Protocol  •  Potassium Replacement - Follow Nurse / BPA Driven Protocol  •  prochlorperazine  •  sodium chloride  •  sodium  "chloride  •  sodium chloride  •  sodium chloride  •  sodium chloride    Intake/Output:    Intake/Output Summary (Last 24 hours) at 2023 1130  Last data filed at 2023 0640  Gross per 24 hour   Intake 720 ml   Output 650 ml   Net 70 ml         Exam:    T MAX 24 hrs: Temp (24hrs), Av.3 °F (36.8 °C), Min:97.9 °F (36.6 °C), Max:98.7 °F (37.1 °C)    Vitals Ranges:   Temp:  [97.9 °F (36.6 °C)-98.7 °F (37.1 °C)] 98.7 °F (37.1 °C)  Heart Rate:  [] 81  Resp:  [18-20] 20  BP: (123-148)/(53-89) 148/89    /89 (BP Location: Right leg, Patient Position: Lying)   Pulse 81   Temp 98.7 °F (37.1 °C) (Oral)   Resp 20   Ht 152.4 cm (60\")   Wt 92.6 kg (204 lb 2.3 oz)   LMP  (LMP Unknown)   SpO2 97%   BMI 39.87 kg/m²     General: Awake, extubated, following commands very well, on 2 L of oxygen, saturation 96%.  Neck: Supple, symmetrical, trachea midline, no adenopathy;              thyroid:  no enlargement/tenderness/nodules;              no carotid bruit or JVD  Cardiovascular: Normal rate, regular rhythm and intact distal pulses.              Exam reveals no gallop and no friction rub. No murmur heard  Pulmonary:  Better air entry.  No wheezing.  Mild rhonchi at bases.    Abdominal: Soft, nontender, bowel sounds active all four quadrants,     no masses, no hepatomegaly, no splenomegaly.   Extremities:  2+ pitting edema of the lower extremity, actually this looks better than her baseline.  Brown discoloration from chronic stasis dermatitis.  Pulses: 2 + symmetric all extremities  Neurological: Patient is intubated, but awake, follow commands, answer questions. No new focal sensorimotor deficit.  Skin: Skin color, texture, normal. Turgor is decreased. No rashes or lesions         Data Review:    Results from last 7 days   Lab Units 23  0642 23  0354 23  0337   WBC 10*3/mm3 9.60 10.59 15.42*   HEMOGLOBIN g/dL 11.2* 11.0* 11.0*   HEMATOCRIT % 35.6 34.2 34.1   PLATELETS 10*3/mm3 138* 135* " 166       Results from last 7 days   Lab Units 05/31/23  0642 05/30/23  0354 05/29/23  0337   SODIUM mmol/L 146* 150* 144   POTASSIUM mmol/L 4.0 4.1 3.3*  3.3*   CHLORIDE mmol/L 101 103 98   CO2 mmol/L 41.0* 38.5* 32.9*   BUN mg/dL 25* 34* 47*   CREATININE mg/dL 0.88 1.10* 1.56*   CALCIUM mg/dL 8.3* 9.0 8.5*   GLUCOSE mg/dL 124* 159* 332*                 Lab Results   Lab Value Date/Time    TROPONINT 28 (H) 05/21/2023 1920    TROPONINT 25 (H) 05/21/2023 1615       Microbiology Results (last 10 days)     Procedure Component Value - Date/Time    MRSA Screen, PCR (Inpatient) - Swab, Nares [[id number]]  (Normal) Collected: 05/26/23 1547    Lab Status: Final result Specimen: Swab from Nares Updated: 05/26/23 1739     MRSA PCR No MRSA Detected    Narrative:      The negative predictive value of this diagnostic test is high and should only be used to consider de-escalating anti-MRSA therapy. A positive result may indicate colonization with MRSA and must be correlated clinically.    Respiratory Culture - Bronchial Wash, ET Suction [[id number]]  (Abnormal) Collected: 05/26/23 1438    Lab Status: Final result Specimen: Bronchial Wash from ET Suction Updated: 05/28/23 0856     Respiratory Culture Heavy growth (4+) Haemophilus influenzae     Comment: This isolate is beta-lactamase NEGATIVE and are routinely susceptible to ampicillin and other beta-lactams.           Light growth (2+) Normal respiratory hannah. No S. aureus or Pseudomonas aeruginosa detected. Final report.     Gram Stain Moderate (3+) WBCs seen      No epithelial cells seen      Moderate (3+) Gram negative coccobacilli      Mixed bacterial morphotypes seen on Gram Stain    Eosinophil Smear - Urine, Urine, Clean Catch [[id number]]  (Normal) Collected: 05/25/23 0700    Lab Status: Final result Specimen: Urine, Clean Catch Updated: 05/25/23 0820     Eosinophil Smear 0 % EOS/100 Cells     Respiratory Panel PCR w/COVID-19(SARS-CoV-2) MILTON/RANDA/RINA/PAD/COR/MAD/DESTINI  In-House, NP Swab in UTM/VTM, 3-4 HR TAT - Swab, Nasopharynx [[id number]]  (Abnormal) Collected: 05/23/23 1625    Lab Status: Final result Specimen: Swab from Nasopharynx Updated: 05/23/23 1756     ADENOVIRUS, PCR Not Detected     Coronavirus 229E Not Detected     Coronavirus HKU1 Not Detected     Coronavirus NL63 Not Detected     Coronavirus OC43 Not Detected     COVID19 Not Detected     Human Metapneumovirus Not Detected     Human Rhinovirus/Enterovirus Not Detected     Influenza A PCR Not Detected     Influenza B PCR Not Detected     Parainfluenza Virus 1 Not Detected     Parainfluenza Virus 2 Not Detected     Parainfluenza Virus 3 Detected     Parainfluenza Virus 4 Not Detected     RSV, PCR Not Detected     Bordetella pertussis pcr Not Detected     Bordetella parapertussis PCR Not Detected     Chlamydophila pneumoniae PCR Not Detected     Mycoplasma pneumo by PCR Not Detected    Narrative:      In the setting of a positive respiratory panel with a viral infection PLUS a negative procalcitonin without other underlying concern for bacterial infection, consider observing off antibiotics or discontinuation of antibiotics and continue supportive care. If the respiratory panel is positive for atypical bacterial infection (Bordetella pertussis, Chlamydophila pneumoniae, or Mycoplasma pneumoniae), consider antibiotic de-escalation to target atypical bacterial infection.    COVID-19 and FLU A/B PCR - Swab, Nasopharynx [[id number]]  (Normal) Collected: 05/21/23 1547    Lab Status: Final result Specimen: Swab from Nasopharynx Updated: 05/21/23 1609     COVID19 Not Detected     Influenza A PCR Not Detected     Influenza B PCR Not Detected    Narrative:      Fact sheet for providers: https://www.fda.gov/media/164874/download    Fact sheet for patients: https://www.fda.gov/media/424233/download    Test performed by PCR.           Imaging Results (Last 7 Days)     Procedure Component Value Units Date/Time    XR Chest 1 View  [[id number]] Collected: 05/29/23 0545     Updated: 05/29/23 0549    Narrative:      SINGLE VIEW OF THE CHEST     HISTORY: Respiratory failure     COMPARISON: 05/28/2023     FINDINGS:  Tubes and lines appear stable position. Cardiac silhouette is stable.  Bilateral alveolar and interstitial tracing noted. Aeration at the lung  bases appears improved. No pneumothorax is seen. There may be a small  right pleural effusion.       Impression:      Interval improvement in aeration at the lung bases.      This report was finalized on 5/29/2023 5:46 AM by Dr. Tanya Prince M.D.       XR Chest 1 View [[id number]] Collected: 05/28/23 0558     Updated: 05/28/23 0602    Narrative:      SINGLE VIEW OF THE CHEST     HISTORY: Respiratory failure     COMPARISON: 05/26/2023     FINDINGS:  Right-sided PICC appears to terminate within the superior vena cava.  Weighted enteric feeding tube extends into the upper abdomen.  Endotracheal tube terminates in satisfactory position. Cardiomegaly is  present. There is vascular congestion. There is elevation of the right  hemidiaphragm. Bibasilar consolidation and bilateral effusions are  unchanged.       Impression:      Persistent vascular congestion and bibasilar consolidation.     This report was finalized on 5/28/2023 5:59 AM by Dr. Tanya Prince M.D.       XR Abdomen KUB [[id number]] Collected: 05/27/23 1434     Updated: 05/27/23 1439    Narrative:      PROCEDURE:  XR ABDOMEN KUB-     HISTORY: Tube placement.     COMPARISON: Chest radiograph 05/26/2023     FINDINGS: A single view of the upper abdomen was obtained. There is a  new enteric tube with the tip projecting over the expected location of  the gastric antrum or proximal portion of the duodenum. Correlate for  desired positioning within the stomach or small bowel. There is oral  contrast within portions of the colon. There are no dilated small bowel  loops. There is multilevel degenerative disc disease.     This report was  finalized on 5/27/2023 2:36 PM by Dr. Marcia Yang M.D.       XR Chest 1 View [[id number]] Collected: 05/26/23 1510     Updated: 05/26/23 1516    Narrative:      XR CHEST 1 VW-     HISTORY:  Status post intubation.     COMPARISON:  Chest radiograph 05/25/2023     FINDINGS:    2 views of the chest were obtained. There is a new endotracheal tube  with the tip terminating approximately 2.3 cm above the nagi. The  cardiac silhouette is enlarged. There is calcific aortic  atherosclerosis. Central pulmonary venous congestion has slightly  progressed. Interstitial opacities are not significantly changed. There  is new or increased airspace opacity at the lung bases with suspected  small layering pleural effusions. Pulmonary opacities could reflect  pulmonary edema and atelectasis with pneumonia not excluded. There is  multilevel degenerative disc disease.     This report was finalized on 5/26/2023 3:13 PM by Dr. Marcia Yang M.D.       XR Chest 1 View [[id number]] Collected: 05/25/23 2137     Updated: 05/25/23 2144    Narrative:      Portable chest radiograph     HISTORY:Respiratory distress     TECHNIQUE: Single AP portable radiograph of the chest     COMPARISON:Chest radiograph 05/21/2023       Impression:      FINDINGS AND IMPRESSION:  There is bilateral pulmonary vascular congestion with superimposed  interstitial thickening within the bilateral lungs, as before. The lungs  are hypoinflated. Cardiac silhouette is accentuated by low lung volumes.  No pneumothorax is seen. Somewhat nodular opacification overlying the  left upper lung is present, not definitely seen on prior imaging.  Further evaluation with CT chest is recommended to exclude underlying  pulmonary nodule and establish appropriate follow-up.     This report was finalized on 5/25/2023 9:40 PM by Dr. Bhupinder Lucero M.D.       US Renal Bilateral [[id number]] Collected: 05/24/23 1303     Updated: 05/24/23 1310    Narrative:      US RENAL BILATERAL-   05/24/2023     HISTORY: Acute renal failure.     Right kidney measures 10.3 cm in length. Left kidney measures 9.8 cm in  length.     No hydronephrosis, renal masses or stones are seen. Urinary bladder is  incompletely distended.       Impression:      1. Normal bilateral renal ultrasound.     This report was finalized on 5/24/2023 1:04 PM by Dr. Elliot Mike M.D.               Assessment:      Acute on chronic hypoxic respiratory failure requiring ventilatory management  Haemophilus influenza pneumonia  Asthma exacerbation  Acute parainfluenza viral bronchitis  Hypertensive urgency  Hyponatremia  Hyperkalemia  Acute kidney injury  Diabetic nephropathy with chronic kidney disease stage IIIa  Lower extremity edema, chronic  Uncontrolled diabetes mellitus  Obstructive sleep apnea  Generalized anxiety disorder  Iron deficiency anemia  Morbid obesity  Difficult IV access       Plan:    Patient is feeling better, tolerating p.o. intake.  Feeling very weak.  Weaning down oxygen, now she is on 1 L of oxygen, saturation 94%.    Continue IV antibiotics, to cover haemophilus influenza.  Continue DuoNeb mini nebs  Monitor and correct electrolytes.  Monitor renal function, improving.  Actually better than baseline.  Volume status also better.  Renal following.  Adjust insulin as needed, blood sugar fairly okay  Continue DVT/stress ulcer prophylaxis  PT to work with her.  Up to chair.  Overall stable, DC to subacute rehabilitation when okay with all.  Hopefully in  1-2 days.  Labs in am      Praneeth Valenzuela MD  05/31/23  10:29 EDT               I wore protective equipment throughout this patient encounter including a face mask, gloves, and protective eyewear.  Hand hygiene was performed before donning protective equipment and after removal when leaving the room.

## 2023-05-31 NOTE — DISCHARGE PLACEMENT REQUEST
"Travis Zhu (77 y.o. Female)     Date of Birth   1945    Social Security Number       Address   [address] [place] [address] [address] [address]    Home Phone   [phone]    MRN   [id number]       Confucianism   Mormon    Marital Status                               Admission Date   5/21/23    Admission Type   Urgent    Admitting Provider   Praneeth Valenzuela MD    Attending Provider   Praneeth Valenzuela MD    Department, Room/Bed   [address], [address]       Discharge Date       Discharge Disposition       Discharge Destination                               Attending Provider: Praneeth Valenzuela MD    Allergies: Aspirin, Sulfa Antibiotics    Isolation: None   Infection: None   Code Status: CPR    Ht: 152.4 cm (60\")   Wt: 92.6 kg (204 lb 2.3 oz)    Admission Cmt: None   Principal Problem: Respiratory distress [R06.03]                 Active Insurance as of 5/21/2023     Primary Coverage     Payor Plan Insurance Group Employer/Plan Group    MEDICARE MEDICARE A & B      Payor Plan Address Payor Plan Phone Number Payor Plan Fax Number Effective Dates    [address] [phone]  6/1/2011 - None Entered    [facility] [id number]       Subscriber Name Subscriber Birth Date Member ID       TARVIS ZHU 1945 [id number]           Secondary Coverage     Payor Plan Insurance Group Employer/Plan Group    AARP MC SUP AARP HEALTH CARE OPTIONS [id number]     Payor Plan Address Payor Plan Phone Number Payor Plan Fax Number Effective Dates    [facility] [phone]  1/1/2016 - None Entered    [address]       [facility] [id number]       Subscriber Name Subscriber Birth Date Member ID       TRAVIS ZHU 1945 [id number]                 Emergency Contacts      (Rel.) Home Phone Work Phone Mobile Phone    Reyes,Alberto (Spouse) [phone] -- [phone]    Reyes,Militza (Daughter) -- -- [phone]              "

## 2023-05-31 NOTE — PLAN OF CARE
Goal Outcome Evaluation:  Plan of Care Reviewed With: patient           Outcome Evaluation: VSS, tylenol given once for HA. Pt appeared to sleep some between care. Pt wore bipap for about 6.5 hrs overnight. Daughter at bedside attentive to pt. Turned Q2 and heels elevated. Pt moved onto low airloss matress. Labs drawn and sent. Will CTM, safety maintained.

## 2023-05-31 NOTE — PLAN OF CARE
Goal Outcome Evaluation:  Plan of Care Reviewed With: patient        Progress: improving  Outcome Evaluation: Pt AOx3. Encouraged IS use as well as coughing. Tolerating diet well. VSS. Safety maintained.

## 2023-06-01 ENCOUNTER — APPOINTMENT (OUTPATIENT)
Dept: GENERAL RADIOLOGY | Facility: HOSPITAL | Age: 78
End: 2023-06-01
Payer: MEDICARE

## 2023-06-01 LAB
ALBUMIN SERPL-MCNC: 2.6 G/DL (ref 3.5–5.2)
ANION GAP SERPL CALCULATED.3IONS-SCNC: 7.2 MMOL/L (ref 5–15)
BUN SERPL-MCNC: 19 MG/DL (ref 8–23)
BUN/CREAT SERPL: 24.7 (ref 7–25)
CALCIUM SPEC-SCNC: 8.7 MG/DL (ref 8.6–10.5)
CHLORIDE SERPL-SCNC: 99 MMOL/L (ref 98–107)
CO2 SERPL-SCNC: 38.8 MMOL/L (ref 22–29)
CREAT SERPL-MCNC: 0.77 MG/DL (ref 0.57–1)
DEPRECATED RDW RBC AUTO: 44.2 FL (ref 37–54)
EGFRCR SERPLBLD CKD-EPI 2021: 80.1 ML/MIN/1.73
ERYTHROCYTE [DISTWIDTH] IN BLOOD BY AUTOMATED COUNT: 12.6 % (ref 12.3–15.4)
GLUCOSE BLDC GLUCOMTR-MCNC: 139 MG/DL (ref 70–130)
GLUCOSE BLDC GLUCOMTR-MCNC: 193 MG/DL (ref 70–130)
GLUCOSE BLDC GLUCOMTR-MCNC: 287 MG/DL (ref 70–130)
GLUCOSE SERPL-MCNC: 148 MG/DL (ref 65–99)
HCT VFR BLD AUTO: 36 % (ref 34–46.6)
HGB BLD-MCNC: 11.1 G/DL (ref 12–15.9)
MCH RBC QN AUTO: 29.4 PG (ref 26.6–33)
MCHC RBC AUTO-ENTMCNC: 30.8 G/DL (ref 31.5–35.7)
MCV RBC AUTO: 95.2 FL (ref 79–97)
PHOSPHATE SERPL-MCNC: 2.2 MG/DL (ref 2.5–4.5)
PLATELET # BLD AUTO: 139 10*3/MM3 (ref 140–450)
PMV BLD AUTO: 10 FL (ref 6–12)
POTASSIUM SERPL-SCNC: 4.3 MMOL/L (ref 3.5–5.2)
RBC # BLD AUTO: 3.78 10*6/MM3 (ref 3.77–5.28)
SODIUM SERPL-SCNC: 145 MMOL/L (ref 136–145)
WBC NRBC COR # BLD: 11.29 10*3/MM3 (ref 3.4–10.8)

## 2023-06-01 PROCEDURE — 92611 MOTION FLUOROSCOPY/SWALLOW: CPT | Performed by: SPEECH-LANGUAGE PATHOLOGIST

## 2023-06-01 PROCEDURE — 63710000001 INSULIN REGULAR HUMAN PER 5 UNITS: Performed by: INTERNAL MEDICINE

## 2023-06-01 PROCEDURE — 25010000002 HEPARIN (PORCINE) PER 1000 UNITS: Performed by: INTERNAL MEDICINE

## 2023-06-01 PROCEDURE — 94660 CPAP INITIATION&MGMT: CPT

## 2023-06-01 PROCEDURE — 94761 N-INVAS EAR/PLS OXIMETRY MLT: CPT

## 2023-06-01 PROCEDURE — 94799 UNLISTED PULMONARY SVC/PX: CPT

## 2023-06-01 PROCEDURE — 80069 RENAL FUNCTION PANEL: CPT | Performed by: INTERNAL MEDICINE

## 2023-06-01 PROCEDURE — 74230 X-RAY XM SWLNG FUNCJ C+: CPT

## 2023-06-01 PROCEDURE — 97530 THERAPEUTIC ACTIVITIES: CPT

## 2023-06-01 PROCEDURE — 97110 THERAPEUTIC EXERCISES: CPT

## 2023-06-01 PROCEDURE — 94664 DEMO&/EVAL PT USE INHALER: CPT

## 2023-06-01 PROCEDURE — 82948 REAGENT STRIP/BLOOD GLUCOSE: CPT

## 2023-06-01 PROCEDURE — 85027 COMPLETE CBC AUTOMATED: CPT | Performed by: INTERNAL MEDICINE

## 2023-06-01 RX ORDER — SODIUM PHOSPHATE IN 0.9 % NACL 15MMOL/100
15 PLASTIC BAG, INJECTION (ML) INTRAVENOUS ONCE
Status: COMPLETED | OUTPATIENT
Start: 2023-06-01 | End: 2023-06-01

## 2023-06-01 RX ADMIN — BARIUM SULFATE 4 ML: 980 POWDER, FOR SUSPENSION ORAL at 11:17

## 2023-06-01 RX ADMIN — SODIUM PHOSPHATE, MONOBASIC, MONOHYDRATE AND SODIUM PHOSPHATE, DIBASIC, ANHYDROUS 15 MMOL: 276; 142 INJECTION, SOLUTION INTRAVENOUS at 15:13

## 2023-06-01 RX ADMIN — BISOPROLOL FUMARATE 20 MG: 5 TABLET, FILM COATED ORAL at 09:37

## 2023-06-01 RX ADMIN — HEPARIN SODIUM 5000 UNITS: 5000 INJECTION INTRAVENOUS; SUBCUTANEOUS at 16:56

## 2023-06-01 RX ADMIN — Medication 10 ML: at 09:30

## 2023-06-01 RX ADMIN — ANORECTAL OINTMENT 1 APPLICATION: 15.7; .44; 24; 20.6 OINTMENT TOPICAL at 09:30

## 2023-06-01 RX ADMIN — INSULIN GLARGINE-YFGN 20 UNITS: 100 INJECTION, SOLUTION SUBCUTANEOUS at 09:40

## 2023-06-01 RX ADMIN — HEPARIN SODIUM 5000 UNITS: 5000 INJECTION INTRAVENOUS; SUBCUTANEOUS at 00:23

## 2023-06-01 RX ADMIN — ANORECTAL OINTMENT 1 APPLICATION: 15.7; .44; 24; 20.6 OINTMENT TOPICAL at 21:04

## 2023-06-01 RX ADMIN — IPRATROPIUM BROMIDE AND ALBUTEROL SULFATE 3 ML: 2.5; .5 SOLUTION RESPIRATORY (INHALATION) at 07:47

## 2023-06-01 RX ADMIN — AMLODIPINE BESYLATE 5 MG: 5 TABLET ORAL at 09:38

## 2023-06-01 RX ADMIN — LEFLUNOMIDE 10 MG: 20 TABLET ORAL at 09:38

## 2023-06-01 RX ADMIN — INSULIN HUMAN 6 UNITS: 100 INJECTION, SOLUTION PARENTERAL at 13:21

## 2023-06-01 RX ADMIN — PANTOPRAZOLE SODIUM 40 MG: 40 TABLET, DELAYED RELEASE ORAL at 06:44

## 2023-06-01 RX ADMIN — HEPARIN SODIUM 5000 UNITS: 5000 INJECTION INTRAVENOUS; SUBCUTANEOUS at 09:38

## 2023-06-01 RX ADMIN — ACETAMINOPHEN 650 MG: 325 TABLET, FILM COATED ORAL at 09:37

## 2023-06-01 RX ADMIN — INSULIN HUMAN 2 UNITS: 100 INJECTION, SOLUTION PARENTERAL at 16:56

## 2023-06-01 RX ADMIN — BARIUM SULFATE 1 TEASPOON(S): 0.6 CREAM ORAL at 11:17

## 2023-06-01 RX ADMIN — GUAIFENESIN AND DEXTROMETHORPHAN HYDROBROMIDE 2 TABLET: 600; 30 TABLET, EXTENDED RELEASE ORAL at 21:03

## 2023-06-01 RX ADMIN — BARIUM SULFATE 55 ML: 0.81 POWDER, FOR SUSPENSION ORAL at 11:17

## 2023-06-01 RX ADMIN — GUAIFENESIN AND DEXTROMETHORPHAN HYDROBROMIDE 2 TABLET: 600; 30 TABLET, EXTENDED RELEASE ORAL at 09:39

## 2023-06-01 RX ADMIN — IPRATROPIUM BROMIDE AND ALBUTEROL SULFATE 3 ML: 2.5; .5 SOLUTION RESPIRATORY (INHALATION) at 21:19

## 2023-06-01 RX ADMIN — Medication 10 ML: at 21:05

## 2023-06-01 RX ADMIN — Medication 10 ML: at 21:03

## 2023-06-01 RX ADMIN — DULOXETINE HYDROCHLORIDE 30 MG: 30 CAPSULE, DELAYED RELEASE ORAL at 09:38

## 2023-06-01 NOTE — PROGRESS NOTES
[facility]     Progress Note    Patient Name: Blanca Zhu  : 1945  MRN: [id number]  Primary Care Physician:  Praneeth Valenzuela MD  Date of admission: 2023    Subjective   Subjective     Chief Complaint: hyperkalemia and federica    History of Present Illness    Patient ith htn, copd, with federica and hyperkalemia    Review of Systems      Objective   Objective     Vitals:   Temp:  [98 °F (36.7 °C)-98.6 °F (37 °C)] 98.6 °F (37 °C)  Heart Rate:  [85-98] 87  Resp:  [16-20] 18  BP: (128-184)/(43-71) 150/59  Flow (L/min):  [2] 2    Physical Exam     General Appearance:  In no acute distress.    HEENT: Normal HEENT exam.     Extremities: .  1+ dependent edema.    Neurological: Patient is awake, alert    Result Review    Result Review:  I have personally reviewed the results from the time of this admission to 2023 13:24 EDT and agree with these findings:  []  Laboratory list / accordion  []  Microbiology  []  Radiology  []  EKG/Telemetry   []  Cardiology/Vascular   []  Pathology  []  Old records  []  Other:        Assessment & Plan   Assessment / Plan     Brief Patient Summary:  Blanca Zhu is a 77 y.o. female who has federica and hyperkalemia    Active Hospital Problems:  Active Hospital Problems    Diagnosis    • **Respiratory distress      Plan:   Acute kidney injury, likely hypovolemic, ARB and diuretic were contributing.  Creatinine stable today, euvolemic   Hyperkalemia from FEDERICA and ARB, slowly better, 5.2 today  Acute hypoxic respiratory failure, pulmonary following, on CPAP  Chronic asthma with acute exacerbation, on IV steroids  Hyperkalemia, improved  Probable viral bronchitis, PCR pending  Hypertensive urgency on admission, improved  Hyponatremia, 135 today  Hypocalcemia: Check albumin  Leukocytosis from steroids  Mild proteinuria    Patient seen and examined. Labs and chart reviewed. Patient awake, no distress. Family at bedside. Po intake improving. Renal function and electrolytes  stable. Continue current    Yas Bustos MD

## 2023-06-01 NOTE — PLAN OF CARE
Goal Outcome Evaluation:  Plan of Care Reviewed With: patient        Progress: improving  Outcome Evaluation: Pt tolerated treatment fair this date. Required min A x2 for bed mobility, though pt c/o increased dizziness immediately upon sitting. After several minutes sitting, pt stood w/ Rw and mod A x2, maintaining for ~3min until needing to sit. Some dizziness still noted when standing, though pt was more concerned about having a BM at the time. Pt was returned to supine w/ max A x2. Encouraged pt to attempt a few bed exercises w/ daughter's assist if needed.

## 2023-06-01 NOTE — PLAN OF CARE
Goal Outcome Evaluation: VSS on 2L NC. Video swallow study completed, diet updated to include thin liquids, well tolerated. IS instruction reinforced, used hourly with encouragement from family. Good UO, no BM this shift. Family updated at bedside. WCM.      Problem: Adult Inpatient Plan of Care  Goal: Plan of Care Review  Outcome: Ongoing, Progressing    Problem: Adult Inpatient Plan of Care  Goal: Absence of Hospital-Acquired Illness or Injury  Intervention: Prevent Skin Injury  Recent Flowsheet Documentation  Taken 6/1/2023 0920 by Polina Meier RN  Skin Protection:   adhesive use limited   incontinence pads utilized   transparent dressing maintained   tubing/devices free from skin contact       Problem: Adult Inpatient Plan of Care  Goal: Absence of Hospital-Acquired Illness or Injury  Intervention: Prevent and Manage VTE (Venous Thromboembolism) Risk  Recent Flowsheet Documentation  Taken 6/1/2023 0920 by Polina Meier RN  Activity Management: activity encouraged  VTE Prevention/Management: (heparin subQ) other (see comments)     Problem: Adult Inpatient Plan of Care  Goal: Optimal Comfort and Wellbeing  Outcome: Ongoing, Progressing     Problem: Adult Inpatient Plan of Care  Goal: Readiness for Transition of Care  Outcome: Ongoing, Progressing     Problem: Fall Injury Risk  Goal: Absence of Fall and Fall-Related Injury  Outcome: Ongoing, Progressing  Intervention: Identify and Manage Contributors  Recent Flowsheet Documentation  Taken 6/1/2023 0920 by Polina Meier RN  Medication Review/Management: medications reviewed  Intervention: Promote Injury-Free Environment  Recent Flowsheet Documentation  Taken 6/1/2023 1800 by Polina Meier RN  Safety Promotion/Fall Prevention: safety round/check completed  Taken 6/1/2023 1600 by Polina Meier RN  Safety Promotion/Fall Prevention: safety round/check completed  Taken 6/1/2023 1400 by Polina Meier RN  Safety Promotion/Fall Prevention:  safety round/check completed  Taken 6/1/2023 1200 by Polina Meier RN  Safety Promotion/Fall Prevention: safety round/check completed  Taken 6/1/2023 1000 by Polina Meier RN  Safety Promotion/Fall Prevention: safety round/check completed  Taken 6/1/2023 0920 by Polina Meier RN  Safety Promotion/Fall Prevention:   activity supervised   assistive device/personal items within reach   clutter free environment maintained   fall prevention program maintained   lighting adjusted   nonskid shoes/slippers when out of bed   room organization consistent   safety round/check completed  Taken 6/1/2023 0800 by Polina Meier RN  Safety Promotion/Fall Prevention: safety round/check completed     Problem: Diabetes Comorbidity  Goal: Blood Glucose Level Within Targeted Range  Outcome: Ongoing, Progressing  Intervention: Monitor and Manage Glycemia  Recent Flowsheet Documentation  Taken 6/1/2023 0920 by Polina Meier RN  Glycemic Management:   blood glucose monitored   supplemental insulin given     Problem: Aspiration (Enteral Nutrition)  Goal: Absence of Aspiration Signs and Symptoms  Outcome: Ongoing, Progressing  Intervention: Minimize Aspiration Risk  Recent Flowsheet Documentation  Taken 6/1/2023 1100 by Polina Meier, RN  Oral Care: oral rinse provided  Taken 6/1/2023 0920 by Polina Meier RN  Oral Care: oral rinse provided

## 2023-06-01 NOTE — PROGRESS NOTES
DAILY PROGRESS NOTE  [facility], [facility]    2023    Patient Identification:  Name: Blanca Zhu  Age: 77 y.o.  Sex: female  :  1945  MRN: [id number]           Primary Care Physician: Praneeth Valenzuela MD    Subjective:    Interval History:    Patient has been doing okay, having swallowing evaluation.  Vital signs stable, saturation in 2 L is 100%.  Using NIPPV/NIV at nighttime  Diuresis per renal  Potassium 4.3, creatinine 0.77, GFR is 80.1.  Phosphorus 2.2, albumin 2.6, WBC 11.29, hemoglobin 11.1.  On antibiotics for haemophilus influenza pneumonia.    ROS:     No nausea, vomiting, diarrhea, constipation, chest pain, shortness of breath.    Objective:    Scheduled Meds:  amLODIPine, 5 mg, Oral, Q24H  bisoprolol, 20 mg, Oral, Q24H  dextrose, 50 mL, Intravenous, Once   And  insulin regular, 10 Units, Intravenous, Once  DULoxetine, 30 mg, Oral, Daily  guaifenesin-dextromethorphan, 2 tablet, Oral, BID  heparin (porcine), 5,000 Units, Subcutaneous, Q8H  insulin glargine, 20 Units, Subcutaneous, Daily  insulin regular, 2-9 Units, Subcutaneous, 4x Daily AC & at Bedtime  ipratropium-albuterol, 3 mL, Nebulization, 4x Daily - RT  leflunomide, 10 mg, Oral, Daily  Menthol-Zinc Oxide, 1 application, Topical, BID  O2, 2 L/min, Inhalation, Once  pantoprazole, 40 mg, Oral, Q AM  sodium chloride, 10 mL, Intravenous, Q12H  sodium chloride, 10 mL, Intravenous, Q12H  sodium chloride, 10 mL, Intravenous, Q12H        Continuous Infusions:       PRN Meds:  •  acetaminophen  •  acetaminophen  •  benzonatate  •  dextrose  •  dextrose  •  dextrose  •  dextrose  •  glucagon (human recombinant)  •  hydrALAZINE  •  ondansetron  •  Phosphorus Replacement - Follow Nurse / BPA Driven Protocol  •  Potassium Replacement - Follow Nurse / BPA Driven Protocol  •  prochlorperazine  •  sodium chloride  •  sodium chloride  •  sodium chloride  •  sodium chloride  •  sodium  "chloride    Intake/Output:    Intake/Output Summary (Last 24 hours) at 2023 1121  Last data filed at 2023 0500  Gross per 24 hour   Intake 120 ml   Output 950 ml   Net -830 ml         Exam:    T MAX 24 hrs: Temp (24hrs), Av.1 °F (36.7 °C), Min:98 °F (36.7 °C), Max:98.3 °F (36.8 °C)    Vitals Ranges:   Temp:  [98 °F (36.7 °C)-98.3 °F (36.8 °C)] 98.3 °F (36.8 °C)  Heart Rate:  [81-98] 98  Resp:  [16-20] 20  BP: (128-184)/(43-71) 184/71    BP (!) 184/71 (BP Location: Right arm, Patient Position: Lying)   Pulse 98   Temp 98.3 °F (36.8 °C) (Oral)   Resp 20   Ht 152.4 cm (60\")   Wt 92.6 kg (204 lb 2.3 oz)   LMP  (LMP Unknown)   SpO2 100%   BMI 39.87 kg/m²     General: Awake, cooperative.  On 2 L of oxygen.  Saturation 100%.    Neck: Supple, symmetrical, trachea midline, no adenopathy;              thyroid:  no enlargement/tenderness/nodules;              no carotid bruit or JVD  Cardiovascular: Normal rate, regular rhythm and intact distal pulses.              Exam reveals no gallop and no friction rub. No murmur heard  Pulmonary:  Better air entry.  No wheezing.  Mild rhonchi at bases.    Abdominal: Soft, nontender, bowel sounds active all four quadrants,     no masses, no hepatomegaly, no splenomegaly.   Extremities:  2+ pitting edema of the lower extremity, actually this looks better than her baseline.  Brown discoloration from chronic stasis dermatitis.  Pulses: 2 + symmetric all extremities  Neurological: Patient is oriented, cooperative. No new focal sensorimotor deficit.  Skin: Skin color, texture, normal. Turgor is decreased. No rashes or lesions         Data Review:    Results from last 7 days   Lab Units 23  0557 23  0642 23  0354   WBC 10*3/mm3 11.29* 9.60 10.59   HEMOGLOBIN g/dL 11.1* 11.2* 11.0*   HEMATOCRIT % 36.0 35.6 34.2   PLATELETS 10*3/mm3 139* 138* 135*       Results from last 7 days   Lab Units 23  0557 23  0642 23  0354   SODIUM mmol/L 145 146* " 150*   POTASSIUM mmol/L 4.3 4.0 4.1   CHLORIDE mmol/L 99 101 103   CO2 mmol/L 38.8* 41.0* 38.5*   BUN mg/dL 19 25* 34*   CREATININE mg/dL 0.77 0.88 1.10*   CALCIUM mg/dL 8.7 8.3* 9.0   GLUCOSE mg/dL 148* 124* 159*                 Lab Results   Lab Value Date/Time    TROPONINT 28 (H) 05/21/2023 1920    TROPONINT 25 (H) 05/21/2023 1615       Microbiology Results (last 10 days)     Procedure Component Value - Date/Time    MRSA Screen, PCR (Inpatient) - Swab, Nares [[id number]]  (Normal) Collected: 05/26/23 1547    Lab Status: Final result Specimen: Swab from Nares Updated: 05/26/23 1739     MRSA PCR No MRSA Detected    Narrative:      The negative predictive value of this diagnostic test is high and should only be used to consider de-escalating anti-MRSA therapy. A positive result may indicate colonization with MRSA and must be correlated clinically.    Respiratory Culture - Bronchial Wash, ET Suction [[id number]]  (Abnormal) Collected: 05/26/23 1438    Lab Status: Final result Specimen: Bronchial Wash from ET Suction Updated: 05/28/23 0856     Respiratory Culture Heavy growth (4+) Haemophilus influenzae     Comment: This isolate is beta-lactamase NEGATIVE and are routinely susceptible to ampicillin and other beta-lactams.           Light growth (2+) Normal respiratory hannah. No S. aureus or Pseudomonas aeruginosa detected. Final report.     Gram Stain Moderate (3+) WBCs seen      No epithelial cells seen      Moderate (3+) Gram negative coccobacilli      Mixed bacterial morphotypes seen on Gram Stain    Eosinophil Smear - Urine, Urine, Clean Catch [[id number]]  (Normal) Collected: 05/25/23 0700    Lab Status: Final result Specimen: Urine, Clean Catch Updated: 05/25/23 0820     Eosinophil Smear 0 % EOS/100 Cells     Respiratory Panel PCR w/COVID-19(SARS-CoV-2) [facility]/RANDA/RINA/PAD/COR/MAD/DESTINI In-House, NP Swab in UTM/VTM, 3-4 HR TAT - Swab, Nasopharynx [[id number]]  (Abnormal) Collected: 05/23/23 1625    Lab Status: Final  result Specimen: Swab from Nasopharynx Updated: 05/23/23 1756     ADENOVIRUS, PCR Not Detected     Coronavirus 229E Not Detected     Coronavirus HKU1 Not Detected     Coronavirus NL63 Not Detected     Coronavirus OC43 Not Detected     COVID19 Not Detected     Human Metapneumovirus Not Detected     Human Rhinovirus/Enterovirus Not Detected     Influenza A PCR Not Detected     Influenza B PCR Not Detected     Parainfluenza Virus 1 Not Detected     Parainfluenza Virus 2 Not Detected     Parainfluenza Virus 3 Detected     Parainfluenza Virus 4 Not Detected     RSV, PCR Not Detected     Bordetella pertussis pcr Not Detected     Bordetella parapertussis PCR Not Detected     Chlamydophila pneumoniae PCR Not Detected     Mycoplasma pneumo by PCR Not Detected    Narrative:      In the setting of a positive respiratory panel with a viral infection PLUS a negative procalcitonin without other underlying concern for bacterial infection, consider observing off antibiotics or discontinuation of antibiotics and continue supportive care. If the respiratory panel is positive for atypical bacterial infection (Bordetella pertussis, Chlamydophila pneumoniae, or Mycoplasma pneumoniae), consider antibiotic de-escalation to target atypical bacterial infection.           Imaging Results (Last 7 Days)     Procedure Component Value Units Date/Time    FL Video Swallow Single Contrast [[id number]] Resulted: 06/01/23 1056     Updated: 06/01/23 1118    XR Chest 1 View [[id number]] Collected: 05/29/23 0545     Updated: 05/29/23 0549    Narrative:      SINGLE VIEW OF THE CHEST     HISTORY: Respiratory failure     COMPARISON: 05/28/2023     FINDINGS:  Tubes and lines appear stable position. Cardiac silhouette is stable.  Bilateral alveolar and interstitial tracing noted. Aeration at the lung  bases appears improved. No pneumothorax is seen. There may be a small  right pleural effusion.       Impression:      Interval improvement in aeration at the  lung bases.      This report was finalized on 5/29/2023 5:46 AM by Dr. aTnya Prince M.D.       XR Chest 1 View [[id number]] Collected: 05/28/23 0558     Updated: 05/28/23 0602    Narrative:      SINGLE VIEW OF THE CHEST     HISTORY: Respiratory failure     COMPARISON: 05/26/2023     FINDINGS:  Right-sided PICC appears to terminate within the superior vena cava.  Weighted enteric feeding tube extends into the upper abdomen.  Endotracheal tube terminates in satisfactory position. Cardiomegaly is  present. There is vascular congestion. There is elevation of the right  hemidiaphragm. Bibasilar consolidation and bilateral effusions are  unchanged.       Impression:      Persistent vascular congestion and bibasilar consolidation.     This report was finalized on 5/28/2023 5:59 AM by Dr. Tanya Prince M.D.       XR Abdomen KUB [[id number]] Collected: 05/27/23 1434     Updated: 05/27/23 1439    Narrative:      PROCEDURE:  XR ABDOMEN KUB-     HISTORY: Tube placement.     COMPARISON: Chest radiograph 05/26/2023     FINDINGS: A single view of the upper abdomen was obtained. There is a  new enteric tube with the tip projecting over the expected location of  the gastric antrum or proximal portion of the duodenum. Correlate for  desired positioning within the stomach or small bowel. There is oral  contrast within portions of the colon. There are no dilated small bowel  loops. There is multilevel degenerative disc disease.     This report was finalized on 5/27/2023 2:36 PM by Dr. Marcia Yang M.D.       XR Chest 1 View [[id number]] Collected: 05/26/23 1510     Updated: 05/26/23 1516    Narrative:      XR CHEST 1 VW-     HISTORY:  Status post intubation.     COMPARISON:  Chest radiograph 05/25/2023     FINDINGS:    2 views of the chest were obtained. There is a new endotracheal tube  with the tip terminating approximately 2.3 cm above the nagi. The  cardiac silhouette is enlarged. There is calcific  aortic  atherosclerosis. Central pulmonary venous congestion has slightly  progressed. Interstitial opacities are not significantly changed. There  is new or increased airspace opacity at the lung bases with suspected  small layering pleural effusions. Pulmonary opacities could reflect  pulmonary edema and atelectasis with pneumonia not excluded. There is  multilevel degenerative disc disease.     This report was finalized on 5/26/2023 3:13 PM by Dr. Marcia Yang M.D.       XR Chest 1 View [[id number]] Collected: 05/25/23 2137     Updated: 05/25/23 2144    Narrative:      Portable chest radiograph     HISTORY:Respiratory distress     TECHNIQUE: Single AP portable radiograph of the chest     COMPARISON:Chest radiograph 05/21/2023       Impression:      FINDINGS AND IMPRESSION:  There is bilateral pulmonary vascular congestion with superimposed  interstitial thickening within the bilateral lungs, as before. The lungs  are hypoinflated. Cardiac silhouette is accentuated by low lung volumes.  No pneumothorax is seen. Somewhat nodular opacification overlying the  left upper lung is present, not definitely seen on prior imaging.  Further evaluation with CT chest is recommended to exclude underlying  pulmonary nodule and establish appropriate follow-up.     This report was finalized on 5/25/2023 9:40 PM by Dr. Bhupinder Lucero M.D.               Assessment:      Acute on chronic hypoxic respiratory failure requiring ventilatory management  Haemophilus influenza pneumonia  Asthma exacerbation  Acute parainfluenza viral bronchitis  Hypertensive urgency  Hyponatremia  Hyperkalemia  Acute kidney injury  Diabetic nephropathy with chronic kidney disease stage IIIa  Lower extremity edema, chronic  Uncontrolled diabetes mellitus  Obstructive sleep apnea  Generalized anxiety disorder  Iron deficiency anemia  Morbid obesity  Difficult IV access       Plan:    Patient is having swallowing evaluation, will change diet  accordingly.  Continue to wean oxygen down, saturation in 2 L 100%.  Probably she does not need oxygen during the daytime.  Has completed IV antibiotics for haemophilus influenza pneumonia.  Continue DuoNeb mini nebs  Monitor and correct electrolytes.  Monitor renal function, hopefully back to normal range.  With EGFR of 18.1.  Continue DVT/stress ulcer prophylaxis  Physical therapy to see patient today.  Family evaluated in rehab places.  Stable to be discharged tomorrow  Labs in am      Praneeth Valenzuela MD  06/01/23   11:33 EDT        There are other unrelated non-urgent complaints, but due to the busy schedule and the amount of time I've already spent with her, time does not permit me to address these routine issues at today's visit. I've requested another appointment to review these additional issues.              I wore protective equipment throughout this patient encounter including a face mask, gloves, and protective eyewear.  Hand hygiene was performed before donning protective equipment and after removal when leaving the room.

## 2023-06-01 NOTE — THERAPY TREATMENT NOTE
Patient Name: Blanca Zhu  : 1945    MRN: [id number]                              Today's Date: 2023       Admit Date: 2023    Visit Dx:     ICD-10-CM ICD-9-CM   1. Respiratory distress  R06.03 786.09   2. Wheezing  R06.2 786.07   3. Pulmonary vascular congestion  R09.89 514   4. Hypertensive urgency  I16.0 401.9     Patient Active Problem List   Diagnosis   • Osteoporosis   • Breast neoplasm, Tis (DCIS), left   • Iron deficiency anemia   • CKD (chronic kidney disease)   • Diabetic nephropathy associated with type 2 diabetes mellitus   • Temporal arteritis   • Immunosuppression   • Anemia   • Duodenal adenoma   • Gastritis without bleeding   • Polyp of duodenum   • Morbidly obese   • Dyspnea on exertion   • Hyperlipidemia   • Type 2 diabetes mellitus with stage 3b chronic kidney disease, with long-term current use of insulin   • Essential hypertension   • Bilateral lower extremity edema   • Pulmonary hypertension   • Obstructive sleep apnea on CPAP   • Stage 3a chronic kidney disease   • Iron malabsorption   • Respiratory distress     Past Medical History:   Diagnosis Date   • Anemia    • Anxiety and depression    • Asthma    • Balance problem    • Breast cancer 2019    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 Daniel Gutierrez, [facility]   •  SECTION N/A     x3   • CHOLECYSTECTOMY N/A    • COLONOSCOPY     • COLONOSCOPY W/ POLYPECTOMY N/A 2019     Enlarged folds in the antrum: biopsied; duodenal, transverse colon x2, splenic flexure, descending colon and sigmoid colon polyps: biopsied (path: tubular adenoma x6)-Dr. Zak De Jesus, [facility]   • ENDOSCOPY  10/11/2019    Procedure: ESOPHAGOGASTRODUODENOSCOPY with hot snare polypectomy;  Surgeon: Haile Handley MD;  Location: [facility] ENDOSCOPY;  Service: Gastroenterology   • ENDOSCOPY N/A 1/29/2020    Procedure: ESOPHAGOGASTRODUODENOSCOPY;  Surgeon: Haile Handley MD;  Location: [facility] ENDOSCOPY;  Service: Gastroenterology   • ENDOSCOPY N/A 9/9/2020    Procedure: ESOPHAGOGASTRODUODENOSCOPY WITH BIOPSIES AND COLD BIOPSY POLYPECTOMY;  Surgeon: Haile Handley MD;  Location: [facility] ENDOSCOPY;  Service: Gastroenterology;  Laterality: N/A;  pre: dyspepsia and diarrhea  post: duodenal polyp and gastritis   • MASTECTOMY Left    • MASTECTOMY WITH SENTINEL NODE BIOPSY AND AXILLARY NODE DISSECTION Left 7/3/2019    Procedure: LEFT BREAST MASTECTOMY WITH SENTINEL NODE BIOPSY AND , v-y plasty closure;  Surgeon: Tahmina James MD;  Location: [facility] MAIN OR;  Service: General   • SUBTOTAL HYSTERECTOMY Bilateral     ovaries still in tact   • TEMPORAL ARTERY BIOPSY Bilateral 12/05/2019      General Information     Row Name 06/01/23 1553          Physical Therapy Time and Intention    Document Type therapy note (daily note)  -     Mode of Treatment physical therapy  -     Row Name 06/01/23 1556          General Information    Existing Precautions/Restrictions fall;oxygen therapy device and L/min  -     Row Name 06/01/23 1550          Cognition    Orientation Status (Cognition) oriented x 3  -           User Key  (r) = Recorded By, (t) = Taken By, (c) = Cosigned By    Initials Name Provider Type     Lexy Stark PTA Physical Therapist Assistant               Mobility     Row Name 06/01/23 3644          Bed Mobility    Bed Mobility supine-sit;sit-supine  -SM     Supine-Sit  [place] (Bed Mobility) minimum assist (75% patient effort);2 person assist  -SM     Sit-Supine [place] (Bed Mobility) maximum assist (25% patient effort);2 person assist  -SM     Assistive Device (Bed Mobility) bed rails;head of bed elevated  -     Row Name 06/01/23 1556          Sit-Stand Transfer    Sit-Stand [place] (Transfers) moderate assist (50% patient effort);2 person assist  -     Assistive Device (Sit-Stand Transfers) walker, front-wheeled  -     Row Name 06/01/23 1556          Gait/Stairs (Locomotion)    Comment, (Gait/Stairs) unable d/t increased dizziness and pt having a BM as well  -           User Key  (r) = Recorded By, (t) = Taken By, (c) = Cosigned By    Initials Name Provider Type    Lexy Mccloud PTA Physical Therapist Assistant               Obj/Interventions     Row Name 06/01/23 1558          Motor Skills    Therapeutic Exercise --  seated AP and LAQ x10 reps  -           User Key  (r) = Recorded By, (t) = Taken By, (c) = Cosigned By    Initials Name Provider Type    Lexy Mccloud PTA Physical Therapist Assistant               Goals/Plan    No documentation.                Clinical Impression     Row Name 06/01/23 1559          Pain    Pretreatment Pain Rating 3/10  -SM     Posttreatment Pain Rating 3/10  -SM     Pain Location - Side/Orientation Bilateral  -     Pain Location lower  -     Pain Location - extremity  -     Row Name 06/01/23 1559          Positioning and Restraints    Pre-Treatment Position in bed  -SM     Post Treatment Position bed  -SM     In Bed supine;call light within reach;encouraged to call for assist;exit alarm on;with family/caregiver  -           User Key  (r) = Recorded By, (t) = Taken By, (c) = Cosigned By    Initials Name Provider Type    Lexy Mccloud PTA Physical Therapist Assistant               Outcome Measures     Row Name 06/01/23 1601          How much help from another person do you currently  need...    Turning from your back to your side while in flat bed without using bedrails? 3  -SM     Moving from lying on back to sitting on the side of a flat bed without bedrails? 3  -SM     Moving to and from a bed to a chair (including a wheelchair)? 2  -SM     Standing up from a chair using your arms (e.g., wheelchair, bedside chair)? 2  -SM     Climbing 3-5 steps with a railing? 1  -SM     To walk in hospital room? 1  -SM     AM-PAC 6 Clicks Score (PT) 12  -SM     Highest level of mobility 4 --> Transferred to chair/commode  -     Row Name 06/01/23 1601          Functional Assessment    Outcome Measure Options AM-PAC 6 Clicks Basic Mobility (PT)  -           User Key  (r) = Recorded By, (t) = Taken By, (c) = Cosigned By    Initials Name Provider Type    Lexy Mccloud, JYOTI Physical Therapist Assistant                             Physical Therapy Education     Title: PT OT SLP Therapies (In Progress)     Topic: Physical Therapy (In Progress)     Point: Mobility training (In Progress)     Learning Progress Summary           Patient Acceptance, E,TB,D, VU,NR by  at 6/1/2023 1602    Acceptance, E,D, NL by KISHAN at 5/26/2023 1559    Acceptance, E,D, NR by PC at 5/24/2023 1601   Family Acceptance, E,D, NL by  at 5/26/2023 1559                   Point: Home exercise program (In Progress)     Learning Progress Summary           Patient Acceptance, E,TB,D, VU,NR by  at 6/1/2023 1602    Acceptance, E,D, NL by KISHAN at 5/26/2023 1559    Acceptance, E,D, NR by PC at 5/24/2023 1601   Family Acceptance, E,D, NL by KISHAN at 5/26/2023 1559                   Point: Body mechanics (In Progress)     Learning Progress Summary           Patient Acceptance, E,TB,D, VU,NR by  at 6/1/2023 1602    Acceptance, E,D, NL by KISHAN at 5/26/2023 1559    Acceptance, E,D, NR by PC at 5/24/2023 1601   Family Acceptance, E,D, NL by KISHAN at 5/26/2023 1559                   Point: Precautions (In Progress)     Learning Progress Summary            Patient Acceptance, E,TB,D, VU,NR by  at 6/1/2023 1602    Acceptance, E,D, NL by  at 5/26/2023 1559    Acceptance, E,D, NR by  at 5/24/2023 1601   Family Acceptance, E,D, NL by  at 5/26/2023 1559                               User Key     Initials Effective Dates Name Provider Type Discipline     06/16/21 -  Belia Heath, PT Physical Therapist PT     03/07/18 -  Polina Stark PTA Physical Therapist Assistant PT     03/07/18 -  Lexy Stark PTA Physical Therapist Assistant PT              PT Recommendation and Plan     Plan of Care Reviewed With: patient  Progress: improving  Outcome Evaluation: Pt tolerated treatment fair this date. Required min A x2 for bed mobility, though pt c/o increased dizziness immediately upon sitting. After several minutes sitting, pt stood w/ Rw and mod A x2, maintaining for ~3min until needing to sit. Some dizziness still noted when standing, though pt was more concerned about having a BM at the time. Pt was returned to supine w/ max A x2. Encouraged pt to attempt a few bed exercises w/ daughter's assist if needed.     Time Calculation:    PT Charges     Row Name 06/01/23 1609             Time Calculation    Start Time 1404  -      Stop Time 1442  -      Time Calculation (min) 38 min  -      PT Received On 06/01/23  -      PT - Next Appointment 06/02/23  -            User Key  (r) = Recorded By, (t) = Taken By, (c) = Cosigned By    Initials Name Provider Type     Lexy Stark PTA Physical Therapist Assistant              Therapy Charges for Today     Code Description Service Date Service Provider Modifiers Qty    65455998382 HC PT THER PROC EA 15 MIN 6/1/2023 Lexy Stark PTA GP 1    76621131883 HC PT THERAPEUTIC ACT EA 15 MIN 6/1/2023 Lexy Stark PTA GP 2    05727249714 HC PT THER SUPP EA 15 MIN 6/1/2023 Lexy Stark PTA GP 3          PT G-Codes  Outcome Measure Options: AM-PAC 6 Clicks Basic Mobility  (PT)  AM-PAC 6 Clicks Score (PT): 12  PT Discharge Summary  Anticipated Discharge Disposition (PT): skilled nursing facility    Lexy Stark, PTA  6/1/2023

## 2023-06-01 NOTE — MBS/VFSS/FEES
Acute Care - Speech Language Pathology   Swallow Initial Evaluation [facility]     Patient Name: Blanca Zhu  : 1945  MRN: [id number]  Today's Date: 2023               Admit Date: 2023    Visit Dx:     ICD-10-CM ICD-9-CM   1. Respiratory distress  R06.03 786.09   2. Wheezing  R06.2 786.07   3. Pulmonary vascular congestion  R09.89 514   4. Hypertensive urgency  I16.0 401.9     Patient Active Problem List   Diagnosis   • Osteoporosis   • Breast neoplasm, Tis (DCIS), left   • Iron deficiency anemia   • CKD (chronic kidney disease)   • Diabetic nephropathy associated with type 2 diabetes mellitus   • Temporal arteritis   • Immunosuppression   • Anemia   • Duodenal adenoma   • Gastritis without bleeding   • Polyp of duodenum   • Morbidly obese   • Dyspnea on exertion   • Hyperlipidemia   • Type 2 diabetes mellitus with stage 3b chronic kidney disease, with long-term current use of insulin   • Essential hypertension   • Bilateral lower extremity edema   • Pulmonary hypertension   • Obstructive sleep apnea on CPAP   • Stage 3a chronic kidney disease   • Iron malabsorption   • Respiratory distress     Past Medical History:   Diagnosis Date   • Anemia    • Anxiety and depression    • Asthma    • Balance problem    • Breast cancer 2019    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 Daniel Gutierrez, [facility]   •  SECTION N/A     x3   • CHOLECYSTECTOMY N/A     • COLONOSCOPY     • COLONOSCOPY W/ POLYPECTOMY N/A 03/12/2019    Enlarged folds in the antrum: biopsied; duodenal, transverse colon x2, splenic flexure, descending colon and sigmoid colon polyps: biopsied (path: tubular adenoma x6)-Dr. Zak De Jesus, [facility]   • ENDOSCOPY  10/11/2019    Procedure: ESOPHAGOGASTRODUODENOSCOPY with hot snare polypectomy;  Surgeon: Haile Handley MD;  Location: [facility] ENDOSCOPY;  Service: Gastroenterology   • ENDOSCOPY N/A 1/29/2020    Procedure: ESOPHAGOGASTRODUODENOSCOPY;  Surgeon: Haile Handley MD;  Location: [facility] ENDOSCOPY;  Service: Gastroenterology   • ENDOSCOPY N/A 9/9/2020    Procedure: ESOPHAGOGASTRODUODENOSCOPY WITH BIOPSIES AND COLD BIOPSY POLYPECTOMY;  Surgeon: Haile Handley MD;  Location: [facility] ENDOSCOPY;  Service: Gastroenterology;  Laterality: N/A;  pre: dyspepsia and diarrhea  post: duodenal polyp and gastritis   • MASTECTOMY Left    • MASTECTOMY WITH SENTINEL NODE BIOPSY AND AXILLARY NODE DISSECTION Left 7/3/2019    Procedure: LEFT BREAST MASTECTOMY WITH SENTINEL NODE BIOPSY AND , v-y plasty closure;  Surgeon: Tahmina James MD;  Location: [facility] MAIN OR;  Service: General   • SUBTOTAL HYSTERECTOMY Bilateral     ovaries still in tact   • TEMPORAL ARTERY BIOPSY Bilateral 12/05/2019       SLP Recommendation and Plan  SLP Swallowing Diagnosis: mild (06/01/23 1100)  SLP Diet Recommendation: regular textures, thin liquids (06/01/23 1100)  Recommended Precautions and Strategies: upright posture during/after eating, small bites of food and sips of liquid, multiple swallows per bite of food, multiple swallows per sip of liquid (06/01/23 1100)  SLP Rec. for Method of Medication Administration: as tolerated (06/01/23 1100)     Monitor for Signs of Aspiration: yes, notify SLP if any concerns (06/01/23 1100)     Swallow Criteria for Skilled Therapeutic Interventions Met: no problems identified which require skilled intervention (06/01/23  1100)        Therapy Frequency (Swallow): PRN (06/01/23 1100)  Predicted Duration Therapy Intervention (Days): until discharge (06/01/23 1100)                                        Plan of Care Reviewed With: patient  Outcome Evaluation: VFSS completed.  Pt with single incident of trace, transient penetration with thin by cup, thin by straw, and mixed consistency.  Full clearance of penetration with swallow.  Penetration did not not reach vocal cords and no trace left in vestibule after the swallow.  Pharyngeal residue noted which cleared with cued repeat swallow.  REC regular diet, thin liquids.  Meds w/ puree.   Upright with all po and 30 minutes after meals.  Encourage double swallow with each bolus.      SWALLOW EVALUATION (last 72 hours)     SLP Adult Swallow Evaluation     Row Name 06/01/23 1100 05/30/23 1100 05/29/23 1700             Rehab Evaluation    Document Type evaluation;other (see comments)  VFSS  -SA re-evaluation  -CR evaluation  -NR      Subjective Information no complaints  -SA no complaints  -CR no complaints  -NR      Patient Observations alert;cooperative  -SA alert;cooperative  -CR alert;cooperative;agree to therapy  -NR      Patient Effort good  -SA good  -CR good  -NR      Symptoms Noted During/After Treatment none  -SA none  -CR none  -NR         General Information    Patient Profile Reviewed yes  -SA yes  -CR yes  -NR      Pertinent History Of Current Problem -- -- Per H and P :  Patient is a 77-year-old female, patient office, patient has history of asthma, chronic kidney disease stage IIIa, morbid obesity, chronic  diastolic congestive heart failure, diabetes mellitus type 2, hypertension, hyperlipidemia, sleep apnea, temporal arteritis.  Patient states that about 4 days ago she started developing upper respiratory symptoms, she developed sneezing, runny nose, nose congestion.  Patient was seen by her allergist and was told that she had a virus infection.  Nothing specifically was  given to the patient.  Over the next few days, patient's symptoms became worse, having worsening shortness of breath, wheezing, decreased oxygenation.  She denies having any worsening lower extremity edema.  Patient was brought to the emergency room per family.  In the emergency room, patient was found to have: Troponin 25 and then 28, creatinine 1.11, potassium 4.4, COVID-19 test was negative, chest x-ray showed borderline heart size with mild vascular congestion, EKG showed sinus tachycardia.  Blood pressure in the emergency room was 217/76.  In the emergency room, patient was placed on a BiPAP, was given Lasix, patient was admitted to the hospital for further management.  Pt extubated this date at approximately 1000  -NR      Current Method of Nutrition regular textures;no mixed consistencies;nectar/syrup-thick liquids  -SA nectar/syrup-thick liquids;regular textures  -CR NPO  -NR      Precautions/Limitations, Vision -- -- WFL;for purposes of eval  -NR      Precautions/Limitations, Hearing -- -- WFL;for purposes of eval  -NR      Prior Level of Function-Communication -- -- WFL  -NR      Prior Level of Function-Swallowing -- -- safe, efficient swallowing in all situations  -NR      Plans/Goals Discussed with -- -- patient;agreed upon  -NR      Barriers to Rehab -- -- none identified  -NR      Patient's Goals for Discharge -- -- return to PO diet  -NR         Pain    Additional Documentation -- -- Pain Scale: Numbers Pre/Post-Treatment (Group)  -NR         Pain Scale: Numbers Pre/Post-Treatment    Pretreatment Pain Rating -- 10/10  -CR 0/10 - no pain  -NR      Posttreatment Pain Rating -- 10/10  -CR 0/10 - no pain  -NR      Pain Location - Side/Orientation -- Right  -CR --      Pain Location - -- other (see comments)  leg  -CR --         Oral Motor Structure and Function    Dentition Assessment -- natural, present and adequate  -CR natural, present and adequate  -NR      Secretion Management -- WNL/WFL  -CR WNL/WFL   -NR      Mucosal Quality -- moist, healthy  -CR moist, healthy  -NR      Volitional Swallow -- -- weak  -NR         Oral Musculature and Cranial Nerve Assessment    Oral Motor General Assessment -- -- generalized oral motor weakness  -NR      Vocal Impairment, Detail. Cranial Nerve X (Vagus) -- impaired throat clear/cough (see comments);other (see comments)  weak and strained  -CR --         General Eating/Swallowing Observations    Eating/Swallowing Skills -- -- fed by SLP  -NR      Positioning During Eating -- -- upright in bed  -NR      Utensils Used -- -- spoon;cup;straw  -NR      Consistencies Trialed -- -- regular textures;chopped;mixed consistency;pureed;ice chips;thin liquids;nectar/syrup-thick liquids  -NR         Clinical Swallow Eval    Clinical Swallow Evaluation Summary -- -- Swallow evaluated at bedside.  Mastication is slow but adequate when indicated.  No oral residuals noted.  The pharyngeal swallow reflex is occasionally mistimed with possible reduced laryngeal elevation upon palpation.  No coughing or choking noted.  Intermittent throat clearing and gurgly vocal quality noted with ice and thin liquids as well as the juice from peaches.  Pt is very aphonic due to reduced vocal fold adduction which may be hindering the swallow function.  No other overt s/s of aspiration noted.  Recommend a regular texture diet with nectar thick liquids.  Meds may be given whole or crushed in puree or nectar thick liquids.  Swallow precautions include sit upright and small bites and sips.  Recommend reassess in 1-2 days to determine diet tolerance and need for a possible VFSS or FEES.  -NR         MBS/VFSS    Utensils Used spoon;cup;straw  -SA -- --      Consistencies Trialed regular textures;soft to chew textures;mixed consistency;pureed;thin liquids  -SA -- --         MBS/VFSS Interpretation    Oral Prep Phase WFL  -SA -- --      Oral Transit Phase impaired  -SA -- --      Oral Residue impaired  -SA -- --          Oral Transit Phase    Impaired Oral Transit Phase premature spillage of liquids into pharynx  -SA -- --      Premature Spillage of Liquids into Pharynx mixed consistency;mechanical soft;thin liquids  -SA -- --         Oral Residue    Impaired Oral Residue diffuse residue throughout oral cavity  -SA -- --      Diffuse Residue throughout Oral Cavity mixed consistency;mechanical soft  -SA -- --      Response to Oral Residue cleared residue;with cued swallow  -SA -- --         Initiation of Pharyngeal Swallow    Pharyngeal Phase impaired pharyngeal phase of swallowing  -SA -- --      Penetration During the Swallow thin liquids;mixed consistency;other (see comments)  x1 each  -SA -- --      Depth of Penetration transient  -SA -- --      Response to Penetration Yes  -SA -- --      Responsed to penetration with effective;other (see comments)  cleared with swallow  -SA -- --      Pharyngeal Residue thin liquids;mixed consistency;mechanical soft;pudding/puree  -SA -- --      Response to Residue cleared residue with cued swallow  -SA -- --      Attempted Compensatory Maneuvers multiple swallows  -SA -- --      Response to Attempted Compensatory Maneuvers reduced residue  -SA -- --         SLP Communication to Radiology    Summary Statement VFSS completed with Dr Moore.  Pt demonstrated single isolated incidence of transient penetration with thin by cup, straw, and mixed consistency.  Additional trials with no penetration observed.  Pharyngeal residue cleared with a cued double swallow.  No aspiration observed.  -SA -- --         SLP Evaluation Clinical Impression    SLP Swallowing Diagnosis mild  -SA R/O pharyngeal dysphagia  -CR R/O pharyngeal dysphagia  -NR      Functional Impact risk of aspiration/pneumonia  -SA risk of aspiration/pneumonia  -CR risk of aspiration/pneumonia  -NR      Rehab Potential/Prognosis, Swallowing -- good, to achieve stated therapy goals  -CR good, to achieve stated therapy goals  -NR      Swallow  Criteria for Skilled Therapeutic Interventions Met no problems identified which require skilled intervention  -SA demonstrates skilled criteria  -CR demonstrates skilled criteria  -NR         Recommendations    Therapy Frequency (Swallow) PRN  -SA PRN  -CR PRN  -NR      Predicted Duration Therapy Intervention (Days) until discharge  -SA until discharge  -CR --      SLP Diet Recommendation regular textures;thin liquids  -SA nectar thick liquids;regular textures;water between meals after oral care, with supervision;ice chips between meals after oral care, with supervision  -CR regular textures;nectar thick liquids  -NR      Recommended Diagnostics -- VFSS (MBS)  -CR reassess via clinical swallow evaluation;reassess via VFSS (MBS)  -NR      Recommended Precautions and Strategies upright posture during/after eating;small bites of food and sips of liquid;multiple swallows per bite of food;multiple swallows per sip of liquid  -SA upright posture during/after eating;small bites of food and sips of liquid;no straw;general aspiration precautions  -CR upright posture during/after eating;small bites of food and sips of liquid;no straw;general aspiration precautions  -NR      Oral Care Recommendations Oral Care BID/PRN  -SA Oral Care BID/PRN  -CR Oral Care before breakfast, after meals and PRN  -NR      SLP Rec. for Method of Medication Administration as tolerated  -SA meds crushed;meds whole;with thick liquids;with puree  -CR meds crushed;meds whole;with thick liquids;with puree  -NR      Monitor for Signs of Aspiration yes;notify SLP if any concerns  -SA yes;notify SLP if any concerns  -CR yes;notify SLP if any concerns  -NR         Swallow Goals (SLP)    Swallow LTGs -- -- Patient will demonstrate functional swallow for  -NR      Swallow STGs -- -- diet tolerance goal selection (SLP)  -NR      Diet Tolerance Goal Selection (SLP) -- -- Patient will tolerate trials of  -NR         (LTG) Patient will demonstrate functional  swallow for    Diet Texture (Demonstrate functional swallow) regular textures  -SA -- regular textures  -NR      Liquid viscosity (Demonstrate functional swallow) thin liquids  -SA -- nectar/ mildly thick liquids  -NR      [place] (Demonstrate functional swallow) -- -- with minimal cues (75-90% accuracy)  -NR      Time Frame (Demonstrate functional swallow) -- -- by discharge  -NR         (STG) Patient will tolerate trials of    Consistencies Trialed (Tolerate trials) thin liquids  -SA thin liquids  -CR thin liquids  -NR      Desired Outcome (Tolerate trials) without signs/symptoms of aspiration  -SA without signs/symptoms of aspiration  -CR without signs/symptoms of aspiration  -NR      [place] (Tolerate trials) with minimal cues (75-90% accuracy)  -SA with minimal cues (75-90% accuracy)  -CR with minimal cues (75-90% accuracy)  -NR      Time Frame (Tolerate trials) 1 week  -SA 1 week  -CR 1 week  -NR      Progress/Outcomes (Tolerate trials) goal met  -SA -- --      Comment (Tolerate trials) -- Clinical swallow re-evaluation completed. Patient with weak and strained vocal quality which was difficult to assess throughout evaluation. No overt s/s of aspiration demonstrated with ice chips, nectar thick liquid by spoon/cup, puree, soft/chopped solids, or regular solids. Wet vocal quality with thins by spoon. Delayed cough with nectar by straw. Recommend patient participate in VFSS to determine safest diet. Pending VFSS, recommend patient continue nectar thick liquid and regular diet. No straws. Free water protocol with ice/water sips between meals. Meds whole or crushed with thickened liquids or puree, as tolerated. Sitting upright, slow rate, small bites/sips. Speech to follow with VFSS.  -CR --            User Key  (r) = Recorded By, (t) = Taken By, (c) = Cosigned By    Initials Name Effective Dates    Mabel Pierre MS CCC-SLP 05/31/23 -     Ania Jewell MA,CCC-SLP 06/16/21 -     DANIKA Espitia  Shayna CF-SLP 11/10/22 - 05/30/23                EDUCATION  The patient has been educated in the following areas:   Dysphagia (Swallowing Impairment) Oral Care/Hydration.        SLP GOALS     Row Name 06/01/23 1100 05/30/23 1100 05/29/23 1700       (LTG) Patient will demonstrate functional swallow for    Diet Texture (Demonstrate functional swallow) regular textures  -SA -- regular textures  -NR    Liquid viscosity (Demonstrate functional swallow) thin liquids  -SA -- nectar/ mildly thick liquids  -NR    [place] (Demonstrate functional swallow) -- -- with minimal cues (75-90% accuracy)  -NR    Time Frame (Demonstrate functional swallow) -- -- by discharge  -NR       (STG) Patient will tolerate trials of    Consistencies Trialed (Tolerate trials) thin liquids  -SA thin liquids  -CR thin liquids  -NR    Desired Outcome (Tolerate trials) without signs/symptoms of aspiration  -SA without signs/symptoms of aspiration  -CR without signs/symptoms of aspiration  -NR    [place] (Tolerate trials) with minimal cues (75-90% accuracy)  -SA with minimal cues (75-90% accuracy)  -CR with minimal cues (75-90% accuracy)  -NR    Time Frame (Tolerate trials) 1 week  -SA 1 week  -CR 1 week  -NR    Progress/Outcomes (Tolerate trials) goal met  -SA -- --    Comment (Tolerate trials) -- Clinical swallow re-evaluation completed. Patient with weak and strained vocal quality which was difficult to assess throughout evaluation. No overt s/s of aspiration demonstrated with ice chips, nectar thick liquid by spoon/cup, puree, soft/chopped solids, or regular solids. Wet vocal quality with thins by spoon. Delayed cough with nectar by straw. Recommend patient participate in VFSS to determine safest diet. Pending VFSS, recommend patient continue nectar thick liquid and regular diet. No straws. Free water protocol with ice/water sips between meals. Meds whole or crushed with thickened liquids or puree, as tolerated. Sitting upright, slow  rate, small bites/sips. Speech to follow with VFSS.  -CR --          User Key  (r) = Recorded By, (t) = Taken By, (c) = Cosigned By    Initials Name Provider Type    Mabel Pierre MS CCC-SLP Speech and Language Pathologist    Ania Jewell MA,CCC-SLP Speech and Language Pathologist    Shayna Capps, CF-SLP Speech and Language Pathologist              SLP Outcome Measures (last 72 hours)     SLP Outcome Measures     Row Name 05/29/23 1700             SLP Outcome Measures    Outcome Measure Used? Adult NOMS  -NR         Adult FCM Scores    FCM Chosen Swallowing  -NR      Swallowing FCM Score 4  -NR            User Key  (r) = Recorded By, (t) = Taken By, (c) = Cosigned By    Initials Name Effective Dates    NR Ania Saavedra MA,CCC-SLP 06/16/21 -                  Time Calculation:    Time Calculation- SLP     Row Name 06/01/23 1137             Time Calculation- SLP    SLP Start Time 1100  -            User Key  (r) = Recorded By, (t) = Taken By, (c) = Cosigned By    Initials Name Provider Type    Mabel Pierre MS CCC-SLP Speech and Language Pathologist                Therapy Charges for Today     Code Description Service Date Service Provider Modifiers Qty    96181234003 HC ST MOTION FLUORO EVAL SWALLOW 5 6/1/2023 Mabel Rodas MS CCC-SLP GN 1               Mabel Rodas MS CCC-ERIN  6/1/2023

## 2023-06-01 NOTE — CASE MANAGEMENT/SOCIAL WORK
Continued Stay Note  [facility]     Patient Name: Blanca Zhu  MRN: [id number]  Today's Date: 6/1/2023    Admit Date: 5/21/2023    Plan: SNF referrals pending   Discharge Plan     Row Name 06/01/23 1419       Plan    Plan SNF referrals pending    Patient/Family in Agreement with Plan yes    Plan Comments [facility] notified [facility] pt has been accepted and bed available. CCP met with pt and daughter at bedside to update. [facility] explained [facility] clinically accepted and bed available. Daughter verbalized understanding but would like additional referrals made to [facility]. [facility] notified of referral, no beds at this time. [facility] notified of referral, she will review pt and let CCP know if they can clinically accept and bed availability. Michelle RN/CCP               Discharge Codes    No documentation.               Expected Discharge Date and Time     Expected Discharge Date Expected Discharge Time    Jun 2, 2023             Aimee Garza RN

## 2023-06-01 NOTE — PROGRESS NOTES
Loyd Huff MD                          [phone]            Patient ID:    Name:  Blanca Zhu    MRN:  [id number]    1945   77 y.o.  female            Patient Care Team:  Praneeth Valenzuela MD as PCP - General (Internal Medicine)  Jean-Claude Farnsworth MD as Consulting Physician (Hematology and Oncology)  Praneeth Valenzuela MD as Referring Physician (Internal Medicine)  Tahmina Brown MD as Consulting Physician (Rheumatology)  Gretchen Mcginnis MD as Consulting Physician (Nephrology)  Tahmina James MD as Surgeon (General Surgery)  Haile Handley MD as Consulting Physician (Gastroenterology)  Sb Power MD as Consulting Physician (Cardiology)    CC/ Reason for visit: Ac parainfluenza URTI, CHF, FEDERICA     Subjective: Pt seen and examined this AM. Patient states that she is feeling better. Slept better but keeps napping all day. Told them that she cant refuse PT.     ROS: Denies any persistent fevers or chills, worsening cough or worsening sputum production, nausea vomiting or diarrhea    Objective     Vital Signs past 24hrs    BP range: BP: (128-184)/(43-71) 184/71  Pulse range: Heart Rate:  [85-98] 98  Resp rate range: Resp:  [16-20] 20  Temp range: Temp (24hrs), Av.1 °F (36.7 °C), Min:98 °F (36.7 °C), Max:98.3 °F (36.8 °C)      Ventilator/Non-Invasive Ventilation Settings (From admission, onward)     Start     Ordered    23 1619  NIPPV (CPAP or BIPAP)  At Bedtime As Needed-RT        Comments: May adjust pressures to best tolerance and effect.  Patient is used to using a Pap machine and may bleed in supplemental O2 to maintain saturations greater than 90% less than 95 when she is on the PAP machine.   Question Answer Comment   Type BIPAP    IPAP 18    EPAP 10        23 1618    23 2042  NIPPV (CPAP or BIPAP)  Until Discontinued,   Status:  Canceled        Comments: May adjust pressures to best tolerance and effect.  Patient is used to  using a Pap machine   Question Answer Comment   Type BIPAP    IPAP 18    EPAP 10        05/21/23 2041    05/21/23 2042  NIPPV (CPAP or BIPAP)  Until Discontinued,   Status:  Canceled        Comments: May adjust pressures to best tolerance and effect.  Patient is used to using a Pap machine and may bleed in supplemental O2 to maintain saturations greater than 90% less than 95 when she is on the PAP machine.   Question Answer Comment   Type BIPAP    IPAP 18    EPAP 10        05/21/23 2042    05/21/23 1542  NIPPV (CPAP or BIPAP)  Until Discontinued,   Status:  Canceled        Question:  Type  Answer:  BIPAP    05/21/23 1541                Vent Settings        Resp Rate (Set): 18  Pressure Support (cm H2O): 10 cm H20  FiO2 (%): 30 %  PEEP/CPAP (cm H2O): 5 cm H20  Minute Ventilation (L/min) (Obs): 8 L/min  Resp Rate (Observed) Vent: 26  I:E Ratio (Set): 1:3.2  I:E Ratio (Obs): 1:2.2  PIP Observed (cm H2O): 16 cm H2O  Plateau Pressure (cm H2O): 24 cm H2O  Driving Pressure (cm H2O): 19.2 cm H2O  Device (Oxygen Therapy): nasal cannula FiO2 (%): 30 %     92.6 kg (204 lb 2.3 oz); Body mass index is 39.87 kg/m².      Intake/Output Summary (Last 24 hours) at 6/1/2023 1212  Last data filed at 6/1/2023 0500  Gross per 24 hour   Intake 120 ml   Output 950 ml   Net -830 ml       PHYSICAL EXAM   Constitutional: Middle-aged obese F pt in bed, awake and interactive, some accessory muscle use.  Head: - NCAT  Eyes: No pallor.  Anicteric sclerae, EOMI.  ENMT: Mallampati 4, no oral thrush. Moist MM.   NECK: Trachea midline, No thyromegaly, no palpable cervical lymphadenopathy  Heart: RRR, no murmur.  Trace pedal edema   Lungs: SAV +, Dec BS @ bases +. No wheezes/ crackles heard    Abdomen: Soft. Obese, No tenderness, guarding or rigidity. No palpable masses  Extremities: Extremities warm and well perfused. No cyanosis/ clubbing  Neuro: Awake, interactive, moving all extremities -generally weak  Psych: Mood and affect -appropriate    PPE  recommended per [facility] infectious disease Isolation protocol for the current clinical scenario (as mentioned below) was followed.     Scheduled meds:  amLODIPine, 5 mg, Oral, Q24H  bisoprolol, 20 mg, Oral, Q24H  dextrose, 50 mL, Intravenous, Once   And  insulin regular, 10 Units, Intravenous, Once  DULoxetine, 30 mg, Oral, Daily  guaifenesin-dextromethorphan, 2 tablet, Oral, BID  heparin (porcine), 5,000 Units, Subcutaneous, Q8H  insulin glargine, 20 Units, Subcutaneous, Daily  insulin regular, 2-9 Units, Subcutaneous, 4x Daily AC & at Bedtime  ipratropium-albuterol, 3 mL, Nebulization, 4x Daily - RT  leflunomide, 10 mg, Oral, Daily  Menthol-Zinc Oxide, 1 application, Topical, BID  O2, 2 L/min, Inhalation, Once  pantoprazole, 40 mg, Oral, Q AM  sodium chloride, 10 mL, Intravenous, Q12H  sodium chloride, 10 mL, Intravenous, Q12H  sodium chloride, 10 mL, Intravenous, Q12H  sodium phosphate, 15 mmol, Intravenous, Once        IV meds:                           Data Review:      Results from last 7 days   Lab Units 06/01/23  0557 05/31/23  0642 05/30/23  0354 05/26/23  1545 05/26/23  1538   SODIUM mmol/L 145 146* 150*   < >  --    POTASSIUM mmol/L 4.3 4.0 4.1   < >  --    CHLORIDE mmol/L 99 101 103   < >  --    CO2 mmol/L 38.8* 41.0* 38.5*   < >  --    BUN mg/dL 19 25* 34*   < >  --    CREATININE mg/dL 0.77 0.88 1.10*   < >  --    CALCIUM mg/dL 8.7 8.3* 9.0   < >  --    GLUCOSE mg/dL 148* 124* 159*   < >  --    WBC 10*3/mm3 11.29* 9.60 10.59   < >  --    HEMOGLOBIN g/dL 11.1* 11.2* 11.0*   < >  --    PLATELETS 10*3/mm3 139* 138* 135*   < >  --    PROCALCITONIN ng/mL  --   --   --   --  0.17    < > = values in this interval not displayed.       Lab Results   Component Value Date    CALCIUM 8.7 06/01/2023    PHOS 2.2 (L) 06/01/2023       Results from last 7 days   Lab Units 05/26/23  1438   RESPCX  Heavy growth (4+) Haemophilus influenzae*  Light growth (2+) Normal respiratory hannah. No S. aureus or Pseudomonas  aeruginosa detected. Final report.       Results from last 7 days   Lab Units 05/28/23  0851 05/27/23  0916 05/26/23  1622   PH, ARTERIAL pH units 7.435 7.413 7.278*   PO2 ART mm Hg 54.9* 69.0* 96.0   PCO2, ARTERIAL mm Hg 51.6* 44.9 58.3*   HCO3 ART mmol/L 34.6* 28.7* 27.3        I have personally reviewed the results from the time of this admission to 6/1/2023 12:12 EDT and agree with these findings:  [x]  Laboratory  [x]  Microbiology  [x]  Radiology  []  EKG/Telemetry   [x]  Cardiology/Vascular   []  Pathology  []  Old records    ASSESSMENT   Acute on chronic hypoxic/hypercapnic respiratory failure(2L) -noninvasive ventilator dependent s/p intubation-5/26 - 5/29  Haemophilus pneumonia s/p bronch - 5/26  Acute metabolic encephalopathy  Acute on chronic congestive heart failure; diastolic  Acute para influenza URTI  Acute asthma exacerbation  Acute kidney injury  Hyperkalemia  Diabetes  Pulmonary hypertension  ANAYELI on CPAP  Morbid obesity    PLAN:  Patient stable from a respiratory standpoint on minimal supplemental oxygen.  S/p antibiotic course and bronchodilators  As needed diuretics -Per nephrology  Will need nocturnal noninvasive ventilator now and @ dc for chronic hypercapnia from ANAYELI/OHS.  Patient's home CPAP is not appropriate and BiPAP but has already failed ending up in intubation mechanical ventilation.  CCp to work on the same.   Therapy evaluation  C/w heparin subcu    Guarded prognosis    D/w pt , daughter    Patient should be able to be discharged from my standpoint to rehab facility with nocturnal noninvasive ventilator    Loyd Huff MD  6/1/2023

## 2023-06-01 NOTE — CASE MANAGEMENT/SOCIAL WORK
Continued Stay Note  [facility]     Patient Name: Blanca Zhu  MRN: [id number]  Today's Date: 6/1/2023    Admit Date: 5/21/2023    Plan: [facility] pending family agreeable.   Discharge Plan     Row Name 06/01/23 1542       Plan    Plan [facility] pending family agreeable.    Patient/Family in Agreement with Plan yes    Plan Comments Chloe/Pradeep notified CCP no bed available at this time. CCP met with pt and daughter at bedside to update. CCP explained no beds available at [facility] or [place]. Daughter stated she would like referral made to [facility]. Beatriz/Lance notified of referral. Beatriz/Lance stated private room available and can clinically accept. [facility]/Lance asked that Trilogy settings be faxed to [phone]. CCP faxed Trilogy settings. CCP then met with pt and daughter at the bedside again to notify that [facility] has clinically accepted, bed available just need to order Trilogy. Daughter stated needed to discuss with her sister but confident they are agreeable to [facility]. Michelle RN/CCP    Row Name 06/01/23 1419       Plan    Plan SNF referrals pending    Patient/Family in Agreement with Plan yes    Plan Comments [facility] notified CCP pt has been accepted and bed available. CCP met with pt and daughter at bedside to update. CCP explained [facility] has clinically accepted and bed available. Daughter verbalized understanding but would like additional referrals made to [facility]. Elda/Marifer notified of referral, no beds at this time. Chloe/Pradeep notified of referral, she will review pt and let CCP know if they can clinically accept and bed availability. Michelle RN/CCP               Discharge Codes    No documentation.               Expected Discharge Date and Time     Expected Discharge Date Expected Discharge Time    Jun 2, 2023             Aimee Garza RN

## 2023-06-01 NOTE — PLAN OF CARE
Goal Outcome Evaluation:  Plan of Care Reviewed With: patient           Outcome Evaluation: VSS, no c/o pain. Pt appeared to sleep well between care. Bipap worn for at least 7hrs overnight-still on now. Pt daughter at bedside attentive to pt. Turned Q2 and heels elevated. Pt hopeful to see SLP today and be cleared for thins. Will CTM, safety maintained.

## 2023-06-01 NOTE — PLAN OF CARE
Goal Outcome Evaluation:  Plan of Care Reviewed With: patient           Outcome Evaluation: VFSS completed.  Pt with single incident of trace, transient penetration with thin by cup, thin by straw, and mixed consistency.  Full clearance of penetration with swallow.  Penetration did not not reach vocal cords and no trace left in vestibule after the swallow.  Pharyngeal residue noted which cleared with cued repeat swallow.  REC regular diet, thin liquids.  Meds w/ puree.   Upright with all po and 30 minutes after meals.  Encourage double swallow with each bolus.

## 2023-06-02 LAB
ALBUMIN SERPL-MCNC: 2.7 G/DL (ref 3.5–5.2)
ANION GAP SERPL CALCULATED.3IONS-SCNC: 7 MMOL/L (ref 5–15)
BUN SERPL-MCNC: 16 MG/DL (ref 8–23)
BUN/CREAT SERPL: 19 (ref 7–25)
CALCIUM SPEC-SCNC: 8 MG/DL (ref 8.6–10.5)
CHLORIDE SERPL-SCNC: 99 MMOL/L (ref 98–107)
CO2 SERPL-SCNC: 35 MMOL/L (ref 22–29)
CREAT SERPL-MCNC: 0.84 MG/DL (ref 0.57–1)
DEPRECATED RDW RBC AUTO: 44.1 FL (ref 37–54)
EGFRCR SERPLBLD CKD-EPI 2021: 72.1 ML/MIN/1.73
ERYTHROCYTE [DISTWIDTH] IN BLOOD BY AUTOMATED COUNT: 12.7 % (ref 12.3–15.4)
GLUCOSE BLDC GLUCOMTR-MCNC: 120 MG/DL (ref 70–130)
GLUCOSE BLDC GLUCOMTR-MCNC: 156 MG/DL (ref 70–130)
GLUCOSE BLDC GLUCOMTR-MCNC: 166 MG/DL (ref 70–130)
GLUCOSE BLDC GLUCOMTR-MCNC: 210 MG/DL (ref 70–130)
GLUCOSE SERPL-MCNC: 123 MG/DL (ref 65–99)
HCT VFR BLD AUTO: 35.2 % (ref 34–46.6)
HGB BLD-MCNC: 11.3 G/DL (ref 12–15.9)
MCH RBC QN AUTO: 30.4 PG (ref 26.6–33)
MCHC RBC AUTO-ENTMCNC: 32.1 G/DL (ref 31.5–35.7)
MCV RBC AUTO: 94.6 FL (ref 79–97)
PHOSPHATE SERPL-MCNC: 2.4 MG/DL (ref 2.5–4.5)
PLATELET # BLD AUTO: 129 10*3/MM3 (ref 140–450)
PMV BLD AUTO: 10 FL (ref 6–12)
POTASSIUM SERPL-SCNC: 4.3 MMOL/L (ref 3.5–5.2)
RBC # BLD AUTO: 3.72 10*6/MM3 (ref 3.77–5.28)
SODIUM SERPL-SCNC: 141 MMOL/L (ref 136–145)
WBC NRBC COR # BLD: 10.74 10*3/MM3 (ref 3.4–10.8)

## 2023-06-02 PROCEDURE — 63710000001 INSULIN REGULAR HUMAN PER 5 UNITS: Performed by: INTERNAL MEDICINE

## 2023-06-02 PROCEDURE — 94660 CPAP INITIATION&MGMT: CPT

## 2023-06-02 PROCEDURE — 82948 REAGENT STRIP/BLOOD GLUCOSE: CPT

## 2023-06-02 PROCEDURE — 94664 DEMO&/EVAL PT USE INHALER: CPT

## 2023-06-02 PROCEDURE — 80069 RENAL FUNCTION PANEL: CPT | Performed by: INTERNAL MEDICINE

## 2023-06-02 PROCEDURE — 94799 UNLISTED PULMONARY SVC/PX: CPT

## 2023-06-02 PROCEDURE — 94761 N-INVAS EAR/PLS OXIMETRY MLT: CPT

## 2023-06-02 PROCEDURE — 25010000002 HEPARIN (PORCINE) PER 1000 UNITS: Performed by: INTERNAL MEDICINE

## 2023-06-02 PROCEDURE — 97530 THERAPEUTIC ACTIVITIES: CPT

## 2023-06-02 PROCEDURE — 85027 COMPLETE CBC AUTOMATED: CPT | Performed by: INTERNAL MEDICINE

## 2023-06-02 RX ADMIN — INSULIN GLARGINE-YFGN 20 UNITS: 100 INJECTION, SOLUTION SUBCUTANEOUS at 09:38

## 2023-06-02 RX ADMIN — GUAIFENESIN AND DEXTROMETHORPHAN HYDROBROMIDE 2 TABLET: 600; 30 TABLET, EXTENDED RELEASE ORAL at 09:40

## 2023-06-02 RX ADMIN — AMLODIPINE BESYLATE 5 MG: 5 TABLET ORAL at 09:40

## 2023-06-02 RX ADMIN — IPRATROPIUM BROMIDE AND ALBUTEROL SULFATE 3 ML: 2.5; .5 SOLUTION RESPIRATORY (INHALATION) at 12:00

## 2023-06-02 RX ADMIN — DULOXETINE HYDROCHLORIDE 30 MG: 30 CAPSULE, DELAYED RELEASE ORAL at 09:40

## 2023-06-02 RX ADMIN — HEPARIN SODIUM 5000 UNITS: 5000 INJECTION INTRAVENOUS; SUBCUTANEOUS at 00:53

## 2023-06-02 RX ADMIN — HEPARIN SODIUM 5000 UNITS: 5000 INJECTION INTRAVENOUS; SUBCUTANEOUS at 09:38

## 2023-06-02 RX ADMIN — INSULIN HUMAN 2 UNITS: 100 INJECTION, SOLUTION PARENTERAL at 17:02

## 2023-06-02 RX ADMIN — Medication 10 ML: at 20:37

## 2023-06-02 RX ADMIN — GUAIFENESIN AND DEXTROMETHORPHAN HYDROBROMIDE 2 TABLET: 600; 30 TABLET, EXTENDED RELEASE ORAL at 20:36

## 2023-06-02 RX ADMIN — IPRATROPIUM BROMIDE AND ALBUTEROL SULFATE 3 ML: 2.5; .5 SOLUTION RESPIRATORY (INHALATION) at 20:21

## 2023-06-02 RX ADMIN — IPRATROPIUM BROMIDE AND ALBUTEROL SULFATE 3 ML: 2.5; .5 SOLUTION RESPIRATORY (INHALATION) at 08:35

## 2023-06-02 RX ADMIN — Medication 10 ML: at 09:48

## 2023-06-02 RX ADMIN — BISOPROLOL FUMARATE 20 MG: 5 TABLET, FILM COATED ORAL at 09:40

## 2023-06-02 RX ADMIN — ANORECTAL OINTMENT 1 APPLICATION: 15.7; .44; 24; 20.6 OINTMENT TOPICAL at 20:38

## 2023-06-02 RX ADMIN — ANORECTAL OINTMENT 1 APPLICATION: 15.7; .44; 24; 20.6 OINTMENT TOPICAL at 09:40

## 2023-06-02 RX ADMIN — HEPARIN SODIUM 5000 UNITS: 5000 INJECTION INTRAVENOUS; SUBCUTANEOUS at 17:02

## 2023-06-02 RX ADMIN — LEFLUNOMIDE 10 MG: 20 TABLET ORAL at 09:40

## 2023-06-02 RX ADMIN — IPRATROPIUM BROMIDE AND ALBUTEROL SULFATE 3 ML: 2.5; .5 SOLUTION RESPIRATORY (INHALATION) at 16:02

## 2023-06-02 RX ADMIN — INSULIN HUMAN 4 UNITS: 100 INJECTION, SOLUTION PARENTERAL at 11:51

## 2023-06-02 RX ADMIN — Medication 10 ML: at 20:36

## 2023-06-02 RX ADMIN — PANTOPRAZOLE SODIUM 40 MG: 40 TABLET, DELAYED RELEASE ORAL at 06:13

## 2023-06-02 NOTE — PROGRESS NOTES
[facility]     Progress Note    Patient Name: Blanca Zhu  : 1945  MRN: [id number]  Primary Care Physician:  Praneeth Valenzuela MD  Date of admission: 2023    Subjective   Subjective     Chief Complaint: hyperkalemia and federica    History of Present Illness    Patient ith htn, copd, with federica and hyperkalemia    Review of Systems      Objective   Objective     Vitals:   Temp:  [97.5 °F (36.4 °C)-98.6 °F (37 °C)] 97.5 °F (36.4 °C)  Heart Rate:  [77-98] 83  Resp:  [16-20] 16  BP: (129-184)/(49-71) 139/58  Flow (L/min):  [2-2.5] 2    Physical Exam     General Appearance:  In no acute distress.    HEENT: Normal HEENT exam.     Extremities: .  1+ dependent edema.    Neurological: Patient is awake, alert    Result Review    Result Review:  I have personally reviewed the results from the time of this admission to 2023 07:25 EDT and agree with these findings:  []  Laboratory list / accordion  []  Microbiology  []  Radiology  []  EKG/Telemetry   []  Cardiology/Vascular   []  Pathology  []  Old records  []  Other:        Assessment & Plan   Assessment / Plan     Brief Patient Summary:  Blanca Zhu is a 77 y.o. female who has federica and hyperkalemia    Active Hospital Problems:  Active Hospital Problems    Diagnosis    • **Respiratory distress      Plan:   Acute kidney injury, likely hypovolemic, ARB and diuretic were contributing.  Creatinine stable today, euvolemic   Hyperkalemia from FEDERICA and ARB, slowly better, 5.2 today  Acute hypoxic respiratory failure, pulmonary following, on CPAP  Chronic asthma with acute exacerbation, on IV steroids  Hyperkalemia, improved  Probable viral bronchitis, PCR pending  Hypertensive urgency on admission, improved  Hyponatremia, 135 today  Hypocalcemia: Check albumin  Leukocytosis from steroids  Mild proteinuria    Patient seen and examined. Labs and chart reviewed. Patient awake, no distress. Tolerating po. renal function and electrolytes stable. Will follow  as needed.     Yas Bustos MD

## 2023-06-02 NOTE — THERAPY TREATMENT NOTE
Patient Name: Blanca Zhu  : 1945    MRN: [id number]                              Today's Date: 2023       Admit Date: 2023    Visit Dx:     ICD-10-CM ICD-9-CM   1. Respiratory distress  R06.03 786.09   2. Wheezing  R06.2 786.07   3. Pulmonary vascular congestion  R09.89 514   4. Hypertensive urgency  I16.0 401.9     Patient Active Problem List   Diagnosis   • Osteoporosis   • Breast neoplasm, Tis (DCIS), left   • Iron deficiency anemia   • CKD (chronic kidney disease)   • Diabetic nephropathy associated with type 2 diabetes mellitus   • Temporal arteritis   • Immunosuppression   • Anemia   • Duodenal adenoma   • Gastritis without bleeding   • Polyp of duodenum   • Morbidly obese   • Dyspnea on exertion   • Hyperlipidemia   • Type 2 diabetes mellitus with stage 3b chronic kidney disease, with long-term current use of insulin   • Essential hypertension   • Bilateral lower extremity edema   • Pulmonary hypertension   • Obstructive sleep apnea on CPAP   • Stage 3a chronic kidney disease   • Iron malabsorption   • Respiratory distress     Past Medical History:   Diagnosis Date   • Anemia    • Anxiety and depression    • Asthma    • Balance problem    • Breast cancer 2019    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 Daniel Gutierrez, [facility]   •  SECTION N/A     x3   • CHOLECYSTECTOMY N/A    • COLONOSCOPY     • COLONOSCOPY W/ POLYPECTOMY N/A 2019     Enlarged folds in the antrum: biopsied; duodenal, transverse colon x2, splenic flexure, descending colon and sigmoid colon polyps: biopsied (path: tubular adenoma x6)-Dr. Zak De Jesus, [facility] [place], [place]   • ENDOSCOPY  10/11/2019    Procedure: ESOPHAGOGASTRODUODENOSCOPY with hot snare polypectomy;  Surgeon: Haile Handley MD;  Location: [facility] ENDOSCOPY;  Service: Gastroenterology   • ENDOSCOPY N/A 1/29/2020    Procedure: ESOPHAGOGASTRODUODENOSCOPY;  Surgeon: Haile Handley MD;  Location: [facility] ENDOSCOPY;  Service: Gastroenterology   • ENDOSCOPY N/A 9/9/2020    Procedure: ESOPHAGOGASTRODUODENOSCOPY WITH BIOPSIES AND COLD BIOPSY POLYPECTOMY;  Surgeon: Haile Handley MD;  Location: [facility] ENDOSCOPY;  Service: Gastroenterology;  Laterality: N/A;  pre: dyspepsia and diarrhea  post: duodenal polyp and gastritis   • MASTECTOMY Left    • MASTECTOMY WITH SENTINEL NODE BIOPSY AND AXILLARY NODE DISSECTION Left 7/3/2019    Procedure: LEFT BREAST MASTECTOMY WITH SENTINEL NODE BIOPSY AND , v-y plasty closure;  Surgeon: Tahmina James MD;  Location: [facility] MAIN OR;  Service: General   • SUBTOTAL HYSTERECTOMY Bilateral     ovaries still in tact   • TEMPORAL ARTERY BIOPSY Bilateral 12/05/2019      General Information     Row Name 06/02/23 1617          Physical Therapy Time and Intention    Document Type therapy note (daily note)  -ST     Mode of Treatment physical therapy  -     Row Name 06/02/23 1617          General Information    Patient Profile Reviewed yes  -ST     Existing Precautions/Restrictions fall;oxygen therapy device and L/min  -ST     Row Name 06/02/23 1617          Cognition    Orientation Status (Cognition) oriented x 3  -ST     Row Name 06/02/23 1617          Safety Issues, Functional Mobility    Impairments Affecting Function (Mobility) strength;endurance/activity tolerance;range of motion (ROM)  -ST     Comment, Safety Issues/Impairments (Mobility) gait belt, nonskid socks donned   -ST           User Key  (r) = Recorded By, (t) = Taken By, (c) = Cosigned By    Initials Name Provider Type    Yadira Chapman, PT Physical Therapist               Mobility     Row Name 06/02/23 1617          Bed Mobility    Bed Mobility supine-sit  -ST     Supine-Sit Shelby (Bed Mobility) minimum assist (75% patient effort)  -ST     Assistive Device (Bed Mobility) bed rails;head of bed elevated  -ST     Comment, (Bed Mobility) inc time  -ST     Row Name 06/02/23 1617          Bed-Chair Transfer    Bed-Chair Shelby (Transfers) moderate assist (50% patient effort);minimum assist (75% patient effort)  -ST     Assistive Device (Bed-Chair Transfers) walker, front-wheeled  -ST     Row Name 06/02/23 1617          Sit-Stand Transfer    Sit-Stand Shelby (Transfers) minimum assist (75% patient effort)  -ST     Assistive Device (Sit-Stand Transfers) walker, front-wheeled  -ST     Row Name 06/02/23 1617          Gait/Stairs (Locomotion)    Shelby Level (Gait) minimum assist (75% patient effort)  -ST     Assistive Device (Gait) walker, front-wheeled  -ST     Distance in Feet (Gait) a few steps to reach chair  -ST     Deviations/Abnormal Patterns (Gait) celestina decreased;gait speed decreased;stride length decreased  -ST           User Key  (r) = Recorded By, (t) = Taken By, (c) = Cosigned By    Initials Name Provider Type    ST Yadira Matamoros, PT Physical Therapist               Obj/Interventions     Row Name 06/02/23 1617          Balance    Comment, Balance SBA sitting balance, min A standing dynamic balance  -ST           User Key  (r) = Recorded By, (t) = Taken By, (c) = Cosigned By    Initials Name Provider Type    ST Yadira Matamoros, PT Physical Therapist               Goals/Plan    No documentation.                Clinical Impression     Row Name 06/02/23 1618          Pain    Pretreatment Pain Rating 0/10 - no pain  -ST     Posttreatment Pain Rating 0/10 - no pain  -ST     Hiro  Name 06/02/23 1618          Plan of Care Review    Plan of Care Reviewed With patient;family  -ST     Outcome Evaluation pt seen for PT this PM. improved bed mobility to min A and improved transfers to min A with rwx today. With encouragement pt able to take a few steps to sit in recliner with min/mod A - constant cues for encouragement needed. Pt on 2 L o2 and sats 89% and above throughout. She is fatigued with this task today. She continues to benefit from skilled PT to address balance, endurance, strength. Recommend SNU at d/c.  -ST     Row Name 06/02/23 1618          Therapy Assessment/Plan (PT)    Rehab Potential (PT) good, to achieve stated therapy goals  -ST     Criteria for Skilled Interventions Met (PT) yes  -ST     Therapy Frequency (PT) 6 times/wk  -ST     Row Name 06/02/23 1618          Positioning and Restraints    Pre-Treatment Position in bed  -ST     Post Treatment Position chair  -ST     In Chair notified nsg;sitting;call light within reach;encouraged to call for assist;exit alarm on;with family/caregiver  -ST           User Key  (r) = Recorded By, (t) = Taken By, (c) = Cosigned By    Initials Name Provider Type    Yadira Chapman, PT Physical Therapist               Outcome Measures     Row Name 06/02/23 1618          How much help from another person do you currently need...    Turning from your back to your side while in flat bed without using bedrails? 3  -ST     Moving from lying on back to sitting on the side of a flat bed without bedrails? 3  -ST     Moving to and from a bed to a chair (including a wheelchair)? 2  -ST     Standing up from a chair using your arms (e.g., wheelchair, bedside chair)? 3  -ST     Climbing 3-5 steps with a railing? 1  -ST     To walk in hospital room? 2  -ST     AM-PAC 6 Clicks Score (PT) 14  -ST     Highest level of mobility 4 --> Transferred to chair/commode  -ST     Row Name 06/02/23 1619          Functional Assessment    Outcome Measure Options AM-PAC 6  Clicks Basic Mobility (PT)  -ST           User Key  (r) = Recorded By, (t) = Taken By, (c) = Cosigned By    Initials Name Provider Type    Yadira Chapman PT Physical Therapist                             Physical Therapy Education     Title: PT OT SLP Therapies (In Progress)     Topic: Physical Therapy (In Progress)     Point: Mobility training (In Progress)     Learning Progress Summary           Patient Acceptance, E,TB, NR,VU by ST at 6/2/2023 1620    Acceptance, E,TB,D, VU,NR by  at 6/1/2023 1602    Acceptance, E,D, NL by KISHAN at 5/26/2023 1559    Acceptance, E,D, NR by PC at 5/24/2023 1601   Family Acceptance, E,D, NL by KISHAN at 5/26/2023 1559                   Point: Home exercise program (In Progress)     Learning Progress Summary           Patient Acceptance, E,TB, NR,VU by ST at 6/2/2023 1620    Acceptance, E,TB,D, VU,NR by JOLLY at 6/1/2023 1602    Acceptance, E,D, NL by KISHAN at 5/26/2023 1559    Acceptance, E,D, NR by PC at 5/24/2023 1601   Family Acceptance, E,D, NL by KISHAN at 5/26/2023 1559                   Point: Body mechanics (In Progress)     Learning Progress Summary           Patient Acceptance, E,TB, NR,VU by ST at 6/2/2023 1620    Acceptance, E,TB,D, VU,NR by  at 6/1/2023 1602    Acceptance, E,D, NL by KISHAN at 5/26/2023 1559    Acceptance, E,D, NR by PC at 5/24/2023 1601   Family Acceptance, E,D, NL by KISHAN at 5/26/2023 1559                   Point: Precautions (In Progress)     Learning Progress Summary           Patient Acceptance, E,TB, NR,VU by ST at 6/2/2023 1620    Acceptance, E,TB,D, VU,NR by  at 6/1/2023 1602    Acceptance, E,D, NL by KISHAN at 5/26/2023 1559    Acceptance, E,D, NR by PC at 5/24/2023 1601   Family Acceptance, E,D, NL by KISHAN at 5/26/2023 1559                               User Key     Initials Effective Dates Name Provider Type Discipline    PC 06/16/21 -  Belia Heath, PT Physical Therapist PT    JM 03/07/18 -  Polina Stark PTA Physical Therapist Assistant PT    SM  03/07/18 -  Lexy Stark, PTA Physical Therapist Assistant PT     09/22/22 -  Yadira Matamoros PT Physical Therapist PT              PT Recommendation and Plan     Plan of Care Reviewed With: patient, family  Outcome Evaluation: pt seen for PT this PM. improved bed mobility to min A and improved transfers to min A with rwx today. With encouragement pt able to take a few steps to sit in recliner with min/mod A - constant cues for encouragement needed. Pt on 2 L o2 and sats 89% and above throughout. She is fatigued with this task today. She continues to benefit from skilled PT to address balance, endurance, strength. Recommend SNU at d/c.     Time Calculation:    PT Charges     Row Name 06/02/23 1620             Time Calculation    Start Time 1457  -ST      Stop Time 1524  -ST      Time Calculation (min) 27 min  -ST      PT Received On 06/02/23  -ST      PT - Next Appointment 06/03/23  -ST         Time Calculation- PT    Total Timed Code Minutes- PT 27 minute(s)  -ST         Timed Charges    52547 - PT Therapeutic Activity Minutes 27  -ST         Total Minutes    Timed Charges Total Minutes 27  -ST       Total Minutes 27  -ST            User Key  (r) = Recorded By, (t) = Taken By, (c) = Cosigned By    Initials Name Provider Type     Yadira Matamoros PT Physical Therapist              Therapy Charges for Today     Code Description Service Date Service Provider Modifiers Qty    [id number]  PT THERAPEUTIC ACT EA 15 MIN 6/2/2023 Yadira Matamoros PT GP 2          PT G-Codes  Outcome Measure Options: AM-PAC 6 Clicks Basic Mobility (PT)  AM-PAC 6 Clicks Score (PT): 14  PT Discharge Summary  Anticipated Discharge Disposition (PT): skilled nursing facility    Yadira Matamoros PT  6/2/2023

## 2023-06-02 NOTE — CASE MANAGEMENT/SOCIAL WORK
Continued Stay Note  [facility]     Patient Name: Blanca Zhu  MRN: [id number]  Today's Date: 6/2/2023    Admit Date: 5/21/2023    Plan: [facility] [place], needs EMS transport at [place].   Discharge Plan     Row Name 06/02/23 1515       Plan    Plan [facility] [place], needs EMS transport at [place].    Patient/Family in Agreement with Plan yes    Plan Comments CCP placed outbound call to Sina, pts daughter, for updated discharge plan. Sina stated she was touring the [facility] [place] and would like a referral made. [facility] notified of referral, able to accept and bed available, however needs 24hrs for [facility] to be ordered and arrive at facility. CCP then met with pt and daughter at bedside for update. CCP explained [facility] [place] has accepted and bed available. Pt and daughter verbalized understanding and are agreeable. CCP then discussed transportation at [place]; EMS vs Stretcher van. EMS disclaimer given. Daughters would like EMS transport. [facility] then notified CCP Trilogy machine to arrive at facility around 12 tomorrow but have the RN confirm with facility when calling report. Pharmacy updated to [facility] in Epic, partial packet in [employer]. Michelle TOURE/CCP               Discharge Codes    No documentation.               Expected Discharge Date and Time     Expected Discharge Date Expected Discharge Time    Jaswinder 3, 2023             Aimee Garza RN

## 2023-06-02 NOTE — PLAN OF CARE
Goal Outcome Evaluation:  Plan of Care Reviewed With: patient, family           Outcome Evaluation: pt seen for PT this PM. improved bed mobility to min A and improved transfers to min A with rwx today. With encouragement pt able to take a few steps to sit in recliner with min/mod A - constant cues for encouragement needed. Pt on 2 L o2 and sats 89% and above throughout. She is fatigued with this task today. She continues to benefit from skilled PT to address balance, endurance, strength. Recommend SNU at d/c.

## 2023-06-02 NOTE — PROGRESS NOTES
Loyd Huff MD                          [phone]            Patient ID:    Name:  Blanca Zhu    MRN:  [id number]    1945   77 y.o.  female            Patient Care Team:  Praneeth Valenzuela MD as PCP - General (Internal Medicine)  Jean-Claude Farnsworth MD as Consulting Physician (Hematology and Oncology)  Praneeth Valenzuela MD as Referring Physician (Internal Medicine)  Tahmina Brown MD as Consulting Physician (Rheumatology)  Gretchen Mcginnis MD as Consulting Physician (Nephrology)  Tahmina James MD as Surgeon (General Surgery)  Haile Handley MD as Consulting Physician (Gastroenterology)  Sb Power MD as Consulting Physician (Cardiology)    CC/ Reason for visit: Ac parainfluenza URTI, CHF, [place]     Subjective: Pt seen and examined this AM. Patient states that she is feeling better. Slept better but keeps napping all day.  Working with PT and sat at the edge of the bed    ROS: Denies any persistent fevers or chills, worsening cough or worsening sputum production, nausea vomiting or diarrhea    Objective     Vital Signs past 24hrs    BP range: BP: (127-150)/(49-73) 127/73  Pulse range: Heart Rate:  [77-94] 93  Resp rate range: Resp:  [16-20] 18  Temp range: Temp (24hrs), Av.9 °F (36.6 °C), Min:97.5 °F (36.4 °C), Max:98.2 °F (36.8 °C)      Ventilator/Non-Invasive Ventilation Settings (From admission, onward)     Start     Ordered    23 1619  NIPPV (CPAP or BIPAP)  At Bedtime As Needed-RT        Comments: May adjust pressures to best tolerance and effect.  Patient is used to using a Pap machine and may bleed in supplemental O2 to maintain saturations greater than 90% less than 95 when she is on the PAP machine.   Question Answer Comment   Type BIPAP    IPAP 18    EPAP 10        23 1618    23 2042  NIPPV (CPAP or BIPAP)  Until Discontinued,   Status:  Canceled        Comments: May adjust pressures to best tolerance and effect.  Patient  is used to using a Pap machine   Question Answer Comment   Type BIPAP    IPAP 18    EPAP 10        05/21/23 2041    05/21/23 2042  NIPPV (CPAP or BIPAP)  Until Discontinued,   Status:  Canceled        Comments: May adjust pressures to best tolerance and effect.  Patient is used to using a Pap machine and may bleed in supplemental O2 to maintain saturations greater than 90% less than 95 when she is on the PAP machine.   Question Answer Comment   Type BIPAP    IPAP 18    EPAP 10        05/21/23 2042    05/21/23 1542  NIPPV (CPAP or BIPAP)  Until Discontinued,   Status:  Canceled        Question:  Type  Answer:  BIPAP    05/21/23 1541                Vent Settings        Resp Rate (Set): 18  Pressure Support (cm H2O): 10 cm H20  FiO2 (%): 30 %  PEEP/CPAP (cm H2O): 5 cm H20  Minute Ventilation (L/min) (Obs): 8 L/min  Resp Rate (Observed) Vent: 26  I:E Ratio (Set): 1:3.2  I:E Ratio (Obs): 1:2.2  PIP Observed (cm H2O): 16 cm H2O  Plateau Pressure (cm H2O): 24 cm H2O  Driving Pressure (cm H2O): 19.2 cm H2O  Device (Oxygen Therapy): nasal cannula FiO2 (%): 30 %     93.6 kg (206 lb 5.6 oz); Body mass index is 40.3 kg/m².      Intake/Output Summary (Last 24 hours) at 6/2/2023 1320  Last data filed at 6/2/2023 0053  Gross per 24 hour   Intake 222 ml   Output 250 ml   Net -28 ml       PHYSICAL EXAM   Constitutional: Middle-aged obese F pt in bed, awake and interactive, some accessory muscle use.  Head: - NCAT  Eyes: No pallor.  Anicteric sclerae, EOMI.  ENMT: Mallampati 4, no oral thrush. Moist MM.   NECK: Trachea midline, No thyromegaly, no palpable cervical lymphadenopathy  Heart: RRR, no murmur.  Trace pedal edema   Lungs: SAV +, Dec BS @ bases +. No wheezes/ crackles heard    Abdomen: Soft. Obese, No tenderness, guarding or rigidity. No palpable masses  Extremities: Extremities warm and well perfused. No cyanosis/ clubbing  Neuro: Awake, interactive, moving all extremities -generally weak  Psych: Mood and affect  -appropriate    PPE recommended per [facility] infectious disease Isolation protocol for the current clinical scenario (as mentioned below) was followed.     Scheduled meds:  amLODIPine, 5 mg, Oral, Q24H  bisoprolol, 20 mg, Oral, Q24H  dextrose, 50 mL, Intravenous, Once   And  insulin regular, 10 Units, Intravenous, Once  DULoxetine, 30 mg, Oral, Daily  guaifenesin-dextromethorphan, 2 tablet, Oral, BID  heparin (porcine), 5,000 Units, Subcutaneous, Q8H  insulin glargine, 20 Units, Subcutaneous, Daily  insulin regular, 2-9 Units, Subcutaneous, 4x Daily AC & at Bedtime  ipratropium-albuterol, 3 mL, Nebulization, 4x Daily - RT  leflunomide, 10 mg, Oral, Daily  Menthol-Zinc Oxide, 1 application, Topical, BID  O2, 2 L/min, Inhalation, Once  pantoprazole, 40 mg, Oral, Q AM  sodium chloride, 10 mL, Intravenous, Q12H  sodium chloride, 10 mL, Intravenous, Q12H  sodium chloride, 10 mL, Intravenous, Q12H        IV meds:                           Data Review:      Results from last 7 days   Lab Units 06/02/23  0420 06/01/23  0557 05/31/23  0642 05/26/23  1545 05/26/23  1538   SODIUM mmol/L 141 145 146*   < >  --    POTASSIUM mmol/L 4.3 4.3 4.0   < >  --    CHLORIDE mmol/L 99 99 101   < >  --    CO2 mmol/L 35.0* 38.8* 41.0*   < >  --    BUN mg/dL 16 19 25*   < >  --    CREATININE mg/dL 0.84 0.77 0.88   < >  --    CALCIUM mg/dL 8.0* 8.7 8.3*   < >  --    GLUCOSE mg/dL 123* 148* 124*   < >  --    WBC 10*3/mm3 10.74 11.29* 9.60   < >  --    HEMOGLOBIN g/dL 11.3* 11.1* 11.2*   < >  --    PLATELETS 10*3/mm3 129* 139* 138*   < >  --    PROCALCITONIN ng/mL  --   --   --   --  0.17    < > = values in this interval not displayed.       Lab Results   Component Value Date    CALCIUM 8.0 (L) 06/02/2023    PHOS 2.4 (L) 06/02/2023       Results from last 7 days   Lab Units 05/26/23  1438   RESPCX  Heavy growth (4+) Haemophilus influenzae*  Light growth (2+) Normal respiratory hannah. No S. aureus or Pseudomonas aeruginosa detected.  Final report.       Results from last 7 days   Lab Units 05/28/23  0851 05/27/23  0916 05/26/23  1622   PH, ARTERIAL pH units 7.435 7.413 7.278*   PO2 ART mm Hg 54.9* 69.0* 96.0   PCO2, ARTERIAL mm Hg 51.6* 44.9 58.3*   HCO3 ART mmol/L 34.6* 28.7* 27.3        I have personally reviewed the results from the time of this admission to 6/2/2023 13:20 EDT and agree with these findings:  [x]  Laboratory  [x]  Microbiology  [x]  Radiology  []  EKG/Telemetry   [x]  Cardiology/Vascular   []  Pathology  []  Old records    ASSESSMENT   Acute on chronic hypoxic/hypercapnic respiratory failure(2L) -noninvasive ventilator dependent s/p intubation-5/26 - 5/29  Haemophilus pneumonia s/p bronch - 5/26  Acute metabolic encephalopathy  Acute on chronic congestive heart failure; diastolic  Acute para influenza URTI  Acute asthma exacerbation  Acute kidney injury  Hyperkalemia  Diabetes  Pulmonary hypertension  ANAYELI on CPAP  Morbid obesity    PLAN:  Patient stable from a respiratory standpoint.  Will DC oxygen  S/p antibiotic course and bronchodilators  As needed diuretics -Per nephrology  Will need nocturnal noninvasive ventilator now and @ dc for chronic hypercapnia from ANAYELI/OHS.  Patient's home CPAP is not appropriate and BiPAP but has already failed ending up in intubation mechanical ventilation. D/w CCP.   Therapy evaluation  C/w heparin subcu    Guarded prognosis    D/w pt , daughter    Patient should be able to be discharged from my standpoint to rehab facility with nocturnal noninvasive ventilator    Loyd Huff MD  6/2/2023

## 2023-06-02 NOTE — PLAN OF CARE
Goal Outcome Evaluation:  Plan of Care Reviewed With: patient, family        Progress: improving  Outcome Evaluation: vss, 1L nc, sr. Up to chair w/ PT. PCC removed. Likely dc tmrw.

## 2023-06-02 NOTE — PROGRESS NOTES
DAILY PROGRESS NOTE  [facility], [facility]    2023    Patient Identification:  Name: Blanca Zhu  Age: 77 y.o.  Sex: female  :  1945  MRN: [id number]           Primary Care Physician: Praneeth Valenzuela MD    Subjective:    Interval History:    Patient is feeling okay today, had speech therapy/swallow study yesterday, placed on regular diet with thin liquids.  Working with physical therapy  Vital signs stable, saturation in 2 L is 99%.  Using NIPPV/NIV at nighttime  Diuresis per renal  Labs reviewed in length     ROS:     No nausea, vomiting, diarrhea, constipation, chest pain, shortness of breath.    Objective:    Scheduled Meds:  amLODIPine, 5 mg, Oral, Q24H  bisoprolol, 20 mg, Oral, Q24H  dextrose, 50 mL, Intravenous, Once   And  insulin regular, 10 Units, Intravenous, Once  DULoxetine, 30 mg, Oral, Daily  guaifenesin-dextromethorphan, 2 tablet, Oral, BID  heparin (porcine), 5,000 Units, Subcutaneous, Q8H  insulin glargine, 20 Units, Subcutaneous, Daily  insulin regular, 2-9 Units, Subcutaneous, 4x Daily AC & at Bedtime  ipratropium-albuterol, 3 mL, Nebulization, 4x Daily - RT  leflunomide, 10 mg, Oral, Daily  Menthol-Zinc Oxide, 1 application, Topical, BID  O2, 2 L/min, Inhalation, Once  pantoprazole, 40 mg, Oral, Q AM  sodium chloride, 10 mL, Intravenous, Q12H  sodium chloride, 10 mL, Intravenous, Q12H  sodium chloride, 10 mL, Intravenous, Q12H        Continuous Infusions:       PRN Meds:  •  acetaminophen  •  alteplase  •  benzonatate  •  dextrose  •  dextrose  •  glucagon (human recombinant)  •  hydrALAZINE  •  ondansetron  •  Phosphorus Replacement - Follow Nurse / BPA Driven Protocol  •  Potassium Replacement - Follow Nurse / BPA Driven Protocol  •  prochlorperazine  •  sodium chloride  •  sodium chloride  •  sodium chloride  •  sodium chloride  •  sodium chloride    Intake/Output:    Intake/Output Summary (Last 24 hours) at 2023 1623  Last  "data filed at 2023 0053  Gross per 24 hour   Intake 222 ml   Output 250 ml   Net -28 ml         Exam:    T MAX 24 hrs: Temp (24hrs), Av.9 °F (36.6 °C), Min:97.5 °F (36.4 °C), Max:98.2 °F (36.8 °C)    Vitals Ranges:   Temp:  [97.5 °F (36.4 °C)-98.2 °F (36.8 °C)] 98 °F (36.7 °C)  Heart Rate:  [77-94] 93  Resp:  [16-20] 18  BP: (127-150)/(49-73) 127/73    /73 (BP Location: Left leg, Patient Position: Lying)   Pulse 93   Temp 98 °F (36.7 °C) (Oral)   Resp 18   Ht 152.4 cm (60\")   Wt 93.6 kg (206 lb 5.6 oz)   LMP  (LMP Unknown)   SpO2 99%   BMI 40.30 kg/m²     General: Awake, cooperative.  On 2 L of oxygen.  Saturation 100%.    Neck: Supple, symmetrical, trachea midline, no adenopathy;              thyroid:  no enlargement/tenderness/nodules;              no carotid bruit or JVD  Cardiovascular: Normal rate, regular rhythm and intact distal pulses.              Exam reveals no gallop and no friction rub. No murmur heard  Pulmonary:  Better air entry.  No wheezing.  Mild rhonchi at bases.    Abdominal: Soft, nontender, bowel sounds active all four quadrants,     no masses, no hepatomegaly, no splenomegaly.   Extremities:  2+ pitting edema of the lower extremity, actually this looks better than her baseline.  Brown discoloration from chronic stasis dermatitis.  Pulses: 2 + symmetric all extremities  Neurological: Patient is oriented, cooperative. No new focal sensorimotor deficit.  Skin: Skin color, texture, normal. Turgor is decreased. No rashes or lesions         Data Review:    Results from last 7 days   Lab Units 23  0420 23  0557 23  0642   WBC 10*3/mm3 10.74 11.29* 9.60   HEMOGLOBIN g/dL 11.3* 11.1* 11.2*   HEMATOCRIT % 35.2 36.0 35.6   PLATELETS 10*3/mm3 129* 139* 138*       Results from last 7 days   Lab Units 23  0420 23  0557 23  0642   SODIUM mmol/L 141 145 146*   POTASSIUM mmol/L 4.3 4.3 4.0   CHLORIDE mmol/L 99 99 101   CO2 mmol/L 35.0* 38.8* 41.0* "   BUN mg/dL 16 19 25*   CREATININE mg/dL 0.84 0.77 0.88   CALCIUM mg/dL 8.0* 8.7 8.3*   GLUCOSE mg/dL 123* 148* 124*                 Lab Results   Lab Value Date/Time    TROPONINT 28 (H) 05/21/2023 1920    TROPONINT 25 (H) 05/21/2023 1615       Microbiology Results (last 10 days)     Procedure Component Value - Date/Time    MRSA Screen, PCR (Inpatient) - Swab, Nares [[id number]]  (Normal) Collected: 05/26/23 1547    Lab Status: Final result Specimen: Swab from Nares Updated: 05/26/23 1739     MRSA PCR No MRSA Detected    Narrative:      The negative predictive value of this diagnostic test is high and should only be used to consider de-escalating anti-MRSA therapy. A positive result may indicate colonization with MRSA and must be correlated clinically.    Respiratory Culture - Bronchial Wash, ET Suction [[id number]]  (Abnormal) Collected: 05/26/23 1438    Lab Status: Final result Specimen: Bronchial Wash from ET Suction Updated: 05/28/23 0856     Respiratory Culture Heavy growth (4+) Haemophilus influenzae     Comment: This isolate is beta-lactamase NEGATIVE and are routinely susceptible to ampicillin and other beta-lactams.           Light growth (2+) Normal respiratory hannah. No S. aureus or Pseudomonas aeruginosa detected. Final report.     Gram Stain Moderate (3+) WBCs seen      No epithelial cells seen      Moderate (3+) Gram negative coccobacilli      Mixed bacterial morphotypes seen on Gram Stain    Eosinophil Smear - Urine, Urine, Clean Catch [[id number]]  (Normal) Collected: 05/25/23 0700    Lab Status: Final result Specimen: Urine, Clean Catch Updated: 05/25/23 0820     Eosinophil Smear 0 % EOS/100 Cells     Respiratory Panel PCR w/COVID-19(SARS-CoV-2) [facility]/RINA/[facility]/M[facility]/DESTINI In-House, NP Swab in UTM/VTM, 3-4 HR TAT - Swab, Nasopharynx [[id number]]  (Abnormal) Collected: 05/23/23 1625    Lab Status: Final result Specimen: Swab from Nasopharynx Updated: 05/23/23 1756     ADENOVIRUS, PCR Not Detected      Coronavirus 229E Not Detected     Coronavirus HKU1 Not Detected     Coronavirus NL63 Not Detected     Coronavirus OC43 Not Detected     COVID19 Not Detected     Human Metapneumovirus Not Detected     Human Rhinovirus/Enterovirus Not Detected     Influenza A PCR Not Detected     Influenza B PCR Not Detected     Parainfluenza Virus 1 Not Detected     Parainfluenza Virus 2 Not Detected     Parainfluenza Virus 3 Detected     Parainfluenza Virus 4 Not Detected     RSV, PCR Not Detected     Bordetella pertussis pcr Not Detected     Bordetella parapertussis PCR Not Detected     Chlamydophila pneumoniae PCR Not Detected     Mycoplasma pneumo by PCR Not Detected    Narrative:      In the setting of a positive respiratory panel with a viral infection PLUS a negative procalcitonin without other underlying concern for bacterial infection, consider observing off antibiotics or discontinuation of antibiotics and continue supportive care. If the respiratory panel is positive for atypical bacterial infection (Bordetella pertussis, Chlamydophila pneumoniae, or Mycoplasma pneumoniae), consider antibiotic de-escalation to target atypical bacterial infection.           Imaging Results (Last 7 Days)     Procedure Component Value Units Date/Time    FL Video Swallow Single Contrast [[id number]] Collected: 06/01/23 1143     Updated: 06/01/23 1147    Narrative:      VIDEO SWALLOWING EXAMINATION BY SPEECH PATHOLOGY     Clinical: Dysphasia     Video swallowing examination performed under the direction of speech  pathology. Imaging reviewed by radiologist who concurs with the  findings.     Speech pathology summary:VFSS completed with Dr Moore. ?Pt  demonstrated single isolated incidence of transient penetration with  thin by cup, straw, and mixed consistency. ?Additional trials with no  penetration observed. ?Pharyngeal residue cleared with a cued double  swallow. ?No aspiration observed.     FLUOROSCOPY TIME: 2 minutes 3 seconds, 3320  images.     This report was finalized on 6/1/2023 11:44 AM by Dr. Rashi Moore M.D.       XR Chest 1 View [[id number]] Collected: 05/29/23 0545     Updated: 05/29/23 0549    Narrative:      SINGLE VIEW OF THE CHEST     HISTORY: Respiratory failure     COMPARISON: 05/28/2023     FINDINGS:  Tubes and lines appear stable position. Cardiac silhouette is stable.  Bilateral alveolar and interstitial tracing noted. Aeration at the lung  bases appears improved. No pneumothorax is seen. There may be a small  right pleural effusion.       Impression:      Interval improvement in aeration at the lung bases.      This report was finalized on 5/29/2023 5:46 AM by Dr. Tanya Prince M.D.       XR Chest 1 View [[id number]] Collected: 05/28/23 0558     Updated: 05/28/23 0602    Narrative:      SINGLE VIEW OF THE CHEST     HISTORY: Respiratory failure     COMPARISON: 05/26/2023     FINDINGS:  Right-sided PICC appears to terminate within the superior vena cava.  Weighted enteric feeding tube extends into the upper abdomen.  Endotracheal tube terminates in satisfactory position. Cardiomegaly is  present. There is vascular congestion. There is elevation of the right  hemidiaphragm. Bibasilar consolidation and bilateral effusions are  unchanged.       Impression:      Persistent vascular congestion and bibasilar consolidation.     This report was finalized on 5/28/2023 5:59 AM by Dr. Tanya Prince M.D.       XR Abdomen KUB [[id number]] Collected: 05/27/23 1434     Updated: 05/27/23 1439    Narrative:      PROCEDURE:  XR ABDOMEN KUB-     HISTORY: Tube placement.     COMPARISON: Chest radiograph 05/26/2023     FINDINGS: A single view of the upper abdomen was obtained. There is a  new enteric tube with the tip projecting over the expected location of  the gastric antrum or proximal portion of the duodenum. Correlate for  desired positioning within the stomach or small bowel. There is oral  contrast within portions of the colon.  There are no dilated small bowel  loops. There is multilevel degenerative disc disease.     This report was finalized on 5/27/2023 2:36 PM by Dr. Marcia Yang M.D.               Assessment:      Acute on chronic hypoxic respiratory failure requiring ventilatory management  Haemophilus influenza pneumonia  Asthma exacerbation  Acute parainfluenza viral bronchitis  Hypertensive urgency  Hyponatremia  Hyperkalemia  Acute kidney injury  Diabetic nephropathy with chronic kidney disease stage IIIa  Lower extremity edema, chronic  Uncontrolled diabetes mellitus  Obstructive sleep apnea  Generalized anxiety disorder  Iron deficiency anemia  Morbid obesity  Difficult IV access       Plan:    Patient passed swallow evaluation.  Has completed IV antibiotics for haemophilus influenza pneumonia  Continue to wean oxygen down, saturation in 2 L 100%.  Probably she does not need oxygen during the daytime.  Continue DuoNeb mini nebs  Monitor and correct electrolytes.  Monitor renal function, hopefully back to normal range.  GFR is 72.1 today   Continue DVT/stress ulcer prophylaxis  Continue working with PT/OT  DC once subacute rehabilitation places improved.  Labs in am      Praneeth Valenzuela MD  06/02/23           There are other unrelated non-urgent complaints, but due to the busy schedule and the amount of time I've already spent with her, time does not permit me to address these routine issues at today's visit. I've requested another appointment to review these additional issues.              I wore protective equipment throughout this patient encounter including a face mask, gloves, and protective eyewear.  Hand hygiene was performed before donning protective equipment and after removal when leaving the room.

## 2023-06-03 LAB
ALBUMIN SERPL-MCNC: 2.4 G/DL (ref 3.5–5.2)
ANION GAP SERPL CALCULATED.3IONS-SCNC: 8 MMOL/L (ref 5–15)
BUN SERPL-MCNC: 14 MG/DL (ref 8–23)
BUN/CREAT SERPL: 15.7 (ref 7–25)
CALCIUM SPEC-SCNC: 8 MG/DL (ref 8.6–10.5)
CHLORIDE SERPL-SCNC: 100 MMOL/L (ref 98–107)
CO2 SERPL-SCNC: 29 MMOL/L (ref 22–29)
CREAT SERPL-MCNC: 0.89 MG/DL (ref 0.57–1)
EGFRCR SERPLBLD CKD-EPI 2021: 67.3 ML/MIN/1.73
GLUCOSE BLDC GLUCOMTR-MCNC: 116 MG/DL (ref 70–130)
GLUCOSE BLDC GLUCOMTR-MCNC: 159 MG/DL (ref 70–130)
GLUCOSE BLDC GLUCOMTR-MCNC: 182 MG/DL (ref 70–130)
GLUCOSE BLDC GLUCOMTR-MCNC: 220 MG/DL (ref 70–130)
GLUCOSE SERPL-MCNC: 139 MG/DL (ref 65–99)
PHOSPHATE SERPL-MCNC: 2.2 MG/DL (ref 2.5–4.5)
PHOSPHATE SERPL-MCNC: 3.3 MG/DL (ref 2.5–4.5)
POTASSIUM SERPL-SCNC: 4.8 MMOL/L (ref 3.5–5.2)
SODIUM SERPL-SCNC: 137 MMOL/L (ref 136–145)

## 2023-06-03 PROCEDURE — 94799 UNLISTED PULMONARY SVC/PX: CPT

## 2023-06-03 PROCEDURE — 94761 N-INVAS EAR/PLS OXIMETRY MLT: CPT

## 2023-06-03 PROCEDURE — 97530 THERAPEUTIC ACTIVITIES: CPT

## 2023-06-03 PROCEDURE — 97110 THERAPEUTIC EXERCISES: CPT

## 2023-06-03 PROCEDURE — 94660 CPAP INITIATION&MGMT: CPT

## 2023-06-03 PROCEDURE — 94664 DEMO&/EVAL PT USE INHALER: CPT

## 2023-06-03 PROCEDURE — 84100 ASSAY OF PHOSPHORUS: CPT | Performed by: INTERNAL MEDICINE

## 2023-06-03 PROCEDURE — 82948 REAGENT STRIP/BLOOD GLUCOSE: CPT

## 2023-06-03 PROCEDURE — 63710000001 INSULIN REGULAR HUMAN PER 5 UNITS: Performed by: INTERNAL MEDICINE

## 2023-06-03 PROCEDURE — 80069 RENAL FUNCTION PANEL: CPT | Performed by: INTERNAL MEDICINE

## 2023-06-03 PROCEDURE — 25010000002 HEPARIN (PORCINE) PER 1000 UNITS: Performed by: INTERNAL MEDICINE

## 2023-06-03 PROCEDURE — 25010000002 ONDANSETRON PER 1 MG: Performed by: INTERNAL MEDICINE

## 2023-06-03 RX ORDER — SODIUM PHOSPHATE IN 0.9 % NACL 15MMOL/100
15 PLASTIC BAG, INJECTION (ML) INTRAVENOUS ONCE
Status: COMPLETED | OUTPATIENT
Start: 2023-06-03 | End: 2023-06-03

## 2023-06-03 RX ORDER — ACETAMINOPHEN 325 MG/1
650 TABLET ORAL EVERY 6 HOURS PRN
Qty: 30 TABLET | Refills: 3 | Status: SHIPPED | OUTPATIENT
Start: 2023-06-03

## 2023-06-03 RX ORDER — IPRATROPIUM BROMIDE AND ALBUTEROL SULFATE 2.5; .5 MG/3ML; MG/3ML
3 SOLUTION RESPIRATORY (INHALATION)
Qty: 360 ML | Refills: 0 | Status: SHIPPED | OUTPATIENT
Start: 2023-06-03

## 2023-06-03 RX ADMIN — GUAIFENESIN AND DEXTROMETHORPHAN HYDROBROMIDE 2 TABLET: 600; 30 TABLET, EXTENDED RELEASE ORAL at 22:15

## 2023-06-03 RX ADMIN — IPRATROPIUM BROMIDE AND ALBUTEROL SULFATE 3 ML: 2.5; .5 SOLUTION RESPIRATORY (INHALATION) at 15:52

## 2023-06-03 RX ADMIN — INSULIN GLARGINE-YFGN 20 UNITS: 100 INJECTION, SOLUTION SUBCUTANEOUS at 09:30

## 2023-06-03 RX ADMIN — ANORECTAL OINTMENT 1 APPLICATION: 15.7; .44; 24; 20.6 OINTMENT TOPICAL at 09:35

## 2023-06-03 RX ADMIN — Medication 10 ML: at 09:35

## 2023-06-03 RX ADMIN — GUAIFENESIN AND DEXTROMETHORPHAN HYDROBROMIDE 2 TABLET: 600; 30 TABLET, EXTENDED RELEASE ORAL at 09:31

## 2023-06-03 RX ADMIN — INSULIN HUMAN 4 UNITS: 100 INJECTION, SOLUTION PARENTERAL at 22:16

## 2023-06-03 RX ADMIN — BISOPROLOL FUMARATE 20 MG: 5 TABLET, FILM COATED ORAL at 09:31

## 2023-06-03 RX ADMIN — Medication 10 ML: at 09:34

## 2023-06-03 RX ADMIN — AMLODIPINE BESYLATE 5 MG: 5 TABLET ORAL at 09:32

## 2023-06-03 RX ADMIN — HEPARIN SODIUM 5000 UNITS: 5000 INJECTION INTRAVENOUS; SUBCUTANEOUS at 00:59

## 2023-06-03 RX ADMIN — INSULIN HUMAN 2 UNITS: 100 INJECTION, SOLUTION PARENTERAL at 16:41

## 2023-06-03 RX ADMIN — IPRATROPIUM BROMIDE AND ALBUTEROL SULFATE 3 ML: 2.5; .5 SOLUTION RESPIRATORY (INHALATION) at 21:06

## 2023-06-03 RX ADMIN — ANORECTAL OINTMENT 1 APPLICATION: 15.7; .44; 24; 20.6 OINTMENT TOPICAL at 22:18

## 2023-06-03 RX ADMIN — IPRATROPIUM BROMIDE AND ALBUTEROL SULFATE 3 ML: 2.5; .5 SOLUTION RESPIRATORY (INHALATION) at 08:34

## 2023-06-03 RX ADMIN — DULOXETINE HYDROCHLORIDE 30 MG: 30 CAPSULE, DELAYED RELEASE ORAL at 09:32

## 2023-06-03 RX ADMIN — Medication 10 ML: at 22:17

## 2023-06-03 RX ADMIN — SODIUM PHOSPHATE, MONOBASIC, MONOHYDRATE AND SODIUM PHOSPHATE, DIBASIC, ANHYDROUS 15 MMOL: 276; 142 INJECTION, SOLUTION INTRAVENOUS at 12:16

## 2023-06-03 RX ADMIN — INSULIN HUMAN 2 UNITS: 100 INJECTION, SOLUTION PARENTERAL at 12:16

## 2023-06-03 RX ADMIN — ONDANSETRON 4 MG: 2 INJECTION INTRAMUSCULAR; INTRAVENOUS at 17:15

## 2023-06-03 RX ADMIN — IPRATROPIUM BROMIDE AND ALBUTEROL SULFATE 3 ML: 2.5; .5 SOLUTION RESPIRATORY (INHALATION) at 11:32

## 2023-06-03 RX ADMIN — HEPARIN SODIUM 5000 UNITS: 5000 INJECTION INTRAVENOUS; SUBCUTANEOUS at 09:30

## 2023-06-03 RX ADMIN — LEFLUNOMIDE 10 MG: 20 TABLET ORAL at 09:32

## 2023-06-03 RX ADMIN — HEPARIN SODIUM 5000 UNITS: 5000 INJECTION INTRAVENOUS; SUBCUTANEOUS at 22:16

## 2023-06-03 RX ADMIN — HEPARIN SODIUM 5000 UNITS: 5000 INJECTION INTRAVENOUS; SUBCUTANEOUS at 16:41

## 2023-06-03 NOTE — PLAN OF CARE
Goal Outcome Evaluation:  Plan of Care Reviewed With: patient        Progress: improving  Outcome Evaluation: vss, 1L nc, sr. DC orders placed. Q2 turns, wound care complete. Up to chair w/ PT. EMS @ 9 am tomorrow. [facility] machine not at facility yet, will call back in am to confirm before sending.

## 2023-06-03 NOTE — CASE MANAGEMENT/SOCIAL WORK
Continued Stay Note  [facility]     Patient Name: Blanca Zhu  MRN: [id number]  Today's Date: 6/3/2023    Admit Date: 5/21/2023    Plan: [facility] [place] via EMS tomorrow 6/4 at 9am   Discharge Plan       Row Name 06/03/23 1502       Plan    Plan [facility] [place] via EMS tomorrow 6/4 at 9am    Plan Comments Inbound call received from RN notifying CCP that pt needs EMS transport arranged for discharge. Called [facility], they are full for today, scheduled for tomorrow 6/4 at 9am. RN updated. Clifford HERNANDEZ                   Discharge Codes    No documentation.                 Expected Discharge Date and Time       Expected Discharge Date Expected Discharge Time    Jaswinder 3, 2023               Clifford Fraire RN

## 2023-06-03 NOTE — DISCHARGE SUMMARY
PHYSICIAN DISCHARGE SUMMARY  [facility], [facility]    Patient Identification:    Name: Blanca Zhu  Age: 77 y.o.  Sex: female  :  1945  MRN: [id number]    Primary Care Physician: Praneeth Valenzuela MD    Admit date: 2023    Discharge date and time:6/3/2023    Discharged Condition: fair    Discharge Diagnoses:      Acute on chronic hypoxic respiratory failure requiring ventilatory management  Haemophilus influenza pneumonia  Asthma exacerbation  Acute parainfluenza viral bronchitis  Hypertensive urgency  Hyponatremia  Hyperkalemia  Acute kidney injury  Diabetic nephropathy with chronic kidney disease stage IIIa  Lower extremity edema, chronic  Uncontrolled diabetes mellitus  Obstructive sleep apnea  Generalized anxiety disorder  Iron deficiency anemia  Morbid obesity  Difficult IV access         Hospital Course: Blanca Zhu  is a  77-year-old female, patient office, patient has history of asthma, chronic kidney disease stage IIIa, morbid obesity, chronic  diastolic congestive heart failure, diabetes mellitus type 2, hypertension, hyperlipidemia, sleep apnea, temporal arteritis.  Patient states that about 4 days ago she started developing upper respiratory symptoms, she developed sneezing, runny nose, nose congestion.  Patient was seen by her allergist and was told that she had a virus infection.  Nothing specifically was given to the patient.  Over the next few days, patient's symptoms became worse, having worsening shortness of breath, wheezing, decreased oxygenation.  She denies having any worsening lower extremity edema.  Patient was brought to the emergency room per family.  In the emergency room, patient was found to have: Troponin 25 and then 28, creatinine 1.11, potassium 4.4, COVID-19 test was negative, chest x-ray showed borderline heart size with mild vascular congestion, EKG showed sinus tachycardia.  Blood pressure in the emergency room  was 217/76.  In the emergency room, patient was placed on a BiPAP, was given Lasix, patient was admitted to the hospital for further management.     Upon admission, patient was given IV diuretics, was treated for asthma saturation as well as viral bronchitis.  Was also given IV steroids and DuoNebs mini nebs.  Patient was tolerating BiPAP and started to improve over the next few days, however, her creatinine went up and her respiratory status started to get worse again so renal and pulmonary service were consulted.  Unfortunately, on May 26, patient developed acute respiratory failure and had to be transferred to the ICU and was intubated, she was found to have haemophilus influenza pneumonia and placed on antibiotics, she eventually improved and was extubated on May 29.  Over the next few days, patient continued to improve, but she has been working physical therapy and she continued to improve.  Patient underwent video swallow evaluation on June 1 and diet was adjusted accordingly.  Today, patient is much better, she is using the NIPPV/NIV at nighttime.  She is stable.  She was approved to go to subacute rehabilitation today.  She will be discharged today.  Medication as the medication reconciliation list.    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 Daniel Gutierrez, [facility]     SECTION N/A     x3    CHOLECYSTECTOMY N/A     COLONOSCOPY      COLONOSCOPY W/ POLYPECTOMY N/A 2019    Enlarged folds in the antrum: biopsied; duodenal, transverse colon x2, splenic flexure, descending colon and sigmoid colon polyps: biopsied (path: tubular adenoma x6)-Dr. Zak De Jesus, [facility]    ENDOSCOPY  10/11/2019    Procedure: ESOPHAGOGASTRODUODENOSCOPY with hot snare polypectomy;  Surgeon: Haile Handley MD;  Location[facility] END[facility];  Service: Gastroenterology    ENDOSCOPY N/A 2020    Procedure: ESOPHAGOGASTRODUODENOSCOPY;  Surgeon: Haile Handley MD;  [facility];  Service: Gastroenterology    ENDOSCOPY N/A 2020    Procedure: ESOPHAGOGASTRODUODENOSCOPY WITH BIOPSIES AND COLD BIOPSY POLYPECTOMY;  Surgeon: Haile Handley MD;  Location: [facility] ENDOSCOPY;  Service: Gastroenterology;  Laterality: N/A;  pre: dyspepsia and diarrhea  post: duodenal polyp and gastritis    MASTECTOMY Left     MASTECTOMY WITH SENTINEL NODE BIOPSY AND AXILLARY NODE DISSECTION Left 7/3/2019    Procedure: LEFT BREAST MASTECTOMY WITH SENTINEL NODE BIOPSY AND , v-y plasty closure;  Surgeon: Tahmina James MD;  Location: [facility] MAIN OR;  Service: General    SUBTOTAL HYSTERECTOMY Bilateral     ovaries still in tact    TEMPORAL ARTERY BIOPSY Bilateral 2019           Consults:     Consults       Date and Time Order Name Status Description    2023  8:11 AM Inpatient Nephrology Consult Completed     2023  6:45 PM Inpatient Pulmonology Consult      2023  4:55 PM IP General Consult (Use specialty-specific consult if known)              Discharge Exam:    /53 (BP Location: Right arm, Patient Position: Lying)   Pulse 101   Temp 98.5 °F (36.9 °C) (Oral)   Resp 18   Ht 152.4 cm (60\")   Wt 85.5 kg (188 lb 7.9 oz)   LMP  (LMP Unknown)   SpO2 97%   BMI 36.81 kg/m²     General: Awake, cooperative.  On 1 " L of oxygen.  Saturation 94%.    Neck: Supple, symmetrical, trachea midline, no adenopathy;              thyroid:  no enlargement/tenderness/nodules;              no carotid bruit or JVD  Cardiovascular: Normal rate, regular rhythm and intact distal pulses.              Exam reveals no gallop and no friction rub. No murmur heard  Pulmonary:  Better air entry.  No wheezing.  Mild rhonchi at bases.    Abdominal: Soft, nontender, bowel sounds active all four quadrants,     no masses, no hepatomegaly, no splenomegaly.   Extremities:  2+ pitting edema of the lower extremity, actually this looks better than her baseline.  Brown discoloration from chronic stasis dermatitis.  Pulses: 2 + symmetric all extremities  Neurological: Patient is oriented, cooperative. No new focal sensorimotor deficit.  Skin: Skin color, texture, normal. Turgor is decreased. No rashes or lesions       Data Review:        Results from last 7 days   Lab Units 06/02/23  0420 06/01/23  0557 05/31/23  0642   WBC 10*3/mm3 10.74 11.29* 9.60   HEMOGLOBIN g/dL 11.3* 11.1* 11.2*   HEMATOCRIT % 35.2 36.0 35.6   PLATELETS 10*3/mm3 129* 139* 138*       Results from last 7 days   Lab Units 06/03/23  0850 06/02/23  0420 06/01/23  0557   SODIUM mmol/L 137 141 145   POTASSIUM mmol/L 4.8 4.3 4.3   CHLORIDE mmol/L 100 99 99   CO2 mmol/L 29.0 35.0* 38.8*   BUN mg/dL 14 16 19   CREATININE mg/dL 0.89 0.84 0.77   CALCIUM mg/dL 8.0* 8.0* 8.7   GLUCOSE mg/dL 139* 123* 148*           Lab Results   Lab Value Date/Time    TROPONINT 28 (H) 05/21/2023 1920    TROPONINT 25 (H) 05/21/2023 1615       Microbiology Results (last 10 days)       Procedure Component Value - Date/Time    MRSA Screen, PCR (Inpatient) - Swab, Nares [[id number]]  (Normal) Collected: 05/26/23 1547    Lab Status: Final result Specimen: Swab from Nares Updated: 05/26/23 0528     MRSA PCR No MRSA Detected    Narrative:      The negative predictive value of this diagnostic test is high and should only be used  to consider de-escalating anti-MRSA therapy. A positive result may indicate colonization with MRSA and must be correlated clinically.    Respiratory Culture - Bronchial Wash, ET Suction [[id number]]  (Abnormal) Collected: 05/26/23 1438    Lab Status: Final result Specimen: Bronchial Wash from ET Suction Updated: 05/28/23 0856     Respiratory Culture Heavy growth (4+) Haemophilus influenzae     Comment: This isolate is beta-lactamase NEGATIVE and are routinely susceptible to ampicillin and other beta-lactams.           Light growth (2+) Normal respiratory hannah. No S. aureus or Pseudomonas aeruginosa detected. Final report.     Gram Stain Moderate (3+) WBCs seen      No epithelial cells seen      Moderate (3+) Gram negative coccobacilli      Mixed bacterial morphotypes seen on Gram Stain    Eosinophil Smear - Urine, Urine, Clean Catch [[id number]]  (Normal) Collected: 05/25/23 0700    Lab Status: Final result Specimen: Urine, Clean Catch Updated: 05/25/23 0820     Eosinophil Smear 0 % EOS/100 Cells              Imaging Results (All)       Procedure Component Value Units Date/Time    FL Video Swallow Single Contrast [[id number]] Collected: 06/01/23 1143     Updated: 06/01/23 1147    Narrative:      VIDEO SWALLOWING EXAMINATION BY SPEECH PATHOLOGY     Clinical: Dysphasia     Video swallowing examination performed under the direction of speech  pathology. Imaging reviewed by radiologist who concurs with the  findings.     Speech pathology summary:VFSS completed with Dr Moore. ?Pt  demonstrated single isolated incidence of transient penetration with  thin by cup, straw, and mixed consistency. ?Additional trials with no  penetration observed. ?Pharyngeal residue cleared with a cued double  swallow. ?No aspiration observed.     FLUOROSCOPY TIME: 2 minutes 3 seconds, 3320 images.     This report was finalized on 6/1/2023 11:44 AM by Dr. Rashi Moore M.D.       XR Chest 1 View [[id number]] Collected: 05/29/23 0508      Updated: 05/29/23 0549    Narrative:      SINGLE VIEW OF THE CHEST     HISTORY: Respiratory failure     COMPARISON: 05/28/2023     FINDINGS:  Tubes and lines appear stable position. Cardiac silhouette is stable.  Bilateral alveolar and interstitial tracing noted. Aeration at the lung  bases appears improved. No pneumothorax is seen. There may be a small  right pleural effusion.       Impression:      Interval improvement in aeration at the lung bases.      This report was finalized on 5/29/2023 5:46 AM by Dr. Tanya Prince M.D.       XR Chest 1 View [[id number]] Collected: 05/28/23 0558     Updated: 05/28/23 0602    Narrative:      SINGLE VIEW OF THE CHEST     HISTORY: Respiratory failure     COMPARISON: 05/26/2023     FINDINGS:  Right-sided PICC appears to terminate within the superior vena cava.  Weighted enteric feeding tube extends into the upper abdomen.  Endotracheal tube terminates in satisfactory position. Cardiomegaly is  present. There is vascular congestion. There is elevation of the right  hemidiaphragm. Bibasilar consolidation and bilateral effusions are  unchanged.       Impression:      Persistent vascular congestion and bibasilar consolidation.     This report was finalized on 5/28/2023 5:59 AM by Dr. Tanya Prince M.D.       XR Abdomen KUB [[id number]] Collected: 05/27/23 1434     Updated: 05/27/23 1439    Narrative:      PROCEDURE:  XR ABDOMEN KUB-     HISTORY: Tube placement.     COMPARISON: Chest radiograph 05/26/2023     FINDINGS: A single view of the upper abdomen was obtained. There is a  new enteric tube with the tip projecting over the expected location of  the gastric antrum or proximal portion of the duodenum. Correlate for  desired positioning within the stomach or small bowel. There is oral  contrast within portions of the colon. There are no dilated small bowel  loops. There is multilevel degenerative disc disease.     This report was finalized on 5/27/2023 2:36 PM by Dr. Kennedy  Celia ALVARADO       XR Chest 1 View [[id number]] Collected: 05/26/23 1510     Updated: 05/26/23 1516    Narrative:      XR CHEST 1 VW-     HISTORY:  Status post intubation.     COMPARISON:  Chest radiograph 05/25/2023     FINDINGS:    2 views of the chest were obtained. There is a new endotracheal tube  with the tip terminating approximately 2.3 cm above the nagi. The  cardiac silhouette is enlarged. There is calcific aortic  atherosclerosis. Central pulmonary venous congestion has slightly  progressed. Interstitial opacities are not significantly changed. There  is new or increased airspace opacity at the lung bases with suspected  small layering pleural effusions. Pulmonary opacities could reflect  pulmonary edema and atelectasis with pneumonia not excluded. There is  multilevel degenerative disc disease.     This report was finalized on 5/26/2023 3:13 PM by Dr. Marcia Yang M.D.       XR Chest 1 View [[id number]] Collected: 05/25/23 2137     Updated: 05/25/23 2144    Narrative:      Portable chest radiograph     HISTORY:Respiratory distress     TECHNIQUE: Single AP portable radiograph of the chest     COMPARISON:Chest radiograph 05/21/2023       Impression:      FINDINGS AND IMPRESSION:  There is bilateral pulmonary vascular congestion with superimposed  interstitial thickening within the bilateral lungs, as before. The lungs  are hypoinflated. Cardiac silhouette is accentuated by low lung volumes.  No pneumothorax is seen. Somewhat nodular opacification overlying the  left upper lung is present, not definitely seen on prior imaging.  Further evaluation with CT chest is recommended to exclude underlying  pulmonary nodule and establish appropriate follow-up.     This report was finalized on 5/25/2023 9:40 PM by Dr. Bhupinder Lucero M.D.       US Renal Bilateral [[id number]] Collected: 05/24/23 1303     Updated: 05/24/23 1310    Narrative:      US RENAL BILATERAL-  05/24/2023     HISTORY: Acute renal failure.      Right kidney measures 10.3 cm in length. Left kidney measures 9.8 cm in  length.     No hydronephrosis, renal masses or stones are seen. Urinary bladder is  incompletely distended.       Impression:      1. Normal bilateral renal ultrasound.     This report was finalized on 5/24/2023 1:04 PM by Dr. Elliot Mike M.D.       XR Chest 1 View [[id number]] Collected: 05/21/23 1547     Updated: 05/21/23 1551    Narrative:      XR CHEST 1 VW-     HISTORY: Female who is 77 years-old,  chest pain     TECHNIQUE: Frontal view of the chest     COMPARISON: 11/9/2019     FINDINGS: The heart size is borderline. Pulmonary vasculature is  congested. Minimal likely atelectasis in the lower lungs. No pleural  effusion or pneumothorax. No acute osseous process.       Impression:      Borderline heart size with pulmonary vascular congestion.  Minimal likely atelectasis in the lower lungs.     This report was finalized on 5/21/2023 3:48 PM by Dr. Galindo Malik M.D.                 Disposition:    Skilled nursing facility    Patient Instructions:        Discharge Medications        New Medications        Instructions Start Date   acetaminophen 325 MG tablet  Commonly known as: TYLENOL   650 mg, Oral, Every 6 Hours PRN      insulin glargine 100 UNIT/ML injection  Commonly known as: LANTUS, SEMGLEE   20 Units, Subcutaneous, Daily   Start Date: June 4, 2023     ipratropium-albuterol 0.5-2.5 mg/3 ml nebulizer  Commonly known as: DUO-NEB   3 mL, Nebulization, 4 Times Daily - RT             Continue These Medications        Instructions Start Date   amLODIPine 5 MG tablet  Commonly known as: NORVASC   amlodipine 5 mg tablet   TAKE 1 TABLET BY MOUTH ONCE DAILY IN THE EVENING      bisoprolol 10 MG tablet  Commonly known as: ZEBeta   20 mg, Oral      Cholecalciferol 125 MCG (5000 UT) tablet   cholecalciferol (vitamin D3) 125 mcg (5,000 unit) tablet   TAKE 1 TABLET BY MOUTH ONCE DAILY FOR 30 DAYS      DULoxetine 30 MG capsule  Commonly  known as: CYMBALTA   duloxetine 30 mg capsule,delayed release   Take 1 capsule every day by oral route for 30 days.      leflunomide 10 MG tablet  Commonly known as: ARAVA   10 mg, Oral      O2  Commonly known as: OXYGEN   Inhalation, Once      omeprazole 20 MG capsule  Commonly known as: priLOSEC   40 mg             Stop These Medications      albuterol sulfate  (90 Base) MCG/ACT inhaler  Commonly known as: PROVENTIL HFA;VENTOLIN HFA;PROAIR HFA     azithromycin 250 MG tablet  Commonly known as: ZITHROMAX     clotrimazole-betamethasone 1-0.05 % cream  Commonly known as: LOTRISONE     doxazosin 2 MG tablet  Commonly known as: CARDURA     ergocalciferol 1.25 MG (36913 UT) capsule  Commonly known as: ERGOCALCIFEROL     HUMALOG PEN SC     hydrocortisone 2.5 % cream     miconazole 2 % cream  Commonly known as: Micatin     Paxlovid (300/100) 20 x 150 MG & 10 x 100MG tablet therapy pack tablet  Generic drug: Nirmatrelvir&Ritonavir 300/100     predniSONE 5 MG tablet  Commonly known as: DELTASONE     torsemide 20 MG tablet  Commonly known as: DEMADEX     TOUJEO SOLOSTAR SC     valsartan-hydrochlorothiazide 320-25 MG per tablet  Commonly known as: DIOVAN-HCT              Discharge Order (From admission, onward)       Start     Ordered    06/03/23 1421  Discharge patient  Once        Expected Discharge Date: 06/03/23    Expected Discharge Time: Afternoon    Discharge Disposition: Skilled Nursing Facility (DC - External)    Physician of Record for Attribution - Please select from Treatment Team: ELSY VALENZUELA [[id number]]    Review needed by CMO to determine Physician of Record: No       Question Answer Comment   Physician of Record for Attribution - Please select from Treatment Team ELSY VALENZUELA    Review needed by CMO to determine Physician of Record No        06/03/23 1423                     Contact information for follow-up providers       Elsy Valenzuela MD .    Specialties: Internal Medicine,  Hospitalist  Contact information:  [address]  [facility] [address]  [facility] [id number] [phone]                       Contact information for after-discharge care       Destination       [facility] .    Service: Skilled Nursing  Contact information:  [address]  [place] [address] [phone]                                   Total time spent discharging patient including evaluation,post hospitalization follow up,  medication and post hospitalization instructions and education total time exceeds 30 minutes.    Signed:  Praneeth Valenzuela MD  6/3/2023  14:30 EDT       I wore protective equipment throughout this patient encounter including a face mask, gloves, and protective eyewear.  Hand hygiene was performed before donning protective equipment and after removal when leaving the room.

## 2023-06-03 NOTE — PLAN OF CARE
Goal Outcome Evaluation:  Plan of Care Reviewed With: patient        Progress: improving  Outcome Evaluation: Pt tolerated treatment well this date. Required min A x2 for bed mobility, then min A x1 to stand and ambulate ~3ft to the chair w/ Rw. Pt was limited d/t fatigue. Encouraged pt to continue attempting a few exercises w/ assist from family during the day.

## 2023-06-03 NOTE — CASE MANAGEMENT/SOCIAL WORK
"Physicians Statement of Medical Necessity for  Ambulance Transportation    GENERAL INFORMATION     Name: Blanca Zhu  YOB: 1945  Medicare #: [id number]   Transport Date: __________ (Valid for round trips this date, or for scheduled repetitive trips for 60 days from the date signed below.)  Origin: [facility]  Destination: [facility] [address] , [place] [address]   Is the Patient's stay covered under Medicare Part A (PPS/DRG?)Yes  Closest appropriate facility? Yes  If this a hosp-hosp transfer? No  Is this a hospice patient? No    MEDICAL NECESSITY QUESTIONAIRE    Ambulance Transportation is medically necessary only if other means of transportation are contraindicated or would be potentially harmful to the patient.  To meet this requirement, the patient must be either \"bed confined\" or suffer from a condition such that transport by means other than an ambulance is contraindicated by the patient's condition.  The following questions must be answered by the healthcare professional signing below for this form to be valid:     1) Describe the MEDICAL CONDITION (physical and/or mental) of this patient AT THE TIME OF AMBULANCE TRANSPORT that requires the patient to be transported in an ambulance, and why transport by other means is contraindicated by the patient's condition: The primary encounter diagnosis was Respiratory distress. Diagnoses of Wheezing, Pulmonary vascular congestion, and Hypertensive urgency were also pertinent to this visit.   Problems Addressed this Visit          Other    * (Principal) Respiratory distress - Primary    Relevant Orders    Walker    INTUBATION (Completed)     Other Visit Diagnoses       Wheezing        Pulmonary vascular congestion        Hypertensive urgency        Relevant Medications    furosemide (LASIX) injection 40 mg (Completed)    hydrALAZINE (APRESOLINE) injection 10 mg    amLODIPine (NORVASC) tablet 5 mg    " bisoprolol (ZEBeta) tablet 20 mg    furosemide (LASIX) injection 20 mg (Completed)    furosemide (LASIX) injection 80 mg (Completed)    furosemide (LASIX) injection 40 mg (Completed)    furosemide (LASIX) injection 40 mg (Completed)    furosemide (LASIX) injection 40 mg (Completed)          Diagnoses         Codes Comments    Respiratory distress    -  Primary ICD-10-CM: R06.03  ICD-9-CM: 786.09     Wheezing     ICD-10-CM: R06.2  ICD-9-CM: 786.07     Pulmonary vascular congestion     ICD-10-CM: R09.89  ICD-9-CM: 514     Hypertensive urgency     ICD-10-CM: I16.0  ICD-9-CM: 401.9            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 Daniel Gutierrez, [facility]     SECTION N/A     x3    CHOLECYSTECTOMY N/A     COLONOSCOPY      COLONOSCOPY W/ POLYPECTOMY N/A 2019    Enlarged folds in the antrum: biopsied; duodenal, transverse colon x2, splenic flexure, descending colon and sigmoid colon polyps: biopsied (path: tubular adenoma x6)-Dr. Zak De Jesus, [facility]    ENDOSCOPY  10/11/2019    Procedure: ESOPHAGOGASTRODUODENOSCOPY with hot snare polypectomy;  Surgeon: Haile Handley MD;  Location: [facility] ENDOSCOPY;  Service: Gastroenterology    ENDOSCOPY N/A 2020    Procedure: ESOPHAGOGASTRODUODENOSCOPY;  Surgeon: Haile Handley,  "MD;  Location: [facility] ENDOSCOPY;  Service: Gastroenterology    ENDOSCOPY N/A 9/9/2020    Procedure: ESOPHAGOGASTRODUODENOSCOPY WITH BIOPSIES AND COLD BIOPSY POLYPECTOMY;  Surgeon: Haile Handley MD;  Location: [facility] ENDOSCOPY;  Service: Gastroenterology;  Laterality: N/A;  pre: dyspepsia and diarrhea  post: duodenal polyp and gastritis    MASTECTOMY Left     MASTECTOMY WITH SENTINEL NODE BIOPSY AND AXILLARY NODE DISSECTION Left 7/3/2019    Procedure: LEFT BREAST MASTECTOMY WITH SENTINEL NODE BIOPSY AND , v-y plasty closure;  Surgeon: Tahmina James MD;  Location: [facility] MAIN OR;  Service: General    SUBTOTAL HYSTERECTOMY Bilateral     ovaries still in tact    TEMPORAL ARTERY BIOPSY Bilateral 12/05/2019      2) Is this patient \"bed confined\" as defined below?Yes   To be \"bed confined\" the patient must satisfy all three of the following criteria:  (1) unable to get up from bed without assistance; AND (2) unable to ambulate;  AND (3) unable to sit in a chair or wheelchair.  3) Can this patient safely be transported by car or wheelchair van (I.e., may safely sit during transport, without an attendant or monitoring?)No   4. In addition to completing questions 1-3 above, please check any of the following conditions that apply*:          *Note: supporting documentation for any boxes checked must be maintained in the patient's medical records Medical attendant required, Requires oxygen - unable to self administer, Unable to tolerate seated position for time needed to transport, and Other high falls risk      SIGNATURE OF PHYSICIAN OR OTHER AUTHORIZED HEALTHCARE PROFESSIONAL    I certify that the above information is true and correct based on my evaluation of this patient, and represent that the patient requires transport by ambulance and that other forms of transport are contraindicated.  I understand that this information will be used by the Centers for Medicare and Medicaid Services (CMS) to support the " determiniation of medical necessity for ambulance services, and I represent that I have personal knowledge of the patient's condition at the time of transport.       If this box is checked, I also certify that the patient is physically or mentally incapable of signing the ambulance service's claim form and that the institution with which I am affiliated has furnished care, services or assistance to the patient.  My signature below is made on behalf of the patient pursuant to 42 .36(b)(4). In accordance with 42 .37, the specific reason(s) that the patient is physically or mentally incapable of signing the claim for is as follows:     Signature of Physician or Healthcare Professional  Date/Time:        (For Scheduled repetitive transport, this form is not valid for transports performed more than 60 days after this date).                                                                                                                                            --------------------------------------------------------------------------------------------  Printed Name and Credentials of Physician or Authorized Healthcare Professional     *Form must be signed by patient's attending physician for scheduled, repetitive transports,.  For non-repetitive ambulance transports, if unable to obtain the signature of the attending physician, any of the following may sign (please select below):     Physician  Clinical Nurse Specialist  Registered Nurse     Physician Assistant  Discharge Planner  Licensed Practical Nurse     Nurse Practitioner

## 2023-06-03 NOTE — THERAPY TREATMENT NOTE
Patient Name: Blanca Zhu  : 1945    MRN: [id number]                              Today's Date: 6/3/2023       Admit Date: 2023    Visit Dx:     ICD-10-CM ICD-9-CM   1. Respiratory distress  R06.03 786.09   2. Wheezing  R06.2 786.07   3. Pulmonary vascular congestion  R09.89 514   4. Hypertensive urgency  I16.0 401.9     Patient Active Problem List   Diagnosis    Osteoporosis    Breast neoplasm, Tis (DCIS), left    Iron deficiency anemia    CKD (chronic kidney disease)    Diabetic nephropathy associated with type 2 diabetes mellitus    Temporal arteritis    Immunosuppression    Anemia    Duodenal adenoma    Gastritis without bleeding    Polyp of duodenum    Morbidly obese    Dyspnea on exertion    Hyperlipidemia    Type 2 diabetes mellitus with stage 3b chronic kidney disease, with long-term current use of insulin    Essential hypertension    Bilateral lower extremity edema    Pulmonary hypertension    Obstructive sleep apnea on CPAP    Stage 3a chronic kidney disease    Iron malabsorption    Respiratory distress     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 Daniel Gutierrez, [facility]     SECTION N/A     x3    CHOLECYSTECTOMY N/A     COLONOSCOPY      COLONOSCOPY W/ POLYPECTOMY N/A 2019    Enlarged folds in the antrum:  biopsied; duodenal, transverse colon x2, splenic flexure, descending colon and sigmoid colon polyps: biopsied (path: tubular adenoma x6)-Dr. Zak De Jesus, [facility]    ENDOSCOPY  10/11/2019    Procedure: ESOPHAGOGASTRODUODENOSCOPY with hot snare polypectomy;  Surgeon: Haile Handley MD;  Location: [facility] ENDOSCOPY;  Service: Gastroenterology    ENDOSCOPY N/A 1/29/2020    Procedure: ESOPHAGOGASTRODUODENOSCOPY;  Surgeon: Haile Handley MD;  Location: [facility] ENDOSCOPY;  Service: Gastroenterology    ENDOSCOPY N/A 9/9/2020    Procedure: ESOPHAGOGASTRODUODENOSCOPY WITH BIOPSIES AND COLD BIOPSY POLYPECTOMY;  Surgeon: Haile Hanldey MD;  Location: [facility] ENDOSCOPY;  Service: Gastroenterology;  Laterality: N/A;  pre: dyspepsia and diarrhea  post: duodenal polyp and gastritis    MASTECTOMY Left     MASTECTOMY WITH SENTINEL NODE BIOPSY AND AXILLARY NODE DISSECTION Left 7/3/2019    Procedure: LEFT BREAST MASTECTOMY WITH SENTINEL NODE BIOPSY AND , v-y plasty closure;  Surgeon: Tahmina James MD;  Location: [facility] MAIN OR;  Service: General    SUBTOTAL HYSTERECTOMY Bilateral     ovaries still in tact    TEMPORAL ARTERY BIOPSY Bilateral 12/05/2019      General Information       Row Name 06/03/23 1615          Physical Therapy Time and Intention    Document Type therapy note (daily note)  -     Mode of Treatment physical therapy  -       Row Name 06/03/23 1615          General Information    Existing Precautions/Restrictions fall;oxygen therapy device and L/min  -       Row Name 06/03/23 1615          Cognition    Orientation Status (Cognition) oriented x 3  -               User Key  (r) = Recorded By, (t) = Taken By, (c) = Cosigned By      Initials Name Provider Type     Lexy Stark PTA Physical Therapist Assistant                   Mobility       Row Name 06/03/23 1615          Bed Mobility    Bed Mobility supine-sit  -     Supine-Sit [place] (Bed Mobility) minimum  assist (75% patient effort);2 person assist  -     Assistive Device (Bed Mobility) bed rails;head of bed elevated  -       Row Name 06/03/23 1615          Sit-Stand Transfer    Sit-Stand [place] (Transfers) minimum assist (75% patient effort)  -     Assistive Device (Sit-Stand Transfers) walker, front-wheeled  -       Row Name 06/03/23 1615          Gait/Stairs (Locomotion)    [place] Level (Gait) minimum assist (75% patient effort)  -     Assistive Device (Gait) walker, front-wheeled  -     Distance in Feet (Gait) 3  -SM     Deviations/Abnormal Patterns (Gait) cel[place] decreased;stride length decreased  -     Bilateral Gait Deviations forward flexed posture  -               User Key  (r) = Recorded By, (t) = Taken By, (c) = Cosigned By      Initials Name Provider Type    Lexy Mccloud PTA Physical Therapist Assistant                   Obj/Interventions       Row Name 06/03/23 1616          Motor Skills    Therapeutic Exercise --  seated AP and LAQ x10 reps  -               User Key  (r) = Recorded By, (t) = Taken By, (c) = Cosigned By      Initials Name Provider Type    Lexy Mccloud PTA Physical Therapist Assistant                   Goals/Plan    No documentation.                  Clinical Impression       Row Name 06/03/23 1616          Pain    Pretreatment Pain Rating 0/10 - no pain  -     Posttreatment Pain Rating 0/10 - no pain  -       Row Name 06/03/23 1616          Positioning and Restraints    Pre-Treatment Position in bed  -     Post Treatment Position chair  -     In Chair reclined;call light within reach;encouraged to call for assist;with family/caregiver  -               User Key  (r) = Recorded By, (t) = Taken By, (c) = Cosigned By      Initials Name Provider Type    Lexy Mccloud PTA Physical Therapist Assistant                   Outcome Measures       Row Name 06/03/23 1620          How much help from another person do you currently  need...    Turning from your back to your side while in flat bed without using bedrails? 3  -SM     Moving from lying on back to sitting on the side of a flat bed without bedrails? 3  -SM     Moving to and from a bed to a chair (including a wheelchair)? 3  -SM     Standing up from a chair using your arms (e.g., wheelchair, bedside chair)? 3  -SM     Climbing 3-5 steps with a railing? 1  -SM     To walk in hospital room? 2  -SM     AM-PAC 6 Clicks Score (PT) 15  -SM     Highest level of mobility 4 --> Transferred to chair/commode  -       Row Name 06/03/23 1620          Functional Assessment    Outcome Measure Options AM-PAC 6 Clicks Basic Mobility (PT)  -               User Key  (r) = Recorded By, (t) = Taken By, (c) = Cosigned By      Initials Name Provider Type    Lexy Mccloud, JYOTI Physical Therapist Assistant                                 Physical Therapy Education       Title: PT OT SLP Therapies (In Progress)       Topic: Physical Therapy (In Progress)       Point: Mobility training (In Progress)       Learning Progress Summary             Patient Acceptance, E,TB,D, VU,NR by  at 6/3/2023 1621    Acceptance, E,TB, NR,VU by  at 6/2/2023 1620    Acceptance, E,TB,D, VU,NR by  at 6/1/2023 1602    Acceptance, E,D, NL by  at 5/26/2023 1559    Acceptance, E,D, NR by  at 5/24/2023 1601   Family Acceptance, E,D, NL by KISHAN at 5/26/2023 1559                         Point: Home exercise program (In Progress)       Learning Progress Summary             Patient Acceptance, E,TB,D, VU,NR by  at 6/3/2023 1621    Acceptance, E,TB, NR,VU by  at 6/2/2023 1620    Acceptance, E,TB,D, VU,NR by  at 6/1/2023 1602    Acceptance, E,D, NL by  at 5/26/2023 1559    Acceptance, E,D, NR by PC at 5/24/2023 1601   Family Acceptance, E,D, NL by KISHAN at 5/26/2023 1559                         Point: Body mechanics (In Progress)       Learning Progress Summary             Patient Acceptance, E,TB,D, VU,NR by SM at  6/3/2023 1621    Acceptance, E,TB, NR,VU by  at 6/2/2023 1620    Acceptance, E,TB,D, VU,NR by  at 6/1/2023 1602    Acceptance, E,D, NL by  at 5/26/2023 1559    Acceptance, E,D, NR by  at 5/24/2023 1601   Family Acceptance, E,D, NL by  at 5/26/2023 1559                         Point: Precautions (In Progress)       Learning Progress Summary             Patient Acceptance, E,TB,D, VU,NR by  at 6/3/2023 1621    Acceptance, E,TB, NR,VU by  at 6/2/2023 1620    Acceptance, E,TB,D, VU,NR by  at 6/1/2023 1602    Acceptance, E,D, NL by  at 5/26/2023 1559    Acceptance, E,D, NR by  at 5/24/2023 1601   Family Acceptance, E,D, NL by  at 5/26/2023 1559                                         User Key       Initials Effective Dates Name Provider Type Discipline     06/16/21 -  Belia Heath PT Physical Therapist PT     03/07/18 -  Polina Stark PTA Physical Therapist Assistant PT     03/07/18 -  Lexy Stark PTA Physical Therapist Assistant PT     09/22/22 -  Yadira Matamoros, PT Physical Therapist PT                  PT Recommendation and Plan     Plan of Care Reviewed With: patient  Progress: improving  Outcome Evaluation: Pt tolerated treatment well this date. Required min A x2 for bed mobility, then min A x1 to stand and ambulate ~3ft to the chair w/ Rw. Pt was limited d/t fatigue. Encouraged pt to continue attempting a few exercises w/ assist from family during the day.     Time Calculation:    PT Charges       Row Name 06/03/23 1624             Time Calculation    Start Time 1421  -      Stop Time 1444  -      Time Calculation (min) 23 min  -      PT Received On 06/03/23  -      PT - Next Appointment 06/05/23  -                User Key  (r) = Recorded By, (t) = Taken By, (c) = Cosigned By      Initials Name Provider Type     Lexy Stark, JYOTI Physical Therapist Assistant                  Therapy Charges for Today       Code Description Service Date Service  Provider Modifiers Qty    15351292555 HC PT THER PROC EA 15 MIN 6/3/2023 Lexy Stark, PTA GP 1    64718207380 HC PT THERAPEUTIC ACT EA 15 MIN 6/3/2023 Lexy Stark, PTA GP 1    06776285495 HC PT THER SUPP EA 15 MIN 6/3/2023 Lexy Stark, PTA GP 1            PT G-Codes  Outcome Measure Options: AM-PAC 6 Clicks Basic Mobility (PT)  AM-PAC 6 Clicks Score (PT): 15  PT Discharge Summary  Anticipated Discharge Disposition (PT): skilled nursing facility    Lexy Stark PTA  6/3/2023

## 2023-06-03 NOTE — PLAN OF CARE
Problem: Adult Inpatient Plan of Care  Goal: Plan of Care Review  Outcome: Ongoing, Progressing  Flowsheets (Taken 6/3/2023 0515)  Outcome Evaluation: VSS, pt AOX4, 1L nc. spouse at bedside. no complaints of pain/ discomfort. likely to DC today.  Goal: Patient-Specific Goal (Individualized)  Outcome: Ongoing, Progressing  Goal: Absence of Hospital-Acquired Illness or Injury  Outcome: Ongoing, Progressing  Intervention: Identify and Manage Fall Risk  Recent Flowsheet Documentation  Taken 6/3/2023 0406 by Lo Moyer RN  Safety Promotion/Fall Prevention:   assistive device/personal items within reach   activity supervised   clutter free environment maintained   lighting adjusted   nonskid shoes/slippers when out of bed   safety round/check completed  Taken 6/3/2023 0237 by Lo Moyer RN  Safety Promotion/Fall Prevention:   clutter free environment maintained   assistive device/personal items within reach   activity supervised   lighting adjusted   nonskid shoes/slippers when out of bed   safety round/check completed  Taken 6/3/2023 0059 by Lo Moyer RN  Safety Promotion/Fall Prevention:   clutter free environment maintained   assistive device/personal items within reach   activity supervised   lighting adjusted   nonskid shoes/slippers when out of bed   safety round/check completed  Taken 6/2/2023 2228 by Lo Moyer RN  Safety Promotion/Fall Prevention:   clutter free environment maintained   assistive device/personal items within reach   activity supervised   lighting adjusted   nonskid shoes/slippers when out of bed   safety round/check completed  Taken 6/2/2023 2021 by Lo Moyer RN  Safety Promotion/Fall Prevention:   activity supervised   assistive device/personal items within reach   clutter free environment maintained   lighting adjusted   nonskid shoes/slippers when out of bed   safety round/check completed  Intervention: Prevent Skin Injury  Recent Flowsheet  Documentation  Taken 6/3/2023 0406 by Lo Moyer RN  Body Position:   turned   left  Taken 6/3/2023 0237 by Lo Moyer RN  Body Position:   turned   right  Taken 6/3/2023 0059 by Lo Moyer RN  Body Position:   patient/family refused   supine   lower extremity elevated   upper extremity elevated  Skin Protection:   adhesive use limited   transparent dressing maintained   tubing/devices free from skin contact  Taken 6/2/2023 2228 by Lo Moyer RN  Body Position: supine, legs elevated  Taken 6/2/2023 2021 by Lo Moyer RN  Body Position:   turned   right  Skin Protection:   adhesive use limited   tubing/devices free from skin contact   transparent dressing maintained  Intervention: Prevent and Manage VTE (Venous Thromboembolism) Risk  Recent Flowsheet Documentation  Taken 6/3/2023 0406 by Lo Moyer RN  Activity Management: activity encouraged  Taken 6/3/2023 0237 by Lo Moyer RN  Activity Management: activity encouraged  Taken 6/3/2023 0059 by Lo Moyer RN  Activity Management: activity encouraged  VTE Prevention/Management: (heparin) --  Range of Motion: active ROM (range of motion) encouraged  Taken 6/2/2023 2228 by Lo Moyer RN  Activity Management: activity encouraged  Taken 6/2/2023 2021 by Lo Moyer RN  Activity Management: activity encouraged  VTE Prevention/Management: (heparin) --  Range of Motion: active ROM (range of motion) encouraged  Intervention: Prevent Infection  Recent Flowsheet Documentation  Taken 6/2/2023 2021 by Lo Moyer RN  Infection Prevention:   rest/sleep promoted   single patient room provided   visitors restricted/screened   hand hygiene promoted   personal protective equipment utilized  Goal: Optimal Comfort and Wellbeing  Outcome: Ongoing, Progressing  Intervention: Provide Person-Centered Care  Recent Flowsheet Documentation  Taken 6/3/2023 0059 by Lo Moyer RN  Trust Relationship/Rapport:   choices  provided   care explained   questions answered   questions encouraged  Taken 6/2/2023 2021 by Lo Moyer RN  Trust Relationship/Rapport:   choices provided   care explained   questions encouraged   questions answered  Goal: Readiness for Transition of Care  Outcome: Ongoing, Progressing     Problem: Fall Injury Risk  Goal: Absence of Fall and Fall-Related Injury  Outcome: Ongoing, Progressing  Intervention: Identify and Manage Contributors  Recent Flowsheet Documentation  Taken 6/3/2023 0059 by Lo Moyer RN  Medication Review/Management: medications reviewed  Taken 6/2/2023 2021 by Lo Moyer RN  Medication Review/Management: medications reviewed  Intervention: Promote Injury-Free Environment  Recent Flowsheet Documentation  Taken 6/3/2023 0406 by Lo Moyer RN  Safety Promotion/Fall Prevention:   assistive device/personal items within reach   activity supervised   clutter free environment maintained   lighting adjusted   nonskid shoes/slippers when out of bed   safety round/check completed  Taken 6/3/2023 0237 by Lo Moyer RN  Safety Promotion/Fall Prevention:   clutter free environment maintained   assistive device/personal items within reach   activity supervised   lighting adjusted   nonskid shoes/slippers when out of bed   safety round/check completed  Taken 6/3/2023 0059 by Lo Moyer RN  Safety Promotion/Fall Prevention:   clutter free environment maintained   assistive device/personal items within reach   activity supervised   lighting adjusted   nonskid shoes/slippers when out of bed   safety round/check completed  Taken 6/2/2023 2228 by Lo Moyer RN  Safety Promotion/Fall Prevention:   clutter free environment maintained   assistive device/personal items within reach   activity supervised   lighting adjusted   nonskid shoes/slippers when out of bed   safety round/check completed  Taken 6/2/2023 2021 by Lo Moyer RN  Safety Promotion/Fall Prevention:    activity supervised   assistive device/personal items within reach   clutter free environment maintained   lighting adjusted   nonskid shoes/slippers when out of bed   safety round/check completed     Problem: Adjustment to Illness (Sepsis/Septic Shock)  Goal: Optimal Coping  Outcome: Ongoing, Progressing  Intervention: Optimize Psychosocial Adjustment to Illness  Recent Flowsheet Documentation  Taken 6/3/2023 0059 by Lo Moyer RN  Supportive Measures: active listening utilized  Family/Support System Care:   self-care encouraged   support provided  Taken 6/2/2023 2021 by Lo Moyer RN  Supportive Measures: active listening utilized  Family/Support System Care:   self-care encouraged   support provided     Problem: Bleeding (Sepsis/Septic Shock)  Goal: Absence of Bleeding  Outcome: Ongoing, Progressing     Problem: Glycemic Control Impaired (Sepsis/Septic Shock)  Goal: Blood Glucose Level Within Desired Range  Outcome: Ongoing, Progressing  Intervention: Optimize Glycemic Control  Recent Flowsheet Documentation  Taken 6/2/2023 2021 by Lo Moyer RN  Glycemic Management: blood glucose monitored     Problem: Infection Progression (Sepsis/Septic Shock)  Goal: Absence of Infection Signs and Symptoms  Outcome: Ongoing, Progressing  Intervention: Initiate Sepsis Management  Recent Flowsheet Documentation  Taken 6/2/2023 2021 by Lo Moyer RN  Infection Prevention:   rest/sleep promoted   single patient room provided   visitors restricted/screened   hand hygiene promoted   personal protective equipment utilized  Intervention: Promote Recovery  Recent Flowsheet Documentation  Taken 6/3/2023 0406 by Lo Moyer RN  Activity Management: activity encouraged  Taken 6/3/2023 0237 by Lo Moyer RN  Activity Management: activity encouraged  Taken 6/3/2023 0059 by Lo Moyer RN  Activity Management: activity encouraged  Sleep/Rest Enhancement: awakenings minimized  Taken 6/2/2023 2228 by  Comfort, Lo, RN  Activity Management: activity encouraged  Taken 6/2/2023 2021 by Lo Moyer RN  Activity Management: activity encouraged  Sleep/Rest Enhancement: awakenings minimized  Intervention: Promote Stabilization  Recent Flowsheet Documentation  Taken 6/2/2023 2021 by Lo Moyer RN  Fluid/Electrolyte Management: fluids provided     Problem: Nutrition Impaired (Sepsis/Septic Shock)  Goal: Optimal Nutrition Intake  Outcome: Ongoing, Progressing     Problem: Asthma Comorbidity  Goal: Maintenance of Asthma Control  Outcome: Ongoing, Progressing  Intervention: Maintain Asthma Symptom Control  Recent Flowsheet Documentation  Taken 6/3/2023 0059 by Lo Moyer RN  Medication Review/Management: medications reviewed  Taken 6/2/2023 2021 by Lo Moyer RN  Medication Review/Management: medications reviewed     Problem: Behavioral Health Comorbidity  Goal: Maintenance of Behavioral Health Symptom Control  Outcome: Ongoing, Progressing  Intervention: Maintain Behavioral Health Symptom Control  Recent Flowsheet Documentation  Taken 6/3/2023 0059 by Lo Moyer RN  Medication Review/Management: medications reviewed  Taken 6/2/2023 2021 by Lo Moyer RN  Medication Review/Management: medications reviewed     Problem: COPD (Chronic Obstructive Pulmonary Disease) Comorbidity  Goal: Maintenance of COPD Symptom Control  Outcome: Ongoing, Progressing  Intervention: Maintain COPD-Symptom Control  Recent Flowsheet Documentation  Taken 6/3/2023 0059 by Lo Moyer RN  Supportive Measures: active listening utilized  Medication Review/Management: medications reviewed  Taken 6/2/2023 2021 by Lo Moyer RN  Supportive Measures: active listening utilized  Medication Review/Management: medications reviewed     Problem: Diabetes Comorbidity  Goal: Blood Glucose Level Within Targeted Range  Outcome: Ongoing, Progressing  Intervention: Monitor and Manage Glycemia  Recent Flowsheet  Documentation  Taken 6/2/2023 2021 by Lo Moyer RN  Glycemic Management: blood glucose monitored     Problem: Heart Failure Comorbidity  Goal: Maintenance of Heart Failure Symptom Control  Outcome: Ongoing, Progressing  Intervention: Maintain Heart Failure-Management  Recent Flowsheet Documentation  Taken 6/3/2023 0059 by Lo Moyer RN  Medication Review/Management: medications reviewed  Taken 6/2/2023 2021 by Lo Moyer RN  Medication Review/Management: medications reviewed     Problem: Hypertension Comorbidity  Goal: Blood Pressure in Desired Range  Outcome: Ongoing, Progressing  Intervention: Maintain Blood Pressure Management  Recent Flowsheet Documentation  Taken 6/3/2023 0059 by Lo Moyer RN  Medication Review/Management: medications reviewed  Taken 6/2/2023 2021 by Lo Moyer RN  Medication Review/Management: medications reviewed     Problem: Obstructive Sleep Apnea Risk or Actual Comorbidity Management  Goal: Unobstructed Breathing During Sleep  Outcome: Ongoing, Progressing     Problem: Osteoarthritis Comorbidity  Goal: Maintenance of Osteoarthritis Symptom Control  Outcome: Ongoing, Progressing  Intervention: Maintain Osteoarthritis Symptom Control  Recent Flowsheet Documentation  Taken 6/3/2023 0406 by Lo Moyer RN  Activity Management: activity encouraged  Taken 6/3/2023 0237 by Lo Moyer RN  Activity Management: activity encouraged  Taken 6/3/2023 0059 by Lo Moyer RN  Activity Management: activity encouraged  Medication Review/Management: medications reviewed  Taken 6/2/2023 2228 by Lo Moyer RN  Activity Management: activity encouraged  Taken 6/2/2023 2021 by Lo Moyer RN  Activity Management: activity encouraged  Medication Review/Management: medications reviewed     Problem: Pain Chronic (Persistent) (Comorbidity Management)  Goal: Acceptable Pain Control and Functional Ability  Outcome: Ongoing, Progressing  Intervention: Manage  Persistent Pain  Recent Flowsheet Documentation  Taken 6/3/2023 0059 by Lo Moyer RN  Sleep/Rest Enhancement: awakenings minimized  Medication Review/Management: medications reviewed  Taken 6/2/2023 2021 by Lo Moyer RN  Sleep/Rest Enhancement: awakenings minimized  Medication Review/Management: medications reviewed  Intervention: Optimize Psychosocial Wellbeing  Recent Flowsheet Documentation  Taken 6/3/2023 0059 by Lo Moyer RN  Supportive Measures: active listening utilized  Diversional Activities: television  Family/Support System Care:   self-care encouraged   support provided  Taken 6/2/2023 2021 by Lo Moyer RN  Supportive Measures: active listening utilized  Diversional Activities: television  Family/Support System Care:   self-care encouraged   support provided     Problem: Seizure Disorder Comorbidity  Goal: Maintenance of Seizure Control  Outcome: Ongoing, Progressing     Problem: Gas Exchange Impaired  Goal: Optimal Gas Exchange  Outcome: Ongoing, Progressing  Intervention: Optimize Oxygenation and Ventilation  Recent Flowsheet Documentation  Taken 6/3/2023 0406 by Lo Moyer RN  Head of Bed (HOB) Positioning: HOB at 30-45 degrees  Taken 6/3/2023 0237 by Lo Moyer RN  Head of Bed (HOB) Positioning: HOB at 30-45 degrees  Taken 6/3/2023 0059 by Lo Moyer RN  Head of Bed (HOB) Positioning: HOB at 30-45 degrees  Taken 6/2/2023 2228 by Lo Moyer RN  Head of Bed (HOB) Positioning: HOB at 30-45 degrees  Taken 6/2/2023 2021 by Lo Moyer RN  Head of Bed (HOB) Positioning: HOB at 30 degrees     Problem: Skin Injury Risk Increased  Goal: Skin Health and Integrity  Outcome: Ongoing, Progressing  Intervention: Optimize Skin Protection  Recent Flowsheet Documentation  Taken 6/3/2023 0406 by Lo Moyer RN  Head of Bed (HOB) Positioning: HOB at 30-45 degrees  Taken 6/3/2023 0237 by Lo Moyer RN  Head of Bed (HOB) Positioning: HOB at 30-45  degrees  Taken 6/3/2023 0059 by Lo Moyer RN  Pressure Reduction Techniques:   sit time limited to 2 hours   weight shift assistance provided   frequent weight shift encouraged  Head of Bed (HOB) Positioning: HOB at 30-45 degrees  Pressure Reduction Devices:   positioning supports utilized   pressure-redistributing mattress utilized   specialty bed utilized  Skin Protection:   adhesive use limited   transparent dressing maintained   tubing/devices free from skin contact  Taken 6/2/2023 2228 by Lo Moyer RN  Head of Bed (HOB) Positioning: HOB at 30-45 degrees  Taken 6/2/2023 2021 by Lo Moyer RN  Pressure Reduction Techniques:   frequent weight shift encouraged   sit time limited to 2 hours   weight shift assistance provided  Head of Bed (HOB) Positioning: HOB at 30 degrees  Pressure Reduction Devices:   specialty bed utilized   pressure-redistributing mattress utilized  Skin Protection:   adhesive use limited   tubing/devices free from skin contact   transparent dressing maintained     Problem: Restraint, Nonviolent  Goal: Absence of Harm or Injury  Outcome: Ongoing, Progressing  Intervention: Implement Least Restrictive Safety Strategies  Recent Flowsheet Documentation  Taken 6/3/2023 0059 by Lo Moyer RN  Diversional Activities: television  Taken 6/2/2023 2021 by Lo Moyer RN  Medical Device Protection: IV pole/bag removed from visual field  Diversional Activities: television  Intervention: Protect Dignity, Rights, and Personal Wellbeing  Recent Flowsheet Documentation  Taken 6/3/2023 0059 by Lo Moyer RN  Trust Relationship/Rapport:   choices provided   care explained   questions answered   questions encouraged  Taken 6/2/2023 2021 by Lo Moyer RN  Trust Relationship/Rapport:   choices provided   care explained   questions encouraged   questions answered  Intervention: Protect Skin and Joint Integrity  Recent Flowsheet Documentation  Taken 6/3/2023 0406 by  Lo Moyer RN  Body Position:   turned   left  Taken 6/3/2023 0237 by Lo Moyer RN  Body Position:   turned   right  Taken 6/3/2023 0059 by Lo Moyer RN  Body Position:   patient/family refused   supine   lower extremity elevated   upper extremity elevated  Range of Motion: active ROM (range of motion) encouraged  Taken 6/2/2023 2228 by Lo Moyer RN  Body Position: supine, legs elevated  Taken 6/2/2023 2021 by Lo Moyer RN  Body Position:   turned   right  Range of Motion: active ROM (range of motion) encouraged     Problem: Aspiration (Enteral Nutrition)  Goal: Absence of Aspiration Signs and Symptoms  Outcome: Ongoing, Progressing  Intervention: Minimize Aspiration Risk  Recent Flowsheet Documentation  Taken 6/3/2023 0406 by Lo Moyer RN  Head of Bed (HOB) Positioning: HOB at 30-45 degrees  Taken 6/3/2023 0237 by Lo Moyer RN  Head of Bed (HOB) Positioning: HOB at 30-45 degrees  Taken 6/3/2023 0059 by Lo Moyer RN  Head of Bed (HOB) Positioning: HOB at 30-45 degrees  Taken 6/2/2023 2228 by Lo Moyer RN  Head of Bed (HOB) Positioning: HOB at 30-45 degrees  Taken 6/2/2023 2021 by Lo Moyer RN  Head of Bed (HOB) Positioning: HOB at 30 degrees     Problem: Device-Related Complication Risk (Enteral Nutrition)  Goal: Safe, Effective Therapy Delivery  Outcome: Ongoing, Progressing     Problem: Feeding Intolerance (Enteral Nutrition)  Goal: Feeding Tolerance  Outcome: Ongoing, Progressing   Goal Outcome Evaluation:              Outcome Evaluation: VSS, pt AOX4, 1L nc. spouse at bedside. no complaints of pain/ discomfort. likely to DC today.

## 2023-06-04 VITALS
HEART RATE: 86 BPM | SYSTOLIC BLOOD PRESSURE: 156 MMHG | HEIGHT: 60 IN | TEMPERATURE: 98.4 F | BODY MASS INDEX: 36.75 KG/M2 | OXYGEN SATURATION: 97 % | WEIGHT: 187.17 LBS | DIASTOLIC BLOOD PRESSURE: 59 MMHG | RESPIRATION RATE: 18 BRPM

## 2023-06-04 LAB
ALBUMIN SERPL-MCNC: 2.5 G/DL (ref 3.5–5.2)
ANION GAP SERPL CALCULATED.3IONS-SCNC: 4.7 MMOL/L (ref 5–15)
BUN SERPL-MCNC: 13 MG/DL (ref 8–23)
BUN/CREAT SERPL: 16.9 (ref 7–25)
CALCIUM SPEC-SCNC: 8.5 MG/DL (ref 8.6–10.5)
CHLORIDE SERPL-SCNC: 98 MMOL/L (ref 98–107)
CO2 SERPL-SCNC: 33.3 MMOL/L (ref 22–29)
CREAT SERPL-MCNC: 0.77 MG/DL (ref 0.57–1)
EGFRCR SERPLBLD CKD-EPI 2021: 80.1 ML/MIN/1.73
GLUCOSE BLDC GLUCOMTR-MCNC: 116 MG/DL (ref 70–130)
GLUCOSE SERPL-MCNC: 112 MG/DL (ref 65–99)
PHOSPHATE SERPL-MCNC: 2.9 MG/DL (ref 2.5–4.5)
POTASSIUM SERPL-SCNC: 4.1 MMOL/L (ref 3.5–5.2)
SODIUM SERPL-SCNC: 136 MMOL/L (ref 136–145)

## 2023-06-04 PROCEDURE — 94799 UNLISTED PULMONARY SVC/PX: CPT

## 2023-06-04 PROCEDURE — 25010000002 HEPARIN (PORCINE) PER 1000 UNITS: Performed by: INTERNAL MEDICINE

## 2023-06-04 PROCEDURE — 82948 REAGENT STRIP/BLOOD GLUCOSE: CPT

## 2023-06-04 PROCEDURE — 94664 DEMO&/EVAL PT USE INHALER: CPT

## 2023-06-04 PROCEDURE — 80069 RENAL FUNCTION PANEL: CPT | Performed by: INTERNAL MEDICINE

## 2023-06-04 RX ADMIN — HEPARIN SODIUM 5000 UNITS: 5000 INJECTION INTRAVENOUS; SUBCUTANEOUS at 08:01

## 2023-06-04 RX ADMIN — ANORECTAL OINTMENT 1 APPLICATION: 15.7; .44; 24; 20.6 OINTMENT TOPICAL at 08:02

## 2023-06-04 RX ADMIN — PANTOPRAZOLE SODIUM 40 MG: 40 TABLET, DELAYED RELEASE ORAL at 05:36

## 2023-06-04 RX ADMIN — AMLODIPINE BESYLATE 5 MG: 5 TABLET ORAL at 08:00

## 2023-06-04 RX ADMIN — GUAIFENESIN AND DEXTROMETHORPHAN HYDROBROMIDE 2 TABLET: 600; 30 TABLET, EXTENDED RELEASE ORAL at 08:00

## 2023-06-04 RX ADMIN — IPRATROPIUM BROMIDE AND ALBUTEROL SULFATE 3 ML: 2.5; .5 SOLUTION RESPIRATORY (INHALATION) at 08:45

## 2023-06-04 RX ADMIN — INSULIN GLARGINE-YFGN 20 UNITS: 100 INJECTION, SOLUTION SUBCUTANEOUS at 08:00

## 2023-06-04 RX ADMIN — Medication 10 ML: at 08:02

## 2023-06-04 RX ADMIN — LEFLUNOMIDE 10 MG: 20 TABLET ORAL at 08:01

## 2023-06-04 RX ADMIN — BISOPROLOL FUMARATE 20 MG: 5 TABLET, FILM COATED ORAL at 08:00

## 2023-06-04 RX ADMIN — DULOXETINE HYDROCHLORIDE 30 MG: 30 CAPSULE, DELAYED RELEASE ORAL at 08:00

## 2023-06-04 NOTE — PLAN OF CARE
Goal Outcome Evaluation:           Progress: improving  Outcome Evaluation: vss, no complaints of pain. pt rested well during the shift. pt had bipap on while sleeping. will be d/c to snf today. will continue to monitor.

## 2023-06-04 NOTE — NURSING NOTE
Given report to Maxine TOURE refused to give her last name patient will be transfer to at facility [facility] [place] ambulance schedule 9 am.

## 2023-06-04 NOTE — CASE MANAGEMENT/SOCIAL WORK
Continued Stay Note  [facility]     Patient Name: Blanca Zhu  MRN: [id number]  Today's Date: 6/4/2023    Admit Date: 5/21/2023    Plan: [facility] via EMS today at 9am   Discharge Plan       Row Name 06/04/23 0848       Plan    Plan [facility] via EMS today at 9am    Plan Comments Called and confirmed with Elda at [facility] that pt's [facility] machine is there.                   Discharge Codes    No documentation.                 Expected Discharge Date and Time       Expected Discharge Date Expected Discharge Time    Jaswinder 3, 2023               Clifford Fraire RN

## 2023-06-04 NOTE — PLAN OF CARE
Problem: Adult Inpatient Plan of Care  Goal: Absence of Hospital-Acquired Illness or Injury  Intervention: Identify and Manage Fall Risk  Recent Flowsheet Documentation  Taken 6/4/2023 0802 by Yrn Brown RN  Safety Promotion/Fall Prevention:   activity supervised   room organization consistent     Problem: Adult Inpatient Plan of Care  Goal: Absence of Hospital-Acquired Illness or Injury  Intervention: Prevent Skin Injury  Recent Flowsheet Documentation  Taken 6/4/2023 0802 by Yrn Brown RN  Body Position:   turned   supine   foot of bed elevated   Goal Outcome Evaluation:  Plan of Care Reviewed With: patient, daughter           Outcome Evaluation: Patient AO x4 with O 2 nasal cannula 2 lt/min tolerated well swallow medication without difficulty need assistant for transfer and changed position will be discharge with ambulance to [facility]

## 2023-06-04 NOTE — CASE MANAGEMENT/SOCIAL WORK
Case Management Discharge Note      Final Note: [facility]    Provided Post Acute Provider List?: Yes  Post Acute Provider List:  (Subacute rehab)  Provided Post Acute Provider Quality & Resource List?: Yes  Post Acute Provider Quality and Resource List:  ([facility])  Delivered To: Patient, Support Person  Support Person: Gaye Infante  Method of Delivery: In person    Selected Continued Care - Discharged on 6/4/2023 Admission date: 5/21/2023 - Discharge disposition: Skilled Nursing Facility (DC - External)      Destination Coordination complete.      Service Provider Selected Services Address Phone Fax Patient Preferred    [facility] [address] , [place] [address] [phone] --              Durable Medical Equipment    No services have been selected for the patient.                Dialysis/Infusion    No services have been selected for the patient.                Home Medical Care    No services have been selected for the patient.                Therapy    No services have been selected for the patient.                Community Resources    No services have been selected for the patient.                Community & DME    No services have been selected for the patient.                    Transportation Services  Ambulance: [facility]    Final Discharge Disposition Code: 03 - skilled nursing facility (SNF)

## 2023-06-30 PROBLEM — T38.3X5A HYPOGLYCEMIA DUE TO INSULIN: Status: ACTIVE | Noted: 2023-06-30

## 2023-06-30 PROBLEM — E16.0 HYPOGLYCEMIA DUE TO INSULIN: Status: ACTIVE | Noted: 2023-06-30

## 2023-07-21 ENCOUNTER — APPOINTMENT (OUTPATIENT)
Dept: GENERAL RADIOLOGY | Facility: HOSPITAL | Age: 78
End: 2023-07-21
Payer: MEDICARE

## 2023-07-21 ENCOUNTER — APPOINTMENT (OUTPATIENT)
Dept: CT IMAGING | Facility: HOSPITAL | Age: 78
End: 2023-07-21
Payer: MEDICARE

## 2023-07-21 ENCOUNTER — HOSPITAL ENCOUNTER (EMERGENCY)
Facility: HOSPITAL | Age: 78
Discharge: HOME | End: 2023-07-21
Attending: EMERGENCY MEDICINE | Admitting: EMERGENCY MEDICINE
Payer: MEDICARE

## 2023-07-21 VITALS
HEIGHT: 60 IN | HEART RATE: 89 BPM | BODY MASS INDEX: 35.34 KG/M2 | TEMPERATURE: 97.8 F | SYSTOLIC BLOOD PRESSURE: 144 MMHG | RESPIRATION RATE: 16 BRPM | OXYGEN SATURATION: 98 % | DIASTOLIC BLOOD PRESSURE: 59 MMHG | WEIGHT: 180 LBS

## 2023-07-21 DIAGNOSIS — S09.90XA MINOR HEAD INJURY, INITIAL ENCOUNTER: ICD-10-CM
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
## 2023-07-21 DIAGNOSIS — S70.01XA CONTUSION OF RIGHT HIP, INITIAL ENCOUNTER: Primary | ICD-10-CM

## 2023-07-21 PROCEDURE — 72110 X-RAY EXAM L-2 SPINE 4/>VWS: CPT

## 2023-07-21 PROCEDURE — 72125 CT NECK SPINE W/O DYE: CPT

## 2023-07-21 PROCEDURE — 73502 X-RAY EXAM HIP UNI 2-3 VIEWS: CPT

## 2023-07-21 PROCEDURE — 70450 CT HEAD/BRAIN W/O DYE: CPT

## 2023-07-21 PROCEDURE — 99284 EMERGENCY DEPT VISIT MOD MDM: CPT

## 2023-07-21 RX ORDER — ACETAMINOPHEN 500 MG
1000 TABLET ORAL ONCE
Status: COMPLETED | OUTPATIENT
Start: 2023-07-21 | End: 2023-07-21

## 2023-07-21 RX ADMIN — ACETAMINOPHEN 1000 MG: 500 TABLET ORAL at 16:12

## 2023-07-21 NOTE — ED NOTES
Pt states that she rolled out of bed at approx 0600, hit her right hip, back of her head on the bed. Denies LOC, denies current blood thinner use. No shortening or rotation of the affected leg.

## 2023-07-21 NOTE — ED PROVIDER NOTES
EMERGENCY DEPARTMENT ENCOUNTER    Room Number:  17/17  PCP: Praneeth Valenzuela MD  Historian: Patient      HPI:  Chief Complaint: Fall, right hip pain, head injury  A complete HPI/ROS/PMH/PSH/SH/FH are unobtainable due to: Nothing  Context: Blanca Zhu is a 78 y.o. female who presents to the ED c/o right hip pain and head injury that occurred today.  She reports she just got out of bed and she was standing at the bedside when she fell.  She landed on her right hip and also hit the back of her head.  She denies loss of consciousness or anticoagulants.  She is also having some mild neck pain, mild low back pain.  She denies tingling, numbness, weakness in her extremities.  She was reportedly able to stand and ambulate after the fall but with significant pain.  She denies vomiting.            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    Gastritis without bleeding 10/22/2019    Polyp of duodenum 10/22/2019    Morbidly obese 06/26/2020    Dyspnea on exertion 03/30/2021    Hyperlipidemia 05/04/2021    Type 2 diabetes mellitus with stage 3b chronic kidney disease, with long-term current use of insulin 05/04/2021    Essential hypertension 05/04/2021    Bilateral lower extremity edema 06/21/2021    Pulmonary hypertension 06/21/2021    Obstructive sleep apnea on CPAP 03/21/2022    Stage 3a chronic kidney disease 03/21/2022    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    Respiratory distress 05/21/2023    Hypoglycemia due to insulin 06/30/2023     Resolved Ambulatory Problems     Diagnosis Date Noted    No Resolved Ambulatory Problems     Past Medical  History:   Diagnosis Date    Anxiety and depression     Asthma     Balance problem     Breast cancer 2019    CKD (chronic kidney disease), stage III     Colon polyp 2019    COVID-19 2023    Diabetes mellitus, type 2     Hypertension     Sleep apnea     Swelling     Thrombophlebitis          PAST SURGICAL HISTORY  Past Surgical History:   Procedure Laterality Date    BREAST BIOPSY Left  approx    benign pathology    BREAST BIOPSY Left 2019    Ultasound guided mammotome vacuum assisted left breast biopsy with placement of a metallic clip-Dr. Daniel Gutierrez, [facility]     SECTION N/A     x3    CHOLECYSTECTOMY N/A     COLONOSCOPY      COLONOSCOPY W/ POLYPECTOMY N/A 2019    Enlarged folds in the antrum: biopsied; duodenal, transverse colon x2, splenic flexure, descending colon and sigmoid colon polyps: biopsied (path: tubular adenoma x6)-Dr. Zak De Jesus, [facility]    ENDOSCOPY  10/11/2019    Procedure: ESOPHAGOGASTRODUODENOSCOPY with hot snare polypectomy;  Surgeon: Haile Handley MD;  Location: [facility] ENDOSCOPY;  Service: Gastroenterology    ENDOSCOPY N/A 2020    Procedure: ESOPHAGOGASTRODUODENOSCOPY;  Surgeon: Haile Handley MD;  Location: [facility] ENDOSCOPY;  Service: Gastroenterology    ENDOSCOPY N/A 2020    Procedure: ESOPHAGOGASTRODUODENOSCOPY WITH BIOPSIES AND COLD BIOPSY POLYPECTOMY;  Surgeon: Haile Handley MD;  Location: [facility] ENDOSCOPY;  Service: Gastroenterology;  Laterality: N/A;  pre: dyspepsia and diarrhea  post: duodenal polyp and gastritis    MASTECTOMY Left     MASTECTOMY WITH SENTINEL NODE BIOPSY AND AXILLARY NODE DISSECTION Left 7/3/2019    Procedure: LEFT BREAST MASTECTOMY WITH SENTINEL NODE BIOPSY AND , v-y plasty closure;  Surgeon: Tahmina James MD;  Location: [facility] OR;  Service: General    SUBTOTAL HYSTERECTOMY Bilateral     ovaries still in tact    TEMPORAL ARTERY BIOPSY Bilateral 2019         FAMILY  HISTORY  Family History   Problem Relation Age of Onset    Hypertension Father     Stomach cancer Father     Diabetes Father     Esophageal cancer Father     Throat cancer Sister     Diabetes Paternal Grandmother     Hypertension Paternal Grandfather     Colon cancer Paternal Grandfather     Heart disease Mother     Colon cancer Paternal Uncle     Colon cancer Paternal Uncle     Colon cancer Paternal Uncle     Ovarian cancer Cousin     Stomach cancer Cousin     Malig Hyperthermia Neg Hx          SOCIAL HISTORY  Social History     Socioeconomic History    Marital status:      Spouse name: Ashwin    Number of children: 3    Years of education: College   Tobacco Use    Smoking status: Never    Smokeless tobacco: Never    Tobacco comments:     caffine use   Vaping Use    Vaping Use: Never used   Substance and Sexual Activity    Alcohol use: No     Comment: one or two small drinks a year    Drug use: No    Sexual activity: Not Currently     Comment: Spouse, Ashwin         ALLERGIES  Aspirin and Sulfa antibiotics        REVIEW OF SYSTEMS  Review of Systems   Review of all 14 systems is negative other than stated in the HPI above.      PHYSICAL EXAM  ED Triage Vitals [07/21/23 1417]   Temp Heart Rate Resp BP SpO2   97.8 °F (36.6 °C) 90 18 150/60 97 %      Temp src Heart Rate Source Patient Position BP Location FiO2 (%)   Tympanic Monitor Sitting Left arm --       Physical Exam      GENERAL: Awake and alert, no acute distress  HENT: nares patent, no palpable skull fracture or scalp hematoma  EYES: no scleral icterus, EOMI  CV: regular rhythm, normal rate  RESPIRATORY: normal effort  ABDOMEN: soft, nondistended, nontender throughout  MUSCULOSKELETAL: no deformity, mild midline C-spine tenderness without step-off.  No L-spine tenderness.  There is point tenderness over the right hip however there is no pain with passive ranging of the right hip.  No shortening of the right lower extremity.  NEURO: alert, moves all  extremities, follows commands normal strength and sensation throughout bilateral upper and lower extremities, cranial nerves II through XII grossly intact  PSYCH:  calm, cooperative  SKIN: warm, dry, no scalp laceration    Vital signs and nursing notes reviewed.          LAB RESULTS  No results found for this or any previous visit (from the past 24 hour(s)).    Ordered the above labs and reviewed the results.        RADIOLOGY  CT Head Without Contrast, CT Cervical Spine Without Contrast    Result Date: 7/21/2023  EMERGENCY CT SCAN OF THE HEAD AND CERVICAL SPINE WITHOUT CONTRAST ON 07/21/2023  CLINICAL HISTORY: Patient fell, hit back of head, has headache and neck pain.  HEAD CT TECHNIQUE: Spiral CT images were obtained from the base of the skull to the vertex without intravenous contrast. The images were reformatted and are submitted in 3 mm thick axial, sagittal and coronal CT sections with brain algorithm and 2 mm thick axial CT sections with high-resolution bone algorithm.  There are no prior head CTs for comparison.  FINDINGS: There is some patchy low-density in the periventricular white matter of the cerebral hemispheres consistent with mild-to-moderate small vessel disease. The remainder of the brain parenchyma is normal in attenuation. There is mild cerebral atrophy and the lateral and third ventricles are mildly prominent in size probably on the basis of central volume loss or atrophy. I see no focal mass effect. There is no midline shift. No extra-axial fluid collections are identified. There is no evidence of acute intracranial hemorrhage. No acute skull fracture is identified. The calvarium and the skull base are normal in appearance. There is complete opacification of the left maxillary sinus with circumferential mucosal thickening and central chronic inspissated secretions.  The remainder of the paranasal sinuses and the mastoid air cells and middle ear cavities are clear.      No acute intracranial  abnormality is identified. There is mild-to-moderate small vessel disease in the cerebral white matter. There is mild cerebral atrophy and the lateral and third ventricles are mildly prominent in size felt to be on the basis of central volume loss or atrophy. There are scattered calcified plaques in the intracranial segments of the distal vertebral arteries and extensive calcified plaques in the cavernous internal carotid arteries.  There is complete opacification of the left maxillary sinus with circumferential mucosal thickening and central chronic inspissated secretions. The remainder of the head CT is within normal limits. Specifically no acute skull fracture or intracranial hemorrhage is identified.  CERVICAL SPINE CT TECHNIQUE: Spiral CT images were obtained from the base of the skull down to the T2 thoracic level. The images were reformatted and submitted in 2 mm thick axial and sagittal CT sections with soft tissue algorithm and 1 mm thick axial, sagittal and coronal CT sections with high-resolution bone algorithm.  There are no prior cervical spine imaging studies from [facility] for comparison.  FINDINGS: The atlantooccipital articulation is within normal limits.  At C1-2 there are minimal arthritic changes at the atlantodental interval. Otherwise the C1-2 level is normal in appearance.  At C2-3 the disc space, facets and uncovertebral joints are normal in appearance with no canal or foraminal narrowing.  At C3-4 there is a posterior central disc bulge or small disc protrusion that abuts the anterior midline of the cord mildly narrowing the central portion of the canal. The facets and uncovertebral joints are normal in appearance. There is no foraminal narrowing.  At C4-5 there is a posterior central disc bulge or small disc protrusion that abuts the anterior midline of the cord mildly narrowing the central portion of the canal. The facets and uncovertebral joints are within normal limits.  There is no foraminal narrowing.  At C5-6 there is some anterior marginal endplate spurring.  There is a tiny posterior central spur or focal calcification in posterior longitudinal ligament that minimally narrows the central portion of the thecal sac.  The facets and uncovertebral joints are normal.  There is no foraminal narrowing.  At C6-7 the disc space, facets and uncovertebral joints are normal with no canal or foraminal narrowing.  At C7-T1 the disc space and facets are normal with no canal or foraminal narrowing.  No acute fracture is seen in the cervical spine.  IMPRESSION:  No acute fracture is seen in the cervical spine. There is minimal cervical spondylosis as described above.  Radiation dose reduction techniques were utilized, including automated exposure control and exposure modulation based on body size.       XR Spine Lumbar Complete 4+VW, XR Hip With or Without Pelvis 2 - 3 View Right    Result Date: 7/21/2023  PROCEDURE:  XR SPINE LUMBAR COMPLETE 4+VW-, XR HIP W OR WO PELVIS 2-3 VIEW RIGHT-  HISTORY: Fall, injury to lower back, right hip pain.  COMPARISON: Pelvic radiographs 10/15/2021. CT abdomen and pelvis 08/25/2020  FINDINGS:   5 views of the lumbar spine were obtained. There is transitional lumbosacral anatomy with 6 lumbar type vertebral bodies. For purposes of this dictation, L5 will be considered the most inferior lumbar-type vertebral body. There is a sclerotic lesion within the L5 vertebral body, which is better assessed on prior CT. There is a chronic mild compression fracture of T12. Lumbar vertebral body heights are maintained. There is at least mild disc height loss at multiple levels. There are tiny degenerative endplate osteophytes at a few levels. Degenerative disc disease would be better assessed with CT or MRI, if clinically indicated. There is calcific atherosclerosis.   3 views of the pelvis and right hip were obtained. There is a chronic ossific density along the medial margin  of the right femoral neck. No acute fracture or malalignment is identified.  Joint spaces are preserved. There is calcific atherosclerosis. If there is persistent clinical concern for acute fracture or inability to bear weight, further evaluation with dedicated CT or MRI would be recommended.  This report was finalized on 7/21/2023 3:45 PM by Dr. Marcia Yang M.D.       Ordered the above noted radiological studies. Reviewed by me in PACS.            PROCEDURES  Procedures            MEDICATIONS GIVEN IN ER  Medications   acetaminophen (TYLENOL) tablet 1,000 mg (1,000 mg Oral Given 7/21/23 1612)                   MEDICAL DECISION MAKING, PROGRESS, and CONSULTS    All labs have been independently reviewed by me.  All radiology studies have been reviewed by me and I have also reviewed the radiology report.   EKG's independently viewed and interpreted by me.  Discussion below represents my analysis of pertinent findings related to patient's condition, differential diagnosis, treatment plan and final disposition.        Orders placed during this visit:  Orders Placed This Encounter   Procedures    XR Spine Lumbar Complete 4+VW    XR Hip With or Without Pelvis 2 - 3 View Right    CT Head Without Contrast    CT Cervical Spine Without Contrast         Differential diagnosis includes but is not limited to:    Traumatic intracranial hemorrhage  Traumatic cervical spine fracture  Hip fracture  Lumbar spine fracture        Independent interpretation of labs, radiology studies, and discussions with consultants:  ED Course as of 07/21/23 1905   Fri Jul 21, 2023   1532 X-ray of the right hip and AP pelvis independently interpreted in PACS and demonstrates no acute fracture or malalignment. [JR]   1616 I discussed the CT head and cervical spine with Dr. Roque, radiologist reports no acute intracranial abnormality, chronic opacification of the left maxillary sinus, mild cervical spondylosis. [JR]   1644 Patient reassessed and  informed of reassuring imaging studies.  I explained plan for discharge home with Tylenol as needed for pain.  Patient expressed that she has had some depression recently due to her recent falls.  She has started physical therapy.  She also has talked to her PCP about her depression.  I encouraged her to continue working with her PCP on these issues. [JR]      ED Course User Index  [JR] Tc Brown MD           DIAGNOSIS  Final diagnoses:   Contusion of right hip, initial encounter   Minor head injury, initial encounter   Fall, initial encounter         DISPOSITION  DISCHARGE    Patient discharged in stable condition.    Reviewed implications of results, diagnosis, meds, responsibility to follow up, warning signs and symptoms of possible worsening, potential complications and reasons to return to ER.    Patient/Family voiced understanding of above instructions.    Discussed plan for discharge, as there is no emergent indication for admission. Patient referred to primary care provider for BP management due to today's BP. Pt/family is agreeable and understands need for follow up and repeat testing.  Pt is aware that discharge does not mean that nothing is wrong but it indicates no emergency is present that requires admission and they must continue care with follow-up as given below or physician of their choice.     FOLLOW-UP  Praneeth Valenzuela MD  [address] [address]  [phone]      As needed         Medication List      No changes were made to your prescriptions during this visit.                   Latest Documented Vital Signs:  As of 19:05 EDT  BP- 144/59 HR- 89 Temp- 97.8 °F (36.6 °C) (Tympanic) O2 sat- 98%              --    Please note that portions of this were completed with a voice recognition program.       Note Disclaimer: At [facility], we believe that sharing information builds trust and better relationships. You are receiving this note because you are receiving  care at [facility] or recently visited. It is possible you will see health information before a provider has talked with you about it. This kind of information can be easy to misunderstand. To help you fully understand what it means for your health, we urge you to discuss this note with your provider.             Tc Brown MD  07/21/23 2955

## 2023-07-21 NOTE — ED NOTES
Pt did require some assistance to sit upright in bed, but was then able to walk with walker, pt has a rollator at home. Pt already working with PT per . Dr bustamante aware

## 2023-08-21 ENCOUNTER — TRANSCRIBE ORDERS (OUTPATIENT)
Dept: LAB | Facility: HOSPITAL | Age: 78
End: 2023-08-21
Payer: MEDICARE

## 2023-08-21 ENCOUNTER — LAB (OUTPATIENT)
Dept: LAB | Facility: HOSPITAL | Age: 78
End: 2023-08-21
Payer: MEDICARE

## 2023-08-21 DIAGNOSIS — T78.40XA ALLERGY, UNSPECIFIED, INITIAL ENCOUNTER: ICD-10-CM

## 2023-08-21 DIAGNOSIS — Z91.018 FOOD ALLERGY: ICD-10-CM

## 2023-08-21 DIAGNOSIS — Z91.018 FOOD ALLERGY: Primary | ICD-10-CM

## 2023-08-21 LAB
BASOPHILS # BLD AUTO: 0.05 10*3/MM3 (ref 0–0.2)
BASOPHILS NFR BLD AUTO: 0.7 % (ref 0–1.5)
DEPRECATED RDW RBC AUTO: 44.8 FL (ref 37–54)
EOSINOPHIL # BLD AUTO: 0.27 10*3/MM3 (ref 0–0.4)
EOSINOPHIL NFR BLD AUTO: 4 % (ref 0.3–6.2)
ERYTHROCYTE [DISTWIDTH] IN BLOOD BY AUTOMATED COUNT: 13 % (ref 12.3–15.4)
HCT VFR BLD AUTO: 38.5 % (ref 34–46.6)
HGB BLD-MCNC: 12 G/DL (ref 12–15.9)
IMM GRANULOCYTES # BLD AUTO: 0.03 10*3/MM3 (ref 0–0.05)
IMM GRANULOCYTES NFR BLD AUTO: 0.4 % (ref 0–0.5)
LYMPHOCYTES # BLD AUTO: 0.66 10*3/MM3 (ref 0.7–3.1)
LYMPHOCYTES NFR BLD AUTO: 9.7 % (ref 19.6–45.3)
MCH RBC QN AUTO: 29.5 PG (ref 26.6–33)
MCHC RBC AUTO-ENTMCNC: 31.2 G/DL (ref 31.5–35.7)
MCV RBC AUTO: 94.6 FL (ref 79–97)
MONOCYTES # BLD AUTO: 0.67 10*3/MM3 (ref 0.1–0.9)
MONOCYTES NFR BLD AUTO: 9.8 % (ref 5–12)
NEUTROPHILS NFR BLD AUTO: 5.14 10*3/MM3 (ref 1.7–7)
NEUTROPHILS NFR BLD AUTO: 75.4 % (ref 42.7–76)
NRBC BLD AUTO-RTO: 0 /100 WBC (ref 0–0.2)
PLATELET # BLD AUTO: 262 10*3/MM3 (ref 140–450)
PMV BLD AUTO: 10.4 FL (ref 6–12)
RBC # BLD AUTO: 4.07 10*6/MM3 (ref 3.77–5.28)
WBC NRBC COR # BLD: 6.82 10*3/MM3 (ref 3.4–10.8)

## 2023-08-21 PROCEDURE — 86003 ALLG SPEC IGE CRUDE XTRC EA: CPT

## 2023-08-21 PROCEDURE — 85025 COMPLETE CBC W/AUTO DIFF WBC: CPT

## 2023-08-21 PROCEDURE — 36415 COLL VENOUS BLD VENIPUNCTURE: CPT

## 2023-08-21 PROCEDURE — 86682 HELMINTH ANTIBODY: CPT

## 2023-08-23 LAB
CLAM IGE QN: <0.1 KU/L
CODFISH IGE QN: <0.1 KU/L
CONV CLASS DESCRIPTION: NORMAL
CORN IGE QN: <0.1 KU/L
COW MILK IGE QN: <0.1 KU/L
EGG WHITE IGE QN: <0.1 KU/L
PEANUT IGE QN: <0.1 KU/L
SCALLOP IGE QN: <0.1 KU/L
SESAME SEED IGE QN: <0.1 KU/L
SHRIMP IGE QN: <0.1 KU/L
SOYBEAN IGE QN: <0.1 KU/L
WALNUT IGE QN: <0.1 KU/L
WHEAT IGE QN: <0.1 KU/L

## 2023-08-24 LAB
A ALTERNATA IGE QN: <0.1 KU/L
A FUMIGATUS IGE QN: 0.25 KU/L
AMER ROACH IGE QN: <0.1 KU/L
BAHIA GRASS IGE QN: <0.1 KU/L
BAYBERRY POLN IGE QN: <0.1 KU/L
BERMUDA GRASS IGE QN: <0.1 KU/L
BOXELDER IGE QN: <0.1 KU/L
C HERBARUM IGE QN: <0.1 KU/L
CAT DANDER IGE QN: 3.23 KU/L
COMMON RAGWEED IGE QN: <0.1 KU/L
CONV CLASS DESCRIPTION: ABNORMAL
D FARINAE IGE QN: 0.5 KU/L
D PTERONYSS IGE QN: 0.72 KU/L
DOG DANDER IGE QN: 0.27 KU/L
DOG FENNEL IGE QN: <0.1 KU/L
ENGL PLANTAIN IGE QN: <0.1 KU/L
GOOSEFOOT IGE QN: <0.1 KU/L
GUM-TREE IGE QN: <0.1 KU/L
ITALIAN CYPRESS IGE QN: <0.1 KU/L
JOHNSON GRASS IGE QN: <0.1 KU/L
M RACEMOSUS IGE QN: <0.1 KU/L
P NOTATUM IGE QN: 1.12 KU/L
PEPPER TREE IGE QN: <0.1 KU/L
PER RYE GRASS IGE QN: <0.1 KU/L
PRIVET IGE QN: <0.1 KU/L
QUEEN PALM IGE QN: <0.1 KU/L
S BOTRYOSUM IGE QN: <0.1 KU/L
SHEEP SORREL IGE QN: <0.1 KU/L
VIRG LIVE OAK IGE QN: <0.1 KU/L
WHITE ELM IGE QN: <0.1 KU/L

## 2023-08-26 LAB — STRONGYLOIDES IGG SER QL IA: NEGATIVE

## 2023-08-30 ENCOUNTER — HOSPITAL ENCOUNTER (EMERGENCY)
Facility: HOSPITAL | Age: 78
Discharge: HOME | End: 2023-08-30
Attending: EMERGENCY MEDICINE
Payer: MEDICARE

## 2023-08-30 ENCOUNTER — APPOINTMENT (OUTPATIENT)
Dept: GENERAL RADIOLOGY | Facility: HOSPITAL | Age: 78
End: 2023-08-30
Payer: MEDICARE

## 2023-08-30 ENCOUNTER — APPOINTMENT (OUTPATIENT)
Dept: CT IMAGING | Facility: HOSPITAL | Age: 78
End: 2023-08-30
Payer: MEDICARE

## 2023-08-30 VITALS
OXYGEN SATURATION: 100 % | SYSTOLIC BLOOD PRESSURE: 173 MMHG | DIASTOLIC BLOOD PRESSURE: 75 MMHG | RESPIRATION RATE: 18 BRPM | HEART RATE: 73 BPM | TEMPERATURE: 98.6 F

## 2023-08-30 DIAGNOSIS — S50.11XA CONTUSION OF RIGHT FOREARM, INITIAL ENCOUNTER: ICD-10-CM

## 2023-08-30 DIAGNOSIS — S09.90XA CHI (CLOSED HEAD INJURY), INITIAL ENCOUNTER: Primary | ICD-10-CM

## 2023-08-30 PROCEDURE — 72125 CT NECK SPINE W/O DYE: CPT

## 2023-08-30 PROCEDURE — 99284 EMERGENCY DEPT VISIT MOD MDM: CPT

## 2023-08-30 PROCEDURE — 73090 X-RAY EXAM OF FOREARM: CPT

## 2023-08-30 PROCEDURE — 70450 CT HEAD/BRAIN W/O DYE: CPT

## 2023-08-30 RX ORDER — METHOCARBAMOL 750 MG/1
750 TABLET, FILM COATED ORAL 3 TIMES DAILY PRN
Qty: 15 TABLET | Refills: 0 | Status: SHIPPED | OUTPATIENT
Start: 2023-08-30

## 2023-08-30 NOTE — ED PROVIDER NOTES
EMERGENCY DEPARTMENT ENCOUNTER    Room Number:  T01/01  Date of encounter:  8/30/2023  PCP: Praneeth Valenzuela MD  Historian: Patient/  Full history not obtainable due to: None    HPI:  Chief Complaint: CHI    Context: Blanca Zhu is a 78 y.o. female with a PMH significant for osteoporosis, iron deficiency anemia, chronic kidney disease, diabetes who presents to the ED c/o fall and head injury.  The patient reports that she sustained a fall while in her kitchen this morning.  The patient was using a walker but stepped away from the walker to grab something and fell.  She has a chronic history of gait imbalance and has been states that this has been improving with physical therapy at home of the past several weeks.  The patient was discharged from physical therapy several days ago.  Today when she fell she landed on her right arm and struck her head on the ground.  She denies loss of consciousness.  No numbness or weakness of the face or extremities, slurred speech, visual disturbance, photophobia, headache.  No signs of external trauma to the head.  She did not take a blood thinner.  No deformity noted to the right forearm.  The pain there is aching and nonradiating.      MEDICAL RECORD REVIEW:    Upon review of the medical record it appears the patient was evaluated in the office with primary care on 8/7/2023 for congestive heart failure.  The patient had a normal lactic acid on 7/2/2023 and negative blood culture on that date.    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    Gastritis without bleeding 10/22/2019    Polyp of  duodenum 10/22/2019    Morbidly obese 2020    Dyspnea on exertion 2021    Hyperlipidemia 2021    Type 2 diabetes mellitus with stage 3b chronic kidney disease, with long-term current use of insulin 2021    Essential hypertension 2021    Bilateral lower extremity edema 2021    Pulmonary hypertension 2021    Obstructive sleep apnea on CPAP 2022    Stage 3a chronic kidney disease 2022    Iron malabsorption 2023    Respiratory distress 2023    Hypoglycemia due to insulin 2023     Resolved Ambulatory Problems     Diagnosis Date Noted    No Resolved Ambulatory Problems     Past Medical History:   Diagnosis Date    Anxiety and depression     Asthma     Balance problem     Breast cancer 2019    CKD (chronic kidney disease), stage III     Colon polyp 2019    COVID-19 2023    Diabetes mellitus, type 2     Hypertension     Sleep apnea     Swelling     Thrombophlebitis          PAST SURGICAL HISTORY  Past Surgical History:   Procedure Laterality Date    BREAST BIOPSY Left  approx    benign pathology    BREAST BIOPSY Left 2019    Ultasound guided mammotome vacuum assisted left breast biopsy with placement of a metallic clip-Dr. Daniel Gutierrez, [facility]     SECTION N/A     x3    CHOLECYSTECTOMY N/A     COLONOSCOPY      COLONOSCOPY W/ POLYPECTOMY N/A 2019    Enlarged folds in the antrum: biopsied; duodenal, transverse colon x2, splenic flexure, descending colon and sigmoid colon polyps: biopsied (path: tubular adenoma x6)-Dr. Zak De Jesus, [facility]    ENDOSCOPY  10/11/2019    Procedure: ESOPHAGOGASTRODUODENOSCOPY with hot snare polypectomy;  Surgeon: Haile Handley MD;  Location: [facility] ENDOSCOPY;  Service: Gastroenterology    ENDOSCOPY N/A 2020    Procedure: ESOPHAGOGASTRODUODENOSCOPY;  Surgeon: Haile Handley MD;  Location: [facility] ENDOSCOPY;  Service: Gastroenterology    ENDOSCOPY N/A  9/9/2020    Procedure: ESOPHAGOGASTRODUODENOSCOPY WITH BIOPSIES AND COLD BIOPSY POLYPECTOMY;  Surgeon: Haile Handley MD;  Location: [facility] ENDOSCOPY;  Service: Gastroenterology;  Laterality: N/A;  pre: dyspepsia and diarrhea  post: duodenal polyp and gastritis    MASTECTOMY Left     MASTECTOMY WITH SENTINEL NODE BIOPSY AND AXILLARY NODE DISSECTION Left 7/3/2019    Procedure: LEFT BREAST MASTECTOMY WITH SENTINEL NODE BIOPSY AND , v-y plasty closure;  Surgeon: Tahmina James MD;  Location: [facility] MAIN OR;  Service: General    SUBTOTAL HYSTERECTOMY Bilateral     ovaries still in tact    TEMPORAL ARTERY BIOPSY Bilateral 12/05/2019         FAMILY HISTORY  Family History   Problem Relation Age of Onset    Hypertension Father     Stomach cancer Father     Diabetes Father     Esophageal cancer Father     Throat cancer Sister     Diabetes Paternal Grandmother     Hypertension Paternal Grandfather     Colon cancer Paternal Grandfather     Heart disease Mother     Colon cancer Paternal Uncle     Colon cancer Paternal Uncle     Colon cancer Paternal Uncle     Ovarian cancer Cousin     Stomach cancer Cousin     Malig Hyperthermia Neg Hx          SOCIAL HISTORY  Social History     Socioeconomic History    Marital status:      Spouse name: Ashwin    Number of children: 3    Years of education: College   Tobacco Use    Smoking status: Never    Smokeless tobacco: Never    Tobacco comments:     caffine use   Vaping Use    Vaping Use: Never used   Substance and Sexual Activity    Alcohol use: No     Comment: one or two small drinks a year    Drug use: No    Sexual activity: Not Currently     Comment: Spouse, Ashwin         ALLERGIES  Aspirin and Sulfa antibiotics        REVIEW OF SYSTEMS    All systems reviewed and marked as negative except as listed in HPI     PHYSICAL EXAM    I have reviewed the triage vital signs and nursing notes.    ED Triage Vitals [08/30/23 1107]   Temp Heart Rate Resp BP SpO2   98.6 °F (37 °C)  76 18 158/70 100 %      Temp src Heart Rate Source Patient Position BP Location FiO2 (%)   -- -- -- -- --       Physical Exam  Constitutional:       General: She is not in acute distress.     Appearance: She is well-developed.   HENT:      Head: Normocephalic and atraumatic.   Eyes:      General: No scleral icterus.     Conjunctiva/sclera: Conjunctivae normal.   Neck:      Trachea: No tracheal deviation.   Cardiovascular:      Rate and Rhythm: Normal rate and regular rhythm.   Pulmonary:      Effort: Pulmonary effort is normal.      Breath sounds: Normal breath sounds.   Abdominal:      Palpations: Abdomen is soft.      Tenderness: There is no abdominal tenderness.   Musculoskeletal:         General: No deformity.      Right forearm: Tenderness present. No swelling or deformity.      Cervical back: Normal range of motion. No spinous process tenderness.      Comments: Contusion on right dorsal forearm, no crepitus or deformity, elbow and wrist are nontender nonswollen and have full range of motion, neurovascular intact distally.   Lymphadenopathy:      Cervical: No cervical adenopathy.   Skin:     General: Skin is warm and dry.   Neurological:      Mental Status: She is alert and oriented to person, place, and time.   Psychiatric:         Behavior: Behavior normal.       Vital signs and nursing notes reviewed.            LAB RESULTS  No results found for this or any previous visit (from the past 24 hour(s)).    Ordered the above labs and independently reviewed the results.        RADIOLOGY  XR Forearm 2 View Right    Result Date: 8/30/2023  PROCEDURE:  XR FOREARM 2 VW RIGHT-  HISTORY: Fall, pain.  COMPARISON: None.  FINDINGS:   2 views of the right forearm were obtained.  No acute fracture or malalignment is identified.  Joint spaces are preserved. There is a small insertional triceps tendon enthesophyte. There is calcific atherosclerosis.     This report was finalized on 8/30/2023 12:10 PM by Dr. Marcia Yang M.D.       CT Head Without Contrast, CT Cervical Spine Without Contrast    Result Date: 8/30/2023  EMERGENCY CT SCAN OF THE HEAD AND CERVICAL SPINE WITHOUT CONTRAST ON 08/30/2023  CLINICAL HISTORY: Patient was ambulating without her walker and lost her balance and fell and had closed head injury, and complains of headache, neck and right arm pain.  Head CT technique: Spiral CT images were obtained from the base of the skull to the vertex without intravenous contrast. The images were reformatted and are submitted in 3 mm thick axial, sagittal and coronal CT sections with brain algorithm, 2 mm thick axial CT sections with high-resolution bone algorithm.  COMPARISON: This is correlated to head CT from [facility] on 07/21/2023.  FINDINGS: There are patchy and confluent areas of low density extending from the periventricular into the subcortical white matter of the cerebral hemispheres, consistent with mild-to-moderate small vessel disease. There is mild cerebral atrophy. Lateral and third ventricles are mildly prominent in size, felt to most probably be on the basis of central volume loss or atrophy. I see no focal mass effect. There is no midline shift. No extra-axial fluid collections are identified and there is no evidence of acute intracranial hemorrhage. Calcified plaques are present in the intracranial segments distal vertebral arteries, right greater than left, and cavernous segments of the internal carotid arteries and supracavernous segments of the internal carotid arteries bilaterally. No acute skull fracture is identified. The calvarium and skull base are normal in appearance. There is subtotal opacification of the left maxillary sinus with circumferential mucosal thickening, central hyperdense chronic inspissated secretions. The remainder of the paranasal sinuses and the mastoid air cells and the middle ear cavities are clear.      1. No significant change when compared to head CT on 07/21/2023 with  no convincing acute intracranial abnormality identified.  2. There is mild-to-moderate small vessel disease in the cerebral white matter, prominent calcified plaques in the intracranial segments of distal vertebral arteries, cavernous and supracavernous segments of the internal carotid arteries bilaterally. There is diffuse cerebral atrophy and the lateral and third ventricles are prominent in size, felt to be most probably on the basis of central volume loss or atrophy. There is stable subtotal opacification left maxillary sinus with circumferential mucosal thickening and central chronic inspissated secretions.  3. The remainder the head CT is within normal limits with no acute skull fracture or intracranial hemorrhage identified.  CERVICAL SPINE CT TECHNIQUE: Spiral CT images were obtained from the skull base down to the T3 thoracic level and images were reformatted and submitted in 2 mm thick axial and sagittal CT sections with soft tissue algorithm and 1 mm thick axial, sagittal and coronal CT sections with high-resolution bone algorithm.  FINDINGS: The atlantooccipital articulation is within normal limits.  At C1-C2, there are minimal arthritic changes at the atlantodental interval, otherwise the C1-C2 level is normal in appearance.  At C2-C3, the disc space and facets and uncovertebral joints are normal in appearance with no canal or foraminal narrowing.  At C3-C4, there is a posterior central disc bulge, abuts the anterior midline of the cord, mildly narrowing the central portion of the canal. The facets and uncovertebral joints are normal. There is no foraminal narrowing.  At C4-C5, there is a posterior central disc bulge or small disc protrusion, abuts the anterior midline of the cord, mildly narrowing the central portion of the canal. The facets and uncovertebral joints are normal. There is no foraminal narrowing.  At C5-C6, there is mild disc space narrowing, mild degenerative endplate changes, posterior  central spur, abuts the anterior midline of the cord mildly narrowing the central portion of the canal. The facets and uncovertebral joints are normal. There is no foraminal narrowing.  At C6-C7, posterior disc margin, facets and uncovertebral joints are normal with no canal or foraminal narrowing.  At C7-T1, posterior disc margin, facets are normal with no canal or foraminal narrowing.  No acute fracture is seen in the cervical spine.  IMPRESSION: No acute fracture is seen in the cervical spine. There is mild cervical spondylosis as described above.  Radiation dose reduction techniques were utilized, including automated exposure control and exposure modulation based on body size.        I ordered the above noted radiological studies. Independently reviewed by me and discussed with radiologist.  See dictation above for official radiology interpretation.      PROCEDURES    Procedures        MEDICATIONS GIVEN IN ER    Medications - No data to display      PROGRESS, DATA ANALYSIS, CONSULTS, AND MEDICAL DECISION MAKING    All labs have been independently interpreted by me.  All radiology studies have been interpreted by me.  Discussion below represents my analysis of pertinent findings related to patient's condition, differential diagnosis, treatment plan and final disposition.    Patient presentation and evaluation consistent with uncomplicated closed head injury from a mechanical fall today.  Her symptoms are well controlled in the ED and she ambulates unassisted with a steady gait.  The  and patient both state that the gait and balance is not a new problem and I do not feel this requires work-up emergently.  Plan to have her follow-up with Dr. Valenzuela as an outpatient for further management.  All questions answered and close return precautions given.    - Chronic or social conditions impacting care: None      DIFFERENTIAL DIAGNOSIS INCLUDE BUT NOT LIMITED TO:     Intracranial bleeding, concussion, right forearm  contusion      Orders placed during this visit:  Orders Placed This Encounter   Procedures    CT Head Without Contrast    CT Cervical Spine Without Contrast    XR Forearm 2 View Right         ED Course as of 08/30/23 1345   Wed Aug 30, 2023   1123 Presents with mechanical fall with closed head injury, posttraumatic headache, also complaining of neck pain as well as right forearm pain.  Patient is high risk of intracranial injury, high risk of C-spine fracture, obtaining CT imaging, x-ray injured extremity [JG]   1124 Contusion on occiput, no crepitus or deformity, no raccoon eyes or baez sign [JG]      ED Course User Index  [JG] Joel Gallegos MD       AS OF 13:45 EDT VITALS:    BP - 158/70  HR - 76  TEMP - 98.6 °F (37 °C)  02 SATS - 100%    1347 I rechecked the patient.  I discussed the patient's labs, radiology findings (including all incidental findings), diagnosis, and plan for discharge.  A repeat exam reveals no new worrisome changes from my initial exam findings.  The patient understands that the fact that they are being discharged does not denote that nothing is abnormal, it indicates that no clinical emergency is present and that they must follow-up as directed in order to properly maintain their health.  Follow-up instructions (specifically listed below) and return to ER precautions were given at this time.  I specifically instructed the patient to follow-up with their PCP.  The patient understands and agrees with the plan, and is ready for discharge.  All questions answered.      DIAGNOSIS  Final diagnoses:   CHI (closed head injury), initial encounter   Contusion of right forearm, initial encounter         DISPOSITION  D/c    Pt masked in first look. I wore a surgical mask throughout my encounters with the pt. I performed hand hygiene on entry into the pt room and upon exit.     Dictated utilizing Dragon dictation     Note Disclaimer: At [facility], we believe that sharing information builds  trust and better relationships. You are receiving this note because you recently visited [facility]. It is possible you will see health information before a provider has talked with you about it. This kind of information can be easy to misunderstand. To help you fully understand what it means for your health, we urge you to discuss this note with your provider.      Ha Carreon, PA  08/30/23 9821

## 2023-08-30 NOTE — ED NOTES
Patient from home via ems; call was for fall that happened today. Patient was ambulating without her walker, lost her balance and fell. Denies any LOC or blood thinners. C/o head pain and right arm pain. Patient is normally always on 2L O2.

## 2023-08-30 NOTE — ED NOTES
Pt states that she was walking without her walker when she lost balance. Pt states that she hit her head. Pt c/o posterior head pain and right forearm pain. Pt denies loc or blood thinners. No laceration noted to head. Bruising present to right forearm

## 2023-08-30 NOTE — ED PROVIDER NOTES
MD ATTESTATION NOTE  I wore full protective equipment throughout this patient encounter including an N95 face mask, googles, gown and gloves. Hand hygiene was performed before donning protective equipment and after removal when leaving the room.    The [facility] and I have discussed this patient's history, physical exam, and treatment plan. I have reviewed the documentation and personally had a face to face interaction with the patient. I affirm the [facility] documentation and agree with their diagnostics, findings, treatment, plan, and disposition.    I provided a substantive portion of the care of this patient.  I personally performed the physical exam, in its entirety.  The attached note describes my personal findings.    PCP: Praneeth Valenzuela MD  Patient Care Team:  Praneeth Valenzuela MD as PCP - General (Internal Medicine)  Jean-Claude Farnsworth MD as Consulting Physician (Hematology and Oncology)  Praneeth Valenzuela MD as Referring Physician (Internal Medicine)  Tahmina Brown MD as Consulting Physician (Rheumatology)  Gretchen Mcginnis MD as Consulting Physician (Nephrology)  Tahmina James MD as Surgeon (General Surgery)  Haile Handley MD as Consulting Physician (Gastroenterology)  Sb Power MD as Consulting Physician (Cardiology)     Blanca Zhu is a 78 y.o. female who presents to the ED c/o head and arm pain.  Patient reports that she fell immediately prior to arrival.  Patient reports that she normally walks with the walker, was in the kitchen making something, had walked away from her walker, lost her balance and fell.  Patient did strike her head, complains of dull pain where she hit, no loss consciousness, no confusion, no visual disturbances, no nausea vomiting.  Patient complains of stiffness in her neck, no radicular pain, no weakness or numbness.  Patient reports only other injury as she has some dull pain in her right forearm.  Patient is not on a blood thinner, reports that she was at baseline  prior to fall.    On exam:  General: NAD.  Head: NCAT.  ENT: nares patent, no scleral icterus  Neck: Supple, trachea midline.  Cervical spine: No step-offs or deformities, no midline tenderness to palpation, bilateral paraspinal tenderness palpation.  Cardiac: regular rate and rhythm.  Lungs: normal effort.  Abdomen: Soft, NTTP.   Extremities: Moves all extremities well, no peripheral edema.  Contusion on right dorsal forearm, no crepitus or deformity, elbow and wrist are nontender nonswollen and have full range of motion, neurovascular intact distally.  Neuro: alert, MAEW, follows commands  Psych: calm, cooperative  Skin: Warm, dry.    Medical Decision Making:  After the initial H&P, I discussed pertinent information from history and physical exam with patient/family.  Discussed differential diagnosis.  Discussed plan for ED evaluation/work-up/treatment.  All questions answered.  Patient/family is agreeable with plan.    ED Course as of 08/30/23 1540   Wed Aug 30, 2023   1123 Presents with mechanical fall with closed head injury, posttraumatic headache, also complaining of neck pain as well as right forearm pain.  Patient is high risk of intracranial injury, high risk of C-spine fracture, obtaining CT imaging, x-ray injured extremity [JG]   1124 Contusion on occiput, no crepitus or deformity, no raccoon eyes or baez sign [JG]      ED Course User Index  [JG] Joel Gallegos MD       Diagnosis  Final diagnoses:   CHI (closed head injury), initial encounter   Contusion of right forearm, initial encounter          Joel Gallegos MD  08/30/23 8440

## 2023-09-19 ENCOUNTER — TRANSCRIBE ORDERS (OUTPATIENT)
Dept: ADMINISTRATIVE | Facility: HOSPITAL | Age: 78
End: 2023-09-19
Payer: MEDICARE

## 2023-09-19 ENCOUNTER — TELEPHONE (OUTPATIENT)
Dept: ONCOLOGY | Facility: CLINIC | Age: 78
End: 2023-09-19

## 2023-09-19 DIAGNOSIS — Z12.31 VISIT FOR SCREENING MAMMOGRAM: Primary | ICD-10-CM

## 2023-09-19 NOTE — TELEPHONE ENCOUNTER
Hub staff attempted to follow warm transfer process and was unsuccessful BECAUSE I WAS TOLD TO SEND MESSAGE SO INDIVIDUAL  COULD R/S.    Caller: Reyes,Alberto/Sina    Relationship to patient: Emergency Contact    Best call back number: [phone]    Patient is needing: TO R/S 9-20-23 LAB AND F/U APPT TO EARLY MORNING TOMORROW INSTEAD OF 4 PM APPT.

## 2023-09-20 ENCOUNTER — LAB (OUTPATIENT)
Dept: LAB | Facility: HOSPITAL | Age: 78
End: 2023-09-20
Payer: MEDICARE

## 2023-09-20 ENCOUNTER — OFFICE VISIT (OUTPATIENT)
Dept: ONCOLOGY | Facility: CLINIC | Age: 78
End: 2023-09-20
Payer: MEDICARE

## 2023-09-20 VITALS
DIASTOLIC BLOOD PRESSURE: 83 MMHG | WEIGHT: 165.7 LBS | HEART RATE: 85 BPM | SYSTOLIC BLOOD PRESSURE: 154 MMHG | TEMPERATURE: 97.8 F | BODY MASS INDEX: 32.53 KG/M2 | HEIGHT: 60 IN | OXYGEN SATURATION: 98 %

## 2023-09-20 DIAGNOSIS — K90.9 IRON MALABSORPTION: ICD-10-CM

## 2023-09-20 DIAGNOSIS — D05.12 BREAST NEOPLASM, TIS (DCIS), LEFT: Primary | ICD-10-CM

## 2023-09-20 DIAGNOSIS — D13.2 DUODENAL ADENOMA: ICD-10-CM

## 2023-09-20 DIAGNOSIS — D50.8 OTHER IRON DEFICIENCY ANEMIA: ICD-10-CM

## 2023-09-20 LAB
ALBUMIN SERPL-MCNC: 3.6 G/DL (ref 3.5–5.2)
ALBUMIN/GLOB SERPL: 1.3 G/DL
ALP SERPL-CCNC: 181 U/L (ref 39–117)
ALT SERPL W P-5'-P-CCNC: 33 U/L (ref 1–33)
ANION GAP SERPL CALCULATED.3IONS-SCNC: 16 MMOL/L (ref 5–15)
AST SERPL-CCNC: 28 U/L (ref 1–32)
BASOPHILS # BLD AUTO: 0.07 10*3/MM3 (ref 0–0.2)
BASOPHILS NFR BLD AUTO: 0.8 % (ref 0–1.5)
BILIRUB SERPL-MCNC: 0.3 MG/DL (ref 0–1.2)
BUN SERPL-MCNC: 31 MG/DL (ref 8–23)
BUN/CREAT SERPL: 25.4 (ref 7–25)
CALCIUM SPEC-SCNC: 8.9 MG/DL (ref 8.6–10.5)
CHLORIDE SERPL-SCNC: 93 MMOL/L (ref 98–107)
CO2 SERPL-SCNC: 22 MMOL/L (ref 22–29)
CREAT SERPL-MCNC: 1.22 MG/DL (ref 0.6–1.1)
DEPRECATED RDW RBC AUTO: 49 FL (ref 37–54)
EGFRCR SERPLBLD CKD-EPI 2021: 45.8 ML/MIN/1.73
EOSINOPHIL # BLD AUTO: 0.1 10*3/MM3 (ref 0–0.4)
EOSINOPHIL NFR BLD AUTO: 1.1 % (ref 0.3–6.2)
ERYTHROCYTE [DISTWIDTH] IN BLOOD BY AUTOMATED COUNT: 13.9 % (ref 12.3–15.4)
FERRITIN SERPL-MCNC: 960 NG/ML (ref 13–150)
GLOBULIN UR ELPH-MCNC: 2.8 GM/DL
GLUCOSE SERPL-MCNC: 337 MG/DL (ref 65–99)
HCT VFR BLD AUTO: 40.1 % (ref 34–46.6)
HGB BLD-MCNC: 12.9 G/DL (ref 12–15.9)
HGB RETIC QN AUTO: 35.1 PG (ref 29.8–36.1)
IMM GRANULOCYTES # BLD AUTO: 0.11 10*3/MM3 (ref 0–0.05)
IMM GRANULOCYTES NFR BLD AUTO: 1.2 % (ref 0–0.5)
IMM RETICS NFR: 4.5 % (ref 3–15.8)
IRON 24H UR-MRATE: 80 MCG/DL (ref 37–145)
IRON SATN MFR SERPL: 31 % (ref 20–50)
LYMPHOCYTES # BLD AUTO: 0.69 10*3/MM3 (ref 0.7–3.1)
LYMPHOCYTES NFR BLD AUTO: 7.5 % (ref 19.6–45.3)
MCH RBC QN AUTO: 30.8 PG (ref 26.6–33)
MCHC RBC AUTO-ENTMCNC: 32.2 G/DL (ref 31.5–35.7)
MCV RBC AUTO: 95.7 FL (ref 79–97)
MONOCYTES # BLD AUTO: 0.72 10*3/MM3 (ref 0.1–0.9)
MONOCYTES NFR BLD AUTO: 7.8 % (ref 5–12)
NEUTROPHILS NFR BLD AUTO: 7.55 10*3/MM3 (ref 1.7–7)
NEUTROPHILS NFR BLD AUTO: 81.6 % (ref 42.7–76)
NRBC BLD AUTO-RTO: 0 /100 WBC (ref 0–0.2)
PLATELET # BLD AUTO: 239 10*3/MM3 (ref 140–450)
PMV BLD AUTO: 10 FL (ref 6–12)
POTASSIUM SERPL-SCNC: 4.5 MMOL/L (ref 3.5–5.2)
PROT SERPL-MCNC: 6.4 G/DL (ref 6–8.5)
RBC # BLD AUTO: 4.19 10*6/MM3 (ref 3.77–5.28)
RETICS # AUTO: 0.05 10*6/MM3 (ref 0.02–0.13)
RETICS/RBC NFR AUTO: 1.1 % (ref 0.7–1.9)
SODIUM SERPL-SCNC: 131 MMOL/L (ref 136–145)
TIBC SERPL-MCNC: 258 MCG/DL (ref 298–536)
TRANSFERRIN SERPL-MCNC: 184 MG/DL (ref 200–360)
WBC NRBC COR # BLD: 9.24 10*3/MM3 (ref 3.4–10.8)

## 2023-09-20 PROCEDURE — 82728 ASSAY OF FERRITIN: CPT

## 2023-09-20 PROCEDURE — 82607 VITAMIN B-12: CPT | Performed by: INTERNAL MEDICINE

## 2023-09-20 PROCEDURE — 36415 COLL VENOUS BLD VENIPUNCTURE: CPT

## 2023-09-20 PROCEDURE — 80053 COMPREHEN METABOLIC PANEL: CPT

## 2023-09-20 PROCEDURE — 84466 ASSAY OF TRANSFERRIN: CPT

## 2023-09-20 PROCEDURE — 85046 RETICYTE/HGB CONCENTRATE: CPT

## 2023-09-20 PROCEDURE — 85025 COMPLETE CBC W/AUTO DIFF WBC: CPT

## 2023-09-20 PROCEDURE — 83540 ASSAY OF IRON: CPT

## 2023-09-20 NOTE — PROGRESS NOTES
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      History of Present Illness     On 09/20/2023 this 78-year-old  female returns today to the office for follow-up along with her . She has been hospitalized in the ICU at [facility] several weeks ago with respiratory insufficiency. She was intubated, mechanically ventilated for several days, finally extubated, and slowly recovering at home. She lost almost 40 pounds in this endeavor and actually her blood sugar has dramatically dropped and her blood pressure medicine has dramatically decreased the need for. The patient is using minimal amount of insulin at this point. On the other hand, her balance remains terrible. She is known for having a terrible Romberg test in the first clinical examination by me many years ago. [facility] and she remains using a walker. She requires a lot of support by her  including bathing, dressing, eating and walking. She is slowly making progress but she spends a lot of time in bed. Her appetite has decreased. She has not had any heartburn or indigestion. No nausea or vomiting. She has had proper bowel activity. No passage of blood in the stool. Urination with proper volume. She has had minimal if any shortness of breath at this time. She remains very fatigued and tired. She has received early this summer, IV iron in the background of iron deficiency. The good news her hemoglobin is dramatically improved and her reticulated hemoglobin is dramatically improved.                          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 Daniel Gutierrez, [facility]     SECTION N/A     x3    CHOLECYSTECTOMY N/A     COLONOSCOPY      COLONOSCOPY W/ POLYPECTOMY N/A 2019    Enlarged folds in the antrum: biopsied; duodenal, transverse colon x2, splenic flexure, descending colon and sigmoid colon polyps: biopsied (path: tubular adenoma x6)-Dr. Zak De Jesus, [facility]    ENDOSCOPY  10/11/2019    Procedure: ESOPHAGOGASTRODUODENOSCOPY with hot snare polypectomy;  Surgeon: Haile Handley MD;  Location: [facility] ENDOSCOPY;  Service: Gastroenterology    ENDOSCOPY N/A 2020    Procedure: ESOPHAGOGASTRODUODENOSCOPY;  Surgeon: Haile Handley MD;  Location: [facility] ENDOSCOPY;  Service: Gastroenterology    ENDOSCOPY N/A 2020    Procedure: ESOPHAGOGASTRODUODENOSCOPY WITH BIOPSIES AND COLD BIOPSY POLYPECTOMY;  Surgeon: Haile Handley MD;  Location: [facility] ENDOSCOPY;  Service: Gastroenterology;  Laterality: N/A;  pre: dyspepsia and diarrhea  post: duodenal polyp and gastritis    MASTECTOMY Left     MASTECTOMY WITH SENTINEL NODE BIOPSY AND AXILLARY NODE DISSECTION Left 7/3/2019    Procedure: LEFT BREAST MASTECTOMY WITH SENTINEL NODE BIOPSY AND , v-y plasty closure;  Surgeon: Tahmina James MD;  Location: [facility] MAIN OR;  Service: General    SUBTOTAL HYSTERECTOMY Bilateral     ovaries still in tact    TEMPORAL ARTERY BIOPSY Bilateral 2019        Current Outpatient Medications on File Prior to Visit   Medication  Sig Dispense Refill    acetaminophen (TYLENOL) 325 MG tablet Take 2 tablets by mouth Every 6 (Six) Hours As Needed for Mild Pain. 30 tablet 3    amLODIPine (NORVASC) 5 MG tablet Take 1 tablet by mouth Daily.      bisoprolol (ZEBeta) 10 MG tablet Take 2 tablets by mouth Every Night.      Cholecalciferol 125 MCG (5000 UT) tablet Every Other Day.      ipratropium-albuterol (DUO-NEB) 0.5-2.5 mg/3 ml nebulizer Take 3 mL by nebulization 4 (Four) Times a Day As Needed for Shortness of Air or Wheezing. 10 mL 0    leflunomide (ARAVA) 10 MG tablet Take 1 tablet by mouth Daily.      methocarbamol (ROBAXIN) 750 MG tablet Take 1 tablet by mouth 3 (Three) Times a Day As Needed for Muscle Spasms. 15 tablet 0    O2 (OXYGEN) Inhale 1 (One) Time.      omeprazole (priLOSEC) 20 MG capsule 2 capsules.      torsemide (DEMADEX) 20 MG tablet Take 1 tablet by mouth Daily. 30 tablet 3     No current facility-administered medications on file prior to visit.        ALLERGIES:    Allergies   Allergen Reactions    Aspirin Nausea Only     Low tolerance, abdominal pain    Sulfa Antibiotics Unknown (See Comments)     Happened as a child        Social History     Socioeconomic History    Marital status:      Spouse name: Ashwin    Number of children: 3    Years of education: College   Tobacco Use    Smoking status: Never    Smokeless tobacco: Never    Tobacco comments:     caffine use   Vaping Use    Vaping Use: Never used   Substance and Sexual Activity    Alcohol use: No     Comment: one or two small drinks a year    Drug use: No    Sexual activity: Not Currently     Comment: Spouse, Ashwin        Family History   Problem Relation Age of Onset    Hypertension Father     Stomach cancer Father     Diabetes Father     Esophageal cancer Father     Throat cancer Sister     Diabetes Paternal Grandmother     Hypertension Paternal Grandfather     Colon cancer Paternal Grandfather     Heart disease Mother     Colon cancer Paternal Uncle     Colon  "cancer Paternal Uncle     Colon cancer Paternal Uncle     Ovarian cancer Cousin     Stomach cancer Cousin     [place] Hyperthermia Neg Hx           Objective     Vitals:    09/20/23 1642   BP: 154/83   Pulse: 85   Temp: 97.8 °F (36.6 °C)   TempSrc: Temporal   SpO2: 98%   Weight: 75.2 kg (165 lb 11.2 oz)   Height: 152.4 cm (60\")   PainSc: 0-No pain         9/20/2023     4:41 PM   Current Status   ECOG score 1       Physical Exam                      GENERAL:  Well-developed, Patient  in CHRONIC ILLNESS USING A WALKER  SKIN:  Warm, dry ,NO purpura ,no rash.  HEENT:  Pupils were equal and reactive to light and accomodation, conjunctivae noninjected,  normal visual acuity.   NECK:  Supple with good range of motion; no thyromegaly , no JVD or bruits,.No carotid artery pain, no carotid abnormal pulsation   LYMPHATICS:  No cervical, NO supraclavicular, NO axillary, NO inguinal adenopathies.  CARDIAC   normal rate , regular rhythm, without murmur,NO rubs NO S3 NO S4   LUNGS: normal breath sounds bilateral, no wheezing, NO rhonchi, NO crackles ,NO rubs.  VASCULAR VENOUS: no cyanosis, NO collateral circulation, NO varicosities, NO edema, NO palpable cords, NO pain,NO erythema, NO pigmentation of the skin.  ABDOMEN:  Soft, NO pain,no hepatomegaly, no splenomegaly,no masses, no ascites, no collateral circulation,no distention.  EXTREMITIES  AND SPINE:  No clubbing, no cyanosis ,no deformities , no pain .No kyphosis,  no pain in spine, no pain in ribs , no pain in pelvic bone.  NEUROLOGICAL:  Patient was awake, alert, oriented to time, person and place.NEUROPATHY IN FEET POSITIVE ROMBERG                      RECENT LABS:  Hematology WBC   Date Value Ref Range Status   09/20/2023 9.24 3.40 - 10.80 10*3/mm3 Final     RBC   Date Value Ref Range Status   09/20/2023 4.19 3.77 - 5.28 10*6/mm3 Final     Hemoglobin   Date Value Ref Range Status   09/20/2023 12.9 12.0 - 15.9 g/dL Final     Hematocrit   Date Value Ref Range Status "   09/20/2023 40.1 34.0 - 46.6 % Final     Platelets   Date Value Ref Range Status   09/20/2023 239 140 - 450 10*3/mm3 Final            Assessment & Plan      1. This patient has undergone a left-sided mastectomy for DCIS of the left breast. The patient had a large tumor that was high grade with negative margins of resection, minimal microinvasion and negative sentinel lymph nodes. The patient’s estrogen receptor and progesterone receptor were negative and she was HER2 negative as well at the time of the original diagnosis. Under these premises, I think the patient is done with the treatment for this and I do not recommend any form of adjuvant chemotherapy, radiation treatment and obviously no hormonal therapy. There is no role for Herceptin or medicines of this nature either.     The patient was further reviewed on 03/30/2021 in regard to her breast cancer. Her right breast examination is normal. Her mammogram is up-to-date and been normal and her left mastectomy site is normal. I see nothing to suggest breast cancer recurrence. As we mentioned before the patient never required adjuvant therapy because she had triple negative disease.         The patient was further reviewed in regard to her breast issue on 07/20/2021. Her mastectomy on the left side is well healed, the right breast is unremarkable, there is no lymphedema in either extremity and the patient is doing fine from this point of view.   The patient is reviewed on 11/16/2021 in regard to her left side mastectomy for DCIS of the left breast. So far the mastectomy site is unremarkable, surgical site is well healed. There is no lymphedema in the left upper extremity, the left axilla is normal, right breast is normal. Right breast mammogram recently performed as stated above disclosed no abnormalities. This will be watched in absence of any other intervention.   The patient was reviewed on 05/12/2022. I find no symptoms or signs that would indicate breast  cancer recurrence. Her right breast exam is normal. Her left side mastectomy discloses no areas of induration, tenderness, nodularity. Her left axilla is unremarkable. She has no lymphedema in either upper extremity. She will be watched in absence of any other intervention.  On 07/20/2022 the patient has no symptoms or signs of breast cancer recurrence. Her left chest wall is unremarkable, left axilla is normal. No lymphedema in the left upper extremity. Right breast examination is normal, no lymphedema in the right upper extremity, normal right axilla.     Mammogram requested to be done in 6 months after she comes back from [place] in 01/2023.   On 03/01/2023 the patient has no symptoms or signs of breast cancer recurrence, her left mastectomy site is normal, her right breast has been examined during the previous visit. I have not examined the right breast today. She is up to date on her right sided mammogram. She will remain in observation from this point of view.  On 05/10/2023 from the point of view of her mastectomy for DCIS, she has no issues pertinent to this. This will be watched in absence of any other intervention.  On 09/20/2023 there are no symptoms or signs of breast cancer recurrence. This patient has had a mastectomy. Her chest wall examination today is unremarkable.                      2. From the point of view of her anemia, iron deficiency, the patient has been replaced with IV iron. As far as the patient can tell on review on 10/19/2020 she has not had any evidence of blood loss in the gastrointestinal tract. Her diet is appropriate but the only thing that she is not following through is that she is back onto diet soft drinks Pepsi for example. She is not drinking any coffee that she used to drink copious amount of it. According to her, her diet again remains appropriate. It calls my attention the hemoglobin of 11.7, her reticulated hemoglobin is normal at 35 therefore probably anemia from today  is reflection of some other condition.  The patient remains anemic today and we have a multitude of testing including ferritin, iron, TIBC, B12, folic acid and reticulated hemoglobin to further analyze. This will be discussed with her on the telephone once that these reports become available. Also I have asked the lab to run peripheral blood slide for me to review given her previous history of hemolytic anemia. I will review her chemistry profile, bilirubin and along with the retic count this will give me an idea if hemolytic anemia is an issue at this point.   On 11/16/2021 the patient remains mildly anemic with a hemoglobin of 10.9. Six months ago we did ferritin, iron, TIBC, B12, folic acid levels all of them normal. Her erythropoietin level was low consistent with anemia of chronic kidney disease. She has not undergone any erythropoietin therapy at this point.   On 05/12/2022, the patient has become anemic again in spite of not losing blood in the gastrointestinal tract. Reticulated hemoglobin is 33; therefore it is likely that she does not have iron deficiency anemia but her ferritin, iron profile are pending and she has had B12 and folic acid levels measured not too far ago that were normal. This leads me to believe that very likely the anemia component of this patient at this time is probably associated with chronic illness, inflammatory condition and chronic kidney disease, especially if we account for the progressive amount of anasarca that this patient has. She will be called at home with the report of the rest of laboratory testing pertinent to this.  On 07/20/2022 the patient still has a mild component of anemia. During the previous visit we documented normal ferritin, iron, TIBC, B12, folic acid level and normal reticulated hemoglobin. Today the numbers remain about the same. She is not having any blood loss. We advised her to remain in observation. Given her creatinine clearance it is likely that part  of the anemia corresponds to anemia associated with chronic renal disease given the fact that the hemoglobin stays above 10 she does not qualify for Procrit.  On 03/01/2023 the patient has had a lot of Gi issues since her COVID infection in [place]. She has not noticed any passage of blood in the stool but she has had constipation and she has had gastric irritation. Her hemoglobin is lower than usual and her reticulated hemoglobin has dropped to 3 telling me that very likely she has iron deficiency again. This raises the question if what she had in the stomach before, polyps in the stomach and duodenum has anything to do with this and if she is having any other new symptomatology pertinent to the anemia and what we need to do. She has not seen Vega Handley MD, in a good while and I went ahead and scheduled her to be seen by him. I pointed out to her that if she is documented to have low ferritin, low iron profile she will require IV iron therapy because she cannot handle oral iron therapy number one and she will require hemoccult testing. Very likely she will have an obvious indication to pursue further upper and lower endoscopies. Assuming that she has low iron she will be scheduled to receive IV iron therapy in the form of Venofer times 3 at 300 mg per dose and review her back in a couple of months to see how she evolves from that point of view.   On 05/10/2023 the patient received IV iron therapy almost 6 weeks ago. Her hemoglobin is 11.6 today. Other indices are waiting to be reviewed. The white count and the platelet count are normal. The white count differential is normal. Reticulated hemoglobin is pending. We tried to obtain another blood sample with her, extremely difficult to measure ferritin. We are not sure that this will be appropriate for measurement of this, waiting to be reviewed by the lab techs.  On 09/20/2023 The patient has had correction of her iron deficiency anemia with IV iron. Hemoglobin has  bounced back telling us that she has a healthy bone marrow that is able to respond to IV iron therapy. The patient's reticulated hemoglobin has normalized and the patient feels better from this point of view. On the other hand all the weakness associated with her recent hospitalization, mechanical ventilation, intubation, respiratory insufficiency and so forth has taken a big toll on her. She lost 40 pounds of weight. She has tremendous immobility syndrome and she is slowly making progress. How far she is going to be able to go through, I do not know but she has already experienced 4 falls and that is very worrisome in regard to her ability to continue functioning independently. She is now using a walker. Her  is very supportive.                    3. The patient has very significant degree of shortness of breath upon minimal exertion. I asked her to walk. It took her almost 3 minutes to walk 30 feet. She was very short winded. We started with an O2 saturation of 100% on room air and a heart rate of 73. Her O2 saturation dropped to 97 and heart rate never raised.     I reviewed this issue with her again today. Her cardiac auscultation is very much normal and her echocardiogram done not too long ago did not document too much of major abnormality besides pulmonary hypertension. Her lung auscultation is abnormal with decreased breath sounds bilaterally. I do not think she has effusions but I think she needs to be seen by pulmonology. I asked her to review this information with her pulmonologist as soon as possible. I still wonder why this patient is not receiving at least oxygen at nighttime given the fact that she has sleep apnea and she has very likely chronic lung disease.  She dd not know that she has pulmonary hypertension and this will be a first start in the treatment of this condition anyway.   On 11/16/2021 the cardiac auscultation remains unremarkable, no obvious gallops. Pulmonary auscultation shows  decrease of breath sounds bilaterally. We advised her to do exercises that maybe in the long run could have impact on her and I again advised her that it will be tremendously consequential to her to lose 20-30 pounds of weight. This will allow less oxygen consumption by fat and more oxygen consumption by her normal weight, normal other parts including muscles, heart, kidneys and so forth. In the long run this could have positive consequences on her.     I will review her back in 6 months with a CBC and CMP at that time.     I find no reason for this patient to proceed with erythropoietin therapy with a hemoglobin of 10.9.   The patient on 05/12/2022 is more short of breath than usual. She is barely able to walk 10 feet without becoming hypoxemic and she has in her cardiac auscultation today a gallop that is more than typical. Her lung auscultation is clear. She has no crackles. She has 3+ edema in both lower extremities in spite of having elastic support in place and she has a blister in the left lower extremity with no obvious secondary infection. I do believe that her shortness of breath is very worrisome for congestive heart failure, fluid accumulation, anasarca and is very worrisome as well in the background of chronic kidney disease.     On top of that she is having pain in the epigastric area that sometimes looks to me and sounds to me like angina and I think she needs to be seen by Cardiology right away. I sent a note to Dr. Power, the patient’s cardiologist, to review this. I went ahead and ordered an echocardiogram for completeness and I ordered a proBNP today. I did not change any medicines on her. My office will be calling Dr. Power’ office to arrange for a follow-up visit quit.  On 07/20/2022 after visits to her nephrologist and cardiologist the patient's volume status has improved, she has lesser degree of anasarca, she feels better. Medications have been modified and overall she is doing  better.  On 03/01/2023 her shortness of breath is very significant upon exertion. Her pulmonary auscultation shows very poor breath sounds bilaterally. Dr. Dmitry Lang has seen her. Radiological assessment is coming up in that regard. I have nothing to add to this situation, Dr. Lang will follow up with her.   On 05/10/2023 shortness of breath is less. She has not had the need for oxygen in the last 3 days. She is on Symbicort. She has not had any respiratory infection and her volume in regard to water control seems to be better controlled at this time. The patient has no new cardiac issues. She does not have new pulmonary issues besides the discontinuation of Trelegy and going back into Symbicort on her own.     I do believe that the IV iron infusion also improves cardiac function and maybe that is another reason why she feels better.  On 09/20/2023 after losing so much weight in the hospitalization, the shortness of breath is very minimal, but she is very inactive at this time. She stays in bed most of the time. I encouraged her to get out of the bed and move on. If she stays in bed the future is going to be more than dark and she knows that.                4.The patient states the swelling in the lower extremities along with the blister in the left lower extremity is very worrisome for her in the background of diabetes. She has elastic support and the legs are huge. She has probably in her body no less than 20 pounds worth of water, I will guess. I would not be surprised if she needs to end up in the hospital with aggressive management of water and profuse diuresis that is necessary.     I went ahead and referred her to be seen by Vascular Surgery. Given the fact that she has had diabetes for so long, she will require Doppler studies of the lower extremities, both for the venous system and also for the arterial system.     On 07/20/2022 the patient had vascular studies in the lower extremities both venous and  arterial failing to document any abnormality to justify the swelling in the lower extremities. Given this finding I advised the patient that this is good news and she will follow up with vascular surgery in the future. Elastic support could be something that she needs to wear.     I will review her back in 6 months with a CBC, CMP, ferritin, iron, TIBC, B12, folic acid level and a mammogram done after she returns from [place] in 01/2023.  On 03/01/2023 the swelling in her lower extremities is a combination of factors. I would not be surprised if she has a component of right sided heart failure at the same time.     The patient will be called at home with the report of her iron and ferritin levels and proBNP.     Planning to see her back in a couple of months repeating CBC, ferritin, iron profile, CMP and proceed with Venofer on 3 different occasions.    On 05/10/2023 the patient will have endoscopies by the end of 05/2023. I asked her to call to notify the time and date of this to review what is found and what the pathology shows. Otherwise, we are planning to review her back in 4 months with a CBC, reticulated hemoglobin, CMP, ferritin, iron profile.     I discussed all these facts with her and her  in [place]. They were grateful about the conversation.  On 09/20/2023 the degree of swelling in her lower extremities is very minimal at this time. The need for blood pressure medication and insulin dramatically changed since the hospitalization. The most important issue that I did on the patient was advise her one more time any many more times, about the need for her to get walking and moving, otherwise the future is not going to be that clear. She realizes that. I will review her back in 6 months with a CBC, CMP, ferritin, iron profile.

## 2023-09-21 LAB — VIT B12 BLD-MCNC: 516 PG/ML (ref 211–946)

## 2023-09-25 ENCOUNTER — TRANSCRIBE ORDERS (OUTPATIENT)
Dept: ADMINISTRATIVE | Facility: HOSPITAL | Age: 78
End: 2023-09-25
Payer: MEDICARE

## 2023-09-25 DIAGNOSIS — Z78.0 POSTMENOPAUSAL: Primary | ICD-10-CM

## 2023-10-01 ENCOUNTER — APPOINTMENT (OUTPATIENT)
Dept: GENERAL RADIOLOGY | Facility: HOSPITAL | Age: 78
DRG: 640 | End: 2023-10-01
Payer: MEDICARE

## 2023-10-01 ENCOUNTER — HOSPITAL ENCOUNTER (INPATIENT)
Facility: HOSPITAL | Age: 78
LOS: 2 days | Discharge: HOME | DRG: 640 | End: 2023-10-04
Attending: EMERGENCY MEDICINE | Admitting: INTERNAL MEDICINE
Payer: MEDICARE

## 2023-10-01 ENCOUNTER — APPOINTMENT (OUTPATIENT)
Dept: CT IMAGING | Facility: HOSPITAL | Age: 78
DRG: 640 | End: 2023-10-01
Payer: MEDICARE

## 2023-10-01 DIAGNOSIS — E11.21 DIABETIC NEPHROPATHY ASSOCIATED WITH TYPE 2 DIABETES MELLITUS: ICD-10-CM

## 2023-10-01 DIAGNOSIS — G47.33 OBSTRUCTIVE SLEEP APNEA ON CPAP: ICD-10-CM

## 2023-10-01 DIAGNOSIS — R79.89 ELEVATED LACTIC ACID LEVEL: ICD-10-CM

## 2023-10-01 DIAGNOSIS — E11.65 HYPERGLYCEMIA DUE TO DIABETES MELLITUS: ICD-10-CM

## 2023-10-01 DIAGNOSIS — E83.42 HYPOMAGNESEMIA: ICD-10-CM

## 2023-10-01 DIAGNOSIS — I27.20 PULMONARY HYPERTENSION: ICD-10-CM

## 2023-10-01 DIAGNOSIS — R41.82 ALTERED MENTAL STATUS, UNSPECIFIED ALTERED MENTAL STATUS TYPE: Primary | ICD-10-CM

## 2023-10-01 DIAGNOSIS — N18.9 CHRONIC RENAL IMPAIRMENT, UNSPECIFIED CKD STAGE: ICD-10-CM

## 2023-10-01 PROBLEM — R41.0 DELIRIUM: Status: ACTIVE | Noted: 2023-10-01

## 2023-10-01 LAB
ALBUMIN SERPL-MCNC: 3.8 G/DL (ref 3.5–5.2)
ALBUMIN/GLOB SERPL: 1.3 G/DL
ALP SERPL-CCNC: 183 U/L (ref 39–117)
ALT SERPL W P-5'-P-CCNC: 19 U/L (ref 1–33)
AMPHET+METHAMPHET UR QL: NEGATIVE
ANION GAP SERPL CALCULATED.3IONS-SCNC: 11 MMOL/L (ref 5–15)
AST SERPL-CCNC: 15 U/L (ref 1–32)
ATMOSPHERIC PRESS: 756.1 MMHG
B-OH-BUTYR SERPL-SCNC: <0.02 MMOL/L (ref 0.02–0.27)
BARBITURATES UR QL SCN: NEGATIVE
BASE EXCESS BLDV CALC-SCNC: -1.1 MMOL/L (ref -2–2)
BASOPHILS # BLD AUTO: 0.04 10*3/MM3 (ref 0–0.2)
BASOPHILS NFR BLD AUTO: 0.4 % (ref 0–1.5)
BENZODIAZ UR QL SCN: NEGATIVE
BILIRUB SERPL-MCNC: 0.3 MG/DL (ref 0–1.2)
BILIRUB UR QL STRIP: NEGATIVE
BUN SERPL-MCNC: 30 MG/DL (ref 8–23)
BUN/CREAT SERPL: 24.8 (ref 7–25)
CALCIUM SPEC-SCNC: 9.6 MG/DL (ref 8.6–10.5)
CANNABINOIDS SERPL QL: NEGATIVE
CHLORIDE SERPL-SCNC: 95 MMOL/L (ref 98–107)
CLARITY UR: CLEAR
CO2 BLDA-SCNC: 27.7 MMOL/L (ref 23–27)
CO2 SERPL-SCNC: 26 MMOL/L (ref 22–29)
COCAINE UR QL: NEGATIVE
COLOR UR: YELLOW
CREAT SERPL-MCNC: 1.21 MG/DL (ref 0.57–1)
D-LACTATE SERPL-SCNC: 1.4 MMOL/L (ref 0.5–2)
D-LACTATE SERPL-SCNC: 2.9 MMOL/L (ref 0.5–2)
DEPRECATED RDW RBC AUTO: 46.2 FL (ref 37–54)
DEVICE COMMENT: ABNORMAL
EGFRCR SERPLBLD CKD-EPI 2021: 46.3 ML/MIN/1.73
EOSINOPHIL # BLD AUTO: 0.04 10*3/MM3 (ref 0–0.4)
EOSINOPHIL NFR BLD AUTO: 0.4 % (ref 0.3–6.2)
ERYTHROCYTE [DISTWIDTH] IN BLOOD BY AUTOMATED COUNT: 13.4 % (ref 12.3–15.4)
ETHANOL BLD-MCNC: <10 MG/DL (ref 0–10)
ETHANOL UR QL: <0.01 %
FENTANYL UR-MCNC: NEGATIVE NG/ML
GLOBULIN UR ELPH-MCNC: 3 GM/DL
GLUCOSE BLDC GLUCOMTR-MCNC: 246 MG/DL (ref 70–130)
GLUCOSE BLDC GLUCOMTR-MCNC: 248 MG/DL (ref 70–130)
GLUCOSE BLDC GLUCOMTR-MCNC: 364 MG/DL (ref 70–130)
GLUCOSE SERPL-MCNC: 372 MG/DL (ref 65–99)
GLUCOSE UR STRIP-MCNC: ABNORMAL MG/DL
HBA1C MFR BLD: 10.2 % (ref 4.8–5.6)
HCO3 BLDV-SCNC: 26.1 MMOL/L (ref 22–28)
HCT VFR BLD AUTO: 41 % (ref 34–46.6)
HGB BLD-MCNC: 13 G/DL (ref 12–15.9)
HGB UR QL STRIP.AUTO: NEGATIVE
HOLD SPECIMEN: NORMAL
HOLD SPECIMEN: NORMAL
IMM GRANULOCYTES # BLD AUTO: 0.09 10*3/MM3 (ref 0–0.05)
IMM GRANULOCYTES NFR BLD AUTO: 0.9 % (ref 0–0.5)
KETONES UR QL STRIP: NEGATIVE
LEUKOCYTE ESTERASE UR QL STRIP.AUTO: NEGATIVE
LIPASE SERPL-CCNC: 25 U/L (ref 13–60)
LYMPHOCYTES # BLD AUTO: 0.52 10*3/MM3 (ref 0.7–3.1)
LYMPHOCYTES NFR BLD AUTO: 5 % (ref 19.6–45.3)
MAGNESIUM SERPL-MCNC: 1.2 MG/DL (ref 1.6–2.4)
MCH RBC QN AUTO: 30.3 PG (ref 26.6–33)
MCHC RBC AUTO-ENTMCNC: 31.7 G/DL (ref 31.5–35.7)
MCV RBC AUTO: 95.6 FL (ref 79–97)
METHADONE UR QL SCN: NEGATIVE
MODALITY: ABNORMAL
MONOCYTES # BLD AUTO: 0.52 10*3/MM3 (ref 0.1–0.9)
MONOCYTES NFR BLD AUTO: 5 % (ref 5–12)
NEUTROPHILS NFR BLD AUTO: 88.3 % (ref 42.7–76)
NEUTROPHILS NFR BLD AUTO: 9.27 10*3/MM3 (ref 1.7–7)
NITRITE UR QL STRIP: NEGATIVE
NRBC BLD AUTO-RTO: 0 /100 WBC (ref 0–0.2)
OPIATES UR QL: NEGATIVE
OXYCODONE UR QL SCN: NEGATIVE
PCO2 BLDV: 51.3 MM HG (ref 41–51)
PH BLDV: 7.32 PH UNITS (ref 7.31–7.41)
PH UR STRIP.AUTO: 7.5 [PH] (ref 5–8)
PLATELET # BLD AUTO: 227 10*3/MM3 (ref 140–450)
PMV BLD AUTO: 10.2 FL (ref 6–12)
PO2 BLDV: 34.4 MM HG (ref 35–45)
POTASSIUM SERPL-SCNC: 4.2 MMOL/L (ref 3.5–5.2)
PROCALCITONIN SERPL-MCNC: 0.14 NG/ML (ref 0–0.25)
PROT SERPL-MCNC: 6.8 G/DL (ref 6–8.5)
PROT UR QL STRIP: ABNORMAL
QT INTERVAL: 345 MS
QTC INTERVAL: 450 MS
RBC # BLD AUTO: 4.29 10*6/MM3 (ref 3.77–5.28)
SAO2 % BLDCOV: 60 % (ref 45–75)
SODIUM SERPL-SCNC: 132 MMOL/L (ref 136–145)
SP GR UR STRIP: 1.02 (ref 1–1.03)
TOTAL RATE: 24 BREATHS/MINUTE
TROPONIN T SERPL HS-MCNC: 34 NG/L
UROBILINOGEN UR QL STRIP: ABNORMAL
WBC NRBC COR # BLD: 10.48 10*3/MM3 (ref 3.4–10.8)
WHOLE BLOOD HOLD COAG: NORMAL
WHOLE BLOOD HOLD SPECIMEN: NORMAL

## 2023-10-01 PROCEDURE — G0378 HOSPITAL OBSERVATION PER HR: HCPCS

## 2023-10-01 PROCEDURE — 99285 EMERGENCY DEPT VISIT HI MDM: CPT

## 2023-10-01 PROCEDURE — 70450 CT HEAD/BRAIN W/O DYE: CPT

## 2023-10-01 PROCEDURE — 80307 DRUG TEST PRSMV CHEM ANLYZR: CPT | Performed by: EMERGENCY MEDICINE

## 2023-10-01 PROCEDURE — 71045 X-RAY EXAM CHEST 1 VIEW: CPT

## 2023-10-01 PROCEDURE — 25010000002 ONDANSETRON PER 1 MG: Performed by: EMERGENCY MEDICINE

## 2023-10-01 PROCEDURE — 82077 ASSAY SPEC XCP UR&BREATH IA: CPT | Performed by: EMERGENCY MEDICINE

## 2023-10-01 PROCEDURE — 82803 BLOOD GASES ANY COMBINATION: CPT

## 2023-10-01 PROCEDURE — 63710000001 INSULIN GLARGINE PER 5 UNITS: Performed by: INTERNAL MEDICINE

## 2023-10-01 PROCEDURE — 36415 COLL VENOUS BLD VENIPUNCTURE: CPT | Performed by: PHYSICIAN ASSISTANT

## 2023-10-01 PROCEDURE — 87040 BLOOD CULTURE FOR BACTERIA: CPT | Performed by: PHYSICIAN ASSISTANT

## 2023-10-01 PROCEDURE — 93005 ELECTROCARDIOGRAM TRACING: CPT

## 2023-10-01 PROCEDURE — 63710000001 INSULIN LISPRO (HUMAN) PER 5 UNITS: Performed by: INTERNAL MEDICINE

## 2023-10-01 PROCEDURE — 83735 ASSAY OF MAGNESIUM: CPT

## 2023-10-01 PROCEDURE — 80053 COMPREHEN METABOLIC PANEL: CPT

## 2023-10-01 PROCEDURE — 82010 KETONE BODYS QUAN: CPT | Performed by: PHYSICIAN ASSISTANT

## 2023-10-01 PROCEDURE — 25010000002 MAGNESIUM SULFATE 2 GM/50ML SOLUTION: Performed by: PHYSICIAN ASSISTANT

## 2023-10-01 PROCEDURE — 74176 CT ABD & PELVIS W/O CONTRAST: CPT

## 2023-10-01 PROCEDURE — 82948 REAGENT STRIP/BLOOD GLUCOSE: CPT

## 2023-10-01 PROCEDURE — 93010 ELECTROCARDIOGRAM REPORT: CPT | Performed by: INTERNAL MEDICINE

## 2023-10-01 PROCEDURE — P9612 CATHETERIZE FOR URINE SPEC: HCPCS

## 2023-10-01 PROCEDURE — 81003 URINALYSIS AUTO W/O SCOPE: CPT

## 2023-10-01 PROCEDURE — 83036 HEMOGLOBIN GLYCOSYLATED A1C: CPT | Performed by: INTERNAL MEDICINE

## 2023-10-01 PROCEDURE — 85025 COMPLETE CBC W/AUTO DIFF WBC: CPT

## 2023-10-01 PROCEDURE — 25010000002 LORAZEPAM PER 2 MG: Performed by: EMERGENCY MEDICINE

## 2023-10-01 PROCEDURE — 84145 PROCALCITONIN (PCT): CPT | Performed by: PHYSICIAN ASSISTANT

## 2023-10-01 PROCEDURE — 83690 ASSAY OF LIPASE: CPT | Performed by: PHYSICIAN ASSISTANT

## 2023-10-01 PROCEDURE — 84484 ASSAY OF TROPONIN QUANT: CPT

## 2023-10-01 PROCEDURE — 83605 ASSAY OF LACTIC ACID: CPT | Performed by: PHYSICIAN ASSISTANT

## 2023-10-01 RX ORDER — DEXTROSE MONOHYDRATE 25 G/50ML
25 INJECTION, SOLUTION INTRAVENOUS
Status: DISCONTINUED | OUTPATIENT
Start: 2023-10-01 | End: 2023-10-04 | Stop reason: HOSPADM

## 2023-10-01 RX ORDER — TORSEMIDE 20 MG/1
20 TABLET ORAL DAILY
Status: DISCONTINUED | OUTPATIENT
Start: 2023-10-01 | End: 2023-10-03

## 2023-10-01 RX ORDER — AMLODIPINE BESYLATE 5 MG/1
5 TABLET ORAL DAILY
Status: DISCONTINUED | OUTPATIENT
Start: 2023-10-01 | End: 2023-10-04 | Stop reason: HOSPADM

## 2023-10-01 RX ORDER — MAGNESIUM SULFATE HEPTAHYDRATE 40 MG/ML
2 INJECTION, SOLUTION INTRAVENOUS ONCE
Status: COMPLETED | OUTPATIENT
Start: 2023-10-01 | End: 2023-10-01

## 2023-10-01 RX ORDER — PANTOPRAZOLE SODIUM 40 MG/1
40 TABLET, DELAYED RELEASE ORAL
Status: DISCONTINUED | OUTPATIENT
Start: 2023-10-02 | End: 2023-10-04 | Stop reason: HOSPADM

## 2023-10-01 RX ORDER — INSULIN LISPRO 100 [IU]/ML
2-7 INJECTION, SOLUTION INTRAVENOUS; SUBCUTANEOUS
Status: DISCONTINUED | OUTPATIENT
Start: 2023-10-01 | End: 2023-10-04 | Stop reason: HOSPADM

## 2023-10-01 RX ORDER — ONDANSETRON 2 MG/ML
4 INJECTION INTRAMUSCULAR; INTRAVENOUS ONCE
Status: COMPLETED | OUTPATIENT
Start: 2023-10-01 | End: 2023-10-01

## 2023-10-01 RX ORDER — NICOTINE POLACRILEX 4 MG
15 LOZENGE BUCCAL
Status: DISCONTINUED | OUTPATIENT
Start: 2023-10-01 | End: 2023-10-04 | Stop reason: HOSPADM

## 2023-10-01 RX ORDER — SODIUM CHLORIDE 0.9 % (FLUSH) 0.9 %
10 SYRINGE (ML) INJECTION AS NEEDED
Status: DISCONTINUED | OUTPATIENT
Start: 2023-10-01 | End: 2023-10-04 | Stop reason: HOSPADM

## 2023-10-01 RX ORDER — BISOPROLOL FUMARATE 5 MG/1
20 TABLET, FILM COATED ORAL NIGHTLY
Status: DISCONTINUED | OUTPATIENT
Start: 2023-10-01 | End: 2023-10-04 | Stop reason: HOSPADM

## 2023-10-01 RX ORDER — LEFLUNOMIDE 20 MG/1
10 TABLET ORAL DAILY
Status: DISCONTINUED | OUTPATIENT
Start: 2023-10-01 | End: 2023-10-02

## 2023-10-01 RX ORDER — IBUPROFEN 600 MG/1
1 TABLET ORAL
Status: DISCONTINUED | OUTPATIENT
Start: 2023-10-01 | End: 2023-10-04 | Stop reason: HOSPADM

## 2023-10-01 RX ORDER — ACETAMINOPHEN 325 MG/1
650 TABLET ORAL EVERY 6 HOURS PRN
Status: DISCONTINUED | OUTPATIENT
Start: 2023-10-01 | End: 2023-10-04 | Stop reason: HOSPADM

## 2023-10-01 RX ORDER — LORAZEPAM 2 MG/ML
0.5 INJECTION INTRAMUSCULAR ONCE
Status: COMPLETED | OUTPATIENT
Start: 2023-10-01 | End: 2023-10-01

## 2023-10-01 RX ORDER — IPRATROPIUM BROMIDE AND ALBUTEROL SULFATE 2.5; .5 MG/3ML; MG/3ML
3 SOLUTION RESPIRATORY (INHALATION) 4 TIMES DAILY PRN
Status: DISCONTINUED | OUTPATIENT
Start: 2023-10-01 | End: 2023-10-04 | Stop reason: HOSPADM

## 2023-10-01 RX ADMIN — ONDANSETRON 4 MG: 2 INJECTION INTRAMUSCULAR; INTRAVENOUS at 14:29

## 2023-10-01 RX ADMIN — LEFLUNOMIDE 10 MG: 20 TABLET ORAL at 21:31

## 2023-10-01 RX ADMIN — INSULIN GLARGINE 15 UNITS: 100 INJECTION, SOLUTION SUBCUTANEOUS at 21:32

## 2023-10-01 RX ADMIN — SODIUM CHLORIDE 500 ML: 9 INJECTION, SOLUTION INTRAVENOUS at 11:48

## 2023-10-01 RX ADMIN — ONDANSETRON 4 MG: 2 INJECTION INTRAMUSCULAR; INTRAVENOUS at 11:27

## 2023-10-01 RX ADMIN — LORAZEPAM 0.5 MG: 2 INJECTION INTRAMUSCULAR; INTRAVENOUS at 12:12

## 2023-10-01 RX ADMIN — INSULIN LISPRO 3 UNITS: 100 INJECTION, SOLUTION INTRAVENOUS; SUBCUTANEOUS at 21:32

## 2023-10-01 RX ADMIN — INSULIN LISPRO 3 UNITS: 100 INJECTION, SOLUTION INTRAVENOUS; SUBCUTANEOUS at 18:25

## 2023-10-01 RX ADMIN — AMLODIPINE BESYLATE 5 MG: 5 TABLET ORAL at 21:31

## 2023-10-01 RX ADMIN — TORSEMIDE 20 MG: 20 TABLET ORAL at 21:31

## 2023-10-01 RX ADMIN — ACETAMINOPHEN 650 MG: 325 TABLET, FILM COATED ORAL at 23:47

## 2023-10-01 RX ADMIN — MAGNESIUM SULFATE HEPTAHYDRATE 2 G: 2 INJECTION, SOLUTION INTRAVENOUS at 12:55

## 2023-10-01 RX ADMIN — BISOPROLOL FUMARATE 20 MG: 5 TABLET, FILM COATED ORAL at 21:30

## 2023-10-01 RX ADMIN — LORAZEPAM 0.5 MG: 2 INJECTION INTRAMUSCULAR; INTRAVENOUS at 11:49

## 2023-10-01 NOTE — ED NOTES
Nursing report ED to floor  Blanca Zhu  78 y.o.  female    HPI :   Chief Complaint   Patient presents with    Altered Mental Status       Admitting doctor:   Praneeth Valenzuela MD    Admitting diagnosis:   The primary encounter diagnosis was Altered mental status, unspecified altered mental status type. Diagnoses of Hyperglycemia due to diabetes mellitus, Elevated lactic acid level, Hypomagnesemia, and Chronic renal impairment, unspecified CKD stage were also pertinent to this visit.    Code status:   Current Code Status       Date Active Code Status Order ID Comments User Context       Prior            Allergies:   Aspirin and Sulfa antibiotics    Isolation:   No active isolations    Intake and Output    Intake/Output Summary (Last 24 hours) at 10/1/2023 1451  Last data filed at 10/1/2023 1400  Gross per 24 hour   Intake 750 ml   Output --   Net 750 ml       Weight:   There were no vitals filed for this visit.    Most recent vitals:   Vitals:    10/01/23 1105 10/01/23 1108 10/01/23 1230   BP: 143/68  (!) 142/106   Pulse: 90     Resp: 22     Temp:  96.4 °F (35.8 °C)    TempSrc:  Tympanic    SpO2: 97%         Active LDAs/IV Access:   Lines, Drains & Airways       Active LDAs       Name Placement date Placement time Site Days    Peripheral IV 10/01/23 Right Forearm 10/01/23  --  Forearm  less than 1    Peripheral IV 10/01/23 1148 Right Wrist 10/01/23  1148  Wrist  less than 1                    Labs (abnormal labs have a star):   Labs Reviewed   COMPREHENSIVE METABOLIC PANEL - Abnormal; Notable for the following components:       Result Value    Glucose 372 (*)     BUN 30 (*)     Creatinine 1.21 (*)     Sodium 132 (*)     Chloride 95 (*)     Alkaline Phosphatase 183 (*)     eGFR 46.3 (*)     All other components within normal limits    Narrative:     GFR Normal >60  Chronic Kidney Disease <60  Kidney Failure <15    The GFR formula is only valid for adults with stable renal function between ages 18 and 70.    SINGLE HSTROPONIN T - Abnormal; Notable for the following components:    HS Troponin T 34 (*)     All other components within normal limits    Narrative:     High Sensitive Troponin T Reference Range:  <10.0 ng/L- Negative Female for AMI  <15.0 ng/L- Negative Male for AMI  >=10 - Abnormal Female indicating possible myocardial injury.  >=15 - Abnormal Male indicating possible myocardial injury.   Clinicians would have to utilize clinical acumen, EKG, Troponin, and serial changes to determine if it is an Acute Myocardial Infarction or myocardial injury due to an underlying chronic condition.        MAGNESIUM - Abnormal; Notable for the following components:    Magnesium 1.2 (*)     All other components within normal limits   URINALYSIS W/ MICROSCOPIC IF INDICATED (NO CULTURE) - Abnormal; Notable for the following components:    Glucose,  mg/dL (2+) (*)     Protein, UA Trace (*)     All other components within normal limits    Narrative:     Urine microscopic not indicated.   CBC WITH AUTO DIFFERENTIAL - Abnormal; Notable for the following components:    Neutrophil % 88.3 (*)     Lymphocyte % 5.0 (*)     Immature Grans % 0.9 (*)     Neutrophils, Absolute 9.27 (*)     Lymphocytes, Absolute 0.52 (*)     Immature Grans, Absolute 0.09 (*)     All other components within normal limits   BETA HYDROXYBUTYRATE QUANTITATIVE - Abnormal; Notable for the following components:    Beta-Hydroxybutyrate Quant <0.020 (*)     All other components within normal limits    Narrative:     In the assessment of possible diabetic ketoacidosis, the test should be interpreted along with other clinical and laboratory findings.  A level greater than 1 mmol/L should require further evaluation and levels of more than 3 mmol/L require immediate medical review.   LACTIC ACID, PLASMA - Abnormal; Notable for the following components:    Lactate 2.9 (*)     All other components within normal limits   BLOOD GAS, VENOUS - Abnormal; Notable for the  "following components:    pCO2, Venous 51.3 (*)     pO2, Venous 34.4 (*)     CO2 Content 27.7 (*)     All other components within normal limits   POCT GLUCOSE FINGERSTICK - Abnormal; Notable for the following components:    Glucose 364 (*)     All other components within normal limits   PROCALCITONIN - Normal    Narrative:     As a Marker for Sepsis (Non-Neonates):    1. <0.5 ng/mL represents a low risk of severe sepsis and/or septic shock.  2. >2 ng/mL represents a high risk of severe sepsis and/or septic shock.    As a Marker for Lower Respiratory Tract Infections that require antibiotic therapy:    PCT on Admission    Antibiotic Therapy       6-12 Hrs later    >0.5                Strongly Recommended  >0.25 - <0.5        Recommended   0.1 - 0.25          Discouraged              Remeasure/reassess PCT  <0.1                Strongly Discouraged     Remeasure/reassess PCT    As 28 day mortality risk marker: \"Change in Procalcitonin Result\" (>80% or <=80%) if Day 0 (or Day 1) and Day 4 values are available. Refer to http://www.[employer]-pct-calculator.com    Change in PCT <=80%  A decrease of PCT levels below or equal to 80% defines a positive change in PCT test result representing a higher risk for 28-day all-cause mortality of patients diagnosed with severe sepsis for septic shock.    Change in PCT >80%  A decrease of PCT levels of more than 80% defines a negative change in PCT result representing a lower risk for 28-day all-cause mortality of patients diagnosed with severe sepsis or septic shock.      LIPASE - Normal   URINE DRUG SCREEN - Normal    Narrative:     Negative Thresholds Per Drugs Screened:    Amphetamines                 500 ng/ml  Barbiturates                 200 ng/ml  Benzodiazepines              100 ng/ml  Cocaine                      300 ng/ml  Methadone                    300 ng/ml  Opiates                      300 ng/ml  Oxycodone                    100 ng/ml  THC                           50 " ng/ml  Fentanyl                       5 ng/ml      The Normal Value for all drugs tested is negative. This report includes final unconfirmed screening results to be used for medical treatment purposes only. Unconfirmed results must not be used for non-medical purposes such as employment or legal testing. Clinical consideration should be applied to any drug of abuse test, particularly when unconfirmed results are used.           BLOOD CULTURE   BLOOD CULTURE   RAINBOW DRAW    Narrative:     The following orders were created for panel order [place] Draw.  Procedure                               Abnormality         Status                     ---------                               -----------         ------                     Green Top (Gel)[[id number]]                                  Final result               Lavender Top[[id number]]                                     Final result               Gold Top - SST[[id number]]                                   Final result               Light Blue Top[[id number]]                                   Final result                 Please view results for these tests on the individual orders.   BLOOD GAS, VENOUS   ETHANOL   LACTIC ACID, REFLEX   POCT GLUCOSE FINGERSTICK   CBC AND DIFFERENTIAL    Narrative:     The following orders were created for panel order CBC & Differential.  Procedure                               Abnormality         Status                     ---------                               -----------         ------                     CBC Auto Differential[[id number]]        Abnormal            Final result                 Please view results for these tests on the individual orders.   GREEN TOP   LAVENDER TOP   GOLD TOP - SST   LIGHT BLUE TOP       EKG:   ECG 12 Lead ED Triage Standing Order; Weak / Dizzy / AMS   Preliminary Result   HEART RATE= 102  bpm   RR Interval= 588  ms   NV Interval= 156  ms   P Horizontal Axis= 15  deg   P Front Axis= 29  deg   QRSD  Interval= 65  ms   QT Interval= 345  ms   QTcB= 450  ms   QRS Axis= 22  deg   T Wave Axis= 65  deg   - BORDERLINE ECG -   Sinus tachycardia   Probable left atrial enlargement   Minimal ST depression, lateral leads   Electronically Signed By:    Date and Time of Study: 2023-10-01 11:54:24          Meds given in ED:   Medications   sodium chloride 0.9 % flush 10 mL (has no administration in time range)   ondansetron (ZOFRAN) injection 4 mg (4 mg Intravenous Given 10/1/23 1127)   LORazepam (ATIVAN) injection 0.5 mg (0.5 mg Intravenous Given 10/1/23 1149)   sodium chloride 0.9 % bolus 500 mL (0 mL Intravenous Stopped 10/1/23 1400)   LORazepam (ATIVAN) injection 0.5 mg (0.5 mg Intravenous Given 10/1/23 1212)   magnesium sulfate 2g/50 mL (PREMIX) infusion (2 g Intravenous New Bag 10/1/23 1255)   ondansetron (ZOFRAN) injection 4 mg (4 mg Intravenous Given 10/1/23 1429)       Imaging results:  CT Abdomen Pelvis Without Contrast    Result Date: 10/1/2023  No evidence for acute intra-abdominal process.   Radiation dose reduction techniques were utilized, including automated exposure control and exposure modulation based on body size.   This report was finalized on 10/1/2023 1:47 PM by Dr. Tomi Dowd M.D.      CT Head Without Contrast    Result Date: 10/1/2023  1. Motion limited exam. Within that limitation, no acute intracranial abnormality. 2. Unchanged opacified left maxillary sinus with internal high attenuation material, suggesting inspissated secretions or chronic fungal sinusitis.     Radiation dose reduction techniques were utilized, including automated exposure control and exposure modulation based on body size.  This report was finalized on 10/1/2023 1:08 PM by Dr. Cameron Rosenthal M.D.       Ambulatory status:   - assist     Social issues:   Social History     Socioeconomic History    Marital status:      Spouse name: Ashwin    Number of children: 3    Years of education: College   Tobacco Use     Smoking status: Never    Smokeless tobacco: Never    Tobacco comments:     caffine use   Vaping Use    Vaping Use: Never used   Substance and Sexual Activity    Alcohol use: No     Comment: one or two small drinks a year    Drug use: No    Sexual activity: Not Currently     Comment: Spouse, Ashwin       NIH Stroke Scale:       Dania Bridges RN  10/01/23 14:51 EDT

## 2023-10-01 NOTE — ED PROVIDER NOTES
MD ATTESTATION NOTE    The [facility]CR[facility] and I have discussed this patient's history, physical exam, and treatment plan.  I have reviewed the documentation and personally had a face to face interaction with the patient. I affirm the documentation and agree with the treatment and plan.  The attached note describes my personal findings.    I provided a substantive portion of the care of this patient. I personally performed the physical exam, in its entirety.    Independent Historians: Patient, family, EMS    A complete HPI/ROS/PMH/PSH/SH/FH are unobtainable due to: Altered mental status    Chronic or social conditions impacting patient care (social determinants of health): None    Blanca Zhu is a 78 y.o. female who presents to the ED c/o acute altered mental status.  Family reports that the patient was normal last night but this morning was altered.  She has not had any recent complaints.  She has not had any recent falls.  She has never been altered before.  They note her blood sugar was elevated in the 400s.  She is not able to provide any history.      Review of prior external notes (non-ED) -and- Review of prior external test results outside of this encounter: Discharge summary dated 7/3/2023 with hypoglycemia and encephalopathy due to hypoglycemia.  She had a UTI and lower extremity edema and chronic diastolic CHF.  Her insulin doses were adjusted.  Her UTI grew Pseudomonas in her antibiotics were changed from Rocephin to Cipro.    Prescription drug monitoring program review:        On exam:  GENERAL: Awake, alert,  confused, anxious, restless  SKIN: Warm, dry  HENT: Normocephalic, atraumatic  EYES: no scleral icterus  CV: regular rhythm, regular rate  RESPIRATORY: normal effort, lungs clear  ABDOMEN: soft, nontender, nondistended  MUSCULOSKELETAL: no deformity  NEURO: alert, moves all extremities, follows commands                                                             Labs  Recent Results (from the past 24  hour(s))   Urinalysis With Microscopic If Indicated (No Culture) - Straight Cath    Collection Time: 10/01/23 11:22 AM    Specimen: Straight Cath; Urine   Result Value Ref Range    Color, UA Yellow Yellow, Straw    Appearance, UA Clear Clear    pH, UA 7.5 5.0 - 8.0    Specific Gravity, UA 1.018 1.005 - 1.030    Glucose,  mg/dL (2+) (A) Negative    Ketones, UA Negative Negative    Bilirubin, UA Negative Negative    Blood, UA Negative Negative    Protein, UA Trace (A) Negative    Leuk Esterase, UA Negative Negative    Nitrite, UA Negative Negative    Urobilinogen, UA 0.2 E.U./dL 0.2 - 1.0 E.U./dL   POC Glucose Once    Collection Time: 10/01/23 11:27 AM    Specimen: Blood   Result Value Ref Range    Glucose 364 (H) 70 - 130 mg/dL   Comprehensive Metabolic Panel    Collection Time: 10/01/23 11:49 AM    Specimen: Blood   Result Value Ref Range    Glucose 372 (H) 65 - 99 mg/dL    BUN 30 (H) 8 - 23 mg/dL    Creatinine 1.21 (H) 0.57 - 1.00 mg/dL    Sodium 132 (L) 136 - 145 mmol/L    Potassium 4.2 3.5 - 5.2 mmol/L    Chloride 95 (L) 98 - 107 mmol/L    CO2 26.0 22.0 - 29.0 mmol/L    Calcium 9.6 8.6 - 10.5 mg/dL    Total Protein 6.8 6.0 - 8.5 g/dL    Albumin 3.8 3.5 - 5.2 g/dL    ALT (SGPT) 19 1 - 33 U/L    AST (SGOT) 15 1 - 32 U/L    Alkaline Phosphatase 183 (H) 39 - 117 U/L    Total Bilirubin 0.3 0.0 - 1.2 mg/dL    Globulin 3.0 gm/dL    A/G Ratio 1.3 g/dL    BUN/Creatinine Ratio 24.8 7.0 - 25.0    Anion Gap 11.0 5.0 - 15.0 mmol/L    eGFR 46.3 (L) >60.0 mL/min/1.73   Single High Sensitivity Troponin T    Collection Time: 10/01/23 11:49 AM    Specimen: Blood   Result Value Ref Range    HS Troponin T 34 (H) <10 ng/L   Magnesium    Collection Time: 10/01/23 11:49 AM    Specimen: Blood   Result Value Ref Range    Magnesium 1.2 (L) 1.6 - 2.4 mg/dL   Green Top (Gel)    Collection Time: 10/01/23 11:49 AM   Result Value Ref Range    Extra Tube Hold for add-ons.    Lavender Top    Collection Time: 10/01/23 11:49 AM   Result  Value Ref Range    Extra Tube hold for add-on    Gold Top - SST    Collection Time: 10/01/23 11:49 AM   Result Value Ref Range    Extra Tube Hold for add-ons.    Light Blue Top    Collection Time: 10/01/23 11:49 AM   Result Value Ref Range    Extra Tube Hold for add-ons.    CBC Auto Differential    Collection Time: 10/01/23 11:49 AM    Specimen: Blood   Result Value Ref Range    WBC 10.48 3.40 - 10.80 10*3/mm3    RBC 4.29 3.77 - 5.28 10*6/mm3    Hemoglobin 13.0 12.0 - 15.9 g/dL    Hematocrit 41.0 34.0 - 46.6 %    MCV 95.6 79.0 - 97.0 fL    MCH 30.3 26.6 - 33.0 pg    MCHC 31.7 31.5 - 35.7 g/dL    RDW 13.4 12.3 - 15.4 %    RDW-SD 46.2 37.0 - 54.0 fl    MPV 10.2 6.0 - 12.0 fL    Platelets 227 140 - 450 10*3/mm3    Neutrophil % 88.3 (H) 42.7 - 76.0 %    Lymphocyte % 5.0 (L) 19.6 - 45.3 %    Monocyte % 5.0 5.0 - 12.0 %    Eosinophil % 0.4 0.3 - 6.2 %    Basophil % 0.4 0.0 - 1.5 %    Immature Grans % 0.9 (H) 0.0 - 0.5 %    Neutrophils, Absolute 9.27 (H) 1.70 - 7.00 10*3/mm3    Lymphocytes, Absolute 0.52 (L) 0.70 - 3.10 10*3/mm3    Monocytes, Absolute 0.52 0.10 - 0.90 10*3/mm3    Eosinophils, Absolute 0.04 0.00 - 0.40 10*3/mm3    Basophils, Absolute 0.04 0.00 - 0.20 10*3/mm3    Immature Grans, Absolute 0.09 (H) 0.00 - 0.05 10*3/mm3    nRBC 0.0 0.0 - 0.2 /100 WBC   Beta Hydroxybutyrate Quantitative    Collection Time: 10/01/23 11:49 AM    Specimen: Blood   Result Value Ref Range    Beta-Hydroxybutyrate Quant <0.020 (L) 0.020 - 0.270 mmol/L   Lactic Acid, Plasma    Collection Time: 10/01/23 11:49 AM    Specimen: Blood   Result Value Ref Range    Lactate 2.9 (C) 0.5 - 2.0 mmol/L   Procalcitonin    Collection Time: 10/01/23 11:49 AM    Specimen: Blood   Result Value Ref Range    Procalcitonin 0.14 0.00 - 0.25 ng/mL   Lipase    Collection Time: 10/01/23 11:49 AM    Specimen: Blood   Result Value Ref Range    Lipase 25 13 - 60 U/L   Ethanol    Collection Time: 10/01/23 11:49 AM    Specimen: Blood   Result Value Ref Range     Ethanol <10 0 - 10 mg/dL    Ethanol % <0.010 %   ECG 12 Lead ED Triage Standing Order; Weak / Dizzy / AMS    Collection Time: 10/01/23 11:54 AM   Result Value Ref Range    QT Interval 345 ms    QTC Interval 450 ms   Urine Drug Screen - Urine, Clean Catch    Collection Time: 10/01/23 11:59 AM    Specimen: Urine, Clean Catch   Result Value Ref Range    Amphet/Methamphet, Screen Negative Negative    Barbiturates Screen, Urine Negative Negative    Benzodiazepine Screen, Urine Negative Negative    Cocaine Screen, Urine Negative Negative    Opiate Screen Negative Negative    THC, Screen, Urine Negative Negative    Methadone Screen, Urine Negative Negative    Oxycodone Screen, Urine Negative Negative    Fentanyl, Urine Negative Negative   Blood Gas, Venous -    Collection Time: 10/01/23  1:27 PM    Specimen: Venous Blood   Result Value Ref Range    pH, Venous 7.315 7.310 - 7.410 pH Units    pCO2, Venous 51.3 (H) 41.0 - 51.0 mm Hg    pO2, Venous 34.4 (L) 35.0 - 45.0 mm Hg    HCO3, Venous 26.1 22.0 - 28.0 mmol/L    Base Excess, Venous -1.1 -2.0 - 2.0 mmol/L    O2 Saturation, Venous 60.0 45.0 - 75.0 %    CO2 Content 27.7 (H) 23 - 27 mmol/L    Barometric Pressure for Blood Gas 756.1000 mmHg    Modality Room Air     Rate 24 Breaths/minute    Device Comment [id number]        Radiology  CT Abdomen Pelvis Without Contrast    Result Date: 10/1/2023  CT ABDOMEN AND PELVIS WITHOUT IV CONTRAST  HISTORY: Confusion, dry heaving.  TECHNIQUE:  CT includes axial imaging from the lung bases to the trochanters without intravenous contrast and without use of oral contrast. Data reconstructed in coronal and sagittal planes. Radiation dose reduction techniques were utilized, including automated exposure control and exposure modulation based on body size.  COMPARISON: CT abdomen and pelvis 03/03/2018.  FINDINGS: There is linear subsegmental lower lobe atelectasis. Liver, spleen, adrenal glands, pancreas, kidneys exhibit normal noncontrasted CT  appearance. There is no hydronephrosis. Gallbladder is not visualized. There is no bowel dilatation or evidence for bowel obstruction. There is sigmoid diverticulosis without evidence for diverticulitis. Urinary bladder is mostly decompressed. There is no ascites or evidence for intra-abdominal abscess formation. Sclerotic lesion within the L5 vertebral body is without change. There is a chronic compression deformity at T12. Bones are diffusely osteopenic. There is evidence for an old right lesser trochanteric/iliopsoas insertional avulsion and there is chronic atrophy of the right iliacus psoas and iliopsoas muscles.      No evidence for acute intra-abdominal process.   Radiation dose reduction techniques were utilized, including automated exposure control and exposure modulation based on body size.   This report was finalized on 10/1/2023 1:47 PM by Dr. Tomi Dowd M.D.      CT Head Without Contrast    Result Date: 10/1/2023  CT HEAD WITHOUT CONTRAST  CLINICAL HISTORY: Altered mental status  TECHNIQUE: CT scan of the head was obtained with 3 mm axial soft tissue algorithm images. No intravenous contrast was administered. Sagittal and coronal reconstructions were obtained.  COMPARISON: CT head 8/30/2023.  FINDINGS: Motion limited exam. No intracranial hemorrhage.  No midline shift or mass effect. Unchanged chronic right putaminal lacunar infarct. No CT evidence of acute territorial infarction.  No extraaxial collection.  No hydrocephalus. Opacified left maxillary sinus with internal high attenuation material hyperostosis frontalis..       1. Motion limited exam. Within that limitation, no acute intracranial abnormality. 2. Unchanged opacified left maxillary sinus with internal high attenuation material, suggesting inspissated secretions or chronic fungal sinusitis.     Radiation dose reduction techniques were utilized, including automated exposure control and exposure modulation based on body size.  This  report was finalized on 10/1/2023 1:08 PM by Dr. Cameron Rosenthal M.D.       Medical Decision Making:  ED Course as of 10/01/23 1544   Sun Oct 01, 2023   1156 EKG          EKG time: 1154  Rhythm/Rate: Sinus tachycardia, rate 102  P waves and NE: Normal P, normal NE  QRS, axis: Narrow QRS, normal axis  ST and T waves: No acute accounting for artifact and wander in the lateral leads    Independently Interpreted by me  Not significantly changed compared to prior 30 2023   [TR]   1216 The first dose of Ativan was not sufficient to get her calm enough to perform imaging.  I have ordered a second dose. [TR]   1229 BUN(!): 30 [MP]   1229 Creatinine(!): 1.21 [MP]   1229 WBC: 10.48 [MP]   1229 Hemoglobin: 13.0 [MP]   1229 Magnesium(!): 1.2 [MP]   1239 Lactate(!!): 2.9 [MP]   1258 CT Head Without Contrast  My independent interpretation of the CT of the head is no gross hemorrhage [TR]   1443 I spoke with Dr. Valenzuela.  Reviewed patient presentation and ED findings.  He agrees to admit patient to a telemetry observation bed.  Chest x-ray pending at this time. [MP]      ED Course User Index  [MP] Kay Noel PA-C  [TR] Hank Kauffman MD       The patient presents with hyperglycemia and acute altered mental status.  There is no history of trauma.  We will CT her head rule out intracranial hemorrhage.  We will obtain chemistry and blood counts as well as urinalysis.  We will obtain toxicology.  She will require some sedation for CT imaging.  My differential diagnosis includes intracranial hemorrhage, sepsis, UTI, intoxication, anxiety, and others.    Procedures:  Procedures      Critical care provider statement:     Critical care time (minutes): 50.    Critical care time was exclusive of:  Separately billable procedures and treating other patients    Critical care was necessary to treat or prevent imminent or life-threatening deterioration of the following conditions: CNS failure    Critical care was time spent personally  by me on the following activities:  Development of treatment plan with patient or surrogate, discussions with consultants, evaluation of patient's response to treatment, examination of patient, obtaining history from patient or surrogate, ordering and performing treatments and interventions, ordering and review of laboratory studies, ordering and review of radiographic studies, pulse oximetry, re-evaluation of patient's condition and review of old charts      Diagnosis  Final diagnoses:   Altered mental status, unspecified altered mental status type   Hyperglycemia due to diabetes mellitus   Elevated lactic acid level   Hypomagnesemia   Chronic renal impairment, unspecified CKD stage       Note Disclaimer: At [facility], we believe that sharing information builds trust and better relationships. You are receiving this note because you recently visited [facility]. It is possible you will see health information before a provider has talked with you about it. This kind of information can be easy to misunderstand. To help you fully understand what it means for your health, we urge you to discuss this note with your provider.       Hank Kauffman MD  10/01/23 1546

## 2023-10-01 NOTE — ED PROVIDER NOTES
" EMERGENCY DEPARTMENT ENCOUNTER    Room Number:  23/23  PCP: Praneeth Valenzuela MD  Discussed/ obtained information from independent historians: EMS,  at bedside      HPI:  Chief Complaint: Altered mental status  A complete HPI/ROS/PMH/PSH/SH/FH are unobtainable due to: Altered mental status  Context: Blanca Zhu is a 78 y.o. female who presents to the ED via EMS for altered mental status.   reports when he went to bed at 2300 last night she was already asleep and had been at her baseline.  When he woke this morning, he went to talk to her and she was \"staring off\".  He was unable to converse with her.  He checked her blood sugar and noted it was in the high 400s.  He gave her 12 units of insulin Toujeo and called EMS due to altered mental status.  He reports she was restless and agitated.  No recent witnessed falls.  No recent medication changes.  History otherwise limited due to patient's acute altered mental status.      External (non-ED) record review:   Patient seen and office visit with PCP on 9/25/2023 where patient seen for obesity, CHF, diabetes, ANAYELI, hyperlipidemia, anxiety.  Unable to load note to review assessment and plan.  Reviewed prior laboratory studies.  Most recent CBC with hemoglobin 12.9.  Most recent CMP with creatinine 1.22.      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    Gastritis without bleeding 10/22/2019    Polyp of duodenum 10/22/2019    Morbidly obese 06/26/2020    Dyspnea on exertion 03/30/2021    Hyperlipidemia 05/04/2021    Type 2 diabetes mellitus with stage 3b chronic kidney disease, " with long-term current use of insulin 2021    Essential hypertension 2021    Bilateral lower extremity edema 2021    Pulmonary hypertension 2021    Obstructive sleep apnea on CPAP 2022    Stage 3a chronic kidney disease 2022    Iron malabsorption 2023    Respiratory distress 2023    Hypoglycemia due to insulin 2023     Resolved Ambulatory Problems     Diagnosis Date Noted    No Resolved Ambulatory Problems     Past Medical History:   Diagnosis Date    Anxiety and depression     Asthma     Balance problem     Breast cancer 2019    CKD (chronic kidney disease), stage III     Colon polyp 2019    COVID-19 2023    Diabetes mellitus, type 2     Hypertension     Sleep apnea     Swelling     Thrombophlebitis          PAST SURGICAL HISTORY  Past Surgical History:   Procedure Laterality Date    BREAST BIOPSY Left  approx    benign pathology    BREAST BIOPSY Left 2019    Ultasound guided mammotome vacuum assisted left breast biopsy with placement of a metallic clip-Dr. Daniel Gutierrez, [facility]     SECTION N/A     x3    CHOLECYSTECTOMY N/A     COLONOSCOPY      COLONOSCOPY W/ POLYPECTOMY N/A 2019    Enlarged folds in the antrum: biopsied; duodenal, transverse colon x2, splenic flexure, descending colon and sigmoid colon polyps: biopsied (path: tubular adenoma x6)-Dr. Zak De Jesus, [facility]    ENDOSCOPY  10/11/2019    Procedure: ESOPHAGOGASTRODUODENOSCOPY with hot snare polypectomy;  Surgeon: Haile Handley MD;  Location: [facility] ENDOSCOPY;  Service: Gastroenterology    ENDOSCOPY N/A 2020    Procedure: ESOPHAGOGASTRODUODENOSCOPY;  Surgeon: Haile Handley MD;  Location: [facility] ENDOSCOPY;  Service: Gastroenterology    ENDOSCOPY N/A 2020    Procedure: ESOPHAGOGASTRODUODENOSCOPY WITH BIOPSIES AND COLD BIOPSY POLYPECTOMY;  Surgeon: Haile Handley MD;  Location: [facility] ENDOSCOPY;  Service:  Gastroenterology;  Laterality: N/A;  pre: dyspepsia and diarrhea  post: duodenal polyp and gastritis    MASTECTOMY Left     MASTECTOMY WITH SENTINEL NODE BIOPSY AND AXILLARY NODE DISSECTION Left 7/3/2019    Procedure: LEFT BREAST MASTECTOMY WITH SENTINEL NODE BIOPSY AND , v-y plasty closure;  Surgeon: Tahmina James MD;  Location: [facility] OR;  Service: General    SUBTOTAL HYSTERECTOMY Bilateral     ovaries still in tact    TEMPORAL ARTERY BIOPSY Bilateral 12/05/2019         FAMILY HISTORY  Family History   Problem Relation Age of Onset    Hypertension Father     Stomach cancer Father     Diabetes Father     Esophageal cancer Father     Throat cancer Sister     Diabetes Paternal Grandmother     Hypertension Paternal Grandfather     Colon cancer Paternal Grandfather     Heart disease Mother     Colon cancer Paternal Uncle     Colon cancer Paternal Uncle     Colon cancer Paternal Uncle     Ovarian cancer Cousin     Stomach cancer Cousin     Malig Hyperthermia Neg Hx          SOCIAL HISTORY  Social History     Socioeconomic History    Marital status:      Spouse name: Ashwin    Number of children: 3    Years of education: College   Tobacco Use    Smoking status: Never    Smokeless tobacco: Never    Tobacco comments:     caffine use   Vaping Use    Vaping Use: Never used   Substance and Sexual Activity    Alcohol use: No     Comment: one or two small drinks a year    Drug use: No    Sexual activity: Not Currently     Comment: Spouse, Ashwin         ALLERGIES  Aspirin and Sulfa antibiotics        REVIEW OF SYSTEMS  Review of Systems   Unable to perform ROS: Mental status change          PHYSICAL EXAM  ED Triage Vitals   Temp Heart Rate Resp BP SpO2   10/01/23 1108 10/01/23 1105 10/01/23 1105 10/01/23 1105 10/01/23 1105   96.4 °F (35.8 °C) 90 22 143/68 97 %      Temp src Heart Rate Source Patient Position BP Location FiO2 (%)   10/01/23 1108 -- -- -- --   Tympanic           Physical Exam  Constitutional:        General: She is not in acute distress.     Appearance: She is well-developed.      Comments: Patient dry heaving   HENT:      Head: Normocephalic and atraumatic.   Eyes:      Extraocular Movements: Extraocular movements intact.   Cardiovascular:      Rate and Rhythm: Normal rate and regular rhythm.      Heart sounds: Normal heart sounds.   Pulmonary:      Effort: Pulmonary effort is normal.      Breath sounds: Normal breath sounds.   Abdominal:      General: There is no distension.   Skin:     General: Skin is warm.   Neurological:      General: No focal deficit present.      Mental Status: She is alert. She is confused.   Psychiatric:         Mood and Affect: Mood normal.         Behavior: Behavior is agitated.         Vital signs and nursing notes reviewed.          LAB RESULTS  Recent Results (from the past 24 hour(s))   Urinalysis With Microscopic If Indicated (No Culture) - Straight Cath    Collection Time: 10/01/23 11:22 AM    Specimen: Straight Cath; Urine   Result Value Ref Range    Color, UA Yellow Yellow, Straw    Appearance, UA Clear Clear    pH, UA 7.5 5.0 - 8.0    Specific Gravity, UA 1.018 1.005 - 1.030    Glucose,  mg/dL (2+) (A) Negative    Ketones, UA Negative Negative    Bilirubin, UA Negative Negative    Blood, UA Negative Negative    Protein, UA Trace (A) Negative    Leuk Esterase, UA Negative Negative    Nitrite, UA Negative Negative    Urobilinogen, UA 0.2 E.U./dL 0.2 - 1.0 E.U./dL   POC Glucose Once    Collection Time: 10/01/23 11:27 AM    Specimen: Blood   Result Value Ref Range    Glucose 364 (H) 70 - 130 mg/dL   Comprehensive Metabolic Panel    Collection Time: 10/01/23 11:49 AM    Specimen: Blood   Result Value Ref Range    Glucose 372 (H) 65 - 99 mg/dL    BUN 30 (H) 8 - 23 mg/dL    Creatinine 1.21 (H) 0.57 - 1.00 mg/dL    Sodium 132 (L) 136 - 145 mmol/L    Potassium 4.2 3.5 - 5.2 mmol/L    Chloride 95 (L) 98 - 107 mmol/L    CO2 26.0 22.0 - 29.0 mmol/L    Calcium 9.6 8.6 - 10.5  mg/dL    Total Protein 6.8 6.0 - 8.5 g/dL    Albumin 3.8 3.5 - 5.2 g/dL    ALT (SGPT) 19 1 - 33 U/L    AST (SGOT) 15 1 - 32 U/L    Alkaline Phosphatase 183 (H) 39 - 117 U/L    Total Bilirubin 0.3 0.0 - 1.2 mg/dL    Globulin 3.0 gm/dL    A/G Ratio 1.3 g/dL    BUN/Creatinine Ratio 24.8 7.0 - 25.0    Anion Gap 11.0 5.0 - 15.0 mmol/L    eGFR 46.3 (L) >60.0 mL/min/1.73   Single High Sensitivity Troponin T    Collection Time: 10/01/23 11:49 AM    Specimen: Blood   Result Value Ref Range    HS Troponin T 34 (H) <10 ng/L   Magnesium    Collection Time: 10/01/23 11:49 AM    Specimen: Blood   Result Value Ref Range    Magnesium 1.2 (L) 1.6 - 2.4 mg/dL   Green Top (Gel)    Collection Time: 10/01/23 11:49 AM   Result Value Ref Range    Extra Tube Hold for add-ons.    Lavender Top    Collection Time: 10/01/23 11:49 AM   Result Value Ref Range    Extra Tube hold for add-on    Gold Top - SST    Collection Time: 10/01/23 11:49 AM   Result Value Ref Range    Extra Tube Hold for add-ons.    Light Blue Top    Collection Time: 10/01/23 11:49 AM   Result Value Ref Range    Extra Tube Hold for add-ons.    CBC Auto Differential    Collection Time: 10/01/23 11:49 AM    Specimen: Blood   Result Value Ref Range    WBC 10.48 3.40 - 10.80 10*3/mm3    RBC 4.29 3.77 - 5.28 10*6/mm3    Hemoglobin 13.0 12.0 - 15.9 g/dL    Hematocrit 41.0 34.0 - 46.6 %    MCV 95.6 79.0 - 97.0 fL    MCH 30.3 26.6 - 33.0 pg    MCHC 31.7 31.5 - 35.7 g/dL    RDW 13.4 12.3 - 15.4 %    RDW-SD 46.2 37.0 - 54.0 fl    MPV 10.2 6.0 - 12.0 fL    Platelets 227 140 - 450 10*3/mm3    Neutrophil % 88.3 (H) 42.7 - 76.0 %    Lymphocyte % 5.0 (L) 19.6 - 45.3 %    Monocyte % 5.0 5.0 - 12.0 %    Eosinophil % 0.4 0.3 - 6.2 %    Basophil % 0.4 0.0 - 1.5 %    Immature Grans % 0.9 (H) 0.0 - 0.5 %    Neutrophils, Absolute 9.27 (H) 1.70 - 7.00 10*3/mm3    Lymphocytes, Absolute 0.52 (L) 0.70 - 3.10 10*3/mm3    Monocytes, Absolute 0.52 0.10 - 0.90 10*3/mm3    Eosinophils, Absolute 0.04 0.00 -  0.40 10*3/mm3    Basophils, Absolute 0.04 0.00 - 0.20 10*3/mm3    Immature Grans, Absolute 0.09 (H) 0.00 - 0.05 10*3/mm3    nRBC 0.0 0.0 - 0.2 /100 WBC   Beta Hydroxybutyrate Quantitative    Collection Time: 10/01/23 11:49 AM    Specimen: Blood   Result Value Ref Range    Beta-Hydroxybutyrate Quant <0.020 (L) 0.020 - 0.270 mmol/L   Lactic Acid, Plasma    Collection Time: 10/01/23 11:49 AM    Specimen: Blood   Result Value Ref Range    Lactate 2.9 (C) 0.5 - 2.0 mmol/L   Procalcitonin    Collection Time: 10/01/23 11:49 AM    Specimen: Blood   Result Value Ref Range    Procalcitonin 0.14 0.00 - 0.25 ng/mL   Lipase    Collection Time: 10/01/23 11:49 AM    Specimen: Blood   Result Value Ref Range    Lipase 25 13 - 60 U/L   Ethanol    Collection Time: 10/01/23 11:49 AM    Specimen: Blood   Result Value Ref Range    Ethanol <10 0 - 10 mg/dL    Ethanol % <0.010 %   ECG 12 Lead ED Triage Standing Order; Weak / Dizzy / AMS    Collection Time: 10/01/23 11:54 AM   Result Value Ref Range    QT Interval 345 ms    QTC Interval 450 ms   Urine Drug Screen - Urine, Clean Catch    Collection Time: 10/01/23 11:59 AM    Specimen: Urine, Clean Catch   Result Value Ref Range    Amphet/Methamphet, Screen Negative Negative    Barbiturates Screen, Urine Negative Negative    Benzodiazepine Screen, Urine Negative Negative    Cocaine Screen, Urine Negative Negative    Opiate Screen Negative Negative    THC, Screen, Urine Negative Negative    Methadone Screen, Urine Negative Negative    Oxycodone Screen, Urine Negative Negative    Fentanyl, Urine Negative Negative   Blood Gas, Venous -    Collection Time: 10/01/23  1:27 PM    Specimen: Venous Blood   Result Value Ref Range    pH, Venous 7.315 7.310 - 7.410 pH Units    pCO2, Venous 51.3 (H) 41.0 - 51.0 mm Hg    pO2, Venous 34.4 (L) 35.0 - 45.0 mm Hg    HCO3, Venous 26.1 22.0 - 28.0 mmol/L    Base Excess, Venous -1.1 -2.0 - 2.0 mmol/L    O2 Saturation, Venous 60.0 45.0 - 75.0 %    CO2 Content 27.7  (H) 23 - 27 mmol/L    Barometric Pressure for Blood Gas 756.1000 mmHg    Modality Room Air     Rate 24 Breaths/minute    Device Comment [id number]        Ordered the above labs and reviewed the results.        RADIOLOGY  CT Abdomen Pelvis Without Contrast    Result Date: 10/1/2023  CT ABDOMEN AND PELVIS WITHOUT IV CONTRAST  HISTORY: Confusion, dry heaving.  TECHNIQUE:  CT includes axial imaging from the lung bases to the trochanters without intravenous contrast and without use of oral contrast. Data reconstructed in coronal and sagittal planes. Radiation dose reduction techniques were utilized, including automated exposure control and exposure modulation based on body size.  COMPARISON: CT abdomen and pelvis 03/03/2018.  FINDINGS: There is linear subsegmental lower lobe atelectasis. Liver, spleen, adrenal glands, pancreas, kidneys exhibit normal noncontrasted CT appearance. There is no hydronephrosis. Gallbladder is not visualized. There is no bowel dilatation or evidence for bowel obstruction. There is sigmoid diverticulosis without evidence for diverticulitis. Urinary bladder is mostly decompressed. There is no ascites or evidence for intra-abdominal abscess formation. Sclerotic lesion within the L5 vertebral body is without change. There is a chronic compression deformity at T12. Bones are diffusely osteopenic. There is evidence for an old right lesser trochanteric/iliopsoas insertional avulsion and there is chronic atrophy of the right iliacus psoas and iliopsoas muscles.      No evidence for acute intra-abdominal process.   Radiation dose reduction techniques were utilized, including automated exposure control and exposure modulation based on body size.   This report was finalized on 10/1/2023 1:47 PM by Dr. Tomi Dowd M.D.      CT Head Without Contrast    Result Date: 10/1/2023  CT HEAD WITHOUT CONTRAST  CLINICAL HISTORY: Altered mental status  TECHNIQUE: CT scan of the head was obtained with 3 mm axial soft  tissue algorithm images. No intravenous contrast was administered. Sagittal and coronal reconstructions were obtained.  COMPARISON: CT head 8/30/2023.  FINDINGS: Motion limited exam. No intracranial hemorrhage.  No midline shift or mass effect. Unchanged chronic right putaminal lacunar infarct. No CT evidence of acute territorial infarction.  No extraaxial collection.  No hydrocephalus. Opacified left maxillary sinus with internal high attenuation material hyperostosis frontalis..       1. Motion limited exam. Within that limitation, no acute intracranial abnormality. 2. Unchanged opacified left maxillary sinus with internal high attenuation material, suggesting inspissated secretions or chronic fungal sinusitis.     Radiation dose reduction techniques were utilized, including automated exposure control and exposure modulation based on body size.  This report was finalized on 10/1/2023 1:08 PM by Dr. Cameron Rosenthal M.D.      XR Chest 1 View    Result Date: 10/1/2023  CHEST SINGLE VIEW  HISTORY: Altered mental status.  COMPARISON: CT abdomen and pelvis same date at 12:38 p.m., AP chest 05/29/2023  FINDINGS: Heart size is upper normal. Thoracic aorta appears tortuous and aortic vascular calcifications are present. There is subsegmental basilar atelectasis and lung volumes are diminished. There is no evidence for pulmonary edema or pleural effusion. The bones are osteopenic. Surgical clips overlie the left lateral chest wall.      Diminished lung volumes with subsegmental basilar atelectasis.       Ordered the above noted radiological studies. Reviewed by me in PACS.              MEDICATIONS GIVEN IN ER  Medications   sodium chloride 0.9 % flush 10 mL (has no administration in time range)   ondansetron (ZOFRAN) injection 4 mg (4 mg Intravenous Given 10/1/23 1127)   LORazepam (ATIVAN) injection 0.5 mg (0.5 mg Intravenous Given 10/1/23 1149)   sodium chloride 0.9 % bolus 500 mL (0 mL Intravenous Stopped 10/1/23 1400)    LORazepam (ATIVAN) injection 0.5 mg (0.5 mg Intravenous Given 10/1/23 1212)   magnesium sulfate 2g/50 mL (PREMIX) infusion (2 g Intravenous New Bag 10/1/23 1255)   ondansetron (ZOFRAN) injection 4 mg (4 mg Intravenous Given 10/1/23 1429)             MEDICAL DECISION MAKING, PROGRESS, and CONSULTS    All labs have been independently reviewed by me.  All radiology studies have been reviewed by me and I have also reviewed the radiology report.   EKG's independently viewed and interpreted by me.  Discussion below represents my analysis of pertinent findings related to patient's condition, differential diagnosis, treatment plan and final disposition.            Orders placed during this visit:  Orders Placed This Encounter   Procedures    Blood Culture - Blood,    Blood Culture - Blood,    XR Chest 1 View    CT Head Without Contrast    CT Abdomen Pelvis Without Contrast    [place]ard Draw    Comprehensive Metabolic Panel    Single High Sensitivity Troponin T    Magnesium    Urinalysis With Microscopic If Indicated (No Culture) - Urine, Clean Catch    CBC Auto Differential    Beta Hydroxybutyrate Quantitative    Blood Gas, Venous -    Lactic Acid, Plasma    Procalcitonin    Lipase    Ethanol    Urine Drug Screen - Urine, Clean Catch    STAT Lactic Acid, Reflex    Blood Gas, Venous -    NPO Diet NPO Type: Strict NPO    Undress & Gown    Continuous Pulse Oximetry    Vital Signs    IP General Consult (Use specialty-specific consult if known)    Oxygen Therapy- Nasal Cannula; Titrate 1-6 LPM Per SpO2; 90 - 95%    POC Glucose Once    POC Glucose Once    ECG 12 Lead ED Triage Standing Order; Weak / Dizzy / AMS    Insert Peripheral IV    Initiate Observation Status    Fall Precautions    CBC & Differential    Green Top (Gel)    Lavender Top    Gold Top - SST    Light Blue Top           Differential diagnosis:  DKA, hyperglycemia, UTI, metabolic encephalopathy, toxic encephalopathy      Independent interpretation of labs,  radiology studies, and discussions with consultants:  ED Course as of 10/01/23 1750   Sun Oct 01, 2023   1156 EKG          EKG time: 1154  Rhythm/Rate: Sinus tachycardia, rate 102  P waves and HI: Normal P, normal HI  QRS, axis: Narrow QRS, normal axis  ST and T waves: No acute accounting for artifact and wander in the lateral leads    Independently Interpreted by me  Not significantly changed compared to prior 30 2023   [TR]   1216 The first dose of Ativan was not sufficient to get her calm enough to perform imaging.  I have ordered a second dose. [TR]   1229 BUN(!): 30 [MP]   1229 Creatinine(!): 1.21 [MP]   1229 WBC: 10.48 [MP]   1229 Hemoglobin: 13.0 [MP]   1229 Magnesium(!): 1.2 [MP]   1239 Lactate(!!): 2.9 [MP]   1258 CT Head Without Contrast  My independent interpretation of the CT of the head is no gross hemorrhage [TR]   1443 I spoke with Dr. Valenzuela.  Reviewed patient presentation and ED findings.  He agrees to admit patient to a telemetry observation bed.  Chest x-ray pending at this time. [MP]      ED Course User Index  [MP] Kay Noel PA-C  [TR] Hank Kauffman MD         Additional orders considered but not ordered:  MRI of the brain      Additional sources:    - Chronic or social conditions impacting care: Diabetes          DIAGNOSIS  Final diagnoses:   Altered mental status, unspecified altered mental status type   Hyperglycemia due to diabetes mellitus   Elevated lactic acid level   Hypomagnesemia   Chronic renal impairment, unspecified CKD stage         Latest Documented Vital Signs:  As of 14:43 EDT  BP- (!) 142/106 HR- 90 Temp- 96.4 °F (35.8 °C) (Tympanic) O2 sat- 97%              --    Please note that portions of this were completed with a voice recognition program.       Note Disclaimer: At [facility], we believe that sharing information builds trust and better relationships. You are receiving this note because you are receiving care at [facility] or recently visited. It is  possible you will see health information before a provider has talked with you about it. This kind of information can be easy to misunderstand. To help you fully understand what it means for your health, we urge you to discuss this note with your provider.             Kay Noel PA-C  10/01/23 1191

## 2023-10-01 NOTE — ED NOTES
Lab called to collect patient's blood cultures and repeat lactic, patient has been stuck multiple times by 2 Rns and an ED tech unsuccessfully.

## 2023-10-01 NOTE — PLAN OF CARE
Problem: Adult Inpatient Plan of Care  Goal: Plan of Care Review  Flowsheets (Taken 10/1/2023 1844)  Outcome Evaluation: Transfer from ashley wilkerson,  @ bedside , Q2 turn,     Problem: Adult Inpatient Plan of Care  Goal: Absence of Hospital-Acquired Illness or Injury  Intervention: Identify and Manage Fall Risk  Recent Flowsheet Documentation  Taken 10/1/2023 1815 by Isabella Ortiz RN  Safety Promotion/Fall Prevention:   clutter free environment maintained   nonskid shoes/slippers when out of bed   safety round/check completed   Goal Outcome Evaluation:              Outcome Evaluation: Transfer from ashley wilkerson,  @ bedside , Q2 turn,

## 2023-10-02 ENCOUNTER — APPOINTMENT (OUTPATIENT)
Dept: MRI IMAGING | Facility: HOSPITAL | Age: 78
DRG: 640 | End: 2023-10-02
Payer: MEDICARE

## 2023-10-02 PROBLEM — E83.42 HYPOMAGNESEMIA: Status: ACTIVE | Noted: 2023-10-02

## 2023-10-02 PROBLEM — E43 SEVERE MALNUTRITION: Status: ACTIVE | Noted: 2023-10-02

## 2023-10-02 LAB
ANION GAP SERPL CALCULATED.3IONS-SCNC: 8 MMOL/L (ref 5–15)
BUN SERPL-MCNC: 22 MG/DL (ref 8–23)
BUN/CREAT SERPL: 21.4 (ref 7–25)
CALCIUM SPEC-SCNC: 8.9 MG/DL (ref 8.6–10.5)
CHLORIDE SERPL-SCNC: 96 MMOL/L (ref 98–107)
CO2 SERPL-SCNC: 27 MMOL/L (ref 22–29)
CREAT SERPL-MCNC: 1.03 MG/DL (ref 0.57–1)
DEPRECATED RDW RBC AUTO: 45.1 FL (ref 37–54)
EGFRCR SERPLBLD CKD-EPI 2021: 56.1 ML/MIN/1.73
ERYTHROCYTE [DISTWIDTH] IN BLOOD BY AUTOMATED COUNT: 13.3 % (ref 12.3–15.4)
ERYTHROCYTE [SEDIMENTATION RATE] IN BLOOD: 37 MM/HR (ref 0–30)
GLUCOSE BLDC GLUCOMTR-MCNC: 180 MG/DL (ref 70–130)
GLUCOSE BLDC GLUCOMTR-MCNC: 195 MG/DL (ref 70–130)
GLUCOSE BLDC GLUCOMTR-MCNC: 206 MG/DL (ref 70–130)
GLUCOSE BLDC GLUCOMTR-MCNC: 264 MG/DL (ref 70–130)
GLUCOSE BLDC GLUCOMTR-MCNC: 314 MG/DL (ref 70–130)
GLUCOSE SERPL-MCNC: 153 MG/DL (ref 65–99)
HCT VFR BLD AUTO: 37.5 % (ref 34–46.6)
HGB BLD-MCNC: 12.3 G/DL (ref 12–15.9)
MCH RBC QN AUTO: 30.6 PG (ref 26.6–33)
MCHC RBC AUTO-ENTMCNC: 32.8 G/DL (ref 31.5–35.7)
MCV RBC AUTO: 93.3 FL (ref 79–97)
PLATELET # BLD AUTO: 217 10*3/MM3 (ref 140–450)
PMV BLD AUTO: 10.1 FL (ref 6–12)
POTASSIUM SERPL-SCNC: 4.1 MMOL/L (ref 3.5–5.2)
RBC # BLD AUTO: 4.02 10*6/MM3 (ref 3.77–5.28)
SODIUM SERPL-SCNC: 131 MMOL/L (ref 136–145)
WBC NRBC COR # BLD: 10.8 10*3/MM3 (ref 3.4–10.8)

## 2023-10-02 PROCEDURE — 70553 MRI BRAIN STEM W/O & W/DYE: CPT

## 2023-10-02 PROCEDURE — 63710000001 PREDNISONE PER 5 MG: Performed by: INTERNAL MEDICINE

## 2023-10-02 PROCEDURE — 85652 RBC SED RATE AUTOMATED: CPT | Performed by: INTERNAL MEDICINE

## 2023-10-02 PROCEDURE — A9577 INJ MULTIHANCE: HCPCS | Performed by: INTERNAL MEDICINE

## 2023-10-02 PROCEDURE — 25010000002 ENOXAPARIN PER 10 MG: Performed by: INTERNAL MEDICINE

## 2023-10-02 PROCEDURE — 85027 COMPLETE CBC AUTOMATED: CPT | Performed by: INTERNAL MEDICINE

## 2023-10-02 PROCEDURE — 87040 BLOOD CULTURE FOR BACTERIA: CPT | Performed by: INTERNAL MEDICINE

## 2023-10-02 PROCEDURE — 63710000001 INSULIN GLARGINE PER 5 UNITS: Performed by: INTERNAL MEDICINE

## 2023-10-02 PROCEDURE — 0 GADOBENATE DIMEGLUMINE 529 MG/ML SOLUTION: Performed by: INTERNAL MEDICINE

## 2023-10-02 PROCEDURE — 80048 BASIC METABOLIC PNL TOTAL CA: CPT | Performed by: INTERNAL MEDICINE

## 2023-10-02 PROCEDURE — 82948 REAGENT STRIP/BLOOD GLUCOSE: CPT

## 2023-10-02 PROCEDURE — 63710000001 INSULIN LISPRO (HUMAN) PER 5 UNITS: Performed by: INTERNAL MEDICINE

## 2023-10-02 RX ORDER — POTASSIUM CHLORIDE 750 MG/1
10 TABLET, FILM COATED, EXTENDED RELEASE ORAL DAILY
Status: DISCONTINUED | OUTPATIENT
Start: 2023-10-02 | End: 2023-10-04 | Stop reason: HOSPADM

## 2023-10-02 RX ORDER — BUDESONIDE AND FORMOTEROL FUMARATE DIHYDRATE 160; 4.5 UG/1; UG/1
2 AEROSOL RESPIRATORY (INHALATION)
Status: DISCONTINUED | OUTPATIENT
Start: 2023-10-02 | End: 2023-10-04 | Stop reason: HOSPADM

## 2023-10-02 RX ORDER — GLUCAGON 3 MG/1
3 POWDER NASAL AS NEEDED
Qty: 1 EACH | Refills: 0 | Status: CANCELLED | OUTPATIENT
Start: 2023-10-02

## 2023-10-02 RX ORDER — INSULIN LISPRO 100 [IU]/ML
7 INJECTION, SOLUTION INTRAVENOUS; SUBCUTANEOUS
Status: DISCONTINUED | OUTPATIENT
Start: 2023-10-02 | End: 2023-10-03

## 2023-10-02 RX ORDER — HYDROCODONE BITARTRATE AND ACETAMINOPHEN 5; 325 MG/1; MG/1
1 TABLET ORAL ONCE AS NEEDED
Status: DISCONTINUED | OUTPATIENT
Start: 2023-10-02 | End: 2023-10-04 | Stop reason: HOSPADM

## 2023-10-02 RX ORDER — VALSARTAN 80 MG/1
80 TABLET ORAL
Status: CANCELLED | OUTPATIENT
Start: 2023-10-02

## 2023-10-02 RX ORDER — DOCUSATE SODIUM 100 MG/1
100 CAPSULE, LIQUID FILLED ORAL 2 TIMES DAILY
Status: DISCONTINUED | OUTPATIENT
Start: 2023-10-02 | End: 2023-10-04 | Stop reason: HOSPADM

## 2023-10-02 RX ORDER — ATORVASTATIN CALCIUM 20 MG/1
20 TABLET, FILM COATED ORAL NIGHTLY
Status: DISCONTINUED | OUTPATIENT
Start: 2023-10-02 | End: 2023-10-04 | Stop reason: HOSPADM

## 2023-10-02 RX ORDER — ENOXAPARIN SODIUM 100 MG/ML
40 INJECTION SUBCUTANEOUS EVERY 24 HOURS
Status: DISCONTINUED | OUTPATIENT
Start: 2023-10-02 | End: 2023-10-04 | Stop reason: HOSPADM

## 2023-10-02 RX ADMIN — PREDNISONE 6 MG: 5 TABLET ORAL at 21:55

## 2023-10-02 RX ADMIN — INSULIN LISPRO 4 UNITS: 100 INJECTION, SOLUTION INTRAVENOUS; SUBCUTANEOUS at 18:55

## 2023-10-02 RX ADMIN — ATORVASTATIN CALCIUM 20 MG: 20 TABLET, FILM COATED ORAL at 21:55

## 2023-10-02 RX ADMIN — AMLODIPINE BESYLATE 5 MG: 5 TABLET ORAL at 08:32

## 2023-10-02 RX ADMIN — DOCUSATE SODIUM 100 MG: 100 CAPSULE, LIQUID FILLED ORAL at 21:55

## 2023-10-02 RX ADMIN — GADOBENATE DIMEGLUMINE 14 ML: 529 INJECTION, SOLUTION INTRAVENOUS at 17:44

## 2023-10-02 RX ADMIN — TORSEMIDE 20 MG: 20 TABLET ORAL at 08:32

## 2023-10-02 RX ADMIN — ACETAMINOPHEN 650 MG: 325 TABLET, FILM COATED ORAL at 11:48

## 2023-10-02 RX ADMIN — PANTOPRAZOLE SODIUM 40 MG: 40 TABLET, DELAYED RELEASE ORAL at 06:02

## 2023-10-02 RX ADMIN — ANORECTAL OINTMENT 1 APPLICATION: 15.7; .44; 24; 20.6 OINTMENT TOPICAL at 21:44

## 2023-10-02 RX ADMIN — ACETAMINOPHEN 650 MG: 325 TABLET, FILM COATED ORAL at 21:55

## 2023-10-02 RX ADMIN — INSULIN LISPRO 7 UNITS: 100 INJECTION, SOLUTION INTRAVENOUS; SUBCUTANEOUS at 18:55

## 2023-10-02 RX ADMIN — INSULIN LISPRO 3 UNITS: 100 INJECTION, SOLUTION INTRAVENOUS; SUBCUTANEOUS at 08:31

## 2023-10-02 RX ADMIN — INSULIN GLARGINE 20 UNITS: 100 INJECTION, SOLUTION SUBCUTANEOUS at 21:56

## 2023-10-02 RX ADMIN — ENOXAPARIN SODIUM 40 MG: 100 INJECTION SUBCUTANEOUS at 21:55

## 2023-10-02 RX ADMIN — INSULIN LISPRO 5 UNITS: 100 INJECTION, SOLUTION INTRAVENOUS; SUBCUTANEOUS at 21:56

## 2023-10-02 RX ADMIN — BISOPROLOL FUMARATE 20 MG: 5 TABLET, FILM COATED ORAL at 21:56

## 2023-10-02 RX ADMIN — POTASSIUM CHLORIDE 10 MEQ: 750 TABLET, EXTENDED RELEASE ORAL at 21:44

## 2023-10-02 NOTE — NURSING NOTE
10/02/23 1049   Wound 10/01/23 1515 Bilateral sacral spine Pressure Injury   Placement Date/Time: 10/01/23 1515   Present on Hospital Admission: Yes  Side: Bilateral  Location: sacral spine  Primary Wound Type: Pressure Injury   Pressure Injury Stage DTPI   Base non-blanchable;moist;maroon/purple   Periwound redness;swelling   Periwound Temperature warm   Periwound Skin Turgor soft   Skin Interventions   Pressure Reduction Devices specialty bed utilized  (Low air loss mattress)     Wound/Ostomy: Consult received regarding skin issue on Coccyx area. Patient is alert, oriented, able to turn with assistance, upon assessment is observed non-intact skin with localized area of persistent non-blanchable purple discoloration, DTI POA.  Wound care order and pressure standing measures have been implemented into Epic.   Low air loss mattress for adequate pressure redistribution and pressure relief from Agility and frame from EVS will be requested, unit secretary will call.  Education about the importance of repositioning every 2 h and minimize any skin contact with urine or stool was provided,  at bedside.  Please re-consult for any additional needs.

## 2023-10-02 NOTE — PLAN OF CARE
Goal Outcome Evaluation:   VSS on room air this shift; pts BP elevated at beginning of shift but resolved after pt took morning meds; pt was able to answer all orientation questions correctly this morning; pt still exhibiting symptoms of forgetfulness but is alert to person, place, time, and situation; pt normally up ad jhoan at home but c/o extreme weakness during hospitalization; pt is currently a max assist x2 to get out of bed; pt c/o R hip pain radiating down R leg that started yesterday; MD made aware; pt's spouse denies her falling yesterday; PRN tylenol given for pain; pts appetite poor; pt to go down for MRI of brain with contrast; pts spouse remains at bedside; pt to be moved onto low airloss mattress when she goes down for MRI.

## 2023-10-02 NOTE — H&P
HISTORY AND PHYSICAL   [facility], [facility]      10/2/2023    Patient Identification:    Name: Blanca Zhu  Age: 78 y.o.  Sex: female  :  1945  MRN: [id number]                       Primary Care Physician: Praneeth Valenzuela MD    Chief Complaint:      Chief Complaint   Patient presents with    Altered Mental Status         History of Present Illness:     Patient is a 78-year-old female, with a diagnosis of asthma, chronic kidney disease stage III, treated type II uncontrolled, hypertension, temporal arteritis recently placed back on steroids, chronic diastolic congestive heart failure, hypertension, GERD.   states that about 9 AM dated admission, patient was in her room staring off, he was unable to converse with her, she was not responsive, her blood sugar was over 400, patient has been given her 2 units of Humalog call EMS.  Patient was restless as well as agitated.  Patient was brought to the emergency room, in the ER, patient was found to have: Tachycardia, urinalysis showed no leukocytes, no nitrates, blood sugar was over 350, creatinine 1.21, sodium 132, magnesium 1.2, WBC 10.48, hemoglobin 13, lactate 2.8, drug screen was negative, CT abdomen pelvis showed no evidence of acute intra-abdominal process, CT of the head showed no acute intracranial abnormality, unchanged opacified left maxillary sinus suggesting inspissated secretions, chronic fungal sinusitis.  Chest x-ray showed subsegmental basilar atelectasis.  In the emergency room, patient was given Ativan twice since patient was delirious and agitated, was given bolus of IV fluids, patient was admitted to hospital for further management.    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 Daniel Gutierrez, [facility]     SECTION N/A     x3    CHOLECYSTECTOMY N/A     COLONOSCOPY      COLONOSCOPY W/ POLYPECTOMY N/A 2019    Enlarged folds in the antrum: biopsied; duodenal, transverse colon x2, splenic flexure, descending colon and sigmoid colon polyps: biopsied (path: tubular adenoma x6)-Dr. Zak De Jesus, [facility]    ENDOSCOPY  10/11/2019    Procedure: ESOPHAGOGASTRODUODENOSCOPY with hot snare polypectomy;  Surgeon: Haile Handley MD;  Location: [facility] ENDOSCOPY;  Service: Gastroenterology    ENDOSCOPY N/A 2020    Procedure: ESOPHAGOGASTRODUODENOSCOPY;  Surgeon: Haile Handley MD;  Location: [facility] ENDOSCOPY;  Service: Gastroenterology    ENDOSCOPY N/A 2020    Procedure: ESOPHAGOGASTRODUODENOSCOPY WITH BIOPSIES AND COLD BIOPSY POLYPECTOMY;  Surgeon: Haile Handley MD;  Location: [facility] ENDOSCOPY;  Service: Gastroenterology;  Laterality: N/A;  pre: dyspepsia and diarrhea  post: duodenal polyp and gastritis    MASTECTOMY Left     MASTECTOMY WITH SENTINEL NODE BIOPSY AND AXILLARY NODE DISSECTION Left 7/3/2019    Procedure: LEFT BREAST MASTECTOMY WITH SENTINEL NODE BIOPSY AND , v-y plasty closure;  Surgeon: Tahmina James MD;  Location: [facility] OR;  Service: General    SUBTOTAL HYSTERECTOMY Bilateral     ovaries still in tact    TEMPORAL ARTERY BIOPSY Bilateral 2019      Home Meds:  Medications Prior to Admission   Medication Sig Dispense Refill Last Dose    amLODIPine  (NORVASC) 5 MG tablet Take 1 tablet by mouth Daily.   9/30/2023    bisoprolol (ZEBeta) 10 MG tablet Take 2 tablets by mouth Every Night.   9/30/2023    Cholecalciferol 125 MCG (5000 UT) tablet Every Other Day.   9/30/2023    ipratropium-albuterol (DUO-NEB) 0.5-2.5 mg/3 ml nebulizer Take 3 mL by nebulization 4 (Four) Times a Day As Needed for Shortness of Air or Wheezing. 10 mL 0 Past Month    leflunomide (ARAVA) 10 MG tablet Take 1 tablet by mouth Daily.   9/30/2023    O2 (OXYGEN) Inhale 1 (One) Time.   Past Month    omeprazole (priLOSEC) 20 MG capsule 2 capsules.   9/30/2023    torsemide (DEMADEX) 20 MG tablet Take 1 tablet by mouth Daily. 30 tablet 3 9/30/2023    acetaminophen (TYLENOL) 325 MG tablet Take 2 tablets by mouth Every 6 (Six) Hours As Needed for Mild Pain. 30 tablet 3 Unknown    methocarbamol (ROBAXIN) 750 MG tablet Take 1 tablet by mouth 3 (Three) Times a Day As Needed for Muscle Spasms. 15 tablet 0 Unknown       Allergies:  Allergies   Allergen Reactions    Aspirin Nausea Only     Low tolerance, abdominal pain    Sulfa Antibiotics Unknown (See Comments)     Happened as a child     Immunizations:  Immunization History   Administered Date(s) Administered    COVID-19 (PFIZER) BIVALENT 12+YRS 10/29/2022    COVID-19 (PFIZER) Purple Cap Monovalent 02/24/2021, 03/17/2021, 10/16/2021    Covid-19 (Pfizer) Gray Cap Monovalent 04/30/2022    Fluad Quad 65+ 10/13/2021    Fluzone High Dose =>65 Years (Vaxcare ONLY) 10/04/2016, 11/17/2017, 09/25/2018, 01/31/2020, 09/29/2020, 10/12/2021    Fluzone High-Dose 65+yrs 09/29/2020, 10/12/2021    Fluzone Quad >6mos (Multi-dose) 09/29/2020    Hepatitis A 05/03/2018, 11/09/2018, 12/16/2019    INFLUENZA SPLIT TRI 10/11/2016    Influenza, Unspecified 09/25/2018    Pneumococcal Conjugate 13-Valent (PCV13) 10/04/2016, 10/12/2016    Pneumococcal Polysaccharide (PPSV23) 10/12/2016, 01/01/2017, 09/25/2018    Pneumococcal, Unspecified 01/01/2017     Social History:   Social History  "    Social History Narrative    Not on file     Social History     Tobacco Use    Smoking status: Never    Smokeless tobacco: Never    Tobacco comments:     caffine use   Substance Use Topics    Alcohol use: No     Comment: one or two small drinks a year     Family History:  Family History   Problem Relation Age of Onset    Hypertension Father     Stomach cancer Father     Diabetes Father     Esophageal cancer Father     Throat cancer Sister     Diabetes Paternal Grandmother     Hypertension Paternal Grandfather     Colon cancer Paternal Grandfather     Heart disease Mother     Colon cancer Paternal Uncle     Colon cancer Paternal Uncle     Colon cancer Paternal Uncle     Ovarian cancer Cousin     Stomach cancer Cousin     Malig Hyperthermia Neg Hx         Review of Systems  See history of present illness and past medical history.    Unable to perform ROS: Mental status change         Objective:    Exam:    T MAX 24 hrs: Temp (24hrs), Av.8 °F (36.6 °C), Min:97.5 °F (36.4 °C), Max:98.2 °F (36.8 °C)    Vitals Ranges:   Temp:  [97.5 °F (36.4 °C)-98.2 °F (36.8 °C)] 98.2 °F (36.8 °C)  Heart Rate:  [] 81  Resp:  [18-20] 18  BP: (119-178)/() 132/69    /69 (BP Location: Right arm, Patient Position: Lying)   Pulse 81   Temp 98.2 °F (36.8 °C) (Oral)   Resp 18   Ht 165.1 cm (65\")   Wt 70.2 kg (154 lb 12.2 oz)   LMP  (LMP Unknown)   SpO2 96%   BMI 25.75 kg/m²     General: Today, patient mental status is much improved, close to baseline.  No respiratory distress. HEENT:    Head: Normocephalic, without obvious abnormality, atraumatic  Eyes: EOM are normal. Pupils are equal  Oropharynx: Mucosa and tongue normal dry  Neck: Supple, symmetrical, trachea midline, no adenopathy;              thyroid:  no enlargement/tenderness/nodules;              no carotid bruit or JVD  Cardiovascular: Normal rate, regular rhythm and intact distal pulses.              Exam reveals no gallop and no friction rub. No " murmur heard  Chest wall: No tenderness or deformity  Pulmonary:  diminished breath sounds  bilaterally, respirations unlabored.               No rhonchi, wheezing or rales.   Abdominal: Soft, nontender, bowel sounds active all four quadrants,     no masses, no hepatomegaly, no splenomegaly.   Extremities: Normal, atraumatic, no cyanosis.  1+ edema, much better than her baseline  Pulses: 2 + symmetric all extremities  Neurological: Patient is alert and oriented to person, place, and time.  No new focal sensorimotor deficit.   Skin: Skin color, texture, normal. Turgor is decreased. No rashes or lesions      Data Review:    Results from last 7 days   Lab Units 10/02/23  0056 10/01/23  1149   WBC 10*3/mm3 10.80 10.48   HEMOGLOBIN g/dL 12.3 13.0   HEMATOCRIT % 37.5 41.0   PLATELETS 10*3/mm3 217 227       Results from last 7 days   Lab Units 10/02/23  0056 10/01/23  1149   SODIUM mmol/L 131* 132*   POTASSIUM mmol/L 4.1 4.2   CHLORIDE mmol/L 96* 95*   CO2 mmol/L 27.0 26.0   BUN mg/dL 22 30*   CREATININE mg/dL 1.03* 1.21*   CALCIUM mg/dL 8.9 9.6   BILIRUBIN mg/dL  --  0.3   ALK PHOS U/L  --  183*   ALT (SGPT) U/L  --  19   AST (SGOT) U/L  --  15   GLUCOSE mg/dL 153* 372*           Results from last 7 days   Lab Units 10/01/23  1149   HEMOGLOBIN A1C % 10.20*       Lab Results   Lab Value Date/Time    TROPONINT 34 (H) 10/01/2023 1149    TROPONINT 56 (C) 06/30/2023 1959    TROPONINT 61 (C) 06/30/2023 1601    TROPONINT 28 (H) 05/21/2023 1920    TROPONINT 25 (H) 05/21/2023 1615       Brief Urine Lab Results  (Last result in the past 365 days)        Color   Clarity   Blood   Leuk Est   Nitrite   Protein   CREAT   Urine HCG        10/01/23 1122 Yellow   Clear   Negative   Negative   Negative   Trace                    Imaging Results (All)       Procedure Component Value Units Date/Time    XR Chest 1 View [[id number]] Collected: 10/01/23 1746     Updated: 10/02/23 0724    Narrative:      CHEST SINGLE VIEW     HISTORY: Altered  mental status.     COMPARISON: CT abdomen and pelvis same date at 12:38 p.m., AP chest  05/29/2023     FINDINGS: Heart size is upper normal. Thoracic aorta appears tortuous  and aortic vascular calcifications are present. There is subsegmental  basilar atelectasis and lung volumes are diminished. There is no  evidence for pulmonary edema or pleural effusion. The bones are  osteopenic. Surgical clips overlie the left lateral chest wall.       Impression:      Diminished lung volumes with subsegmental basilar  atelectasis.     This report was finalized on 10/2/2023 7:21 AM by Dr. Tomi Dowd M.D.       CT Abdomen Pelvis Without Contrast [[id number]] Collected: 10/01/23 1319     Updated: 10/01/23 1350    Narrative:      CT ABDOMEN AND PELVIS WITHOUT IV CONTRAST     HISTORY: Confusion, dry heaving.     TECHNIQUE:  CT includes axial imaging from the lung bases to the  trochanters without intravenous contrast and without use of oral  contrast. Data reconstructed in coronal and sagittal planes. Radiation  dose reduction techniques were utilized, including automated exposure  control and exposure modulation based on body size.     COMPARISON: CT abdomen and pelvis 03/03/2018.     FINDINGS: There is linear subsegmental lower lobe atelectasis. Liver,  spleen, adrenal glands, pancreas, kidneys exhibit normal noncontrasted  CT appearance. There is no hydronephrosis. Gallbladder is not  visualized. There is no bowel dilatation or evidence for bowel  obstruction. There is sigmoid diverticulosis without evidence for  diverticulitis. Urinary bladder is mostly decompressed. There is no  ascites or evidence for intra-abdominal abscess formation. Sclerotic  lesion within the L5 vertebral body is without change. There is a  chronic compression deformity at T12. Bones are diffusely osteopenic.  There is evidence for an old right lesser trochanteric/iliopsoas  insertional avulsion and there is chronic atrophy of the right  iliacus  psoas and iliopsoas muscles.       Impression:      No evidence for acute intra-abdominal process.        Radiation dose reduction techniques were utilized, including automated  exposure control and exposure modulation based on body size.        This report was finalized on 10/1/2023 1:47 PM by Dr. Tomi Dowd M.D.       CT Head Without Contrast [[id number]] Collected: 10/01/23 1259     Updated: 10/01/23 1311    Narrative:      CT HEAD WITHOUT CONTRAST     CLINICAL HISTORY: Altered mental status     TECHNIQUE: CT scan of the head was obtained with 3 mm axial soft tissue  algorithm images. No intravenous contrast was administered. Sagittal and  coronal reconstructions were obtained.     COMPARISON: CT head 8/30/2023.     FINDINGS: Motion limited exam. No intracranial hemorrhage.  No midline  shift or mass effect. Unchanged chronic right putaminal lacunar infarct.  No CT evidence of acute territorial infarction.  No extraaxial  collection.  No hydrocephalus. Opacified left maxillary sinus with  internal high attenuation material hyperostosis frontalis..          Impression:      1. Motion limited exam. Within that limitation, no acute intracranial  abnormality.  2. Unchanged opacified left maxillary sinus with internal high  attenuation material, suggesting inspissated secretions or chronic  fungal sinusitis.              Radiation dose reduction techniques were utilized, including automated  exposure control and exposure modulation based on body size.     This report was finalized on 10/1/2023 1:08 PM by Dr. Cameron Rosenthal M.D.               Assessment:    Altered mental status/delirium  Diabetes mellitus uncontrolled  Hyponatremia  Hypomagnesemia  Dehydration  Opacified left maxillary sinus  Chronic kidney disease stage IIIa  Hypertension  Pulmonary hypertension  Sleep apnea  Hyperlipidemia  Morbid obesity  Temporal arteritis recently placed back on steroids        Plan:    Inpatient  admission  Patient is eating okay, received IV fluids in the emergency room, euvolemic at this time.  We will continue to monitor volume status, no IV fluids at this time.  No signs of infection, work-up negative for UTI, pneumonia, skin infection, sore throat.  We will watch for antibiotics.  Monitor and correct electrolytes, has hyponatremia hypomagnesemia, will replace.  This is probably related to diuretics.  Monitor renal function, avoid nephrotoxic medication.  ENT consult to evaluate a possible left axillary sinus, rule out for sinusitis.  Patient to use her CPAP machine, has diagnosis of sleep apnea  Accu-Chek and SSI, will increase long-acting insulin to 20, Premeal insulin with Humalog to 7.  Continue sliding scale insulin.  Monitor mental status, has improved today.  However, still not at baseline.  Given the nature of her mental status, will need to rule out a TIA or a small CVA.  Patient may benefit from a EEG as well.  We will ask neurology to see her.  Home medications  DVT/stress ulcer prophylaxis  PT/OT consult  Labs in am        Praneeth Valenzuela MD  10/2/2023  13:46 EDT

## 2023-10-02 NOTE — DISCHARGE PLACEMENT REQUEST
"Travis Torrez (78 y.o. Female)       Date of Birth   1945    Social Security Number       Address   [address] [place] [address] [address] [address]    Home Phone   [phone]    MRN   [id number]       Restorationist   Christianity    Marital Status                               Admission Date   10/1/23    Admission Type   Emergency    Admitting Provider   Praneeth Valenzuela MD    Attending Provider   Praneeth Valenzuela MD    Department, Room/Bed   [address], [address]       Discharge Date       Discharge Disposition       Discharge Destination                                 Attending Provider: Praneeth Valenzuela MD    Allergies: Aspirin, Sulfa Antibiotics    Isolation: None   Infection: None   Code Status: Prior    Ht: 165.1 cm (65\")   Wt: 70.2 kg (154 lb 12.2 oz)    Admission Cmt: None   Principal Problem: Delirium [R41.0]                   Active Insurance as of 10/1/2023       Primary Coverage       Payor Plan Insurance Group Employer/Plan Group    MEDICARE MEDICARE A & B        Payor Plan Address Payor Plan Phone Number Payor Plan Fax Number Effective Dates    [address] [phone]  6/1/2011 - None Entered    [facility] [id number]         Subscriber Name Subscriber Birth Date Member ID       TRAVIS TORREZ 1945 [id number]               Secondary Coverage       Payor Plan Insurance Group Employer/Plan Group    AARP MC SUP [employer] HE[employer] C[employer] OPTIONS [id number]       Payor Plan Address Payor Plan Phone Number Payor Plan Fax Number Effective Dates    [facility] [phone]  1/1/2016 - None Entered    [address]       [facility] [id number]         Subscriber Name Subscriber Birth Date Member ID       TRAVIS TORREZ 1945 [id number]                     Emergency Contacts        (Rel.) Home Phone Work Phone Mobile Phone    Reyes,Alberto (Spouse) [phone] -- [phone]    ReyesTravis suero (Daughter) -- -- [phone]                "

## 2023-10-02 NOTE — NURSING NOTE
"Diabetes Education  Assessment/Teaching    Patient Name:  Blanca Zhu  YOB: 1945  MRN: [id number]  Admit Date:  10/1/2023      Assessment Date:  10/2/2023  Flowsheet Row Most Recent Value   General Information     Referral From: Other -staff thru order set. CCP. Meet with pt's spouse at bedside.    Height 165.1 cm (65\")   Height Method Stated  [per ]   Weight 70.2 kg (154 lb 12.2 oz)   Weight Method Bed scale   Diabetes History    What type of diabetes do you have? Type 2   Length of Diabetes Diagnosis 10 + years   Have you had high blood sugar? (>140mg/dl) Yes- current A1c 10.2%   Education Preferences    Barriers to Learning -- -pt sleeping. pls note ed-assessment was performed w/pt spouse.   Assessment Topics    Taking Medication - Assessment Needs education -updates to long-acting and lispro insulins.   Problem Solving - Assessment -- Discuss: pt spouse would like rx for dc for hypoglycemia (E.g G-voke or Baqsimi.)   Healthy Coping - Assessment Competent   Monitoring - Assessment Competent   DM Goals    Contact Plan Follow-up medical care            Flowsheet Row Most Recent Value   DM Education Needs    Meter Has own   Medication -- -d/w pt spouse change to long acting insulin to 20 units and give him a printed copy of the 'correction' dose for meal time in addition to the scheduled meal dose. Pt's spouse reports the previous meal dose as 5 units (now 7.)   Problem Solving Hypoglycemia, Treatment   Healthy Coping Appropriate   Discharge Plan tbd, Follow-up with PCP   Motivation Engaged   Teaching Method Explanation, Discussion, Handouts   Patient Response Verbalized understanding        Other Comments:    Electronically signed by:  Missy Islas RN  10/02/23 17:21 EDT  "

## 2023-10-02 NOTE — PROGRESS NOTES
Nutrition Services    Patient Name:  Blanca Zhu  YOB: 1945  MRN: [id number]  Admit Date:  10/1/2023  Assessment Date:  10/02/23    Summary:     Pt is a 78 y.o. female adm for delirium. H/o T2DM and CKD. Nutrition assessment completed. Visited pt and family at bedside. Pt seemed confused/flat affect. Pt family endorses decreased appetite and 40 lb wt loss in the past 4 mo. Wt hx reviewed, 54 lb (26%) x 7 mo. Meets criteria for severe malnutrition. Labs/skin reviewed. Na 131, Glu 195, A1c 10.2, Creat 1.03, Mg 1.2, Cl 96. Stage 3 bilateral sacra spine pressure injury. Agreeable to Randy.     Pt meets ASPEN/AND criteria for nutrition dx of severe malnutrtion of chronic disease as related to energy intake and wt loss.    REC:  Randy BID B/D mixed with crystal light lemonade to support wound healing  Monitor and replace electrolytes PRN  MD to manage hyponatremia   Will continue to encourage and monitor PO/ONS intake     RD to follow per protocol.    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 Daniel Gutierrez, [facility]     SECTION N/A     x3    CHOLECYSTECTOMY N/A      "COLONOSCOPY      COLONOSCOPY W/ POLYPECTOMY N/A 03/12/2019    Enlarged folds in the antrum: biopsied; duodenal, transverse colon x2, splenic flexure, descending colon and sigmoid colon polyps: biopsied (path: tubular adenoma x6)-Dr. Zak De Jesus, [facility]    ENDOSCOPY  10/11/2019    Procedure: ESOPHAGOGASTRODUODENOSCOPY with hot snare polypectomy;  Surgeon: Haile Handley MD;  Location: [facility] ENDOSCOPY;  Service: Gastroenterology    ENDOSCOPY N/A 1/29/2020    Procedure: ESOPHAGOGASTRODUODENOSCOPY;  Surgeon: Haile Handley MD;  Location: [facility] ENDOSCOPY;  Service: Gastroenterology    ENDOSCOPY N/A 9/9/2020    Procedure: ESOPHAGOGASTRODUODENOSCOPY WITH BIOPSIES AND COLD BIOPSY POLYPECTOMY;  Surgeon: Haile Handley MD;  Location: [facility] ENDOSCOPY;  Service: Gastroenterology;  Laterality: N/A;  pre: dyspepsia and diarrhea  post: duodenal polyp and gastritis    MASTECTOMY Left     MASTECTOMY WITH SENTINEL NODE BIOPSY AND AXILLARY NODE DISSECTION Left 7/3/2019    Procedure: LEFT BREAST MASTECTOMY WITH SENTINEL NODE BIOPSY AND , v-y plasty closure;  Surgeon: Tahmina James MD;  Location: [facility] MAIN OR;  Service: General    SUBTOTAL HYSTERECTOMY Bilateral     ovaries still in tact    TEMPORAL ARTERY BIOPSY Bilateral 12/05/2019        Anthropometrics        Current Height  Current Weight  BMI kg/m2 Height: 165.1 cm (65\")  Weight: 70.2 kg (154 lb 12.2 oz) (10/01/23 1817)  Body mass index is 25.75 kg/m².   Adjusted BMI (if applicable)    BMI Category Overweight (25 - 29.9)   Ideal Body Weight (IBW) 57 kg    Usual Body Weight (UBW) 206 lb    Weight Trend Loss, Amount/Timeframe: 54 lb (26%) wt loss x 7 mo    Weight History Wt Readings from Last 30 Encounters:   10/01/23 1817 70.2 kg (154 lb 12.2 oz)   09/20/23 1642 75.2 kg (165 lb 11.2 oz)   07/21/23 1426 81.6 kg (180 lb)   06/30/23 1553 85.3 kg (188 lb)   06/04/23 0556 84.9 kg (187 lb 2.7 oz)   06/03/23 0628 85.5 kg (188 lb 7.9 oz) "   06/02/23 0544 93.6 kg (206 lb 5.6 oz)   05/29/23 0542 92.6 kg (204 lb 2.3 oz)   05/21/23 1550 90.7 kg (200 lb)   05/10/23 1655 93.5 kg (206 lb 3.2 oz)   04/06/23 1216 94.8 kg (209 lb)   03/24/23 1406 94.6 kg (208 lb 9.6 oz)   03/17/23 1406 93.3 kg (205 lb 9.6 oz)   03/10/23 1428 93.3 kg (205 lb 9.6 oz)   03/01/23 1319 94.8 kg (208 lb 14.4 oz)   10/21/22 1506 96.4 kg (212 lb 8 oz)   10/03/22 1430 95.7 kg (211 lb)   07/25/22 1400 95.7 kg (211 lb)   07/20/22 1521 93.3 kg (205 lb 11.2 oz)   05/18/22 1021 95.3 kg (210 lb)   05/13/22 1542 95.3 kg (210 lb)   05/12/22 1429 95.4 kg (210 lb 6.4 oz)   03/21/22 1339 94.8 kg (209 lb)   11/16/21 1354 94.3 kg (207 lb 12.8 oz)   09/21/21 1415 94.3 kg (208 lb)   07/20/21 1503 95 kg (209 lb 6.4 oz)   06/21/21 1418 94.9 kg (209 lb 3.2 oz)   06/09/21 1400 92.1 kg (203 lb)   05/04/21 1317 92.1 kg (203 lb)   03/30/21 1505 92 kg (202 lb 14.4 oz)   10/19/20 1339 90.6 kg (199 lb 12.8 oz)   09/09/20 0713 88.9 kg (196 lb)   08/27/20 1108 90 kg (198 lb 6.4 oz)   07/02/20 0902 89.8 kg (198 lb)        Estimated Requirements         Weight used  70.2 kg     Calories  5213-6868 (25 kcal/kg, 30 kcal/kg)    Protein   (1.2 - 1.5 gm/kg) for wound healing     Fluid  (1 mL/kcal)     Labs       Pertinent Labs    Results from last 7 days   Lab Units 10/02/23  0056 10/01/23  1149   SODIUM mmol/L 131* 132*   POTASSIUM mmol/L 4.1 4.2   CHLORIDE mmol/L 96* 95*   CO2 mmol/L 27.0 26.0   BUN mg/dL 22 30*   CREATININE mg/dL 1.03* 1.21*   CALCIUM mg/dL 8.9 9.6   BILIRUBIN mg/dL  --  0.3   ALK PHOS U/L  --  183*   ALT (SGPT) U/L  --  19   AST (SGOT) U/L  --  15   GLUCOSE mg/dL 153* 372*     Results from last 7 days   Lab Units 10/02/23  0056 10/01/23  1149   MAGNESIUM mg/dL  --  1.2*   HEMOGLOBIN g/dL 12.3 13.0   HEMATOCRIT % 37.5 41.0   WBC 10*3/mm3 10.80 10.48   ALBUMIN g/dL  --  3.8     Results from last 7 days   Lab Units 10/02/23  0056 10/01/23  1149   PLATELETS 10*3/mm3 217 227     COVID19   Date  Value Ref Range Status   05/23/2023 Not Detected Not Detected - Ref. Range Final     Lab Results   Component Value Date    HGBA1C 10.20 (H) 10/01/2023          Medications           Scheduled Medications amLODIPine, 5 mg, Oral, Daily  bisoprolol, 20 mg, Oral, Nightly  insulin glargine, 15 Units, Subcutaneous, Nightly  insulin lispro, 2-7 Units, Subcutaneous, 4x Daily AC & at Bedtime  leflunomide, 10 mg, Oral, Daily  pantoprazole, 40 mg, Oral, Q AM  torsemide, 20 mg, Oral, Daily       Infusions     PRN Medications   acetaminophen    dextrose    dextrose    glucagon (human recombinant)    ipratropium-albuterol    sodium chloride     Physical Findings          General Findings alert, disoriented, overweight, room air   Oral/Mouth Cavity tooth or teeth missing   Edema  lower extremity , 1+ (trace)   Gastrointestinal last bowel movement: 9/29   Skin  bruising, excoriation, pressure injury: stage 3 bilateral sacral    Tubes/Drains/Lines none   NFPE See Malnutrition Severity Assessment     Malnutrition Severity Assessment      Patient meets criteria for : (P) Severe Malnutrition (Pt meets ASPEN/AND criteria for nutrition dx of severe malnutrtion of chronic disease as related to energy intake and wt loss.)  Malnutrition Type (last 8 hours)       Malnutrition Severity Assessment       Row Name 10/02/23 1026       Malnutrition Severity Assessment    Malnutrition Type Chronic Disease - Related Malnutrition (P)       Row Name 10/02/23 1026       Insufficient Energy Intake     Insufficient Energy Intake Findings Severe (P)     Insufficient Energy Intake  <75% of est. energy requirement for > or equal to 1 month (P)       Row Name 10/02/23 1026       Unintentional Weight Loss     Unintentional Weight Loss Findings Severe (P)     Unintentional Weight Loss  Weight loss greater than 10% in six months (P)       Row Name 10/02/23 1026       Criteria Met (Must meet criteria for severity in at least 2 of these categories: M Wasting, Fat  Loss, Fluid, Secondary Signs, Wt. Status, Intake)    Patient meets criteria for  Severe Malnutrition (P)   Pt meets ASPEN/AND criteria for nutrition dx of severe malnutrtion of chronic disease as related to energy intake and wt loss.                     Current Nutrition Orders & Evaluation of Intake       Oral Nutrition     Food Allergies NKFA   Current PO Diet Diet: Cardiac Diets, Diabetic Diets; Healthy Heart (2-3 Na+); Consistent Carbohydrate; Texture: Regular Texture (IDDSI 7); Fluid Consistency: Thin (IDDSI 0)   Supplement n/a   PO Evaluation     % PO Intake 100% snack     Factors Affecting Intake: altered mental status   --  PES STATEMENT / NUTRITION DIAGNOSIS      Nutrition Dx Problem  Problem: Malnutrition (severe)  Etiology: Medical Diagnosis - T2DM with stage 3b CKD and Factors Affecting Nutrition - decreased appetite, altered mental status     Signs/Symptoms: Report of Minimal PO Intake and Unintended Weight Change     NUTRITION INTERVENTION / PLAN OF CARE      Intervention Goal(s) Maintain nutrition status, Improved nutrition related labs, Establish goals of care, Reduce/improve symptoms, Meet estimated needs, Disease management/therapy, Maintain intake, Accepts oral nutrition supplement, Maintain weight, and No significant weight loss         RD Intervention/Action Supplement provided, Encourage intake, Continue to monitor, and Care plan reviewed   --      Prescription/Orders:       PO Diet       Supplements Randy BID B/D mixed with crystal light lemonade       Enteral Nutrition       Parenteral Nutrition    New Prescription Ordered? Yes   --      Monitor/Evaluation Per protocol   Discharge Plan/Needs Pending clinical course   --    RD to follow per protocol.      Electronically signed by:  Bruna Johnson Dietitian Intern   10/02/23 09:11 EDT

## 2023-10-02 NOTE — PROGRESS NOTES
Discharge Planning Assessment  [facility]     Patient Name: Blanca Zhu  MRN: [id number]  Today's Date: 10/2/2023    Admit Date: 10/1/2023    Plan: tbd   Discharge Needs Assessment       Row Name 10/02/23 1411       Living Environment    People in Home spouse    Name(s) of People in Home Ashwin    Current Living Arrangements apartment    Potentially Unsafe Housing Conditions none    Primary Care Provided by self;spouse/significant other    Provides Primary Care For no one, unable/limited ability to care for self    Family Caregiver if Needed spouse;child(ester), adult    Quality of Family Relationships helpful;involved;supportive    Able to Return to Prior Arrangements yes       Resource/Environmental Concerns    Resource/Environmental Concerns none    Transportation Concerns none       Food Insecurity    Within the past 12 months, you worried that your food would run out before you got the money to buy more. Never true    Within the past 12 months, the food you bought just didn't last and you didn't have money to get more. Never true       Transition Planning    Patient/Family Anticipates Transition to home with family;inpatient rehabilitation facility    Transportation Anticipated family or friend will provide       Discharge Needs Assessment    Readmission Within the Last 30 Days no previous admission in last 30 days    Equipment Currently Used at Home glucometer;walker, standard    Concerns to be Addressed adjustment to diagnosis/illness    Discharge Facility/Level of Care Needs home with home health;nursing facility, skilled    Provided Post Acute Provider List? N/A    Provided Post Acute Provider Quality & Resource List? N/A    Patient's Choice of Community Agency(s) [facility] or [facility] SNF                   Discharge Plan       Row Name 10/02/23 1413       Plan    Plan tbd    Patient/Family in Agreement with Plan yes    Plan Comments Met with patient and spouse, Ashwin. Facesheet  verified. Patient lives with spouse all ADL are on one level. Patient uses a walker and glucometer. Spouse has questions regarding insulin, referral sent to [facility]. Preferred pharmacy is [facility]. Normally patient is IADL. Spouse assists patient as needed and he provides all transportation needs. Patient is current with [facility]. Patient has been to [facility] for short-term rehab. Ideally patient will return home with spouse and [facility]. If rehab is needed [facility] is first choice. Patient's daughter is also helpful and involved.  Spouse will provide dc transportation. Referrals sent to [facility] and [facility]. [facility] will follow progress.                  Continued Care and Services - Admitted Since 10/1/2023       Destination       Service Provider Request Status Selected Services Address Phone Fax Patient Preferred    [facility] Pending - Request Sent N/A [address], [facility] [address] [phone] --              Home Medical Care       Service Provider Request Status Selected Services Address Phone Fax Patient Preferred    [facility] Considering N/A [address], [address], [facility] [address] [phone] --                  Expected Discharge Date and Time       Expected Discharge Date Expected Discharge Time    Oct 4, 2023            Demographic Summary    No documentation.                  Functional Status       Row Name 10/02/23 1412       Functional Status    Usual Activity Tolerance moderate       Functional Status, IADL    Medications independent    Meal Preparation independent    Housekeeping independent    Laundry independent    Shopping assistive equipment and person       Mental Status    General Appearance WDL WDL       Mental Status Summary    Recent Changes in Mental Status/Cognitive Functioning unable to assess                   Psychosocial    No documentation.                   Abuse/Neglect    No documentation.                  Legal    No documentation.                  Substance Abuse    No documentation.                  Patient Forms    No documentation.                     Veronika Yost RN

## 2023-10-02 NOTE — CONSULTS
Asked to see patient regarding opacity left maxillary sinus.    78-year-old female admitted with mental status changes yesterday.  She reports feeling somewhat better today.  Upon imaging of CT scan of the head, she was noted to have an opacified left maxillary sinus with some inspissated secretions.  This is also present on a CT scan of her head a couple of months ago and likely represents a chronic sinusitis potentially an allergic fungal sinusitis.  However, there is no evidence of any bony erosion.  She does not have any symptomatology related to this and her mental status changes acutely are not related to this.  She does have a history of temporal arteritis and is on some steroid therapy.  She does see rheumatology that manages this.  She has been having some slight temporal discomfort bilaterally recently.  She has had a history of bilateral temporal artery biopsies per her and her 's report.    Examination today reveals no evidence of any discharge or drainage.  She does not seem to have any cranial neuropathies.    A/P: 78-year-old female with mental status changes.  She had incidental finding of chronically opacified left maxillary sinus.  This does not appear to be related to her current hospitalization.  This likely represents a longstanding problem.  She can follow-up in the office within a couple of months after discharge although I am not sure we will end up doing anything with her sinus that she does not have any symptomatology that is directly related.

## 2023-10-02 NOTE — PLAN OF CARE
Problem: Adult Inpatient Plan of Care  Goal: Plan of Care Review  Outcome: Ongoing, Progressing  Flowsheets (Taken 10/2/2023 0403)  Progress: no change  Plan of Care Reviewed With: patient  Outcome Evaluation:   No s/sx of distress noted at this time. Rested some throughout night. Vital signs WDL. On room air   tolerated. Fall precautions and turns maintained. Patient alert to self. She keeps asking me what happened to her and why is she here.  Spoke with Dr. Nicolas bowen. He ordered ENT consult to see her in AM. Will cont with current plan of care.  Goal: Patient-Specific Goal (Individualized)  Outcome: Ongoing, Progressing  Goal: Absence of Hospital-Acquired Illness or Injury  Outcome: Ongoing, Progressing  Intervention: Identify and Manage Fall Risk  Recent Flowsheet Documentation  Taken 10/2/2023 0220 by Jennifer Faust RN  Safety Promotion/Fall Prevention:   activity supervised   assistive device/personal items within reach   clutter free environment maintained   fall prevention program maintained   lighting adjusted   nonskid shoes/slippers when out of bed   room organization consistent   safety round/check completed  Taken 10/2/2023 0030 by Jennifer Faust, RN  Safety Promotion/Fall Prevention:   activity supervised   assistive device/personal items within reach   clutter free environment maintained   fall prevention program maintained   lighting adjusted   nonskid shoes/slippers when out of bed   room organization consistent   safety round/check completed  Taken 10/1/2023 2240 by Jennifer Faust, RN  Safety Promotion/Fall Prevention:   activity supervised   clutter free environment maintained   assistive device/personal items within reach   fall prevention program maintained   lighting adjusted   nonskid shoes/slippers when out of bed   safety round/check completed   room organization consistent  Taken 10/1/2023 2050 by Jennifer Faust, RN  Safety Promotion/Fall Prevention:   assistive device/personal items within  reach   activity supervised   clutter free environment maintained   fall prevention program maintained   lighting adjusted   nonskid shoes/slippers when out of bed   room organization consistent   safety round/check completed  Intervention: Prevent Skin Injury  Recent Flowsheet Documentation  Taken 10/2/2023 0220 by Jennifer Faust RN  Body Position:   left   side-lying  Taken 10/1/2023 2240 by Jennifer Faust RN  Body Position:   left   side-lying  Skin Protection:   adhesive use limited   incontinence pads utilized   transparent dressing maintained   tubing/devices free from skin contact  Taken 10/1/2023 2050 by Jennifer Faust RN  Body Position: supine  Intervention: Prevent and Manage VTE (Venous Thromboembolism) Risk  Recent Flowsheet Documentation  Taken 10/2/2023 0220 by Jennifer Faust RN  Activity Management: activity minimized  Taken 10/1/2023 2050 by Jennifer Faust RN  Activity Management: activity minimized  Intervention: Prevent Infection  Recent Flowsheet Documentation  Taken 10/2/2023 0220 by Jennifer Faust RN  Infection Prevention:   hand hygiene promoted   rest/sleep promoted   single patient room provided  Taken 10/2/2023 0030 by Jennifer Faust RN  Infection Prevention:   hand hygiene promoted   rest/sleep promoted   single patient room provided  Taken 10/1/2023 2240 by Jennifer Faust RN  Infection Prevention:   hand hygiene promoted   single patient room provided   rest/sleep promoted  Taken 10/1/2023 2050 by Jennifer Faust RN  Infection Prevention:   hand hygiene promoted   rest/sleep promoted   single patient room provided  Goal: Optimal Comfort and Wellbeing  Outcome: Ongoing, Progressing  Intervention: Provide Person-Centered Care  Recent Flowsheet Documentation  Taken 10/2/2023 0030 by Jennifer Faust RN  Trust Relationship/Rapport:   care explained   reassurance provided   thoughts/feelings acknowledged  Taken 10/1/2023 2240 by Jennifer Faust RN  Trust Relationship/Rapport:   care explained   reassurance  provided   thoughts/feelings acknowledged  Goal: Readiness for Transition of Care  Outcome: Ongoing, Progressing  Goal: Plan of Care Review  Outcome: Ongoing, Progressing  Flowsheets (Taken 10/2/2023 0403)  Progress: no change  Plan of Care Reviewed With: patient  Outcome Evaluation:   No s/sx of distress noted at this time. Rested some throughout night. Vital signs WDL. On room air   tolerated. Fall precautions and turns maintained. Patient alert to self. She keeps asking me what happened to her and why is she here.  Spoke with Dr. Nicolas bowen. He ordered ENT consult to see her in AM. Will cont with current plan of care.  Goal: Patient-Specific Goal (Individualized)  Outcome: Ongoing, Progressing  Goal: Absence of Hospital-Acquired Illness or Injury  Outcome: Ongoing, Progressing  Intervention: Identify and Manage Fall Risk  Recent Flowsheet Documentation  Taken 10/2/2023 0220 by Jennifer Faust, RN  Safety Promotion/Fall Prevention:   activity supervised   assistive device/personal items within reach   clutter free environment maintained   fall prevention program maintained   lighting adjusted   nonskid shoes/slippers when out of bed   room organization consistent   safety round/check completed  Taken 10/2/2023 0030 by Jennifer Fauts, RN  Safety Promotion/Fall Prevention:   activity supervised   assistive device/personal items within reach   clutter free environment maintained   fall prevention program maintained   lighting adjusted   nonskid shoes/slippers when out of bed   room organization consistent   safety round/check completed  Taken 10/1/2023 2240 by Jennifer Faust, RN  Safety Promotion/Fall Prevention:   activity supervised   clutter free environment maintained   assistive device/personal items within reach   fall prevention program maintained   lighting adjusted   nonskid shoes/slippers when out of bed   safety round/check completed   room organization consistent  Taken 10/1/2023 2050 by Jennifer Faust,  RN  Safety Promotion/Fall Prevention:   assistive device/personal items within reach   activity supervised   clutter free environment maintained   fall prevention program maintained   lighting adjusted   nonskid shoes/slippers when out of bed   room organization consistent   safety round/check completed  Intervention: Prevent Skin Injury  Recent Flowsheet Documentation  Taken 10/2/2023 0220 by Jennifer Faust RN  Body Position:   left   side-lying  Taken 10/1/2023 2240 by Jennifer Faust RN  Body Position:   left   side-lying  Skin Protection:   adhesive use limited   incontinence pads utilized   transparent dressing maintained   tubing/devices free from skin contact  Taken 10/1/2023 2050 by Jennifer Faust RN  Body Position: supine  Intervention: Prevent and Manage VTE (Venous Thromboembolism) Risk  Recent Flowsheet Documentation  Taken 10/2/2023 0220 by Jennifer Faust RN  Activity Management: activity minimized  Taken 10/1/2023 2050 by Jennifer Faust RN  Activity Management: activity minimized  Intervention: Prevent Infection  Recent Flowsheet Documentation  Taken 10/2/2023 0220 by Jennifer Faust RN  Infection Prevention:   hand hygiene promoted   rest/sleep promoted   single patient room provided  Taken 10/2/2023 0030 by Jennifer Faust RN  Infection Prevention:   hand hygiene promoted   rest/sleep promoted   single patient room provided  Taken 10/1/2023 2240 by Jennifer Faust RN  Infection Prevention:   hand hygiene promoted   single patient room provided   rest/sleep promoted  Taken 10/1/2023 2050 by Jennifer Faust RN  Infection Prevention:   hand hygiene promoted   rest/sleep promoted   single patient room provided  Goal: Optimal Comfort and Wellbeing  Outcome: Ongoing, Progressing  Intervention: Provide Person-Centered Care  Recent Flowsheet Documentation  Taken 10/2/2023 0030 by Jennifer Faust RN  Trust Relationship/Rapport:   care explained   reassurance provided   thoughts/feelings acknowledged  Taken 10/1/2023 2240 by  Jennifer Faust RN  Trust Relationship/Rapport:   care explained   reassurance provided   thoughts/feelings acknowledged  Goal: Readiness for Transition of Care  Outcome: Ongoing, Progressing     Problem: Fall Injury Risk  Goal: Absence of Fall and Fall-Related Injury  Outcome: Ongoing, Progressing  Intervention: Identify and Manage Contributors  Recent Flowsheet Documentation  Taken 10/2/2023 0220 by Jennifer Faust RN  Medication Review/Management: medications reviewed  Taken 10/2/2023 0030 by Jennifer Faust RN  Medication Review/Management: medications reviewed  Taken 10/1/2023 2240 by Jennifer Faust RN  Medication Review/Management: medications reviewed  Taken 10/1/2023 2050 by Jennifer Faust RN  Medication Review/Management: medications reviewed  Intervention: Promote Injury-Free Environment  Recent Flowsheet Documentation  Taken 10/2/2023 0220 by Jennifer Faust RN  Safety Promotion/Fall Prevention:   activity supervised   assistive device/personal items within reach   clutter free environment maintained   fall prevention program maintained   lighting adjusted   nonskid shoes/slippers when out of bed   room organization consistent   safety round/check completed  Taken 10/2/2023 0030 by Jennifer Faust RN  Safety Promotion/Fall Prevention:   activity supervised   assistive device/personal items within reach   clutter free environment maintained   fall prevention program maintained   lighting adjusted   nonskid shoes/slippers when out of bed   room organization consistent   safety round/check completed  Taken 10/1/2023 2240 by Jennifer Faust RN  Safety Promotion/Fall Prevention:   activity supervised   clutter free environment maintained   assistive device/personal items within reach   fall prevention program maintained   lighting adjusted   nonskid shoes/slippers when out of bed   safety round/check completed   room organization consistent  Taken 10/1/2023 2050 by Jennifer Faust RN  Safety Promotion/Fall Prevention:    assistive device/personal items within reach   activity supervised   clutter free environment maintained   fall prevention program maintained   lighting adjusted   nonskid shoes/slippers when out of bed   room organization consistent   safety round/check completed     Problem: Skin Injury Risk Increased  Goal: Skin Health and Integrity  Outcome: Ongoing, Progressing  Intervention: Optimize Skin Protection  Recent Flowsheet Documentation  Taken 10/2/2023 0030 by Jennifer Faust, RN  Pressure Reduction Techniques:   frequent weight shift encouraged   weight shift assistance provided  Taken 10/1/2023 2240 by Jennifer Faust RN  Pressure Reduction Techniques:   frequent weight shift encouraged   weight shift assistance provided  Head of Bed (HOB) Positioning: HOB at 30-45 degrees  Pressure Reduction Devices: alternating pressure pump (ADD)  Skin Protection:   adhesive use limited   incontinence pads utilized   transparent dressing maintained   tubing/devices free from skin contact   Goal Outcome Evaluation:  Plan of Care Reviewed With: patient        Progress: no change  Outcome Evaluation: No s/sx of distress noted at this time. Rested some throughout night. Vital signs WDL. On room air; tolerated. Fall precautions and turns maintained. Patient alert to self. She keeps asking me what happened to her and why is she here.  Spoke with Dr. Nicolas bowen. He ordered ENT consult to see her in AM. Will cont with current plan of care.

## 2023-10-03 LAB
ANION GAP SERPL CALCULATED.3IONS-SCNC: 10 MMOL/L (ref 5–15)
BUN SERPL-MCNC: 27 MG/DL (ref 8–23)
BUN/CREAT SERPL: 17.2 (ref 7–25)
CALCIUM SPEC-SCNC: 8.9 MG/DL (ref 8.6–10.5)
CHLORIDE SERPL-SCNC: 94 MMOL/L (ref 98–107)
CO2 SERPL-SCNC: 24 MMOL/L (ref 22–29)
CREAT SERPL-MCNC: 1.57 MG/DL (ref 0.57–1)
DEPRECATED RDW RBC AUTO: 43.7 FL (ref 37–54)
EGFRCR SERPLBLD CKD-EPI 2021: 33.8 ML/MIN/1.73
ERYTHROCYTE [DISTWIDTH] IN BLOOD BY AUTOMATED COUNT: 13 % (ref 12.3–15.4)
GLUCOSE BLDC GLUCOMTR-MCNC: 190 MG/DL (ref 70–130)
GLUCOSE BLDC GLUCOMTR-MCNC: 196 MG/DL (ref 70–130)
GLUCOSE BLDC GLUCOMTR-MCNC: 267 MG/DL (ref 70–130)
GLUCOSE BLDC GLUCOMTR-MCNC: 272 MG/DL (ref 70–130)
GLUCOSE SERPL-MCNC: 219 MG/DL (ref 65–99)
HCT VFR BLD AUTO: 44.8 % (ref 34–46.6)
HGB BLD-MCNC: 14.5 G/DL (ref 12–15.9)
MCH RBC QN AUTO: 30 PG (ref 26.6–33)
MCHC RBC AUTO-ENTMCNC: 32.4 G/DL (ref 31.5–35.7)
MCV RBC AUTO: 92.8 FL (ref 79–97)
PLATELET # BLD AUTO: 240 10*3/MM3 (ref 140–450)
PMV BLD AUTO: 10.3 FL (ref 6–12)
POTASSIUM SERPL-SCNC: 4.7 MMOL/L (ref 3.5–5.2)
RBC # BLD AUTO: 4.83 10*6/MM3 (ref 3.77–5.28)
SODIUM SERPL-SCNC: 128 MMOL/L (ref 136–145)
WBC NRBC COR # BLD: 9.32 10*3/MM3 (ref 3.4–10.8)

## 2023-10-03 PROCEDURE — 80048 BASIC METABOLIC PNL TOTAL CA: CPT | Performed by: INTERNAL MEDICINE

## 2023-10-03 PROCEDURE — 97530 THERAPEUTIC ACTIVITIES: CPT

## 2023-10-03 PROCEDURE — 94799 UNLISTED PULMONARY SVC/PX: CPT

## 2023-10-03 PROCEDURE — 63710000001 INSULIN GLARGINE PER 5 UNITS: Performed by: INTERNAL MEDICINE

## 2023-10-03 PROCEDURE — 25010000002 ENOXAPARIN PER 10 MG: Performed by: INTERNAL MEDICINE

## 2023-10-03 PROCEDURE — 82948 REAGENT STRIP/BLOOD GLUCOSE: CPT

## 2023-10-03 PROCEDURE — 97162 PT EVAL MOD COMPLEX 30 MIN: CPT

## 2023-10-03 PROCEDURE — 94640 AIRWAY INHALATION TREATMENT: CPT

## 2023-10-03 PROCEDURE — 85027 COMPLETE CBC AUTOMATED: CPT | Performed by: INTERNAL MEDICINE

## 2023-10-03 PROCEDURE — 94664 DEMO&/EVAL PT USE INHALER: CPT

## 2023-10-03 PROCEDURE — 63710000001 PREDNISONE PER 5 MG: Performed by: INTERNAL MEDICINE

## 2023-10-03 PROCEDURE — 94761 N-INVAS EAR/PLS OXIMETRY MLT: CPT

## 2023-10-03 PROCEDURE — 94760 N-INVAS EAR/PLS OXIMETRY 1: CPT

## 2023-10-03 PROCEDURE — 63710000001 INSULIN LISPRO (HUMAN) PER 5 UNITS: Performed by: INTERNAL MEDICINE

## 2023-10-03 RX ORDER — INSULIN LISPRO 100 [IU]/ML
10 INJECTION, SOLUTION INTRAVENOUS; SUBCUTANEOUS
Status: DISCONTINUED | OUTPATIENT
Start: 2023-10-03 | End: 2023-10-04

## 2023-10-03 RX ADMIN — ANORECTAL OINTMENT 1 APPLICATION: 15.7; .44; 24; 20.6 OINTMENT TOPICAL at 21:09

## 2023-10-03 RX ADMIN — INSULIN LISPRO 7 UNITS: 100 INJECTION, SOLUTION INTRAVENOUS; SUBCUTANEOUS at 13:06

## 2023-10-03 RX ADMIN — INSULIN GLARGINE 30 UNITS: 100 INJECTION, SOLUTION SUBCUTANEOUS at 21:09

## 2023-10-03 RX ADMIN — BUDESONIDE AND FORMOTEROL FUMARATE DIHYDRATE 2 PUFF: 160; 4.5 AEROSOL RESPIRATORY (INHALATION) at 20:22

## 2023-10-03 RX ADMIN — INSULIN LISPRO 2 UNITS: 100 INJECTION, SOLUTION INTRAVENOUS; SUBCUTANEOUS at 18:16

## 2023-10-03 RX ADMIN — BISOPROLOL FUMARATE 20 MG: 5 TABLET, FILM COATED ORAL at 21:09

## 2023-10-03 RX ADMIN — PANTOPRAZOLE SODIUM 40 MG: 40 TABLET, DELAYED RELEASE ORAL at 05:33

## 2023-10-03 RX ADMIN — DOCUSATE SODIUM 100 MG: 100 CAPSULE, LIQUID FILLED ORAL at 09:50

## 2023-10-03 RX ADMIN — INSULIN LISPRO 4 UNITS: 100 INJECTION, SOLUTION INTRAVENOUS; SUBCUTANEOUS at 13:06

## 2023-10-03 RX ADMIN — INSULIN LISPRO 4 UNITS: 100 INJECTION, SOLUTION INTRAVENOUS; SUBCUTANEOUS at 21:09

## 2023-10-03 RX ADMIN — TORSEMIDE 20 MG: 20 TABLET ORAL at 09:50

## 2023-10-03 RX ADMIN — AMLODIPINE BESYLATE 5 MG: 5 TABLET ORAL at 09:49

## 2023-10-03 RX ADMIN — ACETAMINOPHEN 650 MG: 325 TABLET, FILM COATED ORAL at 21:17

## 2023-10-03 RX ADMIN — POTASSIUM CHLORIDE 10 MEQ: 750 TABLET, EXTENDED RELEASE ORAL at 09:49

## 2023-10-03 RX ADMIN — INSULIN LISPRO 7 UNITS: 100 INJECTION, SOLUTION INTRAVENOUS; SUBCUTANEOUS at 09:50

## 2023-10-03 RX ADMIN — INSULIN LISPRO 10 UNITS: 100 INJECTION, SOLUTION INTRAVENOUS; SUBCUTANEOUS at 18:15

## 2023-10-03 RX ADMIN — INSULIN LISPRO 2 UNITS: 100 INJECTION, SOLUTION INTRAVENOUS; SUBCUTANEOUS at 09:50

## 2023-10-03 RX ADMIN — ATORVASTATIN CALCIUM 20 MG: 20 TABLET, FILM COATED ORAL at 21:09

## 2023-10-03 RX ADMIN — ENOXAPARIN SODIUM 40 MG: 100 INJECTION SUBCUTANEOUS at 18:15

## 2023-10-03 RX ADMIN — DOCUSATE SODIUM 100 MG: 100 CAPSULE, LIQUID FILLED ORAL at 21:09

## 2023-10-03 RX ADMIN — BUDESONIDE AND FORMOTEROL FUMARATE DIHYDRATE 2 PUFF: 160; 4.5 AEROSOL RESPIRATORY (INHALATION) at 08:02

## 2023-10-03 RX ADMIN — ANORECTAL OINTMENT 1 APPLICATION: 15.7; .44; 24; 20.6 OINTMENT TOPICAL at 13:06

## 2023-10-03 RX ADMIN — PREDNISONE 6 MG: 5 TABLET ORAL at 09:50

## 2023-10-03 NOTE — PLAN OF CARE
Problem: Adult Inpatient Plan of Care  Goal: Plan of Care Review  Outcome: Ongoing, Progressing  Flowsheets (Taken 10/3/2023 0433)  Progress: improving  Plan of Care Reviewed With: patient  Outcome Evaluation:   No s/sx of distress noted at this time. Rested well throughout night. Vital signs WDL. On room air   tolerated. Fall precautions maintained. Patient oriented   forgetful at times. Will cont with current plan of care.  Goal: Patient-Specific Goal (Individualized)  Outcome: Ongoing, Progressing  Goal: Absence of Hospital-Acquired Illness or Injury  Outcome: Ongoing, Progressing  Intervention: Identify and Manage Fall Risk  Recent Flowsheet Documentation  Taken 10/3/2023 0228 by Jennifer Faust RN  Safety Promotion/Fall Prevention:   activity supervised   assistive device/personal items within reach   clutter free environment maintained   fall prevention program maintained   lighting adjusted   nonskid shoes/slippers when out of bed   room organization consistent   safety round/check completed  Taken 10/3/2023 0021 by Jennifer Faust RN  Safety Promotion/Fall Prevention:   activity supervised   assistive device/personal items within reach   clutter free environment maintained   fall prevention program maintained   lighting adjusted   nonskid shoes/slippers when out of bed   room organization consistent   safety round/check completed  Taken 10/2/2023 2155 by Jennifer Faust RN  Safety Promotion/Fall Prevention:   activity supervised   clutter free environment maintained   assistive device/personal items within reach   fall prevention program maintained   lighting adjusted   nonskid shoes/slippers when out of bed   room organization consistent   safety round/check completed  Intervention: Prevent Skin Injury  Recent Flowsheet Documentation  Taken 10/3/2023 0228 by Jennifer Faust RN  Body Position: supine  Taken 10/3/2023 0021 by Jennifer Faust RN  Body Position:   left   tilted  Taken 10/2/2023 2246 by Jennifer Faust,  RN  Body Position: weight shifting  Intervention: Prevent and Manage VTE (Venous Thromboembolism) Risk  Recent Flowsheet Documentation  Taken 10/3/2023 0228 by Jennifer Faust RN  Activity Management: activity minimized  Taken 10/3/2023 0021 by Jennifer Faust RN  Activity Management: activity minimized  Taken 10/2/2023 2155 by Jennifer Faust RN  Activity Management: activity minimized  Intervention: Prevent Infection  Recent Flowsheet Documentation  Taken 10/3/2023 0228 by Jennifer Faust RN  Infection Prevention:   hand hygiene promoted   rest/sleep promoted   single patient room provided  Taken 10/3/2023 0021 by Jennifer Faust RN  Infection Prevention:   hand hygiene promoted   single patient room provided   rest/sleep promoted  Taken 10/2/2023 2155 by Jennifer Faust RN  Infection Prevention:   hand hygiene promoted   rest/sleep promoted   single patient room provided  Goal: Optimal Comfort and Wellbeing  Outcome: Ongoing, Progressing  Intervention: Provide Person-Centered Care  Recent Flowsheet Documentation  Taken 10/2/2023 2041 by Jennfier Faust RN  Trust Relationship/Rapport:   care explained   reassurance provided   thoughts/feelings acknowledged  Goal: Readiness for Transition of Care  Outcome: Ongoing, Progressing  Goal: Plan of Care Review  Outcome: Ongoing, Progressing  Flowsheets (Taken 10/3/2023 0433)  Progress: improving  Plan of Care Reviewed With: patient  Outcome Evaluation:   No s/sx of distress noted at this time. Rested well throughout night. Vital signs WDL. On room air   tolerated. Fall precautions maintained. Patient oriented   forgetful at times. Will cont with current plan of care.  Goal: Patient-Specific Goal (Individualized)  Outcome: Ongoing, Progressing  Goal: Absence of Hospital-Acquired Illness or Injury  Outcome: Ongoing, Progressing  Intervention: Identify and Manage Fall Risk  Recent Flowsheet Documentation  Taken 10/3/2023 0228 by Jennifer Faust RN  Safety Promotion/Fall Prevention:    activity supervised   assistive device/personal items within reach   clutter free environment maintained   fall prevention program maintained   lighting adjusted   nonskid shoes/slippers when out of bed   room organization consistent   safety round/check completed  Taken 10/3/2023 0021 by Jennifer Faust RN  Safety Promotion/Fall Prevention:   activity supervised   assistive device/personal items within reach   clutter free environment maintained   fall prevention program maintained   lighting adjusted   nonskid shoes/slippers when out of bed   room organization consistent   safety round/check completed  Taken 10/2/2023 2155 by Jennifer Faust RN  Safety Promotion/Fall Prevention:   activity supervised   clutter free environment maintained   assistive device/personal items within reach   fall prevention program maintained   lighting adjusted   nonskid shoes/slippers when out of bed   room organization consistent   safety round/check completed  Intervention: Prevent Skin Injury  Recent Flowsheet Documentation  Taken 10/3/2023 0228 by Jennifer Faust RN  Body Position: supine  Taken 10/3/2023 0021 by Jennifer Faust RN  Body Position:   left   tilted  Taken 10/2/2023 2246 by Jennifer Faust RN  Body Position: weight shifting  Intervention: Prevent and Manage VTE (Venous Thromboembolism) Risk  Recent Flowsheet Documentation  Taken 10/3/2023 0228 by Jennifer Faust RN  Activity Management: activity minimized  Taken 10/3/2023 0021 by Jennifer Faust RN  Activity Management: activity minimized  Taken 10/2/2023 2155 by Jennifer Faust RN  Activity Management: activity minimized  Intervention: Prevent Infection  Recent Flowsheet Documentation  Taken 10/3/2023 0228 by Jennifer Faust RN  Infection Prevention:   hand hygiene promoted   rest/sleep promoted   single patient room provided  Taken 10/3/2023 0021 by Jennifer Faust RN  Infection Prevention:   hand hygiene promoted   single patient room provided   rest/sleep promoted  Taken 10/2/2023  2155 by Jennifer Faust RN  Infection Prevention:   hand hygiene promoted   rest/sleep promoted   single patient room provided  Goal: Optimal Comfort and Wellbeing  Outcome: Ongoing, Progressing  Intervention: Provide Person-Centered Care  Recent Flowsheet Documentation  Taken 10/2/2023 2041 by Jennifer Faust RN  Trust Relationship/Rapport:   care explained   reassurance provided   thoughts/feelings acknowledged  Goal: Readiness for Transition of Care  Outcome: Ongoing, Progressing     Problem: Fall Injury Risk  Goal: Absence of Fall and Fall-Related Injury  Outcome: Ongoing, Progressing  Intervention: Identify and Manage Contributors  Recent Flowsheet Documentation  Taken 10/3/2023 0228 by Jennifer Faust RN  Medication Review/Management: medications reviewed  Taken 10/3/2023 0021 by Jennifer Faust RN  Medication Review/Management: medications reviewed  Taken 10/2/2023 2155 by Jennifer Faust RN  Medication Review/Management: medications reviewed  Intervention: Promote Injury-Free Environment  Recent Flowsheet Documentation  Taken 10/3/2023 0228 by Jennifer Faust RN  Safety Promotion/Fall Prevention:   activity supervised   assistive device/personal items within reach   clutter free environment maintained   fall prevention program maintained   lighting adjusted   nonskid shoes/slippers when out of bed   room organization consistent   safety round/check completed  Taken 10/3/2023 0021 by Jennifer Faust RN  Safety Promotion/Fall Prevention:   activity supervised   assistive device/personal items within reach   clutter free environment maintained   fall prevention program maintained   lighting adjusted   nonskid shoes/slippers when out of bed   room organization consistent   safety round/check completed  Taken 10/2/2023 2155 by Jennifer Faust RN  Safety Promotion/Fall Prevention:   activity supervised   clutter free environment maintained   assistive device/personal items within reach   fall prevention program maintained    lighting adjusted   nonskid shoes/slippers when out of bed   room organization consistent   safety round/check completed     Problem: Skin Injury Risk Increased  Goal: Skin Health and Integrity  Outcome: Ongoing, Progressing  Intervention: Optimize Skin Protection  Recent Flowsheet Documentation  Taken 10/3/2023 0021 by Jennifer Faust, RN  Pressure Reduction Devices: specialty bed utilized   Goal Outcome Evaluation:  Plan of Care Reviewed With: patient        Progress: improving  Outcome Evaluation: No s/sx of distress noted at this time. Rested well throughout night. Vital signs WDL. On room air; tolerated. Fall precautions maintained. Patient oriented; forgetful at times. Will cont with current plan of care.

## 2023-10-03 NOTE — THERAPY EVALUATION
Patient Name: Blanca Zhu  : 1945    MRN: [id number]                              Today's Date: 10/3/2023       Admit Date: 10/1/2023    Visit Dx:     ICD-10-CM ICD-9-CM   1. Altered mental status, unspecified altered mental status type  R41.82 780.97   2. Hyperglycemia due to diabetes mellitus  E11.65 250.02   3. Elevated lactic acid level  R79.89 276.2   4. Hypomagnesemia  E83.42 275.2   5. Chronic renal impairment, unspecified CKD stage  N18.9 585.9     Patient Active Problem List   Diagnosis    Osteoporosis    Breast neoplasm, Tis (DCIS), left    Iron deficiency anemia    CKD (chronic kidney disease)    Diabetic nephropathy associated with type 2 diabetes mellitus    Temporal arteritis    Immunosuppression    Anemia    Duodenal adenoma    Gastritis without bleeding    Polyp of duodenum    Morbidly obese    Dyspnea on exertion    Hyperlipidemia    Type 2 diabetes mellitus with stage 3b chronic kidney disease, with long-term current use of insulin    Essential hypertension    Bilateral lower extremity edema    Pulmonary hypertension    Obstructive sleep apnea on CPAP    Stage 3a chronic kidney disease    Iron malabsorption    Respiratory distress    Hypoglycemia due to insulin    Delirium    Hypomagnesemia    Severe malnutrition     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 Daniel Gutierrez, [facility]     SECTION N/A     x3    CHOLECYSTECTOMY N/A     COLONOSCOPY      COLONOSCOPY W/ POLYPECTOMY N/A 2019    Enlarged folds in the antrum: biopsied; duodenal, transverse colon x2, splenic flexure, descending colon and sigmoid colon polyps: biopsied (path: tubular adenoma x6)-Dr. Zak De Jesus, [facility]    ENDOSCOPY  10/11/2019    Procedure: ESOPHAGOGASTRODUODENOSCOPY with hot snare polypectomy;  Surgeon: Haile Handley MD;  Location: [facility] ENDOSCOPY;  Service: Gastroenterology    ENDOSCOPY N/A 2020    Procedure: ESOPHAGOGASTRODUODENOSCOPY;  Surgeon: Haile Handley MD;  Location[facility];  Service: Gastroenterology    ENDOSCOPY N/A 2020    Procedure: ESOPHAGOGASTRODUODENOSCOPY WITH BIOPSIES AND COLD BIOPSY POLYPECTOMY;  Surgeon: Haile Handley MD;  Location: [facility] ENDOSCOPY;  Service: Gastroenterology;  Laterality: N/A;  pre: dyspepsia and diarrhea  post: duodenal polyp and gastritis    MASTECTOMY Left     MASTECTOMY WITH SENTINEL NODE BIOPSY AND AXILLARY NODE DISSECTION Left 7/3/2019    Procedure: LEFT BREAST MASTECTOMY WITH SENTINEL NODE BIOPSY AND , v-y plasty closure;  Surgeon: Tahmina James MD;  Location: [facility] MAIN OR;  Service: General    SUBTOTAL HYSTERECTOMY Bilateral     ovaries still in tact    TEMPORAL ARTERY BIOPSY Bilateral 2019      General Information       Row Name 10/03/23 1345          Physical Therapy Time and Intention    Document Type evaluation  -CW (r) LL (t) CW (c)     Mode of Treatment individual therapy;physical therapy  -CW (r) LL (t) CW (c)       Row Name 10/03/23 0667          General Information    Patient Profile Reviewed yes  -CW (r) LL (t) CW (c)     Prior Level of Function independent:;gait;transfer  RW  -CW (r) LL (t) CW (c)     Existing Precautions/Restrictions fall  -CW (r) LL (t) CW (c)     Barriers to Rehab medically complex;language barrier   Scottish speaking but understands some english  -CW       Row Name 10/03/23 1345          Living Environment    People in Home spouse  -CW (r) LL (t) CW (c)       Row Name 10/03/23 1345          Home Main Entrance    Number of Stairs, Main Entrance none  -CW (r) LL (t) CW (c)       Row Name 10/03/23 1345          Cognition    Orientation Status (Cognition) oriented x 3  -CW       Row Name 10/03/23 1345          Safety Issues, Functional Mobility    Impairments Affecting Function (Mobility) balance;endurance/activity tolerance;strength  -CW               User Key  (r) = Recorded By, (t) = Taken By, (c) = Cosigned By      Initials Name Provider Type    CW Gwen Nair, PT Physical Therapist    LL Dolores Mcdaniels, PT Student PT Student                   Mobility       Row Name 10/03/23 1348          Bed Mobility    Bed Mobility supine-sit  -CW (r) LL (t) CW (c)     Supine-Sit [place] (Bed Mobility) moderate assist (50% patient effort);1 person assist  -CW (r) LL (t) CW (c)     Assistive Device (Bed Mobility) bed rails;head of bed elevated  -CW (r) LL (t) CW (c)       Row Name 10/03/23 1348          Bed-Chair Transfer    Bed-Chair [place] (Transfers) verbal cues  -CW (r) LL (t) CW (c)     Assistive Device (Bed-Chair Transfers) walker, front-wheeled  -CW (r) LL (t) CW (c)       Row Name 10/03/23 1348          Sit-Stand Transfer    Sit-Stand [place] (Transfers) contact guard  -CW (r) LL (t) CW (c)     Assistive Device (Sit-Stand Transfers) walker, front-wheeled  -CW (r) LL (t) CW (c)       Row Name 10/03/23 1348          Gait/Stairs (Locomotion)    [place] Level (Gait) contact guard;minimum assist (75% patient effort);1 person assist;verbal cues  -CW (r) LL (t) CW (c)     Assistive Device (Gait) walker, front-wheeled  -CW (r) LL (t) CW (c)     Distance in Feet (Gait) 3'  -CW (r) LL (t) CW (c)     Deviations/Abnormal Patterns (Gait) gait speed decreased;celestina decreased;stride length decreased   -CW (r) LL (t) CW (c)     Bilateral Gait Deviations forward flexed posture;heel strike decreased  -CW (r) LL (t) CW (c)     Comment, (Gait/Stairs) VC's for walker management, fatigue limiting  -CW               User Key  (r) = Recorded By, (t) = Taken By, (c) = Cosigned By      Initials Name Provider Type    CW Gwen Nair, PT Physical Therapist    LL Dolores Mcdaniels, PT Student PT Student                   Obj/Interventions       Row Name 10/03/23 1352          Range of Motion Comprehensive    General Range of Motion bilateral lower extremity ROM WFL  -CW (r) LL (t) CW (c)       Row Name 10/03/23 1352          Strength Comprehensive (MMT)    Comment, General Manual Muscle Testing (MMT) Assessment BLE > 3/5 MMT  -CW (r) LL (t) CW (c)       Row Name 10/03/23 1352          Balance    Balance Assessment sitting static balance;sitting dynamic balance;standing static balance;standing dynamic balance  -CW (r) LL (t) CW (c)     Static Sitting Balance standby assist  -CW (r) LL (t) CW (c)     Dynamic Sitting Balance standby assist  -CW (r) LL (t) CW (c)     Position, Sitting Balance sitting in chair;sitting edge of bed  -CW (r) LL (t) CW (c)     Static Standing Balance contact guard  -CW (r) LL (t) CW (c)     Dynamic Standing Balance contact guard;minimal assist  -CW (r) LL (t) CW (c)     Position/Device Used, Standing Balance walker, front-wheeled  -CW (r) LL (t) CW (c)               User Key  (r) = Recorded By, (t) = Taken By, (c) = Cosigned By      Initials Name Provider Type    CW Gwen Nair, PT Physical Therapist    LL Dolores Mcdaniels, PT Student PT Student                   Goals/Plan       Row Name 10/03/23 1417          Bed Mobility Goal 1 (PT)    Activity/Assistive Device (Bed Mobility Goal 1, PT) bed mobility activities, all  -CW (r) LL (t) CW (c)     [place] Level/Cues Needed (Bed Mobility Goal 1, PT) independent  -CW (r) LL (t) CW (c)     Time Frame (Bed Mobility Goal 1, PT) 2 weeks   -CW (r) LL (t) CW (c)       Row Name 10/03/23 1417          Transfer Goal 1 (PT)    Activity/Assistive Device (Transfer Goal 1, PT) transfers, all  -CW (r) LL (t) CW (c)     [place] Level/Cues Needed (Transfer Goal 1, PT) modified independence  -CW (r) LL (t) CW (c)     Time Frame (Transfer Goal 1, PT) 2 weeks  -CW (r) LL (t) CW (c)       Row Name 10/03/23 1417          Gait Training Goal 1 (PT)    Activity/Assistive Device (Gait Training Goal 1, PT) gait (walking locomotion)  -CW (r) LL (t) CW (c)     [place] Level (Gait Training Goal 1, PT) modified independence  -CW (r) LL (t) CW (c)     Distance (Gait Training Goal 1, PT) 80'  -CW (r) LL (t) CW (c)     Time Frame (Gait Training Goal 1, PT) 2 weeks  -CW (r) LL (t) CW (c)       Row Name 10/03/23 1412          Therapy Assessment/Plan (PT)    Planned Therapy Interventions (PT) balance training;bed mobility training;gait training;patient/family education;strengthening  -CW (r) LL (t) CW (c)               User Key  (r) = Recorded By, (t) = Taken By, (c) = Cosigned By      Initials Name Provider Type    CW Gwen Nair, PT Physical Therapist    LL Dolores Mdcaniels, PT Student PT Student                   Clinical Impression       Row Name 10/03/23 6880          Pain    Pain Location - Side/Orientation Right  -CW (r) LL (t) CW (c)     Pain Location - hip;neck  -CW (r) LL (t) CW (c)     Pre/Posttreatment Pain Comment No numeric pain rating given but pt c/o R-sided neck and hip pain  -CW (r) LL (t) CW (c)     Pain Intervention(s) Ambulation/increased activity;Repositioned  -CW (r) LL (t) CW (c)       Row Name 10/03/23 7578          Plan of Care Review    Plan of Care Reviewed With patient  -CW (r) LL (t) CW (c)     Outcome Evaluation Pt is a 78 y.o. admitted for delirium. Hx includes: DM, CKD, HTN, asthma and GERD. Spouse in room to assist with collecting subjective hx and translating in Ukrainian as needed. Pt able to follow ~50% of commands. Per spouse,  pt uses rw for all mobility and is Marisela at baseline. Pt reports having 4-5 falls within the past 4 months. Pt demo. balance and endurance/activity tolerance limiting functional mobility. Pt completed bed mobility with mod A x1, stood at length with cga/min A x1 to change brief and rw, and ambulated 3' with cga and rw. Vc's for walker management when turning. No overt LOB noted, but appeared generally unsteady. Hopeful for return home.  -CW       Row Name 10/03/23 4412          Therapy Assessment/Plan (PT)    Rehab Potential (PT) good, to achieve stated therapy goals  -CW (r) LL (t) CW (c)     Criteria for Skilled Interventions Met (PT) yes  -CW (r) LL (t) CW (c)     Therapy Frequency (PT) 5 times/wk  -CW (r) LL (t) CW (c)       Row Name 10/03/23 1358          Vital Signs    O2 Delivery Pre Treatment room air  -CW (r) LL (t) CW (c)     O2 Delivery Post Treatment room air  -CW (r) LL (t) CW (c)       Row Name 10/03/23 2951          Positioning and Restraints    Pre-Treatment Position in bed  -CW (r) LL (t) CW (c)     Post Treatment Position chair  -CW (r) LL (t) CW (c)     In Chair with nsg;exit alarm on;encouraged to call for assist;call light within reach;reclined  -CW (r) LL (t) CW (c)               User Key  (r) = Recorded By, (t) = Taken By, (c) = Cosigned By      Initials Name Provider Type    CW Gwen Nair, PT Physical Therapist    LL Dolores Mcdaniels, PT Student PT Student                   Outcome Measures       Row Name 10/03/23 7121          How much help from another person do you currently need...    Turning from your back to your side while in flat bed without using bedrails? 3  -CW (r) LL (t) CW (c)     Moving from lying on back to sitting on the side of a flat bed without bedrails? 3  -CW (r) LL (t) CW (c)     Moving to and from a bed to a chair (including a wheelchair)? 3  -CW (r) LL (t) CW (c)     Standing up from a chair using your arms (e.g., wheelchair, bedside chair)? 3  -CW (r) LL (t)  CW (c)     Climbing 3-5 steps with a railing? 2  -CW (r) LL (t) CW (c)     To walk in hospital room? 3  -CW (r) LL (t) CW (c)     AM-PAC 6 Clicks Score (PT) 17  -CW (r) LL (t)     Highest level of mobility 5 --> Static standing  -CW (r) LL (t)       Row Name 10/03/23 1421          Functional Assessment    Outcome Measure Options AM-PAC 6 Clicks Basic Mobility (PT)  -CW (r) LL (t) CW (c)               User Key  (r) = Recorded By, (t) = Taken By, (c) = Cosigned By      Initials Name Provider Type    Gwen Bailey, PT Physical Therapist    Dolores Dan, PT Student PT Student                                   PT Recommendation and Plan  Planned Therapy Interventions (PT): balance training, bed mobility training, gait training, patient/family education, strengthening  Plan of Care Reviewed With: patient     Time Calculation:         PT Charges       Row Name 10/03/23 1422             Time Calculation    Start Time 1323  -CW (r) LL (t) CW (c)      Stop Time 1343  -CW (r) LL (t) CW (c)      Time Calculation (min) 20 min  -CW (r) LL (t)      PT Received On 10/03/23  -CW (r) LL (t) CW (c)      PT - Next Appointment 10/04/23  -CW (r) LL (t) CW (c)      PT Goal Re-Cert Due Date 10/17/23  -CW (r) LL (t) CW (c)         Time Calculation- PT    Total Timed Code Minutes- PT 16 minute(s)  -CW (r) LL (t) CW (c)         Timed Charges    10902 - PT Therapeutic Activity Minutes 16  -CW (r) LL (t) CW (c)         Total Minutes    Timed Charges Total Minutes 16  -CW (r) LL (t)       Total Minutes 16  -CW (r) LL (t)                User Key  (r) = Recorded By, (t) = Taken By, (c) = Cosigned By      Initials Name Provider Type    Gwen Bailey, PT Physical Therapist    Dolores Dan, PT Student PT Student                  Therapy Charges for Today       Code Description Service Date Service Provider Modifiers Qty    64538512451 HC PT EVAL MOD COMPLEXITY 3 10/3/2023 Dolores Mcdaniels, PT Student GP 1     [id number]  PT THERAPEUTIC ACT EA 15 MIN 10/3/2023 Dolores Mcdaniels, PT Student GP 1            PT G-Codes  Outcome Measure Options: AM-PAC 6 Clicks Basic Mobility (PT)  AM-PAC 6 Clicks Score (PT): 17       Dolores Mcdaniels, PT Student  10/3/2023

## 2023-10-03 NOTE — PROGRESS NOTES
DAILY PROGRESS NOTE  [facility], [facility]    10/3/2023    Patient Identification:  Name: Blanca Zhu  Age: 78 y.o.  Sex: female  :  1945  MRN: [id number]           Primary Care Physician: Praneeth Valenzuela MD    Subjective:    Interval History:    Mental status back to baseline.  MRI was not completely done yesterday apparently.  Contrast part not completed.  Apparently to be completed  today  Blood sugar still on the 200-300 level, adjust insulin  Potassium 4.7, sodium 128, creatinine 1.57.    ROS:     No nausea, vomiting, diarrhea, constipation, chest pain, shortness of breath.    Objective:    Scheduled Meds:  amLODIPine, 5 mg, Oral, Daily  atorvastatin, 20 mg, Oral, Nightly  bisoprolol, 20 mg, Oral, Nightly  budesonide-formoterol, 2 puff, Inhalation, BID - RT  docusate sodium, 100 mg, Oral, BID  enoxaparin, 40 mg, Subcutaneous, Q24H  insulin glargine, 20 Units, Subcutaneous, Nightly  insulin lispro, 2-7 Units, Subcutaneous, 4x Daily AC & at Bedtime  insulin lispro, 7 Units, Subcutaneous, TID With Meals  Menthol-Zinc Oxide, 1 application , Topical, Q12H  pantoprazole, 40 mg, Oral, Q AM  potassium chloride, 10 mEq, Oral, Daily  predniSONE, 6 mg, Oral, Daily With Breakfast  torsemide, 20 mg, Oral, Daily        Continuous Infusions:       PRN Meds:    acetaminophen    dextrose    dextrose    glucagon (human recombinant)    HYDROcodone-acetaminophen    ipratropium-albuterol    sodium chloride    Intake/Output:  No intake or output data in the 24 hours ending 10/03/23 1213      Exam:    T MAX 24 hrs: Temp (24hrs), Av °F (36.7 °C), Min:97.7 °F (36.5 °C), Max:98.4 °F (36.9 °C)    Vitals Ranges:   Temp:  [97.7 °F (36.5 °C)-98.4 °F (36.9 °C)] 97.7 °F (36.5 °C)  Heart Rate:  [74-91] 78  Resp:  [18] 18  BP: ()/(46-70) 147/70    /70 (BP Location: Right arm, Patient Position: Lying)   Pulse 78   Temp 97.7 °F (36.5 °C) (Oral)   Resp 18   Ht 165.1 cm  "(65\")   Wt 70.2 kg (154 lb 12.2 oz)   LMP  (LMP Unknown)   SpO2 99%   BMI 25.75 kg/m²     General: Today, patient mental status is back to baseline.    Neck: Supple, symmetrical, trachea midline, no adenopathy;              thyroid:  no enlargement/tenderness/nodules;              no carotid bruit or JVD  Cardiovascular: Normal rate, regular rhythm and intact distal pulses.              Exam reveals no gallop and no friction rub. No murmur heard  Pulmonary:  diminished breath sounds  bilaterally, respirations unlabored.               No rhonchi, wheezing or rales.   Abdominal: Soft, nontender, bowel sounds active all four quadrants,     no masses, no hepatomegaly, no splenomegaly.   Extremities: Normal, atraumatic, no cyanosis.  1+ edema, much better than her baseline  Pulses: 2 + symmetric all extremities  Neurological: Patient is alert and oriented to person, place, and time.  No new focal sensorimotor deficit.   Skin: Skin color, texture, normal. Turgor is decreased. No rashes or lesions         Data Review:    Results from last 7 days   Lab Units 10/03/23  0641 10/02/23  0056 10/01/23  1149   WBC 10*3/mm3 9.32 10.80 10.48   HEMOGLOBIN g/dL 14.5 12.3 13.0   HEMATOCRIT % 44.8 37.5 41.0   PLATELETS 10*3/mm3 240 217 227       Results from last 7 days   Lab Units 10/03/23  0641 10/02/23  0056 10/01/23  1149   SODIUM mmol/L 128* 131* 132*   POTASSIUM mmol/L 4.7 4.1 4.2   CHLORIDE mmol/L 94* 96* 95*   CO2 mmol/L 24.0 27.0 26.0   BUN mg/dL 27* 22 30*   CREATININE mg/dL 1.57* 1.03* 1.21*   CALCIUM mg/dL 8.9 8.9 9.6   BILIRUBIN mg/dL  --   --  0.3   ALK PHOS U/L  --   --  183*   ALT (SGPT) U/L  --   --  19   AST (SGOT) U/L  --   --  15   GLUCOSE mg/dL 219* 153* 372*           Results from last 7 days   Lab Units 10/01/23  1149   HEMOGLOBIN A1C % 10.20*       Lab Results   Lab Value Date/Time    TROPONINT 34 (H) 10/01/2023 1149    TROPONINT 56 (C) 06/30/2023 1959    TROPONINT 61 (C) 06/30/2023 1601    TROPONINT 28 (H) " 05/21/2023 1920    TROPONINT 25 (H) 05/21/2023 1615       Microbiology Results (last 10 days)       Procedure Component Value - Date/Time    Blood Culture - Blood, Arm, Right [[id number]]  (Normal) Collected: 10/02/23 0055    Lab Status: Preliminary result Specimen: Blood from Arm, Right Updated: 10/03/23 0100     Blood Culture No growth at 24 hours    Narrative:      Less than seven (7) mL's of blood was collected.  Insufficient quantity may yield false negative results.    Blood Culture - Blood, Arm, Right [[id number]]  (Normal) Collected: 10/01/23 1652    Lab Status: Preliminary result Specimen: Blood from Arm, Right Updated: 10/02/23 1700     Blood Culture No growth at 24 hours    Narrative:      Less than seven (7) mL's of blood was collected.  Insufficient quantity may yield false negative results.             Imaging Results (Last 7 Days)       Procedure Component Value Units Date/Time    MRI Brain With & Without Contrast [[id number]] Collected: 10/02/23 1920     Updated: 10/02/23 1927    Narrative:      MRI BRAIN W WO CONTRAST-     INDICATIONS: Mental status change     TECHNIQUE: Multiplanar multisequence magnetic resonance imaging of the  brain, without and with intravenous contrast material.     COMPARISON: CT from 10/1/2023     FINDINGS:     The diffusion-weighted images show no restricted diffusion to suggest  acute infarct.     The midline structures appear unremarkable.     The brain parenchyma shows minimal periventricular white matter T2 FLAIR  signal hyperintensities, compatible with chronic small vessel ischemic  change in a patient this age.     Ventricles, cisterns, cerebral sulci are in range of normal for patient  age.     Flow voids in the major arteries at the base of the brain appear  unremarkable.     The left maxillary sinus is mostly opacified. Minimal fluid signal is  seen in the right mastoid air cells. The paranasal sinuses, mastoid air  cells, and orbits otherwise appear unremarkable.        Impression:         No acute infarct. No enhancing lesions of brain. Minimal periventricular  white matter chronic small vessel ischemic change.        This report was finalized on 10/2/2023 7:24 PM by Dr. Galindo Malik M.D.       XR Chest 1 View [[id number]] Collected: 10/01/23 1746     Updated: 10/02/23 0724    Narrative:      CHEST SINGLE VIEW     HISTORY: Altered mental status.     COMPARISON: CT abdomen and pelvis same date at 12:38 p.m., AP chest  05/29/2023     FINDINGS: Heart size is upper normal. Thoracic aorta appears tortuous  and aortic vascular calcifications are present. There is subsegmental  basilar atelectasis and lung volumes are diminished. There is no  evidence for pulmonary edema or pleural effusion. The bones are  osteopenic. Surgical clips overlie the left lateral chest wall.       Impression:      Diminished lung volumes with subsegmental basilar  atelectasis.     This report was finalized on 10/2/2023 7:21 AM by Dr. Tomi Dowd M.D.       CT Abdomen Pelvis Without Contrast [[id number]] Collected: 10/01/23 1319     Updated: 10/01/23 1350    Narrative:      CT ABDOMEN AND PELVIS WITHOUT IV CONTRAST     HISTORY: Confusion, dry heaving.     TECHNIQUE:  CT includes axial imaging from the lung bases to the  trochanters without intravenous contrast and without use of oral  contrast. Data reconstructed in coronal and sagittal planes. Radiation  dose reduction techniques were utilized, including automated exposure  control and exposure modulation based on body size.     COMPARISON: CT abdomen and pelvis 03/03/2018.     FINDINGS: There is linear subsegmental lower lobe atelectasis. Liver,  spleen, adrenal glands, pancreas, kidneys exhibit normal noncontrasted  CT appearance. There is no hydronephrosis. Gallbladder is not  visualized. There is no bowel dilatation or evidence for bowel  obstruction. There is sigmoid diverticulosis without evidence for  diverticulitis. Urinary bladder is  mostly decompressed. There is no  ascites or evidence for intra-abdominal abscess formation. Sclerotic  lesion within the L5 vertebral body is without change. There is a  chronic compression deformity at T12. Bones are diffusely osteopenic.  There is evidence for an old right lesser trochanteric/iliopsoas  insertional avulsion and there is chronic atrophy of the right iliacus  psoas and iliopsoas muscles.       Impression:      No evidence for acute intra-abdominal process.        Radiation dose reduction techniques were utilized, including automated  exposure control and exposure modulation based on body size.        This report was finalized on 10/1/2023 1:47 PM by Dr. Tomi Dowd M.D.       CT Head Without Contrast [[id number]] Collected: 10/01/23 1259     Updated: 10/01/23 1311    Narrative:      CT HEAD WITHOUT CONTRAST     CLINICAL HISTORY: Altered mental status     TECHNIQUE: CT scan of the head was obtained with 3 mm axial soft tissue  algorithm images. No intravenous contrast was administered. Sagittal and  coronal reconstructions were obtained.     COMPARISON: CT head 8/30/2023.     FINDINGS: Motion limited exam. No intracranial hemorrhage.  No midline  shift or mass effect. Unchanged chronic right putaminal lacunar infarct.  No CT evidence of acute territorial infarction.  No extraaxial  collection.  No hydrocephalus. Opacified left maxillary sinus with  internal high attenuation material hyperostosis frontalis..          Impression:      1. Motion limited exam. Within that limitation, no acute intracranial  abnormality.  2. Unchanged opacified left maxillary sinus with internal high  attenuation material, suggesting inspissated secretions or chronic  fungal sinusitis.              Radiation dose reduction techniques were utilized, including automated  exposure control and exposure modulation based on body size.     This report was finalized on 10/1/2023 1:08 PM by Dr. Cameron Rosenthal M.D.                  Assessment:    Altered mental status/delirium  Diabetes mellitus uncontrolled  Hyponatremia  Hypomagnesemia  Dehydration/acute kidney injury  Opacified left maxillary sinus  Chronic kidney disease stage IIIa  Hypertension  Pulmonary hypertension  Sleep apnea  Hyperlipidemia  Morbid obesity  Temporal arteritis recently placed back on steroids         Plan:    Patient creatinine went up today to 1.5, baseline is 1.0, 1.1.  Will hold torsemide.  We will monitor volume status as well as renal function.  Patient was to complete her MRI studies today which was not finalized yesterday   Monitor and correct electrolytes, hyponatremia is getting worse, magnesium replaced.  Appreciate consultant recommendation.  Continuation on CPAP.  Adjust insulin as needed with increased long-acting insulin to 30, Premeal insulin to 10.  Patient is eating better.  Monitor mental status.  Back to baseline  Up to  chair, work with PT and OT.  Continue DVT/stress ulcer prophylaxis  Labs in am      Praneeth Valenzuela MD  10/3/2023  12:13 EDT

## 2023-10-03 NOTE — PLAN OF CARE
Goal Outcome Evaluation:   VSS on room air; pt A&Ox4;  remains at bedside; q2 turn; repeat MRI canceled per Dr. Valenzuela - MRI yesterday was enough to r/o stroke per Elsie in MRI; blood glucose monitored  and scheduled insulin increased; lantus also increased; bed alarm set; bed locked and in low position; call light in reach.

## 2023-10-03 NOTE — NURSING NOTE
Spoke with Elsie in MRI; Elsie states that pts MRI yesterday was enough to r/o stroke; cancel repeat MRI per Dr. Valenzuela.

## 2023-10-03 NOTE — PLAN OF CARE
Goal Outcome Evaluation:              Outcome Evaluation: Pt is a 78 y.o. admitted for delirium. Hx includes: DM, CKD, HTN, asthma and GERD. Spouse in room to assist with collecting subjective hx and translating in Ivorian as needed. Pt able to follow ~50% of commands. Per spouse, pt uses rw for all mobility and is Marisela at baseline. Pt reports having 4-5 falls within the past 4 months. Pt demo. balance and endurance/activity tolerance limiting functional mobility. Pt completed bed mobility with mod A x1, stood at length with cga/min A x1 to change brief and rw, and ambulated 3' with cga and rw. Vc's for walker management when turning. No overt LOB noted, but appeared generally unsteady. Hopeful for return home.      Anticipated Discharge Disposition (PT): home with 24/7 care, home with home health

## 2023-10-04 ENCOUNTER — READMISSION MANAGEMENT (OUTPATIENT)
Dept: CALL CENTER | Facility: HOSPITAL | Age: 78
End: 2023-10-04
Payer: MEDICARE

## 2023-10-04 VITALS
HEART RATE: 76 BPM | WEIGHT: 154.76 LBS | BODY MASS INDEX: 25.79 KG/M2 | HEIGHT: 65 IN | OXYGEN SATURATION: 100 % | DIASTOLIC BLOOD PRESSURE: 67 MMHG | SYSTOLIC BLOOD PRESSURE: 146 MMHG | RESPIRATION RATE: 16 BRPM | TEMPERATURE: 98.2 F

## 2023-10-04 LAB
ANION GAP SERPL CALCULATED.3IONS-SCNC: 10.6 MMOL/L (ref 5–15)
BUN SERPL-MCNC: 32 MG/DL (ref 8–23)
BUN/CREAT SERPL: 20.3 (ref 7–25)
CALCIUM SPEC-SCNC: 8.9 MG/DL (ref 8.6–10.5)
CHLORIDE SERPL-SCNC: 95 MMOL/L (ref 98–107)
CO2 SERPL-SCNC: 24.4 MMOL/L (ref 22–29)
CREAT SERPL-MCNC: 1.58 MG/DL (ref 0.57–1)
DEPRECATED RDW RBC AUTO: 46.9 FL (ref 37–54)
EGFRCR SERPLBLD CKD-EPI 2021: 33.6 ML/MIN/1.73
ERYTHROCYTE [DISTWIDTH] IN BLOOD BY AUTOMATED COUNT: 13.6 % (ref 12.3–15.4)
GLUCOSE BLDC GLUCOMTR-MCNC: 179 MG/DL (ref 70–130)
GLUCOSE BLDC GLUCOMTR-MCNC: 210 MG/DL (ref 70–130)
GLUCOSE BLDC GLUCOMTR-MCNC: 289 MG/DL (ref 70–130)
GLUCOSE SERPL-MCNC: 241 MG/DL (ref 65–99)
HCT VFR BLD AUTO: 45.3 % (ref 34–46.6)
HGB BLD-MCNC: 14.5 G/DL (ref 12–15.9)
MCH RBC QN AUTO: 30.6 PG (ref 26.6–33)
MCHC RBC AUTO-ENTMCNC: 32 G/DL (ref 31.5–35.7)
MCV RBC AUTO: 95.6 FL (ref 79–97)
PLATELET # BLD AUTO: 166 10*3/MM3 (ref 140–450)
PMV BLD AUTO: 10.7 FL (ref 6–12)
POTASSIUM SERPL-SCNC: 4.7 MMOL/L (ref 3.5–5.2)
RBC # BLD AUTO: 4.74 10*6/MM3 (ref 3.77–5.28)
SODIUM SERPL-SCNC: 130 MMOL/L (ref 136–145)
WBC NRBC COR # BLD: 8.07 10*3/MM3 (ref 3.4–10.8)

## 2023-10-04 PROCEDURE — 63710000001 INSULIN LISPRO (HUMAN) PER 5 UNITS: Performed by: INTERNAL MEDICINE

## 2023-10-04 PROCEDURE — 80048 BASIC METABOLIC PNL TOTAL CA: CPT | Performed by: INTERNAL MEDICINE

## 2023-10-04 PROCEDURE — 94799 UNLISTED PULMONARY SVC/PX: CPT

## 2023-10-04 PROCEDURE — 94664 DEMO&/EVAL PT USE INHALER: CPT

## 2023-10-04 PROCEDURE — 63710000001 PREDNISONE PER 5 MG: Performed by: INTERNAL MEDICINE

## 2023-10-04 PROCEDURE — 82948 REAGENT STRIP/BLOOD GLUCOSE: CPT

## 2023-10-04 PROCEDURE — 94761 N-INVAS EAR/PLS OXIMETRY MLT: CPT

## 2023-10-04 PROCEDURE — 85027 COMPLETE CBC AUTOMATED: CPT | Performed by: INTERNAL MEDICINE

## 2023-10-04 RX ORDER — GLUCAGON 3 MG/1
1 POWDER NASAL AS NEEDED
Qty: 2 EACH | Refills: 1 | Status: SHIPPED | OUTPATIENT
Start: 2023-10-04

## 2023-10-04 RX ORDER — INSULIN LISPRO 100 [IU]/ML
15 INJECTION, SOLUTION INTRAVENOUS; SUBCUTANEOUS
Status: DISCONTINUED | OUTPATIENT
Start: 2023-10-04 | End: 2023-10-04 | Stop reason: HOSPADM

## 2023-10-04 RX ORDER — DOCUSATE SODIUM 100 MG/1
100 CAPSULE, LIQUID FILLED ORAL 2 TIMES DAILY
Qty: 60 CAPSULE | Refills: 0 | Status: SHIPPED | OUTPATIENT
Start: 2023-10-04

## 2023-10-04 RX ORDER — ATORVASTATIN CALCIUM 20 MG/1
20 TABLET, FILM COATED ORAL NIGHTLY
Qty: 90 TABLET | Refills: 0 | Status: SHIPPED | OUTPATIENT
Start: 2023-10-04

## 2023-10-04 RX ADMIN — BUDESONIDE AND FORMOTEROL FUMARATE DIHYDRATE 2 PUFF: 160; 4.5 AEROSOL RESPIRATORY (INHALATION) at 07:31

## 2023-10-04 RX ADMIN — INSULIN LISPRO 3 UNITS: 100 INJECTION, SOLUTION INTRAVENOUS; SUBCUTANEOUS at 09:43

## 2023-10-04 RX ADMIN — AMLODIPINE BESYLATE 5 MG: 5 TABLET ORAL at 09:42

## 2023-10-04 RX ADMIN — INSULIN LISPRO 4 UNITS: 100 INJECTION, SOLUTION INTRAVENOUS; SUBCUTANEOUS at 13:02

## 2023-10-04 RX ADMIN — PREDNISONE 6 MG: 5 TABLET ORAL at 09:42

## 2023-10-04 RX ADMIN — INSULIN LISPRO 10 UNITS: 100 INJECTION, SOLUTION INTRAVENOUS; SUBCUTANEOUS at 13:01

## 2023-10-04 RX ADMIN — ANORECTAL OINTMENT 1 APPLICATION: 15.7; .44; 24; 20.6 OINTMENT TOPICAL at 09:44

## 2023-10-04 RX ADMIN — INSULIN LISPRO 10 UNITS: 100 INJECTION, SOLUTION INTRAVENOUS; SUBCUTANEOUS at 09:43

## 2023-10-04 RX ADMIN — PANTOPRAZOLE SODIUM 40 MG: 40 TABLET, DELAYED RELEASE ORAL at 09:43

## 2023-10-04 NOTE — PLAN OF CARE
Problem: Adult Inpatient Plan of Care  Goal: Plan of Care Review  Outcome: Ongoing, Progressing  Flowsheets (Taken 10/4/2023 0355)  Progress: improving  Plan of Care Reviewed With: patient  Outcome Evaluation: pt aox4. vss. on room air. given tylenol for pain. care continued.  Goal: Patient-Specific Goal (Individualized)  Outcome: Ongoing, Progressing  Goal: Absence of Hospital-Acquired Illness or Injury  Outcome: Ongoing, Progressing  Intervention: Identify and Manage Fall Risk  Description: Perform standard risk assessment on admission using a validated tool or comprehensive approach appropriate to the patient; reassess fall risk frequently, with change in status or transfer to another level of care.  Communicate fall injury risk to interprofessional healthcare team.  Determine need for increased observation, equipment and environmental modification, such as low bed, signage and supportive, nonskid footwear.  Adjust safety measures to individual developmental age, stage and identified risk factors.  Reinforce the importance of safety and physical activity with patient and family.  Perform regular intentional rounding to assess need for position change, pain assessment and personal needs, including assistance with toileting.  Recent Flowsheet Documentation  Taken 10/4/2023 0203 by Shelbie Arreguin, RN  Safety Promotion/Fall Prevention:   safety round/check completed   nonskid shoes/slippers when out of bed   fall prevention program maintained   clutter free environment maintained   assistive device/personal items within reach  Taken 10/4/2023 0055 by Shelbie Arreguin, RN  Safety Promotion/Fall Prevention:   safety round/check completed   nonskid shoes/slippers when out of bed   fall prevention program maintained   clutter free environment maintained   assistive device/personal items within reach  Taken 10/3/2023 2237 by Shelbie Arreguin, RN  Safety Promotion/Fall Prevention:   safety round/check completed   nonskid  shoes/slippers when out of bed   fall prevention program maintained   clutter free environment maintained   assistive device/personal items within reach  Taken 10/3/2023 2109 by Shelbie Arreguin RN  Safety Promotion/Fall Prevention:   safety round/check completed   nonskid shoes/slippers when out of bed   fall prevention program maintained   clutter free environment maintained   assistive device/personal items within reach  Intervention: Prevent Skin Injury  Description: Perform a screening for skin injury risk, such as pressure or moisture associated skin damage on admission and at regular intervals throughout hospital stay.  Keep all areas of skin (especially folds) clean and dry.  Maintain adequate skin hydration.  Relieve and redistribute pressure and protect bony prominences; implement measures based on patient-specific risk factors.  Match turning and repositioning schedule to clinical condition.  Encourage weight shift frequently; assist with reposition if unable to complete independently.  Float heels off bed; avoid pressure on the Achilles tendon.  Keep skin free from extended contact with medical devices.  Encourage functional activity and mobility, as early as tolerated.  Use aids (e.g., slide boards, mechanical lift) during transfer.  Recent Flowsheet Documentation  Taken 10/4/2023 0203 by Shelbie Arreguin RN  Body Position: weight shifting  Taken 10/4/2023 0055 by Shelbie Arreguin RN  Body Position: weight shifting  Taken 10/3/2023 2237 by Shelbie Arreguin RN  Body Position: weight shifting  Taken 10/3/2023 2109 by Shelbie Arreguin RN  Body Position: weight shifting  Skin Protection: adhesive use limited  Intervention: Prevent and Manage VTE (Venous Thromboembolism) Risk  Description: Assess for VTE (venous thromboembolism) risk.  Encourage and assist with early ambulation.  Initiate and maintain compression or other therapy, as indicated, based on identified risk in accordance with organizational  protocol and provider order.  Encourage both active and passive leg exercises while in bed, if unable to ambulate.  Recent Flowsheet Documentation  Taken 10/4/2023 0203 by Shelbie Arreguin RN  Activity Management: activity encouraged  Taken 10/4/2023 0055 by Shelbie Arreguin RN  Activity Management: activity encouraged  Taken 10/3/2023 2237 by Shelbie Arreguin RN  Activity Management: activity encouraged  Taken 10/3/2023 2109 by Shelbie Arreguin RN  Activity Management: activity encouraged  Range of Motion: active ROM (range of motion) encouraged  Goal: Optimal Comfort and Wellbeing  Outcome: Ongoing, Progressing  Intervention: Provide Person-Centered Care  Description: Use a family-focused approach to care.  Develop trust and rapport by proactively providing information, encouraging questions, addressing concerns and offering reassurance.  Acknowledge emotional response to hospitalization.  Recognize and utilize personal coping strategies.  Honor spiritual and cultural preferences.  Recent Flowsheet Documentation  Taken 10/3/2023 2109 by Shelbie Arreguin RN  Trust Relationship/Rapport:   care explained   questions answered  Goal: Readiness for Transition of Care  Outcome: Ongoing, Progressing  Goal: Plan of Care Review  Outcome: Ongoing, Progressing  Flowsheets (Taken 10/4/2023 0355)  Progress: improving  Plan of Care Reviewed With: patient  Outcome Evaluation: pt aox4. vss. on room air. given tylenol for pain. care continued.  Goal: Patient-Specific Goal (Individualized)  Outcome: Ongoing, Progressing  Goal: Absence of Hospital-Acquired Illness or Injury  Outcome: Ongoing, Progressing  Intervention: Identify and Manage Fall Risk  Description: Perform standard risk assessment on admission using a validated tool or comprehensive approach appropriate to the patient; reassess fall risk frequently, with change in status or transfer to another level of care.  Communicate fall injury risk to interprofessional healthcare  team.  Determine need for increased observation, equipment and environmental modification, such as low bed, signage and supportive, nonskid footwear.  Adjust safety measures to individual developmental age, stage and identified risk factors.  Reinforce the importance of safety and physical activity with patient and family.  Perform regular intentional rounding to assess need for position change, pain assessment and personal needs, including assistance with toileting.  Recent Flowsheet Documentation  Taken 10/4/2023 0203 by Shelbie Arreguin RN  Safety Promotion/Fall Prevention:   safety round/check completed   nonskid shoes/slippers when out of bed   fall prevention program maintained   clutter free environment maintained   assistive device/personal items within reach  Taken 10/4/2023 0055 by Shelbie Arreguin RN  Safety Promotion/Fall Prevention:   safety round/check completed   nonskid shoes/slippers when out of bed   fall prevention program maintained   clutter free environment maintained   assistive device/personal items within reach  Taken 10/3/2023 2237 by Shelbie Arreguin RN  Safety Promotion/Fall Prevention:   safety round/check completed   nonskid shoes/slippers when out of bed   fall prevention program maintained   clutter free environment maintained   assistive device/personal items within reach  Taken 10/3/2023 2109 by Shelbie Arreguin RN  Safety Promotion/Fall Prevention:   safety round/check completed   nonskid shoes/slippers when out of bed   fall prevention program maintained   clutter free environment maintained   assistive device/personal items within reach  Intervention: Prevent Skin Injury  Description: Perform a screening for skin injury risk, such as pressure or moisture associated skin damage on admission and at regular intervals throughout hospital stay.  Keep all areas of skin (especially folds) clean and dry.  Maintain adequate skin hydration.  Relieve and redistribute pressure and protect  bony prominences; implement measures based on patient-specific risk factors.  Match turning and repositioning schedule to clinical condition.  Encourage weight shift frequently; assist with reposition if unable to complete independently.  Float heels off bed; avoid pressure on the Achilles tendon.  Keep skin free from extended contact with medical devices.  Encourage functional activity and mobility, as early as tolerated.  Use aids (e.g., slide boards, mechanical lift) during transfer.  Recent Flowsheet Documentation  Taken 10/4/2023 0203 by Shelbie Arreguin RN  Body Position: weight shifting  Taken 10/4/2023 0055 by Shelbie Arreguin RN  Body Position: weight shifting  Taken 10/3/2023 2237 by Shelbie Arreguin RN  Body Position: weight shifting  Taken 10/3/2023 2109 by Shelbie Arreguin RN  Body Position: weight shifting  Skin Protection: adhesive use limited  Intervention: Prevent and Manage VTE (Venous Thromboembolism) Risk  Description: Assess for VTE (venous thromboembolism) risk.  Encourage and assist with early ambulation.  Initiate and maintain compression or other therapy, as indicated, based on identified risk in accordance with organizational protocol and provider order.  Encourage both active and passive leg exercises while in bed, if unable to ambulate.  Recent Flowsheet Documentation  Taken 10/4/2023 0203 by Shelbie Arreguin RN  Activity Management: activity encouraged  Taken 10/4/2023 0055 by Shelbie Arreguin RN  Activity Management: activity encouraged  Taken 10/3/2023 2237 by Shelbie Arreguin RN  Activity Management: activity encouraged  Taken 10/3/2023 2109 by Shelbie Arreguin RN  Activity Management: activity encouraged  Range of Motion: active ROM (range of motion) encouraged  Goal: Optimal Comfort and Wellbeing  Outcome: Ongoing, Progressing  Intervention: Provide Person-Centered Care  Description: Use a family-focused approach to care.  Develop trust and rapport by proactively providing  information, encouraging questions, addressing concerns and offering reassurance.  Acknowledge emotional response to hospitalization.  Recognize and utilize personal coping strategies.  Honor spiritual and cultural preferences.  Recent Flowsheet Documentation  Taken 10/3/2023 2109 by Shelbie Arreguin RN  Trust Relationship/Rapport:   care explained   questions answered  Goal: Readiness for Transition of Care  Outcome: Ongoing, Progressing     Problem: Fall Injury Risk  Goal: Absence of Fall and Fall-Related Injury  Outcome: Ongoing, Progressing  Intervention: Identify and Manage Contributors  Description: Develop a fall prevention plan with the patient and caregiver/family.  Provide reorientation, appropriate sensory stimulation and routines with changes in mental status to decrease risk of fall.  Promote use of personal vision and auditory aids.  Assess assistance level required for safe and effective self-care; provide support as needed, such as toileting, mobilization. For age 65 and older, implement timed toileting with assistance.  Encourage physical activity, such as performance of mobility and self-care at highest level of patient ability, multicomponent exercise program and provision of appropriate assistive devices.  If fall occurs, assess the severity of injury; implement fall injury protocol. Determine the cause and revise fall injury prevention plan.  Regularly review medication contribution to fall risk; adjust medication administration times to minimize risk of falling.  Consider risk related to polypharmacy and age.  Balance adequate pain management with potential for oversedation.  Recent Flowsheet Documentation  Taken 10/3/2023 2237 by Shelbie Arreguin, RN  Medication Review/Management: medications reviewed  Taken 10/3/2023 2109 by Shelbie Arreguin RN  Medication Review/Management: medications reviewed  Intervention: Promote Injury-Free Environment  Description: Provide a safe, barrier-free  environment that encourages independent activity.  Keep care area uncluttered and well-lighted.  Determine need for increased observation or monitoring.  Avoid use of devices that minimize mobility, such as restraints or indwelling urinary catheter.  Recent Flowsheet Documentation  Taken 10/4/2023 0203 by Shelbie Arreguin RN  Safety Promotion/Fall Prevention:   safety round/check completed   nonskid shoes/slippers when out of bed   fall prevention program maintained   clutter free environment maintained   assistive device/personal items within reach  Taken 10/4/2023 0055 by Shelbie Arreguin RN  Safety Promotion/Fall Prevention:   safety round/check completed   nonskid shoes/slippers when out of bed   fall prevention program maintained   clutter free environment maintained   assistive device/personal items within reach  Taken 10/3/2023 2237 by Shelbie Arreguin RN  Safety Promotion/Fall Prevention:   safety round/check completed   nonskid shoes/slippers when out of bed   fall prevention program maintained   clutter free environment maintained   assistive device/personal items within reach  Taken 10/3/2023 2109 by Shelbie Arreguin RN  Safety Promotion/Fall Prevention:   safety round/check completed   nonskid shoes/slippers when out of bed   fall prevention program maintained   clutter free environment maintained   assistive device/personal items within reach     Problem: Skin Injury Risk Increased  Goal: Skin Health and Integrity  Outcome: Ongoing, Progressing  Intervention: Optimize Skin Protection  Description: Perform a full pressure injury risk assessment, as indicated by screening, upon admission to care unit.  Reassess skin (injury risk, full inspection) frequently (e.g., scheduled interval, with change in condition) to provide optimal early detection and prevention.  Maintain adequate tissue perfusion (e.g., encourage fluid balance; avoid crossing legs, constrictive clothing or devices) to promote tissue  oxygenation.  Maintain head of bed at lowest degree of elevation tolerated, considering medical condition and other restrictions.  Avoid positioning onto an area that remains reddened.  Minimize incontinence and moisture (e.g., toileting schedule; moisture-wicking pad, diaper or incontinence collection device; skin moisture barrier).  Cleanse skin promptly and gently when soiled utilizing a pH-balanced cleanser.  Relieve and redistribute pressure (e.g., scheduled position changes, weight shifts, use of support surface, medical device repositioning, protective dressing application, use of positioning device, microclimate control, use of pressure-injury-monitor  Encourage increased activity, such as sitting in a chair at the bedside or early mobilization, when able to tolerate.  Recent Flowsheet Documentation  Taken 10/4/2023 0203 by Shelbie Arreguin RN  Head of Bed (HOB) Positioning: HOB elevated  Taken 10/4/2023 0055 by Shelbie Arreguin RN  Head of Bed (HOB) Positioning: HOB elevated  Taken 10/3/2023 2237 by Shelbie Arreguin RN  Head of Bed (HOB) Positioning: HOB elevated  Taken 10/3/2023 2109 by Shelbie Arreguin RN  Pressure Reduction Techniques:   sit time limited to 2 hours   weight shift assistance provided  Head of Bed (HOB) Positioning: HOB elevated  Skin Protection: adhesive use limited   Goal Outcome Evaluation:  Plan of Care Reviewed With: patient        Progress: improving  Outcome Evaluation: pt aox4. vss. on room air. given tylenol for pain. care continued.

## 2023-10-04 NOTE — DISCHARGE SUMMARY
PHYSICIAN DISCHARGE SUMMARY  [facility], [facility]    Patient Identification:    Name: Blanca Zhu  Age: 78 y.o.  Sex: female  :  1945  MRN: [id number]    Primary Care Physician: Praneeth Valenzuela MD    Admit date: 10/1/2023    Discharge date and time:10/4/2023    Discharged Condition: fair    Discharge Diagnoses:    Delirium    Hypomagnesemia    Severe malnutrition  Altered mental status/delirium  Diabetes mellitus uncontrolled  Hyponatremia  Hypomagnesemia  Dehydration/acute kidney injury  Opacified left maxillary sinus  Chronic kidney disease stage IIIa  Hypertension  Pulmonary hypertension  Sleep apnea  Hyperlipidemia  Morbid obesity  Temporal arteritis recently placed back on steroids            Hospital Course: Blanca Zhu  is a 78-year-old female, with a diagnosis of asthma, chronic kidney disease stage III, treated type II uncontrolled, hypertension, temporal arteritis recently placed back on steroids, chronic diastolic congestive heart failure, hypertension, GERD.  states that about 9 AM dated admission, patient was in her room staring off, he was unable to converse with her, she was not responsive, her blood sugar was over 400, patient has been given her 2 units of Humalog call EMS. Patient was restless as well as agitated. Patient was brought to the emergency room, in the ER, patient was found to have: Tachycardia, urinalysis showed no leukocytes, no nitrates, blood sugar was over 350, creatinine 1.21, sodium 132, magnesium 1.2, WBC 10.48, hemoglobin 13, lactate 2.8, drug screen was negative, CT abdomen pelvis showed no evidence of acute intra-abdominal process, CT of the head showed no acute intracranial abnormality, unchanged opacified left maxillary sinus suggesting inspissated secretions, chronic fungal sinusitis. Chest x-ray showed subsegmental basilar atelectasis. In the emergency room, patient was given Ativan twice since  patient was delirious and agitated, was given bolus of IV fluids, patient was admitted to hospital for further management.     Upon admission, patient was worked-up to rule out infection, infection was not found.  Electrolytes abnormalities like hyponatremia, hypomagnesemia, were corrected.  Renal function were monitored.  Blood sugar was treated with lantus and Humalog as well as sliding scale insulin.  Blood sugar dropped today high 100s to low 200s, patient was feeling better, volume status was okay, patient was restarted on torsemide.  By October 3, patient mental status was back to baseline.  Insulin was adjusted since patient was eating better.  At this time, patient is on Humalog 15 units with each meal and Lantus 35 units every day, however, patient has at home plenty of Toujeo as well as Humalog.  Patient will receive the second dose.  Patient is stable enough to be discharged home.  I will see her in the office in a week.  Of note, patient has been taking 6 mg of prednisone for temporal arteritis given per rheumatologist.  Patient has plenty of this medication at home as well.    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 Daniel Gutierrez, [facility]     SECTION N/A     x3    CHOLECYSTECTOMY N/A     COLONOSCOPY      COLONOSCOPY W/  "POLYPECTOMY N/A 03/12/2019    Enlarged folds in the antrum: biopsied; duodenal, transverse colon x2, splenic flexure, descending colon and sigmoid colon polyps: biopsied (path: tubular adenoma x6)-Dr. Zak De Jesus, [facility]    ENDOSCOPY  10/11/2019    Procedure: ESOPHAGOGASTRODUODENOSCOPY with hot snare polypectomy;  Surgeon: Haile Handley MD;  Location: [facility] ENDOSCOPY;  Service: Gastroenterology    ENDOSCOPY N/A 1/29/2020    Procedure: ESOPHAGOGASTRODUODENOSCOPY;  Surgeon: Haile Handley MD;  Location: [facility] ENDOSCOPY;  Service: Gastroenterology    ENDOSCOPY N/A 9/9/2020    Procedure: ESOPHAGOGASTRODUODENOSCOPY WITH BIOPSIES AND COLD BIOPSY POLYPECTOMY;  Surgeon: Haile Handley MD;  Location: [facility] ENDOSCOPY;  Service: Gastroenterology;  Laterality: N/A;  pre: dyspepsia and diarrhea  post: duodenal polyp and gastritis    MASTECTOMY Left     MASTECTOMY WITH SENTINEL NODE BIOPSY AND AXILLARY NODE DISSECTION Left 7/3/2019    Procedure: LEFT BREAST MASTECTOMY WITH SENTINEL NODE BIOPSY AND , v-y plasty closure;  Surgeon: Tahmina James MD;  Location: [facility] MAIN OR;  Service: General    SUBTOTAL HYSTERECTOMY Bilateral     ovaries still in tact    TEMPORAL ARTERY BIOPSY Bilateral 12/05/2019           Consults:     Consults       Date and Time Order Name Status Description    10/2/2023 12:22 AM Inpatient ENT Consult Completed     10/1/2023  1:51 PM IP General Consult (Use specialty-specific consult if known)              Discharge Exam:    /68 (BP Location: Right arm, Patient Position: Lying)   Pulse 120   Temp 97.7 °F (36.5 °C) (Oral)   Resp 16   Ht 165.1 cm (65\")   Wt 70.2 kg (154 lb 12.2 oz)   LMP  (LMP Unknown)   SpO2 97%   BMI 25.75 kg/m²     General: Today, patient mental status is back to baseline.    Neck: Supple, symmetrical, trachea midline, no adenopathy;              thyroid:  no enlargement/tenderness/nodules;              no carotid bruit or " JVD  Cardiovascular: Normal rate, regular rhythm and intact distal pulses.              Exam reveals no gallop and no friction rub. No murmur heard  Pulmonary:  diminished breath sounds  bilaterally, respirations unlabored.               No rhonchi, wheezing or rales.   Abdominal: Soft, nontender, bowel sounds active all four quadrants,     no masses, no hepatomegaly, no splenomegaly.   Extremities: Normal, atraumatic, no cyanosis.  1+ edema, much better than her baseline  Pulses: 2 + symmetric all extremities  Neurological: Patient is alert and oriented to person, place, and time.  No new focal sensorimotor deficit.   Skin: Skin color, texture, normal. Turgor is decreased. No rashes or lesions       Data Review:        Results from last 7 days   Lab Units 10/04/23  0652 10/03/23  0641 10/02/23  0056   WBC 10*3/mm3 8.07 9.32 10.80   HEMOGLOBIN g/dL 14.5 14.5 12.3   HEMATOCRIT % 45.3 44.8 37.5   PLATELETS 10*3/mm3 166 240 217       Results from last 7 days   Lab Units 10/04/23  0918 10/03/23  0641 10/02/23  0056 10/01/23  1149   SODIUM mmol/L 130* 128* 131* 132*   POTASSIUM mmol/L 4.7 4.7 4.1 4.2   CHLORIDE mmol/L 95* 94* 96* 95*   CO2 mmol/L 24.4 24.0 27.0 26.0   BUN mg/dL 32* 27* 22 30*   CREATININE mg/dL 1.58* 1.57* 1.03* 1.21*   CALCIUM mg/dL 8.9 8.9 8.9 9.6   BILIRUBIN mg/dL  --   --   --  0.3   ALK PHOS U/L  --   --   --  183*   ALT (SGPT) U/L  --   --   --  19   AST (SGOT) U/L  --   --   --  15   GLUCOSE mg/dL 241* 219* 153* 372*           Lab Results   Lab Value Date/Time    TROPONINT 34 (H) 10/01/2023 1149    TROPONINT 56 (C) 06/30/2023 1959    TROPONINT 61 (C) 06/30/2023 1601    TROPONINT 28 (H) 05/21/2023 1920    TROPONINT 25 (H) 05/21/2023 1615       Microbiology Results (last 10 days)       Procedure Component Value - Date/Time    Blood Culture - Blood, Arm, Right [[id number]]  (Normal) Collected: 10/02/23 0055    Lab Status: Preliminary result Specimen: Blood from Arm, Right Updated: 10/04/23 0100      Blood Culture No growth at 2 days    Narrative:      Less than seven (7) mL's of blood was collected.  Insufficient quantity may yield false negative results.    Blood Culture - Blood, Arm, Right [[id number]]  (Normal) Collected: 10/01/23 1652    Lab Status: Preliminary result Specimen: Blood from Arm, Right Updated: 10/03/23 1700     Blood Culture No growth at 2 days    Narrative:      Less than seven (7) mL's of blood was collected.  Insufficient quantity may yield false negative results.             Imaging Results (All)       Procedure Component Value Units Date/Time    MRI Brain With & Without Contrast [[id number]] Collected: 10/02/23 1920     Updated: 10/02/23 1927    Narrative:      MRI BRAIN W WO CONTRAST-     INDICATIONS: Mental status change     TECHNIQUE: Multiplanar multisequence magnetic resonance imaging of the  brain, without and with intravenous contrast material.     COMPARISON: CT from 10/1/2023     FINDINGS:     The diffusion-weighted images show no restricted diffusion to suggest  acute infarct.     The midline structures appear unremarkable.     The brain parenchyma shows minimal periventricular white matter T2 FLAIR  signal hyperintensities, compatible with chronic small vessel ischemic  change in a patient this age.     Ventricles, cisterns, cerebral sulci are in range of normal for patient  age.     Flow voids in the major arteries at the base of the brain appear  unremarkable.     The left maxillary sinus is mostly opacified. Minimal fluid signal is  seen in the right mastoid air cells. The paranasal sinuses, mastoid air  cells, and orbits otherwise appear unremarkable.       Impression:         No acute infarct. No enhancing lesions of brain. Minimal periventricular  white matter chronic small vessel ischemic change.        This report was finalized on 10/2/2023 7:24 PM by Dr. Galindo Malik M.D.       XR Chest 1 View [[id number]] Collected: 10/01/23 1746     Updated: 10/02/23 4558     Narrative:      CHEST SINGLE VIEW     HISTORY: Altered mental status.     COMPARISON: CT abdomen and pelvis same date at 12:38 p.m., AP chest  05/29/2023     FINDINGS: Heart size is upper normal. Thoracic aorta appears tortuous  and aortic vascular calcifications are present. There is subsegmental  basilar atelectasis and lung volumes are diminished. There is no  evidence for pulmonary edema or pleural effusion. The bones are  osteopenic. Surgical clips overlie the left lateral chest wall.       Impression:      Diminished lung volumes with subsegmental basilar  atelectasis.     This report was finalized on 10/2/2023 7:21 AM by Dr. Tomi Dowd M.D.       CT Abdomen Pelvis Without Contrast [[id number]] Collected: 10/01/23 1319     Updated: 10/01/23 1350    Narrative:      CT ABDOMEN AND PELVIS WITHOUT IV CONTRAST     HISTORY: Confusion, dry heaving.     TECHNIQUE:  CT includes axial imaging from the lung bases to the  trochanters without intravenous contrast and without use of oral  contrast. Data reconstructed in coronal and sagittal planes. Radiation  dose reduction techniques were utilized, including automated exposure  control and exposure modulation based on body size.     COMPARISON: CT abdomen and pelvis 03/03/2018.     FINDINGS: There is linear subsegmental lower lobe atelectasis. Liver,  spleen, adrenal glands, pancreas, kidneys exhibit normal noncontrasted  CT appearance. There is no hydronephrosis. Gallbladder is not  visualized. There is no bowel dilatation or evidence for bowel  obstruction. There is sigmoid diverticulosis without evidence for  diverticulitis. Urinary bladder is mostly decompressed. There is no  ascites or evidence for intra-abdominal abscess formation. Sclerotic  lesion within the L5 vertebral body is without change. There is a  chronic compression deformity at T12. Bones are diffusely osteopenic.  There is evidence for an old right lesser trochanteric/iliopsoas  insertional  avulsion and there is chronic atrophy of the right iliacus  psoas and iliopsoas muscles.       Impression:      No evidence for acute intra-abdominal process.        Radiation dose reduction techniques were utilized, including automated  exposure control and exposure modulation based on body size.        This report was finalized on 10/1/2023 1:47 PM by Dr. Tomi Dowd M.D.       CT Head Without Contrast [[id number]] Collected: 10/01/23 1259     Updated: 10/01/23 1311    Narrative:      CT HEAD WITHOUT CONTRAST     CLINICAL HISTORY: Altered mental status     TECHNIQUE: CT scan of the head was obtained with 3 mm axial soft tissue  algorithm images. No intravenous contrast was administered. Sagittal and  coronal reconstructions were obtained.     COMPARISON: CT head 8/30/2023.     FINDINGS: Motion limited exam. No intracranial hemorrhage.  No midline  shift or mass effect. Unchanged chronic right putaminal lacunar infarct.  No CT evidence of acute territorial infarction.  No extraaxial  collection.  No hydrocephalus. Opacified left maxillary sinus with  internal high attenuation material hyperostosis frontalis..          Impression:      1. Motion limited exam. Within that limitation, no acute intracranial  abnormality.  2. Unchanged opacified left maxillary sinus with internal high  attenuation material, suggesting inspissated secretions or chronic  fungal sinusitis.              Radiation dose reduction techniques were utilized, including automated  exposure control and exposure modulation based on body size.     This report was finalized on 10/1/2023 1:08 PM by Dr. Cameron Rosenthal M.D.                 Disposition:    Home    Patient Instructions:        Discharge Medications        New Medications        Instructions Start Date   atorvastatin 20 MG tablet  Commonly known as: LIPITOR   20 mg, Oral, Nightly      Baqsimi Two Pack 3 MG/DOSE powder  Generic drug: Glucagon   Instill one spray into the nostril once  as needed for severely low blood sugar.  May repeat dose in 15 minutes if first dose is ineffective      docusate sodium 100 MG capsule  Commonly known as: COLACE   100 mg, Oral, 2 Times Daily             Continue These Medications        Instructions Start Date   acetaminophen 325 MG tablet  Commonly known as: TYLENOL   650 mg, Oral, Every 6 Hours PRN      amLODIPine 5 MG tablet  Commonly known as: NORVASC   Take 1 tablet by mouth Daily.      bisoprolol 10 MG tablet  Commonly known as: ZEBeta   20 mg, Oral, Nightly      Cholecalciferol 125 MCG (5000 UT) tablet   Every Other Day.      ipratropium-albuterol 0.5-2.5 mg/3 ml nebulizer  Commonly known as: DUO-NEB   3 mL, Nebulization, 4 Times Daily PRN      O2  Commonly known as: OXYGEN   Inhalation, Once      omeprazole 20 MG capsule  Commonly known as: priLOSEC   40 mg      torsemide 20 MG tablet  Commonly known as: DEMADEX   20 mg, Oral, Daily             Stop These Medications      leflunomide 10 MG tablet  Commonly known as: ARAVA     methocarbamol 750 MG tablet  Commonly known as: ROBAXIN              Discharge Order (From admission, onward)       Start     Ordered    10/04/23 1237  Discharge patient  Once        Comments: Patient has Toujeo, Humalog and prednisone  Patient is aware she has to take these 3 medications   Expected Discharge Date: 10/04/23   Expected Discharge Time: Afternoon   Discharge Disposition: Home or Self Care   Physician of Record for Attribution - Please select from Treatment Team: ELSY VALENZUELA [[id number]]   Review needed by CMO to determine Physician of Record: No      Question Answer Comment   Physician of Record for Attribution - Please select from Treatment Team ELSY VALENZUELA    Review needed by CMO to determine Physician of Record No        10/04/23 1241                     Follow-up Information       Elsy Valenzuela MD Follow up in 1 week(s).    Specialties: Internal Medicine, Hospitalist  Contact information:  [address]  [address]  [place] [id number] [phone]               Praneeth Valenzuela MD .    Specialties: Internal Medicine, Hospitalist  Contact information:  [address]  [place] [id number] [phone]                             Total time spent discharging patient including evaluation,post hospitalization follow up,  medication and post hospitalization instructions and education total time exceeds 30 minutes.    Signed:  Praneeth Valenzuela MD  10/4/2023  12:46 EDT       I wore protective equipment throughout this patient encounter including a face mask, gloves, and protective eyewear.  Hand hygiene was performed before donning protective equipment and after removal when leaving the room.

## 2023-10-05 NOTE — OUTREACH NOTE
Prep Survey      Flowsheet Row Responses   [facility] patient discharged from? [place]   Is LACE score < 7 ? No   Eligibility Readm Mgmt   Discharge diagnosis DeliriumHypomagnesemia   Does the patient have one of the following disease processes/diagnoses(primary or secondary)? Other   Does the patient have Home health ordered? Yes   What is the Home health agency?  [facility].   Is there a DME ordered? No   Prep survey completed? Yes            SAPNA VALENTIN - Registered Nurse

## 2023-10-06 LAB — BACTERIA SPEC AEROBE CULT: NORMAL

## 2023-10-07 LAB — BACTERIA SPEC AEROBE CULT: NORMAL

## 2023-10-10 ENCOUNTER — READMISSION MANAGEMENT (OUTPATIENT)
Dept: CALL CENTER | Facility: HOSPITAL | Age: 78
End: 2023-10-10
Payer: MEDICARE

## 2023-10-10 NOTE — OUTREACH NOTE
Medical Week 1 Survey      Flowsheet Row Responses   [facility] patient discharged from? [place]   Does the patient have one of the following disease processes/diagnoses(primary or secondary)? Other   Week 1 attempt successful? No   Unsuccessful attempts Attempt 1            Lucy Kline Registered Nurse

## 2023-10-13 ENCOUNTER — READMISSION MANAGEMENT (OUTPATIENT)
Dept: CALL CENTER | Facility: HOSPITAL | Age: 78
End: 2023-10-13
Payer: MEDICARE

## 2023-10-13 NOTE — OUTREACH NOTE
Medical Week 1 Survey      Flowsheet Row Responses   [facility] patient discharged from? [place]   Does the patient have one of the following disease processes/diagnoses(primary or secondary)? Other   Week 1 attempt successful? No   Unsuccessful attempts Attempt 2            Lucy SANDOVAL - Registered Nurse

## 2023-10-17 ENCOUNTER — TELEPHONE (OUTPATIENT)
Dept: GASTROENTEROLOGY | Facility: CLINIC | Age: 78
End: 2023-10-17
Payer: MEDICARE

## 2023-10-17 NOTE — TELEPHONE ENCOUNTER
LAST C/S 5/24/23 IN [facility]      PERSONAL HX OF POLYPS    FAMILY HX OF POLYPS    FAMILY HX OF COLON CA      NO ASA OR BLOOD THINNERS        VALSARTAN  PREDNISONE  TORSEMIDE  BISOPROLOL  DOXAZOSIN  OMEPRAZOLE  TUJEO   HUMALOG  VIATMIN D          OA QUESTIONNAIRE SCANNED IN MEDIA

## 2023-10-18 ENCOUNTER — READMISSION MANAGEMENT (OUTPATIENT)
Dept: CALL CENTER | Facility: HOSPITAL | Age: 78
End: 2023-10-18
Payer: MEDICARE

## 2023-10-18 NOTE — OUTREACH NOTE
Medical Week 1 Survey      Flowsheet Row Responses   [facility] patient discharged from? [place]   Does the patient have one of the following disease processes/diagnoses(primary or secondary)? Other   Week 1 attempt successful? Yes   Call start time 1356   Call end time 1358   Discharge diagnosis Delirium Hypomagnesemia   Is patient permission given to speak with other caregiver? Yes   List who call center can speak with Ashwin spouse   Person spoke with today (if not patient) and relationship Ashwin spouse   Meds reviewed with patient/caregiver? Yes   Is the patient having any side effects they believe may be caused by any medication additions or changes? No   Does the patient have all medications ordered at discharge? Yes   Is the patient taking all medications as directed (includes completed medication regime)? Yes   Does the patient have a primary care provider?  Yes   Has the patient kept scheduled appointments due by today? N/A   What is the Home health agency?  [facility]    Home health comments  has visited pt per spouse   Did the patient receive a copy of their discharge instructions? Yes   Nursing interventions Reviewed instructions with patient   What is the patient's perception of their health status since discharge? Improving   Is the patient/caregiver able to teach back signs and symptoms related to disease process for when to call PCP? Yes   Is the patient/caregiver able to teach back signs and symptoms related to disease process for when to call 911? Yes   Is the patient/caregiver able to teach back the hierarchy of who to call/visit for symptoms/problems? PCP, Specialist, Home health nurse, Urgent Care, ED, 911 Yes   If the patient is a current smoker, are they able to teach back resources for cessation? Not a smoker   Week 1 call completed? Yes   Graduated Yes   Did the patient feel the follow up calls were helpful during their recovery period? Yes   Was the number of calls  appropriate? Yes   Graduated/Revoked comments Pt is improving per spouse   Call end time 1358            Erin H - Registered Nurse

## 2023-10-25 ENCOUNTER — TELEPHONE (OUTPATIENT)
Dept: GASTROENTEROLOGY | Facility: CLINIC | Age: 78
End: 2023-10-25

## 2023-11-01 ENCOUNTER — TELEPHONE (OUTPATIENT)
Dept: GASTROENTEROLOGY | Facility: CLINIC | Age: 78
End: 2023-11-01

## 2023-11-01 NOTE — TELEPHONE ENCOUNTER
Hub staff attempted to follow warm transfer process and was unsuccessful     Caller: Reyes,Alberto    Relationship to patient: Emergency Contact    Best call back number: [phone]     Patient is needing:   PATIENT'S SPOUSE IS CALLING TO SCHEDULE SCOPE.  IT WAS ORIGINALLY SCHEDULED ON 05/24/23 BUT PATIENT WAS IN HOSPITAL.  PLEASE CALL BACK TO SCHEDULE AT THE NUMBER ABOVE.

## 2023-11-01 NOTE — TELEPHONE ENCOUNTER
Hub staff attempted to follow warm transfer process and was unsuccessful      Caller: Reyes,Alberto     Relationship to patient: Emergency Contact     Best call back number: [phone]      Patient is needing:   PATIENT'S SPOUSE IS CALLING TO SCHEDULE SCOPE.  IT WAS ORIGINALLY SCHEDULED ON 05/24/23 BUT PATIENT WAS IN HOSPITAL.  PLEASE CALL BACK TO SCHEDULE AT THE NUMBER ABOVE.

## 2023-11-17 ENCOUNTER — HOSPITAL ENCOUNTER (OUTPATIENT)
Dept: MAMMOGRAPHY | Facility: HOSPITAL | Age: 78
Discharge: HOME | End: 2023-11-17
Admitting: INTERNAL MEDICINE
Payer: MEDICARE

## 2023-11-17 DIAGNOSIS — Z12.31 VISIT FOR SCREENING MAMMOGRAM: ICD-10-CM

## 2023-11-17 PROCEDURE — 77063 BREAST TOMOSYNTHESIS BI: CPT

## 2023-11-17 PROCEDURE — 77067 SCR MAMMO BI INCL CAD: CPT

## 2023-11-26 ENCOUNTER — PREP FOR SURGERY (OUTPATIENT)
Dept: OTHER | Facility: HOSPITAL | Age: 78
End: 2023-11-26
Payer: MEDICARE

## 2023-11-26 DIAGNOSIS — D13.2 DUODENAL ADENOMA: Primary | ICD-10-CM

## 2023-11-28 ENCOUNTER — TELEPHONE (OUTPATIENT)
Dept: GASTROENTEROLOGY | Facility: CLINIC | Age: 78
End: 2023-11-28
Payer: MEDICARE

## 2023-11-28 NOTE — TELEPHONE ENCOUNTER
STANTON GARCIA PT SCHEDULED 02/28/2024 @8:50AM.EGD PREP PACKET SENT TO ADDRESS ON FILE VERIFIED BY PT.OK FOR HUB TO READ

## 2024-02-07 ENCOUNTER — TRANSCRIBE ORDERS (OUTPATIENT)
Dept: ADMINISTRATIVE | Facility: HOSPITAL | Age: 79
End: 2024-02-07
Payer: MEDICARE

## 2024-02-07 ENCOUNTER — LAB (OUTPATIENT)
Dept: LAB | Facility: HOSPITAL | Age: 79
End: 2024-02-07
Payer: MEDICARE

## 2024-02-07 ENCOUNTER — TELEPHONE (OUTPATIENT)
Dept: GASTROENTEROLOGY | Facility: CLINIC | Age: 79
End: 2024-02-07
Payer: MEDICARE

## 2024-02-07 DIAGNOSIS — E11.22 TYPE 2 DIABETES MELLITUS WITH DIABETIC CHRONIC KIDNEY DISEASE, UNSPECIFIED CKD STAGE, UNSPECIFIED WHETHER LONG TERM INSULIN USE: Primary | ICD-10-CM

## 2024-02-07 DIAGNOSIS — E11.22 TYPE 2 DIABETES MELLITUS WITH DIABETIC CHRONIC KIDNEY DISEASE, UNSPECIFIED CKD STAGE, UNSPECIFIED WHETHER LONG TERM INSULIN USE: ICD-10-CM

## 2024-02-07 DIAGNOSIS — N18.30 STAGE 3 CHRONIC KIDNEY DISEASE, UNSPECIFIED WHETHER STAGE 3A OR 3B CKD: Primary | ICD-10-CM

## 2024-02-07 DIAGNOSIS — E78.5 HYPERLIPIDEMIA, UNSPECIFIED HYPERLIPIDEMIA TYPE: ICD-10-CM

## 2024-02-07 DIAGNOSIS — N18.30 STAGE 3 CHRONIC KIDNEY DISEASE, UNSPECIFIED WHETHER STAGE 3A OR 3B CKD: ICD-10-CM

## 2024-02-07 LAB
25(OH)D3 SERPL-MCNC: 17.6 NG/ML (ref 30–100)
ALBUMIN SERPL-MCNC: 3.9 G/DL (ref 3.5–5.2)
ALBUMIN/GLOB SERPL: 1.2 G/DL
ALP SERPL-CCNC: 142 U/L (ref 39–117)
ALT SERPL W P-5'-P-CCNC: 15 U/L (ref 1–33)
ANION GAP SERPL CALCULATED.3IONS-SCNC: 11.6 MMOL/L (ref 5–15)
AST SERPL-CCNC: 13 U/L (ref 1–32)
BACTERIA UR QL AUTO: ABNORMAL /HPF
BASOPHILS # BLD AUTO: 0.05 10*3/MM3 (ref 0–0.2)
BASOPHILS NFR BLD AUTO: 0.5 % (ref 0–1.5)
BILIRUB SERPL-MCNC: 0.4 MG/DL (ref 0–1.2)
BILIRUB UR QL STRIP: NEGATIVE
BUN SERPL-MCNC: 30 MG/DL (ref 8–23)
BUN/CREAT SERPL: 27.8 (ref 7–25)
CALCIUM SPEC-SCNC: 9.1 MG/DL (ref 8.6–10.5)
CHLORIDE SERPL-SCNC: 99 MMOL/L (ref 98–107)
CHOLEST SERPL-MCNC: 156 MG/DL (ref 0–200)
CLARITY UR: CLEAR
CO2 SERPL-SCNC: 25.4 MMOL/L (ref 22–29)
COLOR UR: YELLOW
CREAT SERPL-MCNC: 1.08 MG/DL (ref 0.57–1)
CREAT UR-MCNC: 81.1 MG/DL
DEPRECATED RDW RBC AUTO: 42.4 FL (ref 37–54)
EGFRCR SERPLBLD CKD-EPI 2021: 53 ML/MIN/1.73
EOSINOPHIL # BLD AUTO: 0.1 10*3/MM3 (ref 0–0.4)
EOSINOPHIL NFR BLD AUTO: 1 % (ref 0.3–6.2)
ERYTHROCYTE [DISTWIDTH] IN BLOOD BY AUTOMATED COUNT: 11.6 % (ref 12.3–15.4)
GLOBULIN UR ELPH-MCNC: 3.3 GM/DL
GLUCOSE SERPL-MCNC: 257 MG/DL (ref 65–99)
GLUCOSE UR STRIP-MCNC: ABNORMAL MG/DL
HCT VFR BLD AUTO: 39.7 % (ref 34–46.6)
HDLC SERPL-MCNC: 53 MG/DL (ref 40–60)
HGB BLD-MCNC: 12.4 G/DL (ref 12–15.9)
HGB UR QL STRIP.AUTO: ABNORMAL
HOLD SPECIMEN: NORMAL
HYALINE CASTS UR QL AUTO: ABNORMAL /LPF
IMM GRANULOCYTES # BLD AUTO: 0.09 10*3/MM3 (ref 0–0.05)
IMM GRANULOCYTES NFR BLD AUTO: 0.9 % (ref 0–0.5)
KETONES UR QL STRIP: NEGATIVE
LDLC SERPL CALC-MCNC: 87 MG/DL (ref 0–100)
LDLC/HDLC SERPL: 1.63 {RATIO}
LEUKOCYTE ESTERASE UR QL STRIP.AUTO: NEGATIVE
LYMPHOCYTES # BLD AUTO: 0.77 10*3/MM3 (ref 0.7–3.1)
LYMPHOCYTES NFR BLD AUTO: 7.8 % (ref 19.6–45.3)
MCH RBC QN AUTO: 31.4 PG (ref 26.6–33)
MCHC RBC AUTO-ENTMCNC: 31.2 G/DL (ref 31.5–35.7)
MCV RBC AUTO: 100.5 FL (ref 79–97)
MONOCYTES # BLD AUTO: 0.62 10*3/MM3 (ref 0.1–0.9)
MONOCYTES NFR BLD AUTO: 6.3 % (ref 5–12)
NEUTROPHILS NFR BLD AUTO: 8.25 10*3/MM3 (ref 1.7–7)
NEUTROPHILS NFR BLD AUTO: 83.5 % (ref 42.7–76)
NITRITE UR QL STRIP: NEGATIVE
NRBC BLD AUTO-RTO: 0 /100 WBC (ref 0–0.2)
PH UR STRIP.AUTO: 5.5 [PH] (ref 5–8)
PHOSPHATE SERPL-MCNC: 3.6 MG/DL (ref 2.5–4.5)
PLATELET # BLD AUTO: 268 10*3/MM3 (ref 140–450)
PMV BLD AUTO: 9.8 FL (ref 6–12)
POTASSIUM SERPL-SCNC: 4.5 MMOL/L (ref 3.5–5.2)
PROT ?TM UR-MCNC: 119.9 MG/DL
PROT SERPL-MCNC: 7.2 G/DL (ref 6–8.5)
PROT UR QL STRIP: ABNORMAL
PROT/CREAT UR: 1478.4 MG/G CREA (ref 0–200)
PTH-INTACT SERPL-MCNC: 50.7 PG/ML (ref 15–65)
RBC # BLD AUTO: 3.95 10*6/MM3 (ref 3.77–5.28)
RBC # UR STRIP: ABNORMAL /HPF
REF LAB TEST METHOD: ABNORMAL
SODIUM SERPL-SCNC: 136 MMOL/L (ref 136–145)
SP GR UR STRIP: 1.02 (ref 1–1.03)
SQUAMOUS #/AREA URNS HPF: ABNORMAL /HPF
TRIGL SERPL-MCNC: 83 MG/DL (ref 0–150)
UROBILINOGEN UR QL STRIP: ABNORMAL
VLDLC SERPL-MCNC: 16 MG/DL (ref 5–40)
WBC # UR STRIP: ABNORMAL /HPF
WBC NRBC COR # BLD AUTO: 9.88 10*3/MM3 (ref 3.4–10.8)

## 2024-02-07 PROCEDURE — 84156 ASSAY OF PROTEIN URINE: CPT

## 2024-02-07 PROCEDURE — 84100 ASSAY OF PHOSPHORUS: CPT

## 2024-02-07 PROCEDURE — 83970 ASSAY OF PARATHORMONE: CPT

## 2024-02-07 PROCEDURE — 36415 COLL VENOUS BLD VENIPUNCTURE: CPT

## 2024-02-07 PROCEDURE — 85025 COMPLETE CBC W/AUTO DIFF WBC: CPT

## 2024-02-07 PROCEDURE — 82306 VITAMIN D 25 HYDROXY: CPT

## 2024-02-07 PROCEDURE — 81001 URINALYSIS AUTO W/SCOPE: CPT

## 2024-02-07 PROCEDURE — 80061 LIPID PANEL: CPT

## 2024-02-07 PROCEDURE — 80053 COMPREHEN METABOLIC PANEL: CPT

## 2024-02-07 PROCEDURE — 82570 ASSAY OF URINE CREATININE: CPT

## 2024-02-07 NOTE — TELEPHONE ENCOUNTER
Hub staff attempted to follow warm transfer process and was unsuccessful     Caller: Blanca Zhu    Relationship to patient: Self    Best call back number: [phone]     Patient is needing: PT ADVISED SHE WANTS TO DO EGD/ COLONOCOPY ON THE SAME DAY 2/28/24. SHE IS CURRENTLY ONLY SCHED FOR EGD. PLEASE CALL HER BACK ASAP.

## 2024-02-07 NOTE — TELEPHONE ENCOUNTER
Hub staff attempted to follow warm transfer process and was unsuccessful     Caller: Blanca Zhu    Relationship to patient: Self    Best call back number: [phone]     Patient is needing: PT ADVISED SHE WANTS TO DO EGD/ COLONOCOPY ON THE SAME DAY 2/28/24. SHE IS CURRENTLY ONLY SCHED FOR EGD. PLEASE CALL HER BACK ASAP.

## 2024-02-09 ENCOUNTER — TELEPHONE (OUTPATIENT)
Dept: GASTROENTEROLOGY | Facility: CLINIC | Age: 79
End: 2024-02-09
Payer: MEDICARE

## 2024-02-09 NOTE — TELEPHONE ENCOUNTER
CALLED PT LVM PT REQUESTING COLONOSCOPY WITH HER EGD PROCEDURE .AWAITING ANSWER FROM DR BREWER HE IS ON VACATION, DID TELL PT HE IS VACATION AND I AM AWAITING A ANSWER BUT AS OF NOW SHE IS JUST ON FOR A EGD.OK FOR HUB TO READ

## 2024-02-20 ENCOUNTER — TELEPHONE (OUTPATIENT)
Dept: GASTROENTEROLOGY | Facility: CLINIC | Age: 79
End: 2024-02-20
Payer: MEDICARE

## 2024-02-20 NOTE — TELEPHONE ENCOUNTER
Provider: DR BREWER    Caller: TRAVIS    Relationship to Patient: SELF    Phone Number: [phone]    Reason for Call: PATIENT CALLED IN, ADDED A  TO THE CALL ([id number]) THE PATIENT IS SCHEDULED FOR HER EGD 2/28/24 AND SHE SAID DR BREWER WAS SUPPOSE TO BE ADDING ORDERS FOR HER TO HAVE HER COLONOSCOPY AT THE SAME TIME AS SHE DOESN'T WANT TO BE PUT TO SLEEP 2 DIFFERENT TIMES. SHE WOULD LIKE THIS ADDED AND A CALL BACK TO CONFIRM IT'S BEEN DONE. YOU CAN LEAVE A DETAILED VMAIL.

## 2024-02-23 ENCOUNTER — TELEPHONE (OUTPATIENT)
Dept: GASTROENTEROLOGY | Facility: CLINIC | Age: 79
End: 2024-02-23
Payer: MEDICARE

## 2024-02-23 NOTE — TELEPHONE ENCOUNTER
Caller: Blanca Zhu    Relationship to patient: Self    Best call back number: [phone]    Chief complaint: PT REQUESTS THAT SHE HAVE EGD W/COLONOSCOPY AT THE SAME TIME         Requested date: ANY DATE THAT THE PT CAN HAVE BOTH PROC'S DONE AT ONCE.  IN THE MORNING IF POSSIBLE     If rescheduling, when is the original appointment: 02.28.24     Additional notes:PT REQUESTS SHE HAVE BOTH AN EGD AND COLONOSCOPY PERFORMED AT THE SAME TIME. SHE WAS ADVISED THAT ON THE 02.28.24 PROC DATE SHE COULD NOT HAVE BOTH DONE THEN. PLEASE CONTACT PT TO RESCHEDULE OR WITH ANY QUESTIONS OR CONCERNS.

## 2024-02-23 NOTE — TELEPHONE ENCOUNTER
Caller: Blanca Zhu    Relationship to patient: Self    Best call back number: [phone]    Chief complaint: PT REQUESTS THAT SHE HAVE EGD W/COLONOSCOPY AT THE SAME TIME         Requested date: ANY DATE THAT THE PT CAN HAVE BOTH PROC'S DONE AT ONCE.  IN THE MORNING IF POSSIBLE     If rescheduling, when is the original appointment: 02.28.24     Additional notes:PT REQUESTS SHE HAVE BOTH AN EGD AND COLONOSCOPY PERFORMED AT THE SAME TIME. SHE WAS ADVISED THAT ON THE 02.28.24 PROC DATE SHE COULD NOT HAVE BOTH DONE THEN. PLEASE CONTACT PT TO RESCHEDULE OR WITH ANY QUESTIONS OR CONCERNS.

## 2024-02-26 ENCOUNTER — TELEPHONE (OUTPATIENT)
Dept: GASTROENTEROLOGY | Facility: CLINIC | Age: 79
End: 2024-02-26
Payer: MEDICARE

## 2024-02-26 NOTE — TELEPHONE ENCOUNTER
STANTON BROWN IN SCHEDULING AND PLACED PT IN THE DEPOT,STANTON THE PT.WILL SCHEDULE AFTER SHE IS ABLE TO GET IN TO SEE DR BREWER TO HAVE A COLONOSCOPY ADDED ONTO HER PROCEDURE.OK FOR HUB TO READ

## 2024-03-13 ENCOUNTER — TELEPHONE (OUTPATIENT)
Dept: GASTROENTEROLOGY | Facility: CLINIC | Age: 79
End: 2024-03-13
Payer: MEDICARE

## 2024-03-13 NOTE — TELEPHONE ENCOUNTER
Provider: DR TING BREWER     Caller: TRAVIS PATEL     Relationship to Patient: SELF    Phone Number: [phone]    Reason for Call: PT IS READY TO SCHEDULE COLONOSCOPY

## 2024-03-14 ENCOUNTER — TELEPHONE (OUTPATIENT)
Dept: GASTROENTEROLOGY | Facility: CLINIC | Age: 79
End: 2024-03-14
Payer: MEDICARE

## 2024-04-01 ENCOUNTER — HOSPITAL ENCOUNTER (OUTPATIENT)
Dept: BONE DENSITY | Facility: HOSPITAL | Age: 79
Discharge: HOME | End: 2024-04-01
Admitting: INTERNAL MEDICINE
Payer: MEDICARE

## 2024-04-01 DIAGNOSIS — Z78.0 POSTMENOPAUSAL: ICD-10-CM

## 2024-04-01 PROCEDURE — 77080 DXA BONE DENSITY AXIAL: CPT

## 2024-04-11 PROBLEM — M79.89 LEG SWELLING: Status: ACTIVE | Noted: 2024-04-11

## 2024-04-17 ENCOUNTER — OFFICE VISIT (OUTPATIENT)
Age: 79
End: 2024-04-17
Payer: MEDICARE

## 2024-04-17 VITALS
BODY MASS INDEX: 29.66 KG/M2 | WEIGHT: 178 LBS | DIASTOLIC BLOOD PRESSURE: 72 MMHG | HEART RATE: 89 BPM | HEIGHT: 65 IN | SYSTOLIC BLOOD PRESSURE: 149 MMHG

## 2024-04-17 DIAGNOSIS — R60.0 BILATERAL LOWER EXTREMITY EDEMA: Primary | ICD-10-CM

## 2024-04-17 DIAGNOSIS — M79.89 LEG SWELLING: ICD-10-CM

## 2024-04-17 DIAGNOSIS — I87.2 VENOUS (PERIPHERAL) INSUFFICIENCY: ICD-10-CM

## 2024-04-17 RX ORDER — VALSARTAN AND HYDROCHLOROTHIAZIDE 160; 25 MG/1; MG/1
1 TABLET ORAL DAILY
COMMUNITY

## 2024-04-17 RX ORDER — TORSEMIDE 20 MG/1
1 TABLET ORAL DAILY
COMMUNITY
Start: 2024-02-16

## 2024-04-17 RX ORDER — SENNOSIDES 8.6 MG
CAPSULE ORAL
COMMUNITY

## 2024-04-17 RX ORDER — BISOPROLOL FUMARATE 10 MG/1
2 TABLET, FILM COATED ORAL DAILY
COMMUNITY

## 2024-04-19 NOTE — PROGRESS NOTES
Chief Complaint  Leg Pain and Leg Swelling    Latoya Zhu presents to [facility] VASCULAR SURGERY  History of Present Illness  78 y.o. female returns for follow up of bilateral leg swelling and bilateral superficial venous insufficiency without varicose veins.  She was last seen on 22.  She has normal ABIs and no deep venous insufficiency of either leg but she does have bilateral GSV insufficiency.  She has been using compression stockings with significant improvement in her symptoms of leg swelling, aching, and heaviness.      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    CKD (chronic kidney disease), stage III     Colon polyp 2019    COVID-19 2023    Diabetes mellitus, type 2     Hypertension     Other specified disorders of pigmentation 2022    Sleep apnea     Swelling     IN LOWER EXTREMITIES    Temporal arteritis     Thrombophlebitis     Varicose veins of unspecified lower extremity with ulcer of calf 2022    Venous insufficiency (chronic) (peripheral) 2022       Past Surgical History:   Procedure Laterality Date    BREAST BIOPSY Left  approx    benign pathology    BREAST BIOPSY Left 2019    Ultasound guided mammotome vacuum assisted left breast biopsy with placement of a metallic clip-Dr. Daniel Gutierrez, [facility]     SECTION N/A     x3    CHOLECYSTECTOMY N/A     COLONOSCOPY      COLONOSCOPY W/ POLYPECTOMY N/A 2019    Enlarged folds in the antrum: biopsied; duodenal, transverse colon x2, splenic flexure, descending colon and sigmoid colon polyps: biopsied (path: tubular adenoma x6)-Dr. Zak De Jesus, [facility]    ENDOSCOPY  10/11/2019    Procedure: ESOPHAGOGASTRODUODENOSCOPY with hot snare polypectomy;   Surgeon: Haile Handley MD;  Location: [facility] ENDOSCOPY;  Service: Gastroenterology    ENDOSCOPY N/A 01/29/2020    Procedure: ESOPHAGOGASTRODUODENOSCOPY;  Surgeon: Haile Handley MD;  Location: [facility] ENDOSCOPY;  Service: Gastroenterology    ENDOSCOPY N/A 09/09/2020    Procedure: ESOPHAGOGASTRODUODENOSCOPY WITH BIOPSIES AND COLD BIOPSY POLYPECTOMY;  Surgeon: Haile Handley MD;  Location: [facility] ENDOSCOPY;  Service: Gastroenterology;  Laterality: N/A;  pre: dyspepsia and diarrhea  post: duodenal polyp and gastritis    HYSTERECTOMY Bilateral     MASTECTOMY Left     MASTECTOMY WITH SENTINEL NODE BIOPSY AND AXILLARY NODE DISSECTION Left 07/03/2019    Procedure: LEFT BREAST MASTECTOMY WITH SENTINEL NODE BIOPSY AND , v-y plasty closure;  Surgeon: Tahmina James MD;  Location: [facility] MAIN OR;  Service: General    SUBTOTAL HYSTERECTOMY Bilateral     ovaries still in tact    TEMPORAL ARTERY BIOPSY Bilateral 12/05/2019       Family History   Problem Relation Age of Onset    Heart disease Mother     Stroke Mother     Hypertension Father     Stomach cancer Father     Diabetes Father     Esophageal cancer Father     Throat cancer Sister     Diabetes Paternal Grandmother     Hypertension Paternal Grandfather     Colon cancer Paternal Grandfather     Colon cancer Paternal Uncle     Colon cancer Paternal Uncle     Colon cancer Paternal Uncle     Ovarian cancer Cousin     Stomach cancer Cousin     Malig Hyperthermia Neg Hx          Social History     Tobacco Use    Smoking status: Never    Smokeless tobacco: Never    Tobacco comments:     caffine use   Vaping Use    Vaping status: Never Used   Substance Use Topics    Alcohol use: No     Comment: one or two small drinks a year    Drug use: No       Allergies: Aspirin and Sulfa antibiotics    Prior to Admission medications    Medication Sig Start Date End Date Taking? Authorizing Provider   torsemide (DEMADEX) 20 MG tablet Take 1 tablet by mouth Daily. 2/16/24  Yes  "Rhea Page MD   acetaminophen (Acetaminophen 8 Hour) 650 MG 8 hr tablet 1000 bid    Rhea Page MD   acetaminophen (TYLENOL) 325 MG tablet Take 2 tablets by mouth Every 6 (Six) Hours As Needed for Mild Pain. 6/3/23   Praneeth Valenzuela MD   amLODIPine (NORVASC) 5 MG tablet Take 1 tablet by mouth Daily. 12/15/22   Rhea Page MD   atorvastatin (LIPITOR) 20 MG tablet Take 1 tablet by mouth Every Night. 10/4/23   Praneeth Valenzuela MD   bisoprolol (ZEBeta) 10 MG tablet Take 2 tablets by mouth Daily.    Rhea Page MD   Cholecalciferol 125 MCG (5000 UT) tablet Take 1 tablet by mouth Daily.    Rhea Page MD   docusate sodium (COLACE) 100 MG capsule Take 1 capsule by mouth 2 (Two) Times a Day. 10/4/23   Praneeth Valenzuela MD   Glucagon (Baqsimi Two Pack) 3 MG/DOSE powder Instill one spray into the nostril once as needed for severely low blood sugar.  May repeat dose in 15 minutes if first dose is ineffective 10/4/23   Praneeth Valenzuela MD   ipratropium-albuterol (DUO-NEB) 0.5-2.5 mg/3 ml nebulizer Take 3 mL by nebulization 4 (Four) Times a Day As Needed for Shortness of Air or Wheezing. 7/3/23   Praneeth Valenzuela MD   leflunomide (ARAVA) 10 MG tablet Take 1 tablet by mouth Daily.    Rhea Page MD   O2 (OXYGEN) Inhale 1 (One) Time.    Rhea Page MD   omeprazole (priLOSEC) 20 MG capsule 2 capsules. 8/23/21   Rhea Page MD   potassium chloride (KLOR-CON M20) 20 MEQ CR tablet Take 1 tablet by mouth Daily.    Rhea Page MD   valsartan-hydrochlorothiazide (DIOVAN-HCT) 160-25 MG per tablet Take 1 tablet by mouth Daily.    Rhea Page MD   VITAMIN D PO Take  by mouth.    Rhea Page MD         Objective   Vital Signs:  /72   Pulse 89   Ht 165.1 cm (65\")   Wt 80.7 kg (178 lb)   BMI 29.62 kg/m²   Estimated body mass index is 29.62 kg/m² as calculated from the following:    Height as of this encounter: 165.1 cm " "(65\").    Weight as of this encounter: 80.7 kg (178 lb).             Physical Exam  Vitals reviewed.   Constitutional:       General: She is not in acute distress.     Appearance: Normal appearance.   HENT:      Head: Normocephalic and atraumatic.      Right Ear: External ear normal.      Left Ear: External ear normal.      Nose: Nose normal.      Mouth/Throat:      Mouth: Mucous membranes are moist.      Pharynx: Oropharynx is clear.   Eyes:      Extraocular Movements: Extraocular movements intact.      Conjunctiva/sclera: Conjunctivae normal.      Pupils: Pupils are equal, round, and reactive to light.   Cardiovascular:      Rate and Rhythm: Normal rate and regular rhythm.      Pulses:           Carotid pulses are 2+ on the right side and 2+ on the left side.       Radial pulses are 2+ on the right side and 2+ on the left side.        Dorsalis pedis pulses are 2+ on the right side and 2+ on the left side.        Posterior tibial pulses are 2+ on the right side and 2+ on the left side.      Comments: Bilateral calf hyperpigmentation, no ulcerations (active or healed), no varicose veins  Pulmonary:      Comments: Non labored respirations on room air, equal chest rise  Abdominal:      General: Abdomen is flat. There is no distension.      Palpations: Abdomen is soft.   Musculoskeletal:      Cervical back: Normal range of motion. No rigidity.      Right lower leg: No edema.      Left lower leg: No edema.   Skin:     General: Skin is warm and dry.      Capillary Refill: Capillary refill takes less than 2 seconds.   Neurological:      General: No focal deficit present.      Mental Status: She is alert and oriented to person, place, and time.   Psychiatric:         Mood and Affect: Mood normal.         Behavior: Behavior normal.        Result Review :            Last Echo  Results for orders placed during the hospital encounter of 05/13/22    Adult Transthoracic Echo Complete W/ Cont if Necessary Per " Protocol    Interpretation Summary  · Estimated right ventricular systolic pressure from tricuspid regurgitation is mildly elevated (35-45 mmHg). Calculated right ventricular systolic pressure from tricuspid regurgitation is 37.7 mmHg.  · Mild pulmonary hypertension is present.  · Left ventricular wall thickness is consistent with mild concentric hypertrophy.  · Calculated left ventricular EF = 66.7% Estimated left ventricular EF was in agreement with the calculated left ventricular EF. Left ventricular systolic function is normal.  · Left ventricular diastolic function is consistent with (grade Ia w/high LAP) impaired relaxation.  · C/w baseline study, there is no change in LV systolic function.      Results for orders placed during the hospital encounter of 06/09/21    Adult Transthoracic Echo Complete W/ Cont if Necessary Per Protocol    Interpretation Summary  · Estimated right ventricular systolic pressure from tricuspid regurgitation is mildly elevated (35-45 mmHg).  · Calculated left ventricular EF = 70.3% Estimated left ventricular EF was in agreement with the calculated left ventricular EF.  · Left ventricular diastolic function is consistent with (grade Ia w/high LAP) impaired relaxation.  · There is trivial thickening of the aortic valve  · Trace mitral valve regurgitation is present.       Last Stress  Results for orders placed during the hospital encounter of 07/05/22    Stress Test With Myocardial Perfusion One Day    Interpretation Summary  · Breast attenuation artifact is present.  · Tomographic images were obtained in the short axis, vertical long and horizontal long axis views.  · On stress images there is a small area of moderately diminished counts noted in the inferior and inferolateral walls. The remainder of the myocardium perfuses normally.  · On rest images the inferior and inferolateral wall defect appears to be fixed with no significant areas of reperfusion. The remainder the myocardium  perfuses normally.  · The summed stress score is 12 and the summed rest score is 8. The summed difference score is 4.  · SPECT gated studies demonstrate normal wall motion with ejection fraction of 94%  · Study is consistent with a small fixed inferolateral wall defect suggestive of prior infarction. No significant residual ischemia.  · Findings consistent with a normal ECG stress test.    Stress portion of the study was normal.  Nuclear portion demonstrated a fixed inferolateral wall defect.  No significant residual ischemia.  Normal wall motion.  Ejection fraction hyperdynamic at 94%      Results for orders placed during the hospital encounter of 07/02/20    Stress Test With Pet Myocardial Perfusion (MULTI STUDY, REST AND STRESS)    Interpretation Summary  · Myocardial perfusion imaging indicates a normal myocardial perfusion study with no evidence of ischemia.  · Left ventricular ejection fraction is hyperdynamic (Calculated EF > 70%).  · Impressions are consistent with a low risk study.       Last Cath  No results found for this or any previous visit.                Assessment and Plan     Diagnoses and all orders for this visit:    1. Bilateral lower extremity edema (Primary)    2. Leg swelling    3. Venous (peripheral) insufficiency    Blanca Harden is a 78 year old woman with bilateral superficial venous insufficiency with edema and hyperpigmentation of both legs, with no ulceration and no varicose veins.  This is exacerbated by her obesity and CHF.  She has been tolerating compression well with improvement in her symptoms.  She is to continue 20-30 mmHg knee high compression stockings, elevation, and exercise.  I have not recommended any intervention since she has been well managed with conservative treatment.  I will follow up with her in 6 months.           Follow Up     Return in about 6 months (around 10/17/2024).  Patient was given instructions and counseling regarding her condition or for health  maintenance advice. Please see specific information pulled into the AVS if appropriate.

## 2024-05-15 DIAGNOSIS — Z90.12 ACQUIRED ABSENCE OF BREAST, LEFT: ICD-10-CM

## 2024-05-15 DIAGNOSIS — D05.12 BREAST NEOPLASM, TIS (DCIS), LEFT: Primary | ICD-10-CM

## 2024-05-30 ENCOUNTER — OFFICE VISIT (OUTPATIENT)
Dept: ONCOLOGY | Facility: CLINIC | Age: 79
End: 2024-05-30
Payer: MEDICARE

## 2024-05-30 ENCOUNTER — TELEPHONE (OUTPATIENT)
Dept: ONCOLOGY | Facility: CLINIC | Age: 79
End: 2024-05-30
Payer: MEDICARE

## 2024-05-30 ENCOUNTER — LAB (OUTPATIENT)
Dept: LAB | Facility: HOSPITAL | Age: 79
End: 2024-05-30
Payer: MEDICARE

## 2024-05-30 VITALS
BODY MASS INDEX: 30.97 KG/M2 | HEIGHT: 65 IN | TEMPERATURE: 97.7 F | WEIGHT: 185.9 LBS | DIASTOLIC BLOOD PRESSURE: 78 MMHG | HEART RATE: 91 BPM | OXYGEN SATURATION: 98 % | SYSTOLIC BLOOD PRESSURE: 193 MMHG

## 2024-05-30 DIAGNOSIS — Z01.818 ENCOUNTER FOR DIAGNOSTIC ENDOSCOPY: ICD-10-CM

## 2024-05-30 DIAGNOSIS — D05.12 BREAST NEOPLASM, TIS (DCIS), LEFT: ICD-10-CM

## 2024-05-30 DIAGNOSIS — K90.9 IRON MALABSORPTION: ICD-10-CM

## 2024-05-30 DIAGNOSIS — D50.8 OTHER IRON DEFICIENCY ANEMIA: ICD-10-CM

## 2024-05-30 DIAGNOSIS — Z12.11 ENCOUNTER FOR SCREENING COLONOSCOPY: ICD-10-CM

## 2024-05-30 DIAGNOSIS — D05.12 BREAST NEOPLASM, TIS (DCIS), LEFT: Primary | ICD-10-CM

## 2024-05-30 DIAGNOSIS — D13.2 DUODENAL ADENOMA: ICD-10-CM

## 2024-05-30 LAB
ALBUMIN SERPL-MCNC: 3.8 G/DL (ref 3.5–5.2)
ALBUMIN/GLOB SERPL: 1.1 G/DL
ALP SERPL-CCNC: 143 U/L (ref 39–117)
ALT SERPL W P-5'-P-CCNC: 18 U/L (ref 1–33)
ANION GAP SERPL CALCULATED.3IONS-SCNC: 9.8 MMOL/L (ref 5–15)
AST SERPL-CCNC: 35 U/L (ref 1–32)
BASOPHILS # BLD AUTO: 0.04 10*3/MM3 (ref 0–0.2)
BASOPHILS NFR BLD AUTO: 0.4 % (ref 0–1.5)
BILIRUB SERPL-MCNC: 0.4 MG/DL (ref 0–1.2)
BUN SERPL-MCNC: 34 MG/DL (ref 8–23)
BUN/CREAT SERPL: 29.8 (ref 7–25)
CALCIUM SPEC-SCNC: 9.2 MG/DL (ref 8.6–10.5)
CHLORIDE SERPL-SCNC: 101 MMOL/L (ref 98–107)
CO2 SERPL-SCNC: 24.2 MMOL/L (ref 22–29)
CREAT SERPL-MCNC: 1.14 MG/DL (ref 0.57–1)
DEPRECATED RDW RBC AUTO: 48 FL (ref 37–54)
EGFRCR SERPLBLD CKD-EPI 2021: 49.7 ML/MIN/1.73
EOSINOPHIL # BLD AUTO: 0.07 10*3/MM3 (ref 0–0.4)
EOSINOPHIL NFR BLD AUTO: 0.7 % (ref 0.3–6.2)
ERYTHROCYTE [DISTWIDTH] IN BLOOD BY AUTOMATED COUNT: 12.7 % (ref 12.3–15.4)
FERRITIN SERPL-MCNC: 317 NG/ML (ref 13–150)
GLOBULIN UR ELPH-MCNC: 3.5 GM/DL
GLUCOSE SERPL-MCNC: 240 MG/DL (ref 65–99)
HCT VFR BLD AUTO: 39.5 % (ref 34–46.6)
HGB BLD-MCNC: 12.2 G/DL (ref 12–15.9)
HGB RETIC QN AUTO: 34.4 PG (ref 29.8–36.1)
IMM GRANULOCYTES # BLD AUTO: 0.08 10*3/MM3 (ref 0–0.05)
IMM GRANULOCYTES NFR BLD AUTO: 0.8 % (ref 0–0.5)
IMM RETICS NFR: 11.9 % (ref 3–15.8)
IRON 24H UR-MRATE: 46 MCG/DL (ref 37–145)
IRON SATN MFR SERPL: 14 % (ref 20–50)
LYMPHOCYTES # BLD AUTO: 0.61 10*3/MM3 (ref 0.7–3.1)
LYMPHOCYTES NFR BLD AUTO: 6 % (ref 19.6–45.3)
MCH RBC QN AUTO: 31.9 PG (ref 26.6–33)
MCHC RBC AUTO-ENTMCNC: 30.9 G/DL (ref 31.5–35.7)
MCV RBC AUTO: 103.1 FL (ref 79–97)
MONOCYTES # BLD AUTO: 0.5 10*3/MM3 (ref 0.1–0.9)
MONOCYTES NFR BLD AUTO: 4.9 % (ref 5–12)
NEUTROPHILS NFR BLD AUTO: 8.81 10*3/MM3 (ref 1.7–7)
NEUTROPHILS NFR BLD AUTO: 87.2 % (ref 42.7–76)
NRBC BLD AUTO-RTO: 0 /100 WBC (ref 0–0.2)
PLATELET # BLD AUTO: 259 10*3/MM3 (ref 140–450)
PMV BLD AUTO: 9.6 FL (ref 6–12)
POTASSIUM SERPL-SCNC: 5.6 MMOL/L (ref 3.5–5.2)
PROT SERPL-MCNC: 7.3 G/DL (ref 6–8.5)
RBC # BLD AUTO: 3.83 10*6/MM3 (ref 3.77–5.28)
RETICS # AUTO: 0.07 10*6/MM3 (ref 0.02–0.13)
RETICS/RBC NFR AUTO: 1.74 % (ref 0.7–1.9)
SODIUM SERPL-SCNC: 135 MMOL/L (ref 136–145)
TIBC SERPL-MCNC: 319 MCG/DL (ref 298–536)
TRANSFERRIN SERPL-MCNC: 214 MG/DL (ref 200–360)
WBC NRBC COR # BLD AUTO: 10.11 10*3/MM3 (ref 3.4–10.8)

## 2024-05-30 PROCEDURE — 82728 ASSAY OF FERRITIN: CPT

## 2024-05-30 PROCEDURE — 84466 ASSAY OF TRANSFERRIN: CPT

## 2024-05-30 PROCEDURE — 85046 RETICYTE/HGB CONCENTRATE: CPT

## 2024-05-30 PROCEDURE — 83540 ASSAY OF IRON: CPT

## 2024-05-30 PROCEDURE — 80053 COMPREHEN METABOLIC PANEL: CPT

## 2024-05-30 PROCEDURE — 85025 COMPLETE CBC W/AUTO DIFF WBC: CPT

## 2024-05-30 PROCEDURE — 36415 COLL VENOUS BLD VENIPUNCTURE: CPT

## 2024-05-30 RX ORDER — FINERENONE 10 MG/1
1 TABLET, FILM COATED ORAL DAILY
COMMUNITY
Start: 2024-02-16

## 2024-05-30 NOTE — TELEPHONE ENCOUNTER
Called patient and relayed message via : Call her k is 5.6 today ask her to stop potassium supplement and repeat bmp with dr rios or vega next week. Left voicemail with . Will attempt to call again tomorrow. Melinda Guardado RN

## 2024-05-30 NOTE — PROGRESS NOTES
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      History of Present Illness   On 05/30/2024 this 78-year-old female returns to the office for follow up. She has been very upset with the GI office because she has not been able to schedule her upper and lower endoscopies. Besides this she has no major GI symptoms at this time. Her multiple comorbidities including diabetes, chronic kidney disease, lymphedema in lower extremities and congestive heart failure remain in relatively good control. Blood sugar with a lot of fluctuation. Her congestive heart failure seems to be compensated at this point. She continues seeing vascular surgery in regard the lymphedema in lower extremities with no other modality of treatment besides compression socks. 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 She has had her mammogram in the fall of 2023 that remained normal on the right side. She never was advised to have adjuvant form of therapy for her breast cancer given her multiple comorbidities and marginal performance status. She also has had iron deficiency anemia. She has had GI bleeding in the past. She has no GI symptoms at this point. Hemoglobin today remains stable.                                 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, grade III, 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    CKD (chronic kidney disease), stage III     Colon polyp 03/12/2019    COVID-19 01/2023    Diabetes mellitus, type 2     Hypertension     Other specified disorders of pigmentation 07/06/2022    Sleep apnea     Swelling     IN LOWER EXTREMITIES    Temporal arteritis      Thrombophlebitis     Varicose veins of unspecified lower extremity with ulcer of calf 2022    Venous insufficiency (chronic) (peripheral) 2022        Past Surgical History:   Procedure Laterality Date    BREAST BIOPSY Left  approx    benign pathology    BREAST BIOPSY Left 2019    Ultasound guided mammotome vacuum assisted left breast biopsy with placement of a metallic clip-Dr. Daniel Gutierrez, [facility]     SECTION N/A     x3    CHOLECYSTECTOMY N/A     COLONOSCOPY      COLONOSCOPY W/ POLYPECTOMY N/A 2019    Enlarged folds in the antrum: biopsied; duodenal, transverse colon x2, splenic flexure, descending colon and sigmoid colon polyps: biopsied (path: tubular adenoma x6)-Dr. Zak De Jesus, [facility]    ENDOSCOPY  10/11/2019    Procedure: ESOPHAGOGASTRODUODENOSCOPY with hot snare polypectomy;  Surgeon: Haile Handley MD;  Location: [facility] ENDOSCOPY;  Service: Gastroenterology    ENDOSCOPY N/A 2020    Procedure: ESOPHAGOGASTRODUODENOSCOPY;  Surgeon: Haile Handley MD;  Location: [facility] ENDOSCOPY;  Service: Gastroenterology    ENDOSCOPY N/A 2020    Procedure: ESOPHAGOGASTRODUODENOSCOPY WITH BIOPSIES AND COLD BIOPSY POLYPECTOMY;  Surgeon: Haile Handley MD;  Location: [facility] ENDOSCOPY;  Service: Gastroenterology;  Laterality: N/A;  pre: dyspepsia and diarrhea  post: duodenal polyp and gastritis    HYSTERECTOMY Bilateral     MASTECTOMY Left     MASTECTOMY WITH SENTINEL NODE BIOPSY AND AXILLARY NODE DISSECTION Left 2019    Procedure: LEFT BREAST MASTECTOMY WITH SENTINEL NODE BIOPSY AND , v-y plasty closure;  Surgeon: Tahmina James MD;  Location: [facility] OR;  Service: General    SUBTOTAL HYSTERECTOMY Bilateral     ovaries still in tact    TEMPORAL ARTERY BIOPSY Bilateral 2019        Current Outpatient Medications on File Prior to Visit   Medication Sig Dispense Refill    acetaminophen (Acetaminophen 8 Hour) 650 MG 8 hr tablet 1000  bid      acetaminophen (TYLENOL) 325 MG tablet Take 2 tablets by mouth Every 6 (Six) Hours As Needed for Mild Pain. 30 tablet 3    amLODIPine (NORVASC) 5 MG tablet Take 1 tablet by mouth Daily.      atorvastatin (LIPITOR) 20 MG tablet Take 1 tablet by mouth Every Night. 90 tablet 0    bisoprolol (ZEBeta) 10 MG tablet Take 2 tablets by mouth Daily.      Cholecalciferol 125 MCG (5000 UT) tablet Take 1 tablet by mouth Daily.      docusate sodium (COLACE) 100 MG capsule Take 1 capsule by mouth 2 (Two) Times a Day. 60 capsule 0    Glucagon (Baqsimi Two Pack) 3 MG/DOSE powder Instill one spray into the nostril once as needed for severely low blood sugar.  May repeat dose in 15 minutes if first dose is ineffective 2 each 1    ipratropium-albuterol (DUO-NEB) 0.5-2.5 mg/3 ml nebulizer Take 3 mL by nebulization 4 (Four) Times a Day As Needed for Shortness of Air or Wheezing. 10 mL 0    Kerendia 10 MG tablet Take 1 tablet by mouth Daily.      leflunomide (ARAVA) 10 MG tablet Take 1 tablet by mouth Daily.      O2 (OXYGEN) Inhale 1 (One) Time.      omeprazole (priLOSEC) 20 MG capsule 2 capsules.      potassium chloride (KLOR-CON M20) 20 MEQ CR tablet Take 1 tablet by mouth Daily.      torsemide (DEMADEX) 20 MG tablet Take 1 tablet by mouth Daily.      valsartan-hydrochlorothiazide (DIOVAN-HCT) 160-25 MG per tablet Take 1 tablet by mouth Daily.      VITAMIN D PO Take  by mouth.       No current facility-administered medications on file prior to visit.        ALLERGIES:    Allergies   Allergen Reactions    Revefenacin Shortness Of Breath    Aspirin Nausea Only     Low tolerance, abdominal pain    Sulfa Antibiotics Unknown (See Comments)     Happened as a child        Social History     Socioeconomic History    Marital status:      Spouse name: Ashwin    Number of children: 3    Years of education: College   Tobacco Use    Smoking status: Never    Smokeless tobacco: Never    Tobacco comments:     caffine use   Vaping Use     "Vaping status: Never Used   Substance and Sexual Activity    Alcohol use: No     Comment: one or two small drinks a year    Drug use: No    Sexual activity: Not Currently     Comment: Spouse, Ashwin        Family History   Problem Relation Age of Onset    Heart disease Mother     Stroke Mother     Hypertension Father     Stomach cancer Father     Diabetes Father     Esophageal cancer Father     Throat cancer Sister     Diabetes Paternal Grandmother     Hypertension Paternal Grandfather     Colon cancer Paternal Grandfather     Colon cancer Paternal Uncle     Colon cancer Paternal Uncle     Colon cancer Paternal Uncle     Ovarian cancer Cousin     Stomach cancer Cousin     Malig Hyperthermia Neg Hx           Objective     Vitals:    05/30/24 0921   BP: (!) 193/78   Pulse: 91   Temp: 97.7 °F (36.5 °C)   TempSrc: Oral   SpO2: 98%   Weight: 84.3 kg (185 lb 14.4 oz)   Height: 165.1 cm (65\")   PainSc: 0-No pain           5/30/2024     9:21 AM   Current Status   ECOG score 1       Physical Exam                GENERAL:  Well-developed, Patient  in no acute distress. Using a walker  SKIN:  Warm, dry ,NO purpura ,no rash.  HEENT:  Pupils were equal and reactive to light and accomodation, conjunctivae noninjected,  normal visual acuity.   NECK:  Supple with good range of motion; no thyromegaly , no JVD or bruits,.No carotid artery pain, no carotid abnormal pulsation   LYMPHATICS:  No cervical, NO supraclavicular, NO axillary, NO inguinal adenopathies.  CARDIAC   normal rate , regular rhythm, without murmur,NO rubs NO S3 NO S4 muffled sounds  LUNGS: decreased breath sounds bilateral, no wheezing, NO rhonchi, NO crackles ,NO rubs.  VASCULAR VENOUS: no cyanosis, NO collateral circulation, NO varicosities, severe grade 3 lymphedema both le , NO palpable cords, NO pain,NO erythema, NO pigmentation of the skin.  ABDOMEN:  Soft, NO pain,no hepatomegaly, no splenomegaly,no masses, no ascites, no collateral circulation,no " distention.  EXTREMITIES  AND SPINE:  No clubbing, no cyanosis ,no deformities , no pain .No kyphosis,  no pain in spine, no pain in ribs , no pain in pelvic bone.  NEUROLOGICAL:  Patient was awake, alert, oriented to time, person and place.sensory neuropathy in feet romberg positive    Mastectomy site with no masses, induration or tenderness, normal left axilla, no lymphedema in left upper extremity. Right breast examination disclosed normal nipple, normal skin, no palpable masses, no tenderness, normal right axilla, no lymphedema in the right upper extremity.                    RECENT LABS:  Hematology WBC   Date Value Ref Range Status   05/30/2024 10.11 3.40 - 10.80 10*3/mm3 Final     RBC   Date Value Ref Range Status   05/30/2024 3.83 3.77 - 5.28 10*6/mm3 Final     Hemoglobin   Date Value Ref Range Status   05/30/2024 12.2 12.0 - 15.9 g/dL Final     Hematocrit   Date Value Ref Range Status   05/30/2024 39.5 34.0 - 46.6 % Final     Platelets   Date Value Ref Range Status   05/30/2024 259 140 - 450 10*3/mm3 Final        Lab Results   Component Value Date    GLUCOSE 240 (H) 05/30/2024    BUN 34 (H) 05/30/2024    CREATININE 1.14 (H) 05/30/2024    EGFR 49.7 (L) 05/30/2024    BCR 29.8 (H) 05/30/2024    K 5.6 (C) 05/30/2024    CO2 24.2 05/30/2024    CALCIUM 9.2 05/30/2024    PROTENTOTREF 6.3 04/30/2019    ALBUMIN 3.8 05/30/2024    BILITOT 0.4 05/30/2024    AST 35 (H) 05/30/2024    ALT 18 05/30/2024      Mammo Screening Modified With Tomosynthesis Right With CAD (11/17/2023 13:26)       Assessment & Plan      1. This patient has undergone a left-sided mastectomy for DCIS of the left breast. The patient had a large tumor that was high grade with negative margins of resection, minimal microinvasion and negative sentinel lymph nodes. The patient’s estrogen receptor and progesterone receptor were negative and she was HER2 negative as well at the time of the original diagnosis. Under these premises, I think the patient is  done with the treatment for this and I do not recommend any form of adjuvant chemotherapy, radiation treatment and obviously no hormonal therapy. There is no role for Herceptin or medicines of this nature either.     The patient was further reviewed on 03/30/2021 in regard to her breast cancer. Her right breast examination is normal. Her mammogram is up-to-date and been normal and her left mastectomy site is normal. I see nothing to suggest breast cancer recurrence. As we mentioned before the patient never required adjuvant therapy because she had triple negative disease.         The patient was further reviewed in regard to her breast issue on 07/20/2021. Her mastectomy on the left side is well healed, the right breast is unremarkable, there is no lymphedema in either extremity and the patient is doing fine from this point of view.   The patient is reviewed on 11/16/2021 in regard to her left side mastectomy for DCIS of the left breast. So far the mastectomy site is unremarkable, surgical site is well healed. There is no lymphedema in the left upper extremity, the left axilla is normal, right breast is normal. Right breast mammogram recently performed as stated above disclosed no abnormalities. This will be watched in absence of any other intervention.   The patient was reviewed on 05/12/2022. I find no symptoms or signs that would indicate breast cancer recurrence. Her right breast exam is normal. Her left side mastectomy discloses no areas of induration, tenderness, nodularity. Her left axilla is unremarkable. She has no lymphedema in either upper extremity. She will be watched in absence of any other intervention.  On 07/20/2022 the patient has no symptoms or signs of breast cancer recurrence. Her left chest wall is unremarkable, left axilla is normal. No lymphedema in the left upper extremity. Right breast examination is normal, no lymphedema in the right upper extremity, normal right axilla.     Mammogram  requested to be done in 6 months after she comes back from [place] in 01/2023.   On 03/01/2023 the patient has no symptoms or signs of breast cancer recurrence, her left mastectomy site is normal, her right breast has been examined during the previous visit. I have not examined the right breast today. She is up to date on her right sided mammogram. She will remain in observation from this point of view.  On 05/10/2023 from the point of view of her mastectomy for DCIS, she has no issues pertinent to this. This will be watched in absence of any other intervention.  On 09/20/2023 there are no symptoms or signs of breast cancer recurrence. This patient has had a mastectomy. Her chest wall examination today is unremarkable.  On 05/30/2024 the patient has no symptoms or signs of breast cancer recurrence on the left side, mastectomy remains intact and right breast remains normal. She is up to date with her mammogram done in 11/2023 being normal. She will remain in observation from this point of view. She will have repeat mammogram in 11/2024.                         2. From the point of view of her anemia, iron deficiency, the patient has been replaced with IV iron. As far as the patient can tell on review on 10/19/2020 she has not had any evidence of blood loss in the gastrointestinal tract. Her diet is appropriate but the only thing that she is not following through is that she is back onto diet soft drinks Pepsi for example. She is not drinking any coffee that she used to drink copious amount of it. According to her, her diet again remains appropriate. It calls my attention the hemoglobin of 11.7, her reticulated hemoglobin is normal at 35 therefore probably anemia from today is reflection of some other condition.  The patient remains anemic today and we have a multitude of testing including ferritin, iron, TIBC, B12, folic acid and reticulated hemoglobin to further analyze. This will be discussed with her on the  telephone once that these reports become available. Also I have asked the lab to run peripheral blood slide for me to review given her previous history of hemolytic anemia. I will review her chemistry profile, bilirubin and along with the retic count this will give me an idea if hemolytic anemia is an issue at this point.   On 11/16/2021 the patient remains mildly anemic with a hemoglobin of 10.9. Six months ago we did ferritin, iron, TIBC, B12, folic acid levels all of them normal. Her erythropoietin level was low consistent with anemia of chronic kidney disease. She has not undergone any erythropoietin therapy at this point.   On 05/12/2022, the patient has become anemic again in spite of not losing blood in the gastrointestinal tract. Reticulated hemoglobin is 33; therefore it is likely that she does not have iron deficiency anemia but her ferritin, iron profile are pending and she has had B12 and folic acid levels measured not too far ago that were normal. This leads me to believe that very likely the anemia component of this patient at this time is probably associated with chronic illness, inflammatory condition and chronic kidney disease, especially if we account for the progressive amount of anasarca that this patient has. She will be called at home with the report of the rest of laboratory testing pertinent to this.  On 07/20/2022 the patient still has a mild component of anemia. During the previous visit we documented normal ferritin, iron, TIBC, B12, folic acid level and normal reticulated hemoglobin. Today the numbers remain about the same. She is not having any blood loss. We advised her to remain in observation. Given her creatinine clearance it is likely that part of the anemia corresponds to anemia associated with chronic renal disease given the fact that the hemoglobin stays above 10 she does not qualify for Procrit.  On 03/01/2023 the patient has had a lot of Gi issues since her COVID infection in  [place]. She has not noticed any passage of blood in the stool but she has had constipation and she has had gastric irritation. Her hemoglobin is lower than usual and her reticulated hemoglobin has dropped to 3 telling me that very likely she has iron deficiency again. This raises the question if what she had in the stomach before, polyps in the stomach and duodenum has anything to do with this and if she is having any other new symptomatology pertinent to the anemia and what we need to do. She has not seen Vega Handley MD, in a good while and I went ahead and scheduled her to be seen by him. I pointed out to her that if she is documented to have low ferritin, low iron profile she will require IV iron therapy because she cannot handle oral iron therapy number one and she will require hemoccult testing. Very likely she will have an obvious indication to pursue further upper and lower endoscopies. Assuming that she has low iron she will be scheduled to receive IV iron therapy in the form of Venofer times 3 at 300 mg per dose and review her back in a couple of months to see how she evolves from that point of view.   On 05/10/2023 the patient received IV iron therapy almost 6 weeks ago. Her hemoglobin is 11.6 today. Other indices are waiting to be reviewed. The white count and the platelet count are normal. The white count differential is normal. Reticulated hemoglobin is pending. We tried to obtain another blood sample with her, extremely difficult to measure ferritin. We are not sure that this will be appropriate for measurement of this, waiting to be reviewed by the lab techs.  On 09/20/2023 The patient has had correction of her iron deficiency anemia with IV iron. Hemoglobin has bounced back telling us that she has a healthy bone marrow that is able to respond to IV iron therapy. The patient's reticulated hemoglobin has normalized and the patient feels better from this point of view. On the other hand all the  weakness associated with her recent hospitalization, mechanical ventilation, intubation, respiratory insufficiency and so forth has taken a big toll on her. She lost 40 pounds of weight. She has tremendous immobility syndrome and she is slowly making progress. How far she is going to be able to go through, I do not know but she has already experienced 4 falls and that is very worrisome in regard to her ability to continue functioning independently. She is now using a walker. Her  is very supportive.  On 05/30/2024 her hemoglobin is stable with no notion of blood loss in the gastrointestinal tract. Ferritin level, iron profile are normal. Given this fact the patient will remain in observation. In the past she has required IV iron therapy.     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                       3. The patient has very significant degree of shortness of breath upon minimal exertion. I asked her to walk. It took her almost 3 minutes to walk 30 feet. She was very short winded. We started with an O2 saturation of 100% on room air and a heart rate of 73. Her O2 saturation dropped to 97 and heart rate never raised.     I reviewed this issue with her again today. Her cardiac auscultation is very much normal and her echocardiogram done not too long ago did not document too much of major abnormality besides pulmonary hypertension. Her lung auscultation is abnormal with decreased breath sounds bilaterally. I do not think she has effusions but I think she needs to be seen by pulmonology. I asked her to review this information with her pulmonologist as soon as possible. I  still wonder why this patient is not receiving at least oxygen at nighttime given the fact that she has sleep apnea and she has very likely chronic lung disease.  She dd not know that she has pulmonary hypertension and this will be a first start in the treatment of this condition anyway.   On 11/16/2021 the cardiac auscultation remains unremarkable, no obvious gallops. Pulmonary auscultation shows decrease of breath sounds bilaterally. We advised her to do exercises that maybe in the long run could have impact on her and I again advised her that it will be tremendously consequential to her to lose 20-30 pounds of weight. This will allow less oxygen consumption by fat and more oxygen consumption by her normal weight, normal other parts including muscles, heart, kidneys and so forth. In the long run this could have positive consequences on her.     I will review her back in 6 months with a CBC and CMP at that time.     I find no reason for this patient to proceed with erythropoietin therapy with a hemoglobin of 10.9.   The patient on 05/12/2022 is more short of breath than usual. She is barely able to walk 10 feet without becoming hypoxemic and she has in her cardiac auscultation today a gallop that is more than typical. Her lung auscultation is clear. She has no crackles. She has 3+ edema in both lower extremities in spite of having elastic support in place and she has a blister in the left lower extremity with no obvious secondary infection. I do believe that her shortness of breath is very worrisome for congestive heart failure, fluid accumulation, anasarca and is very worrisome as well in the background of chronic kidney disease.     On top of that she is having pain in the epigastric area that sometimes looks to me and sounds to me like angina and I think she needs to be seen by Cardiology right away. I sent a note to Dr. Power, the patient’s cardiologist, to review this. I went ahead and ordered an  echocardiogram for completeness and I ordered a proBNP today. I did not change any medicines on her. My office will be calling Dr. Power’ office to arrange for a follow-up visit quit.  On 07/20/2022 after visits to her nephrologist and cardiologist the patient's volume status has improved, she has lesser degree of anasarca, she feels better. Medications have been modified and overall she is doing better.  On 03/01/2023 her shortness of breath is very significant upon exertion. Her pulmonary auscultation shows very poor breath sounds bilaterally. Dr. Dmitry Lang has seen her. Radiological assessment is coming up in that regard. I have nothing to add to this situation, Dr. Lang will follow up with her.   On 05/10/2023 shortness of breath is less. She has not had the need for oxygen in the last 3 days. She is on Symbicort. She has not had any respiratory infection and her volume in regard to water control seems to be better controlled at this time. The patient has no new cardiac issues. She does not have new pulmonary issues besides the discontinuation of Trelegy and going back into Symbicort on her own.     I do believe that the IV iron infusion also improves cardiac function and maybe that is another reason why she feels better.  On 09/20/2023 after losing so much weight in the hospitalization, the shortness of breath is very minimal, but she is very inactive at this time. She stays in bed most of the time. I encouraged her to get out of the bed and move on. If she stays in bed the future is going to be more than dark and she knows that.  On 05/30/2024 it seems to be that her congestive heart failure is compensated even though lung auscultation shows decrease of breath sounds and heart sounds are muffled. The patient is trying to control consumption of salt. The degree of lymphedema in her lower extremities is changed. Will follow up with cardiology.                   4.The patient states the swelling in the  lower extremities along with the blister in the left lower extremity is very worrisome for her in the background of diabetes. She has elastic support and the legs are huge. She has probably in her body no less than 20 pounds worth of water, I will guess. I would not be surprised if she needs to end up in the hospital with aggressive management of water and profuse diuresis that is necessary.     I went ahead and referred her to be seen by Vascular Surgery. Given the fact that she has had diabetes for so long, she will require Doppler studies of the lower extremities, both for the venous system and also for the arterial system.     On 07/20/2022 the patient had vascular studies in the lower extremities both venous and arterial failing to document any abnormality to justify the swelling in the lower extremities. Given this finding I advised the patient that this is good news and she will follow up with vascular surgery in the future. Elastic support could be something that she needs to wear.     I will review her back in 6 months with a CBC, CMP, ferritin, iron, TIBC, B12, folic acid level and a mammogram done after she returns from [place] in 01/2023.  On 03/01/2023 the swelling in her lower extremities is a combination of factors. I would not be surprised if she has a component of right sided heart failure at the same time.     The patient will be called at home with the report of her iron and ferritin levels and proBNP.     Planning to see her back in a couple of months repeating CBC, ferritin, iron profile, CMP and proceed with Venofer on 3 different occasions.    On 05/10/2023 the patient will have endoscopies by the end of 05/2023. I asked her to call to notify the time and date of this to review what is found and what the pathology shows. Otherwise, we are planning to review her back in 4 months with a CBC, reticulated hemoglobin, CMP, ferritin, iron profile.     I discussed all these facts with her and her   in Malagasy. They were grateful about the conversation.  On 09/20/2023 the degree of swelling in her lower extremities is very minimal at this time. The need for blood pressure medication and insulin dramatically changed since the hospitalization. The most important issue that I did on the patient was advise her one more time any many more times, about the need for her to get walking and moving, otherwise the future is not going to be that clear. She realizes that. I will review her back in 6 months with a CBC, CMP, ferritin, iron profile.  Elastic support on 05/30/2024 in both lower extremities is the only possibility in regard to controlling lymphedema in both legs. She otherwise will follow up with us in 6 months with repeat CBC, CMP, ferritin, iron profile at that point and repeating the same 6 months later. Mammogram to be scheduled in 11/2024.

## 2024-06-10 ENCOUNTER — TRANSCRIBE ORDERS (OUTPATIENT)
Dept: ADMINISTRATIVE | Facility: HOSPITAL | Age: 79
End: 2024-06-10
Payer: MEDICARE

## 2024-06-10 ENCOUNTER — LAB (OUTPATIENT)
Dept: LAB | Facility: HOSPITAL | Age: 79
End: 2024-06-10
Payer: MEDICARE

## 2024-06-10 DIAGNOSIS — E08.65 DIABETES MELLITUS DUE TO UNDERLYING CONDITION, UNCONTROLLED, WITH HYPERGLYCEMIA: Primary | ICD-10-CM

## 2024-06-10 DIAGNOSIS — E78.5 HYPERLIPIDEMIA, UNSPECIFIED HYPERLIPIDEMIA TYPE: ICD-10-CM

## 2024-06-10 DIAGNOSIS — I42.9: ICD-10-CM

## 2024-06-10 DIAGNOSIS — N18.30 STAGE 3 CHRONIC KIDNEY DISEASE, UNSPECIFIED WHETHER STAGE 3A OR 3B CKD: Primary | ICD-10-CM

## 2024-06-10 DIAGNOSIS — N18.30 STAGE 3 CHRONIC KIDNEY DISEASE, UNSPECIFIED WHETHER STAGE 3A OR 3B CKD: ICD-10-CM

## 2024-06-10 DIAGNOSIS — H91.90: ICD-10-CM

## 2024-06-10 DIAGNOSIS — N18.30 STAGE 3 CHRONIC KIDNEY DISEASE DUE TO DIABETES MELLITUS: ICD-10-CM

## 2024-06-10 DIAGNOSIS — E11.22 STAGE 3 CHRONIC KIDNEY DISEASE DUE TO DIABETES MELLITUS: ICD-10-CM

## 2024-06-10 DIAGNOSIS — E08.65 DIABETES MELLITUS DUE TO UNDERLYING CONDITION, UNCONTROLLED, WITH HYPERGLYCEMIA: ICD-10-CM

## 2024-06-10 LAB
ALBUMIN SERPL-MCNC: 3.4 G/DL (ref 3.5–5.2)
ANION GAP SERPL CALCULATED.3IONS-SCNC: 10.9 MMOL/L (ref 5–15)
BACTERIA UR QL AUTO: ABNORMAL /HPF
BILIRUB UR QL STRIP: NEGATIVE
BUN SERPL-MCNC: 40 MG/DL (ref 8–23)
BUN/CREAT SERPL: 27.6 (ref 7–25)
CALCIUM SPEC-SCNC: 9 MG/DL (ref 8.6–10.5)
CHLORIDE SERPL-SCNC: 101 MMOL/L (ref 98–107)
CHOLEST SERPL-MCNC: 174 MG/DL (ref 0–200)
CLARITY UR: ABNORMAL
CO2 SERPL-SCNC: 24.1 MMOL/L (ref 22–29)
COLOR UR: YELLOW
CREAT SERPL-MCNC: 1.45 MG/DL (ref 0.57–1)
CREAT UR-MCNC: 104.6 MG/DL
EGFRCR SERPLBLD CKD-EPI 2021: 37.2 ML/MIN/1.73
GLUCOSE SERPL-MCNC: 230 MG/DL (ref 65–99)
GLUCOSE UR STRIP-MCNC: ABNORMAL MG/DL
HBA1C MFR BLD: 8 % (ref 4.8–5.6)
HDLC SERPL-MCNC: 51 MG/DL (ref 40–60)
HGB UR QL STRIP.AUTO: NEGATIVE
HYALINE CASTS UR QL AUTO: ABNORMAL /LPF
KETONES UR QL STRIP: NEGATIVE
LDLC SERPL CALC-MCNC: 105 MG/DL (ref 0–100)
LDLC/HDLC SERPL: 2.02 {RATIO}
LEUKOCYTE ESTERASE UR QL STRIP.AUTO: NEGATIVE
NITRITE UR QL STRIP: NEGATIVE
PH UR STRIP.AUTO: 6 [PH] (ref 5–8)
PHOSPHATE SERPL-MCNC: 3.1 MG/DL (ref 2.5–4.5)
POTASSIUM SERPL-SCNC: 5.4 MMOL/L (ref 3.5–5.2)
PROT ?TM UR-MCNC: 126.2 MG/DL
PROT UR QL STRIP: ABNORMAL
PROT/CREAT UR: 1206.5 MG/G CREA (ref 0–200)
RBC # UR STRIP: ABNORMAL /HPF
REF LAB TEST METHOD: ABNORMAL
SODIUM SERPL-SCNC: 136 MMOL/L (ref 136–145)
SP GR UR STRIP: 1.02 (ref 1–1.03)
SQUAMOUS #/AREA URNS HPF: ABNORMAL /HPF
TRIGL SERPL-MCNC: 100 MG/DL (ref 0–150)
UROBILINOGEN UR QL STRIP: ABNORMAL
VLDLC SERPL-MCNC: 18 MG/DL (ref 5–40)
WBC # UR STRIP: ABNORMAL /HPF

## 2024-06-10 PROCEDURE — 80069 RENAL FUNCTION PANEL: CPT

## 2024-06-10 PROCEDURE — 83036 HEMOGLOBIN GLYCOSYLATED A1C: CPT

## 2024-06-10 PROCEDURE — 80061 LIPID PANEL: CPT

## 2024-06-10 PROCEDURE — 84156 ASSAY OF PROTEIN URINE: CPT

## 2024-06-10 PROCEDURE — 36415 COLL VENOUS BLD VENIPUNCTURE: CPT

## 2024-06-10 PROCEDURE — 81001 URINALYSIS AUTO W/SCOPE: CPT

## 2024-06-10 PROCEDURE — 82570 ASSAY OF URINE CREATININE: CPT

## 2024-06-18 ENCOUNTER — OFFICE VISIT (OUTPATIENT)
Dept: SURGERY | Facility: CLINIC | Age: 79
End: 2024-06-18
Payer: MEDICARE

## 2024-06-18 VITALS
WEIGHT: 187.4 LBS | DIASTOLIC BLOOD PRESSURE: 68 MMHG | SYSTOLIC BLOOD PRESSURE: 150 MMHG | BODY MASS INDEX: 31.22 KG/M2 | HEIGHT: 65 IN

## 2024-06-18 DIAGNOSIS — K31.7 GASTRIC POLYPS: ICD-10-CM

## 2024-06-18 DIAGNOSIS — D13.2 ADENOMATOUS DUODENAL POLYP: ICD-10-CM

## 2024-06-18 DIAGNOSIS — K63.5 POLYP OF COLON, UNSPECIFIED PART OF COLON, UNSPECIFIED TYPE: Primary | ICD-10-CM

## 2024-06-18 PROBLEM — E66.9 OBESITY (BMI 30.0-34.9): Status: ACTIVE | Noted: 2024-06-18

## 2024-06-18 PROBLEM — E66.811 OBESITY (BMI 30.0-34.9): Status: ACTIVE | Noted: 2024-06-18

## 2024-06-18 PROCEDURE — 3078F DIAST BP <80 MM HG: CPT | Performed by: SURGERY

## 2024-06-18 PROCEDURE — 1160F RVW MEDS BY RX/DR IN RCRD: CPT | Performed by: SURGERY

## 2024-06-18 PROCEDURE — 3077F SYST BP >= 140 MM HG: CPT | Performed by: SURGERY

## 2024-06-18 PROCEDURE — 1159F MED LIST DOCD IN RCRD: CPT | Performed by: SURGERY

## 2024-06-18 PROCEDURE — 99203 OFFICE O/P NEW LOW 30 MIN: CPT | Performed by: SURGERY

## 2024-06-18 RX ORDER — TOBRAMYCIN AND DEXAMETHASONE 3; 1 MG/ML; MG/ML
SUSPENSION/ DROPS OPHTHALMIC
COMMUNITY

## 2024-06-18 RX ORDER — ALBUTEROL SULFATE 1.25 MG/3ML
SOLUTION RESPIRATORY (INHALATION)
COMMUNITY

## 2024-06-18 RX ORDER — INSULIN GLARGINE 300 U/ML
INJECTION, SOLUTION SUBCUTANEOUS
COMMUNITY

## 2024-06-18 RX ORDER — PREDNISONE 5 MG/1
5 TABLET ORAL DAILY
COMMUNITY

## 2024-06-18 RX ORDER — BUDESONIDE AND FORMOTEROL FUMARATE DIHYDRATE 160; 4.5 UG/1; UG/1
2 AEROSOL RESPIRATORY (INHALATION) 2 TIMES DAILY
COMMUNITY
Start: 2024-05-24

## 2024-06-18 RX ORDER — FOLIC ACID 1 MG/1
1 TABLET ORAL DAILY
COMMUNITY

## 2024-06-18 RX ORDER — PREDNISONE 1 MG/1
TABLET ORAL
COMMUNITY

## 2024-06-18 RX ORDER — PEN NEEDLE, DIABETIC 32GX 5/32"
NEEDLE, DISPOSABLE MISCELLANEOUS
COMMUNITY
Start: 2024-06-03

## 2024-06-18 RX ORDER — CLOTRIMAZOLE AND BETAMETHASONE DIPROPIONATE 10; .64 MG/G; MG/G
CREAM TOPICAL
COMMUNITY

## 2024-06-18 RX ORDER — INSULIN LISPRO 100 [IU]/ML
INJECTION, SOLUTION INTRAVENOUS; SUBCUTANEOUS
COMMUNITY

## 2024-06-18 NOTE — LETTER
2024     Jean-Claude Farnsworth MD  [address]  [place] [id number]    Patient: Blanca Zhu   YOB: 1945   Date of Visit: 2024       Dear Jean-Claude Farnsworth MD,    Thank you for referring Blanca Zhu to me for evaluation. Below is a copy of my consult note.    If you have questions, please do not hesitate to call me. I look forward to following Blanca along with you.         Sincerely,        Ashwin Alvarado MD        CC: No Recipients    CC: Evaluation for upper endoscopy colonoscopy     HPI: Patient is a very pleasant 79-year-old female is here today referred by Dr. Farnsworth for evaluation of colonoscopy and endoscopy.  Patient has history of duodenal adenoma that was removed by Dr. Handley in  as well as history of colon polyps.  She denies any GI symptoms.  Denies any rectal bleeding.  Patient uses oxygen at night at home.  2 L     PMH: Hyperlipidemia, hypertension, morbid obesity, duodenal polyp, colon polyp, chronic kidney disease stage III, iron deficiency anemia     PSH:   -Hysterectomy  -Left mastectomy  -Eye surgery  -Open cholecystectomy  -  -Left rib biopsy   -Brain surgery  -Colonoscopy with polypectomy 2019  -Left mastectomy with sentinel lymph node biopsy and axillary lymph node dissection 2018  -Upper endoscopy 2019  -Bilateral temporal artery biopsy 2019  -Endoscopy 2020 x 2    MEDS: Reviewed and reconciled in epic     ALL: Revefenacin, aspirin, sulfa antibiotics    FH and SH: Multiple family members with cancer  Father with esophageal gastric cancer  Sister with throat cancer  Paternal grandfather with colon cancer  3 paternal uncles with colon cancer  Cousin with ovarian cancer  Other cousin with stomach cancer  Denies smoking drinking or taking any drugs       ROS:   Constitutional: denies any weight changes, fatigue or weakness.  HEENT: Denies hearing loss and rhinorrhea  Cardiovascular: denies chest pain, palpitations,  "edemas.  Respiratory: denies cough, sputum, SOB.  Gastrointestinal: denies N&V, abd pain, diarrhea, constipation.  Genitourinary: denies dysuria, frequency.  Endocrine: denies cold intolerance, lethargy and flushing.  Hem: denies excessive bruising and postop bleeding.  Musculoskeletal: denies weakness, joint swelling, pain or stiffness.  Neuro: denies seizures, CVA, paresthesia, or peripheral neuropathy.   Skin: denies change in nevi, rashes, masses.     PE:   Vitals:    06/18/24 1419   BP: 150/68   Weight: 85 kg (187 lb 6.4 oz)   Height: 165.1 cm (65\")     Body mass index is 31.18 kg/m².   Alert and oriented ×3, no acute distress.  Head is normocephalic and atraumatic.  Neck is supple there is no thyromegaly or lymphadenopathy  Chest is clear bilaterally there is no added sounds  Regular rate and rhythm, no murmurs  Abdomen is soft and nontender, is nondistended, bowel sounds are positive.  There is no rebound or guarding is and there is no peritoneal signs.  There is a well-healed right upper quadrant incision  No clubbing cyanosis or edema in lower or upper extremities  BMI is >= 30 and <35. (Class 1 Obesity). The following options were offered after discussion;: weight loss educational material (shared in after visit summary) and exercise counseling/recommendations    Diagnostic studies:   Upper endoscopy 9/9/2020: Duodenal polyp 10 mm there was biopsy: Tubular adenoma    Labs 6/10/2024:   Hemoglobin A1c 8  5/30/2024 hemoglobin 12.2, white blood cell count 10.11    Assessment and plan    The patient is a very pleasant 79-year-old female with history of colon as well as duodenal polyps.  Discussed with the patient about further step.  I think she should undergo upper endoscopy and colonoscopy for surveillance of duodenal as well as colonic polyps.  Risk and benefits of procedure including bleeding, infection, perforation discussed in detail with the patient that verbalized understanding and agree with the " plan  She understands about increased risk of complication due to night oxygen use.     Ashwin Alvarado MD  General, Minimally Invasive and Endoscopic Surgery  [facility]     [address], [address]  [place], [place], [address]  P: [phone]  F: [phone]

## 2024-06-18 NOTE — PROGRESS NOTES
CC: Evaluation for upper endoscopy colonoscopy     HPI: Patient is a very pleasant 79-year-old female is here today referred by Dr. Farnsworth for evaluation of colonoscopy and endoscopy.  Patient has history of duodenal adenoma that was removed by Dr. Handley in  as well as history of colon polyps.  She denies any GI symptoms.  Denies any rectal bleeding.  Patient uses oxygen at night at home.  2 L     PMH: Hyperlipidemia, hypertension, morbid obesity, duodenal polyp, colon polyp, chronic kidney disease stage III, iron deficiency anemia     PSH:   -Hysterectomy  -Left mastectomy  -Eye surgery  -Open cholecystectomy  -  -Left rib biopsy   -Brain surgery  -Colonoscopy with polypectomy 2019  -Left mastectomy with sentinel lymph node biopsy and axillary lymph node dissection 2018  -Upper endoscopy 2019  -Bilateral temporal artery biopsy 2019  -Endoscopy 2020 x 2    MEDS: Reviewed and reconciled in epic     ALL: Revefenacin, aspirin, sulfa antibiotics    FH and SH: Multiple family members with cancer  Father with esophageal gastric cancer  Sister with throat cancer  Paternal grandfather with colon cancer  3 paternal uncles with colon cancer  Cousin with ovarian cancer  Other cousin with stomach cancer  Denies smoking drinking or taking any drugs       ROS:   Constitutional: denies any weight changes, fatigue or weakness.  HEENT: Denies hearing loss and rhinorrhea  Cardiovascular: denies chest pain, palpitations, edemas.  Respiratory: denies cough, sputum, SOB.  Gastrointestinal: denies N&V, abd pain, diarrhea, constipation.  Genitourinary: denies dysuria, frequency.  Endocrine: denies cold intolerance, lethargy and flushing.  Hem: denies excessive bruising and postop bleeding.  Musculoskeletal: denies weakness, joint swelling, pain or stiffness.  Neuro: denies seizures, CVA, paresthesia, or peripheral neuropathy.   Skin: denies change in nevi, rashes, masses.     PE:   Vitals:    24 1419   BP: 150/68  "  Weight: 85 kg (187 lb 6.4 oz)   Height: 165.1 cm (65\")     Body mass index is 31.18 kg/m².   Alert and oriented ×3, no acute distress.  Head is normocephalic and atraumatic.  Neck is supple there is no thyromegaly or lymphadenopathy  Chest is clear bilaterally there is no added sounds  Regular rate and rhythm, no murmurs  Abdomen is soft and nontender, is nondistended, bowel sounds are positive.  There is no rebound or guarding is and there is no peritoneal signs.  There is a well-healed right upper quadrant incision  No clubbing cyanosis or edema in lower or upper extremities  BMI is >= 30 and <35. (Class 1 Obesity). The following options were offered after discussion;: weight loss educational material (shared in after visit summary) and exercise counseling/recommendations    Diagnostic studies:   Upper endoscopy 9/9/2020: Duodenal polyp 10 mm there was biopsy: Tubular adenoma    Labs 6/10/2024:   Hemoglobin A1c 8  5/30/2024 hemoglobin 12.2, white blood cell count 10.11    Assessment and plan    The patient is a very pleasant 79-year-old female with history of colon as well as duodenal polyps.  Discussed with the patient about further step.  I think she should undergo upper endoscopy and colonoscopy for surveillance of duodenal as well as colonic polyps.  Risk and benefits of procedure including bleeding, infection, perforation discussed in detail with the patient that verbalized understanding and agree with the plan  She understands about increased risk of complication due to night oxygen use.     Ashwin Alvarado MD  General, Minimally Invasive and Endoscopic Surgery  [facility]     [address], [address]  [place], [place], [address]  P: [phone]  F: [phone]    "

## 2024-07-02 ENCOUNTER — OFFICE VISIT (OUTPATIENT)
Dept: SURGERY | Facility: CLINIC | Age: 79
End: 2024-07-02
Payer: MEDICARE

## 2024-07-02 VITALS
DIASTOLIC BLOOD PRESSURE: 76 MMHG | BODY MASS INDEX: 31.06 KG/M2 | HEIGHT: 65 IN | WEIGHT: 186.4 LBS | SYSTOLIC BLOOD PRESSURE: 166 MMHG

## 2024-07-02 DIAGNOSIS — D50.9 IRON DEFICIENCY ANEMIA, UNSPECIFIED IRON DEFICIENCY ANEMIA TYPE: Primary | ICD-10-CM

## 2024-07-02 PROCEDURE — 3077F SYST BP >= 140 MM HG: CPT | Performed by: SURGERY

## 2024-07-02 PROCEDURE — 3078F DIAST BP <80 MM HG: CPT | Performed by: SURGERY

## 2024-07-02 PROCEDURE — 1160F RVW MEDS BY RX/DR IN RCRD: CPT | Performed by: SURGERY

## 2024-07-02 PROCEDURE — 1159F MED LIST DOCD IN RCRD: CPT | Performed by: SURGERY

## 2024-07-02 PROCEDURE — 99024 POSTOP FOLLOW-UP VISIT: CPT | Performed by: SURGERY

## 2024-07-02 RX ORDER — SODIUM ZIRCONIUM CYCLOSILICATE 5 G/5G
POWDER, FOR SUSPENSION ORAL
COMMUNITY
Start: 2024-06-21

## 2024-07-02 RX ORDER — OMEPRAZOLE 20 MG/1
40 CAPSULE, DELAYED RELEASE ORAL DAILY
Qty: 60 CAPSULE | Refills: 1 | Status: SHIPPED | OUTPATIENT
Start: 2024-07-02

## 2024-07-02 NOTE — H&P (VIEW-ONLY)
Patient was today in the office  She had an appointment but she is already scheduled for colonoscopy and upper endoscopy  There has not been new symptoms  Discussed with the patient that we will keep the upper endoscopy and colonoscopy appointments.  She is otherwise doing fine

## 2024-07-12 ENCOUNTER — TRANSCRIBE ORDERS (OUTPATIENT)
Dept: LAB | Facility: HOSPITAL | Age: 79
End: 2024-07-12
Payer: MEDICARE

## 2024-07-12 ENCOUNTER — LAB (OUTPATIENT)
Dept: LAB | Facility: HOSPITAL | Age: 79
End: 2024-07-12
Payer: MEDICARE

## 2024-07-12 DIAGNOSIS — E11.22 TYPE 2 DIABETES MELLITUS WITH ESRD (END-STAGE RENAL DISEASE): Primary | ICD-10-CM

## 2024-07-12 DIAGNOSIS — N18.6 TYPE 2 DIABETES MELLITUS WITH ESRD (END-STAGE RENAL DISEASE): ICD-10-CM

## 2024-07-12 DIAGNOSIS — E11.22 TYPE 2 DIABETES MELLITUS WITH ESRD (END-STAGE RENAL DISEASE): ICD-10-CM

## 2024-07-12 DIAGNOSIS — L57.0 AK (ACTINIC KERATOSIS): ICD-10-CM

## 2024-07-12 DIAGNOSIS — E87.6 POTASSIUM DEFICIENCY: Primary | ICD-10-CM

## 2024-07-12 DIAGNOSIS — E87.6 POTASSIUM DEFICIENCY: ICD-10-CM

## 2024-07-12 DIAGNOSIS — N18.6 TYPE 2 DIABETES MELLITUS WITH ESRD (END-STAGE RENAL DISEASE): Primary | ICD-10-CM

## 2024-07-12 LAB
ALBUMIN SERPL-MCNC: 3.5 G/DL (ref 3.5–5.2)
ANION GAP SERPL CALCULATED.3IONS-SCNC: 9.6 MMOL/L (ref 5–15)
BUN SERPL-MCNC: 20 MG/DL (ref 8–23)
BUN/CREAT SERPL: 15.9 (ref 7–25)
CALCIUM SPEC-SCNC: 8.8 MG/DL (ref 8.6–10.5)
CHLORIDE SERPL-SCNC: 102 MMOL/L (ref 98–107)
CO2 SERPL-SCNC: 24.4 MMOL/L (ref 22–29)
CREAT SERPL-MCNC: 1.26 MG/DL (ref 0.57–1)
EGFRCR SERPLBLD CKD-EPI 2021: 43.8 ML/MIN/1.73
GLUCOSE SERPL-MCNC: 344 MG/DL (ref 65–99)
PHOSPHATE SERPL-MCNC: 3 MG/DL (ref 2.5–4.5)
POTASSIUM SERPL-SCNC: 5.4 MMOL/L (ref 3.5–5.2)
SODIUM SERPL-SCNC: 136 MMOL/L (ref 136–145)

## 2024-07-12 PROCEDURE — 80069 RENAL FUNCTION PANEL: CPT

## 2024-07-12 PROCEDURE — 36415 COLL VENOUS BLD VENIPUNCTURE: CPT

## 2024-07-22 ENCOUNTER — TRANSCRIBE ORDERS (OUTPATIENT)
Dept: ADMINISTRATIVE | Facility: HOSPITAL | Age: 79
End: 2024-07-22
Payer: MEDICARE

## 2024-07-22 DIAGNOSIS — I50.32 CHRONIC DIASTOLIC HEART FAILURE: Primary | ICD-10-CM

## 2024-07-23 ENCOUNTER — ANESTHESIA EVENT (OUTPATIENT)
Dept: GASTROENTEROLOGY | Facility: HOSPITAL | Age: 79
End: 2024-07-23
Payer: MEDICARE

## 2024-07-23 ENCOUNTER — ANESTHESIA (OUTPATIENT)
Dept: GASTROENTEROLOGY | Facility: HOSPITAL | Age: 79
End: 2024-07-23
Payer: MEDICARE

## 2024-07-23 ENCOUNTER — HOSPITAL ENCOUNTER (OUTPATIENT)
Facility: HOSPITAL | Age: 79
Setting detail: HOSPITAL OUTPATIENT SURGERY
Discharge: HOME | End: 2024-07-23
Attending: SURGERY | Admitting: SURGERY
Payer: MEDICARE

## 2024-07-23 VITALS
WEIGHT: 182.3 LBS | HEART RATE: 70 BPM | RESPIRATION RATE: 18 BRPM | OXYGEN SATURATION: 95 % | HEIGHT: 60 IN | SYSTOLIC BLOOD PRESSURE: 154 MMHG | DIASTOLIC BLOOD PRESSURE: 70 MMHG | BODY MASS INDEX: 35.79 KG/M2

## 2024-07-23 DIAGNOSIS — K63.5 POLYP OF COLON, UNSPECIFIED PART OF COLON, UNSPECIFIED TYPE: ICD-10-CM

## 2024-07-23 DIAGNOSIS — D13.2 ADENOMATOUS DUODENAL POLYP: ICD-10-CM

## 2024-07-23 PROBLEM — K63.89 COLONIC MASS: Status: ACTIVE | Noted: 2024-07-23

## 2024-07-23 LAB — GLUCOSE BLDC GLUCOMTR-MCNC: 180 MG/DL (ref 70–130)

## 2024-07-23 PROCEDURE — 88305 TISSUE EXAM BY PATHOLOGIST: CPT | Performed by: SURGERY

## 2024-07-23 PROCEDURE — 25010000002 PROPOFOL 10 MG/ML EMULSION: Performed by: NURSE ANESTHETIST, CERTIFIED REGISTERED

## 2024-07-23 PROCEDURE — 82948 REAGENT STRIP/BLOOD GLUCOSE: CPT

## 2024-07-23 PROCEDURE — 25810000003 SODIUM CHLORIDE 0.9 % SOLUTION: Performed by: SURGERY

## 2024-07-23 DEVICE — WORKING LENGTH 235CM, WORKING CHANNEL 2.8MM
Type: IMPLANTABLE DEVICE | Site: DUODENUM | Status: FUNCTIONAL
Brand: RESOLUTION 360 CLIP

## 2024-07-23 RX ORDER — SODIUM CHLORIDE, SODIUM LACTATE, POTASSIUM CHLORIDE, CALCIUM CHLORIDE 600; 310; 30; 20 MG/100ML; MG/100ML; MG/100ML; MG/100ML
30 INJECTION, SOLUTION INTRAVENOUS CONTINUOUS PRN
Status: DISCONTINUED | OUTPATIENT
Start: 2024-07-23 | End: 2024-07-23

## 2024-07-23 RX ORDER — PROPOFOL 10 MG/ML
VIAL (ML) INTRAVENOUS AS NEEDED
Status: DISCONTINUED | OUTPATIENT
Start: 2024-07-23 | End: 2024-07-23 | Stop reason: SURG

## 2024-07-23 RX ORDER — LIDOCAINE HYDROCHLORIDE 20 MG/ML
INJECTION, SOLUTION INFILTRATION; PERINEURAL AS NEEDED
Status: DISCONTINUED | OUTPATIENT
Start: 2024-07-23 | End: 2024-07-23 | Stop reason: SURG

## 2024-07-23 RX ORDER — ONDANSETRON 2 MG/ML
4 INJECTION INTRAMUSCULAR; INTRAVENOUS ONCE AS NEEDED
Status: DISCONTINUED | OUTPATIENT
Start: 2024-07-23 | End: 2024-07-23 | Stop reason: HOSPADM

## 2024-07-23 RX ORDER — SODIUM CHLORIDE 9 MG/ML
1000 INJECTION, SOLUTION INTRAVENOUS CONTINUOUS
Status: DISCONTINUED | OUTPATIENT
Start: 2024-07-23 | End: 2024-07-23 | Stop reason: HOSPADM

## 2024-07-23 RX ADMIN — PROPOFOL 140 MCG/KG/MIN: 10 INJECTION, EMULSION INTRAVENOUS at 12:39

## 2024-07-23 RX ADMIN — LIDOCAINE HYDROCHLORIDE 80 MG: 20 INJECTION, SOLUTION INFILTRATION; PERINEURAL at 12:39

## 2024-07-23 RX ADMIN — PROPOFOL 70 MG: 10 INJECTION, EMULSION INTRAVENOUS at 12:39

## 2024-07-23 RX ADMIN — SODIUM CHLORIDE: 9 INJECTION, SOLUTION INTRAVENOUS at 12:32

## 2024-07-23 NOTE — INTERVAL H&P NOTE
H&P updated. The patient was examined and the following changes are noted:        CC: Evaluation for upper endoscopy colonoscopy     HPI: Patient is a very pleasant 79-year-old female is here today referred by Dr. Farnsworth for evaluation of colonoscopy and endoscopy.  Patient has history of duodenal adenoma that was removed by Dr. Handley in  as well as history of colon polyps.  She denies any GI symptoms.  Denies any rectal bleeding.  Patient uses oxygen at night at home.  2 L     PMH: Hyperlipidemia, hypertension, morbid obesity, duodenal polyp, colon polyp, chronic kidney disease stage III, iron deficiency anemia     PSH:   -Hysterectomy  -Left mastectomy  -Eye surgery  -Open cholecystectomy  -  -Left rib biopsy   -Brain surgery  -Colonoscopy with polypectomy   -Left mastectomy with sentinel lymph node biopsy and axillary lymph node dissection   -Upper endoscopy 2019  -Bilateral temporal artery biopsy 2019  -Endoscopy 2020 x 2     MEDS: Reviewed and reconciled in epic     ALL: Revefenacin, aspirin, sulfa antibiotics     FH and SH: Multiple family members with cancer  Father with esophageal gastric cancer  Sister with throat cancer  Paternal grandfather with colon cancer  3 paternal uncles with colon cancer  Cousin with ovarian cancer  Other cousin with stomach cancer  Denies smoking drinking or taking any drugs        ROS:   Constitutional: denies any weight changes, fatigue or weakness.  HEENT: Denies hearing loss and rhinorrhea  Cardiovascular: denies chest pain, palpitations, edemas.  Respiratory: denies cough, sputum, SOB.  Gastrointestinal: denies N&V, abd pain, diarrhea, constipation.  Genitourinary: denies dysuria, frequency.  Endocrine: denies cold intolerance, lethargy and flushing.  Hem: denies excessive bruising and postop bleeding.  Musculoskeletal: denies weakness, joint swelling, pain or stiffness.  Neuro: denies seizures, CVA, paresthesia, or peripheral neuropathy.   Skin:  "denies change in nevi, rashes, masses.     PE:   Vitals       Vitals:     06/18/24 1419   BP: 150/68   Weight: 85 kg (187 lb 6.4 oz)   Height: 165.1 cm (65\")         Body mass index is 31.18 kg/m².   Alert and oriented ×3, no acute distress.  Head is normocephalic and atraumatic.  Neck is supple there is no thyromegaly or lymphadenopathy  Chest is clear bilaterally there is no added sounds  Regular rate and rhythm, no murmurs  Abdomen is soft and nontender, is nondistended, bowel sounds are positive.  There is no rebound or guarding is and there is no peritoneal signs.  There is a well-healed right upper quadrant incision  No clubbing cyanosis or edema in lower or upper extremities  BMI is >= 30 and <35. (Class 1 Obesity). The following options were offered after discussion;: weight loss educational material (shared in after visit summary) and exercise counseling/recommendations     Diagnostic studies:   Upper endoscopy 9/9/2020: Duodenal polyp 10 mm there was biopsy: Tubular adenoma     Labs 6/10/2024:   Hemoglobin A1c 8  5/30/2024 hemoglobin 12.2, white blood cell count 10.11     Assessment and claire     The patient is a very pleasant 79-year-old female with history of colon as well as duodenal polyps.  Discussed with the patient about further step.  I think she should undergo upper endoscopy and colonoscopy for surveillance of duodenal as well as colonic polyps.  Risk and benefits of procedure including bleeding, infection, perforation discussed in detail with the patient that verbalized understanding and agree with the plan  She understands about increased risk of complication due to night oxygen use.     "

## 2024-07-23 NOTE — DISCHARGE INSTRUCTIONS
For the next 24 hours patient needs to be with a responsible adult.    For 24 hours DO NOT drive, operate machinery, appliances, drink alcohol, make important decisions or sign legal documents.    Start with a light or bland diet if you are feeling sick to your stomach otherwise advance to regular diet as tolerated.    Follow recommendations on procedure report if provided by your doctor.    Call Dr Alvarado for problems [phone]    Problems may include but not limited to: large amounts of bleeding, trouble breathing, repeated vomiting, severe unrelieved pain, fever or chills.

## 2024-07-23 NOTE — ANESTHESIA POSTPROCEDURE EVALUATION
"Patient: Blanca Zhu    Procedure Summary       Date: 07/23/24 Room / [facility]:  [facility]    Anesthesia Start: 1232 Anesthesia Stop: 1330    Procedures:       ESOPHAGOGASTRODUODENOSCOPY WITH hot snare polypectomy with clip placement x 2BIOPSY (Esophagus)      COLONOSCOPY into the cecum and TI with hot snare polypectomy Diagnosis:       Polyp of colon, unspecified part of colon, unspecified type      Adenomatous duodenal polyp      (Polyp of colon, unspecified part of colon, unspecified type [K63.5])      (Adenomatous duodenal polyp [D13.2])    Surgeons: Ashwin Alvarado MD Provider: Rui Call MD    Anesthesia Type: MAC ASA Status: 3            Anesthesia Type: MAC    Vitals  Vitals Value Taken Time   /70 07/23/24 1350   Temp     Pulse 70 07/23/24 1357   Resp 18 07/23/24 1350   SpO2 96 % 07/23/24 1357   Vitals shown include unfiled device data.        Post Anesthesia Care and Evaluation    Patient location during evaluation: bedside  Patient participation: complete - patient participated  Level of consciousness: awake and alert  Pain management: adequate    Airway patency: patent  Anesthetic complications: No anesthetic complications    Cardiovascular status: acceptable  Respiratory status: acceptable  Hydration status: acceptable    Comments: /70 (BP Location: Right leg, Patient Position: Lying)   Pulse 70   Resp 18   Ht 152.4 cm (60\")   Wt 82.7 kg (182 lb 4.8 oz)   LMP  (LMP Unknown)   SpO2 95%   BMI 35.60 kg/m²     "

## 2024-07-23 NOTE — ANESTHESIA PREPROCEDURE EVALUATION
Anesthesia Evaluation     Patient summary reviewed and Nursing notes reviewed   NPO Solid Status: > 8 hours  NPO Liquid Status: > 8 hours           Airway   Mallampati: II  TM distance: >3 FB  Neck ROM: full  No difficulty expected  Dental - normal exam     Pulmonary - normal exam    breath sounds clear to auscultation  (+) asthma,sleep apnea  Cardiovascular - normal exam    Rhythm: regular  Rate: normal    (+) hypertension 2 medications or greater      Neuro/Psych  (+) psychiatric history Anxiety and Depression  GI/Hepatic/Renal/Endo    (+) obesity, morbid obesity, renal disease- CRI, diabetes mellitus type 2    ROS Comment: Duodenal adenoma    Musculoskeletal (-) negative ROS    Abdominal   (+) obese   Substance History - negative use     OB/GYN negative ob/gyn ROS         Other      history of cancer      Other Comment: Tempora arteritis                    Anesthesia Plan    ASA 3     MAC     intravenous induction     Anesthetic plan, risks, benefits, and alternatives have been provided, discussed and informed consent has been obtained with: patient.

## 2024-07-24 ENCOUNTER — TELEPHONE (OUTPATIENT)
Dept: SURGERY | Facility: CLINIC | Age: 79
End: 2024-07-24
Payer: MEDICARE

## 2024-07-24 DIAGNOSIS — C18.2 MALIGNANT NEOPLASM OF ASCENDING COLON: ICD-10-CM

## 2024-07-24 DIAGNOSIS — K63.89 COLONIC MASS: Primary | ICD-10-CM

## 2024-07-24 LAB
LAB AP CASE REPORT: NORMAL
PATH REPORT.FINAL DX SPEC: NORMAL
PATH REPORT.GROSS SPEC: NORMAL

## 2024-07-24 NOTE — TELEPHONE ENCOUNTER
Discussed with patient about the results of the upper endoscopy and colonoscopy    Final Diagnosis   1. Duodenum, second portion polyp, polypectomy:               A. Adenomatous polyp with low-grade dysplasia.     2. Stomach, biopsy:               A. Prepyloric mucosa with chronic inactive gastritis and reactive epithelial changes.               B. Negative for intestinal metaplasia.               C. Negative for H. pylori-type organisms on routine stain.     3. Gastroesophageal junction, biopsy:               A. Squamocolumnar mucosa with chronic inflammation and reactive foveolar hyperplasia.               B. Negative for intestinal metaplasia.     4. Proximal ascending colon, mass, biopsy:               A. 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               B. No histologic evidence of invasive carcinoma in this sample.     Patient has an adenomatous polyp in the duodenum that was completely removed with low-grade dysplasia, she has chronic inflammation of the lower esophagus.  She is already taking antiacid medication.  She has a large mass in the right colon at the proximal ascending area that is consistent with tubulovillous adenoma with high-grade dysplasia.  Discussed with the patient about further step.  I think that she should undergo CT scan abdomen pelvis to rule out metastatic disease although no invasive carcinoma was finding the polyp that I think is likely due to sampling error due to the size of the mass.  She should also have CEA drawn.  She will follow-up with her cardiology for cardiac clearance.  She understand that she will need to have right hemicolectomy in the near future.  She will follow-up with me after results are available

## 2024-08-01 ENCOUNTER — HOSPITAL ENCOUNTER (OUTPATIENT)
Dept: CT IMAGING | Facility: HOSPITAL | Age: 79
Discharge: HOME | End: 2024-08-01
Admitting: SURGERY
Payer: MEDICARE

## 2024-08-01 DIAGNOSIS — K63.89 COLONIC MASS: ICD-10-CM

## 2024-08-01 PROCEDURE — 74176 CT ABD & PELVIS W/O CONTRAST: CPT

## 2024-08-05 ENCOUNTER — TELEPHONE (OUTPATIENT)
Dept: SURGERY | Facility: CLINIC | Age: 79
End: 2024-08-05
Payer: MEDICARE

## 2024-08-05 NOTE — TELEPHONE ENCOUNTER
Discussed with the patient about the findings on CT scan.  She has abnormality on the right colon that corresponds to the area of the mass.  Patient to follow-up with her cardiologist this Friday and will call me regarding timing for surgery.  She understood

## 2024-08-28 ENCOUNTER — TRANSCRIBE ORDERS (OUTPATIENT)
Dept: LAB | Facility: HOSPITAL | Age: 79
End: 2024-08-28
Payer: MEDICARE

## 2024-08-28 ENCOUNTER — TELEPHONE (OUTPATIENT)
Dept: SURGERY | Facility: CLINIC | Age: 79
End: 2024-08-28
Payer: MEDICARE

## 2024-08-28 ENCOUNTER — LAB (OUTPATIENT)
Dept: LAB | Facility: HOSPITAL | Age: 79
End: 2024-08-28
Payer: MEDICARE

## 2024-08-28 DIAGNOSIS — E11.22 TYPE 2 DIABETES MELLITUS WITH ESRD (END-STAGE RENAL DISEASE): ICD-10-CM

## 2024-08-28 DIAGNOSIS — E11.22 TYPE 2 DIABETES MELLITUS WITH ESRD (END-STAGE RENAL DISEASE): Primary | ICD-10-CM

## 2024-08-28 DIAGNOSIS — N18.6 TYPE 2 DIABETES MELLITUS WITH ESRD (END-STAGE RENAL DISEASE): Primary | ICD-10-CM

## 2024-08-28 DIAGNOSIS — E87.5 HYPERPOTASSEMIA: ICD-10-CM

## 2024-08-28 DIAGNOSIS — R60.0 LOCALIZED EDEMA: ICD-10-CM

## 2024-08-28 DIAGNOSIS — N18.6 TYPE 2 DIABETES MELLITUS WITH ESRD (END-STAGE RENAL DISEASE): ICD-10-CM

## 2024-08-28 DIAGNOSIS — N18.30 STAGE 3 CHRONIC KIDNEY DISEASE, UNSPECIFIED WHETHER STAGE 3A OR 3B CKD: ICD-10-CM

## 2024-08-28 DIAGNOSIS — N18.30 STAGE 3 CHRONIC KIDNEY DISEASE, UNSPECIFIED WHETHER STAGE 3A OR 3B CKD: Primary | ICD-10-CM

## 2024-08-28 LAB
ALBUMIN SERPL-MCNC: 3.7 G/DL (ref 3.5–5.2)
ALBUMIN/GLOB SERPL: 1.2 G/DL
ALP SERPL-CCNC: 134 U/L (ref 39–117)
ALT SERPL W P-5'-P-CCNC: 15 U/L (ref 1–33)
ANION GAP SERPL CALCULATED.3IONS-SCNC: 6.5 MMOL/L (ref 5–15)
AST SERPL-CCNC: 14 U/L (ref 1–32)
BACTERIA UR QL AUTO: ABNORMAL /HPF
BASOPHILS # BLD AUTO: 0.04 10*3/MM3 (ref 0–0.2)
BASOPHILS NFR BLD AUTO: 0.4 % (ref 0–1.5)
BILIRUB SERPL-MCNC: 0.4 MG/DL (ref 0–1.2)
BILIRUB UR QL STRIP: NEGATIVE
BUN SERPL-MCNC: 37 MG/DL (ref 8–23)
BUN/CREAT SERPL: 28.9 (ref 7–25)
CALCIUM SPEC-SCNC: 9.4 MG/DL (ref 8.6–10.5)
CHLORIDE SERPL-SCNC: 101 MMOL/L (ref 98–107)
CLARITY UR: CLEAR
CO2 SERPL-SCNC: 26.5 MMOL/L (ref 22–29)
COLOR UR: YELLOW
CREAT SERPL-MCNC: 1.28 MG/DL (ref 0.57–1)
CREAT UR-MCNC: 95.8 MG/DL
DEPRECATED RDW RBC AUTO: 43.2 FL (ref 37–54)
EGFRCR SERPLBLD CKD-EPI 2021: 43 ML/MIN/1.73
EOSINOPHIL # BLD AUTO: 0.08 10*3/MM3 (ref 0–0.4)
EOSINOPHIL NFR BLD AUTO: 0.7 % (ref 0.3–6.2)
ERYTHROCYTE [DISTWIDTH] IN BLOOD BY AUTOMATED COUNT: 12.1 % (ref 12.3–15.4)
GLOBULIN UR ELPH-MCNC: 3 GM/DL
GLUCOSE SERPL-MCNC: 211 MG/DL (ref 65–99)
GLUCOSE UR STRIP-MCNC: NEGATIVE MG/DL
HCT VFR BLD AUTO: 38.6 % (ref 34–46.6)
HGB BLD-MCNC: 12.4 G/DL (ref 12–15.9)
HGB UR QL STRIP.AUTO: ABNORMAL
HYALINE CASTS UR QL AUTO: ABNORMAL /LPF
IMM GRANULOCYTES # BLD AUTO: 0.1 10*3/MM3 (ref 0–0.05)
IMM GRANULOCYTES NFR BLD AUTO: 0.9 % (ref 0–0.5)
KETONES UR QL STRIP: NEGATIVE
LEUKOCYTE ESTERASE UR QL STRIP.AUTO: NEGATIVE
LYMPHOCYTES # BLD AUTO: 0.71 10*3/MM3 (ref 0.7–3.1)
LYMPHOCYTES NFR BLD AUTO: 6.3 % (ref 19.6–45.3)
MAGNESIUM SERPL-MCNC: 1.5 MG/DL (ref 1.6–2.4)
MCH RBC QN AUTO: 31.4 PG (ref 26.6–33)
MCHC RBC AUTO-ENTMCNC: 32.1 G/DL (ref 31.5–35.7)
MCV RBC AUTO: 97.7 FL (ref 79–97)
MONOCYTES # BLD AUTO: 0.66 10*3/MM3 (ref 0.1–0.9)
MONOCYTES NFR BLD AUTO: 5.9 % (ref 5–12)
NEUTROPHILS NFR BLD AUTO: 85.8 % (ref 42.7–76)
NEUTROPHILS NFR BLD AUTO: 9.61 10*3/MM3 (ref 1.7–7)
NITRITE UR QL STRIP: NEGATIVE
NRBC BLD AUTO-RTO: 0 /100 WBC (ref 0–0.2)
PH UR STRIP.AUTO: 5.5 [PH] (ref 5–8)
PHOSPHATE SERPL-MCNC: 3.5 MG/DL (ref 2.5–4.5)
PLATELET # BLD AUTO: 220 10*3/MM3 (ref 140–450)
PMV BLD AUTO: 9.6 FL (ref 6–12)
POTASSIUM SERPL-SCNC: 5.9 MMOL/L (ref 3.5–5.2)
PROT ?TM UR-MCNC: 153 MG/DL
PROT SERPL-MCNC: 6.7 G/DL (ref 6–8.5)
PROT UR QL STRIP: ABNORMAL
PROT/CREAT UR: 1597.1 MG/G CREA (ref 0–200)
PTH-INTACT SERPL-MCNC: 57 PG/ML (ref 15–65)
RBC # BLD AUTO: 3.95 10*6/MM3 (ref 3.77–5.28)
RBC # UR STRIP: ABNORMAL /HPF
REF LAB TEST METHOD: ABNORMAL
SODIUM SERPL-SCNC: 134 MMOL/L (ref 136–145)
SP GR UR STRIP: 1.02 (ref 1–1.03)
SQUAMOUS #/AREA URNS HPF: ABNORMAL /HPF
UROBILINOGEN UR QL STRIP: ABNORMAL
WBC # UR STRIP: ABNORMAL /HPF
WBC NRBC COR # BLD AUTO: 11.2 10*3/MM3 (ref 3.4–10.8)

## 2024-08-28 PROCEDURE — 80053 COMPREHEN METABOLIC PANEL: CPT

## 2024-08-28 PROCEDURE — 84100 ASSAY OF PHOSPHORUS: CPT | Performed by: INTERNAL MEDICINE

## 2024-08-28 PROCEDURE — 85025 COMPLETE CBC W/AUTO DIFF WBC: CPT | Performed by: INTERNAL MEDICINE

## 2024-08-28 PROCEDURE — 36415 COLL VENOUS BLD VENIPUNCTURE: CPT

## 2024-08-28 PROCEDURE — 84156 ASSAY OF PROTEIN URINE: CPT

## 2024-08-28 PROCEDURE — 81001 URINALYSIS AUTO W/SCOPE: CPT

## 2024-08-28 PROCEDURE — 82570 ASSAY OF URINE CREATININE: CPT

## 2024-08-28 PROCEDURE — 83735 ASSAY OF MAGNESIUM: CPT

## 2024-08-28 PROCEDURE — 83970 ASSAY OF PARATHORMONE: CPT

## 2024-08-28 NOTE — TELEPHONE ENCOUNTER
Sandra from Dr. Elliott's office (cardiologist) calling on behalf of patient.  Patient is concerned that her cardiac CT which is scheduled for 09/27 is significantly delaying her colon mass removal surgery.  She is wanting to know if having surgery delayed is problematic.  Please advise.

## 2024-09-09 ENCOUNTER — LAB (OUTPATIENT)
Dept: LAB | Facility: HOSPITAL | Age: 79
End: 2024-09-09
Payer: MEDICARE

## 2024-09-09 ENCOUNTER — TRANSCRIBE ORDERS (OUTPATIENT)
Dept: LAB | Facility: HOSPITAL | Age: 79
End: 2024-09-09
Payer: MEDICARE

## 2024-09-09 DIAGNOSIS — E87.6 POTASSIUM (K) DEFICIENCY: ICD-10-CM

## 2024-09-09 DIAGNOSIS — E87.6 POTASSIUM (K) DEFICIENCY: Primary | ICD-10-CM

## 2024-09-09 LAB
ANION GAP SERPL CALCULATED.3IONS-SCNC: 10 MMOL/L (ref 5–15)
BUN SERPL-MCNC: 33 MG/DL (ref 8–23)
BUN/CREAT SERPL: 26.6 (ref 7–25)
CALCIUM SPEC-SCNC: 9.4 MG/DL (ref 8.6–10.5)
CHLORIDE SERPL-SCNC: 99 MMOL/L (ref 98–107)
CO2 SERPL-SCNC: 27 MMOL/L (ref 22–29)
CREAT SERPL-MCNC: 1.24 MG/DL (ref 0.57–1)
EGFRCR SERPLBLD CKD-EPI 2021: 44.6 ML/MIN/1.73
GLUCOSE SERPL-MCNC: 235 MG/DL (ref 65–99)
POTASSIUM SERPL-SCNC: 5.4 MMOL/L (ref 3.5–5.2)
SODIUM SERPL-SCNC: 136 MMOL/L (ref 136–145)

## 2024-09-09 PROCEDURE — 80048 BASIC METABOLIC PNL TOTAL CA: CPT

## 2024-09-09 PROCEDURE — 36415 COLL VENOUS BLD VENIPUNCTURE: CPT

## 2024-09-30 ENCOUNTER — TRANSCRIBE ORDERS (OUTPATIENT)
Dept: ADMINISTRATIVE | Facility: HOSPITAL | Age: 79
End: 2024-09-30
Payer: MEDICARE

## 2024-09-30 ENCOUNTER — LAB (OUTPATIENT)
Dept: LAB | Facility: HOSPITAL | Age: 79
End: 2024-09-30
Payer: MEDICARE

## 2024-09-30 DIAGNOSIS — E66.01 MORBID OBESITY: ICD-10-CM

## 2024-09-30 DIAGNOSIS — E78.5 HYPERLIPIDEMIA, UNSPECIFIED HYPERLIPIDEMIA TYPE: ICD-10-CM

## 2024-09-30 DIAGNOSIS — Z00.01 ENCOUNTER FOR GENERAL ADULT MEDICAL EXAMINATION WITH ABNORMAL FINDINGS: Primary | ICD-10-CM

## 2024-09-30 DIAGNOSIS — E11.65 INADEQUATELY CONTROLLED DIABETES MELLITUS: ICD-10-CM

## 2024-09-30 DIAGNOSIS — Z00.01 ENCOUNTER FOR GENERAL ADULT MEDICAL EXAMINATION WITH ABNORMAL FINDINGS: ICD-10-CM

## 2024-09-30 LAB
ALBUMIN SERPL-MCNC: 3.7 G/DL (ref 3.5–5.2)
ALBUMIN/GLOB SERPL: 1.2 G/DL
ALP SERPL-CCNC: 148 U/L (ref 39–117)
ALT SERPL W P-5'-P-CCNC: 14 U/L (ref 1–33)
ANION GAP SERPL CALCULATED.3IONS-SCNC: 10 MMOL/L (ref 5–15)
AST SERPL-CCNC: 11 U/L (ref 1–32)
BACTERIA UR QL AUTO: NORMAL /HPF
BASOPHILS # BLD AUTO: 0.04 10*3/MM3 (ref 0–0.2)
BASOPHILS NFR BLD AUTO: 0.4 % (ref 0–1.5)
BILIRUB SERPL-MCNC: 0.3 MG/DL (ref 0–1.2)
BILIRUB UR QL STRIP: NEGATIVE
BUN SERPL-MCNC: 31 MG/DL (ref 8–23)
BUN/CREAT SERPL: 26.5 (ref 7–25)
CALCIUM SPEC-SCNC: 9.8 MG/DL (ref 8.6–10.5)
CHLORIDE SERPL-SCNC: 101 MMOL/L (ref 98–107)
CHOLEST SERPL-MCNC: 198 MG/DL (ref 0–200)
CLARITY UR: CLEAR
CO2 SERPL-SCNC: 25 MMOL/L (ref 22–29)
COLOR UR: YELLOW
CREAT SERPL-MCNC: 1.17 MG/DL (ref 0.57–1)
DEPRECATED RDW RBC AUTO: 41.9 FL (ref 37–54)
EGFRCR SERPLBLD CKD-EPI 2021: 47.9 ML/MIN/1.73
EOSINOPHIL # BLD AUTO: 0.1 10*3/MM3 (ref 0–0.4)
EOSINOPHIL NFR BLD AUTO: 0.9 % (ref 0.3–6.2)
ERYTHROCYTE [DISTWIDTH] IN BLOOD BY AUTOMATED COUNT: 11.9 % (ref 12.3–15.4)
GLOBULIN UR ELPH-MCNC: 3.1 GM/DL
GLUCOSE SERPL-MCNC: 228 MG/DL (ref 65–99)
GLUCOSE UR STRIP-MCNC: ABNORMAL MG/DL
HBA1C MFR BLD: 8.3 % (ref 4.8–5.6)
HCT VFR BLD AUTO: 39.7 % (ref 34–46.6)
HDLC SERPL-MCNC: 50 MG/DL (ref 40–60)
HGB BLD-MCNC: 12.7 G/DL (ref 12–15.9)
HGB UR QL STRIP.AUTO: ABNORMAL
HYALINE CASTS UR QL AUTO: NORMAL /LPF
IMM GRANULOCYTES # BLD AUTO: 0.12 10*3/MM3 (ref 0–0.05)
IMM GRANULOCYTES NFR BLD AUTO: 1.1 % (ref 0–0.5)
KETONES UR QL STRIP: NEGATIVE
LDLC SERPL CALC-MCNC: 115 MG/DL (ref 0–100)
LDLC/HDLC SERPL: 2.2 {RATIO}
LEUKOCYTE ESTERASE UR QL STRIP.AUTO: NEGATIVE
LYMPHOCYTES # BLD AUTO: 0.82 10*3/MM3 (ref 0.7–3.1)
LYMPHOCYTES NFR BLD AUTO: 7.7 % (ref 19.6–45.3)
MCH RBC QN AUTO: 31 PG (ref 26.6–33)
MCHC RBC AUTO-ENTMCNC: 32 G/DL (ref 31.5–35.7)
MCV RBC AUTO: 96.8 FL (ref 79–97)
MONOCYTES # BLD AUTO: 0.63 10*3/MM3 (ref 0.1–0.9)
MONOCYTES NFR BLD AUTO: 5.9 % (ref 5–12)
NEUTROPHILS NFR BLD AUTO: 8.96 10*3/MM3 (ref 1.7–7)
NEUTROPHILS NFR BLD AUTO: 84 % (ref 42.7–76)
NITRITE UR QL STRIP: NEGATIVE
NRBC BLD AUTO-RTO: 0 /100 WBC (ref 0–0.2)
PH UR STRIP.AUTO: 5.5 [PH] (ref 5–8)
PLATELET # BLD AUTO: 235 10*3/MM3 (ref 140–450)
PMV BLD AUTO: 9.5 FL (ref 6–12)
POTASSIUM SERPL-SCNC: 5.6 MMOL/L (ref 3.5–5.2)
PROT SERPL-MCNC: 6.8 G/DL (ref 6–8.5)
PROT UR QL STRIP: ABNORMAL
RBC # BLD AUTO: 4.1 10*6/MM3 (ref 3.77–5.28)
RBC # UR STRIP: NORMAL /HPF
REF LAB TEST METHOD: NORMAL
SODIUM SERPL-SCNC: 136 MMOL/L (ref 136–145)
SP GR UR STRIP: 1.02 (ref 1–1.03)
SQUAMOUS #/AREA URNS HPF: NORMAL /HPF
TRIGL SERPL-MCNC: 191 MG/DL (ref 0–150)
TSH SERPL DL<=0.05 MIU/L-ACNC: 1.17 UIU/ML (ref 0.27–4.2)
UROBILINOGEN UR QL STRIP: ABNORMAL
VLDLC SERPL-MCNC: 33 MG/DL (ref 5–40)
WBC # UR STRIP: NORMAL /HPF
WBC NRBC COR # BLD AUTO: 10.67 10*3/MM3 (ref 3.4–10.8)

## 2024-09-30 PROCEDURE — 81001 URINALYSIS AUTO W/SCOPE: CPT

## 2024-09-30 PROCEDURE — 36415 COLL VENOUS BLD VENIPUNCTURE: CPT

## 2024-09-30 PROCEDURE — 80053 COMPREHEN METABOLIC PANEL: CPT

## 2024-09-30 PROCEDURE — 85025 COMPLETE CBC W/AUTO DIFF WBC: CPT

## 2024-09-30 PROCEDURE — 83036 HEMOGLOBIN GLYCOSYLATED A1C: CPT

## 2024-09-30 PROCEDURE — 84443 ASSAY THYROID STIM HORMONE: CPT

## 2024-09-30 PROCEDURE — 80061 LIPID PANEL: CPT

## 2024-10-03 ENCOUNTER — TRANSCRIBE ORDERS (OUTPATIENT)
Dept: ADMINISTRATIVE | Facility: HOSPITAL | Age: 79
End: 2024-10-03
Payer: MEDICARE

## 2024-10-03 DIAGNOSIS — I25.10 CAD, MULTIPLE VESSEL: Primary | ICD-10-CM

## 2024-10-09 ENCOUNTER — HOSPITAL ENCOUNTER (OUTPATIENT)
Dept: NUCLEAR MEDICINE | Facility: HOSPITAL | Age: 79
Discharge: HOME | End: 2024-10-09
Payer: MEDICARE

## 2024-10-09 DIAGNOSIS — I25.10 CAD, MULTIPLE VESSEL: ICD-10-CM

## 2024-10-09 LAB
BH CV REST NUCLEAR ISOTOPE DOSE: 12 MCI
BH CV STRESS COMMENTS STAGE 1: NORMAL
BH CV STRESS DOSE REGADENOSON STAGE 1: 0.4
BH CV STRESS DURATION MIN STAGE 1: 0
BH CV STRESS DURATION SEC STAGE 1: 10
BH CV STRESS NUCLEAR ISOTOPE DOSE: 36 MCI
BH CV STRESS PROTOCOL 1: NORMAL
BH CV STRESS RECOVERY BP: NORMAL MMHG
BH CV STRESS RECOVERY HR: 97 BPM
BH CV STRESS STAGE 1: 1
LV EF NUC BP: 73 %
MAXIMAL PREDICTED HEART RATE: 142 BPM
PERCENT MAX PREDICTED HR: 76.06 %
STRESS BASELINE BP: NORMAL MMHG
STRESS BASELINE HR: 80 BPM
STRESS PERCENT HR: 89 %
STRESS POST PEAK BP: NORMAL MMHG
STRESS POST PEAK HR: 108 BPM
STRESS TARGET HR: 121 BPM

## 2024-10-09 PROCEDURE — 93017 CV STRESS TEST TRACING ONLY: CPT

## 2024-10-09 PROCEDURE — 0 TECHNETIUM TETROFOSMIN KIT: Performed by: INTERNAL MEDICINE

## 2024-10-09 PROCEDURE — A9502 TC99M TETROFOSMIN: HCPCS | Performed by: INTERNAL MEDICINE

## 2024-10-09 PROCEDURE — 78452 HT MUSCLE IMAGE SPECT MULT: CPT

## 2024-10-09 PROCEDURE — 25010000002 REGADENOSON 0.4 MG/5ML SOLUTION: Performed by: INTERNAL MEDICINE

## 2024-10-09 RX ORDER — REGADENOSON 0.08 MG/ML
0.4 INJECTION, SOLUTION INTRAVENOUS
Status: COMPLETED | OUTPATIENT
Start: 2024-10-09 | End: 2024-10-09

## 2024-10-09 RX ADMIN — TETROFOSMIN 1 DOSE: 1.38 INJECTION, POWDER, LYOPHILIZED, FOR SOLUTION INTRAVENOUS at 09:17

## 2024-10-09 RX ADMIN — TETROFOSMIN 1 DOSE: 1.38 INJECTION, POWDER, LYOPHILIZED, FOR SOLUTION INTRAVENOUS at 07:50

## 2024-10-09 RX ADMIN — REGADENOSON 0.4 MG: 0.08 INJECTION, SOLUTION INTRAVENOUS at 09:17

## 2024-11-06 ENCOUNTER — TELEPHONE (OUTPATIENT)
Dept: SURGERY | Facility: CLINIC | Age: 79
End: 2024-11-06
Payer: MEDICARE

## 2024-11-06 NOTE — TELEPHONE ENCOUNTER
**    Caller: Blanca Sosa    Relationship to patient: Self    Best call back number: [phone]    Patient is needing: PT STATED SHE WENT TO CARDIO DR GUNN AND WANTS TO KNOW WHEN DR CALI WILL SCHEDULE COLON SURGERY.

## 2024-11-15 ENCOUNTER — OFFICE VISIT (OUTPATIENT)
Dept: SURGERY | Facility: CLINIC | Age: 79
End: 2024-11-15
Payer: MEDICARE

## 2024-11-15 VITALS
SYSTOLIC BLOOD PRESSURE: 188 MMHG | DIASTOLIC BLOOD PRESSURE: 68 MMHG | HEIGHT: 60 IN | OXYGEN SATURATION: 96 % | HEART RATE: 63 BPM | BODY MASS INDEX: 37.58 KG/M2 | WEIGHT: 191.4 LBS

## 2024-11-15 DIAGNOSIS — K63.89 COLONIC MASS: Primary | ICD-10-CM

## 2024-11-15 PROCEDURE — 3078F DIAST BP <80 MM HG: CPT | Performed by: SURGERY

## 2024-11-15 PROCEDURE — 1159F MED LIST DOCD IN RCRD: CPT | Performed by: SURGERY

## 2024-11-15 PROCEDURE — 99214 OFFICE O/P EST MOD 30 MIN: CPT | Performed by: SURGERY

## 2024-11-15 PROCEDURE — 1160F RVW MEDS BY RX/DR IN RCRD: CPT | Performed by: SURGERY

## 2024-11-15 PROCEDURE — 3077F SYST BP >= 140 MM HG: CPT | Performed by: SURGERY

## 2024-11-15 RX ORDER — HEPARIN SODIUM 5000 [USP'U]/ML
5000 INJECTION, SOLUTION INTRAVENOUS; SUBCUTANEOUS
OUTPATIENT
Start: 2024-11-15

## 2024-11-15 RX ORDER — NEOMYCIN SULFATE 500 MG/1
1000 TABLET ORAL 3 TIMES DAILY
Qty: 6 TABLET | Refills: 0 | Status: SHIPPED | OUTPATIENT
Start: 2024-11-15 | End: 2024-11-16

## 2024-11-15 RX ORDER — ALVIMOPAN 12 MG/1
12 CAPSULE ORAL ONCE
OUTPATIENT
Start: 2024-11-15 | End: 2024-11-22

## 2024-11-15 RX ORDER — CHLORHEXIDINE GLUCONATE 40 MG/ML
1 SOLUTION TOPICAL 2 TIMES DAILY
Qty: 236 ML | Refills: 0 | Status: SHIPPED | OUTPATIENT
Start: 2024-11-15

## 2024-11-15 RX ORDER — BISOPROLOL FUMARATE 10 MG/1
20 TABLET, FILM COATED ORAL DAILY
COMMUNITY

## 2024-11-15 RX ORDER — GABAPENTIN 100 MG/1
600 CAPSULE ORAL 3 TIMES DAILY
OUTPATIENT
Start: 2024-11-15

## 2024-11-15 RX ORDER — ERYTHROMYCIN 500 MG/1
1000 TABLET, COATED ORAL 3 TIMES DAILY
Qty: 6 TABLET | Refills: 0 | Status: SHIPPED | OUTPATIENT
Start: 2024-11-15 | End: 2024-11-16

## 2024-11-15 RX ORDER — ACETAMINOPHEN 500 MG
1000 TABLET ORAL EVERY 6 HOURS
OUTPATIENT
Start: 2024-11-15

## 2024-11-18 NOTE — PROGRESS NOTES
CC: Colon mass     HPI: Patient is a very pleasant 79-year-old female that is here today for follow-up for discussion for possible surgery regarding her large proximal ascending colon mass.  Patient underwent upper endoscopy and colonoscopy on 2024.  She was found to have adenomatous polyp in the duodenum that was completely removed was found to have low-grade dysplasia.  She was found to have a large mass in the right colon at the proximal ascending area that was consistent with tubulovillous adenoma with high-grade dysplasia.  She underwent CT scan of the abdomen and pelvis as well as chest x-ray.  She was found to have mass in the proximal ascending colon.  Otherwise there was no evidence of metastatic disease.  Patient did not have any further issues with this.  She has seen her cardiologist and she was cleared for surgery and general anesthesia.  She has been also following up with Dr. Lang for shortness of breath.  She was referred to him because of shortness of breath that is her only complaint at the moment.     PMH: Hyperlipidemia, hypertension, morbid obesity, duodenal polyp, colon polyp, chronic kidney disease stage III, iron deficiency anemia     PSH:   -Hysterectomy  -Left mastectomy  -Eye surgery  -Open cholecystectomy  -  -Left rib biopsy   -Brain surgery  -Colonoscopy with polypectomy 2019  -Left mastectomy with sentinel lymph node biopsy and axillary lymph node dissection 2018  -Upper endoscopy 2019  -Bilateral temporal artery biopsy 2019  -Endoscopy 2020 x 2  -Upper endoscopy and colonoscopy 2024     MEDS: Reviewed and reconciled in epic     ALL: Revefenacin, aspirin, sulfa antibiotics     FH and SH: Multiple family members with cancer  Father with esophageal gastric cancer  Sister with throat cancer  Paternal grandfather with colon cancer  3 paternal uncles with colon cancer  Cousin with ovarian cancer  Other cousin with stomach cancer  Denies smoking drinking or taking any  "drugs     ROS:   Reports shortness of breath with exertion     PE:   Vitals:    11/15/24 0908   BP: (!) 188/68   Pulse: 63   SpO2: 96%   Weight: 86.8 kg (191 lb 6.4 oz)   Height: 152.4 cm (60\")     Body mass index is 37.38 kg/m².   Alert and oriented ×3, no acute distress.  Head is normocephalic and atraumatic.  Neck is supple  Breathing comfortable  Regular rate and rhythm, no murmurs  Abdomen is soft and nontender, is nondistended.  Well-healed right upper quadrant incision from prior cholecystectomy, well-healed infraumbilical incision  No clubbing cyanosis or edema in lower or upper extremities     Diagnostic studies:   CT scan abdomen pelvis was performed 8/1/2024 and was reviewed by me.  There is a lipomatous ileocecal valve and a focal region of 2.2 x 1.6 cm soft tissue density in the lumen of the proximal ascending colon.  No evidence of metastatic disease    CEA has not been done yet    10/28/2024: Hemoglobin 12.3, white blood cell count 10.5, platelets 223, creatinine 1.5, glucose 250, BUN 41, sodium 135 hemoglobin A1c 8.3      Colonoscopy report from 7/23/2024 showed evidence of polypoid medium-sized mass at the proximal ascending colon.  Diverticulosis.  Pathology reports show evidence of tubulovillous adenoma with high-grade dysplasia.     Assessment and plan    The patient is a very pleasant 79-year-old female with large proximal ascending colon mass with biopsy consistent with tubulovillous adenoma with grade dysplasia.  The mass was not able to be endoscopically resected.  She has multiple medical problems including stage III chronic kidney disease, diabetes, heart disease, morbid obesity, O2 dependent.  Discussed with the patient about further step.  I recommend that she undergoes robotic assisted laparoscopic right hemicolectomy.  I think that to the size of the mass there is risk that the mass may harbor invasive cancer so the only curative treatment will be resection.  She understand that this " may not be an invasive cancer on final pathology.  Risk and benefits of procedure including bleeding, infection, anastomotic leak, wound complication, cardiac and respiratory issues discussed in detail with the patient.  She was cleared by cardiology for general anesthesia.  I discussed with the patient about the need to get in touch with Dr. Lang, I will send him a message so he can evaluate her preop to ensure nothing else can be optimized prior to undergoing surgery.  We also discussed about the need to stay on a low carbohydrate diet and have her blood sugar under control prior to undergoing surgery to decrease the risk of wound related complications.    Bowel preparation, ERAS protocol explained to the patient pre and postoperative expectations for exercise, weight loss, bowel prep.    She understood     Ashwin Alvarado MD  General, Minimally Invasive and Endoscopic Surgery  [facility]     [address], [address]  [place], [place], [address]  P: [phone]  F: [phone]

## 2024-11-20 ENCOUNTER — OFFICE VISIT (OUTPATIENT)
Age: 79
End: 2024-11-20
Payer: MEDICARE

## 2024-11-20 VITALS
BODY MASS INDEX: 37.3 KG/M2 | WEIGHT: 191 LBS | SYSTOLIC BLOOD PRESSURE: 178 MMHG | HEART RATE: 102 BPM | DIASTOLIC BLOOD PRESSURE: 71 MMHG

## 2024-11-20 DIAGNOSIS — M79.89 LEG SWELLING: ICD-10-CM

## 2024-11-20 DIAGNOSIS — I87.2 VENOUS (PERIPHERAL) INSUFFICIENCY: ICD-10-CM

## 2024-11-20 DIAGNOSIS — R60.0 BILATERAL LOWER EXTREMITY EDEMA: Primary | ICD-10-CM

## 2024-11-20 DIAGNOSIS — E66.01 MORBIDLY OBESE: Chronic | ICD-10-CM

## 2024-11-21 ENCOUNTER — LAB (OUTPATIENT)
Dept: LAB | Facility: HOSPITAL | Age: 79
End: 2024-11-21
Payer: MEDICARE

## 2024-11-21 ENCOUNTER — OFFICE VISIT (OUTPATIENT)
Dept: ONCOLOGY | Facility: CLINIC | Age: 79
End: 2024-11-21
Payer: MEDICARE

## 2024-11-21 VITALS
DIASTOLIC BLOOD PRESSURE: 71 MMHG | OXYGEN SATURATION: 95 % | HEIGHT: 60 IN | HEART RATE: 82 BPM | SYSTOLIC BLOOD PRESSURE: 178 MMHG | WEIGHT: 194.3 LBS | TEMPERATURE: 97.7 F | BODY MASS INDEX: 38.14 KG/M2

## 2024-11-21 DIAGNOSIS — Z12.31 SCREENING MAMMOGRAM FOR BREAST CANCER: ICD-10-CM

## 2024-11-21 DIAGNOSIS — D05.12 BREAST NEOPLASM, TIS (DCIS), LEFT: ICD-10-CM

## 2024-11-21 DIAGNOSIS — D13.2 DUODENAL ADENOMA: ICD-10-CM

## 2024-11-21 DIAGNOSIS — D05.12 BREAST NEOPLASM, TIS (DCIS), LEFT: Primary | ICD-10-CM

## 2024-11-21 DIAGNOSIS — K90.9 IRON MALABSORPTION: ICD-10-CM

## 2024-11-21 DIAGNOSIS — D50.8 OTHER IRON DEFICIENCY ANEMIA: ICD-10-CM

## 2024-11-21 LAB
ALBUMIN SERPL-MCNC: 3.5 G/DL (ref 3.5–5.2)
ALBUMIN/GLOB SERPL: 1.1 G/DL
ALP SERPL-CCNC: 123 U/L (ref 39–117)
ALT SERPL W P-5'-P-CCNC: 13 U/L (ref 1–33)
ANION GAP SERPL CALCULATED.3IONS-SCNC: 9.2 MMOL/L (ref 5–15)
AST SERPL-CCNC: 23 U/L (ref 1–32)
BASOPHILS # BLD AUTO: 0.03 10*3/MM3 (ref 0–0.2)
BASOPHILS NFR BLD AUTO: 0.3 % (ref 0–1.5)
BILIRUB SERPL-MCNC: 0.3 MG/DL (ref 0–1.2)
BUN SERPL-MCNC: 42 MG/DL (ref 8–23)
BUN/CREAT SERPL: 30.9 (ref 7–25)
CALCIUM SPEC-SCNC: 8.7 MG/DL (ref 8.6–10.5)
CHLORIDE SERPL-SCNC: 102 MMOL/L (ref 98–107)
CO2 SERPL-SCNC: 25.8 MMOL/L (ref 22–29)
CREAT SERPL-MCNC: 1.36 MG/DL (ref 0.57–1)
DEPRECATED RDW RBC AUTO: 49.1 FL (ref 37–54)
EGFRCR SERPLBLD CKD-EPI 2021: 40 ML/MIN/1.73
EOSINOPHIL # BLD AUTO: 0.15 10*3/MM3 (ref 0–0.4)
EOSINOPHIL NFR BLD AUTO: 1.6 % (ref 0.3–6.2)
ERYTHROCYTE [DISTWIDTH] IN BLOOD BY AUTOMATED COUNT: 12.7 % (ref 12.3–15.4)
FERRITIN SERPL-MCNC: 235 NG/ML (ref 13–150)
FOLATE SERPL-MCNC: 8.8 NG/ML (ref 4.78–24.2)
GLOBULIN UR ELPH-MCNC: 3.1 GM/DL
GLUCOSE SERPL-MCNC: 263 MG/DL (ref 65–99)
HCT VFR BLD AUTO: 36.7 % (ref 34–46.6)
HGB BLD-MCNC: 11.2 G/DL (ref 12–15.9)
HGB RETIC QN AUTO: 33.1 PG (ref 29.8–36.1)
IMM GRANULOCYTES # BLD AUTO: 0.09 10*3/MM3 (ref 0–0.05)
IMM GRANULOCYTES NFR BLD AUTO: 0.9 % (ref 0–0.5)
IMM RETICS NFR: 12.1 % (ref 3–15.8)
IRON 24H UR-MRATE: 50 MCG/DL (ref 37–145)
IRON SATN MFR SERPL: 16 % (ref 20–50)
LYMPHOCYTES # BLD AUTO: 0.68 10*3/MM3 (ref 0.7–3.1)
LYMPHOCYTES NFR BLD AUTO: 7.1 % (ref 19.6–45.3)
MCH RBC QN AUTO: 31.8 PG (ref 26.6–33)
MCHC RBC AUTO-ENTMCNC: 30.5 G/DL (ref 31.5–35.7)
MCV RBC AUTO: 104.3 FL (ref 79–97)
MONOCYTES # BLD AUTO: 0.82 10*3/MM3 (ref 0.1–0.9)
MONOCYTES NFR BLD AUTO: 8.5 % (ref 5–12)
NEUTROPHILS NFR BLD AUTO: 7.87 10*3/MM3 (ref 1.7–7)
NEUTROPHILS NFR BLD AUTO: 81.6 % (ref 42.7–76)
NRBC BLD AUTO-RTO: 0 /100 WBC (ref 0–0.2)
PLATELET # BLD AUTO: 205 10*3/MM3 (ref 140–450)
PMV BLD AUTO: 9.4 FL (ref 6–12)
POTASSIUM SERPL-SCNC: 4.7 MMOL/L (ref 3.5–5.2)
PROT SERPL-MCNC: 6.6 G/DL (ref 6–8.5)
RBC # BLD AUTO: 3.52 10*6/MM3 (ref 3.77–5.28)
RETICS # AUTO: 0.06 10*6/MM3 (ref 0.02–0.13)
RETICS/RBC NFR AUTO: 1.7 % (ref 0.7–1.9)
SODIUM SERPL-SCNC: 137 MMOL/L (ref 136–145)
TIBC SERPL-MCNC: 316 MCG/DL (ref 298–536)
TRANSFERRIN SERPL-MCNC: 212 MG/DL (ref 200–360)
VIT B12 BLD-MCNC: 371 PG/ML (ref 211–946)
WBC NRBC COR # BLD AUTO: 9.64 10*3/MM3 (ref 3.4–10.8)

## 2024-11-21 PROCEDURE — 85046 RETICYTE/HGB CONCENTRATE: CPT

## 2024-11-21 PROCEDURE — 84466 ASSAY OF TRANSFERRIN: CPT

## 2024-11-21 PROCEDURE — 36415 COLL VENOUS BLD VENIPUNCTURE: CPT

## 2024-11-21 PROCEDURE — 80053 COMPREHEN METABOLIC PANEL: CPT

## 2024-11-21 PROCEDURE — 82607 VITAMIN B-12: CPT | Performed by: NURSE PRACTITIONER

## 2024-11-21 PROCEDURE — 85025 COMPLETE CBC W/AUTO DIFF WBC: CPT

## 2024-11-21 PROCEDURE — 82728 ASSAY OF FERRITIN: CPT

## 2024-11-21 PROCEDURE — 83540 ASSAY OF IRON: CPT

## 2024-11-21 PROCEDURE — 82746 ASSAY OF FOLIC ACID SERUM: CPT | Performed by: NURSE PRACTITIONER

## 2024-11-21 NOTE — PROGRESS NOTES
"[facility] GROUP OUTPATIENT FOLLOW UP CLINIC VISIT    REASON FOR FOLLOW-UP:    DCIS  Iron deficiency  Colon mass    HISTORY OF PRESENT ILLNESS:  Blanca Harden is a 79 y.o. female who returns today for follow up of the above issue.      The patient returns the office today, 11/21/2024 for 6-month follow-up and review.  She is followed for DCIS, intermittent iron deficiency.  Most recently, she has been regularly following up with Dr. Alvarado.  She underwent endoscopy and colonoscopy 7/23/2024.She was found to have adenomatous polyp in the duodenum that was completely removed was found to have low-grade dysplasia. She was found to have a large mass in the right colon at the proximal ascending area that was consistent with tubulovillous adenoma with high-grade dysplasia. She underwent CT scan of the abdomen and pelvis as well as chest x-ray. She was found to have mass in the proximal ascending colon. Otherwise there was no evidence of metastatic disease.  She is now scheduled to undergo hemicolectomy 1/29/2025.  She reports Dr. Alvarado would like her to lose some weight prior to surgery.    The patient reports today she is overall feeling well.  She has no new pain.  She has no nausea or vomiting.  She has no obvious blood in her stool.  Seen today with her  who aids in translation.  She is overdue for her mammogram.    REVIEW OF SYSTEMS:  ROS as per HPI    PHYSICAL EXAMINATION:    Vitals:    11/21/24 1516   BP: 178/71   Pulse: 82   Temp: 97.7 °F (36.5 °C)   TempSrc: Oral   SpO2: 95%   Weight: 88.1 kg (194 lb 4.8 oz)   Height: 152.4 cm (60\")   PainSc: 0-No pain       Physical Exam:  General:  No acute distress, awake, alert and oriented  Skin:  Warm and dry, no visible rash  HEENT:  Normocephalic/atraumatic.  Wearing a face mask.  Chest:  Normal respiratory effort  Breasts: Status post left mastectomy with no palpable abnormalities on the chest wall.  Right breast exam with no palpable " abnormalities  Extremities:  No visible clubbing, cyanosis, or edema  Neuro/psych:  Grossly nonfocal.  Normal mood and affect.      DIAGNOSTIC DATA:  Retic With IRF & RET-He (11/21/2024 15:08)  CBC & Differential (11/21/2024 15:08)  Comprehensive Metabolic Panel (11/21/2024 15:08)  Ferritin (11/21/2024 15:08)  Iron Profile (11/21/2024 15:08)    IMAGING:    None reviewed.     ASSESSMENT:  This is a 79 y.o. female with:    *DCIS of the left breast  5/23/2019 left breast biopsy noted high-grade DCIS   ER/ID negative  7/3/2019 status post left mastectomy with Dr. Tahmina James.  Pathology noted a grade 2, single focus of microinvasion DCIS less than 1 mm extensive intraductal carcinoma.  DCIS greatest dimension 70 mm. [id number]  No recommendations for adjuvant chemotherapy or radiation  She continues in surveillance undergoing annual screening mammogram  Most recent right screening mammogram 11/17/2024 benign, B RADS category 1  Clinically no palpable abnormalities on exam today, no left chest wall abnormalities.  The patient is slightly overdue for mammogram which is not yet scheduled.  We will schedule a right screening mammogram    *Temporal arteritis  Biopsy-proven to be bilateral temporal arteritis  Previously treated with high-dose steroids, though ultimately steroids have tapered to 6 mg daily.  This is unable to be tapered further per the patient's report    *Adenomatous polyp in the duodenum   Fully resected at the time of endoscopy and colonoscopy 7/23/2024  Found to have low-grade dysplasia    *Tubulovillous adenoma with high-grade dysplasia   EGD and colonoscopy 7/23/2024 noting large mass in the right colon at the proximal ascending area.  Biopsy consistent with tubulovillous adenoma with high-grade dysplasia this was not able to be resected at the time of colonoscopy  10/1/2024 CT of the abdomen noting no evidence of intra-abdominal process  Chris for robotic assisted laparoscopic right hemicolectomy with  Dr. Alvarado 1/29/2025.  Surgery is delayed in hopes the patient will lose weight prior to surgery    *Multifactorial anemia  She has previously struggled with iron deficiency requiring IV iron replacement  She does report a history of hemolytic anemia  She also does have an element of chronic kidney disease  Today, 11/21/2024 hemoglobin is slightly declined at 11.2 without iron deficiency.  Ferritin 235, iron saturation 20%, reticulated hemoglobin is normal.  MCV is noted to be elevated today at 104.3.  Additional labs including B12 and folate are pending    *Chronic kidney disease, stage 3  Followed by Dr. Mcginnis  Her is on creatinine around 1.2  Today, 11/21/2024 creatinine 1.36.  The patient does have follow-up next week with Dr. Mcginnis  Hemoglobin declined in the future she could potentially be a candidate for Procrit though currently her hemoglobin remains greater than 10.0      PLAN:  Additional labs pending today including folate and vitamin B12  She is currently iron replete  We will schedule right screening mammogram for surveillance  The patient is scheduled to undergo right hemicolectomy with Dr. Alvarado 1/29/2025  Follow-up with Dr. Garcia early February 2025 following right hemicolectomy her to review pathology and determine if adjuvant therapy is needed pending pathology    I spent 45 minutes caring for Blanca on this date of service. This time includes time spent by me in the following activities: preparing for the visit, reviewing tests, obtaining and/or reviewing a separately obtained history, performing a medically appropriate examination and/or evaluation, counseling and educating the patient/family/caregiver, ordering medications, tests, or procedures, referring and communicating with other health care professionals, documenting information in the medical record, and care coordination.     Stacey Rodriguez, APRN  11/21/2024

## 2024-11-22 RX ORDER — LANOLIN ALCOHOL/MO/W.PET/CERES
1000 CREAM (GRAM) TOPICAL DAILY
Qty: 30 TABLET | Refills: 1 | Status: SHIPPED | OUTPATIENT
Start: 2024-11-22

## 2024-11-22 RX ORDER — FOLIC ACID 1 MG/1
1 TABLET ORAL DAILY
Qty: 30 TABLET | Refills: 1 | Status: SHIPPED | OUTPATIENT
Start: 2024-11-22

## 2024-11-23 NOTE — PROGRESS NOTES
Chief Complaint  Follow-up    Subjective        Blanca Harden presents to [facility] V[facility]ULAR [facility]ERY  History of Present Illness  79 y.o. female returns for follow up of bilateral lower extremity edema with venous insufficiency.  Overall she has been stable and continues to use her compression stockings to manage her swelling.  She has an upcoming colon surgery and is concerned about that.  No new ulcerations or leg pain.     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, grade III, ER/[place] negative    CKD (chronic kidney disease), stage III     Colon polyp 2019    Coronary artery disease     COVID-19 2023    Diabetes mellitus, type 2     Hypertension     Other specified disorders of pigmentation 2022    Sleep apnea     Swelling     IN LOWER EXTREMITIES    Temporal arteritis     Thrombophlebitis     Varicose veins of unspecified lower extremity with ulcer of calf 2022    Venous insufficiency (chronic) (peripheral) 2022       Past Surgical History:   Procedure Laterality Date    BRAIN SURGERY      BREAST BIOPSY Left  approx    benign pathology    BREAST BIOPSY Left 2019    Ultasound guided mammotome vacuum assisted left breast biopsy with placement of a metallic clip-Dr. Daniel Gutierrez, [facility]     SECTION N/A     x3    CHOLECYSTECTOMY N/A     COLONOSCOPY      COLONOSCOPY N/A 2024    Procedure: COLONOSCOPY into the cecum and TI with hot snare polypectomy;  Surgeon: Ashwin Alvarado MD;  Location: [facility] ENDOSCOPY;  Service: General;  Laterality: N/A;  pre-colon polyps  post-colon mass    COLONOSCOPY W/ POLYPECTOMY N/A 2019    Enlarged folds in the antrum: biopsied; duodenal, transverse colon x2, splenic flexure, descending colon and sigmoid colon polyps: biopsied (path:  tubular adenoma x6)-Dr. Zak De Jesus, [facility]    ENDOSCOPY  10/11/2019    Procedure: ESOPHAGOGASTRODUODENOSCOPY with hot snare polypectomy;  Surgeon: Haile Handley MD;  [facility];  Service: Gastroenterology    ENDOSCOPY N/A 01/29/2020    Procedure: ESOPHAGOGASTRODUODENOSCOPY;  Surgeon: Haile Handley MD;  Location: [facility] ENDOSCOPY;  Service: Gastroenterology    ENDOSCOPY N/A 09/09/2020    Procedure: ESOPHAGOGASTRODUODENOSCOPY WITH BIOPSIES AND COLD BIOPSY POLYPECTOMY;  Surgeon: Haile Handley MD;  Location: [facility] ENDOSCOPY;  Service: Gastroenterology;  Laterality: N/A;  pre: dyspepsia and diarrhea  post: duodenal polyp and gastritis    ENDOSCOPY N/A 7/23/2024    Procedure: ESOPHAGOGASTRODUODENOSCOPY WITH hot snare polypectomy with clip placement x 2BIOPSY;  Surgeon: Ashwin Alvarado MD;  Location: [facility] ENDOSCOPY;  Service: General;  Laterality: N/A;  pre-hx duoedenal polyp  post-duodenal polyp; gastritis    EYE SURGERY      HYSTERECTOMY Bilateral     MASTECTOMY Left     MASTECTOMY WITH SENTINEL NODE BIOPSY AND AXILLARY NODE DISSECTION Left 07/03/2019    Procedure: LEFT BREAST MASTECTOMY WITH SENTINEL NODE BIOPSY AND , v-y plasty closure;  Surgeon: Tahmina James MD;  Location: [facility] OR;  Service: General    SUBTOTAL HYSTERECTOMY Bilateral     ovaries still in tact    TEMPORAL ARTERY BIOPSY Bilateral 12/05/2019       Family History   Problem Relation Age of Onset    Heart disease Mother     Stroke Mother     Asthma Mother     Hypertension Father     Stomach cancer Father     Diabetes Father     Esophageal cancer Father     Cancer Father     Throat cancer Sister     Diabetes Paternal Grandmother     Hypertension Paternal Grandfather     Colon cancer Paternal Grandfather     Colon cancer Paternal Uncle     Colon cancer Paternal Uncle     Colon cancer Paternal Uncle     Ovarian cancer Cousin     Stomach cancer Cousin     Malig Hyperthermia Neg Hx           Social History     Tobacco Use    Smoking status: Never    Smokeless tobacco: Never    Tobacco comments:     caffine use   Vaping Use    Vaping status: Never Used   Substance Use Topics    Alcohol use: No     Comment: one or two small drinks a year    Drug use: No       Allergies: Revefenacin, Aspirin, and Sulfa antibiotics    Prior to Admission medications    Medication Sig Start Date End Date Taking? Authorizing Provider   acetaminophen (TYLENOL) 325 MG tablet Take 2 tablets by mouth Every 6 (Six) Hours As Needed for Mild Pain. 6/3/23  Yes Praneeth Valenzuela MD   albuterol (ACCUNEB) 1.25 MG/3ML nebulizer solution Inhale 3 mL 3 times a day by inhalation route.   Yes Rhea Page MD   BD Pen Needle Tila 2nd Gen 32G X 4 MM misc USE TO GIVE INSULIN 4 TIMES DAILY 6/3/24  Yes Rhea Page MD   bisoprolol (ZEBeta) 10 MG tablet Take 2 tablets by mouth Daily.   Yes Rhea Page MD   HumaLOG KwikPen 100 UNIT/ML solution pen-injector INJECT 20 UNITS SUBCUTANEOUSLY THREE TIMES DAILY BEFORE MEAL(S)   Yes Rhea Page MD   O2 (OXYGEN) Inhale 1 (One) Time.   Yes Rhea Page MD   omeprazole (priLOSEC) 20 MG capsule Take 2 capsules by mouth Daily. 7/2/24  Yes Ashwin Alvarado MD   predniSONE (DELTASONE) 1 MG tablet TAKE 1 TABLETS BY MOUTH ONCE DAILYTOTAL 6 mg qd   Yes Rhea Page MD   predniSONE (DELTASONE) 5 MG tablet Take 1 tablet by mouth Daily.   Yes Rhea Page MD   Symbicort 160-4.5 MCG/ACT inhaler Inhale 2 puffs 2 (Two) Times a Day. 5/24/24  Yes Rhea Page MD   torsemide (DEMADEX) 20 MG tablet Take 1 tablet by mouth Daily. 2/16/24  Yes Rhea Page MD Toujeo SoloStar 300 UNIT/ML solution pen-injector injection Inject  under the skin into the appropriate area as directed.   Yes Rhea Page MD   valsartan-hydrochlorothiazide (DIOVAN-HCT) 160-25 MG per tablet Take 1 tablet by mouth Daily.   Yes Kaylee  "MD Rhea   acetaminophen (Acetaminophen 8 Hour) 650 MG 8 hr tablet 1000 bid  Patient not taking: Reported on 11/21/2024    Rhea Page MD   Chlorhexidine Gluconate 4 % solution Apply 1 Application topically to the appropriate area as directed 2 (Two) Times a Day. Shower With Hibiclens Solution Twice The Day Before Surgery  Patient not taking: Reported on 11/21/2024 11/15/24   Ashwin Alvarado MD   Cholecalciferol 125 MCG (5000 UT) tablet Take 1 tablet by mouth Daily.    Rhea Page MD   clotrimazole-betamethasone (LOTRISONE) 1-0.05 % cream APPLY CREAM TOPICALLY TO AFFECTED AND SURROUNDING AREAS OF SKIN TWICE A DAY FOR 2 WEEKS (IN THE MORNING AND EVENING)  Patient not taking: Reported on 11/21/2024    Rhea Page MD   folic acid (FOLVITE) 1 MG tablet Take 1 tablet by mouth Daily. 11/22/24   Stacey Rodriguez APRN   vitamin B-12 (CYANOCOBALAMIN) 1000 MCG tablet Take 1 tablet by mouth Daily. 11/22/24   Stacey Rodriguez APRN         Objective   Vital Signs:  /71 (BP Location: Right arm)   Pulse 102   Wt 86.6 kg (191 lb)   BMI 37.30 kg/m²   Estimated body mass index is 37.3 kg/m² as calculated from the following:    Height as of 11/15/24: 152.4 cm (60\").    Weight as of this encounter: 86.6 kg (191 lb).               Physical Exam  Vitals reviewed.   Constitutional:       General: She is not in acute distress.     Appearance: Normal appearance. She is obese.   HENT:      Head: Normocephalic and atraumatic.      Right Ear: External ear normal.      Left Ear: External ear normal.      Nose: Nose normal.      Mouth/Throat:      Mouth: Mucous membranes are moist.      Pharynx: Oropharynx is clear.   Eyes:      Extraocular Movements: Extraocular movements intact.      Conjunctiva/sclera: Conjunctivae normal.      Pupils: Pupils are equal, round, and reactive to light.   Cardiovascular:      Rate and Rhythm: Normal rate and regular rhythm.      Pulses:        "    Carotid pulses are 2+ on the right side and 2+ on the left side.       Radial pulses are 2+ on the right side and 2+ on the left side.        Dorsalis pedis pulses are 2+ on the right side and 2+ on the left side.        Posterior tibial pulses are 2+ on the right side and 2+ on the left side.      Comments: Bilateral calf hyperpigmentation, no ulcerations (active or healed), no varicose veins  Pulmonary:      Comments: Non labored respirations on room air, equal chest rise  Abdominal:      General: Abdomen is flat. There is no distension.      Palpations: Abdomen is soft.   Musculoskeletal:      Cervical back: Normal range of motion. No rigidity.      Right lower leg: No edema.      Left lower leg: No edema.   Skin:     General: Skin is warm and dry.      Capillary Refill: Capillary refill takes less than 2 seconds.   Neurological:      General: No focal deficit present.      Mental Status: She is alert and oriented to person, place, and time.   Psychiatric:         Mood and Affect: Mood normal.         Behavior: Behavior normal.        Result Review :            Last Echo  Results for orders placed during the hospital encounter of 05/13/22    Adult Transthoracic Echo Complete W/ Cont if Necessary Per Protocol    Interpretation Summary  · Estimated right ventricular systolic pressure from tricuspid regurgitation is mildly elevated (35-45 mmHg). Calculated right ventricular systolic pressure from tricuspid regurgitation is 37.7 mmHg.  · Mild pulmonary hypertension is present.  · Left ventricular wall thickness is consistent with mild concentric hypertrophy.  · Calculated left ventricular EF = 66.7% Estimated left ventricular EF was in agreement with the calculated left ventricular EF. Left ventricular systolic function is normal.  · Left ventricular diastolic function is consistent with (grade Ia w/high LAP) impaired relaxation.  · C/w baseline study, there is no change in LV systolic function.      Results for  orders placed during the hospital encounter of 06/09/21    Adult Transthoracic Echo Complete W/ Cont if Necessary Per Protocol    Interpretation Summary  · Estimated right ventricular systolic pressure from tricuspid regurgitation is mildly elevated (35-45 mmHg).  · Calculated left ventricular EF = 70.3% Estimated left ventricular EF was in agreement with the calculated left ventricular EF.  · Left ventricular diastolic function is consistent with (grade Ia w/high LAP) impaired relaxation.  · There is trivial thickening of the aortic valve  · Trace mitral valve regurgitation is present.       Last Stress  Results for orders placed during the hospital encounter of 10/09/24    Stress test with myocardial perfusion one day    Interpretation Summary    Myocardial perfusion imaging indicates a moderate-sized, mild-to-moderately severe area of ischemia located in the inferior wall.    Left ventricular ejection fraction is hyperdynamic (Calculated EF > 70%).    I messaged Dr. Valenzuela and I called Dr. Elliott's office and spoke to Sandra to relay a message.      Results for orders placed during the hospital encounter of 07/05/22    Stress Test With Myocardial Perfusion One Day    Interpretation Summary  · Breast attenuation artifact is present.  · Tomographic images were obtained in the short axis, vertical long and horizontal long axis views.  · On stress images there is a small area of moderately diminished counts noted in the inferior and inferolateral walls. The remainder of the myocardium perfuses normally.  · On rest images the inferior and inferolateral wall defect appears to be fixed with no significant areas of reperfusion. The remainder the myocardium perfuses normally.  · The summed stress score is 12 and the summed rest score is 8. The summed difference score is 4.  · SPECT gated studies demonstrate normal wall motion with ejection fraction of 94%  · Study is consistent with a small fixed inferolateral wall defect  suggestive of prior infarction. No significant residual ischemia.  · Findings consistent with a normal ECG stress test.    Stress portion of the study was normal.  Nuclear portion demonstrated a fixed inferolateral wall defect.  No significant residual ischemia.  Normal wall motion.  Ejection fraction hyperdynamic at 94%       Last Cath  No results found for this or any previous visit.                Assessment and Plan     Diagnoses and all orders for this visit:    1. Bilateral lower extremity edema (Primary)    2. Venous (peripheral) insufficiency    3. Leg swelling    4. Morbidly obese    Blanca Harden returns for follow up of her bilateral leg swelling with venous insufficiency.  Her symptoms are stable and she is using compression stockings to manage her symptoms. We discussed that this will be a chronic problem and will need to be managed with compression long term.  We also discussed the importance of maintaining a healthy weight and exercise in improving lower extremity swelling.  Since she is stable, she will follow up as needed.          Follow Up     Return if symptoms worsen or fail to improve.  Patient was given instructions and counseling regarding her condition or for health maintenance advice. Please see specific information pulled into the AVS if appropriate.

## 2025-01-22 ENCOUNTER — HOSPITAL ENCOUNTER (OUTPATIENT)
Dept: MAMMOGRAPHY | Facility: HOSPITAL | Age: 80
Discharge: HOME | End: 2025-01-22
Payer: MEDICARE

## 2025-01-22 ENCOUNTER — PRE-ADMISSION TESTING (OUTPATIENT)
Dept: PREADMISSION TESTING | Facility: HOSPITAL | Age: 80
End: 2025-01-22
Payer: MEDICARE

## 2025-01-22 ENCOUNTER — TELEPHONE (OUTPATIENT)
Dept: ONCOLOGY | Facility: CLINIC | Age: 80
End: 2025-01-22
Payer: MEDICARE

## 2025-01-22 VITALS
TEMPERATURE: 97.7 F | RESPIRATION RATE: 20 BRPM | HEIGHT: 58 IN | SYSTOLIC BLOOD PRESSURE: 152 MMHG | WEIGHT: 184.1 LBS | HEART RATE: 87 BPM | BODY MASS INDEX: 38.65 KG/M2 | DIASTOLIC BLOOD PRESSURE: 83 MMHG | OXYGEN SATURATION: 97 %

## 2025-01-22 DIAGNOSIS — K63.89 COLONIC MASS: ICD-10-CM

## 2025-01-22 DIAGNOSIS — Z12.31 SCREENING MAMMOGRAM FOR BREAST CANCER: ICD-10-CM

## 2025-01-22 LAB
ABO GROUP BLD: NORMAL
ANION GAP SERPL CALCULATED.3IONS-SCNC: 10.2 MMOL/L (ref 5–15)
BLD GP AB SCN SERPL QL: NEGATIVE
BUN SERPL-MCNC: 31 MG/DL (ref 8–23)
BUN/CREAT SERPL: 25.8 (ref 7–25)
CALCIUM SPEC-SCNC: 9.5 MG/DL (ref 8.6–10.5)
CHLORIDE SERPL-SCNC: 103 MMOL/L (ref 98–107)
CO2 SERPL-SCNC: 22.8 MMOL/L (ref 22–29)
CREAT SERPL-MCNC: 1.2 MG/DL (ref 0.57–1)
DEPRECATED RDW RBC AUTO: 41.2 FL (ref 37–54)
EGFRCR SERPLBLD CKD-EPI 2021: 46.4 ML/MIN/1.73
ERYTHROCYTE [DISTWIDTH] IN BLOOD BY AUTOMATED COUNT: 11.8 % (ref 12.3–15.4)
GLUCOSE SERPL-MCNC: 175 MG/DL (ref 65–99)
HBA1C MFR BLD: 7.6 % (ref 4.8–5.6)
HCT VFR BLD AUTO: 38.1 % (ref 34–46.6)
HGB BLD-MCNC: 12.3 G/DL (ref 12–15.9)
MCH RBC QN AUTO: 30.8 PG (ref 26.6–33)
MCHC RBC AUTO-ENTMCNC: 32.3 G/DL (ref 31.5–35.7)
MCV RBC AUTO: 95.3 FL (ref 79–97)
PLATELET # BLD AUTO: 246 10*3/MM3 (ref 140–450)
PMV BLD AUTO: 9.3 FL (ref 6–12)
POTASSIUM SERPL-SCNC: 4.9 MMOL/L (ref 3.5–5.2)
QT INTERVAL: 338 MS
QTC INTERVAL: 402 MS
RBC # BLD AUTO: 4 10*6/MM3 (ref 3.77–5.28)
RH BLD: POSITIVE
SODIUM SERPL-SCNC: 136 MMOL/L (ref 136–145)
T&S EXPIRATION DATE: NORMAL
WBC NRBC COR # BLD AUTO: 10.62 10*3/MM3 (ref 3.4–10.8)

## 2025-01-22 PROCEDURE — 36415 COLL VENOUS BLD VENIPUNCTURE: CPT

## 2025-01-22 PROCEDURE — 93005 ELECTROCARDIOGRAM TRACING: CPT

## 2025-01-22 PROCEDURE — 83036 HEMOGLOBIN GLYCOSYLATED A1C: CPT | Performed by: SURGERY

## 2025-01-22 PROCEDURE — 86900 BLOOD TYPING SEROLOGIC ABO: CPT

## 2025-01-22 PROCEDURE — 86850 RBC ANTIBODY SCREEN: CPT

## 2025-01-22 PROCEDURE — 77067 SCR MAMMO BI INCL CAD: CPT

## 2025-01-22 PROCEDURE — 86901 BLOOD TYPING SEROLOGIC RH(D): CPT

## 2025-01-22 PROCEDURE — 80048 BASIC METABOLIC PNL TOTAL CA: CPT

## 2025-01-22 PROCEDURE — 85027 COMPLETE CBC AUTOMATED: CPT

## 2025-01-22 PROCEDURE — 77063 BREAST TOMOSYNTHESIS BI: CPT

## 2025-01-22 RX ORDER — VALSARTAN AND HYDROCHLOROTHIAZIDE 320; 25 MG/1; MG/1
1 TABLET, FILM COATED ORAL DAILY
COMMUNITY
Start: 2025-01-10

## 2025-01-22 NOTE — DISCHARGE INSTRUCTIONS
CHLORHEXIDINE CLOTH INSTRUCTIONS  The morning of surgery follow these instructions using the Chlorhexidine cloths you've been given.  These steps reduce bacteria on the body.  Do not use the cloths near your eyes, ears mouth, genitalia or on open wounds.  Throw the cloths away after use but do not try to flush them down a toilet.      Open and remove one cloth at a time from the package.    Leave the cloth unfolded and begin the bathing.  Massage the skin with the cloths using gentle pressure to remove bacteria.  Do not scrub harshly.   Follow the steps below with one 2% CHG cloth per area (6 total cloths).  One cloth for neck, shoulders and chest.  One cloth for both arms, hands, fingers and underarms (do underarms last).  One cloth for the abdomen followed by groin.  One cloth for right leg and foot including between the toes.  One cloth for left leg and foot including between the toes.  The last cloth is to be used for the back of the neck, back and buttocks.    Allow the CHG to air dry 3 minutes on the skin which will give it time to work and decrease the chance of irritation.  The skin may feel sticky until it is dry.  Do not rinse with water or any other liquid or you will lose the beneficial effects of the CHG.  If mild skin irritation occurs, do rinse the skin to remove the CHG.  Report this to the nurse at time of admission.  Do not apply lotions, creams, ointments, deodorants or perfumes after using the clothes. Dress in clean clothes before coming to the hospital.    Take the following medications the morning of surgery:  INHALER, OMEPRAZOLE, PREDNISONE    If you are on prescription narcotic pain medication to control your pain you may also take that medication the morning of surgery.    General Instructions:    Follow your surgeons instructions regarding when to stop solid foods and when to stop liquids.   Verify with your surgeon if you are to complete a bowel prep and when to do so.  Patients who avoid  smoking, chewing tobacco and alcohol for 4 weeks prior to surgery have a reduced risk of post-operative complications.  Quit smoking as many days before surgery as you can.  Do not smoke, use chewing tobacco or drink alcohol the day of surgery.   If applicable bring your C-PAP/ BI-PAP machine in with you to preop day of surgery.  Bring any papers given to you in the doctor’s office.  Wear clean comfortable clothes.  Do not wear contact lenses, false eyelashes or make-up.  Bring a case for your glasses.   Bring crutches or walker if applicable.  Remove all piercings.  Leave jewelry and any other valuables at home.  Hair extensions with metal clips must be removed prior to surgery.  The Pre-Admission Testing nurse will instruct you to bring medications if unable to obtain an accurate list in Pre-Admission Testing.        If you were given a blood bank ID arm band remember to bring it with you the day of surgery.    Preventing a Surgical Site Infection:  For 2 to 3 days before surgery, avoid shaving with a razor because the razor can irritate skin and make it easier to develop an infection.    Any areas of open skin can increase the risk of a post-operative wound infection by allowing bacteria to enter and travel throughout the body.  Notify your surgeon if you have any skin wounds / rashes even if it is not near the expected surgical site.  The area will need assessed to determine if surgery should be delayed until it is healed.  The night prior to surgery shower using a fresh bar of anti-bacterial soap (such as Dial) and clean washcloth.  Sleep in a clean bed with clean clothing.  Do not allow pets to sleep with you.  Shower on the morning of surgery using a fresh bar of anti-bacterial soap (such as Dial) and clean washcloth.  Dry with a clean towel and dress in clean clothing.  Ask your surgeon if you will be receiving antibiotics prior to surgery.  Make sure you, your family, and all healthcare providers clean  their hands with soap and water or an alcohol based hand  before caring for you or your wound.    Day of surgery:  Your arrival time is approximately two hours before your scheduled surgery time.  Upon arrival, a Pre-op nurse and Anesthesiologist will review your health history, obtain vital signs, and answer questions you may have.  The only belongings needed at this time will be a list of your home medications and if applicable your C-PAP/BI-PAP machine.  A Pre-op nurse will start an IV and you may receive medication in preparation for surgery, including something to help you relax.     Please be aware that surgery does come with discomfort.  We want to make every effort to control your discomfort so please discuss any uncontrolled symptoms with your nurse.   Your doctor will most likely have prescribed pain medications.      If you are going home after surgery you will receive individualized written care instructions before being discharged.  A responsible adult must drive you to and from the hospital on the day of your surgery and stay with you for 24 hours.  Discharge prescriptions can be filled by the hospital pharmacy during regular pharmacy hours.  If you are having surgery late in the day/evening your prescription may be e-prescribed to your pharmacy.  Please verify your pharmacy hours or chose a 24 hour pharmacy to avoid not having access to your prescription because your pharmacy has closed for the day.    If you are staying overnight following surgery, you will be transported to your hospital room following the recovery period.  [facility] has all private rooms.    If you have any questions please call Pre-Admission Testing at [phone].  Deductibles and co-payments are collected on the day of service. Please be prepared to pay the required co-pay, deductible or deposit on the day of service as defined by your plan.    Call your surgeon immediately if you experience any of the  following symptoms:  Sore Throat  Shortness of Breath or difficulty breathing  Cough  Chills  Body soreness or muscle pain  Headache  Fever  New loss of taste or smell  Do not arrive for your surgery ill.  Your procedure will need to be rescheduled to another time.  You will need to call your physician before the day of surgery to avoid any unnecessary exposure to hospital staff as well as other patients.

## 2025-01-22 NOTE — TELEPHONE ENCOUNTER
Called pt and s/w her . Informed him pt's mammogram was normal.     ----- Message from Stacey Rodriguez sent at 1/22/2025  3:58 PM EST -----  Can you let her know her mammo is normal  ----- Message -----  From: Interface, Rad Results [place] In  Sent: 1/22/2025   2:06 PM EST  To: Stacey Rodriguez, APRN

## 2025-01-28 ENCOUNTER — TELEPHONE (OUTPATIENT)
Dept: SURGERY | Facility: CLINIC | Age: 80
End: 2025-01-28
Payer: MEDICARE

## 2025-01-28 NOTE — TELEPHONE ENCOUNTER
Discuss with patient to clarify surgery instructions. Patient was confused because it was missing dulcolax step. Inform patient she needed to take antibiotics including dulcolax tablets starting at 3 pm. She  felt it was too many tablets to take in 1 hr. Inform patient she can take 2 tablets every couple of minutes.   She was also concern due to be being diabetic it will drop her sugar levels. Discussed with patient to follow clear liquids diet that it was given to her.  Patient verbalized understanding.

## 2025-01-29 ENCOUNTER — HOSPITAL ENCOUNTER (INPATIENT)
Facility: HOSPITAL | Age: 80
LOS: 4 days | Discharge: HOME | DRG: 329 | End: 2025-02-02
Attending: SURGERY | Admitting: SURGERY
Payer: MEDICARE

## 2025-01-29 ENCOUNTER — ANESTHESIA EVENT (OUTPATIENT)
Dept: PERIOP | Facility: HOSPITAL | Age: 80
End: 2025-01-29
Payer: MEDICARE

## 2025-01-29 ENCOUNTER — APPOINTMENT (OUTPATIENT)
Dept: GENERAL RADIOLOGY | Facility: HOSPITAL | Age: 80
DRG: 329 | End: 2025-01-29
Payer: MEDICARE

## 2025-01-29 ENCOUNTER — ANESTHESIA (OUTPATIENT)
Dept: PERIOP | Facility: HOSPITAL | Age: 80
End: 2025-01-29
Payer: MEDICARE

## 2025-01-29 DIAGNOSIS — I27.20 PULMONARY HYPERTENSION: Primary | ICD-10-CM

## 2025-01-29 DIAGNOSIS — K63.89 COLONIC MASS: ICD-10-CM

## 2025-01-29 DIAGNOSIS — J18.9 PNEUMONIA OF RIGHT UPPER LOBE DUE TO INFECTIOUS ORGANISM: ICD-10-CM

## 2025-01-29 DIAGNOSIS — D12.2 ADENOMATOUS POLYP OF ASCENDING COLON: ICD-10-CM

## 2025-01-29 LAB
ABO GROUP BLD: NORMAL
ARTERIAL PATENCY WRIST A: POSITIVE
ATMOSPHERIC PRESS: 750.5 MMHG
BASE EXCESS BLDA CALC-SCNC: -3.6 MMOL/L (ref 0–2)
BDY SITE: ABNORMAL
BLD GP AB SCN SERPL QL: NEGATIVE
CO2 BLDA-SCNC: 28.3 MMOL/L (ref 23–27)
DEVICE COMMENT: ABNORMAL
GAS FLOW AIRWAY: 3 LPM
GLUCOSE BLDC GLUCOMTR-MCNC: 220 MG/DL (ref 70–130)
GLUCOSE BLDC GLUCOMTR-MCNC: 230 MG/DL (ref 70–130)
GLUCOSE BLDC GLUCOMTR-MCNC: 292 MG/DL (ref 70–130)
GLUCOSE BLDC GLUCOMTR-MCNC: 305 MG/DL (ref 70–130)
GLUCOSE BLDC GLUCOMTR-MCNC: 317 MG/DL (ref 70–130)
HCO3 BLDA-SCNC: 26.1 MMOL/L (ref 22–28)
HEMODILUTION: NO
Lab: ABNORMAL
MODALITY: ABNORMAL
NOTIFIED WHO: ABNORMAL
PCO2 BLDA: 70.6 MM HG (ref 35–45)
PH BLDA: 7.18 PH UNITS (ref 7.35–7.45)
PO2 BLDA: 93.4 MM HG (ref 80–100)
READ BACK: YES
RH BLD: POSITIVE
SAO2 % BLDCOA: 94.4 % (ref 92–98.5)
T&S EXPIRATION DATE: NORMAL
TOTAL RATE: 20 BREATHS/MINUTE

## 2025-01-29 PROCEDURE — 25010000002 ONDANSETRON PER 1 MG

## 2025-01-29 PROCEDURE — 25010000002 MAGNESIUM SULFATE PER 500 MG OF MAGNESIUM

## 2025-01-29 PROCEDURE — 25010000002 PROPOFOL 200 MG/20ML EMULSION

## 2025-01-29 PROCEDURE — 25010000002 BUPIVACAINE LIPOSOME 1.3 % SUSPENSION: Performed by: STUDENT IN AN ORGANIZED HEALTH CARE EDUCATION/TRAINING PROGRAM

## 2025-01-29 PROCEDURE — 25810000003 LACTATED RINGERS PER 1000 ML: Performed by: ANESTHESIOLOGY

## 2025-01-29 PROCEDURE — 82803 BLOOD GASES ANY COMBINATION: CPT | Performed by: SURGERY

## 2025-01-29 PROCEDURE — 25010000002 GLYCOPYRROLATE 0.2 MG/ML SOLUTION

## 2025-01-29 PROCEDURE — 25010000002 FENTANYL CITRATE (PF) 50 MCG/ML SOLUTION: Performed by: ANESTHESIOLOGY

## 2025-01-29 PROCEDURE — 86901 BLOOD TYPING SEROLOGIC RH(D): CPT | Performed by: SURGERY

## 2025-01-29 PROCEDURE — 63710000001 PREDNISONE PER 5 MG: Performed by: SURGERY

## 2025-01-29 PROCEDURE — S2900 ROBOTIC SURGICAL SYSTEM: HCPCS | Performed by: SURGERY

## 2025-01-29 PROCEDURE — 25810000003 SODIUM CHLORIDE PER 500 ML: Performed by: SURGERY

## 2025-01-29 PROCEDURE — 25010000002 SUGAMMADEX 200 MG/2ML SOLUTION

## 2025-01-29 PROCEDURE — 86900 BLOOD TYPING SEROLOGIC ABO: CPT | Performed by: SURGERY

## 2025-01-29 PROCEDURE — S0260 H&P FOR SURGERY: HCPCS | Performed by: SURGERY

## 2025-01-29 PROCEDURE — 44204 LAPARO PARTIAL COLECTOMY: CPT

## 2025-01-29 PROCEDURE — 94799 UNLISTED PULMONARY SVC/PX: CPT

## 2025-01-29 PROCEDURE — 25010000002 INDOCYANINE GREEN 25 MG RECONSTITUTED SOLUTION

## 2025-01-29 PROCEDURE — 88309 TISSUE EXAM BY PATHOLOGIST: CPT | Performed by: SURGERY

## 2025-01-29 PROCEDURE — 82948 REAGENT STRIP/BLOOD GLUCOSE: CPT

## 2025-01-29 PROCEDURE — 25010000002 LIDOCAINE 2% SOLUTION

## 2025-01-29 PROCEDURE — 71045 X-RAY EXAM CHEST 1 VIEW: CPT

## 2025-01-29 PROCEDURE — 8E0W4CZ ROBOTIC ASSISTED PROCEDURE OF TRUNK REGION, PERCUTANEOUS ENDOSCOPIC APPROACH: ICD-10-PCS | Performed by: SURGERY

## 2025-01-29 PROCEDURE — 25810000003 SODIUM CHLORIDE 0.9 % SOLUTION 250 ML FLEX CONT

## 2025-01-29 PROCEDURE — 63710000001 INSULIN REGULAR HUMAN PER 5 UNITS: Performed by: SURGERY

## 2025-01-29 PROCEDURE — 25010000002 BUPIVACAINE (PF) 0.25 % SOLUTION: Performed by: STUDENT IN AN ORGANIZED HEALTH CARE EDUCATION/TRAINING PROGRAM

## 2025-01-29 PROCEDURE — 25010000002 CEFOXITIN PER 1 G

## 2025-01-29 PROCEDURE — 25010000002 FENTANYL CITRATE (PF) 50 MCG/ML SOLUTION

## 2025-01-29 PROCEDURE — 25010000002 HEPARIN (PORCINE) PER 1000 UNITS: Performed by: SURGERY

## 2025-01-29 PROCEDURE — 36600 WITHDRAWAL OF ARTERIAL BLOOD: CPT | Performed by: SURGERY

## 2025-01-29 PROCEDURE — 25010000002 ONDANSETRON PER 1 MG: Performed by: SURGERY

## 2025-01-29 PROCEDURE — 25010000002 PHENYLEPHRINE 10 MG/ML SOLUTION 5 ML VIAL

## 2025-01-29 PROCEDURE — 0DTF4ZZ RESECTION OF RIGHT LARGE INTESTINE, PERCUTANEOUS ENDOSCOPIC APPROACH: ICD-10-PCS | Performed by: SURGERY

## 2025-01-29 PROCEDURE — 94660 CPAP INITIATION&MGMT: CPT

## 2025-01-29 PROCEDURE — 86850 RBC ANTIBODY SCREEN: CPT | Performed by: SURGERY

## 2025-01-29 PROCEDURE — 44204 LAPARO PARTIAL COLECTOMY: CPT | Performed by: SURGERY

## 2025-01-29 PROCEDURE — 25810000003 SODIUM CHLORIDE 0.9 % SOLUTION: Performed by: SURGERY

## 2025-01-29 PROCEDURE — 25010000002 LIDOCAINE PER 10 MG

## 2025-01-29 PROCEDURE — 25010000002 CEFOXITIN PER 1 G: Performed by: SURGERY

## 2025-01-29 DEVICE — KNOTLESS TISSUE CONTROL DEVICE, VIOLET UNIDIRECTIONAL (ANTIBACTERIAL) SYNTHETIC ABSORBABLE DEVICE
Type: IMPLANTABLE DEVICE | Site: ABDOMEN | Status: FUNCTIONAL
Brand: STRATAFIX

## 2025-01-29 DEVICE — STAPLER 60 RELOAD WHITE
Type: IMPLANTABLE DEVICE | Site: ABDOMEN | Status: FUNCTIONAL
Brand: SUREFORM

## 2025-01-29 DEVICE — STAPLER 60 RELOAD BLUE
Type: IMPLANTABLE DEVICE | Site: ABDOMEN | Status: FUNCTIONAL
Brand: SUREFORM

## 2025-01-29 RX ORDER — SODIUM CHLORIDE 9 MG/ML
INJECTION, SOLUTION INTRAVENOUS AS NEEDED
Status: DISCONTINUED | OUTPATIENT
Start: 2025-01-29 | End: 2025-01-29 | Stop reason: HOSPADM

## 2025-01-29 RX ORDER — PREDNISONE 5 MG/1
5 TABLET ORAL EVERY EVENING
Status: DISCONTINUED | OUTPATIENT
Start: 2025-01-29 | End: 2025-02-02 | Stop reason: HOSPADM

## 2025-01-29 RX ORDER — ONDANSETRON 2 MG/ML
INJECTION INTRAMUSCULAR; INTRAVENOUS AS NEEDED
Status: DISCONTINUED | OUTPATIENT
Start: 2025-01-29 | End: 2025-01-29 | Stop reason: SURG

## 2025-01-29 RX ORDER — TRAMADOL HYDROCHLORIDE 50 MG/1
50 TABLET ORAL EVERY 6 HOURS PRN
Status: DISCONTINUED | OUTPATIENT
Start: 2025-01-29 | End: 2025-02-02 | Stop reason: HOSPADM

## 2025-01-29 RX ORDER — PANTOPRAZOLE SODIUM 40 MG/1
40 TABLET, DELAYED RELEASE ORAL
Status: DISCONTINUED | OUTPATIENT
Start: 2025-01-30 | End: 2025-02-02 | Stop reason: HOSPADM

## 2025-01-29 RX ORDER — BISOPROLOL FUMARATE 5 MG/1
20 TABLET, FILM COATED ORAL NIGHTLY
Status: DISCONTINUED | OUTPATIENT
Start: 2025-01-29 | End: 2025-02-02 | Stop reason: HOSPADM

## 2025-01-29 RX ORDER — ROCURONIUM BROMIDE 10 MG/ML
INJECTION, SOLUTION INTRAVENOUS AS NEEDED
Status: DISCONTINUED | OUTPATIENT
Start: 2025-01-29 | End: 2025-01-29 | Stop reason: SURG

## 2025-01-29 RX ORDER — ERYTHROMYCIN 500 MG/1
1000 TABLET, COATED ORAL 3 TIMES DAILY
COMMUNITY
End: 2025-02-02 | Stop reason: HOSPADM

## 2025-01-29 RX ORDER — OXYCODONE HYDROCHLORIDE 5 MG/1
5 TABLET ORAL ONCE AS NEEDED
Status: DISCONTINUED | OUTPATIENT
Start: 2025-01-29 | End: 2025-01-29 | Stop reason: HOSPADM

## 2025-01-29 RX ORDER — ACETAMINOPHEN 500 MG
1000 TABLET ORAL EVERY 6 HOURS
Status: DISPENSED | OUTPATIENT
Start: 2025-01-29 | End: 2025-01-31

## 2025-01-29 RX ORDER — TORSEMIDE 20 MG/1
20 TABLET ORAL DAILY
Status: DISCONTINUED | OUTPATIENT
Start: 2025-01-29 | End: 2025-01-31

## 2025-01-29 RX ORDER — NALOXONE HCL 0.4 MG/ML
0.4 VIAL (ML) INJECTION AS NEEDED
Status: DISCONTINUED | OUTPATIENT
Start: 2025-01-29 | End: 2025-01-29 | Stop reason: HOSPADM

## 2025-01-29 RX ORDER — VALSARTAN 320 MG/1
320 TABLET ORAL
Status: DISCONTINUED | OUTPATIENT
Start: 2025-01-30 | End: 2025-01-30

## 2025-01-29 RX ORDER — PROPOFOL 10 MG/ML
INJECTION, EMULSION INTRAVENOUS AS NEEDED
Status: DISCONTINUED | OUTPATIENT
Start: 2025-01-29 | End: 2025-01-29 | Stop reason: SURG

## 2025-01-29 RX ORDER — ALVIMOPAN 12 MG/1
12 CAPSULE ORAL ONCE
Status: COMPLETED | OUTPATIENT
Start: 2025-01-29 | End: 2025-01-29

## 2025-01-29 RX ORDER — FLUMAZENIL 0.1 MG/ML
0.2 INJECTION INTRAVENOUS AS NEEDED
Status: DISCONTINUED | OUTPATIENT
Start: 2025-01-29 | End: 2025-01-29 | Stop reason: HOSPADM

## 2025-01-29 RX ORDER — BUPIVACAINE HYDROCHLORIDE 2.5 MG/ML
INJECTION, SOLUTION EPIDURAL; INFILTRATION; INTRACAUDAL
Status: COMPLETED | OUTPATIENT
Start: 2025-01-29 | End: 2025-01-29

## 2025-01-29 RX ORDER — GABAPENTIN 300 MG/1
600 CAPSULE ORAL 3 TIMES DAILY
Status: DISCONTINUED | OUTPATIENT
Start: 2025-01-29 | End: 2025-01-29 | Stop reason: HOSPADM

## 2025-01-29 RX ORDER — NALOXONE HCL 0.4 MG/ML
0.4 VIAL (ML) INJECTION
Status: DISCONTINUED | OUTPATIENT
Start: 2025-01-29 | End: 2025-02-01

## 2025-01-29 RX ORDER — DEXTROSE MONOHYDRATE 25 G/50ML
25 INJECTION, SOLUTION INTRAVENOUS
Status: DISCONTINUED | OUTPATIENT
Start: 2025-01-29 | End: 2025-02-02 | Stop reason: HOSPADM

## 2025-01-29 RX ORDER — ACETAMINOPHEN 500 MG
1000 TABLET ORAL EVERY 6 HOURS
Status: DISCONTINUED | OUTPATIENT
Start: 2025-01-29 | End: 2025-01-29 | Stop reason: HOSPADM

## 2025-01-29 RX ORDER — HYDROMORPHONE HYDROCHLORIDE 1 MG/ML
0.5 INJECTION, SOLUTION INTRAMUSCULAR; INTRAVENOUS; SUBCUTANEOUS
Status: DISCONTINUED | OUTPATIENT
Start: 2025-01-29 | End: 2025-02-01

## 2025-01-29 RX ORDER — NALOXONE HCL 0.4 MG/ML
0.4 VIAL (ML) INJECTION
Status: DISCONTINUED | OUTPATIENT
Start: 2025-01-29 | End: 2025-01-29 | Stop reason: HOSPADM

## 2025-01-29 RX ORDER — NEOMYCIN SULFATE 500 MG/1
1000 TABLET ORAL 3 TIMES DAILY
COMMUNITY
Start: 2025-01-02 | End: 2025-02-02 | Stop reason: HOSPADM

## 2025-01-29 RX ORDER — LABETALOL HYDROCHLORIDE 5 MG/ML
5 INJECTION, SOLUTION INTRAVENOUS
Status: DISCONTINUED | OUTPATIENT
Start: 2025-01-29 | End: 2025-01-29 | Stop reason: HOSPADM

## 2025-01-29 RX ORDER — ENOXAPARIN SODIUM 100 MG/ML
40 INJECTION SUBCUTANEOUS DAILY
Status: DISCONTINUED | OUTPATIENT
Start: 2025-01-30 | End: 2025-01-31

## 2025-01-29 RX ORDER — ALVIMOPAN 12 MG/1
12 CAPSULE ORAL 2 TIMES DAILY
Status: DISCONTINUED | OUTPATIENT
Start: 2025-01-30 | End: 2025-01-31

## 2025-01-29 RX ORDER — SODIUM CHLORIDE 0.9 % (FLUSH) 0.9 %
3-10 SYRINGE (ML) INJECTION AS NEEDED
Status: DISCONTINUED | OUTPATIENT
Start: 2025-01-29 | End: 2025-01-29 | Stop reason: HOSPADM

## 2025-01-29 RX ORDER — SODIUM CHLORIDE 9 MG/ML
75 INJECTION, SOLUTION INTRAVENOUS CONTINUOUS
Status: DISCONTINUED | OUTPATIENT
Start: 2025-01-29 | End: 2025-01-31

## 2025-01-29 RX ORDER — PREGABALIN 75 MG/1
75 CAPSULE ORAL DAILY
Status: DISCONTINUED | OUTPATIENT
Start: 2025-01-29 | End: 2025-02-02 | Stop reason: HOSPADM

## 2025-01-29 RX ORDER — IPRATROPIUM BROMIDE AND ALBUTEROL SULFATE 2.5; .5 MG/3ML; MG/3ML
3 SOLUTION RESPIRATORY (INHALATION) ONCE AS NEEDED
Status: DISCONTINUED | OUTPATIENT
Start: 2025-01-29 | End: 2025-01-29 | Stop reason: HOSPADM

## 2025-01-29 RX ORDER — SODIUM CHLORIDE 0.9 % (FLUSH) 0.9 %
3 SYRINGE (ML) INJECTION EVERY 12 HOURS SCHEDULED
Status: DISCONTINUED | OUTPATIENT
Start: 2025-01-29 | End: 2025-01-29 | Stop reason: HOSPADM

## 2025-01-29 RX ORDER — SODIUM CHLORIDE, SODIUM LACTATE, POTASSIUM CHLORIDE, CALCIUM CHLORIDE 600; 310; 30; 20 MG/100ML; MG/100ML; MG/100ML; MG/100ML
9 INJECTION, SOLUTION INTRAVENOUS CONTINUOUS
Status: DISCONTINUED | OUTPATIENT
Start: 2025-01-29 | End: 2025-01-30

## 2025-01-29 RX ORDER — KETAMINE HCL IN NACL, ISO-OSM 100MG/10ML
SYRINGE (ML) INJECTION AS NEEDED
Status: DISCONTINUED | OUTPATIENT
Start: 2025-01-29 | End: 2025-01-29 | Stop reason: SURG

## 2025-01-29 RX ORDER — GLYCOPYRROLATE 0.2 MG/ML
INJECTION INTRAMUSCULAR; INTRAVENOUS AS NEEDED
Status: DISCONTINUED | OUTPATIENT
Start: 2025-01-29 | End: 2025-01-29 | Stop reason: SURG

## 2025-01-29 RX ORDER — FAMOTIDINE 10 MG/ML
20 INJECTION, SOLUTION INTRAVENOUS ONCE
Status: COMPLETED | OUTPATIENT
Start: 2025-01-29 | End: 2025-01-29

## 2025-01-29 RX ORDER — IBUPROFEN 600 MG/1
1 TABLET ORAL
Status: DISCONTINUED | OUTPATIENT
Start: 2025-01-29 | End: 2025-02-02 | Stop reason: HOSPADM

## 2025-01-29 RX ORDER — CEFOXITIN 2 G/1
INJECTION, POWDER, FOR SOLUTION INTRAVENOUS AS NEEDED
Status: DISCONTINUED | OUTPATIENT
Start: 2025-01-29 | End: 2025-01-29 | Stop reason: SURG

## 2025-01-29 RX ORDER — FENTANYL CITRATE 50 UG/ML
50 INJECTION, SOLUTION INTRAMUSCULAR; INTRAVENOUS ONCE AS NEEDED
Status: COMPLETED | OUTPATIENT
Start: 2025-01-29 | End: 2025-01-29

## 2025-01-29 RX ORDER — DEXMEDETOMIDINE HYDROCHLORIDE 4 UG/ML
.2-1.5 INJECTION, SOLUTION INTRAVENOUS
Status: DISCONTINUED | OUTPATIENT
Start: 2025-01-30 | End: 2025-02-01

## 2025-01-29 RX ORDER — METOCLOPRAMIDE HYDROCHLORIDE 5 MG/ML
10 INJECTION INTRAMUSCULAR; INTRAVENOUS ONCE AS NEEDED
Status: DISCONTINUED | OUTPATIENT
Start: 2025-01-29 | End: 2025-01-29 | Stop reason: HOSPADM

## 2025-01-29 RX ORDER — HYDROMORPHONE HYDROCHLORIDE 1 MG/ML
0.25 INJECTION, SOLUTION INTRAMUSCULAR; INTRAVENOUS; SUBCUTANEOUS
Status: DISCONTINUED | OUTPATIENT
Start: 2025-01-29 | End: 2025-01-29 | Stop reason: HOSPADM

## 2025-01-29 RX ORDER — MAGNESIUM SULFATE HEPTAHYDRATE 500 MG/ML
INJECTION, SOLUTION INTRAMUSCULAR; INTRAVENOUS AS NEEDED
Status: DISCONTINUED | OUTPATIENT
Start: 2025-01-29 | End: 2025-01-29 | Stop reason: SURG

## 2025-01-29 RX ORDER — DIPHENHYDRAMINE HYDROCHLORIDE 50 MG/ML
25 INJECTION INTRAMUSCULAR; INTRAVENOUS EVERY 4 HOURS PRN
Status: DISCONTINUED | OUTPATIENT
Start: 2025-01-29 | End: 2025-01-29 | Stop reason: HOSPADM

## 2025-01-29 RX ORDER — BUDESONIDE AND FORMOTEROL FUMARATE DIHYDRATE 160; 4.5 UG/1; UG/1
2 AEROSOL RESPIRATORY (INHALATION) 2 TIMES DAILY
Status: DISCONTINUED | OUTPATIENT
Start: 2025-01-29 | End: 2025-02-02 | Stop reason: HOSPADM

## 2025-01-29 RX ORDER — NICOTINE POLACRILEX 4 MG
15 LOZENGE BUCCAL
Status: DISCONTINUED | OUTPATIENT
Start: 2025-01-29 | End: 2025-02-02 | Stop reason: HOSPADM

## 2025-01-29 RX ORDER — LIDOCAINE HYDROCHLORIDE 10 MG/ML
0.5 INJECTION, SOLUTION INFILTRATION; PERINEURAL ONCE AS NEEDED
Status: DISCONTINUED | OUTPATIENT
Start: 2025-01-29 | End: 2025-01-29 | Stop reason: HOSPADM

## 2025-01-29 RX ORDER — LIDOCAINE HYDROCHLORIDE 20 MG/ML
INJECTION, SOLUTION INFILTRATION; PERINEURAL AS NEEDED
Status: DISCONTINUED | OUTPATIENT
Start: 2025-01-29 | End: 2025-01-29 | Stop reason: SURG

## 2025-01-29 RX ORDER — ONDANSETRON 2 MG/ML
4 INJECTION INTRAMUSCULAR; INTRAVENOUS EVERY 6 HOURS PRN
Status: DISCONTINUED | OUTPATIENT
Start: 2025-01-29 | End: 2025-02-02 | Stop reason: HOSPADM

## 2025-01-29 RX ORDER — DIPHENHYDRAMINE HCL 25 MG
25 CAPSULE ORAL EVERY 4 HOURS PRN
Status: DISCONTINUED | OUTPATIENT
Start: 2025-01-29 | End: 2025-01-29 | Stop reason: HOSPADM

## 2025-01-29 RX ORDER — INDOCYANINE GREEN AND WATER 25 MG
KIT INJECTION AS NEEDED
Status: DISCONTINUED | OUTPATIENT
Start: 2025-01-29 | End: 2025-01-29 | Stop reason: SURG

## 2025-01-29 RX ORDER — FENTANYL CITRATE 50 UG/ML
INJECTION, SOLUTION INTRAMUSCULAR; INTRAVENOUS AS NEEDED
Status: DISCONTINUED | OUTPATIENT
Start: 2025-01-29 | End: 2025-01-29 | Stop reason: SURG

## 2025-01-29 RX ORDER — HYDROCHLOROTHIAZIDE 12.5 MG/1
25 TABLET ORAL
Status: DISCONTINUED | OUTPATIENT
Start: 2025-01-29 | End: 2025-01-30

## 2025-01-29 RX ORDER — LIDOCAINE HYDROCHLORIDE ANHYDROUS AND DEXTROSE MONOHYDRATE 5; 400 G/100ML; MG/100ML
INJECTION, SOLUTION INTRAVENOUS CONTINUOUS PRN
Status: DISCONTINUED | OUTPATIENT
Start: 2025-01-29 | End: 2025-01-29 | Stop reason: SURG

## 2025-01-29 RX ORDER — KETAMINE HCL IN NACL, ISO-OSM 100MG/10ML
10 SYRINGE (ML) INJECTION
Status: DISCONTINUED | OUTPATIENT
Start: 2025-01-29 | End: 2025-01-29 | Stop reason: HOSPADM

## 2025-01-29 RX ORDER — HYDRALAZINE HYDROCHLORIDE 20 MG/ML
5 INJECTION INTRAMUSCULAR; INTRAVENOUS
Status: DISCONTINUED | OUTPATIENT
Start: 2025-01-29 | End: 2025-01-29 | Stop reason: HOSPADM

## 2025-01-29 RX ORDER — HEPARIN SODIUM 5000 [USP'U]/ML
5000 INJECTION, SOLUTION INTRAVENOUS; SUBCUTANEOUS
Status: COMPLETED | OUTPATIENT
Start: 2025-01-29 | End: 2025-01-29

## 2025-01-29 RX ORDER — BUPIVACAINE HCL/0.9 % NACL/PF 0.125 %
PLASTIC BAG, INJECTION (ML) EPIDURAL AS NEEDED
Status: DISCONTINUED | OUTPATIENT
Start: 2025-01-29 | End: 2025-01-29 | Stop reason: SURG

## 2025-01-29 RX ORDER — ONDANSETRON 2 MG/ML
4 INJECTION INTRAMUSCULAR; INTRAVENOUS ONCE AS NEEDED
Status: DISCONTINUED | OUTPATIENT
Start: 2025-01-29 | End: 2025-01-29 | Stop reason: HOSPADM

## 2025-01-29 RX ADMIN — PHENYLEPHRINE HYDROCHLORIDE 0.5 MCG/KG/MIN: 10 INJECTION, SOLUTION INTRAVENOUS at 12:26

## 2025-01-29 RX ADMIN — PROPOFOL 50 MG: 10 INJECTION, EMULSION INTRAVENOUS at 10:27

## 2025-01-29 RX ADMIN — SODIUM CHLORIDE 75 ML/HR: 9 INJECTION, SOLUTION INTRAVENOUS at 18:21

## 2025-01-29 RX ADMIN — INSULIN HUMAN 10 UNITS: 100 INJECTION, SOLUTION PARENTERAL at 15:12

## 2025-01-29 RX ADMIN — PROPOFOL 150 MG: 10 INJECTION, EMULSION INTRAVENOUS at 10:20

## 2025-01-29 RX ADMIN — Medication 100 MCG: at 10:48

## 2025-01-29 RX ADMIN — LIDOCAINE HYDROCHLORIDE 2 MG/MIN: 4 INJECTION, SOLUTION INTRAVENOUS at 10:29

## 2025-01-29 RX ADMIN — MAGNESIUM SULFATE HEPTAHYDRATE 2 G: 500 INJECTION, SOLUTION INTRAMUSCULAR; INTRAVENOUS at 10:37

## 2025-01-29 RX ADMIN — ALVIMOPAN 12 MG: 12 CAPSULE ORAL at 09:10

## 2025-01-29 RX ADMIN — Medication 10 MG: at 11:25

## 2025-01-29 RX ADMIN — ONDANSETRON 4 MG: 2 INJECTION, SOLUTION INTRAMUSCULAR; INTRAVENOUS at 18:26

## 2025-01-29 RX ADMIN — Medication 3 ML: at 09:11

## 2025-01-29 RX ADMIN — FAMOTIDINE 20 MG: 10 INJECTION INTRAVENOUS at 09:31

## 2025-01-29 RX ADMIN — ROCURONIUM BROMIDE 30 MG: 10 INJECTION, SOLUTION INTRAVENOUS at 11:26

## 2025-01-29 RX ADMIN — GLYCOPYRROLATE 0.2 MG: 0.2 INJECTION INTRAMUSCULAR; INTRAVENOUS at 11:05

## 2025-01-29 RX ADMIN — GABAPENTIN 600 MG: 300 CAPSULE ORAL at 09:10

## 2025-01-29 RX ADMIN — CEFOXITIN SODIUM 2 G: 2 POWDER, FOR SOLUTION INTRAVENOUS at 13:49

## 2025-01-29 RX ADMIN — Medication 10 MG: at 12:25

## 2025-01-29 RX ADMIN — ROCURONIUM BROMIDE 50 MG: 10 INJECTION, SOLUTION INTRAVENOUS at 10:22

## 2025-01-29 RX ADMIN — FENTANYL CITRATE 50 MCG: 50 INJECTION, SOLUTION INTRAMUSCULAR; INTRAVENOUS at 09:31

## 2025-01-29 RX ADMIN — FENTANYL CITRATE 50 MCG: 50 INJECTION, SOLUTION INTRAMUSCULAR; INTRAVENOUS at 12:45

## 2025-01-29 RX ADMIN — LIDOCAINE HYDROCHLORIDE 85 MG: 20 INJECTION, SOLUTION INFILTRATION; PERINEURAL at 10:20

## 2025-01-29 RX ADMIN — PREGABALIN 75 MG: 75 CAPSULE ORAL at 19:01

## 2025-01-29 RX ADMIN — Medication 10 MG: at 13:25

## 2025-01-29 RX ADMIN — ACETAMINOPHEN 1000 MG: 500 TABLET, FILM COATED ORAL at 18:58

## 2025-01-29 RX ADMIN — TORSEMIDE 20 MG: 20 TABLET ORAL at 18:59

## 2025-01-29 RX ADMIN — PREDNISONE 5 MG: 5 TABLET ORAL at 18:59

## 2025-01-29 RX ADMIN — BUPIVACAINE 20 ML: 13.3 INJECTION, SUSPENSION, LIPOSOMAL INFILTRATION at 10:35

## 2025-01-29 RX ADMIN — Medication 20 MG: at 10:22

## 2025-01-29 RX ADMIN — INDOCYANINE GREEN AND WATER 7.5 MG: KIT at 13:26

## 2025-01-29 RX ADMIN — SODIUM CHLORIDE, POTASSIUM CHLORIDE, SODIUM LACTATE AND CALCIUM CHLORIDE 9 ML/HR: 600; 310; 30; 20 INJECTION, SOLUTION INTRAVENOUS at 09:11

## 2025-01-29 RX ADMIN — CEFOXITIN SODIUM 2000 MG: 2 POWDER, FOR SOLUTION INTRAVENOUS at 09:49

## 2025-01-29 RX ADMIN — DEXMEDETOMIDINE HYDROCHLORIDE 0.2 MCG/KG/HR: 4 INJECTION, SOLUTION INTRAVENOUS at 23:36

## 2025-01-29 RX ADMIN — SUGAMMADEX 200 MG: 100 INJECTION, SOLUTION INTRAVENOUS at 14:01

## 2025-01-29 RX ADMIN — ONDANSETRON 4 MG: 2 INJECTION INTRAMUSCULAR; INTRAVENOUS at 14:01

## 2025-01-29 RX ADMIN — Medication 50 MCG: at 13:33

## 2025-01-29 RX ADMIN — INDOCYANINE GREEN AND WATER 7.5 MG: KIT at 12:29

## 2025-01-29 RX ADMIN — Medication 100 MCG: at 12:09

## 2025-01-29 RX ADMIN — ACETAMINOPHEN 1000 MG: 500 TABLET, FILM COATED ORAL at 09:10

## 2025-01-29 RX ADMIN — BUPIVACAINE HYDROCHLORIDE 30 ML: 2.5 INJECTION, SOLUTION EPIDURAL; INFILTRATION; INTRACAUDAL; PERINEURAL at 10:35

## 2025-01-29 RX ADMIN — HEPARIN SODIUM 5000 UNITS: 5000 INJECTION INTRAVENOUS; SUBCUTANEOUS at 09:49

## 2025-01-29 NOTE — ANESTHESIA PROCEDURE NOTES
Arterial Line      Patient reassessed immediately prior to procedure    Patient location during procedure: holding area  Start time: 1/29/2025 9:28 AM  Stop Time:1/29/2025 9:41 AM       Line placed for hemodynamic monitoring, ABGs/Labs/ISTAT and MD/Surgeon request.  Performed By   Anesthesiologist: Rui Suárez MD   Preanesthetic Checklist  Completed: patient identified, IV checked, site marked, risks and benefits discussed, surgical consent, monitors and equipment checked, pre-op evaluation and timeout performed  Arterial Line Prep    Sterile Tech: cap, gloves, mask and sterile barriers  Prep: ChloraPrep  Patient monitoring: blood pressure monitoring, continuous pulse oximetry and EKG  Arterial Line Procedure   Laterality:left  Location:  radial artery  Catheter size: 20 G   Guidance: palpation technique  Number of attempts: 2  Successful placement: yes Images: still images not obtained  Post Assessment   Dressing Type: wrist guard applied, occlusive dressing applied and secured with tape.   Complications no  Circ/Move/Sens Assessment: normal and unchanged.   Patient Tolerance: patient tolerated the procedure well with no apparent complications

## 2025-01-29 NOTE — H&P
CC: Ascending colon mass     HPI: Patient is a very pleasant 79-year-old female that is here today for right hemicolectomy. Patient underwent upper endoscopy and colonoscopy on 2024. She was found to have adenomatous polyp in the duodenum that was completely removed was found to have low-grade dysplasia. She was found to have a large mass in the right colon at the proximal ascending area that was consistent with tubulovillous adenoma with high-grade dysplasia. She underwent CT scan of the abdomen and pelvis as well as chest x-ray. She was found to have mass in the proximal ascending colon. Otherwise there was no evidence of metastatic disease. Patient did not have any further issues with this. She has seen her cardiologist and she was cleared for surgery and general anesthesia. She has been also following up with Dr. Lang for shortness of breath.  His last note states she is at her best respiratory wise and there is no other measurements that we can take to improve that.     PMH: Hyperlipidemia, hypertension, morbid obesity, duodenal polyp, colon polyp, chronic kidney disease stage III, iron deficiency anemia     PSH:   -Hysterectomy  -Left mastectomy  -Eye surgery  -Open cholecystectomy  -  -Left rib biopsy   -Brain surgery  -Colonoscopy with polypectomy 2019  -Left mastectomy with sentinel lymph node biopsy and axillary lymph node dissection 2018  -Upper endoscopy 2019  -Bilateral temporal artery biopsy 2019  -Endoscopy 2020 x 2  -Upper endoscopy and colonoscopy 2024     MEDS: Reviewed and reconciled in epic     ALL: Revefenacin, aspirin, sulfa antibiotics     FH and SH: Multiple family members with cancer  Father with esophageal gastric cancer  Sister with throat cancer  Paternal grandfather with colon cancer  3 paternal uncles with colon cancer  Cousin with ovarian cancer  Other cousin with stomach cancer  Denies smoking drinking or taking any drugs     ROS:   Reports shortness of breath  "with exertion     PE:   Vitals:    01/29/25 0742   BP: 179/85   BP Location: Right arm   Patient Position: Sitting   Pulse: 95   Resp: 24   Temp: 97.8 °F (36.6 °C)   TempSrc: Oral   SpO2: 95%   Weight: 83.5 kg (184 lb 1.4 oz)   Height: 152.4 cm (60\")     Body mass index is 35.95 kg/m².   Alert and oriented ×3, no acute distress.  Head is normocephalic and atraumatic.  Neck is supple there is no thyromegaly or lymphadenopathy  Chest is clear bilaterally there is no added sounds  Regular rate and rhythm, no murmurs  Abdomen is soft and nontender, is nondistended, bowel sounds are positive. Well-healed right upper quadrant incision from prior cholecystectomy, well-healed infraumbilical incision   No clubbing cyanosis or edema in lower or upper extremities     Diagnostic studies:   CT scan abdomen pelvis was performed 8/1/2024 and was reviewed by me.  There is a lipomatous ileocecal valve and a focal region of 2.2 x 1.6 cm soft tissue density in the lumen of the proximal ascending colon.  No evidence of metastatic disease     CEA has not been done yet     10/28/2024: Hemoglobin 12.3, white blood cell count 10.5, platelets 223, creatinine 1.5, glucose 250, BUN 41, sodium 135 hemoglobin A1c 8.3    1/22/2025:   Creatinine 1.2 from 1.3, hemoglobin A1c 7.6  White blood cell count 10.6, hemoglobin 12.3, otherwise stable labs     Colonoscopy report from 7/23/2024 showed evidence of polypoid medium-sized mass at the proximal ascending colon.  Diverticulosis.  Pathology reports show evidence of tubulovillous adenoma with high-grade dysplasia.     Assessment and plan     The patient is a very pleasant 79-year-old female with large proximal ascending colon mass with biopsy consistent with tubulovillous adenoma with grade dysplasia.  The mass was not able to be endoscopically resected.  She has multiple medical problems including stage III chronic kidney disease, diabetes, heart disease, morbid obesity, O2 dependent.  Discussed " with the patient about further step.  I recommend that she undergoes robotic assisted laparoscopic right hemicolectomy.  I think that to the size of the mass there is risk that the mass may harbor invasive cancer so the only curative treatment will be resection.  She understand that this may not be an invasive cancer on final pathology.  Risk and benefits of procedure including bleeding, infection, anastomotic leak, wound complication, cardiac and respiratory issues discussed in detail with the patient.  She was cleared by cardiology for general anesthesia.  She has seen Dr. Lang and there has not been any medication or measurement that she can take to improve her shortness of breath due to multifactorial disease.    Bowel preparation, ERAS protocol explained to the patient pre and postoperative expectations for exercise, weight loss, bowel prep.     She understood     Ashwin Alvarado MD  General, Minimally Invasive and Endoscopic Surgery  [facility] [address], [address]  [place], [place], [address]  P: [phone]  F: [phone]

## 2025-01-29 NOTE — ANESTHESIA PROCEDURE NOTES
Airway  Urgency: elective    Date/Time: 1/29/2025 10:28 AM  Difficult airway    General Information and Staff    Patient location during procedure: OR  Anesthesiologist: Gorge Barnett MD  CRNA/CAA: Alba Mendez CRNA    Indications and Patient Condition  Indications for airway management: airway protection    Preoxygenated: yes  MILS maintained throughout  Mask difficulty assessment: 2 - vent by mask + OA or adjuvant +/- NMBA    Final Airway Details  Final airway type: endotracheal airway      Successful airway: ETT  Cuffed: yes   Successful intubation technique: direct laryngoscopy and video laryngoscopy  Facilitating devices/methods: intubating stylet and cricoid pressure  Endotracheal tube insertion site: oral  Blade: CMAC  Blade size: D  ETT size (mm): 7.0  Cormack-Lehane Classification: grade III - view of epiglottis only  Placement verified by: chest auscultation and capnometry   Cuff volume (mL): 6  Measured from: lips  ETT/EBT  to lips (cm): 22  Number of attempts at approach: 3 or more  Assessment: lips, teeth, and gum same as pre-op    Additional Comments  1 cm laceration to left lip-min. Bleeding   Very limited mouth opening, large tongue, TMD <3, poor dentition, enlarged tonsils and arytenoids   Shortening of vocal cords and anterior placed     First attempt with MAC 3 blade  2nd attempt with D-blade

## 2025-01-29 NOTE — ANESTHESIA PROCEDURE NOTES
Peripheral Block      Patient reassessed immediately prior to procedure    Patient location during procedure: pre-op  Start time: 1/29/2025 10:30 AM  Stop time: 1/29/2025 10:35 AM  Reason for block: at surgeon's request and post-op pain management  Performed by  Anesthesiologist: Gorge Barnett MD  Preanesthetic Checklist  Completed: patient identified, IV checked, site marked, risks and benefits discussed, surgical consent, monitors and equipment checked, pre-op evaluation and timeout performed  Prep:  Sterile barriers:cap, gloves, mask and washed/disinfected hands  Prep: ChloraPrep  Patient monitoring: blood pressure monitoring, continuous pulse oximetry and EKG  Procedure    Sedation: yes  Performed under: local infiltration  Guidance:ultrasound guided    ULTRASOUND INTERPRETATION.  Using ultrasound guidance a 22 G (22G needle for placement of 3cc 2%lido to site prior to start of procedure.) gauge needle was placed in close proximity to the nerve, at which point, under ultrasound guidance anesthetic was injected in the area of the nerve and spread of the anesthesia was seen on ultrasound in close proximity thereto.  There were no abnormalities seen on ultrasound; a digital image was taken; and the patient tolerated the procedure with no complications. Images:still images obtained    Laterality:Bilateral  Block Type:TAP and rectus sheath  Injection Technique:single-shot  Needle Type:echogenic  Needle Gauge:22 G      Medications Used: bupivacaine liposome (EXPAREL) 1.3 % injection - Infiltration   20 mL - 1/29/2025 10:35:00 AM  bupivacaine PF (MARCAINE) 0.25 % injection - Injection   30 mL - 1/29/2025 10:35:00 AM      Post Assessment  Injection Assessment: negative aspiration for heme, no paresthesia on injection and incremental injection  Patient Tolerance:comfortable throughout block  Complications:no  Additional Notes  Ultrasound interpretation: Under ultrasound guidance anatomy was evaluated, needle  placement was viewed and nerve structures verified,   medication disbursement was visualized for this block.  Performed by: Gorge Barnett MD

## 2025-01-29 NOTE — ANESTHESIA PREPROCEDURE EVALUATION
Anesthesia Evaluation     Patient summary reviewed and Nursing notes reviewed   no history of anesthetic complications:   NPO Solid Status: > 8 hours  NPO Liquid Status: > 8 hours           Airway   Mallampati: II  TM distance: >3 FB  Neck ROM: full  No difficulty expected  Dental - normal exam     Pulmonary    (+) COPD, asthma,home oxygen (3lpm as needed and at night), shortness of breath, sleep apnea on CPAP  (-) rhonchi, decreased breath sounds, wheezes, not a smoker  Cardiovascular   Exercise tolerance: poor (<4 METS)    Rhythm: regular  Rate: normal    (+) hypertension, CAD (significant lesions in OM1 and CIrc), hyperlipidemia  (-) dysrhythmias, angina, CARR, murmur    ROS comment:   LVEF 70% with positive stress test Pulm HTN   Dr Alvarado notes state cardiologist had seen pt and okayed surgery clearance     Neuro/Psych  (+) psychiatric history Anxiety  (-) seizures, CVA  GI/Hepatic/Renal/Endo    (+) morbid obesity, renal disease- CRI, diabetes mellitus type 2 poorly controlled using insulin  (-) liver disease, no thyroid disorder    Musculoskeletal     Abdominal     Abdomen: soft.   Substance History      OB/GYN          Other      history of cancer active                Anesthesia Plan    ASA 4     general with block and Anne     (Preop tylenol and gabapentin ordered by surgeon  TAP block planned   )  intravenous induction     Anesthetic plan, risks, benefits, and alternatives have been provided, discussed and informed consent has been obtained with: patient.    CODE STATUS:

## 2025-01-29 NOTE — OP NOTE
Date of procedure: 1/29/2025    Primary Surgeon: Dr. Alvarado    Assistant: SHRAVAN [place] and Catie Howe MD there were in charge of retraction, exposure, exchanging instrument, holding camera, closing wounds and placing dressings that was essential for success of the case    Preoperative diagnosis: Endoscopically unresectable proximal ascending colon mass    Postoperative diagnosis: Same    Procedure performed:   -Robotic assisted laparoscopic right hemicolectomy    Anesthesia: General Plus tap block    EBL: 100 cc    Complications: None    Findings: Intra-abdominal adhesions from the omentum to the anterior abdominal wall in the right upper quadrant.    Condition of patient: Stable to recovery    Indications: 79-year-old female that was found to have an endoscopically unresectable colon polyp in the proximal ascending colon.  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  We discussed with the patient about treatment options and I recommend that she undergo robotic assisted laparoscopic right hemicolectomy.  Risk and benefits of procedure including bleeding, infection, anastomotic leak, wound complications discussed in detail with the patient that verbalized understanding and agreed with the plan.    Description: Patient was taken to operative room and she was placed in the OR table in the supine position.  Preoperative antibiotics were given and SCDs were placed.  Patient underwent general endotracheal anesthesia.  Patient abdomen was then prepped and draped in usual sterile fashion.  Timeout was performed the patient was correctly identified.  Patient underwent tap block by anesthesia service.  Patient abdomen was accessed in the left epigastric area with Optiview technique and insertion of a 5 mm trocar, pneumoperitoneum was insufflated, upon exploration of the abdominal cavity there was no evidence of injury from trocar placement.  There were adhesions from the omentum to  anterior abdominal wall in the right upper quadrant as well as the suprapubic area.  I placed another two 8 mm trocars in the suprapubic and left periumbilical area, another 8 mm robotic trocar was placed in the left upper quadrant.  Initial 5 mm trocar was switched by a 12 mm robotic trocar.  An assistant 8 mm trocar was placed in the left flank.  Patient was positioned in Trendelenburg position.  Robotic arms were brought to the operative field and connected to the trocars.  Instruments were inserted under direct camera vision.  The adhesions from the omentum to anterior abdominal wall were taken down with combination of blunt dissection and vessel sealer.  Adhesions from the omentum to the right upper quadrant were taken down with vessel sealer.  I was able to then divided the terminal ileum and this was retracted medially.  I started taking the attachments from the colon to the lateral wall at the Toltz fascia.  Dissection continue all the way up to the hepatic flexure.  The right colon was mobilized lateral to medial from the retroperitoneum.  I then continued the lateral to medial dissection by entering the plane behind the ileocolic vessels.  Dissection of the mesentery of the colon was performed medial to lateral.  The duodenum was recognized and dissected from the mesentery of the colon.  I was able to reach all the way superiorly where the transverse colon was attached to the retroperitoneum.  I then took the attachments from the omentum to the transverse colon with vessel sealer.  The transverse colon was then dissected from its peritoneal attachments and hepatic flexure of the colon was mobilized.  Duodenum was visualized preserved during the dissection.  I then proceeded to transect ileocolic vessel with single white load of the robotic stapler.  I then continued the transection of the mesentery towards the terminal ileum.  The terminal ileum was then transected with robotic blue load stapler.  I then  continued the transection of the mesentery of theascending colon as well as the proximal colon and taking the right colic as well as the right branch of the middle colic artery.  I then selected an area of transection of the colon and the proximal transverse colon.  This was performed with a history of blue and white load staplers.  The specimen was positioned in the left upper quadrant.  I then injected ICG and evaluated the blood flow to the colon and this was found to be excellent.  I then reinforced the staple line of the small bowel with interrupted 2 silk suture.   Stay suture was placed in between the small bowel and colon with 2-0 silk.  I then proceeded perform and isoperistaltic side-to-side stapled anastomosis after enterotomy in the small bowel and the transverse colon were performed.  The common enterotomy channel was closed in 2 layers with running 3-0 PDS unidirectional barbed suture.  Again ICG was injected and blood flow at both ends of the colon and small bowel was found to be excellent.  Stay suture was tied.  I then proceeded to enlarge the 12 mm trocar incision with Bovie electrocautery.  Section was carried into subcutaneous tissue.  Abdominal wall fascia was transected and rectus muscle was split.  Posterior rectus sheath was enlarged.  Medium sized wound retractor was placed.  Specimen was removed and sent for pathological analysis.  I then removed the wound protector.  We changed gloves.  The posterior rectus sheath was closed with a running 2-0 Vicryl suture.  Anterior rectus sheath was closed with running #1 PDS suture.  Pneumoperitoneum again was insufflated.  Operation of the abdominal cavity revealed small amount of blood in the right upper quadrant that was suctioned and irrigated.  There was no evidence of active bleeding.  The assistant trocar was then removed and the defect was closed with 0 Vicryl Endo Close technique.  All the trocars were removed under direct camera vision while  desufflated the pneumoperitoneum.  The patient tolerated the procedure well and she was sent to recovery area in stable condition.  Instrument sponge count were correct    Ashwin Alvardao MD, FACS  General, Minimally Invasive and Endoscopic Surgery  [facility] ([facility]    [address] [place] [address], [address]  [place], [place], [address]  P: [phone]  F: [phone]

## 2025-01-30 ENCOUNTER — APPOINTMENT (OUTPATIENT)
Dept: CT IMAGING | Facility: HOSPITAL | Age: 80
DRG: 329 | End: 2025-01-30
Payer: MEDICARE

## 2025-01-30 LAB
ANION GAP SERPL CALCULATED.3IONS-SCNC: 10.9 MMOL/L (ref 5–15)
ANION GAP SERPL CALCULATED.3IONS-SCNC: 5.6 MMOL/L (ref 5–15)
ARTERIAL PATENCY WRIST A: POSITIVE
ATMOSPHERIC PRESS: 752.3 MMHG
BASE EXCESS BLDA CALC-SCNC: -2.6 MMOL/L (ref 0–2)
BASOPHILS # BLD AUTO: 0.01 10*3/MM3 (ref 0–0.2)
BASOPHILS NFR BLD AUTO: 0.1 % (ref 0–1.5)
BDY SITE: ABNORMAL
BUN SERPL-MCNC: 21 MG/DL (ref 8–23)
BUN SERPL-MCNC: 22 MG/DL (ref 8–23)
BUN/CREAT SERPL: 16.2 (ref 7–25)
BUN/CREAT SERPL: 16.9 (ref 7–25)
CALCIUM SPEC-SCNC: 8.2 MG/DL (ref 8.6–10.5)
CALCIUM SPEC-SCNC: 8.6 MG/DL (ref 8.6–10.5)
CHLORIDE SERPL-SCNC: 104 MMOL/L (ref 98–107)
CHLORIDE SERPL-SCNC: 98 MMOL/L (ref 98–107)
CO2 BLDA-SCNC: 26.4 MMOL/L (ref 23–27)
CO2 SERPL-SCNC: 21.1 MMOL/L (ref 22–29)
CO2 SERPL-SCNC: 25.4 MMOL/L (ref 22–29)
CREAT SERPL-MCNC: 1.24 MG/DL (ref 0.57–1)
CREAT SERPL-MCNC: 1.36 MG/DL (ref 0.57–1)
D-LACTATE SERPL-SCNC: 0.7 MMOL/L (ref 0.5–2)
DEPRECATED RDW RBC AUTO: 41.1 FL (ref 37–54)
DEPRECATED RDW RBC AUTO: 41.2 FL (ref 37–54)
DEVICE COMMENT: ABNORMAL
EGFRCR SERPLBLD CKD-EPI 2021: 40 ML/MIN/1.73
EGFRCR SERPLBLD CKD-EPI 2021: 44.6 ML/MIN/1.73
EOSINOPHIL # BLD AUTO: 0 10*3/MM3 (ref 0–0.4)
EOSINOPHIL NFR BLD AUTO: 0 % (ref 0.3–6.2)
ERYTHROCYTE [DISTWIDTH] IN BLOOD BY AUTOMATED COUNT: 11.7 % (ref 12.3–15.4)
ERYTHROCYTE [DISTWIDTH] IN BLOOD BY AUTOMATED COUNT: 11.9 % (ref 12.3–15.4)
GLUCOSE BLDC GLUCOMTR-MCNC: 142 MG/DL (ref 70–130)
GLUCOSE BLDC GLUCOMTR-MCNC: 143 MG/DL (ref 70–130)
GLUCOSE BLDC GLUCOMTR-MCNC: 209 MG/DL (ref 70–130)
GLUCOSE BLDC GLUCOMTR-MCNC: 237 MG/DL (ref 70–130)
GLUCOSE BLDC GLUCOMTR-MCNC: 275 MG/DL (ref 70–130)
GLUCOSE BLDC GLUCOMTR-MCNC: 290 MG/DL (ref 70–130)
GLUCOSE SERPL-MCNC: 221 MG/DL (ref 65–99)
GLUCOSE SERPL-MCNC: 241 MG/DL (ref 65–99)
HCO3 BLDA-SCNC: 24.8 MMOL/L (ref 22–28)
HCT VFR BLD AUTO: 34.2 % (ref 34–46.6)
HCT VFR BLD AUTO: 35.6 % (ref 34–46.6)
HEMODILUTION: NO
HGB BLD-MCNC: 10.7 G/DL (ref 12–15.9)
HGB BLD-MCNC: 11.3 G/DL (ref 12–15.9)
IMM GRANULOCYTES # BLD AUTO: 0.05 10*3/MM3 (ref 0–0.05)
IMM GRANULOCYTES NFR BLD AUTO: 0.5 % (ref 0–0.5)
INHALED O2 CONCENTRATION: 35 %
LYMPHOCYTES # BLD AUTO: 0.24 10*3/MM3 (ref 0.7–3.1)
LYMPHOCYTES NFR BLD AUTO: 2.2 % (ref 19.6–45.3)
Lab: ABNORMAL
MCH RBC QN AUTO: 30.4 PG (ref 26.6–33)
MCH RBC QN AUTO: 30.5 PG (ref 26.6–33)
MCHC RBC AUTO-ENTMCNC: 31.3 G/DL (ref 31.5–35.7)
MCHC RBC AUTO-ENTMCNC: 31.7 G/DL (ref 31.5–35.7)
MCV RBC AUTO: 96.2 FL (ref 79–97)
MCV RBC AUTO: 97.2 FL (ref 79–97)
MODALITY: ABNORMAL
MONOCYTES # BLD AUTO: 0.32 10*3/MM3 (ref 0.1–0.9)
MONOCYTES NFR BLD AUTO: 3 % (ref 5–12)
NEUTROPHILS NFR BLD AUTO: 10.12 10*3/MM3 (ref 1.7–7)
NEUTROPHILS NFR BLD AUTO: 94.2 % (ref 42.7–76)
NOTIFIED WHO: ABNORMAL
NRBC BLD AUTO-RTO: 0 /100 WBC (ref 0–0.2)
O2 A-A PPRESDIFF RESPIRATORY: 0.7 MMHG
PCO2 BLDA: 54.2 MM HG (ref 35–45)
PH BLDA: 7.27 PH UNITS (ref 7.35–7.45)
PLATELET # BLD AUTO: 164 10*3/MM3 (ref 140–450)
PLATELET # BLD AUTO: 169 10*3/MM3 (ref 140–450)
PMV BLD AUTO: 10 FL (ref 6–12)
PMV BLD AUTO: 9.9 FL (ref 6–12)
PO2 BLD: 417 MM[HG] (ref 0–500)
PO2 BLDA: 145.9 MM HG (ref 80–100)
POTASSIUM SERPL-SCNC: 4.7 MMOL/L (ref 3.5–5.2)
POTASSIUM SERPL-SCNC: 4.8 MMOL/L (ref 3.5–5.2)
POTASSIUM SERPL-SCNC: 5.2 MMOL/L (ref 3.5–5.2)
POTASSIUM SERPL-SCNC: 6 MMOL/L (ref 3.5–5.2)
POTASSIUM SERPL-SCNC: 6.7 MMOL/L (ref 3.5–5.2)
RBC # BLD AUTO: 3.52 10*6/MM3 (ref 3.77–5.28)
RBC # BLD AUTO: 3.7 10*6/MM3 (ref 3.77–5.28)
READ BACK: YES
SAO2 % BLDCOA: 98.9 % (ref 92–98.5)
SET MECH RESP RATE: 20
SODIUM SERPL-SCNC: 130 MMOL/L (ref 136–145)
SODIUM SERPL-SCNC: 135 MMOL/L (ref 136–145)
TOTAL RATE: 20 BREATHS/MINUTE
VENTILATOR MODE: ABNORMAL
VT ON VENT VENT: 450 ML
WBC NRBC COR # BLD AUTO: 10.74 10*3/MM3 (ref 3.4–10.8)
WBC NRBC COR # BLD AUTO: 10.75 10*3/MM3 (ref 3.4–10.8)

## 2025-01-30 PROCEDURE — 94761 N-INVAS EAR/PLS OXIMETRY MLT: CPT

## 2025-01-30 PROCEDURE — 63710000001 INSULIN REGULAR HUMAN PER 5 UNITS: Performed by: INTERNAL MEDICINE

## 2025-01-30 PROCEDURE — 94799 UNLISTED PULMONARY SVC/PX: CPT

## 2025-01-30 PROCEDURE — 80048 BASIC METABOLIC PNL TOTAL CA: CPT | Performed by: STUDENT IN AN ORGANIZED HEALTH CARE EDUCATION/TRAINING PROGRAM

## 2025-01-30 PROCEDURE — 87040 BLOOD CULTURE FOR BACTERIA: CPT

## 2025-01-30 PROCEDURE — 25810000003 SODIUM CHLORIDE 0.9 % SOLUTION: Performed by: SURGERY

## 2025-01-30 PROCEDURE — 99024 POSTOP FOLLOW-UP VISIT: CPT | Performed by: SURGERY

## 2025-01-30 PROCEDURE — 25010000002 CALCIUM GLUCONATE-NACL 1-0.675 GM/50ML-% SOLUTION: Performed by: INTERNAL MEDICINE

## 2025-01-30 PROCEDURE — 93005 ELECTROCARDIOGRAM TRACING: CPT | Performed by: INTERNAL MEDICINE

## 2025-01-30 PROCEDURE — 25010000002 CEFTRIAXONE PER 250 MG

## 2025-01-30 PROCEDURE — 87899 AGENT NOS ASSAY W/OPTIC: CPT | Performed by: STUDENT IN AN ORGANIZED HEALTH CARE EDUCATION/TRAINING PROGRAM

## 2025-01-30 PROCEDURE — 83605 ASSAY OF LACTIC ACID: CPT

## 2025-01-30 PROCEDURE — 63710000001 PREDNISONE PER 5 MG: Performed by: SURGERY

## 2025-01-30 PROCEDURE — 63710000001 INSULIN REGULAR HUMAN PER 5 UNITS

## 2025-01-30 PROCEDURE — 87449 NOS EACH ORGANISM AG IA: CPT | Performed by: STUDENT IN AN ORGANIZED HEALTH CARE EDUCATION/TRAINING PROGRAM

## 2025-01-30 PROCEDURE — 84132 ASSAY OF SERUM POTASSIUM: CPT | Performed by: INTERNAL MEDICINE

## 2025-01-30 PROCEDURE — 85027 COMPLETE CBC AUTOMATED: CPT | Performed by: STUDENT IN AN ORGANIZED HEALTH CARE EDUCATION/TRAINING PROGRAM

## 2025-01-30 PROCEDURE — 82803 BLOOD GASES ANY COMBINATION: CPT | Performed by: INTERNAL MEDICINE

## 2025-01-30 PROCEDURE — 25010000002 HYDRALAZINE PER 20 MG: Performed by: STUDENT IN AN ORGANIZED HEALTH CARE EDUCATION/TRAINING PROGRAM

## 2025-01-30 PROCEDURE — 25010000002 CALCIUM GLUCONATE-NACL 1-0.675 GM/50ML-% SOLUTION

## 2025-01-30 PROCEDURE — 94640 AIRWAY INHALATION TREATMENT: CPT

## 2025-01-30 PROCEDURE — 36600 WITHDRAWAL OF ARTERIAL BLOOD: CPT | Performed by: INTERNAL MEDICINE

## 2025-01-30 PROCEDURE — 63710000001 INSULIN REGULAR HUMAN PER 5 UNITS: Performed by: SURGERY

## 2025-01-30 PROCEDURE — 94660 CPAP INITIATION&MGMT: CPT

## 2025-01-30 PROCEDURE — 25010000002 ENOXAPARIN PER 10 MG: Performed by: SURGERY

## 2025-01-30 PROCEDURE — 82948 REAGENT STRIP/BLOOD GLUCOSE: CPT

## 2025-01-30 PROCEDURE — 85025 COMPLETE CBC W/AUTO DIFF WBC: CPT | Performed by: SURGERY

## 2025-01-30 PROCEDURE — 25010000002 FUROSEMIDE PER 20 MG: Performed by: INTERNAL MEDICINE

## 2025-01-30 PROCEDURE — 71250 CT THORAX DX C-: CPT

## 2025-01-30 RX ORDER — DEXTROSE MONOHYDRATE 25 G/50ML
25 INJECTION, SOLUTION INTRAVENOUS ONCE
Status: COMPLETED | OUTPATIENT
Start: 2025-01-30 | End: 2025-01-30

## 2025-01-30 RX ORDER — FUROSEMIDE 10 MG/ML
40 INJECTION INTRAMUSCULAR; INTRAVENOUS ONCE
Status: COMPLETED | OUTPATIENT
Start: 2025-01-30 | End: 2025-01-30

## 2025-01-30 RX ORDER — CALCIUM GLUCONATE 20 MG/ML
1000 INJECTION, SOLUTION INTRAVENOUS ONCE
Status: COMPLETED | OUTPATIENT
Start: 2025-01-30 | End: 2025-01-30

## 2025-01-30 RX ORDER — HYDRALAZINE HYDROCHLORIDE 20 MG/ML
10 INJECTION INTRAMUSCULAR; INTRAVENOUS EVERY 4 HOURS PRN
Status: DISCONTINUED | OUTPATIENT
Start: 2025-01-30 | End: 2025-02-02 | Stop reason: HOSPADM

## 2025-01-30 RX ORDER — NITROGLYCERIN 0.4 MG/1
0.4 TABLET SUBLINGUAL
Status: DISCONTINUED | OUTPATIENT
Start: 2025-01-30 | End: 2025-02-02 | Stop reason: HOSPADM

## 2025-01-30 RX ADMIN — INSULIN HUMAN 10 UNITS: 100 INJECTION, SOLUTION PARENTERAL at 03:37

## 2025-01-30 RX ADMIN — INSULIN HUMAN 5 UNITS: 100 INJECTION, SOLUTION PARENTERAL at 00:20

## 2025-01-30 RX ADMIN — CEFTRIAXONE SODIUM 1000 MG: 1 INJECTION, POWDER, FOR SOLUTION INTRAMUSCULAR; INTRAVENOUS at 01:36

## 2025-01-30 RX ADMIN — DEXTROSE MONOHYDRATE 25 G: 25 INJECTION, SOLUTION INTRAVENOUS at 03:37

## 2025-01-30 RX ADMIN — INSULIN HUMAN 5 UNITS: 100 INJECTION, SOLUTION PARENTERAL at 11:52

## 2025-01-30 RX ADMIN — MUPIROCIN 1 APPLICATION: 20 OINTMENT TOPICAL at 09:16

## 2025-01-30 RX ADMIN — CALCIUM GLUCONATE 1000 MG: 20 INJECTION, SOLUTION INTRAVENOUS at 09:15

## 2025-01-30 RX ADMIN — CALCIUM GLUCONATE 1000 MG: 20 INJECTION, SOLUTION INTRAVENOUS at 03:18

## 2025-01-30 RX ADMIN — FUROSEMIDE 40 MG: 10 INJECTION, SOLUTION INTRAMUSCULAR; INTRAVENOUS at 09:15

## 2025-01-30 RX ADMIN — SODIUM BICARBONATE 50 MEQ: 84 INJECTION INTRAVENOUS at 03:37

## 2025-01-30 RX ADMIN — BUDESONIDE AND FORMOTEROL FUMARATE DIHYDRATE 2 PUFF: 160; 4.5 AEROSOL RESPIRATORY (INHALATION) at 20:26

## 2025-01-30 RX ADMIN — BISOPROLOL FUMARATE 20 MG: 5 TABLET ORAL at 20:09

## 2025-01-30 RX ADMIN — INSULIN HUMAN 8 UNITS: 100 INJECTION, SOLUTION PARENTERAL at 05:22

## 2025-01-30 RX ADMIN — PREDNISONE 5 MG: 5 TABLET ORAL at 17:49

## 2025-01-30 RX ADMIN — HYDRALAZINE HYDROCHLORIDE 10 MG: 20 INJECTION INTRAMUSCULAR; INTRAVENOUS at 12:12

## 2025-01-30 RX ADMIN — SODIUM BICARBONATE 50 MEQ: 84 INJECTION INTRAVENOUS at 09:15

## 2025-01-30 RX ADMIN — MUPIROCIN 1 APPLICATION: 20 OINTMENT TOPICAL at 20:10

## 2025-01-30 RX ADMIN — ENOXAPARIN SODIUM 40 MG: 100 INJECTION SUBCUTANEOUS at 09:15

## 2025-01-30 RX ADMIN — HYDRALAZINE HYDROCHLORIDE 10 MG: 20 INJECTION INTRAMUSCULAR; INTRAVENOUS at 21:18

## 2025-01-30 RX ADMIN — CEFTRIAXONE 2000 MG: 2 INJECTION, POWDER, FOR SOLUTION INTRAMUSCULAR; INTRAVENOUS at 17:49

## 2025-01-30 RX ADMIN — ACETAMINOPHEN 1000 MG: 500 TABLET, FILM COATED ORAL at 11:52

## 2025-01-30 RX ADMIN — ALVIMOPAN 12 MG: 12 CAPSULE ORAL at 20:08

## 2025-01-30 RX ADMIN — SODIUM CHLORIDE 75 ML/HR: 9 INJECTION, SOLUTION INTRAVENOUS at 00:24

## 2025-01-30 RX ADMIN — HYDROCHLOROTHIAZIDE 25 MG: 12.5 TABLET ORAL at 09:15

## 2025-01-30 RX ADMIN — ACETAMINOPHEN 1000 MG: 500 TABLET, FILM COATED ORAL at 17:49

## 2025-01-30 RX ADMIN — INSULIN HUMAN 10 UNITS: 100 INJECTION, SOLUTION PARENTERAL at 09:15

## 2025-01-30 RX ADMIN — ALVIMOPAN 12 MG: 12 CAPSULE ORAL at 09:15

## 2025-01-30 RX ADMIN — DEXTROSE MONOHYDRATE 25 G: 25 INJECTION, SOLUTION INTRAVENOUS at 09:14

## 2025-01-30 RX ADMIN — VALSARTAN 320 MG: 320 TABLET, FILM COATED ORAL at 09:15

## 2025-01-30 NOTE — PLAN OF CARE
Goal Outcome Evaluation:  Plan of Care Reviewed With: patient, spouse        Progress: improving  Outcome Evaluation: Pt transferred to CICU from rapid response for decreased responsiveness. Currently on bipap @ 25% FiO2. See AM ABG results. BP stable. Precedex started for agitation/restlessness but has now been turned off. Started on abx. Hyperkalemia protocol initiated for potassium of 6.7. Lap sites CDI. F/C in place.  at bedside.

## 2025-01-30 NOTE — CONSULTS
[facility]                                                                                             Nephrology Initial Consult Note    Patient Identification:  Name: Blanca Harden MRN: [id number]  Age: 79 y.o. : 1945  Sex: female  Date:2025    Requesting Physician: As per consult order.  Reason for Consultation: CKD, hyperkalemia  Information from:patient/ family/ chart      History of Present Illness: This is a 79 y.o. year old female  with stage III chronic kidney disease.  Admitted post right hemicolectomy after recent findings of a large right colon mass consistent with tubulovillous adenoma with high-grade dysplasia.  Normally follows in our office with Dr. Mcginnis, baseline creatinine around 1.3 and she was stable, near that level at her last office visit.  Creatinine this morning is 1.36 which is near baseline  Her potassium was 6.7 overnight, recheck at 7 AM now 6.0 and further improved down to 5.2 with medical therapy.  I saw her in the ICU she feels well denies complaints, remains on normal saline, has been ordered a clear liquid diet denies any nausea vomiting shortness of breath or chest pain, no diarrhea or constipation.      The following medical history and medications personally reviewed by me:    Problem List:     * No active hospital problems. 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, grade III, ER/[place] negative    Hypertension     Lower extremity  edema     Moderate persistent asthma     On home O2     3L NC PRN    Pulmonary hypertension     Sleep apnea     Swelling     IN LOWER EXTREMITIES    Temporal arteritis     Varicose veins of unspecified lower extremity with ulcer of calf 2022    Venous insufficiency (chronic) (peripheral)        Past Surgical History:  Past Surgical History:   Procedure Laterality Date    BREAST BIOPSY Left  approx    benign pathology    BREAST BIOPSY Left 2019    Ultasound guided mammotome vacuum assisted left breast biopsy with placement of a metallic clip-Dr. Daniel Gutierrez, [facility]    CARDIAC CATHETERIZATION       SECTION N/A     x3    CHOLECYSTECTOMY N/A     COLON RESECTION Right 2025    Procedure: COLON RESECTION LAPAROSCOPIC RIGHT WITH DAVINCI ROBOT;  Surgeon: Ashwin Alvarado MD;  Location: [facility] MAIN OR;  Service: Robotics - DaVinci;  Laterality: Right;    COLONOSCOPY      COLONOSCOPY N/A 2024    Procedure: COLONOSCOPY into the cecum and TI with hot snare polypectomy;  Surgeon: Ashwin Alvarado MD;  Location: [facility] ENDOSCOPY;  Service: General;  Laterality: N/A;  pre-colon polyps  post-colon mass    COLONOSCOPY W/ POLYPECTOMY N/A 2019    Enlarged folds in the antrum: biopsied; duodenal, transverse colon x2, splenic flexure, descending colon and sigmoid colon polyps: biopsied (path: tubular adenoma x6)-Dr. Zak De Jesus, [facility]    ENDOSCOPY  10/11/2019    Procedure: ESOPHAGOGASTRODUODENOSCOPY with hot snare polypectomy;  Surgeon: Haile Handley MD;  Location: [facility] ENDOSCOPY;  Service: Gastroenterology    ENDOSCOPY N/A 2020    Procedure: ESOPHAGOGASTRODUODENOSCOPY;  Surgeon: Haile Handley MD;  Location: [facility] ENDOSCOPY;  Service: Gastroenterology    ENDOSCOPY N/A 2020    Procedure: ESOPHAGOGASTRODUODENOSCOPY WITH BIOPSIES AND COLD BIOPSY POLYPECTOMY;  Surgeon: Haile Handley MD;  Location: [facility] ENDOSCOPY;  Service:  Gastroenterology;  Laterality: N/A;  pre: dyspepsia and diarrhea  post: duodenal polyp and gastritis    ENDOSCOPY N/A 07/23/2024    Procedure: ESOPHAGOGASTRODUODENOSCOPY WITH hot snare polypectomy with clip placement x 2BIOPSY;  Surgeon: Ashwin Alvarado MD;  Location: [facility] ENDOSCOPY;  Service: General;  Laterality: N/A;  pre-hx duoedenal polyp  post-duodenal polyp; gastritis    EYE SURGERY      MASTECTOMY WITH SENTINEL NODE BIOPSY AND AXILLARY NODE DISSECTION Left 07/03/2019    Procedure: LEFT BREAST MASTECTOMY WITH SENTINEL NODE BIOPSY AND , v-y plasty closure;  Surgeon: Tahmina James MD;  Location: [facility] MAIN OR;  Service: General    SUBTOTAL HYSTERECTOMY Bilateral     ovaries still in tact    TEMPORAL ARTERY BIOPSY Bilateral 12/05/2019        Home Meds:   Medications Prior to Admission   Medication Sig Dispense Refill Last Dose/Taking    acetaminophen (TYLENOL) 325 MG tablet Take 2 tablets by mouth Every 6 (Six) Hours As Needed for Mild Pain. 30 tablet 3 Taking As Needed    BD Pen Needle Tila 2nd Gen 32G X 4 MM misc USE TO GIVE INSULIN 4 TIMES DAILY   Taking    bisoprolol (ZEBeta) 10 MG tablet Take 2 tablets by mouth Every Night.   1/27/2025 at 10:00 PM    Chlorhexidine Gluconate 4 % solution Apply 1 Application topically to the appropriate area as directed 2 (Two) Times a Day. Shower With Hibiclens Solution Twice The Day Before Surgery 236 mL 0 1/29/2025 at  5:00 AM    erythromycin base (E-MYCIN) 500 MG tablet Take 2 tablets by mouth 3 (Three) Times a Day. FOR 1 DAY (2 TABLETS AT 2 PM, 3 PM AND 10 PM THE DAY BEFORE SURGERY)   1/28/2025    HumaLOG KwikPen 100 UNIT/ML solution pen-injector Inject  under the skin into the appropriate area as directed 3 (Three) Times a Day With Meals. SLIDING SCALE 18-24 UNITS   1/28/2025 Morning    neomycin (MYCIFRADIN) 500 MG tablet Take 2 tablets by mouth 3 times a day. FOR 1 DAY. TAKE 2 TABLETS AT 2 PM, 3 PM, AND AT 10PM THE DAY BEFORE SURGERY.   1/28/2025    O2  (OXYGEN) Inhale 3 L/min As Needed.   Taking As Needed    omeprazole (priLOSEC) 20 MG capsule Take 2 capsules by mouth Daily. (Patient taking differently: Take 2 capsules by mouth Daily As Needed.) 60 capsule 1 1/28/2025    predniSONE (DELTASONE) 5 MG tablet Take 1 tablet by mouth Every Evening.   1/27/2025 Evening    Symbicort 160-4.5 MCG/ACT inhaler Inhale 2 puffs 2 (Two) Times a Day.   1/28/2025 at  6:00 PM    torsemide (DEMADEX) 20 MG tablet Take 1 tablet by mouth Daily.   1/22/2025    Toujeo SoloStar 300 UNIT/ML solution pen-injector injection Inject 25 Units under the skin into the appropriate area as directed Every Night.   1/28/2025    valsartan-hydrochlorothiazide (DIOVAN-HCT) 320-25 MG per tablet Take 1 tablet by mouth Daily.   1/29/2025 at  6:00 AM    albuterol (ACCUNEB) 1.25 MG/3ML nebulizer solution Take  by nebulization Every 4 (Four) Hours As Needed for Wheezing or Shortness of Air.          Current Meds:   Current Facility-Administered Medications   Medication Dose Route Frequency Provider Last Rate Last Admin    acetaminophen (TYLENOL) tablet 1,000 mg  1,000 mg Oral Q6H Ashwin Alvarado MD   1,000 mg at 01/30/25 1152    alvimopan (ENTEREG) capsule 12 mg  12 mg Oral BID Ashwin Alvarado MD   12 mg at 01/30/25 0915    bisoprolol (ZEBeta) tablet 20 mg  20 mg Oral Nightly Ashwin Alvarado MD        budesonide-formoterol (SYMBICORT) 160-4.5 MCG/ACT inhaler 2 puff  2 puff Inhalation BID Ashwin Alvarado MD        cefTRIAXone (ROCEPHIN) 2,000 mg in sodium chloride 0.9 % 100 mL MBP  2,000 mg Intravenous Q24H Nela Pennington APRN        dexmedetomidine (PRECEDEX) 400 mcg in 100 mL NS infusion  0.2-1.5 mcg/kg/hr Intravenous Titrated Nela Pennington APRN   Stopped at 01/30/25 0348    dextrose (D50W) (25 g/50 mL) IV injection 25 g  25 g Intravenous Q15 Min PRN Ashwin Alvarado MD        dextrose (GLUTOSE) oral gel 15 g  15 g Oral Q15 Min PRN Ashwin Alvarado  MD Adarsh        Enoxaparin Sodium (LOVENOX) syringe 40 mg  40 mg Subcutaneous Daily Ashwin Alvarado MD   40 mg at 01/30/25 0915    glucagon (GLUCAGEN) injection 1 mg  1 mg Intramuscular Q15 Min PRN Ashwin Alvarado MD        hydrALAZINE (APRESOLINE) injection 10 mg  10 mg Intravenous Q4H PRN Landen Dexter, DO   10 mg at 01/30/25 1212    HYDROmorphone (DILAUDID) injection 0.5 mg  0.5 mg Intravenous Q2H PRN Ashwin Alvarado MD        And    naloxone (NARCAN) injection 0.4 mg  0.4 mg Intravenous Q5 Min PRN Ashwin Alvarado MD        insulin regular (humuLIN R,novoLIN R) injection 3-14 Units  3-14 Units Subcutaneous Q6H Ashwin Alvarado MD   5 Units at 01/30/25 1152    mupirocin (BACTROBAN) 2 % nasal ointment 1 Application  1 Application Each Nare BID Landen Dexter, DO   1 Application at 01/30/25 0916    nitroglycerin (NITROSTAT) SL tablet 0.4 mg  0.4 mg Sublingual Q5 Min PRN Nela Pennington, APRENRRIQUE        nitroglycerin (NITROSTAT) SL tablet 0.4 mg  0.4 mg Sublingual Q5 Min PRN Santosh Dhillon MD        O2 (OXYGEN)  3 L/min Inhalation PRN Ashwin Alvarado MD        ondansetron (ZOFRAN) injection 4 mg  4 mg Intravenous Q6H PRN Ashwin Alvarado MD   4 mg at 01/29/25 1826    pantoprazole (PROTONIX) EC tablet 40 mg  40 mg Oral Q AM Ashwin Alvarado MD        predniSONE (DELTASONE) tablet 5 mg  5 mg Oral Q PM Ashwin Alvarado MD   5 mg at 01/29/25 1859    [Held by provider] pregabalin (LYRICA) capsule 75 mg  75 mg Oral Daily Ashwin Alvarado MD   75 mg at 01/29/25 1901    sodium chloride 0.9 % infusion  75 mL/hr Intravenous Continuous Santosh Dhillon MD 75 mL/hr at 01/30/25 1509 75 mL/hr at 01/30/25 1509    [Held by provider] torsemide (DEMADEX) tablet 20 mg  20 mg Oral Daily Ashwin Alvarado MD   20 mg at 01/29/25 1859    traMADol (ULTRAM) tablet 50 mg  50 mg Oral Q6H PRN Ashwin Alvarado  MD Adarsh           Allergies:  Allergies   Allergen Reactions    Revefenacin Shortness Of Breath    Aspirin Nausea Only     ABDOMINAL PAIN     Sulfa Antibiotics Unknown (See Comments)     CHILDHOOD REACTION        Social History:   Social History     Socioeconomic History    Marital status:      Spouse name: Ashwin    Number of children: 3    Years of education: College   Tobacco Use    Smoking status: Never    Smokeless tobacco: Never    Tobacco comments:     caffine use   Vaping Use    Vaping status: Never Used   Substance and Sexual Activity    Alcohol use: No    Drug use: No    Sexual activity: Not Currently     Comment: Spouse, Ashwin        Family History:  Family History   Problem Relation Age of Onset    Heart disease Mother     Stroke Mother     Asthma Mother     Hypertension Father     Stomach cancer Father     Diabetes Father     Esophageal cancer Father     Cancer Father     Throat cancer Sister     Diabetes Paternal Grandmother     Hypertension Paternal Grandfather     Colon cancer Paternal Grandfather     Colon cancer Paternal Uncle     Colon cancer Paternal Uncle     Colon cancer Paternal Uncle     Ovarian cancer Cousin     Stomach cancer Cousin     Malig Hyperthermia Neg Hx         Review of Systems: as per HPI, in addition:    General:      Denies  weakness / fatigue,                       No fevers / chills                       no weight loss  HEENT;       no dysphagia   Neck:           No swelling  Respiratory: no cough / congestion/wheezing                      No shortness of air   CV:              No chest pain                       No palpitations  Abdomen/GI: no nausea / vomiting                      No diarrhea, no constipation                      No abdominal pain  :             no dysuria  Endocrine:   No heat or cold intolerance  Skin:           Denies rashes or skin lesions   Vascular:   No edema  Musculoskeletal: Denies joint pain or deformities      Physical  "Exam:  Vitals:   Temp (24hrs), Av °F (36.1 °C), Min:96.2 °F (35.7 °C), Max:97.6 °F (36.4 °C)    /54   Pulse 77   Temp 97.6 °F (36.4 °C) (Oral)   Resp 22   Ht 152.4 cm (60\")   Wt 83.5 kg (184 lb 1.4 oz)   LMP  (LMP Unknown)   SpO2 100%   BMI 35.95 kg/m²   Intake/Output:     Intake/Output Summary (Last 24 hours) at 2025 1602  Last data filed at 2025 1224  Gross per 24 hour   Intake 663 ml   Output 2125 ml   Net -1462 ml        Wt Readings from Last 1 Encounters:   25 0519 83.5 kg (184 lb 1.4 oz)   25 0742 83.5 kg (184 lb 1.4 oz)       Exam:    General Appearance:  Awake, alert, no acute distress  Mildly ill-appearing   Head and Face:  Normocephalic, atraumatic   Eyes:  No icterus   ENMT: Moist mucosa   Neck: Supple  no jugular venous distention   Pulmonary:  Respiratory effort: Normal  Auscultation of lungs: Clear bilaterally  No wheezes or rhonchi  Good air movement, good expansion   Chest wall:  No tenderness or deformity   Cardiovascular:  Auscultation of the heart: Normal rhythm, no murmurs  Trace edema of bilateral lower extremities   Abdomen:  Abdomen: soft, nontender, normal bowel sounds    Musculoskeletal: No joint swelling or gross deformities    Skin: Skin inspection and palpation: No rash   Lymphatic: No focal lymphadenopathy   Psychiatric: Judgement and insight: normal  Orientation to person place and time: normal  Mood and affect: normal       DATA:  Radiology and Labs:  The following labs independently reviewed by me, additional AM labs ordered  Old records independently reviewed showing CKD, prior colon mass  The following radiologic studies independently viewed by me, findings CT chest showing atelectasis, right upper lobe consolidation consistent with pneumonia  Interval notes, chart personally reviewed by me.  I have reviewed and summarized old records as detailed above  Plan of care discussed with patient, RN, family members at bedside  New problems include " severe hyperkalemia      Risk/ complexity of medical care/ medical decision making: High risk, severe hyperkalemia, FEDERICA on CKD  Chronic illness with severe exacerbation or progression: Hyperkalemia      Labs:   Recent Results (from the past 24 hours)   POC Glucose Once    Collection Time: 01/29/25  4:11 PM    Specimen: Blood   Result Value Ref Range    Glucose 305 (H) 70 - 130 mg/dL   POC Glucose Once    Collection Time: 01/29/25  5:21 PM    Specimen: Blood   Result Value Ref Range    Glucose 292 (H) 70 - 130 mg/dL   POC Glucose Once    Collection Time: 01/29/25  8:59 PM    Specimen: Blood   Result Value Ref Range    Glucose 230 (H) 70 - 130 mg/dL   Blood Gas, Arterial -    Collection Time: 01/29/25 10:54 PM    Specimen: Arterial Blood   Result Value Ref Range    Site Right Radial     Norman's Test Positive     pH, Arterial 7.176 (C) 7.350 - 7.450 pH units    pCO2, Arterial 70.6 (C) 35.0 - 45.0 mm Hg    pO2, Arterial 93.4 80.0 - 100.0 mm Hg    HCO3, Arterial 26.1 22.0 - 28.0 mmol/L    Base Excess, Arterial -3.6 (L) 0.0 - 2.0 mmol/L    O2 Saturation, Arterial 94.4 92.0 - 98.5 %    CO2 Content 28.3 (H) 23 - 27 mmol/L    Barometric Pressure for Blood Gas 750.5000 mmHg    Modality Cannula     Flow Rate 3.0000 lpm    Rate 20 Breaths/minute    Notified Who daron brooks     Read Back Yes     Notified Time      Hemodilution No     Device Comment sats=94%    CBC (No Diff)    Collection Time: 01/30/25 12:13 AM    Specimen: Blood   Result Value Ref Range    WBC 10.75 3.40 - 10.80 10*3/mm3    RBC 3.70 (L) 3.77 - 5.28 10*6/mm3    Hemoglobin 11.3 (L) 12.0 - 15.9 g/dL    Hematocrit 35.6 34.0 - 46.6 %    MCV 96.2 79.0 - 97.0 fL    MCH 30.5 26.6 - 33.0 pg    MCHC 31.7 31.5 - 35.7 g/dL    RDW 11.9 (L) 12.3 - 15.4 %    RDW-SD 41.2 37.0 - 54.0 fl    MPV 10.0 6.0 - 12.0 fL    Platelets 169 140 - 450 10*3/mm3   POC Glucose Once    Collection Time: 01/30/25 12:13 AM    Specimen: Blood   Result Value Ref Range    Glucose 209 (H) 70 -  130 mg/dL   CBC Auto Differential    Collection Time: 01/30/25  1:35 AM    Specimen: Blood   Result Value Ref Range    WBC 10.74 3.40 - 10.80 10*3/mm3    RBC 3.52 (L) 3.77 - 5.28 10*6/mm3    Hemoglobin 10.7 (L) 12.0 - 15.9 g/dL    Hematocrit 34.2 34.0 - 46.6 %    MCV 97.2 (H) 79.0 - 97.0 fL    MCH 30.4 26.6 - 33.0 pg    MCHC 31.3 (L) 31.5 - 35.7 g/dL    RDW 11.7 (L) 12.3 - 15.4 %    RDW-SD 41.1 37.0 - 54.0 fl    MPV 9.9 6.0 - 12.0 fL    Platelets 164 140 - 450 10*3/mm3    Neutrophil % 94.2 (H) 42.7 - 76.0 %    Lymphocyte % 2.2 (L) 19.6 - 45.3 %    Monocyte % 3.0 (L) 5.0 - 12.0 %    Eosinophil % 0.0 (L) 0.3 - 6.2 %    Basophil % 0.1 0.0 - 1.5 %    Immature Grans % 0.5 0.0 - 0.5 %    Neutrophils, Absolute 10.12 (H) 1.70 - 7.00 10*3/mm3    Lymphocytes, Absolute 0.24 (L) 0.70 - 3.10 10*3/mm3    Monocytes, Absolute 0.32 0.10 - 0.90 10*3/mm3    Eosinophils, Absolute 0.00 0.00 - 0.40 10*3/mm3    Basophils, Absolute 0.01 0.00 - 0.20 10*3/mm3    Immature Grans, Absolute 0.05 0.00 - 0.05 10*3/mm3    nRBC 0.0 0.0 - 0.2 /100 WBC   Basic Metabolic Panel    Collection Time: 01/30/25  1:35 AM    Specimen: Blood   Result Value Ref Range    Glucose 221 (H) 65 - 99 mg/dL    BUN 21 8 - 23 mg/dL    Creatinine 1.24 (H) 0.57 - 1.00 mg/dL    Sodium 130 (L) 136 - 145 mmol/L    Potassium 6.7 (C) 3.5 - 5.2 mmol/L    Chloride 98 98 - 107 mmol/L    CO2 21.1 (L) 22.0 - 29.0 mmol/L    Calcium 8.2 (L) 8.6 - 10.5 mg/dL    BUN/Creatinine Ratio 16.9 7.0 - 25.0    Anion Gap 10.9 5.0 - 15.0 mmol/L    eGFR 44.6 (L) >60.0 mL/min/1.73   Lactic Acid, Plasma    Collection Time: 01/30/25  1:35 AM    Specimen: Blood   Result Value Ref Range    Lactate 0.7 0.5 - 2.0 mmol/L   Blood Gas, Arterial -    Collection Time: 01/30/25  1:42 AM    Specimen: Arterial Blood   Result Value Ref Range    Site Right Radial     Norman's Test Positive     pH, Arterial 7.268 (C) 7.350 - 7.450 pH units    pCO2, Arterial 54.2 (H) 35.0 - 45.0 mm Hg    pO2, Arterial 145.9 (H) 80.0 -  100.0 mm Hg    HCO3, Arterial 24.8 22.0 - 28.0 mmol/L    Base Excess, Arterial -2.6 (L) 0.0 - 2.0 mmol/L    O2 Saturation, Arterial 98.9 (H) 92.0 - 98.5 %    A-a DO2 0.7 mmHg    CO2 Content 26.4 23 - 27 mmol/L    Barometric Pressure for Blood Gas 752.3000 mmHg    Modality BiPap     FIO2 35 %    Ventilator Mode NIV     Set Tidal Volume 450     Set Mech Resp Rate 20     Rate 20 Breaths/minute    Notified Who Nela CARBAJAL     Read Back Yes     Notified Time      Hemodilution No     Device Comment AVAPS 24/12/5 sat 100     PO2/FIO2 417 0 - 500   POC Glucose Once    Collection Time: 01/30/25  5:17 AM    Specimen: Blood   Result Value Ref Range    Glucose 275 (H) 70 - 130 mg/dL   Basic Metabolic Panel    Collection Time: 01/30/25  6:51 AM    Specimen: Blood   Result Value Ref Range    Glucose 241 (H) 65 - 99 mg/dL    BUN 22 8 - 23 mg/dL    Creatinine 1.36 (H) 0.57 - 1.00 mg/dL    Sodium 135 (L) 136 - 145 mmol/L    Potassium 6.0 (H) 3.5 - 5.2 mmol/L    Chloride 104 98 - 107 mmol/L    CO2 25.4 22.0 - 29.0 mmol/L    Calcium 8.6 8.6 - 10.5 mg/dL    BUN/Creatinine Ratio 16.2 7.0 - 25.0    Anion Gap 5.6 5.0 - 15.0 mmol/L    eGFR 40.0 (L) >60.0 mL/min/1.73   ECG 12 Lead Electrolyte Imbalance    Collection Time: 01/30/25  8:24 AM   Result Value Ref Range    QT Interval 405 ms    QTC Interval 422 ms   POC Glucose Once    Collection Time: 01/30/25 10:06 AM    Specimen: Blood   Result Value Ref Range    Glucose 290 (H) 70 - 130 mg/dL   POC Glucose Once    Collection Time: 01/30/25 11:21 AM    Specimen: Blood   Result Value Ref Range    Glucose 237 (H) 70 - 130 mg/dL   Potassium    Collection Time: 01/30/25 12:31 PM    Specimen: Blood   Result Value Ref Range    Potassium 5.2 3.5 - 5.2 mmol/L       Radiology:  Imaging Results (Last 24 Hours)       Procedure Component Value Units Date/Time    CT Chest Without Contrast Diagnostic [[id number]] Collected: 01/30/25 1410     Updated: 01/30/25 1424    Narrative:      CT CHEST WO  CONTRAST DIAGNOSTIC-     INDICATION: Abnormal chest x-ray     COMPARISON: Chest radiograph January 29, 2025 and CT chest August 29, 2017     TECHNIQUE:  Routine CT chest without IV contrast. Coronal and sagittal reformats.  Radiation dose reduction techniques were utilized, including automated  exposure control and exposure modulation based on body size.     FINDINGS:      Chest wall: Left mastectomy. No lymphadenopathy.     Mediastinum: Coronary artery atherosclerotic calcifications. Mild  cardiomegaly. No pericardial effusion. No mediastinal or hilar  lymphadenopathy.     Lungs/pleura: Trace bilateral pleural effusions. Expiratory phase  imaging with low lung volume. Small amount of secretions seen in the  trachea. Subsegmental airway occlusions in the lower lobes.  Consolidative opacity in the posterior right upper lobe with surrounding  linear opacities extending into the anterior segment and apical segment  right upper lobe. Inferior right middle lobe opacities with volume loss.  Right lower lobe opacities, with volume loss. Lingular opacity. Left  lower lobe opacities, with volume loss.     Upper abdomen: Free intraperitoneal air seen in the upper abdomen.  Pancreatic atrophy. Bulky calcific atherosclerotic disease at the celiac  axis origin.     Osseous structures: Diffuse idiopathic skeletal hyperostosis seen in the  mid and lower thoracic spine. Old superior endplate compression  deformities seen at L1, with 40% collapse.       Impression:         1. Expiratory phase imaging with low lung volumes and suspected large  amount of atelectasis at the lung bases including the inferior middle  lobe, bilateral lower lobes and lingula.  2. Consolidative opacities in the right upper lobe, more consistent with  pneumonia. Recommend follow-up to resolution.  3. Trace bilateral pleural effusions.  4. Mild cardiomegaly.  5. Free intraperitoneal air seen in the upper abdomen, suspect  iatrogenic from recent surgery.      This report was finalized on 1/30/2025 2:21 PM by Dr. Kalin Panchal M.D on Workstation: [id number]       XR Chest 1 View [[id number]] Collected: 01/30/25 0003     Updated: 01/30/25 0007    Narrative:      CXR ONE VIEW      HISTORY: acute hyperap resp failure; K63.89-Other specified diseases of  intestine     COMPARISON: Chest x-ray 10/1/2023     TECHNIQUE: single portable AP       Impression:         There is mild cardiomegaly.     There are patchy bilateral infiltrates, in the right mid to upper lung  and right lung base, and in the left mid to lower lung.     There is no effusion or pneumothorax.     This report was finalized on 1/30/2025 12:04 AM by Dr. Sachin Michelle M.D on Workstation: [id number]               ASSESSMENT:   CKD stage IIIa, stable, near baseline  Severe hyperkalemia.  She was on valsartan at home, also possibly worsened by extracellular potassium shifting with anesthesia, improving with medical therapy  Right colon mass status post laparoscopic hemicolectomy 1/29  Acute hypercapnic respiratory failure, now off BiPAP  Altered mental status, improved  ANAYELI on CPAP  Asthma  Obesity  Anemia of CKD      DISCUSSION/PLAN:   Her renal function remains stable, near baseline  Potassium continues to improve with medical therapy  Will continue to hold losartan and Demadex  Continue normal saline IV hydration, Lasix IV x 1 to assist with kaliuresis and will repeat medications for acute hyperkalemia with serial lab monitoring  Potassium levels every 4 hours until normal twice as hyperkalemic and rebound after intracellular shifts  I would avoid Lokelma at this time with her recent bowel resection    Continue to monitor electrolytes and volume closely, avoid IV contrast and nephrotoxic medications     I appreciate the consult request.  Please send me a secure chat message with any nonurgent questions regarding patient care.  For any urgent patient care issues please call my office number  below.      Santosh Dhillon MD  [facility]  Office phone number: [phone]  Answering service phone number: [phone]      1/30/2025        Dictation via Dragon dictation software

## 2025-01-30 NOTE — PROGRESS NOTES
Postoperative day 1 status post robotic assisted laparoscopic right hemicolectomy    S: Patient with confusion overnight.   She had ABG that show hypercapnia.  She was transferred to the intensive care unit for further monitoring and CPAP treatment.  She was also found to have potassium of 6.7.  She was treated by nephrology.  She is feeling fine and denies any complaints.  Denies any abdominal pain nausea vomiting.  She is hungry and wants to try some liquids.    O:   Vitals:    01/30/25 1236 01/30/25 1300 01/30/25 1442 01/30/25 1502   BP: 164/55 154/66  149/54   Pulse: 74 76 89 77   Resp:   22    Temp:       TempSrc:       SpO2: 100% 100% 95% 100%   Weight:       Height:          Alert, no acute distress  Breathing comfortable  Regular rate rhythm  Abdomen soft, nontender, nondistended, incisions clean dry and intact    Blood glucose 237 from 290 from 241    Potassium 5.2 from 6, sodium 135, creatinine 1.3 that is her baseline.  White blood cell count 10.7, hemoglobin 10.7, stable, platelets 164    Assessment and plan    79-year-old female status post day 1 from robotic right hemicolectomy.  Worsening respiratory status, slowly getting better on oxygen.  Hyperkalemia, now resolved  Hyperglycemia, on insulin  Pain well-controlled.    Start clear liquid diet and see how she does  Fluid management as per renal  SCDs and Lovenox  Start physical therapy  Ambulate  Continue Entereg  Awaiting pathology results    Ashwin Alvarado MD, FACS  General, Minimally Invasive and Endoscopic Surgery  [facility]    [address], [address]  [place], [place], [address]  P: [phone]  F: [phone]

## 2025-01-30 NOTE — PROGRESS NOTES
Per chart review, patient has been followed by Dr. Gretchen Mcginnis from kidney care consultant outpatient, will defer consult to their group. Thank you for consideration.

## 2025-01-30 NOTE — PROGRESS NOTES
"  Overnight intensivist staffing note.   [facility] CARDIAC INTENSIVE CARE  1/30/2025    Patient:  Name:  Blanca Harden  MRN:  [id number]  1945  79 y.o.  female         CC: Acute hypercapnic respiratory failure BiPAP management transferred to ICU    Summary:  Patient is a 79-year-old status post right hemicolectomy secondary to colonic mass who sees Dr. Lang for asthma who was evaluated by rapid response team secondary to decreased responsiveness found to be acutely hypercapnic.  She was placed on BiPAP and brought to the ICU.  She was alert enough to be agitated reaching for BiPAP mask is on gentle medication for anxiolytics she will arouse now and move and agitated manner however will not answer questions at present time.  Discussed with the patient's son at bedside    Physical Exam:  /71   Pulse 79   Temp 96.7 °F (35.9 °C) (Axillary)   Resp 18   Ht 152.4 cm (60\")   Wt 83.5 kg (184 lb 1.4 oz)   LMP  (LMP Unknown)   SpO2 98%   BMI 35.95 kg/m²   Body mass index is 35.95 kg/m².    Intake/Output Summary (Last 24 hours) at 1/30/2025 0004  Last data filed at 1/29/2025 2230  Gross per 24 hour   Intake 1620 ml   Output 400 ml   Net 1220 ml     General appearance: Ill-appearing not conversant limited by BiPAP large neck circumference  Eyes: anicteric sclerae, moist conjunctivae; no lidlag;    HENT: Atraumatic; oropharynx clear with moist mucous membranes    Neck: Trachea midline;  supple   Lungs: Bilateral air entry, with synchronous and calm respiratory effort and no intercostal retractions  CV: RRR, no rub   Abdomen: Postsurgical  Extremities: No peripheral edema   Skin: Warm dry surgical incisions without bleeding  Psych/neuro lethargic    Data Review:  Notable Labs:  Results from last 7 days   Lab Units 01/30/25  0013   WBC 10*3/mm3 10.75   HEMOGLOBIN g/dL 11.3*   PLATELETS 10*3/mm3 169     Estimated Creatinine Clearance: 37 mL/min (A) (by C-G formula based on SCr of 1.2 mg/dL " (H)).    Results from last 7 days   Lab Units 01/29/25  2254   PH, ARTERIAL pH units 7.176*   PCO2, ARTERIAL mm Hg 70.6*   PO2 ART mm Hg 93.4   HCO3 ART mmol/L 26.1       Imaging:  Reviewed chest images personally from past 3 days    Chest x-ray with left-sided nodular opacity    ASSESSMENT  /  PLAN:  Acute hypercapnic respiratory failure  ANAYELI on CPAP  Asthma  Colonic mass status postresection  Morbid obesity  CKD  Iron deficiency anemia  Abnormal left-sided pulmonary opacity given her history of colonic mass would pursue CT scan chest to better evaluate when more stable cover with ceftriaxone for now obtain blood cultures    Given significant hypercapnia and pH of 7.16 stat transfer to the ICU  Bipap settings reviewed adjusted, avaps, min vent11 reassuring  Recheck abg after a few hours on bipap  Hold lyrica and gabapentin until more alert  Discussed with the patient's family member at bedside    Time spent taking care of this patient from 23  on 1/29/2025 documentation delayed for other emergent patient care needs    Electronically signed by Bryan Carr MD, 01/30/25, 1:37 AM EST.    Lab addendu:  Results from last 7 days   Lab Units 01/30/25  0135 01/30/25  0013   WBC 10*3/mm3 10.74 10.75   HEMOGLOBIN g/dL 10.7* 11.3*   PLATELETS 10*3/mm3 164 169     Results from last 7 days   Lab Units 01/30/25  0135   SODIUM mmol/L 130*   POTASSIUM mmol/L 6.7*   CHLORIDE mmol/L 98   CO2 mmol/L 21.1*   BUN mg/dL 21   CREATININE mg/dL 1.24*   GLUCOSE mg/dL 221*   CALCIUM mg/dL 8.2*   Estimated Creatinine Clearance: 35.8 mL/min (A) (by C-G formula based on SCr of 1.24 mg/dL (H)).  Ivfs increased  Hyperkalemia cocktail given  Nephrolgoy stat consult placed.  Recheck rfp ordered    Electronically signed by Bryan Carr MD, 01/30/25, 7:03 AM EST.

## 2025-01-30 NOTE — PROGRESS NOTES
Received a cardiology consult for postoperative management.  I reviewed the chart thoroughly.  I cannot find any active cardiac issues.  It appears that Dr. Carr has seen the patient from pulmonology.  We also confirmed with nursing that there are no major cardiology issues currently.  Cardiology will sign off for now given these factors.  If cardiology consultation is still needed, please reconsult.    Adan Rodriguez MD

## 2025-01-30 NOTE — ANESTHESIA POSTPROCEDURE EVALUATION
Patient: Blanca Harden    Procedure Summary       Date: 01/29/25 Room / Location: [facility] OR  /  [facility]    Anesthesia Start: 1011 Anesthesia Stop: 1422    Procedure: COLON RESECTION LAPAROSCOPIC RIGHT WITH DAVINCI ROBOT (Right: Abdomen) Diagnosis:       Colonic mass      (Colonic mass [K63.89])    Surgeons: Ashwin Alvarado MD Provider: Gorge Barnett MD    Anesthesia Type: general with blockAnne ASA Status: 4            Anesthesia Type: general with blockAnne    Vitals  Vitals Value Taken Time   /64 01/29/25 1715   Temp 36.4 °C (97.5 °F) 01/29/25 1720   Pulse 76 01/29/25 1721   Resp 16 01/29/25 1515   SpO2 100 % 01/29/25 1721   Vitals shown include unfiled device data.        Anesthesia Post Evaluation

## 2025-01-30 NOTE — PROGRESS NOTES
General Surgery    Notified by nurse that rapid response was called due to decreased responsiveness.  CO2 was 70.  She is awake and following commands although lethargic.  Transfer to telemetry, pulmonary consulted for possible BiPAP.  Labs ordered.  Made n.p.o. with sips and pain medication held.  Will continue to follow.    Mary Jo Christian MD

## 2025-01-30 NOTE — CONSULTS
Group: [facility]         CONSULT NOTE    Patient Identification:  Blanca Harden  79 y.o.  female  1945  [id number]              Reason for Consultation: ICU transfer, acute hypercapnic respiratory failure    CC: Ascending colon mass    History of Present Illness:  Blanca Harden is a 79-year-old female with a past medical history including: Hyperlipidemia, hypertension, obesity, chronic kidney disease, iron deficiency anemia, and duodenal/colon polyp.    She follows with Dr. Lang in the pulmonary office for moderate persistent asthma-she is on maximum medical therapy for this with Symbicort, she did not tolerate triple therapy due to allergy to the anticholinergic component.    She presented to the hospital on 1/29/2025 for scheduled right hemicolectomy.  Patient underwent recent EGD and colonoscopy in July 2024, was found to have an adenomatous polyp in the duodenum that was completely removed which was found to have low-grade dysplasia.  There was also a large mass in the right colon at the proximal ascending area that was consistent with tubulovillous adenoma with high-grade dysplasia.  Mass was unable to be endoscopically resected and patient presented to the hospital for scheduled robotic assisted laparoscopic right hemicolectomy.  Surgery was performed on 1/29/2025-patient tolerated procedure well and patient was sent to the recovery area in stable condition.    Rapid response was called on patient around 2300.  According nursing staff, at around 2000, patient was alert, oriented and appropriate.  When they checked on patient later in the evening, patient had decreased level of responsiveness and rapid response was called.  During rapid response, an ABG was performed which revealed uncompensated respiratory acidosis with pH of 7.176, PaCO2 70.6, HCO3 26.1.    Patient transferred to ICU for acute hypercapnic respiratory failure and initiation of BiPAP.    Review of  Systems:  Unable to obtain secondary to lethargy, AMS.    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 Daniel Gutierrez, [facility]    CARDIAC CATHETERIZATION       SECTION N/A     x3    CHOLECYSTECTOMY N/A     COLONOSCOPY      COLONOSCOPY N/A 2024    Procedure: COLONOSCOPY into the cecum and TI with hot snare polypectomy;  Surgeon: Ashwin Alvarado MD;  Location: [facility] ENDOSCOPY;  Service: General;  Laterality: N/A;  pre-colon polyps  post-colon mass    COLONOSCOPY W/ POLYPECTOMY N/A 2019    Enlarged folds in the antrum: biopsied; duodenal, transverse colon x2, splenic flexure, descending colon and sigmoid colon polyps: biopsied (path: tubular adenoma x6)-Dr. Zak De Jesus, [facility]    ENDOSCOPY  10/11/2019    Procedure: ESOPHAGOGASTRODUODENOSCOPY with hot snare polypectomy;  Surgeon: Haile Handley MD;  Location: [facility] ENDOSCOPY;  Service:  Gastroenterology    ENDOSCOPY N/A 01/29/2020    Procedure: ESOPHAGOGASTRODUODENOSCOPY;  Surgeon: Haile Handley MD;  Location: [facility] ENDOSCOPY;  Service: Gastroenterology    ENDOSCOPY N/A 09/09/2020    Procedure: ESOPHAGOGASTRODUODENOSCOPY WITH BIOPSIES AND COLD BIOPSY POLYPECTOMY;  Surgeon: Haile Handley MD;  Location: [facility] ENDOSCOPY;  Service: Gastroenterology;  Laterality: N/A;  pre: dyspepsia and diarrhea  post: duodenal polyp and gastritis    ENDOSCOPY N/A 07/23/2024    Procedure: ESOPHAGOGASTRODUODENOSCOPY WITH hot snare polypectomy with clip placement x 2BIOPSY;  Surgeon: Ashwin Alvarado MD;  Location: [facility] ENDOSCOPY;  Service: General;  Laterality: N/A;  pre-hx duoedenal polyp  post-duodenal polyp; gastritis    EYE SURGERY      MASTECTOMY WITH SENTINEL NODE BIOPSY AND AXILLARY NODE DISSECTION Left 07/03/2019    Procedure: LEFT BREAST MASTECTOMY WITH SENTINEL NODE BIOPSY AND , v-y plasty closure;  Surgeon: Tahmina James MD;  Location: [facility] MAIN OR;  Service: General    SUBTOTAL HYSTERECTOMY Bilateral     ovaries still in tact    TEMPORAL ARTERY BIOPSY Bilateral 12/05/2019        Home Meds:  Medications Prior to Admission   Medication Sig Dispense Refill Last Dose/Taking    acetaminophen (TYLENOL) 325 MG tablet Take 2 tablets by mouth Every 6 (Six) Hours As Needed for Mild Pain. 30 tablet 3 Taking As Needed    BD Pen Needle Tila 2nd Gen 32G X 4 MM misc USE TO GIVE INSULIN 4 TIMES DAILY   Taking    bisoprolol (ZEBeta) 10 MG tablet Take 2 tablets by mouth Every Night.   1/27/2025 at 10:00 PM    Chlorhexidine Gluconate 4 % solution Apply 1 Application topically to the appropriate area as directed 2 (Two) Times a Day. Shower With Hibiclens Solution Twice The Day Before Surgery 236 mL 0 1/29/2025 at  5:00 AM    erythromycin base (E-MYCIN) 500 MG tablet Take 2 tablets by mouth 3 (Three) Times a Day. FOR 1 DAY (2 TABLETS AT 2 PM, 3 PM AND 10 PM THE DAY BEFORE SURGERY)   1/28/2025     HumaLOG KwikPen 100 UNIT/ML solution pen-injector Inject  under the skin into the appropriate area as directed 3 (Three) Times a Day With Meals. SLIDING SCALE 18-24 UNITS   1/28/2025 Morning    neomycin (MYCIFRADIN) 500 MG tablet Take 2 tablets by mouth 3 times a day. FOR 1 DAY. TAKE 2 TABLETS AT 2 PM, 3 PM, AND AT 10PM THE DAY BEFORE SURGERY.   1/28/2025    O2 (OXYGEN) Inhale 3 L/min As Needed.   Taking As Needed    omeprazole (priLOSEC) 20 MG capsule Take 2 capsules by mouth Daily. (Patient taking differently: Take 2 capsules by mouth Daily As Needed.) 60 capsule 1 1/28/2025    predniSONE (DELTASONE) 5 MG tablet Take 1 tablet by mouth Every Evening.   1/27/2025 Evening    Symbicort 160-4.5 MCG/ACT inhaler Inhale 2 puffs 2 (Two) Times a Day.   1/28/2025 at  6:00 PM    torsemide (DEMADEX) 20 MG tablet Take 1 tablet by mouth Daily.   1/22/2025    Toujeo SoloStar 300 UNIT/ML solution pen-injector injection Inject 25 Units under the skin into the appropriate area as directed Every Night.   1/28/2025    valsartan-hydrochlorothiazide (DIOVAN-HCT) 320-25 MG per tablet Take 1 tablet by mouth Daily.   1/29/2025 at  6:00 AM    albuterol (ACCUNEB) 1.25 MG/3ML nebulizer solution Take  by nebulization Every 4 (Four) Hours As Needed for Wheezing or Shortness of Air.          Allergies:  Allergies   Allergen Reactions    Revefenacin Shortness Of Breath    Aspirin Nausea Only     ABDOMINAL PAIN     Sulfa Antibiotics Unknown (See Comments)     CHILDHOOD REACTION        Social History:   Social History     Socioeconomic History    Marital status:      Spouse name: Ashwin    Number of children: 3    Years of education: College   Tobacco Use    Smoking status: Never    Smokeless tobacco: Never    Tobacco comments:     caffine use   Vaping Use    Vaping status: Never Used   Substance and Sexual Activity    Alcohol use: No    Drug use: No    Sexual activity: Not Currently     Comment: Spouse, Ashwin       Family  "History:  Family History   Problem Relation Age of Onset    Heart disease Mother     Stroke Mother     Asthma Mother     Hypertension Father     Stomach cancer Father     Diabetes Father     Esophageal cancer Father     Cancer Father     Throat cancer Sister     Diabetes Paternal Grandmother     Hypertension Paternal Grandfather     Colon cancer Paternal Grandfather     Colon cancer Paternal Uncle     Colon cancer Paternal Uncle     Colon cancer Paternal Uncle     Ovarian cancer Cousin     Stomach cancer Cousin     Malig Hyperthermia Neg Hx        Physical Exam:  /71   Pulse 79   Temp 96.2 °F (35.7 °C)   Resp 18   Ht 152.4 cm (60\")   Wt 83.5 kg (184 lb 1.4 oz)   LMP  (LMP Unknown)   SpO2 98%   BMI 35.95 kg/m²  Body mass index is 35.95 kg/m². 98% 83.5 kg (184 lb 1.4 oz)  Constitutional: Middle aged pt in bed, No acute respiratory distress, no accessory muscle use, lethargic, arousal to vigorous stimulation  Head: - NCAT  Eyes: No pallor, Anicteric conjunctiva, EOMI.  ENMT:  Mallampati 3, no oral thrush. Dry MM.   NECK: Trachea midline, No thyromegaly, no palpable cervical LNpathy, no JVD  Heart: RRR, no murmur. No pedal edema   Lungs: SAV +, No wheezes/ crackles heard    Abdomen: Soft. Laparoscopic incisions noted, approximated, abdomen distended but soft  Extremities: Extremities warm and well perfused. No cyanosis/ clubbing  Neuro: Lethargic, moves all extremities purposefully, arouses to vigorous stimulation    PPE recommended per [facility] infectious disease Isolation protocol for the current clinical scenario(as mentioned below) was followed.      LABS:  COVID19   Date Value Ref Range Status   05/23/2023 Not Detected Not Detected - Ref. Range Final       Lab Results   Component Value Date    CALCIUM 9.5 01/22/2025    PHOS 3.5 08/28/2024       Lab Results   Component Value Date    CKTOTAL 51 05/24/2023    TROPONINT 34 (H) 10/01/2023                 Results from last 7 days   Lab Units " 25  2254   PH, ARTERIAL pH units 7.176*   PCO2, ARTERIAL mm Hg 70.6*   PO2 ART mm Hg 93.4   O2 SATURATION ART % 94.4   FLOW RATE lpm 3.0000   MODALITY  Cannula                 Lab Results   Component Value Date    TSH 1.170 2024     Estimated Creatinine Clearance: 37 mL/min (A) (by C-G formula based on SCr of 1.2 mg/dL (H)).         Imaging: I personally visualized the images of scans/x-rays performed within last 3 days.      Assessment:  Acute hypercapnic respiratory failure  Moderate to severe persistent asthma  ANAYELI, compliant with CPAP  Ascending colon mass  Hypertension  Morbid obesity  Chronic kidney disease  Iron deficiency anemia      Recommendations:  Acute hypercapnic respiratory failure  ANAYELI, compliant with CPAP  Patient developed lethargy, decreased level of responsiveness on the floor.  Rapid response was called and ABG was performed which showed uncompensated respiratory acidosis.  AB.176/70.6/93.4/26.1 on 3 L nasal cannula.  Patient transferred to ICU for initiation of BiPAP and close monitoring of hypercapnia.  Recheck ABG approximately 1 hour after BiPAP initiation.    Precedex as needed for BiPAP compliance.  CXR showing bilateral infiltrates- CT chest without contrast ordered, ceftriaxone initiated.    Moderate to severe persistent asthma  No wheezing on exam-no indication for systemic steroids.    Continue home Symbicort.    Ascending colon mass  Sp hemicolectomy-followed by general surgery.    Hypertension  Home medications: valsartan-HCTZ, bisoprolol.     Iron deficiency anemia  Hemoglobin stable at 11.3.    Chronic kidney disease  Creatinine on 25 was 1.20.  Recheck pending.    Morbid obesity    NPO   Full code  Lovenox for VTE prophylaxis      Patient was placed in face mask upon entering room and kept mask on throughout our encounter. I wore full protective equipment throughout this patient encounter including a face mask, gown and gloves. Hand hygiene was performed  before donning protective equipment and after removal when leaving the room.    Nela Pennington, APRN  1/29/2025  23:13 EST      Much of this encounter note is an electronic transcription/translation of spoken language to printed text using Dragon Software.

## 2025-01-30 NOTE — PLAN OF CARE
Goal Outcome Evaluation:  Plan of Care Reviewed With: patient, spouse        Progress: no change  Outcome Evaluation: Patient remained in CICU this shift. Patient off bipap - placed on 2L NC. IV abx continued, IV fluids continued but rate changed. Hyperkalemia protocol given and recheck completed x2. [place] in place - 2L UOP. CT chest completed - see results. Family updated at bedside throughout shift. Blood glucose monitored. Patient slightly confused throughout shift but easily reoriented. No complaints at this time. Clear liquid diet started per surgery.

## 2025-01-30 NOTE — CODE DOCUMENTATION
Patient Name:  Blanca Harden  YOB: 1945  MRN:  [id number]  Admit Date:  1/29/2025    Visit Diagnoses:     ICD-10-CM ICD-9-CM   1. Colonic mass  K63.89 569.89       Reason For Rapid:   Decrease responsiveness  RN Communicated With:  Rapid team, primary RN, patients family at bedside, Nela Chu with new pulm consult     Rapid Outcome:  Transfer to CICU  Communication From Rapid Team:   Pt VSS but with critical ABGs, transfer to CICU for bipap, family updated    Most Recent Vital Signs  Temp:  [96.2 °F (35.7 °C)-97.8 °F (36.6 °C)] 96.2 °F (35.7 °C)  Heart Rate:  [75-96] 79  Resp:  [16-24] 18  BP: (111-197)/(53-85) 160/71  Arterial Line BP: (193)/(74) 193/74  SpO2:  [81 %-100 %] 98 %  on  Flow (L/min) (Oxygen Therapy):  [2-4] 3;   Device (Oxygen Therapy): nasal cannula    Labs:      Glucose   Date/Time Value Ref Range Status   01/29/2025 2059 230 (H) 70 - 130 mg/dL Final   01/29/2025 1721 292 (H) 70 - 130 mg/dL Final   01/29/2025 1611 305 (H) 70 - 130 mg/dL Final   01/29/2025 1429 317 (H) 70 - 130 mg/dL Final   01/29/2025 0745 220 (H) 70 - 130 mg/dL Final     Site   Date Value Ref Range Status   01/29/2025 Right Radial  Final     Norman's Test   Date Value Ref Range Status   01/29/2025 Positive  Final     pH, Arterial   Date Value Ref Range Status   01/29/2025 7.176 (C) 7.350 - 7.450 pH units Final     pCO2, Arterial   Date Value Ref Range Status   01/29/2025 70.6 (C) 35.0 - 45.0 mm Hg Final     pO2, Arterial   Date Value Ref Range Status   01/29/2025 93.4 80.0 - 100.0 mm Hg Final     HCO3, Arterial   Date Value Ref Range Status   01/29/2025 26.1 22.0 - 28.0 mmol/L Final     Base Excess, Arterial   Date Value Ref Range Status   01/29/2025 -3.6 (L) 0.0 - 2.0 mmol/L Final     Comment:     Serial Number: [id number]:  [id number]     O2 Saturation, Arterial   Date Value Ref Range Status   01/29/2025 94.4 92.0 - 98.5 % Final     CO2 Content   Date Value Ref Range Status   01/29/2025  "28.3 (H) 23 - 27 mmol/L Final     Barometric Pressure for Blood Gas   Date Value Ref Range Status   01/29/2025 750.5000 mmHg Final     Modality   Date Value Ref Range Status   01/29/2025 Cannula  Final         Estimated Creatinine Clearance: 37 mL/min (A) (by C-G formula based on SCr of 1.2 mg/dL (H)).          Results from last 7 days   Lab Units 01/29/25  2254   PH, ARTERIAL pH units 7.176*   PO2 ART mm Hg 93.4   PCO2, ARTERIAL mm Hg 70.6*   HCO3 ART mmol/L 26.1   O2 SATURATION ART % 94.4   MODALITY  Cannula   No results found for: \"STREPPNEUAG\", \"LEGANTIGENUR\"        NIH Stroke Scale:                                                         Please refer to full rapid documentation on summary page under Index / Code Timeline  "

## 2025-01-31 LAB
ALBUMIN SERPL-MCNC: 2.9 G/DL (ref 3.5–5.2)
ANION GAP SERPL CALCULATED.3IONS-SCNC: 7 MMOL/L (ref 5–15)
ARTERIAL PATENCY WRIST A: NEGATIVE
ATMOSPHERIC PRESS: 741.3 MMHG
BASE EXCESS BLDA CALC-SCNC: 4.7 MMOL/L (ref 0–2)
BASOPHILS # BLD AUTO: 0.03 10*3/MM3 (ref 0–0.2)
BASOPHILS NFR BLD AUTO: 0.3 % (ref 0–1.5)
BDY SITE: ABNORMAL
BUN SERPL-MCNC: 20 MG/DL (ref 8–23)
BUN/CREAT SERPL: 14.1 (ref 7–25)
CALCIUM SPEC-SCNC: 8.3 MG/DL (ref 8.6–10.5)
CHLORIDE SERPL-SCNC: 101 MMOL/L (ref 98–107)
CO2 BLDA-SCNC: 31.6 MMOL/L (ref 23–27)
CO2 SERPL-SCNC: 28 MMOL/L (ref 22–29)
CREAT SERPL-MCNC: 1.42 MG/DL (ref 0.57–1)
CYTO UR: NORMAL
DEPRECATED RDW RBC AUTO: 42.2 FL (ref 37–54)
EGFRCR SERPLBLD CKD-EPI 2021: 37.9 ML/MIN/1.73
EOSINOPHIL # BLD AUTO: 0.04 10*3/MM3 (ref 0–0.4)
EOSINOPHIL NFR BLD AUTO: 0.4 % (ref 0.3–6.2)
ERYTHROCYTE [DISTWIDTH] IN BLOOD BY AUTOMATED COUNT: 12.2 % (ref 12.3–15.4)
GAS FLOW AIRWAY: 1 LPM
GLUCOSE BLDC GLUCOMTR-MCNC: 109 MG/DL (ref 70–130)
GLUCOSE BLDC GLUCOMTR-MCNC: 116 MG/DL (ref 70–130)
GLUCOSE BLDC GLUCOMTR-MCNC: 124 MG/DL (ref 70–130)
GLUCOSE BLDC GLUCOMTR-MCNC: 133 MG/DL (ref 70–130)
GLUCOSE BLDC GLUCOMTR-MCNC: 176 MG/DL (ref 70–130)
GLUCOSE SERPL-MCNC: 113 MG/DL (ref 65–99)
HCO3 BLDA-SCNC: 30.1 MMOL/L (ref 22–28)
HCT VFR BLD AUTO: 31.7 % (ref 34–46.6)
HEMODILUTION: NO
HGB BLD-MCNC: 10.4 G/DL (ref 12–15.9)
IMM GRANULOCYTES # BLD AUTO: 0.09 10*3/MM3 (ref 0–0.05)
IMM GRANULOCYTES NFR BLD AUTO: 0.9 % (ref 0–0.5)
L PNEUMO1 AG UR QL IA: NEGATIVE
LAB AP CASE REPORT: NORMAL
LAB AP SYNOPTIC CHECKLIST: NORMAL
LYMPHOCYTES # BLD AUTO: 0.65 10*3/MM3 (ref 0.7–3.1)
LYMPHOCYTES NFR BLD AUTO: 6.2 % (ref 19.6–45.3)
Lab: ABNORMAL
MCH RBC QN AUTO: 31.1 PG (ref 26.6–33)
MCHC RBC AUTO-ENTMCNC: 32.8 G/DL (ref 31.5–35.7)
MCV RBC AUTO: 94.9 FL (ref 79–97)
MODALITY: ABNORMAL
MONOCYTES # BLD AUTO: 0.83 10*3/MM3 (ref 0.1–0.9)
MONOCYTES NFR BLD AUTO: 8 % (ref 5–12)
NEUTROPHILS NFR BLD AUTO: 8.78 10*3/MM3 (ref 1.7–7)
NEUTROPHILS NFR BLD AUTO: 84.2 % (ref 42.7–76)
NOTIFIED WHO: ABNORMAL
NRBC BLD AUTO-RTO: 0 /100 WBC (ref 0–0.2)
PATH REPORT.FINAL DX SPEC: NORMAL
PATH REPORT.GROSS SPEC: NORMAL
PCO2 BLDA: 47.7 MM HG (ref 35–45)
PH BLDA: 7.41 PH UNITS (ref 7.35–7.45)
PHOSPHATE SERPL-MCNC: 3 MG/DL (ref 2.5–4.5)
PLATELET # BLD AUTO: 217 10*3/MM3 (ref 140–450)
PMV BLD AUTO: 9.9 FL (ref 6–12)
PO2 BLDA: 31.5 MM HG (ref 80–100)
POTASSIUM SERPL-SCNC: 4.7 MMOL/L (ref 3.5–5.2)
POTASSIUM SERPL-SCNC: 4.7 MMOL/L (ref 3.5–5.2)
RBC # BLD AUTO: 3.34 10*6/MM3 (ref 3.77–5.28)
READ BACK: YES
S PNEUM AG SPEC QL LA: NEGATIVE
SAO2 % BLDCOA: 60.1 % (ref 92–98.5)
SODIUM SERPL-SCNC: 136 MMOL/L (ref 136–145)
TOTAL RATE: 20 BREATHS/MINUTE
WBC NRBC COR # BLD AUTO: 10.42 10*3/MM3 (ref 3.4–10.8)

## 2025-01-31 PROCEDURE — 94799 UNLISTED PULMONARY SVC/PX: CPT

## 2025-01-31 PROCEDURE — 84132 ASSAY OF SERUM POTASSIUM: CPT | Performed by: INTERNAL MEDICINE

## 2025-01-31 PROCEDURE — 25010000002 ENOXAPARIN PER 10 MG: Performed by: SURGERY

## 2025-01-31 PROCEDURE — 25010000002 CEFTRIAXONE PER 250 MG

## 2025-01-31 PROCEDURE — 80069 RENAL FUNCTION PANEL: CPT | Performed by: INTERNAL MEDICINE

## 2025-01-31 PROCEDURE — 97162 PT EVAL MOD COMPLEX 30 MIN: CPT | Performed by: PHYSICAL THERAPIST

## 2025-01-31 PROCEDURE — 25810000003 SODIUM CHLORIDE 0.9 % SOLUTION: Performed by: INTERNAL MEDICINE

## 2025-01-31 PROCEDURE — 94761 N-INVAS EAR/PLS OXIMETRY MLT: CPT

## 2025-01-31 PROCEDURE — 99024 POSTOP FOLLOW-UP VISIT: CPT | Performed by: SURGERY

## 2025-01-31 PROCEDURE — 63710000001 INSULIN REGULAR HUMAN PER 5 UNITS: Performed by: SURGERY

## 2025-01-31 PROCEDURE — 85025 COMPLETE CBC W/AUTO DIFF WBC: CPT | Performed by: INTERNAL MEDICINE

## 2025-01-31 PROCEDURE — 94664 DEMO&/EVAL PT USE INHALER: CPT

## 2025-01-31 PROCEDURE — 82948 REAGENT STRIP/BLOOD GLUCOSE: CPT

## 2025-01-31 PROCEDURE — 82803 BLOOD GASES ANY COMBINATION: CPT | Performed by: STUDENT IN AN ORGANIZED HEALTH CARE EDUCATION/TRAINING PROGRAM

## 2025-01-31 PROCEDURE — 94760 N-INVAS EAR/PLS OXIMETRY 1: CPT

## 2025-01-31 PROCEDURE — 63710000001 PREDNISONE PER 5 MG: Performed by: SURGERY

## 2025-01-31 PROCEDURE — 36600 WITHDRAWAL OF ARTERIAL BLOOD: CPT | Performed by: STUDENT IN AN ORGANIZED HEALTH CARE EDUCATION/TRAINING PROGRAM

## 2025-01-31 PROCEDURE — 94660 CPAP INITIATION&MGMT: CPT

## 2025-01-31 RX ORDER — TORSEMIDE 20 MG/1
10 TABLET ORAL DAILY
Status: DISCONTINUED | OUTPATIENT
Start: 2025-02-01 | End: 2025-02-02 | Stop reason: HOSPADM

## 2025-01-31 RX ORDER — ENOXAPARIN SODIUM 100 MG/ML
30 INJECTION SUBCUTANEOUS DAILY
Status: DISCONTINUED | OUTPATIENT
Start: 2025-01-31 | End: 2025-02-01

## 2025-01-31 RX ORDER — ACETAMINOPHEN 325 MG/1
650 TABLET ORAL EVERY 6 HOURS PRN
Status: DISCONTINUED | OUTPATIENT
Start: 2025-01-31 | End: 2025-02-02 | Stop reason: HOSPADM

## 2025-01-31 RX ADMIN — PREDNISONE 5 MG: 5 TABLET ORAL at 18:15

## 2025-01-31 RX ADMIN — ENOXAPARIN SODIUM 30 MG: 100 INJECTION SUBCUTANEOUS at 12:09

## 2025-01-31 RX ADMIN — ACETAMINOPHEN 1000 MG: 500 TABLET, FILM COATED ORAL at 06:04

## 2025-01-31 RX ADMIN — BISOPROLOL FUMARATE 20 MG: 5 TABLET ORAL at 23:22

## 2025-01-31 RX ADMIN — ACETAMINOPHEN 1000 MG: 500 TABLET, FILM COATED ORAL at 12:09

## 2025-01-31 RX ADMIN — MUPIROCIN 1 APPLICATION: 20 OINTMENT TOPICAL at 23:22

## 2025-01-31 RX ADMIN — SODIUM CHLORIDE 75 ML/HR: 9 INJECTION, SOLUTION INTRAVENOUS at 01:21

## 2025-01-31 RX ADMIN — CEFTRIAXONE 2000 MG: 2 INJECTION, POWDER, FOR SOLUTION INTRAMUSCULAR; INTRAVENOUS at 18:15

## 2025-01-31 RX ADMIN — INSULIN HUMAN 3 UNITS: 100 INJECTION, SOLUTION PARENTERAL at 21:01

## 2025-01-31 RX ADMIN — BUDESONIDE AND FORMOTEROL FUMARATE DIHYDRATE 2 PUFF: 160; 4.5 AEROSOL RESPIRATORY (INHALATION) at 07:55

## 2025-01-31 RX ADMIN — BUDESONIDE AND FORMOTEROL FUMARATE DIHYDRATE 2 PUFF: 160; 4.5 AEROSOL RESPIRATORY (INHALATION) at 23:12

## 2025-01-31 RX ADMIN — ALVIMOPAN 12 MG: 12 CAPSULE ORAL at 09:40

## 2025-01-31 RX ADMIN — ACETAMINOPHEN 650 MG: 325 TABLET, FILM COATED ORAL at 23:49

## 2025-01-31 RX ADMIN — PANTOPRAZOLE SODIUM 40 MG: 40 TABLET, DELAYED RELEASE ORAL at 06:05

## 2025-01-31 RX ADMIN — MUPIROCIN 1 APPLICATION: 20 OINTMENT TOPICAL at 09:29

## 2025-01-31 NOTE — PROGRESS NOTES
[facility]  [phone]  Dr. Landen Dexter     Subjective:  LOS: 1    Chief Complaint:  Acute hypercapnic respiratory failure BiPAP management transferred to ICU     Patient was doing much better.  Seen off BiPAP and was alert and awake.    Objective   Vital Signs past 24hrs  Temp range: Temp (24hrs), Av.1 °F (36.2 °C), Min:96.2 °F (35.7 °C), Max:97.9 °F (36.6 °C)    BP range: BP: (106-198)/() 167/65  Pulse range: Heart Rate:  [59-89] 82  Resp rate range: Resp:  [20-22] 20  Device (Oxygen Therapy): nasal cannulaFlow (L/min) (Oxygen Therapy):  [2-35] 2  Oxygen range:SpO2:  [95 %-100 %] 100 %   Mechanical Ventilator:     Physical Exam  Vitals reviewed.   Constitutional:       General: She is not in acute distress.     Appearance: Normal appearance.   HENT:      Head: Normocephalic and atraumatic.   Eyes:      Extraocular Movements: Extraocular movements intact.      Pupils: Pupils are equal, round, and reactive to light.   Cardiovascular:      Rate and Rhythm: Normal rate and regular rhythm.      Heart sounds: No murmur heard.     No friction rub. No gallop.   Pulmonary:      Effort: Pulmonary effort is normal. No respiratory distress.      Breath sounds: No wheezing, rhonchi or rales.   Skin:     General: Skin is warm and dry.      Findings: No rash.   Neurological:      General: No focal deficit present.      Mental Status: She is alert and oriented to person, place, and time.       Results Review:    I have reviewed the laboratory and imaging data since the last note by [facility] physician.  My annotations are noted in assessment and plan.      Result Review:  I have personally reviewed the results from last note by [facility] physician to 2025 21:43 EST and agree with these findings:  [x]  Laboratory list / accordion  [x]  Microbiology  [x]  Radiology  [x]  EKG/Telemetry   [x]  Cardiology/Vascular   [x]  Pathology  [x]  Old records  []  Other:    Medication Review:  I have reviewed the  current MAR.  My annotations are noted in assessment and plan.    acetaminophen, 1,000 mg, Oral, Q6H  alvimopan, 12 mg, Oral, BID  bisoprolol, 20 mg, Oral, Nightly  budesonide-formoterol, 2 puff, Inhalation, BID  cefTRIAXone, 2,000 mg, Intravenous, Q24H  enoxaparin, 40 mg, Subcutaneous, Daily  insulin regular, 3-14 Units, Subcutaneous, Q6H  mupirocin, 1 Application, Each Nare, BID  pantoprazole, 40 mg, Oral, Q AM  predniSONE, 5 mg, Oral, Q PM  [Held by provider] pregabalin, 75 mg, Oral, Daily  [Held by provider] torsemide, 20 mg, Oral, Daily        dexmedetomidine, 0.2-1.5 mcg/kg/hr, Last Rate: Stopped (01/30/25 0348)  sodium chloride, 75 mL/hr, Last Rate: 75 mL/hr (01/30/25 1509)      Lines, Drains & Airways       Active LDAs       Name Placement date Placement time Site Days    Peripheral IV 01/29/25 0837 Distal;Posterior;Right Forearm 01/29/25  0837  Forearm  1    Peripheral IV 01/29/25 0838 Right Antecubital 01/29/25  0838  Antecubital  1    Urethral Catheter Silicone 16 Fr. 01/29/25  1030  -- 1                  No active isolations  Diet Orders (active) (From admission, onward)       Start     Ordered    01/30/25 1532  Diet: Liquid; Clear Liquid; Fluid Consistency: Thin (IDDSI 0)  Diet Effective Now         01/30/25 1531    01/29/25 2200  Snack: Chewing gum x30 minutes three times daily to increase saliva flow and provide gas pain relief. Do not give to ileostomy patients.  3 Times Daily      Comments: Chewing gum x30 minutes three times daily to increase saliva flow and provide gas pain relief.      Do not give to ileostomy patients.    01/29/25 1741                      Assessment  Acute hypercapnic respiratory failure  Abnormal left-sided pulmonary opacity-suspect pneumonia or possible aspiration  ANAYELI on CPAP  Asthma  Colonic mass status postresection  Morbid obesity  CKD  Iron deficiency anemia      Plan  -Patient initially presenting for right hemicolectomy secondary to colonic mass.  The night after  surgery she was found to be lethargic with decreased responsiveness and an ABG showed acute hypercapnia.  She was placed on BiPAP brought to the ICU.  - ABG showing significant improvement and patient mentation improved.  Will wean off BiPAP and check morning ABG  - Continue weaning oxygen for goal to saturation of 90%  - Continue scheduled and as needed bronchodilators  - Continue Rocephin for suspected pneumonia.  Will need follow-up CT chest imaging in 4 to 6 weeks to ensure clearing especially given her colonic mass  -May consider resuming Lyrica and gabapentin at lower doses.  - ICU core measures.  Transfer out of ICU tomorrow    Critical care time 35 minutes    THESE ARE NEW MEDICAL PROBLEMS TO ME.      Landen Dexter DO   01/30/25  21:43 EST      Part of this note may be an electronic transcription/translation of spoken language to printed text using the Dragon Dictation System.

## 2025-01-31 NOTE — PROGRESS NOTES
Postoperative day 2 status post robotic right hemicolectomy    S: No events overnight.  Tolerating clear liquid diet.  Having bowel function.  No abdominal pain.  Feels better    O:   Vitals:    01/31/25 1100 01/31/25 1200 01/31/25 1300 01/31/25 1400   BP: 133/58 137/53 142/52 131/51   BP Location:    Right arm   Patient Position:    Lying   Pulse: 86 98 105 86   Resp:    18   Temp:    98.9 °F (37.2 °C)   TempSrc:    Oral   SpO2: 99% 98% 99% 98%   Weight:       Height:          Alert, no acute distress  Breathing comfortable  Abdomen soft, nontender nondistended, incisions clean dry and intact    White blood cell count 10.4, hemoglobin 10.4, both stable, creatinine 1.4, stable  pH 7.4, pO2 31.5, pCO2 47.7    Pathology report  Final Diagnosis   1.  Terminal ileum, right ascending colon and appendix, right hemicolectomy: Small bowel, colon and appendix with               -A.  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  Adenoma comes within 7 cm of the proximal margin and 11 cm of the distal margin               -B.  18 benign lymph nodes               -C.  Benign appendix     Comment: On the original biopsy case [id number] high-grade dysplasia was identified within the polyp.  MSI is not performed on the biopsy for this case as no invasion is present.   Electronically signed by Azael Horner MD on 1/31/2025 at 1154   Synoptic Checklist   COLON AND RECTUM: Resection   8th Edition - Protocol posted: 6/19/2024COLON AND RECTUM: RESECTION - All Specimens  SPECIMEN   Procedure  Right hemicolectomy   TUMOR   Tumor Site  Cecum   Histologic Type  Tubulovillous adenoma with focal high-grade dysplasia-no invasive tumor identified   Histologic Grade  Not applicable   Tumor Size  Cannot be determined: No invasive tumor is identified the polyp measures up to 2.5 cm   Tumor Extent  No invasion (high grade dysplasia)   Macroscopic Tumor  Perforation  Not identified   Lymphatic and / or Vascular Invasion  Not identified   Perineural Invasion  Not identified   Tumor Budding Score  Cannot be determined: No invasion present   Type of Polyp in which Invasive Carcinoma Arose  Tubulovillous adenoma   Treatment Effect  No known presurgical therapy   MARGINS   Margin Status for Invasive Carcinoma  All margins negative for invasive carcinoma   Closest Margin(s) to Invasive Carcinoma  No invasive tumor present   Margin Status for Non-Invasive Tumor  All margins negative for high-grade dysplasia / intramucosal carcinoma and low-grade dysplasia   REGIONAL LYMPH NODES   Regional Lymph Node Status  All regional lymph nodes negative for tumor   Number of Lymph Nodes Examined  18   Tumor Deposits  Not identified   pTNM CLASSIFICATION (AJCC 8th Edition)   Reporting of pT, pN, and (when applicable) pM categories is based on information available to the pathologist at the time the report is issued. As per the AJCC (Chapter 1, 8th Ed.) it is the managing physician's responsibility to establish the final pathologic stage based upon all pertinent information, including but potentially not limited to this pathology report.   pT Category  pTis   pN Category  pN0        Assessment and plan    79-year-old female postoperative day 2 status post robotic right hemicolectomy.  She is doing pretty well.  Pain is well-controlled.  Still a little bit hypercapnic.  She is having bowel function.  She is tolerating clear liquid diet.    Advance diet as tolerated, start with full liquid diet.  Out of bed to chair and ambulate  SCDs and Lovenox  P.o. pain control  DC Entereg  Pathology discussed with the patient.  No need for further chemotherapy or other interventions.  DC [place] catheter    Ashwin Alvarado MD, FACS  General, Minimally Invasive and Endoscopic Surgery  [facility] [address], [address]  [place], [place], [facility]  P: [phone]  F: [phone]

## 2025-01-31 NOTE — PROGRESS NOTES
"   LOS: 2 days     Chief Complaint/ Reason for encounter: [place], CKD, hyperkalemia    Subjective   01/31/25 : Patient seen and examined in the ICU, good night, complaining of some pain postoperatively  On clear liquid diet, no nausea or vomiting  No shortness of breath or chest pain, maintaining good urine output  Minimal edema, tolerating clear liquid diet    Medical history reviewed:  History of Present Illness    Subjective    History taken from: Chart and patient/family as able    Vital Signs  Temp:  [97.6 °F (36.4 °C)-98.2 °F (36.8 °C)] 98.2 °F (36.8 °C)  Heart Rate:  [69-93] 91  Resp:  [18-22] 18  BP: (119-198)/() 139/51       Wt Readings from Last 1 Encounters:   01/30/25 0519 83.5 kg (184 lb 1.4 oz)   01/29/25 0742 83.5 kg (184 lb 1.4 oz)       Objective:  Vital signs: (most recent): Blood pressure 139/51, pulse 91, temperature 98.2 °F (36.8 °C), temperature source Axillary, resp. rate 18, height 152.4 cm (60\"), weight 83.5 kg (184 lb 1.4 oz), SpO2 99%, not currently breastfeeding.                Objective:  General Appearance:  Comfortable, well-appearing, in no acute distress and not in pain.  HEENT: Mucous membranes moist  Lungs:  Normal effort and normal respiratory rate.  Breath sounds clear to auscultation: No rhonchi/Rales.  No  respiratory distress.   Heart:  S1, S2 normal.  No murmur.   Abdomen: Abdomen is soft, tender postop/nondistended, + bowel sounds  Extremities: Trace edema of bilateral lower extremities  Skin:  Warm and dry with no rashes      Results Review:    Intake/Output:     Intake/Output Summary (Last 24 hours) at 1/31/2025 0918  Last data filed at 1/31/2025 0552  Gross per 24 hour   Intake 1530 ml   Output 3700 ml   Net -2170 ml         DATA:  Radiology and Labs:  The following labs independently reviewed by me. Additional labs ordered for tomorrow a.m.  Interval notes, chart personally reviewed by me.   Old records independently reviewed showing CKD, baseline creatinine 1.4  The " Noted. following radiologic studies independently viewed by me, findings CT chest was not expiratory film suspected atelectasis possible right upper lobe consolidation  New problems include hyperkalemia  Discussed with patient and family at bedside    Risk/ complexity of medical care/ medical decision making moderate complexity, postop ICU care    Labs:   Recent Results (from the past 24 hours)   POC Glucose Once    Collection Time: 01/30/25 10:06 AM    Specimen: Blood   Result Value Ref Range    Glucose 290 (H) 70 - 130 mg/dL   POC Glucose Once    Collection Time: 01/30/25 11:21 AM    Specimen: Blood   Result Value Ref Range    Glucose 237 (H) 70 - 130 mg/dL   Potassium    Collection Time: 01/30/25 12:31 PM    Specimen: Blood   Result Value Ref Range    Potassium 5.2 3.5 - 5.2 mmol/L   Potassium    Collection Time: 01/30/25  5:03 PM    Specimen: Blood   Result Value Ref Range    Potassium 4.7 3.5 - 5.2 mmol/L   POC Glucose Once    Collection Time: 01/30/25  5:05 PM    Specimen: Blood   Result Value Ref Range    Glucose 142 (H) 70 - 130 mg/dL   Potassium    Collection Time: 01/30/25  7:58 PM    Specimen: Blood   Result Value Ref Range    Potassium 4.8 3.5 - 5.2 mmol/L   POC Glucose Once    Collection Time: 01/30/25 11:20 PM    Specimen: Blood   Result Value Ref Range    Glucose 143 (H) 70 - 130 mg/dL   S. Pneumo Ag Urine or CSF - Urine, Urine, Clean Catch    Collection Time: 01/30/25 11:27 PM    Specimen: Urine, Clean Catch   Result Value Ref Range    Strep Pneumo Ag Negative Negative   Legionella Antigen, Urine - Urine, Urine, Clean Catch    Collection Time: 01/30/25 11:27 PM    Specimen: Urine, Clean Catch   Result Value Ref Range    LEGIONELLA ANTIGEN, URINE Negative Negative   POC Glucose Once    Collection Time: 01/31/25  5:49 AM    Specimen: Blood   Result Value Ref Range    Glucose 133 (H) 70 - 130 mg/dL   Renal Function Panel    Collection Time: 01/31/25  8:16 AM    Specimen: Blood   Result Value Ref Range     Glucose 113 (H) 65 - 99 mg/dL    BUN 20 8 - 23 mg/dL    Creatinine 1.42 (H) 0.57 - 1.00 mg/dL    Sodium 136 136 - 145 mmol/L    Potassium 4.7 3.5 - 5.2 mmol/L    Chloride 101 98 - 107 mmol/L    CO2 28.0 22.0 - 29.0 mmol/L    Calcium 8.3 (L) 8.6 - 10.5 mg/dL    Albumin 2.9 (L) 3.5 - 5.2 g/dL    Phosphorus 3.0 2.5 - 4.5 mg/dL    Anion Gap 7.0 5.0 - 15.0 mmol/L    BUN/Creatinine Ratio 14.1 7.0 - 25.0    eGFR 37.9 (L) >60.0 mL/min/1.73   CBC Auto Differential    Collection Time: 01/31/25  8:16 AM    Specimen: Blood   Result Value Ref Range    WBC 10.42 3.40 - 10.80 10*3/mm3    RBC 3.34 (L) 3.77 - 5.28 10*6/mm3    Hemoglobin 10.4 (L) 12.0 - 15.9 g/dL    Hematocrit 31.7 (L) 34.0 - 46.6 %    MCV 94.9 79.0 - 97.0 fL    MCH 31.1 26.6 - 33.0 pg    MCHC 32.8 31.5 - 35.7 g/dL    RDW 12.2 (L) 12.3 - 15.4 %    RDW-SD 42.2 37.0 - 54.0 fl    MPV 9.9 6.0 - 12.0 fL    Platelets 217 140 - 450 10*3/mm3    Neutrophil % 84.2 (H) 42.7 - 76.0 %    Lymphocyte % 6.2 (L) 19.6 - 45.3 %    Monocyte % 8.0 5.0 - 12.0 %    Eosinophil % 0.4 0.3 - 6.2 %    Basophil % 0.3 0.0 - 1.5 %    Immature Grans % 0.9 (H) 0.0 - 0.5 %    Neutrophils, Absolute 8.78 (H) 1.70 - 7.00 10*3/mm3    Lymphocytes, Absolute 0.65 (L) 0.70 - 3.10 10*3/mm3    Monocytes, Absolute 0.83 0.10 - 0.90 10*3/mm3    Eosinophils, Absolute 0.04 0.00 - 0.40 10*3/mm3    Basophils, Absolute 0.03 0.00 - 0.20 10*3/mm3    Immature Grans, Absolute 0.09 (H) 0.00 - 0.05 10*3/mm3    nRBC 0.0 0.0 - 0.2 /100 WBC   Potassium    Collection Time: 01/31/25  8:16 AM    Specimen: Blood   Result Value Ref Range    Potassium 4.7 3.5 - 5.2 mmol/L       Radiology:  Pertinent radiology studies were reviewed as described above      Medications have been reviewed separately in chart review medication tab      ASSESSMENT:  CKD stage IIIa, stable, near baseline.  Stable today  Severe hyperkalemia.  Improved with medical therapy, stable today  Right colon mass status post laparoscopic hemicolectomy 1/29  Acute  hypercapnic respiratory failure, now off BiPAP  Altered mental status, improved  ANAYELI on CPAP  Asthma  Obesity  Anemia of CKD        DISCUSSION/PLAN:   Her renal function remains stable, near baseline  Potassium continues to improve with medical therapy  Her blood pressures been fairly stable so we will continue to hold losartan at this time  Resume Demadex at half prior home dose in a.m.  Stop IV fluids  Change potassium checks to once daily  I would avoid Lokelma at this time with her recent bowel resection if potassium levels increase again     Continue to monitor electrolytes and volume closely, avoid IV contrast and nephrotoxic medications   Stable for transfer out of the ICU from renal standpoint      Santosh Dhillon MD  [facility]   Office phone number: [phone]  Answering service phone number: [phone]    01/31/25  09:18 EST    Dictation performed using Dragon dictation software

## 2025-01-31 NOTE — PLAN OF CARE
Goal Outcome Evaluation:              Outcome Evaluation: Patient remains in CICU. Bipap while sleeping and 2L NC while awake, tolerated well. PRN hydralazine given for hypotension. Urine cultures collected. Difficult to collect labs, awaiting AM labs. VSS. Continue to monitor.

## 2025-01-31 NOTE — THERAPY TREATMENT NOTE
Patient Name: Blanca Harden  : 1945    MRN: [id number]                              Today's Date: 2025       Admit Date: 2025    Visit Dx:     ICD-10-CM ICD-9-CM   1. Colonic mass  K63.89 569.89     Patient Active Problem List   Diagnosis    Osteoporosis    Breast neoplasm, Tis (DCIS), left    Iron deficiency anemia    CKD (chronic kidney disease)    Diabetic nephropathy associated with type 2 diabetes mellitus    Temporal arteritis    Immunosuppression    Anemia    Duodenal adenoma    Gastritis without bleeding    Polyp of duodenum    Morbidly obese    Dyspnea on exertion    Hyperlipidemia    Type 2 diabetes mellitus with stage 3b chronic kidney disease, with long-term current use of insulin    Essential hypertension    Bilateral lower extremity edema    Pulmonary hypertension    Obstructive sleep apnea on CPAP    Stage 3a chronic kidney disease    Iron malabsorption    Respiratory distress    Hypoglycemia due to insulin    Delirium    Hypomagnesemia    Severe malnutrition    Leg swelling    Venous (peripheral) insufficiency    Polyp of colon    Obesity (BMI 30.0-34.9)     Past Medical History:   Diagnosis Date    Anemia     Anxiety and depression     Asthma     Balance problem     CKD (chronic kidney disease), stage III     Colonic mass     COPD (chronic obstructive pulmonary disease)     Coronary artery disease     FOLLOWED BY DR GUNN    Diabetes mellitus, type 2     History of COVID-19     History of left breast cancer 2019    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 Daniel Gutierrez, [facility]    CARDIAC CATHETERIZATION       SECTION N/A     x3    CHOLECYSTECTOMY N/A     COLON RESECTION Right 2025    Procedure: COLON RESECTION LAPAROSCOPIC RIGHT WITH DAVINCI ROBOT;  Surgeon: Ashwin Alvarado MD;  Location: [facility] MAIN OR;  Service: Robotics - DaVinci;  Laterality: Right;    COLONOSCOPY      COLONOSCOPY N/A 2024    Procedure: COLONOSCOPY into the cecum and TI with hot snare polypectomy;  Surgeon: Ashwin Alvarado MD;  Location: [facility] ENDOSCOPY;  Service: General;  Laterality: N/A;  pre-colon polyps  post-colon mass    COLONOSCOPY W/ POLYPECTOMY N/A 2019    Enlarged folds in the antrum: biopsied; duodenal, transverse colon x2, splenic flexure, descending colon and sigmoid colon polyps: biopsied (path: tubular adenoma x6)-Dr. Zak De Jesus, [facility]    ENDOSCOPY  10/11/2019    Procedure: ESOPHAGOGASTRODUODENOSCOPY with hot snare polypectomy;  Surgeon: Haile Handley MD;  Location: [facility] ENDOSCOPY;  Service: Gastroenterology    ENDOSCOPY N/A 2020    Procedure: ESOPHAGOGASTRODUODENOSCOPY;  Surgeon: Haile Handley MD;  Location: [facility] ENDOSCOPY;  Service: Gastroenterology    ENDOSCOPY N/A 2020    Procedure: ESOPHAGOGASTRODUODENOSCOPY WITH BIOPSIES AND COLD BIOPSY POLYPECTOMY;  Surgeon: Haile Handley MD;  Location: [facility] ENDOSCOPY;  Service: Gastroenterology;  Laterality: N/A;  pre: dyspepsia and diarrhea  post: duodenal polyp and gastritis    ENDOSCOPY N/A 2024    Procedure: ESOPHAGOGASTRODUODENOSCOPY WITH hot snare polypectomy with clip placement x 2BIOPSY;  Surgeon: Ashwin Alvarado MD;  Location: [facility] ENDOSCOPY;  Service: General;  Laterality: N/A;  pre-hx  duoedenal polyp  post-duodenal polyp; gastritis    EYE SURGERY      MASTECTOMY WITH SENTINEL NODE BIOPSY AND AXILLARY NODE DISSECTION Left 07/03/2019    Procedure: LEFT BREAST MASTECTOMY WITH SENTINEL NODE BIOPSY AND , v-y plasty closure;  Surgeon: Tahmina James MD;  Location: [facility] OR;  Service: General    SUBTOTAL HYSTERECTOMY Bilateral     ovaries still in tact    TEMPORAL ARTERY BIOPSY Bilateral 12/05/2019      General Information       Row Name 01/31/25 1036          Physical Therapy Time and Intention    Document Type evaluation  -     Mode of Treatment individual therapy;physical therapy  -       Row Name 01/31/25 1036          General Information    Patient Profile Reviewed yes  -     Prior Level of Function independent:  uses rwx at baseline  -     Existing Precautions/Restrictions fall;oxygen therapy device and L/min  -     Barriers to Rehab none identified  -       Row Name 01/31/25 1036          Living Environment    People in Home spouse  -       Row Name 01/31/25 1036          Home Main Entrance    Number of Stairs, Main Entrance none  -       Row Name 01/31/25 1036          Cognition    Orientation Status (Cognition) oriented x 3  -       Row Name 01/31/25 1036          Safety Issues/Impairments Affecting Functional Mobility    Impairments Affecting Function (Mobility) endurance/activity tolerance;balance;pain;strength  -               User Key  (r) = Recorded By, (t) = Taken By, (c) = Cosigned By      Initials Name Provider Type    Billie Hinton, PT Physical Therapist                   Mobility       Row Name 01/31/25 1036          Bed Mobility    Bed Mobility supine-sit;sit-supine  -     Supine-Sit [place] (Bed Mobility) minimum assist (75% patient effort)  -     Sit-Supine [place] (Bed Mobility) minimum assist (75% patient effort)  -     Assistive Device (Bed Mobility) head of bed elevated;bed rails  -       Row Name 01/31/25 1036           Sit-Stand Transfer    Sit-Stand [place] (Transfers) minimum assist (75% patient effort);2 person assist  -     Assistive Device (Sit-Stand Transfers) walker, front-wheeled  -       Row Name 01/31/25 1036          Gait/Stairs (Locomotion)    [place] Level (Gait) contact guard;2 person assist  -     Assistive Device (Gait) walker, front-wheeled  -     Distance in Feet (Gait) 25  -KH     Deviations/Abnormal Patterns (Gait) gait speed decreased;stride length decreased  -     Bilateral Gait Deviations forward flexed posture  -               User Key  (r) = Recorded By, (t) = Taken By, (c) = Cosigned By      Initials Name Provider Type    Billie Hinton, TREY Physical Therapist                   Obj/Interventions       Row Name 01/31/25 1037          Range of Motion Comprehensive    Comment, General Range of Motion BLE WFL  -       Row Name 01/31/25 1037          Strength Comprehensive (MMT)    Comment, General Manual Muscle Testing (MMT) Assessment generalized weakness  -       Row Name 01/31/25 1037          Balance    Balance Assessment sitting static balance;standing static balance;standing dynamic balance  -     Static Sitting Balance minimal assist;standby assist  -     Static Standing Balance contact guard  -     Dynamic Standing Balance contact guard  -     Position/Device Used, Standing Balance walker, rolling  -               User Key  (r) = Recorded By, (t) = Taken By, (c) = Cosigned By      Initials Name Provider Type    Billie Hinton, PT Physical Therapist                   Goals/Plan       Row Name 01/31/25 1039          Bed Mobility Goal 1 (PT)    Activity/Assistive Device (Bed Mobility Goal 1, PT) bed mobility activities, all  -     [place] Level/Cues Needed (Bed Mobility Goal 1, PT) standby assist  -     Time Frame (Bed Mobility Goal 1, PT) 1 week  -       Row Name 01/31/25 1039          Transfer Goal 1 (PT)    Activity/Assistive Device  (Transfer Goal 1, PT) transfers, all  -KH     [place] Level/Cues Needed (Transfer Goal 1, PT) standby assist  -KH     Time Frame (Transfer Goal 1, PT) 1 week  -       Row Name 01/31/25 1039          Gait Training Goal 1 (PT)    Activity/Assistive Device (Gait Training Goal 1, PT) gait (walking locomotion);walker, rolling  -KH     [place] Level (Gait Training Goal 1, PT) standby assist  -KH     Distance (Gait Training Goal 1, PT) 100 ft  -KH     Time Frame (Gait Training Goal 1, PT) 1 week  -       Row Name 01/31/25 1039          Patient Education Goal (PT)    Activity (Patient Education Goal, PT) HEP  -KH     [place]/Cues/Accuracy (Memory Goal 2, PT) demonstrates adequately  -KH     Time Frame (Patient Education Goal, PT) 1 week  -       Row Name 01/31/25 1039          Therapy Assessment/Plan (PT)    Planned Therapy Interventions (PT) balance training;bed mobility training;gait training;home exercise program;transfer training;strengthening;patient/family education  -               User Key  (r) = Recorded By, (t) = Taken By, (c) = Cosigned By      Initials Name Provider Type    Billie Hinton, PT Physical Therapist                   Clinical Impression       Row Name 01/31/25 1038          Pain    Pain Location neck  -     Pre/Posttreatment Pain Comment no number given  -       Row Name 01/31/25 1038          Plan of Care Review    Plan of Care Reviewed With patient;family  -     Outcome Evaluation Pt presented to the hospital for hemicolectomy. After hemicolectomy, pt was moved to CICU with respiratory failure and place on the bipap. Pt was in bed and agreeable to therapy. She was on supplemental O2, nasal canula. Chart lists that pt is Slovenian speaking and needs an interpretor, but family was present in the room to translate for patient. Pt is independently mobile at baseline with a Rwx, she also has a power wheelchair for longer distances as needed. Pt required MIn A for  bed mobility and to stand. She ambulated with CGAx2 and Rwx x 25 ft. Further ambulation distance was limited by lightheadedness. Pt presents with generalized weakness, impaired balance and limited endurance. She would benefit form PT to address these impairments while admitted. Pt plans to d/c home with family when medically stable. Encouraged pt to mobilize more with nursing staff in addition to PT including up to chair for meals and up to bathroom once catheter is removed.  -       Row Name 01/31/25 1038          Therapy Assessment/Plan (PT)    Patient/Family Therapy Goals Statement (PT) return home to [facility]  -     Criteria for Skilled Interventions Met (PT) yes  -     Therapy Frequency (PT) 6 times/wk  -       Row Name 01/31/25 1038          Vital Signs    O2 Delivery Pre Treatment supplemental O2  -     O2 Delivery Intra Treatment supplemental O2  -     O2 Delivery Post Treatment supplemental O2  -       Row Name 01/31/25 1038          Positioning and Restraints    Pre-Treatment Position in bed  -KH     Post Treatment Position bed  -KH     In Bed fowlers;call light within reach;encouraged to call for assist;exit alarm on;with family/caregiver;side rails up x3;notified [facility]               User Key  (r) = Recorded By, (t) = Taken By, (c) = Cosigned By      Initials Name Provider Type    Billie Hinton, PT Physical Therapist                   Outcome Measures       Row Name 01/31/25 1040          How much help from another person do you currently need...    Turning from your back to your side while in flat bed without using bedrails? 4  -KH     Moving from lying on back to sitting on the side of a flat bed without bedrails? 3  -KH     Moving to and from a bed to a chair (including a wheelchair)? 3  -KH     Standing up from a chair using your arms (e.g., wheelchair, bedside chair)? 3  -KH     Climbing 3-5 steps with a railing? 2  -KH     To walk in hospital room? 3  -KH     AM-PAC 6  Clicks Score (PT) 18  -     Highest Level of Mobility Goal 6 --> Walk 10 steps or more  -       Row Name 01/31/25 104          Functional Assessment    Outcome Measure Options AM-PAC 6 Clicks Basic Mobility (PT)  -               User Key  (r) = Recorded By, (t) = Taken By, (c) = Cosigned By      Initials Name Provider Type    Billie Hinton, PT Physical Therapist                                 Physical Therapy Education       Title: PT OT SLP Therapies (In Progress)       Topic: Physical Therapy (Not Started)       Point: Mobility training (Not Started)       Learner Progress:  Not documented in this visit.              Point: Home exercise program (Not Started)       Learner Progress:  Not documented in this visit.              Point: Body mechanics (Not Started)       Learner Progress:  Not documented in this visit.              Point: Precautions (Not Started)       Learner Progress:  Not documented in this visit.                                  PT Recommendation and Plan  Planned Therapy Interventions (PT): balance training, bed mobility training, gait training, home exercise program, transfer training, strengthening, patient/family education  Outcome Evaluation: Pt presented to the hospital for hemicolectomy. After hemicolectomy, pt was moved to CICU with respiratory failure and place on the bipap. Pt was in bed and agreeable to therapy. She was on supplemental O2, nasal canula. Chart lists that pt is Guinean speaking and needs an interpretor, but family was present in the room to translate for patient. Pt is independently mobile at baseline with a Rwx, she also has a power wheelchair for longer distances as needed. Pt required MIn A for bed mobility and to stand. She ambulated with CGAx2 and Rwx x 25 ft. Further ambulation distance was limited by lightheadedness. Pt presents with generalized weakness, impaired balance and limited endurance. She would benefit form PT to address these  impairments while admitted. Pt plans to d/c home with family when medically stable. Encouraged pt to mobilize more with nursing staff in addition to PT including up to chair for meals and up to bathroom once catheter is removed.     Time Calculation:         PT Charges       Row Name 01/31/25 1040             Time Calculation    Start Time 0958  -      Stop Time 1014  -      Time Calculation (min) 16 min  -      PT Received On 01/31/25  -      PT - Next Appointment 02/01/25  -      PT Goal Re-Cert Due Date 02/07/25  -                User Key  (r) = Recorded By, (t) = Taken By, (c) = Cosigned By      Initials Name Provider Type    Billie Hinton, PT Physical Therapist                  Therapy Charges for Today       Code Description Service Date Service Provider Modifiers Qty    10919591437 HC PT EVAL MOD COMPLEXITY 3 1/31/2025 Billie Yates, PT GP 1            PT G-Codes  Outcome Measure Options: AM-PAC 6 Clicks Basic Mobility (PT)  AM-PAC 6 Clicks Score (PT): 18  PT Discharge Summary  Anticipated Discharge Disposition (PT): home with assist    Billie Yates, PT  1/31/2025

## 2025-01-31 NOTE — PLAN OF CARE
Problem: Adult Inpatient Plan of Care  Goal: Plan of Care Review  Recent Flowsheet Documentation  Taken 1/31/2025 1038 by Billie Yates PT  Outcome Evaluation: Pt presented to the hospital for hemicolectomy. After hemicolectomy, pt was moved to CICU with respiratory failure and place on the bipap. Pt was in bed and agreeable to therapy. She was on supplemental O2, nasal canula. Chart lists that pt is Syrian speaking and needs an interpretor, but family was present in the room to translate for patient. Pt is independently mobile at baseline with a Rwx, she also has a power wheelchair for longer distances as needed. Pt required MIn A for bed mobility and to stand. She ambulated with CGAx2 and Rwx x 25 ft. Further ambulation distance was limited by lightheadedness. Pt presents with generalized weakness, impaired balance and limited endurance. She would benefit form PT to address these impairments while admitted. Pt plans to d/c home with family when medically stable. Encouraged pt to mobilize more with nursing staff in addition to PT including up to chair for meals and up to bathroom once catheter is removed.  Plan of Care Reviewed With:   patient   family   Goal Outcome Evaluation:  Plan of Care Reviewed With: patient, family           Outcome Evaluation: Pt presented to the hospital for hemicolectomy. After hemicolectomy, pt was moved to CICU with respiratory failure and place on the bipap. Pt was in bed and agreeable to therapy. She was on supplemental O2, nasal canula. Chart lists that pt is Syrian speaking and needs an interpretor, but family was present in the room to translate for patient. Pt is independently mobile at baseline with a Rwx, she also has a power wheelchair for longer distances as needed. Pt required MIn A for bed mobility and to stand. She ambulated with CGAx2 and Rwx x 25 ft. Further ambulation distance was limited by lightheadedness. Pt presents with generalized weakness, impaired  balance and limited endurance. She would benefit form PT to address these impairments while admitted. Pt plans to d/c home with family when medically stable. Encouraged pt to mobilize more with nursing staff in addition to PT including up to chair for meals and up to bathroom once catheter is removed.    Anticipated Discharge Disposition (PT): home with assist

## 2025-01-31 NOTE — PLAN OF CARE
Goal Outcome Evaluation:  Pt remains in CICU on 2L NC. Transfer to 6N. VSS. Diet advanced to full liquid. X3 liquid BMs. . F/C removed per orders. Family updated at bedside

## 2025-02-01 LAB
ALBUMIN SERPL-MCNC: 2.7 G/DL (ref 3.5–5.2)
ANION GAP SERPL CALCULATED.3IONS-SCNC: 5 MMOL/L (ref 5–15)
BASOPHILS # BLD AUTO: 0.03 10*3/MM3 (ref 0–0.2)
BASOPHILS NFR BLD AUTO: 0.4 % (ref 0–1.5)
BUN SERPL-MCNC: 22 MG/DL (ref 8–23)
BUN/CREAT SERPL: 15.5 (ref 7–25)
CALCIUM SPEC-SCNC: 8 MG/DL (ref 8.6–10.5)
CHLORIDE SERPL-SCNC: 102 MMOL/L (ref 98–107)
CO2 SERPL-SCNC: 28 MMOL/L (ref 22–29)
CREAT SERPL-MCNC: 1.42 MG/DL (ref 0.57–1)
DEPRECATED RDW RBC AUTO: 40.9 FL (ref 37–54)
EGFRCR SERPLBLD CKD-EPI 2021: 37.9 ML/MIN/1.73
EOSINOPHIL # BLD AUTO: 0.08 10*3/MM3 (ref 0–0.4)
EOSINOPHIL NFR BLD AUTO: 1.1 % (ref 0.3–6.2)
ERYTHROCYTE [DISTWIDTH] IN BLOOD BY AUTOMATED COUNT: 12.1 % (ref 12.3–15.4)
GLUCOSE BLDC GLUCOMTR-MCNC: 112 MG/DL (ref 70–130)
GLUCOSE BLDC GLUCOMTR-MCNC: 133 MG/DL (ref 70–130)
GLUCOSE BLDC GLUCOMTR-MCNC: 163 MG/DL (ref 70–130)
GLUCOSE BLDC GLUCOMTR-MCNC: 175 MG/DL (ref 70–130)
GLUCOSE SERPL-MCNC: 113 MG/DL (ref 65–99)
HCT VFR BLD AUTO: 29.6 % (ref 34–46.6)
HGB BLD-MCNC: 9.6 G/DL (ref 12–15.9)
IMM GRANULOCYTES # BLD AUTO: 0.04 10*3/MM3 (ref 0–0.05)
IMM GRANULOCYTES NFR BLD AUTO: 0.5 % (ref 0–0.5)
LYMPHOCYTES # BLD AUTO: 0.55 10*3/MM3 (ref 0.7–3.1)
LYMPHOCYTES NFR BLD AUTO: 7.5 % (ref 19.6–45.3)
MCH RBC QN AUTO: 30.9 PG (ref 26.6–33)
MCHC RBC AUTO-ENTMCNC: 32.4 G/DL (ref 31.5–35.7)
MCV RBC AUTO: 95.2 FL (ref 79–97)
MONOCYTES # BLD AUTO: 0.56 10*3/MM3 (ref 0.1–0.9)
MONOCYTES NFR BLD AUTO: 7.7 % (ref 5–12)
NEUTROPHILS NFR BLD AUTO: 6.03 10*3/MM3 (ref 1.7–7)
NEUTROPHILS NFR BLD AUTO: 82.8 % (ref 42.7–76)
NRBC BLD AUTO-RTO: 0 /100 WBC (ref 0–0.2)
PHOSPHATE SERPL-MCNC: 2.4 MG/DL (ref 2.5–4.5)
PLATELET # BLD AUTO: 198 10*3/MM3 (ref 140–450)
PMV BLD AUTO: 9.8 FL (ref 6–12)
POTASSIUM SERPL-SCNC: 4.6 MMOL/L (ref 3.5–5.2)
QT INTERVAL: 405 MS
QTC INTERVAL: 422 MS
RBC # BLD AUTO: 3.11 10*6/MM3 (ref 3.77–5.28)
SODIUM SERPL-SCNC: 135 MMOL/L (ref 136–145)
WBC NRBC COR # BLD AUTO: 7.29 10*3/MM3 (ref 3.4–10.8)

## 2025-02-01 PROCEDURE — 25010000002 HYDRALAZINE PER 20 MG: Performed by: STUDENT IN AN ORGANIZED HEALTH CARE EDUCATION/TRAINING PROGRAM

## 2025-02-01 PROCEDURE — 80069 RENAL FUNCTION PANEL: CPT | Performed by: INTERNAL MEDICINE

## 2025-02-01 PROCEDURE — 63710000001 PREDNISONE PER 5 MG: Performed by: SURGERY

## 2025-02-01 PROCEDURE — 94799 UNLISTED PULMONARY SVC/PX: CPT

## 2025-02-01 PROCEDURE — 85025 COMPLETE CBC W/AUTO DIFF WBC: CPT | Performed by: INTERNAL MEDICINE

## 2025-02-01 PROCEDURE — 94664 DEMO&/EVAL PT USE INHALER: CPT

## 2025-02-01 PROCEDURE — 82948 REAGENT STRIP/BLOOD GLUCOSE: CPT

## 2025-02-01 PROCEDURE — 25010000002 CEFTRIAXONE PER 250 MG

## 2025-02-01 PROCEDURE — 25010000002 ENOXAPARIN PER 10 MG: Performed by: SURGERY

## 2025-02-01 PROCEDURE — 99024 POSTOP FOLLOW-UP VISIT: CPT | Performed by: SURGERY

## 2025-02-01 PROCEDURE — 94761 N-INVAS EAR/PLS OXIMETRY MLT: CPT

## 2025-02-01 PROCEDURE — 94760 N-INVAS EAR/PLS OXIMETRY 1: CPT

## 2025-02-01 PROCEDURE — 63710000001 INSULIN REGULAR HUMAN PER 5 UNITS: Performed by: SURGERY

## 2025-02-01 PROCEDURE — 94660 CPAP INITIATION&MGMT: CPT

## 2025-02-01 RX ORDER — AMLODIPINE BESYLATE 10 MG/1
10 TABLET ORAL
Status: DISCONTINUED | OUTPATIENT
Start: 2025-02-01 | End: 2025-02-02 | Stop reason: HOSPADM

## 2025-02-01 RX ORDER — ENOXAPARIN SODIUM 100 MG/ML
40 INJECTION SUBCUTANEOUS DAILY
Status: DISCONTINUED | OUTPATIENT
Start: 2025-02-02 | End: 2025-02-02 | Stop reason: HOSPADM

## 2025-02-01 RX ORDER — LOSARTAN POTASSIUM 50 MG/1
50 TABLET ORAL
Status: DISCONTINUED | OUTPATIENT
Start: 2025-02-01 | End: 2025-02-01

## 2025-02-01 RX ADMIN — ACETAMINOPHEN 650 MG: 325 TABLET, FILM COATED ORAL at 08:39

## 2025-02-01 RX ADMIN — PANTOPRAZOLE SODIUM 40 MG: 40 TABLET, DELAYED RELEASE ORAL at 05:49

## 2025-02-01 RX ADMIN — INSULIN HUMAN 3 UNITS: 100 INJECTION, SOLUTION PARENTERAL at 17:43

## 2025-02-01 RX ADMIN — TORSEMIDE 10 MG: 20 TABLET ORAL at 08:38

## 2025-02-01 RX ADMIN — INSULIN HUMAN 3 UNITS: 100 INJECTION, SOLUTION PARENTERAL at 22:30

## 2025-02-01 RX ADMIN — PREDNISONE 5 MG: 5 TABLET ORAL at 17:43

## 2025-02-01 RX ADMIN — ENOXAPARIN SODIUM 30 MG: 100 INJECTION SUBCUTANEOUS at 12:46

## 2025-02-01 RX ADMIN — CEFTRIAXONE 2000 MG: 2 INJECTION, POWDER, FOR SOLUTION INTRAMUSCULAR; INTRAVENOUS at 17:43

## 2025-02-01 RX ADMIN — ACETAMINOPHEN 650 MG: 325 TABLET, FILM COATED ORAL at 22:31

## 2025-02-01 RX ADMIN — BUDESONIDE AND FORMOTEROL FUMARATE DIHYDRATE 2 PUFF: 160; 4.5 AEROSOL RESPIRATORY (INHALATION) at 08:06

## 2025-02-01 RX ADMIN — MUPIROCIN 1 APPLICATION: 20 OINTMENT TOPICAL at 22:31

## 2025-02-01 RX ADMIN — MUPIROCIN 1 APPLICATION: 20 OINTMENT TOPICAL at 08:39

## 2025-02-01 RX ADMIN — HYDRALAZINE HYDROCHLORIDE 10 MG: 20 INJECTION INTRAMUSCULAR; INTRAVENOUS at 08:39

## 2025-02-01 RX ADMIN — BUDESONIDE AND FORMOTEROL FUMARATE DIHYDRATE 2 PUFF: 160; 4.5 AEROSOL RESPIRATORY (INHALATION) at 19:29

## 2025-02-01 RX ADMIN — BISOPROLOL FUMARATE 20 MG: 5 TABLET ORAL at 22:30

## 2025-02-01 NOTE — PROGRESS NOTES
"General Surgery  Progress Note    CC: Follow-up ascending colon mass    POD#3 laparoscopic robotic assisted right hemicolectomy    S: She denies any abdominal pain and is in very good spirits today.  She has been tolerating a full liquid diet with no nausea or vomiting.  She had 2 bowel movements yesterday and is passing flatus.  She has been ambulating around her room without issue.    O:BP (!) 187/71 (BP Location: Right arm, Patient Position: Lying) Comment: RN notified  Pulse 86   Temp 98.1 °F (36.7 °C) (Oral)   Resp 18   Ht 152.4 cm (60\")   Wt 84.2 kg (185 lb 10 oz)   LMP  (LMP Unknown)   SpO2 100%   BMI 36.25 kg/m²     Intake & Output:  UOP: 1025 mL/24 hrs  BM x 2    GENERAL: alert, well appearing, and in no distress  HEENT: normocephalic, atraumatic, moist mucous membranes, clear sclerae   CHEST: clear to auscultation, no wheezes, rales or rhonchi, symmetric air entry  CARDIAC: regular rate and rhythm    ABDOMEN: Soft, nondistended, incisions clean/dry/intact with glue, nontender  EXTREMITIES: no cyanosis, clubbing, or edema   SKIN: Warm and moist, no rashes    LABS  Results from last 7 days   Lab Units 02/01/25  0604 01/31/25  0816 01/30/25  0135   WBC 10*3/mm3 7.29 10.42 10.74   HEMOGLOBIN g/dL 9.6* 10.4* 10.7*   HEMATOCRIT % 29.6* 31.7* 34.2   PLATELETS 10*3/mm3 198 217 164     Results from last 7 days   Lab Units 02/01/25  0604 01/31/25  0816 01/30/25  1958 01/30/25  1231 01/30/25  0651   SODIUM mmol/L 135* 136  --   --  135*   POTASSIUM mmol/L 4.6 4.7  4.7 4.8   < > 6.0*   CHLORIDE mmol/L 102 101  --   --  104   CO2 mmol/L 28.0 28.0  --   --  25.4   BUN mg/dL 22 20  --   --  22   CREATININE mg/dL 1.42* 1.42*  --   --  1.36*   CALCIUM mg/dL 8.0* 8.3*  --   --  8.6   GLUCOSE mg/dL 113* 113*  --   --  241*    < > = values in this interval not displayed.         A/P: 79 y.o. female POD#3 laparoscopic robotic assisted right colectomy    Advance to regular diet.  We can plan for home tomorrow morning " if she tolerates this.    Zarina Martinez MD  General, Robotic, and Endoscopic Surgery  [facility]    [address], [address]  [place] [address]  P: [phone]  F: [phone]

## 2025-02-01 NOTE — PROGRESS NOTES
[facility]  [phone]  Dr. Landen Dexter     Subjective:  LOS: 2    Chief Complaint:   Acute hypercapnic respiratory failure BiPAP management transferred to ICU     Pt doing well today. Admits to having CPAP but not wearing it. She otherwise is feeling well.     Objective   Vital Signs past 24hrs  Temp range: Temp (24hrs), Av.3 °F (36.8 °C), Min:97.6 °F (36.4 °C), Max:98.9 °F (37.2 °C)    BP range: BP: (102-160)/(42-89) 160/56  Pulse range: Heart Rate:  [] 95  Resp rate range: Resp:  [18] 18  Device (Oxygen Therapy): nasal cannula;humidifiedFlow (L/min) (Oxygen Therapy):  [2] 2  Oxygen range:SpO2:  [93 %-100 %] 93 %   Mechanical Ventilator:     Physical Exam  Vitals reviewed.   Constitutional:       General: She is not in acute distress.     Appearance: Normal appearance.   HENT:      Head: Normocephalic and atraumatic.   Eyes:      Extraocular Movements: Extraocular movements intact.      Pupils: Pupils are equal, round, and reactive to light.   Cardiovascular:      Rate and Rhythm: Normal rate and regular rhythm.      Heart sounds: No murmur heard.     No friction rub. No gallop.   Pulmonary:      Effort: Pulmonary effort is normal. No respiratory distress.      Breath sounds: No wheezing, rhonchi or rales.   Skin:     General: Skin is warm and dry.      Findings: No rash.   Neurological:      General: No focal deficit present.      Mental Status: She is alert and oriented to person, place, and time.       Results Review:    I have reviewed the laboratory and imaging data since the last note by [facility] physician.  My annotations are noted in assessment and plan.      Result Review:  I have personally reviewed the results from last note by [facility] physician to 2025 21:18 EST and agree with these findings:  [x]  Laboratory list / accordion  [x]  Microbiology  [x]  Radiology  [x]  EKG/Telemetry   [x]  Cardiology/Vascular   [x]  Pathology  [x]  Old records  []  Other:    Medication  Review:  I have reviewed the current MAR.  My annotations are noted in assessment and plan.    bisoprolol, 20 mg, Oral, Nightly  budesonide-formoterol, 2 puff, Inhalation, BID  cefTRIAXone, 2,000 mg, Intravenous, Q24H  enoxaparin, 30 mg, Subcutaneous, Daily  insulin regular, 3-14 Units, Subcutaneous, 4x Daily AC & at Bedtime  mupirocin, 1 Application, Each Nare, BID  pantoprazole, 40 mg, Oral, Q AM  predniSONE, 5 mg, Oral, Q PM  [Held by provider] pregabalin, 75 mg, Oral, Daily  [START ON 2/1/2025] torsemide, 10 mg, Oral, Daily        dexmedetomidine, 0.2-1.5 mcg/kg/hr, Last Rate: Stopped (01/30/25 0348)      Lines, Drains & Airways       Active LDAs       Name Placement date Placement time Site Days    Peripheral IV 01/29/25 0837 Distal;Posterior;Right Forearm 01/29/25  0837  Forearm  2    Peripheral IV 01/29/25 0838 Right Antecubital 01/29/25  0838  Antecubital  2                  No active isolations  Diet Orders (active) (From admission, onward)       Start     Ordered    01/31/25 1454  Diet: Liquid, Cardiac, Diabetic; Full Liquid; Healthy Heart (2-3 Na+); Consistent Carbohydrate; Fluid Consistency: Thin (IDDSI 0)  Diet Effective Now         01/31/25 1453    01/29/25 2200  Snack: Chewing gum x30 minutes three times daily to increase saliva flow and provide gas pain relief. Do not give to ileostomy patients.  3 Times Daily      Comments: Chewing gum x30 minutes three times daily to increase saliva flow and provide gas pain relief.      Do not give to ileostomy patients.    01/29/25 1741                      Assessment  Acute hypercapnic respiratory failure  Abnormal left-sided pulmonary opacity-suspect pneumonia or possible aspiration  ANAYELI on CPAP  Asthma  Colonic mass status postresection  Morbid obesity  CKD  Iron deficiency anemia        Plan  -Patient initially presenting for right hemicolectomy secondary to colonic mass.  The night after surgery she was found to be lethargic with decreased responsiveness and  an ABG showed acute hypercapnia.  She was placed on BiPAP brought to the ICU.  - weaned off BiPAP and stable on NC  - Continue weaning oxygen for goal to saturation of 90%  - Continue scheduled and as needed bronchodilators  - Continue Rocephin for suspected pneumonia.  Will need follow-up CT chest imaging in 4 to 6 weeks to ensure clearing especially given her colonic mass  -May consider resuming Lyrica and gabapentin at lower doses.  - pt admitted to having CPAP but not using it. Will need to follow up in office.  to bring in home CPAP. But will definitely need to follow up with sleep in the offic.e   - transfer out of ICU. Pulmonary to continue following.        Landen Dexter DO   01/31/25  21:18 EST      Part of this note may be an electronic transcription/translation of spoken language to printed text using the Dragon Dictation System.

## 2025-02-01 NOTE — PROGRESS NOTES
: The chart reviewed.  Patient feels better today denies complaints potassium and creatinine is close to baseline    Vital Signs  Vitals:    02/01/25 1316   BP: 139/46   Pulse: 89   Resp: 18   Temp: 98.4 °F (36.9 °C)   SpO2: 98%     I/O this shift:  In: 360 [P.O.:360]  Out: -   I/O last 3 completed shifts:  In: 840 [P.O.:840]  Out: 2125 [Urine:2125]      Physical Exam:    General: nad  HEENT:  Normocephalic, Atraumatic, EOMI, PERRLA, NO icterus,  Neck:  Supple, No JVD, No Carotid artery bruit, No lymphadenopathy  Chest: Clear to auscultation bilaterally,   Cardiovascular: Regular rate and rhythm. Positive S1 & S2, No rub, murmur or gallop.  Abdominal: Soft, nontender, no palpable organomegaly, no abdominal bruit, positive bowel sounds  Musculoskeletal: No joint tenderness or swelling, good range of motion, Adequate muscle strength on both sides, no cyanosis, clubbing or edema on lower extremities.  Neuro: Alert oriented x3, CN1 - 12 intact, No focal sensory or motor deficit.      Review of Systems:   CNS: Denied headaches, blurred vision, tingling or numbness in any part of body. No weakness or any impaired speech.  CVS: No chest pain or shortness of breath, No orthopnea or PND or exertional dyspnea,  Pulmonary: Denies shortness of breath, coughs, hematemesis, night sweats or weight loss.  GI: Denies diarrhea, nausea, vomiting. Denies weight loss, hematemesis, melena.  : Denies dysuria, frequency or hesitancy of urination  Vascular: Denies claudication, resting pain, tingling numbness or weakness in any part of the body.  Musculoskeletal: Denies Joint tenderness or pain, denies stiffness in joints, denies fever or weight loss, or skin rashes.    Current Labs  [unfilled]  Lab Results (last 24 hours)       Procedure Component Value Units Date/Time    POC Glucose Once [[id number]]  (Abnormal) Collected: 02/01/25 1126    Specimen: Blood Updated: 02/01/25 1128     Glucose 133 mg/dL     Renal Function Panel  [[id number]]  (Abnormal) Collected: 02/01/25 0604    Specimen: Blood Updated: 02/01/25 0728     Glucose 113 mg/dL      BUN 22 mg/dL      Creatinine 1.42 mg/dL      Sodium 135 mmol/L      Potassium 4.6 mmol/L      Chloride 102 mmol/L      CO2 28.0 mmol/L      Calcium 8.0 mg/dL      Albumin 2.7 g/dL      Phosphorus 2.4 mg/dL      Anion Gap 5.0 mmol/L      BUN/Creatinine Ratio 15.5     eGFR 37.9 mL/min/1.73     Narrative:      GFR Categories in Chronic Kidney Disease (CKD)      GFR Category          GFR (mL/min/1.73)    Interpretation  G1                     90 or greater         Normal or high (1)  G2                      60-89                Mild decrease (1)  G3a                   45-59                Mild to moderate decrease  G3b                   30-44                Moderate to severe decrease  G4                    15-29                Severe decrease  G5                    14 or less           Kidney failure          (1)In the absence of evidence of kidney disease, neither GFR category G1 or G2 fulfill the criteria for CKD.    eGFR calculation 2021 CKD-EPI creatinine equation, which does not include race as a factor    CBC & Differential [[id number]]  (Abnormal) Collected: 02/01/25 0604    Specimen: Blood Updated: 02/01/25 0708    Narrative:      The following orders were created for panel order CBC & Differential.  Procedure                               Abnormality         Status                     ---------                               -----------         ------                     CBC Auto Differential[[id number]]        Abnormal            Final result                 Please view results for these tests on the individual orders.    CBC Auto Differential [[id number]]  (Abnormal) Collected: 02/01/25 0604    Specimen: Blood Updated: 02/01/25 0708     WBC 7.29 10*3/mm3      RBC 3.11 10*6/mm3      Hemoglobin 9.6 g/dL      Hematocrit 29.6 %      MCV 95.2 fL      MCH 30.9 pg      MCHC 32.4 g/dL      RDW 12.1 %       RDW-SD 40.9 fl      MPV 9.8 fL      Platelets 198 10*3/mm3      Neutrophil % 82.8 %      Lymphocyte % 7.5 %      Monocyte % 7.7 %      Eosinophil % 1.1 %      Basophil % 0.4 %      Immature Grans % 0.5 %      Neutrophils, Absolute 6.03 10*3/mm3      Lymphocytes, Absolute 0.55 10*3/mm3      Monocytes, Absolute 0.56 10*3/mm3      Eosinophils, Absolute 0.08 10*3/mm3      Basophils, Absolute 0.03 10*3/mm3      Immature Grans, Absolute 0.04 10*3/mm3      nRBC 0.0 /100 WBC     POC Glucose Once [[id number]]  (Normal) Collected: 02/01/25 0552    Specimen: Blood Updated: 02/01/25 0556     Glucose 112 mg/dL     Blood Culture - Blood, Arm, Right [[id number]]  (Normal) Collected: 01/30/25 0120    Specimen: Blood from Arm, Right Updated: 02/01/25 0215     Blood Culture No growth at 2 days    Blood Culture - Blood, Arm, Right [[id number]]  (Normal) Collected: 01/30/25 0134    Specimen: Blood from Arm, Right Updated: 02/01/25 0215     Blood Culture No growth at 2 days    POC Glucose Once [[id number]]  (Abnormal) Collected: 01/31/25 2022    Specimen: Blood Updated: 01/31/25 2024     Glucose 176 mg/dL     POC Glucose Once [[id number]]  (Normal) Collected: 01/31/25 1817    Specimen: Blood Updated: 01/31/25 1819     Glucose 109 mg/dL           Current Radiology  Imaging Results (Last 24 Hours)       ** No results found for the last 24 hours. **          Current Meds    Current Facility-Administered Medications:     acetaminophen (TYLENOL) tablet 650 mg, 650 mg, Oral, Q6H PRN, Ashwin Alvarado MD, 650 mg at 02/01/25 0839    bisoprolol (ZEBeta) tablet 20 mg, 20 mg, Oral, Nightly, Ashwin Alvarado MD, 20 mg at 01/31/25 2322    budesonide-formoterol (SYMBICORT) 160-4.5 MCG/ACT inhaler 2 puff, 2 puff, Inhalation, BID, Ashwin Alvarado MD, 2 puff at 02/01/25 0806    cefTRIAXone (ROCEPHIN) 2,000 mg in sodium chloride 0.9 % 100 mL MBP, 2,000 mg, Intravenous, Q24H, Nela Pennington, APRN, Last Rate: 200 mL/hr at 01/31/25  1815, 2,000 mg at 01/31/25 1815    dextrose (D50W) (25 g/50 mL) IV injection 25 g, 25 g, Intravenous, Q15 Min PRN, Ashwin Alvarado MD    dextrose (GLUTOSE) oral gel 15 g, 15 g, Oral, Q15 Min PRN, Ashwin Alvarado MD    [START ON 2/2/2025] Enoxaparin Sodium (LOVENOX) syringe 40 mg, 40 mg, Subcutaneous, Daily, Ashwin Alvarado MD    glucagon (GLUCAGEN) injection 1 mg, 1 mg, Intramuscular, Q15 Min PRN, Ashwin Alvarado MD    hydrALAZINE (APRESOLINE) injection 10 mg, 10 mg, Intravenous, Q4H PRN, Landen Dexter DO, 10 mg at 02/01/25 0839    insulin regular (humuLIN R,novoLIN R) injection 3-14 Units, 3-14 Units, Subcutaneous, 4x Daily AC & at Bedtime, Ashwin Alvarado MD, 3 Units at 01/31/25 2101    losartan (COZAAR) tablet 50 mg, 50 mg, Oral, Q24H, Santosh Dhillon MD    mupirocin (BACTROBAN) 2 % nasal ointment 1 Application, 1 Application, Each Nare, BID, Landen Dexter DO, 1 Application at 02/01/25 0839    nitroglycerin (NITROSTAT) SL tablet 0.4 mg, 0.4 mg, Sublingual, Q5 Min PRN, Nela Pennington, RAVEN    nitroglycerin (NITROSTAT) SL tablet 0.4 mg, 0.4 mg, Sublingual, Q5 Min PRN, Santosh Dhillon MD    O2 (OXYGEN), 3 L/min, Inhalation, PRN, Ashwin Alvarado MD    ondansetron (ZOFRAN) injection 4 mg, 4 mg, Intravenous, Q6H PRN, Ashwin Alvarado MD, 4 mg at 01/29/25 1826    pantoprazole (PROTONIX) EC tablet 40 mg, 40 mg, Oral, Q AM, Ashwin Alvarado MD, 40 mg at 02/01/25 0549    predniSONE (DELTASONE) tablet 5 mg, 5 mg, Oral, Q PM, Ashwin Alvarado MD, 5 mg at 01/31/25 1815    [Held by provider] pregabalin (LYRICA) capsule 75 mg, 75 mg, Oral, Daily, Ashwin Alvarado MD, 75 mg at 01/29/25 1901    torsemide (DEMADEX) tablet 10 mg, 10 mg, Oral, Daily, Santosh Dhillon MD, 10 mg at 02/01/25 0838    traMADol (ULTRAM) tablet 50 mg, 50 mg, Oral, Q6H PRN, Ashwin Alvarado MD      Assessment and  Fall plan:          * No active hospital problems. *  CKD 3 A stable renal function, creatinine close to baseline  Hyperkalemia avoid losartan use hydralazine orally along with Demadex      Ant Naranjo MD FACP, FASN.  02/01/25  15:20 EST

## 2025-02-01 NOTE — PLAN OF CARE
Goal Outcome Evaluation:              Outcome Evaluation: Pt A&Ox4 on 2LNC. Pt had elevated bp, prn given. DBP below parameters to give afternoon dose of bp medication. Pt resting in bed with alarm on and call light within reach.

## 2025-02-01 NOTE — PROGRESS NOTES
PROGRESS NOTE  Patient Name: Blanca Harden  Age/Sex: 79 y.o. female  : 1945  MRN: [id number]    Date of Admission: 2025  Date of Encounter Visit: 25   LOS: 3 days   Patient Care Team:  Praneeth Valenzuela MD as PCP - General (Internal Medicine)  Jean-Claude Farnsworth MD as Consulting Physician (Hematology and Oncology)  Praneeth Valenzuela MD as Referring Physician (Internal Medicine)  Tahmina Brown MD as Consulting Physician (Rheumatology)  Gretchen Mcginnis MD as Consulting Physician (Nephrology)  Haile Handley MD as Consulting Physician (Gastroenterology)  Santosh Garcia MD as Consulting Physician (Hematology and Oncology)  Dmitry Lang MD as Consulting Physician (Pulmonary Disease)  Kalin Elliott MD as Consulting Physician (Cardiology)    Chief Complaint: Acute hypercapnic respiratory failure    Hospital course: Patient presented post right hemicolectomy with acute hypercapnic respiratory failure requiring BiPAP.  He was weaned off the BiPAP and has been doing well on the nasal cannula oxygen  He is on Rocephin for suspected pneumonia and need to have a follow-up CAT scan in 4 to 6 weeks.  Patient has obstructive sleep apnea and he has CPAP at home that he is not compliant with but was strongly encouraged to go back on it regularly  He was transferred out of the ICU yesterday and he seems to be doing well, glycemic control is satisfactory, kidney function is stable with creatinine around 1.4, electrolytes were normal, white count is normal hemoglobin is low but stable and the last ABG showed compensated mild hypercapnia pH 7.4 with a pCO2 of 48         REVIEW OF SYSTEMS:   CONSTITUTIONAL: no fever or chills  CARDIOVASCULAR: No chest pain, chest pressure or chest discomfort. No palpitations or edema.   RESPIRATORY: Minimal respiratory complaints, unclear endocardial oxygen  GASTROINTESTINAL: Abdominal pain within expected range after his surgery.   HEMATOLOGIC: No bleeding  "or bruising.     Ventilator/Non-Invasive Ventilation Settings (From admission, onward)       Start     Ordered    01/29/25 2318  NIPPV (CPAP or BIPAP)  Until Discontinued        Question Answer Comment   Indication Acute Respiratory Failure    Type AVAPS/PC/PS    Backup Rate 20    Target VT (mL) 450    EPAP/PEEP (cm H2O) 5    Min Pressure (cm H2O) 12    Max Pressure (cm H2O) 24    Titrate Oxygen for SpO2 90 - 95%        01/29/25 2319 01/29/25 2314  NIPPV (CPAP or BIPAP)  Until Discontinued,   Status:  Canceled        Question Answer Comment   Indication Acute Respiratory Failure    Type AVAPS/PC/PS    Backup Rate 16    Target VT (mL) 450    EPAP/PEEP (cm H2O) 5    Min Pressure (cm H2O) 8    Max Pressure (cm H2O) 24    Titrate Oxygen for SpO2 90 - 95%        01/29/25 2313                      Vital Signs  Temp:  [97.9 °F (36.6 °C)-98.4 °F (36.9 °C)] 98.4 °F (36.9 °C)  Heart Rate:  [] 89  Resp:  [18] 18  BP: (132-187)/(46-79) 139/46  SpO2:  [93 %-100 %] 98 %  on  Flow (L/min) (Oxygen Therapy):  [2] 2 Device (Oxygen Therapy): nasal cannula    Intake/Output Summary (Last 24 hours) at 2/1/2025 1408  Last data filed at 2/1/2025 1300  Gross per 24 hour   Intake 360 ml   Output 450 ml   Net -90 ml     Flowsheet Rows      Flowsheet Row First Filed Value   Admission Height 152.4 cm (60\") Documented at 01/29/2025 0742   Admission Weight 83.5 kg (184 lb 1.4 oz) Documented at 01/29/2025 0742          Body mass index is 36.25 kg/m².      01/29/25  0742 01/30/25  0519 01/31/25 2013   Weight: 83.5 kg (184 lb 1.4 oz) 83.5 kg (184 lb 1.4 oz) 84.2 kg (185 lb 10 oz)       Physical Exam:  GEN:  No acute distress, alert, cooperative, well developed   EYES:   Sclerae clear. No icterus. PERRL. Normal EOM  ENT:   External ears/nose normal, no oral lesions, no thrush, mucous membranes moist  NECK:  Supple, midline trachea, no JVD  LUNGS: Normal chest on inspection, clear to quotation anteriorly, diminished over the bases " "posteriorly but no wheezes or crackles. Respirations regular, even and unlabored.   CV:  Regular rhythm and rate. Normal S1/S2. No murmurs, gallops, or rubs noted.  ABD:  Soft, nontender and nondistended. Normal bowel sounds. No guarding  EXT:  Moves all extremities well. No cyanosis. No redness.  Minimal edema.   Skin: Dry, intact, no bleeding    Results Review:    Results From Last 14 Days   Lab Units 01/30/25  0135   LACTATE mmol/L 0.7     Results from last 7 days   Lab Units 02/01/25  0604 01/31/25  0816 01/30/25  1958 01/30/25  1703 01/30/25  1231 01/30/25  0651 01/30/25 0135   SODIUM mmol/L 135* 136  --   --   --  135* 130*   POTASSIUM mmol/L 4.6 4.7  4.7 4.8 4.7 5.2 6.0* 6.7*   CHLORIDE mmol/L 102 101  --   --   --  104 98   CO2 mmol/L 28.0 28.0  --   --   --  25.4 21.1*   BUN mg/dL 22 20  --   --   --  22 21   CREATININE mg/dL 1.42* 1.42*  --   --   --  1.36* 1.24*   CALCIUM mg/dL 8.0* 8.3*  --   --   --  8.6 8.2*   ANION GAP mmol/L 5.0 7.0  --   --   --  5.6 10.9   ALBUMIN g/dL 2.7* 2.9*  --   --   --   --   --                  Results from last 7 days   Lab Units 02/01/25  0604 01/31/25  0816 01/30/25  0135 01/30/25  0013   WBC 10*3/mm3 7.29 10.42 10.74 10.75   HEMOGLOBIN g/dL 9.6* 10.4* 10.7* 11.3*   HEMATOCRIT % 29.6* 31.7* 34.2 35.6   PLATELETS 10*3/mm3 198 217 164 169   MCV fL 95.2 94.9 97.2* 96.2   NEUTROPHIL % % 82.8* 84.2* 94.2*  --    LYMPHOCYTE % % 7.5* 6.2* 2.2*  --    MONOCYTES % % 7.7 8.0 3.0*  --    EOSINOPHIL % % 1.1 0.4 0.0*  --    BASOPHIL % % 0.4 0.3 0.1  --    IMM GRAN % % 0.5 0.9* 0.5  --                    Invalid input(s): \"LDLCALC\"  Results from last 7 days   Lab Units 01/31/25  1027 01/30/25  0142 01/29/25  2254   PH, ARTERIAL pH units 7.408 7.268* 7.176*   PCO2, ARTERIAL mm Hg 47.7* 54.2* 70.6*   PO2 ART mm Hg 31.5* 145.9* 93.4   HCO3 ART mmol/L 30.1* 24.8 26.1         Glucose   Date/Time Value Ref Range Status   02/01/2025 1126 133 (H) 70 - 130 mg/dL Final   02/01/2025 0552 112 " 70 - 130 mg/dL Final   01/31/2025 2022 176 (H) 70 - 130 mg/dL Final   01/31/2025 1817 109 70 - 130 mg/dL Final   01/31/2025 1203 116 70 - 130 mg/dL Final   01/31/2025 0926 124 70 - 130 mg/dL Final   01/31/2025 0549 133 (H) 70 - 130 mg/dL Final   01/30/2025 2320 143 (H) 70 - 130 mg/dL Final     Results from last 7 days   Lab Units 01/30/25  0135   LACTATE mmol/L 0.7     Results from last 7 days   Lab Units 01/30/25  2327 01/30/25  0134 01/30/25  0120   BLOODCX   --  No growth at 2 days No growth at 2 days   STREP PNEUMO AG  Negative  --   --    L. PNEUMOPHILA SEROGP 1 UR AG  Negative  --   --                        Imaging:   Imaging Results (All)               I reviewed the patient's new clinical results.  I personally viewed and interpreted the patient's imaging results:        Medication Review:   bisoprolol, 20 mg, Oral, Nightly  budesonide-formoterol, 2 puff, Inhalation, BID  cefTRIAXone, 2,000 mg, Intravenous, Q24H  enoxaparin, 30 mg, Subcutaneous, Daily  insulin regular, 3-14 Units, Subcutaneous, 4x Daily AC & at Bedtime  losartan, 50 mg, Oral, Q24H  mupirocin, 1 Application, Each Nare, BID  pantoprazole, 40 mg, Oral, Q AM  predniSONE, 5 mg, Oral, Q PM  [Held by provider] pregabalin, 75 mg, Oral, Daily  torsemide, 10 mg, Oral, Daily             ASSESSMENT:   Acute hypercapnic respiratory failure  Abnormal left-sided pulmonary opacity-suspect pneumonia or possible aspiration  ANAYELI on CPAP  Asthma  Colonic mass status postresection  Morbid obesity  CKD  Iron deficiency anemia    PLAN:  Rocephin day #3  Patient has some bibasilar atelectasis, she has been laying in the bed for several days and she needs to put more effort with the deep breathing exercises to reverse any underlying atelectasis  Discussed with the patient and with the family  She will try to get her home CPAP meanwhile we can use the BiPAP as needed  Minimize narcotics  Finish the course of antibiotic as ordered    Discussed with patient and  family, notes reviewed  Labs/Notes/films were independently reviewed and pertinent results are summarized above  The copied texts in this note were reviewed and they are accurate as of 02/01/25    Disposition: Per primary team    Belem Thomas MD  02/01/25  14:08 EST             Dictated utilizing Dragon dictation

## 2025-02-02 ENCOUNTER — READMISSION MANAGEMENT (OUTPATIENT)
Dept: CALL CENTER | Facility: HOSPITAL | Age: 80
End: 2025-02-02
Payer: MEDICARE

## 2025-02-02 VITALS
OXYGEN SATURATION: 94 % | RESPIRATION RATE: 18 BRPM | BODY MASS INDEX: 36.44 KG/M2 | TEMPERATURE: 99 F | WEIGHT: 185.63 LBS | DIASTOLIC BLOOD PRESSURE: 56 MMHG | SYSTOLIC BLOOD PRESSURE: 131 MMHG | HEIGHT: 60 IN | HEART RATE: 104 BPM

## 2025-02-02 LAB
ALBUMIN SERPL-MCNC: 2.7 G/DL (ref 3.5–5.2)
ANION GAP SERPL CALCULATED.3IONS-SCNC: 6.7 MMOL/L (ref 5–15)
BASOPHILS # BLD AUTO: 0.02 10*3/MM3 (ref 0–0.2)
BASOPHILS NFR BLD AUTO: 0.2 % (ref 0–1.5)
BUN SERPL-MCNC: 23 MG/DL (ref 8–23)
BUN/CREAT SERPL: 18.4 (ref 7–25)
CALCIUM SPEC-SCNC: 8.1 MG/DL (ref 8.6–10.5)
CHLORIDE SERPL-SCNC: 98 MMOL/L (ref 98–107)
CO2 SERPL-SCNC: 29.3 MMOL/L (ref 22–29)
CREAT SERPL-MCNC: 1.25 MG/DL (ref 0.57–1)
DEPRECATED RDW RBC AUTO: 40.5 FL (ref 37–54)
EGFRCR SERPLBLD CKD-EPI 2021: 44.2 ML/MIN/1.73
EOSINOPHIL # BLD AUTO: 0.05 10*3/MM3 (ref 0–0.4)
EOSINOPHIL NFR BLD AUTO: 0.6 % (ref 0.3–6.2)
ERYTHROCYTE [DISTWIDTH] IN BLOOD BY AUTOMATED COUNT: 11.9 % (ref 12.3–15.4)
GLUCOSE BLDC GLUCOMTR-MCNC: 172 MG/DL (ref 70–130)
GLUCOSE BLDC GLUCOMTR-MCNC: 179 MG/DL (ref 70–130)
GLUCOSE BLDC GLUCOMTR-MCNC: 182 MG/DL (ref 70–130)
GLUCOSE SERPL-MCNC: 189 MG/DL (ref 65–99)
HCT VFR BLD AUTO: 30.3 % (ref 34–46.6)
HGB BLD-MCNC: 9.9 G/DL (ref 12–15.9)
IMM GRANULOCYTES # BLD AUTO: 0.06 10*3/MM3 (ref 0–0.05)
IMM GRANULOCYTES NFR BLD AUTO: 0.7 % (ref 0–0.5)
LYMPHOCYTES # BLD AUTO: 0.31 10*3/MM3 (ref 0.7–3.1)
LYMPHOCYTES NFR BLD AUTO: 3.8 % (ref 19.6–45.3)
MCH RBC QN AUTO: 31 PG (ref 26.6–33)
MCHC RBC AUTO-ENTMCNC: 32.7 G/DL (ref 31.5–35.7)
MCV RBC AUTO: 95 FL (ref 79–97)
MONOCYTES # BLD AUTO: 0.42 10*3/MM3 (ref 0.1–0.9)
MONOCYTES NFR BLD AUTO: 5.1 % (ref 5–12)
NEUTROPHILS NFR BLD AUTO: 7.33 10*3/MM3 (ref 1.7–7)
NEUTROPHILS NFR BLD AUTO: 89.6 % (ref 42.7–76)
NRBC BLD AUTO-RTO: 0 /100 WBC (ref 0–0.2)
PHOSPHATE SERPL-MCNC: 2.4 MG/DL (ref 2.5–4.5)
PLATELET # BLD AUTO: 202 10*3/MM3 (ref 140–450)
PMV BLD AUTO: 9.6 FL (ref 6–12)
POTASSIUM SERPL-SCNC: 5 MMOL/L (ref 3.5–5.2)
RBC # BLD AUTO: 3.19 10*6/MM3 (ref 3.77–5.28)
SODIUM SERPL-SCNC: 134 MMOL/L (ref 136–145)
WBC NRBC COR # BLD AUTO: 8.19 10*3/MM3 (ref 3.4–10.8)

## 2025-02-02 PROCEDURE — 25010000002 ENOXAPARIN PER 10 MG: Performed by: SURGERY

## 2025-02-02 PROCEDURE — 80069 RENAL FUNCTION PANEL: CPT | Performed by: INTERNAL MEDICINE

## 2025-02-02 PROCEDURE — 82948 REAGENT STRIP/BLOOD GLUCOSE: CPT

## 2025-02-02 PROCEDURE — 63710000001 PREDNISONE PER 5 MG: Performed by: SURGERY

## 2025-02-02 PROCEDURE — 63710000001 INSULIN REGULAR HUMAN PER 5 UNITS: Performed by: SURGERY

## 2025-02-02 PROCEDURE — 94799 UNLISTED PULMONARY SVC/PX: CPT

## 2025-02-02 PROCEDURE — 85025 COMPLETE CBC W/AUTO DIFF WBC: CPT | Performed by: INTERNAL MEDICINE

## 2025-02-02 PROCEDURE — 99024 POSTOP FOLLOW-UP VISIT: CPT | Performed by: SURGERY

## 2025-02-02 PROCEDURE — 94664 DEMO&/EVAL PT USE INHALER: CPT

## 2025-02-02 RX ORDER — DOXYCYCLINE 100 MG/1
100 CAPSULE ORAL EVERY 12 HOURS SCHEDULED
Status: DISCONTINUED | OUTPATIENT
Start: 2025-02-02 | End: 2025-02-02 | Stop reason: HOSPADM

## 2025-02-02 RX ORDER — DOXYCYCLINE 100 MG/1
100 CAPSULE ORAL EVERY 12 HOURS SCHEDULED
Qty: 6 CAPSULE | Refills: 0 | OUTPATIENT
Start: 2025-02-02 | End: 2025-02-05

## 2025-02-02 RX ORDER — DOXYCYCLINE 100 MG/1
100 CAPSULE ORAL EVERY 12 HOURS SCHEDULED
Qty: 6 CAPSULE | Refills: 0 | Status: SHIPPED | OUTPATIENT
Start: 2025-02-02 | End: 2025-02-05

## 2025-02-02 RX ORDER — TRAMADOL HYDROCHLORIDE 50 MG/1
50 TABLET ORAL EVERY 6 HOURS PRN
Qty: 10 TABLET | Refills: 0 | Status: SHIPPED | OUTPATIENT
Start: 2025-02-02

## 2025-02-02 RX ORDER — HYDRALAZINE HYDROCHLORIDE 25 MG/1
25 TABLET, FILM COATED ORAL 3 TIMES DAILY PRN
Qty: 120 TABLET | Refills: 11 | OUTPATIENT
Start: 2025-02-02

## 2025-02-02 RX ORDER — TRAMADOL HYDROCHLORIDE 50 MG/1
50 TABLET ORAL EVERY 6 HOURS PRN
Qty: 10 TABLET | Refills: 0 | OUTPATIENT
Start: 2025-02-02

## 2025-02-02 RX ADMIN — BUDESONIDE AND FORMOTEROL FUMARATE DIHYDRATE 2 PUFF: 160; 4.5 AEROSOL RESPIRATORY (INHALATION) at 08:11

## 2025-02-02 RX ADMIN — INSULIN HUMAN 3 UNITS: 100 INJECTION, SOLUTION PARENTERAL at 08:23

## 2025-02-02 RX ADMIN — MUPIROCIN 1 APPLICATION: 20 OINTMENT TOPICAL at 08:23

## 2025-02-02 RX ADMIN — PANTOPRAZOLE SODIUM 40 MG: 40 TABLET, DELAYED RELEASE ORAL at 06:14

## 2025-02-02 RX ADMIN — INSULIN HUMAN 3 UNITS: 100 INJECTION, SOLUTION PARENTERAL at 12:08

## 2025-02-02 RX ADMIN — PREDNISONE 5 MG: 5 TABLET ORAL at 17:43

## 2025-02-02 RX ADMIN — ENOXAPARIN SODIUM 40 MG: 100 INJECTION SUBCUTANEOUS at 12:08

## 2025-02-02 RX ADMIN — TORSEMIDE 10 MG: 20 TABLET ORAL at 08:21

## 2025-02-02 RX ADMIN — INSULIN HUMAN 3 UNITS: 100 INJECTION, SOLUTION PARENTERAL at 17:43

## 2025-02-02 RX ADMIN — AMLODIPINE BESYLATE 10 MG: 10 TABLET ORAL at 08:21

## 2025-02-02 NOTE — PROGRESS NOTES
"General Surgery  Progress Note    CC: Follow-up ascending colon mass    POD#4 laparoscopic robotic assisted right hemicolectomy    S: She still requiring intermittent nasal cannula oxygen, and she requires this at home as needed for shortness of breath.  She had another bronc last night and is tolerating a regular diet.  She denies any abdominal pain.    O:/67 (BP Location: Right arm, Patient Position: Lying)   Pulse 105   Temp 98.8 °F (37.1 °C) (Oral)   Resp 18   Ht 152.4 cm (60\")   Wt 84.2 kg (185 lb 10 oz)   LMP  (LMP Unknown)   SpO2 99%   BMI 36.25 kg/m²     Intake & Output:  UOP: 2600 mL/24 hrs  BM x 1    GENERAL: alert, well appearing, and in no distress  HEENT: normocephalic, atraumatic, moist mucous membranes, clear sclerae   CHEST: clear to auscultation, no wheezes, rales or rhonchi, symmetric air entry  CARDIAC: regular rate and rhythm    ABDOMEN: Soft, nondistended, incisions clean/dry/intact with glue, nontender  EXTREMITIES: no cyanosis, clubbing, or edema   SKIN: Warm and moist, no rashes    LABS  Results from last 7 days   Lab Units 02/02/25  0806 02/01/25  0604 01/31/25  0816   WBC 10*3/mm3 8.19 7.29 10.42   HEMOGLOBIN g/dL 9.9* 9.6* 10.4*   HEMATOCRIT % 30.3* 29.6* 31.7*   PLATELETS 10*3/mm3 202 198 217     Results from last 7 days   Lab Units 02/02/25  0806 02/01/25  0604 01/31/25  0816   SODIUM mmol/L 134* 135* 136   POTASSIUM mmol/L 5.0 4.6 4.7  4.7   CHLORIDE mmol/L 98 102 101   CO2 mmol/L 29.3* 28.0 28.0   BUN mg/dL 23 22 20   CREATININE mg/dL 1.25* 1.42* 1.42*   CALCIUM mg/dL 8.1* 8.0* 8.3*   GLUCOSE mg/dL 189* 113* 113*         A/P: 79 y.o. female POD#4 laparoscopic robotic assisted right colectomy    Discharge home today if okay with nephrology and pulmonology.  I will wait for their recommendations.    Zarina Martinez MD  General, Robotic, and Endoscopic Surgery  [facility]    [address], [address]  [place] [address]  P: [phone]  F: " [phone]

## 2025-02-02 NOTE — PLAN OF CARE
Goal Outcome Evaluation:            Pt A&Ox4 on 2LNC that she will be using going home as well. Family has portable oxygen they will be providing and using. Pt d/c education completed at bedside.

## 2025-02-02 NOTE — PLAN OF CARE
Goal Outcome Evaluation:  Plan of Care Reviewed With: patient        Progress: no change  Outcome Evaluation:       A&O x4. VSS. SR on telemetry. 2L via n/c while awake. Bipap while sleeping.     Incontinent care provided.   BM x1.       Accuchecks.           Will continue to monitor.

## 2025-02-02 NOTE — PROGRESS NOTES
PROGRESS NOTE  Patient Name: Blanca Harden  Age/Sex: 79 y.o. female  : 1945  MRN: [id number]    Date of Admission: 2025  Date of Encounter Visit: 25   LOS: 4 days   Patient Care Team:  Praneeth Valenzuela MD as PCP - General (Internal Medicine)  Jean-Claude Farnsworth MD as Consulting Physician (Hematology and Oncology)  Praneeth Valenzuela MD as Referring Physician (Internal Medicine)  Tahmina Brown MD as Consulting Physician (Rheumatology)  Gretchen Mcginnis MD as Consulting Physician (Nephrology)  Haile Handley MD as Consulting Physician (Gastroenterology)  Santosh Garcia MD as Consulting Physician (Hematology and Oncology)  Dmitry Lang MD as Consulting Physician (Pulmonary Disease)  Kalin Elliott MD as Consulting Physician (Cardiology)    Chief Complaint: Acute hypercapnic respiratory failure    Hospital course: Patient presented post right hemicolectomy with acute hypercapnic respiratory failure requiring BiPAP.  He was weaned off the BiPAP and has been doing well on the nasal cannula oxygen  He is on Rocephin for her right upper lobe pneumonia and need to have a follow-up chest x-ray in a week and eventually a follow-up CAT scan in 4 to 6 weeks.  Patient has obstructive sleep apnea and he has CPAP at home that he is not compliant with but was strongly encouraged to go back on it regularly  She is still on the oxygen at 2 L/min which need to be continued on discharge         REVIEW OF SYSTEMS:   CONSTITUTIONAL: no fever or chills  CARDIOVASCULAR: No chest pain, chest pressure or chest discomfort. No palpitations or edema.   RESPIRATORY: On oxygen with hypoxemic desaturation in the 80s on room air at rest.  GASTROINTESTINAL: Improving abdominal pain and discomfort  HEMATOLOGIC: No bleeding or bruising.     Ventilator/Non-Invasive Ventilation Settings (From admission, onward)       Start     Ordered    25 1567  NIPPV (CPAP or BIPAP)  Until Discontinued       "  Question Answer Comment   Indication Acute Respiratory Failure    Type AVAPS/PC/PS    Backup Rate 20    Target VT (mL) 450    EPAP/PEEP (cm H2O) 5    Min Pressure (cm H2O) 12    Max Pressure (cm H2O) 24    Titrate Oxygen for SpO2 90 - 95%        01/29/25 2319 01/29/25 2314  NIPPV (CPAP or BIPAP)  Until Discontinued,   Status:  Canceled        Question Answer Comment   Indication Acute Respiratory Failure    Type AVAPS/PC/PS    Backup Rate 16    Target VT (mL) 450    EPAP/PEEP (cm H2O) 5    Min Pressure (cm H2O) 8    Max Pressure (cm H2O) 24    Titrate Oxygen for SpO2 90 - 95%        01/29/25 2313                      Vital Signs  Temp:  [98.1 °F (36.7 °C)-99 °F (37.2 °C)] 99 °F (37.2 °C)  Heart Rate:  [] 104  Resp:  [16-18] 18  BP: (131-163)/(53-67) 131/56  SpO2:  [94 %-100 %] 94 %  on  Flow (L/min) (Oxygen Therapy):  [2] 2 Device (Oxygen Therapy): room air    Intake/Output Summary (Last 24 hours) at 2/2/2025 1549  Last data filed at 2/2/2025 1300  Gross per 24 hour   Intake 720 ml   Output 2600 ml   Net -1880 ml     Flowsheet Rows      Flowsheet Row First Filed Value   Admission Height 152.4 cm (60\") Documented at 01/29/2025 0742   Admission Weight 83.5 kg (184 lb 1.4 oz) Documented at 01/29/2025 0742          Body mass index is 36.25 kg/m².      01/29/25  0742 01/30/25  0519 01/31/25 2013   Weight: 83.5 kg (184 lb 1.4 oz) 83.5 kg (184 lb 1.4 oz) 84.2 kg (185 lb 10 oz)       Physical Exam:  GEN:  No acute distress, alert, cooperative, well developed   EYES:   Sclerae clear. No icterus. PERRL. Normal EOM  ENT:   External ears/nose normal, no oral lesions, no thrush, mucous membranes moist  NECK:  Supple, midline trachea, no JVD  LUNGS: Normal chest on inspection, clear to quotation anteriorly, diminished over the bases posteriorly but no wheezes or crackles. Respirations regular, even and unlabored.   CV:  Regular rhythm and rate. Normal S1/S2. No murmurs, gallops, or rubs noted.  ABD:  Soft, nontender " "and nondistended. Normal bowel sounds. No guarding  EXT:  Moves all extremities well. No cyanosis. No redness.  Minimal edema.   Skin: Dry, intact, no bleeding    Results Review:    Results From Last 14 Days   Lab Units 01/30/25  0135   LACTATE mmol/L 0.7     Results from last 7 days   Lab Units 02/02/25  0806 02/01/25  0604 01/31/25  0816 01/30/25  1958 01/30/25  1703 01/30/25  1231 01/30/25  0651 01/30/25  0135   SODIUM mmol/L 134* 135* 136  --   --   --  135* 130*   POTASSIUM mmol/L 5.0 4.6 4.7  4.7 4.8 4.7 5.2 6.0* 6.7*   CHLORIDE mmol/L 98 102 101  --   --   --  104 98   CO2 mmol/L 29.3* 28.0 28.0  --   --   --  25.4 21.1*   BUN mg/dL 23 22 20  --   --   --  22 21   CREATININE mg/dL 1.25* 1.42* 1.42*  --   --   --  1.36* 1.24*   CALCIUM mg/dL 8.1* 8.0* 8.3*  --   --   --  8.6 8.2*   ANION GAP mmol/L 6.7 5.0 7.0  --   --   --  5.6 10.9   ALBUMIN g/dL 2.7* 2.7* 2.9*  --   --   --   --   --                  Results from last 7 days   Lab Units 02/02/25  0806 02/01/25  0604 01/31/25  0816 01/30/25  0135 01/30/25  0013   WBC 10*3/mm3 8.19 7.29 10.42 10.74 10.75   HEMOGLOBIN g/dL 9.9* 9.6* 10.4* 10.7* 11.3*   HEMATOCRIT % 30.3* 29.6* 31.7* 34.2 35.6   PLATELETS 10*3/mm3 202 198 217 164 169   MCV fL 95.0 95.2 94.9 97.2* 96.2   NEUTROPHIL % % 89.6* 82.8* 84.2* 94.2*  --    LYMPHOCYTE % % 3.8* 7.5* 6.2* 2.2*  --    MONOCYTES % % 5.1 7.7 8.0 3.0*  --    EOSINOPHIL % % 0.6 1.1 0.4 0.0*  --    BASOPHIL % % 0.2 0.4 0.3 0.1  --    IMM GRAN % % 0.7* 0.5 0.9* 0.5  --                    Invalid input(s): \"LDLCALC\"  Results from last 7 days   Lab Units 01/31/25  1027 01/30/25  0142 01/29/25  2254   PH, ARTERIAL pH units 7.408 7.268* 7.176*   PCO2, ARTERIAL mm Hg 47.7* 54.2* 70.6*   PO2 ART mm Hg 31.5* 145.9* 93.4   HCO3 ART mmol/L 30.1* 24.8 26.1         Glucose   Date/Time Value Ref Range Status   02/02/2025 1109 172 (H) 70 - 130 mg/dL Final   02/02/2025 0551 179 (H) 70 - 130 mg/dL Final   02/01/2025 2041 163 (H) 70 - 130 " mg/dL Final   02/01/2025 1634 175 (H) 70 - 130 mg/dL Final   02/01/2025 1126 133 (H) 70 - 130 mg/dL Final   02/01/2025 0552 112 70 - 130 mg/dL Final   01/31/2025 2022 176 (H) 70 - 130 mg/dL Final   01/31/2025 1817 109 70 - 130 mg/dL Final     Results from last 7 days   Lab Units 01/30/25  0135   LACTATE mmol/L 0.7     Results from last 7 days   Lab Units 01/30/25  2327 01/30/25  0134 01/30/25  0120   BLOODCX   --  No growth at 3 days No growth at 3 days   STREP PNEUMO AG  Negative  --   --    L. PNEUMOPHILA SEROGP 1 UR AG  Negative  --   --                        Imaging:   Imaging Results (All)               I reviewed the patient's new clinical results.  I personally viewed and interpreted the patient's imaging results:        Medication Review:   amLODIPine, 10 mg, Oral, Q24H  bisoprolol, 20 mg, Oral, Nightly  budesonide-formoterol, 2 puff, Inhalation, BID  cefTRIAXone, 2,000 mg, Intravenous, Q24H  enoxaparin, 40 mg, Subcutaneous, Daily  insulin regular, 3-14 Units, Subcutaneous, 4x Daily AC & at Bedtime  mupirocin, 1 Application, Each Nare, BID  pantoprazole, 40 mg, Oral, Q AM  predniSONE, 5 mg, Oral, Q PM  [Held by provider] pregabalin, 75 mg, Oral, Daily  torsemide, 10 mg, Oral, Daily             ASSESSMENT:   Acute hypercapnic respiratory failure  Atelectasis, right upper lobe pneumonia  Trace pleural effusion  ANAYELI on CPAP  Asthma  Colonic mass status postresection  Morbid obesity  CKD  Iron deficiency anemia    PLAN:  Rocephin day #4  Will transition to p.o. doxycycline so that she can be discharged home today  Continue to work on the deep breathing exercise to reverse atelectasis  Resume home CPAP after discharge we will work with the patient on that as an outpatient  Follow-up in 1 week with a chest x-ray and decide on the follow-up CT scan in 4 to 6 weeks accordingly  Will arrange for the home oxygen and will assess for the oxygen requirements on her follow-up visit    Discussed with the patient and  family, updated the surgical team     Labs/Notes/films were independently reviewed and pertinent results are summarized above  The copied texts in this note were reviewed and they are accurate as of 02/02/25    Disposition: Per primary team    Belem Thomas MD  02/02/25  15:49 EST             Dictated utilizing Dragon dictation

## 2025-02-02 NOTE — PROGRESS NOTES
: the pt. Feeling better , denies complaints     Vital Signs  Vitals:    02/02/25 0811   BP:    Pulse: 105   Resp: 18   Temp:    SpO2: 99%     I/O this shift:  In: 240 [P.O.:240]  Out: -   I/O last 3 completed shifts:  In: 660 [P.O.:660]  Out: 2900 [Urine:2900]      Physical Exam:    General: no distress  HEENT:  Normocephalic, Atraumatic, EOMI, PERRLA, NO icterus,  Neck:  Supple, No JVD, No Carotid artery bruit, No lymphadenopathy  Chest: Clear to auscultation bilaterally,   Cardiovascular: Regular rate and rhythm. Positive S1 & S2, No rub, murmur or gallop.  Abdominal: Soft, nontender, no palpable organomegaly, no abdominal bruit, positive bowel sounds  Musculoskeletal: No joint tenderness or swelling, good range of motion, Adequate muscle strength on both sides, no cyanosis, clubbing or edema on lower extremities.  Neuro: Alert oriented x3, CN1 - 12 intact, No focal sensory or motor deficit.      Review of Systems:   CNS: Denied headaches, blurred vision, tingling or numbness in any part of body. No weakness or any impaired speech.  CVS: No chest pain or shortness of breath, No orthopnea or PND or exertional dyspnea,  Pulmonary: Denies shortness of breath, coughs, hematemesis, night sweats or weight loss.  GI: Denies diarrhea, nausea, vomiting. Denies weight loss, hematemesis, melena.  : Denies dysuria, frequency or hesitancy of urination  Vascular: Denies claudication, resting pain, tingling numbness or weakness in any part of the body.  Musculoskeletal: Denies Joint tenderness or pain, denies stiffness in joints, denies fever or weight loss, or skin rashes.    Current Labs  [unfilled]  Lab Results (last 24 hours)       Procedure Component Value Units Date/Time    POC Glucose Once [[id number]]  (Abnormal) Collected: 02/02/25 1109    Specimen: Blood Updated: 02/02/25 1112     Glucose 172 mg/dL     Renal Function Panel [[id number]]  (Abnormal) Collected: 02/02/25 0806    Specimen: Blood Updated: 02/02/25 0848      Glucose 189 mg/dL      BUN 23 mg/dL      Creatinine 1.25 mg/dL      Sodium 134 mmol/L      Potassium 5.0 mmol/L      Chloride 98 mmol/L      CO2 29.3 mmol/L      Calcium 8.1 mg/dL      Albumin 2.7 g/dL      Phosphorus 2.4 mg/dL      Anion Gap 6.7 mmol/L      BUN/Creatinine Ratio 18.4     eGFR 44.2 mL/min/1.73     Narrative:      GFR Categories in Chronic Kidney Disease (CKD)      GFR Category          GFR (mL/min/1.73)    Interpretation  G1                     90 or greater         Normal or high (1)  G2                      60-89                Mild decrease (1)  G3a                   45-59                Mild to moderate decrease  G3b                   30-44                Moderate to severe decrease  G4                    15-29                Severe decrease  G5                    14 or less           Kidney failure          (1)In the absence of evidence of kidney disease, neither GFR category G1 or G2 fulfill the criteria for CKD.    eGFR calculation 2021 CKD-EPI creatinine equation, which does not include race as a factor    CBC & Differential [[id number]]  (Abnormal) Collected: 02/02/25 0806    Specimen: Blood Updated: 02/02/25 0830    Narrative:      The following orders were created for panel order CBC & Differential.  Procedure                               Abnormality         Status                     ---------                               -----------         ------                     CBC Auto Differential[[id number]]        Abnormal            Final result                 Please view results for these tests on the individual orders.    CBC Auto Differential [[id number]]  (Abnormal) Collected: 02/02/25 0806    Specimen: Blood Updated: 02/02/25 0830     WBC 8.19 10*3/mm3      RBC 3.19 10*6/mm3      Hemoglobin 9.9 g/dL      Hematocrit 30.3 %      MCV 95.0 fL      MCH 31.0 pg      MCHC 32.7 g/dL      RDW 11.9 %      RDW-SD 40.5 fl      MPV 9.6 fL      Platelets 202 10*3/mm3      Neutrophil % 89.6 %       Lymphocyte % 3.8 %      Monocyte % 5.1 %      Eosinophil % 0.6 %      Basophil % 0.2 %      Immature Grans % 0.7 %      Neutrophils, Absolute 7.33 10*3/mm3      Lymphocytes, Absolute 0.31 10*3/mm3      Monocytes, Absolute 0.42 10*3/mm3      Eosinophils, Absolute 0.05 10*3/mm3      Basophils, Absolute 0.02 10*3/mm3      Immature Grans, Absolute 0.06 10*3/mm3      nRBC 0.0 /100 WBC     POC Glucose Once [[id number]]  (Abnormal) Collected: 02/02/25 0551    Specimen: Blood Updated: 02/02/25 0555     Glucose 179 mg/dL     Blood Culture - Blood, Arm, Right [[id number]]  (Normal) Collected: 01/30/25 0120    Specimen: Blood from Arm, Right Updated: 02/02/25 0215     Blood Culture No growth at 3 days    Blood Culture - Blood, Arm, Right [[id number]]  (Normal) Collected: 01/30/25 0134    Specimen: Blood from Arm, Right Updated: 02/02/25 0215     Blood Culture No growth at 3 days    POC Glucose Once [[id number]]  (Abnormal) Collected: 02/01/25 2041    Specimen: Blood Updated: 02/01/25 2043     Glucose 163 mg/dL     POC Glucose Once [[id number]]  (Abnormal) Collected: 02/01/25 1634    Specimen: Blood Updated: 02/01/25 1635     Glucose 175 mg/dL           Current Radiology  Imaging Results (Last 24 Hours)       ** No results found for the last 24 hours. **          Current Meds    Current Facility-Administered Medications:     acetaminophen (TYLENOL) tablet 650 mg, 650 mg, Oral, Q6H PRN, Ashwin Alvarado MD, 650 mg at 02/01/25 2231    amLODIPine (NORVASC) tablet 10 mg, 10 mg, Oral, Q24H, Ant Naranjo MD, 10 mg at 02/02/25 0821    bisoprolol (ZEBeta) tablet 20 mg, 20 mg, Oral, Nightly, Ashwin Alvarado MD, 20 mg at 02/01/25 2230    budesonide-formoterol (SYMBICORT) 160-4.5 MCG/ACT inhaler 2 puff, 2 puff, Inhalation, BID, Ashwin Alvarado MD, 2 puff at 02/02/25 0811    cefTRIAXone (ROCEPHIN) 2,000 mg in sodium chloride 0.9 % 100 mL MBP, 2,000 mg, Intravenous, Q24H, Nela Pennington, APRN, Last Rate: 200 mL/hr  at 02/01/25 1743, 2,000 mg at 02/01/25 1743    dextrose (D50W) (25 g/50 mL) IV injection 25 g, 25 g, Intravenous, Q15 Min PRN, Ashwin Alvarado MD    dextrose (GLUTOSE) oral gel 15 g, 15 g, Oral, Q15 Min PRN, Ashwin Alvarado MD    Enoxaparin Sodium (LOVENOX) syringe 40 mg, 40 mg, Subcutaneous, Daily, Ashwin Alvarado MD, 40 mg at 02/02/25 1208    glucagon (GLUCAGEN) injection 1 mg, 1 mg, Intramuscular, Q15 Min PRN, Ashwin Alvarado MD    hydrALAZINE (APRESOLINE) injection 10 mg, 10 mg, Intravenous, Q4H PRN, Landen Dexter DO, 10 mg at 02/01/25 0839    insulin regular (humuLIN R,novoLIN R) injection 3-14 Units, 3-14 Units, Subcutaneous, 4x Daily AC & at Bedtime, Ashwin Alvarado MD, 3 Units at 02/02/25 1208    mupirocin (BACTROBAN) 2 % nasal ointment 1 Application, 1 Application, Each Nare, BID, Landen Dexter DO, 1 Application at 02/02/25 0823    nitroglycerin (NITROSTAT) SL tablet 0.4 mg, 0.4 mg, Sublingual, Q5 Min PRN, Nela Pennington, RAVEN    nitroglycerin (NITROSTAT) SL tablet 0.4 mg, 0.4 mg, Sublingual, Q5 Min PRN, Santosh Dhillon MD    O2 (OXYGEN), 3 L/min, Inhalation, PRN, Ashwin Alvarado MD    ondansetron (ZOFRAN) injection 4 mg, 4 mg, Intravenous, Q6H PRN, Ashwin Alvarado MD, 4 mg at 01/29/25 1826    pantoprazole (PROTONIX) EC tablet 40 mg, 40 mg, Oral, Q AM, Ashwin Alvarado MD, 40 mg at 02/02/25 0614    predniSONE (DELTASONE) tablet 5 mg, 5 mg, Oral, Q PM, Ashwin Alvarado MD, 5 mg at 02/01/25 1743    [Held by provider] pregabalin (LYRICA) capsule 75 mg, 75 mg, Oral, Daily, Ashwin Alvarado MD, 75 mg at 01/29/25 1901    torsemide (DEMADEX) tablet 10 mg, 10 mg, Oral, Daily, Santosh hDillon MD, 10 mg at 02/02/25 0821    traMADol (ULTRAM) tablet 50 mg, 50 mg, Oral, Q6H PRN, Ashwin Alvarado MD    Assessment and plan;            * No active hospital problems. *  CKD-IIIA ,  CREATININE BETTER THEN BASE LINE , KEEP OF LOSARTAN , USE HYDRALAZINE   HYPERKALEMIA , KEEP ON LOW K DIET         Ant Naranjo MD [facility].  02/02/25  13:42 EST

## 2025-02-03 NOTE — OUTREACH NOTE
Prep Survey      Flowsheet Row Responses   [facility] facility patient discharged from? [place]   Is LACE score < 7 ? No   Eligibility Readm Mgmt   Discharge diagnosis Polyp of colon, COLON RESECTION LAPAROSCOPIC RIGHT   Does the patient have one of the following disease processes/diagnoses(primary or secondary)? General Surgery   Does the patient have Home health ordered? No   Is there a DME ordered? No   Prep survey completed? Yes            Yanelis SIDHU - Registered Nurse

## 2025-02-03 NOTE — DISCHARGE SUMMARY
Admission date: 1/29/2025   Discharge date: 2/2/2025  7:39 PM     Admission diagnosis: Colonic mass [K63.89]     Discharge diagnosis: Same     Procedure: Robotic assisted laparoscopic right hemicolectomy    Hospital course: Patient was admitted to the hospital after undergoing robotic assisted laparoscopic right hemicolectomy.  She was hypercapnic and lethargic early postoperative.  She was transferred to intensive care unit.  She was started on CPAP.  She did fine.  She is not having bowel function postoperative day 1.  She tolerated clear liquid diet on postoperative day 1.  Diet was slowly advanced.  Appiah catheter was removed on postoperative day 1.  She started feeling better.  Pain was always well-controlled.  Her respiratory status slowly improved.  She was able to get out of bed and ambulate.  Diet was slowly advanced and she tolerated without any problem.  Decision was to discharge the patient home in stable condition on postoperative day 4    Pathology report:   Final Diagnosis   1.  Terminal ileum, right ascending colon and appendix, right hemicolectomy: Small bowel, colon and appendix with               -A.  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  Adenoma comes within 7 cm of the proximal margin and 11 cm of the distal margin               -B.  18 benign lymph nodes               -C.  Benign appendix     Comment: On the original biopsy case [id number] high-grade dysplasia was identified within the polyp.  MSI is not performed on the biopsy for this case as no invasion is present.        Meds:      Your medication list        START taking these medications        Instructions Last Dose Given Next Dose Due   doxycycline 100 MG capsule  Commonly known as: MONODOX      Take 1 capsule by mouth Every 12 (Twelve) Hours for 6 doses. Indications: Pneumonia       traMADol 50 MG tablet  Commonly known as: ULTRAM      Donna 1  tableta oral cada 6 horas emmanuel sea necesario para dolor moderado  (Take 1 tablet by mouth Every 6 (Six) Hours As Needed for Moderate Pain.)              CHANGE how you take these medications        Instructions Last Dose Given Next Dose Due   omeprazole 20 MG capsule  Commonly known as: priLOSEC  What changed:   when to take this  reasons to take this      [place] 2 capsulas por via oral diariamente  (Take 2 capsules by mouth Daily.)              CONTINUE taking these medications        Instructions Last Dose Given Next Dose Due   acetaminophen 325 MG tablet  Commonly known as: TYLENOL      Donna 2 tabletas oral cada 6 horas emmanuel sea necesario para dolor leve  (Take 2 tablets by mouth Every 6 (Six) Hours As Needed for Mild Pain.)       albuterol 1.25 MG/3ML nebulizer solution  Commonly known as: ACCUNEB      Take  by nebulization Every 4 (Four) Hours As Needed for Wheezing or Shortness of Air.       BD Pen Needle Tila 2nd Gen 32G X 4 MM misc  Generic drug: Insulin Pen Needle      USE TO GIVE INSULIN 4 TIMES DAILY       bisoprolol 10 MG tablet  Commonly known as: ZEBeta      [place] 2 tabletas por via oral cada noche  (Take 2 tablets by mouth Every Night.)       HumaLOG KwikPen 100 UNIT/ML solution pen-injector  Generic drug: Insulin Lispro (1 Unit Dial)      Inject  under the skin into the appropriate area as directed 3 (Three) Times a Day With Meals. SLIDING SCALE 18-24 UNITS       O2  Commonly known as: OXYGEN      Inhale 3 L/min As Needed.       predniSONE 5 MG tablet  Commonly known as: DELTASONE      [place] 1 tableta por via oral cada noche  (Take 1 tablet by mouth Every Evening.)       Symbicort 160-4.5 MCG/ACT inhaler  Generic drug: budesonide-formoterol      Inhalar 2 bocanadas 2 (dos) veces al inna.  (Inhale 2 puffs 2 (Two) Times a Day.)       torsemide 20 MG tablet  Commonly known as: DEMADEX      [place] 1 tableta por via oral diariamente.  (Take 1 tablet by mouth Daily.)       Toujeo SoloStar 300 UNIT/ML solution  pen-injector injection  Generic drug: Insulin Glargine (1 Unit Dial)      Inject 25 Units under the skin into the appropriate area as directed Every Night.              STOP taking these medications      Chlorhexidine Gluconate 4 % solution        erythromycin base 500 MG tablet  Commonly known as: E-MYCIN        neomycin 500 MG tablet  Commonly known as: MYCIFRADIN        valsartan-hydrochlorothiazide 320-25 MG per tablet  Commonly known as: DIOVAN-HCT                  Where to Get Your Medications        These medications were sent to [address] - [address] [facility] - [phone]  - [phone]   [address] [facility] [address]      Phone: [phone]   doxycycline 100 MG capsule  traMADol 50 MG tablet         Instructions:    - No heavy lifting of more than 15 lb for 6 weeks. Can start doing aerobic type exercise in 4 weeks.   - No driving while taking pain medications  - Take medications as prescribed.   - Can shower, no bath tub    Follow up: Dr. Alvarado in 2 weeks, call for appointment      Ashwin Alvarado MD  General, Minimally Invasive and Endoscopic Surgery  [facility]

## 2025-02-04 LAB
BACTERIA SPEC AEROBE CULT: NORMAL
BACTERIA SPEC AEROBE CULT: NORMAL

## 2025-02-06 ENCOUNTER — READMISSION MANAGEMENT (OUTPATIENT)
Dept: CALL CENTER | Facility: HOSPITAL | Age: 80
End: 2025-02-06
Payer: MEDICARE

## 2025-02-06 NOTE — OUTREACH NOTE
General Surgery Week 1 Survey      Flowsheet Row Responses   [facility] patient discharged from? [place]   Does the patient have one of the following disease processes/diagnoses(primary or secondary)? General Surgery   Week 1 attempt successful? Yes   Call start time 1514   Call end time 1516   Discharge diagnosis Polyp of colon, COLON RESECTION LAPAROSCOPIC RIGHT   Person spoke with today (if not patient) and relationship Patient   Meds reviewed with patient/caregiver? Yes   Does the patient have all medications related to this admission filled (includes all antibiotics, pain medications, etc.) Yes   Is the patient taking all medications as directed (includes completed medication regime)? Yes   Does the patient have a follow up appointment scheduled with their surgeon? Yes   Has the patient kept scheduled appointments due by today? N/A   Has home health visited the patient within 72 hours of discharge? N/A   Psychosocial issues? No   Did the patient receive a copy of their discharge instructions? Yes   Nursing interventions Reviewed instructions with patient   What is the patient's perception of their health status since discharge? Improving   Is the patient/caregiver able to teach back signs and symptoms of incisional infection? Increased redness, swelling or pain at the incisonal site, Increased drainage or bleeding   Is the patient/caregiver able to teach back steps to recovery at home? Set small, achievable goals for return to baseline health, Eat a well-balance diet   If the patient is a current smoker, are they able to teach back resources for cessation? Not a smoker   Is the patient/caregiver able to teach back the hierarchy of who to call/visit for symptoms/problems? PCP, Specialist, Home health nurse, Urgent Care, ED, 911 Yes   Week 1 call completed? Yes   Is the patient interested in additional calls from an ambulatory ? No   Would this patient benefit from a Referral to [facility]  Work? No   Call end time 1511            BLAKE SPENCE - Registered Nurse

## 2025-02-06 NOTE — PROGRESS NOTES
"[facility] GROUP OUTPATIENT FOLLOW UP CLINIC VISIT    REASON FOR FOLLOW-UP:    DCIS  Iron deficiency  Colon mass    HISTORY OF PRESENT ILLNESS:  Blanca Harden is a 79 y.o. female who returns today for follow up of the above issue.      She is followed for DCIS, intermittent iron deficiency.  Most recently, she has been regularly following up with Dr. Alvarado.  She underwent endoscopy and colonoscopy 7/23/2024.She was found to have adenomatous polyp in the duodenum that was completely removed was found to have low-grade dysplasia. She was found to have a large mass in the right colon at the proximal ascending area that was consistent with tubulovillous adenoma with high-grade dysplasia. She underwent CT scan of the abdomen and pelvis as well as chest x-ray. She was found to have mass in the proximal ascending colon. Otherwise there was no evidence of metastatic disease.      Right hemicolectomy, appendectomy, resection of terminal ileum with tubulovillous adenoma measuring up to 2.5 cm within the cecum with very focal high-grade dysplasia, 18 benign lymph nodes.    She is healing quite nicely from surgery.  She states that she is eating well.  She denies much pain.  Energy level is improving.    REVIEW OF SYSTEMS:  ROS as per HPI    PHYSICAL EXAMINATION:    Vitals:    02/10/25 1556   BP: (!) 183/84   Pulse: 88   Resp: 16   Temp: 97.9 °F (36.6 °C)   TempSrc: Oral   SpO2: 100%   Weight: 83.3 kg (183 lb 11.2 oz)   Height: 152.4 cm (60\")   PainSc: 0-No pain       Physical Exam:  General:  No acute distress, awake, alert and oriented  Skin:  Warm and dry, no visible rash  HEENT:  Normocephalic/atraumatic.  Wearing a face mask.  Chest:  Normal respiratory effort  Extremities:  No visible clubbing, cyanosis, or edema  Neuro/psych:  Grossly nonfocal.  Normal mood and affect.    DIAGNOSTIC DATA:  Tissue Pathology Exam (01/29/2025 13:42)     Retic With IRF & RET-He (02/10/2025 15:42)  CBC & Differential " (02/10/2025 15:42)    IMAGING:    Mammo Screening Modified With Tomosynthesis Right With CAD (01/22/2025 12:54)       ASSESSMENT:  This is a 79 y.o. female with:    *DCIS of the left breast  5/23/2019 left breast biopsy noted high-grade DCIS   ER/IL negative  7/3/2019 status post left mastectomy with Dr. Tahmina James.  Pathology noted a grade 2, single focus of microinvasion DCIS less than 1 mm extensive intraductal carcinoma.  DCIS greatest dimension 70 mm. 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  Biopsy consistent with tubulovillous adenoma with high-grade dysplasia this was not able to be resected at the time of colonoscopy  10/1/2024 CT of the abdomen noting no evidence of intra-abdominal process  Robotic assisted laparoscopic right hemicolectomy with Dr. Alvarado 1/29/2025.  Pathology with tubulovillous adenoma measuring up to 2.5 cm with very focal high-grade dysplasia within the cecum, 18 benign lymph nodes, benign appendix.    *Multifactorial anemia  She has previously struggled with iron deficiency requiring IV iron replacement  She does report a history of hemolytic anemia  She also does have an element of chronic kidney disease  Today, 11/21/2024 hemoglobin is slightly declined at 11.2 without iron deficiency.  Ferritin 235,  iron saturation 20%, reticulated hemoglobin is normal.  MCV is noted to be elevated today at 104.3.  Additional labs including B12 and folate normal with a vitamin B12 level of 371  2/10/2025: Hemoglobin 10.5, .9, ferritin, folate, and iron panel pending today    *Chronic kidney disease, stage 3  Followed by Dr. Mcginnis  Baseline creatinine around 1.2  Today, 11/21/2024 creatinine 1.36.  The patient does follow with Dr. Mcginnis  If her hemoglobin declines in the future she may be a candidate for Procrit   CMP pending today      PLAN:  Follow-up pending CMP, iron studies, ferritin from today.  Assuming labs look okay I will see her back in 6 months for follow-up with labs  Annual mammogram will be due in November    Santosh Garcia MD  02/10/2025

## 2025-02-10 ENCOUNTER — LAB (OUTPATIENT)
Dept: LAB | Facility: HOSPITAL | Age: 80
End: 2025-02-10
Payer: MEDICARE

## 2025-02-10 ENCOUNTER — OFFICE VISIT (OUTPATIENT)
Dept: ONCOLOGY | Facility: CLINIC | Age: 80
End: 2025-02-10
Payer: MEDICARE

## 2025-02-10 VITALS
HEART RATE: 88 BPM | BODY MASS INDEX: 36.06 KG/M2 | SYSTOLIC BLOOD PRESSURE: 183 MMHG | OXYGEN SATURATION: 100 % | TEMPERATURE: 97.9 F | WEIGHT: 183.7 LBS | RESPIRATION RATE: 16 BRPM | DIASTOLIC BLOOD PRESSURE: 84 MMHG | HEIGHT: 60 IN

## 2025-02-10 DIAGNOSIS — D05.12 BREAST NEOPLASM, TIS (DCIS), LEFT: ICD-10-CM

## 2025-02-10 DIAGNOSIS — D50.8 OTHER IRON DEFICIENCY ANEMIA: Primary | ICD-10-CM

## 2025-02-10 DIAGNOSIS — D50.8 OTHER IRON DEFICIENCY ANEMIA: ICD-10-CM

## 2025-02-10 LAB
ALBUMIN SERPL-MCNC: 3.4 G/DL (ref 3.5–5.2)
ALBUMIN/GLOB SERPL: 1 G/DL
ALP SERPL-CCNC: 130 U/L (ref 39–117)
ALT SERPL W P-5'-P-CCNC: 14 U/L (ref 1–33)
ANION GAP SERPL CALCULATED.3IONS-SCNC: 9.8 MMOL/L (ref 5–15)
AST SERPL-CCNC: 19 U/L (ref 1–32)
BASOPHILS # BLD AUTO: 0.08 10*3/MM3 (ref 0–0.2)
BASOPHILS NFR BLD AUTO: 0.6 % (ref 0–1.5)
BILIRUB SERPL-MCNC: 0.3 MG/DL (ref 0–1.2)
BUN SERPL-MCNC: 29 MG/DL (ref 8–23)
BUN/CREAT SERPL: 22.1 (ref 7–25)
CALCIUM SPEC-SCNC: 8.6 MG/DL (ref 8.6–10.5)
CHLORIDE SERPL-SCNC: 103 MMOL/L (ref 98–107)
CO2 SERPL-SCNC: 24.2 MMOL/L (ref 22–29)
CREAT SERPL-MCNC: 1.31 MG/DL (ref 0.57–1)
DEPRECATED RDW RBC AUTO: 47.2 FL (ref 37–54)
EGFRCR SERPLBLD CKD-EPI 2021: 41.8 ML/MIN/1.73
EOSINOPHIL # BLD AUTO: 0.18 10*3/MM3 (ref 0–0.4)
EOSINOPHIL NFR BLD AUTO: 1.3 % (ref 0.3–6.2)
ERYTHROCYTE [DISTWIDTH] IN BLOOD BY AUTOMATED COUNT: 12.7 % (ref 12.3–15.4)
FERRITIN SERPL-MCNC: 323 NG/ML (ref 13–150)
FOLATE SERPL-MCNC: 12.4 NG/ML (ref 4.78–24.2)
GLOBULIN UR ELPH-MCNC: 3.3 GM/DL
GLUCOSE SERPL-MCNC: 167 MG/DL (ref 65–99)
HCT VFR BLD AUTO: 34.9 % (ref 34–46.6)
HGB BLD-MCNC: 10.5 G/DL (ref 12–15.9)
HGB RETIC QN AUTO: 34.9 PG (ref 29.8–36.1)
IMM GRANULOCYTES # BLD AUTO: 0.2 10*3/MM3 (ref 0–0.05)
IMM GRANULOCYTES NFR BLD AUTO: 1.4 % (ref 0–0.5)
IMM RETICS NFR: 12.7 % (ref 3–15.8)
IRON 24H UR-MRATE: 43 MCG/DL (ref 37–145)
IRON SATN MFR SERPL: 16 % (ref 20–50)
LYMPHOCYTES # BLD AUTO: 0.88 10*3/MM3 (ref 0.7–3.1)
LYMPHOCYTES NFR BLD AUTO: 6.3 % (ref 19.6–45.3)
MCH RBC QN AUTO: 31 PG (ref 26.6–33)
MCHC RBC AUTO-ENTMCNC: 30.1 G/DL (ref 31.5–35.7)
MCV RBC AUTO: 102.9 FL (ref 79–97)
MONOCYTES # BLD AUTO: 0.98 10*3/MM3 (ref 0.1–0.9)
MONOCYTES NFR BLD AUTO: 7 % (ref 5–12)
NEUTROPHILS NFR BLD AUTO: 11.65 10*3/MM3 (ref 1.7–7)
NEUTROPHILS NFR BLD AUTO: 83.4 % (ref 42.7–76)
NRBC BLD AUTO-RTO: 0 /100 WBC (ref 0–0.2)
PLATELET # BLD AUTO: 290 10*3/MM3 (ref 140–450)
PMV BLD AUTO: 10 FL (ref 6–12)
POTASSIUM SERPL-SCNC: 4.3 MMOL/L (ref 3.5–5.2)
PROT SERPL-MCNC: 6.7 G/DL (ref 6–8.5)
RBC # BLD AUTO: 3.39 10*6/MM3 (ref 3.77–5.28)
RETICS # AUTO: 0.05 10*6/MM3 (ref 0.02–0.13)
RETICS/RBC NFR AUTO: 1.4 % (ref 0.7–1.9)
SODIUM SERPL-SCNC: 137 MMOL/L (ref 136–145)
TIBC SERPL-MCNC: 277 MCG/DL (ref 298–536)
TRANSFERRIN SERPL-MCNC: 186 MG/DL (ref 200–360)
WBC NRBC COR # BLD AUTO: 13.97 10*3/MM3 (ref 3.4–10.8)

## 2025-02-10 PROCEDURE — 82728 ASSAY OF FERRITIN: CPT | Performed by: INTERNAL MEDICINE

## 2025-02-10 PROCEDURE — 85046 RETICYTE/HGB CONCENTRATE: CPT

## 2025-02-10 PROCEDURE — 85025 COMPLETE CBC W/AUTO DIFF WBC: CPT

## 2025-02-10 PROCEDURE — 83540 ASSAY OF IRON: CPT | Performed by: INTERNAL MEDICINE

## 2025-02-10 PROCEDURE — 84466 ASSAY OF TRANSFERRIN: CPT | Performed by: INTERNAL MEDICINE

## 2025-02-10 PROCEDURE — 80053 COMPREHEN METABOLIC PANEL: CPT | Performed by: INTERNAL MEDICINE

## 2025-02-10 PROCEDURE — 36415 COLL VENOUS BLD VENIPUNCTURE: CPT

## 2025-02-10 PROCEDURE — 82746 ASSAY OF FOLIC ACID SERUM: CPT | Performed by: INTERNAL MEDICINE

## 2025-02-10 RX ORDER — LISINOPRIL 40 MG/1
40 TABLET ORAL DAILY
Status: ON HOLD | COMMUNITY
Start: 2025-02-07

## 2025-02-15 ENCOUNTER — APPOINTMENT (OUTPATIENT)
Dept: CT IMAGING | Facility: HOSPITAL | Age: 80
DRG: 065 | End: 2025-02-15
Payer: MEDICARE

## 2025-02-15 ENCOUNTER — HOSPITAL ENCOUNTER (INPATIENT)
Facility: HOSPITAL | Age: 80
LOS: 5 days | Discharge: NURSING, REHAB OR LONG-TERM CARE | DRG: 065 | End: 2025-02-21
Attending: EMERGENCY MEDICINE | Admitting: INTERNAL MEDICINE
Payer: MEDICARE

## 2025-02-15 ENCOUNTER — APPOINTMENT (OUTPATIENT)
Dept: GENERAL RADIOLOGY | Facility: HOSPITAL | Age: 80
DRG: 065 | End: 2025-02-15
Payer: MEDICARE

## 2025-02-15 ENCOUNTER — READMISSION MANAGEMENT (OUTPATIENT)
Dept: CALL CENTER | Facility: HOSPITAL | Age: 80
End: 2025-02-15
Payer: MEDICARE

## 2025-02-15 DIAGNOSIS — R00.2 PALPITATIONS: ICD-10-CM

## 2025-02-15 DIAGNOSIS — N18.9 CHRONIC RENAL IMPAIRMENT, UNSPECIFIED CKD STAGE: ICD-10-CM

## 2025-02-15 DIAGNOSIS — R47.9 DIFFICULTY WITH SPEECH: Primary | ICD-10-CM

## 2025-02-15 DIAGNOSIS — R42 DIZZINESS: ICD-10-CM

## 2025-02-15 DIAGNOSIS — I10 UNCONTROLLED HYPERTENSION: ICD-10-CM

## 2025-02-15 DIAGNOSIS — D12.2 ADENOMATOUS POLYP OF ASCENDING COLON: ICD-10-CM

## 2025-02-15 DIAGNOSIS — R29.898 RIGHT LEG WEAKNESS: ICD-10-CM

## 2025-02-15 DIAGNOSIS — Z86.39 HISTORY OF DIABETES MELLITUS: ICD-10-CM

## 2025-02-15 PROBLEM — G45.9 TIA (TRANSIENT ISCHEMIC ATTACK): Status: ACTIVE | Noted: 2025-02-15

## 2025-02-15 LAB
ABO GROUP BLD: NORMAL
ALBUMIN SERPL-MCNC: 3.6 G/DL (ref 3.5–5.2)
ALBUMIN/GLOB SERPL: 1 G/DL
ALP SERPL-CCNC: 136 U/L (ref 39–117)
ALT SERPL W P-5'-P-CCNC: 15 U/L (ref 1–33)
ANION GAP SERPL CALCULATED.3IONS-SCNC: 10.6 MMOL/L (ref 5–15)
APTT PPP: 30.5 SECONDS (ref 22.7–35.4)
AST SERPL-CCNC: 17 U/L (ref 1–32)
BASOPHILS # BLD AUTO: 0.04 10*3/MM3 (ref 0–0.2)
BASOPHILS NFR BLD AUTO: 0.3 % (ref 0–1.5)
BILIRUB SERPL-MCNC: 0.3 MG/DL (ref 0–1.2)
BLD GP AB SCN SERPL QL: NEGATIVE
BUN SERPL-MCNC: 17 MG/DL (ref 8–23)
BUN/CREAT SERPL: 14.9 (ref 7–25)
CALCIUM SPEC-SCNC: 9 MG/DL (ref 8.6–10.5)
CHLORIDE SERPL-SCNC: 99 MMOL/L (ref 98–107)
CO2 SERPL-SCNC: 24.4 MMOL/L (ref 22–29)
CREAT SERPL-MCNC: 1.14 MG/DL (ref 0.57–1)
DEPRECATED RDW RBC AUTO: 43.8 FL (ref 37–54)
EGFRCR SERPLBLD CKD-EPI 2021: 49.4 ML/MIN/1.73
EOSINOPHIL # BLD AUTO: 0.18 10*3/MM3 (ref 0–0.4)
EOSINOPHIL NFR BLD AUTO: 1.3 % (ref 0.3–6.2)
ERYTHROCYTE [DISTWIDTH] IN BLOOD BY AUTOMATED COUNT: 12.4 % (ref 12.3–15.4)
GLOBULIN UR ELPH-MCNC: 3.7 GM/DL
GLUCOSE BLDC GLUCOMTR-MCNC: 113 MG/DL (ref 70–130)
GLUCOSE BLDC GLUCOMTR-MCNC: 137 MG/DL (ref 70–130)
GLUCOSE BLDC GLUCOMTR-MCNC: 205 MG/DL (ref 70–130)
GLUCOSE BLDC GLUCOMTR-MCNC: 50 MG/DL (ref 70–130)
GLUCOSE SERPL-MCNC: 50 MG/DL (ref 65–99)
HCT VFR BLD AUTO: 34.3 % (ref 34–46.6)
HGB BLD-MCNC: 10.9 G/DL (ref 12–15.9)
HOLD SPECIMEN: NORMAL
HOLD SPECIMEN: NORMAL
IMM GRANULOCYTES # BLD AUTO: 0.12 10*3/MM3 (ref 0–0.05)
IMM GRANULOCYTES NFR BLD AUTO: 0.9 % (ref 0–0.5)
INR PPP: 1.08 (ref 0.9–1.1)
LYMPHOCYTES # BLD AUTO: 1.25 10*3/MM3 (ref 0.7–3.1)
LYMPHOCYTES NFR BLD AUTO: 9.1 % (ref 19.6–45.3)
MCH RBC QN AUTO: 30.7 PG (ref 26.6–33)
MCHC RBC AUTO-ENTMCNC: 31.8 G/DL (ref 31.5–35.7)
MCV RBC AUTO: 96.6 FL (ref 79–97)
MONOCYTES # BLD AUTO: 1.03 10*3/MM3 (ref 0.1–0.9)
MONOCYTES NFR BLD AUTO: 7.5 % (ref 5–12)
NEUTROPHILS NFR BLD AUTO: 11.06 10*3/MM3 (ref 1.7–7)
NEUTROPHILS NFR BLD AUTO: 80.9 % (ref 42.7–76)
NRBC BLD AUTO-RTO: 0 /100 WBC (ref 0–0.2)
PLATELET # BLD AUTO: 351 10*3/MM3 (ref 140–450)
PMV BLD AUTO: 9.5 FL (ref 6–12)
POTASSIUM SERPL-SCNC: 4.3 MMOL/L (ref 3.5–5.2)
PROT SERPL-MCNC: 7.3 G/DL (ref 6–8.5)
PROTHROMBIN TIME: 14.2 SECONDS (ref 11.7–14.2)
RBC # BLD AUTO: 3.55 10*6/MM3 (ref 3.77–5.28)
RH BLD: POSITIVE
SODIUM SERPL-SCNC: 134 MMOL/L (ref 136–145)
T&S EXPIRATION DATE: NORMAL
WBC NRBC COR # BLD AUTO: 13.68 10*3/MM3 (ref 3.4–10.8)
WHOLE BLOOD HOLD COAG: NORMAL
WHOLE BLOOD HOLD SPECIMEN: NORMAL

## 2025-02-15 PROCEDURE — 70498 CT ANGIOGRAPHY NECK: CPT

## 2025-02-15 PROCEDURE — 63710000001 PREDNISONE PER 5 MG: Performed by: INTERNAL MEDICINE

## 2025-02-15 PROCEDURE — 99285 EMERGENCY DEPT VISIT HI MDM: CPT

## 2025-02-15 PROCEDURE — G0378 HOSPITAL OBSERVATION PER HR: HCPCS

## 2025-02-15 PROCEDURE — 71045 X-RAY EXAM CHEST 1 VIEW: CPT

## 2025-02-15 PROCEDURE — 85730 THROMBOPLASTIN TIME PARTIAL: CPT | Performed by: EMERGENCY MEDICINE

## 2025-02-15 PROCEDURE — 80053 COMPREHEN METABOLIC PANEL: CPT | Performed by: EMERGENCY MEDICINE

## 2025-02-15 PROCEDURE — 86850 RBC ANTIBODY SCREEN: CPT | Performed by: EMERGENCY MEDICINE

## 2025-02-15 PROCEDURE — 85610 PROTHROMBIN TIME: CPT | Performed by: EMERGENCY MEDICINE

## 2025-02-15 PROCEDURE — 86900 BLOOD TYPING SEROLOGIC ABO: CPT | Performed by: EMERGENCY MEDICINE

## 2025-02-15 PROCEDURE — 70496 CT ANGIOGRAPHY HEAD: CPT

## 2025-02-15 PROCEDURE — 86901 BLOOD TYPING SEROLOGIC RH(D): CPT | Performed by: EMERGENCY MEDICINE

## 2025-02-15 PROCEDURE — 82948 REAGENT STRIP/BLOOD GLUCOSE: CPT

## 2025-02-15 PROCEDURE — 0042T HC CT CEREBRAL PERFUSION W/WO CONTRAST: CPT

## 2025-02-15 PROCEDURE — 93010 ELECTROCARDIOGRAM REPORT: CPT | Performed by: INTERNAL MEDICINE

## 2025-02-15 PROCEDURE — 25810000003 SODIUM CHLORIDE 0.9 % SOLUTION: Performed by: EMERGENCY MEDICINE

## 2025-02-15 PROCEDURE — 25510000001 IOPAMIDOL PER 1 ML: Performed by: EMERGENCY MEDICINE

## 2025-02-15 PROCEDURE — 85025 COMPLETE CBC W/AUTO DIFF WBC: CPT | Performed by: EMERGENCY MEDICINE

## 2025-02-15 PROCEDURE — 93005 ELECTROCARDIOGRAM TRACING: CPT | Performed by: EMERGENCY MEDICINE

## 2025-02-15 RX ORDER — PANTOPRAZOLE SODIUM 40 MG/1
40 TABLET, DELAYED RELEASE ORAL
Status: DISCONTINUED | OUTPATIENT
Start: 2025-02-16 | End: 2025-02-21 | Stop reason: HOSPADM

## 2025-02-15 RX ORDER — ASPIRIN 300 MG/1
300 SUPPOSITORY RECTAL DAILY
Status: DISCONTINUED | OUTPATIENT
Start: 2025-02-15 | End: 2025-02-16

## 2025-02-15 RX ORDER — ONDANSETRON 2 MG/ML
4 INJECTION INTRAMUSCULAR; INTRAVENOUS EVERY 6 HOURS PRN
Status: DISCONTINUED | OUTPATIENT
Start: 2025-02-15 | End: 2025-02-15

## 2025-02-15 RX ORDER — ACETAMINOPHEN 325 MG/1
650 TABLET ORAL EVERY 6 HOURS PRN
Status: DISCONTINUED | OUTPATIENT
Start: 2025-02-15 | End: 2025-02-21 | Stop reason: HOSPADM

## 2025-02-15 RX ORDER — LISINOPRIL 20 MG/1
40 TABLET ORAL DAILY
Status: DISCONTINUED | OUTPATIENT
Start: 2025-02-15 | End: 2025-02-21 | Stop reason: HOSPADM

## 2025-02-15 RX ORDER — SODIUM CHLORIDE 0.9 % (FLUSH) 0.9 %
10 SYRINGE (ML) INJECTION AS NEEDED
Status: DISCONTINUED | OUTPATIENT
Start: 2025-02-15 | End: 2025-02-21 | Stop reason: HOSPADM

## 2025-02-15 RX ORDER — BUDESONIDE AND FORMOTEROL FUMARATE DIHYDRATE 160; 4.5 UG/1; UG/1
2 AEROSOL RESPIRATORY (INHALATION)
Status: DISCONTINUED | OUTPATIENT
Start: 2025-02-15 | End: 2025-02-21 | Stop reason: HOSPADM

## 2025-02-15 RX ORDER — SODIUM CHLORIDE 0.9 % (FLUSH) 0.9 %
10 SYRINGE (ML) INJECTION AS NEEDED
Status: DISCONTINUED | OUTPATIENT
Start: 2025-02-15 | End: 2025-02-15

## 2025-02-15 RX ORDER — DEXTROSE MONOHYDRATE 25 G/50ML
INJECTION, SOLUTION INTRAVENOUS
Status: COMPLETED
Start: 2025-02-15 | End: 2025-02-15

## 2025-02-15 RX ORDER — ALBUTEROL SULFATE 1.25 MG/3ML
1.25 SOLUTION RESPIRATORY (INHALATION) EVERY 4 HOURS PRN
Status: DISCONTINUED | OUTPATIENT
Start: 2025-02-15 | End: 2025-02-21 | Stop reason: HOSPADM

## 2025-02-15 RX ORDER — ATORVASTATIN CALCIUM 20 MG/1
40 TABLET, FILM COATED ORAL NIGHTLY
Status: DISCONTINUED | OUTPATIENT
Start: 2025-02-15 | End: 2025-02-15

## 2025-02-15 RX ORDER — SODIUM CHLORIDE 9 MG/ML
40 INJECTION, SOLUTION INTRAVENOUS AS NEEDED
Status: DISCONTINUED | OUTPATIENT
Start: 2025-02-15 | End: 2025-02-15

## 2025-02-15 RX ORDER — PREDNISONE 5 MG/1
5 TABLET ORAL EVERY EVENING
Status: DISCONTINUED | OUTPATIENT
Start: 2025-02-15 | End: 2025-02-21 | Stop reason: HOSPADM

## 2025-02-15 RX ORDER — IOPAMIDOL 755 MG/ML
100 INJECTION, SOLUTION INTRAVASCULAR
Status: DISCONTINUED | OUTPATIENT
Start: 2025-02-15 | End: 2025-02-15

## 2025-02-15 RX ORDER — ASPIRIN 300 MG/1
300 SUPPOSITORY RECTAL ONCE
Status: DISCONTINUED | OUTPATIENT
Start: 2025-02-15 | End: 2025-02-15

## 2025-02-15 RX ORDER — ASPIRIN 325 MG
325 TABLET ORAL DAILY
Status: DISCONTINUED | OUTPATIENT
Start: 2025-02-15 | End: 2025-02-16

## 2025-02-15 RX ORDER — HYDRALAZINE HYDROCHLORIDE 20 MG/ML
10 INJECTION INTRAMUSCULAR; INTRAVENOUS EVERY 6 HOURS PRN
Status: DISCONTINUED | OUTPATIENT
Start: 2025-02-15 | End: 2025-02-21 | Stop reason: HOSPADM

## 2025-02-15 RX ORDER — TORSEMIDE 20 MG/1
20 TABLET ORAL DAILY
Status: DISCONTINUED | OUTPATIENT
Start: 2025-02-15 | End: 2025-02-17

## 2025-02-15 RX ORDER — TRAMADOL HYDROCHLORIDE 50 MG/1
50 TABLET ORAL EVERY 6 HOURS PRN
Status: DISCONTINUED | OUTPATIENT
Start: 2025-02-15 | End: 2025-02-21 | Stop reason: HOSPADM

## 2025-02-15 RX ORDER — AMLODIPINE BESYLATE 5 MG/1
5 TABLET ORAL
Status: DISCONTINUED | OUTPATIENT
Start: 2025-02-15 | End: 2025-02-17

## 2025-02-15 RX ORDER — BISOPROLOL FUMARATE 5 MG/1
20 TABLET, FILM COATED ORAL NIGHTLY
Status: DISCONTINUED | OUTPATIENT
Start: 2025-02-15 | End: 2025-02-21 | Stop reason: HOSPADM

## 2025-02-15 RX ORDER — SODIUM CHLORIDE 0.9 % (FLUSH) 0.9 %
10 SYRINGE (ML) INJECTION EVERY 12 HOURS SCHEDULED
Status: DISCONTINUED | OUTPATIENT
Start: 2025-02-15 | End: 2025-02-15

## 2025-02-15 RX ORDER — IOPAMIDOL 755 MG/ML
150 INJECTION, SOLUTION INTRAVASCULAR
Status: COMPLETED | OUTPATIENT
Start: 2025-02-15 | End: 2025-02-15

## 2025-02-15 RX ORDER — DEXTROSE MONOHYDRATE 25 G/50ML
25 INJECTION, SOLUTION INTRAVENOUS ONCE
Status: COMPLETED | OUTPATIENT
Start: 2025-02-15 | End: 2025-02-15

## 2025-02-15 RX ADMIN — PREDNISONE 5 MG: 5 TABLET ORAL at 21:47

## 2025-02-15 RX ADMIN — SODIUM CHLORIDE 500 ML: 9 INJECTION, SOLUTION INTRAVENOUS at 16:44

## 2025-02-15 RX ADMIN — DEXTROSE MONOHYDRATE 25 G: 25 INJECTION, SOLUTION INTRAVENOUS at 15:22

## 2025-02-15 RX ADMIN — LISINOPRIL 40 MG: 20 TABLET ORAL at 21:46

## 2025-02-15 RX ADMIN — AMLODIPINE BESYLATE 5 MG: 5 TABLET ORAL at 21:47

## 2025-02-15 RX ADMIN — ASPIRIN 325 MG ORAL TABLET 325 MG: 325 PILL ORAL at 16:44

## 2025-02-15 RX ADMIN — IOPAMIDOL 145 ML: 755 INJECTION, SOLUTION INTRAVENOUS at 15:45

## 2025-02-15 RX ADMIN — BISOPROLOL FUMARATE 20 MG: 5 TABLET ORAL at 21:48

## 2025-02-15 NOTE — ED TRIAGE NOTES
Speech problem per -woke up this morning unknown time. And had slurred speech, and also can not find her words.

## 2025-02-15 NOTE — ED PROVIDER NOTES
EMERGENCY DEPARTMENT ENCOUNTER    Room Number:  22/22  PCP: Praneeth Valenzuela MD  Historian: Patient,       HPI:  Chief Complaint: Weakness, dizziness, speech difficulty  A complete HPI/ROS/PMH/PSH/SH/FH are unobtainable due to: None    Context: Blanca Harden is a 79 y.o. female who presents to the ED via private vehicle from home c/o acute right leg weakness, dizziness, and speech difficulties that were present when she woke up this morning.  Unclear exactly what time she went to bed last night.  Patient not on any anticoagulation.  Blood glucose levels were above 200 earlier this morning, have since dropped down to 50 at this time.  No vomiting or diarrhea.      MEDICAL RECORD REVIEW    External (non-ED) record review: No anticoagulants noted on chart review in epic.  MRI brain with and without contrast reviewed from October 2, 2023 in epic, no signs of infarct at that time.  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    Gastritis without bleeding 10/22/2019    Polyp of duodenum 10/22/2019    Morbidly obese 06/26/2020    Dyspnea on exertion 03/30/2021    Hyperlipidemia 05/04/2021    Type 2 diabetes mellitus with stage 3b chronic kidney disease, with long-term current use of insulin 05/04/2021    Essential hypertension 05/04/2021    Bilateral lower extremity edema 06/21/2021    Pulmonary hypertension 06/21/2021    Obstructive sleep apnea on CPAP 03/21/2022    Stage 3a chronic kidney disease 03/21/2022    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     Respiratory distress 2023    Hypoglycemia due to insulin 2023    Delirium 10/01/2023    Hypomagnesemia 10/02/2023    Severe malnutrition 10/02/2023    Leg swelling 2024    Venous (peripheral) insufficiency 2024    Polyp of colon 2024    Obesity (BMI 30.0-34.9) 2024     Resolved Ambulatory Problems     Diagnosis Date Noted    Adenomatous duodenal polyp 2024    Colonic mass 2024     Past Medical History:   Diagnosis Date    Anxiety and depression     Asthma     Balance problem     CKD (chronic kidney disease), stage III     COPD (chronic obstructive pulmonary disease)     Coronary artery disease     Diabetes mellitus, type 2     History of COVID-2023    History of left breast cancer     Hypertension     Lower extremity edema     Moderate persistent asthma     On home O2     Sleep apnea     Swelling     Varicose veins of unspecified lower extremity with ulcer of calf 2022    Venous insufficiency (chronic) (peripheral)          PAST SURGICAL HISTORY  Past Surgical History:   Procedure Laterality Date    BREAST BIOPSY Left  approx    benign pathology    BREAST BIOPSY Left 2019    Ultasound guided mammotome vacuum assisted left breast biopsy with placement of a metallic clip-Dr. Daniel Gutierrez, [facility]    CARDIAC CATHETERIZATION       SECTION N/A     x3    CHOLECYSTECTOMY N/A     COLON RESECTION Right 2025    Procedure: COLON RESECTION LAPAROSCOPIC RIGHT WITH DAVINCI ROBOT;  Surgeon: Ashwin Alvarado MD;  Location: [facility] OR;  Service: Robotics - DaVinci;  Laterality: Right;    COLONOSCOPY      COLONOSCOPY N/A 2024    Procedure: COLONOSCOPY into the cecum and TI with hot snare polypectomy;  Surgeon: Ashwin Alvarado MD;  Location: [facility] ENDOSCOPY;  Service: General;  Laterality: N/A;  pre-colon polyps  post-colon mass    COLONOSCOPY W/ POLYPECTOMY N/A 2019    Enlarged folds in the antrum: biopsied; duodenal,  transverse colon x2, splenic flexure, descending colon and sigmoid colon polyps: biopsied (path: tubular adenoma x6)-Dr. Zak De Jesus, [facility]    ENDOSCOPY  10/11/2019    Procedure: ESOPHAGOGASTRODUODENOSCOPY with hot snare polypectomy;  Surgeon: Haile Handley MD;  Location: [facility] ENDOSCOPY;  Service: Gastroenterology    ENDOSCOPY N/A 01/29/2020    Procedure: ESOPHAGOGASTRODUODENOSCOPY;  Surgeon: Haile Handley MD;  Location: [facility] ENDOSCOPY;  Service: Gastroenterology    ENDOSCOPY N/A 09/09/2020    Procedure: ESOPHAGOGASTRODUODENOSCOPY WITH BIOPSIES AND COLD BIOPSY POLYPECTOMY;  Surgeon: Haile Handley MD;  Location: [facility] ENDOSCOPY;  Service: Gastroenterology;  Laterality: N/A;  pre: dyspepsia and diarrhea  post: duodenal polyp and gastritis    ENDOSCOPY N/A 07/23/2024    Procedure: ESOPHAGOGASTRODUODENOSCOPY WITH hot snare polypectomy with clip placement x 2BIOPSY;  Surgeon: Ashwin Alvarado MD;  Location: [facility] ENDOSCOPY;  Service: General;  Laterality: N/A;  pre-hx duoedenal polyp  post-duodenal polyp; gastritis    EYE SURGERY      MASTECTOMY WITH SENTINEL NODE BIOPSY AND AXILLARY NODE DISSECTION Left 07/03/2019    Procedure: LEFT BREAST MASTECTOMY WITH SENTINEL NODE BIOPSY AND , v-y plasty closure;  Surgeon: Tahmina James MD;  Location: [facility] MAIN OR;  Service: General    SUBTOTAL HYSTERECTOMY Bilateral     ovaries still in tact    TEMPORAL ARTERY BIOPSY Bilateral 12/05/2019         FAMILY HISTORY  Family History   Problem Relation Age of Onset    Heart disease Mother     Stroke Mother     Asthma Mother     Hypertension Father     Stomach cancer Father     Diabetes Father     Esophageal cancer Father     Cancer Father     Throat cancer Sister     Diabetes Paternal Grandmother     Hypertension Paternal Grandfather     Colon cancer Paternal Grandfather     Colon cancer Paternal Uncle     Colon cancer Paternal Uncle     Colon cancer Paternal Uncle     Ovarian  cancer Cousin     Stomach cancer Cousin     Malig Hyperthermia Neg Hx          SOCIAL HISTORY  Social History     Socioeconomic History    Marital status:      Spouse name: Ashwin    Number of children: 3    Years of education: College   Tobacco Use    Smoking status: Never    Smokeless tobacco: Never    Tobacco comments:     caffine use   Vaping Use    Vaping status: Never Used   Substance and Sexual Activity    Alcohol use: No    Drug use: No    Sexual activity: Not Currently     Comment: Spouse, Ashwin         ALLERGIES  Revefenacin, Aspirin, and Sulfa antibiotics        REVIEW OF SYSTEMS  Review of Systems     All systems reviewed and negative except for those discussed in HPI.       PHYSICAL EXAM    I have reviewed the triage vital signs and nursing notes.    ED Triage Vitals [02/15/25 1510]   Temp Heart Rate Resp BP SpO2   98.5 °F (36.9 °C) 100 16 -- 96 %      Temp src Heart Rate Source Patient Position BP Location FiO2 (%)   -- -- -- -- --       Physical Exam  General: No acute distress, nontoxic  HEENT: Mucous membranes moist, atraumatic, EOMI  Neck: Full ROM  Pulm: Symmetric chest rise, nonlabored, lungs CTAB  Cardiovascular: Regular rate and rhythm, intact distal pulses  GI: Soft, nontender, nondistended, no rebound, no guarding, bowel sounds present  MSK: Full ROM, no deformity  Skin: Warm, dry  Neuro: Awake, alert, oriented x 4, GCS 15, no overt facial droop, slight dysarthria/aphasia, 5 out of 5 strength and sensation bilateral upper extremities, no pronator drift.  Weakness in the right lower extremity compared to the left with inability to hold up against gravity.  Sensation intact. Moving all extremities, no focal deficits  Psych: Calm, cooperative    Interval: baseline  1a. Level of Consciousness: 0-->Alert, keenly responsive  1b. LOC Questions: 0-->Answers both questions correctly  1c. LOC Commands: 0-->Performs both tasks correctly  2. Best Gaze: 0-->Normal  3. Visual: 0-->No visual  loss  4. Facial Palsy: 0-->Normal symmetrical movements  5a. Motor Arm, Left: 0-->No drift, limb holds 90 (or 45) degrees for full 10 secs  5b. Motor Arm, Right: 0-->No drift, limb holds 90 (or 45) degrees for full 10 secs  6a. Motor Leg, Left: 0-->No drift, leg holds 30 degree position for full 5 secs  6b. Motor Leg, Right: 2-->Some effort against gravity, leg falls to bed by 5 secs, but has some effort against gravity  7. Limb Ataxia: 0-->Absent  8. Sensory: 0-->Normal, no sensory loss  9. Best Language: 1-->Mild-to-moderate aphasia, some obvious loss of fluency or facility of comprehension, without significant limitation on ideas expressed or form of expression. Reduction of speech and/or comprehension, however, makes conversation. . . (see row details)  10. Dysarthria: 1-->Mild-to-moderate dysarthria, patient slurs at least some words and, at worst, can be understood with some difficulty  11. Extinction and Inattention (formerly Neglect): 0-->No abnormality    Total (NIH Stroke Scale): 4        LAB RESULTS  Recent Results (from the past 24 hours)   POC Glucose Once    Collection Time: 02/15/25  3:15 PM    Specimen: Blood   Result Value Ref Range    Glucose 50 (L) 70 - 130 mg/dL   Comprehensive Metabolic Panel    Collection Time: 02/15/25  3:22 PM    Specimen: Blood   Result Value Ref Range    Glucose 50 (L) 65 - 99 mg/dL    BUN 17 8 - 23 mg/dL    Creatinine 1.14 (H) 0.57 - 1.00 mg/dL    Sodium 134 (L) 136 - 145 mmol/L    Potassium 4.3 3.5 - 5.2 mmol/L    Chloride 99 98 - 107 mmol/L    CO2 24.4 22.0 - 29.0 mmol/L    Calcium 9.0 8.6 - 10.5 mg/dL    Total Protein 7.3 6.0 - 8.5 g/dL    Albumin 3.6 3.5 - 5.2 g/dL    ALT (SGPT) 15 1 - 33 U/L    AST (SGOT) 17 1 - 32 U/L    Alkaline Phosphatase 136 (H) 39 - 117 U/L    Total Bilirubin 0.3 0.0 - 1.2 mg/dL    Globulin 3.7 gm/dL    A/G Ratio 1.0 g/dL    BUN/Creatinine Ratio 14.9 7.0 - 25.0    Anion Gap 10.6 5.0 - 15.0 mmol/L    eGFR 49.4 (L) >60.0 mL/min/1.73   Protime-INR     Collection Time: 02/15/25  3:22 PM    Specimen: Blood   Result Value Ref Range    Protime 14.2 11.7 - 14.2 Seconds    INR 1.08 0.90 - 1.10   aPTT    Collection Time: 02/15/25  3:22 PM    Specimen: Blood   Result Value Ref Range    PTT 30.5 22.7 - 35.4 seconds   Green Top (Gel)    Collection Time: 02/15/25  3:22 PM   Result Value Ref Range    Extra Tube Hold for add-ons.    Lavender Top    Collection Time: 02/15/25  3:22 PM   Result Value Ref Range    Extra Tube hold for add-on    Gold Top - SST    Collection Time: 02/15/25  3:22 PM   Result Value Ref Range    Extra Tube Hold for add-ons.    Light Blue Top    Collection Time: 02/15/25  3:22 PM   Result Value Ref Range    Extra Tube Hold for add-ons.    CBC Auto Differential    Collection Time: 02/15/25  3:22 PM    Specimen: Blood   Result Value Ref Range    WBC 13.68 (H) 3.40 - 10.80 10*3/mm3    RBC 3.55 (L) 3.77 - 5.28 10*6/mm3    Hemoglobin 10.9 (L) 12.0 - 15.9 g/dL    Hematocrit 34.3 34.0 - 46.6 %    MCV 96.6 79.0 - 97.0 fL    MCH 30.7 26.6 - 33.0 pg    MCHC 31.8 31.5 - 35.7 g/dL    RDW 12.4 12.3 - 15.4 %    RDW-SD 43.8 37.0 - 54.0 fl    MPV 9.5 6.0 - 12.0 fL    Platelets 351 140 - 450 10*3/mm3    Neutrophil % 80.9 (H) 42.7 - 76.0 %    Lymphocyte % 9.1 (L) 19.6 - 45.3 %    Monocyte % 7.5 5.0 - 12.0 %    Eosinophil % 1.3 0.3 - 6.2 %    Basophil % 0.3 0.0 - 1.5 %    Immature Grans % 0.9 (H) 0.0 - 0.5 %    Neutrophils, Absolute 11.06 (H) 1.70 - 7.00 10*3/mm3    Lymphocytes, Absolute 1.25 0.70 - 3.10 10*3/mm3    Monocytes, Absolute 1.03 (H) 0.10 - 0.90 10*3/mm3    Eosinophils, Absolute 0.18 0.00 - 0.40 10*3/mm3    Basophils, Absolute 0.04 0.00 - 0.20 10*3/mm3    Immature Grans, Absolute 0.12 (H) 0.00 - 0.05 10*3/mm3    nRBC 0.0 0.0 - 0.2 /100 WBC   ECG 12 Lead Stroke Evaluation    Collection Time: 02/15/25  3:52 PM   Result Value Ref Range    QT Interval 365 ms    QTC Interval 431 ms   POC Glucose Once    Collection Time: 02/15/25  4:01 PM    Specimen: Blood    Result Value Ref Range    Glucose 137 (H) 70 - 130 mg/dL       Ordered the above labs and independently interpreted results. My findings will be discussed in the medical decision making section below        RADIOLOGY  CT Angiogram Head w AI Analysis of LVO, CT Angiogram Neck, CT CEREBRAL PERFUSION WITH & WITHOUT CONTRAST    Result Date: 2/15/2025  CT ANGIOGRAM OF THE HEAD AND NECK AND CT PERFUSION STUDY  CLINICAL HISTORY: Speech difficulties, dizziness, and right leg weakness.  TECHNIQUE:  A noncontrast head CT was performed with 3 mm axial images. Thereafter, a CT perfusion study was performed after the dynamic bolus of IV contrast. Standard perfusion maps were constructed with RAPID software. A CT angiogram of the head and neck was then performed with 1 mm axial images. Sagittal, coronal, and 3-dimensional reconstructed images were obtained. Finally, a delayed postcontrast head CT was performed with 3 mm axial images.  FINDINGS:  HEAD CT: No convincing evidence for an acute intracranial abnormality. Mild to moderate changes of chronic small vessel ischemic phenomenon. Near complete opacification of the left maxillary sinus with mucosal thickening and secretions.  CT PERFUSION STUDY: No abnormality on the CBF less than 30% or Tmax greater than 6 seconds maps.  CTA HEAD:  Posterior Circulation: Unremarkable appearance of the vertebral arteries, basilar artery, and posterior cerebral arteries. Anterior Circulation: Atherosclerotic calcifications within the carotid siphons resulting in relatively mild degrees of luminal compromise. Unremarkable appearance of the anterior and middle cerebral arteries.  CTA NECK:   Aortic Arch: Classic configuration. Mild atherosclerotic involvement of the aortic arch. Subclavian Arteries: Mild to moderate stenosis of the proximal aspect the left subclavian artery secondary to predominantly noncalcified atherosclerotic changes. Vertebral Arteries: Unremarkable. CCA/ICA's:  Atherosclerotic changes without evidence for a NASCET stenosis.        No convincing evidence for an acute infarct on either the noncontrast head CT or the CT perfusion study. Further evaluation could be performed with MR imaging for more sensitive and specific assessment.  No evidence for large vessel occlusion.  Mild to moderate stenosis of the proximal aspect of the left subclavian artery.  No significant NASCET stenosis within either internal carotid artery.  These findings were contemporaneously discussed with Dr. Vega on 02/15/2025.  Please take note that this is a preliminary result until the receipt of the reconstructed images from [facility] at which time this report be finalized.    Radiation dose reduction techniques were utilized, including automated exposure control and exposure modulation based on body size.        Ordered the above noted radiological studies.  Independently interpreted by me and my independent review of findings can be found in the ED Course.  See dictation for official radiology interpretation.      PROCEDURES    Procedures      EKG - Per my independent interpretation at 1557:    EKG Time: 1552  Rhythm/Rate: Sinus rhythm with rate of 84  Normal axis  Normal intervals  No acute ischemic changes  No STEMI       No emergent changes compared to January 30th 2025      MEDICATIONS GIVEN IN ER    Medications   sodium chloride 0.9 % flush 10 mL (has no administration in time range)   sodium chloride 0.9 % bolus 500 mL (has no administration in time range)   aspirin tablet 325 mg (has no administration in time range)     Or   aspirin suppository 300 mg (has no administration in time range)   dextrose (D50W) (25 g/50 mL) IV injection 25 g (25 g Intravenous Given 2/15/25 1522)   iopamidol (ISOVUE-370) 76 % injection 150 mL (145 mL Intravenous Given by Other 2/15/25 1545)         PROGRESS, DATA ANALYSIS, CONSULTS, AND MEDICAL DECISION MAKING    Please note that this section constitutes my  independent interpretation of clinical data including lab results, radiology, EKG's.  This constitutes my independent professional opinion regarding differential diagnosis and management of this patient.  It may include any factors such as history from outside sources, review of external records, social determinants of health, management of medications, response to those treatments, and discussions with other providers.    Differential Diagnosis and Plan: Plan for stat stroke neurology consult out of concerns for possible CVA, patient is hypoglycemic at a blood glucose level of 50, will give IV D50, will obtain labs, EKG, and follow-up on results.  Potential for acute team D imaging.  She is not a TNK candidate due to time of onset being unclear from last night.     Additional sources:  - Discussed/ obtained information from independent historians:   states symptoms were not present last night      - (Social Determinants of Health): None     - Shared decision making:  Patient and  at bedside fully updated on and in agreement with the course and plan moving forward    ED Course as of 02/15/25 1641   Sat Feb 15, 2025   1521 Discussed with Dr. Sethi, Stroke Neurology, discussed patient's clinical course and findings today, treatment modalities, NIH of 2, and we will activate team D protocol.  She is not a TNK candidate given last known normal last night. [DC]   1536 WBC(!): 13.68 [DC]   1536 Hemoglobin(!): 10.9 [DC]   1536 Platelets: 351 [DC]   1536 Glucose(!): 50 [DC]   1552 CT Angiogram Head w AI Analysis of LVO  Per my independent interpretation of the CT head, no overtly evident intracranial hemorrhage although subtle finding could be missed will defer to final radiology report [DC]   1605 Glucose(!): 137 [DC]   1606 Glucose(!): 50 [DC]   1606 BUN: 17 [DC]   1606 Creatinine(!): 1.14  1.31 five days ago [DC]   1606 Sodium(!): 134 [DC]   1606 Potassium: 4.3 [DC]   1606 ALT (SGPT): 15 [DC]   1606 AST  (SGOT): 17 [DC]   1606 Alkaline Phosphatase(!): 136 [DC]   1606 Total Bilirubin: 0.3 [DC]   1613 CT Angiogram Head w AI Analysis of LVO  Radiology reports reviewed of the CTA Head/Neck and CTP, no evidence of any acute emergent findings. [DC]   1631 Patient and family fully updated on the findings today and need for hospitalization.  She reports improvement in her right leg weakness but still having a little bit of speech difficulty. [DC]   1640 Discussed with Dr. Valenzuela, [facility], discussed patient's clinical course and findings today, treatment modalities, neurology consult, and the need for hospitalization. [DC]      ED Course User Index  [DC] Raad Belcher MD       Hospitalization Considered?: yes    Orders Placed During This Visit:  Orders Placed This Encounter   Procedures    CT Angiogram Head w AI Analysis of LVO    CT Angiogram Neck    CT CEREBRAL PERFUSION WITH & WITHOUT CONTRAST    XR Chest 1 View    [place] Draw    Comprehensive Metabolic Panel    Protime-INR    aPTT    CBC Auto Differential    Initiate Department's Acute Stroke Process (Team D, Code 19, etc.)    Perform NIH Stroke Scale    Measure Actual Weight    Notify Provider    Notify Provider for SBP Greater Than 140 for Hemorrhagic Stroke Patient    Head of Bed 30 Degrees or Less    Undress and Gown    Vital Signs    Neuro Checks    No Hypotonic Fluids    Nursing Dysphagia Screening (Complete Prior to Giving anything PO)    RN to Place Order SLP Consult (IF swallow screen failed) - Eval & Treat Choosing Reason of RN Dysphagia Screen Failed    Vital Signs    Continuous Pulse Oximetry    Telemetry - Place Orders & Notify Provider of Results When Patient Experiences Acute Chest Pain, Dysrhythmia or Respiratory Distress    Inpatient Neurology Consult Stroke    Inpatient Neurology Consult Stroke    Family Medicine Consult    POC Glucose Once    POC Glucose Once    POC Glucose Once    POC Glucose Finger 4x Daily Before Meals & at Bedtime    ECG 12 Lead  Stroke Evaluation    Type & Screen    Insert Large-Bore Peripheral IV - RIGHT AC Preferred    Initiate Observation Status    CBC & Differential    Green Top (Gel)    Lavender Top    Gold Top - SST    Light Blue Top       Additional orders considered but not placed:      Independent interpretation of labs, radiology studies, and discussions with consultants: See ED Course        AS OF 16:41 EST VITALS:    BP - (!) 195/82  HR - 82  TEMP - 98.5 °F (36.9 °C)  02 SATS - 97%          DIAGNOSIS  Final diagnoses:   Difficulty with speech   Right leg weakness   Dizziness   Uncontrolled hypertension   Chronic renal impairment, unspecified CKD stage   History of diabetes mellitus         DISPOSITION  ED Disposition       ED Disposition   Decision to Admit    Condition   --    Comment   Level of Care: Telemetry [5]   Diagnosis: Difficulty with speech [[id number]]   Admitting Physician: ELSY HAINES [[id number]]   Attending Physician: ELSY HAINES [[id number]]   Is patient appropriate for Inpatient Observation Unit?: No [0]                  Please note that portions of this document were completed with a voice recognition program.    Note Disclaimer: At [facility], we believe that sharing information builds trust and better relationships. You are receiving this note because you recently visited [facility]. It is possible you will see health information before a provider has talked with you about it. This kind of information can be easy to misunderstand. To help you fully understand what it means for your health, we urge you to discuss this note with your provider.                       Raad Belcher MD  02/15/25 [id number]

## 2025-02-15 NOTE — ED NOTES
"Nursing report ED to floor  Blanca Harden  79 y.o.  female    HPI :  HPI  Stated Reason for Visit: patient woke up this morning, right leg weakness and slurred speech and trouble finding words, is awake and alert at this time.  History Obtained From: patient, family    Chief Complaint  Chief Complaint   Patient presents with    Neuro Deficit(s)       Admitting doctor:   Praneeth Valenzuela MD    Admitting diagnosis:   The primary encounter diagnosis was Difficulty with speech. Diagnoses of Right leg weakness, Dizziness, Uncontrolled hypertension, Chronic renal impairment, unspecified CKD stage, and History of diabetes mellitus were also pertinent to this visit.    Code status:   Current Code Status       Date Active Code Status Order ID Comments User Context       Prior            Allergies:   Revefenacin, Aspirin, and Sulfa antibiotics    Isolation:   No active isolations    Intake and Output  No intake or output data in the 24 hours ending 02/15/25 1654    Weight:       02/15/25  1514   Weight: 81.6 kg (180 lb)       Most recent vitals:   Vitals:    02/15/25 1510 02/15/25 1514 02/15/25 1555 02/15/25 1640   BP:  163/89 (!) 195/82 (!) 188/74   Pulse: 100  83 82   Resp: 16  16 18   Temp: 98.5 °F (36.9 °C)      SpO2: 96%  95% 97%   Weight:  81.6 kg (180 lb)     Height:  152.4 cm (60\")         Active LDAs/IV Access:   Lines, Drains & Airways       Active LDAs       Name Placement date Placement time Site Days    Peripheral IV 02/15/25 1539 Anterior;Right;Upper Arm 02/15/25  1539  Arm  less than 1                    Labs (abnormal labs have a star):   Labs Reviewed   COMPREHENSIVE METABOLIC PANEL - Abnormal; Notable for the following components:       Result Value    Glucose 50 (*)     Creatinine 1.14 (*)     Sodium 134 (*)     Alkaline Phosphatase 136 (*)     eGFR 49.4 (*)     All other components within normal limits    Narrative:     GFR Categories in Chronic Kidney Disease (CKD)      GFR Category          GFR " (mL/min/1.73)    Interpretation  G1                     90 or greater         Normal or high (1)  G2                      60-89                Mild decrease (1)  G3a                   45-59                Mild to moderate decrease  G3b                   30-44                Moderate to severe decrease  G4                    15-29                Severe decrease  G5                    14 or less           Kidney failure          (1)In the absence of evidence of kidney disease, neither GFR category G1 or G2 fulfill the criteria for CKD.    eGFR calculation 2021 CKD-EPI creatinine equation, which does not include race as a factor   CBC WITH AUTO DIFFERENTIAL - Abnormal; Notable for the following components:    WBC 13.68 (*)     RBC 3.55 (*)     Hemoglobin 10.9 (*)     Neutrophil % 80.9 (*)     Lymphocyte % 9.1 (*)     Immature Grans % 0.9 (*)     Neutrophils, Absolute 11.06 (*)     Monocytes, Absolute 1.03 (*)     Immature Grans, Absolute 0.12 (*)     All other components within normal limits   POCT GLUCOSE FINGERSTICK - Abnormal; Notable for the following components:    Glucose 50 (*)     All other components within normal limits   POCT GLUCOSE FINGERSTICK - Abnormal; Notable for the following components:    Glucose 137 (*)     All other components within normal limits   PROTIME-INR - Normal   APTT - Normal   RAINBOW DRAW    Narrative:     The following orders were created for panel order [place] Draw.  Procedure                               Abnormality         Status                     ---------                               -----------         ------                     Green Top (Gel)[[id number]]                                  Final result               Lavender Top[[id number]]                                     Final result               Gold Top - SST[[id number]]                                   Final result               Light Blue Top[[id number]]                                   Final result                  Please view results for these tests on the individual orders.   POCT GLUCOSE FINGERSTICK   POCT GLUCOSE FINGERSTICK   POCT GLUCOSE FINGERSTICK   POCT GLUCOSE FINGERSTICK   POCT GLUCOSE FINGERSTICK   TYPE AND SCREEN   CBC AND DIFFERENTIAL    Narrative:     The following orders were created for panel order CBC & Differential.  Procedure                               Abnormality         Status                     ---------                               -----------         ------                     CBC Auto Differential[[id number]]        Abnormal            Final result                 Please view results for these tests on the individual orders.   GREEN TOP   LAVENDER TOP   GOLD TOP - SST   LIGHT BLUE TOP       EKG:   ECG 12 Lead Stroke Evaluation   Preliminary Result   HEART RATE=84  bpm   RR Nzizvqbg=962  ms   MO Qcthqvde=155  ms   P Horizontal Axis=  deg   P Front Axis=24  deg   QRSD Interval=71  ms   QT Ywwycnpo=876  ms   GVrN=821  ms   QRS Axis=-4  deg   T Wave Axis=24  deg   - ABNORMAL ECG -   Atrial-paced complexes   Abnormal R-wave progression, early transition   LVH by voltage   Date and Time of Study:2025-02-15 15:52:48          Meds given in ED:   Medications   sodium chloride 0.9 % flush 10 mL (has no administration in time range)   sodium chloride 0.9 % bolus 500 mL (500 mL Intravenous New Bag 2/15/25 1644)   aspirin tablet 325 mg (325 mg Oral Given 2/15/25 1644)     Or   aspirin suppository 300 mg ( Rectal Not Given:  See Alt 2/15/25 1644)   dextrose (D50W) (25 g/50 mL) IV injection 25 g (25 g Intravenous Given 2/15/25 1522)   iopamidol (ISOVUE-370) 76 % injection 150 mL (145 mL Intravenous Given by Other 2/15/25 1545)       Imaging results:  CT Angiogram Head w AI Analysis of LVO    Result Date: 2/15/2025   No convincing evidence for an acute infarct on either the noncontrast head CT or the CT perfusion study. Further evaluation could be performed with MR imaging for more sensitive and specific  assessment.  No evidence for large vessel occlusion.  Mild to moderate stenosis of the proximal aspect of the left subclavian artery.  No significant NASCET stenosis within either internal carotid artery.  The findings of the noncontrast head CT were discussed with Dr. Sethi on 2/15/2025 at approximately 3:35 p.m. The findings of the CT angiogram and CT perfusion study were discussed with Dr. Sethi at approximately 3:50 p.m.    Radiation dose reduction techniques were utilized, including automated exposure control and exposure modulation based on body size.   This report was finalized on 2/15/2025 4:40 PM by Dr. Sanya Armendariz M.D on Workstation: [id number]      CT Angiogram Neck    Result Date: 2/15/2025   No convincing evidence for an acute infarct on either the noncontrast head CT or the CT perfusion study. Further evaluation could be performed with MR imaging for more sensitive and specific assessment.  No evidence for large vessel occlusion.  Mild to moderate stenosis of the proximal aspect of the left subclavian artery.  No significant NASCET stenosis within either internal carotid artery.  The findings of the noncontrast head CT were discussed with Dr. Sethi on 2/15/2025 at approximately 3:35 p.m. The findings of the CT angiogram and CT perfusion study were discussed with Dr. Sethi at approximately 3:50 p.m.    Radiation dose reduction techniques were utilized, including automated exposure control and exposure modulation based on body size.   This report was finalized on 2/15/2025 4:40 PM by Dr. Sanya Armendariz M.D on Workstation: [id number]      CT CEREBRAL PERFUSION WITH & WITHOUT CONTRAST    Result Date: 2/15/2025   No convincing evidence for an acute infarct on either the noncontrast head CT or the CT perfusion study. Further evaluation could be performed with MR imaging for more sensitive and specific assessment.  No evidence for large vessel occlusion.  Mild to moderate stenosis of  the proximal aspect of the left subclavian artery.  No significant NASCET stenosis within either internal carotid artery.  The findings of the noncontrast head CT were discussed with Dr. Sethi on 2/15/2025 at approximately 3:35 p.m. The findings of the CT angiogram and CT perfusion study were discussed with Dr. Sethi at approximately 3:50 p.m.    Radiation dose reduction techniques were utilized, including automated exposure control and exposure modulation based on body size.   This report was finalized on 2/15/2025 4:40 PM by Dr. Sanya Armendariz M.D on Workstation: [id number]       Ambulatory status:   - assist    Social issues:   Social History     Socioeconomic History    Marital status:      Spouse name: Ashwin    Number of children: 3    Years of education: College   Tobacco Use    Smoking status: Never    Smokeless tobacco: Never    Tobacco comments:     caffine use   Vaping Use    Vaping status: Never Used   Substance and Sexual Activity    Alcohol use: No    Drug use: No    Sexual activity: Not Currently     Comment: Spouse, Ashwin       Peripheral Neurovascular  Peripheral Neurovascular (Adult)  Peripheral Neurovascular WDL: WDL  Additional Documentation: Edema (Group)  Edema  Edema: ankle, left, ankle, right  Ankle, Left Edema: 2+ (Mild)  Ankle, Right Edema: 2+ (Mild)    Neuro Cognitive  Neuro Cognitive (Adult)  Cognitive/Neuro/Behavioral WDL: .WDL except (Pt  reports noticing pt w/difficulty with her speech when he woke up around 8am, pt was already awake. Pt has difficulty getting some words out, reports dizziness and rt leg weakness, is able to answer most questions and follow commands.)  Additional Documentation: NIH Stroke Scale (group)  Marielos Coma Scale  Best Eye Response: 4-->(E4) spontaneous  Best Motor Response: 6-->(M6) obeys commands  Best Verbal Response: 5-->(V5) oriented  Marielos Coma Scale Score: 15  NIH Stroke Scale  Interval: 2 hrs posttreatment  1a. Level of  Consciousness: 0-->Alert, keenly responsive  1b. LOC Questions: 0-->Answers both questions correctly  1c. LOC Commands: 0-->Performs both tasks correctly  2. Best Gaze: 0-->Normal  3. Visual: 0-->No visual loss  4. Facial Palsy: 0-->Normal symmetrical movements  5a. Motor Arm, Left: 0-->No drift, limb holds 90 (or 45) degrees for full 10 secs  5b. Motor Arm, Right: 0-->No drift, limb holds 90 (or 45) degrees for full 10 secs  6a. Motor Leg, Left: 0-->No drift, leg holds 30 degree position for full 5 secs  6b. Motor Leg, Right: 1-->Drift, leg falls by the end of the 5-sec period but does not hit bed  7. Limb Ataxia: 0-->Absent  8. Sensory: 0-->Normal, no sensory loss  9. Best Language: 1-->Mild-to-moderate aphasia, some obvious loss of fluency or facility of comprehension, without significant limitation on ideas expressed or form of expression. Reduction of speech and/or comprehension, however, makes conversation. . . (see row details)  10. Dysarthria: 1-->Mild-to-moderate dysarthria, patient slurs at least some words and, at worst, can be understood with some difficulty  11. Extinction and Inattention (formerly Neglect): 0-->No abnormality  Total (NIH Stroke Scale): 3    Learning  Learning Assessment  Learning Readiness and Ability: cultural barrier identified, language barrier identified  Education Provided  Person Taught: patient  Teaching Method: verbal instruction    Respiratory  Respiratory  Airway WDL: WDL  Respiratory WDL  Respiratory WDL: WDL    Abdominal Pain       Pain Assessments  Pain (Adult)  (0-10) Pain Rating: Rest: 0    NIH Stroke Scale  NIH Stroke Scale  Interval: 2 hrs posttreatment  1a. Level of Consciousness: 0-->Alert, keenly responsive  1b. LOC Questions: 0-->Answers both questions correctly  1c. LOC Commands: 0-->Performs both tasks correctly  2. Best Gaze: 0-->Normal  3. Visual: 0-->No visual loss  4. Facial Palsy: 0-->Normal symmetrical movements  5a. Motor Arm, Left: 0-->No drift, limb  holds 90 (or 45) degrees for full 10 secs  5b. Motor Arm, Right: 0-->No drift, limb holds 90 (or 45) degrees for full 10 secs  6a. Motor Leg, Left: 0-->No drift, leg holds 30 degree position for full 5 secs  6b. Motor Leg, Right: 1-->Drift, leg falls by the end of the 5-sec period but does not hit bed  7. Limb Ataxia: 0-->Absent  8. Sensory: 0-->Normal, no sensory loss  9. Best Language: 1-->Mild-to-moderate aphasia, some obvious loss of fluency or facility of comprehension, without significant limitation on ideas expressed or form of expression. Reduction of speech and/or comprehension, however, makes conversation. . . (see row details)  10. Dysarthria: 1-->Mild-to-moderate dysarthria, patient slurs at least some words and, at worst, can be understood with some difficulty  11. Extinction and Inattention (formerly Neglect): 0-->No abnormality  Total (NIH Stroke Scale): 3    Azael Vital RN  02/15/25 16:54 EST

## 2025-02-16 ENCOUNTER — APPOINTMENT (OUTPATIENT)
Dept: MRI IMAGING | Facility: HOSPITAL | Age: 80
DRG: 065 | End: 2025-02-16
Payer: MEDICARE

## 2025-02-16 LAB
ANION GAP SERPL CALCULATED.3IONS-SCNC: 10 MMOL/L (ref 5–15)
BASOPHILS # BLD AUTO: 0.04 10*3/MM3 (ref 0–0.2)
BASOPHILS NFR BLD AUTO: 0.3 % (ref 0–1.5)
BUN SERPL-MCNC: 13 MG/DL (ref 8–23)
BUN/CREAT SERPL: 13.4 (ref 7–25)
CALCIUM SPEC-SCNC: 8.7 MG/DL (ref 8.6–10.5)
CHLORIDE SERPL-SCNC: 101 MMOL/L (ref 98–107)
CHOLEST SERPL-MCNC: 183 MG/DL (ref 0–200)
CO2 SERPL-SCNC: 23 MMOL/L (ref 22–29)
CREAT SERPL-MCNC: 0.97 MG/DL (ref 0.57–1)
DEPRECATED RDW RBC AUTO: 44.3 FL (ref 37–54)
EGFRCR SERPLBLD CKD-EPI 2021: 59.9 ML/MIN/1.73
EOSINOPHIL # BLD AUTO: 0.07 10*3/MM3 (ref 0–0.4)
EOSINOPHIL NFR BLD AUTO: 0.6 % (ref 0.3–6.2)
ERYTHROCYTE [DISTWIDTH] IN BLOOD BY AUTOMATED COUNT: 12.4 % (ref 12.3–15.4)
GLUCOSE BLDC GLUCOMTR-MCNC: 193 MG/DL (ref 70–130)
GLUCOSE BLDC GLUCOMTR-MCNC: 213 MG/DL (ref 70–130)
GLUCOSE BLDC GLUCOMTR-MCNC: 287 MG/DL (ref 70–130)
GLUCOSE BLDC GLUCOMTR-MCNC: 300 MG/DL (ref 70–130)
GLUCOSE BLDC GLUCOMTR-MCNC: 306 MG/DL (ref 70–130)
GLUCOSE SERPL-MCNC: 283 MG/DL (ref 65–99)
HBA1C MFR BLD: 7.7 % (ref 4.8–5.6)
HCT VFR BLD AUTO: 31.9 % (ref 34–46.6)
HDLC SERPL-MCNC: 42 MG/DL (ref 40–60)
HGB BLD-MCNC: 9.9 G/DL (ref 12–15.9)
IMM GRANULOCYTES # BLD AUTO: 0.05 10*3/MM3 (ref 0–0.05)
IMM GRANULOCYTES NFR BLD AUTO: 0.4 % (ref 0–0.5)
LDLC SERPL CALC-MCNC: 119 MG/DL (ref 0–100)
LDLC/HDLC SERPL: 2.78 {RATIO}
LYMPHOCYTES # BLD AUTO: 0.41 10*3/MM3 (ref 0.7–3.1)
LYMPHOCYTES NFR BLD AUTO: 3.5 % (ref 19.6–45.3)
MCH RBC QN AUTO: 30.2 PG (ref 26.6–33)
MCHC RBC AUTO-ENTMCNC: 31 G/DL (ref 31.5–35.7)
MCV RBC AUTO: 97.3 FL (ref 79–97)
MONOCYTES # BLD AUTO: 0.56 10*3/MM3 (ref 0.1–0.9)
MONOCYTES NFR BLD AUTO: 4.8 % (ref 5–12)
NEUTROPHILS NFR BLD AUTO: 10.54 10*3/MM3 (ref 1.7–7)
NEUTROPHILS NFR BLD AUTO: 90.4 % (ref 42.7–76)
NRBC BLD AUTO-RTO: 0 /100 WBC (ref 0–0.2)
PLATELET # BLD AUTO: 254 10*3/MM3 (ref 140–450)
PMV BLD AUTO: 9.6 FL (ref 6–12)
POTASSIUM SERPL-SCNC: 4.7 MMOL/L (ref 3.5–5.2)
RBC # BLD AUTO: 3.28 10*6/MM3 (ref 3.77–5.28)
SODIUM SERPL-SCNC: 134 MMOL/L (ref 136–145)
TRIGL SERPL-MCNC: 122 MG/DL (ref 0–150)
VLDLC SERPL-MCNC: 22 MG/DL (ref 5–40)
WBC NRBC COR # BLD AUTO: 11.67 10*3/MM3 (ref 3.4–10.8)

## 2025-02-16 PROCEDURE — 80048 BASIC METABOLIC PNL TOTAL CA: CPT | Performed by: INTERNAL MEDICINE

## 2025-02-16 PROCEDURE — 63710000001 INSULIN GLARGINE PER 5 UNITS: Performed by: INTERNAL MEDICINE

## 2025-02-16 PROCEDURE — 63710000001 INSULIN LISPRO (HUMAN) PER 5 UNITS: Performed by: INTERNAL MEDICINE

## 2025-02-16 PROCEDURE — 63710000001 PREDNISONE PER 5 MG: Performed by: INTERNAL MEDICINE

## 2025-02-16 PROCEDURE — 94799 UNLISTED PULMONARY SVC/PX: CPT

## 2025-02-16 PROCEDURE — 36415 COLL VENOUS BLD VENIPUNCTURE: CPT | Performed by: INTERNAL MEDICINE

## 2025-02-16 PROCEDURE — 25010000002 HYDRALAZINE PER 20 MG: Performed by: INTERNAL MEDICINE

## 2025-02-16 PROCEDURE — 94640 AIRWAY INHALATION TREATMENT: CPT

## 2025-02-16 PROCEDURE — 85025 COMPLETE CBC W/AUTO DIFF WBC: CPT | Performed by: INTERNAL MEDICINE

## 2025-02-16 PROCEDURE — 97530 THERAPEUTIC ACTIVITIES: CPT

## 2025-02-16 PROCEDURE — 70551 MRI BRAIN STEM W/O DYE: CPT

## 2025-02-16 PROCEDURE — 97162 PT EVAL MOD COMPLEX 30 MIN: CPT

## 2025-02-16 PROCEDURE — 94664 DEMO&/EVAL PT USE INHALER: CPT

## 2025-02-16 PROCEDURE — 94761 N-INVAS EAR/PLS OXIMETRY MLT: CPT

## 2025-02-16 PROCEDURE — 99222 1ST HOSP IP/OBS MODERATE 55: CPT | Performed by: PSYCHIATRY & NEUROLOGY

## 2025-02-16 PROCEDURE — 25010000002 ENOXAPARIN PER 10 MG: Performed by: INTERNAL MEDICINE

## 2025-02-16 PROCEDURE — 82948 REAGENT STRIP/BLOOD GLUCOSE: CPT

## 2025-02-16 PROCEDURE — 83036 HEMOGLOBIN GLYCOSYLATED A1C: CPT | Performed by: INTERNAL MEDICINE

## 2025-02-16 PROCEDURE — 80061 LIPID PANEL: CPT | Performed by: INTERNAL MEDICINE

## 2025-02-16 RX ORDER — NICOTINE POLACRILEX 4 MG
15 LOZENGE BUCCAL
Status: DISCONTINUED | OUTPATIENT
Start: 2025-02-16 | End: 2025-02-21 | Stop reason: HOSPADM

## 2025-02-16 RX ORDER — INSULIN LISPRO 100 [IU]/ML
2-7 INJECTION, SOLUTION INTRAVENOUS; SUBCUTANEOUS
Status: DISCONTINUED | OUTPATIENT
Start: 2025-02-16 | End: 2025-02-21 | Stop reason: HOSPADM

## 2025-02-16 RX ORDER — ATORVASTATIN CALCIUM 80 MG/1
80 TABLET, FILM COATED ORAL NIGHTLY
Status: DISCONTINUED | OUTPATIENT
Start: 2025-02-16 | End: 2025-02-21 | Stop reason: HOSPADM

## 2025-02-16 RX ORDER — DEXTROSE MONOHYDRATE 25 G/50ML
25 INJECTION, SOLUTION INTRAVENOUS
Status: DISCONTINUED | OUTPATIENT
Start: 2025-02-16 | End: 2025-02-21 | Stop reason: HOSPADM

## 2025-02-16 RX ORDER — ENOXAPARIN SODIUM 100 MG/ML
40 INJECTION SUBCUTANEOUS EVERY 12 HOURS
Status: DISCONTINUED | OUTPATIENT
Start: 2025-02-16 | End: 2025-02-21 | Stop reason: HOSPADM

## 2025-02-16 RX ORDER — IBUPROFEN 600 MG/1
1 TABLET ORAL
Status: DISCONTINUED | OUTPATIENT
Start: 2025-02-16 | End: 2025-02-21 | Stop reason: HOSPADM

## 2025-02-16 RX ORDER — ASPIRIN 81 MG/1
81 TABLET ORAL DAILY
Status: DISCONTINUED | OUTPATIENT
Start: 2025-02-17 | End: 2025-02-21 | Stop reason: HOSPADM

## 2025-02-16 RX ADMIN — INSULIN LISPRO 3 UNITS: 100 INJECTION, SOLUTION INTRAVENOUS; SUBCUTANEOUS at 20:35

## 2025-02-16 RX ADMIN — ENOXAPARIN SODIUM 40 MG: 100 INJECTION SUBCUTANEOUS at 13:02

## 2025-02-16 RX ADMIN — TORSEMIDE 20 MG: 20 TABLET ORAL at 09:46

## 2025-02-16 RX ADMIN — BUDESONIDE AND FORMOTEROL FUMARATE DIHYDRATE 2 PUFF: 160; 4.5 AEROSOL RESPIRATORY (INHALATION) at 21:34

## 2025-02-16 RX ADMIN — PANTOPRAZOLE SODIUM 40 MG: 40 TABLET, DELAYED RELEASE ORAL at 05:35

## 2025-02-16 RX ADMIN — INSULIN LISPRO 5 UNITS: 100 INJECTION, SOLUTION INTRAVENOUS; SUBCUTANEOUS at 13:02

## 2025-02-16 RX ADMIN — PREDNISONE 5 MG: 5 TABLET ORAL at 18:19

## 2025-02-16 RX ADMIN — AMLODIPINE BESYLATE 5 MG: 5 TABLET ORAL at 09:46

## 2025-02-16 RX ADMIN — ENOXAPARIN SODIUM 40 MG: 100 INJECTION SUBCUTANEOUS at 21:52

## 2025-02-16 RX ADMIN — LISINOPRIL 40 MG: 20 TABLET ORAL at 09:46

## 2025-02-16 RX ADMIN — INSULIN GLARGINE 25 UNITS: 100 INJECTION, SOLUTION SUBCUTANEOUS at 20:35

## 2025-02-16 RX ADMIN — ATORVASTATIN CALCIUM 80 MG: 80 TABLET, FILM COATED ORAL at 20:36

## 2025-02-16 RX ADMIN — HYDRALAZINE HYDROCHLORIDE 10 MG: 20 INJECTION INTRAMUSCULAR; INTRAVENOUS at 23:58

## 2025-02-16 RX ADMIN — ASPIRIN 325 MG ORAL TABLET 325 MG: 325 PILL ORAL at 09:46

## 2025-02-16 RX ADMIN — BISOPROLOL FUMARATE 20 MG: 5 TABLET ORAL at 20:36

## 2025-02-16 RX ADMIN — INSULIN LISPRO 2 UNITS: 100 INJECTION, SOLUTION INTRAVENOUS; SUBCUTANEOUS at 18:19

## 2025-02-16 RX ADMIN — BUDESONIDE AND FORMOTEROL FUMARATE DIHYDRATE 2 PUFF: 160; 4.5 AEROSOL RESPIRATORY (INHALATION) at 10:37

## 2025-02-16 NOTE — PLAN OF CARE
Goal Outcome Evaluation:              Outcome Evaluation: Pt is a 79 y.o. female admitted to [facility] with c/o AMS, difficulty speaking, and R Sided weakness on 2/15/2025. Work up reveals TIA. Pt reports symptoms improved since admission. Pt presents today with generalized weakness, and decreased functional mobility. Pt required min A for bed mobility, CGA for STS transfers, and CGA for ambulation with FWW for 30 ft. Pt will benefit from skilled PT to address the previous deficits and improve overall safety with functional mobility. Prior to admission pt lives with spouse at home and uses rollator for ambulation. Will continue to assess for appropriate d/c disposition IRF vs. Home with assist and HH PT pending progress.    Anticipated Discharge Disposition (PT): inpatient rehabilitation facility, home with home health, home with assist

## 2025-02-16 NOTE — PLAN OF CARE
Problem: Adult Inpatient Plan of Care  Goal: Plan of Care Review  Outcome: Progressing  Goal: Patient-Specific Goal (Individualized)  Outcome: Progressing  Goal: Absence of Hospital-Acquired Illness or Injury  Outcome: Progressing  Intervention: Identify and Manage Fall Risk  Recent Flowsheet Documentation  Taken 2/16/2025 0040 by Cindy Mercado RN  Safety Promotion/Fall Prevention:   room organization consistent   safety round/check completed  Taken 2/15/2025 2025 by Cindy Mercado RN  Safety Promotion/Fall Prevention:   safety round/check completed   room organization consistent  Intervention: Prevent Skin Injury  Recent Flowsheet Documentation  Taken 2/16/2025 0040 by Cindy Mercado RN  Skin Protection: incontinence pads utilized  Taken 2/15/2025 2025 by Cindy Mercado RN  Skin Protection: incontinence pads utilized  Goal: Optimal Comfort and Wellbeing  Outcome: Progressing  Intervention: Provide Person-Centered Care  Recent Flowsheet Documentation  Taken 2/15/2025 2025 by Cindy Mercaod RN  Trust Relationship/Rapport: care explained  Goal: Readiness for Transition of Care  Outcome: Progressing  Intervention: Mutually Develop Transition Plan  Recent Flowsheet Documentation  Taken 2/15/2025 2022 by Cindy Mercado RN  Transportation Anticipated: family or friend will provide  Patient/Family Anticipates Transition to: home with family  Taken 2/15/2025 2013 by Cindy Mercado RN  Equipment Currently Used at Home: walker, rolling     Problem: Skin Injury Risk Increased  Goal: Skin Health and Integrity  Outcome: Progressing  Intervention: Optimize Skin Protection  Recent Flowsheet Documentation  Taken 2/16/2025 0040 by Cindy Mercado RN  Pressure Reduction Techniques: frequent weight shift encouraged  Pressure Reduction Devices: pressure-redistributing mattress utilized  Skin Protection: incontinence pads utilized  Taken 2/15/2025 2025 by Cindy Mercado RN  Pressure Reduction Techniques: frequent weight shift  encouraged  Pressure Reduction Devices: pressure-redistributing mattress utilized  Skin Protection: incontinence pads utilized   Goal Outcome Evaluation:         Patient resting in bed. Mri completed . Results pending. No c/o pain or discomfort. Vss stable. Safety measures in place. Family at bedside.

## 2025-02-16 NOTE — OUTREACH NOTE
General Surgery Week 2 Survey      Flowsheet Row Responses   [facility] patient discharged from? [place]   Does the patient have one of the following disease processes/diagnoses(primary or secondary)? General Surgery   Week 2 attempt successful? No   Unsuccessful attempts Attempt 1   Revoke Readmitted            SAPNA VALENTIN - Registered Nurse

## 2025-02-16 NOTE — CONSULTS
Neurology Consult Note  Consult Date: 2/16/2025  Referring MD: Praneeth Valenzuela MD  Reason for Consult I have been asked to see the patient in neurological consultation to render advice and opinion regarding right-sided weakness and dysarthria    Blanca Harden is a 79 y.o. female with a medical history of hypertension, hyperlipidemia, obesity, CKD, diabetes, prior left breast cancer, prior temporal arteritis and ANAYELI, presenting to the stroke service for right-sided weakness.  Patient denies having similar symptoms in the past.  There is no visual disturbance.  No new headaches.  CT head negative for acute process.  CTA of the head and neck negative for any flow-limiting stenosis or large vessel occlusion, there is some mild to moderate stenosis of the left subclavian artery.  MRI of the brain without gadolinium demonstrates a left subcortical stroke without hemorrhagic transformation.    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 Daniel Gutierrez, [facility]    CARDIAC CATHETERIZATION       SECTION N/A     x3    CHOLECYSTECTOMY N/A     COLON RESECTION Right 2025    Procedure: COLON RESECTION LAPAROSCOPIC RIGHT WITH DAVINCI ROBOT;  Surgeon: Ashwin Alvarado MD;  Location: [facility] MAIN OR;  Service: Robotics - DaVinci;  Laterality: Right;    COLONOSCOPY      COLONOSCOPY N/A 2024    Procedure: COLONOSCOPY into the cecum and TI with hot snare polypectomy;  Surgeon: Ashwin Alvarado MD;  Location: [facility] ENDOSCOPY;  Service: General;  Laterality: N/A;  pre-colon polyps  post-colon mass    COLONOSCOPY W/ POLYPECTOMY N/A 2019    Enlarged folds in the antrum: biopsied; duodenal, transverse colon x2, splenic flexure, descending colon and sigmoid colon polyps: biopsied (path: tubular adenoma x6)-Dr. Zak De Jesus, [facility]    ENDOSCOPY  10/11/2019    Procedure: ESOPHAGOGASTRODUODENOSCOPY with hot snare polypectomy;  Surgeon: Haile Handley MD;  Location: [facility] ENDOSCOPY;  Service: Gastroenterology    ENDOSCOPY N/A 2020    Procedure: ESOPHAGOGASTRODUODENOSCOPY;  Surgeon: Haile Handley MD;  Location: [facility] ENDOSCOPY;  Service: Gastroenterology    ENDOSCOPY N/A 2020    Procedure: ESOPHAGOGASTRODUODENOSCOPY WITH BIOPSIES AND COLD BIOPSY POLYPECTOMY;  Surgeon: Haile Handley MD;  Location: [facility] ENDOSCOPY;  Service: Gastroenterology;  Laterality: N/A;  pre: dyspepsia and diarrhea  post: duodenal polyp and gastritis    ENDOSCOPY N/A 2024    Procedure: ESOPHAGOGASTRODUODENOSCOPY WITH hot snare polypectomy with clip placement x 2BIOPSY;  Surgeon: Ashwin Alvarado MD;  Location: [facility] ENDOSCOPY;  Service: General;  Laterality: N/A;  pre-hx duoedenal polyp  post-duodenal polyp; gastritis    EYE SURGERY      MASTECTOMY WITH SENTINEL NODE BIOPSY AND AXILLARY NODE DISSECTION Left 2019    Procedure: LEFT BREAST MASTECTOMY  WITH SENTINEL NODE BIOPSY AND , v-y plasty closure;  Surgeon: Tahmina James MD;  Location: [facility] MAIN OR;  Service: General    SUBTOTAL HYSTERECTOMY Bilateral     ovaries still in tact    TEMPORAL ARTERY BIOPSY Bilateral 12/05/2019     Medications On Admission  Medications Prior to Admission   Medication Sig Dispense Refill Last Dose/Taking    traMADol (ULTRAM) 50 MG tablet Take 1 tablet by mouth Every 6 (Six) Hours As Needed for Moderate Pain. 10 tablet 0 Taking As Needed    acetaminophen (TYLENOL) 325 MG tablet Take 2 tablets by mouth Every 6 (Six) Hours As Needed for Mild Pain. 30 tablet 3     albuterol (ACCUNEB) 1.25 MG/3ML nebulizer solution Take  by nebulization Every 4 (Four) Hours As Needed for Wheezing or Shortness of Air.       BD Pen Needle Tila 2nd Gen 32G X 4 MM misc USE TO GIVE INSULIN 4 TIMES DAILY       bisoprolol (ZEBeta) 10 MG tablet Take 2 tablets by mouth Every Night.       HumaLOG KwikPen 100 UNIT/ML solution pen-injector Inject  under the skin into the appropriate area as directed 3 (Three) Times a Day With Meals. SLIDING SCALE 18-24 UNITS       lisinopril (PRINIVIL,ZESTRIL) 40 MG tablet Take 1 tablet by mouth Daily.       O2 (OXYGEN) Inhale 3 L/min As Needed.       omeprazole (priLOSEC) 20 MG capsule Take 2 capsules by mouth Daily. 60 capsule 1     predniSONE (DELTASONE) 5 MG tablet Take 1 tablet by mouth Every Evening.       Symbicort 160-4.5 MCG/ACT inhaler Inhale 2 puffs 2 (Two) Times a Day.       torsemide (DEMADEX) 20 MG tablet Take 1 tablet by mouth Daily.       Toujeo SoloStar 300 UNIT/ML solution pen-injector injection Inject 25 Units under the skin into the appropriate area as directed Every Night.        Allergies:  Allergies   Allergen Reactions    Revefenacin Shortness Of Breath    Aspirin Nausea Only     ABDOMINAL PAIN     Sulfa Antibiotics Unknown (See Comments)     CHILDHOOD REACTION      Social Hx:  Social History     Socioeconomic History    Marital status:       "Spouse name: Ashwin    Number of children: 3    Years of education: College   Tobacco Use    Smoking status: Never    Smokeless tobacco: Never    Tobacco comments:     caffine use   Vaping Use    Vaping status: Never Used   Substance and Sexual Activity    Alcohol use: No    Drug use: No    Sexual activity: Not Currently     Comment: Spouse, Ashwin     Family Hx:  Family History   Problem Relation Age of Onset    Heart disease Mother     Stroke Mother     Asthma Mother     Hypertension Father     Stomach cancer Father     Diabetes Father     Esophageal cancer Father     Cancer Father     Throat cancer Sister     Diabetes Paternal Grandmother     Hypertension Paternal Grandfather     Colon cancer Paternal Grandfather     Colon cancer Paternal Uncle     Colon cancer Paternal Uncle     Colon cancer Paternal Uncle     Ovarian cancer Cousin     Stomach cancer Cousin     Malig Hyperthermia Neg Hx      Review of Systems as per HPI  Exam  /61 (BP Location: Right arm, Patient Position: Lying)   Pulse 97   Temp 98.1 °F (36.7 °C) (Oral)   Resp 17   Ht 152.4 cm (60\")   Wt 80.1 kg (176 lb 9.4 oz)   LMP  (LMP Unknown)   SpO2 95%   BMI 34.49 kg/m²     Current Facility-Administered Medications:     acetaminophen (TYLENOL) tablet 650 mg, 650 mg, Oral, Q6H PRN, Praneeth Valenzuela MD    albuterol (PROVENTIL) nebulizer solution 0.042% 1.25 mg/3mL, 1.25 mg, Nebulization, Q4H PRN, Praneeth Valenzuela MD    amLODIPine (NORVASC) tablet 5 mg, 5 mg, Oral, Q24H, Praneeth Valenzuela MD, 5 mg at 02/16/25 0946    aspirin tablet 325 mg, 325 mg, Oral, Daily, 325 mg at 02/16/25 0946 **OR** aspirin suppository 300 mg, 300 mg, Rectal, Daily, Praneeth Valenzuela MD    atorvastatin (LIPITOR) tablet 80 mg, 80 mg, Oral, Nightly, Praneeth Valenzuela MD    bisoprolol (ZEBeta) tablet 20 mg, 20 mg, Oral, Nightly, Praneeth Valenzuela MD, 20 mg at 02/15/25 1710    budesonide-formoterol (SYMBICORT) 160-4.5 MCG/ACT inhaler 2 puff, 2 puff, Inhalation, BID - " RT, Praneeth Valenzuela MD, 2 puff at 02/16/25 1037    dextrose (D50W) (25 g/50 mL) IV injection 25 g, 25 g, Intravenous, Q15 Min Nicolas WOLF Marlon R, MD    dextrose (GLUTOSE) oral gel 15 g, 15 g, Oral, Q15 Min PRNicolas OSUNA Marlon R, MD    Enoxaparin Sodium (LOVENOX) syringe 40 mg, 40 mg, Subcutaneous, Q12H, Praneeth Valenzuela MD, 40 mg at 02/16/25 1302    glucagon (GLUCAGEN) injection 1 mg, 1 mg, Intramuscular, Q15 Min Nicolas WOLF Marlon R, MD    hydrALAZINE (APRESOLINE) injection 10 mg, 10 mg, Intravenous, Q6H PRENRRIQUE, Praneeth Valenzuela MD    insulin glargine (LANTUS, SEMGLEE) injection 25 Units, 25 Units, Subcutaneous, Nightly, Praneeth Valenzuela MD    insulin lispro (HUMALOG/ADMELOG) injection 2-7 Units, 2-7 Units, Subcutaneous, 4x Daily AC & at Bedtime, Praneeth Valenzuela MD, 5 Units at 02/16/25 1302    lisinopril (PRINIVIL,ZESTRIL) tablet 40 mg, 40 mg, Oral, Daily, Praneeth Valenzuela MD, 40 mg at 02/16/25 0946    O2 (OXYGEN), 3 L/min, Inhalation, PRN, Praneeth Valenzuela MD    pantoprazole (PROTONIX) EC tablet 40 mg, 40 mg, Oral, Q AM, Praneeth Valenzuela MD, 40 mg at 02/16/25 0535    predniSONE (DELTASONE) tablet 5 mg, 5 mg, Oral, Q PM, Praneeth Valenzuela MD, 5 mg at 02/15/25 2147    sodium chloride 0.9 % flush 10 mL, 10 mL, Intravenous, PRN, Praneeth Valenzuela MD    torsemide (DEMADEX) tablet 20 mg, 20 mg, Oral, Daily, Praneeth Valenzuela MD, 20 mg at 02/16/25 0946    traMADol (ULTRAM) tablet 50 mg, 50 mg, Oral, Q6H PRN, Praneeth Valenzuela MD    PRN meds    acetaminophen    albuterol    dextrose    dextrose    glucagon (human recombinant)    hydrALAZINE    O2    sodium chloride    traMADol    No current facility-administered medications on file prior to encounter.     Current Outpatient Medications on File Prior to Encounter   Medication Sig    traMADol (ULTRAM) 50 MG tablet Take 1 tablet by mouth Every 6 (Six) Hours As Needed for Moderate Pain.    acetaminophen (TYLENOL) 325 MG tablet Take 2 tablets by mouth Every 6 (Six)  Hours As Needed for Mild Pain.    albuterol (ACCUNEB) 1.25 MG/3ML nebulizer solution Take  by nebulization Every 4 (Four) Hours As Needed for Wheezing or Shortness of Air.    BD Pen Needle Tila 2nd Gen 32G X 4 MM misc USE TO GIVE INSULIN 4 TIMES DAILY    bisoprolol (ZEBeta) 10 MG tablet Take 2 tablets by mouth Every Night.    HumaLOG KwikPen 100 UNIT/ML solution pen-injector Inject  under the skin into the appropriate area as directed 3 (Three) Times a Day With Meals. SLIDING SCALE 18-24 UNITS    lisinopril (PRINIVIL,ZESTRIL) 40 MG tablet Take 1 tablet by mouth Daily.    O2 (OXYGEN) Inhale 3 L/min As Needed.    omeprazole (priLOSEC) 20 MG capsule Take 2 capsules by mouth Daily.    predniSONE (DELTASONE) 5 MG tablet Take 1 tablet by mouth Every Evening.    Symbicort 160-4.5 MCG/ACT inhaler Inhale 2 puffs 2 (Two) Times a Day.    torsemide (DEMADEX) 20 MG tablet Take 1 tablet by mouth Daily.    Toujeo SoloStar 300 UNIT/ML solution pen-injector injection Inject 25 Units under the skin into the appropriate area as directed Every Night.       General appearance: NAD, alert and cooperative  HEENT: Normocephalic, atraumatic  Resp: Even and unlabored  Extremities: No obvious edema.  Skin: warm, dry    NIHSS      Level Of Consciousness 0   0=Alert; keenly responsive 1=Not alert, but arousable by minor stimulation 2=Not alert, requires repeated stimulation 3=Responds only with reflex movements      LOC Questions to Month and age 0  0=Answers both questions correctly 1=Answers one question correctly 2=Answers neither question correctly      LOC Commands -Open/Close eyes -Open/close  0   0=Performs both tasks correctly 1=Performs one task correctly 2=Performs neighter task correctly      Best Gaze 0   0=Normal 1=Partial gaze palsy 2=Forced deviation, or total gaze paresis      Visual 0   0=No visual loss 1=Partial hemianopia 2=Complete hemianopia 3=Bilateral hemianopia (blind including cortical blindness)      Facial Palsy  1  0=Normal symmetrical movement 1=Minor paralysis (asymmetry) 2=Partial paralysis (lower facde) 3=Complete paralysis (upper and lower face)      Motor:   Right Arm 0  0=No drift, limb holds posture for full 10 seconds 1=Drift, limb holds posture, no drift to bed 2=Some antigravity effort, cannot maintain posture, drifts to bed 3=No effort against gravity, limb falls 4=No movement    Left Arm 0  0=No drift, limb holds posture for full 10 seconds 1=Drift, limb holds posture, no drift to bed 2=Some antigravity effort, cannot maintain posture, drifts to bed 3=No effort against gravity, limb falls 4=No movement    Right Leg 0  0=No drift, limb holds posture for full 10 seconds 1=Drift, limb holds posture, no drift to bed 2=Some antigravity effort, cannot maintain posture, drifts to bed 3=No effort against gravity, limb falls 4=No movement    Left Leg  0  0=No drift, limb holds posture for full 10 seconds 1=Drift, limb holds posture, no drift to bed 2=Some antigravity effort, cannot maintain posture, drifts to bed 3=No effort against gravity, limb falls 4=No movement      Limb Ataxia 0   0=Absent 1=Present in one limb 2=Present in two limbs      Sensory 0  1=Normal 2=Mild to moderate sensory loss 3=Severe to total sensory loss      Best Language 0   0=No aphasia, normal 1=Mild to moderate aphasia 2=Mute, global aphasia 3=Multe, global aphasia      Dysarthria 1  0=Normal 1=Mild to moderate 2=Severe, unintelligible or mute/anarthric      Extinction/Neglect 0   0=No abnormality 1=Extinction to bilateral simultaneous stimulation 2=Profound neglect      Total  2; the right upper extremity pronates but there is no drift    Laboratory results:  Lab Results   Component Value Date    GLUCOSE 283 (H) 02/16/2025    CALCIUM 8.7 02/16/2025     (L) 02/16/2025    K 4.7 02/16/2025    CO2 23.0 02/16/2025     02/16/2025    BUN 13 02/16/2025    CREATININE 0.97 02/16/2025    EGFRIFNONA 46 (L) 02/14/2022    BCR 13.4 02/16/2025     ANIONGAP 10.0 02/16/2025     Lab Results   Component Value Date    WBC 11.67 (H) 02/16/2025    HGB 9.9 (L) 02/16/2025    HCT 31.9 (L) 02/16/2025    MCV 97.3 (H) 02/16/2025     02/16/2025     Lab Results   Component Value Date    CHOL 183 02/16/2025    CHOL 198 09/30/2024    CHOL 174 06/10/2024     Lab Results   Component Value Date    HDL 42 02/16/2025    HDL 50 09/30/2024    HDL 51 06/10/2024     Lab Results   Component Value Date     (H) 02/16/2025     (H) 09/30/2024     (H) 06/10/2024     Lab Results   Component Value Date    TRIG 122 02/16/2025    TRIG 191 (H) 09/30/2024    TRIG 100 06/10/2024     Lab Results   Component Value Date    HGBA1C 7.70 (H) 02/16/2025     Lab Results   Component Value Date    INR 1.08 02/15/2025    INR 0.94 10/28/2024    INR 0.95 06/30/2023    PROTIME 14.2 02/15/2025    PROTIME 10.8 10/28/2024    PROTIME 12.8 06/30/2023     Lab Results   Component Value Date    SIFGQAVH44 371 11/21/2024     Lab Results   Component Value Date    TSH 1.170 09/30/2024     Pain Management Panel  More data exists         Latest Ref Rng & Units 8/28/2024 6/10/2024   Pain Management Panel   Creatinine, Urine mg/dL 95.8  104.6       Details                  Brief Urine Lab Results  (Last result in the past 365 days)        Color   Clarity   Blood   Leuk Est   Nitrite   Protein   CREAT   Urine HCG        09/30/24 1317 Yellow   Clear   Small (1+)   Negative   Negative   >=300 mg/dL (3+)                   Lab review: I personally reviewed all labs as documented above.    Imaging review:   MRI Brain Without Contrast    Result Date: 2/16/2025  1. Study is positive for an acute infarct within the left corona radiata/basal ganglia. No evidence of hemorrhagic information..  FINDINGS were called to the patient's nurse at 6:20 a.m.   This report was finalized on 2/16/2025 6:22 AM by Dr. Tanya Prince M.D on Workstation: [id number]      CT Angiogram Head w AI Analysis of LVO    Result  Date: 2/15/2025   No convincing evidence for an acute infarct on either the noncontrast head CT or the CT perfusion study. Further evaluation could be performed with MR imaging for more sensitive and specific assessment.  No evidence for large vessel occlusion.  Mild to moderate stenosis of the proximal aspect of the left subclavian artery.  No significant NASCET stenosis within either internal carotid artery.  The findings of the noncontrast head CT were discussed with Dr. Sethi on 2/15/2025 at approximately 3:35 p.m. The findings of the CT angiogram and CT perfusion study were discussed with Dr. Sethi at approximately 3:50 p.m.    Radiation dose reduction techniques were utilized, including automated exposure control and exposure modulation based on body size.   This report was finalized on 2/15/2025 4:40 PM by Dr. Sanya Armendariz M.D on Workstation: [id number]      CT Angiogram Neck    Result Date: 2/15/2025   No convincing evidence for an acute infarct on either the noncontrast head CT or the CT perfusion study. Further evaluation could be performed with MR imaging for more sensitive and specific assessment.  No evidence for large vessel occlusion.  Mild to moderate stenosis of the proximal aspect of the left subclavian artery.  No significant NASCET stenosis within either internal carotid artery.  The findings of the noncontrast head CT were discussed with Dr. Sethi on 2/15/2025 at approximately 3:35 p.m. The findings of the CT angiogram and CT perfusion study were discussed with Dr. Sethi at approximately 3:50 p.m.    Radiation dose reduction techniques were utilized, including automated exposure control and exposure modulation based on body size.   This report was finalized on 2/15/2025 4:40 PM by Dr. Sanya Armendariz M.D on Workstation: [id number]      CT CEREBRAL PERFUSION WITH & WITHOUT CONTRAST    Result Date: 2/15/2025   No convincing evidence for an acute infarct on either the  noncontrast head CT or the CT perfusion study. Further evaluation could be performed with MR imaging for more sensitive and specific assessment.  No evidence for large vessel occlusion.  Mild to moderate stenosis of the proximal aspect of the left subclavian artery.  No significant NASCET stenosis within either internal carotid artery.  The findings of the noncontrast head CT were discussed with Dr. Sethi on 2/15/2025 at approximately 3:35 p.m. The findings of the CT angiogram and CT perfusion study were discussed with Dr. Sethi at approximately 3:50 p.m.    Radiation dose reduction techniques were utilized, including automated exposure control and exposure modulation based on body size.   This report was finalized on 2/15/2025 4:40 PM by Dr. Sanya Armendariz M.D on Workstation: [id number]     Results for orders placed during the hospital encounter of 05/13/22    Adult Transthoracic Echo Complete W/ Cont if Necessary Per Protocol    Interpretation Summary  · Estimated right ventricular systolic pressure from tricuspid regurgitation is mildly elevated (35-45 mmHg). Calculated right ventricular systolic pressure from tricuspid regurgitation is 37.7 mmHg.  · Mild pulmonary hypertension is present.  · Left ventricular wall thickness is consistent with mild concentric hypertrophy.  · Calculated left ventricular EF = 66.7% Estimated left ventricular EF was in agreement with the calculated left ventricular EF. Left ventricular systolic function is normal.  · Left ventricular diastolic function is consistent with (grade Ia w/high LAP) impaired relaxation.  · C/w baseline study, there is no change in LV systolic function.      I personally reviewed images and agree with radiology report.    Diagnosis:  Left basal ganglia stroke of unclear etiology.  Further workup is warranted.    PLAN:   --Neurochecks and vitals per unit  --Enteric-coated aspirin 81 mg daily.  Patient reports GI distress with nonenteric coated  aspirin  --High intensity statin  --BP permissive to 160/90 for now  --TTE without bubble study  --Antidiabetics for goal A1c of less than 7%  --CPAP while asleep  --Patient encouraged to visit sleep doctor after this admission for potential retitration of her CPAP given new stroke  --PT/OT/speech to eval and treat  --Delirium precautions  --Therapies as written. CCP for discharge planning. Call RRT for any acute neurological changes. We will continue to follow and advise.    Coni Sethi MD  Neurohospitalist

## 2025-02-16 NOTE — H&P
HISTORY AND PHYSICAL   [facility], [facility]      2025    Patient Identification:    Name: Blanca Harden  Age: 79 y.o.  Sex: female  :  1945  MRN: [id number]                       Primary Care Physician: Praneeth Valenzuela MD    Chief Complaint:      Chief Complaint   Patient presents with    Neuro Deficit(s)         History of Present Illness:     Patient is a 79-year-old female, patient of office.  Patient has history of severe asthma, chronic kidney disease stage III, uncontrolled diabetes mellitus type 2, hypertension, temporal arteritis, chronic diastolic congestive heart failure, hypertension, GERD.  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  Patient has been doing okay and recovering.  However, this morning, patient woke up with slurred speech and could not find her words, she also had right lower extremity weakness.  Patient was brought to the emergency room.  His NIH stroke scale was 4, CT angiogram showed no complaints with this 1 acute infarct on either the: Contrast CT ordered CT perfusion study.  Patient creatinine was 1.14, sodium 134, WBC 13.68, hemoglobin 10.9.  Neurology team D was consulted.  In the ER, patient was given aspirin.  Patient was admitted for further management.  An MRI was already done and actually showed acute infarct within the left corona radiata/basal ganglia, no hemorrhagic transformation.  Patient passed a swallow evaluation, is taking aspirin p.o. as well as starting p.o.  Neurology team has been consulted.    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, grade III, 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    Hypertension     Lower extremity edema     Moderate persistent asthma     On home O2     3L NC PRN    Pulmonary hypertension     Sleep apnea     Swelling     IN LOWER EXTREMITIES    Temporal arteritis     Varicose veins of unspecified lower extremity with ulcer of calf 2022    Venous insufficiency (chronic) (peripheral)      Past Surgical History:  Past Surgical History:   Procedure Laterality Date    BREAST BIOPSY Left  approx    benign pathology    BREAST BIOPSY Left 2019    Ultasound guided mammotome vacuum assisted left breast biopsy with placement of a metallic clip-Dr. Daniel Gutierrez, [facility]    CARDIAC CATHETERIZATION       SECTION N/A     x3    CHOLECYSTECTOMY N/A     COLON RESECTION Right 2025    Procedure: COLON RESECTION LAPAROSCOPIC RIGHT WITH DAVINCI ROBOT;  Surgeon: Ashwin Alvarado MD;  Location: [facility] OR;  Service: Robotics - DaVinci;  Laterality: Right;    COLONOSCOPY      COLONOSCOPY N/A 2024    Procedure: COLONOSCOPY into the cecum and TI with hot snare polypectomy;  Surgeon: Ashwin Alvarado MD;  Location: [facility] ENDOSCOPY;  Service: General;  Laterality: N/A;  pre-colon polyps  post-colon mass    COLONOSCOPY W/ POLYPECTOMY N/A 2019    Enlarged folds in the antrum: biopsied; duodenal, transverse colon x2, splenic flexure, descending colon and sigmoid colon polyps: biopsied (path: tubular adenoma x6)-Dr. Zak De Jesus, [facility]    ENDOSCOPY  10/11/2019    Procedure: ESOPHAGOGASTRODUODENOSCOPY with hot snare polypectomy;  Surgeon: Haile Handley MD;  Location: [facility] ENDOSCOPY;  Service: Gastroenterology    ENDOSCOPY N/A 2020    Procedure: ESOPHAGOGASTRODUODENOSCOPY;  Surgeon: Ender  Haile BRAR MD;  Location: [facility] ENDOSCOPY;  Service: Gastroenterology    ENDOSCOPY N/A 09/09/2020    Procedure: ESOPHAGOGASTRODUODENOSCOPY WITH BIOPSIES AND COLD BIOPSY POLYPECTOMY;  Surgeon: Haile Handley MD;  Location: [facility] ENDOSCOPY;  Service: Gastroenterology;  Laterality: N/A;  pre: dyspepsia and diarrhea  post: duodenal polyp and gastritis    ENDOSCOPY N/A 07/23/2024    Procedure: ESOPHAGOGASTRODUODENOSCOPY WITH hot snare polypectomy with clip placement x 2BIOPSY;  Surgeon: Ashwin Alvarado MD;  Location: [facility] ENDOSCOPY;  Service: General;  Laterality: N/A;  pre-hx duoedenal polyp  post-duodenal polyp; gastritis    EYE SURGERY      MASTECTOMY WITH SENTINEL NODE BIOPSY AND AXILLARY NODE DISSECTION Left 07/03/2019    Procedure: LEFT BREAST MASTECTOMY WITH SENTINEL NODE BIOPSY AND , v-y plasty closure;  Surgeon: Tahmina James MD;  Location: [facility] MAIN OR;  Service: General    SUBTOTAL HYSTERECTOMY Bilateral     ovaries still in tact    TEMPORAL ARTERY BIOPSY Bilateral 12/05/2019      Home Meds:  Medications Prior to Admission   Medication Sig Dispense Refill Last Dose/Taking    traMADol (ULTRAM) 50 MG tablet Take 1 tablet by mouth Every 6 (Six) Hours As Needed for Moderate Pain. 10 tablet 0 Taking As Needed    acetaminophen (TYLENOL) 325 MG tablet Take 2 tablets by mouth Every 6 (Six) Hours As Needed for Mild Pain. 30 tablet 3     albuterol (ACCUNEB) 1.25 MG/3ML nebulizer solution Take  by nebulization Every 4 (Four) Hours As Needed for Wheezing or Shortness of Air.       BD Pen Needle Tila 2nd Gen 32G X 4 MM misc USE TO GIVE INSULIN 4 TIMES DAILY       bisoprolol (ZEBeta) 10 MG tablet Take 2 tablets by mouth Every Night.       HumaLOG KwikPen 100 UNIT/ML solution pen-injector Inject  under the skin into the appropriate area as directed 3 (Three) Times a Day With Meals. SLIDING SCALE 18-24 UNITS       lisinopril (PRINIVIL,ZESTRIL) 40 MG tablet Take 1 tablet by mouth Daily.       O2  (OXYGEN) Inhale 3 L/min As Needed.       omeprazole (priLOSEC) 20 MG capsule Take 2 capsules by mouth Daily. 60 capsule 1     predniSONE (DELTASONE) 5 MG tablet Take 1 tablet by mouth Every Evening.       Symbicort 160-4.5 MCG/ACT inhaler Inhale 2 puffs 2 (Two) Times a Day.       torsemide (DEMADEX) 20 MG tablet Take 1 tablet by mouth Daily.       Toujeo SoloStar 300 UNIT/ML solution pen-injector injection Inject 25 Units under the skin into the appropriate area as directed Every Night.          Allergies:  Allergies   Allergen Reactions    Revefenacin Shortness Of Breath    Aspirin Nausea Only     ABDOMINAL PAIN     Sulfa Antibiotics Unknown (See Comments)     CHILDHOOD REACTION      Immunizations:  Immunization History   Administered Date(s) Administered    Arexvy (RSV, Adults 60+ yrs) 09/23/2023    COVID-19 (PFIZER) 12YRS+ (COMIRNATY) 12/23/2023, 12/10/2024    COVID-19 (PFIZER) BIVALENT 12+YRS 10/29/2022    COVID-19 (PFIZER) Purple Cap Monovalent 02/24/2021, 03/17/2021, 10/16/2021    Covid-19 (Pfizer) Gray Cap Monovalent 04/30/2022    FLUAD TRI 65YR+ 10/01/2022    FluMist 2-49yrs 10/13/2021    Fluad Quad 65+ 10/13/2021    Fluzone High-Dose 65+YRS 10/04/2016, 11/17/2017, 09/25/2018, 01/31/2020, 09/29/2020, 10/12/2021, 09/16/2024    Fluzone High-Dose 65+yrs 09/29/2020, 10/12/2021, 09/23/2023    Fluzone Quad >6mos (Multi-dose) 09/29/2020    Hepatitis A 05/03/2018, 11/09/2018, 12/16/2019    INFLUENZA SPLIT TRI 10/11/2016    Influenza Seasonal Injectable 10/11/2016    Influenza, Unspecified 09/25/2018, 10/12/2021    Pneumococcal Conjugate 13-Valent (PCV13) 10/04/2016, 10/12/2016    Pneumococcal Conjugate 20-Valent (PCV20) 02/02/2024    Pneumococcal Polysaccharide (PPSV23) 10/12/2016, 01/01/2017, 09/25/2018    Pneumococcal, Unspecified 01/01/2017    Typhoid Conjugate Vaccine 09/29/2020     Social History:   Social History     Social History Narrative    Not on file     Social History     Tobacco Use    Smoking status:  "Never    Smokeless tobacco: Never    Tobacco comments:     caffine use   Substance Use Topics    Alcohol use: No     Family History:  Family History   Problem Relation Age of Onset    Heart disease Mother     Stroke Mother     Asthma Mother     Hypertension Father     Stomach cancer Father     Diabetes Father     Esophageal cancer Father     Cancer Father     Throat cancer Sister     Diabetes Paternal Grandmother     Hypertension Paternal Grandfather     Colon cancer Paternal Grandfather     Colon cancer Paternal Uncle     Colon cancer Paternal Uncle     Colon cancer Paternal Uncle     Ovarian cancer Cousin     Stomach cancer Cousin     Malig Hyperthermia Neg Hx         Review of Systems  See history of present illness and past medical history.    Remainder of ROS is negative.    Objective:    Exam:    T MAX 24 hrs: Temp (24hrs), Av °F (36.7 °C), Min:97.5 °F (36.4 °C), Max:98.5 °F (36.9 °C)    Vitals Ranges:   Temp:  [97.5 °F (36.4 °C)-98.5 °F (36.9 °C)] 97.9 °F (36.6 °C)  Heart Rate:  [] 79  Resp:  [14-18] 18  BP: (158-195)/(51-89) 167/51    /51 (BP Location: Right arm, Patient Position: Lying)   Pulse 79   Temp 97.9 °F (36.6 °C) (Oral)   Resp 18   Ht 152.4 cm (60\")   Wt 80.1 kg (176 lb 9.4 oz)   LMP  (LMP Unknown)   SpO2 99%   BMI 34.49 kg/m²     General: Alert, oriented x 3. Cooperative, no distress, appears stated age.  Patient has slurred speech but, according to her and her , it is better than yesterday.   HEENT:    Head: Normocephalic, without obvious abnormality, atraumatic  Eyes: EOM are normal. Pupils are equal  Oropharynx: Mucosa and tongue dry  Neck: Supple, symmetrical, trachea midline, no adenopathy;              thyroid:  no enlargement/tenderness/nodules;              no carotid bruit or JVD  Cardiovascular: Normal rate, regular rhythm and intact distal pulses.              Exam reveals no gallop and no friction rub. No murmur heard  Chest wall: No tenderness or " deformity  Pulmonary: Diminished breath sounds bilaterally, respirations unlabored.               No rhonchi, wheezing or rales.   Abdominal: Soft, nontender, bowel sounds active all four quadrants,     no masses, no hepatomegaly, no splenomegaly.   Extremities: Normal, atraumatic, no cyanosis. + Pitting edema  Pulses: 2 + symmetric all extremities  Neurological: Patient is alert and oriented to person, place, and time.  Patient has slurred speech, although improving from yesterday.  Has a right facial droop which has not changed since yesterday.  And right lower extremity weakness 3/5.  Bilateral upper extremity and left lower extremity 5/5.  Skin: Skin color, texture, normal. Turgor is decreased. No rashes or lesions      Data Review:    Results from last 7 days   Lab Units 02/16/25  0810 02/15/25  1522 02/10/25  1542   WBC 10*3/mm3 11.67* 13.68* 13.97*   HEMOGLOBIN g/dL 9.9* 10.9* 10.5*   HEMATOCRIT % 31.9* 34.3 34.9   PLATELETS 10*3/mm3 254 351 290       Results from last 7 days   Lab Units 02/16/25  0810 02/15/25  1522 02/10/25  1542   SODIUM mmol/L 134* 134* 137   POTASSIUM mmol/L 4.7 4.3 4.3   CHLORIDE mmol/L 101 99 103   CO2 mmol/L 23.0 24.4 24.2   BUN mg/dL 13 17 29*   CREATININE mg/dL 0.97 1.14* 1.31*   CALCIUM mg/dL 8.7 9.0 8.6   BILIRUBIN mg/dL  --  0.3 0.3   ALK PHOS U/L  --  136* 130*   ALT (SGPT) U/L  --  15 14   AST (SGOT) U/L  --  17 19   GLUCOSE mg/dL 283* 50* 167*     Results from last 7 days   Lab Units 02/15/25  1522   INR  1.08             Lab Results   Lab Value Date/Time    TROPONINT 34 (H) 10/01/2023 1149    TROPONINT 56 (C) 06/30/2023 1959    TROPONINT 61 (C) 06/30/2023 1601    TROPONINT 28 (H) 05/21/2023 1920    TROPONINT 25 (H) 05/21/2023 1615       Brief Urine Lab Results  (Last result in the past 365 days)        Color   Clarity   Blood   Leuk Est   Nitrite   Protein   CREAT   Urine HCG        09/30/24 1317 Yellow   Clear   Small (1+)   Negative   Negative   >=300 mg/dL (3+)                     Imaging Results (All)       Procedure Component Value Units Date/Time    MRI Brain Without Contrast [[id number]] Collected: 02/16/25 0616     Updated: 02/16/25 0625    Narrative:      BRAIN MRI WITHOUT CONTRAST     HISTORY: Altered mental status, focal weakness; R47.9-Unspecified speech  disturbances; R29.898-Other symptoms and signs involving the  musculoskeletal system; R42-Dizziness and giddiness; I10-Essential  (primary) hypertension; N18.9-Chronic kidney disease, unspecified;  Z86.39-Personal history of other endocrine, nutritional and metabolic  disease     COMPARISON: October 2, 2023.     FINDINGS:  Multiplanar images of the head were obtained without  gadolinium. The patient has an area of restricted diffusion within the  left corona radiata, with extension into the left basal ganglia. No  additional areas of restricted diffusion are seen. There is diffuse  atrophy. There is periventricular deep white matter microangiopathic  change. The intracranial flow voids appear intact. No abnormality is  seen on susceptibility weighted imaging. There is mucosal thickening  within the ethmoid sinuses, with extensive opacification of the left  maxillary sinus noted.       Impression:      1. Study is positive for an acute infarct within the left corona  radiata/basal ganglia. No evidence of hemorrhagic information..     FINDINGS were called to the patient's nurse at 6:20 a.m.        This report was finalized on 2/16/2025 6:22 AM by Dr. Tanya Prince M.D on Workstation: [id number]       XR Chest 1 View [[id number]] Collected: 02/15/25 1654     Updated: 02/15/25 1707    Narrative:      XR CHEST 1 VW-     CLINICAL HISTORY:  Acute Stroke Protocol (onset < 12 hrs);  R47.9-Unspecified speech disturbances; R29.898-Other symptoms and signs  involving the musculoskeletal system; R42-Dizziness and giddiness;  I10-Essential (primary) hypertension; N18.9-Chronic kidney disease,  unspecified; Z86.39-Personal history of  other endocrine, nutritional and  metabolic disease     FINDINGS:     The lung volumes are low. There our mild bibasilar atelectatic changes.  The cardiomediastinal silhouette is mildly enlarged but stable since  01/29/2025. The osseous structures demonstrate no significant  abnormalities.           This report was finalized on 2/15/2025 5:04 PM by Dr. Sanya Armendariz M.D  on Workstation: [id number]       CT Angiogram Head w AI Analysis of LVO [[id number]] Collected: 02/15/25 1612     Updated: 02/15/25 1643    Narrative:      CT ANGIOGRAM OF THE HEAD AND NECK AND CT PERFUSION STUDY     CLINICAL HISTORY: Speech difficulties, dizziness, and right leg  weakness.     TECHNIQUE:  A noncontrast head CT was performed with 3 mm axial images.  Thereafter, a CT perfusion study was performed after the dynamic bolus  of IV contrast. Standard perfusion maps were constructed with RAPID  software. A CT angiogram of the head and neck was then performed with 1  mm axial images. Sagittal, coronal, and 3-dimensional reconstructed  images were obtained. Finally, a delayed postcontrast head CT was  performed with 3 mm axial images.     FINDINGS:     HEAD CT: No convincing evidence for an acute intracranial abnormality.  Mild to moderate changes of chronic small vessel ischemic phenomenon.  Near complete opacification of the left maxillary sinus with mucosal  thickening and secretions.     CT PERFUSION STUDY: No abnormality on the CBF less than 30% or Tmax  greater than 6 seconds maps.     CTA HEAD:     Posterior Circulation: Unremarkable appearance of the vertebral  arteries, basilar artery, and posterior cerebral arteries.  Anterior Circulation: Atherosclerotic calcifications within the carotid  siphons resulting in relatively mild degrees of luminal compromise.  Unremarkable appearance of the anterior and middle cerebral arteries.     CTA NECK:       Aortic Arch: Classic configuration. Mild atherosclerotic involvement of  the aortic  arch.  Subclavian Arteries: Mild to moderate stenosis of the proximal aspect  the left subclavian artery secondary to predominantly noncalcified  atherosclerotic changes.  Vertebral Arteries: Unremarkable.  CCA/ICA's: Atherosclerotic changes without evidence for a NASCET  stenosis.       Impression:         No convincing evidence for an acute infarct on either the noncontrast  head CT or the CT perfusion study. Further evaluation could be performed  with MR imaging for more sensitive and specific assessment.     No evidence for large vessel occlusion.     Mild to moderate stenosis of the proximal aspect of the left subclavian  artery.     No significant NASCET stenosis within either internal carotid artery.     The findings of the noncontrast head CT were discussed with Dr. Sethi on 2/15/2025 at approximately 3:35 p.m. The findings of the CT  angiogram and CT perfusion study were discussed with Dr. Sethi at  approximately 3:50 p.m.           Radiation dose reduction techniques were utilized, including automated  exposure control and exposure modulation based on body size.        This report was finalized on 2/15/2025 4:40 PM by Dr. Sanya Armendariz M.D  on Workstation: [id number]       CT Angiogram Neck [[id number]] Collected: 02/15/25 1612     Updated: 02/15/25 1643    Narrative:      CT ANGIOGRAM OF THE HEAD AND NECK AND CT PERFUSION STUDY     CLINICAL HISTORY: Speech difficulties, dizziness, and right leg  weakness.     TECHNIQUE:  A noncontrast head CT was performed with 3 mm axial images.  Thereafter, a CT perfusion study was performed after the dynamic bolus  of IV contrast. Standard perfusion maps were constructed with RAPID  software. A CT angiogram of the head and neck was then performed with 1  mm axial images. Sagittal, coronal, and 3-dimensional reconstructed  images were obtained. Finally, a delayed postcontrast head CT was  performed with 3 mm axial images.     FINDINGS:     HEAD CT: No  convincing evidence for an acute intracranial abnormality.  Mild to moderate changes of chronic small vessel ischemic phenomenon.  Near complete opacification of the left maxillary sinus with mucosal  thickening and secretions.     CT PERFUSION STUDY: No abnormality on the CBF less than 30% or Tmax  greater than 6 seconds maps.     CTA HEAD:     Posterior Circulation: Unremarkable appearance of the vertebral  arteries, basilar artery, and posterior cerebral arteries.  Anterior Circulation: Atherosclerotic calcifications within the carotid  siphons resulting in relatively mild degrees of luminal compromise.  Unremarkable appearance of the anterior and middle cerebral arteries.     CTA NECK:       Aortic Arch: Classic configuration. Mild atherosclerotic involvement of  the aortic arch.  Subclavian Arteries: Mild to moderate stenosis of the proximal aspect  the left subclavian artery secondary to predominantly noncalcified  atherosclerotic changes.  Vertebral Arteries: Unremarkable.  CCA/ICA's: Atherosclerotic changes without evidence for a NASCET  stenosis.       Impression:         No convincing evidence for an acute infarct on either the noncontrast  head CT or the CT perfusion study. Further evaluation could be performed  with MR imaging for more sensitive and specific assessment.     No evidence for large vessel occlusion.     Mild to moderate stenosis of the proximal aspect of the left subclavian  artery.     No significant NASCET stenosis within either internal carotid artery.     The findings of the noncontrast head CT were discussed with Dr. Sethi on 2/15/2025 at approximately 3:35 p.m. The findings of the CT  angiogram and CT perfusion study were discussed with Dr. Sethi at  approximately 3:50 p.m.           Radiation dose reduction techniques were utilized, including automated  exposure control and exposure modulation based on body size.        This report was finalized on 2/15/2025 4:40 PM by   Sanya Armendariz M.D  on Workstation: [id number]       CT CEREBRAL PERFUSION WITH & WITHOUT CONTRAST [[id number]] Collected: 02/15/25 1612     Updated: 02/15/25 1643    Narrative:      CT ANGIOGRAM OF THE HEAD AND NECK AND CT PERFUSION STUDY     CLINICAL HISTORY: Speech difficulties, dizziness, and right leg  weakness.     TECHNIQUE:  A noncontrast head CT was performed with 3 mm axial images.  Thereafter, a CT perfusion study was performed after the dynamic bolus  of IV contrast. Standard perfusion maps were constructed with RAPID  software. A CT angiogram of the head and neck was then performed with 1  mm axial images. Sagittal, coronal, and 3-dimensional reconstructed  images were obtained. Finally, a delayed postcontrast head CT was  performed with 3 mm axial images.     FINDINGS:     HEAD CT: No convincing evidence for an acute intracranial abnormality.  Mild to moderate changes of chronic small vessel ischemic phenomenon.  Near complete opacification of the left maxillary sinus with mucosal  thickening and secretions.     CT PERFUSION STUDY: No abnormality on the CBF less than 30% or Tmax  greater than 6 seconds maps.     CTA HEAD:     Posterior Circulation: Unremarkable appearance of the vertebral  arteries, basilar artery, and posterior cerebral arteries.  Anterior Circulation: Atherosclerotic calcifications within the carotid  siphons resulting in relatively mild degrees of luminal compromise.  Unremarkable appearance of the anterior and middle cerebral arteries.     CTA NECK:       Aortic Arch: Classic configuration. Mild atherosclerotic involvement of  the aortic arch.  Subclavian Arteries: Mild to moderate stenosis of the proximal aspect  the left subclavian artery secondary to predominantly noncalcified  atherosclerotic changes.  Vertebral Arteries: Unremarkable.  CCA/ICA's: Atherosclerotic changes without evidence for a NASCET  stenosis.       Impression:         No convincing evidence for an acute  infarct on either the noncontrast  head CT or the CT perfusion study. Further evaluation could be performed  with MR imaging for more sensitive and specific assessment.     No evidence for large vessel occlusion.     Mild to moderate stenosis of the proximal aspect of the left subclavian  artery.     No significant NASCET stenosis within either internal carotid artery.     The findings of the noncontrast head CT were discussed with Dr. Sethi on 2/15/2025 at approximately 3:35 p.m. The findings of the CT  angiogram and CT perfusion study were discussed with Dr. Sethi at  approximately 3:50 p.m.           Radiation dose reduction techniques were utilized, including automated  exposure control and exposure modulation based on body size.        This report was finalized on 2/15/2025 4:40 PM by Dr. Sanya Armendariz M.D  on Workstation: [id number]               Assessment:    Acute CVA within the left corona radiata/basal ganglia  Aphasia due to CVA  Right lower extremity weakness due to CVA  Hypertension  Uncontrolled diabetes mellitus  Chronic diastolic congestive heart failure  Pulmonary hypertension  Obstructive sleep apnea, noncompliant with CPAP  Moderate persistent asthma  Coronary artery disease  Chronic kidney disease stage IIIa  Iron deficiency anemia  Morbid obesity affecting all aspects of care  Recent right hemicolectomy  Status post left mastectomy due to breast cancer  Temporal arteritis on steroids    Plan:    Inpatient admission  Patient has developed acute CVA.  Left corona radiata/basal ganglia.  Has received aspirin in the emergency room.  Will continue with aspirin daily as well as high-dose statin.  CT angiogram/MRI reviewed.  Will check 2D echo.  Will ask neurology to see patient.  Speech therapy to work with patient for aphasia.  Physical therapy to work with patient, for right lower extremity weakness.  Will adjust blood pressure medications.  Norvasc has been added to the regimen, on  hydralazine IV as needed.  Will continue to adjust medications according to blood pressure.  Patient is euvolemic.  Continue medication for diastolic congestive heart failure.  Will monitor oxygen, has history of sleep apnea and hypercapnia recent hospitalization.  May need to place her on CPAP/BiPAP at nighttime, will monitor.  Monitor and correct electrolytes, sodium is 134, otherwise patient is stable.  Accu-Chek and SSI, blood sugar has been elevated, in the mid 200s.  Lantus was started today.  Add sliding scale insulin.  A1c is 7.7.  Monitor mental status, besides aphasia, she is alert oriented x 3.  Will follow closely.  Neuro: As well.  Home medications  DVT prophylaxis: Lovenox  Stress ulcer prophylaxis:Pantoprazole.  Consult PT/OT/ST.  Labs in am        Praneeth Valenzuela MD  2/16/2025  09:44 EST       I wore protective equipment throughout this patient encounter including a face mask, gloves, and protective eyewear.  Hand hygiene was performed before donning protective equipment and after removal when leaving the room.

## 2025-02-16 NOTE — PLAN OF CARE
Goal Outcome Evaluation:  Plan of Care Reviewed With: patient        Progress: no change  Outcome Evaluation: SR, RA, A/Ox4. PT consutled during shift. Lovenox, Lipitor, and SSI added per MD orders. TTE ordered, to be compelted. Discharge planning to be evaluated pending medical clearance.

## 2025-02-17 ENCOUNTER — APPOINTMENT (OUTPATIENT)
Dept: GENERAL RADIOLOGY | Facility: HOSPITAL | Age: 80
DRG: 065 | End: 2025-02-17
Payer: MEDICARE

## 2025-02-17 ENCOUNTER — APPOINTMENT (OUTPATIENT)
Dept: CARDIOLOGY | Facility: HOSPITAL | Age: 80
DRG: 065 | End: 2025-02-17
Payer: MEDICARE

## 2025-02-17 LAB
ALBUMIN SERPL-MCNC: 3 G/DL (ref 3.5–5.2)
ALBUMIN/GLOB SERPL: 0.9 G/DL
ALP SERPL-CCNC: 149 U/L (ref 39–117)
ALT SERPL W P-5'-P-CCNC: 16 U/L (ref 1–33)
ANION GAP SERPL CALCULATED.3IONS-SCNC: 12.1 MMOL/L (ref 5–15)
AORTIC ARCH: 2.7 CM
ASCENDING AORTA: 3.1 CM
AST SERPL-CCNC: 18 U/L (ref 1–32)
AV MEAN PRESS GRAD SYS DOP V1V2: 5.2 MMHG
AV VMAX SYS DOP: 167.7 CM/SEC
BASOPHILS # BLD AUTO: 0.03 10*3/MM3 (ref 0–0.2)
BASOPHILS NFR BLD AUTO: 0.3 % (ref 0–1.5)
BH CV ECHO MEAS - ACS: 1.7 CM
BH CV ECHO MEAS - AO MAX PG: 11.3 MMHG
BH CV ECHO MEAS - AO ROOT DIAM: 3.2 CM
BH CV ECHO MEAS - AO V2 VTI: 34.3 CM
BH CV ECHO MEAS - AVA(I,D): 1.94 CM2
BH CV ECHO MEAS - EDV(CUBED): 28.4 ML
BH CV ECHO MEAS - EDV(MOD-SP2): 34 ML
BH CV ECHO MEAS - EDV(MOD-SP4): 39 ML
BH CV ECHO MEAS - EF(MOD-SP2): 64.7 %
BH CV ECHO MEAS - EF(MOD-SP4): 59 %
BH CV ECHO MEAS - ESV(CUBED): 14.9 ML
BH CV ECHO MEAS - ESV(MOD-SP2): 12 ML
BH CV ECHO MEAS - ESV(MOD-SP4): 16 ML
BH CV ECHO MEAS - FS: 19.4 %
BH CV ECHO MEAS - IVS/LVPW: 0.99 CM
BH CV ECHO MEAS - IVSD: 0.98 CM
BH CV ECHO MEAS - LAT PEAK E' VEL: 8.3 CM/SEC
BH CV ECHO MEAS - LV DIASTOLIC VOL/BSA (35-75): 22.1 CM2
BH CV ECHO MEAS - LV MASS(C)D: 82.4 GRAMS
BH CV ECHO MEAS - LV MAX PG: 6.7 MMHG
BH CV ECHO MEAS - LV MEAN PG: 3.1 MMHG
BH CV ECHO MEAS - LV SYSTOLIC VOL/BSA (12-30): 9.1 CM2
BH CV ECHO MEAS - LV V1 MAX: 129.2 CM/SEC
BH CV ECHO MEAS - LV V1 VTI: 26.4 CM
BH CV ECHO MEAS - LVIDD: 3.1 CM
BH CV ECHO MEAS - LVIDS: 2.46 CM
BH CV ECHO MEAS - LVOT AREA: 2.5 CM2
BH CV ECHO MEAS - LVOT DIAM: 1.79 CM
BH CV ECHO MEAS - LVPWD: 0.99 CM
BH CV ECHO MEAS - MED PEAK E' VEL: 5.1 CM/SEC
BH CV ECHO MEAS - MV A DUR: 0.13 SEC
BH CV ECHO MEAS - MV A MAX VEL: 152.8 CM/SEC
BH CV ECHO MEAS - MV DEC SLOPE: 430.5 CM/SEC2
BH CV ECHO MEAS - MV DEC TIME: 0.21 SEC
BH CV ECHO MEAS - MV E MAX VEL: 89.6 CM/SEC
BH CV ECHO MEAS - MV E/A: 0.59
BH CV ECHO MEAS - MV MAX PG: 10.2 MMHG
BH CV ECHO MEAS - MV MEAN PG: 3.9 MMHG
BH CV ECHO MEAS - MV P1/2T: 67.3 MSEC
BH CV ECHO MEAS - MV V2 VTI: 30.3 CM
BH CV ECHO MEAS - MVA(P1/2T): 3.3 CM2
BH CV ECHO MEAS - MVA(VTI): 2.2 CM2
BH CV ECHO MEAS - PA ACC TIME: 0.1 SEC
BH CV ECHO MEAS - PA V2 MAX: 141.2 CM/SEC
BH CV ECHO MEAS - PULM A REVS DUR: 0.12 SEC
BH CV ECHO MEAS - PULM A REVS VEL: 26.7 CM/SEC
BH CV ECHO MEAS - PULM DIAS VEL: 26.2 CM/SEC
BH CV ECHO MEAS - PULM S/D: 2.36
BH CV ECHO MEAS - PULM SYS VEL: 61.8 CM/SEC
BH CV ECHO MEAS - RAP SYSTOLE: 3 MMHG
BH CV ECHO MEAS - RV MAX PG: 4 MMHG
BH CV ECHO MEAS - RV V1 MAX: 100.4 CM/SEC
BH CV ECHO MEAS - RV V1 VTI: 19.6 CM
BH CV ECHO MEAS - RVSP: 25 MMHG
BH CV ECHO MEAS - SV(LVOT): 66.5 ML
BH CV ECHO MEAS - SV(MOD-SP2): 22 ML
BH CV ECHO MEAS - SV(MOD-SP4): 23 ML
BH CV ECHO MEAS - SVI(LVOT): 37.6 ML/M2
BH CV ECHO MEAS - SVI(MOD-SP2): 12.4 ML/M2
BH CV ECHO MEAS - SVI(MOD-SP4): 13 ML/M2
BH CV ECHO MEAS - TAPSE (>1.6): 1.8 CM
BH CV ECHO MEAS - TR MAX PG: 22.3 MMHG
BH CV ECHO MEAS - TR MAX VEL: 236.2 CM/SEC
BH CV ECHO MEASUREMENTS AVERAGE E/E' RATIO: 13.37
BH CV ECHO SHUNT ASSESSMENT PERFORMED (HIDDEN SCRIPTING): 1
BH CV XLRA - RV BASE: 2.36 CM
BH CV XLRA - RV LENGTH: 6.3 CM
BH CV XLRA - RV MID: 2.17 CM
BH CV XLRA - TDI S': 8.8 CM/SEC
BILIRUB SERPL-MCNC: 0.2 MG/DL (ref 0–1.2)
BUN SERPL-MCNC: 20 MG/DL (ref 8–23)
BUN/CREAT SERPL: 14.3 (ref 7–25)
CALCIUM SPEC-SCNC: 8.7 MG/DL (ref 8.6–10.5)
CHLORIDE SERPL-SCNC: 99 MMOL/L (ref 98–107)
CO2 SERPL-SCNC: 25.9 MMOL/L (ref 22–29)
CREAT SERPL-MCNC: 1.4 MG/DL (ref 0.57–1)
DEPRECATED RDW RBC AUTO: 42.8 FL (ref 37–54)
EGFRCR SERPLBLD CKD-EPI 2021: 38.6 ML/MIN/1.73
EOSINOPHIL # BLD AUTO: 0.05 10*3/MM3 (ref 0–0.4)
EOSINOPHIL NFR BLD AUTO: 0.5 % (ref 0.3–6.2)
ERYTHROCYTE [DISTWIDTH] IN BLOOD BY AUTOMATED COUNT: 12.4 % (ref 12.3–15.4)
GLOBULIN UR ELPH-MCNC: 3.2 GM/DL
GLUCOSE BLDC GLUCOMTR-MCNC: 165 MG/DL (ref 70–130)
GLUCOSE BLDC GLUCOMTR-MCNC: 171 MG/DL (ref 70–130)
GLUCOSE BLDC GLUCOMTR-MCNC: 236 MG/DL (ref 70–130)
GLUCOSE SERPL-MCNC: 173 MG/DL (ref 65–99)
HCT VFR BLD AUTO: 30 % (ref 34–46.6)
HGB BLD-MCNC: 9.9 G/DL (ref 12–15.9)
IMM GRANULOCYTES # BLD AUTO: 0.07 10*3/MM3 (ref 0–0.05)
IMM GRANULOCYTES NFR BLD AUTO: 0.6 % (ref 0–0.5)
LEFT ATRIUM VOLUME INDEX: 24.2 ML/M2
LV EF BIPLANE MOD: 61.5 %
LYMPHOCYTES # BLD AUTO: 0.52 10*3/MM3 (ref 0.7–3.1)
LYMPHOCYTES NFR BLD AUTO: 4.7 % (ref 19.6–45.3)
MCH RBC QN AUTO: 31.1 PG (ref 26.6–33)
MCHC RBC AUTO-ENTMCNC: 33 G/DL (ref 31.5–35.7)
MCV RBC AUTO: 94.3 FL (ref 79–97)
MONOCYTES # BLD AUTO: 0.64 10*3/MM3 (ref 0.1–0.9)
MONOCYTES NFR BLD AUTO: 5.8 % (ref 5–12)
NEUTROPHILS NFR BLD AUTO: 88.1 % (ref 42.7–76)
NEUTROPHILS NFR BLD AUTO: 9.75 10*3/MM3 (ref 1.7–7)
NRBC BLD AUTO-RTO: 0 /100 WBC (ref 0–0.2)
PLATELET # BLD AUTO: 266 10*3/MM3 (ref 140–450)
PMV BLD AUTO: 9.7 FL (ref 6–12)
POTASSIUM SERPL-SCNC: 4.3 MMOL/L (ref 3.5–5.2)
PROT SERPL-MCNC: 6.2 G/DL (ref 6–8.5)
QT INTERVAL: 365 MS
QTC INTERVAL: 431 MS
RBC # BLD AUTO: 3.18 10*6/MM3 (ref 3.77–5.28)
SINUS: 2.8 CM
SODIUM SERPL-SCNC: 137 MMOL/L (ref 136–145)
STJ: 2.9 CM
WBC NRBC COR # BLD AUTO: 11.06 10*3/MM3 (ref 3.4–10.8)

## 2025-02-17 PROCEDURE — 94761 N-INVAS EAR/PLS OXIMETRY MLT: CPT

## 2025-02-17 PROCEDURE — 63710000001 INSULIN LISPRO (HUMAN) PER 5 UNITS: Performed by: INTERNAL MEDICINE

## 2025-02-17 PROCEDURE — 25010000002 ENOXAPARIN PER 10 MG: Performed by: INTERNAL MEDICINE

## 2025-02-17 PROCEDURE — 97166 OT EVAL MOD COMPLEX 45 MIN: CPT

## 2025-02-17 PROCEDURE — 71045 X-RAY EXAM CHEST 1 VIEW: CPT

## 2025-02-17 PROCEDURE — 94799 UNLISTED PULMONARY SVC/PX: CPT

## 2025-02-17 PROCEDURE — 97112 NEUROMUSCULAR REEDUCATION: CPT

## 2025-02-17 PROCEDURE — 94660 CPAP INITIATION&MGMT: CPT

## 2025-02-17 PROCEDURE — 93306 TTE W/DOPPLER COMPLETE: CPT

## 2025-02-17 PROCEDURE — 82948 REAGENT STRIP/BLOOD GLUCOSE: CPT

## 2025-02-17 PROCEDURE — 93306 TTE W/DOPPLER COMPLETE: CPT | Performed by: INTERNAL MEDICINE

## 2025-02-17 PROCEDURE — 80053 COMPREHEN METABOLIC PANEL: CPT | Performed by: INTERNAL MEDICINE

## 2025-02-17 PROCEDURE — 85025 COMPLETE CBC W/AUTO DIFF WBC: CPT | Performed by: INTERNAL MEDICINE

## 2025-02-17 PROCEDURE — 94664 DEMO&/EVAL PT USE INHALER: CPT

## 2025-02-17 PROCEDURE — 99232 SBSQ HOSP IP/OBS MODERATE 35: CPT | Performed by: NURSE PRACTITIONER

## 2025-02-17 PROCEDURE — 63710000001 INSULIN GLARGINE PER 5 UNITS: Performed by: INTERNAL MEDICINE

## 2025-02-17 PROCEDURE — 63710000001 PREDNISONE PER 5 MG: Performed by: INTERNAL MEDICINE

## 2025-02-17 RX ORDER — AMLODIPINE BESYLATE 10 MG/1
10 TABLET ORAL
Status: DISCONTINUED | OUTPATIENT
Start: 2025-02-18 | End: 2025-02-21 | Stop reason: HOSPADM

## 2025-02-17 RX ORDER — INSULIN LISPRO 100 [IU]/ML
5 INJECTION, SOLUTION INTRAVENOUS; SUBCUTANEOUS
Status: DISCONTINUED | OUTPATIENT
Start: 2025-02-17 | End: 2025-02-21 | Stop reason: HOSPADM

## 2025-02-17 RX ADMIN — BISOPROLOL FUMARATE 20 MG: 5 TABLET ORAL at 20:56

## 2025-02-17 RX ADMIN — LISINOPRIL 40 MG: 20 TABLET ORAL at 08:50

## 2025-02-17 RX ADMIN — ENOXAPARIN SODIUM 40 MG: 100 INJECTION SUBCUTANEOUS at 22:19

## 2025-02-17 RX ADMIN — INSULIN LISPRO 2 UNITS: 100 INJECTION, SOLUTION INTRAVENOUS; SUBCUTANEOUS at 08:50

## 2025-02-17 RX ADMIN — BUDESONIDE AND FORMOTEROL FUMARATE DIHYDRATE 2 PUFF: 160; 4.5 AEROSOL RESPIRATORY (INHALATION) at 07:51

## 2025-02-17 RX ADMIN — PREDNISONE 5 MG: 5 TABLET ORAL at 17:09

## 2025-02-17 RX ADMIN — INSULIN LISPRO 3 UNITS: 100 INJECTION, SOLUTION INTRAVENOUS; SUBCUTANEOUS at 22:18

## 2025-02-17 RX ADMIN — ATORVASTATIN CALCIUM 80 MG: 80 TABLET, FILM COATED ORAL at 20:57

## 2025-02-17 RX ADMIN — BUDESONIDE AND FORMOTEROL FUMARATE DIHYDRATE 2 PUFF: 160; 4.5 AEROSOL RESPIRATORY (INHALATION) at 20:49

## 2025-02-17 RX ADMIN — ASPIRIN 81 MG: 81 TABLET, COATED ORAL at 08:50

## 2025-02-17 RX ADMIN — ENOXAPARIN SODIUM 40 MG: 100 INJECTION SUBCUTANEOUS at 11:56

## 2025-02-17 RX ADMIN — AMLODIPINE BESYLATE 5 MG: 5 TABLET ORAL at 08:51

## 2025-02-17 RX ADMIN — EMPAGLIFLOZIN 10 MG: 10 TABLET, FILM COATED ORAL at 20:56

## 2025-02-17 RX ADMIN — TORSEMIDE 20 MG: 20 TABLET ORAL at 08:50

## 2025-02-17 RX ADMIN — INSULIN LISPRO 2 UNITS: 100 INJECTION, SOLUTION INTRAVENOUS; SUBCUTANEOUS at 17:09

## 2025-02-17 RX ADMIN — INSULIN GLARGINE 25 UNITS: 100 INJECTION, SOLUTION SUBCUTANEOUS at 20:57

## 2025-02-17 RX ADMIN — PANTOPRAZOLE SODIUM 40 MG: 40 TABLET, DELAYED RELEASE ORAL at 05:42

## 2025-02-17 NOTE — PLAN OF CARE
Goal Outcome Evaluation:  Plan of Care Reviewed With: patient        Progress: improving  Outcome Evaluation: Pt is a 79 y.o. female admitted to [facility] on 2/15 presenting with difficulty speaking, walking, and R sided weakness per pt . dx acute CVA L corona radiata/basal ganglia. Per pt , she was independent in all ADLs at home, uses a walker to ambulate when outside of the home.  would help put her socks/shoes on. Has a shower seat and raised toilet seat at home. Min A needed to sit EOB, presents w/ R sided lean. Able to hold herself up with R hand on bed, Min A needed when R hand is not supporting/dynamic sitting balance. Pt is dependent to don/doff socks. RUE ROM WFL, grossly 4/5 MMT. Sensation intact, no numbness or tingling noted. Coordination with RUE appears to be intact. Able to bring a cup w/ straw to mouth from table. Min/Mod A for STS, CGA for functional mobility with rwx. Pt would benefit from OT services to address RUE function and improve independence with ADLs. dc rec to IRF.    Anticipated Discharge Disposition (OT): inpatient rehabilitation facility

## 2025-02-17 NOTE — PROGRESS NOTES
DAILY PROGRESS NOTE  [facility], [facility]    2025    Patient Identification:  Name: Blanca Harden  Age: 79 y.o.  Sex: female  :  1945  MRN: [id number]           Primary Care Physician: Praneeth Valenzuela MD    Subjective:    Interval History:    Patient is feeling better today.  Speech more clear.  Has worked with physical therapy yesterday and Occupational Therapy today.  Was able to walk 30 feet.  Appreciate neurology input  Vital signs stable, saturation room air is 99%.  Blood pressure better.  Blood sugar in the mid 100s, potassium 4.3, creatinine 1.4.  2D echo show EF 61.5%.    ROS:     Denies having any nausea, vomiting, diarrhea, constipation, chest pain, shortness of breath.     Objective:    Scheduled Meds:  amLODIPine, 5 mg, Oral, Q24H  aspirin, 81 mg, Oral, Daily  atorvastatin, 80 mg, Oral, Nightly  bisoprolol, 20 mg, Oral, Nightly  budesonide-formoterol, 2 puff, Inhalation, BID - RT  enoxaparin, 40 mg, Subcutaneous, Q12H  insulin glargine, 25 Units, Subcutaneous, Nightly  insulin lispro, 2-7 Units, Subcutaneous, 4x Daily AC & at Bedtime  lisinopril, 40 mg, Oral, Daily  pantoprazole, 40 mg, Oral, Q AM  predniSONE, 5 mg, Oral, Q PM  torsemide, 20 mg, Oral, Daily        Continuous Infusions:       PRN Meds:    acetaminophen    albuterol    dextrose    dextrose    glucagon (human recombinant)    hydrALAZINE    O2    sodium chloride    traMADol    Intake/Output:    Intake/Output Summary (Last 24 hours) at 2025 1706  Last data filed at 2025 1306  Gross per 24 hour   Intake 200 ml   Output 2600 ml   Net -2400 ml         Exam:    T MAX 24 hrs: Temp (24hrs), Av.1 °F (36.7 °C), Min:97.9 °F (36.6 °C), Max:98.6 °F (37 °C)    Vitals Ranges:   Temp:  [97.9 °F (36.6 °C)-98.6 °F (37 °C)] 97.9 °F (36.6 °C)  Heart Rate:  [84-93] 91  Resp:  [18] 18  BP: (159-175)/(59-73) 159/67    /67   Pulse 91   Temp 97.9 °F (36.6 °C) (Oral)   Resp 18   " Ht 152.4 cm (60\")   Wt 79.8 kg (176 lb)   LMP  (LMP Unknown)   SpO2 99%   BMI 34.37 kg/m²     General: Speech is better today.  Patient is alert, oriented x 3. Cooperative, no distress, appears stated age.    Neck: Supple, symmetrical, trachea midline, no adenopathy;              thyroid:  no enlargement/tenderness/nodules;              no carotid bruit or JVD  Cardiovascular: Normal rate, regular rhythm and intact distal pulses.              Exam reveals no gallop and no friction rub. No murmur heard  Pulmonary: Diminished breath sounds bilaterally, respirations unlabored.               No rhonchi, wheezing or rales.   Abdominal: Soft, nontender, bowel sounds active all four quadrants,     no masses, no hepatomegaly, no splenomegaly.   Extremities: Normal, atraumatic, no cyanosis. + Pitting edema  Pulses: 2 + symmetric all extremities  Neurological: Patient is alert and oriented to person, place, and time.  Patient has slurred speech, although improving from yesterday.  Right lower extremity weakness about the same.    Skin: Skin color, texture, normal. Turgor is decreased. No rashes or lesions            Data Review:    Results from last 7 days   Lab Units 02/17/25  0409 02/16/25  0810 02/15/25  1522   WBC 10*3/mm3 11.06* 11.67* 13.68*   HEMOGLOBIN g/dL 9.9* 9.9* 10.9*   HEMATOCRIT % 30.0* 31.9* 34.3   PLATELETS 10*3/mm3 266 254 351       Results from last 7 days   Lab Units 02/17/25  0409 02/16/25  0810 02/15/25  1522   SODIUM mmol/L 137 134* 134*   POTASSIUM mmol/L 4.3 4.7 4.3   CHLORIDE mmol/L 99 101 99   CO2 mmol/L 25.9 23.0 24.4   BUN mg/dL 20 13 17   CREATININE mg/dL 1.40* 0.97 1.14*   CALCIUM mg/dL 8.7 8.7 9.0   BILIRUBIN mg/dL 0.2  --  0.3   ALK PHOS U/L 149*  --  136*   ALT (SGPT) U/L 16  --  15   AST (SGOT) U/L 18  --  17   GLUCOSE mg/dL 173* 283* 50*     Results from last 7 days   Lab Units 02/15/25  1522   INR  1.08       Results from last 7 days   Lab Units 02/16/25  0810   HEMOGLOBIN A1C % " 7.70*       Lab Results   Lab Value Date/Time    TROPONINT 34 (H) 10/01/2023 1149    TROPONINT 56 (C) 06/30/2023 1959    TROPONINT 61 (C) 06/30/2023 1601    TROPONINT 28 (H) 05/21/2023 1920    TROPONINT 25 (H) 05/21/2023 1615       Microbiology Results (last 10 days)       ** No results found for the last 240 hours. **             Imaging Results (Last 7 Days)       Procedure Component Value Units Date/Time    MRI Brain Without Contrast [[id number]] Collected: 02/16/25 0616     Updated: 02/16/25 0625    Narrative:      BRAIN MRI WITHOUT CONTRAST     HISTORY: Altered mental status, focal weakness; R47.9-Unspecified speech  disturbances; R29.898-Other symptoms and signs involving the  musculoskeletal system; R42-Dizziness and giddiness; I10-Essential  (primary) hypertension; N18.9-Chronic kidney disease, unspecified;  Z86.39-Personal history of other endocrine, nutritional and metabolic  disease     COMPARISON: October 2, 2023.     FINDINGS:  Multiplanar images of the head were obtained without  gadolinium. The patient has an area of restricted diffusion within the  left corona radiata, with extension into the left basal ganglia. No  additional areas of restricted diffusion are seen. There is diffuse  atrophy. There is periventricular deep white matter microangiopathic  change. The intracranial flow voids appear intact. No abnormality is  seen on susceptibility weighted imaging. There is mucosal thickening  within the ethmoid sinuses, with extensive opacification of the left  maxillary sinus noted.       Impression:      1. Study is positive for an acute infarct within the left corona  radiata/basal ganglia. No evidence of hemorrhagic information..     FINDINGS were called to the patient's nurse at 6:20 a.m.        This report was finalized on 2/16/2025 6:22 AM by Dr. Tanya Prince M.D on Workstation: [id number]       XR Chest 1 View [[id number]] Collected: 02/15/25 1654     Updated: 02/15/25 1707    Narrative:       XR CHEST 1 VW-     CLINICAL HISTORY:  Acute Stroke Protocol (onset < 12 hrs);  R47.9-Unspecified speech disturbances; R29.898-Other symptoms and signs  involving the musculoskeletal system; R42-Dizziness and giddiness;  I10-Essential (primary) hypertension; N18.9-Chronic kidney disease,  unspecified; Z86.39-Personal history of other endocrine, nutritional and  metabolic disease     FINDINGS:     The lung volumes are low. There our mild bibasilar atelectatic changes.  The cardiomediastinal silhouette is mildly enlarged but stable since  01/29/2025. The osseous structures demonstrate no significant  abnormalities.           This report was finalized on 2/15/2025 5:04 PM by Dr. Sanya Armendariz M.D  on Workstation: [id number]       CT Angiogram Head w AI Analysis of LVO [[id number]] Collected: 02/15/25 1612     Updated: 02/15/25 1643    Narrative:      CT ANGIOGRAM OF THE HEAD AND NECK AND CT PERFUSION STUDY     CLINICAL HISTORY: Speech difficulties, dizziness, and right leg  weakness.     TECHNIQUE:  A noncontrast head CT was performed with 3 mm axial images.  Thereafter, a CT perfusion study was performed after the dynamic bolus  of IV contrast. Standard perfusion maps were constructed with RAPID  software. A CT angiogram of the head and neck was then performed with 1  mm axial images. Sagittal, coronal, and 3-dimensional reconstructed  images were obtained. Finally, a delayed postcontrast head CT was  performed with 3 mm axial images.     FINDINGS:     HEAD CT: No convincing evidence for an acute intracranial abnormality.  Mild to moderate changes of chronic small vessel ischemic phenomenon.  Near complete opacification of the left maxillary sinus with mucosal  thickening and secretions.     CT PERFUSION STUDY: No abnormality on the CBF less than 30% or Tmax  greater than 6 seconds maps.     CTA HEAD:     Posterior Circulation: Unremarkable appearance of the vertebral  arteries, basilar artery, and posterior  cerebral arteries.  Anterior Circulation: Atherosclerotic calcifications within the carotid  siphons resulting in relatively mild degrees of luminal compromise.  Unremarkable appearance of the anterior and middle cerebral arteries.     CTA NECK:       Aortic Arch: Classic configuration. Mild atherosclerotic involvement of  the aortic arch.  Subclavian Arteries: Mild to moderate stenosis of the proximal aspect  the left subclavian artery secondary to predominantly noncalcified  atherosclerotic changes.  Vertebral Arteries: Unremarkable.  CCA/ICA's: Atherosclerotic changes without evidence for a NASCET  stenosis.       Impression:         No convincing evidence for an acute infarct on either the noncontrast  head CT or the CT perfusion study. Further evaluation could be performed  with MR imaging for more sensitive and specific assessment.     No evidence for large vessel occlusion.     Mild to moderate stenosis of the proximal aspect of the left subclavian  artery.     No significant NASCET stenosis within either internal carotid artery.     The findings of the noncontrast head CT were discussed with Dr. Sethi on 2/15/2025 at approximately 3:35 p.m. The findings of the CT  angiogram and CT perfusion study were discussed with Dr. Sethi at  approximately 3:50 p.m.           Radiation dose reduction techniques were utilized, including automated  exposure control and exposure modulation based on body size.        This report was finalized on 2/15/2025 4:40 PM by Dr. Sanya Armendariz M.D  on Workstation: [id number]       CT Angiogram Neck [[id number]] Collected: 02/15/25 1612     Updated: 02/15/25 1643    Narrative:      CT ANGIOGRAM OF THE HEAD AND NECK AND CT PERFUSION STUDY     CLINICAL HISTORY: Speech difficulties, dizziness, and right leg  weakness.     TECHNIQUE:  A noncontrast head CT was performed with 3 mm axial images.  Thereafter, a CT perfusion study was performed after the dynamic bolus  of IV  contrast. Standard perfusion maps were constructed with RAPID  software. A CT angiogram of the head and neck was then performed with 1  mm axial images. Sagittal, coronal, and 3-dimensional reconstructed  images were obtained. Finally, a delayed postcontrast head CT was  performed with 3 mm axial images.     FINDINGS:     HEAD CT: No convincing evidence for an acute intracranial abnormality.  Mild to moderate changes of chronic small vessel ischemic phenomenon.  Near complete opacification of the left maxillary sinus with mucosal  thickening and secretions.     CT PERFUSION STUDY: No abnormality on the CBF less than 30% or Tmax  greater than 6 seconds maps.     CTA HEAD:     Posterior Circulation: Unremarkable appearance of the vertebral  arteries, basilar artery, and posterior cerebral arteries.  Anterior Circulation: Atherosclerotic calcifications within the carotid  siphons resulting in relatively mild degrees of luminal compromise.  Unremarkable appearance of the anterior and middle cerebral arteries.     CTA NECK:       Aortic Arch: Classic configuration. Mild atherosclerotic involvement of  the aortic arch.  Subclavian Arteries: Mild to moderate stenosis of the proximal aspect  the left subclavian artery secondary to predominantly noncalcified  atherosclerotic changes.  Vertebral Arteries: Unremarkable.  CCA/ICA's: Atherosclerotic changes without evidence for a NASCET  stenosis.       Impression:         No convincing evidence for an acute infarct on either the noncontrast  head CT or the CT perfusion study. Further evaluation could be performed  with MR imaging for more sensitive and specific assessment.     No evidence for large vessel occlusion.     Mild to moderate stenosis of the proximal aspect of the left subclavian  artery.     No significant NASCET stenosis within either internal carotid artery.     The findings of the noncontrast head CT were discussed with Dr. Sethi on 2/15/2025 at  approximately 3:35 p.m. The findings of the CT  angiogram and CT perfusion study were discussed with Dr. Sethi at  approximately 3:50 p.m.           Radiation dose reduction techniques were utilized, including automated  exposure control and exposure modulation based on body size.        This report was finalized on 2/15/2025 4:40 PM by Dr. Sanya Armendariz M.D  on Workstation: [id number]       CT CEREBRAL PERFUSION WITH & WITHOUT CONTRAST [[id number]] Collected: 02/15/25 1612     Updated: 02/15/25 1643    Narrative:      CT ANGIOGRAM OF THE HEAD AND NECK AND CT PERFUSION STUDY     CLINICAL HISTORY: Speech difficulties, dizziness, and right leg  weakness.     TECHNIQUE:  A noncontrast head CT was performed with 3 mm axial images.  Thereafter, a CT perfusion study was performed after the dynamic bolus  of IV contrast. Standard perfusion maps were constructed with RAPID  software. A CT angiogram of the head and neck was then performed with 1  mm axial images. Sagittal, coronal, and 3-dimensional reconstructed  images were obtained. Finally, a delayed postcontrast head CT was  performed with 3 mm axial images.     FINDINGS:     HEAD CT: No convincing evidence for an acute intracranial abnormality.  Mild to moderate changes of chronic small vessel ischemic phenomenon.  Near complete opacification of the left maxillary sinus with mucosal  thickening and secretions.     CT PERFUSION STUDY: No abnormality on the CBF less than 30% or Tmax  greater than 6 seconds maps.     CTA HEAD:     Posterior Circulation: Unremarkable appearance of the vertebral  arteries, basilar artery, and posterior cerebral arteries.  Anterior Circulation: Atherosclerotic calcifications within the carotid  siphons resulting in relatively mild degrees of luminal compromise.  Unremarkable appearance of the anterior and middle cerebral arteries.     CTA NECK:       Aortic Arch: Classic configuration. Mild atherosclerotic involvement of  the aortic  arch.  Subclavian Arteries: Mild to moderate stenosis of the proximal aspect  the left subclavian artery secondary to predominantly noncalcified  atherosclerotic changes.  Vertebral Arteries: Unremarkable.  CCA/ICA's: Atherosclerotic changes without evidence for a NASCET  stenosis.       Impression:         No convincing evidence for an acute infarct on either the noncontrast  head CT or the CT perfusion study. Further evaluation could be performed  with MR imaging for more sensitive and specific assessment.     No evidence for large vessel occlusion.     Mild to moderate stenosis of the proximal aspect of the left subclavian  artery.     No significant NASCET stenosis within either internal carotid artery.     The findings of the noncontrast head CT were discussed with Dr. Sethi on 2/15/2025 at approximately 3:35 p.m. The findings of the CT  angiogram and CT perfusion study were discussed with Dr. Sethi at  approximately 3:50 p.m.           Radiation dose reduction techniques were utilized, including automated  exposure control and exposure modulation based on body size.        This report was finalized on 2/15/2025 4:40 PM by Dr. Sanya Armendariz M.D  on Workstation: [id number]                 Assessment:    Acute CVA within the left corona radiata/basal ganglia  Aphasia due to CVA  Right lower extremity weakness due to CVA  Hypertension  Uncontrolled diabetes mellitus  Chronic diastolic congestive heart failure  Pulmonary hypertension  Obstructive sleep apnea, noncompliant with CPAP  Moderate persistent asthma  Coronary artery disease  Chronic kidney disease stage IIIa  Iron deficiency anemia  Morbid obesity affecting all aspects of care  Recent right hemicolectomy  Status post left mastectomy due to breast cancer  Temporal arteritis on long-term steroids    Plan:    Speech is better today, has worked with physical therapy, improving.  Continue aspirin as well as atorvastatin 80 mg daily.  Tolerating  medications so far.  2D echo okay.  Appreciate neurology input  Continue working with speech therapy, PT, OT.  Blood pressure still a little elevated, will adjust medication.  Blood pressure goal is less than 130/80.  Continue medication for chronic diastolic congestive heart failure, stable.  To start wearing CPAP at nighttime.  Monitor and correct electrolytes, sodium back to normal range, potassium back to normal range.  Monitor renal function, creatinine went up to 1.4.  Will adjust medication as well as diuresis.  Encourage p.o. intake  Adjust insulin as needed, will benefit from Jardiance.    Monitor mental status, improving slowly, better speech.  Otherwise, she is alert and oriented x 3.  DVT prophylaxis: Lovenox  Stress ulcer prophylaxis:Pantoprazole.  Labs in am      Praneeth Valenzuela MD  2/17/2025  17:06 EST         I wore protective equipment throughout this patient encounter including a face mask, gloves, and protective eyewear.  Hand hygiene was performed before donning protective equipment and after removal when leaving the room.

## 2025-02-17 NOTE — CONSULTS
"Diabetes Education  Assessment/Teaching    Patient Name:  Blanca Harden  YOB: 1945  MRN: [id number]  Admit Date:  2/15/2025      Assessment Date:  2/17/2025  Flowsheet Row Most Recent Value   General Information     Referral From: Other -RN- thru order set. Meet with 78 y/o at bedside on 5N to assess needs for DM ed and discuss controllable cva risk factors. Pt's spouse verbalizes questions re referral to endo -outpt -for pursuing insulin pump tx. Discuss.    Height 152.4 cm (60\")   Weight 80.1 kg (176 lb 9.4 oz)   Weight Method Bed scale   Diabetes History    What type of diabetes do you have? Type 2   Do you test your blood sugar at home? yes -spouse reports pt uses BG sensor/Michell 3.   Do you have any diabetes complications? circulation problems   Education Preferences    Barriers to Learning -- acuity. language. Pt's primary language is Turkish.  at bedside speaks English, Turkish.   Assessment Topics    Healthy Coping - Assessment Competent   Monitoring - Assessment Competent   DM Goals    Contact Plan Follow-up medical care with PCP            Flowsheet Row Most Recent Value   DM Education Needs    Meter Has own   Meter Type Freestyle   Healthy Coping Appropriate   Reducing Risks BP control, cva risk factors   Discharge Plan Facility   Teaching Method Explanation, Discussion   Patient Response Verbalized understanding        Other Comments: Addendum. Call to pt spouse to inform him that there are no [facility] endo providers that are fluent in Turkish. Dr Rodriguez does speak some Turkish. He v.u.   Electronically signed by:  Missy Islas RN, BSN, [facility]  02/17/25 13:25 EST  "

## 2025-02-17 NOTE — THERAPY EVALUATION
Patient Name: Blanca Harden  : 1945    MRN: [id number]                              Today's Date: 2025       Admit Date: 2/15/2025    Visit Dx:     ICD-10-CM ICD-9-CM   1. Difficulty with speech  R47.9 784.59   2. Right leg weakness  R29.898 729.89   3. Dizziness  R42 780.4   4. Uncontrolled hypertension  I10 401.9   5. Chronic renal impairment, unspecified CKD stage  N18.9 585.9   6. History of diabetes mellitus  Z86.39 V12.29     Patient Active Problem List   Diagnosis    Osteoporosis    Breast neoplasm, Tis (DCIS), left    Iron deficiency anemia    CKD (chronic kidney disease)    Diabetic nephropathy associated with type 2 diabetes mellitus    Temporal arteritis    Immunosuppression    Anemia    Duodenal adenoma    Gastritis without bleeding    Polyp of duodenum    Morbidly obese    Dyspnea on exertion    Hyperlipidemia    Type 2 diabetes mellitus with stage 3b chronic kidney disease, with long-term current use of insulin    Essential hypertension    Bilateral lower extremity edema    Pulmonary hypertension    Obstructive sleep apnea on CPAP    Stage 3a chronic kidney disease    Iron malabsorption    Respiratory distress    Hypoglycemia due to insulin    Delirium    Hypomagnesemia    Severe malnutrition    Leg swelling    Venous (peripheral) insufficiency    Polyp of colon    Obesity (BMI 30.0-34.9)    TIA (transient ischemic attack)    Difficulty with speech     Past Medical History:   Diagnosis Date    Anemia     Anxiety and depression     Asthma     Balance problem     CKD (chronic kidney disease), stage III     Colonic mass     COPD (chronic obstructive pulmonary disease)     Coronary artery disease     FOLLOWED BY DR GUNN    Diabetes mellitus, type 2     History of COVID-19     History of left breast cancer 2019    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 Daniel Gutierrez, [facility]    CARDIAC CATHETERIZATION       SECTION N/A     x3    CHOLECYSTECTOMY N/A     COLON RESECTION Right 2025    Procedure: COLON RESECTION LAPAROSCOPIC RIGHT WITH DAVINCI ROBOT;  Surgeon: Ashwin Alvarado MD;  Location: [facility] MAIN OR;  Service: Robotics - DaVinci;  Laterality: Right;    COLONOSCOPY      COLONOSCOPY N/A 2024    Procedure: COLONOSCOPY into the cecum and TI with hot snare polypectomy;  Surgeon: Ashwin Alvarado MD;  Location: [facility] ENDOSCOPY;  Service: General;  Laterality: N/A;  pre-colon polyps  post-colon mass    COLONOSCOPY W/ POLYPECTOMY N/A 2019    Enlarged folds in the antrum: biopsied; duodenal, transverse colon x2, splenic flexure, descending colon and sigmoid colon polyps: biopsied (path: tubular adenoma x6)-Dr. Zak De Jesus, [facility]    ENDOSCOPY  10/11/2019    Procedure: ESOPHAGOGASTRODUODENOSCOPY with hot snare polypectomy;  Surgeon: Haile Handley MD;  Location: [facility] ENDOSCOPY;  Service: Gastroenterology    ENDOSCOPY N/A 2020    Procedure: ESOPHAGOGASTRODUODENOSCOPY;  Surgeon: Haile Handley MD;  Location: [facility] ENDOSCOPY;  Service: Gastroenterology    ENDOSCOPY N/A 2020    Procedure: ESOPHAGOGASTRODUODENOSCOPY WITH BIOPSIES AND COLD BIOPSY POLYPECTOMY;  Surgeon: Haile Handley MD;  Location: [facility] ENDOSCOPY;  Service: Gastroenterology;  Laterality: N/A;  pre: dyspepsia and  diarrhea  post: duodenal polyp and gastritis    ENDOSCOPY N/A 07/23/2024    Procedure: ESOPHAGOGASTRODUODENOSCOPY WITH hot snare polypectomy with clip placement x 2BIOPSY;  Surgeon: Ashwin Alvarado MD;  [facility] END[facility]COPY;  Service: General;  Laterality: N/A;  pre-hx duoedenal polyp  post-duodenal polyp; gastritis    EYE SURGERY      MASTECTOMY WITH SENTINEL NODE BIOPSY AND AXILLARY NODE DISSECTION Left 07/03/2019    Procedure: LEFT BREAST MASTECTOMY WITH SENTINEL NODE BIOPSY AND , v-y plasty closure;  Surgeon: Tahmina James MD;  Location: [facility] MAIN OR;  Service: General    SUBTOTAL HYSTERECTOMY Bilateral     ovaries still in tact    TEMPORAL ARTERY BIOPSY Bilateral 12/05/2019      General Information       Row Name 02/17/25 1221          OT Time and Intention    Subjective Information no complaints  -SM (r) KL (t) SM (c)     Document Type evaluation  -SM (r) KL (t) SM (c)     Mode of Treatment individual therapy;occupational therapy  -SM (r) KL (t) SM (c)       Row Name 02/17/25 1221          General Information    Patient Profile Reviewed yes  -SM (r) KL (t) SM (c)     Prior Level of Function independent:;ADL's;min assist:;dressing  -SM (r) KL (t) SM (c)     Existing Precautions/Restrictions fall  -SM (r) KL (t) SM (c)       Row Name 02/17/25 1221          Living Environment    People in Home spouse  -SM (r) KL (t) SM (c)       Row Name 02/17/25 1221          Cognition    Orientation Status (Cognition) oriented x 3  -SM (r) KL (t) SM (c)       Row Name 02/17/25 1221          Safety Issues/Impairments Affecting Functional Mobility    Impairments Affecting Function (Mobility) balance;endurance/activity tolerance;strength;range of motion (ROM)  -SM (r) KL (t) SM (c)               User Key  (r) = Recorded By, (t) = Taken By, (c) = Cosigned By      Initials Name Provider Type    Lexy Craven, OT Occupational Therapist    Shila Strauss OT Student OT Student                      Mobility/ADL's       Row Name 02/17/25 [facility]          Bed Mobility    Bed Mobility supine-sit  -SM (r) KL (t) SM (c)     All Activities, [place] (Bed Mobility) minimum assist (75% patient effort);verbal cues  -SM (r) KL (t) SM (c)     Assistive Device (Bed Mobility) head of bed elevated  -SM (r) KL (t) SM (c)       Row Name 02/17/25 1223          Transfers    Transfers sit-stand transfer  -SM (r) KL (t) SM (c)       Row Name 02/17/25 [facility]          Sit-Stand Transfer    Sit-Stand [place] (Transfers) minimum assist (75% patient effort);moderate assist (50% patient effort)  -SM (r) KL (t) SM (c)     Assistive Device (Sit-Stand Transfers) walker, front-wheeled  -SM (r) KL (t) SM (c)       Row Name 02/17/25 [facility]          Functional Mobility    Functional Mobility- Ind. Level minimum assist (75% patient effort);contact guard assist  -SM (r) KL (t) SM (c)     Functional Mobility- Device walker, front-wheeled  -SM (r) KL (t) SM (c)     Functional Mobility- Comment bed-chair  -SM (r) KL (t) SM (c)     Patient was able to Ambulate yes  -SM (r) KL (t) SM (c)       Row Name 02/17/25 1223          Activities of Daily Living    BADL Assessment/Intervention lower body dressing;feeding;toileting  -SM (r) KL (t) SM (c)       Row Name 02/17/25 [facility]          Lower Body Dressing Assessment/Training    [place] Level (Lower Body Dressing) doff;don;socks;dependent (less than 25% patient effort)  -SM (r) KL (t) SM (c)     Comment, (Lower Body Dressing) Pt spouse helps with socks/shoes  -SM (r) KL (t) SM (c)       Row Name 02/17/25 [facility]          Self-Feeding Assessment/Training    [place] Level (Feeding) liquids to mouth;set up;contact guard assist  -SM (r) KL (t) SM (c)     Position (Feeding) supported sitting  -SM (r) KL (t) SM (c)       Row Name 02/17/25 [facility]          Toileting Assessment/Training    Comment, (Toileting) brief/purewick in place  -SM (r) KL (t) SM (c)               User Key  (r) = Recorded By, (t) =  Taken By, (c) = Cosigned By      Initials Name Provider Type    SM Lexy Denson OT Occupational Therapist    Shila Strauss, OT Student OT Student                   Obj/Interventions       Row Name 02/17/25 1225          Sensory Assessment (Somatosensory)    Sensory Assessment (Somatosensory) UE sensation intact  -SM (r) KL (t) SM (c)       Row Name 02/17/25 1225          Range of Motion Comprehensive    General Range of Motion bilateral upper extremity ROM WFL  -SM (r) KL (t) SM (c)     Comment, General Range of Motion RUE shoulder flex/abd, elbow flex/ext, pronation/supination  -SM (r) KL (t) SM (c)       Row Name 02/17/25 1225          Strength Comprehensive (MMT)    Comment, General Manual Muscle Testing (MMT) Assessment BUE grossly 4/5  -SM (r) KL (t) SM (c)       Row Name 02/17/25 1225          Balance    Balance Assessment sitting static balance;sitting dynamic balance;standing static balance;standing dynamic balance  -SM (r) KL (t) SM (c)     Static Sitting Balance minimal assist  -SM (r) KL (t) SM (c)     Dynamic Sitting Balance minimal assist;moderate assist  -SM (r) KL (t) SM (c)     Position, Sitting Balance sitting edge of bed  -SM (r) KL (t) SM (c)     Static Standing Balance contact guard  -SM (r) KL (t) SM (c)     Dynamic Standing Balance contact guard;minimal assist  -SM (r) KL (t) SM (c)     Position/Device Used, Standing Balance walker, front-wheeled  -SM (r) KL (t) SM (c)               User Key  (r) = Recorded By, (t) = Taken By, (c) = Cosigned By      Initials Name Provider Type    SM Lexy Denson OT Occupational Therapist    Shila Strauss, OT Student OT Student                   Goals/Plan       Row Name 02/17/25 1229          Bed Mobility Goal 1 (OT)    Activity/Assistive Device (Bed Mobility Goal 1, OT) bed mobility activities, all  -SM (r) KL (t) SM (c)     [place] Level/Cues Needed (Bed Mobility Goal 1, OT) standby assist  -SM (r) KL (t) SM (c)     Time Frame  (Bed Mobility Goal 1, OT) short term goal (STG);2 weeks  -SM (r) KL (t) SM (c)     Progress/Outcomes (Bed Mobility Goal 1, OT) new goal  -SM (r) KL (t) SM (c)       Row Name 02/17/25 1229          Transfer Goal 1 (OT)    Activity/Assistive Device (Transfer Goal 1, OT) transfers, all  -SM (r) KL (t) SM (c)     [place] Level/Cues Needed (Transfer Goal 1, OT) contact guard required  -SM (r) KL (t) SM (c)     Time Frame (Transfer Goal 1, OT) 2 weeks;short term goal (STG)  -SM (r) KL (t) SM (c)     Progress/Outcome (Transfer Goal 1, OT) new goal  -SM (r) KL (t) SM (c)       Row Name 02/17/25 1229          Bathing Goal 1 (OT)    Activity/Device (Bathing Goal 1, OT) bathing skills, all  -SM (r) KL (t) SM (c)     [place] Level/Cues Needed (Bathing Goal 1, OT) minimum assist (75% or more patient effort)  -SM (r) KL (t) SM (c)     Time Frame (Bathing Goal 1, OT) short term goal (STG);2 weeks  -SM (r) KL (t) SM (c)     Progress/Outcomes (Bathing Goal 1, OT) new goal  -SM (r) KL (t) SM (c)       Row Name 02/17/25 1229          Dressing Goal 1 (OT)    Activity/Device (Dressing Goal 1, OT) dressing skills, all  -SM (r) KL (t) SM (c)     [place]/Cues Needed (Dressing Goal 1, OT) minimum assist (75% or more patient effort)  -SM (r) KL (t) SM (c)     Time Frame (Dressing Goal 1, OT) short term goal (STG);2 weeks  -SM (r) KL (t) SM (c)     Progress/Outcome (Dressing Goal 1, OT) new goal  -SM (r) KL (t) SM (c)       Row Name 02/17/25 1229          Toileting Goal 1 (OT)    Activity/Device (Toileting Goal 1, OT) toileting skills, all  -SM (r) KL (t) SM (c)     [place] Level/Cues Needed (Toileting Goal 1, OT) minimum assist (75% or more patient effort)  -SM (r) KL (t) SM (c)     Time Frame (Toileting Goal 1, OT) short term goal (STG);2 weeks  -SM (r) KL (t) SM (c)     Progress/Outcome (Toileting Goal 1, OT) new goal  -JOLLY (r) CADENCE (t) SM (c)       Row Name 02/17/25 1229          Therapy Assessment/Plan (OT)    Planned  Therapy Interventions (OT) activity tolerance training;BADL retraining;patient/caregiver education/training;neuromuscular control/coordination retraining;occupation/activity based interventions;functional balance retraining  -SM (r) KL (t) SM (c)               User Key  (r) = Recorded By, (t) = Taken By, (c) = Cosigned By      Initials Name Provider Type    SM Lexy Denson, OT Occupational Therapist    Shila Strauss, OT Student OT Student                   Clinical Impression       Row Name 02/17/25 1222          Pain Assessment    Pretreatment Pain Rating 0/10 - no pain  -SM (r) KL (t) SM (c)     Posttreatment Pain Rating 0/10 - no pain  -SM (r) KL (t) SM (c)       Row Name 02/17/25 1220          Plan of Care Review    Plan of Care Reviewed With patient  -SM (r) KL (t) SM (c)     Progress improving  -SM (r) KL (t) SM (c)     Outcome Evaluation Pt is a 79 y.o. female admitted to [facility] on 2/15 presenting with difficulty speaking, walking, and R sided weakness per pt . dx acute CVA L corona radiata/basal ganglia. Per pt , she was independent in all ADLs at home, uses a walker to ambulate when outside of the home.  would help put her socks/shoes on. Has a shower seat and raised toilet seat at home. Min A needed to sit EOB, presents w/ R sided lean. Able to hold herself up with R hand on bed, Min A needed when R hand is not supporting/dynamic sitting balance. Pt is dependent to don/doff socks. RUE ROM WFL, grossly 4/5 MMT. Sensation intact, no numbness or tingling noted. Coordination with RUE appears to be intact. Able to bring a cup w/ straw to mouth from table. Min/Mod A for STS, CGA for functional mobility with rwx. Pt would benefit from OT services to address RUE function and improve independence with ADLs. dc rec to IRF.  -SM (r) KL (t) SM (c)       Row Name 02/17/25 1225          Therapy Assessment/Plan (OT)    Rehab Potential (OT) good  -SM (r) KL (t) SM (c)     Criteria for Skilled  Therapeutic Interventions Met (OT) yes  -SM (r) KL (t) SM (c)     Therapy Frequency (OT) 5 times/wk  -SM (r) KL (t) SM (c)       Row Name 02/17/25 1228          Therapy Plan Review/Discharge Plan (OT)    Anticipated Discharge Disposition (OT) inpatient rehabilitation facility  -SM (r) KL (t) SM (c)       Row Name 02/17/25 1228          Positioning and Restraints    Pre-Treatment Position in bed  -SM (r) KL (t) SM (c)     Post Treatment Position chair  -SM (r) KL (t) SM (c)     In Chair notified nsg;reclined;call light within reach;encouraged to call for assist;exit alarm on  -SM (r) KL (t) SM (c)               User Key  (r) = Recorded By, (t) = Taken By, (c) = Cosigned By      Initials Name Provider Type    Lexy Craven, OT Occupational Therapist    Shila Strauss, OT Student OT Student                   Outcome Measures       Row Name 02/17/25 1232          How much help from another is currently needed...    Putting on and taking off regular lower body clothing? 1  -SM (r) KL (t) SM (c)     Bathing (including washing, rinsing, and drying) 2  -SM (r) KL (t) SM (c)     Toileting (which includes using toilet bed pan or urinal) 2  -SM (r) KL (t) SM (c)     Putting on and taking off regular upper body clothing 2  -SM (r) KL (t) SM (c)     Taking care of personal grooming (such as brushing teeth) 3  -SM (r) KL (t) SM (c)     Eating meals 3  -SM (r) KL (t) SM (c)     AM-PAC 6 Clicks Score (OT) 13  -SM (r) KL (t)       Row Name 02/17/25 1232          Modified Rakan Scale    Modified Rakan Scale 4 - Moderately severe disability.  Unable to walk without assistance, and unable to attend to own bodily needs without assistance.  -SM (r) KL (t) SM (c)       Row Name 02/17/25 1232          Functional Assessment    Outcome Measure Options AM-PAC 6 Clicks Daily Activity (OT);Modified [place]akan  -SM (r) KL (t) SM (c)               User Key  (r) = Recorded By, (t) = Taken By, (c) = Cosigned By      Initials Name  Provider Type     Lexy Denson, OT Occupational Therapist    Shila Strauss, OT Student OT Student                    Occupational Therapy Education       Title: PT OT SLP Therapies (In Progress)       Topic: Occupational Therapy (In Progress)       Point: ADL training (Done)       Description:   Instruct learner(s) on proper safety adaptation and remediation techniques during self care or transfers.   Instruct in proper use of assistive devices.                  Learning Progress Summary            Patient Acceptance, E, VU by CADENCE at 2/17/2025 1883    Comment: OT POC                      Point: Home exercise program (Not Started)       Description:   Instruct learner(s) on appropriate technique for monitoring, assisting and/or progressing therapeutic exercises/activities.                  Learner Progress:  Not documented in this visit.              Point: Precautions (Not Started)       Description:   Instruct learner(s) on prescribed precautions during self-care and functional transfers.                  Learner Progress:  Not documented in this visit.              Point: Body mechanics (Not Started)       Description:   Instruct learner(s) on proper positioning and spine alignment during self-care, functional mobility activities and/or exercises.                  Learner Progress:  Not documented in this visit.                              User Key       Initials Effective Dates Name Provider Type Discipline     01/10/25 -  Shila Pichardo, OT Student OT Student OT                  OT Recommendation and Plan  Planned Therapy Interventions (OT): activity tolerance training, BADL retraining, patient/caregiver education/training, neuromuscular control/coordination retraining, occupation/activity based interventions, functional balance retraining  Therapy Frequency (OT): 5 times/wk  Plan of Care Review  Plan of Care Reviewed With: patient  Progress: improving  Outcome Evaluation: Pt is a 79 y.o.  female admitted to [facility] on 2/15 presenting with difficulty speaking, walking, and R sided weakness per pt . dx acute CVA L corona radiata/basal ganglia. Per pt , she was independent in all ADLs at home, uses a walker to ambulate when outside of the home.  would help put her socks/shoes on. Has a shower seat and raised toilet seat at home. Min A needed to sit EOB, presents w/ R sided lean. Able to hold herself up with R hand on bed, Min A needed when R hand is not supporting/dynamic sitting balance. Pt is dependent to don/doff socks. RUE ROM WFL, grossly 4/5 MMT. Sensation intact, no numbness or tingling noted. Coordination with RUE appears to be intact. Able to bring a cup w/ straw to mouth from table. Min/Mod A for STS, CGA for functional mobility with rwx. Pt would benefit from OT services to address RUE function and improve independence with ADLs. dc rec to IRF.     Time Calculation:   Evaluation Complexity (OT)  Review Occupational Profile/Medical/Therapy History Complexity: expanded/moderate complexity  Assessment, Occupational Performance/Identification of Deficit Complexity: 3-5 performance deficits  Clinical Decision Making Complexity (OT): detailed assessment/moderate complexity  Overall Complexity of Evaluation (OT): moderate complexity     Time Calculation- OT       Row Name 02/17/25 1233             Time Calculation- OT    OT Start Time 1123  -SM (r) KL (t) SM (c)      OT Stop Time 1142  -SM (r) KL (t) SM (c)      OT Time Calculation (min) 19 min  -SM (r) KL (t)      OT Received On 02/17/25  -SM (r) KL (t) SM (c)      OT - Next Appointment 02/18/25  -SM (r) KL (t) SM (c)      OT Goal Re-Cert Due Date 03/03/25  -SM (r) KL (t) SM (c)         Timed Charges    79166 -  OT Neuromuscular Reeducation Minutes 10  -SM (r) KL (t) SM (c)         Untimed Charges    OT Eval/Re-eval Minutes 9  -SM (r) KL (t) SM (c)         Total Minutes    Timed Charges Total Minutes 10  -SM (r) KL (t)      Untimed  Charges Total Minutes 9  -SM (r) KL (t)       Total Minutes 19  -SM (r) KL (t)                User Key  (r) = Recorded By, (t) = Taken By, (c) = Cosigned By      Initials Name Provider Type    Lexy Craven, OT Occupational Therapist    Shila Strauss, OT Student OT Student                           Shila Pichardo, OT Student  2/17/2025

## 2025-02-17 NOTE — PLAN OF CARE
Goal Outcome Evaluation:      Vitals as charted, no c/o pain, up w/ 1-2 assist to the chair, echo complete, NIH charted, A&Ox4, on RA, will continue to monitor the remainder of this shift.

## 2025-02-17 NOTE — PROGRESS NOTES
"DOS: 2025  NAME: Blanca Harden   : 1945  PCP: Praneeth Valenzuela MD  Chief Complaint   Patient presents with    Neuro Deficit(s)     Stroke    Subjective: Patient's  at bedside.  Patient tearful at times describing a fullness in her throat whenever she coughs or sneezes but states she's not having any issues swallowing. Feels weakness is stable. Pt seen in follow up today, however the problem is new to the examiner.    ROS: No headache, afebrile    Objective:  Vital signs: /67 (BP Location: Right arm, Patient Position: Lying)   Pulse 91   Temp 97.9 °F (36.6 °C) (Oral)   Resp 18   Ht 152.4 cm (60\")   Wt 80.1 kg (176 lb 9.4 oz)   LMP  (LMP Unknown)   SpO2 99%   BMI 34.49 kg/m²       GEN: NAD, v/s reviewed  HEENT: Normocephalic, atraumatic   COR: RRR  Resp: Even and unlabored, clear to auscultation bilaterally  Extremities: Trace edema BLE    Neurological:   MS: AOx3. Naming intact, follows 2 step commands, no neglect.   CN: Visual fields intact bilaterally, PERRL, extraocular movements intact, decreased face facial sensation, right droop, symmetric shoulder shrug, tongue midline, hypophonic speech with mild dysarthria  Motor: 5/5 in extremities, normal tone  Sensory: Intact to LT in all extremities  Coordination: Normal FTN bilaterally    Scheduled Meds:amLODIPine, 5 mg, Oral, Q24H  aspirin, 81 mg, Oral, Daily  atorvastatin, 80 mg, Oral, Nightly  bisoprolol, 20 mg, Oral, Nightly  budesonide-formoterol, 2 puff, Inhalation, BID - RT  enoxaparin, 40 mg, Subcutaneous, Q12H  insulin glargine, 25 Units, Subcutaneous, Nightly  insulin lispro, 2-7 Units, Subcutaneous, 4x Daily AC & at Bedtime  lisinopril, 40 mg, Oral, Daily  pantoprazole, 40 mg, Oral, Q AM  predniSONE, 5 mg, Oral, Q PM  torsemide, 20 mg, Oral, Daily      Continuous Infusions:   PRN Meds:.  acetaminophen    albuterol    dextrose    dextrose    glucagon (human recombinant)    hydrALAZINE    O2    sodium chloride    " traMADol    Laboratory results:  Lab Results   Component Value Date    GLUCOSE 173 (H) 02/17/2025    CALCIUM 8.7 02/17/2025     02/17/2025    K 4.3 02/17/2025    CO2 25.9 02/17/2025    CL 99 02/17/2025    BUN 20 02/17/2025    CREATININE 1.40 (H) 02/17/2025    EGFRIFNONA 46 (L) 02/14/2022    BCR 14.3 02/17/2025    ANIONGAP 12.1 02/17/2025     Lab Results   Component Value Date    WBC 11.06 (H) 02/17/2025    HGB 9.9 (L) 02/17/2025    HCT 30.0 (L) 02/17/2025    MCV 94.3 02/17/2025     02/17/2025     Lab Results   Component Value Date    CHOL 183 02/16/2025    CHOL 198 09/30/2024    CHOL 174 06/10/2024     Lab Results   Component Value Date    HDL 42 02/16/2025    HDL 50 09/30/2024    HDL 51 06/10/2024     Lab Results   Component Value Date     (H) 02/16/2025     (H) 09/30/2024     (H) 06/10/2024     Lab Results   Component Value Date    TRIG 122 02/16/2025    TRIG 191 (H) 09/30/2024    TRIG 100 06/10/2024         Lab 02/16/25  0810   HEMOGLOBIN A1C 7.70*      Review and interpretation of imaging:  Lab review:  Creatinine 1.40  Normal AST/ALT, alk phos 149,  Hemoglobin A1c 7.70%,    White count 11.06    MRI Brain Without Contrast    Result Date: 2/16/2025  BRAIN MRI WITHOUT CONTRAST  HISTORY: Altered mental status, focal weakness; R47.9-Unspecified speech disturbances; R29.898-Other symptoms and signs involving the musculoskeletal system; R42-Dizziness and giddiness; I10-Essential (primary) hypertension; N18.9-Chronic kidney disease, unspecified; Z86.39-Personal history of other endocrine, nutritional and metabolic disease  COMPARISON: October 2, 2023.  FINDINGS:  Multiplanar images of the head were obtained without gadolinium. The patient has an area of restricted diffusion within the left corona radiata, with extension into the left basal ganglia. No additional areas of restricted diffusion are seen. There is diffuse atrophy. There is periventricular deep white matter  microangiopathic change. The intracranial flow voids appear intact. No abnormality is seen on susceptibility weighted imaging. There is mucosal thickening within the ethmoid sinuses, with extensive opacification of the left maxillary sinus noted.      1. Study is positive for an acute infarct within the left corona radiata/basal ganglia. No evidence of hemorrhagic information..  FINDINGS were called to the patient's nurse at 6:20 a.m.   This report was finalized on 2/16/2025 6:22 AM by Dr. Tanya Prince M.D on Workstation: [id number]      XR Chest 1 View    Result Date: 2/15/2025  XR CHEST 1 VW-  CLINICAL HISTORY:  Acute Stroke Protocol (onset < 12 hrs); R47.9-Unspecified speech disturbances; R29.898-Other symptoms and signs involving the musculoskeletal system; R42-Dizziness and giddiness; I10-Essential (primary) hypertension; N18.9-Chronic kidney disease, unspecified; Z86.39-Personal history of other endocrine, nutritional and metabolic disease  FINDINGS:  The lung volumes are low. There our mild bibasilar atelectatic changes. The cardiomediastinal silhouette is mildly enlarged but stable since 01/29/2025. The osseous structures demonstrate no significant abnormalities.    This report was finalized on 2/15/2025 5:04 PM by Dr. Sanya Armendariz M.D on Workstation: [id number]      CT Angiogram Head w AI Analysis of LVO    Result Date: 2/15/2025  CT ANGIOGRAM OF THE HEAD AND NECK AND CT PERFUSION STUDY  CLINICAL HISTORY: Speech difficulties, dizziness, and right leg weakness.  TECHNIQUE:  A noncontrast head CT was performed with 3 mm axial images. Thereafter, a CT perfusion study was performed after the dynamic bolus of IV contrast. Standard perfusion maps were constructed with RAPID software. A CT angiogram of the head and neck was then performed with 1 mm axial images. Sagittal, coronal, and 3-dimensional reconstructed images were obtained. Finally, a delayed postcontrast head CT was performed with 3 mm axial  images.  FINDINGS:  HEAD CT: No convincing evidence for an acute intracranial abnormality. Mild to moderate changes of chronic small vessel ischemic phenomenon. Near complete opacification of the left maxillary sinus with mucosal thickening and secretions.  CT PERFUSION STUDY: No abnormality on the CBF less than 30% or Tmax greater than 6 seconds maps.  CTA HEAD:  Posterior Circulation: Unremarkable appearance of the vertebral arteries, basilar artery, and posterior cerebral arteries. Anterior Circulation: Atherosclerotic calcifications within the carotid siphons resulting in relatively mild degrees of luminal compromise. Unremarkable appearance of the anterior and middle cerebral arteries.  CTA NECK:   Aortic Arch: Classic configuration. Mild atherosclerotic involvement of the aortic arch. Subclavian Arteries: Mild to moderate stenosis of the proximal aspect the left subclavian artery secondary to predominantly noncalcified atherosclerotic changes. Vertebral Arteries: Unremarkable. CCA/ICA's: Atherosclerotic changes without evidence for a NASCET stenosis.       No convincing evidence for an acute infarct on either the noncontrast head CT or the CT perfusion study. Further evaluation could be performed with MR imaging for more sensitive and specific assessment.  No evidence for large vessel occlusion.  Mild to moderate stenosis of the proximal aspect of the left subclavian artery.  No significant NASCET stenosis within either internal carotid artery.  The findings of the noncontrast head CT were discussed with Dr. Sethi on 2/15/2025 at approximately 3:35 p.m. The findings of the CT angiogram and CT perfusion study were discussed with Dr. Sethi at approximately 3:50 p.m.    Radiation dose reduction techniques were utilized, including automated exposure control and exposure modulation based on body size.   This report was finalized on 2/15/2025 4:40 PM by Dr. Sanya Armendariz M.D on Workstation: [id number]       CT Angiogram Neck    Result Date: 2/15/2025  CT ANGIOGRAM OF THE HEAD AND NECK AND CT PERFUSION STUDY  CLINICAL HISTORY: Speech difficulties, dizziness, and right leg weakness.  TECHNIQUE:  A noncontrast head CT was performed with 3 mm axial images. Thereafter, a CT perfusion study was performed after the dynamic bolus of IV contrast. Standard perfusion maps were constructed with RAPID software. A CT angiogram of the head and neck was then performed with 1 mm axial images. Sagittal, coronal, and 3-dimensional reconstructed images were obtained. Finally, a delayed postcontrast head CT was performed with 3 mm axial images.  FINDINGS:  HEAD CT: No convincing evidence for an acute intracranial abnormality. Mild to moderate changes of chronic small vessel ischemic phenomenon. Near complete opacification of the left maxillary sinus with mucosal thickening and secretions.  CT PERFUSION STUDY: No abnormality on the CBF less than 30% or Tmax greater than 6 seconds maps.  CTA HEAD:  Posterior Circulation: Unremarkable appearance of the vertebral arteries, basilar artery, and posterior cerebral arteries. Anterior Circulation: Atherosclerotic calcifications within the carotid siphons resulting in relatively mild degrees of luminal compromise. Unremarkable appearance of the anterior and middle cerebral arteries.  CTA NECK:   Aortic Arch: Classic configuration. Mild atherosclerotic involvement of the aortic arch. Subclavian Arteries: Mild to moderate stenosis of the proximal aspect the left subclavian artery secondary to predominantly noncalcified atherosclerotic changes. Vertebral Arteries: Unremarkable. CCA/ICA's: Atherosclerotic changes without evidence for a NASCET stenosis.       No convincing evidence for an acute infarct on either the noncontrast head CT or the CT perfusion study. Further evaluation could be performed with MR imaging for more sensitive and specific assessment.  No evidence for large vessel occlusion.   Mild to moderate stenosis of the proximal aspect of the left subclavian artery.  No significant NASCET stenosis within either internal carotid artery.  The findings of the noncontrast head CT were discussed with Dr. Sethi on 2/15/2025 at approximately 3:35 p.m. The findings of the CT angiogram and CT perfusion study were discussed with Dr. Sethi at approximately 3:50 p.m.    Radiation dose reduction techniques were utilized, including automated exposure control and exposure modulation based on body size.   This report was finalized on 2/15/2025 4:40 PM by Dr. Sanya Armendariz M.D on Workstation: [id number]      CT CEREBRAL PERFUSION WITH & WITHOUT CONTRAST    Result Date: 2/15/2025  CT ANGIOGRAM OF THE HEAD AND NECK AND CT PERFUSION STUDY  CLINICAL HISTORY: Speech difficulties, dizziness, and right leg weakness.  TECHNIQUE:  A noncontrast head CT was performed with 3 mm axial images. Thereafter, a CT perfusion study was performed after the dynamic bolus of IV contrast. Standard perfusion maps were constructed with RAPID software. A CT angiogram of the head and neck was then performed with 1 mm axial images. Sagittal, coronal, and 3-dimensional reconstructed images were obtained. Finally, a delayed postcontrast head CT was performed with 3 mm axial images.  FINDINGS:  HEAD CT: No convincing evidence for an acute intracranial abnormality. Mild to moderate changes of chronic small vessel ischemic phenomenon. Near complete opacification of the left maxillary sinus with mucosal thickening and secretions.  CT PERFUSION STUDY: No abnormality on the CBF less than 30% or Tmax greater than 6 seconds maps.  CTA HEAD:  Posterior Circulation: Unremarkable appearance of the vertebral arteries, basilar artery, and posterior cerebral arteries. Anterior Circulation: Atherosclerotic calcifications within the carotid siphons resulting in relatively mild degrees of luminal compromise. Unremarkable appearance of the anterior and  middle cerebral arteries.  CTA NECK:   Aortic Arch: Classic configuration. Mild atherosclerotic involvement of the aortic arch. Subclavian Arteries: Mild to moderate stenosis of the proximal aspect the left subclavian artery secondary to predominantly noncalcified atherosclerotic changes. Vertebral Arteries: Unremarkable. CCA/ICA's: Atherosclerotic changes without evidence for a NASCET stenosis.       No convincing evidence for an acute infarct on either the noncontrast head CT or the CT perfusion study. Further evaluation could be performed with MR imaging for more sensitive and specific assessment.  No evidence for large vessel occlusion.  Mild to moderate stenosis of the proximal aspect of the left subclavian artery.  No significant NASCET stenosis within either internal carotid artery.  The findings of the noncontrast head CT were discussed with Dr. Sethi on 2/15/2025 at approximately 3:35 p.m. The findings of the CT angiogram and CT perfusion study were discussed with Dr. Sethi at approximately 3:50 p.m.    Radiation dose reduction techniques were utilized, including automated exposure control and exposure modulation based on body size.   This report was finalized on 2/15/2025 4:40 PM by Dr. Sanya Armendariz M.D on Workstation: [id number]       Impression:  79-year-old female with past medical history of hypertension, hyperlipidemia, diabetes, obesity, CKD, prior left breast cancer, prior temporal arteritis, and ANAYELI compliant with CPAP, who presented with right-sided weakness.  SBP up to 190s this admission.  CT head negative for acute findings, CTA head/neck showed mild to moderate stenosis in the left subclavian but no high-grade stenosis or LVO.  MRI brain without contrast showed acute left corona radiata/basal ganglia stroke without hemorrhage.  She was not on antiplatelet or statin prior to arrival.    Diagnosis:  Left basal ganglia stroke, unclear  etiology  Hypertension  Hyperlipidemia  Diabetes  ANAYELI on CPAP  CKD    Plan:  Follow-up 2D echo, Zio patch to be placed on discharge  Started on enteric-coated aspirin, Lipitor 80 mg daily  Neurochecks  BP control-gradually normalize, long-term BP goal less than 130/80, LDL goal less than 70, A1c goal less than 7.0%  Encouraged ongoing CPAP use, recommend out patient follow-up with her sleep medicine provider to see if she needs further titration  Stroke Education  NABIL/SCDs  PT/OT/ST-recommending inpatient rehab  Discussed with Dr. Sethi today.  Will follow-up on 2D echo.    Addendum: 2D echo completed, EF 61.5%, normal left atrial cavity size noted, saline test result negative, mild mitral valve regurgitation noted.  Neurology will sign off please call if further questions or concerns.  Will arrange outpatient neurology follow-up.

## 2025-02-17 NOTE — PLAN OF CARE
Problem: Adult Inpatient Plan of Care  Goal: Plan of Care Review  Outcome: Progressing  Goal: Patient-Specific Goal (Individualized)  Outcome: Progressing  Goal: Absence of Hospital-Acquired Illness or Injury  Outcome: Progressing  Intervention: Identify and Manage Fall Risk  Recent Flowsheet Documentation  Taken 2/17/2025 0400 by Cindy Mercado RN  Safety Promotion/Fall Prevention:   room organization consistent   safety round/check completed  Taken 2/17/2025 0145 by Cindy Mercado RN  Safety Promotion/Fall Prevention:   safety round/check completed   room organization consistent  Taken 2/17/2025 0000 by Cindy Mercado RN  Safety Promotion/Fall Prevention:   safety round/check completed   room organization consistent  Taken 2/16/2025 2200 by Cindy Mercado RN  Safety Promotion/Fall Prevention:   safety round/check completed   room organization consistent  Taken 2/16/2025 2035 by Cindy Mercado RN  Safety Promotion/Fall Prevention:   activity supervised   safety round/check completed   room organization consistent  Intervention: Prevent Skin Injury  Recent Flowsheet Documentation  Taken 2/17/2025 0400 by Cindy Mercado RN  Skin Protection: incontinence pads utilized  Taken 2/17/2025 0145 by Cindy Mercado RN  Skin Protection: incontinence pads utilized  Taken 2/17/2025 0000 by Cindy Mercado RN  Skin Protection: incontinence pads utilized  Taken 2/16/2025 2200 by Cindy Mercado RN  Skin Protection: incontinence pads utilized  Taken 2/16/2025 2035 by Cindy Mercado RN  Skin Protection:   incontinence pads utilized   protective footwear used  Intervention: Prevent Infection  Recent Flowsheet Documentation  Taken 2/16/2025 2035 by Cindy Mercado RN  Infection Prevention:   single patient room provided   rest/sleep promoted  Goal: Optimal Comfort and Wellbeing  Outcome: Progressing  Intervention: Provide Person-Centered Care  Recent Flowsheet Documentation  Taken 2/16/2025 2035 by Cindy Mercado RN  Trust  Relationship/Rapport: care explained  Goal: Readiness for Transition of Care  Outcome: Progressing  Intervention: Mutually Develop Transition Plan  Recent Flowsheet Documentation  Taken 2/16/2025 1954 by Cindy Mercado RN  Transportation Anticipated: family or friend will provide  Patient/Family Anticipated Services at Transition: none  Patient/Family Anticipates Transition to:   home   home with family     Problem: Skin Injury Risk Increased  Goal: Skin Health and Integrity  Outcome: Progressing  Intervention: Optimize Skin Protection  Recent Flowsheet Documentation  Taken 2/17/2025 0400 by Cindy Mercado RN  Pressure Reduction Techniques: frequent weight shift encouraged  Pressure Reduction Devices: pressure-redistributing mattress utilized  Skin Protection: incontinence pads utilized  Taken 2/17/2025 0145 by Cindy Mercado RN  Pressure Reduction Techniques: weight shift assistance provided  Pressure Reduction Devices: pressure-redistributing mattress utilized  Skin Protection: incontinence pads utilized  Taken 2/17/2025 0000 by Cindy Mercado RN  Pressure Reduction Techniques: weight shift assistance provided  Pressure Reduction Devices: pressure-redistributing mattress utilized  Skin Protection: incontinence pads utilized  Taken 2/16/2025 2200 by Cindy Mercado RN  Pressure Reduction Techniques: frequent weight shift encouraged  Pressure Reduction Devices: pressure-redistributing mattress utilized  Skin Protection: incontinence pads utilized  Taken 2/16/2025 2035 by Cindy Mercado RN  Pressure Reduction Techniques: weight shift assistance provided  Pressure Reduction Devices: pressure-redistributing mattress utilized  Skin Protection:   incontinence pads utilized   protective footwear used     Problem: Fall Injury Risk  Goal: Absence of Fall and Fall-Related Injury  Outcome: Progressing  Intervention: Identify and Manage Contributors  Recent Flowsheet Documentation  Taken 2/17/2025 0400 by Cindy Mercado  RN  Medication Review/Management: medications reviewed  Taken 2/17/2025 0145 by Cindy Mercado RN  Medication Review/Management: medications reviewed  Taken 2/17/2025 0000 by Cindy Mercado RN  Medication Review/Management: medications reviewed  Taken 2/16/2025 2200 by Cindy Mercado RN  Medication Review/Management: medications reviewed  Taken 2/16/2025 2035 by Cindy Mercado RN  Medication Review/Management: medications reviewed  Intervention: Promote Injury-Free Environment  Recent Flowsheet Documentation  Taken 2/17/2025 0400 by Cindy Mercado RN  Safety Promotion/Fall Prevention:   room organization consistent   safety round/check completed  Taken 2/17/2025 0145 by Cindy Mercado RN  Safety Promotion/Fall Prevention:   safety round/check completed   room organization consistent  Taken 2/17/2025 0000 by Cindy Mercado RN  Safety Promotion/Fall Prevention:   safety round/check completed   room organization consistent  Taken 2/16/2025 2200 by Cindy Mercado RN  Safety Promotion/Fall Prevention:   safety round/check completed   room organization consistent  Taken 2/16/2025 2035 by Cindy Mercado RN  Safety Promotion/Fall Prevention:   activity supervised   safety round/check completed   room organization consistent   Goal Outcome Evaluation:                 Patient resting in bed. No attempts to ambulate. Iv hydralazine given one time for HTN. No c/o pain or discomfort. NIH remains the same. Vs stable. Safety measures in place.

## 2025-02-18 LAB
ANION GAP SERPL CALCULATED.3IONS-SCNC: 15 MMOL/L (ref 5–15)
BASOPHILS # BLD AUTO: 0.04 10*3/MM3 (ref 0–0.2)
BASOPHILS NFR BLD AUTO: 0.3 % (ref 0–1.5)
BUN SERPL-MCNC: 21 MG/DL (ref 8–23)
BUN/CREAT SERPL: 13 (ref 7–25)
CALCIUM SPEC-SCNC: 8.3 MG/DL (ref 8.6–10.5)
CHLORIDE SERPL-SCNC: 94 MMOL/L (ref 98–107)
CO2 SERPL-SCNC: 25 MMOL/L (ref 22–29)
CREAT SERPL-MCNC: 1.61 MG/DL (ref 0.57–1)
DEPRECATED RDW RBC AUTO: 41.7 FL (ref 37–54)
EGFRCR SERPLBLD CKD-EPI 2021: 32.6 ML/MIN/1.73
EOSINOPHIL # BLD AUTO: 0.06 10*3/MM3 (ref 0–0.4)
EOSINOPHIL NFR BLD AUTO: 0.4 % (ref 0.3–6.2)
ERYTHROCYTE [DISTWIDTH] IN BLOOD BY AUTOMATED COUNT: 12.4 % (ref 12.3–15.4)
GLUCOSE BLDC GLUCOMTR-MCNC: 133 MG/DL (ref 70–130)
GLUCOSE BLDC GLUCOMTR-MCNC: 140 MG/DL (ref 70–130)
GLUCOSE BLDC GLUCOMTR-MCNC: 157 MG/DL (ref 70–130)
GLUCOSE BLDC GLUCOMTR-MCNC: 172 MG/DL (ref 70–130)
GLUCOSE SERPL-MCNC: 170 MG/DL (ref 65–99)
HCT VFR BLD AUTO: 32.3 % (ref 34–46.6)
HGB BLD-MCNC: 10.6 G/DL (ref 12–15.9)
IMM GRANULOCYTES # BLD AUTO: 0.11 10*3/MM3 (ref 0–0.05)
IMM GRANULOCYTES NFR BLD AUTO: 0.8 % (ref 0–0.5)
LYMPHOCYTES # BLD AUTO: 0.64 10*3/MM3 (ref 0.7–3.1)
LYMPHOCYTES NFR BLD AUTO: 4.5 % (ref 19.6–45.3)
MCH RBC QN AUTO: 30.7 PG (ref 26.6–33)
MCHC RBC AUTO-ENTMCNC: 32.8 G/DL (ref 31.5–35.7)
MCV RBC AUTO: 93.6 FL (ref 79–97)
MONOCYTES # BLD AUTO: 0.8 10*3/MM3 (ref 0.1–0.9)
MONOCYTES NFR BLD AUTO: 5.6 % (ref 5–12)
NEUTROPHILS NFR BLD AUTO: 12.68 10*3/MM3 (ref 1.7–7)
NEUTROPHILS NFR BLD AUTO: 88.4 % (ref 42.7–76)
NRBC BLD AUTO-RTO: 0 /100 WBC (ref 0–0.2)
PLATELET # BLD AUTO: 284 10*3/MM3 (ref 140–450)
PMV BLD AUTO: 9.9 FL (ref 6–12)
POTASSIUM SERPL-SCNC: 4 MMOL/L (ref 3.5–5.2)
RBC # BLD AUTO: 3.45 10*6/MM3 (ref 3.77–5.28)
SODIUM SERPL-SCNC: 134 MMOL/L (ref 136–145)
WBC NRBC COR # BLD AUTO: 14.33 10*3/MM3 (ref 3.4–10.8)

## 2025-02-18 PROCEDURE — 97116 GAIT TRAINING THERAPY: CPT

## 2025-02-18 PROCEDURE — 80048 BASIC METABOLIC PNL TOTAL CA: CPT | Performed by: INTERNAL MEDICINE

## 2025-02-18 PROCEDURE — 94799 UNLISTED PULMONARY SVC/PX: CPT

## 2025-02-18 PROCEDURE — 63710000001 INSULIN GLARGINE PER 5 UNITS: Performed by: INTERNAL MEDICINE

## 2025-02-18 PROCEDURE — 63710000001 PREDNISONE PER 5 MG: Performed by: INTERNAL MEDICINE

## 2025-02-18 PROCEDURE — 92610 EVALUATE SWALLOWING FUNCTION: CPT

## 2025-02-18 PROCEDURE — 82948 REAGENT STRIP/BLOOD GLUCOSE: CPT

## 2025-02-18 PROCEDURE — 94664 DEMO&/EVAL PT USE INHALER: CPT

## 2025-02-18 PROCEDURE — 97530 THERAPEUTIC ACTIVITIES: CPT

## 2025-02-18 PROCEDURE — 25010000002 ENOXAPARIN PER 10 MG: Performed by: INTERNAL MEDICINE

## 2025-02-18 PROCEDURE — 63710000001 INSULIN LISPRO (HUMAN) PER 5 UNITS: Performed by: INTERNAL MEDICINE

## 2025-02-18 PROCEDURE — 94761 N-INVAS EAR/PLS OXIMETRY MLT: CPT

## 2025-02-18 PROCEDURE — 85025 COMPLETE CBC W/AUTO DIFF WBC: CPT | Performed by: INTERNAL MEDICINE

## 2025-02-18 RX ORDER — SODIUM CHLORIDE 9 MG/ML
75 INJECTION, SOLUTION INTRAVENOUS CONTINUOUS
Status: DISCONTINUED | OUTPATIENT
Start: 2025-02-18 | End: 2025-02-19

## 2025-02-18 RX ADMIN — PANTOPRAZOLE SODIUM 40 MG: 40 TABLET, DELAYED RELEASE ORAL at 05:23

## 2025-02-18 RX ADMIN — EMPAGLIFLOZIN 10 MG: 10 TABLET, FILM COATED ORAL at 09:20

## 2025-02-18 RX ADMIN — ENOXAPARIN SODIUM 40 MG: 100 INJECTION SUBCUTANEOUS at 22:22

## 2025-02-18 RX ADMIN — INSULIN LISPRO 2 UNITS: 100 INJECTION, SOLUTION INTRAVENOUS; SUBCUTANEOUS at 09:20

## 2025-02-18 RX ADMIN — INSULIN LISPRO 2 UNITS: 100 INJECTION, SOLUTION INTRAVENOUS; SUBCUTANEOUS at 17:55

## 2025-02-18 RX ADMIN — BUDESONIDE AND FORMOTEROL FUMARATE DIHYDRATE 2 PUFF: 160; 4.5 AEROSOL RESPIRATORY (INHALATION) at 21:17

## 2025-02-18 RX ADMIN — PREDNISONE 5 MG: 5 TABLET ORAL at 17:55

## 2025-02-18 RX ADMIN — INSULIN LISPRO 5 UNITS: 100 INJECTION, SOLUTION INTRAVENOUS; SUBCUTANEOUS at 09:21

## 2025-02-18 RX ADMIN — INSULIN LISPRO 5 UNITS: 100 INJECTION, SOLUTION INTRAVENOUS; SUBCUTANEOUS at 12:31

## 2025-02-18 RX ADMIN — INSULIN LISPRO 5 UNITS: 100 INJECTION, SOLUTION INTRAVENOUS; SUBCUTANEOUS at 17:55

## 2025-02-18 RX ADMIN — LISINOPRIL 40 MG: 20 TABLET ORAL at 09:20

## 2025-02-18 RX ADMIN — BISOPROLOL FUMARATE 20 MG: 5 TABLET ORAL at 22:21

## 2025-02-18 RX ADMIN — AMLODIPINE BESYLATE 10 MG: 10 TABLET ORAL at 09:20

## 2025-02-18 RX ADMIN — ENOXAPARIN SODIUM 40 MG: 100 INJECTION SUBCUTANEOUS at 12:31

## 2025-02-18 RX ADMIN — INSULIN GLARGINE 25 UNITS: 100 INJECTION, SOLUTION SUBCUTANEOUS at 22:21

## 2025-02-18 RX ADMIN — TRAMADOL HYDROCHLORIDE 50 MG: 50 TABLET ORAL at 22:10

## 2025-02-18 RX ADMIN — ATORVASTATIN CALCIUM 80 MG: 80 TABLET, FILM COATED ORAL at 22:21

## 2025-02-18 RX ADMIN — BUDESONIDE AND FORMOTEROL FUMARATE DIHYDRATE 2 PUFF: 160; 4.5 AEROSOL RESPIRATORY (INHALATION) at 07:51

## 2025-02-18 RX ADMIN — ASPIRIN 81 MG: 81 TABLET, COATED ORAL at 09:20

## 2025-02-18 NOTE — PLAN OF CARE
Goal Outcome Evaluation:  Plan of Care Reviewed With: patient, family        Progress: improving  Outcome Evaluation: Pt seen for PT this PM and tolerated mobility well. Pt and family eager for her to get OOB, though pt reporting she's had a BM and needs to be cleaned up. PT assisted mod A x1 to roll and nsg aid called to assist with cleaning pt. Pt transferred to EOB following with min A x1 and stood with min A x2. Pt ambulated 30ft in her room with rwx and CGA-SBA for safety. Pt with slow pace but demo'd independence with rwx navigation around obstacles. Pt fatigues quickly, cues for controlled lower to chair and repositioned, left with needs met. PT will continue to follow, anticipate DC to rehab - family interested in IPR.    Anticipated Discharge Disposition (PT): inpatient rehabilitation facility, skilled nursing facility (Pending progress)

## 2025-02-18 NOTE — PLAN OF CARE
Goal Outcome Evaluation:              Outcome Evaluation: Clinical swallow evaluation completed. RN downgraded diet to nectar thick liquids/mechanical soft previous date d/t coughing with thin liquids.  at bedside. Right labial droop, right lingual deviation. Word finding difficulties observed. Wet vocal quality demonstrated with thins via spoon 2/2, nectar via spoon/cup. Throat clearing with nectar via cup. No overt s/s of aspiration with ice chips, honey via spoon/cup/straw, puree, or strained soft/chopped solids. Prolonged munching mastication. Pt complaining of anterior loss on right side, not appreciated during assessment. Recommend mechanical ground diet with honey thick liquids. Meds whole or crushed in puree. Sitting upright, slow rate, small bites/sips, supervision during meals, assist with feeding, check mouth for residue/pocketing. Will follow with [facility] to further assess.

## 2025-02-18 NOTE — THERAPY TREATMENT NOTE
Patient Name: Blanca Harden  : 1945    MRN: [id number]                              Today's Date: 2025       Admit Date: 2/15/2025    Visit Dx:     ICD-10-CM ICD-9-CM   1. Difficulty with speech  R47.9 784.59   2. Right leg weakness  R29.898 729.89   3. Dizziness  R42 780.4   4. Uncontrolled hypertension  I10 401.9   5. Chronic renal impairment, unspecified CKD stage  N18.9 585.9   6. History of diabetes mellitus  Z86.39 V12.29   7. Palpitations  R00.2 785.1     Patient Active Problem List   Diagnosis    Osteoporosis    Breast neoplasm, Tis (DCIS), left    Iron deficiency anemia    CKD (chronic kidney disease)    Diabetic nephropathy associated with type 2 diabetes mellitus    Temporal arteritis    Immunosuppression    Anemia    Duodenal adenoma    Gastritis without bleeding    Polyp of duodenum    Morbidly obese    Dyspnea on exertion    Hyperlipidemia    Type 2 diabetes mellitus with stage 3b chronic kidney disease, with long-term current use of insulin    Essential hypertension    Bilateral lower extremity edema    Pulmonary hypertension    Obstructive sleep apnea on CPAP    Stage 3a chronic kidney disease    Iron malabsorption    Respiratory distress    Hypoglycemia due to insulin    Delirium    Hypomagnesemia    Severe malnutrition    Leg swelling    Venous (peripheral) insufficiency    Polyp of colon    Obesity (BMI 30.0-34.9)    TIA (transient ischemic attack)    Difficulty with speech     Past Medical History:   Diagnosis Date    Anemia     Anxiety and depression     Asthma     Balance problem     CKD (chronic kidney disease), stage III     Colonic mass     COPD (chronic obstructive pulmonary disease)     Coronary artery disease     FOLLOWED BY DR GUNN    Diabetes mellitus, type 2     History of COVID-19     History of left breast cancer 2019    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 Daniel Gutierrez, [facility]    CARDIAC CATHETERIZATION       SECTION N/A     x3    CHOLECYSTECTOMY N/A     COLON RESECTION Right 2025    Procedure: COLON RESECTION LAPAROSCOPIC RIGHT WITH DAVINCI ROBOT;  Surgeon: Ashwin Alvarado MD;  Location: [facility] MAIN OR;  Service: Robotics - DaVinci;  Laterality: Right;    COLONOSCOPY      COLONOSCOPY N/A 2024    Procedure: COLONOSCOPY into the cecum and TI with hot snare polypectomy;  Surgeon: Ashwin Alvarado MD;  Location: [facility] ENDOSCOPY;  Service: General;  Laterality: N/A;  pre-colon polyps  post-colon mass    COLONOSCOPY W/ POLYPECTOMY N/A 2019    Enlarged folds in the antrum: biopsied; duodenal, transverse colon x2, splenic flexure, descending colon and sigmoid colon polyps: biopsied (path: tubular adenoma x6)-Dr. Zak De Jesus, [facility]    ENDOSCOPY  10/11/2019    Procedure: ESOPHAGOGASTRODUODENOSCOPY with hot snare polypectomy;  Surgeon: Haile Handley MD;  Location: [facility] ENDOSCOPY;  Service: Gastroenterology    ENDOSCOPY N/A 2020    Procedure: ESOPHAGOGASTRODUODENOSCOPY;  Surgeon: Haile Handley MD;  Location: [facility] ENDOSCOPY;  Service: Gastroenterology    ENDOSCOPY N/A 2020    Procedure: ESOPHAGOGASTRODUODENOSCOPY WITH BIOPSIES AND COLD BIOPSY POLYPECTOMY;  Surgeon: Haile Handley MD;  Location: [facility] ENDOSCOPY;  Service: Gastroenterology;   Laterality: N/A;  pre: dyspepsia and diarrhea  post: duodenal polyp and gastritis    ENDOSCOPY N/A 07/23/2024    Procedure: ESOPHAGOGASTRODUODENOSCOPY WITH hot snare polypectomy with clip placement x 2BIOPSY;  Surgeon: Ashwin Alvarado MD;  Location: [facility] ENDOSCOPY;  Service: General;  Laterality: N/A;  pre-hx duoedenal polyp  post-duodenal polyp; gastritis    EYE SURGERY      MASTECTOMY WITH SENTINEL NODE BIOPSY AND AXILLARY NODE DISSECTION Left 07/03/2019    Procedure: LEFT BREAST MASTECTOMY WITH SENTINEL NODE BIOPSY AND , v-y plasty closure;  Surgeon: Tahmina James MD;  Location: [facility] MAIN OR;  Service: General    SUBTOTAL HYSTERECTOMY Bilateral     ovaries still in tact    TEMPORAL ARTERY BIOPSY Bilateral 12/05/2019      General Information       Row Name 02/18/25 1608          Physical Therapy Time and Intention    Document Type therapy note (daily note)  -     Mode of Treatment individual therapy;physical therapy  -       Row Name 02/18/25 1608          General Information    Patient Profile Reviewed yes  -     Existing Precautions/Restrictions fall  -       Row Name 02/18/25 1608          Cognition    Orientation Status (Cognition) oriented x 3  -       Row Name 02/18/25 1608          Safety Issues/Impairments Affecting Functional Mobility    Impairments Affecting Function (Mobility) balance;endurance/activity tolerance;strength;range of motion (ROM)  -               User Key  (r) = Recorded By, (t) = Taken By, (c) = Cosigned By      Initials Name Provider Type     Yolie Garcia PT Physical Therapist                   Mobility       Row Name 02/18/25 1608          Bed Mobility    Bed Mobility rolling left;supine-sit  -     All Activities, [place] (Bed Mobility) moderate assist (50% patient effort);1 person assist;verbal cues  -     Rolling Left [place] (Bed Mobility) moderate assist (50% patient effort);verbal cues;1 person assist  -     Supine-Sit [place]  (Bed Mobility) 1 person assist;verbal cues;minimum assist (75% patient effort)  -     Assistive Device (Bed Mobility) head of bed elevated  -[facility] Name 02/18/25 1608          Sit-Stand Transfer    Sit-Stand [place] (Transfers) minimum assist (75% patient effort);2 person assist;verbal cues  -     Assistive Device (Sit-Stand Transfers) walker, front-wheeled  -[facility] Name 02/18/25 1608          Gait/Stairs (Locomotion)    [place] Level (Gait) contact guard;verbal cues;standby assist  -     Distance in Feet (Gait) 30  -     Deviations/Abnormal Patterns (Gait) antalgic;celestina decreased;gait speed decreased;stride length decreased  -     Bilateral Gait Deviations forward flexed posture  -               User Key  (r) = Recorded By, (t) = Taken By, (c) = Cosigned By      Initials Name Provider Type     Yolie Garcia, PT Physical Therapist                   Obj/Interventions    No documentation.                  Goals/Plan    No documentation.                  Clinical Impression       [facility] Name 02/18/25 1609          Pain    Pretreatment Pain Rating 0/10 - no pain  -     Posttreatment Pain Rating 0/10 - no pain  -[facility] Name 02/18/25 1609          Plan of Care Review    Plan of Care Reviewed With patient;family  -     Progress improving  -     Outcome Evaluation Pt seen for PT this PM and tolerated mobility well. Pt and family eager for her to get OOB, though pt reporting she's had a BM and needs to be cleaned up. PT assisted mod A x1 to roll and nsg aid called to assist with cleaning pt. Pt transferred to EOB following with min A x1 and stood with min A x2. Pt ambulated 30ft in her room with rwx and CGA-SBA for safety. Pt with slow pace but demo'd independence with rwx navigation around obstacles. Pt fatigues quickly, cues for controlled lower to chair and repositioned, left with needs met. PT will continue to follow, anticipate DC to rehab - family interested in IPR.   -       Row Name 02/18/25 1609          Vital Signs    O2 Delivery Pre Treatment room air  -     O2 Delivery Intra Treatment room air  -     O2 Delivery Post Treatment room air  -       Row Name 02/18/25 1609          Positioning and Restraints    Pre-Treatment Position in bed  -     Post Treatment Position chair  -     In Chair notified nsg;reclined;call light within reach;encouraged to call for assist;exit alarm on;with family/caregiver  -               User Key  (r) = Recorded By, (t) = Taken By, (c) = Cosigned By      Initials Name Provider Type     Yolie Garcia PT Physical Therapist                   Outcome Measures       Row Name 02/18/25 1611          How much help from another person do you currently need...    Turning from your back to your side while in flat bed without using bedrails? 3  -BH     Moving from lying on back to sitting on the side of a flat bed without bedrails? 3  -BH     Moving to and from a bed to a chair (including a wheelchair)? 3  -BH     Standing up from a chair using your arms (e.g., wheelchair, bedside chair)? 3  -     Climbing 3-5 steps with a railing? 2  -     To walk in hospital room? 3  -     AM-PAC 6 Clicks Score (PT) 17  -     Highest Level of Mobility Goal 5 --> Static standing  -       Row Name 02/18/25 1611          Functional Assessment    Outcome Measure Options AM-PAC 6 Clicks Basic Mobility (PT)  -               User Key  (r) = Recorded By, (t) = Taken By, (c) = Cosigned By      Initials Name Provider Type     Yolie Garcia PT Physical Therapist                                 Physical Therapy Education       Title: PT OT SLP Therapies (In Progress)       Topic: Physical Therapy (In Progress)       Point: Mobility training (Done)       Learning Progress Summary            Patient Acceptance, E,TB,D, VU,NR by  at 2/18/2025 1611                      Point: Home exercise program (Not Started)       Learner Progress:  Not documented  in this visit.              Point: Body mechanics (Done)       Learning Progress Summary            Patient Acceptance, E,TB,D, VU,NR by  at 2/18/2025 1611                      Point: Precautions (Done)       Learning Progress Summary            Patient Acceptance, E,TB,D, VU,NR by  at 2/18/2025 1611                                      User Key       Initials Effective Dates Name Provider Type [facility] 04/08/22 -  Yolie Garcia PT Physical Therapist PT                  PT Recommendation and Plan     Progress: improving  Outcome Evaluation: Pt seen for PT this PM and tolerated mobility well. Pt and family eager for her to get OOB, though pt reporting she's had a BM and needs to be cleaned up. PT assisted mod A x1 to roll and nsg aid called to assist with cleaning pt. Pt transferred to EOB following with min A x1 and stood with min A x2. Pt ambulated 30ft in her room with rwx and CGA-SBA for safety. Pt with slow pace but demo'd independence with rwx navigation around obstacles. Pt fatigues quickly, cues for controlled lower to chair and repositioned, left with needs met. PT will continue to follow, anticipate DC to rehab - family interested in IPR.     Time Calculation:         PT Charges       Row Name 02/18/25 1611             Time Calculation    Start Time 1527  -      Stop Time 1554  -      Time Calculation (min) 27 min  -      PT Received On 02/18/25  -      PT - Next Appointment 02/19/25  -         Time Calculation- PT    Total Timed Code Minutes- PT 27 minute(s)  -         Timed Charges    50415 - Gait Training Minutes  10  -      84563 - PT Therapeutic Activity Minutes 17  -         Total Minutes    Timed Charges Total Minutes 27  -       Total Minutes 27  -                User Key  (r) = Recorded By, (t) = Taken By, (c) = Cosigned By      Initials Name Provider Type     Yolie Garcia, PT Physical Therapist                  Therapy Charges for Today       Code  Description Service Date Service Provider Modifiers Qty    [id number]  GAIT TRAINING EA 15 MIN 2/18/2025 Yolie Garcia, PT GP 1    87223975054 HC PT THERAPEUTIC ACT EA 15 MIN 2/18/2025 Yolie Garcia, PT GP 1    [id number]  PT THER SUPP EA 15 MIN 2/18/2025 Yolie Garcia, PT GP 1            PT G-Codes  Outcome Measure Options: AM-PAC 6 Clicks Basic Mobility (PT)  AM-PAC 6 Clicks Score (PT): 17  AM-PAC 6 Clicks Score (OT): 13  Modified Rakan Scale: 4 - Moderately severe disability.  Unable to walk without assistance, and unable to attend to own bodily needs without assistance.  PT Discharge Summary  Anticipated Discharge Disposition (PT): inpatient rehabilitation facility, skilled nursing facility (Pending progress)    Yolie Garcia, PT  2/18/2025

## 2025-02-18 NOTE — THERAPY EVALUATION
Acute Care - Speech Language Pathology   Swallow Initial Evaluation [facility]     Patient Name: Blanca Harden  : 1945  MRN: [id number]  Today's Date: 2025               Admit Date: 2/15/2025    Visit Dx:     ICD-10-CM ICD-9-CM   1. Difficulty with speech  R47.9 784.59   2. Right leg weakness  R29.898 729.89   3. Dizziness  R42 780.4   4. Uncontrolled hypertension  I10 401.9   5. Chronic renal impairment, unspecified CKD stage  N18.9 585.9   6. History of diabetes mellitus  Z86.39 V12.29   7. Palpitations  R00.2 785.1     Patient Active Problem List   Diagnosis    Osteoporosis    Breast neoplasm, Tis (DCIS), left    Iron deficiency anemia    CKD (chronic kidney disease)    Diabetic nephropathy associated with type 2 diabetes mellitus    Temporal arteritis    Immunosuppression    Anemia    Duodenal adenoma    Gastritis without bleeding    Polyp of duodenum    Morbidly obese    Dyspnea on exertion    Hyperlipidemia    Type 2 diabetes mellitus with stage 3b chronic kidney disease, with long-term current use of insulin    Essential hypertension    Bilateral lower extremity edema    Pulmonary hypertension    Obstructive sleep apnea on CPAP    Stage 3a chronic kidney disease    Iron malabsorption    Respiratory distress    Hypoglycemia due to insulin    Delirium    Hypomagnesemia    Severe malnutrition    Leg swelling    Venous (peripheral) insufficiency    Polyp of colon    Obesity (BMI 30.0-34.9)    TIA (transient ischemic attack)    Difficulty with speech     Past Medical History:   Diagnosis Date    Anemia     Anxiety and depression     Asthma     Balance problem     CKD (chronic kidney disease), stage III     Colonic mass     COPD (chronic obstructive pulmonary disease)     Coronary artery disease     FOLLOWED BY DR GUNN    Diabetes mellitus, type 2     History of COVID-19     History of left breast cancer 2019    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,  grade III, 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 Daniel Gutierrez, [facility]    CARDIAC CATHETERIZATION       SECTION N/A     x3    CHOLECYSTECTOMY N/A     COLON RESECTION Right 2025    Procedure: COLON RESECTION LAPAROSCOPIC RIGHT WITH DAVINCI ROBOT;  Surgeon: Ashwin Alvarado MD;  Location: [facility] MAIN OR;  Service: Robotics - DaVinci;  Laterality: Right;    COLONOSCOPY      COLONOSCOPY N/A 2024    Procedure: COLONOSCOPY into the cecum and TI with hot snare polypectomy;  Surgeon: Ashwin Alvarado MD;  Location: [facility] ENDOSCOPY;  Service: General;  Laterality: N/A;  pre-colon polyps  post-colon mass    COLONOSCOPY W/ POLYPECTOMY N/A 2019    Enlarged folds in the antrum: biopsied; duodenal, transverse colon x2, splenic flexure, descending colon and sigmoid colon polyps: biopsied (path: tubular adenoma x6)-Dr. Zak De Jesus, [facility]    ENDOSCOPY  10/11/2019    Procedure: ESOPHAGOGASTRODUODENOSCOPY with hot snare polypectomy;  Surgeon: Haile Handley MD;  Location: [facility] ENDOSCOPY;  Service: Gastroenterology    ENDOSCOPY N/A 2020    Procedure: ESOPHAGOGASTRODUODENOSCOPY;  Surgeon: Haile Handley MD;  Location: [facility] ENDOSCOPY;  Service: Gastroenterology    ENDOSCOPY N/A 2020    Procedure: ESOPHAGOGASTRODUODENOSCOPY WITH BIOPSIES AND COLD BIOPSY POLYPECTOMY;  Surgeon: Haile Handely MD;   Location:  [facility] ENDOSCOPY;  Service: Gastroenterology;  Laterality: N/A;  pre: dyspepsia and diarrhea  post: duodenal polyp and gastritis    ENDOSCOPY N/A 07/23/2024    Procedure: ESOPHAGOGASTRODUODENOSCOPY WITH hot snare polypectomy with clip placement x 2BIOPSY;  Surgeon: Ashwin Alvarado MD;  Location:  [facility] ENDOSCOPY;  Service: General;  Laterality: N/A;  pre-hx duoedenal polyp  post-duodenal polyp; gastritis    EYE SURGERY      MASTECTOMY WITH SENTINEL NODE BIOPSY AND AXILLARY NODE DISSECTION Left 07/03/2019    Procedure: LEFT BREAST MASTECTOMY WITH SENTINEL NODE BIOPSY AND , v-y plasty closure;  Surgeon: Tahmina James MD;  Location: [facility] MAIN OR;  Service: General    SUBTOTAL HYSTERECTOMY Bilateral     ovaries still in tact    TEMPORAL ARTERY BIOPSY Bilateral 12/05/2019       SLP Recommendation and Plan  SLP Swallowing Diagnosis: suspected pharyngeal dysphagia, oral dysphagia (02/18/25 1000)  SLP Diet Recommendation: mechanical ground textures, no mixed consistencies, honey thick liquids (02/18/25 1000)  Recommended Precautions and Strategies: upright posture during/after eating, small bites of food and sips of liquid, 1:1 supervision, assist with feeding, check mouth frequently for oral residue/pocketing (02/18/25 1000)  SLP Rec. for Method of Medication Administration: meds whole, meds crushed, with puree, as tolerated (02/18/25 1000)     Monitor for Signs of Aspiration: yes, notify SLP if any concerns (02/18/25 1000)  Recommended Diagnostics: reassess via VFSS (MBS), SLE/Cog/Motor Speech Evaluation (02/18/25 1000)  Swallow Criteria for Skilled Therapeutic Interventions Met: demonstrates skilled criteria (02/18/25 1000)     Rehab Potential/Prognosis, Swallowing: good, to achieve stated therapy goals (02/18/25 1000)  Therapy Frequency (Swallow): PRN (02/18/25 1000)  Predicted Duration Therapy Intervention (Days): until discharge (02/18/25 1000)  Oral Care Recommendations: Oral Care BID/PRN  (02/18/25 1000)                                        Outcome Evaluation: Clinical swallow evaluation completed. RN downgraded diet to nectar thick liquids/mechanical soft previous date d/t coughing with thin liquids.  at bedside. Right labial droop, right lingual deviation. Word finding difficulties observed. Wet vocal quality demonstrated with thins via spoon 2/2, nectar via spoon/cup. Throat clearing with nectar via cup. No overt s/s of aspiration with ice chips, honey via spoon/cup/straw, puree, or strained soft/chopped solids. Prolonged munching mastication. Pt complaining of anterior loss on right side, not appreciated during assessment. Recommend mechanical ground diet with honey thick liquids. Meds whole or crushed in puree. Sitting upright, slow rate, small bites/sips, supervision during meals, assist with feeding, check mouth for residue/pocketing. Will follow with VFSS to further assess.      SWALLOW EVALUATION (Last 72 Hours)       SLP Adult Swallow Evaluation       Row Name 02/18/25 1000                   Rehab Evaluation    Document Type evaluation  -CR        Subjective Information no complaints  -CR        Patient Observations alert;cooperative  -CR        Patient Effort excellent  -CR        Symptoms Noted During/After Treatment none  -CR           General Information    Patient Profile Reviewed yes  -CR        Pertinent History Of Current Problem L basal ganglia stroke.  -CR        Current Method of Nutrition mechanical ground textures;nectar/syrup-thick liquids  -CR        Precautions/Limitations, Vision WFL;for purposes of eval  -CR        Precautions/Limitations, Hearing WFL;for purposes of eval  -CR        Prior Level of Function-Communication WFL  -CR        Prior Level of Function-Swallowing no diet consistency restrictions  -CR        Plans/Goals Discussed with patient;spouse/S.O.;agreed upon  -CR        Barriers to Rehab none identified  -CR           Pain    Pretreatment Pain  Rating 0/10 - no pain  -CR        Posttreatment Pain Rating 0/10 - no pain  -CR           Oral Motor Structure and Function    Dentition Assessment natural, present and adequate  -CR        Secretion Management WNL/WFL  -CR        Mucosal Quality moist, healthy  -CR           Oral Musculature and Cranial Nerve Assessment    Mandibular Impairment Detail, Cranial Nerve V (Trigeminal) reduced mandibular ROM  -CR        Oral Labial or Buccal Impairment, Detail, Cranial Nerve VII (Facial): right labial droop  -CR        Lingual Impairment, Detail. Cranial Nerves IX, XII (Glossopharyngeal and Hypoglossal) other (see comments)  right lingual deviation  -CR           General Eating/Swallowing Observations    Respiratory Support Currently in Use room air  -CR           Clinical Swallow Eval    Clinical Swallow Evaluation Summary Clinical swallow evaluation completed. RN downgraded diet to nectar thick liquids/mechanical soft previous date d/t coughing with thin liquids.  at bedside. Right labial droop, right lingual deviation appreciated. Word finding difficulties observed. Wet vocal quality demonstrated with thins via spoon 2/2, nectar via spoon/cup. Throat clearing with nectar via cup. No overt s/s of aspiration with ice chips, honey via spoon/cup/straw, puree, or strained soft/chopped solids. Prolonged munching mastication. No oral residue. Pt complaining of anterior loss on right side, not appreciated during assessment. Recommend mechanical ground diet with honey thick liquids. Meds whole or crushed in puree. Sitting upright, slow rate, small bites/sips, supervision during meals, assist with feeding, check mouth for residue/pocketing. Will follow with VFSS to further assess.  -CR           SLP Evaluation Clinical Impression    SLP Swallowing Diagnosis suspected pharyngeal dysphagia;oral dysphagia  -CR        Functional Impact risk of aspiration/pneumonia  -CR        Rehab Potential/Prognosis, Swallowing good, to  achieve stated therapy goals  -CR        Swallow Criteria for Skilled Therapeutic Interventions Met demonstrates skilled criteria  -CR           Recommendations    Therapy Frequency (Swallow) PRN  -CR        Predicted Duration Therapy Intervention (Days) until discharge  -CR        SLP Diet Recommendation mechanical ground textures;no mixed consistencies;honey thick liquids  -CR        Recommended Diagnostics reassess via VFSS (MBS);SLE/Cog/Motor Speech Evaluation  -CR        Recommended Precautions and Strategies upright posture during/after eating;small bites of food and sips of liquid;1:1 supervision;assist with feeding;check mouth frequently for oral residue/pocketing  -CR        Oral Care Recommendations Oral Care BID/PRN  -CR        SLP Rec. for Method of Medication Administration meds whole;meds crushed;with puree;as tolerated  -CR        Monitor for Signs of Aspiration yes;notify SLP if any concerns  -CR                  User Key  (r) = Recorded By, (t) = Taken By, (c) = Cosigned By      Initials Name Effective Dates    CR Shayna Odonnell SLP 12/03/24 -                     EDUCATION  The patient has been educated in the following areas:   Dysphagia (Swallowing Impairment).                Time Calculation:    Time Calculation- SLP       Row Name 02/18/25 1028             Time Calculation- SLP    SLP Start Time 0730  -CR      SLP Received On 02/18/25  -CR         Untimed Charges    83265-YL Eval Oral Pharyng Swallow Minutes 60  -CR         Total Minutes    Untimed Charges Total Minutes 60  -CR       Total Minutes 60  -CR                User Key  (r) = Recorded By, (t) = Taken By, (c) = Cosigned By      Initials Name Provider Type    CR Shayna Odonnell SLP Speech and Language Pathologist                    Therapy Charges for Today       Code Description Service Date Service Provider Modifiers Qty    [id number]  ST EVAL ORAL PHARYNG SWALLOW 4 2/18/2025 Shayna Odonnell SLP GN 1                 Shayna  Belle, ERIN  2/18/2025

## 2025-02-18 NOTE — NURSING NOTE
Patient coughing after drinking fluids. MD notified of change in condition. Chest xray ordered and diet changed to mechanical soft with nectar thick liquids. Speech to see patient tomorrow.

## 2025-02-18 NOTE — PLAN OF CARE
Problem: Adult Inpatient Plan of Care  Goal: Plan of Care Review  Outcome: Progressing  Goal: Patient-Specific Goal (Individualized)  Outcome: Progressing  Goal: Absence of Hospital-Acquired Illness or Injury  Outcome: Progressing  Intervention: Identify and Manage Fall Risk  Recent Flowsheet Documentation  Taken 2/18/2025 0400 by Cindy Mercado RN  Safety Promotion/Fall Prevention:   room organization consistent   safety round/check completed  Taken 2/18/2025 0200 by Cindy Mercado RN  Safety Promotion/Fall Prevention:   room organization consistent   safety round/check completed  Taken 2/18/2025 0003 by Cindy Mercado RN  Safety Promotion/Fall Prevention:   safety round/check completed   room organization consistent  Taken 2/17/2025 2215 by Cindy Mercado RN  Safety Promotion/Fall Prevention:   safety round/check completed   room organization consistent  Intervention: Prevent Skin Injury  Recent Flowsheet Documentation  Taken 2/18/2025 0400 by Cindy Mercado RN  Skin Protection: incontinence pads utilized  Taken 2/18/2025 0200 by Cindy Mercado RN  Skin Protection: incontinence pads utilized  Taken 2/18/2025 0003 by Cindy Mercado RN  Skin Protection: incontinence pads utilized  Taken 2/17/2025 2215 by Cindy Mercado RN  Skin Protection: incontinence pads utilized  Goal: Optimal Comfort and Wellbeing  Outcome: Progressing  Intervention: Provide Person-Centered Care  Recent Flowsheet Documentation  Taken 2/17/2025 2215 by Cindy Mercado RN  Trust Relationship/Rapport: care explained  Goal: Readiness for Transition of Care  Outcome: Progressing     Problem: Skin Injury Risk Increased  Goal: Skin Health and Integrity  Outcome: Progressing  Intervention: Optimize Skin Protection  Recent Flowsheet Documentation  Taken 2/18/2025 0400 by Cindy Mercado RN  Pressure Reduction Techniques: frequent weight shift encouraged  Pressure Reduction Devices: pressure-redistributing mattress utilized  Skin Protection: incontinence  pads utilized  Taken 2/18/2025 0200 by Cindy Mercado RN  Pressure Reduction Techniques: frequent weight shift encouraged  Pressure Reduction Devices: pressure-redistributing mattress utilized  Skin Protection: incontinence pads utilized  Taken 2/18/2025 0003 by Cindy Mercado RN  Pressure Reduction Techniques: frequent weight shift encouraged  Pressure Reduction Devices: pressure-redistributing mattress utilized  Skin Protection: incontinence pads utilized  Taken 2/17/2025 2215 by Cindy Mercado RN  Pressure Reduction Techniques: frequent weight shift encouraged  Pressure Reduction Devices: pressure-redistributing mattress utilized  Skin Protection: incontinence pads utilized     Problem: Fall Injury Risk  Goal: Absence of Fall and Fall-Related Injury  Outcome: Progressing  Intervention: Identify and Manage Contributors  Recent Flowsheet Documentation  Taken 2/18/2025 0400 by Cindy Mercado RN  Medication Review/Management: medications reviewed  Taken 2/18/2025 0200 by Cindy Mercado RN  Medication Review/Management: medications reviewed  Taken 2/18/2025 0003 by Cindy Mercado RN  Medication Review/Management: medications reviewed  Taken 2/17/2025 2215 by Cindy Mercado RN  Medication Review/Management: medications reviewed  Intervention: Promote Injury-Free Environment  Recent Flowsheet Documentation  Taken 2/18/2025 0400 by Cindy Mercado RN  Safety Promotion/Fall Prevention:   room organization consistent   safety round/check completed  Taken 2/18/2025 0200 by Cindy Mercado RN  Safety Promotion/Fall Prevention:   room organization consistent   safety round/check completed  Taken 2/18/2025 0003 by Cindy Mercado RN  Safety Promotion/Fall Prevention:   safety round/check completed   room organization consistent  Taken 2/17/2025 2215 by Cindy Mercado RN  Safety Promotion/Fall Prevention:   safety round/check completed   room organization consistent   Goal Outcome Evaluation:            Patient resting in bed. Vss  stable. Safety measures in place. Cxr ordered for coughing after drinking.diet changed. Speech to see today.

## 2025-02-18 NOTE — PROGRESS NOTES
DAILY PROGRESS NOTE  [facility], [facility]    2025    Patient Identification:  Name: Blanca Harden  Age: 79 y.o.  Sex: female  :  1945  MRN: [id number]           Primary Care Physician: Praneeth Valenzuela MD    Subjective:    Interval History:    Speech is a lot clearer, eating okay, however, he has been coughing a lot when she drinks thin liquids.  To have video swallow evaluation tomorrow.  Vital signs stable, saturation room air is 96%.  Blood pressure better.  Blood sugar in the mid 100s, potassium 4.0, creatinine 1.61.  WBC 14.33.  2D echo show EF 61.5%.    ROS:     Denies having any nausea, vomiting, diarrhea, constipation, chest pain, shortness of breath.     Objective:    Scheduled Meds:  amLODIPine, 10 mg, Oral, Q24H  aspirin, 81 mg, Oral, Daily  atorvastatin, 80 mg, Oral, Nightly  bisoprolol, 20 mg, Oral, Nightly  budesonide-formoterol, 2 puff, Inhalation, BID - RT  empagliflozin, 10 mg, Oral, Daily  enoxaparin, 40 mg, Subcutaneous, Q12H  insulin glargine, 25 Units, Subcutaneous, Nightly  insulin lispro, 5 Units, Intravenous, TID With Meals  insulin lispro, 2-7 Units, Subcutaneous, 4x Daily AC & at Bedtime  lisinopril, 40 mg, Oral, Daily  pantoprazole, 40 mg, Oral, Q AM  predniSONE, 5 mg, Oral, Q PM        Continuous Infusions:       PRN Meds:    acetaminophen    albuterol    dextrose    dextrose    glucagon (human recombinant)    hydrALAZINE    O2    sodium chloride    traMADol    Intake/Output:    Intake/Output Summary (Last 24 hours) at 2025 1435  Last data filed at 2025 0100  Gross per 24 hour   Intake 0 ml   Output 800 ml   Net -800 ml         Exam:    T MAX 24 hrs: Temp (24hrs), Av.8 °F (37.1 °C), Min:98.6 °F (37 °C), Max:99 °F (37.2 °C)    Vitals Ranges:   Temp:  [98.6 °F (37 °C)-99 °F (37.2 °C)] 98.6 °F (37 °C)  Heart Rate:  [86-93] 86  Resp:  [18] 18  BP: (132-171)/(56-67) 149/60    /60 (BP Location: Right arm,  "Patient Position: Lying)   Pulse 86   Temp 98.6 °F (37 °C) (Oral)   Resp 18   Ht 152.4 cm (60\")   Wt 79.8 kg (176 lb)   LMP  (LMP Unknown)   SpO2 97%   BMI 34.37 kg/m²     General: Speech is better today.  Patient is alert, oriented x 3. Cooperative, no distress, appears stated age.    Neck: Supple, symmetrical, trachea midline, no adenopathy;              thyroid:  no enlargement/tenderness/nodules;              no carotid bruit or JVD  Cardiovascular: Normal rate, regular rhythm and intact distal pulses.              Exam reveals no gallop and no friction rub. No murmur heard  Pulmonary: Diminished breath sounds bilaterally, respirations unlabored.               No rhonchi, wheezing or rales.   Abdominal: Soft, nontender, bowel sounds active all four quadrants,     no masses, no hepatomegaly, no splenomegaly.   Extremities: Normal, atraumatic, no cyanosis. + Pitting edema  Pulses: 2 + symmetric all extremities  Neurological: Patient is alert and oriented to person, place, and time.  Patient has slurred speech, although improving since admission.  Right lower extremity weakness about the same.    Skin: Skin color, texture, normal. Turgor is decreased. No rashes or lesions            Data Review:    Results from last 7 days   Lab Units 02/18/25  0426 02/17/25  0409 02/16/25  0810   WBC 10*3/mm3 14.33* 11.06* 11.67*   HEMOGLOBIN g/dL 10.6* 9.9* 9.9*   HEMATOCRIT % 32.3* 30.0* 31.9*   PLATELETS 10*3/mm3 284 266 254       Results from last 7 days   Lab Units 02/18/25  0426 02/17/25  0409 02/16/25  0810 02/15/25  1522   SODIUM mmol/L 134* 137 134* 134*   POTASSIUM mmol/L 4.0 4.3 4.7 4.3   CHLORIDE mmol/L 94* 99 101 99   CO2 mmol/L 25.0 25.9 23.0 24.4   BUN mg/dL 21 20 13 17   CREATININE mg/dL 1.61* 1.40* 0.97 1.14*   CALCIUM mg/dL 8.3* 8.7 8.7 9.0   BILIRUBIN mg/dL  --  0.2  --  0.3   ALK PHOS U/L  --  149*  --  136*   ALT (SGPT) U/L  --  16  --  15   AST (SGOT) U/L  --  18  --  17   GLUCOSE mg/dL 170* 173* 283* " 50*     Results from last 7 days   Lab Units 02/15/25  1522   INR  1.08       Results from last 7 days   Lab Units 02/16/25  0810   HEMOGLOBIN A1C % 7.70*       Lab Results   Lab Value Date/Time    TROPONINT 34 (H) 10/01/2023 1149    TROPONINT 56 (C) 06/30/2023 1959    TROPONINT 61 (C) 06/30/2023 1601    TROPONINT 28 (H) 05/21/2023 1920    TROPONINT 25 (H) 05/21/2023 1615       Microbiology Results (last 10 days)       ** No results found for the last 240 hours. **             Imaging Results (Last 7 Days)       Procedure Component Value Units Date/Time    XR Chest 1 View [[id number]] Collected: 02/17/25 2114     Updated: 02/17/25 2118    Narrative:      SINGLE VIEW OF THE CHEST     HISTORY: Possible aspiration     COMPARISON: February 15, 2025     FINDINGS:  There is cardiomegaly. There is no vascular congestion. There is  bibasilar atelectasis. There is calcification of the aorta. No  pneumothorax or large pleural effusion is seen.       Impression:      Bibasilar atelectasis.     This report was finalized on 2/17/2025 9:15 PM by Dr. Tanya Prince M.D on Workstation: [id number]       MRI Brain Without Contrast [[id number]] Collected: 02/16/25 0616     Updated: 02/16/25 0625    Narrative:      BRAIN MRI WITHOUT CONTRAST     HISTORY: Altered mental status, focal weakness; R47.9-Unspecified speech  disturbances; R29.898-Other symptoms and signs involving the  musculoskeletal system; R42-Dizziness and giddiness; I10-Essential  (primary) hypertension; N18.9-Chronic kidney disease, unspecified;  Z86.39-Personal history of other endocrine, nutritional and metabolic  disease     COMPARISON: October 2, 2023.     FINDINGS:  Multiplanar images of the head were obtained without  gadolinium. The patient has an area of restricted diffusion within the  left corona radiata, with extension into the left basal ganglia. No  additional areas of restricted diffusion are seen. There is diffuse  atrophy. There is periventricular  deep white matter microangiopathic  change. The intracranial flow voids appear intact. No abnormality is  seen on susceptibility weighted imaging. There is mucosal thickening  within the ethmoid sinuses, with extensive opacification of the left  maxillary sinus noted.       Impression:      1. Study is positive for an acute infarct within the left corona  radiata/basal ganglia. No evidence of hemorrhagic information..     FINDINGS were called to the patient's nurse at 6:20 a.m.        This report was finalized on 2/16/2025 6:22 AM by Dr. Tanya Prince M.D on Workstation: [id number]       XR Chest 1 View [[id number]] Collected: 02/15/25 1654     Updated: 02/15/25 1707    Narrative:      XR CHEST 1 VW-     CLINICAL HISTORY:  Acute Stroke Protocol (onset < 12 hrs);  R47.9-Unspecified speech disturbances; R29.898-Other symptoms and signs  involving the musculoskeletal system; R42-Dizziness and giddiness;  I10-Essential (primary) hypertension; N18.9-Chronic kidney disease,  unspecified; Z86.39-Personal history of other endocrine, nutritional and  metabolic disease     FINDINGS:     The lung volumes are low. There our mild bibasilar atelectatic changes.  The cardiomediastinal silhouette is mildly enlarged but stable since  01/29/2025. The osseous structures demonstrate no significant  abnormalities.           This report was finalized on 2/15/2025 5:04 PM by Dr. Sanya Armendariz M.D  on Workstation: [id number]       CT Angiogram Head w AI Analysis of LVO [[id number]] Collected: 02/15/25 1612     Updated: 02/15/25 1643    Narrative:      CT ANGIOGRAM OF THE HEAD AND NECK AND CT PERFUSION STUDY     CLINICAL HISTORY: Speech difficulties, dizziness, and right leg  weakness.     TECHNIQUE:  A noncontrast head CT was performed with 3 mm axial images.  Thereafter, a CT perfusion study was performed after the dynamic bolus  of IV contrast. Standard perfusion maps were constructed with RAPID  software. A CT angiogram of the  head and neck was then performed with 1  mm axial images. Sagittal, coronal, and 3-dimensional reconstructed  images were obtained. Finally, a delayed postcontrast head CT was  performed with 3 mm axial images.     FINDINGS:     HEAD CT: No convincing evidence for an acute intracranial abnormality.  Mild to moderate changes of chronic small vessel ischemic phenomenon.  Near complete opacification of the left maxillary sinus with mucosal  thickening and secretions.     CT PERFUSION STUDY: No abnormality on the CBF less than 30% or Tmax  greater than 6 seconds maps.     CTA HEAD:     Posterior Circulation: Unremarkable appearance of the vertebral  arteries, basilar artery, and posterior cerebral arteries.  Anterior Circulation: Atherosclerotic calcifications within the carotid  siphons resulting in relatively mild degrees of luminal compromise.  Unremarkable appearance of the anterior and middle cerebral arteries.     CTA NECK:       Aortic Arch: Classic configuration. Mild atherosclerotic involvement of  the aortic arch.  Subclavian Arteries: Mild to moderate stenosis of the proximal aspect  the left subclavian artery secondary to predominantly noncalcified  atherosclerotic changes.  Vertebral Arteries: Unremarkable.  CCA/ICA's: Atherosclerotic changes without evidence for a NASCET  stenosis.       Impression:         No convincing evidence for an acute infarct on either the noncontrast  head CT or the CT perfusion study. Further evaluation could be performed  with MR imaging for more sensitive and specific assessment.     No evidence for large vessel occlusion.     Mild to moderate stenosis of the proximal aspect of the left subclavian  artery.     No significant NASCET stenosis within either internal carotid artery.     The findings of the noncontrast head CT were discussed with Dr. Sethi on 2/15/2025 at approximately 3:35 p.m. The findings of the CT  angiogram and CT perfusion study were discussed with   Navdeep at  approximately 3:50 p.m.           Radiation dose reduction techniques were utilized, including automated  exposure control and exposure modulation based on body size.        This report was finalized on 2/15/2025 4:40 PM by Dr. Sanya Armendariz M.D  on Workstation: [id number]       CT Angiogram Neck [[id number]] Collected: 02/15/25 1612     Updated: 02/15/25 1643    Narrative:      CT ANGIOGRAM OF THE HEAD AND NECK AND CT PERFUSION STUDY     CLINICAL HISTORY: Speech difficulties, dizziness, and right leg  weakness.     TECHNIQUE:  A noncontrast head CT was performed with 3 mm axial images.  Thereafter, a CT perfusion study was performed after the dynamic bolus  of IV contrast. Standard perfusion maps were constructed with RAPID  software. A CT angiogram of the head and neck was then performed with 1  mm axial images. Sagittal, coronal, and 3-dimensional reconstructed  images were obtained. Finally, a delayed postcontrast head CT was  performed with 3 mm axial images.     FINDINGS:     HEAD CT: No convincing evidence for an acute intracranial abnormality.  Mild to moderate changes of chronic small vessel ischemic phenomenon.  Near complete opacification of the left maxillary sinus with mucosal  thickening and secretions.     CT PERFUSION STUDY: No abnormality on the CBF less than 30% or Tmax  greater than 6 seconds maps.     CTA HEAD:     Posterior Circulation: Unremarkable appearance of the vertebral  arteries, basilar artery, and posterior cerebral arteries.  Anterior Circulation: Atherosclerotic calcifications within the carotid  siphons resulting in relatively mild degrees of luminal compromise.  Unremarkable appearance of the anterior and middle cerebral arteries.     CTA NECK:       Aortic Arch: Classic configuration. Mild atherosclerotic involvement of  the aortic arch.  Subclavian Arteries: Mild to moderate stenosis of the proximal aspect  the left subclavian artery secondary to predominantly  noncalcified  atherosclerotic changes.  Vertebral Arteries: Unremarkable.  CCA/ICA's: Atherosclerotic changes without evidence for a NASCET  stenosis.       Impression:         No convincing evidence for an acute infarct on either the noncontrast  head CT or the CT perfusion study. Further evaluation could be performed  with MR imaging for more sensitive and specific assessment.     No evidence for large vessel occlusion.     Mild to moderate stenosis of the proximal aspect of the left subclavian  artery.     No significant NASCET stenosis within either internal carotid artery.     The findings of the noncontrast head CT were discussed with Dr. Sethi on 2/15/2025 at approximately 3:35 p.m. The findings of the CT  angiogram and CT perfusion study were discussed with Dr. Sethi at  approximately 3:50 p.m.           Radiation dose reduction techniques were utilized, including automated  exposure control and exposure modulation based on body size.        This report was finalized on 2/15/2025 4:40 PM by Dr. Sanya Armendariz M.D  on Workstation: [id number]       CT CEREBRAL PERFUSION WITH & WITHOUT CONTRAST [[id number]] Collected: 02/15/25 1612     Updated: 02/15/25 1643    Narrative:      CT ANGIOGRAM OF THE HEAD AND NECK AND CT PERFUSION STUDY     CLINICAL HISTORY: Speech difficulties, dizziness, and right leg  weakness.     TECHNIQUE:  A noncontrast head CT was performed with 3 mm axial images.  Thereafter, a CT perfusion study was performed after the dynamic bolus  of IV contrast. Standard perfusion maps were constructed with RAPID  software. A CT angiogram of the head and neck was then performed with 1  mm axial images. Sagittal, coronal, and 3-dimensional reconstructed  images were obtained. Finally, a delayed postcontrast head CT was  performed with 3 mm axial images.     FINDINGS:     HEAD CT: No convincing evidence for an acute intracranial abnormality.  Mild to moderate changes of chronic small vessel  ischemic phenomenon.  Near complete opacification of the left maxillary sinus with mucosal  thickening and secretions.     CT PERFUSION STUDY: No abnormality on the CBF less than 30% or Tmax  greater than 6 seconds maps.     CTA HEAD:     Posterior Circulation: Unremarkable appearance of the vertebral  arteries, basilar artery, and posterior cerebral arteries.  Anterior Circulation: Atherosclerotic calcifications within the carotid  siphons resulting in relatively mild degrees of luminal compromise.  Unremarkable appearance of the anterior and middle cerebral arteries.     CTA NECK:       Aortic Arch: Classic configuration. Mild atherosclerotic involvement of  the aortic arch.  Subclavian Arteries: Mild to moderate stenosis of the proximal aspect  the left subclavian artery secondary to predominantly noncalcified  atherosclerotic changes.  Vertebral Arteries: Unremarkable.  CCA/ICA's: Atherosclerotic changes without evidence for a NASCET  stenosis.       Impression:         No convincing evidence for an acute infarct on either the noncontrast  head CT or the CT perfusion study. Further evaluation could be performed  with MR imaging for more sensitive and specific assessment.     No evidence for large vessel occlusion.     Mild to moderate stenosis of the proximal aspect of the left subclavian  artery.     No significant NASCET stenosis within either internal carotid artery.     The findings of the noncontrast head CT were discussed with Dr. Sethi on 2/15/2025 at approximately 3:35 p.m. The findings of the CT  angiogram and CT perfusion study were discussed with Dr. Sethi at  approximately 3:50 p.m.           Radiation dose reduction techniques were utilized, including automated  exposure control and exposure modulation based on body size.        This report was finalized on 2/15/2025 4:40 PM by Dr. Sanya Armendariz M.D  on Workstation: [id number]                 Assessment:    Acute CVA within the left  corona radiata/basal ganglia  Aphasia due to CVA  Right lower extremity weakness due to CVA  Hypertension  Uncontrolled diabetes mellitus  Chronic diastolic congestive heart failure  Pulmonary hypertension  Obstructive sleep apnea, noncompliant with CPAP  Moderate persistent asthma  Coronary artery disease  Chronic kidney disease stage IIIa  Iron deficiency anemia  Morbid obesity affecting all aspects of care  Recent right hemicolectomy  Status post left mastectomy due to breast cancer  Temporal arteritis on long-term steroids    Plan:    Speech is better today, to have video swallow study tomorrow.  I believe she is aspirating with thin liquids.  Family advised not to give her thin liquids.  Continue work on physical therapy.  Continue aspirin as well as atorvastatin 80 mg daily.  Tolerating medications so far.  2D echo okay.  Continue working with speech therapy, PT, OT.  Blood pressure improving.  Continue current medication.  Adjust medication accordingly.    Continue medication for chronic diastolic congestive heart failure, stable.  To start wearing CPAP at nighttime.  Monitor and correct electrolytes, sodium 134, potassium 4.0,   Monitor renal function, creatinine went up to 1.61 today.  Patient looks a little dry.  Patient not eating much, not drinking much.  Will give IV fluids overnight.  Hold diuresis.  Encourage p.o. intake  Adjust insulin as needed, will benefit from Jardiance.    Monitor mental status, improving slowly, better speech.  Otherwise, she is alert and oriented x 3.  DVT prophylaxis: Lovenox  Stress ulcer prophylaxis:Pantoprazole.  Labs in am      Praneeth Valenzuela MD  2/18/2025  14:35 EST         I wore protective equipment throughout this patient encounter including a face mask, gloves, and protective eyewear.  Hand hygiene was performed before donning protective equipment and after removal when leaving the room.

## 2025-02-18 NOTE — PLAN OF CARE
Goal Outcome Evaluation:              Outcome Evaluation: No acute changes today. Meds given per MAR. Video swallow ordered for tomorrow.

## 2025-02-18 NOTE — DISCHARGE PLACEMENT REQUEST
"José Luis Dereck Travis YOLETTE (79 y.o. Female)       Date of Birth   1945    Social Security Number       Address   [address] [place] [address] [address] [address]    Home Phone   [phone]    MRN   [id number]       Jew   [employer] witness    Marital Status                               Admission Date   2/15/25    Admission Type   Emergency    Admitting Provider   Praneeth Valenzuela MD    Attending Provider   Praneeth Valenzuela MD    Department, Room/Bed   [address], [address]       Discharge Date       Discharge Disposition       Discharge Destination                                 Attending Provider: Praneeth Valenzuela MD    Allergies: Revefenacin, Aspirin, Sulfa Antibiotics    Isolation: None   Infection: None   Code Status: CPR    Ht: 152.4 cm (60\")   Wt: 79.8 kg (176 lb)    Admission Cmt: None   Principal Problem: TIA (transient ischemic attack) [G45.9]                   Active Insurance as of 2/15/2025       Primary Coverage       Payor Plan Insurance Group Employer/Plan Group    MEDICARE MEDICARE A & B        Payor Plan Address Payor Plan Phone Number Payor Plan Fax Number Effective Dates    [address] [phone]  6/1/2011 - None Entered    [facility] [id number]         Subscriber Name Subscriber Birth Date Member ID       TRAVIS CABRERA 1945 [id number]               Secondary Coverage       Payor Plan Insurance Group Employer/Plan Group    AARP [employer] SUP [employer] HE[employer] CARE OPTIONS [id number]       Payor Plan Address Payor Plan Phone Number Payor Plan Fax Number Effective Dates    [facility] [phone]  1/1/2016 - None Entered    [address]       [facility] [address]         Subscriber Name Subscriber Birth Date Member ID       TRAVIS CABRERA 1945 [id number]               Tertiary Coverage       Payor Plan Insurance Group Employer/Plan Group    [id number]        Payor Plan Address Payor Plan Phone Number Payor Plan Fax " Number Effective Dates    [address]   2/10/2025 - None Entered    [place] [id number]         Subscriber Name Subscriber Birth Date Member ID       TRAVIS CABRERA 1945 [id number]                     Emergency Contacts        (Rel.) Home Phone Work Phone Mobile Phone    Reyes,Alberto (Spouse) -- -- [phone]    Reyes,Travis (Daughter) -- -- [phone]

## 2025-02-19 ENCOUNTER — APPOINTMENT (OUTPATIENT)
Dept: GENERAL RADIOLOGY | Facility: HOSPITAL | Age: 80
DRG: 065 | End: 2025-02-19
Payer: MEDICARE

## 2025-02-19 LAB
ANION GAP SERPL CALCULATED.3IONS-SCNC: 9 MMOL/L (ref 5–15)
BACTERIA UR QL AUTO: ABNORMAL /HPF
BASOPHILS # BLD AUTO: 0.04 10*3/MM3 (ref 0–0.2)
BASOPHILS NFR BLD AUTO: 0.3 % (ref 0–1.5)
BILIRUB UR QL STRIP: NEGATIVE
BUN SERPL-MCNC: 25 MG/DL (ref 8–23)
BUN/CREAT SERPL: 15 (ref 7–25)
CALCIUM SPEC-SCNC: 7.5 MG/DL (ref 8.6–10.5)
CHLORIDE SERPL-SCNC: 98 MMOL/L (ref 98–107)
CLARITY UR: ABNORMAL
CO2 SERPL-SCNC: 28 MMOL/L (ref 22–29)
COLOR UR: YELLOW
CREAT SERPL-MCNC: 1.67 MG/DL (ref 0.57–1)
DEPRECATED RDW RBC AUTO: 41.6 FL (ref 37–54)
EGFRCR SERPLBLD CKD-EPI 2021: 31.2 ML/MIN/1.73
EOSINOPHIL # BLD AUTO: 0.1 10*3/MM3 (ref 0–0.4)
EOSINOPHIL NFR BLD AUTO: 0.8 % (ref 0.3–6.2)
ERYTHROCYTE [DISTWIDTH] IN BLOOD BY AUTOMATED COUNT: 12.2 % (ref 12.3–15.4)
GLUCOSE BLDC GLUCOMTR-MCNC: 112 MG/DL (ref 70–130)
GLUCOSE BLDC GLUCOMTR-MCNC: 152 MG/DL (ref 70–130)
GLUCOSE BLDC GLUCOMTR-MCNC: 178 MG/DL (ref 70–130)
GLUCOSE BLDC GLUCOMTR-MCNC: 194 MG/DL (ref 70–130)
GLUCOSE SERPL-MCNC: 174 MG/DL (ref 65–99)
GLUCOSE UR STRIP-MCNC: ABNORMAL MG/DL
HCT VFR BLD AUTO: 30.2 % (ref 34–46.6)
HGB BLD-MCNC: 10 G/DL (ref 12–15.9)
HGB UR QL STRIP.AUTO: ABNORMAL
HYALINE CASTS UR QL AUTO: ABNORMAL /LPF
IMM GRANULOCYTES # BLD AUTO: 0.14 10*3/MM3 (ref 0–0.05)
IMM GRANULOCYTES NFR BLD AUTO: 1.1 % (ref 0–0.5)
KETONES UR QL STRIP: NEGATIVE
LEUKOCYTE ESTERASE UR QL STRIP.AUTO: ABNORMAL
LYMPHOCYTES # BLD AUTO: 0.67 10*3/MM3 (ref 0.7–3.1)
LYMPHOCYTES NFR BLD AUTO: 5.1 % (ref 19.6–45.3)
MCH RBC QN AUTO: 31.3 PG (ref 26.6–33)
MCHC RBC AUTO-ENTMCNC: 33.1 G/DL (ref 31.5–35.7)
MCV RBC AUTO: 94.4 FL (ref 79–97)
MONOCYTES # BLD AUTO: 0.85 10*3/MM3 (ref 0.1–0.9)
MONOCYTES NFR BLD AUTO: 6.5 % (ref 5–12)
NEUTROPHILS NFR BLD AUTO: 11.33 10*3/MM3 (ref 1.7–7)
NEUTROPHILS NFR BLD AUTO: 86.2 % (ref 42.7–76)
NITRITE UR QL STRIP: NEGATIVE
NRBC BLD AUTO-RTO: 0 /100 WBC (ref 0–0.2)
PH UR STRIP.AUTO: 5.5 [PH] (ref 5–8)
PLATELET # BLD AUTO: 243 10*3/MM3 (ref 140–450)
PMV BLD AUTO: 9.7 FL (ref 6–12)
POTASSIUM SERPL-SCNC: 3.9 MMOL/L (ref 3.5–5.2)
PROT UR QL STRIP: ABNORMAL
RBC # BLD AUTO: 3.2 10*6/MM3 (ref 3.77–5.28)
RBC # UR STRIP: ABNORMAL /HPF
REF LAB TEST METHOD: ABNORMAL
SODIUM SERPL-SCNC: 135 MMOL/L (ref 136–145)
SP GR UR STRIP: 1.03 (ref 1–1.03)
SQUAMOUS #/AREA URNS HPF: ABNORMAL /HPF
UROBILINOGEN UR QL STRIP: ABNORMAL
WBC # UR STRIP: ABNORMAL /HPF
WBC NRBC COR # BLD AUTO: 13.13 10*3/MM3 (ref 3.4–10.8)
YEAST URNS QL MICRO: PRESENT /HPF

## 2025-02-19 PROCEDURE — 63710000001 INSULIN LISPRO (HUMAN) PER 5 UNITS: Performed by: INTERNAL MEDICINE

## 2025-02-19 PROCEDURE — 80048 BASIC METABOLIC PNL TOTAL CA: CPT | Performed by: INTERNAL MEDICINE

## 2025-02-19 PROCEDURE — 82948 REAGENT STRIP/BLOOD GLUCOSE: CPT

## 2025-02-19 PROCEDURE — 81001 URINALYSIS AUTO W/SCOPE: CPT | Performed by: INTERNAL MEDICINE

## 2025-02-19 PROCEDURE — 94761 N-INVAS EAR/PLS OXIMETRY MLT: CPT

## 2025-02-19 PROCEDURE — 85025 COMPLETE CBC W/AUTO DIFF WBC: CPT | Performed by: INTERNAL MEDICINE

## 2025-02-19 PROCEDURE — 63710000001 INSULIN GLARGINE PER 5 UNITS: Performed by: INTERNAL MEDICINE

## 2025-02-19 PROCEDURE — 93010 ELECTROCARDIOGRAM REPORT: CPT | Performed by: INTERNAL MEDICINE

## 2025-02-19 PROCEDURE — 93005 ELECTROCARDIOGRAM TRACING: CPT | Performed by: INTERNAL MEDICINE

## 2025-02-19 PROCEDURE — 74230 X-RAY XM SWLNG FUNCJ C+: CPT

## 2025-02-19 PROCEDURE — 94799 UNLISTED PULMONARY SVC/PX: CPT

## 2025-02-19 PROCEDURE — 25010000002 ENOXAPARIN PER 10 MG: Performed by: INTERNAL MEDICINE

## 2025-02-19 PROCEDURE — 63710000001 PREDNISONE PER 5 MG: Performed by: INTERNAL MEDICINE

## 2025-02-19 PROCEDURE — 87086 URINE CULTURE/COLONY COUNT: CPT | Performed by: INTERNAL MEDICINE

## 2025-02-19 PROCEDURE — 92611 MOTION FLUOROSCOPY/SWALLOW: CPT

## 2025-02-19 PROCEDURE — 25810000003 SODIUM CHLORIDE 0.9 % SOLUTION: Performed by: INTERNAL MEDICINE

## 2025-02-19 PROCEDURE — 94664 DEMO&/EVAL PT USE INHALER: CPT

## 2025-02-19 PROCEDURE — 71045 X-RAY EXAM CHEST 1 VIEW: CPT

## 2025-02-19 RX ORDER — SODIUM CHLORIDE 9 MG/ML
75 INJECTION, SOLUTION INTRAVENOUS CONTINUOUS
Status: DISCONTINUED | OUTPATIENT
Start: 2025-02-19 | End: 2025-02-20

## 2025-02-19 RX ADMIN — SODIUM CHLORIDE 75 ML/HR: 9 INJECTION, SOLUTION INTRAVENOUS at 16:44

## 2025-02-19 RX ADMIN — BARIUM SULFATE 50 ML: 400 SUSPENSION ORAL at 09:56

## 2025-02-19 RX ADMIN — BISOPROLOL FUMARATE 20 MG: 5 TABLET ORAL at 20:33

## 2025-02-19 RX ADMIN — INSULIN LISPRO 5 UNITS: 100 INJECTION, SOLUTION INTRAVENOUS; SUBCUTANEOUS at 11:32

## 2025-02-19 RX ADMIN — BARIUM SULFATE 4 ML: 980 POWDER, FOR SUSPENSION ORAL at 09:56

## 2025-02-19 RX ADMIN — BARIUM SULFATE 55 ML: 0.81 POWDER, FOR SUSPENSION ORAL at 09:56

## 2025-02-19 RX ADMIN — AMLODIPINE BESYLATE 10 MG: 10 TABLET ORAL at 11:32

## 2025-02-19 RX ADMIN — ENOXAPARIN SODIUM 40 MG: 100 INJECTION SUBCUTANEOUS at 22:19

## 2025-02-19 RX ADMIN — INSULIN LISPRO 5 UNITS: 100 INJECTION, SOLUTION INTRAVENOUS; SUBCUTANEOUS at 17:54

## 2025-02-19 RX ADMIN — INSULIN GLARGINE 25 UNITS: 100 INJECTION, SOLUTION SUBCUTANEOUS at 20:33

## 2025-02-19 RX ADMIN — ACETAMINOPHEN 650 MG: 325 TABLET, FILM COATED ORAL at 12:47

## 2025-02-19 RX ADMIN — BUDESONIDE AND FORMOTEROL FUMARATE DIHYDRATE 2 PUFF: 160; 4.5 AEROSOL RESPIRATORY (INHALATION) at 08:52

## 2025-02-19 RX ADMIN — ASPIRIN 81 MG: 81 TABLET, COATED ORAL at 11:32

## 2025-02-19 RX ADMIN — EMPAGLIFLOZIN 10 MG: 10 TABLET, FILM COATED ORAL at 11:32

## 2025-02-19 RX ADMIN — PREDNISONE 5 MG: 5 TABLET ORAL at 16:45

## 2025-02-19 RX ADMIN — SODIUM CHLORIDE 75 ML/HR: 9 INJECTION, SOLUTION INTRAVENOUS at 00:17

## 2025-02-19 RX ADMIN — INSULIN LISPRO 2 UNITS: 100 INJECTION, SOLUTION INTRAVENOUS; SUBCUTANEOUS at 11:33

## 2025-02-19 RX ADMIN — LISINOPRIL 40 MG: 20 TABLET ORAL at 11:33

## 2025-02-19 RX ADMIN — BARIUM SULFATE 5 ML: 400 PASTE ORAL at 09:56

## 2025-02-19 RX ADMIN — INSULIN LISPRO 5 UNITS: 100 INJECTION, SOLUTION INTRAVENOUS; SUBCUTANEOUS at 09:10

## 2025-02-19 RX ADMIN — ATORVASTATIN CALCIUM 80 MG: 80 TABLET, FILM COATED ORAL at 20:34

## 2025-02-19 RX ADMIN — PANTOPRAZOLE SODIUM 40 MG: 40 TABLET, DELAYED RELEASE ORAL at 05:29

## 2025-02-19 RX ADMIN — ENOXAPARIN SODIUM 40 MG: 100 INJECTION SUBCUTANEOUS at 11:33

## 2025-02-19 RX ADMIN — ACETAMINOPHEN 650 MG: 325 TABLET, FILM COATED ORAL at 05:36

## 2025-02-19 RX ADMIN — BUDESONIDE AND FORMOTEROL FUMARATE DIHYDRATE 2 PUFF: 160; 4.5 AEROSOL RESPIRATORY (INHALATION) at 21:09

## 2025-02-19 RX ADMIN — INSULIN LISPRO 2 UNITS: 100 INJECTION, SOLUTION INTRAVENOUS; SUBCUTANEOUS at 09:10

## 2025-02-19 RX ADMIN — ACETAMINOPHEN 650 MG: 325 TABLET, FILM COATED ORAL at 20:33

## 2025-02-19 NOTE — PROGRESS NOTES
DAILY PROGRESS NOTE  [facility], [facility]    2025    Patient Identification:  Name: Blanca Harden  Age: 79 y.o.  Sex: female  :  1945  MRN: [id number]           Primary Care Physician: Praneeth Valenzuela MD    Subjective:    Interval History:    Patient is complaining of headache this afternoon, also having some chills, subjective fever.  She was coughing yesterday when daughter was giving her some water.  Swallow study reviewed.  New diet reviewed.  Family encouraged to follow diet recommendations.  Vital signs stable, saturation room air is 100%.  Blood pressure better.  Blood sugar in the mid 100s, potassium 3.9, creatinine 1.67.  WBC 13.13.  Has not been eating well or drinking well during the last 2 to 3 days.      ROS:     Denies having any nausea, vomiting, diarrhea, constipation, chest pain, shortness of breath.     Objective:    Scheduled Meds:  amLODIPine, 10 mg, Oral, Q24H  aspirin, 81 mg, Oral, Daily  atorvastatin, 80 mg, Oral, Nightly  bisoprolol, 20 mg, Oral, Nightly  budesonide-formoterol, 2 puff, Inhalation, BID - RT  empagliflozin, 10 mg, Oral, Daily  enoxaparin, 40 mg, Subcutaneous, Q12H  insulin glargine, 25 Units, Subcutaneous, Nightly  insulin lispro, 5 Units, Intravenous, TID With Meals  insulin lispro, 2-7 Units, Subcutaneous, 4x Daily AC & at Bedtime  lisinopril, 40 mg, Oral, Daily  pantoprazole, 40 mg, Oral, Q AM  predniSONE, 5 mg, Oral, Q PM        Continuous Infusions:  sodium chloride, 75 mL/hr, Last Rate: 75 mL/hr (25 0017)        PRN Meds:    acetaminophen    albuterol    dextrose    dextrose    glucagon (human recombinant)    hydrALAZINE    O2    sodium chloride    traMADol    Intake/Output:    Intake/Output Summary (Last 24 hours) at 2025 1359  Last data filed at 2025 2150  Gross per 24 hour   Intake --   Output 650 ml   Net -650 ml         Exam:    T MAX 24 hrs: Temp (24hrs), Av.4 °F (36.9 °C), Min:97.7  "°F (36.5 °C), Max:99.4 °F (37.4 °C)    Vitals Ranges:   Temp:  [97.7 °F (36.5 °C)-99.4 °F (37.4 °C)] 97.7 °F (36.5 °C)  Heart Rate:  [] 81  Resp:  [16-18] 16  BP: (123-152)/(56-63) 152/63    /63 (BP Location: Right arm, Patient Position: Lying)   Pulse 81   Temp 97.7 °F (36.5 °C) (Oral)   Resp 16   Ht 152.4 cm (60\")   Wt 79.8 kg (176 lb)   LMP  (LMP Unknown)   SpO2 100%   BMI 34.37 kg/m²     General: Speech is better today.  Patient is alert, oriented x 3. Cooperative, no distress, appears stated age.    Neck: Supple, symmetrical, trachea midline, no adenopathy;              thyroid:  no enlargement/tenderness/nodules;              no carotid bruit or JVD  Cardiovascular: Normal rate, regular rhythm and intact distal pulses.              Exam reveals no gallop and no friction rub. No murmur heard  Pulmonary: Diminished breath sounds bilaterally, respirations unlabored.               No rhonchi, wheezing or rales.   Abdominal: Soft, nontender, bowel sounds active all four quadrants,     no masses, no hepatomegaly, no splenomegaly.   Extremities: Normal, atraumatic, no cyanosis. no edema  Pulses: 2 + symmetric all extremities  Neurological: Patient is alert and oriented to person, place, and time.  Patient has slurred speech, although improving since admission.  Right lower extremity weakness about the same.    Skin: Skin color, texture, normal. Turgor is decreased. No rashes or lesions            Data Review:    Results from last 7 days   Lab Units 02/19/25  0639 02/18/25  0426 02/17/25  0409   WBC 10*3/mm3 13.13* 14.33* 11.06*   HEMOGLOBIN g/dL 10.0* 10.6* 9.9*   HEMATOCRIT % 30.2* 32.3* 30.0*   PLATELETS 10*3/mm3 243 284 266       Results from last 7 days   Lab Units 02/19/25  0639 02/18/25  0426 02/17/25  0409 02/16/25  0810 02/15/25  1522   SODIUM mmol/L 135* 134* 137   < > 134*   POTASSIUM mmol/L 3.9 4.0 4.3   < > 4.3   CHLORIDE mmol/L 98 94* 99   < > 99   CO2 mmol/L 28.0 25.0 25.9   < > 24.4 "   BUN mg/dL 25* 21 20   < > 17   CREATININE mg/dL 1.67* 1.61* 1.40*   < > 1.14*   CALCIUM mg/dL 7.5* 8.3* 8.7   < > 9.0   BILIRUBIN mg/dL  --   --  0.2  --  0.3   ALK PHOS U/L  --   --  149*  --  136*   ALT (SGPT) U/L  --   --  16  --  15   AST (SGOT) U/L  --   --  18  --  17   GLUCOSE mg/dL 174* 170* 173*   < > 50*    < > = values in this interval not displayed.     Results from last 7 days   Lab Units 02/15/25  1522   INR  1.08       Results from last 7 days   Lab Units 02/16/25  0810   HEMOGLOBIN A1C % 7.70*       Lab Results   Lab Value Date/Time    TROPONINT 34 (H) 10/01/2023 1149    TROPONINT 56 (C) 06/30/2023 1959    TROPONINT 61 (C) 06/30/2023 1601    TROPONINT 28 (H) 05/21/2023 1920    TROPONINT 25 (H) 05/21/2023 1615       Microbiology Results (last 10 days)       ** No results found for the last 240 hours. **             Imaging Results (Last 7 Days)       Procedure Component Value Units Date/Time    FL Video Swallow Single Contrast [[id number]] Resulted: 02/19/25 0941     Updated: 02/19/25 0957    XR Chest 1 View [[id number]] Collected: 02/17/25 2114     Updated: 02/17/25 2118    Narrative:      SINGLE VIEW OF THE CHEST     HISTORY: Possible aspiration     COMPARISON: February 15, 2025     FINDINGS:  There is cardiomegaly. There is no vascular congestion. There is  bibasilar atelectasis. There is calcification of the aorta. No  pneumothorax or large pleural effusion is seen.       Impression:      Bibasilar atelectasis.     This report was finalized on 2/17/2025 9:15 PM by Dr. Tanya Prince M.D on Workstation: [id number]       MRI Brain Without Contrast [[id number]] Collected: 02/16/25 0616     Updated: 02/16/25 0625    Narrative:      BRAIN MRI WITHOUT CONTRAST     HISTORY: Altered mental status, focal weakness; R47.9-Unspecified speech  disturbances; R29.898-Other symptoms and signs involving the  musculoskeletal system; R42-Dizziness and giddiness; I10-Essential  (primary) hypertension;  N18.9-Chronic kidney disease, unspecified;  Z86.39-Personal history of other endocrine, nutritional and metabolic  disease     COMPARISON: October 2, 2023.     FINDINGS:  Multiplanar images of the head were obtained without  gadolinium. The patient has an area of restricted diffusion within the  left corona radiata, with extension into the left basal ganglia. No  additional areas of restricted diffusion are seen. There is diffuse  atrophy. There is periventricular deep white matter microangiopathic  change. The intracranial flow voids appear intact. No abnormality is  seen on susceptibility weighted imaging. There is mucosal thickening  within the ethmoid sinuses, with extensive opacification of the left  maxillary sinus noted.       Impression:      1. Study is positive for an acute infarct within the left corona  radiata/basal ganglia. No evidence of hemorrhagic information..     FINDINGS were called to the patient's nurse at 6:20 a.m.        This report was finalized on 2/16/2025 6:22 AM by Dr. Tanya Prince M.D on Workstation: [id number]       XR Chest 1 View [[id number]] Collected: 02/15/25 1654     Updated: 02/15/25 1707    Narrative:      XR CHEST 1 VW-     CLINICAL HISTORY:  Acute Stroke Protocol (onset < 12 hrs);  R47.9-Unspecified speech disturbances; R29.898-Other symptoms and signs  involving the musculoskeletal system; R42-Dizziness and giddiness;  I10-Essential (primary) hypertension; N18.9-Chronic kidney disease,  unspecified; Z86.39-Personal history of other endocrine, nutritional and  metabolic disease     FINDINGS:     The lung volumes are low. There our mild bibasilar atelectatic changes.  The cardiomediastinal silhouette is mildly enlarged but stable since  01/29/2025. The osseous structures demonstrate no significant  abnormalities.           This report was finalized on 2/15/2025 5:04 PM by Dr. Sanya Armendariz M.D  on Workstation: [id number]       CT Angiogram Head w AI Analysis of LVO  [[id number]] Collected: 02/15/25 1612     Updated: 02/15/25 1643    Narrative:      CT ANGIOGRAM OF THE HEAD AND NECK AND CT PERFUSION STUDY     CLINICAL HISTORY: Speech difficulties, dizziness, and right leg  weakness.     TECHNIQUE:  A noncontrast head CT was performed with 3 mm axial images.  Thereafter, a CT perfusion study was performed after the dynamic bolus  of IV contrast. Standard perfusion maps were constructed with RAPID  software. A CT angiogram of the head and neck was then performed with 1  mm axial images. Sagittal, coronal, and 3-dimensional reconstructed  images were obtained. Finally, a delayed postcontrast head CT was  performed with 3 mm axial images.     FINDINGS:     HEAD CT: No convincing evidence for an acute intracranial abnormality.  Mild to moderate changes of chronic small vessel ischemic phenomenon.  Near complete opacification of the left maxillary sinus with mucosal  thickening and secretions.     CT PERFUSION STUDY: No abnormality on the CBF less than 30% or Tmax  greater than 6 seconds maps.     CTA HEAD:     Posterior Circulation: Unremarkable appearance of the vertebral  arteries, basilar artery, and posterior cerebral arteries.  Anterior Circulation: Atherosclerotic calcifications within the carotid  siphons resulting in relatively mild degrees of luminal compromise.  Unremarkable appearance of the anterior and middle cerebral arteries.     CTA NECK:       Aortic Arch: Classic configuration. Mild atherosclerotic involvement of  the aortic arch.  Subclavian Arteries: Mild to moderate stenosis of the proximal aspect  the left subclavian artery secondary to predominantly noncalcified  atherosclerotic changes.  Vertebral Arteries: Unremarkable.  CCA/ICA's: Atherosclerotic changes without evidence for a NASCET  stenosis.       Impression:         No convincing evidence for an acute infarct on either the noncontrast  head CT or the CT perfusion study. Further evaluation could be  performed  with MR imaging for more sensitive and specific assessment.     No evidence for large vessel occlusion.     Mild to moderate stenosis of the proximal aspect of the left subclavian  artery.     No significant NASCET stenosis within either internal carotid artery.     The findings of the noncontrast head CT were discussed with Dr. Sethi on 2/15/2025 at approximately 3:35 p.m. The findings of the CT  angiogram and CT perfusion study were discussed with Dr. Sethi at  approximately 3:50 p.m.           Radiation dose reduction techniques were utilized, including automated  exposure control and exposure modulation based on body size.        This report was finalized on 2/15/2025 4:40 PM by Dr. Sanya Armendariz M.D  on Workstation: [id number]       CT Angiogram Neck [[id number]] Collected: 02/15/25 1612     Updated: 02/15/25 1643    Narrative:      CT ANGIOGRAM OF THE HEAD AND NECK AND CT PERFUSION STUDY     CLINICAL HISTORY: Speech difficulties, dizziness, and right leg  weakness.     TECHNIQUE:  A noncontrast head CT was performed with 3 mm axial images.  Thereafter, a CT perfusion study was performed after the dynamic bolus  of IV contrast. Standard perfusion maps were constructed with RAPID  software. A CT angiogram of the head and neck was then performed with 1  mm axial images. Sagittal, coronal, and 3-dimensional reconstructed  images were obtained. Finally, a delayed postcontrast head CT was  performed with 3 mm axial images.     FINDINGS:     HEAD CT: No convincing evidence for an acute intracranial abnormality.  Mild to moderate changes of chronic small vessel ischemic phenomenon.  Near complete opacification of the left maxillary sinus with mucosal  thickening and secretions.     CT PERFUSION STUDY: No abnormality on the CBF less than 30% or Tmax  greater than 6 seconds maps.     CTA HEAD:     Posterior Circulation: Unremarkable appearance of the vertebral  arteries, basilar artery, and  posterior cerebral arteries.  Anterior Circulation: Atherosclerotic calcifications within the carotid  siphons resulting in relatively mild degrees of luminal compromise.  Unremarkable appearance of the anterior and middle cerebral arteries.     CTA NECK:       Aortic Arch: Classic configuration. Mild atherosclerotic involvement of  the aortic arch.  Subclavian Arteries: Mild to moderate stenosis of the proximal aspect  the left subclavian artery secondary to predominantly noncalcified  atherosclerotic changes.  Vertebral Arteries: Unremarkable.  CCA/ICA's: Atherosclerotic changes without evidence for a NASCET  stenosis.       Impression:         No convincing evidence for an acute infarct on either the noncontrast  head CT or the CT perfusion study. Further evaluation could be performed  with MR imaging for more sensitive and specific assessment.     No evidence for large vessel occlusion.     Mild to moderate stenosis of the proximal aspect of the left subclavian  artery.     No significant NASCET stenosis within either internal carotid artery.     The findings of the noncontrast head CT were discussed with Dr. Sethi on 2/15/2025 at approximately 3:35 p.m. The findings of the CT  angiogram and CT perfusion study were discussed with Dr. Sethi at  approximately 3:50 p.m.           Radiation dose reduction techniques were utilized, including automated  exposure control and exposure modulation based on body size.        This report was finalized on 2/15/2025 4:40 PM by Dr. Sanya Armendariz M.D  on Workstation: [id number]       CT CEREBRAL PERFUSION WITH & WITHOUT CONTRAST [[id number]] Collected: 02/15/25 1612     Updated: 02/15/25 1643    Narrative:      CT ANGIOGRAM OF THE HEAD AND NECK AND CT PERFUSION STUDY     CLINICAL HISTORY: Speech difficulties, dizziness, and right leg  weakness.     TECHNIQUE:  A noncontrast head CT was performed with 3 mm axial images.  Thereafter, a CT perfusion study was performed  after the dynamic bolus  of IV contrast. Standard perfusion maps were constructed with RAPID  software. A CT angiogram of the head and neck was then performed with 1  mm axial images. Sagittal, coronal, and 3-dimensional reconstructed  images were obtained. Finally, a delayed postcontrast head CT was  performed with 3 mm axial images.     FINDINGS:     HEAD CT: No convincing evidence for an acute intracranial abnormality.  Mild to moderate changes of chronic small vessel ischemic phenomenon.  Near complete opacification of the left maxillary sinus with mucosal  thickening and secretions.     CT PERFUSION STUDY: No abnormality on the CBF less than 30% or Tmax  greater than 6 seconds maps.     CTA HEAD:     Posterior Circulation: Unremarkable appearance of the vertebral  arteries, basilar artery, and posterior cerebral arteries.  Anterior Circulation: Atherosclerotic calcifications within the carotid  siphons resulting in relatively mild degrees of luminal compromise.  Unremarkable appearance of the anterior and middle cerebral arteries.     CTA NECK:       Aortic Arch: Classic configuration. Mild atherosclerotic involvement of  the aortic arch.  Subclavian Arteries: Mild to moderate stenosis of the proximal aspect  the left subclavian artery secondary to predominantly noncalcified  atherosclerotic changes.  Vertebral Arteries: Unremarkable.  CCA/ICA's: Atherosclerotic changes without evidence for a NASCET  stenosis.       Impression:         No convincing evidence for an acute infarct on either the noncontrast  head CT or the CT perfusion study. Further evaluation could be performed  with MR imaging for more sensitive and specific assessment.     No evidence for large vessel occlusion.     Mild to moderate stenosis of the proximal aspect of the left subclavian  artery.     No significant NASCET stenosis within either internal carotid artery.     The findings of the noncontrast head CT were discussed with  Dr. Sethi on 2/15/2025 at approximately 3:35 p.m. The findings of the CT  angiogram and CT perfusion study were discussed with Dr. Sethi at  approximately 3:50 p.m.           Radiation dose reduction techniques were utilized, including automated  exposure control and exposure modulation based on body size.        This report was finalized on 2/15/2025 4:40 PM by Dr. Sanya Armendariz M.D  on Workstation: [id number]                 Assessment:    Acute CVA within the left corona radiata/basal ganglia  Aphasia due to CVA  Right lower extremity weakness due to CVA  Hypertension  Uncontrolled diabetes mellitus  Chronic diastolic congestive heart failure  Pulmonary hypertension  Obstructive sleep apnea, noncompliant with CPAP  Moderate persistent asthma  Coronary artery disease  Chronic kidney disease stage IIIa  Iron deficiency anemia  Morbid obesity affecting all aspects of care  Recent right hemicolectomy  Status post left mastectomy due to breast cancer  Temporal arteritis on long-term steroids    Plan:    Patient feels feverish, chills, headaches today.  Will check urine to rule out infection.  Will check CT scan of the head to rule out any changes on her recent stroke.   We also ordered a chest x-ray, I believe patient was aspirating thin liquids that family was administering to her.    New speech therapy recommendations reviewed.  Continue aspirin as well as atorvastatin 80 mg daily.  Tolerating medications so far.  2D echo okay.  Continue working with speech therapy, PT, OT.  Blood pressure improving. Adjust blood pressure medication if needed.    Continue medication for chronic diastolic congestive heart failure, patient still dehydrated however, will continue IV fluids.  To start wearing CPAP at nighttime.  Monitor and correct electrolytes, stable at this time.    Monitor renal function, creatinine still elevated at 1.67.  Will continue IV fluids.  Encourage patient to eat.  She is not drinking or  eating much.    Adjust insulin as needed, continue Jardiance.    Monitor mental status, improving slowly, better speech.  Otherwise, she is alert and oriented x 3.  DVT prophylaxis: Lovenox  Stress ulcer prophylaxis:Pantoprazole.  Labs in am      Praneeth Valenzuela MD  2/19/2025  13:59 EST

## 2025-02-19 NOTE — PLAN OF CARE
Goal Outcome Evaluation:  Plan of Care Reviewed With: patient           Outcome Evaluation: VFSS completed. Anika Archibald, Radiology APRN, present. Intermittent non transient penetration with thin. Transient penetration with nectar. No penetration with puree or honey. Lingual residue with regular solids. SLP recs upgrade to nectar, continue mech soft, no mixed. Meds as july. Can allow free water protocol between meals.

## 2025-02-19 NOTE — PLAN OF CARE
Goal Outcome Evaluation:Goal Outcome Evaluation:    Problem: Adult Inpatient Plan of Care  Goal: Plan of Care Review  Outcome: Progressing  Goal: Patient-Specific Goal (Individualized)  Outcome: Progressing  Goal: Absence of Hospital-Acquired Illness or Injury  Outcome: Progressing  Intervention: Identify and Manage Fall Risk  Recent Flowsheet Documentation  Taken 2/18/2025 2013 by Joanie Coleman RN  Safety Promotion/Fall Prevention:   clutter free environment maintained   fall prevention program maintained   room organization consistent   safety round/check completed  Intervention: Prevent Skin Injury  Recent Flowsheet Documentation  Taken 2/18/2025 2013 by Joanie Coleman RN  Skin Protection: incontinence pads utilized  Goal: Optimal Comfort and Wellbeing  Outcome: Progressing  Intervention: Provide Person-Centered Care  Recent Flowsheet Documentation  Taken 2/18/2025 2013 by Joanie Coleman RN  Trust Relationship/Rapport:   care explained   choices provided  Goal: Readiness for Transition of Care  Outcome: Progressing     Problem: Fall Injury Risk  Goal: Absence of Fall and Fall-Related Injury  Outcome: Progressing  Intervention: Identify and Manage Contributors  Recent Flowsheet Documentation  Taken 2/18/2025 2013 by Joanie Coleman RN  Medication Review/Management: medications reviewed  Intervention: Promote Injury-Free Environment  Recent Flowsheet Documentation  Taken 2/18/2025 2013 by Joanie Coleman RN  Safety Promotion/Fall Prevention:   clutter free environment maintained   fall prevention program maintained   room organization consistent   safety round/check completed     Problem: Skin Injury Risk Increased  Goal: Skin Health and Integrity  Outcome: Progressing  Intervention: Optimize Skin Protection  Recent Flowsheet Documentation  Taken 2/18/2025 2013 by Joanie Coleman RN  Pressure Reduction Techniques: frequent weight shift encouraged  Pressure Reduction Devices: pressure-redistributing mattress utilized  Skin  Protection: incontinence pads utilized     Problem: Stroke, Ischemic (Includes Transient Ischemic Attack)  Goal: Optimal Coping  Outcome: Progressing  Goal: Effective Bowel Elimination  Outcome: Progressing  Goal: Optimal Cerebral Tissue Perfusion  Outcome: Progressing  Goal: Optimal Cognitive Function  Outcome: Progressing  Goal: Improved Communication Skills  Outcome: Progressing  Goal: Optimal Functional Ability  Outcome: Progressing  Goal: Optimal Nutrition Intake  Outcome: Progressing  Goal: Effective Oxygenation and Ventilation  Outcome: Progressing  Goal: Improved Sensorimotor Function  Outcome: Progressing  Intervention: Optimize Sensory and Perceptual Ability  Recent Flowsheet Documentation  Taken 2/18/2025 2013 by Joanie Coleman RN  Pressure Reduction Techniques: frequent weight shift encouraged  Pressure Reduction Devices: pressure-redistributing mattress utilized  Goal: Safe and Effective Swallow  Outcome: Progressing  Goal: Effective Urinary Elimination  Outcome: Progressing       No acute changes noted. Pain treated per MAR. VS WNL

## 2025-02-19 NOTE — MBS/VFSS/FEES
Acute Care - Speech Language Pathology   Swallow Initial Evaluation [facility]     Patient Name: Blanca Harden  : 1945  MRN: [id number]  Today's Date: 2025               Admit Date: 2/15/2025    Visit Dx:     ICD-10-CM ICD-9-CM   1. Difficulty with speech  R47.9 784.59   2. Right leg weakness  R29.898 729.89   3. Dizziness  R42 780.4   4. Uncontrolled hypertension  I10 401.9   5. Chronic renal impairment, unspecified CKD stage  N18.9 585.9   6. History of diabetes mellitus  Z86.39 V12.29   7. Palpitations  R00.2 785.1     Patient Active Problem List   Diagnosis    Osteoporosis    Breast neoplasm, Tis (DCIS), left    Iron deficiency anemia    CKD (chronic kidney disease)    Diabetic nephropathy associated with type 2 diabetes mellitus    Temporal arteritis    Immunosuppression    Anemia    Duodenal adenoma    Gastritis without bleeding    Polyp of duodenum    Morbidly obese    Dyspnea on exertion    Hyperlipidemia    Type 2 diabetes mellitus with stage 3b chronic kidney disease, with long-term current use of insulin    Essential hypertension    Bilateral lower extremity edema    Pulmonary hypertension    Obstructive sleep apnea on CPAP    Stage 3a chronic kidney disease    Iron malabsorption    Respiratory distress    Hypoglycemia due to insulin    Delirium    Hypomagnesemia    Severe malnutrition    Leg swelling    Venous (peripheral) insufficiency    Polyp of colon    Obesity (BMI 30.0-34.9)    TIA (transient ischemic attack)    Difficulty with speech     Past Medical History:   Diagnosis Date    Anemia     Anxiety and depression     Asthma     Balance problem     CKD (chronic kidney disease), stage III     Colonic mass     COPD (chronic obstructive pulmonary disease)     Coronary artery disease     FOLLOWED BY DR GUNN    Diabetes mellitus, type 2     History of COVID-2023    History of left breast cancer 2019    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 Daniel Gutierrez, [facility]    CARDIAC CATHETERIZATION       SECTION N/A     x3    CHOLECYSTECTOMY N/A     COLON RESECTION Right 2025    Procedure: COLON RESECTION LAPAROSCOPIC RIGHT WITH DAVINCI ROBOT;  Surgeon: Ashwin Alvarado MD;  Location: [facility] MAIN OR;  Service: Robotics - DaVinci;  Laterality: Right;    COLONOSCOPY      COLONOSCOPY N/A 2024    Procedure: COLONOSCOPY into the cecum and TI with hot snare polypectomy;  Surgeon: Ashwin Alvarado MD;  Location: [facility] ENDOSCOPY;  Service: General;  Laterality: N/A;  pre-colon polyps  post-colon mass    COLONOSCOPY W/ POLYPECTOMY N/A 2019    Enlarged folds in the antrum: biopsied; duodenal, transverse colon x2, splenic flexure, descending colon and sigmoid colon polyps: biopsied (path: tubular adenoma x6)-Dr. Zak De Jesus, [facility]    ENDOSCOPY  10/11/2019    Procedure: ESOPHAGOGASTRODUODENOSCOPY with hot snare polypectomy;  Surgeon: Haile Handley MD;  Location: [facility] ENDOSCOPY;  Service: Gastroenterology    ENDOSCOPY N/A 2020    Procedure: ESOPHAGOGASTRODUODENOSCOPY;  Surgeon: Haile Handley MD;  Location: [facility] ENDOSCOPY;  Service: Gastroenterology    ENDOSCOPY N/A 2020    Procedure: ESOPHAGOGASTRODUODENOSCOPY WITH BIOPSIES AND COLD BIOPSY POLYPECTOMY;  Surgeon: Haile Handley MD;   Location:  [facility]OSCOPY;  Service: Gastroenterology;  Laterality: N/A;  pre: dyspepsia and diarrhea  post: duodenal polyp and gastritis    ENDOSCOPY N/A 07/23/2024    Procedure: ESOPHAGOGASTRODUODENOSCOPY WITH hot snare polypectomy with clip placement x 2BIOPSY;  Surgeon: Ashwin Alvarado MD;  Location:  [facility];  Service: General;  Laterality: N/A;  pre-hx duoedenal polyp  post-duodenal polyp; gastritis    EYE SURGERY      MASTECTOMY WITH SENTINEL NODE BIOPSY AND AXILLARY NODE DISSECTION Left 07/03/2019    Procedure: LEFT BREAST MASTECTOMY WITH SENTINEL NODE BIOPSY AND , v-y plasty closure;  Surgeon: Tahmina James MD;  Location: [facility] MAIN OR;  Service: General    SUBTOTAL HYSTERECTOMY Bilateral     ovaries still in tact    TEMPORAL ARTERY BIOPSY Bilateral 12/05/2019       SLP Recommendation and Plan  SLP Swallowing Diagnosis: mild, oral dysphagia, pharyngeal dysphagia (02/19/25 0900)  SLP Diet Recommendation: mechanical ground textures, no mixed consistencies, nectar thick liquids, water between meals after oral care, with supervision, ice chips between meals after oral care, with supervision (02/19/25 0900)  Recommended Precautions and Strategies: upright posture during/after eating, small bites of food and sips of liquid (02/19/25 0900)  SLP Rec. for Method of Medication Administration: meds whole, meds crushed, with thick liquids, with puree, as tolerated (02/19/25 0900)     Monitor for Signs of Aspiration: yes, notify SLP if any concerns (02/19/25 0900)  Recommended Diagnostics: reassess via clinical swallow evaluation, SLE/Cog/Motor Speech Evaluation (02/19/25 0900)           Therapy Frequency (Swallow): PRN (02/19/25 0900)  Predicted Duration Therapy Intervention (Days): until discharge (02/19/25 0900)  Oral Care Recommendations: Oral Care BID/PRN (02/19/25 0900)                                        Outcome Evaluation: VFSS completed. Anika Archibald, Radiology APRN, present. Intermittent  non transient penetration with thin. Transient penetration with nectar. No penetration with puree or honey. Lingual residue with regular solids. SLP recs upgrade to nectar, continue mech soft, no mixed. Meds as july. Can allow free water protocol between meals.      SWALLOW EVALUATION (Last 72 Hours)       SLP Adult Swallow Evaluation       Row Name 02/19/25 0900 02/18/25 1000       Rehab Evaluation    Document Type evaluation  - evaluation  -CR    Subjective Information -- no complaints  -CR    Patient Observations -- alert;cooperative  -CR    Patient Effort -- excellent  -CR    Symptoms Noted During/After Treatment -- none  -CR       General Information    Patient Profile Reviewed yes  -SH yes  -CR    Pertinent History Of Current Problem Acute stroke  -SH L basal ganglia stroke.  -CR    Current Method of Nutrition mechanical ground textures;honey-thick liquids  - mechanical ground textures;nectar/syrup-thick liquids  -CR    Precautions/Limitations, Vision WFL;for purposes of eval  - WFL;for purposes of eval  -CR    Precautions/Limitations, Hearing WFL;for purposes of eval  - WFL;for purposes of eval  -CR    Prior Level of Function-Communication English as a second language  - WFL  -CR    Prior Level of Function-Swallowing no diet consistency restrictions  - no diet consistency restrictions  -CR    Plans/Goals Discussed with patient;agreed upon  - patient;spouse/S.O.;agreed upon  -CR    Barriers to Rehab none identified  - none identified  -CR    Patient's Goals for Discharge patient did not state  - --       Pain    Pretreatment Pain Rating 0/10 - no pain  - 0/10 - no pain  -CR    Posttreatment Pain Rating 0/10 - no pain  - 0/10 - no pain  -CR       Oral Motor Structure and Function    Dentition Assessment -- natural, present and adequate  -CR    Secretion Management -- WNL/WFL  -CR    Mucosal Quality -- moist, healthy  -CR       Oral Musculature and Cranial Nerve Assessment    Mandibular  Impairment Detail, Cranial Nerve V (Trigeminal) -- reduced mandibular ROM  -CR    Oral Labial or Buccal Impairment, Detail, Cranial Nerve VII (Facial): -- right labial droop  -CR    Lingual Impairment, Detail. Cranial Nerves IX, XII (Glossopharyngeal and Hypoglossal) -- other (see comments)  right lingual deviation  -CR       General Eating/Swallowing Observations    Respiratory Support Currently in Use -- room air  -CR       Clinical Swallow Eval    Clinical Swallow Evaluation Summary -- Clinical swallow evaluation completed. RN downgraded diet to nectar thick liquids/mechanical soft previous date d/t coughing with thin liquids.  at bedside. Right labial droop, right lingual deviation appreciated. Word finding difficulties observed. Wet vocal quality demonstrated with thins via spoon 2/2, nectar via spoon/cup. Throat clearing with nectar via cup. No overt s/s of aspiration with ice chips, honey via spoon/cup/straw, puree, or strained soft/chopped solids. Prolonged munching mastication. No oral residue. Pt complaining of anterior loss on right side, not appreciated during assessment. Recommend mechanical ground diet with honey thick liquids. Meds whole or crushed in puree. Sitting upright, slow rate, small bites/sips, supervision during meals, assist with feeding, check mouth for residue/pocketing. Will follow with VFSS to further assess.  -CR       MBS/VFSS Interpretation    VFSS Summary Patient presents with mild oropharyngeal dysphagia characterized by swallow mistiming, lingual weakness, and poor labial seal. Swallow elicited at the valleculae with honey via spoon, cup, and straw without penetration.  Pt cleared oral residue with additional swallow. Spill past the valleculae before the swallow with nectar via spoon, cup, and straw. Transient penetration during the swallow. Intermittent deep, trace, but non transient penetration with thins via cup and straw before and during the swallow. Anterior loss  noted on R. Pt required cues to clear oral residue that pooled in the pharynx. Swallow elicited at the valleculae with puree. Munching mastication pattern with solids. Spill to the pyriforms before the swallow with mevh soft. Trace, non transient penetration during the swallow. Lingual residue post swallow with regular.  - --       SLP Communication to Radiology    Summary Statement Anika Archibald, Radiology APRN, present. Intermittent non transient penetration with thin. Transient penetration with nectar. No penetration with puree or honey. Lingual residue with regular solids.  -SH --       SLP Evaluation Clinical Impression    SLP Swallowing Diagnosis mild;oral dysphagia;pharyngeal dysphagia  - suspected pharyngeal dysphagia;oral dysphagia  -CR    Functional Impact -- risk of aspiration/pneumonia  -CR    Rehab Potential/Prognosis, Swallowing -- good, to achieve stated therapy goals  -CR    Swallow Criteria for Skilled Therapeutic Interventions Met -- demonstrates skilled criteria  -CR       Recommendations    Therapy Frequency (Swallow) PRN  - PRN  -CR    Predicted Duration Therapy Intervention (Days) until discharge  - until discharge  -CR    SLP Diet Recommendation mechanical ground textures;no mixed consistencies;nectar thick liquids;water between meals after oral care, with supervision;ice chips between meals after oral care, with supervision  - mechanical ground textures;no mixed consistencies;honey thick liquids  -CR    Recommended Diagnostics reassess via clinical swallow evaluation;SLE/Cog/Motor Speech Evaluation  - reassess via VFSS (MBS);SLE/Cog/Motor Speech Evaluation  -CR    Recommended Precautions and Strategies upright posture during/after eating;small bites of food and sips of liquid  - upright posture during/after eating;small bites of food and sips of liquid;1:1 supervision;assist with feeding;check mouth frequently for oral residue/pocketing  -CR    Oral Care Recommendations Oral Care  BID/PRN  -SH Oral Care BID/PRN  -CR    SLP Rec. for Method of Medication Administration meds whole;meds crushed;with thick liquids;with puree;as tolerated  -SH meds whole;meds crushed;with puree;as tolerated  -CR    Monitor for Signs of Aspiration yes;notify SLP if any concerns  -SH yes;notify SLP if any concerns  -CR       Swallow Goals (SLP)    Swallow LTGs Patient will demonstrate functional swallow for  -SH --       (LTG) Patient will demonstrate functional swallow for    Diet Texture (Demonstrate functional swallow) regular textures  - --    Liquid viscosity (Demonstrate functional swallow) thin liquids  - --    [place] (Demonstrate functional swallow) independently (over 90% accuracy)  - --    Time Frame (Demonstrate functional swallow) by discharge  - --              User Key  (r) = Recorded By, (t) = Taken By, (c) = Cosigned By      Initials Name Effective Dates    Lexy Levi SLP 01/05/24 -     Shayna Levine SLP 12/03/24 -                     EDUCATION  The patient has been educated in the following areas:   Dysphagia (Swallowing Impairment).        SLP GOALS       Row Name 02/19/25 0900             (LTG) Patient will demonstrate functional swallow for    Diet Texture (Demonstrate functional swallow) regular textures  -      Liquid viscosity (Demonstrate functional swallow) thin liquids  -      [place] (Demonstrate functional swallow) independently (over 90% accuracy)  -      Time Frame (Demonstrate functional swallow) by discharge  -                User Key  (r) = Recorded By, (t) = Taken By, (c) = Cosigned By      Initials Name Provider Type     Lexy Haile, SLP Speech and Language Pathologist                         Time Calculation:    Time Calculation- SLP       Row Name 02/19/25 1244             Time Calculation- [facility]    SLP Start Time 0900  -      SLP Received On 02/19/25  -                User Key  (r) = Recorded By, (t) = Taken By, (c) = Cosigned By       Initials Name Provider Type     Lexy Haile SLP Speech and Language Pathologist                    Therapy Charges for Today       Code Description Service Date Service Provider Modifiers Qty    [id number] HC ST MOTION FLUORO EVAL SWALLOW 5 2/19/2025 Lexy Haile SLP GN 1                 ERIN Mooney  2/19/2025

## 2025-02-19 NOTE — SIGNIFICANT NOTE
02/19/25 1517   OTHER   Discipline occupational therapist   Rehab Time/Intention   Session Not Performed other (see comments)  (Pt SALVATORE in am and being cleaned up by PCA during 2nd attempt. Will f/u as time allows or next service date.)   Therapy Assessment/Plan (PT)   Criteria for Skilled Interventions Met (PT) yes;skilled treatment is necessary   Recommendation   OT - Next Appointment 02/20/25

## 2025-02-20 ENCOUNTER — APPOINTMENT (OUTPATIENT)
Dept: CT IMAGING | Facility: HOSPITAL | Age: 80
DRG: 065 | End: 2025-02-20
Payer: MEDICARE

## 2025-02-20 LAB
ANION GAP SERPL CALCULATED.3IONS-SCNC: 9 MMOL/L (ref 5–15)
BASOPHILS # BLD AUTO: 0.02 10*3/MM3 (ref 0–0.2)
BASOPHILS NFR BLD AUTO: 0.2 % (ref 0–1.5)
BUN SERPL-MCNC: 19 MG/DL (ref 8–23)
BUN/CREAT SERPL: 14.1 (ref 7–25)
CALCIUM SPEC-SCNC: 7.2 MG/DL (ref 8.6–10.5)
CHLORIDE SERPL-SCNC: 102 MMOL/L (ref 98–107)
CO2 SERPL-SCNC: 26 MMOL/L (ref 22–29)
CREAT SERPL-MCNC: 1.35 MG/DL (ref 0.57–1)
DEPRECATED RDW RBC AUTO: 40.9 FL (ref 37–54)
EGFRCR SERPLBLD CKD-EPI 2021: 40.3 ML/MIN/1.73
EOSINOPHIL # BLD AUTO: 0.07 10*3/MM3 (ref 0–0.4)
EOSINOPHIL NFR BLD AUTO: 0.6 % (ref 0.3–6.2)
ERYTHROCYTE [DISTWIDTH] IN BLOOD BY AUTOMATED COUNT: 12 % (ref 12.3–15.4)
GLUCOSE BLDC GLUCOMTR-MCNC: 121 MG/DL (ref 70–130)
GLUCOSE BLDC GLUCOMTR-MCNC: 134 MG/DL (ref 70–130)
GLUCOSE BLDC GLUCOMTR-MCNC: 136 MG/DL (ref 70–130)
GLUCOSE BLDC GLUCOMTR-MCNC: 165 MG/DL (ref 70–130)
GLUCOSE SERPL-MCNC: 142 MG/DL (ref 65–99)
HCT VFR BLD AUTO: 32.4 % (ref 34–46.6)
HGB BLD-MCNC: 10.5 G/DL (ref 12–15.9)
IMM GRANULOCYTES # BLD AUTO: 0.14 10*3/MM3 (ref 0–0.05)
IMM GRANULOCYTES NFR BLD AUTO: 1.3 % (ref 0–0.5)
LYMPHOCYTES # BLD AUTO: 0.48 10*3/MM3 (ref 0.7–3.1)
LYMPHOCYTES NFR BLD AUTO: 4.3 % (ref 19.6–45.3)
MCH RBC QN AUTO: 30.2 PG (ref 26.6–33)
MCHC RBC AUTO-ENTMCNC: 32.4 G/DL (ref 31.5–35.7)
MCV RBC AUTO: 93.1 FL (ref 79–97)
MONOCYTES # BLD AUTO: 0.85 10*3/MM3 (ref 0.1–0.9)
MONOCYTES NFR BLD AUTO: 7.6 % (ref 5–12)
NEUTROPHILS NFR BLD AUTO: 86 % (ref 42.7–76)
NEUTROPHILS NFR BLD AUTO: 9.56 10*3/MM3 (ref 1.7–7)
NRBC BLD AUTO-RTO: 0 /100 WBC (ref 0–0.2)
PLATELET # BLD AUTO: 256 10*3/MM3 (ref 140–450)
PMV BLD AUTO: 9.8 FL (ref 6–12)
POTASSIUM SERPL-SCNC: 3.8 MMOL/L (ref 3.5–5.2)
QT INTERVAL: 337 MS
QTC INTERVAL: 432 MS
RBC # BLD AUTO: 3.48 10*6/MM3 (ref 3.77–5.28)
SODIUM SERPL-SCNC: 137 MMOL/L (ref 136–145)
WBC NRBC COR # BLD AUTO: 11.12 10*3/MM3 (ref 3.4–10.8)

## 2025-02-20 PROCEDURE — 97112 NEUROMUSCULAR REEDUCATION: CPT

## 2025-02-20 PROCEDURE — 94799 UNLISTED PULMONARY SVC/PX: CPT

## 2025-02-20 PROCEDURE — 97110 THERAPEUTIC EXERCISES: CPT

## 2025-02-20 PROCEDURE — 25010000002 CEFTRIAXONE PER 250 MG: Performed by: INTERNAL MEDICINE

## 2025-02-20 PROCEDURE — 25010000002 ENOXAPARIN PER 10 MG: Performed by: INTERNAL MEDICINE

## 2025-02-20 PROCEDURE — 80048 BASIC METABOLIC PNL TOTAL CA: CPT | Performed by: INTERNAL MEDICINE

## 2025-02-20 PROCEDURE — 25810000003 SODIUM CHLORIDE 0.9 % SOLUTION: Performed by: INTERNAL MEDICINE

## 2025-02-20 PROCEDURE — 94761 N-INVAS EAR/PLS OXIMETRY MLT: CPT

## 2025-02-20 PROCEDURE — 63710000001 PREDNISONE PER 5 MG: Performed by: INTERNAL MEDICINE

## 2025-02-20 PROCEDURE — 63710000001 INSULIN LISPRO (HUMAN) PER 5 UNITS: Performed by: INTERNAL MEDICINE

## 2025-02-20 PROCEDURE — 85025 COMPLETE CBC W/AUTO DIFF WBC: CPT | Performed by: INTERNAL MEDICINE

## 2025-02-20 PROCEDURE — 63710000001 INSULIN GLARGINE PER 5 UNITS: Performed by: INTERNAL MEDICINE

## 2025-02-20 PROCEDURE — 94664 DEMO&/EVAL PT USE INHALER: CPT

## 2025-02-20 PROCEDURE — 82948 REAGENT STRIP/BLOOD GLUCOSE: CPT

## 2025-02-20 PROCEDURE — 70450 CT HEAD/BRAIN W/O DYE: CPT

## 2025-02-20 RX ORDER — SODIUM CHLORIDE 9 MG/ML
50 INJECTION, SOLUTION INTRAVENOUS CONTINUOUS
Status: DISCONTINUED | OUTPATIENT
Start: 2025-02-20 | End: 2025-02-21 | Stop reason: HOSPADM

## 2025-02-20 RX ORDER — LOPERAMIDE HYDROCHLORIDE 2 MG/1
2 CAPSULE ORAL 4 TIMES DAILY PRN
Status: DISCONTINUED | OUTPATIENT
Start: 2025-02-20 | End: 2025-02-21 | Stop reason: HOSPADM

## 2025-02-20 RX ADMIN — BUDESONIDE AND FORMOTEROL FUMARATE DIHYDRATE 2 PUFF: 160; 4.5 AEROSOL RESPIRATORY (INHALATION) at 22:09

## 2025-02-20 RX ADMIN — CEFTRIAXONE SODIUM 1000 MG: 1 INJECTION, POWDER, FOR SOLUTION INTRAMUSCULAR; INTRAVENOUS at 09:37

## 2025-02-20 RX ADMIN — LISINOPRIL 40 MG: 20 TABLET ORAL at 09:27

## 2025-02-20 RX ADMIN — ACETAMINOPHEN 650 MG: 325 TABLET, FILM COATED ORAL at 21:58

## 2025-02-20 RX ADMIN — EMPAGLIFLOZIN 10 MG: 10 TABLET, FILM COATED ORAL at 09:27

## 2025-02-20 RX ADMIN — ASPIRIN 81 MG: 81 TABLET, COATED ORAL at 09:27

## 2025-02-20 RX ADMIN — AMLODIPINE BESYLATE 10 MG: 10 TABLET ORAL at 09:27

## 2025-02-20 RX ADMIN — ENOXAPARIN SODIUM 40 MG: 100 INJECTION SUBCUTANEOUS at 10:52

## 2025-02-20 RX ADMIN — INSULIN LISPRO 5 UNITS: 100 INJECTION, SOLUTION INTRAVENOUS; SUBCUTANEOUS at 12:48

## 2025-02-20 RX ADMIN — BUDESONIDE AND FORMOTEROL FUMARATE DIHYDRATE 2 PUFF: 160; 4.5 AEROSOL RESPIRATORY (INHALATION) at 07:44

## 2025-02-20 RX ADMIN — PANTOPRAZOLE SODIUM 40 MG: 40 TABLET, DELAYED RELEASE ORAL at 05:43

## 2025-02-20 RX ADMIN — ATORVASTATIN CALCIUM 80 MG: 80 TABLET, FILM COATED ORAL at 21:47

## 2025-02-20 RX ADMIN — ACETAMINOPHEN 650 MG: 325 TABLET, FILM COATED ORAL at 09:27

## 2025-02-20 RX ADMIN — INSULIN LISPRO 5 UNITS: 100 INJECTION, SOLUTION INTRAVENOUS; SUBCUTANEOUS at 09:28

## 2025-02-20 RX ADMIN — ZINC OXIDE 1 APPLICATION: 200 OINTMENT TOPICAL at 21:48

## 2025-02-20 RX ADMIN — PREDNISONE 5 MG: 5 TABLET ORAL at 16:20

## 2025-02-20 RX ADMIN — ENOXAPARIN SODIUM 40 MG: 100 INJECTION SUBCUTANEOUS at 23:35

## 2025-02-20 RX ADMIN — INSULIN LISPRO 5 UNITS: 100 INJECTION, SOLUTION INTRAVENOUS; SUBCUTANEOUS at 18:16

## 2025-02-20 RX ADMIN — BISOPROLOL FUMARATE 20 MG: 5 TABLET ORAL at 21:47

## 2025-02-20 RX ADMIN — INSULIN GLARGINE 25 UNITS: 100 INJECTION, SOLUTION SUBCUTANEOUS at 21:47

## 2025-02-20 RX ADMIN — SODIUM CHLORIDE 75 ML/HR: 9 INJECTION, SOLUTION INTRAVENOUS at 05:43

## 2025-02-20 NOTE — THERAPY TREATMENT NOTE
Patient Name: Blanca Harden  : 1945    MRN: [id number]                              Today's Date: 2025       Admit Date: 2/15/2025    Visit Dx:     ICD-10-CM ICD-9-CM   1. Difficulty with speech  R47.9 784.59   2. Right leg weakness  R29.898 729.89   3. Dizziness  R42 780.4   4. Uncontrolled hypertension  I10 401.9   5. Chronic renal impairment, unspecified CKD stage  N18.9 585.9   6. History of diabetes mellitus  Z86.39 V12.29   7. Palpitations  R00.2 785.1     Patient Active Problem List   Diagnosis    Osteoporosis    Breast neoplasm, Tis (DCIS), left    Iron deficiency anemia    CKD (chronic kidney disease)    Diabetic nephropathy associated with type 2 diabetes mellitus    Temporal arteritis    Immunosuppression    Anemia    Duodenal adenoma    Gastritis without bleeding    Polyp of duodenum    Morbidly obese    Dyspnea on exertion    Hyperlipidemia    Type 2 diabetes mellitus with stage 3b chronic kidney disease, with long-term current use of insulin    Essential hypertension    Bilateral lower extremity edema    Pulmonary hypertension    Obstructive sleep apnea on CPAP    Stage 3a chronic kidney disease    Iron malabsorption    Respiratory distress    Hypoglycemia due to insulin    Delirium    Hypomagnesemia    Severe malnutrition    Leg swelling    Venous (peripheral) insufficiency    Polyp of colon    Obesity (BMI 30.0-34.9)    TIA (transient ischemic attack)    Difficulty with speech     Past Medical History:   Diagnosis Date    Anemia     Anxiety and depression     Asthma     Balance problem     CKD (chronic kidney disease), stage III     Colonic mass     COPD (chronic obstructive pulmonary disease)     Coronary artery disease     FOLLOWED BY DR GUNN    Diabetes mellitus, type 2     History of COVID-19     History of left breast cancer     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 Daniel Gutierrez, [facility]    CARDIAC CATHETERIZATION       SECTION N/A     x3    CHOLECYSTECTOMY N/A     COLON RESECTION Right 2025    Procedure: COLON RESECTION LAPAROSCOPIC RIGHT WITH DAVINCI ROBOT;  Surgeon: Ashwni Alvarado MD;  Location: [facility] MAIN OR;  Service: Robotics - DaVinci;  Laterality: Right;    COLONOSCOPY      COLONOSCOPY N/A 2024    Procedure: COLONOSCOPY into the cecum and TI with hot snare polypectomy;  Surgeon: Ashwin Alvarado MD;  Location: [facility] ENDOSCOPY;  Service: General;  Laterality: N/A;  pre-colon polyps  post-colon mass    COLONOSCOPY W/ POLYPECTOMY N/A 2019    Enlarged folds in the antrum: biopsied; duodenal, transverse colon x2, splenic flexure, descending colon and sigmoid colon polyps: biopsied (path: tubular adenoma x6)-Dr. Zak De Jesus, [facility]    ENDOSCOPY  10/11/2019    Procedure: ESOPHAGOGASTRODUODENOSCOPY with hot snare polypectomy;  Surgeon: Haile Handley MD;  Location: [facility] ENDOSCOPY;  Service: Gastroenterology    ENDOSCOPY N/A 2020    Procedure: ESOPHAGOGASTRODUODENOSCOPY;  Surgeon: Haile Handley MD;  Location: [facility] ENDOSCOPY;  Service: Gastroenterology    ENDOSCOPY N/A 2020    Procedure: ESOPHAGOGASTRODUODENOSCOPY WITH BIOPSIES AND COLD BIOPSY POLYPECTOMY;  Surgeon: Haile Handley MD;  Location: [facility] ENDOSCOPY;  Service: Gastroenterology;   Laterality: N/A;  pre: dyspepsia and diarrhea  post: duodenal polyp and gastritis    ENDOSCOPY N/A 07/23/2024    Procedure: ESOPHAGOGASTRODUODENOSCOPY WITH hot snare polypectomy with clip placement x 2BIOPSY;  Surgeon: Ashwin Alvarado MD;  Location: [facility] ENDOSCOPY;  Service: General;  Laterality: N/A;  pre-hx duoedenal polyp  post-duodenal polyp; gastritis    EYE SURGERY      MASTECTOMY WITH SENTINEL NODE BIOPSY AND AXILLARY NODE DISSECTION Left 07/03/2019    Procedure: LEFT BREAST MASTECTOMY WITH SENTINEL NODE BIOPSY AND , v-y plasty closure;  Surgeon: Tahmina James MD;  Location: [facility] MAIN OR;  Service: General    SUBTOTAL HYSTERECTOMY Bilateral     ovaries still in tact    TEMPORAL ARTERY BIOPSY Bilateral 12/05/2019      General Information       Row Name 02/20/25 1628          Physical Therapy Time and Intention    Document Type therapy note (daily note)  -     Mode of Treatment physical therapy  -       Row Name 02/20/25 1628          General Information    Existing Precautions/Restrictions fall  -       Row Name 02/20/25 1628          Cognition    Orientation Status (Cognition) oriented x 3  -               User Key  (r) = Recorded By, (t) = Taken By, (c) = Cosigned By      Initials Name Provider Type     Lexy Stark PTA Physical Therapist Assistant                   Mobility       Row Name 02/20/25 1629          Bed Mobility    Bed Mobility supine-sit;sit-supine  -     Supine-Sit Elk (Bed Mobility) minimum assist (75% patient effort)  -     Sit-Supine Elk (Bed Mobility) minimum assist (75% patient effort)  -     Assistive Device (Bed Mobility) bed rails;head of bed elevated  -       Row Name 02/20/25 1629          Sit-Stand Transfer    Comment, (Sit-Stand Transfer) pt declined d/t dizziness  -               User Key  (r) = Recorded By, (t) = Taken By, (c) = Cosigned By      Initials Name Provider Type     Lexy Stark PTA Physical  Therapist Assistant                   Obj/Interventions       Row Name 02/20/25 1629          Motor Skills    Therapeutic Exercise --  seated AP, LAQ, marches, pillow squeeze x10 reps  -               User Key  (r) = Recorded By, (t) = Taken By, (c) = Cosigned By      Initials Name Provider Type    Lexy Mccloud PTA Physical Therapist Assistant                   Goals/Plan    No documentation.                  Clinical Impression       Row Name 02/20/25 1630          Pain    Pretreatment Pain Rating 0/10 - no pain  -     Posttreatment Pain Rating 0/10 - no pain  -SM       [facility] Name 02/20/25 1630          Positioning and Restraints    Pre-Treatment Position in bed  -SM     Post Treatment Position bed  -SM     In Bed supine;call light within reach;encouraged to call for assist;exit alarm on;with family/caregiver  -               User Key  (r) = Recorded By, (t) = Taken By, (c) = Cosigned By      Initials Name Provider Type    Lexy Mccloud PTA Physical Therapist Assistant                   Outcome Measures       Row Name 02/20/25 1630 02/20/25 1359       How much help from another person do you currently need...    Turning from your back to your side while in flat bed without using bedrails? 3  -SM 3  -RF    Moving from lying on back to sitting on the side of a flat bed without bedrails? 3  -SM 3  -RF    Moving to and from a bed to a chair (including a wheelchair)? 3  -SM 3  -RF    Standing up from a chair using your arms (e.g., wheelchair, bedside chair)? 3  -SM 3  -RF    Climbing 3-5 steps with a railing? 1  -SM 2  -RF    To walk in hospital room? 3  -SM 3  -RF    AM-PAC 6 Clicks Score (PT) 16  -SM 17  -RF    Highest Level of Mobility Goal 5 --> Static standing  -SM 5 --> Static standing  -RF      Row Name 02/20/25 0815          How much help from another person do you currently need...    Turning from your back to your side while in flat bed without using bedrails? 3  -RF     Moving from  lying on back to sitting on the side of a flat bed without bedrails? 3  -RF     Moving to and from a bed to a chair (including a wheelchair)? 3  -RF     Standing up from a chair using your arms (e.g., wheelchair, bedside chair)? 3  -RF     Climbing 3-5 steps with a railing? 2  -RF     To walk in hospital room? 3  -RF     AM-PAC 6 Clicks Score (PT) 17  -RF     Highest Level of Mobility Goal 5 --> Static standing  -RF       Row Name 02/20/25 1630 02/20/25 1429       Functional Assessment    Outcome Measure Options AM-PAC 6 Clicks Basic Mobility (PT)  - AM-PAC 6 Clicks Daily Activity (OT)  -BC              User Key  (r) = Recorded By, (t) = Taken By, (c) = Cosigned By      Initials Name Provider Type     Lexy Stark PTA Physical Therapist Assistant    RF Brooklyn Arellano, RN Registered Nurse    BC Maury Olvera OT Occupational Therapist                                 Physical Therapy Education       Title: PT OT SLP Therapies (In Progress)       Topic: Physical Therapy (Done)       Point: Mobility training (Done)       Learning Progress Summary            Patient Acceptance, E,TB,D, VU,NR by  at 2/20/2025 1631    Acceptance, E,TB,D, VU,NR by  at 2/18/2025 1611                      Point: Home exercise program (Done)       Learning Progress Summary            Patient Acceptance, E,TB,D, VU,NR by  at 2/20/2025 1631                      Point: Body mechanics (Done)       Learning Progress Summary            Patient Acceptance, E,TB,D, VU,NR by  at 2/20/2025 1631    Acceptance, E,TB,D, VU,NR by  at 2/18/2025 1611                      Point: Precautions (Done)       Learning Progress Summary            Patient Acceptance, E,TB,D, VU,NR by  at 2/20/2025 1631    Acceptance, E,TB,D, VU,NR by  at 2/18/2025 1611                                      User Key       Initials Effective Dates Name Provider Type [facility] 03/07/18 -  Lexy Stark PTA Physical Therapist Assistant PT      04/08/22 -  Yolie Garcia PT Physical Therapist PT                  PT Recommendation and Plan     Progress: improving  Outcome Evaluation: Pt tolerated treatment fair this date. Required min A for bed mobility, though pt declined standing d/t dizziness. Completed a few seated LE exercises, then requested to lie back down. Pt was able to scoot self up in bed as well.     Time Calculation:         PT Charges       Row Name 02/20/25 1632             Time Calculation    Start Time 1604  -      Stop Time 1618  -      Time Calculation (min) 14 min  -      PT Received On 02/20/25  -      PT - Next Appointment 02/21/25  -                User Key  (r) = Recorded By, (t) = Taken By, (c) = Cosigned By      Initials Name Provider Type    Lexy Mccloud PTA Physical Therapist Assistant                  Therapy Charges for Today       Code Description Service Date Service Provider Modifiers Qty    [id number] HC PT THER PROC EA 15 MIN 2/20/2025 Lexy Stark PTA GP 1            PT G-Codes  Outcome Measure Options: AM-PAC 6 Clicks Basic Mobility (PT)  AM-PAC 6 Clicks Score (PT): 16  AM-PAC 6 Clicks Score (OT): 13  Modified Rakan Scale: 4 - Moderately severe disability.  Unable to walk without assistance, and unable to attend to own bodily needs without assistance.  PT Discharge Summary  Anticipated Discharge Disposition (PT): skilled nursing facility, inpatient rehabilitation facility    Lexy Stark PTA  2/20/2025

## 2025-02-20 NOTE — PROGRESS NOTES
DAILY PROGRESS NOTE  [facility], [facility]    2025    Patient Identification:  Name: Blanca Harden  Age: 79 y.o.  Sex: female  :  1945  MRN: [id number]           Primary Care Physician: Praneeth Valenzuela MD    Subjective:    Interval History:    Headache is better.  Has lower abdominal discomfort.  Urinalysis showed too numerous to count RBC, too numerous to count WBC.  Culture in process.  Chest x-ray showed no pneumonia  Vital signs stable.  Saturation room air is 96%.  On CPAP overnight.  Creatinine better at 1.35, potassium 3.8, WBC 11.12, hemoglobin 10.5.  Blood sugar in the low 100s.  Still not eating much.    ROS:     Denies having any nausea, vomiting, diarrhea, constipation, chest pain, shortness of breath.     Objective:    Scheduled Meds:  amLODIPine, 10 mg, Oral, Q24H  aspirin, 81 mg, Oral, Daily  atorvastatin, 80 mg, Oral, Nightly  bisoprolol, 20 mg, Oral, Nightly  budesonide-formoterol, 2 puff, Inhalation, BID - RT  cefTRIAXone, 1,000 mg, Intravenous, Q24H  empagliflozin, 10 mg, Oral, Daily  enoxaparin, 40 mg, Subcutaneous, Q12H  hydrocortisone-bacitracin-zinc oxide-nystatin, 1 Application, Topical, TID  insulin glargine, 25 Units, Subcutaneous, Nightly  insulin lispro, 5 Units, Intravenous, TID With Meals  insulin lispro, 2-7 Units, Subcutaneous, 4x Daily AC & at Bedtime  lisinopril, 40 mg, Oral, Daily  miconazole, , Topical, Q12H  pantoprazole, 40 mg, Oral, Q AM  predniSONE, 5 mg, Oral, Q PM        Continuous Infusions:  sodium chloride, 75 mL/hr, Last Rate: 75 mL/hr (25 0543)        PRN Meds:    acetaminophen    albuterol    dextrose    dextrose    glucagon (human recombinant)    hydrALAZINE    O2    sodium chloride    traMADol    Intake/Output:    Intake/Output Summary (Last 24 hours) at 2025 1413  Last data filed at 2025 1700  Gross per 24 hour   Intake 0 ml   Output 350 ml   Net -350 ml         Exam:    T MAX 24 hrs:  "Temp (24hrs), Av.4 °F (36.9 °C), Min:98.1 °F (36.7 °C), Max:98.8 °F (37.1 °C)    Vitals Ranges:   Temp:  [98.1 °F (36.7 °C)-98.8 °F (37.1 °C)] 98.1 °F (36.7 °C)  Heart Rate:  [84-98] 86  Resp:  [18-20] 19  BP: (131-149)/(50-60) 131/50    /50 (BP Location: Right arm, Patient Position: Lying)   Pulse 86   Temp 98.1 °F (36.7 °C) (Oral)   Resp 19   Ht 152.4 cm (60\")   Wt 75.5 kg (166 lb 7.2 oz)   LMP  (LMP Unknown)   SpO2 96%   BMI 32.51 kg/m²     General: Feeling a little better.  However, weak.  No eating much.  Saturation in room air is 96%.    Neck: Supple, symmetrical, trachea midline, no adenopathy;              thyroid:  no enlargement/tenderness/nodules;              no carotid bruit or JVD  Cardiovascular: Normal rate, regular rhythm and intact distal pulses.              Exam reveals no gallop and no friction rub. No murmur heard  Pulmonary: Diminished breath sounds bilaterally, respirations unlabored.               No rhonchi, wheezing or rales.   Abdominal: Soft, nontender, bowel sounds active all four quadrants,     no masses, no hepatomegaly, no splenomegaly.  Lower abdominal discomfort  Extremities: Normal, atraumatic, no cyanosis. no edema lower abdominal discomfort  Pulses: 2 + symmetric all extremities  Neurological: Patient is alert and oriented to person, place, and time.  Patient has slurred speech, although improving since admission.  Right lower extremity weakness about the same.    Skin: Skin color, texture, normal. Turgor is decreased. No rashes or lesions            Data Review:    Results from last 7 days   Lab Units 25  0628 25  0639 25  0426   WBC 10*3/mm3 11.12* 13.13* 14.33*   HEMOGLOBIN g/dL 10.5* 10.0* 10.6*   HEMATOCRIT % 32.4* 30.2* 32.3*   PLATELETS 10*3/mm3 256 243 284       Results from last 7 days   Lab Units 25  0628 25  0639 25  0426 25  0409 25  0810 02/15/25  1522   SODIUM mmol/L 137 135* 134* 137   < > 134* "   POTASSIUM mmol/L 3.8 3.9 4.0 4.3   < > 4.3   CHLORIDE mmol/L 102 98 94* 99   < > 99   CO2 mmol/L 26.0 28.0 25.0 25.9   < > 24.4   BUN mg/dL 19 25* 21 20   < > 17   CREATININE mg/dL 1.35* 1.67* 1.61* 1.40*   < > 1.14*   CALCIUM mg/dL 7.2* 7.5* 8.3* 8.7   < > 9.0   BILIRUBIN mg/dL  --   --   --  0.2  --  0.3   ALK PHOS U/L  --   --   --  149*  --  136*   ALT (SGPT) U/L  --   --   --  16  --  15   AST (SGOT) U/L  --   --   --  18  --  17   GLUCOSE mg/dL 142* 174* 170* 173*   < > 50*    < > = values in this interval not displayed.     Results from last 7 days   Lab Units 02/15/25  1522   INR  1.08       Results from last 7 days   Lab Units 02/16/25  0810   HEMOGLOBIN A1C % 7.70*       Lab Results   Lab Value Date/Time    TROPONINT 34 (H) 10/01/2023 1149    TROPONINT 56 (C) 06/30/2023 1959    TROPONINT 61 (C) 06/30/2023 1601    TROPONINT 28 (H) 05/21/2023 1920    TROPONINT 25 (H) 05/21/2023 1615       Microbiology Results (last 10 days)       Procedure Component Value - Date/Time    Urine Culture - Urine, Urine, Clean Catch [[id number]]  (Normal) Collected: 02/19/25 2010    Lab Status: Preliminary result Specimen: Urine, Clean Catch Updated: 02/20/25 0950     Urine Culture Culture in progress             Imaging Results (Last 7 Days)       Procedure Component Value Units Date/Time    CT Head Without Contrast [[id number]] Collected: 02/20/25 1103     Updated: 02/20/25 1112    Narrative:      CT HEAD WITHOUT CONTRAST     CLINICAL HISTORY: headache, recent CVA; R47.9-Unspecified speech  disturbances; R29.898-Other symptoms and signs involving the  musculoskeletal system; R42-Dizziness and giddiness; I10-Essential  (primary) hypertension; N18.9-Chronic kidney disease, unspecified;  Z86.39-Personal history of other endocrine, nutritional and metabolic  disease; R00.2-Palpitations     TECHNIQUE: CT scan of the head was obtained with 3 mm axial soft tissue  algorithm images. No intravenous contrast was administered. Sagittal  and  coronal reconstructions were obtained.     COMPARISON: Brain MRI dated 2/16/2025.     FINDINGS:     Again noted is an acute infarct involving the left caudate body, corona  radiata, and lentiform nucleus which measures up to approximately 2.2 x  1.2 cm in greatest axial dimensions. Similar findings were noted on the  prior brain MRI study. This infarct is within the left lateral  lenticulostriate distribution. There is no evidence for hemorrhagic  transformation.     Mild to moderate chronic small vessel ischemic changes are identified.  The ventricles, sulci, and cisterns are age-appropriate. The gray-white  matter differentiation is within normal limits. The basal ganglia and  thalami are unremarkable in appearance. The posterior fossa structures  are unremarkable.     There is near complete opacification of the left maxillary sinus with  mucosal thickening and secretions.       Impression:         Again noted is an acute infarct within the left caudate body, corona  radiata, and lentiform nucleus measuring up to 2.2 x 1.2 cm in greatest  axial dimensions within the left lateral lenticulostriate distribution.  There is no evidence for hemorrhagic transformation.        Radiation dose reduction techniques were utilized, including automated  exposure control and exposure modulation based on body size.     This report was finalized on 2/20/2025 11:09 AM by Dr. Sanya Armendariz M.D  on Workstation: [id number]       FL Video Swallow Single Contrast [[id number]] Collected: 02/19/25 1525     Updated: 02/20/25 0716    Narrative:      VIDEO SWALLOWING EXAMINATION BY SPEECH PATHOLOGY     Clinical: Dysphasia     Reference Air Kerma: 10 mGy     Video swallowing examination performed under the direction of speech  pathology. Imaging reviewed by radiologist who concurs with the  findings.     Speech pathology summary:     Anika Archibald, Radiology APRN, present. Intermittent non transient  penetration with thin. Transient  penetration with nectar. No penetration  with puree or honey. Lingual residue with regular solids.        This report was finalized on 2/20/2025 7:13 AM by Dr. Elliot Mike M.D on Workstation: [id number]       XR Chest 1 View [[id number]] Collected: 02/19/25 1558     Updated: 02/19/25 1605    Narrative:      XR CHEST 1 VW-     HISTORY: Female who is 79 years-old, chills     TECHNIQUE: Frontal view of the chest     COMPARISON: 2/17/2025     FINDINGS: The heart is enlarged. Pulmonary vasculature is mildly  congested. Aorta is calcified. No focal pulmonary consolidation, pleural  effusion, or pneumothorax. Residual enteric contrast material is noted.  No acute osseous process.       Impression:      No focal pulmonary consolidation. Cardiomegaly with mild  pulmonary vascular congestion. Follow-up as clinical indications  persist.     This report was finalized on 2/19/2025 4:01 PM by Dr. Galindo Malik M.D on Workstation: [id number]       XR Chest 1 View [[id number]] Collected: 02/17/25 2114     Updated: 02/17/25 2118    Narrative:      SINGLE VIEW OF THE CHEST     HISTORY: Possible aspiration     COMPARISON: February 15, 2025     FINDINGS:  There is cardiomegaly. There is no vascular congestion. There is  bibasilar atelectasis. There is calcification of the aorta. No  pneumothorax or large pleural effusion is seen.       Impression:      Bibasilar atelectasis.     This report was finalized on 2/17/2025 9:15 PM by Dr. Tanya Prince M.D on Workstation: [id number]       MRI Brain Without Contrast [[id number]] Collected: 02/16/25 0616     Updated: 02/16/25 0625    Narrative:      BRAIN MRI WITHOUT CONTRAST     HISTORY: Altered mental status, focal weakness; R47.9-Unspecified speech  disturbances; R29.898-Other symptoms and signs involving the  musculoskeletal system; R42-Dizziness and giddiness; I10-Essential  (primary) hypertension; N18.9-Chronic kidney disease, unspecified;  Z86.39-Personal history of other  endocrine, nutritional and metabolic  disease     COMPARISON: October 2, 2023.     FINDINGS:  Multiplanar images of the head were obtained without  gadolinium. The patient has an area of restricted diffusion within the  left corona radiata, with extension into the left basal ganglia. No  additional areas of restricted diffusion are seen. There is diffuse  atrophy. There is periventricular deep white matter microangiopathic  change. The intracranial flow voids appear intact. No abnormality is  seen on susceptibility weighted imaging. There is mucosal thickening  within the ethmoid sinuses, with extensive opacification of the left  maxillary sinus noted.       Impression:      1. Study is positive for an acute infarct within the left corona  radiata/basal ganglia. No evidence of hemorrhagic information..     FINDINGS were called to the patient's nurse at 6:20 a.m.        This report was finalized on 2/16/2025 6:22 AM by Dr. Tanya Prince M.D on Workstation: [id number]       XR Chest 1 View [[id number]] Collected: 02/15/25 1654     Updated: 02/15/25 1707    Narrative:      XR CHEST 1 VW-     CLINICAL HISTORY:  Acute Stroke Protocol (onset < 12 hrs);  R47.9-Unspecified speech disturbances; R29.898-Other symptoms and signs  involving the musculoskeletal system; R42-Dizziness and giddiness;  I10-Essential (primary) hypertension; N18.9-Chronic kidney disease,  unspecified; Z86.39-Personal history of other endocrine, nutritional and  metabolic disease     FINDINGS:     The lung volumes are low. There our mild bibasilar atelectatic changes.  The cardiomediastinal silhouette is mildly enlarged but stable since  01/29/2025. The osseous structures demonstrate no significant  abnormalities.           This report was finalized on 2/15/2025 5:04 PM by Dr. Sanya Armendariz M.D  on Workstation: [id number]       CT Angiogram Head w AI Analysis of LVO [[id number]] Collected: 02/15/25 1612     Updated: 02/15/25 1643    Narrative:       CT ANGIOGRAM OF THE HEAD AND NECK AND CT PERFUSION STUDY     CLINICAL HISTORY: Speech difficulties, dizziness, and right leg  weakness.     TECHNIQUE:  A noncontrast head CT was performed with 3 mm axial images.  Thereafter, a CT perfusion study was performed after the dynamic bolus  of IV contrast. Standard perfusion maps were constructed with RAPID  software. A CT angiogram of the head and neck was then performed with 1  mm axial images. Sagittal, coronal, and 3-dimensional reconstructed  images were obtained. Finally, a delayed postcontrast head CT was  performed with 3 mm axial images.     FINDINGS:     HEAD CT: No convincing evidence for an acute intracranial abnormality.  Mild to moderate changes of chronic small vessel ischemic phenomenon.  Near complete opacification of the left maxillary sinus with mucosal  thickening and secretions.     CT PERFUSION STUDY: No abnormality on the CBF less than 30% or Tmax  greater than 6 seconds maps.     CTA HEAD:     Posterior Circulation: Unremarkable appearance of the vertebral  arteries, basilar artery, and posterior cerebral arteries.  Anterior Circulation: Atherosclerotic calcifications within the carotid  siphons resulting in relatively mild degrees of luminal compromise.  Unremarkable appearance of the anterior and middle cerebral arteries.     CTA NECK:       Aortic Arch: Classic configuration. Mild atherosclerotic involvement of  the aortic arch.  Subclavian Arteries: Mild to moderate stenosis of the proximal aspect  the left subclavian artery secondary to predominantly noncalcified  atherosclerotic changes.  Vertebral Arteries: Unremarkable.  CCA/ICA's: Atherosclerotic changes without evidence for a NASCET  stenosis.       Impression:         No convincing evidence for an acute infarct on either the noncontrast  head CT or the CT perfusion study. Further evaluation could be performed  with MR imaging for more sensitive and specific assessment.     No evidence  for large vessel occlusion.     Mild to moderate stenosis of the proximal aspect of the left subclavian  artery.     No significant NASCET stenosis within either internal carotid artery.     The findings of the noncontrast head CT were discussed with Dr. Sethi on 2/15/2025 at approximately 3:35 p.m. The findings of the CT  angiogram and CT perfusion study were discussed with Dr. Sethi at  approximately 3:50 p.m.           Radiation dose reduction techniques were utilized, including automated  exposure control and exposure modulation based on body size.        This report was finalized on 2/15/2025 4:40 PM by Dr. Sayna Armendariz M.D  on Workstation: [id number]       CT Angiogram Neck [[id number]] Collected: 02/15/25 1612     Updated: 02/15/25 1643    Narrative:      CT ANGIOGRAM OF THE HEAD AND NECK AND CT PERFUSION STUDY     CLINICAL HISTORY: Speech difficulties, dizziness, and right leg  weakness.     TECHNIQUE:  A noncontrast head CT was performed with 3 mm axial images.  Thereafter, a CT perfusion study was performed after the dynamic bolus  of IV contrast. Standard perfusion maps were constructed with RAPID  software. A CT angiogram of the head and neck was then performed with 1  mm axial images. Sagittal, coronal, and 3-dimensional reconstructed  images were obtained. Finally, a delayed postcontrast head CT was  performed with 3 mm axial images.     FINDINGS:     HEAD CT: No convincing evidence for an acute intracranial abnormality.  Mild to moderate changes of chronic small vessel ischemic phenomenon.  Near complete opacification of the left maxillary sinus with mucosal  thickening and secretions.     CT PERFUSION STUDY: No abnormality on the CBF less than 30% or Tmax  greater than 6 seconds maps.     CTA HEAD:     Posterior Circulation: Unremarkable appearance of the vertebral  arteries, basilar artery, and posterior cerebral arteries.  Anterior Circulation: Atherosclerotic calcifications within the  carotid  siphons resulting in relatively mild degrees of luminal compromise.  Unremarkable appearance of the anterior and middle cerebral arteries.     CTA NECK:       Aortic Arch: Classic configuration. Mild atherosclerotic involvement of  the aortic arch.  Subclavian Arteries: Mild to moderate stenosis of the proximal aspect  the left subclavian artery secondary to predominantly noncalcified  atherosclerotic changes.  Vertebral Arteries: Unremarkable.  CCA/ICA's: Atherosclerotic changes without evidence for a NASCET  stenosis.       Impression:         No convincing evidence for an acute infarct on either the noncontrast  head CT or the CT perfusion study. Further evaluation could be performed  with MR imaging for more sensitive and specific assessment.     No evidence for large vessel occlusion.     Mild to moderate stenosis of the proximal aspect of the left subclavian  artery.     No significant NASCET stenosis within either internal carotid artery.     The findings of the noncontrast head CT were discussed with Dr. Sethi on 2/15/2025 at approximately 3:35 p.m. The findings of the CT  angiogram and CT perfusion study were discussed with Dr. Sethi at  approximately 3:50 p.m.           Radiation dose reduction techniques were utilized, including automated  exposure control and exposure modulation based on body size.        This report was finalized on 2/15/2025 4:40 PM by Dr. Sanya Armendariz M.D  on Workstation: [id number]       CT CEREBRAL PERFUSION WITH & WITHOUT CONTRAST [[id number]] Collected: 02/15/25 1612     Updated: 02/15/25 1643    Narrative:      CT ANGIOGRAM OF THE HEAD AND NECK AND CT PERFUSION STUDY     CLINICAL HISTORY: Speech difficulties, dizziness, and right leg  weakness.     TECHNIQUE:  A noncontrast head CT was performed with 3 mm axial images.  Thereafter, a CT perfusion study was performed after the dynamic bolus  of IV contrast. Standard perfusion maps were constructed with  RAPID  software. A CT angiogram of the head and neck was then performed with 1  mm axial images. Sagittal, coronal, and 3-dimensional reconstructed  images were obtained. Finally, a delayed postcontrast head CT was  performed with 3 mm axial images.     FINDINGS:     HEAD CT: No convincing evidence for an acute intracranial abnormality.  Mild to moderate changes of chronic small vessel ischemic phenomenon.  Near complete opacification of the left maxillary sinus with mucosal  thickening and secretions.     CT PERFUSION STUDY: No abnormality on the CBF less than 30% or Tmax  greater than 6 seconds maps.     CTA HEAD:     Posterior Circulation: Unremarkable appearance of the vertebral  arteries, basilar artery, and posterior cerebral arteries.  Anterior Circulation: Atherosclerotic calcifications within the carotid  siphons resulting in relatively mild degrees of luminal compromise.  Unremarkable appearance of the anterior and middle cerebral arteries.     CTA NECK:       Aortic Arch: Classic configuration. Mild atherosclerotic involvement of  the aortic arch.  Subclavian Arteries: Mild to moderate stenosis of the proximal aspect  the left subclavian artery secondary to predominantly noncalcified  atherosclerotic changes.  Vertebral Arteries: Unremarkable.  CCA/ICA's: Atherosclerotic changes without evidence for a NASCET  stenosis.       Impression:         No convincing evidence for an acute infarct on either the noncontrast  head CT or the CT perfusion study. Further evaluation could be performed  with MR imaging for more sensitive and specific assessment.     No evidence for large vessel occlusion.     Mild to moderate stenosis of the proximal aspect of the left subclavian  artery.     No significant NASCET stenosis within either internal carotid artery.     The findings of the noncontrast head CT were discussed with Dr. Sethi on 2/15/2025 at approximately 3:35 p.m. The findings of the CT  angiogram and CT  perfusion study were discussed with Dr. Sethi at  approximately 3:50 p.m.           Radiation dose reduction techniques were utilized, including automated  exposure control and exposure modulation based on body size.        This report was finalized on 2/15/2025 4:40 PM by Dr. Sanya Armendariz M.D  on Workstation: [id number]                 Assessment:    Acute CVA within the left corona radiata/basal ganglia  Aphasia due to CVA  Right lower extremity weakness due to CVA  Hypertension  Uncontrolled diabetes mellitus  Chronic diastolic congestive heart failure  Pulmonary hypertension  Obstructive sleep apnea, noncompliant with CPAP  Moderate persistent asthma  Coronary artery disease  Chronic kidney disease stage IIIa  Iron deficiency anemia  Morbid obesity affecting all aspects of care  Recent right hemicolectomy  Status post left mastectomy due to breast cancer  Temporal arteritis on long-term steroids    Plan:    Patient has developed UTI.  Will start Rocephin IV.  Culture pending.  Encourage p.o. intake.  Patient has not been eating much.  Will continue IV fluids gently.  CT scan of head pending.  Patient has headaches on and off.  Just had a stroke.  Diet per speech therapy, neurorecommendations seen.  Continue aspirin as well as atorvastatin 80 mg daily.  Tolerating medications so far.  2D echo was okay.  Continue working with speech therapy, PT, OT.  Blood pressure improving. Adjust blood pressure medication if needed.    Continue medication for chronic diastolic congestive heart failure, patient still dehydrated however, will continue IV fluids x 1 more day.  Continue wearing CPAP at nighttime.  Monitor and correct electrolytes, stable at this time.    Monitor renal function, creatinine, better at 1.35.  Not at baseline yet   Diabetes: Adjust insulin as needed, will increase Jardiance to 25.      Monitor mental status, improving slowly, better speech.  Otherwise, she is alert and oriented x 3.  DVT  prophylaxis: Lovenox  Stress ulcer prophylaxis:Pantoprazole.    Hopefully to acute rehab tomorrow.  Labs in am      Praneeth Valenzuela MD  2/20/2025  14:13 EST

## 2025-02-20 NOTE — PLAN OF CARE
Goal Outcome Evaluation:  Plan of Care Reviewed With: patient, spouse           Outcome Evaluation: No acute changes today. Antibiotics started. Wound care consult completed. Meds ordered by wound RN requested from pharmacy.  Meds given per MAR. WCTM.

## 2025-02-20 NOTE — PLAN OF CARE
Goal Outcome Evaluation:  Plan of Care Reviewed With: patient, spouse, daughter        Progress: no change  Outcome Evaluation: Upon entering room, Pts daughter present reports she had a long, rough day and is tired. With encouraged Pt agreeable only to sitting up EOB. Completed seated EOB unsupported reaching RUE for increased function/return, and cont'd practice handwriting w R hand to sign her name. Decreased legibility w/ difficulty, completed 3x with min cues for techniques, improved performance w/ cont'd practice. W increased encouragement Pt stood w min A and completed lateral to HOB side steps before back to sitting>supine. Cont POC with DC REC IRF.    Anticipated Discharge Disposition (OT): inpatient rehabilitation facility

## 2025-02-20 NOTE — NURSING NOTE
Cwon consult received. Patient having multiple incontinent bowel movements which has caused mild skin erosion to inner buttocks and perineum. There is also intertrigo noted to low abdominal skin crease and bilateral groin.  Will recommend antifungal powder to skin creases and magic barrier cream to areas of skin erosion. Discussed with nurse at bedside and family and they are agreeable to plan. Please don't hesitate to call with any questions.

## 2025-02-20 NOTE — PLAN OF CARE
Goal Outcome Evaluation:    Problem: Adult Inpatient Plan of Care  Goal: Plan of Care Review  Outcome: Progressing  Goal: Patient-Specific Goal (Individualized)  Outcome: Progressing  Goal: Absence of Hospital-Acquired Illness or Injury  Outcome: Progressing  Intervention: Identify and Manage Fall Risk  Recent Flowsheet Documentation  Taken 2/19/2025 2033 by Joanie Coleman RN  Safety Promotion/Fall Prevention:   clutter free environment maintained   fall prevention program maintained   nonskid shoes/slippers when out of bed   room organization consistent   safety round/check completed  Goal: Optimal Comfort and Wellbeing  Outcome: Progressing  Intervention: Monitor Pain and Promote Comfort  Recent Flowsheet Documentation  Taken 2/19/2025 2033 by Joanie Coleman RN  Pain Management Interventions: pain medication given  Intervention: Provide Person-Centered Care  Recent Flowsheet Documentation  Taken 2/19/2025 2033 by Joanie Coleman RN  Trust Relationship/Rapport:   care explained   choices provided   empathic listening provided   emotional support provided  Goal: Readiness for Transition of Care  Outcome: Progressing     Problem: Fall Injury Risk  Goal: Absence of Fall and Fall-Related Injury  Outcome: Progressing  Intervention: Identify and Manage Contributors  Recent Flowsheet Documentation  Taken 2/19/2025 2033 by Joanie Coleman RN  Medication Review/Management: medications reviewed  Intervention: Promote Injury-Free Environment  Recent Flowsheet Documentation  Taken 2/19/2025 2033 by Joanie Coleman RN  Safety Promotion/Fall Prevention:   clutter free environment maintained   fall prevention program maintained   nonskid shoes/slippers when out of bed   room organization consistent   safety round/check completed     Problem: Skin Injury Risk Increased  Goal: Skin Health and Integrity  Outcome: Progressing     Problem: Stroke, Ischemic (Includes Transient Ischemic Attack)  Goal: Optimal Coping  Outcome: Progressing  Goal:  Effective Bowel Elimination  Outcome: Progressing  Goal: Optimal Cerebral Tissue Perfusion  Outcome: Progressing  Goal: Optimal Cognitive Function  Outcome: Progressing  Goal: Improved Communication Skills  Outcome: Progressing  Goal: Optimal Functional Ability  Outcome: Progressing  Goal: Optimal Nutrition Intake  Outcome: Progressing  Goal: Effective Oxygenation and Ventilation  Outcome: Progressing  Goal: Improved Sensorimotor Function  Outcome: Progressing  Goal: Safe and Effective Swallow  Outcome: Progressing  Goal: Effective Urinary Elimination  Outcome: Progressing     Problem: Pain Acute  Goal: Optimal Pain Control and Function  Outcome: Progressing  Intervention: Develop Pain Management Plan  Recent Flowsheet Documentation  Taken 2/19/2025 2033 by Joanie Coleman RN  Pain Management Interventions: pain medication given  Intervention: Prevent or Manage Pain  Recent Flowsheet Documentation  Taken 2/19/2025 2033 by Joanie Coleman RN  Medication Review/Management: medications reviewed          Still awaiting CT scan. MD notified of patient's complaint of chest wall pain. EKG normal. Advised to monitor. Pain treated per MAR.

## 2025-02-20 NOTE — CASE MANAGEMENT/SOCIAL WORK
Continued Stay Note  [facility]     Patient Name: Blanca Harden  MRN: [id number]  Today's Date: 2/20/2025    Admit Date: 2/15/2025    Plan: [facility] once medically cleared.   Discharge Plan       Row Name 02/20/25 1417       Plan    Plan [facility] once medically cleared.    Plan Comments Spoke with attending, patient started on IV ABX. Spoke with Brandy/ [facility] to update with discharge planning; states bed will be available tomorrow. Updated attending, plan to discharge to [facility] 2/21 pending hospital course.                   Discharge Codes    No documentation.                 Expected Discharge Date and Time       Expected Discharge Date Expected Discharge Time    Feb 19, 2025               Brooklyn Marquez RN

## 2025-02-20 NOTE — PLAN OF CARE
Goal Outcome Evaluation:  Plan of Care Reviewed With: patient        Progress: improving  Outcome Evaluation: Pt tolerated treatment fair this date. Required min A for bed mobility, though pt declined standing d/t dizziness. Completed a few seated LE exercises, then requested to lie back down. Pt was able to scoot self up in bed as well.    Anticipated Discharge Disposition (PT): skilled nursing facility, inpatient rehabilitation facility

## 2025-02-20 NOTE — NURSING NOTE
Cwon consult received. Patient is off unit at this time and I was unable to assess.  Discussed with family at bedside.  Will attempt again after lunch.

## 2025-02-20 NOTE — THERAPY TREATMENT NOTE
Patient Name: Blanca Harden  : 1945    MRN: [id number]                              Today's Date: 2025       Admit Date: 2/15/2025    Visit Dx:     ICD-10-CM ICD-9-CM   1. Difficulty with speech  R47.9 784.59   2. Right leg weakness  R29.898 729.89   3. Dizziness  R42 780.4   4. Uncontrolled hypertension  I10 401.9   5. Chronic renal impairment, unspecified CKD stage  N18.9 585.9   6. History of diabetes mellitus  Z86.39 V12.29   7. Palpitations  R00.2 785.1     Patient Active Problem List   Diagnosis    Osteoporosis    Breast neoplasm, Tis (DCIS), left    Iron deficiency anemia    CKD (chronic kidney disease)    Diabetic nephropathy associated with type 2 diabetes mellitus    Temporal arteritis    Immunosuppression    Anemia    Duodenal adenoma    Gastritis without bleeding    Polyp of duodenum    Morbidly obese    Dyspnea on exertion    Hyperlipidemia    Type 2 diabetes mellitus with stage 3b chronic kidney disease, with long-term current use of insulin    Essential hypertension    Bilateral lower extremity edema    Pulmonary hypertension    Obstructive sleep apnea on CPAP    Stage 3a chronic kidney disease    Iron malabsorption    Respiratory distress    Hypoglycemia due to insulin    Delirium    Hypomagnesemia    Severe malnutrition    Leg swelling    Venous (peripheral) insufficiency    Polyp of colon    Obesity (BMI 30.0-34.9)    TIA (transient ischemic attack)    Difficulty with speech     Past Medical History:   Diagnosis Date    Anemia     Anxiety and depression     Asthma     Balance problem     CKD (chronic kidney disease), stage III     Colonic mass     COPD (chronic obstructive pulmonary disease)     Coronary artery disease     FOLLOWED BY DR GUNN    Diabetes mellitus, type 2     History of COVID-19     History of left breast cancer     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 Daniel Gutierrez, [facility]    CARDIAC CATHETERIZATION       SECTION N/A     x3    CHOLECYSTECTOMY N/A     COLON RESECTION Right 2025    Procedure: COLON RESECTION LAPAROSCOPIC RIGHT WITH DAVINCI ROBOT;  Surgeon: Ashwin Alvarado MD;  Location: [facility] MAIN OR;  Service: Robotics - DaVinci;  Laterality: Right;    COLONOSCOPY      COLONOSCOPY N/A 2024    Procedure: COLONOSCOPY into the cecum and TI with hot snare polypectomy;  Surgeon: Ashwin Alvarado MD;  Location: [facility] ENDOSCOPY;  Service: General;  Laterality: N/A;  pre-colon polyps  post-colon mass    COLONOSCOPY W/ POLYPECTOMY N/A 2019    Enlarged folds in the antrum: biopsied; duodenal, transverse colon x2, splenic flexure, descending colon and sigmoid colon polyps: biopsied (path: tubular adenoma x6)-Dr. Zak De Jesus, [facility]    ENDOSCOPY  10/11/2019    Procedure: ESOPHAGOGASTRODUODENOSCOPY with hot snare polypectomy;  Surgeon: Haile Handley MD;  Location: [facility] ENDOSCOPY;  Service: Gastroenterology    ENDOSCOPY N/A 2020    Procedure: ESOPHAGOGASTRODUODENOSCOPY;  Surgeon: Haile Handley MD;  Location: [facility] ENDOSCOPY;  Service: Gastroenterology    ENDOSCOPY N/A 2020    Procedure: ESOPHAGOGASTRODUODENOSCOPY WITH BIOPSIES AND COLD BIOPSY POLYPECTOMY;  Surgeon: Haile Handley MD;  Location: [facility] ENDOSCOPY;  Service: Gastroenterology;   Laterality: N/A;  pre: dyspepsia and diarrhea  post: duodenal polyp and gastritis    ENDOSCOPY N/A 07/23/2024    Procedure: ESOPHAGOGASTRODUODENOSCOPY WITH hot snare polypectomy with clip placement x 2BIOPSY;  Surgeon: Ashwin Alvarado MD;  Location: [facility] ENDOSCOPY;  Service: General;  Laterality: N/A;  pre-hx duoedenal polyp  post-duodenal polyp; gastritis    EYE SURGERY      MASTECTOMY WITH SENTINEL NODE BIOPSY AND AXILLARY NODE DISSECTION Left 07/03/2019    Procedure: LEFT BREAST MASTECTOMY WITH SENTINEL NODE BIOPSY AND , v-y plasty closure;  Surgeon: Tahimna James MD;  Location: [facility] MAIN OR;  Service: General    SUBTOTAL HYSTERECTOMY Bilateral     ovaries still in tact    TEMPORAL ARTERY BIOPSY Bilateral 12/05/2019      General Information       Row Name 02/20/25 1417          OT Time and Intention    Subjective Information no complaints  -BC     Document Type therapy note (daily note)  -BC     Mode of Treatment individual therapy;occupational therapy  -BC     Patient Effort good  -BC       Row Name 02/20/25 1417          General Information    Patient Profile Reviewed yes  -BC     Existing Precautions/Restrictions fall  -BC       Row Name 02/20/25 1417          Cognition    Orientation Status (Cognition) oriented x 3  -BC       Row Name 02/20/25 1417          Safety Issues/Impairments Affecting Functional Mobility    Impairments Affecting Function (Mobility) balance;endurance/activity tolerance;strength;range of motion (ROM)  -BC               User Key  (r) = Recorded By, (t) = Taken By, (c) = Cosigned By      Initials Name Provider Type    BC Maury Olvera OT Occupational Therapist                     Mobility/ADL's       Row Name 02/20/25 1418          Bed Mobility    Bed Mobility supine-sit;scooting/bridging;sit-supine  -BC     Supine-Sit [place] (Bed Mobility) 1 person assist;verbal cues;minimum assist (75% patient effort)  -BC     Sit-Supine [place] (Bed Mobility) 1 person  assist;moderate assist (50% patient effort)  -BC     Assistive Device (Bed Mobility) head of bed elevated  -BC     Comment, (Bed Mobility) min A w/ cues for sequence w/ mvmts sup>sit, mod A for help w/ BLEs back to bedside.  -BC       Row Name 02/20/25 1418          Transfers    Transfers sit-stand transfer;stand-sit transfer  -BC       Row Name 02/20/25 1418          Sit-Stand Transfer    Sit-Stand [place] (Transfers) minimum assist (75% patient effort);verbal cues  -BC     Comment, (Sit-Stand Transfer) arm in arm min A to stand from EOB w encouragement and take lateral to HOB side steps.  -BC       Row Name 02/20/25 1418          Stand-Sit Transfer    Stand-Sit [place] (Transfers) minimum assist (75% patient effort);verbal cues  -BC     Comment, (Stand-Sit Transfer) cues for reachback  -BC               User Key  (r) = Recorded By, (t) = Taken By, (c) = Cosigned By      Initials Name Provider Type    Maury Albrecht OT Occupational Therapist                   Obj/Interventions       Row Name 02/20/25 1423          Balance    Static Sitting Balance supervision  -BC     Dynamic Sitting Balance supervision;contact guard  -BC     Position, Sitting Balance sitting edge of bed  -BC     Comment, Balance sitting balance reaching RUE completed for increased balance unsupported sitting, and RUE function. Close SBA w/ sitting balance EOB. Increased difficulty w/ reachin/hitting targets overhead.  -BC               User Key  (r) = Recorded By, (t) = Taken By, (c) = Cosigned By      Initials Name Provider Type    Maury Albrecht OT Occupational Therapist                   Goals/Plan    No documentation.                  Clinical Impression       Row Name 02/20/25 1426          Pain Assessment    Pretreatment Pain Rating 0/10 - no pain  -BC     Posttreatment Pain Rating 0/10 - no pain  -BC       Row Name 02/20/25 1426          Plan of Care Review    Plan of Care Reviewed With patient;spouse;daughter  -BC      Progress no change  -BC     Outcome Evaluation Upon entering room, Pts daughter present reports she had a long, rough day and is tired. With encouraged Pt agreeable only to sitting up EOB. Completed seated EOB unsupported reaching RUE for increased function/return, and cont'd practice handwriting w R hand to sign her name. Decreased legibility w/ difficulty, completed 3x with min cues for techniques, improved performance w/ cont'd practice. W increased encouragement Pt stood w min A and completed lateral to HOB side steps before back to sitting>supine. Cont POC with DC REC IRF.  -BC       Row Name 02/20/25 1426          Therapy Plan Review/Discharge Plan (OT)    Anticipated Discharge Disposition (OT) inpatient rehabilitation facility  -BC       Row Name 02/20/25 1426          Vital Signs    Pre Patient Position Supine  -BC     Intra Patient Position Standing  -BC     Post Patient Position Supine  -BC       Row Name 02/20/25 1426          Positioning and Restraints    Pre-Treatment Position in bed  -BC     Post Treatment Position bed  -BC     In Bed fowlers;call light within reach;exit alarm on;encouraged to call for assist;with family/caregiver  -BC               User Key  (r) = Recorded By, (t) = Taken By, (c) = Cosigned By      Initials Name Provider Type    BC Maury Olvera, ROOPA Occupational Therapist                   Outcome Measures       Row Name 02/20/25 1429          How much help from another is currently needed...    Putting on and taking off regular lower body clothing? 1  -BC     Bathing (including washing, rinsing, and drying) 2  -BC     Toileting (which includes using toilet bed pan or urinal) 2  -BC     Putting on and taking off regular upper body clothing 2  -BC     Taking care of personal grooming (such as brushing teeth) 3  -BC     Eating meals 3  -BC     AM-PAC 6 Clicks Score (OT) 13  -BC       Row Name 02/20/25 1359 02/20/25 0815       How much help from another person do you currently need...     Turning from your back to your side while in flat bed without using bedrails? 3  -RF 3  -RF    Moving from lying on back to sitting on the side of a flat bed without bedrails? 3  -RF 3  -RF    Moving to and from a bed to a chair (including a wheelchair)? 3  -RF 3  -RF    Standing up from a chair using your arms (e.g., wheelchair, bedside chair)? 3  -RF 3  -RF    Climbing 3-5 steps with a railing? 2  -RF 2  -RF    To walk in hospital room? 3  -RF 3  -RF    AM-PAC 6 Clicks Score (PT) 17  -RF 17  -RF    Highest Level of Mobility Goal 5 --> Static standing  -RF 5 --> Static standing  -RF      Row Name 02/20/25 1429          Functional Assessment    Outcome Measure Options AM-PAC 6 Clicks Daily Activity (OT)  -BC               User Key  (r) = Recorded By, (t) = Taken By, (c) = Cosigned By      Initials Name Provider Type    Brooklyn Viera, RN Registered Nurse    Maury Albrecht OT Occupational Therapist                    Occupational Therapy Education       Title: PT OT SLP Therapies (In Progress)       Topic: Occupational Therapy (In Progress)       Point: ADL training (Done)       Description:   Instruct learner(s) on proper safety adaptation and remediation techniques during self care or transfers.   Instruct in proper use of assistive devices.                  Learning Progress Summary            Patient Acceptance, E, VU by CADENCE at 2/17/2025 4485    Comment: OT POC                      Point: Home exercise program (Not Started)       Description:   Instruct learner(s) on appropriate technique for monitoring, assisting and/or progressing therapeutic exercises/activities.                  Learner Progress:  Not documented in this visit.              Point: Precautions (Not Started)       Description:   Instruct learner(s) on prescribed precautions during self-care and functional transfers.                  Learner Progress:  Not documented in this visit.              Point: Body mechanics (Not Started)        Description:   Instruct learner(s) on proper positioning and spine alignment during self-care, functional mobility activities and/or exercises.                  Learner Progress:  Not documented in this visit.                              User Key       Initials Effective Dates Name Provider Type [facility] 01/10/25 -  Shila Pichardo OT Student OT Student OT                  OT Recommendation and Plan     Plan of Care Review  Plan of Care Reviewed With: patient, spouse, daughter  Progress: no change  Outcome Evaluation: Upon entering room, Pts daughter present reports she had a long, rough day and is tired. With encouraged Pt agreeable only to sitting up EOB. Completed seated EOB unsupported reaching RUE for increased function/return, and cont'd practice handwriting w R hand to sign her name. Decreased legibility w/ difficulty, completed 3x with min cues for techniques, improved performance w/ cont'd practice. W increased encouragement Pt stood w min A and completed lateral to HOB side steps before back to sitting>supine. Cont POC with DC REC IRF.     Time Calculation:         Time Calculation- OT       Row Name 02/20/25 1429             Time Calculation- OT    OT Start Time 1332  -BC      OT Stop Time 1349  -BC      OT Time Calculation (min) 17 min  -BC      Total Timed Code Minutes- OT 17 minute(s)  -BC      OT Received On 02/20/25  -BC      OT - Next Appointment 02/21/25  -BC         Timed Charges    41648 -  OT Neuromuscular Reeducation Minutes 17  -BC         Total Minutes    Timed Charges Total Minutes 17  -BC       Total Minutes 17  -BC                User Key  (r) = Recorded By, (t) = Taken By, (c) = Cosigned By      Initials Name Provider Type    BC Maury Olvera OT Occupational Therapist                  Therapy Charges for Today       Code Description Service Date Service Provider Modifiers Qty    [id number]  OT NEUROMUSC RE EDUCATION EA 15 MIN 2/20/2025 Maury Olvera OT GO 1                  Maury Olvera, OT  2/20/2025

## 2025-02-21 ENCOUNTER — APPOINTMENT (OUTPATIENT)
Dept: CARDIOLOGY | Facility: HOSPITAL | Age: 80
DRG: 065 | End: 2025-02-21
Payer: MEDICARE

## 2025-02-21 VITALS
WEIGHT: 166.45 LBS | BODY MASS INDEX: 32.68 KG/M2 | OXYGEN SATURATION: 98 % | SYSTOLIC BLOOD PRESSURE: 155 MMHG | RESPIRATION RATE: 17 BRPM | HEART RATE: 79 BPM | TEMPERATURE: 97.9 F | HEIGHT: 60 IN | DIASTOLIC BLOOD PRESSURE: 54 MMHG

## 2025-02-21 LAB
ANION GAP SERPL CALCULATED.3IONS-SCNC: 11 MMOL/L (ref 5–15)
BACTERIA SPEC AEROBE CULT: NORMAL
BASOPHILS # BLD AUTO: 0.03 10*3/MM3 (ref 0–0.2)
BASOPHILS NFR BLD AUTO: 0.3 % (ref 0–1.5)
BUN SERPL-MCNC: 15 MG/DL (ref 8–23)
BUN/CREAT SERPL: 12.9 (ref 7–25)
CALCIUM SPEC-SCNC: 7 MG/DL (ref 8.6–10.5)
CHLORIDE SERPL-SCNC: 108 MMOL/L (ref 98–107)
CO2 SERPL-SCNC: 23 MMOL/L (ref 22–29)
CREAT SERPL-MCNC: 1.16 MG/DL (ref 0.57–1)
DEPRECATED RDW RBC AUTO: 42.4 FL (ref 37–54)
EGFRCR SERPLBLD CKD-EPI 2021: 48.4 ML/MIN/1.73
EOSINOPHIL # BLD AUTO: 0.16 10*3/MM3 (ref 0–0.4)
EOSINOPHIL NFR BLD AUTO: 1.5 % (ref 0.3–6.2)
ERYTHROCYTE [DISTWIDTH] IN BLOOD BY AUTOMATED COUNT: 12.3 % (ref 12.3–15.4)
GLUCOSE BLDC GLUCOMTR-MCNC: 111 MG/DL (ref 70–130)
GLUCOSE BLDC GLUCOMTR-MCNC: 75 MG/DL (ref 70–130)
GLUCOSE BLDC GLUCOMTR-MCNC: 77 MG/DL (ref 70–130)
GLUCOSE SERPL-MCNC: 61 MG/DL (ref 65–99)
HCT VFR BLD AUTO: 30.7 % (ref 34–46.6)
HGB BLD-MCNC: 10.1 G/DL (ref 12–15.9)
IMM GRANULOCYTES # BLD AUTO: 0.21 10*3/MM3 (ref 0–0.05)
IMM GRANULOCYTES NFR BLD AUTO: 2 % (ref 0–0.5)
LYMPHOCYTES # BLD AUTO: 1.09 10*3/MM3 (ref 0.7–3.1)
LYMPHOCYTES NFR BLD AUTO: 10.4 % (ref 19.6–45.3)
MCH RBC QN AUTO: 30.9 PG (ref 26.6–33)
MCHC RBC AUTO-ENTMCNC: 32.9 G/DL (ref 31.5–35.7)
MCV RBC AUTO: 93.9 FL (ref 79–97)
MONOCYTES # BLD AUTO: 0.78 10*3/MM3 (ref 0.1–0.9)
MONOCYTES NFR BLD AUTO: 7.4 % (ref 5–12)
NEUTROPHILS NFR BLD AUTO: 78.4 % (ref 42.7–76)
NEUTROPHILS NFR BLD AUTO: 8.25 10*3/MM3 (ref 1.7–7)
NRBC BLD AUTO-RTO: 0 /100 WBC (ref 0–0.2)
PLATELET # BLD AUTO: 242 10*3/MM3 (ref 140–450)
PMV BLD AUTO: 10.7 FL (ref 6–12)
POTASSIUM SERPL-SCNC: 4.8 MMOL/L (ref 3.5–5.2)
RBC # BLD AUTO: 3.27 10*6/MM3 (ref 3.77–5.28)
SODIUM SERPL-SCNC: 142 MMOL/L (ref 136–145)
WBC NRBC COR # BLD AUTO: 10.52 10*3/MM3 (ref 3.4–10.8)

## 2025-02-21 PROCEDURE — 25010000002 ENOXAPARIN PER 10 MG: Performed by: INTERNAL MEDICINE

## 2025-02-21 PROCEDURE — 94761 N-INVAS EAR/PLS OXIMETRY MLT: CPT

## 2025-02-21 PROCEDURE — 82948 REAGENT STRIP/BLOOD GLUCOSE: CPT

## 2025-02-21 PROCEDURE — 94799 UNLISTED PULMONARY SVC/PX: CPT

## 2025-02-21 PROCEDURE — 85025 COMPLETE CBC W/AUTO DIFF WBC: CPT | Performed by: INTERNAL MEDICINE

## 2025-02-21 PROCEDURE — 80048 BASIC METABOLIC PNL TOTAL CA: CPT | Performed by: INTERNAL MEDICINE

## 2025-02-21 PROCEDURE — 94664 DEMO&/EVAL PT USE INHALER: CPT

## 2025-02-21 PROCEDURE — 92526 ORAL FUNCTION THERAPY: CPT

## 2025-02-21 PROCEDURE — 93246 EXT ECG>7D<15D RECORDING: CPT

## 2025-02-21 RX ORDER — INSULIN LISPRO 100 [IU]/ML
2-7 INJECTION, SOLUTION INTRAVENOUS; SUBCUTANEOUS
Qty: 15 ML | Refills: 12 | Status: SHIPPED | OUTPATIENT
Start: 2025-02-21

## 2025-02-21 RX ORDER — LOPERAMIDE HYDROCHLORIDE 2 MG/1
2 CAPSULE ORAL 4 TIMES DAILY PRN
Qty: 30 CAPSULE | Refills: 3 | Status: SHIPPED | OUTPATIENT
Start: 2025-02-21

## 2025-02-21 RX ORDER — ASPIRIN 81 MG/1
81 TABLET ORAL DAILY
Qty: 30 TABLET | Refills: 3 | Status: SHIPPED | OUTPATIENT
Start: 2025-02-22

## 2025-02-21 RX ORDER — CEFUROXIME AXETIL 500 MG/1
500 TABLET ORAL 2 TIMES DAILY
Qty: 10 TABLET | Refills: 0 | Status: SHIPPED | OUTPATIENT
Start: 2025-02-21 | End: 2025-02-26

## 2025-02-21 RX ORDER — ATORVASTATIN CALCIUM 80 MG/1
80 TABLET, FILM COATED ORAL NIGHTLY
Qty: 90 TABLET | Refills: 3 | Status: SHIPPED | OUTPATIENT
Start: 2025-02-21

## 2025-02-21 RX ORDER — TRAMADOL HYDROCHLORIDE 50 MG/1
50 TABLET ORAL EVERY 6 HOURS PRN
Qty: 10 TABLET | Refills: 0 | Status: SHIPPED | OUTPATIENT
Start: 2025-02-21

## 2025-02-21 RX ORDER — AMLODIPINE BESYLATE 10 MG/1
10 TABLET ORAL
Qty: 30 TABLET | Refills: 3 | Status: SHIPPED | OUTPATIENT
Start: 2025-02-22

## 2025-02-21 RX ADMIN — ENOXAPARIN SODIUM 40 MG: 100 INJECTION SUBCUTANEOUS at 11:39

## 2025-02-21 RX ADMIN — BUDESONIDE AND FORMOTEROL FUMARATE DIHYDRATE 2 PUFF: 160; 4.5 AEROSOL RESPIRATORY (INHALATION) at 07:02

## 2025-02-21 RX ADMIN — ZINC OXIDE 1 APPLICATION: 200 OINTMENT TOPICAL at 08:00

## 2025-02-21 RX ADMIN — LISINOPRIL 40 MG: 20 TABLET ORAL at 08:00

## 2025-02-21 RX ADMIN — ASPIRIN 81 MG: 81 TABLET, COATED ORAL at 08:00

## 2025-02-21 RX ADMIN — ANTI-FUNGAL POWDER MICONAZOLE NITRATE TALC FREE: 1.42 POWDER TOPICAL at 10:39

## 2025-02-21 RX ADMIN — AMLODIPINE BESYLATE 10 MG: 10 TABLET ORAL at 08:00

## 2025-02-21 RX ADMIN — EMPAGLIFLOZIN 25 MG: 25 TABLET, FILM COATED ORAL at 08:00

## 2025-02-21 RX ADMIN — ACETAMINOPHEN 650 MG: 325 TABLET, FILM COATED ORAL at 07:58

## 2025-02-21 RX ADMIN — PANTOPRAZOLE SODIUM 40 MG: 40 TABLET, DELAYED RELEASE ORAL at 06:11

## 2025-02-21 NOTE — DISCHARGE SUMMARY
PHYSICIAN DISCHARGE SUMMARY  [facility], [facility]    Patient Identification:    Name: Blanca Harden  Age: 79 y.o.  Sex: female  :  1945  MRN: [id number]    Primary Care Physician: Praneeth Valenzuela MD    Admit date: 2/15/2025    Discharge date and time:2025    Discharged Condition: fair    Discharge Diagnoses:    Acute CVA within the left corona radiata/basal ganglia  Aphasia due to CVA  Right lower extremity weakness due to CVA  Hypertension  Uncontrolled diabetes mellitus  Chronic diastolic congestive heart failure  Pulmonary hypertension  Obstructive sleep apnea, noncompliant with CPAP  Moderate persistent asthma  Coronary artery disease  Chronic kidney disease stage IIIa  Iron deficiency anemia  Morbid obesity affecting all aspects of care  Recent right hemicolectomy  Status post left mastectomy due to breast cancer  Temporal arteritis on long-term steroids  UTI     Hospital Course: Blanca Harden  is a  79-year-old female, patient of office.  Patient has history of severe asthma, chronic kidney disease stage III, uncontrolled diabetes mellitus type 2, hypertension, temporal arteritis, chronic diastolic congestive heart failure, hypertension, GERD.  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  Patient has been doing okay and recovering.  However, this morning, patient woke up with slurred speech and could not find her words, she also had right lower extremity weakness.  Patient was brought to the emergency room.  His NIH stroke scale was 4, CT angiogram showed no complaints with this 1 acute infarct on either the: Contrast CT ordered CT perfusion study.  Patient creatinine was 1.14, sodium 134, WBC 13.68, hemoglobin 10.9.  Neurology team D was consulted.  In the ER, patient was given  aspirin.  Patient was admitted for further management.  An MRI was already done and actually showed acute infarct within the left corona radiata/basal ganglia, no hemorrhagic transformation.  Patient passed a swallow evaluation, is taking aspirin p.o. as well as starting p.o.      Upon admission, patient was continued on aspirin as well as high-dose statin.  Neurology followed patient and recommendation were followed.  Speech therapy, physical therapy and Occupational Therapy were consulted to work with patient.  Blood pressure medications were adjusted accordingly.  Patient has sleep apnea so family brought her CPAP machine and patient was wearing it every night.  Blood sugar was treated with Accu-Cheks and sliding scale insulin as well as Lantus.  Her A1c was 7.7.  Patient was placed on DVT prophylaxis as well as stress ulcer prophylaxis.  Over the next few days, her speech improved, however, she was weak due to the stroke.  PT/OT/ST continued to work with patient.  Patient was initially not eating well so she was placed on IV fluids.  Patient diet was modified as per speech therapy recommendation.  On February 19, patient was complaining of headaches as well as chills, workup revealed UTI, patient improved after receiving Rocephin.  Today, patient is much better.  Feeling better.  Patient was approved for acute rehabilitation.  Patient will be discharged today and transferred to acute rehabilitation.   I will see patient in the office after she is discharged from acute rehabilitation.  Please see new medication reconciliation list.      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 Daniel Gutierrez, [facility]    CARDIAC CATHETERIZATION       SECTION N/A     x3    CHOLECYSTECTOMY N/A     COLON RESECTION Right 2025    Procedure: COLON RESECTION LAPAROSCOPIC RIGHT WITH DAVINCI ROBOT;  Surgeon: Ashwin Alvarado MD;  Location: [facility] MAIN OR;  Service: Robotics - DaVinci;  Laterality: Right;    COLONOSCOPY      COLONOSCOPY N/A 2024    Procedure: COLONOSCOPY into the cecum and TI with hot snare polypectomy;  Surgeon: Ashwin Alvarado MD;  Location: [facility] ENDOSCOPY;  Service: General;  Laterality: N/A;  pre-colon polyps  post-colon mass    COLONOSCOPY W/ POLYPECTOMY N/A 2019    Enlarged folds in the antrum: biopsied; duodenal, transverse colon x2, splenic flexure, descending colon and sigmoid colon polyps: biopsied (path: tubular adenoma x6)-Dr. Zak De Jesus, [facility]    ENDOSCOPY  10/11/2019    Procedure: ESOPHAGOGASTRODUODENOSCOPY with hot snare polypectomy;  Surgeon: Haile Handley MD;  Location: [facility] ENDOSCOPY;  Service: Gastroenterology    ENDOSCOPY N/A 2020    Procedure: ESOPHAGOGASTRODUODENOSCOPY;  Surgeon: Haile Handley MD;  Location: [facility] ENDOSCOPY;  Service: Gastroenterology    ENDOSCOPY N/A 2020    Procedure: ESOPHAGOGASTRODUODENOSCOPY  "WITH BIOPSIES AND COLD BIOPSY POLYPECTOMY;  Surgeon: Haile Handley MD;  Location:  [facility];  Service: Gastroenterology;  Laterality: N/A;  pre: dyspepsia and diarrhea  post: duodenal polyp and gastritis    ENDOSCOPY N/A 07/23/2024    Procedure: ESOPHAGOGASTRODUODENOSCOPY WITH hot snare polypectomy with clip placement x 2BIOPSY;  Surgeon: Ashwin Alvarado MD;  Location:  [facility];  Service: General;  Laterality: N/A;  pre-hx duoedenal polyp  post-duodenal polyp; gastritis    EYE SURGERY      MASTECTOMY WITH SENTINEL NODE BIOPSY AND AXILLARY NODE DISSECTION Left 07/03/2019    Procedure: LEFT BREAST MASTECTOMY WITH SENTINEL NODE BIOPSY AND , v-y plasty closure;  Surgeon: Tahmina James MD;  Location: [facility] MAIN OR;  Service: General    SUBTOTAL HYSTERECTOMY Bilateral     ovaries still in tact    TEMPORAL ARTERY BIOPSY Bilateral 12/05/2019           Consults:     Consults       Date and Time Order Name Status Description    2/16/2025 10:05 AM Inpatient Neurology Consult Stroke Completed     2/15/2025  4:35 PM Family Medicine Consult      2/15/2025  3:20 PM Inpatient Neurology Consult Stroke Completed     2/15/2025  3:20 PM Inpatient Neurology Consult Stroke Completed     1/30/2025  7:37 AM Inpatient Nephrology Consult Completed     1/29/2025 11:05 PM Inpatient Pulmonology Consult Completed             Discharge Exam:    /56 (BP Location: Right arm, Patient Position: Lying)   Pulse 79   Temp 98.2 °F (36.8 °C) (Oral)   Resp 18   Ht 152.4 cm (60\")   Wt 75.5 kg (166 lb 7.2 oz)   LMP  (LMP Unknown)   SpO2 96%   BMI 32.51 kg/m²     General: Feeling better today, eating better.  Saturation in room air is 96%.    Neck: Supple, symmetrical, trachea midline, no adenopathy;              thyroid:  no enlargement/tenderness/nodules;              no carotid bruit or JVD  Cardiovascular: Normal rate, regular rhythm and intact distal pulses.              Exam reveals no gallop and no " friction rub. No murmur heard  Pulmonary: Diminished breath sounds bilaterally, respirations unlabored.               No rhonchi, wheezing or rales.   Abdominal: Soft, nontender, bowel sounds active all four quadrants,     no masses, no hepatomegaly, no splenomegaly.  Lower abdominal discomfort  Extremities: Normal, atraumatic, no cyanosis. no edema lower abdominal discomfort  Pulses: 2 + symmetric all extremities  Neurological: Patient is alert and oriented to person, place, and time.  Patient has minimal slurred speech. Right lower extremity weakness about the same.    Skin: Skin color, texture, normal. Turgor is decreased. No rashes or lesions       Data Review:        Results from last 7 days   Lab Units 02/21/25  0549 02/20/25  0628 02/19/25  0639   WBC 10*3/mm3 10.52 11.12* 13.13*   HEMOGLOBIN g/dL 10.1* 10.5* 10.0*   HEMATOCRIT % 30.7* 32.4* 30.2*   PLATELETS 10*3/mm3 242 256 243       Results from last 7 days   Lab Units 02/21/25  0549 02/20/25  0628 02/19/25  0639 02/18/25  0426 02/17/25  0409 02/16/25  0810 02/15/25  1522   SODIUM mmol/L 142 137 135*   < > 137   < > 134*   POTASSIUM mmol/L 4.8 3.8 3.9   < > 4.3   < > 4.3   CHLORIDE mmol/L 108* 102 98   < > 99   < > 99   CO2 mmol/L 23.0 26.0 28.0   < > 25.9   < > 24.4   BUN mg/dL 15 19 25*   < > 20   < > 17   CREATININE mg/dL 1.16* 1.35* 1.67*   < > 1.40*   < > 1.14*   CALCIUM mg/dL 7.0* 7.2* 7.5*   < > 8.7   < > 9.0   BILIRUBIN mg/dL  --   --   --   --  0.2  --  0.3   ALK PHOS U/L  --   --   --   --  149*  --  136*   ALT (SGPT) U/L  --   --   --   --  16  --  15   AST (SGOT) U/L  --   --   --   --  18  --  17   GLUCOSE mg/dL 61* 142* 174*   < > 173*   < > 50*    < > = values in this interval not displayed.     Results from last 7 days   Lab Units 02/15/25  1522   INR  1.08       Lab Results   Lab Value Date/Time    TROPONINT 34 (H) 10/01/2023 1149    TROPONINT 56 (C) 06/30/2023 1959    TROPONINT 61 (C) 06/30/2023 1601    TROPONINT 28 (H) 05/21/2023 1920     TROPONINT 25 (H) 05/21/2023 1615       Microbiology Results (last 10 days)       Procedure Component Value - Date/Time    Urine Culture - Urine, Urine, Clean Catch [[id number]]  (Normal) Collected: 02/19/25 2010    Lab Status: Preliminary result Specimen: Urine, Clean Catch Updated: 02/20/25 0950     Urine Culture Culture in progress             Imaging Results (All)       Procedure Component Value Units Date/Time    CT Head Without Contrast [[id number]] Collected: 02/20/25 1103     Updated: 02/20/25 1112    Narrative:      CT HEAD WITHOUT CONTRAST     CLINICAL HISTORY: headache, recent CVA; R47.9-Unspecified speech  disturbances; R29.898-Other symptoms and signs involving the  musculoskeletal system; R42-Dizziness and giddiness; I10-Essential  (primary) hypertension; N18.9-Chronic kidney disease, unspecified;  Z86.39-Personal history of other endocrine, nutritional and metabolic  disease; R00.2-Palpitations     TECHNIQUE: CT scan of the head was obtained with 3 mm axial soft tissue  algorithm images. No intravenous contrast was administered. Sagittal and  coronal reconstructions were obtained.     COMPARISON: Brain MRI dated 2/16/2025.     FINDINGS:     Again noted is an acute infarct involving the left caudate body, corona  radiata, and lentiform nucleus which measures up to approximately 2.2 x  1.2 cm in greatest axial dimensions. Similar findings were noted on the  prior brain MRI study. This infarct is within the left lateral  lenticulostriate distribution. There is no evidence for hemorrhagic  transformation.     Mild to moderate chronic small vessel ischemic changes are identified.  The ventricles, sulci, and cisterns are age-appropriate. The gray-white  matter differentiation is within normal limits. The basal ganglia and  thalami are unremarkable in appearance. The posterior fossa structures  are unremarkable.     There is near complete opacification of the left maxillary sinus with  mucosal thickening and  secretions.       Impression:         Again noted is an acute infarct within the left caudate body, corona  radiata, and lentiform nucleus measuring up to 2.2 x 1.2 cm in greatest  axial dimensions within the left lateral lenticulostriate distribution.  There is no evidence for hemorrhagic transformation.        Radiation dose reduction techniques were utilized, including automated  exposure control and exposure modulation based on body size.     This report was finalized on 2/20/2025 11:09 AM by Dr. Sanya Armendariz M.D  on Workstation: [id number]       FL Video Swallow Single Contrast [[id number]] Collected: 02/19/25 1525     Updated: 02/20/25 0716    Narrative:      VIDEO SWALLOWING EXAMINATION BY SPEECH PATHOLOGY     Clinical: Dysphasia     Reference Air Kerma: 10 mGy     Video swallowing examination performed under the direction of speech  pathology. Imaging reviewed by radiologist who concurs with the  findings.     Speech pathology summary:     Anika Archibald, Radiology APRN, present. Intermittent non transient  penetration with thin. Transient penetration with nectar. No penetration  with puree or honey. Lingual residue with regular solids.        This report was finalized on 2/20/2025 7:13 AM by Dr. Elliot Mike M.D on Workstation: [id number]       XR Chest 1 View [[id number]] Collected: 02/19/25 1558     Updated: 02/19/25 1605    Narrative:      XR CHEST 1 VW-     HISTORY: Female who is 79 years-old, chills     TECHNIQUE: Frontal view of the chest     COMPARISON: 2/17/2025     FINDINGS: The heart is enlarged. Pulmonary vasculature is mildly  congested. Aorta is calcified. No focal pulmonary consolidation, pleural  effusion, or pneumothorax. Residual enteric contrast material is noted.  No acute osseous process.       Impression:      No focal pulmonary consolidation. Cardiomegaly with mild  pulmonary vascular congestion. Follow-up as clinical indications  persist.     This report was finalized on 2/19/2025  4:01 PM by Dr. Galindo Malik M.D on Workstation: [id number]       XR Chest 1 View [[id number]] Collected: 02/17/25 2114     Updated: 02/17/25 2118    Narrative:      SINGLE VIEW OF THE CHEST     HISTORY: Possible aspiration     COMPARISON: February 15, 2025     FINDINGS:  There is cardiomegaly. There is no vascular congestion. There is  bibasilar atelectasis. There is calcification of the aorta. No  pneumothorax or large pleural effusion is seen.       Impression:      Bibasilar atelectasis.     This report was finalized on 2/17/2025 9:15 PM by Dr. Tanya Prince M.D on Workstation: [id number]       MRI Brain Without Contrast [[id number]] Collected: 02/16/25 0616     Updated: 02/16/25 0625    Narrative:      BRAIN MRI WITHOUT CONTRAST     HISTORY: Altered mental status, focal weakness; R47.9-Unspecified speech  disturbances; R29.898-Other symptoms and signs involving the  musculoskeletal system; R42-Dizziness and giddiness; I10-Essential  (primary) hypertension; N18.9-Chronic kidney disease, unspecified;  Z86.39-Personal history of other endocrine, nutritional and metabolic  disease     COMPARISON: October 2, 2023.     FINDINGS:  Multiplanar images of the head were obtained without  gadolinium. The patient has an area of restricted diffusion within the  left corona radiata, with extension into the left basal ganglia. No  additional areas of restricted diffusion are seen. There is diffuse  atrophy. There is periventricular deep white matter microangiopathic  change. The intracranial flow voids appear intact. No abnormality is  seen on susceptibility weighted imaging. There is mucosal thickening  within the ethmoid sinuses, with extensive opacification of the left  maxillary sinus noted.       Impression:      1. Study is positive for an acute infarct within the left corona  radiata/basal ganglia. No evidence of hemorrhagic information..     FINDINGS were called to the patient's nurse at 6:20 a.m.        This  report was finalized on 2/16/2025 6:22 AM by Dr. Tanya Prince M.D on Workstation: [id number]       XR Chest 1 View [[id number]] Collected: 02/15/25 1654     Updated: 02/15/25 1707    Narrative:      XR CHEST 1 VW-     CLINICAL HISTORY:  Acute Stroke Protocol (onset < 12 hrs);  R47.9-Unspecified speech disturbances; R29.898-Other symptoms and signs  involving the musculoskeletal system; R42-Dizziness and giddiness;  I10-Essential (primary) hypertension; N18.9-Chronic kidney disease,  unspecified; Z86.39-Personal history of other endocrine, nutritional and  metabolic disease     FINDINGS:     The lung volumes are low. There our mild bibasilar atelectatic changes.  The cardiomediastinal silhouette is mildly enlarged but stable since  01/29/2025. The osseous structures demonstrate no significant  abnormalities.           This report was finalized on 2/15/2025 5:04 PM by Dr. Sanya Armendariz M.D  on Workstation: [id number]       CT Angiogram Head w AI Analysis of LVO [[id number]] Collected: 02/15/25 1612     Updated: 02/15/25 1643    Narrative:      CT ANGIOGRAM OF THE HEAD AND NECK AND CT PERFUSION STUDY     CLINICAL HISTORY: Speech difficulties, dizziness, and right leg  weakness.     TECHNIQUE:  A noncontrast head CT was performed with 3 mm axial images.  Thereafter, a CT perfusion study was performed after the dynamic bolus  of IV contrast. Standard perfusion maps were constructed with RAPID  software. A CT angiogram of the head and neck was then performed with 1  mm axial images. Sagittal, coronal, and 3-dimensional reconstructed  images were obtained. Finally, a delayed postcontrast head CT was  performed with 3 mm axial images.     FINDINGS:     HEAD CT: No convincing evidence for an acute intracranial abnormality.  Mild to moderate changes of chronic small vessel ischemic phenomenon.  Near complete opacification of the left maxillary sinus with mucosal  thickening and secretions.     CT PERFUSION STUDY: No  abnormality on the CBF less than 30% or Tmax  greater than 6 seconds maps.     CTA HEAD:     Posterior Circulation: Unremarkable appearance of the vertebral  arteries, basilar artery, and posterior cerebral arteries.  Anterior Circulation: Atherosclerotic calcifications within the carotid  siphons resulting in relatively mild degrees of luminal compromise.  Unremarkable appearance of the anterior and middle cerebral arteries.     CTA NECK:       Aortic Arch: Classic configuration. Mild atherosclerotic involvement of  the aortic arch.  Subclavian Arteries: Mild to moderate stenosis of the proximal aspect  the left subclavian artery secondary to predominantly noncalcified  atherosclerotic changes.  Vertebral Arteries: Unremarkable.  CCA/ICA's: Atherosclerotic changes without evidence for a NASCET  stenosis.       Impression:         No convincing evidence for an acute infarct on either the noncontrast  head CT or the CT perfusion study. Further evaluation could be performed  with MR imaging for more sensitive and specific assessment.     No evidence for large vessel occlusion.     Mild to moderate stenosis of the proximal aspect of the left subclavian  artery.     No significant NASCET stenosis within either internal carotid artery.     The findings of the noncontrast head CT were discussed with Dr. Sethi on 2/15/2025 at approximately 3:35 p.m. The findings of the CT  angiogram and CT perfusion study were discussed with Dr. Sethi at  approximately 3:50 p.m.           Radiation dose reduction techniques were utilized, including automated  exposure control and exposure modulation based on body size.        This report was finalized on 2/15/2025 4:40 PM by Dr. Sanya Armendariz M.D  on Workstation: [id number]       CT Angiogram Neck [[id number]] Collected: 02/15/25 1612     Updated: 02/15/25 1643    Narrative:      CT ANGIOGRAM OF THE HEAD AND NECK AND CT PERFUSION STUDY     CLINICAL HISTORY: Speech difficulties,  dizziness, and right leg  weakness.     TECHNIQUE:  A noncontrast head CT was performed with 3 mm axial images.  Thereafter, a CT perfusion study was performed after the dynamic bolus  of IV contrast. Standard perfusion maps were constructed with RAPID  software. A CT angiogram of the head and neck was then performed with 1  mm axial images. Sagittal, coronal, and 3-dimensional reconstructed  images were obtained. Finally, a delayed postcontrast head CT was  performed with 3 mm axial images.     FINDINGS:     HEAD CT: No convincing evidence for an acute intracranial abnormality.  Mild to moderate changes of chronic small vessel ischemic phenomenon.  Near complete opacification of the left maxillary sinus with mucosal  thickening and secretions.     CT PERFUSION STUDY: No abnormality on the CBF less than 30% or Tmax  greater than 6 seconds maps.     CTA HEAD:     Posterior Circulation: Unremarkable appearance of the vertebral  arteries, basilar artery, and posterior cerebral arteries.  Anterior Circulation: Atherosclerotic calcifications within the carotid  siphons resulting in relatively mild degrees of luminal compromise.  Unremarkable appearance of the anterior and middle cerebral arteries.     CTA NECK:       Aortic Arch: Classic configuration. Mild atherosclerotic involvement of  the aortic arch.  Subclavian Arteries: Mild to moderate stenosis of the proximal aspect  the left subclavian artery secondary to predominantly noncalcified  atherosclerotic changes.  Vertebral Arteries: Unremarkable.  CCA/ICA's: Atherosclerotic changes without evidence for a NASCET  stenosis.       Impression:         No convincing evidence for an acute infarct on either the noncontrast  head CT or the CT perfusion study. Further evaluation could be performed  with MR imaging for more sensitive and specific assessment.     No evidence for large vessel occlusion.     Mild to moderate stenosis of the proximal aspect of the left  subclavian  artery.     No significant NASCET stenosis within either internal carotid artery.     The findings of the noncontrast head CT were discussed with Dr. Sethi on 2/15/2025 at approximately 3:35 p.m. The findings of the CT  angiogram and CT perfusion study were discussed with Dr. Sethi at  approximately 3:50 p.m.           Radiation dose reduction techniques were utilized, including automated  exposure control and exposure modulation based on body size.        This report was finalized on 2/15/2025 4:40 PM by Dr. Sanya Armendariz M.D  on Workstation: [id number]       CT CEREBRAL PERFUSION WITH & WITHOUT CONTRAST [[id number]] Collected: 02/15/25 1612     Updated: 02/15/25 1643    Narrative:      CT ANGIOGRAM OF THE HEAD AND NECK AND CT PERFUSION STUDY     CLINICAL HISTORY: Speech difficulties, dizziness, and right leg  weakness.     TECHNIQUE:  A noncontrast head CT was performed with 3 mm axial images.  Thereafter, a CT perfusion study was performed after the dynamic bolus  of IV contrast. Standard perfusion maps were constructed with RAPID  software. A CT angiogram of the head and neck was then performed with 1  mm axial images. Sagittal, coronal, and 3-dimensional reconstructed  images were obtained. Finally, a delayed postcontrast head CT was  performed with 3 mm axial images.     FINDINGS:     HEAD CT: No convincing evidence for an acute intracranial abnormality.  Mild to moderate changes of chronic small vessel ischemic phenomenon.  Near complete opacification of the left maxillary sinus with mucosal  thickening and secretions.     CT PERFUSION STUDY: No abnormality on the CBF less than 30% or Tmax  greater than 6 seconds maps.     CTA HEAD:     Posterior Circulation: Unremarkable appearance of the vertebral  arteries, basilar artery, and posterior cerebral arteries.  Anterior Circulation: Atherosclerotic calcifications within the carotid  siphons resulting in relatively mild degrees of luminal  compromise.  Unremarkable appearance of the anterior and middle cerebral arteries.     CTA NECK:       Aortic Arch: Classic configuration. Mild atherosclerotic involvement of  the aortic arch.  Subclavian Arteries: Mild to moderate stenosis of the proximal aspect  the left subclavian artery secondary to predominantly noncalcified  atherosclerotic changes.  Vertebral Arteries: Unremarkable.  CCA/ICA's: Atherosclerotic changes without evidence for a NASCET  stenosis.       Impression:         No convincing evidence for an acute infarct on either the noncontrast  head CT or the CT perfusion study. Further evaluation could be performed  with MR imaging for more sensitive and specific assessment.     No evidence for large vessel occlusion.     Mild to moderate stenosis of the proximal aspect of the left subclavian  artery.     No significant NASCET stenosis within either internal carotid artery.     The findings of the noncontrast head CT were discussed with Dr. Sethi on 2/15/2025 at approximately 3:35 p.m. The findings of the CT  angiogram and CT perfusion study were discussed with Dr. Sethi at  approximately 3:50 p.m.           Radiation dose reduction techniques were utilized, including automated  exposure control and exposure modulation based on body size.        This report was finalized on 2/15/2025 4:40 PM by Dr. Sanya Armendariz M.D  on Workstation: [id number]                 Disposition:    Rehab    Patient Instructions:        Discharge Medications        New Medications        Instructions Start Date   amLODIPine 10 MG tablet  Commonly known as: NORVASC   10 mg, Oral, Every 24 Hours Scheduled   Start Date: February 22, 2025     aspirin 81 MG EC tablet   81 mg, Oral, Daily   Start Date: February 22, 2025     atorvastatin 80 MG tablet  Commonly known as: LIPITOR   80 mg, Oral, Nightly      cefuroxime 500 MG tablet  Commonly known as: CEFTIN   500 mg, Oral, 2 Times Daily      empagliflozin 25 MG tablet  tablet  Commonly known as: JARDIANCE   25 mg, Oral, Daily   Start Date: February 22, 2025     hydrocortisone-bacitracin-zinc oxide-nystatin  Commonly known as: MAGIC BARRIER   1 Application, Topical, 3 Times Daily      insulin lispro 100 UNIT/ML injection  Commonly known as: HUMALOG/ADMELOG  Replaces: HumaLOG KwikPen 100 UNIT/ML solution pen-injector   2-7 Units, Subcutaneous, 4 Times Daily Before Meals & Nightly      loperamide 2 MG capsule  Commonly known as: IMODIUM   2 mg, Oral, 4 Times Daily PRN      miconazole 2 % powder  Commonly known as: MICOTIN   Topical, Every 12 Hours Scheduled             Continue These Medications        Instructions Start Date   acetaminophen 325 MG tablet  Commonly known as: TYLENOL   650 mg, Oral, Every 6 Hours PRN      albuterol 1.25 MG/3ML nebulizer solution  Commonly known as: ACCUNEB   Take  by nebulization Every 4 (Four) Hours As Needed for Wheezing or Shortness of Air.      BD Pen Needle Tila 2nd Gen 32G X 4 MM misc  Generic drug: Insulin Pen Needle   USE TO GIVE INSULIN 4 TIMES DAILY      bisoprolol 10 MG tablet  Commonly known as: ZEBeta   20 mg, Nightly      lisinopril 40 MG tablet  Commonly known as: PRINIVIL,ZESTRIL   40 mg, Daily      O2  Commonly known as: OXYGEN   3 L/min, As Needed      omeprazole 20 MG capsule  Commonly known as: priLOSEC   40 mg, Oral, Daily      predniSONE 5 MG tablet  Commonly known as: DELTASONE   5 mg, Every Evening      Symbicort 160-4.5 MCG/ACT inhaler  Generic drug: budesonide-formoterol   2 puffs, 2 Times Daily      Toujeo SoloStar 300 UNIT/ML solution pen-injector injection  Generic drug: Insulin Glargine (1 Unit Dial)   25 Units, Nightly      traMADol 50 MG tablet  Commonly known as: ULTRAM   50 mg, Oral, Every 6 Hours PRN             Stop These Medications      HumaLOG KwikPen 100 UNIT/ML solution pen-injector  Generic drug: Insulin Lispro (1 Unit Dial)  Replaced by: insulin lispro 100 UNIT/ML injection     torsemide 20 MG  tablet  Commonly known as: DEMADEX              Discharge Order (From admission, onward)      None             Follow-up Information       Praneeth Valenzuela MD .    Specialties: Internal Medicine, Hospitalist  Contact information:  [address]  [place] [address]  [phone]                             Total time spent discharging patient including evaluation,post hospitalization follow up,  medication and post hospitalization instructions and education total time exceeds 30 minutes.    Signed:  Praneeth Valenzuela MD  2/21/2025  09:25 EST       I wore protective equipment throughout this patient encounter including a face mask, gloves, and protective eyewear.  Hand hygiene was performed before donning protective equipment and after removal when leaving the room.

## 2025-02-21 NOTE — CASE MANAGEMENT/SOCIAL WORK
Case Management Discharge Note      Final Note: [facility] via [facility] w/c van         Selected Continued Care - Discharged on 2/21/2025 Admission date: 2/15/2025 - Discharge disposition: Rehab Facility or Unit (DC - External)      Destination    No services have been selected for the patient.                Durable Medical Equipment    No services have been selected for the patient.                Dialysis/Infusion    No services have been selected for the patient.                Home Medical Care    No services have been selected for the patient.                Therapy    No services have been selected for the patient.                Community Resources    No services have been selected for the patient.                Community & DME    No services have been selected for the patient.                         Final Discharge Disposition Code: 62 - inpatient rehab facility

## 2025-02-21 NOTE — PLAN OF CARE
Goal Outcome Evaluation:     Tylenol effective for headache and mild pain to left neck and upper left chest. VS WNL.      Problem: Adult Inpatient Plan of Care  Goal: Plan of Care Review  Outcome: Progressing  Goal: Patient-Specific Goal (Individualized)  Outcome: Progressing  Goal: Absence of Hospital-Acquired Illness or Injury  Outcome: Progressing  Intervention: Identify and Manage Fall Risk  Recent Flowsheet Documentation  Taken 2/20/2025 2200 by Joanie Coleman RN  Safety Promotion/Fall Prevention:   safety round/check completed   room organization consistent  Goal: Optimal Comfort and Wellbeing  Outcome: Progressing  Intervention: Monitor Pain and Promote Comfort  Recent Flowsheet Documentation  Taken 2/20/2025 2200 by Joanie Coleman RN  Pain Management Interventions: pain medication given  Intervention: Provide Person-Centered Care  Recent Flowsheet Documentation  Taken 2/20/2025 2200 by Joanie Coleman RN  Trust Relationship/Rapport:   care explained   choices provided   empathic listening provided  Goal: Readiness for Transition of Care  Outcome: Progressing     Problem: Skin Injury Risk Increased  Goal: Skin Health and Integrity  Outcome: Progressing     Problem: Fall Injury Risk  Goal: Absence of Fall and Fall-Related Injury  Outcome: Progressing  Intervention: Identify and Manage Contributors  Recent Flowsheet Documentation  Taken 2/20/2025 2200 by Joanie Coleman RN  Medication Review/Management: medications reviewed  Intervention: Promote Injury-Free Environment  Recent Flowsheet Documentation  Taken 2/20/2025 2200 by Joanie Coleman RN  Safety Promotion/Fall Prevention:   safety round/check completed   room organization consistent     Problem: Stroke, Ischemic (Includes Transient Ischemic Attack)  Goal: Optimal Coping  Outcome: Progressing  Goal: Effective Bowel Elimination  Outcome: Progressing  Goal: Optimal Cerebral Tissue Perfusion  Outcome: Progressing  Goal: Optimal Cognitive Function  Outcome:  Progressing  Goal: Improved Communication Skills  Outcome: Progressing  Goal: Optimal Functional Ability  Outcome: Progressing  Goal: Optimal Nutrition Intake  Outcome: Progressing  Goal: Effective Oxygenation and Ventilation  Outcome: Progressing  Goal: Improved Sensorimotor Function  Outcome: Progressing  Goal: Safe and Effective Swallow  Outcome: Progressing  Goal: Effective Urinary Elimination  Outcome: Progressing     Problem: Pain Acute  Goal: Optimal Pain Control and Function  Outcome: Progressing  Intervention: Develop Pain Management Plan  Recent Flowsheet Documentation  Taken 2/20/2025 2200 by Joanie Coleman RN  Pain Management Interventions: pain medication given  Intervention: Prevent or Manage Pain  Recent Flowsheet Documentation  Taken 2/20/2025 2200 by Joanie Coleman RN  Medication Review/Management: medications reviewed

## 2025-02-21 NOTE — THERAPY RE-EVALUATION
Acute Care - Speech Language Pathology   Swallow Re-Evaluation [facility]     Patient Name: Blanca Harden  : 1945  MRN: [id number]  Today's Date: 2025               Admit Date: 2/15/2025    Visit Dx:     ICD-10-CM ICD-9-CM   1. Difficulty with speech  R47.9 784.59   2. Right leg weakness  R29.898 729.89   3. Dizziness  R42 780.4   4. Uncontrolled hypertension  I10 401.9   5. Chronic renal impairment, unspecified CKD stage  N18.9 585.9   6. History of diabetes mellitus  Z86.39 V12.29   7. Palpitations  R00.2 785.1   8. Adenomatous polyp of ascending colon  D12.2 211.3     Patient Active Problem List   Diagnosis    Osteoporosis    Breast neoplasm, Tis (DCIS), left    Iron deficiency anemia    CKD (chronic kidney disease)    Diabetic nephropathy associated with type 2 diabetes mellitus    Temporal arteritis    Immunosuppression    Anemia    Duodenal adenoma    Gastritis without bleeding    Polyp of duodenum    Morbidly obese    Dyspnea on exertion    Hyperlipidemia    Type 2 diabetes mellitus with stage 3b chronic kidney disease, with long-term current use of insulin    Essential hypertension    Bilateral lower extremity edema    Pulmonary hypertension    Obstructive sleep apnea on CPAP    Stage 3a chronic kidney disease    Iron malabsorption    Respiratory distress    Hypoglycemia due to insulin    Delirium    Hypomagnesemia    Severe malnutrition    Leg swelling    Venous (peripheral) insufficiency    Polyp of colon    Obesity (BMI 30.0-34.9)    TIA (transient ischemic attack)    Difficulty with speech     Past Medical History:   Diagnosis Date    Anemia     Anxiety and depression     Asthma     Balance problem     CKD (chronic kidney disease), stage III     Colonic mass     COPD (chronic obstructive pulmonary disease)     Coronary artery disease     FOLLOWED BY DR GUNN    Diabetes mellitus, type 2     History of COVID-19     History of left breast cancer 2019    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 Daniel Gutierrez, [facility]    CARDIAC CATHETERIZATION       SECTION N/A     x3    CHOLECYSTECTOMY N/A     COLON RESECTION Right 2025    Procedure: COLON RESECTION LAPAROSCOPIC RIGHT WITH DAVINCI ROBOT;  Surgeon: Ashwin Alvarado MD;  Location: [facility] OR;  Service: Robotics - DaVinci;  Laterality: Right;    COLONOSCOPY      COLONOSCOPY N/A 2024    Procedure: COLONOSCOPY into the cecum and TI with hot snare polypectomy;  Surgeon: Ashwin Alvarado MD;  Location: [facility] ENDOSCOPY;  Service: General;  Laterality: N/A;  pre-colon polyps  post-colon mass    COLONOSCOPY W/ POLYPECTOMY N/A 2019    Enlarged folds in the antrum: biopsied; duodenal, transverse colon x2, splenic flexure, descending colon and sigmoid colon polyps: biopsied (path: tubular adenoma x6)-Dr. Zak De Jesus, [facility]    ENDOSCOPY  10/11/2019    Procedure: ESOPHAGOGASTRODUODENOSCOPY with hot snare polypectomy;  Surgeon: Haile Handley MD;  Location: [facility] ENDOSCOPY;  Service: Gastroenterology    ENDOSCOPY N/A 2020    Procedure: ESOPHAGOGASTRODUODENOSCOPY;  Surgeon: Haile Handley MD;  Location: [facility] ENDOSCOPY;  Service: Gastroenterology    ENDOSCOPY N/A 2020    Procedure: ESOPHAGOGASTRODUODENOSCOPY WITH BIOPSIES AND COLD BIOPSY  POLYPECTOMY;  Surgeon: Haile Handley MD;  Location:  [facility]COPY;  Service: Gastroenterology;  Laterality: N/A;  pre: dyspepsia and diarrhea  post: duodenal polyp and gastritis    ENDOSCOPY N/A 07/23/2024    Procedure: ESOPHAGOGASTRODUODENOSCOPY WITH hot snare polypectomy with clip placement x 2BIOPSY;  Surgeon: Ashwin Alvarado MD;  Location:  [facility]OSCOPY;  Service: General;  Laterality: N/A;  pre-hx duoedenal polyp  post-duodenal polyp; gastritis    EYE SURGERY      MASTECTOMY WITH SENTINEL NODE BIOPSY AND AXILLARY NODE DISSECTION Left 07/03/2019    Procedure: LEFT BREAST MASTECTOMY WITH SENTINEL NODE BIOPSY AND , v-y plasty closure;  Surgeon: Tahmina James MD;  Location: [facility] MAIN OR;  Service: General    SUBTOTAL HYSTERECTOMY Bilateral     ovaries still in tact    TEMPORAL ARTERY BIOPSY Bilateral 12/05/2019       SLP Recommendation and Plan  SLP Swallowing Diagnosis: mild, oral dysphagia, pharyngeal dysphagia (per VFSS) (02/21/25 1537)     Recommended Precautions and Strategies: upright posture during/after eating, small bites of food and sips of liquid (02/21/25 1537)  SLP Rec. for Method of Medication Administration: meds whole, with puree, as tolerated (02/21/25 1537)     Monitor for Signs of Aspiration: yes, notify SLP if any concerns (02/21/25 1537)  Recommended Diagnostics: reassess via clinical swallow evaluation, SLE/Cog/Motor Speech Evaluation (02/21/25 1537)  Swallow Criteria for Skilled Therapeutic Interventions Met: demonstrates skilled criteria (02/21/25 1537)  Anticipated Discharge Disposition (SLP): anticipate therapy at next level of care (02/21/25 1537)  Rehab Potential/Prognosis, Swallowing: good, to achieve stated therapy goals (02/21/25 1537)  Therapy Frequency (Swallow): PRN (02/21/25 1537)  Predicted Duration Therapy Intervention (Days): until discharge (02/21/25 1537)  Oral Care Recommendations: Oral Care BID/PRN (02/21/25 1537)        Daily Summary of Progress  (SLP): progress toward functional goals as expected (02/21/25 1537)               Treatment Assessment (SLP): continued (02/21/25 1537)     Plan for Continued Treatment (SLP): continue treatment per plan of care (02/21/25 1537)                SWALLOW EVALUATION (Last 72 Hours)       SLP Adult Swallow Evaluation       Row Name 02/21/25 1537 02/19/25 0900                Rehab Evaluation    Document Type re-evaluation  -BB evaluation  -       Subjective Information no complaints  -BB --       Patient Observations alert;cooperative;agree to therapy  -BB --       Patient Effort good  -BB --       Symptoms Noted During/After Treatment none  -BB --          General Information    Patient Profile Reviewed yes  -BB yes  -SH       Pertinent History Of Current Problem -- Acute stroke  -       Current Method of Nutrition mechanical ground textures;nectar/syrup-thick liquids  - mechanical ground textures;honey-thick liquids  -       Precautions/Limitations, Vision WFL;for purposes of eval  -BB WFL;for purposes of eval  -SH       Precautions/Limitations, Hearing WFL;for purposes of eval  -BB WFL;for purposes of eval  -SH       Prior Level of Function-Communication English as a second language  -BB English as a second language  -       Prior Level of Function-Swallowing no diet consistency restrictions  -BB no diet consistency restrictions  -       Plans/Goals Discussed with patient;spouse/S.O.;agreed upon  -BB patient;agreed upon  -       Barriers to Rehab none identified  -BB none identified  -       Patient's Goals for Discharge --  thin liquids  -BB patient did not state  -          Pain    Pretreatment Pain Rating 0/10 - no pain  -BB 0/10 - no pain  -SH       Posttreatment Pain Rating 0/10 - no pain  -BB 0/10 - no pain  -SH          Oral Motor Structure and Function    Dentition Assessment natural, present and adequate  -BB --       Secretion Management WNL/WFL  -BB --       Mucosal Quality moist, healthy   -BB --          Oral Musculature and Cranial Nerve Assessment    Oral Labial or Buccal Impairment, Detail, Cranial Nerve VII (Facial): right labial droop  -BB --          General Eating/Swallowing Observations    Respiratory Support Currently in Use room air  -BB --          Clinical Swallow Eval    Clinical Swallow Evaluation Summary Clinical swallow eval follow up completed. Pt/spouse are very pleasant. Laryngeal penetration without tracheal aspiration observed with thin liquids on VFSS. Penetrated material appears to clear from laryngeal vestibule across additional trials. Pt agreeable to PO trials. Throat clear in 1/10 trials of thin. Discussed signs of aspiration with pt/spouse. Pt/spouse verbalized understanding. Pt expected to d/c to encompass this date. Suggest could trial advance to thin liquids and monitor for signs of aspiration with 1:1 supervision. Suggest follow up with SLP at next level of care for swallowing and diet tolerance, and speech-language evaluation. Pt reports issues with slurred speech and word finding; pt's spouse in agreement wiht observation.  -BB --          MBS/VFSS Interpretation    VFSS Summary -- Patient presents with mild oropharyngeal dysphagia characterized by swallow mistiming, lingual weakness, and poor labial seal. Swallow elicited at the valleculae with honey via spoon, cup, and straw without penetration.  Pt cleared oral residue with additional swallow. Spill past the valleculae before the swallow with nectar via spoon, cup, and straw. Transient penetration during the swallow. Intermittent deep, trace, but non transient penetration with thins via cup and straw before and during the swallow. Anterior loss noted on R. Pt required cues to clear oral residue that pooled in the pharynx. Swallow elicited at the valleculae with puree. Munching mastication pattern with solids. Spill to the pyriforms before the swallow with mevh soft. Trace, non transient penetration during the  swallow. Lingual residue post swallow with regular.  -          SLP Communication to Radiology    Summary Statement -- Anika Archibald, Radiology APRN, present. Intermittent non transient penetration with thin. Transient penetration with nectar. No penetration with puree or honey. Lingual residue with regular solids.  -          SLP Evaluation Clinical Impression    SLP Swallowing Diagnosis mild;oral dysphagia;pharyngeal dysphagia  per VFSS  - mild;oral dysphagia;pharyngeal dysphagia  -       Rehab Potential/Prognosis, Swallowing good, to achieve stated therapy goals  -BB --       Swallow Criteria for Skilled Therapeutic Interventions Met demonstrates skilled criteria  -BB --          SLP Treatment Clinical Impressions    Treatment Assessment (SLP) continued  -BB --       Daily Summary of Progress (SLP) progress toward functional goals as expected  -BB --       Plan for Continued Treatment (SLP) continue treatment per plan of care  - --       Care Plan Review evaluation/treatment results reviewed;care plan/treatment goals reviewed;risks/benefits reviewed;current/potential barriers reviewed;patient/other agree to care plan  -BB --       Care Plan Review, Other Participant(s) spouse  - --          Recommendations    Therapy Frequency (Swallow) PRN  - PRN  -       Predicted Duration Therapy Intervention (Days) until discharge  -BB until discharge  -       SLP Diet Recommendation -- mechanical ground textures;no mixed consistencies;nectar thick liquids;water between meals after oral care, with supervision;ice chips between meals after oral care, with supervision  -       Recommended Diagnostics reassess via clinical swallow evaluation;SLE/Cog/Motor Speech Evaluation  - reassess via clinical swallow evaluation;SLE/Cog/Motor Speech Evaluation  -       Recommended Precautions and Strategies upright posture during/after eating;small bites of food and sips of liquid  - upright posture during/after  eating;small bites of food and sips of liquid  -       Oral Care Recommendations Oral Care BID/PRN  -BB Oral Care BID/PRN  -SH       SLP Rec. for Method of Medication Administration meds whole;with puree;as tolerated  -BB meds whole;meds crushed;with thick liquids;with puree;as tolerated  -       Monitor for Signs of Aspiration yes;notify SLP if any concerns  -BB yes;notify SLP if any concerns  -       Anticipated Discharge Disposition (SLP) anticipate therapy at next level of care  -BB --          Swallow Goals (SLP)    Swallow LTGs -- Patient will demonstrate functional swallow for  -SH          (LTG) Patient will demonstrate functional swallow for    Diet Texture (Demonstrate functional swallow) -- regular textures  -       Liquid viscosity (Demonstrate functional swallow) -- thin liquids  -       [place] (Demonstrate functional swallow) -- independently (over 90% accuracy)  -       Time Frame (Demonstrate functional swallow) -- by discharge  -                 User Key  (r) = Recorded By, (t) = Taken By, (c) = Cosigned By      Initials Name Effective Dates    Lexy eLvi SLP 01/05/24 -     Wilbur Bhatti SLP 02/19/23 -                     EDUCATION  The patient has been educated in the following areas:   Dysphagia (Swallowing Impairment).        SLP GOALS       Row Name 02/19/25 0900             (LTG) Patient will demonstrate functional swallow for    Diet Texture (Demonstrate functional swallow) regular textures  -      Liquid viscosity (Demonstrate functional swallow) thin liquids  -      [place] (Demonstrate functional swallow) independently (over 90% accuracy)  -      Time Frame (Demonstrate functional swallow) by discharge  -                User Key  (r) = Recorded By, (t) = Taken By, (c) = Cosigned By      Initials Name Provider Type    Lexy Levi SLP Speech and Language Pathologist                         Time Calculation:    Time Calculation- SLP       Row  Name 02/21/25 1548             Time Calculation- SLP    SLP Start Time 1000  -BB      SLP Stop Time 1100  -BB      SLP Time Calculation (min) 60 min  -BB      SLP Received On 02/21/25  -BB         Untimed Charges    73982-[id number] Treatment Swallow Minutes 60  -BB         Total Minutes    Untimed Charges Total Minutes 60  -BB       Total Minutes 60  -BB                User Key  (r) = Recorded By, (t) = Taken By, (c) = Cosigned By      Initials Name Provider Type    BB Wilbur Bridges, SLP Speech and Language Pathologist                    Therapy Charges for Today       Code Description Service Date Service Provider Modifiers Qty    [id number]  ST TREATMENT SWALLOW 4 2/21/2025 Wilbur Bridges, SLP GN 1                 ERIN Echevarria  2/21/2025

## 2025-03-09 ENCOUNTER — APPOINTMENT (OUTPATIENT)
Dept: GENERAL RADIOLOGY | Facility: HOSPITAL | Age: 80
End: 2025-03-09
Payer: MEDICARE

## 2025-03-09 ENCOUNTER — HOSPITAL ENCOUNTER (OUTPATIENT)
Facility: HOSPITAL | Age: 80
Setting detail: OBSERVATION
Discharge: NURSING, REHAB OR LONG-TERM CARE | End: 2025-03-12
Attending: EMERGENCY MEDICINE | Admitting: INTERNAL MEDICINE
Payer: MEDICARE

## 2025-03-09 ENCOUNTER — APPOINTMENT (OUTPATIENT)
Dept: CT IMAGING | Facility: HOSPITAL | Age: 80
End: 2025-03-09
Payer: MEDICARE

## 2025-03-09 DIAGNOSIS — S22.41XA CLOSED FRACTURE OF MULTIPLE RIBS OF RIGHT SIDE, INITIAL ENCOUNTER: Primary | ICD-10-CM

## 2025-03-09 DIAGNOSIS — S09.90XA MILD CLOSED HEAD INJURY, INITIAL ENCOUNTER: ICD-10-CM

## 2025-03-09 DIAGNOSIS — S42.024A CLOSED NONDISPLACED FRACTURE OF SHAFT OF RIGHT CLAVICLE, INITIAL ENCOUNTER: ICD-10-CM

## 2025-03-09 PROBLEM — S22.49XA MULTIPLE RIB FRACTURES: Status: ACTIVE | Noted: 2025-03-09

## 2025-03-09 PROCEDURE — G0378 HOSPITAL OBSERVATION PER HR: HCPCS

## 2025-03-09 PROCEDURE — 70450 CT HEAD/BRAIN W/O DYE: CPT

## 2025-03-09 PROCEDURE — 73030 X-RAY EXAM OF SHOULDER: CPT

## 2025-03-09 PROCEDURE — 73060 X-RAY EXAM OF HUMERUS: CPT

## 2025-03-09 PROCEDURE — 71250 CT THORAX DX C-: CPT

## 2025-03-09 PROCEDURE — 72125 CT NECK SPINE W/O DYE: CPT

## 2025-03-09 PROCEDURE — 99285 EMERGENCY DEPT VISIT HI MDM: CPT

## 2025-03-09 RX ORDER — SODIUM CHLORIDE 0.9 % (FLUSH) 0.9 %
10 SYRINGE (ML) INJECTION AS NEEDED
Status: DISCONTINUED | OUTPATIENT
Start: 2025-03-09 | End: 2025-03-12 | Stop reason: HOSPADM

## 2025-03-09 RX ORDER — ACETAMINOPHEN 500 MG
1000 TABLET ORAL ONCE
Status: COMPLETED | OUTPATIENT
Start: 2025-03-09 | End: 2025-03-09

## 2025-03-09 RX ADMIN — ACETAMINOPHEN 1000 MG: 500 TABLET, FILM COATED ORAL at 22:00

## 2025-03-10 LAB
ALBUMIN SERPL-MCNC: 3.3 G/DL (ref 3.5–5.2)
ALBUMIN/GLOB SERPL: 1.1 G/DL
ALP SERPL-CCNC: 112 U/L (ref 39–117)
ALT SERPL W P-5'-P-CCNC: 14 U/L (ref 1–33)
ANION GAP SERPL CALCULATED.3IONS-SCNC: 10.4 MMOL/L (ref 5–15)
ANION GAP SERPL CALCULATED.3IONS-SCNC: 10.4 MMOL/L (ref 5–15)
AST SERPL-CCNC: 18 U/L (ref 1–32)
BASOPHILS # BLD AUTO: 0.03 10*3/MM3 (ref 0–0.2)
BASOPHILS NFR BLD AUTO: 0.3 % (ref 0–1.5)
BILIRUB SERPL-MCNC: 0.3 MG/DL (ref 0–1.2)
BUN SERPL-MCNC: 29 MG/DL (ref 8–23)
BUN SERPL-MCNC: 33 MG/DL (ref 8–23)
BUN/CREAT SERPL: 23.6 (ref 7–25)
BUN/CREAT SERPL: 23.9 (ref 7–25)
CALCIUM SPEC-SCNC: 9 MG/DL (ref 8.6–10.5)
CALCIUM SPEC-SCNC: 9 MG/DL (ref 8.6–10.5)
CHLORIDE SERPL-SCNC: 104 MMOL/L (ref 98–107)
CHLORIDE SERPL-SCNC: 106 MMOL/L (ref 98–107)
CO2 SERPL-SCNC: 17.6 MMOL/L (ref 22–29)
CO2 SERPL-SCNC: 20.6 MMOL/L (ref 22–29)
CREAT SERPL-MCNC: 1.23 MG/DL (ref 0.57–1)
CREAT SERPL-MCNC: 1.38 MG/DL (ref 0.57–1)
DEPRECATED RDW RBC AUTO: 45.3 FL (ref 37–54)
DEPRECATED RDW RBC AUTO: 48.9 FL (ref 37–54)
EGFRCR SERPLBLD CKD-EPI 2021: 39.3 ML/MIN/1.73
EGFRCR SERPLBLD CKD-EPI 2021: 45.1 ML/MIN/1.73
EOSINOPHIL # BLD AUTO: 0.13 10*3/MM3 (ref 0–0.4)
EOSINOPHIL NFR BLD AUTO: 1.3 % (ref 0.3–6.2)
ERYTHROCYTE [DISTWIDTH] IN BLOOD BY AUTOMATED COUNT: 13 % (ref 12.3–15.4)
ERYTHROCYTE [DISTWIDTH] IN BLOOD BY AUTOMATED COUNT: 13.2 % (ref 12.3–15.4)
GLOBULIN UR ELPH-MCNC: 3.1 GM/DL
GLUCOSE BLDC GLUCOMTR-MCNC: 147 MG/DL (ref 70–130)
GLUCOSE BLDC GLUCOMTR-MCNC: 184 MG/DL (ref 70–130)
GLUCOSE BLDC GLUCOMTR-MCNC: 207 MG/DL (ref 70–130)
GLUCOSE BLDC GLUCOMTR-MCNC: 207 MG/DL (ref 70–130)
GLUCOSE BLDC GLUCOMTR-MCNC: 214 MG/DL (ref 70–130)
GLUCOSE SERPL-MCNC: 158 MG/DL (ref 65–99)
GLUCOSE SERPL-MCNC: 228 MG/DL (ref 65–99)
HCT VFR BLD AUTO: 31 % (ref 34–46.6)
HCT VFR BLD AUTO: 31.7 % (ref 34–46.6)
HGB BLD-MCNC: 10.2 G/DL (ref 12–15.9)
HGB BLD-MCNC: 9.9 G/DL (ref 12–15.9)
IMM GRANULOCYTES # BLD AUTO: 0.07 10*3/MM3 (ref 0–0.05)
IMM GRANULOCYTES NFR BLD AUTO: 0.7 % (ref 0–0.5)
LYMPHOCYTES # BLD AUTO: 0.61 10*3/MM3 (ref 0.7–3.1)
LYMPHOCYTES NFR BLD AUTO: 5.9 % (ref 19.6–45.3)
MCH RBC QN AUTO: 30.5 PG (ref 26.6–33)
MCH RBC QN AUTO: 32.2 PG (ref 26.6–33)
MCHC RBC AUTO-ENTMCNC: 31.9 G/DL (ref 31.5–35.7)
MCHC RBC AUTO-ENTMCNC: 32.2 G/DL (ref 31.5–35.7)
MCV RBC AUTO: 100 FL (ref 79–97)
MCV RBC AUTO: 95.4 FL (ref 79–97)
MONOCYTES # BLD AUTO: 0.6 10*3/MM3 (ref 0.1–0.9)
MONOCYTES NFR BLD AUTO: 5.8 % (ref 5–12)
NEUTROPHILS NFR BLD AUTO: 8.87 10*3/MM3 (ref 1.7–7)
NEUTROPHILS NFR BLD AUTO: 86 % (ref 42.7–76)
PLATELET # BLD AUTO: 220 10*3/MM3 (ref 140–450)
PLATELET # BLD AUTO: 251 10*3/MM3 (ref 140–450)
PMV BLD AUTO: 11.8 FL (ref 6–12)
PMV BLD AUTO: 9.9 FL (ref 6–12)
POTASSIUM SERPL-SCNC: 4.8 MMOL/L (ref 3.5–5.2)
POTASSIUM SERPL-SCNC: 5 MMOL/L (ref 3.5–5.2)
PROT SERPL-MCNC: 6.4 G/DL (ref 6–8.5)
RBC # BLD AUTO: 3.17 10*6/MM3 (ref 3.77–5.28)
RBC # BLD AUTO: 3.25 10*6/MM3 (ref 3.77–5.28)
SODIUM SERPL-SCNC: 132 MMOL/L (ref 136–145)
SODIUM SERPL-SCNC: 137 MMOL/L (ref 136–145)
WBC NRBC COR # BLD AUTO: 10.31 10*3/MM3 (ref 3.4–10.8)
WBC NRBC COR # BLD AUTO: 9.97 10*3/MM3 (ref 3.4–10.8)

## 2025-03-10 PROCEDURE — G0378 HOSPITAL OBSERVATION PER HR: HCPCS

## 2025-03-10 PROCEDURE — 85025 COMPLETE CBC W/AUTO DIFF WBC: CPT | Performed by: EMERGENCY MEDICINE

## 2025-03-10 PROCEDURE — 97530 THERAPEUTIC ACTIVITIES: CPT

## 2025-03-10 PROCEDURE — 94799 UNLISTED PULMONARY SVC/PX: CPT

## 2025-03-10 PROCEDURE — 85007 BL SMEAR W/DIFF WBC COUNT: CPT | Performed by: EMERGENCY MEDICINE

## 2025-03-10 PROCEDURE — 96372 THER/PROPH/DIAG INJ SC/IM: CPT

## 2025-03-10 PROCEDURE — 63710000001 INSULIN LISPRO (HUMAN) PER 5 UNITS: Performed by: INTERNAL MEDICINE

## 2025-03-10 PROCEDURE — 80053 COMPREHEN METABOLIC PANEL: CPT | Performed by: EMERGENCY MEDICINE

## 2025-03-10 PROCEDURE — 25010000002 ENOXAPARIN PER 10 MG: Performed by: INTERNAL MEDICINE

## 2025-03-10 PROCEDURE — 94640 AIRWAY INHALATION TREATMENT: CPT

## 2025-03-10 PROCEDURE — 82948 REAGENT STRIP/BLOOD GLUCOSE: CPT

## 2025-03-10 PROCEDURE — 63710000001 PREDNISONE PER 5 MG: Performed by: INTERNAL MEDICINE

## 2025-03-10 PROCEDURE — 97162 PT EVAL MOD COMPLEX 30 MIN: CPT

## 2025-03-10 PROCEDURE — 63710000001 INSULIN GLARGINE PER 5 UNITS: Performed by: INTERNAL MEDICINE

## 2025-03-10 PROCEDURE — 85027 COMPLETE CBC AUTOMATED: CPT | Performed by: INTERNAL MEDICINE

## 2025-03-10 RX ORDER — IBUPROFEN 600 MG/1
1 TABLET ORAL
Status: DISCONTINUED | OUTPATIENT
Start: 2025-03-10 | End: 2025-03-12 | Stop reason: HOSPADM

## 2025-03-10 RX ORDER — LISINOPRIL 40 MG/1
40 TABLET ORAL DAILY
Status: DISCONTINUED | OUTPATIENT
Start: 2025-03-10 | End: 2025-03-12 | Stop reason: HOSPADM

## 2025-03-10 RX ORDER — ALBUTEROL SULFATE 1.25 MG/3ML
1.25 SOLUTION RESPIRATORY (INHALATION) EVERY 4 HOURS PRN
Status: DISCONTINUED | OUTPATIENT
Start: 2025-03-10 | End: 2025-03-12 | Stop reason: HOSPADM

## 2025-03-10 RX ORDER — ASPIRIN 81 MG/1
81 TABLET ORAL DAILY
Status: DISCONTINUED | OUTPATIENT
Start: 2025-03-10 | End: 2025-03-12 | Stop reason: HOSPADM

## 2025-03-10 RX ORDER — PANTOPRAZOLE SODIUM 40 MG/1
40 TABLET, DELAYED RELEASE ORAL
Status: DISCONTINUED | OUTPATIENT
Start: 2025-03-10 | End: 2025-03-12 | Stop reason: HOSPADM

## 2025-03-10 RX ORDER — HYDROCODONE BITARTRATE AND ACETAMINOPHEN 5; 325 MG/1; MG/1
1 TABLET ORAL EVERY 6 HOURS PRN
Refills: 0 | Status: DISCONTINUED | OUTPATIENT
Start: 2025-03-10 | End: 2025-03-12 | Stop reason: HOSPADM

## 2025-03-10 RX ORDER — ENOXAPARIN SODIUM 100 MG/ML
40 INJECTION SUBCUTANEOUS EVERY 24 HOURS
Status: DISCONTINUED | OUTPATIENT
Start: 2025-03-10 | End: 2025-03-12 | Stop reason: HOSPADM

## 2025-03-10 RX ORDER — NICOTINE POLACRILEX 4 MG
15 LOZENGE BUCCAL
Status: DISCONTINUED | OUTPATIENT
Start: 2025-03-10 | End: 2025-03-12 | Stop reason: HOSPADM

## 2025-03-10 RX ORDER — ATORVASTATIN CALCIUM 20 MG/1
80 TABLET, FILM COATED ORAL NIGHTLY
Status: DISCONTINUED | OUTPATIENT
Start: 2025-03-10 | End: 2025-03-12 | Stop reason: HOSPADM

## 2025-03-10 RX ORDER — TRAMADOL HYDROCHLORIDE 50 MG/1
50 TABLET ORAL EVERY 6 HOURS PRN
Status: DISCONTINUED | OUTPATIENT
Start: 2025-03-10 | End: 2025-03-12 | Stop reason: HOSPADM

## 2025-03-10 RX ORDER — LOPERAMIDE HYDROCHLORIDE 2 MG/1
2 CAPSULE ORAL 4 TIMES DAILY PRN
Status: DISCONTINUED | OUTPATIENT
Start: 2025-03-10 | End: 2025-03-12 | Stop reason: HOSPADM

## 2025-03-10 RX ORDER — AMLODIPINE BESYLATE 10 MG/1
10 TABLET ORAL
Status: DISCONTINUED | OUTPATIENT
Start: 2025-03-10 | End: 2025-03-12

## 2025-03-10 RX ORDER — PREDNISONE 5 MG/1
5 TABLET ORAL EVERY EVENING
Status: DISCONTINUED | OUTPATIENT
Start: 2025-03-10 | End: 2025-03-12 | Stop reason: HOSPADM

## 2025-03-10 RX ORDER — DEXTROSE MONOHYDRATE 25 G/50ML
25 INJECTION, SOLUTION INTRAVENOUS
Status: DISCONTINUED | OUTPATIENT
Start: 2025-03-10 | End: 2025-03-12 | Stop reason: HOSPADM

## 2025-03-10 RX ORDER — ACETAMINOPHEN 325 MG/1
650 TABLET ORAL EVERY 6 HOURS PRN
Status: DISCONTINUED | OUTPATIENT
Start: 2025-03-10 | End: 2025-03-12 | Stop reason: HOSPADM

## 2025-03-10 RX ORDER — BISOPROLOL FUMARATE 5 MG/1
20 TABLET, FILM COATED ORAL NIGHTLY
Status: DISCONTINUED | OUTPATIENT
Start: 2025-03-10 | End: 2025-03-12 | Stop reason: HOSPADM

## 2025-03-10 RX ORDER — INSULIN LISPRO 100 [IU]/ML
2-7 INJECTION, SOLUTION INTRAVENOUS; SUBCUTANEOUS
Status: DISCONTINUED | OUTPATIENT
Start: 2025-03-10 | End: 2025-03-12 | Stop reason: HOSPADM

## 2025-03-10 RX ORDER — BUDESONIDE AND FORMOTEROL FUMARATE DIHYDRATE 160; 4.5 UG/1; UG/1
2 AEROSOL RESPIRATORY (INHALATION) 2 TIMES DAILY
Status: DISCONTINUED | OUTPATIENT
Start: 2025-03-10 | End: 2025-03-12 | Stop reason: HOSPADM

## 2025-03-10 RX ADMIN — BISOPROLOL FUMARATE 20 MG: 5 TABLET ORAL at 02:38

## 2025-03-10 RX ADMIN — ATORVASTATIN CALCIUM 80 MG: 20 TABLET, FILM COATED ORAL at 02:39

## 2025-03-10 RX ADMIN — AMLODIPINE BESYLATE 10 MG: 10 TABLET ORAL at 09:00

## 2025-03-10 RX ADMIN — ASPIRIN 81 MG: 81 TABLET, COATED ORAL at 09:00

## 2025-03-10 RX ADMIN — BISOPROLOL FUMARATE 20 MG: 5 TABLET ORAL at 21:40

## 2025-03-10 RX ADMIN — HYDROCODONE BITARTRATE AND ACETAMINOPHEN 1 TABLET: 5; 325 TABLET ORAL at 18:11

## 2025-03-10 RX ADMIN — HYDROCODONE BITARTRATE AND ACETAMINOPHEN 1 TABLET: 5; 325 TABLET ORAL at 11:23

## 2025-03-10 RX ADMIN — INSULIN GLARGINE 25 UNITS: 100 INJECTION, SOLUTION SUBCUTANEOUS at 02:38

## 2025-03-10 RX ADMIN — PREDNISONE 5 MG: 5 TABLET ORAL at 16:39

## 2025-03-10 RX ADMIN — LISINOPRIL 40 MG: 40 TABLET ORAL at 09:01

## 2025-03-10 RX ADMIN — BUDESONIDE AND FORMOTEROL FUMARATE DIHYDRATE 2 PUFF: 160; 4.5 AEROSOL RESPIRATORY (INHALATION) at 07:02

## 2025-03-10 RX ADMIN — INSULIN LISPRO 2 UNITS: 100 INJECTION, SOLUTION INTRAVENOUS; SUBCUTANEOUS at 12:27

## 2025-03-10 RX ADMIN — EMPAGLIFLOZIN 25 MG: 25 TABLET, FILM COATED ORAL at 09:00

## 2025-03-10 RX ADMIN — PANTOPRAZOLE SODIUM 40 MG: 40 TABLET, DELAYED RELEASE ORAL at 06:29

## 2025-03-10 RX ADMIN — ATORVASTATIN CALCIUM 80 MG: 20 TABLET, FILM COATED ORAL at 21:39

## 2025-03-10 RX ADMIN — ACETAMINOPHEN 650 MG: 325 TABLET, FILM COATED ORAL at 09:00

## 2025-03-10 RX ADMIN — BUDESONIDE AND FORMOTEROL FUMARATE DIHYDRATE 2 PUFF: 160; 4.5 AEROSOL RESPIRATORY (INHALATION) at 21:08

## 2025-03-10 RX ADMIN — INSULIN LISPRO 3 UNITS: 100 INJECTION, SOLUTION INTRAVENOUS; SUBCUTANEOUS at 16:39

## 2025-03-10 RX ADMIN — INSULIN LISPRO 3 UNITS: 100 INJECTION, SOLUTION INTRAVENOUS; SUBCUTANEOUS at 21:39

## 2025-03-10 RX ADMIN — ENOXAPARIN SODIUM 40 MG: 100 INJECTION SUBCUTANEOUS at 16:39

## 2025-03-10 RX ADMIN — INSULIN GLARGINE 25 UNITS: 100 INJECTION, SOLUTION SUBCUTANEOUS at 21:39

## 2025-03-10 NOTE — PROGRESS NOTES
Discharge Planning Assessment  [facility]     Patient Name: Blanca Harden  MRN: [id number]  Today's Date: 3/10/2025    Admit Date: 3/9/2025    Plan: Pending   Discharge Needs Assessment    No documentation.                  Discharge Plan       Row Name 03/10/25 1538       Plan    Plan Pending    Plan Comments The patient transferred to [facility] from ER on 3/10/25 @ 01:32. CCP will continue to follow for any needs. JACQUELINE Hawthorne RN, CCP                    Expected Discharge Date and Time       Expected Discharge Date Expected Discharge Time    Mar 14, 2025            Demographic Summary    No documentation.                  Functional Status    No documentation.                  Psychosocial    No documentation.                  Abuse/Neglect    No documentation.                  Legal    No documentation.                  Substance Abuse    No documentation.                  Patient Forms    No documentation.                     Corinna Hawthorne, RN

## 2025-03-10 NOTE — PLAN OF CARE
Goal Outcome Evaluation:  Plan of Care Reviewed With: patient, spouse        Progress: no change  Outcome Evaluation: Pt admitted to [facility] after a fall at home.  Pt AOx4.  VSS.  [place]agner AC/HS.  Refused pain medication.   at bedside all night.  Pt resting comfortably at this time.  Will continue with plan of care.

## 2025-03-10 NOTE — H&P
HISTORY AND PHYSICAL   [facility], [facility]      3/10/2025    Patient Identification:    Name: Blanca Harden  Age: 79 y.o.  Sex: female  :  1945  MRN: [id number]                       Primary Care Physician: Praneeth Valenzuela MD    Chief Complaint:      Chief Complaint   Patient presents with    Fall    Dizziness    Arm Pain         History of Present Illness:     Patient is a 79-year-old female, patient of my office.  Patient has history of severe asthma, chronic kidney disease stage III, uncontrolled diabetes mellitus type 2, hypertension, temporal arteritis on prednisone 5 mg daily, chronic systolic congestive heart failure, hypertension, GERD.  Patient was admitted last month due to acute CVA within the left corona radiata/basal ganglia causing some aphasia as well as right lower extremity weakness, patient improved and was discharged to acute rehabilitation.  She was discharged home Improved.  Unfortunately, prior to admission, when patient was trying to pull her pants on by herself, she lost her balance and fell, she landed on the right shoulder and right upper chest. She complained of pain in the right shoulder, right side of the chest as well as right side of her neck.  Patient came to the emergency room.  In the emergency room, patient was found to have: CT of the chest show acute fractures of the right clavicle, and 2nd through 4th ribs, including segmental right second and third rib fractures.  CT of the head showed no new normalities, CT of the cervical spine showed no new abnormalities.  In the emergency room patient was given Tylenol 1000 mg one-time.  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, grade III, ER/[place] negative    Hypertension     Lower extremity edema     Moderate persistent asthma     On home O2     3L NC PRN    Pulmonary hypertension     Sleep apnea     Swelling     IN LOWER EXTREMITIES    Temporal arteritis     Varicose veins of unspecified lower extremity with ulcer of calf 2022    Venous insufficiency (chronic) (peripheral)      Past Surgical History:  Past Surgical History:   Procedure Laterality Date    BREAST BIOPSY Left  approx    benign pathology    BREAST BIOPSY Left 2019    Ultasound guided mammotome vacuum assisted left breast biopsy with placement of a metallic clip-Dr. Daniel Gutierrez, [facility]    CARDIAC CATHETERIZATION       SECTION N/A     x3    CHOLECYSTECTOMY N/A     COLON RESECTION Right 2025    Procedure: COLON RESECTION LAPAROSCOPIC RIGHT WITH DAVINCI ROBOT;  Surgeon: Ashwin Alvarado MD;  Location: [facility] OR;  Service: Robotics - DaVinci;  Laterality: Right;    COLONOSCOPY      COLONOSCOPY N/A 2024    Procedure: COLONOSCOPY into the cecum and TI with hot snare polypectomy;  Surgeon: Ashwin Alvarado MD;  Location: [facility] ENDOSCOPY;  Service: General;  Laterality: N/A;  pre-colon polyps  post-colon mass    COLONOSCOPY W/ POLYPECTOMY N/A 2019    Enlarged folds in the antrum: biopsied; duodenal, transverse colon x2, splenic flexure, descending colon and sigmoid colon polyps: biopsied (path: tubular adenoma x6)-Dr. Zak De Jesus, [facility]    ENDOSCOPY  10/11/2019    Procedure: ESOPHAGOGASTRODUODENOSCOPY with hot snare polypectomy;  Surgeon: Haile Handley MD;  Location: [facility] ENDOSCOPY;  Service: Gastroenterology    ENDOSCOPY N/A 2020    Procedure: ESOPHAGOGASTRODUODENOSCOPY;  Surgeon: Ender  Haile BRAR MD;  Location: [facility] ENDOSCOPY;  Service: Gastroenterology    ENDOSCOPY N/A 09/09/2020    Procedure: ESOPHAGOGASTRODUODENOSCOPY WITH BIOPSIES AND COLD BIOPSY POLYPECTOMY;  Surgeon: Haile Handley MD;  Location: [facility] ENDOSCOPY;  Service: Gastroenterology;  Laterality: N/A;  pre: dyspepsia and diarrhea  post: duodenal polyp and gastritis    ENDOSCOPY N/A 07/23/2024    Procedure: ESOPHAGOGASTRODUODENOSCOPY WITH hot snare polypectomy with clip placement x 2BIOPSY;  Surgeon: Ashwin Alvarado MD;  Location: [facility] ENDOSCOPY;  Service: General;  Laterality: N/A;  pre-hx duoedenal polyp  post-duodenal polyp; gastritis    EYE SURGERY      MASTECTOMY WITH SENTINEL NODE BIOPSY AND AXILLARY NODE DISSECTION Left 07/03/2019    Procedure: LEFT BREAST MASTECTOMY WITH SENTINEL NODE BIOPSY AND , v-y plasty closure;  Surgeon: Tahmina James MD;  Location: [facility] MAIN OR;  Service: General    SUBTOTAL HYSTERECTOMY Bilateral     ovaries still in tact    TEMPORAL ARTERY BIOPSY Bilateral 12/05/2019      Home Meds:  Medications Prior to Admission   Medication Sig Dispense Refill Last Dose/Taking    albuterol (ACCUNEB) 1.25 MG/3ML nebulizer solution Take  by nebulization Every 4 (Four) Hours As Needed for Wheezing or Shortness of Air.   3/9/2025    aspirin 81 MG EC tablet Take 1 tablet by mouth Daily. 30 tablet 3 3/9/2025    atorvastatin (LIPITOR) 80 MG tablet Take 1 tablet by mouth Every Night. 90 tablet 3 Past Week    bisoprolol (ZEBeta) 10 MG tablet Take 2 tablets by mouth Every Night.   Past Week    empagliflozin (JARDIANCE) 25 MG tablet tablet Take 1 tablet by mouth Daily. 30 tablet 3 3/9/2025    Insulin Lispro, 1 Unit Dial, (HumaLOG KwikPen) 100 UNIT/ML solution pen-injector Inject 2-7 Units under the skin into the appropriate area as directed 4 (Four) Times a Day Before Meals & at Bedtime. 15 mL 12 3/9/2025    lisinopril (PRINIVIL,ZESTRIL) 40 MG tablet Take 1 tablet by mouth Daily.   3/9/2025     omeprazole (priLOSEC) 20 MG capsule Take 2 capsules by mouth Daily. 60 capsule 1 3/9/2025    predniSONE (DELTASONE) 5 MG tablet Take 1 tablet by mouth Every Evening.   3/9/2025    Symbicort 160-4.5 MCG/ACT inhaler Inhale 2 puffs 2 (Two) Times a Day.   3/9/2025    Toujeo SoloStar 300 UNIT/ML solution pen-injector injection Inject 25 Units under the skin into the appropriate area as directed Every Night.   Past Week    acetaminophen (TYLENOL) 325 MG tablet Take 2 tablets by mouth Every 6 (Six) Hours As Needed for Mild Pain. 30 tablet 3     amLODIPine (NORVASC) 10 MG tablet Take 1 tablet by mouth Daily. 30 tablet 3 Unknown    BD Pen Needle Tila 2nd Gen 32G X 4 MM misc USE TO GIVE INSULIN 4 TIMES DAILY       hydrocortisone-bacitracin-zinc oxide-nystatin Apply  topically to the appropriate area as directed 3 (Three) Times a Day. 100 g 3 Unknown    loperamide (IMODIUM) 2 MG capsule Take 1 capsule by mouth 4 (Four) Times a Day As Needed for Diarrhea. 30 capsule 3 Unknown    miconazole (MICOTIN) 2 % powder Apply  topically to the appropriate area as directed Every 12 (Twelve) Hours. 71 g 1 Unknown    O2 (OXYGEN) Inhale 3 L/min As Needed.       traMADol (ULTRAM) 50 MG tablet Take 1 tablet by mouth Every 6 (Six) Hours As Needed for Moderate Pain. 10 tablet 0        Allergies:  Allergies   Allergen Reactions    Revefenacin Shortness Of Breath    Aspirin Nausea Only     ABDOMINAL PAIN     Sulfa Antibiotics Unknown (See Comments)     CHILDHOOD REACTION      Immunizations:  Immunization History   Administered Date(s) Administered    Arexvy (RSV, Adults 60+ yrs) 09/23/2023    COVID-19 (PFIZER) 12YRS+ (COMIRNATY) 12/23/2023, 12/10/2024    COVID-19 (PFIZER) BIVALENT 12+YRS 10/29/2022    COVID-19 (PFIZER) Purple Cap Monovalent 02/24/2021, 03/17/2021, 10/16/2021    Covid-19 (Pfizer) Gray Cap Monovalent 04/30/2022    FLUAD TRI 65YR+ 10/01/2022    FluMist 2-49yrs 10/13/2021    Fluad Quad 65+ 10/13/2021    Fluzone High-Dose 65+YRS  10/04/2016, 2017, 2018, 2020, 2020, 10/12/2021, 2024    Fluzone High-Dose 65+yrs 2020, 10/12/2021, 2023    Fluzone Quad >6mos (Multi-dose) 2020    Hepatitis A 2018, 2018, 2019    INFLUENZA SPLIT TRI 10/11/2016    Influenza Seasonal Injectable 10/11/2016    Influenza, Unspecified 2018, 10/12/2021    Pneumococcal Conjugate 13-Valent (PCV13) 10/04/2016, 10/12/2016    Pneumococcal Conjugate 20-Valent (PCV20) 2024    Pneumococcal Polysaccharide (PPSV23) 10/12/2016, 2017, 2018    Pneumococcal, Unspecified 2017    Typhoid Conjugate Vaccine 2020     Social History:   Social History     Social History Narrative    Not on file     Social History     Tobacco Use    Smoking status: Never    Smokeless tobacco: Never    Tobacco comments:     caffine use   Substance Use Topics    Alcohol use: No     Family History:  Family History   Problem Relation Age of Onset    Heart disease Mother     Stroke Mother     Asthma Mother     Hypertension Father     Stomach cancer Father     Diabetes Father     Esophageal cancer Father     Cancer Father     Throat cancer Sister     Diabetes Paternal Grandmother     Hypertension Paternal Grandfather     Colon cancer Paternal Grandfather     Colon cancer Paternal Uncle     Colon cancer Paternal Uncle     Colon cancer Paternal Uncle     Ovarian cancer Cousin     Stomach cancer Cousin     Malig Hyperthermia Neg Hx         Review of Systems  See history of present illness and past medical history.  Remainder of ROS is negative.    Objective:    Exam:    T MAX 24 hrs: Temp (24hrs), Av.6 °F (36.4 °C), Min:97 °F (36.1 °C), Max:98.7 °F (37.1 °C)    Vitals Ranges:   Temp:  [97 °F (36.1 °C)-98.7 °F (37.1 °C)] 97 °F (36.1 °C)  Heart Rate:  [] 71  Resp:  [16-18] 16  BP: (141-177)/(58-75) 174/69    /69 (BP Location: Left arm, Patient Position: Lying)   Pulse 71   Temp 97 °F (36.1 °C) (Oral)    "Resp 16   Ht 152.4 cm (60\")   Wt 81.6 kg (180 lb)   LMP  (LMP Unknown)   SpO2 91%   BMI 35.15 kg/m²     General: Alert, oriented x 3. Cooperative, complaining of pain on the right shoulder and right side of the chest but better with Tylenol.  Obese.  HEENT:    Head: Normocephalic, without obvious abnormality, atraumatic  Eyes: EOM are normal. Pupils are equal  Oropharynx: Mucosa and tongue normal  Neck: Supple, symmetrical, trachea midline, no adenopathy;              thyroid:  no enlargement/tenderness/nodules;              no carotid bruit or JVD.  Tenderness, mild, on the right side of the neck.  Cardiovascular: Normal rate, regular rhythm and intact distal pulses.              Exam reveals no gallop and no friction rub. No murmur heard  Chest wall: Tender to palpation of the right upper anterior chest wall.  No crepitus.  Pulmonary: Diminished breath sounds, worse on the right side.  No wheezing, no rales.    Abdominal: Soft, nontender, bowel sounds active all four quadrants,     no masses, no hepatomegaly, no splenomegaly.   Extremities: Tenderness on the right shoulder and right proximal upper arm.   Pulses: 2 + symmetric all extremities  Neurological: Patient is alert and oriented to person, place, and time.  No slurred speech at this time.  Mild right lower extremity weakness from recent stroke.  No new focal sensorimotor deficit.  Skin: Skin color, texture, normal. Turgor is decreased. No rashes or lesions      Data Review:    Results from last 7 days   Lab Units 03/10/25  0619 03/10/25  0035   WBC 10*3/mm3 9.97 10.31   HEMOGLOBIN g/dL 9.9* 10.2*   HEMATOCRIT % 31.0* 31.7*   PLATELETS 10*3/mm3 251 220       Results from last 7 days   Lab Units 03/10/25  0619 03/10/25  0035   SODIUM mmol/L 137 132*   POTASSIUM mmol/L 5.0 4.8   CHLORIDE mmol/L 106 104   CO2 mmol/L 20.6* 17.6*   BUN mg/dL 29* 33*   CREATININE mg/dL 1.23* 1.38*   CALCIUM mg/dL 9.0 9.0   BILIRUBIN mg/dL  --  0.3   ALK PHOS U/L  --  112 "   ALT (SGPT) U/L  --  14   AST (SGOT) U/L  --  18   GLUCOSE mg/dL 158* 228*                 Lab Results   Lab Value Date/Time    TROPONINT 34 (H) 10/01/2023 1149    TROPONINT 56 (C) 06/30/2023 1959    TROPONINT 61 (C) 06/30/2023 1601    TROPONINT 28 (H) 05/21/2023 1920    TROPONINT 25 (H) 05/21/2023 1615       Brief Urine Lab Results  (Last result in the past 365 days)        Color   Clarity   Blood   Leuk Est   Nitrite   Protein   CREAT   Urine HCG        02/19/25 2010 Yellow   Turbid   Moderate (2+)   Moderate (2+)   Negative   100 mg/dL (2+)                    Imaging Results (All)       Procedure Component Value Units Date/Time    CT Chest Without Contrast Diagnostic [[id number]] Collected: 03/09/25 2222     Updated: 03/09/25 2229    Narrative:      CT CHEST WITHOUT CONTRAST;     HISTORY: Fall, right upper chest tenderness and bruising     COMPARISON: Chest CT 1/30/2025     TECHNIQUE: Helical CT was performed of the chest from the lung apices  through the upper abdomen without IV  contrast. Multiplanar reformats  were created and reviewed. Radiation dose reduction techniques were  utilized, including automated exposure control, and exposure modulation  based on body size.     FINDINGS:     There is no mediastinal adenopathy or other mass.   The thoracic aorta  is normal in caliber. There are modest coronary atherosclerotic vascular  calcifications.     There is no acute pulmonary infiltrate, but there is right greater than  left bibasilar linear parenchymal opacity, almost certainly due to  atelectasis. There is no effusion or pneumothorax.     Images of the upper abdomen demonstrate no acute abnormality.     There is a comminuted mid right clavicle fracture. There are acute right  second through fourth rib fractures, including segmental fractures of  the second and third ribs.       Impression:         Acute fractures of the right clavicle and second through fourth ribs,  including segmental right second and  third rib fractures.     Otherwise negative unenhanced chest CT.        This report was finalized on 3/9/2025 10:26 PM by Dr. Sachin Michelle M.D on Workstation: [id number]       CT Head Without Contrast [[id number]] Collected: 03/09/25 2217     Updated: 03/09/25 2225    Narrative:      NON-CONTRAST CT HEAD & CT CERVICAL SPINE     REASON:  The patient is being seen in the ED for trauma and is  determined to have a high energy mechanism and/or high risk for missed  injuries due to presence of associated injuries or distracting pain. The  patient will need imaging of the head per the trauma protocol given  diagnoses such as intracranial hemorrhage cannot be excluded.    The  patient will need imaging of the cervical spine given diagnoses such as  cervical spine fracture cannot be excluded.  The diagnostic information  gained in this study exceeds the estimated risk of radiation induced  injury at the time of order placement.  Mechanism of injury: Fall     COMPARISON STUDIES:  None Available.     TECHNIQUE:  Axial images were acquired from the skull base to vertex  without contrast, including multiplanar reformats, per standard  departmental protocol.   Routine cervical spine CT without contrast.  Multiplanar reformats were created. Radiation dose reduction techniques  were utilized, including automated exposure control, and exposure  modulation based on body size.     FINDINGS:     Head CT: There is no CT evidence of acute intracranial hemorrhage, mass,  or infarct. There is volume loss, but there is no evidence of  hydrocephalus or extra-axial fluid collection.  There are moderate  nonspecific white matter changes, but there is no acute intracranial  abnormality.     Skull base and calvarium show no acute abnormality, and the extracranial  soft tissues show no acute abnormality. There are chronic appearing  changes in the left maxillary sinus.     C-spine CT: Cervical spine alignment is within normal limits.   There is  no cervical spine fracture or other acute cervical spine abnormality.  There is a comminuted mid right clavicle fracture, partially seen here.       Impression:         Negative unenhanced head CT, senescent change without acute abnormality.        Negative cervical spine CT.     Comminuted mid right clavicle fracture, partially demonstrated.           This report was finalized on 3/9/2025 10:21 PM by Dr. Sachin Michelle M.D on Workstation: [id number]       CT Cervical Spine Without Contrast [[id number]] Collected: 03/09/25 2217     Updated: 03/09/25 2225    Narrative:      NON-CONTRAST CT HEAD & CT CERVICAL SPINE     REASON:  The patient is being seen in the ED for trauma and is  determined to have a high energy mechanism and/or high risk for missed  injuries due to presence of associated injuries or distracting pain. The  patient will need imaging of the head per the trauma protocol given  diagnoses such as intracranial hemorrhage cannot be excluded.    The  patient will need imaging of the cervical spine given diagnoses such as  cervical spine fracture cannot be excluded.  The diagnostic information  gained in this study exceeds the estimated risk of radiation induced  injury at the time of order placement.  Mechanism of injury: Fall     COMPARISON STUDIES:  None Available.     TECHNIQUE:  Axial images were acquired from the skull base to vertex  without contrast, including multiplanar reformats, per standard  departmental protocol.   Routine cervical spine CT without contrast.  Multiplanar reformats were created. Radiation dose reduction techniques  were utilized, including automated exposure control, and exposure  modulation based on body size.     FINDINGS:     Head CT: There is no CT evidence of acute intracranial hemorrhage, mass,  or infarct. There is volume loss, but there is no evidence of  hydrocephalus or extra-axial fluid collection.  There are moderate  nonspecific white matter changes,  but there is no acute intracranial  abnormality.     Skull base and calvarium show no acute abnormality, and the extracranial  soft tissues show no acute abnormality. There are chronic appearing  changes in the left maxillary sinus.     C-spine CT: Cervical spine alignment is within normal limits.  There is  no cervical spine fracture or other acute cervical spine abnormality.  There is a comminuted mid right clavicle fracture, partially seen here.       Impression:         Negative unenhanced head CT, senescent change without acute abnormality.        Negative cervical spine CT.     Comminuted mid right clavicle fracture, partially demonstrated.           This report was finalized on 3/9/2025 10:21 PM by Dr. Sachin Michelle M.D on Workstation: [id number]       XR Shoulder 2+ View Right [[id number]] Collected: 03/09/25 2051     Updated: 03/09/25 2056    Narrative:         XR SHOULDER 2+ VW RIGHT-, XR HUMERUS RIGHT-          HISTORY:   Fall, right shoulder pain      TECHNIQUE: 3 views of the right shoulder; Two views of the right  humerus.     FINDINGS:     Shoulder: Degenerative change, no evidence of acute fracture,  dislocation, or radiopaque foreign body.     Humerus: Negative for acute fracture, dislocation, or radiopaque foreign  body.       Impression:         No acute abnormality.        This report was finalized on 3/9/2025 8:53 PM by Dr. Sachin Michelle M.D  on Workstation: [id number]       XR Humerus Right [[id number]] Collected: 03/09/25 2051     Updated: 03/09/25 2056    Narrative:         XR SHOULDER 2+ VW RIGHT-, XR HUMERUS RIGHT-          HISTORY:   Fall, right shoulder pain      TECHNIQUE: 3 views of the right shoulder; Two views of the right  humerus.     FINDINGS:     Shoulder: Degenerative change, no evidence of acute fracture,  dislocation, or radiopaque foreign body.     Humerus: Negative for acute fracture, dislocation, or radiopaque foreign  body.       Impression:         No acute  abnormality.        This report was finalized on 3/9/2025 8:53 PM by Dr. Sachin Michelle M.D  on Workstation: [id number]               Assessment:      Multiple rib fractures  Acute comminuted mid right clavicular fracture  Uncontrolled diabetes mellitus  Chronic systolic congestive heart failure  Morbid obesity affecting all aspects of care  Hypertension  Pulmonary hypertension  Obstructive sleep apnea  Moderate persistent asthma  Coronary artery disease  Chronic kidney disease stage IIIa  Iron deficient anemia  Temporal arteritis on long-term steroids  Status post right knee colectomy  Status post left mastectomy due to breast cancer    Plan:    Observation status  Pain control.  Patient states that she is doing okay with Tylenol.  Will continue. In case pain getting worse, she has low-dose Norco available.  Will ask physical therapy and Occupational Therapy to work with patient.  Wonder if patient will need acute or subacute rehabilitation.  Will see.  Monitor and correct electrolytes, stable  Accu-Chek and SSI  Monitor mental status, at baseline  Home medications  DVT prophylaxis: Lovenox  Stress ulcer prophylaxis: Patient is on pantoprazole  Labs in am        Praneeth Valenzuela MD  3/10/2025  07:53 EDT

## 2025-03-10 NOTE — PLAN OF CARE
Goal Outcome Evaluation:  Plan of Care Reviewed With: patient        Progress: no change     Pt alert and oriented, calm and cooperative, [place] speaking understand some English. Complaining of Right sided pain. Agreeable to take PRN Norco once, which helped with her pain. Physical therapist following. Purewick in place. Pt tolerating diet, slading scale insulin given. Family visiting throughput the day, attentive to pt. Plan of care ongoing.        Problem: Adult Inpatient Plan of Care  Goal: Plan of Care Review  Outcome: Progressing  Flowsheets (Taken 3/10/2025 1730)  Progress: no change  Plan of Care Reviewed With: patient     Problem: Adult Inpatient Plan of Care  Goal: Optimal Comfort and Wellbeing  Intervention: Monitor Pain and Promote Comfort  Recent Flowsheet Documentation  Taken 3/10/2025 1215 by Zakiya Lal RN  Pain Management Interventions:   pain management plan reviewed with patient/caregiver   diversional activity provided

## 2025-03-10 NOTE — THERAPY EVALUATION
Patient Name: Blanca Harden  : 1945    MRN: [id number]                              Today's Date: 3/10/2025       Admit Date: 3/9/2025    Visit Dx:     ICD-10-CM ICD-9-CM   1. Closed fracture of multiple ribs of right side, initial encounter  S22.41XA 807.09   2. Closed nondisplaced fracture of shaft of right clavicle, initial encounter  S42.024A 810.02   3. Mild closed head injury, initial encounter  S09.90XA 959.01     Patient Active Problem List   Diagnosis    Osteoporosis    Breast neoplasm, Tis (DCIS), left    Iron deficiency anemia    CKD (chronic kidney disease)    Diabetic nephropathy associated with type 2 diabetes mellitus    Temporal arteritis    Immunosuppression    Anemia    Duodenal adenoma    Gastritis without bleeding    Polyp of duodenum    Morbidly obese    Dyspnea on exertion    Hyperlipidemia    Type 2 diabetes mellitus with stage 3b chronic kidney disease, with long-term current use of insulin    Essential hypertension    Bilateral lower extremity edema    Pulmonary hypertension    Obstructive sleep apnea on CPAP    Stage 3a chronic kidney disease    Iron malabsorption    Respiratory distress    Hypoglycemia due to insulin    Delirium    Hypomagnesemia    Severe malnutrition    Leg swelling    Venous (peripheral) insufficiency    Polyp of colon    Obesity (BMI 30.0-34.9)    TIA (transient ischemic attack)    Difficulty with speech    Multiple rib fractures     Past Medical History:   Diagnosis Date    Anemia     Anxiety and depression     Asthma     Balance problem     CKD (chronic kidney disease), stage III     Colonic mass     COPD (chronic obstructive pulmonary disease)     Coronary artery disease     FOLLOWED BY DR GUNN    Diabetes mellitus, type 2     History of COVID-19     History of left breast cancer     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 Daniel Gutierrez, [facility]    CARDIAC CATHETERIZATION       SECTION N/A     x3    CHOLECYSTECTOMY N/A     COLON RESECTION Right 2025    Procedure: COLON RESECTION LAPAROSCOPIC RIGHT WITH DAVINCI ROBOT;  Surgeon: Ashwin Alvarado MD;  Location: [facility] MAIN OR;  Service: Robotics - DaVinci;  Laterality: Right;    COLONOSCOPY      COLONOSCOPY N/A 2024    Procedure: COLONOSCOPY into the cecum and TI with hot snare polypectomy;  Surgeon: Ashwin Alvarado MD;  Location: [facility] ENDOSCOPY;  Service: General;  Laterality: N/A;  pre-colon polyps  post-colon mass    COLONOSCOPY W/ POLYPECTOMY N/A 2019    Enlarged folds in the antrum: biopsied; duodenal, transverse colon x2, splenic flexure, descending colon and sigmoid colon polyps: biopsied (path: tubular adenoma x6)-Dr. Zak De Jesus, [facility]    ENDOSCOPY  10/11/2019    Procedure: ESOPHAGOGASTRODUODENOSCOPY with hot snare polypectomy;  Surgeon: Haile Handley MD;  Location: [facility] ENDOSCOPY;  Service: Gastroenterology    ENDOSCOPY N/A 2020    Procedure: ESOPHAGOGASTRODUODENOSCOPY;  Surgeon: Haile Handley MD;  Location: [facility] ENDOSCOPY;  Service: Gastroenterology    ENDOSCOPY N/A 2020    Procedure: ESOPHAGOGASTRODUODENOSCOPY WITH BIOPSIES AND COLD BIOPSY POLYPECTOMY;  Surgeon: Haile Handley MD;  Location: [facility] ENDOSCOPY;  Service: Gastroenterology;  Laterality: N/A;  pre:  dyspepsia and diarrhea  post: duodenal polyp and gastritis    ENDOSCOPY N/A 07/23/2024    Procedure: ESOPHAGOGASTRODUODENOSCOPY WITH hot snare polypectomy with clip placement x 2BIOPSY;  Surgeon: Ashwin Alvarado MD;  Location: [facility] ENDOSCOPY;  Service: General;  Laterality: N/A;  pre-hx duoedenal polyp  post-duodenal polyp; gastritis    EYE SURGERY      MASTECTOMY WITH SENTINEL NODE BIOPSY AND AXILLARY NODE DISSECTION Left 07/03/2019    Procedure: LEFT BREAST MASTECTOMY WITH SENTINEL NODE BIOPSY AND , v-y plasty closure;  Surgeon: Tahmina James MD;  Location: [facility] MAIN OR;  Service: General    SUBTOTAL HYSTERECTOMY Bilateral     ovaries still in tact    TEMPORAL ARTERY BIOPSY Bilateral 12/05/2019      General Information       Row Name 03/10/25 1531          Physical Therapy Time and Intention    Document Type evaluation  -MG     Mode of Treatment individual therapy;physical therapy  -MG       Row Name 03/10/25 1531          General Information    Patient Profile Reviewed yes  -MG     Prior Level of Function independent:  Spouse reports pt recently DC home from [facility] and has been (I) with use of a RW.  -MG     Existing Precautions/Restrictions fall;right;non-weight bearing  RUE NWB w/ sling  -MG     Barriers to Rehab language barrier  -MG       Row Name 03/10/25 1531          Living Environment    Current Living Arrangements apartment  -MG     People in Home spouse  -MG       Row Name 03/10/25 1531          Home Main Entrance    Number of Stairs, Main Entrance none  -MG       Row Name 03/10/25 1531          Stairs Within Home, Primary    Number of Stairs, Within Home, Primary none  -MG       Row Name 03/10/25 1531          Cognition    Orientation Status (Cognition) oriented x 3  -MG       Row Name 03/10/25 1531          Safety Issues/Impairments Affecting Functional Mobility    Safety Issues Affecting Function (Mobility) insight into deficits/self-awareness;safety precaution  awareness;problem-solving;sequencing abilities  -MG     Impairments Affecting Function (Mobility) balance;endurance/activity tolerance;pain;coordination;strength  -MG     Comment, Safety Issues/Impairments (Mobility) Non-skid socks donned.  -MG               User Key  (r) = Recorded By, (t) = Taken By, (c) = Cosigned By      Initials Name Provider Type    MG Dena Royal, PT Physical Therapist                   Mobility       Row Name 03/10/25 1534          Bed Mobility    Bed Mobility supine-sit;sit-supine  -MG     Supine-Sit [place] (Bed Mobility) maximum assist (25% patient effort);verbal cues  -MG     Sit-Supine [place] (Bed Mobility) maximum assist (25% patient effort);2 person assist;verbal cues  -MG     Assistive Device (Bed Mobility) head of bed elevated;bed rails  -MG     Comment, (Bed Mobility) Sat up on L side of bed. Cues for hand placement and sequencing. Pt had difficulty reaching bedrail w/ LUE and trying to not use her RUE. Unable to tolerate sitting EOB for greater than 1-2 min due to pain. Unable to maintain balance enough for this PT to adjust sling properly.  -MG       Row Name 03/10/25 1534          Mobility    Extremity Weight-bearing Status right upper extremity  -MG     Right Upper Extremity (Weight-bearing Status) non weight-bearing (NWB)  -MG               User Key  (r) = Recorded By, (t) = Taken By, (c) = Cosigned By      Initials Name Provider Type    Dena Logan PT Physical Therapist                   Obj/Interventions       Row Name 03/10/25 1537          Range of Motion Comprehensive    General Range of Motion bilateral lower extremity ROM WFL;upper extremity range of motion deficits identified  -MG       Row Name 03/10/25 1537          Strength Comprehensive (MMT)    General Manual Muscle Testing (MMT) Assessment lower extremity strength deficits identified;upper extremity strength deficits identified  -MG     Comment, General Manual Muscle Testing (MMT) Assessment  Generalized weakness. BLE grossly appear 4/5.  -MG       Row Name 03/10/25 1537          Balance    Comment, Balance Sitting EOB w/ Roldan. Difficulty with balance due to pain.  -MG       Row Name 03/10/25 1537          Sensory Assessment (Somatosensory)    Sensory Assessment (Somatosensory) sensation intact  -MG               User Key  (r) = Recorded By, (t) = Taken By, (c) = Cosigned By      Initials Name Provider Type    Dena Logan PT Physical Therapist                   Goals/Plan       Row Name 03/10/25 1542          Bed Mobility Goal 1 (PT)    Activity/Assistive Device (Bed Mobility Goal 1, PT) bed mobility activities, all  -MG     [place] Level/Cues Needed (Bed Mobility Goal 1, PT) contact guard required  -MG     Time Frame (Bed Mobility Goal 1, PT) 1 week  -MG       Row Name 03/10/25 1542          Transfer Goal 1 (PT)    Activity/Assistive Device (Transfer Goal 1, PT) transfers, all  -MG     [place] Level/Cues Needed (Transfer Goal 1, PT) minimum assist (75% or more patient effort)  -MG     Time Frame (Transfer Goal 1, PT) 1 week  -MG       Row Name 03/10/25 1542          Gait Training Goal 1 (PT)    Activity/Assistive Device (Gait Training Goal 1, PT) gait (walking locomotion)  -MG     [place] Level (Gait Training Goal 1, PT) minimum assist (75% or more patient effort)  -MG     Distance (Gait Training Goal 1, PT) 30  -MG     Time Frame (Gait Training Goal 1, PT) 1 week  -MG       Row Name 03/10/25 1542          Therapy Assessment/Plan (PT)    Planned Therapy Interventions (PT) balance training;bed mobility training;gait training;home exercise program;patient/family education;stretching;strengthening;neuromuscular re-education;ROM (range of motion);postural re-education;transfer training  -MG               User Key  (r) = Recorded By, (t) = Taken By, (c) = Cosigned By      Initials Name Provider Type    Dena Logan PT Physical Therapist                   Clinical Impression        Row Name 03/10/25 1537          Pain    Pretreatment Pain Rating 10/10  -MG     Posttreatment Pain Rating 10/10  -MG     Pain Location extremity  -MG     Pain Side/Orientation right;upper  -MG     Pain Management Interventions nursing notified;premedicated for activity;exercise or physical activity utilized;positioning techniques utilized  -MG     Response to Pain Interventions activity participation with increased pain  -MG     Pre/Posttreatment Pain Comment c/o RUE and R rib pain.  -MG       Row Name 03/10/25 1537          Plan of Care Review    Plan of Care Reviewed With patient;spouse  -MG     Progress no change  -MG     Outcome Evaluation Pt is a 78 y/o F with h/o CVA (R gina), anemia, asthma, HTN, DM2 and CKD who presented to [facility] with acute fall. Imaging (+) R 2-4 fxs and comminuted mid R clavicle fx. Pt is NWB to RUE and in a sling. Pts spouse states she was recently discharged from from acute rehab and has returned to being (I) with a RW. They live together in an apt w/ a ramped entrance, no steps. Today, pt was MaxAx1-2 for bed mobility and Roldan to maintain her sitting balance. Pt tolerated sitting EOB for approx 1-2 min, limited by pain and was unable to maintain her sitting balance long enough for this PT to adjust her sling correctly. PCT arrived and assisted w/ return to supine, then set-up to begin a bath. Pt will benefit from skilled PT services to address her impaired, strength, balance, mobility and activity tolerance. Discussed w/ RN. PT to rec IRF at [place] at this time.  -MG       Row Name 03/10/25 1537          Therapy Assessment/Plan (PT)    Rehab Potential (PT) good  -MG     Criteria for Skilled Interventions Met (PT) yes  -MG     Therapy Frequency (PT) 5 times/wk  -MG       Row Name 03/10/25 1537          Positioning and Restraints    Pre-Treatment Position in bed  -MG     Post Treatment Position bed  -MG     In Bed notified nsg;supine;fowlers;call light within reach;encouraged to call for  assist;exit alarm on;with family/caregiver;with nsg;side rails up x3;with brace  -MG               User Key  (r) = Recorded By, (t) = Taken By, (c) = Cosigned By      Initials Name Provider Type    Dena Logan, PT Physical Therapist                   Outcome Measures       Row Name 03/10/25 1543 03/10/25 0900       How much help from another person do you currently need...    Turning from your back to your side while in flat bed without using bedrails? 2  -MG 2  -LR    Moving from lying on back to sitting on the side of a flat bed without bedrails? 2  -MG 2  -LR    Moving to and from a bed to a chair (including a wheelchair)? 1  -MG 2  -LR    Standing up from a chair using your arms (e.g., wheelchair, bedside chair)? 1  -MG 2  -LR    Climbing 3-5 steps with a railing? 1  -MG 1  -LR    To walk in hospital room? 1  -MG 2  -LR    AM-PAC 6 Clicks Score (PT) 8  -MG 11  -LR              User Key  (r) = Recorded By, (t) = Taken By, (c) = Cosigned By      Initials Name Provider Type    Dena Logan, PT Physical Therapist    LR Marilyn Crawford, RN Registered Nurse                                 Physical Therapy Education       Title: PT OT SLP Therapies (In Progress)       Topic: Physical Therapy (In Progress)       Point: Mobility training (Done)       Learning Progress Summary            Patient Acceptance, E,D, VU,NR by MG at 3/10/2025 1543   Significant Other Acceptance, E,D, VU,NR by MG at 3/10/2025 1543                      Point: Home exercise program (Not Started)       Learner Progress:  Not documented in this visit.              Point: Body mechanics (Done)       Learning Progress Summary            Patient Acceptance, E,D, VU,NR by MG at 3/10/2025 1543   Significant Other Acceptance, E,D, VU,NR by MG at 3/10/2025 1543                      Point: Precautions (Done)       Learning Progress Summary            Patient Acceptance, E,D, VU,NR by MG at 3/10/2025 1543   Significant Other Acceptance, E,D, VU,NR  by MG at 3/10/2025 1543                                      User Key       Initials Effective Dates Name Provider Type Discipline     05/24/22 -  Dena Royal, PT Physical Therapist PT                  PT Recommendation and Plan  Planned Therapy Interventions (PT): balance training, bed mobility training, gait training, home exercise program, patient/family education, stretching, strengthening, neuromuscular re-education, ROM (range of motion), postural re-education, transfer training  Progress: no change  Outcome Evaluation: Pt is a 78 y/o F with h/o CVA (R gina), anemia, asthma, HTN, DM2 and CKD who presented to [facility] with acute fall. Imaging (+) R 2-4 fxs and comminuted mid R clavicle fx. Pt is NWB to RUE and in a sling. Pts spouse states she was recently discharged from from acute rehab and has returned to being (I) with a RW. They live together in an apt w/ a ramped entrance, no steps. Today, pt was MaxAx1-2 for bed mobility and Roldan to maintain her sitting balance. Pt tolerated sitting EOB for approx 1-2 min, limited by pain and was unable to maintain her sitting balance long enough for this PT to adjust her sling correctly. PCT arrived and assisted w/ return to supine, then set-up to begin a bath. Pt will benefit from skilled PT services to address her impaired, strength, balance, mobility and activity tolerance. Discussed w/ RN. PT to rec IRF at [place] at this time.     Time Calculation:         PT Charges       Row Name 03/10/25 1544             Time Calculation    Start Time 1508  -MG      Stop Time 1527  -MG      Time Calculation (min) 19 min  -MG      PT Received On 03/10/25  -MG      PT - Next Appointment 03/11/25  -MG      PT Goal Re-Cert Due Date 03/24/25  -MG         Time Calculation- PT    Total Timed Code Minutes- PT 10 minute(s)  -MG                User Key  (r) = Recorded By, (t) = Taken By, (c) = Cosigned By      Initials Name Provider Type    MG Dena Royal, PT Physical Therapist                   Therapy Charges for Today       Code Description Service Date Service Provider Modifiers Qty    18395301299 HC PT EVAL MOD COMPLEXITY 3 3/10/2025 Dena Royal, PT GP 1    [id number] HC PT THERAPEUTIC ACT EA 15 MIN 3/10/2025 Dena Royal, PT GP 1            PT G-Codes  AM-PAC 6 Clicks Score (PT): 8  PT Discharge Summary  Anticipated Discharge Disposition (PT): inpatient rehabilitation facility    Dena Royal, PT  3/10/2025

## 2025-03-10 NOTE — ED NOTES
Per spouse, patient was trying to put on her pants when she fell backwards and to the right. Patient c/o pain right anterior chest and right shoulder area.    Spouse states that patient recently had a stroke and went to rehab. Spouse states that she returned home a few days ago.

## 2025-03-10 NOTE — ED PROVIDER NOTES
EMERGENCY DEPARTMENT ENCOUNTER    Room Number:  22/22  PCP: Praneeth Valenzuela MD  Historian: Patient, spouse    I initially evaluated the patient at 2023    HPI:  Chief Complaint: Fall, right shoulder injury, right chest injury  A complete HPI/ROS/PMH/PSH/SH/FH are unobtainable due to: Nothing  Context: Blanca Harden is a 79 y.o. female with a medical history of CVA, anemia, asthma, hypertension, type 2 diabetes, chronic kidney disease who presents to the ED from home c/o acute fall.  Patient was trying to put her pants on by herself when she lost her balance and fell.  She landed on her right shoulder and right upper chest.  She is unsure if she hit her head.  She currently complains of pain in her right shoulder, right chest, and right neck.  Denies headache, back pain, abdominal pain, or new numbness/tingling/weakness in her extremities.  She takes aspirin but is not on anticoagulants.  Patient had a stroke a couple of weeks ago which is left her with some right-sided weakness.  Her  has been helping her dress since then.              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    Gastritis without bleeding 10/22/2019    Polyp of duodenum 10/22/2019    Morbidly obese 06/26/2020    Dyspnea on exertion 03/30/2021    Hyperlipidemia 05/04/2021    Type 2 diabetes mellitus with stage 3b chronic kidney disease, with long-term current use of insulin 05/04/2021    Essential hypertension 05/04/2021    Bilateral lower extremity edema 06/21/2021    Pulmonary hypertension 06/21/2021    Obstructive sleep apnea on CPAP 03/21/2022    Stage 3a  chronic kidney disease 2022    Iron malabsorption 2023    Respiratory distress 2023    Hypoglycemia due to insulin 2023    Delirium 10/01/2023    Hypomagnesemia 10/02/2023    Severe malnutrition 10/02/2023    Leg swelling 2024    Venous (peripheral) insufficiency 2024    Polyp of colon 2024    Obesity (BMI 30.0-34.9) 2024    TIA (transient ischemic attack) 02/15/2025    Difficulty with speech 02/15/2025     Resolved Ambulatory Problems     Diagnosis Date Noted    Adenomatous duodenal polyp 2024    Colonic mass 2024     Past Medical History:   Diagnosis Date    Anxiety and depression     Asthma     Balance problem     CKD (chronic kidney disease), stage III     COPD (chronic obstructive pulmonary disease)     Coronary artery disease     Diabetes mellitus, type 2     History of COVID-2023    History of left breast cancer     Hypertension     Lower extremity edema     Moderate persistent asthma     On home O2     Sleep apnea     Swelling     Varicose veins of unspecified lower extremity with ulcer of calf 2022    Venous insufficiency (chronic) (peripheral)          PAST SURGICAL HISTORY  Past Surgical History:   Procedure Laterality Date    BREAST BIOPSY Left  approx    benign pathology    BREAST BIOPSY Left 2019    Ultasound guided mammotome vacuum assisted left breast biopsy with placement of a metallic clip-Dr. Daniel Gutierrez, [facility]    CARDIAC CATHETERIZATION       SECTION N/A     x3    CHOLECYSTECTOMY N/A     COLON RESECTION Right 2025    Procedure: COLON RESECTION LAPAROSCOPIC RIGHT WITH DAVINCI ROBOT;  Surgeon: Ashwin Alvarado MD;  Location: [facility] MAIN OR;  Service: Robotics - DaVinci;  Laterality: Right;    COLONOSCOPY      COLONOSCOPY N/A 2024    Procedure: COLONOSCOPY into the cecum and TI with hot snare polypectomy;  Surgeon: Ashwin Alvarado MD;  Location: [facility] ENDOSCOPY;  Service:  General;  Laterality: N/A;  pre-colon polyps  post-colon mass    COLONOSCOPY W/ POLYPECTOMY N/A 03/12/2019    Enlarged folds in the antrum: biopsied; duodenal, transverse colon x2, splenic flexure, descending colon and sigmoid colon polyps: biopsied (path: tubular adenoma x6)-Dr. Zak De Jesus, [facility]    ENDOSCOPY  10/11/2019    Procedure: ESOPHAGOGASTRODUODENOSCOPY with hot snare polypectomy;  Surgeon: Haile Handley MD;  Location: [facility] ENDOSCOPY;  Service: Gastroenterology    ENDOSCOPY N/A 01/29/2020    Procedure: ESOPHAGOGASTRODUODENOSCOPY;  Surgeon: Haile Handley MD;  Location: [facility] ENDOSCOPY;  Service: Gastroenterology    ENDOSCOPY N/A 09/09/2020    Procedure: ESOPHAGOGASTRODUODENOSCOPY WITH BIOPSIES AND COLD BIOPSY POLYPECTOMY;  Surgeon: Haile Handley MD;  Location: [facility] ENDOSCOPY;  Service: Gastroenterology;  Laterality: N/A;  pre: dyspepsia and diarrhea  post: duodenal polyp and gastritis    ENDOSCOPY N/A 07/23/2024    Procedure: ESOPHAGOGASTRODUODENOSCOPY WITH hot snare polypectomy with clip placement x 2BIOPSY;  Surgeon: Ashwin Alvarado MD;  Location: [facility] ENDOSCOPY;  Service: General;  Laterality: N/A;  pre-hx duoedenal polyp  post-duodenal polyp; gastritis    EYE SURGERY      MASTECTOMY WITH SENTINEL NODE BIOPSY AND AXILLARY NODE DISSECTION Left 07/03/2019    Procedure: LEFT BREAST MASTECTOMY WITH SENTINEL NODE BIOPSY AND , v-y plasty closure;  Surgeon: Tahmina James MD;  Location: [facility] MAIN OR;  Service: General    SUBTOTAL HYSTERECTOMY Bilateral     ovaries still in tact    TEMPORAL ARTERY BIOPSY Bilateral 12/05/2019         FAMILY HISTORY  Family History   Problem Relation Age of Onset    Heart disease Mother     Stroke Mother     Asthma Mother     Hypertension Father     Stomach cancer Father     Diabetes Father     Esophageal cancer Father     Cancer Father     Throat cancer Sister     Diabetes Paternal Grandmother     Hypertension  Paternal Grandfather     Colon cancer Paternal Grandfather     Colon cancer Paternal Uncle     Colon cancer Paternal Uncle     Colon cancer Paternal Uncle     Ovarian cancer Cousin     Stomach cancer Cousin     Malig Hyperthermia Neg Hx          SOCIAL HISTORY  Social History     Socioeconomic History    Marital status:      Spouse name: Ashwin    Number of children: 3    Years of education: College   Tobacco Use    Smoking status: Never    Smokeless tobacco: Never    Tobacco comments:     caffine use   Vaping Use    Vaping status: Never Used   Substance and Sexual Activity    Alcohol use: No    Drug use: No    Sexual activity: Not Currently     Comment: Spouse, Ashwin         ALLERGIES  Revefenacin, Aspirin, and Sulfa antibiotics    REVIEW OF SYSTEMS  Review of Systems  Included in HPI  All systems reviewed and negative except for those discussed in HPI.      PHYSICAL EXAM  ED Triage Vitals   Temp Heart Rate Resp BP SpO2   03/09/25 2007 03/09/25 2007 03/09/25 2007 03/09/25 2014 03/09/25 2007   98.7 °F (37.1 °C) 114 16 157/69 98 %      Temp src Heart Rate Source Patient Position BP Location FiO2 (%)   03/09/25 2007 -- 03/09/25 2014 03/09/25 2014 --   Tympanic  Sitting Right arm        Physical Exam      GENERAL: Awake, alert, oriented x 3.  Nontoxic-appearing elderly female.  Resting comfortably in no acute distress  HENT: NCAT, nares patent  EYES: no scleral icterus  CV: regular rhythm, normal rate  RESPIRATORY: normal effort, clear to auscultation bilaterally  ABDOMEN: soft, nontender  MUSCULOSKELETAL: There is tenderness of the right lateral neck.  There is bruising and tenderness over the right upper anterior chest wall.  No crepitus.  There is tenderness of the right shoulder and right proximal upper arm.  C/T/L-spine and all other extremities are nontender.  NEURO: Speech is normal.  No facial droop.  There is mild right sided weakness (chronic and unchanged per patient and spouse).  Follows  commands.  PSYCH:  calm, cooperative  SKIN: warm, dry    Vital signs and nursing notes reviewed.          LAB RESULTS  No results found for this or any previous visit (from the past 24 hours).    Ordered the above labs and reviewed the results.        RADIOLOGY  CT Chest Without Contrast Diagnostic  Result Date: 3/9/2025  CT CHEST WITHOUT CONTRAST;  HISTORY: Fall, right upper chest tenderness and bruising  COMPARISON: Chest CT 1/30/2025  TECHNIQUE: Helical CT was performed of the chest from the lung apices through the upper abdomen without IV  contrast. Multiplanar reformats were created and reviewed. Radiation dose reduction techniques were utilized, including automated exposure control, and exposure modulation based on body size.  FINDINGS:  There is no mediastinal adenopathy or other mass.   The thoracic aorta is normal in caliber. There are modest coronary atherosclerotic vascular calcifications.  There is no acute pulmonary infiltrate, but there is right greater than left bibasilar linear parenchymal opacity, almost certainly due to atelectasis. There is no effusion or pneumothorax.  Images of the upper abdomen demonstrate no acute abnormality.  There is a comminuted mid right clavicle fracture. There are acute right second through fourth rib fractures, including segmental fractures of the second and third ribs.       Acute fractures of the right clavicle and second through fourth ribs, including segmental right second and third rib fractures.  Otherwise negative unenhanced chest CT.   This report was finalized on 3/9/2025 10:26 PM by Dr. Sachin Michelle M.D on Workstation: [id number]      CT Head Without Contrast  CT Head Without Contrast, CT Cervical Spine Without Contrast  Result Date: 3/9/2025  NON-CONTRAST CT HEAD & CT CERVICAL SPINE  REASON:  The patient is being seen in the ED for trauma and is determined to have a high energy mechanism and/or high risk for missed injuries due to presence of  associated injuries or distracting pain. The patient will need imaging of the head per the trauma protocol given diagnoses such as intracranial hemorrhage cannot be excluded.    The patient will need imaging of the cervical spine given diagnoses such as cervical spine fracture cannot be excluded.  The diagnostic information gained in this study exceeds the estimated risk of radiation induced injury at the time of order placement. Mechanism of injury: Fall  COMPARISON STUDIES:  None Available.  TECHNIQUE:  Axial images were acquired from the skull base to vertex without contrast, including multiplanar reformats, per standard departmental protocol.   Routine cervical spine CT without contrast. Multiplanar reformats were created. Radiation dose reduction techniques were utilized, including automated exposure control, and exposure modulation based on body size.  FINDINGS:  Head CT: There is no CT evidence of acute intracranial hemorrhage, mass, or infarct. There is volume loss, but there is no evidence of hydrocephalus or extra-axial fluid collection.  There are moderate nonspecific white matter changes, but there is no acute intracranial abnormality.  Skull base and calvarium show no acute abnormality, and the extracranial soft tissues show no acute abnormality. There are chronic appearing changes in the left maxillary sinus.  C-spine CT: Cervical spine alignment is within normal limits.  There is no cervical spine fracture or other acute cervical spine abnormality. There is a comminuted mid right clavicle fracture, partially seen here.       Negative unenhanced head CT, senescent change without acute abnormality.   Negative cervical spine CT.  Comminuted mid right clavicle fracture, partially demonstrated.    This report was finalized on 3/9/2025 10:21 PM by Dr. Sachin Michelle M.D on Workstation: [id number]      XR Shoulder 2+ View Right  XR Shoulder 2+ View Right, XR Humerus Right  Result Date: 3/9/2025   XR  SHOULDER 2+ VW RIGHT-, XR HUMERUS RIGHT-    HISTORY:   Fall, right shoulder pain  TECHNIQUE: 3 views of the right shoulder; Two views of the right humerus.  FINDINGS:  Shoulder: Degenerative change, no evidence of acute fracture, dislocation, or radiopaque foreign body.  Humerus: Negative for acute fracture, dislocation, or radiopaque foreign body.       No acute abnormality.   This report was finalized on 3/9/2025 8:53 PM by Dr. Sachin Michelle M.D on Workstation: [id number]        Ordered the above noted radiological studies. Reviewed by me in PACS.            PROCEDURES  Procedures        OUTPATIENT MEDICATION MANAGEMENT:  Current Facility-Administered Medications Ordered in Epic   Medication Dose Route Frequency Provider Last Rate Last Admin    sodium chloride 0.9 % flush 10 mL  10 mL Intravenous PRN Pasha Aguilar MD         Current Outpatient Medications Ordered in Epic   Medication Sig Dispense Refill    acetaminophen (TYLENOL) 325 MG tablet Take 2 tablets by mouth Every 6 (Six) Hours As Needed for Mild Pain. 30 tablet 3    albuterol (ACCUNEB) 1.25 MG/3ML nebulizer solution Take  by nebulization Every 4 (Four) Hours As Needed for Wheezing or Shortness of Air.      amLODIPine (NORVASC) 10 MG tablet Take 1 tablet by mouth Daily. 30 tablet 3    aspirin 81 MG EC tablet Take 1 tablet by mouth Daily. 30 tablet 3    atorvastatin (LIPITOR) 80 MG tablet Take 1 tablet by mouth Every Night. 90 tablet 3    BD Pen Needle Tila 2nd Gen 32G X 4 MM misc USE TO GIVE INSULIN 4 TIMES DAILY      bisoprolol (ZEBeta) 10 MG tablet Take 2 tablets by mouth Every Night.      empagliflozin (JARDIANCE) 25 MG tablet tablet Take 1 tablet by mouth Daily. 30 tablet 3    hydrocortisone-bacitracin-zinc oxide-nystatin Apply  topically to the appropriate area as directed 3 (Three) Times a Day. 100 g 3    Insulin Lispro, 1 Unit Dial, (HumaLOG KwikPen) 100 UNIT/ML solution pen-injector Inject 2-7 Units under the skin into the appropriate  area as directed 4 (Four) Times a Day Before Meals & at Bedtime. 15 mL 12    lisinopril (PRINIVIL,ZESTRIL) 40 MG tablet Take 1 tablet by mouth Daily.      loperamide (IMODIUM) 2 MG capsule Take 1 capsule by mouth 4 (Four) Times a Day As Needed for Diarrhea. 30 capsule 3    miconazole (MICOTIN) 2 % powder Apply  topically to the appropriate area as directed Every 12 (Twelve) Hours. 71 g 1    O2 (OXYGEN) Inhale 3 L/min As Needed.      omeprazole (priLOSEC) 20 MG capsule Take 2 capsules by mouth Daily. 60 capsule 1    predniSONE (DELTASONE) 5 MG tablet Take 1 tablet by mouth Every Evening.      Symbicort 160-4.5 MCG/ACT inhaler Inhale 2 puffs 2 (Two) Times a Day.      Toujeo SoloStar 300 UNIT/ML solution pen-injector injection Inject 25 Units under the skin into the appropriate area as directed Every Night.      traMADol (ULTRAM) 50 MG tablet Take 1 tablet by mouth Every 6 (Six) Hours As Needed for Moderate Pain. 10 tablet 0           MEDICATIONS GIVEN IN ER  Medications   sodium chloride 0.9 % flush 10 mL (has no administration in time range)   acetaminophen (TYLENOL) tablet 1,000 mg (1,000 mg Oral Given 3/9/25 2200)                   MEDICAL DECISION MAKING, PROGRESS, and CONSULTS    All labs have been independently reviewed by me.  All radiology studies have been reviewed by me and I have also reviewed the radiology report.   EKG's independently viewed and interpreted by me.  Discussion below represents my analysis of pertinent findings related to patient's condition, differential diagnosis, treatment plan and final disposition.      Additional sources:    - Discussed/ obtained information from independent historians: Spouse    - External (non-ED) record review: Patient was admitted here 2/15 through 2/21/2025 for an acute CVA.  CTA head/neck did not show any large vessel occlusion or acute hemorrhage.  There was moderate stenosis of the proximal aspect of the left subclavian artery. Brain MRI showed an acute  infarct in left corona radiata/basal ganglia    -Prescription drug monitoring program review:     N/A    - Chronic or social conditions impacting patient care (Social Determinants of Health): None          Orders placed during this visit:  Orders Placed This Encounter   Procedures    CT Head Without Contrast    CT Cervical Spine Without Contrast    CT Chest Without Contrast Diagnostic    XR Shoulder 2+ View Right    XR Humerus Right    Comprehensive Metabolic Panel    CBC Auto Differential    Obtain & Apply The Following- Upper extremity; Sling    Family Medicine Consult    Insert Peripheral IV    Initiate Observation Status    CBC & Differential         Additional orders considered but not ordered:          Differential diagnosis includes, but is not limited to:    Shoulder fracture/strain/contusion, chest contusion, rib fracture, closed head injury, intracranial hemorrhage      Independent interpretation of labs, radiology studies, and discussions with consultants:  ED Course as of 03/09/25 2306   Sun Mar 09, 2025   2023 BP: 157/69 [WH]   2023 Temp: 98.7 °F (37.1 °C) [WH]   2023 Heart Rate: 114 [WH]   2023 Resp: 16 [WH]   2023 SpO2: 98 % [WH]   2023 Device (Oxygen Therapy): room air [WH]   2054 Right shoulder and humerus x-rays personally interpreted by me.  My personal interpretation is: No fracture.  No dislocation [WH]   2234 Per the radiologist, CT of the chest shows acute fractures of the right clavicle and 2nd through 4th ribs.  There are segmental fractures of the right second and third ribs.  There is no pneumothorax.  CTs of the head and C-spine are negative acute. [WH]   2235 Patient is resting and breathing comfortably.  She declines additional pain medication at this time.  Test results and diagnoses were discussed with her and her .  Shared decision making was discussed and admission was recommended.  They are agreeable with this.  Call will be placed to her PCP.  Sling will be placed on her  right arm []   2240 Case discussed with Dr. Valenzuela, the patient's PCP, and he agrees to admit the patient.  Pertinent history, exam findings, test results, ED course, and diagnoses were discussed with him. []   230 MDM: Patient presented to the ED after a mechanical fall.  She was found to have 3 right-sided rib fractures.  There was segmental fractures of 2 ribs.  There was no pneumothorax.  CTs of the head and C-spine were negative acute.  Patient was also noted to have a right mid clavicle fracture.  She will be placed in a sling.  Pain was well-controlled with Tylenol.  Case was discussed with her PCP.  She will need to be admitted for segmental rib fractures and will need to be evaluated by thoracic surgery. [WH]      ED Course User Index  [] Pasha Aguilar MD         COMPLEXITY OF CARE  The patient requires admission.      DIAGNOSIS  Final diagnoses:   Closed fracture of multiple ribs of right side, initial encounter   Closed nondisplaced fracture of shaft of right clavicle, initial encounter   Mild closed head injury, initial encounter         DISPOSITION  ADMISSION    Discussed treatment plan and reason for admission with pt/family and admitting physician.  Pt/family voiced understanding of the plan for admission for further testing/treatment as needed.               Latest Documented Vital Signs:  AS OF 23:06 EDT VITALS:    BP - 166/72  HR - 92  TEMP - 98.7 °F (37.1 °C) (Tympanic)  O2 SATS - 95%            --    Please note that portions of this were completed with a voice recognition program.       Note Disclaimer: At [facility], we believe that sharing information builds trust and better relationships. You are receiving this note because you are receiving care at [facility] or recently visited. It is possible you will see health information before a provider has talked with you about it. This kind of information can be easy to misunderstand. To help you fully understand what it means  for your health, we urge you to discuss this note with your provider.             Pasha Aguilar MD  03/09/25 [id number]

## 2025-03-10 NOTE — PROGRESS NOTES
Discharge Planning Assessment  [facility]     Patient Name: Blanca Harden  MRN: [id number]  Today's Date: 3/10/2025    Admit Date: 3/9/2025    Plan: Pending   Discharge Needs Assessment       Row Name 03/10/25 1551       Living Environment    People in Home spouse    Current Living Arrangements apartment    Potentially Unsafe Housing Conditions none    In the past 12 months has the electric, gas, oil, or water company threatened to shut off services in your home? No    Primary Care Provided by spouse/significant other    Provides Primary Care For no one, unable/limited ability to care for self;spouse    Quality of Family Relationships helpful;involved;supportive    Able to Return to Prior Arrangements yes    Living Arrangement Comments will need rehab at discharge       Resource/Environmental Concerns    Resource/Environmental Concerns none    Transportation Concerns none       Transportation Needs    In the past 12 months, has lack of transportation kept you from medical appointments or from getting medications? no    In the past 12 months, has lack of transportation kept you from meetings, work, or from getting things needed for daily living? No       Food Insecurity    Within the past 12 months, you worried that your food would run out before you got the money to buy more. Never true    Within the past 12 months, the food you bought just didn't last and you didn't have money to get more. Never true       Transition Planning    Patient/Family Anticipates Transition to inpatient rehabilitation facility    Patient/Family Anticipated Services at Transition none    Transportation Anticipated health plan transportation       Discharge Needs Assessment    Readmission Within the Last 30 Days previous discharge plan unsuccessful    Equipment Currently Used at Home rollator    Concerns to be Addressed decision-making    Do you want help finding or keeping work or a job? I do not need or want help    Do you want  help with school or training? For example, starting or completing job training or getting a high school diploma, GED or equivalent No    Anticipated Changes Related to Illness inability to care for self    Equipment Needed After Discharge none    Outpatient/Agency/Support Group Needs inpatient rehabilitation facility    Discharge Facility/Level of Care Needs rehabilitation facility;acute rehab    Provided Post Acute Provider List? Yes    Post Acute Provider List Inpatient Rehab;Nursing Home    Provided Post Acute Provider Quality & Resource List? Yes    Post Acute Provider Quality and Resource List Inpatient Rehab;Nursing Home    Delivered To Support Person    Method of Delivery In person    Offered/Gave Vendor List yes    Current Discharge Risk chronically ill                   Discharge Plan       Row Name 03/10/25 1538       Plan    Plan Pending    Plan Comments The patient transferred to [facility] from ER on 3/10/25 @ 01:32. CCP will continue to follow for any needs. JACQUELINE Hawthorne RN, CCP                    Expected Discharge Date and Time       Expected Discharge Date Expected Discharge Time    Mar 14, 2025            Demographic Summary       Row Name 03/10/25 1551       General Information    Admission Type observation    Arrived From home    Referral Source admission list    Reason for Consult discharge planning    Preferred Language English       Contact Information    Permission Granted to Share Info With     Contact Information Obtained for                    Functional Status       Row Name 03/10/25 1553       Functional Status    Usual Activity Tolerance poor    Current Activity Tolerance poor       Physical Activity    On average, how many days per week do you engage in moderate to strenuous exercise (like a brisk walk)? 4 days    On average, how many minutes do you engage in exercise at this level? 10 min    Number of minutes of exercise per week 40       Assessment of Health  Literacy    How often do you have someone help you read hospital materials? Always    How often do you have problems learning about your medical condition because of difficulty understanding written information? Always    How often do you have a problem understanding what is told to you about your medical condition? Always    How confident are you filling out medical forms by yourself? Not at all    Health Literacy Fair       Functional Status, IADL    Medications assistive person    Meal Preparation assistive person    Housekeeping assistive person    Laundry assistive person    Shopping assistive person    If for any reason you need help with day-to-day activities such as bathing, preparing meals, shopping, managing finances, etc., do you get the help you need? I could use a little more help       Mental Status    General Appearance WDL WDL                   Psychosocial    No documentation.                  Abuse/Neglect    No documentation.                  Legal    No documentation.                  Substance Abuse    No documentation.                  Patient Forms    No documentation.                     Corinna Hwathorne RN

## 2025-03-10 NOTE — PLAN OF CARE
Goal Outcome Evaluation:  Plan of Care Reviewed With: patient, spouse        Progress: no change  Outcome Evaluation: Pt is a 80 y/o F with h/o CVA (R gina), anemia, asthma, HTN, DM2 and CKD who presented to [facility] with acute fall. Imaging (+) R 2-4 fxs and comminuted mid R clavicle fx. Pt is NWB to RUE and in a sling. Pts spouse states she was recently discharged from from acute rehab and has returned to being (I) with a RW. They live together in an apt w/ a ramped entrance, no steps. Today, pt was MaxAx1-2 for bed mobility and Roldan to maintain her sitting balance. Pt tolerated sitting EOB for approx 1-2 min, limited by pain and was unable to maintain her sitting balance long enough for this PT to adjust her sling correctly. PCT arrived and assisted w/ return to supine, then set-up to begin a bath. Pt will benefit from skilled PT services to address her impaired, strength, balance, mobility and activity tolerance. Discussed w/ RN. PT to rec [facility] at [facility] at this time.    Anticipated Discharge Disposition (PT): inpatient rehabilitation facility

## 2025-03-10 NOTE — PROGRESS NOTES
Discharge Planning Assessment  [facility]     Patient Name: Blanca Harden  MRN: [id number]  Today's Date: 3/10/2025    Admit Date: 3/9/2025    Plan: [facility]   Discharge Needs Assessment       Row Name 03/10/25 1551       Living Environment    People in Home spouse    Current Living Arrangements apartment    Potentially Unsafe Housing Conditions none    In the past 12 months has the electric, gas, oil, or water company threatened to shut off services in your home? No    Primary Care Provided by spouse/significant other    Provides Primary Care For no one, unable/limited ability to care for self;spouse    Quality of Family Relationships helpful;involved;supportive    Able to Return to Prior Arrangements yes    Living Arrangement Comments will need rehab at discharge       Resource/Environmental Concerns    Resource/Environmental Concerns none    Transportation Concerns none       Transportation Needs    In the past 12 months, has lack of transportation kept you from medical appointments or from getting medications? no    In the past 12 months, has lack of transportation kept you from meetings, work, or from getting things needed for daily living? No       Food Insecurity    Within the past 12 months, you worried that your food would run out before you got the money to buy more. Never true    Within the past 12 months, the food you bought just didn't last and you didn't have money to get more. Never true       Transition Planning    Patient/Family Anticipates Transition to inpatient rehabilitation facility    Patient/Family Anticipated Services at Transition none    Transportation Anticipated health plan transportation       Discharge Needs Assessment    Readmission Within the Last 30 Days previous discharge plan unsuccessful    Equipment Currently Used at Home rollator    Concerns to be Addressed decision-making    Do you want help finding or keeping work or a job? I do not need or want help     Do you want help with school or training? For example, starting or completing job training or getting a high school diploma, GED or equivalent No    Anticipated Changes Related to Illness inability to care for self    Equipment Needed After Discharge none    Outpatient/Agency/Support Group Needs inpatient rehabilitation facility    Discharge Facility/Level of Care Needs rehabilitation facility;acute rehab    Provided Post Acute Provider List? Yes    Post Acute Provider List Inpatient Rehab;Nursing Home    Provided Post Acute Provider Quality & Resource List? Yes    Post Acute Provider Quality and Resource List Inpatient Rehab;Nursing Home    Delivered To Support Person    Method of Delivery In person    Offered/Gave Vendor List yes    Current Discharge Risk chronically ill                   Discharge Plan       Row Name 03/10/25 1608       Plan    Plan [facility]    Plan Comments Spoke with the patient's spouse at bedside and he would [facility] Rehab to evaluate. Called Dmitry with [facility] and left a secure message for him to evaluate for acute rehab at discharge. JACQUELINE Hawthorne Rn, CCP.      Row Name 03/10/25 7022       Plan    Plan Pending    Plan Comments The patient transferred to [facility] from ER on 3/10/25 @ 01:32. CCP will continue to follow for any needs. JACQUELINE Hawthorne RN, CCP                  Continued Care and Services - Admitted Since 3/9/2025       Destination       Service Provider Request Status Services Address Phone Fax Patient Pref[place]    [facility] [place] [id number] [facility] [id number] [phone] --                  Expected Discharge Date and Time       Expected Discharge Date Expected Discharge Time    Mar 14, 2025            Demographic Summary       Row Name 03/10/25 1228       General Information    Admission Type observation    Arrived From home    Referral Source  admission list    Reason for Consult discharge planning    Preferred Language English       Contact Information    Permission Granted to Share Info With     Contact Information Obtained for                    Functional Status       Row Name 03/10/25 0292       Functional Status    Usual Activity Tolerance poor    Current Activity Tolerance poor       Physical Activity    On average, how many days per week do you engage in moderate to strenuous exercise (like a brisk walk)? 4 days    On average, how many minutes do you engage in exercise at this level? 10 min    Number of minutes of exercise per week 40       Assessment of Health Literacy    How often do you have someone help you read hospital materials? Always    How often do you have problems learning about your medical condition because of difficulty understanding written information? Always    How often do you have a problem understanding what is told to you about your medical condition? Always    How confident are you filling out medical forms by yourself? Not at all    Health Literacy Fair       Functional Status, IADL    Medications assistive person    Meal Preparation assistive person    Housekeeping assistive person    Laundry assistive person    Shopping assistive person    If for any reason you need help with day-to-day activities such as bathing, preparing meals, shopping, managing finances, etc., do you get the help you need? I could use a little more help       Mental Status    General Appearance WDL WDL                   Psychosocial    No documentation.                  Abuse/Neglect    No documentation.                  Legal    No documentation.                  Substance Abuse    No documentation.                  Patient Forms    No documentation.                     Corinna Hawthorne RN

## 2025-03-10 NOTE — PROGRESS NOTES
Renal Dose Adjustment    Lovenox has been appropriately renally dose adjusted for kidney function based on P&T approved guideline and procedure.     Frank Nunez PharmD  Clinical Pharmacist

## 2025-03-10 NOTE — DISCHARGE PLACEMENT REQUEST
"Travis Sosa (79 y.o. Female)       Date of Birth   1945    Social Security Number       Address   [address] [place] [address] [address] [address]    Home Phone   [phone]    MRN   [id number]       Confucianist   Advent    Marital Status                               Admission Date   3/9/2025    Admission Type   Emergency    Admitting Provider   Praneeth Valenzuela MD    Attending Provider   Praneeth Valenzuela MD    Department, Room/Bed   [address], P485/1       Discharge Date       Discharge Disposition       Discharge Destination                                 Attending Provider: Praneeth Valenzuela MD    Allergies: Revefenacin, Aspirin, Sulfa Antibiotics    Isolation: None   Infection: None   Code Status: Prior    Ht: 152.4 cm (60\")   Wt: 81.6 kg (180 lb)    Admission Cmt: None   Principal Problem: Multiple rib fractures [S22.49XA]                   Active Insurance as of 3/9/2025       Primary Coverage       Payor Plan Insurance Group Employer/Plan Group    MEDICARE MEDICARE A & B        Payor Plan Address Payor Plan Phone Number Payor Plan Fax Number Effective Dates    [address] [phone]  6/1/2011 - None Entered    [facility] [address]         Subscriber Name Subscriber Birth Date Member ID       TRAVIS SOSA 1945 [id number]               Secondary Coverage       Payor Plan Insurance Group Employer/Plan Group    AARP [employer] SUP [employer] HE[employer] C[employer] OPTIONS [id number]       Payor Plan Address Payor Plan Phone Number Payor Plan Fax Number Effective Dates    [facility] [phone]  1/1/2016 - None Entered    [address]       [facility] [id number]         Subscriber Name Subscriber Birth Date Member ID       TRAVIS SOSA 1945 [id number]               Tertiary Coverage       Payor Plan Insurance Group Employer/Plan Group    [id number]        Payor Plan Address Payor Plan Phone Number Payor Plan Fax " Number Effective Dates    [address]   2/10/2025 - None Entered    [place] [id number]         Subscriber Name Subscriber Birth Date Member ID       TRAVIS CABRERA 1945 [id number]                     Emergency Contacts        (Rel.) Home Phone Work Phone Mobile Phone    Reyes,Alberto (Spouse) -- -- [phone]    Reyes,Travis (Daughter) -- -- [phone]

## 2025-03-11 LAB
ANION GAP SERPL CALCULATED.3IONS-SCNC: 8.2 MMOL/L (ref 5–15)
BASOPHILS # BLD AUTO: 0.04 10*3/MM3 (ref 0–0.2)
BASOPHILS NFR BLD AUTO: 0.5 % (ref 0–1.5)
BUN SERPL-MCNC: 30 MG/DL (ref 8–23)
BUN/CREAT SERPL: 21.3 (ref 7–25)
CALCIUM SPEC-SCNC: 8.8 MG/DL (ref 8.6–10.5)
CHLORIDE SERPL-SCNC: 106 MMOL/L (ref 98–107)
CO2 SERPL-SCNC: 21.8 MMOL/L (ref 22–29)
CREAT SERPL-MCNC: 1.41 MG/DL (ref 0.57–1)
DEPRECATED RDW RBC AUTO: 44.2 FL (ref 37–54)
EGFRCR SERPLBLD CKD-EPI 2021: 38.3 ML/MIN/1.73
EOSINOPHIL # BLD AUTO: 0.12 10*3/MM3 (ref 0–0.4)
EOSINOPHIL NFR BLD AUTO: 1.5 % (ref 0.3–6.2)
ERYTHROCYTE [DISTWIDTH] IN BLOOD BY AUTOMATED COUNT: 12.9 % (ref 12.3–15.4)
GLUCOSE BLDC GLUCOMTR-MCNC: 122 MG/DL (ref 70–130)
GLUCOSE BLDC GLUCOMTR-MCNC: 139 MG/DL (ref 70–130)
GLUCOSE BLDC GLUCOMTR-MCNC: 195 MG/DL (ref 70–130)
GLUCOSE BLDC GLUCOMTR-MCNC: 217 MG/DL (ref 70–130)
GLUCOSE SERPL-MCNC: 170 MG/DL (ref 65–99)
HCT VFR BLD AUTO: 31.7 % (ref 34–46.6)
HGB BLD-MCNC: 10.2 G/DL (ref 12–15.9)
IMM GRANULOCYTES # BLD AUTO: 0.13 10*3/MM3 (ref 0–0.05)
IMM GRANULOCYTES NFR BLD AUTO: 1.6 % (ref 0–0.5)
LYMPHOCYTES # BLD AUTO: 0.5 10*3/MM3 (ref 0.7–3.1)
LYMPHOCYTES NFR BLD AUTO: 6.3 % (ref 19.6–45.3)
MCH RBC QN AUTO: 30.4 PG (ref 26.6–33)
MCHC RBC AUTO-ENTMCNC: 32.2 G/DL (ref 31.5–35.7)
MCV RBC AUTO: 94.6 FL (ref 79–97)
MONOCYTES # BLD AUTO: 0.5 10*3/MM3 (ref 0.1–0.9)
MONOCYTES NFR BLD AUTO: 6.3 % (ref 5–12)
NEUTROPHILS NFR BLD AUTO: 6.65 10*3/MM3 (ref 1.7–7)
NEUTROPHILS NFR BLD AUTO: 83.8 % (ref 42.7–76)
NRBC BLD AUTO-RTO: 0 /100 WBC (ref 0–0.2)
PLATELET # BLD AUTO: 249 10*3/MM3 (ref 140–450)
PMV BLD AUTO: 10.2 FL (ref 6–12)
POTASSIUM SERPL-SCNC: 5.2 MMOL/L (ref 3.5–5.2)
RBC # BLD AUTO: 3.35 10*6/MM3 (ref 3.77–5.28)
SODIUM SERPL-SCNC: 136 MMOL/L (ref 136–145)
WBC NRBC COR # BLD AUTO: 7.94 10*3/MM3 (ref 3.4–10.8)

## 2025-03-11 PROCEDURE — 63710000001 PREDNISONE PER 5 MG: Performed by: INTERNAL MEDICINE

## 2025-03-11 PROCEDURE — 94761 N-INVAS EAR/PLS OXIMETRY MLT: CPT

## 2025-03-11 PROCEDURE — 82948 REAGENT STRIP/BLOOD GLUCOSE: CPT

## 2025-03-11 PROCEDURE — G0378 HOSPITAL OBSERVATION PER HR: HCPCS

## 2025-03-11 PROCEDURE — 63710000001 INSULIN GLARGINE PER 5 UNITS: Performed by: INTERNAL MEDICINE

## 2025-03-11 PROCEDURE — 96372 THER/PROPH/DIAG INJ SC/IM: CPT

## 2025-03-11 PROCEDURE — 97530 THERAPEUTIC ACTIVITIES: CPT

## 2025-03-11 PROCEDURE — 80048 BASIC METABOLIC PNL TOTAL CA: CPT | Performed by: INTERNAL MEDICINE

## 2025-03-11 PROCEDURE — 63710000001 INSULIN LISPRO (HUMAN) PER 5 UNITS: Performed by: INTERNAL MEDICINE

## 2025-03-11 PROCEDURE — 94799 UNLISTED PULMONARY SVC/PX: CPT

## 2025-03-11 PROCEDURE — 25010000002 ENOXAPARIN PER 10 MG: Performed by: INTERNAL MEDICINE

## 2025-03-11 PROCEDURE — 97535 SELF CARE MNGMENT TRAINING: CPT

## 2025-03-11 PROCEDURE — 85025 COMPLETE CBC W/AUTO DIFF WBC: CPT | Performed by: INTERNAL MEDICINE

## 2025-03-11 PROCEDURE — 94760 N-INVAS EAR/PLS OXIMETRY 1: CPT

## 2025-03-11 PROCEDURE — 97165 OT EVAL LOW COMPLEX 30 MIN: CPT

## 2025-03-11 PROCEDURE — 97116 GAIT TRAINING THERAPY: CPT

## 2025-03-11 PROCEDURE — 94664 DEMO&/EVAL PT USE INHALER: CPT

## 2025-03-11 RX ADMIN — PREDNISONE 5 MG: 5 TABLET ORAL at 17:37

## 2025-03-11 RX ADMIN — ASPIRIN 81 MG: 81 TABLET, COATED ORAL at 09:01

## 2025-03-11 RX ADMIN — ATORVASTATIN CALCIUM 80 MG: 20 TABLET, FILM COATED ORAL at 21:17

## 2025-03-11 RX ADMIN — HYDROCODONE BITARTRATE AND ACETAMINOPHEN 1 TABLET: 5; 325 TABLET ORAL at 04:39

## 2025-03-11 RX ADMIN — BISOPROLOL FUMARATE 20 MG: 5 TABLET ORAL at 21:17

## 2025-03-11 RX ADMIN — ACETAMINOPHEN 650 MG: 325 TABLET, FILM COATED ORAL at 21:18

## 2025-03-11 RX ADMIN — HYDROCODONE BITARTRATE AND ACETAMINOPHEN 1 TABLET: 5; 325 TABLET ORAL at 12:02

## 2025-03-11 RX ADMIN — AMLODIPINE BESYLATE 10 MG: 10 TABLET ORAL at 09:01

## 2025-03-11 RX ADMIN — ENOXAPARIN SODIUM 40 MG: 100 INJECTION SUBCUTANEOUS at 14:35

## 2025-03-11 RX ADMIN — INSULIN LISPRO 2 UNITS: 100 INJECTION, SOLUTION INTRAVENOUS; SUBCUTANEOUS at 12:02

## 2025-03-11 RX ADMIN — LISINOPRIL 40 MG: 40 TABLET ORAL at 09:01

## 2025-03-11 RX ADMIN — INSULIN GLARGINE 25 UNITS: 100 INJECTION, SOLUTION SUBCUTANEOUS at 21:18

## 2025-03-11 RX ADMIN — HYDROCODONE BITARTRATE AND ACETAMINOPHEN 1 TABLET: 5; 325 TABLET ORAL at 18:25

## 2025-03-11 RX ADMIN — INSULIN LISPRO 3 UNITS: 100 INJECTION, SOLUTION INTRAVENOUS; SUBCUTANEOUS at 21:18

## 2025-03-11 RX ADMIN — BUDESONIDE AND FORMOTEROL FUMARATE DIHYDRATE 2 PUFF: 160; 4.5 AEROSOL RESPIRATORY (INHALATION) at 09:20

## 2025-03-11 RX ADMIN — PANTOPRAZOLE SODIUM 40 MG: 40 TABLET, DELAYED RELEASE ORAL at 05:35

## 2025-03-11 RX ADMIN — EMPAGLIFLOZIN 25 MG: 25 TABLET, FILM COATED ORAL at 09:01

## 2025-03-11 RX ADMIN — BUDESONIDE AND FORMOTEROL FUMARATE DIHYDRATE 2 PUFF: 160; 4.5 AEROSOL RESPIRATORY (INHALATION) at 21:19

## 2025-03-11 NOTE — PROGRESS NOTES
Discharge Planning Assessment  [facility]     Patient Name: Blanca Harden  MRN: [id number]  Today's Date: 3/11/2025    Admit Date: 3/9/2025    Plan: [facility]   Discharge Needs Assessment    No documentation.                  Discharge Plan       Row Name 03/11/25 1334       Plan    Plan Comments Dmitry called from [facility] and the patient has been accepted when discharged. The patient is c/o pain and symptom mgmt being adjusted. JACQUELINE Hawthorne RN, CCP.                  Continued Care and Services - Admitted Since 3/9/2025       Destination       Service Provider Request Status Services Address Phone Fax Patient Preferred    [facility] [place] [place] [address] [facility] [address] [phone] --                  Expected Discharge Date and Time       Expected Discharge Date Expected Discharge Time    Mar 13, 2025            Demographic Summary    No documentation.                  Functional Status    No documentation.                  Psychosocial    No documentation.                  Abuse/Neglect    No documentation.                  Legal    No documentation.                  Substance Abuse    No documentation.                  Patient Forms    No documentation.                     Corinna Hawthorne RN

## 2025-03-11 NOTE — PLAN OF CARE
Goal Outcome Evaluation:  Plan of Care Reviewed With: patient        Progress: no change  Outcome Evaluation: Pt AOx4.  Pleasant and cooperative.  VSS. Purewick in place.  Norco given x1 for right side pain.   remains at bedside.  Will continue with plan of care.

## 2025-03-11 NOTE — THERAPY TREATMENT NOTE
Patient Name: Blanca Harden  : 1945    MRN: [id number]                              Today's Date: 3/11/2025       Admit Date: 3/9/2025    Visit Dx:     ICD-10-CM ICD-9-CM   1. Closed fracture of multiple ribs of right side, initial encounter  S22.41XA 807.09   2. Closed nondisplaced fracture of shaft of right clavicle, initial encounter  S42.024A 810.02   3. Mild closed head injury, initial encounter  S09.90XA 959.01     Patient Active Problem List   Diagnosis    Osteoporosis    Breast neoplasm, Tis (DCIS), left    Iron deficiency anemia    CKD (chronic kidney disease)    Diabetic nephropathy associated with type 2 diabetes mellitus    Temporal arteritis    Immunosuppression    Anemia    Duodenal adenoma    Gastritis without bleeding    Polyp of duodenum    Morbidly obese    Dyspnea on exertion    Hyperlipidemia    Type 2 diabetes mellitus with stage 3b chronic kidney disease, with long-term current use of insulin    Essential hypertension    Bilateral lower extremity edema    Pulmonary hypertension    Obstructive sleep apnea on CPAP    Stage 3a chronic kidney disease    Iron malabsorption    Respiratory distress    Hypoglycemia due to insulin    Delirium    Hypomagnesemia    Severe malnutrition    Leg swelling    Venous (peripheral) insufficiency    Polyp of colon    Obesity (BMI 30.0-34.9)    TIA (transient ischemic attack)    Difficulty with speech    Multiple rib fractures     Past Medical History:   Diagnosis Date    Anemia     Anxiety and depression     Asthma     Balance problem     CKD (chronic kidney disease), stage III     Colonic mass     COPD (chronic obstructive pulmonary disease)     Coronary artery disease     FOLLOWED BY DR GUNN    Diabetes mellitus, type 2     History of COVID-19     History of left breast cancer     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 Daniel Gutierrez, [facility]    CARDIAC CATHETERIZATION       SECTION N/A     x3    CHOLECYSTECTOMY N/A     COLON RESECTION Right 2025    Procedure: COLON RESECTION LAPAROSCOPIC RIGHT WITH DAVINCI ROBOT;  Surgeon: Ashwin Alvarado MD;  Location: [facility] MAIN OR;  Service: Robotics - DaVinci;  Laterality: Right;    COLONOSCOPY      COLONOSCOPY N/A 2024    Procedure: COLONOSCOPY into the cecum and TI with hot snare polypectomy;  Surgeon: Ashwin Alvarado MD;  Location: [facility] ENDOSCOPY;  Service: General;  Laterality: N/A;  pre-colon polyps  post-colon mass    COLONOSCOPY W/ POLYPECTOMY N/A 2019    Enlarged folds in the antrum: biopsied; duodenal, transverse colon x2, splenic flexure, descending colon and sigmoid colon polyps: biopsied (path: tubular adenoma x6)-Dr. Zak De Jesus, [facility]    ENDOSCOPY  10/11/2019    Procedure: ESOPHAGOGASTRODUODENOSCOPY with hot snare polypectomy;  Surgeon: Haile Handley MD;  Location: [facility] ENDOSCOPY;  Service: Gastroenterology    ENDOSCOPY N/A 2020    Procedure: ESOPHAGOGASTRODUODENOSCOPY;  Surgeon: Haile Handley MD;  Location: [facility] ENDOSCOPY;  Service: Gastroenterology    ENDOSCOPY N/A 2020    Procedure: ESOPHAGOGASTRODUODENOSCOPY WITH BIOPSIES AND COLD BIOPSY POLYPECTOMY;  Surgeon: Haile Handley MD;  Location: [facility] ENDOSCOPY;  Service: Gastroenterology;  Laterality: N/A;  pre:  dyspepsia and diarrhea  post: duodenal polyp and gastritis    ENDOSCOPY N/A 07/23/2024    Procedure: ESOPHAGOGASTRODUODENOSCOPY WITH hot snare polypectomy with clip placement x 2BIOPSY;  Surgeon: Ashwin Alvarado MD;  Location: [facility] ENDOSCOPY;  Service: General;  Laterality: N/A;  pre-hx duoedenal polyp  post-duodenal polyp; gastritis    EYE SURGERY      MASTECTOMY WITH SENTINEL NODE BIOPSY AND AXILLARY NODE DISSECTION Left 07/03/2019    Procedure: LEFT BREAST MASTECTOMY WITH SENTINEL NODE BIOPSY AND , v-y plasty closure;  Surgeon: aThmina James MD;  Location: [facility] MAIN OR;  Service: General    SUBTOTAL HYSTERECTOMY Bilateral     ovaries still in tact    TEMPORAL ARTERY BIOPSY Bilateral 12/05/2019      General Information       Row Name 03/11/25 1023          Physical Therapy Time and Intention    Document Type therapy note (daily note)  -MG     Mode of Treatment co-treatment;occupational therapy;physical therapy;other (see comments)  -MG       Row Name 03/11/25 1023          General Information    Patient Profile Reviewed yes  -MG     Existing Precautions/Restrictions fall;right;non-weight bearing  RUE NWB w/ sling  -MG     Barriers to Rehab language barrier  -MG       Row Name 03/11/25 1023          Cognition    Orientation Status (Cognition) oriented x 3  -MG       Row Name 03/11/25 1023          Safety Issues/Impairments Affecting Functional Mobility    Safety Issues Affecting Function (Mobility) insight into deficits/self-awareness;sequencing abilities;problem-solving;safety precaution awareness  -MG     Impairments Affecting Function (Mobility) balance;endurance/activity tolerance;pain;coordination;strength  -MG     Comment, Safety Issues/Impairments (Mobility) Co treatment medically appropriate and necessary due to patient acuity level, activity tolerance and safety of patient and staff. Treatment is focusing on progression of care and goals established in the POC. Gait belt, sling and non-skid  socks donned.  -MG               User Key  (r) = Recorded By, (t) = Taken By, (c) = Cosigned By      Initials Name Provider Type    MG Dena Royal, PT Physical Therapist                   Mobility       Row Name 03/11/25 1023          Bed Mobility    Supine-Sit [place] (Bed Mobility) minimum assist (75% patient effort);moderate assist (50% patient effort);2 person assist;verbal cues;nonverbal cues (demo/gesture)  -MG     Assistive Device (Bed Mobility) head of bed elevated;bed rails  -MG     Comment, (Bed Mobility) Increased time to complete. Cues for hand placement and sequencing. Once sitting pt required Mod/MaxA with use of draw sheet to scoot hips out.  -MG       Row Name 03/11/25 1023          Sit-Stand Transfer    Sit-Stand [place] (Transfers) minimum assist (75% patient effort);moderate assist (50% patient effort);2 person assist;verbal cues  -MG     Assistive Device (Sit-Stand Transfers) other (see comments)  -MG     Comment, (Sit-Stand Transfer) L HHA, R gait belt. Pt reporting feeling imbalanced in standing.  -MG       Row Name 03/11/25 1023          Gait/Stairs (Locomotion)    [place] Level (Gait) minimum assist (75% patient effort);moderate assist (50% patient effort);2 person assist;verbal cues  -MG     Assistive Device (Gait) other (see comments)  L HHA, R use of gait belt.  -MG     Distance in Feet (Gait) 6  -MG     Deviations/Abnormal Patterns (Gait) gait speed decreased;antalgic;stride length decreased;base of support, narrow  -MG     Bilateral Gait Deviations forward flexed posture;heel strike decreased  -MG     Left Sided Gait Deviations weight shift ability decreased  Decreased stance time  -MG     Right Sided Gait Deviations --  Decreased stride length  -MG     Comment, (Gait/Stairs) Cues for posture, sequencing and to push down on this PTs hand on the L. Pt having difficulty with L weight shifting and increasing stance time in order to step with RLE. Mild unsteadiness  throughout. No overt LOB or buckling.  -MG       Row Name 03/11/25 1023          Mobility    Extremity Weight-bearing Status right upper extremity  -MG     Right Upper Extremity (Weight-bearing Status) non weight-bearing (NWB)  -MG               User Key  (r) = Recorded By, (t) = Taken By, (c) = Cosigned By      Initials Name Provider Type    MG Dena Royal, PT Physical Therapist                   Obj/Interventions       Row Name 03/11/25 1028          Balance    Comment, Balance Sitting EOB initially with Min/ModA until her feet were placed firmly on the floor, then she was progressed to SBA. OT assisted w/ donning her sling while sitting EOB w/ improved pain tolerance. Some unsteadiness in standing, but no overt LOB or buckling.  -MG               User Key  (r) = Recorded By, (t) = Taken By, (c) = Cosigned By      Initials Name Provider Type    MG Dena Royal, PT Physical Therapist                   Goals/Plan    No documentation.                  Clinical Impression       Row Name 03/11/25 1029          Pain    Pain Location extremity  -MG     Pain Side/Orientation right;upper  -MG     Pain Management Interventions nursing notified;positioning techniques utilized;breathing exercises utilized  -MG     Response to Pain Interventions activity participation with tolerable pain  -MG     Pre/Posttreatment Pain Comment c/o RUE and R rib pain. States 0/10 at rest, does not rate w/ mobility, but demos increased pain.  -MG       Row Name 03/11/25 1029          Plan of Care Review    Plan of Care Reviewed With patient;spouse  -MG     Progress improving  -MG     Outcome Evaluation Pt seen for PT/OT cotx this AM and tolerated the session well. Today, pt was Min/ModA x2 for bed mobility, STS and ambulation of 6' w/ L HHA and use of gait belt on the R. Pt tolerated sitting EOB initially with Min/ModA until she was able to scoot out w/ Mod/MaxA and use of draw sheet, then progressed to SBA. OT assisted w/ donning of the  sling with improvement in her pain tolerance. Pt demos unsteadiness, decreased L weight shifting and stance time, and decreased R stride length with ambulation. No overt LOB, buckling, dizziness or SOB. RN present at end of session and discussed transfer with her. Discussed donning and alignment of sling, NWB status and DC recs w/ spouse. PT will cont to follow and rec IRF at DC.  -MG       Row Name 03/11/25 1029          Therapy Assessment/Plan (PT)    Rehab Potential (PT) good  -MG     Criteria for Skilled Interventions Met (PT) yes  -MG     Therapy Frequency (PT) 5 times/wk  -MG       Row Name 03/11/25 1029          Positioning and Restraints    Pre-Treatment Position in bed  -MG     Post Treatment Position chair  -MG     In Chair reclined;call light within reach;encouraged to call for assist;exit alarm on;with family/caregiver;with nsg;with brace;RUE elevated;legs elevated  -MG               User Key  (r) = Recorded By, (t) = Taken By, (c) = Cosigned By      Initials Name Provider Type    Dena Logan, PT Physical Therapist                   Outcome Measures       Row Name 03/11/25 1034 03/11/25 0900       How much help from another person do you currently need...    Turning from your back to your side while in flat bed without using bedrails? 2  -MG 2  -LR    Moving from lying on back to sitting on the side of a flat bed without bedrails? 2  -MG 2  -LR    Moving to and from a bed to a chair (including a wheelchair)? 2  -MG 2  -LR    Standing up from a chair using your arms (e.g., wheelchair, bedside chair)? 2  -MG 2  -LR    Climbing 3-5 steps with a railing? 1  -MG 2  -LR    To walk in hospital room? 2  -MG 3  -LR    AM-PAC 6 Clicks Score (PT) 11  -MG 13  -LR              User Key  (r) = Recorded By, (t) = Taken By, (c) = Cosigned By      Initials Name Provider Type    Dena Logan, PT Physical Therapist    LR Marilyn Crawford, RN Registered Nurse                                 Physical Therapy  Education       Title: PT OT SLP Therapies (In Progress)       Topic: Physical Therapy (In Progress)       Point: Mobility training (Done)       Learning Progress Summary            Patient Acceptance, E,D, VU,NR by MG at 3/11/2025 1034    Acceptance, E,D, VU,NR by MG at 3/10/2025 1543   Significant Other Acceptance, E,D, VU,NR by MG at 3/10/2025 1543                      Point: Home exercise program (Not Started)       Learner Progress:  Not documented in this visit.              Point: Body mechanics (Done)       Learning Progress Summary            Patient Acceptance, E,D, VU,NR by MG at 3/11/2025 1034    Acceptance, E,D, VU,NR by MG at 3/10/2025 1543   Significant Other Acceptance, E,D, VU,NR by MG at 3/10/2025 1543                      Point: Precautions (Done)       Learning Progress Summary            Patient Acceptance, E,D, VU,NR by MG at 3/11/2025 1034    Acceptance, E,D, VU,NR by MG at 3/10/2025 1543   Significant Other Acceptance, E,D, VU,NR by MG at 3/10/2025 1543                                      User Key       Initials Effective Dates Name Provider Type Discipline     05/24/22 -  Dena Royal, PT Physical Therapist PT                  PT Recommendation and Plan  Planned Therapy Interventions (PT): balance training, bed mobility training, gait training, home exercise program, patient/family education, stretching, strengthening, neuromuscular re-education, ROM (range of motion), postural re-education, transfer training  Progress: improving  Outcome Evaluation: Pt seen for PT/OT cotx this AM and tolerated the session well. Today, pt was Min/ModA x2 for bed mobility, STS and ambulation of 6' w/ L HHA and use of gait belt on the R. Pt tolerated sitting EOB initially with Min/ModA until she was able to scoot out w/ Mod/MaxA and use of draw sheet, then progressed to SBA. OT assisted w/ donning of the sling with improvement in her pain tolerance. Pt demos unsteadiness, decreased L weight shifting and  stance time, and decreased R stride length with ambulation. No overt LOB, buckling, dizziness or SOB. RN present at end of session and discussed transfer with her. Discussed donning and alignment of sling, NWB status and DC recs w/ spouse. PT will cont to follow and rec IRF at DC.     Time Calculation:         PT Charges       Row Name 03/11/25 1035             Time Calculation    Start Time 0840  -MG      Stop Time 0906  -MG      Time Calculation (min) 26 min  -MG      PT Received On 03/11/25  -MG      PT - Next Appointment 03/12/25  -MG                User Key  (r) = Recorded By, (t) = Taken By, (c) = Cosigned By      Initials Name Provider Type    MG Dena Royal, PT Physical Therapist                  Therapy Charges for Today       Code Description Service Date Service Provider Modifiers Qty    22823560138 HC PT EVAL MOD COMPLEXITY 3 3/10/2025 Dena Royal, PT GP 1    86316846293 HC PT THERAPEUTIC ACT EA 15 MIN 3/10/2025 Dena Royal, PT GP 1    83457176577 HC PT THERAPEUTIC ACT EA 15 MIN 3/11/2025 Dena Royal, PT GP 1    54299241292 HC GAIT TRAINING EA 15 MIN 3/11/2025 Dena Royal, PT GP 1            PT G-Codes  AM-PAC 6 Clicks Score (PT): 11  PT Discharge Summary  Anticipated Discharge Disposition (PT): inpatient rehabilitation facility    Dena Royal PT  3/11/2025

## 2025-03-11 NOTE — PLAN OF CARE
Goal Outcome Evaluation:  Plan of Care Reviewed With: patient, spouse           Outcome Evaluation: Pt AOx4. VSS. Norco given x 2 for pain. Pt up in chair several hours, back to bed this evening. Purewick in place.  at bedside. Ortho consult ordered today. Continue to monitor for needs.

## 2025-03-11 NOTE — PLAN OF CARE
Goal Outcome Evaluation:  Plan of Care Reviewed With: patient, spouse        Progress: improving  Outcome Evaluation: Pt is a 80 y/o F with recent fall after only home from rehab a couple days. Pt was at IRF for Acute CVA within the left corona radiata/basal ganglia and progressed to (I) w/ walker use. Imaging (+) rib 2-4 fxs and comminuted mid R clavicle fx. Pt is NWB to RUE and in a sling at this time. PMH: anemia, asthma, HTN, DM2 and CKD. Pt with increased fearfullness w/ mvmts and guarding, with difficulty w/ RLE foot progression in standing to take ~6 steps over to chair following EOB sitting balance/ADL retraining. Pt would benefit from cont'd rehabt at IRF when stable to return to highest level of (I)/function before home. Spouse in agreement w/ POC. Awaiting ortho recs for R clavicle fx.    Anticipated Discharge Disposition (OT): inpatient rehabilitation facility

## 2025-03-11 NOTE — PROGRESS NOTES
DAILY PROGRESS NOTE  [facility], [facility]    3/11/2025    Patient Identification:  Name: Blanca Harden  Age: 79 y.o.  Sex: female  :  1945  MRN: [id number]           Primary Care Physician: Praneeth Valenzuela MD    Subjective:    Interval History:    Patient complaining of pain on the right side of the chest as well as right clavicular pain.  Did take 1 dose of Norco, it helped.  Physical therapy evaluation reviewed  Vital signs stable, saturation room air is 96%.  Afebrile.  Potassium 5.2, creatinine 1.41, blood sugar in the mid 100s.  Hemoglobin 10.2.    ROS:     No report for nausea, vomiting, diarrhea, constipation, chest pain, shortness of breath.    Objective:    Scheduled Meds:  amLODIPine, 10 mg, Oral, Q24H  aspirin, 81 mg, Oral, Daily  atorvastatin, 80 mg, Oral, Nightly  bisoprolol, 20 mg, Oral, Nightly  budesonide-formoterol, 2 puff, Inhalation, BID  empagliflozin, 25 mg, Oral, Daily  enoxaparin sodium, 40 mg, Subcutaneous, Q24H  insulin glargine, 25 Units, Subcutaneous, Nightly  insulin lispro, 2-7 Units, Subcutaneous, 4x Daily AC & at Bedtime  lisinopril, 40 mg, Oral, Daily  pantoprazole, 40 mg, Oral, Q AM  predniSONE, 5 mg, Oral, Q PM        Continuous Infusions:       PRN Meds:    acetaminophen    albuterol    dextrose    dextrose    glucagon (human recombinant)    HYDROcodone-acetaminophen    loperamide    O2    [COMPLETED] Insert Peripheral IV **AND** sodium chloride    traMADol    Intake/Output:    Intake/Output Summary (Last 24 hours) at 3/11/2025 0951  Last data filed at 3/11/2025 0829  Gross per 24 hour   Intake 960 ml   Output 1100 ml   Net -140 ml         Exam:    T MAX 24 hrs: Temp (24hrs), Av.7 °F (36.5 °C), Min:97.1 °F (36.2 °C), Max:97.9 °F (36.6 °C)    Vitals Ranges:   Temp:  [97.1 °F (36.2 °C)-97.9 °F (36.6 °C)] 97.9 °F (36.6 °C)  Heart Rate:  [70-90] 75  Resp:  [16-20] 18  BP: (122-165)/(56-73) 122/60    /60   Pulse 75   " Temp 97.9 °F (36.6 °C) (Oral)   Resp 18   Ht 152.4 cm (60\")   Wt 81.6 kg (180 lb)   LMP  (LMP Unknown)   SpO2 96%   BMI 35.15 kg/m²     General: Alert, oriented x 3. Cooperative, complaining of pain on the right shoulder and right side of the chest but better with Tylenol.  Obese.  Neck: Supple, symmetrical, trachea midline, no adenopathy;              thyroid:  no enlargement/tenderness/nodules;              no carotid bruit or JVD.  Tenderness, mild, on the right side of the neck.  Cardiovascular: Normal rate, regular rhythm and intact distal pulses.              Exam reveals no gallop and no friction rub. No murmur heard  Pulmonary: Diminished breath sounds, worse on the right side.  No wheezing, no rales.    Abdominal: Soft, nontender, bowel sounds active all four quadrants,     no masses, no hepatomegaly, no splenomegaly.   Extremities: Tenderness on the right shoulder and right proximal upper arm.   Pulses: 2 + symmetric all extremities  Neurological: Patient is alert and oriented to person, place, and time.  No slurred speech at this time.  Mild right lower extremity weakness from recent stroke.  No new focal sensorimotor deficit.  Skin: Skin color, texture, normal. Turgor is decreased. No rashes or lesions         Data Review:    Results from last 7 days   Lab Units 03/11/25  0532 03/10/25  0619 03/10/25  0035   WBC 10*3/mm3 7.94 9.97 10.31   HEMOGLOBIN g/dL 10.2* 9.9* 10.2*   HEMATOCRIT % 31.7* 31.0* 31.7*   PLATELETS 10*3/mm3 249 251 220       Results from last 7 days   Lab Units 03/11/25  0532 03/10/25  0619 03/10/25  0035   SODIUM mmol/L 136 137 132*   POTASSIUM mmol/L 5.2 5.0 4.8   CHLORIDE mmol/L 106 106 104   CO2 mmol/L 21.8* 20.6* 17.6*   BUN mg/dL 30* 29* 33*   CREATININE mg/dL 1.41* 1.23* 1.38*   CALCIUM mg/dL 8.8 9.0 9.0   BILIRUBIN mg/dL  --   --  0.3   ALK PHOS U/L  --   --  112   ALT (SGPT) U/L  --   --  14   AST (SGOT) U/L  --   --  18   GLUCOSE mg/dL 170* 158* 228*                 Lab " Results   Lab Value Date/Time    TROPONINT 34 (H) 10/01/2023 1149    TROPONINT 56 (C) 06/30/2023 1959    TROPONINT 61 (C) 06/30/2023 1601    TROPONINT 28 (H) 05/21/2023 1920    TROPONINT 25 (H) 05/21/2023 1615       Microbiology Results (last 10 days)       ** No results found for the last 240 hours. **             Imaging Results (Last 7 Days)       Procedure Component Value Units Date/Time    CT Chest Without Contrast Diagnostic [[id number]] Collected: 03/09/25 2222     Updated: 03/09/25 2229    Narrative:      CT CHEST WITHOUT CONTRAST;     HISTORY: Fall, right upper chest tenderness and bruising     COMPARISON: Chest CT 1/30/2025     TECHNIQUE: Helical CT was performed of the chest from the lung apices  through the upper abdomen without IV  contrast. Multiplanar reformats  were created and reviewed. Radiation dose reduction techniques were  utilized, including automated exposure control, and exposure modulation  based on body size.     FINDINGS:     There is no mediastinal adenopathy or other mass.   The thoracic aorta  is normal in caliber. There are modest coronary atherosclerotic vascular  calcifications.     There is no acute pulmonary infiltrate, but there is right greater than  left bibasilar linear parenchymal opacity, almost certainly due to  atelectasis. There is no effusion or pneumothorax.     Images of the upper abdomen demonstrate no acute abnormality.     There is a comminuted mid right clavicle fracture. There are acute right  second through fourth rib fractures, including segmental fractures of  the second and third ribs.       Impression:         Acute fractures of the right clavicle and second through fourth ribs,  including segmental right second and third rib fractures.     Otherwise negative unenhanced chest CT.        This report was finalized on 3/9/2025 10:26 PM by Dr. Sachin Michelle M.D on Workstation: [id number]       CT Head Without Contrast [[id number]] Collected: 03/09/25 2217      Updated: 03/09/25 2220    Narrative:      NON-CONTRAST CT HEAD & CT CERVICAL SPINE     REASON:  The patient is being seen in the ED for trauma and is  determined to have a high energy mechanism and/or high risk for missed  injuries due to presence of associated injuries or distracting pain. The  patient will need imaging of the head per the trauma protocol given  diagnoses such as intracranial hemorrhage cannot be excluded.    The  patient will need imaging of the cervical spine given diagnoses such as  cervical spine fracture cannot be excluded.  The diagnostic information  gained in this study exceeds the estimated risk of radiation induced  injury at the time of order placement.  Mechanism of injury: Fall     COMPARISON STUDIES:  None Available.     TECHNIQUE:  Axial images were acquired from the skull base to vertex  without contrast, including multiplanar reformats, per standard  departmental protocol.   Routine cervical spine CT without contrast.  Multiplanar reformats were created. Radiation dose reduction techniques  were utilized, including automated exposure control, and exposure  modulation based on body size.     FINDINGS:     Head CT: There is no CT evidence of acute intracranial hemorrhage, mass,  or infarct. There is volume loss, but there is no evidence of  hydrocephalus or extra-axial fluid collection.  There are moderate  nonspecific white matter changes, but there is no acute intracranial  abnormality.     Skull base and calvarium show no acute abnormality, and the extracranial  soft tissues show no acute abnormality. There are chronic appearing  changes in the left maxillary sinus.     C-spine CT: Cervical spine alignment is within normal limits.  There is  no cervical spine fracture or other acute cervical spine abnormality.  There is a comminuted mid right clavicle fracture, partially seen here.       Impression:         Negative unenhanced head CT, senescent change without acute abnormality.         Negative cervical spine CT.     Comminuted mid right clavicle fracture, partially demonstrated.           This report was finalized on 3/9/2025 10:21 PM by Dr. Sachin Michelle M.D on Workstation: [id number]       CT Cervical Spine Without Contrast [[id number]] Collected: 03/09/25 2217     Updated: 03/09/25 2225    Narrative:      NON-CONTRAST CT HEAD & CT CERVICAL SPINE     REASON:  The patient is being seen in the ED for trauma and is  determined to have a high energy mechanism and/or high risk for missed  injuries due to presence of associated injuries or distracting pain. The  patient will need imaging of the head per the trauma protocol given  diagnoses such as intracranial hemorrhage cannot be excluded.    The  patient will need imaging of the cervical spine given diagnoses such as  cervical spine fracture cannot be excluded.  The diagnostic information  gained in this study exceeds the estimated risk of radiation induced  injury at the time of order placement.  Mechanism of injury: Fall     COMPARISON STUDIES:  None Available.     TECHNIQUE:  Axial images were acquired from the skull base to vertex  without contrast, including multiplanar reformats, per standard  departmental protocol.   Routine cervical spine CT without contrast.  Multiplanar reformats were created. Radiation dose reduction techniques  were utilized, including automated exposure control, and exposure  modulation based on body size.     FINDINGS:     Head CT: There is no CT evidence of acute intracranial hemorrhage, mass,  or infarct. There is volume loss, but there is no evidence of  hydrocephalus or extra-axial fluid collection.  There are moderate  nonspecific white matter changes, but there is no acute intracranial  abnormality.     Skull base and calvarium show no acute abnormality, and the extracranial  soft tissues show no acute abnormality. There are chronic appearing  changes in the left maxillary sinus.     C-spine CT:  Cervical spine alignment is within normal limits.  There is  no cervical spine fracture or other acute cervical spine abnormality.  There is a comminuted mid right clavicle fracture, partially seen here.       Impression:         Negative unenhanced head CT, senescent change without acute abnormality.        Negative cervical spine CT.     Comminuted mid right clavicle fracture, partially demonstrated.           This report was finalized on 3/9/2025 10:21 PM by Dr. Sachin Michelle M.D on Workstation: [id number]       XR Shoulder 2+ View Right [[id number]] Collected: 03/09/25 2051     Updated: 03/09/25 2056    Narrative:         XR SHOULDER 2+ VW RIGHT-, XR HUMERUS RIGHT-          HISTORY:   Fall, right shoulder pain      TECHNIQUE: 3 views of the right shoulder; Two views of the right  humerus.     FINDINGS:     Shoulder: Degenerative change, no evidence of acute fracture,  dislocation, or radiopaque foreign body.     Humerus: Negative for acute fracture, dislocation, or radiopaque foreign  body.       Impression:         No acute abnormality.        This report was finalized on 3/9/2025 8:53 PM by Dr. Sachin Michelle M.D  on Workstation: [id number]       XR Humerus Right [[id number]] Collected: 03/09/25 2051     Updated: 03/09/25 2056    Narrative:         XR SHOULDER 2+ VW RIGHT-, XR HUMERUS RIGHT-          HISTORY:   Fall, right shoulder pain      TECHNIQUE: 3 views of the right shoulder; Two views of the right  humerus.     FINDINGS:     Shoulder: Degenerative change, no evidence of acute fracture,  dislocation, or radiopaque foreign body.     Humerus: Negative for acute fracture, dislocation, or radiopaque foreign  body.       Impression:         No acute abnormality.        This report was finalized on 3/9/2025 8:53 PM by Dr. Sachin Michelle M.D  on Workstation: [id number]                 Assessment:        Multiple rib fractures  Acute comminuted mid right clavicular fracture  Uncontrolled diabetes  mellitus  Chronic systolic congestive heart failure  Morbid obesity affecting all aspects of care  Hypertension  Pulmonary hypertension  Obstructive sleep apnea  Moderate persistent asthma  Coronary artery disease  Chronic kidney disease stage IIIa  Iron deficient anemia  Temporal arteritis on long-term steroids  Status post right knee colectomy  Status post left mastectomy due to breast cancer           Plan:    Patient main complaint is pain in the right side of the chest as well as in the right clavicular area.  Encouraged to take pain medication when needed.  For mild pain Tylenol should be okay.  Will ask orthopedics if a different management other than conservative manage may benefit patient.  Continue workup physical therapy.  Patient may need acute rehabilitation again.  Monitor and correct electrolytes, laboratory data okay.  Monitor renal function, stable  Monitor volume status, stable  Adjust insulin as needed, blood sugar in the mid 100s.  Monitor mental status, at baseline.  Continue DVT/stress ulcer prophylaxis  Labs in am      Praneeth Valenzuela MD  3/11/2025  09:51 EDT

## 2025-03-11 NOTE — CONSULTS
Orthopaedic Consultation      Patient: Blanca Harden    Date of Admission: 3/9/2025  8:15 PM    YOB: 1945    Medical Record Number: [id number]    Attending Physician:  Praneeth Valenzuela MD    Consulting Physician:  Marcia Craig PA-C        Chief Complaints: Right shoulder pain    History of Present Illness: 79 y.o. female admitted to [facility] after an acute fall on Sunday.  Patient was accompanied in the room by her .  She reports on Sunday she lost her balance and fell at home onto her right shoulder.  She does not recall if she hit her head but did not lose consciousness.  She was having worsening right shoulder and chest pain.  She has a history of a stroke a few weeks ago that has led to some right sided weakness.  Her  has been aiding her in her daily activities.  X-rays of the right shoulder were performed in the hospital which did not show any acute abnormalities.  A CT of her head and chest was performed which revealed a comminuted mid right clavicle fracture.  I was consulted for further evaluation and treatment.     Allergies:   Allergies   Allergen Reactions    Revefenacin Shortness Of Breath    Aspirin Nausea Only     ABDOMINAL PAIN     Sulfa Antibiotics Unknown (See Comments)     CHILDHOOD REACTION        Medications:   Home Medications:  Medications Prior to Admission   Medication Sig Dispense Refill Last Dose/Taking    albuterol (ACCUNEB) 1.25 MG/3ML nebulizer solution Take  by nebulization Every 4 (Four) Hours As Needed for Wheezing or Shortness of Air.   3/9/2025    aspirin 81 MG EC tablet Take 1 tablet by mouth Daily. 30 tablet 3 3/9/2025    atorvastatin (LIPITOR) 80 MG tablet Take 1 tablet by mouth Every Night. 90 tablet 3 Past Week    bisoprolol (ZEBeta) 10 MG tablet Take 2 tablets by mouth Every Night.   Past Week    empagliflozin (JARDIANCE) 25 MG tablet tablet Take 1 tablet by mouth Daily. 30 tablet 3 3/9/2025    Insulin Lispro, 1 Unit Dial,  (HumaLOG KwikPen) 100 UNIT/ML solution pen-injector Inject 2-7 Units under the skin into the appropriate area as directed 4 (Four) Times a Day Before Meals & at Bedtime. 15 mL 12 3/9/2025    lisinopril (PRINIVIL,ZESTRIL) 40 MG tablet Take 1 tablet by mouth Daily.   3/9/2025    omeprazole (priLOSEC) 20 MG capsule Take 2 capsules by mouth Daily. 60 capsule 1 3/9/2025    predniSONE (DELTASONE) 5 MG tablet Take 1 tablet by mouth Every Evening.   3/9/2025    Symbicort 160-4.5 MCG/ACT inhaler Inhale 2 puffs 2 (Two) Times a Day.   3/9/2025    Toujeo SoloStar 300 UNIT/ML solution pen-injector injection Inject 25 Units under the skin into the appropriate area as directed Every Night.   Past Week    acetaminophen (TYLENOL) 325 MG tablet Take 2 tablets by mouth Every 6 (Six) Hours As Needed for Mild Pain. 30 tablet 3     amLODIPine (NORVASC) 10 MG tablet Take 1 tablet by mouth Daily. 30 tablet 3 Unknown    BD Pen Needle Tila 2nd Gen 32G X 4 MM misc USE TO GIVE INSULIN 4 TIMES DAILY       hydrocortisone-bacitracin-zinc oxide-nystatin Apply  topically to the appropriate area as directed 3 (Three) Times a Day. 100 g 3 Unknown    loperamide (IMODIUM) 2 MG capsule Take 1 capsule by mouth 4 (Four) Times a Day As Needed for Diarrhea. 30 capsule 3 Unknown    miconazole (MICOTIN) 2 % powder Apply  topically to the appropriate area as directed Every 12 (Twelve) Hours. 71 g 1 Unknown    O2 (OXYGEN) Inhale 3 L/min As Needed.       traMADol (ULTRAM) 50 MG tablet Take 1 tablet by mouth Every 6 (Six) Hours As Needed for Moderate Pain. 10 tablet 0        Current Medications:  Scheduled Meds:amLODIPine, 10 mg, Oral, Q24H  aspirin, 81 mg, Oral, Daily  atorvastatin, 80 mg, Oral, Nightly  bisoprolol, 20 mg, Oral, Nightly  budesonide-formoterol, 2 puff, Inhalation, BID  empagliflozin, 25 mg, Oral, Daily  enoxaparin sodium, 40 mg, Subcutaneous, Q24H  insulin glargine, 25 Units, Subcutaneous, Nightly  insulin lispro, 2-7 Units, Subcutaneous, 4x  Daily AC & at Bedtime  lisinopril, 40 mg, Oral, Daily  pantoprazole, 40 mg, Oral, Q AM  predniSONE, 5 mg, Oral, Q PM      Continuous Infusions:   PRN Meds:.  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 Daniel Gutierrez, [facility]    CARDIAC CATHETERIZATION       SECTION N/A     x3    CHOLECYSTECTOMY N/A     COLON RESECTION Right 2025    Procedure: COLON RESECTION LAPAROSCOPIC RIGHT WITH DAVINCI ROBOT;  Surgeon: Ashwin Alvarado MD;  Location: [facility] OR;  Service: Robotics - [employer];  Laterality: Right;    COLONOSCOPY      COLONOSCOPY N/A 2024    Procedure: COLONOSCOPY into the cecum and TI with hot snare polypectomy;  Surgeon: Ashwin Alvarado MD;  Location: [facility] ENDOSCOPY;  Service: General;   Laterality: N/A;  pre-colon polyps  post-colon mass    COLONOSCOPY W/ POLYPECTOMY N/A 03/12/2019    Enlarged folds in the antrum: biopsied; duodenal, transverse colon x2, splenic flexure, descending colon and sigmoid colon polyps: biopsied (path: tubular adenoma x6)-Dr. Zak De Jesus, [facility]    ENDOSCOPY  10/11/2019    Procedure: ESOPHAGOGASTRODUODENOSCOPY with hot snare polypectomy;  Surgeon: Haile Handley MD;  Location: [facility] ENDOSCOPY;  Service: Gastroenterology    ENDOSCOPY N/A 01/29/2020    Procedure: ESOPHAGOGASTRODUODENOSCOPY;  Surgeon: Haile Handley MD;  Location: [facility] ENDOSCOPY;  Service: Gastroenterology    ENDOSCOPY N/A 09/09/2020    Procedure: ESOPHAGOGASTRODUODENOSCOPY WITH BIOPSIES AND COLD BIOPSY POLYPECTOMY;  Surgeon: Haile Handley MD;  Location: [facility] ENDOSCOPY;  Service: Gastroenterology;  Laterality: N/A;  pre: dyspepsia and diarrhea  post: duodenal polyp and gastritis    ENDOSCOPY N/A 07/23/2024    Procedure: ESOPHAGOGASTRODUODENOSCOPY WITH hot snare polypectomy with clip placement x 2BIOPSY;  Surgeon: Ashwin Alvarado MD;  Location: [facility] ENDOSCOPY;  Service: General;  Laterality: N/A;  pre-hx duoedenal polyp  post-duodenal polyp; gastritis    EYE SURGERY      MASTECTOMY WITH SENTINEL NODE BIOPSY AND AXILLARY NODE DISSECTION Left 07/03/2019    Procedure: LEFT BREAST MASTECTOMY WITH SENTINEL NODE BIOPSY AND , v-y plasty closure;  Surgeon: Tahmina James MD;  Location: [facility] MAIN OR;  Service: General    SUBTOTAL HYSTERECTOMY Bilateral     ovaries still in tact    TEMPORAL ARTERY BIOPSY Bilateral 12/05/2019     Social History     Occupational History    Occupation: POSTAL SERVICE     Employer: RETIRED   Tobacco Use    Smoking status: Never    Smokeless tobacco: Never    Tobacco comments:     caffine use   Vaping Use    Vaping status: Never Used   Substance and Sexual Activity    Alcohol use: No    Drug use: No    Sexual activity: Not  Currently     Comment: Spouse, Ashwin      Social History     Social History Narrative    Not on file     Family History   Problem Relation Age of Onset    Heart disease Mother     Stroke Mother     Asthma Mother     Hypertension Father     Stomach cancer Father     Diabetes Father     Esophageal cancer Father     Cancer Father     Throat cancer Sister     Diabetes Paternal Grandmother     Hypertension Paternal Grandfather     Colon cancer Paternal Grandfather     Colon cancer Paternal Uncle     Colon cancer Paternal Uncle     Colon cancer Paternal Uncle     Ovarian cancer Cousin     Stomach cancer Cousin     Malig Hyperthermia Neg Hx          Review of Systems:   No other pertinent positives or negatives other than what is mentioned in the HPI and below.  Constitutional: Negative for fatigue, fever, or weight loss  HEENT: No active headache.  Pulmonary: Patient denies SOA.  Cardiovascular: Patient denies any chest pain.  Gastrointestinal:  Patient denies active vomiting or diarrhea.  Musculoskeletal: Positive for right shoulder pain.  Neurological: Patient denies active dizziness or loss of consciousness.  Skin: Patient denies any active bleeding.    Vital signs in last 24 hours:  Temp:  [97.1 °F (36.2 °C)-97.9 °F (36.6 °C)] 97.9 °F (36.6 °C)  Heart Rate:  [70-90] 76  Resp:  [16-20] 16  BP: (122-165)/(56-74) 131/73  Vitals:    03/11/25 0901 03/11/25 0920 03/11/25 1216 03/11/25 1604   BP: 122/60  149/74 131/73   BP Location:   Left arm Left arm   Patient Position:   Sitting Sitting   Pulse: 79 75 79 76   Resp:  18 16 16   Temp:   97.3 °F (36.3 °C) 97.9 °F (36.6 °C)   TempSrc:   Oral Oral   SpO2:  96% 96% 95%   Weight:       Height:              Physical Exam: 79 y.o. female         General Appearance:  Alert, cooperative, in no acute distress    HEENT:    Atraumatic, Pupils are equal   Neck:   Cervical spine midline, no appreciable JVD   Lungs:     Breathing non-labored and chest rise symmetric    Heart:   Abdomen:      Rectal:       Extremities:   Pulses  Neurovascular:   Skin:   Musculoskeletal:      Pulse regular    Soft, Non-tender or distended    Deferred        No clubbing, cyanosis, or edema    Intact    Cranial nerves 2 - 12 grossly intact, sensation intact    No skin lesions  Examination of the right shoulder reveals intact skin with no erythema or signs of infection.  Ecchymosis along the superior aspect of the shoulder that extends down the right upper extremity.  Mild swelling of the right shoulder and neck.  Mild tenderness to palpation.  No tenting of the skin noted.  Patient is in a sling to the right upper extremity and is proper placement.  Finger and wrist range of motion normal.  Neurovascularly intact distally.     Diagnostic Tests:    Results from last 7 days   Lab Units 03/11/25  0532   WBC 10*3/mm3 7.94   HEMOGLOBIN g/dL 10.2*   HEMATOCRIT % 31.7*   PLATELETS 10*3/mm3 249     Results from last 7 days   Lab Units 03/11/25  0532   SODIUM mmol/L 136   POTASSIUM mmol/L 5.2   CHLORIDE mmol/L 106   CO2 mmol/L 21.8*   BUN mg/dL 30*   CREATININE mg/dL 1.41*   GLUCOSE mg/dL 170*   CALCIUM mg/dL 8.8           CT Chest Without Contrast Diagnostic  Result Date: 3/9/2025  CT CHEST WITHOUT CONTRAST;  HISTORY: Fall, right upper chest tenderness and bruising  COMPARISON: Chest CT 1/30/2025  TECHNIQUE: Helical CT was performed of the chest from the lung apices through the upper abdomen without IV  contrast. Multiplanar reformats were created and reviewed. Radiation dose reduction techniques were utilized, including automated exposure control, and exposure modulation based on body size.  FINDINGS:  There is no mediastinal adenopathy or other mass.   The thoracic aorta is normal in caliber. There are modest coronary atherosclerotic vascular calcifications.  There is no acute pulmonary infiltrate, but there is right greater than left bibasilar linear parenchymal opacity, almost certainly due to atelectasis. There is no  effusion or pneumothorax.  Images of the upper abdomen demonstrate no acute abnormality.  There is a comminuted mid right clavicle fracture. There are acute right second through fourth rib fractures, including segmental fractures of the second and third ribs.       Acute fractures of the right clavicle and second through fourth ribs, including segmental right second and third rib fractures.  Otherwise negative unenhanced chest CT.   This report was finalized on 3/9/2025 10:26 PM by Dr. Sachin Michelle M.D on Workstation: [id number]            Assessment:    Multiple rib fractures  Nondisplaced comminuted right clavicle fracture      Plan:    The patient CT showed a nondisplaced comminuted mid right clavicle fracture.  This can be managed nonoperatively as there is not significant displacement of the clavicle.  Patient should remain in the sling and avoid any significant movement of the right shoulder.  She can take the arm out of the sling to move the elbow and wrist.  She should remain nonweightbearing of the right upper extremity.  She can continue pain medication as needed and as directed.  Patient was advised of this will take time to heal but her pain should gradually improve with time.  Patient was advised to follow-up in the outpatient office with Dr. Ha Silva with [facility] in about 2 weeks for reevaluation and new x-rays.  Patient and her  were in agreement with the plan and all questions were answered.  Patient is stable from an orthopedics standpoint and we will sign off at this time.    The patient voiced understanding of the risks, benefits, and alternative forms of treatment that were discussed and the patient consents to proceed with the above treatment plan.     Date: 3/11/2025  Marcia Craig PA-C

## 2025-03-11 NOTE — PROGRESS NOTES
Discharge Planning Assessment  [facility]     Patient Name: Blanca Harden  MRN: [id number]  Today's Date: 3/11/2025    Admit Date: 3/9/2025    Plan: [facility]   Discharge Needs Assessment    No documentation.                  Discharge Plan       Row Name 03/11/25 4408       Plan    Plan Comments The patient's spouse has been in and out of the room and he is not in there at this time. Spoke with Marilyn/TEJAL and she will update the patient and spouse that [facility] can accept at discharge. JACQUELINE Hawhtorne Rn, CCP.      Row Name 03/11/25 6470       Plan    Plan Comments Dmitry called from [facility] and the patient has been accepted when discharged. The patient is c/o pain and symptom mgmt being adjusted. JACQUELINE Hawthorne RN, CCP.                  Continued Care and Services - Admitted Since 3/9/2025       Destination       Service Provider Request Status Services Address Phone Fax Patient Preferred    [facility] -- [address] [place] [id number] [phone] --                  Expected Discharge Date and Time       Expected Discharge Date Expected Discharge Time    Mar 13, 2025            Demographic Summary    No documentation.                  Functional Status    No documentation.                  Psychosocial    No documentation.                  Abuse/Neglect    No documentation.                  Legal    No documentation.                  Substance Abuse    No documentation.                  Patient Forms    No documentation.                     Corinna Hawthorne RN

## 2025-03-11 NOTE — PLAN OF CARE
Goal Outcome Evaluation:  Plan of Care Reviewed With: patient, spouse        Progress: improving  Outcome Evaluation: Pt seen for PT/OT cotx this AM and tolerated the session well. Today, pt was Min/ModA x2 for bed mobility, STS and ambulation of 6' w/ L HHA and use of gait belt on the R. Pt tolerated sitting EOB initially with Min/ModA until she was able to scoot out w/ Mod/MaxA and use of draw sheet, then progressed to SBA. OT assisted w/ donning of the sling with improvement in her pain tolerance. Pt demos unsteadiness, decreased L weight shifting and stance time, and decreased R stride length with ambulation. No overt LOB, buckling, dizziness or SOB. RN present at end of session and discussed transfer with her. Discussed donning and alignment of sling, NWB status and DC recs w/ spouse. PT will cont to follow and rec IRF at DC.    Anticipated Discharge Disposition (PT): inpatient rehabilitation facility

## 2025-03-11 NOTE — THERAPY EVALUATION
Patient Name: Blanca Harden  : 1945    MRN: [id number]                              Today's Date: 3/11/2025       Admit Date: 3/9/2025    Visit Dx:     ICD-10-CM ICD-9-CM   1. Closed fracture of multiple ribs of right side, initial encounter  S22.41XA 807.09   2. Closed nondisplaced fracture of shaft of right clavicle, initial encounter  S42.024A 810.02   3. Mild closed head injury, initial encounter  S09.90XA 959.01     Patient Active Problem List   Diagnosis    Osteoporosis    Breast neoplasm, Tis (DCIS), left    Iron deficiency anemia    CKD (chronic kidney disease)    Diabetic nephropathy associated with type 2 diabetes mellitus    Temporal arteritis    Immunosuppression    Anemia    Duodenal adenoma    Gastritis without bleeding    Polyp of duodenum    Morbidly obese    Dyspnea on exertion    Hyperlipidemia    Type 2 diabetes mellitus with stage 3b chronic kidney disease, with long-term current use of insulin    Essential hypertension    Bilateral lower extremity edema    Pulmonary hypertension    Obstructive sleep apnea on CPAP    Stage 3a chronic kidney disease    Iron malabsorption    Respiratory distress    Hypoglycemia due to insulin    Delirium    Hypomagnesemia    Severe malnutrition    Leg swelling    Venous (peripheral) insufficiency    Polyp of colon    Obesity (BMI 30.0-34.9)    TIA (transient ischemic attack)    Difficulty with speech    Multiple rib fractures     Past Medical History:   Diagnosis Date    Anemia     Anxiety and depression     Asthma     Balance problem     CKD (chronic kidney disease), stage III     Colonic mass     COPD (chronic obstructive pulmonary disease)     Coronary artery disease     FOLLOWED BY DR GUNN    Diabetes mellitus, type 2     History of COVID-19     History of left breast cancer     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 Daniel Gutierrez, [facility]    CARDIAC CATHETERIZATION       SECTION N/A     x3    CHOLECYSTECTOMY N/A     COLON RESECTION Right 2025    Procedure: COLON RESECTION LAPAROSCOPIC RIGHT WITH DAVINCI ROBOT;  Surgeon: Ashwin Alvarado MD;  Location: [facility] MAIN OR;  Service: Robotics - DaVinci;  Laterality: Right;    COLONOSCOPY      COLONOSCOPY N/A 2024    Procedure: COLONOSCOPY into the cecum and TI with hot snare polypectomy;  Surgeon: Ashwin Alvarado MD;  Location: [facility] ENDOSCOPY;  Service: General;  Laterality: N/A;  pre-colon polyps  post-colon mass    COLONOSCOPY W/ POLYPECTOMY N/A 2019    Enlarged folds in the antrum: biopsied; duodenal, transverse colon x2, splenic flexure, descending colon and sigmoid colon polyps: biopsied (path: tubular adenoma x6)-Dr. Zak De Jesus, [facility]    ENDOSCOPY  10/11/2019    Procedure: ESOPHAGOGASTRODUODENOSCOPY with hot snare polypectomy;  Surgeon: Haile Handley MD;  Location: [facility] ENDOSCOPY;  Service: Gastroenterology    ENDOSCOPY N/A 2020    Procedure: ESOPHAGOGASTRODUODENOSCOPY;  Surgeon: Haile Handley MD;  Location: [facility] ENDOSCOPY;  Service: Gastroenterology    ENDOSCOPY N/A 2020    Procedure: ESOPHAGOGASTRODUODENOSCOPY WITH BIOPSIES AND COLD BIOPSY POLYPECTOMY;  Surgeon: Haile Handley MD;  Location: [facility] ENDOSCOPY;  Service: Gastroenterology;  Laterality: N/A;  pre:  dyspepsia and diarrhea  post: duodenal polyp and gastritis    ENDOSCOPY N/A 07/23/2024    Procedure: ESOPHAGOGASTRODUODENOSCOPY WITH hot snare polypectomy with clip placement x 2BIOPSY;  Surgeon: Ashwin Alvarado MD;  Location: [facility] ENDOSCOPY;  Service: General;  Laterality: N/A;  pre-hx duoedenal polyp  post-duodenal polyp; gastritis    EYE SURGERY      MASTECTOMY WITH SENTINEL NODE BIOPSY AND AXILLARY NODE DISSECTION Left 07/03/2019    Procedure: LEFT BREAST MASTECTOMY WITH SENTINEL NODE BIOPSY AND , v-y plasty closure;  Surgeon: Tahmina James MD;  Location: [facility] MAIN OR;  Service: General    SUBTOTAL HYSTERECTOMY Bilateral     ovaries still in tact    TEMPORAL ARTERY BIOPSY Bilateral 12/05/2019      General Information       Row Name 03/11/25 1223          OT Time and Intention    Document Type evaluation  -BC     Mode of Treatment occupational therapy;physical therapy;co-treatment  -BC     Patient Effort good  -BC       Row Name 03/11/25 1223          General Information    Patient Profile Reviewed yes  -BC     Prior Level of Function independent:  Pt was DC'd from [facility] with RW and was (I) w/ ADLS, and bathing/toileting.  -BC     Existing Precautions/Restrictions fall;right;non-weight bearing  waiting ortho recs for R clavice fx, in sling  -BC     Barriers to Rehab language barrier  declined  for interpretor services; Welsh speaking but understands and vocalizes in english as well. spouse present  -BC       Row Name 03/11/25 1223          Living Environment    Current Living Arrangements apartment  -BC     People in Home spouse  -BC       Row Name 03/11/25 1223          Home Main Entrance    Number of Stairs, Main Entrance none  -BC       Row Name 03/11/25 1223          Stairs Within Home, Primary    Number of Stairs, Within Home, Primary none  -BC       Row Name 03/11/25 1223          Cognition    Orientation Status (Cognition) oriented x 3  -BC       Row Name 03/11/25 1223          Safety  Issues/Impairments Affecting Functional Mobility    Impairments Affecting Function (Mobility) balance;endurance/activity tolerance;pain;coordination;strength  -BC     Comment, Safety Issues/Impairments (Mobility) PT/OT cotreatment medically appropriate and necessary due to patient acuity level, to maximize therapeutic benefit due to impaired act tolerance, and for safety of patient and staff. Treatment focused on progression of care and goals established in POC.  -BC               User Key  (r) = Recorded By, (t) = Taken By, (c) = Cosigned By      Initials Name Provider Type    BC Maury Olvera OT Occupational Therapist                     Mobility/ADL's       Row Name 03/11/25 1225          Bed Mobility    Bed Mobility supine-sit;sit-supine  -BC     Supine-Sit [place] (Bed Mobility) minimum assist (75% patient effort);moderate assist (50% patient effort);2 person assist;verbal cues;nonverbal cues (demo/gesture)  -BC     Sit-Supine [place] (Bed Mobility) maximum assist (25% patient effort);2 person assist;verbal cues  -BC     Bed Mobility, Safety Issues decreased use of arms for pushing/pulling  -BC     Assistive Device (Bed Mobility) head of bed elevated;bed rails;repositioning sheet  -BC     Comment, (Bed Mobility) increased time and cues for mvmts and modifications to keep NWB'ing through RUE. Assist w draw sheet to scoot out  -BC       Row Name 03/11/25 1225          Transfers    Transfers sit-stand transfer;stand-sit transfer;bed-chair transfer  -BC       Row Name 03/11/25 1225          Bed-Chair Transfer    Bed-Chair [place] (Transfers) minimum assist (75% patient effort);moderate assist (50% patient effort);2 person assist  -BC     Assistive Device (Bed-Chair Transfers) walker, front-wheeled  -BC       Row Name 03/11/25 1225          Sit-Stand Transfer    Sit-Stand [place] (Transfers) minimum assist (75% patient effort);moderate assist (50% patient effort);2 person assist;verbal cues   -BC     Assistive Device (Sit-Stand Transfers) other (see comments)  -BC     Comment, (Sit-Stand Transfer) HHA on L with gait belt assist for safety on R. Pt fearful so provided gavino support/assist  -BC       Row Name 03/11/25 1225          Stand-Sit Transfer    Stand-Sit [place] (Transfers) minimum assist (75% patient effort)  -BC       Row Name 03/11/25 1225          Functional Mobility    Functional Mobility- Ind. Level minimum assist (75% patient effort);moderate assist (50% patient effort)  -BC     Patient was able to Ambulate yes  -BC       Row Name 03/11/25 1225          Activities of Daily Living    BADL Assessment/Intervention upper body dressing  -BC       Row Name 03/11/25 1225          Mobility    Extremity Weight-bearing Status right upper extremity  -BC     Right Upper Extremity (Weight-bearing Status) non weight-bearing (NWB)  -BC       Row Name 03/11/25 122          Upper Body Dressing Assessment/Training    [place] Level (Upper Body Dressing) dependent (less than 25% patient effort)  -BC     Position (Upper Body Dressing) edge of bed sitting  -BC     Comment, (Upper Body Dressing) total A sling mgmt and adjustment w/ gown mgmt in the back once seated EOB  -BC               User Key  (r) = Recorded By, (t) = Taken By, (c) = Cosigned By      Initials Name Provider Type    Maury Albrecht OT Occupational Therapist                   Obj/Interventions       Row Name 03/11/25 1232          Sensory Assessment (Somatosensory)    Sensory Assessment (Somatosensory) sensation intact  -[facility] Name 03/11/25 1232          Range of Motion Comprehensive    General Range of Motion bilateral lower extremity ROM WFL;upper extremity range of motion deficits identified  -BC     Comment, General Range of Motion gen'd weakness w/ hx of CVA, R hand/wrist function, DNT RUE.  -BC       Row Name 03/11/25 1232          Strength Comprehensive (MMT)    General Manual Muscle Testing (MMT) Assessment lower  extremity strength deficits identified;upper extremity strength deficits identified  -BC     Comment, General Manual Muscle Testing (MMT) Assessment gross weakness w/ R sided weakness from recent CVA.  -BC       Row Name 03/11/25 1232          Balance    Balance Assessment sitting static balance;sitting dynamic balance;standing static balance;standing dynamic balance  -BC     Static Sitting Balance contact guard;supervision;verbal cues  -BC     Dynamic Sitting Balance verbal cues;supervision;contact guard  -BC     Position, Sitting Balance unsupported;sitting edge of bed  -BC     Static Standing Balance minimal assist  -BC     Dynamic Standing Balance moderate assist  -BC     Position/Device Used, Standing Balance supported  -BC     Comment, Balance SBA once positioned out EOB for sitting balance. Assist w/ sling mgmt/ don seated EOB, pain improved. no overt LOB but instability once in standing.  -BC               User Key  (r) = Recorded By, (t) = Taken By, (c) = Cosigned By      Initials Name Provider Type    BC Maury Olvera OT Occupational Therapist                   Goals/Plan       Row Name 03/11/25 1240          Bed Mobility Goal 1 (OT)    Activity/Assistive Device (Bed Mobility Goal 1, OT) bed mobility activities, all  -BC     [place] Level/Cues Needed (Bed Mobility Goal 1, OT) supervision required  -BC     Time Frame (Bed Mobility Goal 1, OT) short term goal (STG);2 weeks  -BC     Progress/Outcomes (Bed Mobility Goal 1, OT) new goal  -BC       Row Name 03/11/25 1240          Transfer Goal 1 (OT)    Activity/Assistive Device (Transfer Goal 1, OT) sit-to-stand/stand-to-sit;bed-to-chair/chair-to-bed;toilet  -BC     [place] Level/Cues Needed (Transfer Goal 1, OT) supervision required  -BC     Time Frame (Transfer Goal 1, OT) short term goal (STG);2 weeks  -BC     Progress/Outcome (Transfer Goal 1, OT) new goal  -BC       Row Name 03/11/25 1240          Dressing Goal 1 (OT)    Activity/Device  (Dressing Goal 1, OT) upper body dressing;lower body dressing  -BC     [place]/Cues Needed (Dressing Goal 1, OT) supervision required  -BC     Time Frame (Dressing Goal 1, OT) short term goal (STG);2 weeks  -BC     Progress/Outcome (Dressing Goal 1, OT) new goal  -BC       Row Name 03/11/25 1240          Toileting Goal 1 (OT)    Activity/Device (Toileting Goal 1, OT) toileting skills, all;adjust/manage clothing;perform perineal hygiene  -BC     [place] Level/Cues Needed (Toileting Goal 1, OT) supervision required  -BC     Time Frame (Toileting Goal 1, OT) short term goal (STG);2 weeks  -BC     Progress/Outcome (Toileting Goal 1, OT) new goal  -BC       Row Name 03/11/25 1240          Grooming Goal 1 (OT)    Activity/Device (Grooming Goal 1, OT) grooming skills, all  -BC     [place] (Grooming Goal 1, OT) supervision required  -BC     Time Frame (Grooming Goal 1, OT) 2 weeks;short term goal (STG)  -BC     Strategies/Barriers (Grooming Goal 1, OT) standing sinkside for ADL IND  -BC     Progress/Outcome (Grooming Goal 1, OT) new goal  -BC       Row Name 03/11/25 1240          Problem Specific Goal 1 (OT)    Problem Specific Goal 1 (OT) Pt will increase ADL IND for 3 step ADL in room w/ gina walker and close sup A.  -BC     Time Frame (Problem Specific Goal 1, OT) 2 weeks;short term goal (STG)  -BC     Progress/Outcome (Problem Specific Goal 1, OT) new goal  -BC       Row Name 03/11/25 1240          Therapy Assessment/Plan (OT)    Planned Therapy Interventions (OT) activity tolerance training;BADL retraining;cognitive/visual perception retraining;functional balance retraining;IADL retraining;neuromuscular control/coordination retraining;occupation/activity based interventions;passive ROM/stretching;patient/caregiver education/training;ROM/therapeutic exercise;strengthening exercise;transfer/mobility retraining  -BC               User Key  (r) = Recorded By, (t) = Taken By, (c) = Cosigned By      Initials  Name Provider Type    BC Maury Olvera, ROOPA Occupational Therapist                   Clinical Impression       Row Name 03/11/25 1237          Pain Assessment    Pre/Posttreatment Pain Comment c/o RUE and R rib pain. reports no pain at rest but some increased pain w/ mobility. improved w/ sling on  -BC       Row Name 03/11/25 1234          Plan of Care Review    Plan of Care Reviewed With patient;spouse  -BC     Progress improving  -BC     Outcome Evaluation Pt is a 78 y/o F with recent fall after only home from rehab a couple days. Pt was at IRF for Acute CVA within the left corona radiata/basal ganglia and progressed to (I) w/ walker use. Imaging (+) rib 2-4 fxs and comminuted mid R clavicle fx. Pt is NWB to RUE and in a sling at this time. PMH: anemia, asthma, HTN, DM2 and CKD. Pt with increased fearfullness w/ mvmts and guarding, with difficulty w/ RLE foot progression in standing to take ~6 steps over to chair following EOB sitting balance/ADL retraining. Pt would benefit from cont'd rehabt at IRF when stable to return to highest level of (I)/function before home. Spouse in agreement w/ POC. Awaiting ortho recs for R clavicle fx.  -BC       Row Name 03/11/25 6331          Therapy Assessment/Plan (OT)    Rehab Potential (OT) good  -BC     Criteria for Skilled Therapeutic Interventions Met (OT) yes;meets criteria  -BC     Therapy Frequency (OT) 5 times/wk  -BC     Predicted Duration of Therapy Intervention (OT) 2 weeks  -BC       Row Name 03/11/25 1239          Therapy Plan Review/Discharge Plan (OT)    Anticipated Discharge Disposition (OT) inpatient rehabilitation facility  -BC       Row Name 03/11/25 1239          Vital Signs    O2 Delivery Pre Treatment room air  -BC     O2 Delivery Intra Treatment room air  -BC     O2 Delivery Post Treatment room air  -BC     Pre Patient Position Supine  -BC     Intra Patient Position Standing  -BC     Post Patient Position Sitting  -BC       Row Name 03/11/25 7878           Positioning and Restraints    Pre-Treatment Position in bed  -BC     Post Treatment Position chair  -BC     In Chair reclined;call light within reach;encouraged to call for assist;exit alarm on;with family/caregiver;with brace;legs elevated;RUE elevated  -BC               User Key  (r) = Recorded By, (t) = Taken By, (c) = Cosigned By      Initials Name Provider Type    Maury Albrecht, ROOPA Occupational Therapist                   Outcome Measures       Row Name 03/11/25 1240          How much help from another is currently needed...    Putting on and taking off regular lower body clothing? 1  -BC     Bathing (including washing, rinsing, and drying) 1  -BC     Toileting (which includes using toilet bed pan or urinal) 1  -BC     Putting on and taking off regular upper body clothing 2  -BC     Taking care of personal grooming (such as brushing teeth) 2  -BC     Eating meals 3  -BC     AM-PAC 6 Clicks Score (OT) 10  -BC       Row Name 03/11/25 1034 03/11/25 0900       How much help from another person do you currently need...    Turning from your back to your side while in flat bed without using bedrails? 2  -MG 2  -LR    Moving from lying on back to sitting on the side of a flat bed without bedrails? 2  -MG 2  -LR    Moving to and from a bed to a chair (including a wheelchair)? 2  -MG 2  -LR    Standing up from a chair using your arms (e.g., wheelchair, bedside chair)? 2  -MG 2  -LR    Climbing 3-5 steps with a railing? 1  -MG 2  -LR    To walk in hospital room? 2  -MG 3  -LR    AM-PAC 6 Clicks Score (PT) 11  -MG 13  -LR      Row Name 03/11/25 1240          Functional Assessment    Outcome Measure Options AM-PAC 6 Clicks Daily Activity (OT)  -BC               User Key  (r) = Recorded By, (t) = Taken By, (c) = Cosigned By      Initials Name Provider Type    Dena Logan, PT Physical Therapist    LR Marilyn Crawford, RN Registered Nurse    Maury Albrecht, ROOPA Occupational Therapist                    Occupational  Therapy Education       Title: PT OT SLP Therapies (In Progress)       Topic: Occupational Therapy (In Progress)       Point: ADL training (In Progress)       Learning Progress Summary            Patient Acceptance, E, NR by BC at 3/11/2025 1241    Comment: RUE sling use/mgmt, DC recs, ADL training in future POC                                      User Key       Initials Effective Dates Name Provider Type Discipline    BC 07/29/24 -  Maury Olvera OT Occupational Therapist OT                  OT Recommendation and Plan  Planned Therapy Interventions (OT): activity tolerance training, BADL retraining, cognitive/visual perception retraining, functional balance retraining, IADL retraining, neuromuscular control/coordination retraining, occupation/activity based interventions, passive ROM/stretching, patient/caregiver education/training, ROM/therapeutic exercise, strengthening exercise, transfer/mobility retraining  Therapy Frequency (OT): 5 times/wk  Plan of Care Review  Plan of Care Reviewed With: patient, spouse  Progress: improving  Outcome Evaluation: Pt is a 78 y/o F with recent fall after only home from rehab a couple days. Pt was at IRF for Acute CVA within the left corona radiata/basal ganglia and progressed to (I) w/ walker use. Imaging (+) rib 2-4 fxs and comminuted mid R clavicle fx. Pt is NWB to RUE and in a sling at this time. PMH: anemia, asthma, HTN, DM2 and CKD. Pt with increased fearfullness w/ mvmts and guarding, with difficulty w/ RLE foot progression in standing to take ~6 steps over to chair following EOB sitting balance/ADL retraining. Pt would benefit from cont'd rehabt at IRF when stable to return to highest level of (I)/function before home. Spouse in agreement w/ POC. Awaiting ortho recs for R clavicle fx.     Time Calculation:   Evaluation Complexity (OT)  Review Occupational Profile/Medical/Therapy History Complexity: brief/low complexity  Assessment, Occupational  Performance/Identification of Deficit Complexity: 1-3 performance deficits  Clinical Decision Making Complexity (OT): problem focused assessment/low complexity  Overall Complexity of Evaluation (OT): low complexity     Time Calculation- OT       Row Name 03/11/25 1242             Time Calculation- OT    OT Start Time 0835  -BC      OT Stop Time 0906  -BC      OT Time Calculation (min) 31 min  -BC      Total Timed Code Minutes- OT 18 minute(s)  -BC      OT Received On 03/11/25  -BC      OT - Next Appointment 03/12/25  -BC      OT Goal Re-Cert Due Date 03/25/25  -BC         Timed Charges    29103 - OT Self Care/Mgmt Minutes 18  -BC         Total Minutes    Timed Charges Total Minutes 18  -BC       Total Minutes 18  -BC                User Key  (r) = Recorded By, (t) = Taken By, (c) = Cosigned By      Initials Name Provider Type    BC Maury Olvera OT Occupational Therapist                  Therapy Charges for Today       Code Description Service Date Service Provider Modifiers Qty    [id number] HC OT SELF CARE/MGMT/TRAIN EA 15 MIN 3/11/2025 Maury Olvera OT GO 1    [id number]  OT EVAL LOW COMPLEXITY 3 3/11/2025 Maury Olvera OT GO 1                 Maury Olvera OT  3/11/2025

## 2025-03-12 VITALS
DIASTOLIC BLOOD PRESSURE: 61 MMHG | BODY MASS INDEX: 35.34 KG/M2 | HEART RATE: 87 BPM | WEIGHT: 180 LBS | OXYGEN SATURATION: 94 % | HEIGHT: 60 IN | RESPIRATION RATE: 17 BRPM | TEMPERATURE: 97 F | SYSTOLIC BLOOD PRESSURE: 126 MMHG

## 2025-03-12 LAB
ANION GAP SERPL CALCULATED.3IONS-SCNC: 9 MMOL/L (ref 5–15)
BASOPHILS # BLD AUTO: 0.03 10*3/MM3 (ref 0–0.2)
BASOPHILS NFR BLD AUTO: 0.3 % (ref 0–1.5)
BUN SERPL-MCNC: 37 MG/DL (ref 8–23)
BUN/CREAT SERPL: 20 (ref 7–25)
CALCIUM SPEC-SCNC: 8.5 MG/DL (ref 8.6–10.5)
CHLORIDE SERPL-SCNC: 104 MMOL/L (ref 98–107)
CO2 SERPL-SCNC: 20 MMOL/L (ref 22–29)
CREAT SERPL-MCNC: 1.85 MG/DL (ref 0.57–1)
CV ZIO BASELINE AVG BPM: 82 BPM
CV ZIO BASELINE BPM HIGH: 182 BPM
CV ZIO BASELINE BPM LOW: 61 BPM
CV ZIO DEVICE ANALYSIS TIME: NORMAL
CV ZIO ECT SVE COUNT: 685 EPISODES
CV ZIO ECT SVE CPLT COUNT: 25 EPISODES
CV ZIO ECT SVE CPLT FREQ: NORMAL
CV ZIO ECT SVE FREQ: NORMAL
CV ZIO ECT SVE TPLT COUNT: 12 EPISODES
CV ZIO ECT SVE TPLT FREQ: NORMAL
CV ZIO ECT VE COUNT: 38 EPISODES
CV ZIO ECT VE CPLT COUNT: 0 EPISODES
CV ZIO ECT VE CPLT FREQ: 0
CV ZIO ECT VE FREQ: NORMAL
CV ZIO ECT VE TPLT COUNT: 0 EPISODES
CV ZIO ECT VE TPLT FREQ: 0
CV ZIO ECTOPIC SVE COUPLET RAW PERCENT: 0.01 %
CV ZIO ECTOPIC SVE ISOLATED PERCENT: 0.15 %
CV ZIO ECTOPIC SVE TRIPLET RAW PERCENT: 0.01 %
CV ZIO ECTOPIC VE COUPLET RAW PERCENT: 0 %
CV ZIO ECTOPIC VE ISOLATED PERCENT: 0.01 %
CV ZIO ECTOPIC VE TRIPLET RAW PERCENT: 0 %
CV ZIO ENROLLMENT END: NORMAL
CV ZIO ENROLLMENT START: NORMAL
CV ZIO PATIENT EVENTS DIARIES: 0
CV ZIO PATIENT EVENTS TRIGGERS: 0
CV ZIO PAUSE COUNT: 0
CV ZIO PRESCRIPTION STATUS: NORMAL
CV ZIO SVT AVG BPM: 133 BPM
CV ZIO SVT BPM HIGH: 182 BPM
CV ZIO SVT BPM LOW: 78 BPM
CV ZIO SVT COUNT: 18
CV ZIO SVT F EPI AVG BPM: 157 BPM
CV ZIO SVT F EPI BEATS: 7 BEATS
CV ZIO SVT F EPI BPM HIGH: 182 BPM
CV ZIO SVT F EPI BPM LOW: 132 BPM
CV ZIO SVT F EPI DUR: 2.6 SEC
CV ZIO SVT F EPI END: NORMAL
CV ZIO SVT F EPI START: NORMAL
CV ZIO SVT L EPI AVG BPM: 122 BPM
CV ZIO SVT L EPI BEATS: 23 BEATS
CV ZIO SVT L EPI BPM HIGH: 152 BPM
CV ZIO SVT L EPI BPM LOW: 78 BPM
CV ZIO SVT L EPI DUR: 11.8 SEC
CV ZIO SVT L EPI END: NORMAL
CV ZIO SVT L EPI START: NORMAL
CV ZIO TOTAL  ENROLLMENT PERIOD: NORMAL
CV ZIO VT COUNT: 0
DEPRECATED RDW RBC AUTO: 45.7 FL (ref 37–54)
EGFRCR SERPLBLD CKD-EPI 2021: 27.6 ML/MIN/1.73
EOSINOPHIL # BLD AUTO: 0.15 10*3/MM3 (ref 0–0.4)
EOSINOPHIL NFR BLD AUTO: 1.7 % (ref 0.3–6.2)
ERYTHROCYTE [DISTWIDTH] IN BLOOD BY AUTOMATED COUNT: 13 % (ref 12.3–15.4)
GLUCOSE BLDC GLUCOMTR-MCNC: 173 MG/DL (ref 70–130)
GLUCOSE BLDC GLUCOMTR-MCNC: 210 MG/DL (ref 70–130)
GLUCOSE SERPL-MCNC: 176 MG/DL (ref 65–99)
HCT VFR BLD AUTO: 31 % (ref 34–46.6)
HGB BLD-MCNC: 10.1 G/DL (ref 12–15.9)
IMM GRANULOCYTES # BLD AUTO: 0.11 10*3/MM3 (ref 0–0.05)
IMM GRANULOCYTES NFR BLD AUTO: 1.3 % (ref 0–0.5)
LYMPHOCYTES # BLD AUTO: 0.45 10*3/MM3 (ref 0.7–3.1)
LYMPHOCYTES NFR BLD AUTO: 5.2 % (ref 19.6–45.3)
MCH RBC QN AUTO: 31.1 PG (ref 26.6–33)
MCHC RBC AUTO-ENTMCNC: 32.6 G/DL (ref 31.5–35.7)
MCV RBC AUTO: 95.4 FL (ref 79–97)
MONOCYTES # BLD AUTO: 0.47 10*3/MM3 (ref 0.1–0.9)
MONOCYTES NFR BLD AUTO: 5.5 % (ref 5–12)
NEUTROPHILS NFR BLD AUTO: 7.4 10*3/MM3 (ref 1.7–7)
NEUTROPHILS NFR BLD AUTO: 86 % (ref 42.7–76)
NRBC BLD AUTO-RTO: 0 /100 WBC (ref 0–0.2)
PLATELET # BLD AUTO: 244 10*3/MM3 (ref 140–450)
PMV BLD AUTO: 10.4 FL (ref 6–12)
POTASSIUM SERPL-SCNC: 5.2 MMOL/L (ref 3.5–5.2)
RBC # BLD AUTO: 3.25 10*6/MM3 (ref 3.77–5.28)
SODIUM SERPL-SCNC: 133 MMOL/L (ref 136–145)
WBC NRBC COR # BLD AUTO: 8.61 10*3/MM3 (ref 3.4–10.8)

## 2025-03-12 PROCEDURE — 94799 UNLISTED PULMONARY SVC/PX: CPT

## 2025-03-12 PROCEDURE — 94664 DEMO&/EVAL PT USE INHALER: CPT

## 2025-03-12 PROCEDURE — G0378 HOSPITAL OBSERVATION PER HR: HCPCS

## 2025-03-12 PROCEDURE — 80048 BASIC METABOLIC PNL TOTAL CA: CPT | Performed by: INTERNAL MEDICINE

## 2025-03-12 PROCEDURE — 63710000001 INSULIN LISPRO (HUMAN) PER 5 UNITS: Performed by: INTERNAL MEDICINE

## 2025-03-12 PROCEDURE — 85025 COMPLETE CBC W/AUTO DIFF WBC: CPT | Performed by: INTERNAL MEDICINE

## 2025-03-12 PROCEDURE — 82948 REAGENT STRIP/BLOOD GLUCOSE: CPT

## 2025-03-12 PROCEDURE — 94761 N-INVAS EAR/PLS OXIMETRY MLT: CPT

## 2025-03-12 RX ORDER — AMLODIPINE BESYLATE 5 MG/1
5 TABLET ORAL
Status: DISCONTINUED | OUTPATIENT
Start: 2025-03-13 | End: 2025-03-12 | Stop reason: HOSPADM

## 2025-03-12 RX ORDER — ENOXAPARIN SODIUM 100 MG/ML
40 INJECTION SUBCUTANEOUS EVERY 24 HOURS
Qty: 10 ML | Refills: 0 | Status: SHIPPED | OUTPATIENT
Start: 2025-03-12

## 2025-03-12 RX ORDER — AMLODIPINE BESYLATE 5 MG/1
5 TABLET ORAL
Qty: 30 TABLET | Refills: 3 | Status: SHIPPED | OUTPATIENT
Start: 2025-03-13

## 2025-03-12 RX ORDER — HYDROCODONE BITARTRATE AND ACETAMINOPHEN 5; 325 MG/1; MG/1
1 TABLET ORAL EVERY 6 HOURS PRN
Qty: 30 TABLET | Refills: 0 | Status: SHIPPED | OUTPATIENT
Start: 2025-03-12

## 2025-03-12 RX ORDER — PANTOPRAZOLE SODIUM 40 MG/1
40 TABLET, DELAYED RELEASE ORAL
Qty: 30 TABLET | Refills: 3 | Status: SHIPPED | OUTPATIENT
Start: 2025-03-12

## 2025-03-12 RX ORDER — INSULIN LISPRO 100 [IU]/ML
2-7 INJECTION, SOLUTION INTRAVENOUS; SUBCUTANEOUS
Qty: 10 ML | Refills: 12 | Status: SHIPPED | OUTPATIENT
Start: 2025-03-12

## 2025-03-12 RX ADMIN — AMLODIPINE BESYLATE 10 MG: 10 TABLET ORAL at 08:28

## 2025-03-12 RX ADMIN — LISINOPRIL 40 MG: 40 TABLET ORAL at 08:28

## 2025-03-12 RX ADMIN — HYDROCODONE BITARTRATE AND ACETAMINOPHEN 1 TABLET: 5; 325 TABLET ORAL at 15:59

## 2025-03-12 RX ADMIN — EMPAGLIFLOZIN 25 MG: 25 TABLET, FILM COATED ORAL at 09:02

## 2025-03-12 RX ADMIN — ASPIRIN 81 MG: 81 TABLET, COATED ORAL at 08:28

## 2025-03-12 RX ADMIN — INSULIN LISPRO 2 UNITS: 100 INJECTION, SOLUTION INTRAVENOUS; SUBCUTANEOUS at 11:30

## 2025-03-12 RX ADMIN — HYDROCODONE BITARTRATE AND ACETAMINOPHEN 1 TABLET: 5; 325 TABLET ORAL at 09:02

## 2025-03-12 RX ADMIN — BUDESONIDE AND FORMOTEROL FUMARATE DIHYDRATE 2 PUFF: 160; 4.5 AEROSOL RESPIRATORY (INHALATION) at 10:33

## 2025-03-12 NOTE — DISCHARGE SUMMARY
PHYSICIAN DISCHARGE SUMMARY  [facility], [facility]    Patient Identification:    Name: Blanca Harden  Age: 79 y.o.  Sex: female  :  1945  MRN: [id number]    Primary Care Physician: Praneeth Valenzuela MD    Admit date: 3/9/2025    Discharge date and time:3/12/2025    Discharged Condition: fair    Discharge Diagnoses:    Multiple rib fractures  Acute comminuted mid right clavicular fracture  Uncontrolled diabetes mellitus  Chronic systolic congestive heart failure  Morbid obesity affecting all aspects of care  Hypertension  Pulmonary hypertension  Obstructive sleep apnea  Moderate persistent asthma  Coronary artery disease  Chronic kidney disease stage IIIa  Iron deficient anemia  Temporal arteritis on long-term steroids  Status post right knee colectomy  Status post left mastectomy due to breast cancer           Hospital Course: Blanca Harden  is a 79-year-old female, patient of my office.  Patient has history of severe asthma, chronic kidney disease stage III, uncontrolled diabetes mellitus type 2, hypertension, temporal arteritis on prednisone 5 mg daily, chronic systolic congestive heart failure, hypertension, GERD.  Patient was admitted last month due to acute CVA within the left corona radiata/basal ganglia causing some aphasia as well as right lower extremity weakness, patient improved and was discharged to acute rehabilitation.  She was discharged home Improved.  Unfortunately, prior to admission, when patient was trying to pull her pants on by herself, she lost her balance and fell, she landed on the right shoulder and right upper chest. She complained of pain in the right shoulder, right side of the chest as well as right side of her neck.  Patient came to the emergency room.  In the emergency room, patient was found to have: CT of the chest show acute fractures of the right clavicle, and 2nd through 4th ribs, including segmental right second and  third rib fractures.  CT of the head showed no new normalities, CT of the cervical spine showed no new abnormalities.  In the emergency room patient was given Tylenol 1000 mg one-time.  Patient was placed in observation for further management     Patient was placed on medication for pain and nausea, vital signs remain okay, blood sugars remain okay.  Patient was on Accu-Cheks and insulin scale insulin.  She was also placed on DVT and stress ulcer prophylaxis.  Patient was evaluated for physical therapy and  occupational therapy, and the recommendation was for patient to do acute rehabilitation.  Patient is doing okay.  Vital signs are stable except for blood pressure which is on the low side, for which, amlodipine will be decreased to 5 mg daily.  Rest of medication as the medication reconciliation list.  Patient is okay to be discharged to acute debilitation today.  I will see her back in the office a week after she is discharged from acute rehabilitation.    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, grade III, ER/WV negative    Hypertension     Lower extremity edema     Moderate persistent asthma     On home O2     3L NC PRN    Pulmonary hypertension     Sleep apnea     Swelling     IN LOWER EXTREMITIES    Temporal arteritis     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 Daniel Gutierrez, [facility]    CARDIAC CATHETERIZATION       SECTION N/A     x3    CHOLECYSTECTOMY N/A     COLON RESECTION Right 2025    Procedure: COLON RESECTION LAPAROSCOPIC RIGHT WITH DAVINCI ROBOT;  Surgeon: Ashwin Alvarado MD;  Location: [facility] MAIN OR;  Service: Robotics - DaVinci;  Laterality: Right;    COLONOSCOPY      COLONOSCOPY N/A 2024    Procedure: COLONOSCOPY into the cecum and TI with hot snare polypectomy;  Surgeon: Ashwin Alvarado MD;  Location: [facility] ENDOSCOPY;  Service: General;  Laterality: N/A;  pre-colon polyps  post-colon mass    COLONOSCOPY W/ POLYPECTOMY N/A 2019    Enlarged folds in the antrum: biopsied; duodenal, transverse colon x2, splenic flexure, descending colon and sigmoid colon polyps: biopsied (path: tubular adenoma x6)-Dr. Zak De Jesus, [facility]    ENDOSCOPY  10/11/2019    Procedure: ESOPHAGOGASTRODUODENOSCOPY with hot snare polypectomy;  Surgeon: Haile Handley MD;  Location: [facility] ENDOSCOPY;  Service: Gastroenterology    ENDOSCOPY N/A 2020    Procedure: ESOPHAGOGASTRODUODENOSCOPY;  Surgeon: Haile Handley MD;  Location: [facility] ENDOSCOPY;  Service: Gastroenterology    ENDOSCOPY N/A 2020    Procedure: ESOPHAGOGASTRODUODENOSCOPY WITH BIOPSIES AND COLD BIOPSY POLYPECTOMY;  Surgeon: Haile Handley MD;  Location: [facility] ENDOSCOPY;  Service: Gastroenterology;  Laterality: N/A;  pre: dyspepsia and diarrhea  post: duodenal polyp and gastritis    ENDOSCOPY N/A 2024    Procedure: ESOPHAGOGASTRODUODENOSCOPY WITH hot snare polypectomy with clip placement x 2BIOPSY;  Surgeon: Ashwin Alvarado MD;  Location: [facility] ENDOSCOPY;  Service: General;  Laterality: N/A;  pre-hx duoedenal polyp  post-duodenal polyp; gastritis    EYE SURGERY      MASTECTOMY WITH SENTINEL NODE BIOPSY AND AXILLARY NODE DISSECTION  "Left 07/03/2019    Procedure: LEFT BREAST MASTECTOMY WITH SENTINEL NODE BIOPSY AND , v-y plasty closure;  Surgeon: Tahmina James MD;  Location: [facility] OR;  Service: General    SUBTOTAL HYSTERECTOMY Bilateral     ovaries still in tact    TEMPORAL ARTERY BIOPSY Bilateral 12/05/2019           Consults:     Consults       Date and Time Order Name Status Description    3/11/2025  9:57 AM Inpatient Orthopedic Surgery Consult Completed     3/9/2025 10:38 PM Family Medicine Consult      2/16/2025 10:05 AM Inpatient Neurology Consult Stroke Completed     2/15/2025  3:20 PM Inpatient Neurology Consult Stroke Completed     2/15/2025  3:20 PM Inpatient Neurology Consult Stroke Completed             Discharge Exam:    /55 (BP Location: Left leg, Patient Position: Sitting)   Pulse 88   Temp 97.8 °F (36.6 °C) (Oral)   Resp 16   Ht 152.4 cm (60\")   Wt 81.6 kg (180 lb)   LMP  (LMP Unknown)   SpO2 95%   BMI 35.15 kg/m²     General: Alert, oriented x 3. Cooperative, complaining of pain on the right shoulder and right side of the chest but better with Tylenol.  Obese.  Neck: Supple, symmetrical, trachea midline, no adenopathy;              thyroid:  no enlargement/tenderness/nodules;              no carotid bruit or JVD.  Tenderness, mild, on the right side of the neck.  Cardiovascular: Normal rate, regular rhythm and intact distal pulses.              Exam reveals no gallop and no friction rub. No murmur heard  Pulmonary: Diminished breath sounds, worse on the right side.  No wheezing, no rales.    Abdominal: Soft, nontender, bowel sounds active all four quadrants,     no masses, no hepatomegaly, no splenomegaly.   Extremities: Tenderness on the right shoulder and right proximal upper arm.   Pulses: 2 + symmetric all extremities  Neurological: Patient is alert and oriented to person, place, and time.  No slurred speech at this time.  Mild right lower extremity weakness from recent stroke.  No new focal " sensorimotor deficit.  Skin: Skin color, texture, normal. Turgor is decreased. No rashes or lesions       Data Review:        Results from last 7 days   Lab Units 03/12/25 0450 03/11/25  0532 03/10/25  0619   WBC 10*3/mm3 8.61 7.94 9.97   HEMOGLOBIN g/dL 10.1* 10.2* 9.9*   HEMATOCRIT % 31.0* 31.7* 31.0*   PLATELETS 10*3/mm3 244 249 251       Results from last 7 days   Lab Units 03/12/25 0450 03/11/25  0532 03/10/25  0619 03/10/25  0035   SODIUM mmol/L 133* 136 137 132*   POTASSIUM mmol/L 5.2 5.2 5.0 4.8   CHLORIDE mmol/L 104 106 106 104   CO2 mmol/L 20.0* 21.8* 20.6* 17.6*   BUN mg/dL 37* 30* 29* 33*   CREATININE mg/dL 1.85* 1.41* 1.23* 1.38*   CALCIUM mg/dL 8.5* 8.8 9.0 9.0   BILIRUBIN mg/dL  --   --   --  0.3   ALK PHOS U/L  --   --   --  112   ALT (SGPT) U/L  --   --   --  14   AST (SGOT) U/L  --   --   --  18   GLUCOSE mg/dL 176* 170* 158* 228*           Lab Results   Lab Value Date/Time    TROPONINT 34 (H) 10/01/2023 1149    TROPONINT 56 (C) 06/30/2023 1959    TROPONINT 61 (C) 06/30/2023 1601    TROPONINT 28 (H) 05/21/2023 1920    TROPONINT 25 (H) 05/21/2023 1615       Microbiology Results (last 10 days)       ** No results found for the last 240 hours. **             Imaging Results (All)       Procedure Component Value Units Date/Time    CT Chest Without Contrast Diagnostic [[id number]] Collected: 03/09/25 2222     Updated: 03/09/25 2229    Narrative:      CT CHEST WITHOUT CONTRAST;     HISTORY: Fall, right upper chest tenderness and bruising     COMPARISON: Chest CT 1/30/2025     TECHNIQUE: Helical CT was performed of the chest from the lung apices  through the upper abdomen without IV  contrast. Multiplanar reformats  were created and reviewed. Radiation dose reduction techniques were  utilized, including automated exposure control, and exposure modulation  based on body size.     FINDINGS:     There is no mediastinal adenopathy or other mass.   The thoracic aorta  is normal in caliber. There are modest  coronary atherosclerotic vascular  calcifications.     There is no acute pulmonary infiltrate, but there is right greater than  left bibasilar linear parenchymal opacity, almost certainly due to  atelectasis. There is no effusion or pneumothorax.     Images of the upper abdomen demonstrate no acute abnormality.     There is a comminuted mid right clavicle fracture. There are acute right  second through fourth rib fractures, including segmental fractures of  the second and third ribs.       Impression:         Acute fractures of the right clavicle and second through fourth ribs,  including segmental right second and third rib fractures.     Otherwise negative unenhanced chest CT.        This report was finalized on 3/9/2025 10:26 PM by Dr. Sachin Michelle M.D on Workstation: [id number]       CT Head Without Contrast [[id number]] Collected: 03/09/25 2217     Updated: 03/09/25 2225    Narrative:      NON-CONTRAST CT HEAD & CT CERVICAL SPINE     REASON:  The patient is being seen in the ED for trauma and is  determined to have a high energy mechanism and/or high risk for missed  injuries due to presence of associated injuries or distracting pain. The  patient will need imaging of the head per the trauma protocol given  diagnoses such as intracranial hemorrhage cannot be excluded.    The  patient will need imaging of the cervical spine given diagnoses such as  cervical spine fracture cannot be excluded.  The diagnostic information  gained in this study exceeds the estimated risk of radiation induced  injury at the time of order placement.  Mechanism of injury: Fall     COMPARISON STUDIES:  None Available.     TECHNIQUE:  Axial images were acquired from the skull base to vertex  without contrast, including multiplanar reformats, per standard  departmental protocol.   Routine cervical spine CT without contrast.  Multiplanar reformats were created. Radiation dose reduction techniques  were utilized, including automated  exposure control, and exposure  modulation based on body size.     FINDINGS:     Head CT: There is no CT evidence of acute intracranial hemorrhage, mass,  or infarct. There is volume loss, but there is no evidence of  hydrocephalus or extra-axial fluid collection.  There are moderate  nonspecific white matter changes, but there is no acute intracranial  abnormality.     Skull base and calvarium show no acute abnormality, and the extracranial  soft tissues show no acute abnormality. There are chronic appearing  changes in the left maxillary sinus.     C-spine CT: Cervical spine alignment is within normal limits.  There is  no cervical spine fracture or other acute cervical spine abnormality.  There is a comminuted mid right clavicle fracture, partially seen here.       Impression:         Negative unenhanced head CT, senescent change without acute abnormality.        Negative cervical spine CT.     Comminuted mid right clavicle fracture, partially demonstrated.           This report was finalized on 3/9/2025 10:21 PM by Dr. Sachin Michelle M.D on Workstation: [id number]       CT Cervical Spine Without Contrast [[id number]] Collected: 03/09/25 2217     Updated: 03/09/25 2225    Narrative:      NON-CONTRAST CT HEAD & CT CERVICAL SPINE     REASON:  The patient is being seen in the ED for trauma and is  determined to have a high energy mechanism and/or high risk for missed  injuries due to presence of associated injuries or distracting pain. The  patient will need imaging of the head per the trauma protocol given  diagnoses such as intracranial hemorrhage cannot be excluded.    The  patient will need imaging of the cervical spine given diagnoses such as  cervical spine fracture cannot be excluded.  The diagnostic information  gained in this study exceeds the estimated risk of radiation induced  injury at the time of order placement.  Mechanism of injury: Fall     COMPARISON STUDIES:  None Available.     TECHNIQUE:   Axial images were acquired from the skull base to vertex  without contrast, including multiplanar reformats, per standard  departmental protocol.   Routine cervical spine CT without contrast.  Multiplanar reformats were created. Radiation dose reduction techniques  were utilized, including automated exposure control, and exposure  modulation based on body size.     FINDINGS:     Head CT: There is no CT evidence of acute intracranial hemorrhage, mass,  or infarct. There is volume loss, but there is no evidence of  hydrocephalus or extra-axial fluid collection.  There are moderate  nonspecific white matter changes, but there is no acute intracranial  abnormality.     Skull base and calvarium show no acute abnormality, and the extracranial  soft tissues show no acute abnormality. There are chronic appearing  changes in the left maxillary sinus.     C-spine CT: Cervical spine alignment is within normal limits.  There is  no cervical spine fracture or other acute cervical spine abnormality.  There is a comminuted mid right clavicle fracture, partially seen here.       Impression:         Negative unenhanced head CT, senescent change without acute abnormality.        Negative cervical spine CT.     Comminuted mid right clavicle fracture, partially demonstrated.           This report was finalized on 3/9/2025 10:21 PM by Dr. Sachin Michelle M.D on Workstation: [id number]       XR Shoulder 2+ View Right [[id number]] Collected: 03/09/25 2051     Updated: 03/09/25 2056    Narrative:         XR SHOULDER 2+ VW RIGHT-, XR HUMERUS RIGHT-          HISTORY:   Fall, right shoulder pain      TECHNIQUE: 3 views of the right shoulder; Two views of the right  humerus.     FINDINGS:     Shoulder: Degenerative change, no evidence of acute fracture,  dislocation, or radiopaque foreign body.     Humerus: Negative for acute fracture, dislocation, or radiopaque foreign  body.       Impression:         No acute abnormality.        This  report was finalized on 3/9/2025 8:53 PM by Dr. Sachin Michelle M.D  on Workstation: [id number]       XR Humerus Right [[id number]] Collected: 03/09/25 2051     Updated: 03/09/25 2056    Narrative:         XR SHOULDER 2+ VW RIGHT-, XR HUMERUS RIGHT-          HISTORY:   Fall, right shoulder pain      TECHNIQUE: 3 views of the right shoulder; Two views of the right  humerus.     FINDINGS:     Shoulder: Degenerative change, no evidence of acute fracture,  dislocation, or radiopaque foreign body.     Humerus: Negative for acute fracture, dislocation, or radiopaque foreign  body.       Impression:         No acute abnormality.        This report was finalized on 3/9/2025 8:53 PM by Dr. Sachin Michelle M.D  on Workstation: [id number]                 Disposition:    Rehab    Patient Instructions:        Discharge Medications        New Medications        Instructions Start Date   enoxaparin sodium 40 MG/0.4ML solution prefilled syringe syringe  Commonly known as: LOVENOX   40 mg, Subcutaneous, Every 24 Hours      HYDROcodone-acetaminophen 5-325 MG per tablet  Commonly known as: NORCO   1 tablet, Oral, Every 6 Hours PRN      insulin lispro 100 UNIT/ML injection  Commonly known as: HUMALOG/ADMELOG  Replaces: Insulin Lispro (1 Unit Dial) 100 UNIT/ML solution pen-injector   2-7 Units, Subcutaneous, 4 Times Daily Before Meals & Nightly      pantoprazole 40 MG EC tablet  Commonly known as: PROTONIX  Replaces: omeprazole 20 MG capsule   40 mg, Oral, Every Early Morning             Changes to Medications        Instructions Start Date   amLODIPine 5 MG tablet  Commonly known as: NORVASC  What changed:   medication strength  how much to take   5 mg, Oral, Every 24 Hours Scheduled   Start Date: March 13, 2025            Continue These Medications        Instructions Start Date   acetaminophen 325 MG tablet  Commonly known as: TYLENOL   650 mg, Oral, Every 6 Hours PRN      albuterol 1.25 MG/3ML nebulizer solution  Commonly known  as: ACCUNEB   Take  by nebulization Every 4 (Four) Hours As Needed for Wheezing or Shortness of Air.      aspirin 81 MG EC tablet   81 mg, Oral, Daily      atorvastatin 80 MG tablet  Commonly known as: LIPITOR   80 mg, Oral, Nightly      BD Pen Needle Tila 2nd Gen 32G X 4 MM misc  Generic drug: Insulin Pen Needle   USE TO GIVE INSULIN 4 TIMES DAILY      bisoprolol 10 MG tablet  Commonly known as: ZEBeta   20 mg, Nightly      empagliflozin 25 MG tablet tablet  Commonly known as: JARDIANCE   25 mg, Oral, Daily      lisinopril 40 MG tablet  Commonly known as: PRINIVIL,ZESTRIL   40 mg, Daily      loperamide 2 MG capsule  Commonly known as: IMODIUM   2 mg, Oral, 4 Times Daily PRN      miconazole 2 % powder  Commonly known as: MICOTIN   Topical, Every 12 Hours Scheduled      O2  Commonly known as: OXYGEN   3 L/min, As Needed      predniSONE 5 MG tablet  Commonly known as: DELTASONE   5 mg, Every Evening      Symbicort 160-4.5 MCG/ACT inhaler  Generic drug: budesonide-formoterol   2 puffs, 2 Times Daily      Toujeo SoloStar 300 UNIT/ML solution pen-injector injection  Generic drug: Insulin Glargine (1 Unit Dial)   25 Units, Nightly      traMADol 50 MG tablet  Commonly known as: ULTRAM   50 mg, Oral, Every 6 Hours PRN             Stop These Medications      hydrocortisone-bacitracin-zinc oxide-nystatin     Insulin Lispro (1 Unit Dial) 100 UNIT/ML solution pen-injector  Commonly known as: HumaLOG KwikPen  Replaced by: insulin lispro 100 UNIT/ML injection     omeprazole 20 MG capsule  Commonly known as: priLOSEC  Replaced by: pantoprazole 40 MG EC tablet              Discharge Order (From admission, onward)       Start     Ordered    03/12/25 1212  Discharge patient  Once        Expected Discharge Date: 03/12/25   Expected Discharge Time: Afternoon   Discharge Disposition: Rehab Facility or Unit (DC - External)   Physician of Record for Attribution - Please select from Treatment Team: ELSY HAINES [[id number]]   Review needed  by CMO to determine Physician of Record: No      Question Answer Comment   Physician of Record for Attribution - Please select from Treatment Team ELSY VALENZUELA    Review needed by CMO to determine Physician of Record No        03/12/25 1214                     Follow-up Information       [facility] Follow up.    Specialty: Inpatient Rehabilitation Facility  Contact information:  [address] [facility] [address] [phone]             Elsy Valenzuela MD .    Specialties: Internal Medicine, Hospitalist  Contact information:  [address] [address]  [phone]                             Total time spent discharging patient including evaluation,post hospitalization follow up,  medication and post hospitalization instructions and education total time exceeds 30 minutes.    Signed:  Elsy Valenzuela MD  3/12/2025  12:14 EDT       I wore protective equipment throughout this patient encounter including a face mask, gloves, and protective eyewear.  Hand hygiene was performed before donning protective equipment and after removal when leaving the room.

## 2025-03-12 NOTE — PLAN OF CARE
Goal Outcome Evaluation:           Progress: no change  Outcome Evaluation: Pt alert, pleasant and cooperative with staff.  at bedside, very supportive. No bp's/sticks to left arm from previous mastectomy. IV to right forearm d/c'd per order. Order to discharge pt to [facility] per w/c transportation. Abd bruising noted from insulin injections and Lovenox injections. Accuchecks ac&hs. Sling to right arm. Report called to Raj at [facility]. Nsg to cont to monitor.

## 2025-03-12 NOTE — PLAN OF CARE
Goal Outcome Evaluation:  Plan of Care Reviewed With: patient, spouse        Progress: improving  Outcome Evaluation: Pt A&Ox4. Tylenol given for pain- pt refused pain medicine this morning.  at bedside pt in bed resting comfotably. Pt was on 1L NC at the start of this RN shift, at bedtime pt wore CPAP all night

## 2025-03-12 NOTE — PROGRESS NOTES
Discharge Planning Assessment  [facility]     Patient Name: Blanca Harden  MRN: [id number]  Today's Date: 3/12/2025    Admit Date: 3/9/2025    Plan: [facility]   Discharge Needs Assessment    No documentation.                  Discharge Plan       Row Name 03/12/25 1303       Plan    Plan Comments Per [employer] [facility] transportation setup for 3/12/25 wheelchair to transport the patient at 15:00-16:00 to the facility. Family/spouse notified and is agreeable to discharge today. JACQUELINE Hawthorne RN, CCP.    Final Discharge Disposition Code 62 - inpatient rehab facility    Final Note Per [employer] [facility] transportation setup for 3/12/25 wheelchair to transport the patient at 15:00-16:00 to the facility. Family/spouse notified and is agreeable to discharge today. JACQUELINE Hawthorne RN, CCP.      Row Name 03/12/25 1024       Plan    Plan Comments Ortho MD signed off. Dmitry from [facility] can accept today. JACQUELINE Hawthorne RN, CCP.                  Continued Care and Services - Admitted Since 3/9/2025       Destination Coordination complete.      Service Provider Request Status Services Address Phone Fax Patient Preferred    [facility] [address] [facility] [address] [phone] --                  Expected Discharge Date and Time       Expected Discharge Date Expected Discharge Time    Mar 12, 2025            Demographic Summary    No documentation.                  Functional Status    No documentation.                  Psychosocial    No documentation.                  Abuse/Neglect    No documentation.                  Legal    No documentation.                  Substance Abuse    No documentation.                  Patient Forms    No documentation.                     Corinna Hawthorne RN

## 2025-03-12 NOTE — PROGRESS NOTES
Case Management Discharge Note      Final Note: Per [facility] [employer] transportation setup for 3/12/25 wheelchair to transport the patient at 15:00-16:00 to the facility. Family/spouse notified and is agreeable to discharge today. JACQUELINE Hawthorne, RN, CCP.    Provided Post Acute Provider List?: Yes  Post Acute Provider List: Inpatient Rehab, Nursing Home  Provided Post Acute Provider Quality & Resource List?: Yes  Post Acute Provider Quality and Resource List: Inpatient Rehab, Nursing Home  Delivered To: Support Person  Method of Delivery: In person    Selected Continued Care - Admitted Since 3/9/2025       Destination Coordination complete.      Service Provider Services Address Phone Fax Patient Preferred    [facility] [address] [address] [phone] --              Durable Medical Equipment    No services have been selected for the patient.                Dialysis/Infusion    No services have been selected for the patient.                Home Medical Care    No services have been selected for the patient.                Therapy    No services have been selected for the patient.                Community Resources    No services have been selected for the patient.                Community & DME    No services have been selected for the patient.                    Transportation Services  W/C Van: [employer] (Was setup by [facility] and they will pay for the transportation per Dmitry. JACQUELINE Hawthorne, RN, CCP)    Final Discharge Disposition Code: 62 - inpatient rehab facility

## 2025-03-12 NOTE — PROGRESS NOTES
Discharge Planning Assessment  [facility]     Patient Name: Blanca Harden  MRN: [id number]  Today's Date: 3/12/2025    Admit Date: 3/9/2025    Plan: [facility]   Discharge Needs Assessment    No documentation.                  Discharge Plan       Row Name 03/12/25 1024       Plan    Plan Comments Karl GOLD signed off. Dmitry from [facility] can accept today. JACQUELINE Hawthorne RN, CCP.                  Continued Care and Services - Admitted Since 3/9/2025       Destination       Service Provider Request Status Services Address Phone Fax Patient Preferred    [facility] [place] [address] [id number] [phone] --                  Expected Discharge Date and Time       Expected Discharge Date Expected Discharge Time    Mar 13, 2025            Demographic Summary    No documentation.                  Functional Status    No documentation.                  Psychosocial    No documentation.                  Abuse/Neglect    No documentation.                  Legal    No documentation.                  Substance Abuse    No documentation.                  Patient Forms    No documentation.                     Corinna Hawthorne RN

## 2025-03-13 ENCOUNTER — OUTSIDE FACILITY SERVICE (OUTPATIENT)
Age: 80
End: 2025-03-13
Payer: MEDICARE

## 2025-03-14 ENCOUNTER — OUTSIDE FACILITY SERVICE (OUTPATIENT)
Age: 80
End: 2025-03-14

## 2025-03-14 PROCEDURE — OUTSIDEPOS PR OUTSIDE POS PLACEHOLDER: Performed by: PHYSICAL MEDICINE & REHABILITATION

## 2025-03-17 ENCOUNTER — OUTSIDE FACILITY SERVICE (OUTPATIENT)
Age: 80
End: 2025-03-17

## 2025-03-17 PROCEDURE — OUTSIDEPOS PR OUTSIDE POS PLACEHOLDER: Performed by: PHYSICAL MEDICINE & REHABILITATION

## 2025-03-18 ENCOUNTER — OUTSIDE FACILITY SERVICE (OUTPATIENT)
Age: 80
End: 2025-03-18

## 2025-03-18 PROCEDURE — OUTSIDEPOS PR OUTSIDE POS PLACEHOLDER: Performed by: PHYSICAL MEDICINE & REHABILITATION

## 2025-03-20 ENCOUNTER — OUTSIDE FACILITY SERVICE (OUTPATIENT)
Age: 80
End: 2025-03-20

## 2025-03-20 PROCEDURE — OUTSIDEPOS PR OUTSIDE POS PLACEHOLDER: Performed by: PHYSICAL MEDICINE & REHABILITATION

## 2025-03-21 ENCOUNTER — OUTSIDE FACILITY SERVICE (OUTPATIENT)
Age: 80
End: 2025-03-21

## 2025-03-21 PROCEDURE — OUTSIDEPOS PR OUTSIDE POS PLACEHOLDER: Performed by: PHYSICAL MEDICINE & REHABILITATION

## 2025-03-24 ENCOUNTER — OUTSIDE FACILITY SERVICE (OUTPATIENT)
Age: 80
End: 2025-03-24

## 2025-03-24 PROCEDURE — OUTSIDEPOS PR OUTSIDE POS PLACEHOLDER: Performed by: PHYSICAL MEDICINE & REHABILITATION

## 2025-03-25 ENCOUNTER — OUTSIDE FACILITY SERVICE (OUTPATIENT)
Age: 80
End: 2025-03-25

## 2025-03-25 PROCEDURE — OUTSIDEPOS PR OUTSIDE POS PLACEHOLDER: Performed by: PHYSICAL MEDICINE & REHABILITATION

## 2025-04-07 ENCOUNTER — TRANSCRIBE ORDERS (OUTPATIENT)
Age: 80
End: 2025-04-07
Payer: MEDICARE

## 2025-04-07 DIAGNOSIS — I50.30 DIASTOLIC HEART FAILURE, UNSPECIFIED HF CHRONICITY: Primary | ICD-10-CM

## 2025-04-28 ENCOUNTER — OFFICE VISIT (OUTPATIENT)
Dept: ENDOCRINOLOGY | Age: 80
End: 2025-04-28
Payer: MEDICARE

## 2025-04-28 VITALS
WEIGHT: 161.2 LBS | SYSTOLIC BLOOD PRESSURE: 130 MMHG | BODY MASS INDEX: 31.65 KG/M2 | OXYGEN SATURATION: 94 % | DIASTOLIC BLOOD PRESSURE: 78 MMHG | HEART RATE: 98 BPM | HEIGHT: 60 IN

## 2025-04-28 DIAGNOSIS — E78.5 HYPERLIPIDEMIA, UNSPECIFIED HYPERLIPIDEMIA TYPE: Primary | ICD-10-CM

## 2025-04-28 DIAGNOSIS — E11.22 TYPE 2 DIABETES MELLITUS WITH STAGE 3B CHRONIC KIDNEY DISEASE, WITH LONG-TERM CURRENT USE OF INSULIN: ICD-10-CM

## 2025-04-28 DIAGNOSIS — Z79.4 TYPE 2 DIABETES MELLITUS WITH STAGE 3B CHRONIC KIDNEY DISEASE, WITH LONG-TERM CURRENT USE OF INSULIN: ICD-10-CM

## 2025-04-28 DIAGNOSIS — N18.32 TYPE 2 DIABETES MELLITUS WITH STAGE 3B CHRONIC KIDNEY DISEASE, WITH LONG-TERM CURRENT USE OF INSULIN: ICD-10-CM

## 2025-04-28 PROCEDURE — 99204 OFFICE O/P NEW MOD 45 MIN: CPT | Performed by: INTERNAL MEDICINE

## 2025-04-28 PROCEDURE — 3078F DIAST BP <80 MM HG: CPT | Performed by: INTERNAL MEDICINE

## 2025-04-28 PROCEDURE — 1160F RVW MEDS BY RX/DR IN RCRD: CPT | Performed by: INTERNAL MEDICINE

## 2025-04-28 PROCEDURE — 3075F SYST BP GE 130 - 139MM HG: CPT | Performed by: INTERNAL MEDICINE

## 2025-04-28 PROCEDURE — 1159F MED LIST DOCD IN RCRD: CPT | Performed by: INTERNAL MEDICINE

## 2025-04-28 PROCEDURE — G2211 COMPLEX E/M VISIT ADD ON: HCPCS | Performed by: INTERNAL MEDICINE

## 2025-04-28 RX ORDER — ROSUVASTATIN CALCIUM 5 MG/1
5 TABLET, COATED ORAL NIGHTLY
Qty: 30 TABLET | Refills: 11 | Status: SHIPPED | OUTPATIENT
Start: 2025-04-28 | End: 2026-04-28

## 2025-04-28 NOTE — PROGRESS NOTES
Referring provider: Praneeth Valenzuela MD     Chief complaint/Reason for consult: T2DM    HPI:   - 79 year old female here for management of diabetes mellitus type 2  - Has had diabetes for 30 years  - Complications include CAD, CVA  - No known complications to date  - Is currently taking Humalog 0-16 units with meals and Jardiance 25 units  - Denies hypoglycemia  - She does have a [employer]hell CGM      The following portions of the patient's history were reviewed and updated as appropriate: allergies, current medications, past family history, past medical history, past social history, past surgical history, and problem list.      Objective     Vitals:    04/28/25 1351   BP: 130/78   Pulse: 98   SpO2: 94%        Physical Exam  Vitals reviewed.   Constitutional:       Appearance: Normal appearance.   HENT:      Head: Normocephalic and atraumatic.   Eyes:      General: No scleral icterus.  Pulmonary:      Effort: Pulmonary effort is normal. No respiratory distress.   Neurological:      Mental Status: She is alert.      Gait: Gait normal.   Psychiatric:         Mood and Affect: Mood normal.         Behavior: Behavior normal.         Thought Content: Thought content normal.         Judgment: Judgment normal.       Assessment & Plan   T2DM, uncontrolled due to hyperglycemia, complicated by CAD and CVA  - Cont. Humalog 0-16 units with meals and Jardiance 25 units  - Add Toujeo 20 units daily    2. Hyperlipidemia  - Start Crestor 5 mg daily  - She states she had nausea with atorvastatin    - Return to clinic in 3 months

## 2025-05-09 ENCOUNTER — OFFICE VISIT (OUTPATIENT)
Age: 80
End: 2025-05-09
Payer: MEDICARE

## 2025-05-09 VITALS
HEIGHT: 60 IN | OXYGEN SATURATION: 97 % | WEIGHT: 166 LBS | HEART RATE: 80 BPM | BODY MASS INDEX: 32.59 KG/M2 | SYSTOLIC BLOOD PRESSURE: 140 MMHG | DIASTOLIC BLOOD PRESSURE: 80 MMHG

## 2025-05-09 DIAGNOSIS — I10 ESSENTIAL HYPERTENSION: Primary | ICD-10-CM

## 2025-05-09 DIAGNOSIS — E11.22 TYPE 2 DIABETES MELLITUS WITH STAGE 3B CHRONIC KIDNEY DISEASE, WITH LONG-TERM CURRENT USE OF INSULIN: ICD-10-CM

## 2025-05-09 DIAGNOSIS — I25.10 CORONARY ARTERY DISEASE INVOLVING NATIVE CORONARY ARTERY OF NATIVE HEART WITHOUT ANGINA PECTORIS: ICD-10-CM

## 2025-05-09 DIAGNOSIS — E78.5 HYPERLIPIDEMIA, UNSPECIFIED HYPERLIPIDEMIA TYPE: ICD-10-CM

## 2025-05-09 DIAGNOSIS — N18.32 TYPE 2 DIABETES MELLITUS WITH STAGE 3B CHRONIC KIDNEY DISEASE, WITH LONG-TERM CURRENT USE OF INSULIN: ICD-10-CM

## 2025-05-09 DIAGNOSIS — Z79.4 TYPE 2 DIABETES MELLITUS WITH STAGE 3B CHRONIC KIDNEY DISEASE, WITH LONG-TERM CURRENT USE OF INSULIN: ICD-10-CM

## 2025-05-09 RX ORDER — ROSUVASTATIN CALCIUM 40 MG/1
40 TABLET, COATED ORAL NIGHTLY
Qty: 90 TABLET | Refills: 3 | Status: SHIPPED | OUTPATIENT
Start: 2025-05-09 | End: 2026-05-09

## 2025-05-09 NOTE — PROGRESS NOTES
Subjective:     Encounter Date:05/09/2025      Patient ID: Blanca Harden is a 79 y.o. female.    Chief Complaint:  CAD, CVA    HPI:   79 y.o. female with recent CVA in February 2025, CAD, hypertension, hyperlipidemia, diabetes, CKD stage III who presents to establish care.  Patient was previously followed by Dr. Elliott.  She had been in her usual state of health however presented in February with slurred speech and lower extremity weakness, found to have an acute infarct within the left corona radiata/basal ganglia.  She was initiated on medical therapy and was discharged home.  Echocardiogram at that time revealed normal EF with negative bubble study.  Holter monitor revealed intermittent SVT but no A-fib.  Of note, she had coronary angiography performed in October 24 with Dr. Elliott which revealed 70% stenosis of an OM branch.  Given the patient was asymptomatic, PCI was deferred.  She denies any cardiac symptoms including dyspnea, chest pain, palpitations.    The following portions of the patient's history were reviewed and updated as appropriate: allergies, current medications, past family history, past medical history, past social history, past surgical history and problem list.     REVIEW OF SYSTEMS:   All systems reviewed.  Pertinent positives identified in HPI.  All other systems are negative.    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    Hypertension     Liver disease     Lower extremity edema     Moderate persistent  asthma     On home O2     3L NC PRN    Pulmonary hypertension     Sleep apnea     Swelling     IN LOWER EXTREMITIES    Temporal arteritis     Varicose veins of unspecified lower extremity with ulcer of calf 2022    Venous insufficiency (chronic) (peripheral)     Vitamin D deficiency        Family History   Problem Relation Age of Onset    Heart disease Mother     Stroke Mother     Asthma Mother     Hypertension Father     Stomach cancer Father     Diabetes Father     Esophageal cancer Father     Cancer Father     Throat cancer Sister     Diabetes Paternal Grandmother     Hypertension Paternal Grandfather     Colon cancer Paternal Grandfather     Colon cancer Paternal Uncle     Colon cancer Paternal Uncle     Colon cancer Paternal Uncle     Ovarian cancer Cousin     Stomach cancer Cousin     Malig Hyperthermia Neg Hx        Social History     Socioeconomic History    Marital status:      Spouse name: Ashwin    Number of children: 3    Years of education: College   Tobacco Use    Smoking status: Never    Smokeless tobacco: Never    Tobacco comments:     caffine use   Vaping Use    Vaping status: Never Used   Substance and Sexual Activity    Alcohol use: Never    Drug use: Never    Sexual activity: Not Currently     Partners: Male     Comment: Spouse, Ashwin       Allergies   Allergen Reactions    Revefenacin Shortness Of Breath    Aspirin Nausea Only     ABDOMINAL PAIN     Sulfa Antibiotics Unknown (See Comments)     CHILDHOOD REACTION        Past Surgical History:   Procedure Laterality Date    AMPUTATION  Breast    BRAIN SURGERY      BREAST BIOPSY Left  approx    benign pathology    BREAST BIOPSY Left 2019    Ultasound guided mammotome vacuum assisted left breast biopsy with placement of a metallic clip-Dr. Daniel Gutierrez, [facility]    CARDIAC CATHETERIZATION       SECTION N/A     x3    CHOLECYSTECTOMY N/A     COLON RESECTION Right 2025    Procedure: COLON RESECTION LAPAROSCOPIC  RIGHT WITH DAVINCI ROBOT;  Surgeon: Ashwin Alvarado MD;  Location: [facility] MAIN OR;  Service: Robotics - DaVinci;  Laterality: Right;    COLONOSCOPY      COLONOSCOPY N/A 07/23/2024    Procedure: COLONOSCOPY into the cecum and TI with hot snare polypectomy;  Surgeon: Ashwin Alvarado MD;  Location: [facility] ENDOSCOPY;  Service: General;  Laterality: N/A;  pre-colon polyps  post-colon mass    COLONOSCOPY W/ POLYPECTOMY N/A 03/12/2019    Enlarged folds in the antrum: biopsied; duodenal, transverse colon x2, splenic flexure, descending colon and sigmoid colon polyps: biopsied (path: tubular adenoma x6)-Dr. Zak De Jesus, [facility]    ENDOSCOPY  10/11/2019    Procedure: ESOPHAGOGASTRODUODENOSCOPY with hot snare polypectomy;  Surgeon: Haile Handley MD;  Location: [facility] ENDOSCOPY;  Service: Gastroenterology    ENDOSCOPY N/A 01/29/2020    Procedure: ESOPHAGOGASTRODUODENOSCOPY;  Surgeon: Haile Handley MD;  Location: [facility] ENDOSCOPY;  Service: Gastroenterology    ENDOSCOPY N/A 09/09/2020    Procedure: ESOPHAGOGASTRODUODENOSCOPY WITH BIOPSIES AND COLD BIOPSY POLYPECTOMY;  Surgeon: Haile Handley MD;  Location: [facility] ENDOSCOPY;  Service: Gastroenterology;  Laterality: N/A;  pre: dyspepsia and diarrhea  post: duodenal polyp and gastritis    ENDOSCOPY N/A 07/23/2024    Procedure: ESOPHAGOGASTRODUODENOSCOPY WITH hot snare polypectomy with clip placement x 2BIOPSY;  Surgeon: Ashwin Alvarado MD;  Location: [facility] ENDOSCOPY;  Service: General;  Laterality: N/A;  pre-hx duoedenal polyp  post-duodenal polyp; gastritis    EYE SURGERY      MASTECTOMY WITH SENTINEL NODE BIOPSY AND AXILLARY NODE DISSECTION Left 07/03/2019    Procedure: LEFT BREAST MASTECTOMY WITH SENTINEL NODE BIOPSY AND , v-y plasty closure;  Surgeon: Tahmina James MD;  Location: [facility] MAIN OR;  Service: General    SUBTOTAL HYSTERECTOMY Bilateral     ovaries still in tact    TEMPORAL ARTERY BIOPSY  Bilateral 12/05/2019       Procedures       Objective:         Vitals:    05/09/25 1400   BP: 140/80   Pulse: 80   SpO2: 97%       PHYSICAL EXAM:  GEN: well appearing, in NAD   HEENT: NCAT, EOMI, moist mucus membranes   Respiratory: CTAB, no wheezes, rales or rhonchi  CV: normal rate, regular rhythm, normal S1, S2, no murmurs, rubs, gallops, +2 radial pulses b/l  GI: soft, nontender, nondistended  MSK: no edema  Skin: no rash, warm, dry  Heme/Lymph: no bruising or bleeding  Neuro: Alert and Oriented x 3, grossly normal motor function        Assessment:         (I10) Essential hypertension    (E78.5) Hyperlipidemia, unspecified hyperlipidemia type - Plan: rosuvastatin (Crestor) 40 MG tablet    (E11.22,  N18.32,  Z79.4) Type 2 diabetes mellitus with stage 3b chronic kidney disease, with long-term current use of insulin - Plan: rosuvastatin (Crestor) 40 MG tablet    (I25.10) Coronary artery disease involving native coronary artery of native heart without angina pectoris    79 y.o. female with recent CVA in February 2025, CAD, hypertension, hyperlipidemia, diabetes, CKD stage III who presents to establish care.       Plan:       #CAD/CVA/hyperlipidemia  Currently asymptomatic from a cardiac standpoint.  Patient's recent LDL was not at goal.  She was intolerant to atorvastatin due to stomach upset.  Currently tolerating low-dose rosuvastatin.  - increase rosuvastatin to 40 mg daily    #Hypertension  Currently at goal.  - Continue amlodipine 5 mg daily, bisoprolol 10 mg daily, lisinopril 40 mg daily    Praneeth Valenzuela MD, thank you very much for referring this kind patient to me. Please call me with any questions or concerns. I will see the patient again in the office in 6 months or earlier as needed.         Renard Anaya MD, [facility], [facility]  05/09/25  [facility]    Outpatient Encounter Medications as of 5/9/2025   Medication Sig Dispense Refill    acetaminophen (TYLENOL) 325 MG tablet Take 2 tablets by mouth  Every 6 (Six) Hours As Needed for Mild Pain. 30 tablet 3    albuterol (ACCUNEB) 1.25 MG/3ML nebulizer solution Take  by nebulization Every 4 (Four) Hours As Needed for Wheezing or Shortness of Air.      amLODIPine (NORVASC) 5 MG tablet Take 1 tablet by mouth Daily. 30 tablet 3    aspirin 81 MG EC tablet Take 1 tablet by mouth Daily. 30 tablet 3    BD Pen Needle Tila 2nd Gen 32G X 4 MM misc USE TO GIVE INSULIN 4 TIMES DAILY      bisoprolol (ZEBeta) 10 MG tablet Take 2 tablets by mouth Every Night.      empagliflozin (JARDIANCE) 25 MG tablet tablet Take 1 tablet by mouth Daily. 30 tablet 3    insulin lispro (HUMALOG/ADMELOG) 100 UNIT/ML injection Inject 2-7 Units under the skin into the appropriate area as directed 4 (Four) Times a Day Before Meals & at Bedtime. 10 mL 12    lisinopril (PRINIVIL,ZESTRIL) 40 MG tablet Take 1 tablet by mouth Daily.      miconazole (MICOTIN) 2 % powder Apply  topically to the appropriate area as directed Every 12 (Twelve) Hours. 71 g 1    O2 (OXYGEN) Inhale 3 L/min As Needed.      pantoprazole (PROTONIX) 40 MG EC tablet Take 1 tablet by mouth Every Morning. 30 tablet 3    predniSONE (DELTASONE) 5 MG tablet Take 1 tablet by mouth Every Evening.      rosuvastatin (Crestor) 40 MG tablet Take 1 tablet by mouth Every Night. 90 tablet 3    Symbicort 160-4.5 MCG/ACT inhaler Inhale 2 puffs 2 (Two) Times a Day.      Toujeo SoloStar 300 UNIT/ML solution pen-injector injection Inject 25 Units under the skin into the appropriate area as directed Every Night.      [DISCONTINUED] rosuvastatin (Crestor) 5 MG tablet Take 1 tablet by mouth Every Night. 30 tablet 11    [DISCONTINUED] enoxaparin sodium (LOVENOX) 40 MG/0.4ML solution prefilled syringe syringe Inject 0.4 mL under the skin into the appropriate area as directed Daily. Indications: Prevention of Unwanted Clot in Veins 10 mL 0    [DISCONTINUED] HYDROcodone-acetaminophen (NORCO) 5-325 MG per tablet Take 1 tablet by mouth Every 6 (Six) Hours As  Needed for Moderate Pain. 30 tablet 0    [DISCONTINUED] loperamide (IMODIUM) 2 MG capsule Take 1 capsule by mouth 4 (Four) Times a Day As Needed for Diarrhea. 30 capsule 3    [DISCONTINUED] traMADol (ULTRAM) 50 MG tablet Take 1 tablet by mouth Every 6 (Six) Hours As Needed for Moderate Pain. 10 tablet 0     No facility-administered encounter medications on file as of 5/9/2025.

## 2025-05-14 ENCOUNTER — OFFICE VISIT (OUTPATIENT)
Dept: NEUROLOGY | Facility: CLINIC | Age: 80
End: 2025-05-14
Payer: MEDICARE

## 2025-05-14 VITALS
WEIGHT: 161 LBS | SYSTOLIC BLOOD PRESSURE: 124 MMHG | HEART RATE: 88 BPM | OXYGEN SATURATION: 98 % | DIASTOLIC BLOOD PRESSURE: 56 MMHG | HEIGHT: 60 IN | BODY MASS INDEX: 31.61 KG/M2

## 2025-05-14 DIAGNOSIS — E78.2 MIXED HYPERLIPIDEMIA: ICD-10-CM

## 2025-05-14 DIAGNOSIS — E11.649 TYPE 2 DIABETES MELLITUS WITH HYPOGLYCEMIA WITHOUT COMA, UNSPECIFIED WHETHER LONG TERM INSULIN USE: ICD-10-CM

## 2025-05-14 DIAGNOSIS — I10 PRIMARY HYPERTENSION: ICD-10-CM

## 2025-05-14 DIAGNOSIS — Z86.73 HISTORY OF STROKE: Primary | ICD-10-CM

## 2025-05-14 DIAGNOSIS — G47.33 OBSTRUCTIVE SLEEP APNEA: ICD-10-CM

## 2025-05-14 NOTE — PROGRESS NOTES
CC: Stroke    Interval History 5/14/25  Patient denies any new weakness numbness speech or vision  Patient is currently on aspirin and Lipitor at home  Patient denies any recent hospitalizations or any new illness or any new medical diagnosis   Has been using a walker even before the stroke she did get some rehab but  thinks that she can get much more better  Patient only speaks Tristanian much of the history has been obtained from her  at bedside  Patient denies any new headaches or any cognitive difficulty    Stroke history 2/17/2025  79-year-old female with past medical history of hypertension, hyperlipidemia, diabetes, obesity, CKD, prior left breast cancer, prior temporal arteritis, and ANAYELI compliant with CPAP, who presented with right-sided weakness. SBP up to 190s this admission. CT head negative for acute findings, CTA head/neck showed mild to moderate stenosis in the left subclavian but no high-grade stenosis or LVO. MRI brain without contrast showed acute left corona radiata/basal ganglia stroke without hemorrhage. She was not on antiplatelet or statin prior to arrival.           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    Hypertension     Liver disease     Lower extremity edema     Moderate persistent asthma     On home O2     3L NC PRN    Pulmonary hypertension     Sleep apnea     Swelling     IN LOWER EXTREMITIES    Temporal arteritis     Varicose veins of unspecified lower extremity with ulcer of calf 07/06/2022     Venous insufficiency (chronic) (peripheral)     Vitamin D deficiency          Past Surgical History:   Procedure Laterality Date    AMPUTATION  Breast    BRAIN SURGERY      BREAST BIOPSY Left  approx    benign pathology    BREAST BIOPSY Left 2019    Ultasound guided mammotome vacuum assisted left breast biopsy with placement of a metallic clip-Dr. Daniel Gutierrez, [facility]    CARDIAC CATHETERIZATION       SECTION N/A     x3    CHOLECYSTECTOMY N/A     COLON RESECTION Right 2025    Procedure: COLON RESECTION LAPAROSCOPIC RIGHT WITH DAVINCI ROBOT;  Surgeon: Ashwin Alvarado MD;  Location: [facility] MAIN OR;  Service: Robotics - DaVinci;  Laterality: Right;    COLONOSCOPY      COLONOSCOPY N/A 2024    Procedure: COLONOSCOPY into the cecum and TI with hot snare polypectomy;  Surgeon: Ashwin Alvarado MD;  Location: [facility] ENDOSCOPY;  Service: General;  Laterality: N/A;  pre-colon polyps  post-colon mass    COLONOSCOPY W/ POLYPECTOMY N/A 2019    Enlarged folds in the antrum: biopsied; duodenal, transverse colon x2, splenic flexure, descending colon and sigmoid colon polyps: biopsied (path: tubular adenoma x6)-Dr. Zak De Jesus, [facility]    ENDOSCOPY  10/11/2019    Procedure: ESOPHAGOGASTRODUODENOSCOPY with hot snare polypectomy;  Surgeon: Haile Handley MD;  Location: [facility] ENDOSCOPY;  Service: Gastroenterology    ENDOSCOPY N/A 2020    Procedure: ESOPHAGOGASTRODUODENOSCOPY;  Surgeon: Haile Handley MD;  Location: [facility] ENDOSCOPY;  Service: Gastroenterology    ENDOSCOPY N/A 2020    Procedure: ESOPHAGOGASTRODUODENOSCOPY WITH BIOPSIES AND COLD BIOPSY POLYPECTOMY;  Surgeon: Haile Handley MD;  Location: [facility] ENDOSCOPY;  Service: Gastroenterology;  Laterality: N/A;  pre: dyspepsia and diarrhea  post: duodenal polyp and gastritis    ENDOSCOPY N/A 2024    Procedure: ESOPHAGOGASTRODUODENOSCOPY WITH hot snare polypectomy with clip  placement x 2BIOPSY;  Surgeon: Ashwin Alvarado MD;  Location: [facility] ENDOSCOPY;  Service: General;  Laterality: N/A;  pre-hx duoedenal polyp  post-duodenal polyp; gastritis    EYE SURGERY      MASTECTOMY WITH SENTINEL NODE BIOPSY AND AXILLARY NODE DISSECTION Left 07/03/2019    Procedure: LEFT BREAST MASTECTOMY WITH SENTINEL NODE BIOPSY AND , v-y plasty closure;  Surgeon: Tahmina James MD;  Location: [facility] MAIN OR;  Service: General    SUBTOTAL HYSTERECTOMY Bilateral     ovaries still in tact    TEMPORAL ARTERY BIOPSY Bilateral 12/05/2019           Current Outpatient Medications:     acetaminophen (TYLENOL) 325 MG tablet, Take 2 tablets by mouth Every 6 (Six) Hours As Needed for Mild Pain., Disp: 30 tablet, Rfl: 3    albuterol (ACCUNEB) 1.25 MG/3ML nebulizer solution, Take  by nebulization Every 4 (Four) Hours As Needed for Wheezing or Shortness of Air., Disp: , Rfl:     amLODIPine (NORVASC) 5 MG tablet, Take 1 tablet by mouth Daily., Disp: 30 tablet, Rfl: 3    aspirin 81 MG EC tablet, Take 1 tablet by mouth Daily., Disp: 30 tablet, Rfl: 3    BD Pen Needle Tila 2nd Gen 32G X 4 MM misc, USE TO GIVE INSULIN 4 TIMES DAILY, Disp: , Rfl:     bisoprolol (ZEBeta) 10 MG tablet, Take 2 tablets by mouth Every Night., Disp: , Rfl:     empagliflozin (JARDIANCE) 25 MG tablet tablet, Take 1 tablet by mouth Daily., Disp: 30 tablet, Rfl: 3    insulin lispro (HUMALOG/ADMELOG) 100 UNIT/ML injection, Inject 2-7 Units under the skin into the appropriate area as directed 4 (Four) Times a Day Before Meals & at Bedtime., Disp: 10 mL, Rfl: 12    lisinopril (PRINIVIL,ZESTRIL) 40 MG tablet, Take 1 tablet by mouth Daily., Disp: , Rfl:     O2 (OXYGEN), Inhale 3 L/min As Needed., Disp: , Rfl:     pantoprazole (PROTONIX) 40 MG EC tablet, Take 1 tablet by mouth Every Morning., Disp: 30 tablet, Rfl: 3    predniSONE (DELTASONE) 5 MG tablet, Take 1 tablet by mouth Every Evening., Disp: , Rfl:     rosuvastatin (Crestor) 40 MG tablet, Take  1 tablet by mouth Every Night., Disp: 90 tablet, Rfl: 3    Symbicort 160-4.5 MCG/ACT inhaler, Inhale 2 puffs 2 (Two) Times a Day., Disp: , Rfl:     Toujeo SoloStar 300 UNIT/ML solution pen-injector injection, Inject 25 Units under the skin into the appropriate area as directed Every Night., Disp: , Rfl:     miconazole (MICOTIN) 2 % powder, Apply  topically to the appropriate area as directed Every 12 (Twelve) Hours. (Patient not taking: Reported on 5/14/2025), Disp: 71 g, Rfl: 1      Family History   Problem Relation Age of Onset    Heart disease Mother     Stroke Mother     Asthma Mother     Hypertension Father     Stomach cancer Father     Diabetes Father     Esophageal cancer Father     Cancer Father     Throat cancer Sister     Diabetes Paternal Grandmother     Hypertension Paternal Grandfather     Colon cancer Paternal Grandfather     Colon cancer Paternal Uncle     Colon cancer Paternal Uncle     Colon cancer Paternal Uncle     Ovarian cancer Cousin     Stomach cancer Cousin     Malig Hyperthermia Neg Hx          Social History     Socioeconomic History    Marital status:      Spouse name: Ashwin    Number of children: 3    Years of education: College   Tobacco Use    Smoking status: Never    Smokeless tobacco: Never    Tobacco comments:     caffine use   Vaping Use    Vaping status: Never Used   Substance and Sexual Activity    Alcohol use: Never    Drug use: Never    Sexual activity: Not Currently     Partners: Male     Comment: Spouse, Ashwin         Allergies   Allergen Reactions    Revefenacin Shortness Of Breath    Aspirin Nausea Only     ABDOMINAL PAIN     Sulfa Antibiotics Unknown (See Comments)     CHILDHOOD REACTION          Pain Scale:        ROS:    Constitutional: [No fevers, chills, sweats or weight loss/gain]   Eye: [No change in vision, double vision, or loss of vision]   HEENT: [No headaches, tenderness, dizziness, or tinnitus. Normal smell and taste. Normal speech and swallowing]  "  Respiratory: [No shortness of breath, coughing, wheezing]   Cardiovascular: [No Chest pain, palpitations, syncope, CARR]   Gastrointestinal: [Normal bowel function. No nausea, vomiting, diarrhea]   Genitourinary: [Normal bladder function]   Musculoskeletal: [No trauma, joint or neck pain, myalgias, cramping or weakness]   Skin: [No itching, burning, pain, rashes, or birthmarks]   Endocrinology: [No heat or cold intolerance]   Psychiatric: [No sleep disturbance. No anxiety or depression]   Neurologic: [See HPI, above]       Physical Exam:  Vitals:    05/14/25 1525   BP: 124/56   Pulse: 88   SpO2: 98%   Weight: 73 kg (161 lb)   Height: 152.4 cm (60\")     Orthostatic BP:    Body mass index is 31.44 kg/m².    General appearance: Well developed, well nourished, well groomed, alert and cooperative.   HEENT: Normocephalic.   Cardiac: Regular rate and rhythm. No murmurs.   Chest Exam: Clear to auscultation bilaterally, no wheezes, no rhonchi.  Extremities: Normal, no edema.   Skin: No rashes or birthmarks.      Higher integrative function: Patient speaks Persian hard to obtain orientation questions answers but is alert and awake answering questions appropriately  CN II: Normal visual acuity   CN III IV VI: Extraocular movements are full without nystagmus. Pupils are equal, round, and reactive to light.   CN V: Sensation same on both sides of the face  CN VII: Facial movements are symmetric, no weakness.   CN XII: The tongue is midline. No atrophy or fasciculations.   Motor: Right upper and right lower extremity 4+/  Sensation: Normal sensation to pin prick and light touch all four extremities   Uses a walker to walk  Coordination: Finger to nose test showed no dysmetria      Lab Results   Component Value Date    GLUCOSE 176 (H) 03/12/2025    BUN 37 (H) 03/12/2025    CREATININE 1.85 (H) 03/12/2025    EGFRIFNONA 46 (L) 02/14/2022    BCR 20.0 03/12/2025    CO2 20.0 (L) 03/12/2025    CALCIUM 8.5 (L) 03/12/2025    ALBUMIN 3.3 " "(L) 03/10/2025    AST 18 03/10/2025    ALT 14 03/10/2025       Lab Results   Component Value Date    WBC 8.61 03/12/2025    HGB 10.1 (L) 03/12/2025    HCT 31.0 (L) 03/12/2025    MCV 95.4 03/12/2025     03/12/2025         .No results found for: \"RPR\"      Lab Results   Component Value Date    TSH 1.170 09/30/2024         Lab Results   Component Value Date    XEUEENRG16 371 11/21/2024         Lab Results   Component Value Date    FOLATE 12.40 02/10/2025         Lab Results   Component Value Date    HGBA1C 7.70 (H) 02/16/2025         Lab Results   Component Value Date    GLUCOSE 176 (H) 03/12/2025    BUN 37 (H) 03/12/2025    CREATININE 1.85 (H) 03/12/2025    EGFRIFNONA 46 (L) 02/14/2022    BCR 20.0 03/12/2025    K 5.2 03/12/2025    CO2 20.0 (L) 03/12/2025    CALCIUM 8.5 (L) 03/12/2025    ALBUMIN 3.3 (L) 03/10/2025    AST 18 03/10/2025    ALT 14 03/10/2025         Lab Results   Component Value Date    WBC 8.61 03/12/2025    HGB 10.1 (L) 03/12/2025    HCT 31.0 (L) 03/12/2025    MCV 95.4 03/12/2025     03/12/2025       Results for orders placed during the hospital encounter of 03/09/25    CT Head Without Contrast    Narrative  NON-CONTRAST CT HEAD & CT CERVICAL SPINE    REASON:  The patient is being seen in the ED for trauma and is  determined to have a high energy mechanism and/or high risk for missed  injuries due to presence of associated injuries or distracting pain. The  patient will need imaging of the head per the trauma protocol given  diagnoses such as intracranial hemorrhage cannot be excluded.    The  patient will need imaging of the cervical spine given diagnoses such as  cervical spine fracture cannot be excluded.  The diagnostic information  gained in this study exceeds the estimated risk of radiation induced  injury at the time of order placement.  Mechanism of injury: Fall    COMPARISON STUDIES:  None Available.    TECHNIQUE:  Axial images were acquired from the skull base to vertex  without " contrast, including multiplanar reformats, per standard  departmental protocol.   Routine cervical spine CT without contrast.  Multiplanar reformats were created. Radiation dose reduction techniques  were utilized, including automated exposure control, and exposure  modulation based on body size.    FINDINGS:    Head CT: There is no CT evidence of acute intracranial hemorrhage, mass,  or infarct. There is volume loss, but there is no evidence of  hydrocephalus or extra-axial fluid collection.  There are moderate  nonspecific white matter changes, but there is no acute intracranial  abnormality.    Skull base and calvarium show no acute abnormality, and the extracranial  soft tissues show no acute abnormality. There are chronic appearing  changes in the left maxillary sinus.    C-spine CT: Cervical spine alignment is within normal limits.  There is  no cervical spine fracture or other acute cervical spine abnormality.  There is a comminuted mid right clavicle fracture, partially seen here.    Impression  Negative unenhanced head CT, senescent change without acute abnormality.      Negative cervical spine CT.    Comminuted mid right clavicle fracture, partially demonstrated.        This report was finalized on 3/9/2025 10:21 PM by Dr. Sachin Michelle M.D on Workstation: [id number]      Results for orders placed during the hospital encounter of 02/15/25    CT Head Without Contrast    Narrative  CT HEAD WITHOUT CONTRAST    CLINICAL HISTORY: headache, recent CVA; R47.9-Unspecified speech  disturbances; R29.898-Other symptoms and signs involving the  musculoskeletal system; R42-Dizziness and giddiness; I10-Essential  (primary) hypertension; N18.9-Chronic kidney disease, unspecified;  Z86.39-Personal history of other endocrine, nutritional and metabolic  disease; R00.2-Palpitations    TECHNIQUE: CT scan of the head was obtained with 3 mm axial soft tissue  algorithm images. No intravenous contrast was administered.  Sagittal and  coronal reconstructions were obtained.    COMPARISON: Brain MRI dated 2/16/2025.    FINDINGS:    Again noted is an acute infarct involving the left caudate body, corona  radiata, and lentiform nucleus which measures up to approximately 2.2 x  1.2 cm in greatest axial dimensions. Similar findings were noted on the  prior brain MRI study. This infarct is within the left lateral  lenticulostriate distribution. There is no evidence for hemorrhagic  transformation.    Mild to moderate chronic small vessel ischemic changes are identified.  The ventricles, sulci, and cisterns are age-appropriate. The gray-white  matter differentiation is within normal limits. The basal ganglia and  thalami are unremarkable in appearance. The posterior fossa structures  are unremarkable.    There is near complete opacification of the left maxillary sinus with  mucosal thickening and secretions.    Impression  Again noted is an acute infarct within the left caudate body, corona  radiata, and lentiform nucleus measuring up to 2.2 x 1.2 cm in greatest  axial dimensions within the left lateral lenticulostriate distribution.  There is no evidence for hemorrhagic transformation.      Radiation dose reduction techniques were utilized, including automated  exposure control and exposure modulation based on body size.    This report was finalized on 2/20/2025 11:09 AM by Dr. Sanya Armendariz M.D  on Workstation: [id number]      MRI Brain Without Contrast    Narrative  BRAIN MRI WITHOUT CONTRAST    HISTORY: Altered mental status, focal weakness; R47.9-Unspecified speech  disturbances; R29.898-Other symptoms and signs involving the  musculoskeletal system; R42-Dizziness and giddiness; I10-Essential  (primary) hypertension; N18.9-Chronic kidney disease, unspecified;  Z86.39-Personal history of other endocrine, nutritional and metabolic  disease    COMPARISON: October 2, 2023.    FINDINGS:  Multiplanar images of the head were obtained  without  gadolinium. The patient has an area of restricted diffusion within the  left corona radiata, with extension into the left basal ganglia. No  additional areas of restricted diffusion are seen. There is diffuse  atrophy. There is periventricular deep white matter microangiopathic  change. The intracranial flow voids appear intact. No abnormality is  seen on susceptibility weighted imaging. There is mucosal thickening  within the ethmoid sinuses, with extensive opacification of the left  maxillary sinus noted.    Impression  1. Study is positive for an acute infarct within the left corona  radiata/basal ganglia. No evidence of hemorrhagic information..    FINDINGS were called to the patient's nurse at 6:20 a.m.      This report was finalized on 2/16/2025 6:22 AM by Dr. Tanya Prince M.D on Workstation: [id number]     Imaging    MRI brain acute left basal ganglia ischemic stroke mild to moderate small vessel disease no SWI    CTA head and neck no large vessel occlusion stenosis or significant atherosclerosis      TTE EF 61.5%, normal left atrial cavity size noted, saline test result negative, mild mitral valve regurgitation noted.    Zio patch normal study no A-fib detected      Diagnosis:  History of stroke  Left basal ganglia stroke, unclear etiology  Hypertension  Hyperlipidemia  Diabetes  ANAYELI on CPAP  CKD    Etiology of the stroke small vessel disease from poorly controlled vascular risk factors     Plan:    Continue aspirin, Lipitor 80 mg daily  Systolic blood pressure goal less than 1, LDL goal less than 70, A1c goal less than 7.0%  Encouraged ongoing CPAP use, recommend out patient follow-up with her sleep medicine provider to see if she needs further titration  Will place new referrals for physical therapy      Plan:        Diagnoses and all orders for this visit:    1. History of stroke (Primary)    2. Primary hypertension    3. Mixed hyperlipidemia    4. Obstructive sleep apnea    5. Type 2  diabetes mellitus with hypoglycemia without coma, unspecified whether long term insulin use          For history of TIA/stroke  Continue antiplatelet/AC as prescribed.  HLD: Goal LDL <70, should continue surveillance labs and management with PCP  HTN: goal of asymptomatic normotension. If elevated pt should reach out to PCP office, and if remains elevated at home go to urgent care and/or emergency room  DM: Continue monitoring of and control of blood sugars per PCP and/or endocrinology  Secondary stroke prevention: Ideal targets for stroke prevention would be Blood pressure < 130/80; LDL < 70; HbA1c < 6.5 and smoking cessation if applicable.  Call 911 for stroke symptoms      MDM   Reviewed: Previous charts, nursing notes and vitals   Reviewed: Previous labs and CT CTA/MRI scan    Interpretation: Labs and CT/CTA/MRI scan   Total time providing care is :30-74 minutes. This excluded time spent performing separately reportable procedures and services  Consults :Neurology/Stroke    Please note that portions of this note were completed with a voice recognition program.     Da Pascal MD  Neuro Hospitalist /Vascular Neurology.                       Dictated utilizing Dragon dictation.

## 2025-06-04 ENCOUNTER — HOSPITAL ENCOUNTER (OUTPATIENT)
Dept: PHYSICAL THERAPY | Facility: HOSPITAL | Age: 80
Setting detail: THERAPIES SERIES
Discharge: HOME | End: 2025-06-04
Payer: MEDICARE

## 2025-06-04 DIAGNOSIS — Z91.81 HISTORY OF FALL: ICD-10-CM

## 2025-06-04 DIAGNOSIS — Z74.09 IMPAIRED FUNCTIONAL MOBILITY, BALANCE, GAIT, AND ENDURANCE: ICD-10-CM

## 2025-06-04 DIAGNOSIS — Z86.73 HISTORY OF STROKE: Primary | ICD-10-CM

## 2025-06-04 DIAGNOSIS — R53.1 RIGHT SIDED WEAKNESS: ICD-10-CM

## 2025-06-04 PROCEDURE — 97162 PT EVAL MOD COMPLEX 30 MIN: CPT

## 2025-06-04 NOTE — THERAPY EVALUATION
.Outpatient Physical Therapy Neuro Initial Evaluation  [facility]     Patient Name: Blanca Harden  : 1945  MRN: [id number]  Today's Date: 2025      Visit Date: 2025    Patient Active Problem List   Diagnosis    Osteoporosis    Breast neoplasm, Tis (DCIS), left    Iron deficiency anemia    CKD (chronic kidney disease)    Diabetic nephropathy associated with type 2 diabetes mellitus    Temporal arteritis    Immunosuppression    Anemia    Duodenal adenoma    Gastritis without bleeding    Polyp of duodenum    Morbidly obese    Dyspnea on exertion    Hyperlipidemia    Type 2 diabetes mellitus with stage 3b chronic kidney disease, with long-term current use of insulin    Essential hypertension    Bilateral lower extremity edema    Pulmonary hypertension    Obstructive sleep apnea on CPAP    Stage 3a chronic kidney disease    Iron malabsorption    Respiratory distress    Hypoglycemia due to insulin    Delirium    Hypomagnesemia    Severe malnutrition    Leg swelling    Venous (peripheral) insufficiency    Polyp of colon    Obesity (BMI 30.0-34.9)    TIA (transient ischemic attack)    Difficulty with speech    Multiple rib fractures    Coronary artery disease involving native coronary artery of native heart without angina pectoris        Past Medical History:   Diagnosis Date    Anemia     Anxiety and depression     Asthma     Balance problem     Breast cancer 2019    CKD (chronic kidney disease), stage III     Colon polyp 2019    Colonic mass     COPD (chronic obstructive pulmonary disease)     Coronary artery disease     FOLLOWED BY DR GUNN    Diabetes mellitus, type 2     History of COVID-19     History of left breast cancer     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, grade III, 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    Hypertension     Liver disease     Lower extremity edema     Moderate persistent asthma     On home O2     3L NC PRN    Pulmonary  hypertension     Sleep apnea     Swelling     IN LOWER EXTREMITIES    Temporal arteritis     Varicose veins of unspecified lower extremity with ulcer of calf 2022    Venous insufficiency (chronic) (peripheral)     Vitamin D deficiency         Past Surgical History:   Procedure Laterality Date    AMPUTATION  Breast    BRAIN SURGERY      BREAST BIOPSY Left  approx    benign pathology    BREAST BIOPSY Left 2019    Ultasound guided mammotome vacuum assisted left breast biopsy with placement of a metallic clip-Dr. Daniel Gutierrez, [facility]    CARDIAC CATHETERIZATION       SECTION N/A     x3    CHOLECYSTECTOMY N/A     COLON RESECTION Right 2025    Procedure: COLON RESECTION LAPAROSCOPIC RIGHT WITH DAVINCI ROBOT;  Surgeon: Ashwin Alvarado MD;  Location: [facility] MAIN OR;  Service: Robotics - DaVinci;  Laterality: Right;    COLONOSCOPY      COLONOSCOPY N/A 2024    Procedure: COLONOSCOPY into the cecum and TI with hot snare polypectomy;  Surgeon: Ashwin Alvarado MD;  Location: [facility] ENDOSCOPY;  Service: General;  Laterality: N/A;  pre-colon polyps  post-colon mass    COLONOSCOPY W/ POLYPECTOMY N/A 2019    Enlarged folds in the antrum: biopsied; duodenal, transverse colon x2, splenic flexure, descending colon and sigmoid colon polyps: biopsied (path: tubular adenoma x6)-Dr. Zak De Jesus, [facility]    ENDOSCOPY  10/11/2019    Procedure: ESOPHAGOGASTRODUODENOSCOPY with hot snare polypectomy;  Surgeon: Haile Handley MD;  Location: [facility] ENDOSCOPY;  Service: Gastroenterology    ENDOSCOPY N/A 2020    Procedure: ESOPHAGOGASTRODUODENOSCOPY;  Surgeon: Haile Handley MD;  Location: [facility] ENDOSCOPY;  Service: Gastroenterology    ENDOSCOPY N/A 2020    Procedure: ESOPHAGOGASTRODUODENOSCOPY WITH BIOPSIES AND COLD BIOPSY POLYPECTOMY;  Surgeon: Haile Handley MD;  Location: [facility] ENDOSCOPY;  Service: Gastroenterology;  Laterality: N/A;   pre: dyspepsia and diarrhea  post: duodenal polyp and gastritis    ENDOSCOPY N/A 07/23/2024    Procedure: ESOPHAGOGASTRODUODENOSCOPY WITH hot snare polypectomy with clip placement x 2BIOPSY;  Surgeon: Ashwin Alvarado MD;  Location: [facility] ENDOSCOPY;  Service: General;  Laterality: N/A;  pre-hx duoedenal polyp  post-duodenal polyp; gastritis    EYE SURGERY      MASTECTOMY WITH SENTINEL NODE BIOPSY AND AXILLARY NODE DISSECTION Left 07/03/2019    Procedure: LEFT BREAST MASTECTOMY WITH SENTINEL NODE BIOPSY AND , v-y plasty closure;  Surgeon: Thamina James MD;  Location: [facility] MAIN OR;  Service: General    SUBTOTAL HYSTERECTOMY Bilateral     ovaries still in tact    TEMPORAL ARTERY BIOPSY Bilateral 12/05/2019         Visit Dx:     ICD-10-CM ICD-9-CM   1. History of stroke  Z86.73 V12.54   2. Right sided weakness  R53.1 728.87   3. History of fall  Z91.81 V15.88   4. Impaired functional mobility, balance, gait, and endurance  Z74.09 V49.89        Patient History       Row Name 06/04/25 1417             History    Chief Complaint Balance Problems;Difficulty Walking;Difficulty with daily activities;Falls/history of falls;Fatigue/poor endurance;Muscle weakness  -SARA      Date Current Problem(s) Began 02/15/25  -SARA      Brief Description of Current Complaint Patient who is status post stroke on 2/15/2025, went to rehab, and at rehab fell 2 days after discharge fracturing 2 ribs and right clavicle.  Patient returned back to inpatient rehab and is now been discharged home, received home health and now referred for outpt PT.  -SARA      Patient/Caregiver Goals Improve mobility;Improve strength  Would like to walk without walker and regain her confidence with balance.  -SARA      Hand Dominance right-handed  -SARA         Pain     Pain at Present 0  -SARA         Fall Risk Assessment    Any falls in the past year: Yes  -SARA      Number of falls reported in the last 12 months 1  -SARA      Factors that contributed to the fall:  Lost balance  fractured 2 ribs and R clavicle  -SARA      Does patient have a fear of falling Yes (comment)  Patient very fearful of falling per her .  -SARA         Daily Activities    Primary Language Cameroonian  -SARA      Action taken if English not primary language  present and interpreted.  -SARA      Pt Participated in POC and Goals Yes  -SARA         Safety    Are you being hurt, hit, or frightened by anyone at home or in your life? Unspecified  -SARA      Are you being neglected by a caregiver Unspecified  -SARA                User Key  (r) = Recorded By, (t) = Taken By, (c) = Cosigned By      Initials Name Provider Type    Calli Davis, PT Physical Therapist                         PT Neuro       Row Name 06/04/25 6890             Subjective    Subjective Comments Has been reports patient was using rollator most of the community before the stroke.  Now, she is using rollator at all times.  He states patient is fearful of falling and has decreased confidence in her balance since the fall at home shortly after rehab from stroke.  -SARA         Precautions and Contraindications    Precautions/Limitations fall precautions  -SARA         Subjective Pain    Able to rate subjective pain? yes  -SARA      Pre-Treatment Pain Level 0  -SARA      Post-Treatment Pain Level 0  -SARA      Subjective Pain Comment Patient mentioned at end of session that when she is walking for long distances and pressing the rollator she has pain in her right shoulder as well as when she is trying to perform range of motion for that shoulder.  -SARA         Home Living    Current Living Arrangements apartment  Patient lives in a first-floor apartment with her  with ramp access.  -SARA      Home Accessibility wheelchair accessible  -SARA      Home Equipment Rollator;Wheelchair-electric  -SARA         Vision-Basic Assessment    Current Vision No visual deficits  -SARA         Cognition    Overall Cognitive Status WFL  -SARA      Memory --    provided most history due to patient speaking Croatian.  -SARA      Orientation Level Oriented to person;Oriented to situation;Oriented to place  -SARA      Safety Judgment Good awareness of safety precautions  -SARA      Deficits Fully aware of deficits  -SARA         Sensation    Light Touch No apparent deficits  -SARA         Posture/Observations    Posture/Observations Comments Patient arrived to PT clinic ambulating with rollator with  by her side.  -SARA         General ROM    GENERAL ROM COMMENTS Bilateral lower extremities active range of motion within functional limits.  Patient demonstrated some limitation of bilateral shoulders and complained of some right shoulder pain due to recent clavicle fracture.  -SARA         MMT (Manual Muscle Testing)    Rt Lower Ext Rt Hip Flexion;Rt Knee Extension;Rt Knee Flexion;Rt Ankle Dorsiflexion;Rt Ankle Subtalar Inversion;Rt Ankle Subtalar Eversion  -SARA      Lt Lower Ext Comments  -SARA         MMT Right Lower Ext    Rt Hip Flexion MMT, Gross Movement (3-/5) fair minus  -SARA      Rt Knee Extension MMT, Gross Movement (3/5) fair  -SARA      Rt Knee Flexion MMT, Gross Movement other (see comments)  mod resistance in sitting  -SARA      Rt Ankle Dorsiflexion MMT, Gross Movement (4/5) good  -SARA      Rt Ankle Subtalar Inversion MT, Gross Movement (4/5) good  -SARA      Rt Ankle Subtalar Eversion MMT, Gross Movement (4/5) good  -SARA         MMT Left Lower Ext    Lt Lower Extremity Comments  4/5  -SARA         Bed Mobility    Bed Mobility rolling left;rolling right;supine-sit;sit-supine  -SARA      Rolling Left Bennett (Bed Mobility) contact guard  -SARA      Rolling Right Bennett (Bed Mobility) contact guard  -SARA      Supine-Sit Bennett (Bed Mobility) moderate assist (50% patient effort)  Pt pulled on PT arm as if bedrail which pt has at home.  -SARA      Sit-Supine Bennett (Bed Mobility) minimum assist (75% patient effort)  to assist LE's onto mat  -SARA      Bed Mobility, Safety  "Issues decreased use of arms for pushing/pulling;decreased use of legs for bridging/pushing;impaired trunk control for bed mobility  -SARA         Transfers    Sit-Stand Mayes (Transfers) standby assist;contact guard;verbal cues;nonverbal cues (demo/gesture)  -SARA      Stand-Sit Mayes (Transfers) standby assist  -SARA      Transfers, Sit-Stand-Sit, Assist Device four wheeled walker  -SARA      Transfer, Safety Issues weight-shifting ability decreased;sequencing ability decreased  -SARA      Transfer, Impairments strength decreased;impaired balance  -SARA      Comment, (Transfers) relied on UE\"s to assist,seemed reluctant to forward weight shift  -SARA         Gait/Stairs (Locomotion)    Mayes Level (Gait) modified independence;standby assist  -SARA      Assistive Device (Gait) walker, 4-wheeled  -SARA      Distance in Feet (Gait) 80  x 2  -SARA      Deviations/Abnormal Patterns (Gait) bilateral deviations;gait speed decreased;weight shifting decreased  -SARA      Bilateral Gait Deviations forward flexed posture;heel strike decreased  -SARA      Right Sided Gait Deviations foot drop/toe drag  25% of time  -SARA      Mayes Level (Stairs) minimum assist (75% patient effort);contact guard;nonverbal cues (demo/gesture)  -SARA      Handrail Location (Stairs) both sides  -SARA      Number of Steps (Stairs) 4  -SARA      Ascending Technique (Stairs) step-to-step  -SARA      Descending Technique (Stairs) step-to-step  -SARA      Stairs, Impairments strength decreased;impaired balance  -SARA      Comment, (Gait/Stairs) see Tinetti and TUG  -SARA                User Key  (r) = Recorded By, (t) = Taken By, (c) = Cosigned By      Initials Name Provider Type    Calli Davis PT Physical Therapist                            Therapy Education  Education Details: Discussed PT plan of care.  Instructed patient to be sure to scoot to edge of seat prior to coming to stand.  Given: Fall prevention and home safety, Mobility training  How " Provided: Verbal, Demonstration  Provided to: Patient, Caregiver  Level of Understanding: Verbalized, Demonstrated     PT OP Goals       Row Name 06/04/25 1417          PT Short Term Goals    STG Date to Achieve 07/04/25  -     STG 1 Pt will be independent with initial HEP to improve strength and balance.  -SARA     STG 2 Patient will transfer to stand with 1 attempt demonstrating increased weight shift over LE's independently 50% of time.  -SARA     STG 3 Patient will ambulate with rollator with increased R heel strike.  -     STG 4 Pt will perform static standing for 2 minutes with 1 hand on table for improved standing balance with activities.  -SARA        Long Term Goals    LTG Date to Achieve 08/01/25  -SARA     LTG 1 Patient will be independent with advanced HEP to improve strength and balance.  -SARA     LTG 2 Patient will have increase strength R hip flexion and knee extension by 1/2 muscle grade.  -     LTG 3 Patient to score 18/28 on the Tinetti balance test for improved balance and reduced risk for falls.  -SARA     LTG 4 Patient will ambulate in the community 150' feet with least restrictive device SBA.  -     LTG 5 Pt to complete TUG in < or equal to 22  seconds for improved balance with gait related task.  -     LTG 6 Pt will be independent with supine to sit and vice versa.  -        Time Calculation    PT Goal Re-Cert Due Date 07/04/25  -               User Key  (r) = Recorded By, (t) = Taken By, (c) = Cosigned By      Initials Name Provider Type    Calli Davis, PT Physical Therapist                     PT Assessment/Plan       Row Name 06/04/25 5498          PT Assessment    Functional Limitations Decreased safety during functional activities;Impaired gait;Limitation in home management;Performance in self-care ADL;Performance in leisure activities;Limitations in functional capacity and performance;Limitations in community activities  -SARA     Impairments Balance;Gait;Endurance;Muscle  strength  -     Assessment Comments Patient is a 79-year-old female who is referred to outpatient physical therapy with history of stroke in 2/15/2025 who subsequently was discharged to [facility] acute rehab and discharged home early March 2025.   reports patient fell a couple days after discharge home from rehab fracturing 2 ribs and her right clavicle sending her back to the hospital and then back to [facility] rehab.  Patient then discharged home with  and received home health.  Now referred for outpatient physical therapy.  Patient's past medical history includes osteoporosis, chronic kidney disease, breast cancer, diabetes, obstructive sleep apnea on CPAP, obesity, TIA.  reports pt is very fearful of her balance now since her fall. Pt has some weakness of R LE especially hip flexion and knee extension. Pt has deficits with transfers, bed mobility, ambulation and balance.  Patient scored 15 out of 28 on the Tinetti and 26 on the tug indicating patient is at further risk for falls.  Patient will benefit from outpatient physical therapy to work on strengthening, bed mobility, transfers, ambulation and balance.  Pt c/o pain and reports some decreased use of R UE. This PT feels pt would benefit from outpt OT eval and treat. Will request from referring MD.  -SARA     Rehab Potential Good  -SARA     Patient/caregiver participated in establishment of treatment plan and goals Yes  -SARA     Patient would benefit from skilled therapy intervention Yes  -SARA        PT Plan    PT Frequency 2x/week  -SARA     Predicted Duration of Therapy Intervention (PT) 8 weeks  -SARA     Planned CPT's? PT EVAL MOD COMPLELITY: 22371;PT THER PROC EA 15 MIN: 30406;PT THER ACT EA 15 MIN: 50781;PT NEUROMUSC RE-EDUCATION EA 15 MIN: 74196;PT GAIT TRAINING EA 15 MIN: 65141  -SARA     Physical Therapy Interventions (Optional Details) balance training;bed mobility training;gait training;home exercise program;neuromuscular  "re-education;patient/family education;strengthening;transfer training  -SARA               User Key  (r) = Recorded By, (t) = Taken By, (c) = Cosigned By      Initials Name Provider Type    Calli Davis, PT Physical Therapist                        OP Exercises       Row Name 06/04/25 9492             Subjective    Subjective Comments Has been reports patient was using rollator most of the community before the stroke.  Now, she is using rollator at all times.  He states patient is fearful of falling and has decreased confidence in her balance since the fall at home shortly after rehab from stroke.  -SARA         Subjective Pain    Able to rate subjective pain? yes  -SARA      Pre-Treatment Pain Level 0  -SARA      Post-Treatment Pain Level 0  -SARA      Subjective Pain Comment Patient mentioned at end of session that when she is walking for long distances and pressing the rollator she has pain in her right shoulder as well as when she is trying to perform range of motion for that shoulder.  -SARA                User Key  (r) = Recorded By, (t) = Taken By, (c) = Cosigned By      Initials Name Provider Type    Calli Davis, PT Physical Therapist                                Outcome Measure Options: Tinetti, Timed Up and Go (TUG)  Tinetti Assessment  Sitting Balance: Steady,safe  Arises: Able, uses arms  Attempts to Rise: Able, requires > 1 attempt  Immediate Standing Balance (first 5 sec): Steady, with support  Standing Balance: Steady, stance > 4 inch ADALGISA & requires support  Sternal Nudge (feet close together): Stagger, grabs, catches self  Eyes Closed (feet close together): Unsteady  Turning 360 Degrees- Steps: Discontinuous steps  Turning 360 Degrees- Steadiness: Unsteady (staggers, grabs)  Sitting Down: Uses arms or not a smooth motion  Tinetti Balance Score: 7  Gait Initiation (immediate after told \"go\"): No hesitancy  Step Length- Right Swing: Right swing foot passes Left stance leg  Step Length- Left " Swing: Left swing foot passes Right  Foot Clearance- Right Foot: Right foot does not completely clear floor  Foot Clearance- Left Foot: Left foot completely clears floor  Step Symmetry: Right and Left step length equal  Step Continuity: Steps appear continuous  Path (excursion): Mild/moderate deviation or use of aid  Trunk: Marked sway or uses device  Base of Support: Heels close while walking  Gait Score: 8  Tinetti Total Score: 15  Tinetti Assistive Device: rollator  Timed Up and Go (TUG)  TUG Test 1: 26 seconds (rollator)    Time Calculation:   Start Time: 1417  Stop Time: 1500  Time Calculation (min): 43 min  Total Timed Code Minutes- PT: 43 minute(s)  Untimed Charges  PT Eval/Re-eval Minutes: 43  Total Minutes  Untimed Charges Total Minutes: 43   Total Minutes: 43   Therapy Charges for Today       Code Description Service Date Service Provider Modifiers Qty    62325811425 HC PT EVAL MOD COMPLEXITY 4 6/4/2025 Calli Mcguire, PT GP 1            PT G-Codes  Outcome Measure Options: Tinetti, Timed Up and Go (TUG)  Tinetti Total Score: 15  TUG Test 1: 26 seconds (rollator)         Calli Mcguire, PT  6/4/2025

## 2025-06-11 ENCOUNTER — HOSPITAL ENCOUNTER (OUTPATIENT)
Dept: PHYSICAL THERAPY | Facility: HOSPITAL | Age: 80
Setting detail: THERAPIES SERIES
Discharge: HOME | End: 2025-06-11
Payer: MEDICARE

## 2025-06-11 DIAGNOSIS — Z91.81 HISTORY OF FALL: ICD-10-CM

## 2025-06-11 DIAGNOSIS — Z86.73 HISTORY OF STROKE: Primary | ICD-10-CM

## 2025-06-11 DIAGNOSIS — R53.1 RIGHT SIDED WEAKNESS: ICD-10-CM

## 2025-06-11 DIAGNOSIS — Z74.09 IMPAIRED FUNCTIONAL MOBILITY, BALANCE, GAIT, AND ENDURANCE: ICD-10-CM

## 2025-06-11 PROCEDURE — 97112 NEUROMUSCULAR REEDUCATION: CPT

## 2025-06-11 PROCEDURE — 97530 THERAPEUTIC ACTIVITIES: CPT

## 2025-06-11 NOTE — THERAPY TREATMENT NOTE
Outpatient Physical Therapy Neuro Treatment Note  [facility]     Patient Name: Blanca Harden  : 1945  MRN: [id number]  Today's Date: 2025      Visit Date: 2025    Visit Dx:    ICD-10-CM ICD-9-CM   1. History of stroke  Z86.73 V12.54   2. Right sided weakness  R53.1 728.87   3. History of fall  Z91.81 V15.88   4. Impaired functional mobility, balance, gait, and endurance  Z74.09 V49.89       Patient Active Problem List   Diagnosis    Osteoporosis    Breast neoplasm, Tis (DCIS), left    Iron deficiency anemia    CKD (chronic kidney disease)    Diabetic nephropathy associated with type 2 diabetes mellitus    Temporal arteritis    Immunosuppression    Anemia    Duodenal adenoma    Gastritis without bleeding    Polyp of duodenum    Morbidly obese    Dyspnea on exertion    Hyperlipidemia    Type 2 diabetes mellitus with stage 3b chronic kidney disease, with long-term current use of insulin    Essential hypertension    Bilateral lower extremity edema    Pulmonary hypertension    Obstructive sleep apnea on CPAP    Stage 3a chronic kidney disease    Iron malabsorption    Respiratory distress    Hypoglycemia due to insulin    Delirium    Hypomagnesemia    Severe malnutrition    Leg swelling    Venous (peripheral) insufficiency    Polyp of colon    Obesity (BMI 30.0-34.9)    TIA (transient ischemic attack)    Difficulty with speech    Multiple rib fractures    Coronary artery disease involving native coronary artery of native heart without angina pectoris            PT Neuro       Row Name 25 1416             Subjective    Subjective Comments Pt reports she understands English but has difficulty with speaking English. She declines need for .  -SARA         Precautions and Contraindications    Precautions/Limitations fall precautions  -SARA         Subjective Pain    Able to rate subjective pain? yes  -SARA      Pre-Treatment Pain Level 5  -SARA      Post-Treatment Pain Level 5  -SARA       "Subjective Pain Comment R shoulder due to recent clavicle and rib fracture  -SARA         Cognition    Overall Cognitive Status WFL  -SARA         Posture/Observations    Posture/Observations Comments Patient arrived to PT clinic ambulating with rollator.  drove.  stayed in waiting room.  -SARA         Bed Mobility    Supine-Sit [place] (Bed Mobility) contact guard;minimum assist (75% patient effort)  -SARA      Sit-Supine [place] (Bed Mobility) contact guard  -SARA      Bed Mobility, Safety Issues decreased use of arms for pushing/pulling;decreased use of legs for bridging/pushing;impaired trunk control for bed mobility  -SARA         Transfers    Sit-Stand [place] (Transfers) standby assist;verbal cues;nonverbal cues (demo/gesture)  -SARA      Stand-Sit [place] (Transfers) standby assist  -SARA      Transfers, Sit-Stand-Sit, Assist Device four wheeled walker  -SARA      Transfer, Safety Issues weight-shifting ability decreased  -SARA      Transfer, Impairments strength decreased;impaired balance  -SARA      Comment, (Transfers) To stand from mat and armchair with upper extremities to assist  -SARA         Gait/Stairs (Locomotion)    [place] Level (Gait) modified independence;standby assist  -SARA      Assistive Device (Gait) walker, 4-wheeled  -SARA      Distance in Feet (Gait) --  80' x 2; 60'  -SARA      Deviations/Abnormal Patterns (Gait) bilateral deviations;gait speed decreased;weight shifting decreased  -SARA      Bilateral Gait Deviations forward flexed posture;heel strike decreased  -SARA      Right Sided Gait Deviations foot drop/toe drag  < 25% of time  -SARA      Gait Assessment/Intervention R LE more ER (both with walking and exercise)  and pt reports that has been issue for \"long time\"  -SARA         Balance Skills Training    Standing-Level of Assistance Contact guard;Minimum assistance  -SARA      Static Standing Balance Support No upper extremity supported;parallel bars  frequently leaned into bar " to recover balance.  -SARA      Standing-Balance Activities Foam square;Reaching across midline  head turns/nods on foam square  -SARA      Gait Balance-Level of Assistance Contact guard  -SARA      Gait Balance Support Right upper extremity supported;Left upper extremity supported;gina bar  -SARA      Gait Balance Activities side-stepping  -SARA                User Key  (r) = Recorded By, (t) = Taken By, (c) = Cosigned By      Initials Name Provider Type    Calli Davis, PT Physical Therapist                             PT Assessment/Plan       Row Name 06/11/25 1625          PT Assessment    Assessment Comments Patient is ambulating consistently at home using rollator and reporting no falls.  Patient continues to have weakness of right especially at hip which was very weak when patient tried performing hip flexion and hip abduction in sitting.  She did compensate due to weakness as she was lowering her down 4 inch step in standing.  -SARA               User Key  (r) = Recorded By, (t) = Taken By, (c) = Cosigned By      Initials Name Provider Type    Calli Davis, PT Physical Therapist                        OP Exercises       Row Name 06/11/25 1627 06/11/25 1416          Subjective    Subjective Comments -- Pt reports she understands English but has difficulty with speaking English. She declines need for .  -SARA        Subjective Pain    Able to rate subjective pain? -- yes  -SARA     Pre-Treatment Pain Level -- 5  -SARA     Post-Treatment Pain Level -- 5  -SARA     Subjective Pain Comment -- R shoulder due to recent clavicle and rib fracture  -SARA        Total Minutes    89581 -  PT Neuromuscular Reeducation Minutes 17  -SARA --     52138 - PT Therapeutic Activity Minutes 28  -SARA --        Exercise 1    Exercise Name 1 -- Sitting: hip abd and hip flexion with RTB and then no band  -SARA     Cueing 1 -- Verbal;Tactile;Demo  -SARA     Reps 1 -- 5-10  -SARA     Additional Comments -- Patient struggled with moving  right lower extremity due to weakness.   Therefore aborted these exercises reports on standing and supine.  -SARA        Exercise 2    Exercise Name 2 -- Supine: B bridge  -SARA     Cueing 2 -- Verbal;Tactile  -SARA     Reps 2 -- 10  -SRAA        Exercise 3    Exercise Name 3 -- Supine: knee fall outs  -SARA     Cueing 3 -- Verbal;Tactile  -SARA     Reps 3 -- 10 each  -SARA        Exercise 4    Exercise Name 4 -- Standing: mini squats, B HR, knee flexion  -SARA     Cueing 4 -- Verbal;Tactile;Demo  -SARA     Reps 4 -- 10 each  -SARA     Additional Comments -- Bilateral upper extremity support  -SARA        Exercise 5    Exercise Name 5 -- Standing with bilateral upper extremity support: Alternate tap ups to 4 inch step  -SARA     Cueing 5 -- Verbal;Tactile;Demo  -SARA     Reps 5 -- 10 each  -SARA     Additional Comments -- Patient struggled with lowering right lower extremity off step due to weakness  -SARA        Exercise 6    Exercise Name 6 -- Standing with bilateral upper extremity support: Forward step up to 4 inch step  -SARA     Cueing 6 -- Verbal;Tactile;Demo  -SARA     Reps 6 -- 10 each  -SARA        Exercise 7    Exercise Name 7 -- Standing bilateral upper extremity support: Step forward over neutral and back with each lower extremity  -SARA     Cueing 7 -- Verbal;Tactile;Demo  -SARA     Reps 7 -- 10 each  -SARA        Exercise 8    Exercise Name 8 -- Seated: LAQ  -SARA     Cueing 8 -- Verbal;Tactile;Demo  -SARA     Reps 8 -- 10 each  -SARA               User Key  (r) = Recorded By, (t) = Taken By, (c) = Cosigned By      Initials Name Provider Type    Calli Davis PT Physical Therapist                                    Therapy Education  Education Details: Provided patient with HEP of supine bilateral bridge, knee fallouts supine, standing at counter mini squats, heel raises, and knee flexion.  Given: Fall prevention and home safety, Mobility training, HEP  Program: New  How Provided: Verbal, Demonstration, Written  Provided to: Patient  Level  of Understanding: Teach back education performed, Verbalized, Demonstrated              Time Calculation:   Start Time: 1416  Stop Time: 1501  Time Calculation (min): 45 min  Total Timed Code Minutes- PT: 45 minute(s)  Timed Charges  56731 -  PT Neuromuscular Reeducation Minutes: 17  65784 - PT Therapeutic Activity Minutes: 28  Total Minutes  Timed Charges Total Minutes: 45   Total Minutes: 45   Therapy Charges for Today       Code Description Service Date Service Provider Modifiers Qty    [id number]  PT NEUROMUSC RE EDUCATION EA 15 MIN 6/11/2025 Calli Mcguire, PT GP 1    [id number]  PT THERAPEUTIC ACT EA 15 MIN 6/11/2025 Calli Mcguire, PT GP 2                      Calli Mcguire PT  6/11/2025

## 2025-06-12 ENCOUNTER — TELEPHONE (OUTPATIENT)
Dept: ONCOLOGY | Facility: CLINIC | Age: 80
End: 2025-06-12
Payer: MEDICARE

## 2025-06-12 NOTE — TELEPHONE ENCOUNTER
I called and left a voicemail with the patient informing them their appointment with Dr. Garcia originally scheduled for Aug. 11 has been moved to Aug. 12th as Dr. Garcia will be unavailable. --- Per staff message.

## 2025-06-16 ENCOUNTER — HOSPITAL ENCOUNTER (OUTPATIENT)
Dept: PHYSICAL THERAPY | Facility: HOSPITAL | Age: 80
Setting detail: THERAPIES SERIES
Discharge: HOME | End: 2025-06-16
Payer: MEDICARE

## 2025-06-16 DIAGNOSIS — Z91.81 HISTORY OF FALL: ICD-10-CM

## 2025-06-16 DIAGNOSIS — Z86.73 HISTORY OF STROKE: Primary | ICD-10-CM

## 2025-06-16 DIAGNOSIS — Z74.09 IMPAIRED FUNCTIONAL MOBILITY, BALANCE, GAIT, AND ENDURANCE: ICD-10-CM

## 2025-06-16 DIAGNOSIS — R53.1 RIGHT SIDED WEAKNESS: ICD-10-CM

## 2025-06-16 PROCEDURE — 97530 THERAPEUTIC ACTIVITIES: CPT

## 2025-06-16 PROCEDURE — 97112 NEUROMUSCULAR REEDUCATION: CPT

## 2025-06-16 NOTE — THERAPY TREATMENT NOTE
Outpatient Physical Therapy Neuro Treatment Note  [facility]     Patient Name: Blanca Harden  : 1945  MRN: [id number]  Today's Date: 2025      Visit Date: 2025    Visit Dx:    ICD-10-CM ICD-9-CM   1. History of stroke  Z86.73 V12.54   2. Right sided weakness  R53.1 728.87   3. History of fall  Z91.81 V15.88   4. Impaired functional mobility, balance, gait, and endurance  Z74.09 V49.89       Patient Active Problem List   Diagnosis    Osteoporosis    Breast neoplasm, Tis (DCIS), left    Iron deficiency anemia    CKD (chronic kidney disease)    Diabetic nephropathy associated with type 2 diabetes mellitus    Temporal arteritis    Immunosuppression    Anemia    Duodenal adenoma    Gastritis without bleeding    Polyp of duodenum    Morbidly obese    Dyspnea on exertion    Hyperlipidemia    Type 2 diabetes mellitus with stage 3b chronic kidney disease, with long-term current use of insulin    Essential hypertension    Bilateral lower extremity edema    Pulmonary hypertension    Obstructive sleep apnea on CPAP    Stage 3a chronic kidney disease    Iron malabsorption    Respiratory distress    Hypoglycemia due to insulin    Delirium    Hypomagnesemia    Severe malnutrition    Leg swelling    Venous (peripheral) insufficiency    Polyp of colon    Obesity (BMI 30.0-34.9)    TIA (transient ischemic attack)    Difficulty with speech    Multiple rib fractures    Coronary artery disease involving native coronary artery of native heart without angina pectoris            PT Neuro       Row Name 25 1419             Subjective    Subjective Comments Pt c/o weakness in R hip. Compliant with HEP.  -SARA         Precautions and Contraindications    Precautions/Limitations fall precautions  -SARA         Subjective Pain    Able to rate subjective pain? yes  -SARA      Pre-Treatment Pain Level 0  -SARA      Post-Treatment Pain Level 0  -SARA         Cognition    Overall Cognitive Status WFL  -SARA      Comments  Pt and PT able to communicate with short phrases, gestures, etc.  -SARA         Posture/Observations    Posture/Observations Comments Patient arrived to PT clinic ambulating with rollator.  drove.  stayed in waiting room.  -SARA         Bed Mobility    Supine-Sit [place] (Bed Mobility) contact guard;minimum assist (75% patient effort)  -SARA      Sit-Supine [place] (Bed Mobility) contact guard  -SARA      Bed Mobility, Safety Issues decreased use of arms for pushing/pulling;decreased use of legs for bridging/pushing;impaired trunk control for bed mobility  -SARA         Transfers    Sit-Stand [place] (Transfers) modified independence;supervision  -SARA      Stand-Sit [place] (Transfers) supervision  -SARA      Transfers, Sit-Stand-Sit, Assist Device four wheeled walker  -SAAR         Gait/Stairs (Locomotion)    [place] Level (Gait) modified independence  -SARA      Assistive Device (Gait) walker, 4-wheeled  -SARA      Distance in Feet (Gait) --  80' x 2, 40'  -SARA      Deviations/Abnormal Patterns (Gait) bilateral deviations;gait speed decreased;weight shifting decreased  -SARA      Bilateral Gait Deviations forward flexed posture;heel strike decreased  -SARA         Balance Skills Training    Gait Balance-Level of Assistance Contact guard  -SARA      Gait Balance Support Right upper extremity supported;Left upper extremity supported;gina bar  -SARA      Gait Balance Activities side-stepping  -SARA                User Key  (r) = Recorded By, (t) = Taken By, (c) = Cosigned By      Initials Name Provider Type    Calli Davis, PT Physical Therapist                             PT Assessment/Plan       Row Name 06/16/25 9481          PT Assessment    Assessment Comments Pt continues to have weakness of right hip and therefore focused on strengthening of hip today.  She struggled to sidestep to the right with red Thera-Band around thighs.  -SARA               User Key  (r) = Recorded By, (t) = Taken By, (c)  = Cosigned By      Initials Name Provider Type    Calli Davis, PT Physical Therapist                        OP Exercises       Row Name 06/16/25 1612 06/16/25 1419          Subjective    Subjective Comments -- Pt c/o weakness in R hip. Compliant with HEP.  -SARA        Subjective Pain    Able to rate subjective pain? -- yes  -SARA     Pre-Treatment Pain Level -- 0  -SARA     Post-Treatment Pain Level -- 0  -SARA        Total Minutes    79757 -  PT Neuromuscular Reeducation Minutes 12  -SARA --     26698 - PT Therapeutic Activity Minutes 28  -SARA --        Exercise 2    Exercise Name 2 -- Supine: B bridge  -SARA     Cueing 2 -- Verbal;Tactile  -SARA     Sets 2 -- 2  -SARA     Reps 2 -- 10  -SARA     Additional Comments -- aborted rest of mat exercises as pt needed to use bathroom.  -SARA        Exercise 4    Additional Comments -- B HR at bar x 15 reps  -SARA        Exercise 5    Exercise Name 5 -- Standing with bilateral upper extremity support: Alternate tap ups forward and then lateral to 4 inch step  -SARA     Cueing 5 -- Verbal;Tactile;Demo  -SARA     Reps 5 -- 10 each  -SARA     Additional Comments -- struggled greatly with placing R foot laterally onto step.  -SARA        Exercise 6    Exercise Name 6 -- Standing with bilateral upper extremity support: Forward step up to 4 inch step  -SARA     Cueing 6 -- Verbal;Tactile;Demo  -SARA     Reps 6 -- 10 each  -SARA        Exercise 9    Exercise Name 9 -- In .. bars with support of B UE's: resisted forward ambulation with red TB around pelvis 8' x 6  -SARA        Exercise 10    Exercise Name 10 -- Standing: sidestep with RTB around thighs  -SARA     Cueing 10 -- Verbal;Tactile;Demo  -SARA     Reps 10 -- 8' x 2  -SARA     Additional Comments -- struggled with sidestep to R due to R LE weakness, cues to keep toes facing forward.  -SARA               User Key  (r) = Recorded By, (t) = Taken By, (c) = Cosigned By      Initials Name Provider Type    Calli Davis, PT Physical Therapist                                     Therapy Education  Given: Fall prevention and home safety, Mobility training  How Provided: Verbal  Provided to: Patient  Level of Understanding: Teach back education performed, Verbalized              Time Calculation:   Start Time: 1419  Stop Time: 1459  Time Calculation (min): 40 min  Total Timed Code Minutes- PT: 40 minute(s)  Timed Charges  95876 -  PT Neuromuscular Reeducation Minutes: 12  61489 - PT Therapeutic Activity Minutes: 28  Total Minutes  Timed Charges Total Minutes: 40   Total Minutes: 40   Therapy Charges for Today       Code Description Service Date Service Provider Modifiers Qty    [id number]  PT NEUROMUSC RE EDUCATION EA 15 MIN 6/16/2025 Calli Mgcuire, PT GP 1    [id number]  PT THERAPEUTIC ACT EA 15 MIN 6/16/2025 Calli Mcguire, PT GP 2                      Calli Mcgiure, PT  6/16/2025

## 2025-06-23 ENCOUNTER — HOSPITAL ENCOUNTER (OUTPATIENT)
Dept: OCCUPATIONAL THERAPY | Facility: HOSPITAL | Age: 80
Setting detail: THERAPIES SERIES
Discharge: HOME | End: 2025-06-23
Payer: MEDICARE

## 2025-06-23 ENCOUNTER — HOSPITAL ENCOUNTER (OUTPATIENT)
Dept: PHYSICAL THERAPY | Facility: HOSPITAL | Age: 80
Setting detail: THERAPIES SERIES
Discharge: HOME | End: 2025-06-23
Payer: MEDICARE

## 2025-06-23 DIAGNOSIS — R53.1 RIGHT SIDED WEAKNESS: ICD-10-CM

## 2025-06-23 DIAGNOSIS — Z74.09 IMPAIRED FUNCTIONAL MOBILITY, BALANCE, GAIT, AND ENDURANCE: ICD-10-CM

## 2025-06-23 DIAGNOSIS — Z86.73 HISTORY OF STROKE: Primary | ICD-10-CM

## 2025-06-23 DIAGNOSIS — R29.898 DECREASED GRIP STRENGTH OF RIGHT HAND: ICD-10-CM

## 2025-06-23 DIAGNOSIS — Z91.81 HISTORY OF FALL: ICD-10-CM

## 2025-06-23 PROCEDURE — 97530 THERAPEUTIC ACTIVITIES: CPT

## 2025-06-23 PROCEDURE — 97112 NEUROMUSCULAR REEDUCATION: CPT

## 2025-06-23 PROCEDURE — 97166 OT EVAL MOD COMPLEX 45 MIN: CPT

## 2025-06-23 NOTE — THERAPY EVALUATION
Outpatient Occupational Therapy Neuro Initial Evaluation  [facility]     Patient Name: Blanca Harden  : 1945  MRN: [id number]  Today's Date: 2025      Visit Date: 2025    Patient Active Problem List   Diagnosis    Osteoporosis    Breast neoplasm, Tis (DCIS), left    Iron deficiency anemia    CKD (chronic kidney disease)    Diabetic nephropathy associated with type 2 diabetes mellitus    Temporal arteritis    Immunosuppression    Anemia    Duodenal adenoma    Gastritis without bleeding    Polyp of duodenum    Morbidly obese    Dyspnea on exertion    Hyperlipidemia    Type 2 diabetes mellitus with stage 3b chronic kidney disease, with long-term current use of insulin    Essential hypertension    Bilateral lower extremity edema    Pulmonary hypertension    Obstructive sleep apnea on CPAP    Stage 3a chronic kidney disease    Iron malabsorption    Respiratory distress    Hypoglycemia due to insulin    Delirium    Hypomagnesemia    Severe malnutrition    Leg swelling    Venous (peripheral) insufficiency    Polyp of colon    Obesity (BMI 30.0-34.9)    TIA (transient ischemic attack)    Difficulty with speech    Multiple rib fractures    Coronary artery disease involving native coronary artery of native heart without angina pectoris        Past Medical History:   Diagnosis Date    Anemia     Anxiety and depression     Asthma     Balance problem     Breast cancer 2019    CKD (chronic kidney disease), stage III     Colon polyp 2019    Colonic mass     COPD (chronic obstructive pulmonary disease)     Coronary artery disease     FOLLOWED BY DR GUNN    Diabetes mellitus, type 2     History of COVID-19     History of left breast cancer     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, grade III, 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    Hypertension     Liver disease     Lower extremity edema     Moderate persistent asthma     On home O2     3L NC PRN    Pulmonary  hypertension     Sleep apnea     Swelling     IN LOWER EXTREMITIES    Temporal arteritis     Varicose veins of unspecified lower extremity with ulcer of calf 2022    Venous insufficiency (chronic) (peripheral)     Vitamin D deficiency         Past Surgical History:   Procedure Laterality Date    AMPUTATION  Breast    BRAIN SURGERY      BREAST BIOPSY Left  approx    benign pathology    BREAST BIOPSY Left 2019    Ultasound guided mammotome vacuum assisted left breast biopsy with placement of a metallic clip-Dr. Daniel Gutierrez, [facility]    CARDIAC CATHETERIZATION       SECTION N/A     x3    CHOLECYSTECTOMY N/A     COLON RESECTION Right 2025    Procedure: COLON RESECTION LAPAROSCOPIC RIGHT WITH DAVINCI ROBOT;  Surgeon: Ashwin Alvarado MD;  Location: [facility] MAIN OR;  Service: Robotics - DaVinci;  Laterality: Right;    COLONOSCOPY      COLONOSCOPY N/A 2024    Procedure: COLONOSCOPY into the cecum and TI with hot snare polypectomy;  Surgeon: Ashwin Alvarado MD;  Location: [facility] ENDOSCOPY;  Service: General;  Laterality: N/A;  pre-colon polyps  post-colon mass    COLONOSCOPY W/ POLYPECTOMY N/A 2019    Enlarged folds in the antrum: biopsied; duodenal, transverse colon x2, splenic flexure, descending colon and sigmoid colon polyps: biopsied (path: tubular adenoma x6)-Dr. Zak De Jesus, [facility]    ENDOSCOPY  10/11/2019    Procedure: ESOPHAGOGASTRODUODENOSCOPY with hot snare polypectomy;  Surgeon: Haile Handley MD;  Location: [facility] ENDOSCOPY;  Service: Gastroenterology    ENDOSCOPY N/A 2020    Procedure: ESOPHAGOGASTRODUODENOSCOPY;  Surgeon: Haile Handley MD;  Location: [facility] ENDOSCOPY;  Service: Gastroenterology    ENDOSCOPY N/A 2020    Procedure: ESOPHAGOGASTRODUODENOSCOPY WITH BIOPSIES AND COLD BIOPSY POLYPECTOMY;  Surgeon: Haile Handley MD;  Location: [facility] ENDOSCOPY;  Service: Gastroenterology;  Laterality: N/A;   pre: dyspepsia and diarrhea  post: duodenal polyp and gastritis    ENDOSCOPY N/A 07/23/2024    Procedure: ESOPHAGOGASTRODUODENOSCOPY WITH hot snare polypectomy with clip placement x 2BIOPSY;  Surgeon: Ashwin Alvarado MD;  Location: [facility] ENDOSCOPY;  Service: General;  Laterality: N/A;  pre-hx duoedenal polyp  post-duodenal polyp; gastritis    EYE SURGERY      MASTECTOMY WITH SENTINEL NODE BIOPSY AND AXILLARY NODE DISSECTION Left 07/03/2019    Procedure: LEFT BREAST MASTECTOMY WITH SENTINEL NODE BIOPSY AND , v-y plasty closure;  Surgeon: Tahmina James MD;  Location: [facility] MAIN OR;  Service: General    SUBTOTAL HYSTERECTOMY Bilateral     ovaries still in tact    TEMPORAL ARTERY BIOPSY Bilateral 12/05/2019         Visit Dx:      ICD-10-CM ICD-9-CM   1. History of stroke  Z86.73 V12.54   2. Decreased  strength of right hand  R29.898 729.89            OT Neuro       Row Name 06/23/25 1300             Home Living    Current Living Arrangements apartment  -BC      Home Accessibility wheelchair accessible  -BC      Home Equipment Rollator;Wheelchair-electric  -BC         Cognitive Assessment/Intervention    Current Cognitive/Communication Assessment --  difficulty w/ expressive language, and some delay with comprehension skills. Yoruba is first language with spouse present today so did not formally assess but appears min to mod impairments  -BC      Follows Commands (Cognition) follows two-step commands;50-74% accuracy;delayed response/completion  -BC         Posture/Observations    Posture- WNL --  kyphotic posture noted w/ rounded shoulders  -BC         Coordination    Coordination Tests Finger to nose eyes open;Box and Blocks;9-Hole Peg  -BC      Finger to Nose Eyes Open Right:;Impaired  delayed and limited w/ sh mvmt/range  -BC      Box and Blocks Left 39  -BC      Box and Blocks Right 21  -BC      9-Hole Peg Left 67  -BC      9-Hole Peg Right 58  -BC         Bed Mobility    Bed Mobility, Comment (I)  w bed mobility  -BC         Transfers    Transfers, Sit-Stand-Sit, Assist Device four wheeled walker  -BC      [place] Level (Toilet Transfer) modified independence  -BC      Assistive Device (Toilet Transfer) raised toilet seat;commode, bedside without drop arms  -BC      [place] Level (Shower Transfer) modified independence  -BC      Transfer, Comment reports (I) w/ rollator with ADLs and transfers, rollator level  -BC         Functional Mobility    Functional Mobility- Ind. Level conditional independence;set up required  -BC         ADL Assessment/Intervention    Comment, IADL Assessment/Training assist w/ IADls, cooking, cleaning, grociery shopping  -BC                User Key  (r) = Recorded By, (t) = Taken By, (c) = Cosigned By      Initials Name Provider Type    Maury Albrecht OT Occupational Therapist                    Hand Therapy (Last 24 Hours)       Hand Eval       Row Name 06/23/25 1400             Hand  Strength     Strength Affected Side Bilateral;Right  -BC          Strength Right    # Reps 3  -BC      Right Rung 2  -BC      Right  Test 1 25  -BC      Right  Test 2 20  -BC      Right  Test 3 23  -BC       Strength Average Right 22.67  -BC          Strength Left    # Reps 3  -BC      Left Rung 2  -BC      Left  Test 1 25  -BC      Left  Test 2 20  -BC      Left  Test 3 20  -BC       Strength Average Left 21.67  -BC         Pinch Strength    Affected Side Bilateral  -BC         Right Hand Strength - Pinch (lbs)    Lateral 2 lbs  -BC      Tip (2 point) 1 lbs  -BC         Left Hand Strength - Pinch (lbs)    Lateral 2 lbs  -BC      Tip (2 point) 1 lbs  -BC                User Key  (r) = Recorded By, (t) = Taken By, (c) = Cosigned By      Initials Name Provider Type    Maury Albrecht OT Occupational Therapist                     OT Pediatric Evaluation       Row Name 06/23/25 1300             Vision- Basic    Current Vision Wears glasses  only for reading  'supposed' to wear glasses  -BC      Visual History Cataracts  -BC      Patient Visual Report --  some dleay with peripheral field testing. Has floaters from diabetes  -BC         Vision - Complex Assessment    Tracking --  delayed hoizontal to L but functional  -BC         Sensation    Light Touch Partial deficits in the LLE;Partial deficits in the RLE  neuropathy hx with diminished sensation BLEs, Miranda WFLs  -BC         General ROM    RT Upper Ext Rt Shoulder Extension;Rt Shoulder ABduction;Rt Shoulder Flexion;Rt Shoulder Horizontal Abduction;Rt Shoulder External Rotation;Rt Shoulder Internal Rotation;Rt Elbow Extension/Flexion;Rt Elbow Supination;Rt Elbow Pronation;Rt Wrist Flexion;Rt Wrist Extension  -BC      GENERAL ROM COMMENTS BUEs  -BC         Right Upper Ext    Rt Shoulder Abduction AROM 75  -BC      Rt Shoulder Abduction PROM 95  -BC      Rt Shoulder Flexion AROM 70  -BC      Rt Shoulder Flexion PROM 90  -BC      Rt Shoulder Internal Rotation AROM ~25  -BC      Rt Elbow Extension/Flexion AROM ~5 lacking ext  -BC      Rt Elbow Supination AROM 70  -BC      Rt Elbow Supination PROM WNL  -BC      Rt Elbow Pronation AROM WFLs  -BC      Rt Wrist Flexion AROM ~10* lacking  -BC      Rt Wrist Flexion PROM WNLs  -BC      Rt Wrist Extension AROM WFLs  -BC         MMT (Manual Muscle Testing)    General MMT Comments RUE 3+/5 sh, pain w mvmts gavino elbow >hand 4-/5 to 4/5,  -BC                User Key  (r) = Recorded By, (t) = Taken By, (c) = Cosigned By      Initials Name Provider Type    BC Maury Olvera, OT Occupational Therapist                    Therapy Education  Education Details: reviewed role of OT, goal setting, sh glides on table/wall R sh, POC, cont teaching  Given: HEP, Symptoms/condition management  Program: New  How Provided: Verbal  Provided to: Patient, Caregiver  Level of Understanding: Teach back education performed, Verbalized     OT Goals       Row Name 06/23/25 1500          OT  Short Term Goals    STG Date to Achieve 07/23/25  -BC     STG 1 Pt to increase gavino hand  strength to 30 pounds to increase improvement of strength for ADL, iADL performance.  -BC     STG 1 Progress New  -BC     STG 2 Pt will increased RUE AROM to 120* at sh to increased (I) to be able to reach into close to gather clothes for bathing tasks at home  -BC     STG 2 Progress New  -BC     STG 3 Pt will increase R in hand manipulation skills through improvement of box and blocks score to 35 or more to increase coordination for ADL, iADLs.  -BC     STG 3 Progress New  -BC     STG 4 Pt will be (I) with theraputty HEP for increased gavino  and pinch for functional use  -BC     STG 4 Progress New  -BC        Long Term Goals    LTG Date to Achieve 08/22/25  -BC     LTG 1 Pt will be able to complete a simple cooking task w/ supervision A with rollator and no cues for safety for increased balance and home iADL (I).  -BC     LTG 1 Progress New  -BC     LTG 2 Pt to illustrate and improvement of gavino hand fine motor coordination skills through improvement of nine-hole peg test score to 45 seconds or less  -BC     LTG 2 Progress New  -BC     LTG 3 Pt will be (I) with RUE HEP for increased sh>hand use and function for daily activities  -BC     LTG 3 Progress New  -BC     LTG 4 Pt will be able to complete a ADL or UE leisure activity in standing with rollator for 8 minutes with Sup A for balance to increase safety for ADLs.  -BC     LTG 4 Progress New  -BC     LTG 5 Pt with increase MMT to 4/5 RUE to increase ability to complete kitchen management tasks.  -BC     LTG 5 Progress New  -BC     LTG 6 Pt will be (I) with gather clothing for (I) bathing task including reaching w/ RUE into closet (I)ly rollator level per spouse report  -BC     LTG 6 Progress New  -BC        Time Calculation    OT Goal Re-Cert Due Date 07/23/25  -BC               User Key  (r) = Recorded By, (t) = Taken By, (c) = Cosigned By      Initials Name Provider  Type    BC Maury Olvera, OT Occupational Therapist                            OT Assessment/Plan       Row Name 06/23/25 3549          OT Assessment    Functional Limitations Decreased safety during functional activities;Limitation in home management;Limitations in functional capacity and performance;Performance in self-care ADL  -BC     Assessment Comments Pt is a 81 y/o F admitted to OP OT services following recent CVA and fall. Pt with h/o CVA (R gina), anemia, asthma, HTN, DM2 and CKD who presented to [facility] initially with acute fall on 03/11/2025 after DC home from rehab ~2 days prior. Imaging (+) R 2-4 fxs and comminuted mid R clavicle fx. Pt is cleared for WBAT to RUE and activities as tolerated per ortho notes 5/19. They live together in an apt w/ a ramped entrance, no steps. Pt was evaluated by PT 6/4 with OT referral placed after. Pt uses a rollator for mobility now and is fearful of falling with dcreased confidence with tasks, ADLs, and transfers. Pt has right sided weakness from her CVA and difficulty w/ full functional use of Right dominant hand including limited overhead reaching to gather clothes for bathing, and wants to get back to cooking tasks. Continue OT services while IP to address impairments, improve strength, coordination, and balance for increased in home safety and (I) with ADLs. Pt in agreement with plan. Interpretor declined for use with spouse providing info; no pertinent medication information discusses today. Pt/spouse also voiced Pt w difficulty w/ expressive language, Message request to MD RN to get an SLP order out OP as well.  -BC     Please refer to paper survey for additional self-reported information Yes  -BC     OT Diagnosis OT following CVA for balance, strengthening, and coordination tasks for increased RUE function/use.  -BC     OT Rehab Potential Good  -BC     Patient/caregiver participated in establishment of treatment plan and goals Yes  -BC     Patient would benefit  "from skilled therapy intervention Yes  -BC        OT Plan    OT Frequency 2x/week  -BC     Predicted Duration of Therapy Intervention (OT) 8 weeks  -BC     Planned Therapy Interventions (Optional Details) balance training;home exercise program;neuromuscular re-education;bed mobility training;patient/family education;ROM (Range of Motion);strengthening;stretching;transfer training;postural re-education;joint mobilization;motor coordination training  -BC               User Key  (r) = Recorded By, (t) = Taken By, (c) = Cosigned By      Initials Name Provider Type    Maury Albrecht OT Occupational Therapist                   OT Exercises       Row Name 06/23/25 1300             Precautions    Existing Precautions/Restrictions fall  -BC         Subjective Comments    Subjective Comments \"Im having trouble getting my words out\"  -BC         Subjective Pain    Able to rate subjective pain? yes  -BC      Pre-Treatment Pain Level 0  -BC      Post-Treatment Pain Level 0  -BC         Exercise 1    Exercise Name 1 incline table slides with pillow case RUE for increased sh range in prep for fxnl reaching  -BC      Cueing 1 Verbal;Tactile;Demo  -BC      Time (Minutes) 1 5  -BC                User Key  (r) = Recorded By, (t) = Taken By, (c) = Cosigned By      Initials Name Provider Type    Maury Albrecht OT Occupational Therapist                      9 Hole Peg  9-Hole Peg Left: 67  9-Hole Peg Right: 58  Box and Blocks  Box and Blocks Left: 39  Box and Blocks Right: 21         Time Calculation:   OT Start Time: 1330  OT Stop Time: 1415  OT Time Calculation (min): 45 min  Total Timed Code Minutes- OT: 45 minute(s)     Therapy Charges for Today       Code Description Service Date Service Provider Modifiers Qty    [id number]  OT EVAL MOD COMPLEXITY 3 6/23/2025 Maury Olvera OT GO 1                       Maury Olvera OT  6/23/2025  "

## 2025-06-23 NOTE — THERAPY TREATMENT NOTE
Outpatient Physical Therapy Neuro Treatment Note  [facility]     Patient Name: Blanca Harden  : 1945  MRN: [id number]  Today's Date: 2025      Visit Date: 2025    Visit Dx:    ICD-10-CM ICD-9-CM   1. History of stroke  Z86.73 V12.54   2. Right sided weakness  R53.1 728.87   3. History of fall  Z91.81 V15.88   4. Impaired functional mobility, balance, gait, and endurance  Z74.09 V49.89       Patient Active Problem List   Diagnosis    Osteoporosis    Breast neoplasm, Tis (DCIS), left    Iron deficiency anemia    CKD (chronic kidney disease)    Diabetic nephropathy associated with type 2 diabetes mellitus    Temporal arteritis    Immunosuppression    Anemia    Duodenal adenoma    Gastritis without bleeding    Polyp of duodenum    Morbidly obese    Dyspnea on exertion    Hyperlipidemia    Type 2 diabetes mellitus with stage 3b chronic kidney disease, with long-term current use of insulin    Essential hypertension    Bilateral lower extremity edema    Pulmonary hypertension    Obstructive sleep apnea on CPAP    Stage 3a chronic kidney disease    Iron malabsorption    Respiratory distress    Hypoglycemia due to insulin    Delirium    Hypomagnesemia    Severe malnutrition    Leg swelling    Venous (peripheral) insufficiency    Polyp of colon    Obesity (BMI 30.0-34.9)    TIA (transient ischemic attack)    Difficulty with speech    Multiple rib fractures    Coronary artery disease involving native coronary artery of native heart without angina pectoris            PT Neuro       Row Name 25 1419             Subjective    Subjective Comments Pt and  report that since stroke she has had difficulty finding words with speech with both Bel[place]ian and English.  Patient reports she has been using her rollator for several years at home and in the community.  -SARA         Precautions and Contraindications    Precautions/Limitations fall precautions  -SARA         Subjective Pain    Able to rate  subjective pain? yes  -SARA      Pre-Treatment Pain Level 0  -SARA      Post-Treatment Pain Level 0  -SARA         Cognition    Overall Cognitive Status WFL  -SARA         Posture/Observations    Posture/Observations Comments Patient arrived to PT clinic ambulating with rollator.  drove.  stayed in waiting room.  -SARA         Transfers    Sit-Stand [place] (Transfers) modified independence;supervision  -SARA      Stand-Sit [place] (Transfers) modified independence;supervision  -SARA      Transfers, Sit-Stand-Sit, Assist Device four wheeled walker  -SARA      Transfer, Impairments strength decreased;impaired balance  -SARA         Gait/Stairs (Locomotion)    [place] Level (Gait) modified independence  -SARA      Assistive Device (Gait) walker, 4-wheeled  -SARA      Distance in Feet (Gait) --  80', 200', 40' x 2  -SARA      Deviations/Abnormal Patterns (Gait) bilateral deviations;gait speed decreased;weight shifting decreased  -SARA      Bilateral Gait Deviations forward flexed posture;heel strike decreased  -SARA      Gait Assessment/Intervention slow pace especially at end of session due to fatigue  -SARA         Balance Skills Training    Gait Balance-Level of Assistance Contact guard  -SARA      Gait Balance Support Right upper extremity supported;Left upper extremity supported;gina bar  -SARA      Gait Balance Activities side-stepping;stepping over object  cues to keep toes facing forward; B UE support of // bars stepping over noodles and 1/2 roll  -SARA                User Key  (r) = Recorded By, (t) = Taken By, (c) = Cosigned By      Initials Name Provider Type    Calli Davis, PT Physical Therapist                             PT Assessment/Plan       Row Name 06/23/25 4600          PT Assessment    Assessment Comments Patient is weak with right hip flexors weaker than abductors. Pt required at least 2 sit down rest breaks today during standing exercises. By end of session very fatigued but still safe  ambulating with rollator.  -SARA               User Key  (r) = Recorded By, (t) = Taken By, (c) = Cosigned By      Initials Name Provider Type    Calli Davis, TREY Physical Therapist                        OP Exercises       Row Name 06/23/25 1636 06/23/25 1411          Subjective    Subjective Comments -- Pt and  report that since stroke she has had difficulty finding words with speech with both Divehi and English.  Patient reports she has been using her rollator for several years at home and in the community.  -SARA        Subjective Pain    Able to rate subjective pain? -- yes  -SARA     Pre-Treatment Pain Level -- 0  -SARA     Post-Treatment Pain Level -- 0  -SARA        Total Minutes    85628 -  PT Neuromuscular Reeducation Minutes 15  -SARA --     42337 - PT Therapeutic Activity Minutes 26  -SARA --        Exercise 1    Exercise Name 1 -- Sitting: hip abd with RTB and hip flexion with YTB  -SARA     Cueing 1 -- Verbal;Tactile;Demo  -SARA     Sets 1 -- 2  -SARA     Reps 1 -- 10  -SARA     Additional Comments -- very weak R hip flexion  -SARA        Exercise 4    Exercise Name 4 -- Standing: mini squats, B HR, knee flexion, hip abduction  -SARA     Cueing 4 -- Verbal;Tactile;Demo  -SARA     Reps 4 -- 15 each  -SARA        Exercise 5    Exercise Name 5 -- Standing with bilateral upper extremity support: Alternate tap ups forward and then lateral to 4 inch step  -SARA     Cueing 5 -- Verbal;Tactile;Demo  -SARA     Reps 5 -- 10 each  -SARA        Exercise 10    Exercise Name 10 -- Standing: sidestep with RTB around thighs  -SARA     Cueing 10 -- Verbal;Tactile  -SARA     Reps 10 -- 8' x 2  -SARA               User Key  (r) = Recorded By, (t) = Taken By, (c) = Cosigned By      Initials Name Provider Type    Calli Davis PT Physical Therapist                                    Therapy Education  Education Details: provided HEP for seated hip abduction and hip flexion with YTB  Given: HEP, Fall prevention and home safety, Mobility  training  Program: Progressed  How Provided: Verbal, Demonstration, Written  Provided to: Patient  Level of Understanding: Teach back education performed, Verbalized, Demonstrated              Time Calculation:   Start Time: 1419  Stop Time: 1500  Time Calculation (min): 41 min  Total Timed Code Minutes- PT: 41 minute(s)  Timed Charges  47579 -  PT Neuromuscular Reeducation Minutes: 15  19275 - PT Therapeutic Activity Minutes: 26  Total Minutes  Timed Charges Total Minutes: 41   Total Minutes: 41   Therapy Charges for Today       Code Description Service Date Service Provider Modifiers Qty    [id number]  PT NEUROMUSC RE EDUCATION EA 15 MIN 6/23/2025 Calli Mcguire, PT GP 1    [id number]  PT THERAPEUTIC ACT EA 15 MIN 6/23/2025 Calli Mcguire, PT GP 2                      Calli Mcguire PT  6/23/2025

## 2025-06-25 ENCOUNTER — HOSPITAL ENCOUNTER (OUTPATIENT)
Dept: OCCUPATIONAL THERAPY | Facility: HOSPITAL | Age: 80
Setting detail: THERAPIES SERIES
Discharge: HOME | End: 2025-06-25
Payer: MEDICARE

## 2025-06-25 ENCOUNTER — HOSPITAL ENCOUNTER (OUTPATIENT)
Dept: PHYSICAL THERAPY | Facility: HOSPITAL | Age: 80
Setting detail: THERAPIES SERIES
Discharge: HOME | End: 2025-06-25
Payer: MEDICARE

## 2025-06-25 ENCOUNTER — LAB (OUTPATIENT)
Dept: LAB | Facility: HOSPITAL | Age: 80
End: 2025-06-25
Payer: MEDICARE

## 2025-06-25 ENCOUNTER — TRANSCRIBE ORDERS (OUTPATIENT)
Dept: LAB | Facility: HOSPITAL | Age: 80
End: 2025-06-25
Payer: MEDICARE

## 2025-06-25 DIAGNOSIS — Z86.73 HISTORY OF STROKE: Primary | ICD-10-CM

## 2025-06-25 DIAGNOSIS — E11.65 INADEQUATELY CONTROLLED DIABETES MELLITUS: ICD-10-CM

## 2025-06-25 DIAGNOSIS — R29.898 DECREASED GRIP STRENGTH OF RIGHT HAND: ICD-10-CM

## 2025-06-25 DIAGNOSIS — Z74.09 IMPAIRED FUNCTIONAL MOBILITY, BALANCE, GAIT, AND ENDURANCE: ICD-10-CM

## 2025-06-25 DIAGNOSIS — Z91.81 HISTORY OF FALL: ICD-10-CM

## 2025-06-25 DIAGNOSIS — I13.0 HYPERTENSIVE HEART AND RENAL DISEASE WITH CONGESTIVE HEART FAILURE: ICD-10-CM

## 2025-06-25 DIAGNOSIS — R53.1 RIGHT SIDED WEAKNESS: ICD-10-CM

## 2025-06-25 DIAGNOSIS — I13.0 HYPERTENSIVE HEART AND RENAL DISEASE WITH CONGESTIVE HEART FAILURE: Primary | ICD-10-CM

## 2025-06-25 LAB
ALBUMIN SERPL-MCNC: 3.8 G/DL (ref 3.5–5.2)
ANION GAP SERPL CALCULATED.3IONS-SCNC: 14.2 MMOL/L (ref 5–15)
BUN SERPL-MCNC: 32 MG/DL (ref 8–23)
BUN/CREAT SERPL: 19.4 (ref 7–25)
CALCIUM SPEC-SCNC: 9.4 MG/DL (ref 8.6–10.5)
CHLORIDE SERPL-SCNC: 102 MMOL/L (ref 98–107)
CO2 SERPL-SCNC: 21.8 MMOL/L (ref 22–29)
CREAT SERPL-MCNC: 1.65 MG/DL (ref 0.57–1)
EGFRCR SERPLBLD CKD-EPI 2021: 31.5 ML/MIN/1.73
GLUCOSE SERPL-MCNC: 183 MG/DL (ref 65–99)
HBA1C MFR BLD: 9.6 % (ref 4.8–5.6)
PHOSPHATE SERPL-MCNC: 3.6 MG/DL (ref 2.5–4.5)
POTASSIUM SERPL-SCNC: 3.8 MMOL/L (ref 3.5–5.2)
SODIUM SERPL-SCNC: 138 MMOL/L (ref 136–145)

## 2025-06-25 PROCEDURE — 83036 HEMOGLOBIN GLYCOSYLATED A1C: CPT

## 2025-06-25 PROCEDURE — 36415 COLL VENOUS BLD VENIPUNCTURE: CPT | Performed by: INTERNAL MEDICINE

## 2025-06-25 PROCEDURE — 97530 THERAPEUTIC ACTIVITIES: CPT

## 2025-06-25 PROCEDURE — 97110 THERAPEUTIC EXERCISES: CPT

## 2025-06-25 PROCEDURE — 80069 RENAL FUNCTION PANEL: CPT | Performed by: INTERNAL MEDICINE

## 2025-06-25 PROCEDURE — 97112 NEUROMUSCULAR REEDUCATION: CPT

## 2025-06-25 NOTE — THERAPY TREATMENT NOTE
Outpatient Occupational Therapy Neuro Treatment Note  [facility]     Patient Name: Blanca Harden  : 1945  MRN: [id number]  Today's Date: 2025       Visit Date: 2025    Patient Active Problem List   Diagnosis    Osteoporosis    Breast neoplasm, Tis (DCIS), left    Iron deficiency anemia    CKD (chronic kidney disease)    Diabetic nephropathy associated with type 2 diabetes mellitus    Temporal arteritis    Immunosuppression    Anemia    Duodenal adenoma    Gastritis without bleeding    Polyp of duodenum    Morbidly obese    Dyspnea on exertion    Hyperlipidemia    Type 2 diabetes mellitus with stage 3b chronic kidney disease, with long-term current use of insulin    Essential hypertension    Bilateral lower extremity edema    Pulmonary hypertension    Obstructive sleep apnea on CPAP    Stage 3a chronic kidney disease    Iron malabsorption    Respiratory distress    Hypoglycemia due to insulin    Delirium    Hypomagnesemia    Severe malnutrition    Leg swelling    Venous (peripheral) insufficiency    Polyp of colon    Obesity (BMI 30.0-34.9)    TIA (transient ischemic attack)    Difficulty with speech    Multiple rib fractures    Coronary artery disease involving native coronary artery of native heart without angina pectoris        Past Medical History:   Diagnosis Date    Anemia     Anxiety and depression     Asthma     Balance problem     Breast cancer 2019    CKD (chronic kidney disease), stage III     Colon polyp 2019    Colonic mass     COPD (chronic obstructive pulmonary disease)     Coronary artery disease     FOLLOWED BY DR GUNN    Diabetes mellitus, type 2     History of COVID-19     History of left breast cancer     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, grade III, 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    Hypertension     Liver disease     Lower extremity edema     Moderate persistent asthma     On home O2     3L NC PRN    Pulmonary hypertension      Sleep apnea     Swelling     IN LOWER EXTREMITIES    Temporal arteritis     Varicose veins of unspecified lower extremity with ulcer of calf 2022    Venous insufficiency (chronic) (peripheral)     Vitamin D deficiency         Past Surgical History:   Procedure Laterality Date    AMPUTATION  Breast    BRAIN SURGERY      BREAST BIOPSY Left  approx    benign pathology    BREAST BIOPSY Left 2019    Ultasound guided mammotome vacuum assisted left breast biopsy with placement of a metallic clip-Dr. Daniel Gutierrez, [facility]    CARDIAC CATHETERIZATION       SECTION N/A     x3    CHOLECYSTECTOMY N/A     COLON RESECTION Right 2025    Procedure: COLON RESECTION LAPAROSCOPIC RIGHT WITH DAVINCI ROBOT;  Surgeon: Ashwin Alvarado MD;  Location: [facility] MAIN OR;  Service: Robotics - DaVinci;  Laterality: Right;    COLONOSCOPY      COLONOSCOPY N/A 2024    Procedure: COLONOSCOPY into the cecum and TI with hot snare polypectomy;  Surgeon: Ashwin Alvarado MD;  Location: [facility] ENDOSCOPY;  Service: General;  Laterality: N/A;  pre-colon polyps  post-colon mass    COLONOSCOPY W/ POLYPECTOMY N/A 2019    Enlarged folds in the antrum: biopsied; duodenal, transverse colon x2, splenic flexure, descending colon and sigmoid colon polyps: biopsied (path: tubular adenoma x6)-Dr. Zak De Jesus, [facility]    ENDOSCOPY  10/11/2019    Procedure: ESOPHAGOGASTRODUODENOSCOPY with hot snare polypectomy;  Surgeon: Haile Handley MD;  Location: [facility] ENDOSCOPY;  Service: Gastroenterology    ENDOSCOPY N/A 2020    Procedure: ESOPHAGOGASTRODUODENOSCOPY;  Surgeon: Haile Handley MD;  Location: [facility] ENDOSCOPY;  Service: Gastroenterology    ENDOSCOPY N/A 2020    Procedure: ESOPHAGOGASTRODUODENOSCOPY WITH BIOPSIES AND COLD BIOPSY POLYPECTOMY;  Surgeon: Haile Handley MD;  Location: [facility] ENDOSCOPY;  Service: Gastroenterology;  Laterality: N/A;  pre:  dyspepsia and diarrhea  post: duodenal polyp and gastritis    ENDOSCOPY N/A 07/23/2024    Procedure: ESOPHAGOGASTRODUODENOSCOPY WITH hot snare polypectomy with clip placement x 2BIOPSY;  Surgeon: Ashwin Alvarado MD;  Location: [facility] ENDOSCOPY;  Service: General;  Laterality: N/A;  pre-hx duoedenal polyp  post-duodenal polyp; gastritis    EYE SURGERY      MASTECTOMY WITH SENTINEL NODE BIOPSY AND AXILLARY NODE DISSECTION Left 07/03/2019    Procedure: LEFT BREAST MASTECTOMY WITH SENTINEL NODE BIOPSY AND , v-y plasty closure;  Surgeon: Tahmina James MD;  Location: [facility] MAIN OR;  Service: General    SUBTOTAL HYSTERECTOMY Bilateral     ovaries still in tact    TEMPORAL ARTERY BIOPSY Bilateral 12/05/2019         Visit Dx:    ICD-10-CM ICD-9-CM   1. History of stroke  Z86.73 V12.54   2. Decreased  strength of right hand  R29.898 729.89                    OT Assessment/Plan       Row Name 06/25/25 1330          OT Assessment    Assessment Comments Pt seen for 1st treatment session evaluation. Libyan interpretor used via VRI as Pt is comprehensive to english but expresses in Icelandic intermittently, ID# [id number]. Emphasis on improved overhead shoulder range/reach for functional use with progression from unweighted dowel to 2# weights. Pt with functional sh range >120 with cont'd exercises. Also completed fxnl reaching to  items supine/seated. Continue with OT POC to improve RUE use following CVA and clavicle fx. No increased pain today just feeling difficult/heaviness.  -BC               User Key  (r) = Recorded By, (t) = Taken By, (c) = Cosigned By      Initials Name Provider Type    Maury Albrecht, OT Occupational Therapist                               Therapy Education  Education Details: HEP with sh range, supine and wall slides. FMC tasks  Given: HEP, Fall prevention and home safety  Program: Progressed  How Provided: Verbal, Demonstration, Written  Provided to: Patient  Level of  Understanding: Teach back education performed, Verbalized, Demonstrated       OT Exercises       Row Name 06/25/25 1300             Precautions    Existing Precautions/Restrictions fall  -BC         Subjective Comments    Subjective Comments Kyrgyz interpretor used as Pt can comprehend english but response in Yemeni  -BC         Subjective Pain    Able to rate subjective pain? yes  -BC         Exercise 1    Exercise Name 1 Supine sh stretching/strengthening program initiated witout dowel and progressed to with dowel for increased RUE function and reaching for household items. Sh FF, chest press and horizontal abd/add  -BC      Cueing 1 Verbal  -BC      Equipment 1 Dowel  -BC      Weights/Plates 1 2  chest press 4# dowel  -BC      Sets 1 3  -BC      Reps 1 10  -BC         Exercise 2    Exercise Name 2 supine overhead reaching to  clothes pins from overhead and place on kenyatta for incresed pinch and sh range with high reps completed  -BC      Cueing 2 Verbal  -BC         Exercise 3    Exercise Name 3 seated reaching to  clothes pins from L lateral and  using pincer grasp and throw anteriorly into bucket for increased function/use. Decreasedsh range w/ reaching seated  -BC      Cueing 3 Verbal  -BC      Sets 3 1  -BC      Reps 3 25  -BC         Exercise 4    Exercise Name 4 seated coordination and  strenghtening to  marbles using tennis ball w/ slit in it and place in bucket w min to mod cues for techniques and hand placement for , fair performance  -BC      Cueing 4 Verbal  -BC      Sets 4 1  -BC      Reps 4 25  -BC         Exercise 5    Exercise Name 5 seated FMC to  marbles and place on gustavo pyramid for increased RUE FMC for fxnl tasks, dropping onccasionally with min challenge noted. Cont'd w/ translation tip back to palm in hand and returning to bucket w/ fair performance  -BC      Cueing 5 Verbal  -BC      Sets 5 2  -BC      Reps 5 15  -BC         Exercise 6    Exercise  Name 6 RUE 'finger walking' R UE up wall using digits to increase sh range and strength with mod difficulty, unable to range fully ~100*  -BC      Cueing 6 Tactile;Verbal;Demo  -BC      Sets 6 1  -BC      Reps 6 5  -BC         Exercise 7    Exercise Name 7 standing wall slides w/ washcloth on wall to glide up/down for increased sh range/strength for reaching tasks, min cues improved sh range w/ practice  -BC      Cueing 7 Verbal  -BC      Sets 7 2  -BC      Reps 7 5  -BC         Exercise 8    Exercise Name 8 standing lateral steps L/R at counter without AD and CGA for stability to increased balance, LB strength, and confidence for future kitchen mgmt/practice.  -BC      Cueing 8 Verbal  -BC      Reps 8 3  -BC         Exercise 9    Exercise Name 9 standing balance task to bounce /catch ball off wall for gavino hand use/integration  -BC      Cueing 9 Verbal;Tactile  -BC      Sets 9 2  -BC      Reps 9 20  -BC                User Key  (r) = Recorded By, (t) = Taken By, (c) = Cosigned By      Initials Name Provider Type    BC Maury Olvera OT Occupational Therapist                                Time Calculation:   OT Start Time: 1316  OT Stop Time: 1415  OT Time Calculation (min): 59 min  Total Timed Code Minutes- OT: 59 minute(s)  Timed Charges  25969 - OT Therapeutic Exercise Minutes: 10  14300 -  OT Neuromuscular Reeducation Minutes: 19  65485 - OT Therapeutic Activity Minutes: 30  Total Minutes  Timed Charges Total Minutes: 59   Total Minutes: 59     Therapy Charges for Today       Code Description Service Date Service Provider Modifiers Qty    47750935665 HC OT NEUROMUSC RE EDUCATION EA 15 MIN 6/25/2025 Maury Olvera OT GO 1    25454228062 HC OT THER PROC EA 15 MIN 6/25/2025 Maury Olvera OT GO 1    57583251072 HC OT THERAPEUTIC ACT EA 15 MIN 6/25/2025 Maury Olvera OT GO 2                      Maury Olvera OT  6/25/2025

## 2025-06-25 NOTE — THERAPY TREATMENT NOTE
Outpatient Physical Therapy Neuro Treatment Note  [facility]     Patient Name: Blanca Harden  : 1945  MRN: [id number]  Today's Date: 2025      Visit Date: 2025    Visit Dx:    ICD-10-CM ICD-9-CM   1. History of stroke  Z86.73 V12.54   2. Right sided weakness  R53.1 728.87   3. History of fall  Z91.81 V15.88   4. Impaired functional mobility, balance, gait, and endurance  Z74.09 V49.89       Patient Active Problem List   Diagnosis    Osteoporosis    Breast neoplasm, Tis (DCIS), left    Iron deficiency anemia    CKD (chronic kidney disease)    Diabetic nephropathy associated with type 2 diabetes mellitus    Temporal arteritis    Immunosuppression    Anemia    Duodenal adenoma    Gastritis without bleeding    Polyp of duodenum    Morbidly obese    Dyspnea on exertion    Hyperlipidemia    Type 2 diabetes mellitus with stage 3b chronic kidney disease, with long-term current use of insulin    Essential hypertension    Bilateral lower extremity edema    Pulmonary hypertension    Obstructive sleep apnea on CPAP    Stage 3a chronic kidney disease    Iron malabsorption    Respiratory distress    Hypoglycemia due to insulin    Delirium    Hypomagnesemia    Severe malnutrition    Leg swelling    Venous (peripheral) insufficiency    Polyp of colon    Obesity (BMI 30.0-34.9)    TIA (transient ischemic attack)    Difficulty with speech    Multiple rib fractures    Coronary artery disease involving native coronary artery of native heart without angina pectoris            PT Neuro       Row Name 25 1417             Subjective    Subjective Comments Doing HEP.  -SARA         Precautions and Contraindications    Precautions/Limitations fall precautions  -SARA         Subjective Pain    Able to rate subjective pain? yes  -SARA      Pre-Treatment Pain Level 0  -SARA      Post-Treatment Pain Level 0  -SARA         Cognition    Overall Cognitive Status WFL  -SARA      Comments PT and pt communicate with some  Albanian and English.  -SARA         Posture/Observations    Posture/Observations Comments Patient arrived to PT clinic ambulating with rollator.  drove.  stayed in waiting room.  -SARA         Transfers    Sit-Stand [place] (Transfers) modified independence;supervision  -SARA      Stand-Sit [place] (Transfers) modified independence;supervision  -SARA      Transfers, Sit-Stand-Sit, Assist Device four wheeled walker  -SARA         Gait/Stairs (Locomotion)    [place] Level (Gait) modified independence  -SARA      Assistive Device (Gait) walker, 4-wheeled  -SARA      Distance in Feet (Gait) --  180', 80'  -SARA      Deviations/Abnormal Patterns (Gait) bilateral deviations;gait speed decreased;weight shifting decreased  -SARA      Bilateral Gait Deviations forward flexed posture;heel strike decreased  -SARA      Gait Assessment/Intervention cues to increase pace  -SARA         Balance Skills Training    Standing-Level of Assistance Contact guard  -SARA      Static Standing Balance Support No upper extremity supported;parallel bars  frequently leaned into bar to recover balance.  -SARA      Standing-Balance Activities Foam square;Reaching across midline  -SAAR      Gait Balance-Level of Assistance Contact guard  -SARA      Gait Balance Support Right upper extremity supported;Left upper extremity supported;gina bar  -SARA      Gait Balance Activities side-stepping  -SARA                User Key  (r) = Recorded By, (t) = Taken By, (c) = Cosigned By      Initials Name Provider Type    Calli Davis, PT Physical Therapist                             PT Assessment/Plan       Row Name 06/25/25 5909          PT Assessment    Assessment Comments Continued therapy working on strengthening R LE especially hip and gait with rollator. Pt tires quickly and requires frequent sit down rest breaks.  -SARA               User Key  (r) = Recorded By, (t) = Taken By, (c) = Cosigned By      Initials Name Provider Type    Calli Davis,  PT Physical Therapist                        OP Exercises       Row Name 06/25/25 1649 06/25/25 1417          Subjective    Subjective Comments -- Doing HEP.  -SARA        Subjective Pain    Able to rate subjective pain? -- yes  -SARA     Pre-Treatment Pain Level -- 0  -SARA     Post-Treatment Pain Level -- 0  -SARA        Total Minutes    79651 -  PT Neuromuscular Reeducation Minutes 18  -SARA --     43744 - PT Therapeutic Activity Minutes 25  -SARA --        Exercise 1    Exercise Name 1 -- Sitting: hip abd with YTB and hip flexion with YTB  -SARA     Cueing 1 -- Verbal;Tactile;Demo  -SARA     Sets 1 -- 2  -SARA     Reps 1 -- 10  -SARA        Exercise 4    Exercise Name 4 -- Standing: B HR at hemibar  -SARA     Cueing 4 -- Verbal;Tactile;Demo  -SARA     Reps 4 -- 15  -SARA        Exercise 5    Additional Comments -- Standing B UE support: step over and back over 1/2 roll 2 sets of 5 reps  -SARA        Exercise 6    Exercise Name 6 -- Standing with bilateral upper extremity support: Forward step up to 4 inch step  -SARA     Cueing 6 -- Verbal;Tactile;Demo  -SARA     Reps 6 -- 10 each  -SARA        Exercise 7    Exercise Name 7 -- Standing with hemibar: 1.5 # ankle weights for knee flexion, hip flexion and hip abduction  -SARA     Cueing 7 -- Verbal;Tactile;Demo  -SARA     Reps 7 -- 10 each  -SARA        Exercise 10    Exercise Name 10 -- Standing: sidestep with YTB around thighs  -SARA     Cueing 10 -- Verbal;Tactile  -SARA     Reps 10 -- 8' x 2  -SARA     Additional Comments -- struggled with sidestep to R due to R LE weakness, cues to keep toes facing forward.  -SARA               User Key  (r) = Recorded By, (t) = Taken By, (c) = Cosigned By      Initials Name Provider Type    Calli Davis PT Physical Therapist                                    Therapy Education  Given: Fall prevention and home safety, Mobility training  How Provided: Verbal  Provided to: Patient  Level of Understanding: Teach back education performed, Verbalized              Time  Calculation:   Start Time: 1417  Stop Time: 1500  Time Calculation (min): 43 min  Total Timed Code Minutes- PT: 43 minute(s)  Timed Charges  66742 -  PT Neuromuscular Reeducation Minutes: 18  53830 - PT Therapeutic Activity Minutes: 25  Total Minutes  Timed Charges Total Minutes: 43   Total Minutes: 43   Therapy Charges for Today       Code Description Service Date Service Provider Modifiers Qty    06511551884 HC PT NEUROMUSC RE EDUCATION EA 15 MIN 6/25/2025 Calli Mcguire, PT GP 1    76955417063 HC PT THERAPEUTIC ACT EA 15 MIN 6/25/2025 Calli Mcguire, PT GP 2                      Calli Mcguire, PT  6/25/2025

## 2025-06-30 ENCOUNTER — HOSPITAL ENCOUNTER (OUTPATIENT)
Dept: SPEECH THERAPY | Facility: HOSPITAL | Age: 80
Setting detail: THERAPIES SERIES
Discharge: HOME | End: 2025-06-30
Payer: MEDICARE

## 2025-06-30 ENCOUNTER — HOSPITAL ENCOUNTER (OUTPATIENT)
Dept: PHYSICAL THERAPY | Facility: HOSPITAL | Age: 80
Setting detail: THERAPIES SERIES
Discharge: HOME | End: 2025-06-30
Payer: MEDICARE

## 2025-06-30 ENCOUNTER — HOSPITAL ENCOUNTER (OUTPATIENT)
Dept: OCCUPATIONAL THERAPY | Facility: HOSPITAL | Age: 80
Setting detail: THERAPIES SERIES
Discharge: HOME | End: 2025-06-30
Payer: MEDICARE

## 2025-06-30 DIAGNOSIS — Z86.73 HISTORY OF STROKE: Primary | ICD-10-CM

## 2025-06-30 DIAGNOSIS — Z91.81 HISTORY OF FALL: ICD-10-CM

## 2025-06-30 DIAGNOSIS — Z74.09 IMPAIRED FUNCTIONAL MOBILITY, BALANCE, GAIT, AND ENDURANCE: ICD-10-CM

## 2025-06-30 DIAGNOSIS — R47.9 DIFFICULTY WITH SPEECH: ICD-10-CM

## 2025-06-30 DIAGNOSIS — R53.1 RIGHT SIDED WEAKNESS: ICD-10-CM

## 2025-06-30 DIAGNOSIS — R29.898 DECREASED GRIP STRENGTH OF RIGHT HAND: ICD-10-CM

## 2025-06-30 PROCEDURE — 97530 THERAPEUTIC ACTIVITIES: CPT

## 2025-06-30 PROCEDURE — 96125 COGNITIVE TEST BY HC PRO: CPT

## 2025-06-30 PROCEDURE — 97112 NEUROMUSCULAR REEDUCATION: CPT

## 2025-06-30 NOTE — THERAPY PROGRESS REPORT/RE-CERT
.Outpatient Physical Therapy Neuro Progress Note  [facility]     Patient Name: Blanca Harden  : 1945  MRN: [id number]  Today's Date: 2025      Visit Date: 2025    Patient Active Problem List   Diagnosis    Osteoporosis    Breast neoplasm, Tis (DCIS), left    Iron deficiency anemia    CKD (chronic kidney disease)    Diabetic nephropathy associated with type 2 diabetes mellitus    Temporal arteritis    Immunosuppression    Anemia    Duodenal adenoma    Gastritis without bleeding    Polyp of duodenum    Morbidly obese    Dyspnea on exertion    Hyperlipidemia    Type 2 diabetes mellitus with stage 3b chronic kidney disease, with long-term current use of insulin    Essential hypertension    Bilateral lower extremity edema    Pulmonary hypertension    Obstructive sleep apnea on CPAP    Stage 3a chronic kidney disease    Iron malabsorption    Respiratory distress    Hypoglycemia due to insulin    Delirium    Hypomagnesemia    Severe malnutrition    Leg swelling    Venous (peripheral) insufficiency    Polyp of colon    Obesity (BMI 30.0-34.9)    TIA (transient ischemic attack)    Difficulty with speech    Multiple rib fractures    Coronary artery disease involving native coronary artery of native heart without angina pectoris        Past Medical History:   Diagnosis Date    Anemia     Anxiety and depression     Asthma     Balance problem     Breast cancer 2019    CKD (chronic kidney disease), stage III     Colon polyp 2019    Colonic mass     COPD (chronic obstructive pulmonary disease)     Coronary artery disease     FOLLOWED BY DR GUNN    Diabetes mellitus, type 2     History of COVID-19     History of left breast cancer     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, grade III, ER/[place] negative    Hypertension     Liver disease     Lower extremity edema     Moderate persistent asthma     On home O2     3L NC PRN    Pulmonary hypertension      Sleep apnea     Swelling     IN LOWER EXTREMITIES    Temporal arteritis     Varicose veins of unspecified lower extremity with ulcer of calf 2022    Venous insufficiency (chronic) (peripheral)     Vitamin D deficiency         Past Surgical History:   Procedure Laterality Date    AMPUTATION  Breast    BRAIN SURGERY      BREAST BIOPSY Left  approx    benign pathology    BREAST BIOPSY Left 2019    Ultasound guided mammotome vacuum assisted left breast biopsy with placement of a metallic clip-Dr. Daniel Gutierrez, [facility]    CARDIAC CATHETERIZATION       SECTION N/A     x3    CHOLECYSTECTOMY N/A     COLON RESECTION Right 2025    Procedure: COLON RESECTION LAPAROSCOPIC RIGHT WITH DAVINCI ROBOT;  Surgeon: Ashwin Alvarado MD;  Location: [facility] MAIN OR;  Service: Robotics - DaVinci;  Laterality: Right;    COLONOSCOPY      COLONOSCOPY N/A 2024    Procedure: COLONOSCOPY into the cecum and TI with hot snare polypectomy;  Surgeon: Ashwin Alvarado MD;  Location: [facility] ENDOSCOPY;  Service: General;  Laterality: N/A;  pre-colon polyps  post-colon mass    COLONOSCOPY W/ POLYPECTOMY N/A 2019    Enlarged folds in the antrum: biopsied; duodenal, transverse colon x2, splenic flexure, descending colon and sigmoid colon polyps: biopsied (path: tubular adenoma x6)-Dr. Zak De Jesus, [facility]    ENDOSCOPY  10/11/2019    Procedure: ESOPHAGOGASTRODUODENOSCOPY with hot snare polypectomy;  Surgeon: Haile Handley MD;  Location: [facility] ENDOSCOPY;  Service: Gastroenterology    ENDOSCOPY N/A 2020    Procedure: ESOPHAGOGASTRODUODENOSCOPY;  Surgeon: Haile Handley MD;  Location: [facility] ENDOSCOPY;  Service: Gastroenterology    ENDOSCOPY N/A 2020    Procedure: ESOPHAGOGASTRODUODENOSCOPY WITH BIOPSIES AND COLD BIOPSY POLYPECTOMY;  Surgeon: Haile Handley MD;  Location: [facility] ENDOSCOPY;  Service: Gastroenterology;  Laterality: N/A;  pre:  dyspepsia and diarrhea  post: duodenal polyp and gastritis    ENDOSCOPY N/A 07/23/2024    Procedure: ESOPHAGOGASTRODUODENOSCOPY WITH hot snare polypectomy with clip placement x 2BIOPSY;  Surgeon: Ashwin Alvarado MD;  Location: [facility] ENDOSCOPY;  Service: General;  Laterality: N/A;  pre-hx duoedenal polyp  post-duodenal polyp; gastritis    EYE SURGERY      MASTECTOMY WITH SENTINEL NODE BIOPSY AND AXILLARY NODE DISSECTION Left 07/03/2019    Procedure: LEFT BREAST MASTECTOMY WITH SENTINEL NODE BIOPSY AND , v-y plasty closure;  Surgeon: Tahmina James MD;  Location: [facility] MAIN OR;  Service: General    SUBTOTAL HYSTERECTOMY Bilateral     ovaries still in tact    TEMPORAL ARTERY BIOPSY Bilateral 12/05/2019         Visit Dx:     ICD-10-CM ICD-9-CM   1. History of stroke  Z86.73 V12.54   2. Right sided weakness  R53.1 728.87   3. History of fall  Z91.81 V15.88   4. Impaired functional mobility, balance, gait, and endurance  Z74.09 V49.89                PT Neuro       Row Name 06/30/25 1420             Subjective    Subjective Comments Pt reports using rollator prior to stroke.  She feels that her right foot is getting stronger and is no longer dragging while she walks.  -SARA         Precautions and Contraindications    Precautions/Limitations fall precautions  -SARA         Subjective Pain    Able to rate subjective pain? yes  -SARA      Pre-Treatment Pain Level 0  -SARA      Post-Treatment Pain Level 0  -SARA         Cognition    Overall Cognitive Status WFL  -SARA      Orientation Level Oriented to person;Oriented to situation;Oriented to place  -SARA      Safety Judgment Good awareness of safety precautions  -SARA      Deficits Fully aware of deficits  -SARA      Comments Patient began speech therapy today. Pt and PT able to communicate with short phrases, gestures, with ESL.  -SARA         Posture/Observations    Posture/Observations Comments Patient arrived to PT clinic ambulating with rollator.  drove.  stayed  in waiting room.  -SARA         MMT Right Lower Ext    Rt Hip Flexion MMT, Gross Movement (4-/5) good minus  -SARA      Rt Knee Extension MMT, Gross Movement (4/5) good  -SARA      Rt Knee Flexion MMT, Gross Movement other (see comments)  mod resistance in sitting  -SARA      Rt Ankle Dorsiflexion MMT, Gross Movement (4+/5) good plus  -SARA         Transfers    Sit-Stand [place] (Transfers) modified independence;verbal cues  cues for hand placement  -SARA      Stand-Sit [place] (Transfers) modified independence;verbal cues  cues to back up to chair with rollator, don't place out to side and then walk to chair  -SARA      Transfers, Sit-Stand-Sit, Assist Device four wheeled walker  -SARA      Transfer, Impairments strength decreased;impaired balance  -SARA      Comment, (Transfers) to stand from armless chair; demonstrated good weight shift forward  -SARA         Gait/Stairs (Locomotion)    [place] Level (Gait) modified independence  -SARA      Assistive Device (Gait) walker, 4-wheeled  -SARA      Distance in Feet (Gait) --  80' x 2  -SARA      Deviations/Abnormal Patterns (Gait) bilateral deviations;gait speed decreased  -SARA      Bilateral Gait Deviations forward flexed posture  -SARA      Gait Assessment/Intervention no R footdrop/drag today and pt reporting not at home as well.  -SARA      Comment, (Gait/Stairs) see Tinetti and TUG  -SARA                User Key  (r) = Recorded By, (t) = Taken By, (c) = Cosigned By      Initials Name Provider Type    Calli Davis, PT Physical Therapist                            Therapy Education  Education Details: Discussed progress in PT thus far. Reviewed sit to stand.  Given: Fall prevention and home safety, Mobility training  How Provided: Verbal, Demonstration  Provided to: Patient  Level of Understanding: Teach back education performed, Verbalized, Demonstrated     PT OP Goals       Row Name 06/30/25 5986          PT Short Term Goals    STG Date to Achieve 07/29/25  -SARA     STG 1  Pt will be independent with initial HEP to improve strength and balance.  -     STG 1 Progress Met  -     STG 2 Patient will transfer to stand with 1 attempt demonstrating increased weight shift over LE's independently 50% of time.  -     STG 2 Progress Met  -     STG 3 Patient will ambulate with rollator with increased R heel strike.  -     STG 3 Progress Met  -     STG 4 Pt will perform static standing for 2 minutes with 1 hand on table for improved standing balance with activities.  -     STG 4 Progress Ongoing;Progressing  -        Long Term Goals    LTG Date to Achieve 07/29/25  -     LTG 1 Patient will be independent with advanced HEP to improve strength and balance.  -     LTG 1 Progress Ongoing  -     LTG 2 Patient will have increase strength R hip flexion and knee extension by 1/2 muscle grade.  -     LTG 2 Progress Ongoing;Progressing  -     LTG 3 Patient to score 20/28 on the Tinetti balance test for improved balance and reduced risk for falls.  -SARA     LTG 3 Progress Met;Goal Revised  -     LTG 3 Progress Comments 18/28 on retest today  -     LTG 4 Patient will ambulate in the community 150' feet with least restrictive device SBA.  -     LTG 4 Progress Ongoing  -     LTG 5 Pt to complete TUG in < or equal to 18  seconds for improved balance with gait related task.  -     LTG 5 Progress Met;Goal Revised  -     LTG 5 Progress Comments completed in 20 seconds today  -     LTG 6 Pt will be independent with supine to sit and vice versa.  -     LTG 6 Progress Ongoing  -        Time Calculation    PT Goal Re-Cert Due Date 07/29/25  -               User Key  (r) = Recorded By, (t) = Taken By, (c) = Cosigned By      Initials Name Provider Type    Calli Davis, PT Physical Therapist                     PT Assessment/Plan       Row Name 06/30/25 1620          PT Assessment    Functional Limitations Decreased safety during functional activities;Impaired  gait;Limitation in home management;Performance in self-care ADL;Performance in leisure activities;Limitations in functional capacity and performance;Limitations in community activities  -     Impairments Balance;Gait;Endurance;Muscle strength  -     Assessment Comments Pt has been seen for ~ 4 weeks of PT that included strengthening, transfers and ambulation. Pt has improved strength of R LE hip flexion, knee extension and ankle DF. Pt is independent with transfers. She ambulates with rollator with no R footdrag now. She has improved scores on outcome measures (TUG and Tinetti) but still at risk for falls. She would benefit from continued outpt PT to work on strengthening, transfers, ambulation and balance.  -SARA     Rehab Potential Good  -SARA     Patient/caregiver participated in establishment of treatment plan and goals Yes  -SARA     Patient would benefit from skilled therapy intervention Yes  -SARA        PT Plan    PT Frequency 2x/week  -SARA     Predicted Duration of Therapy Intervention (PT) 4 weeks  -SARA               User Key  (r) = Recorded By, (t) = Taken By, (c) = Cosigned By      Initials Name Provider Type    Calli Davis, PT Physical Therapist                        OP Exercises       Row Name 06/30/25 1639 06/30/25 1420          Subjective    Subjective Comments -- Pt reports using rollator prior to stroke.  She feels that her right foot is getting stronger and is no longer dragging while she walks.  -SARA        Subjective Pain    Able to rate subjective pain? -- yes  -SARA     Pre-Treatment Pain Level -- 0  -SARA     Post-Treatment Pain Level -- 0  -SARA        Total Minutes    72873 -  PT Neuromuscular Reeducation Minutes 13  -SARA --     95768 - PT Therapeutic Activity Minutes 27  -SARA --        Exercise 4    Exercise Name 4 -- Standing: B HR at hemibar  -SARA     Cueing 4 -- Verbal  -SARA     Reps 4 -- 15  -SARA        Exercise 5    Exercise Name 5 -- Standing with bilateral upper extremity support: Alternate  "tap ups forward and then lateral to 4 inch step  -SARA     Cueing 5 -- Verbal;Tactile;Demo  -SARA     Reps 5 -- 10 each  -SARA        Exercise 7    Exercise Name 7 -- Standing with hemibar: 1.5 # ankle weights for knee flexion, hip abduction; hip flexion with RTB around thighs  -SARA     Cueing 7 -- Verbal;Tactile;Demo  -SARA     Reps 7 -- 10 each  -SARA        Exercise 10    Exercise Name 10 -- Standing: sidestep with RTB around thighs  -SARA     Cueing 10 -- Verbal;Tactile  -SARA     Reps 10 -- 8' x 2  -SARA               User Key  (r) = Recorded By, (t) = Taken By, (c) = Cosigned By      Initials Name Provider Type    Calli Davis, PT Physical Therapist                                Outcome Measure Options: Tinetti, Timed Up and Go (TUG)  Tinetti Assessment  Sitting Balance: Steady,safe  Arises: Able, uses arms  Attempts to Rise: Able, requires > 1 attempt  Immediate Standing Balance (first 5 sec): Steady, with support  Standing Balance: Narrow stance, without support  Sternal Nudge (feet close together): Stagger, grabs, catches self  Eyes Closed (feet close together): Unsteady  Turning 360 Degrees- Steps: Continuous steps  Turning 360 Degrees- Steadiness: Unsteady (staggers, grabs)  Sitting Down: Uses arms or not a smooth motion  Tinetti Balance Score: 9  Gait Initiation (immediate after told \"go\"): No hesitancy  Step Length- Right Swing: Right swing foot passes Left stance leg  Step Length- Left Swing: Left swing foot passes Right  Foot Clearance- Right Foot: Right foot completely clears floor  Foot Clearance- Left Foot: Left foot completely clears floor  Step Symmetry: Right and Left step length equal  Step Continuity: Steps appear continuous  Path (excursion): Mild/moderate deviation or use of aid  Trunk: Marked sway or uses device  Base of Support: Heels close while walking  Gait Score: 9  Tinetti Total Score: 18  Tinetti Assistive Device: rollator  Timed Up and Go (TUG)  TUG Test 1: 20 seconds (rollator)    Time " Calculation:   Start Time: 1420  Stop Time: 1500  Time Calculation (min): 40 min  Total Timed Code Minutes- PT: 40 minute(s)  Timed Charges  03666 -  PT Neuromuscular Reeducation Minutes: 13  51470 - PT Therapeutic Activity Minutes: 27  Total Minutes  Timed Charges Total Minutes: 40   Total Minutes: 40   Therapy Charges for Today       Code Description Service Date Service Provider Modifiers Qty    32017432622 HC PT NEUROMUSC RE EDUCATION EA 15 MIN 6/30/2025 Calli Mcguire, PT GP 1    22448103515 HC PT THERAPEUTIC ACT EA 15 MIN 6/30/2025 Calli Mcguire, PT GP 2            PT G-Codes  Outcome Measure Options: Tinetti, Timed Up and Go (TUG)  Tinetti Total Score: 18  TUG Test 1: 20 seconds (rollator)         Calli Mcguire PT  6/30/2025

## 2025-06-30 NOTE — THERAPY TREATMENT NOTE
Outpatient Occupational Therapy Neuro Treatment Note  [facility]     Patient Name: Blanca Harden  : 1945  MRN: [id number]  Today's Date: 2025       Visit Date: 2025    Patient Active Problem List   Diagnosis    Osteoporosis    Breast neoplasm, Tis (DCIS), left    Iron deficiency anemia    CKD (chronic kidney disease)    Diabetic nephropathy associated with type 2 diabetes mellitus    Temporal arteritis    Immunosuppression    Anemia    Duodenal adenoma    Gastritis without bleeding    Polyp of duodenum    Morbidly obese    Dyspnea on exertion    Hyperlipidemia    Type 2 diabetes mellitus with stage 3b chronic kidney disease, with long-term current use of insulin    Essential hypertension    Bilateral lower extremity edema    Pulmonary hypertension    Obstructive sleep apnea on CPAP    Stage 3a chronic kidney disease    Iron malabsorption    Respiratory distress    Hypoglycemia due to insulin    Delirium    Hypomagnesemia    Severe malnutrition    Leg swelling    Venous (peripheral) insufficiency    Polyp of colon    Obesity (BMI 30.0-34.9)    TIA (transient ischemic attack)    Difficulty with speech    Multiple rib fractures    Coronary artery disease involving native coronary artery of native heart without angina pectoris        Past Medical History:   Diagnosis Date    Anemia     Anxiety and depression     Asthma     Balance problem     Breast cancer 2019    CKD (chronic kidney disease), stage III     Colon polyp 2019    Colonic mass     COPD (chronic obstructive pulmonary disease)     Coronary artery disease     FOLLOWED BY DR GUNN    Diabetes mellitus, type 2     History of COVID-19     History of left breast cancer     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, grade III, ER/[place] negative    Hypertension     Liver disease     Lower extremity edema     Moderate persistent asthma     On home O2     3L NC PRN    Pulmonary hypertension      Sleep apnea     Swelling     IN LOWER EXTREMITIES    Temporal arteritis     Varicose veins of unspecified lower extremity with ulcer of calf 2022    Venous insufficiency (chronic) (peripheral)     Vitamin D deficiency         Past Surgical History:   Procedure Laterality Date    AMPUTATION  Breast    BRAIN SURGERY      BREAST BIOPSY Left  approx    benign pathology    BREAST BIOPSY Left 2019    Ultasound guided mammotome vacuum assisted left breast biopsy with placement of a metallic clip-Dr. Daniel Gutierrez, [facility]    CARDIAC CATHETERIZATION       SECTION N/A     x3    CHOLECYSTECTOMY N/A     COLON RESECTION Right 2025    Procedure: COLON RESECTION LAPAROSCOPIC RIGHT WITH DAVINCI ROBOT;  Surgeon: Ashwin Alvarado MD;  Location: [facility] MAIN OR;  Service: Robotics - DaVinci;  Laterality: Right;    COLONOSCOPY      COLONOSCOPY N/A 2024    Procedure: COLONOSCOPY into the cecum and TI with hot snare polypectomy;  Surgeon: Ashwin Alvarado MD;  Location: [facility] ENDOSCOPY;  Service: General;  Laterality: N/A;  pre-colon polyps  post-colon mass    COLONOSCOPY W/ POLYPECTOMY N/A 2019    Enlarged folds in the antrum: biopsied; duodenal, transverse colon x2, splenic flexure, descending colon and sigmoid colon polyps: biopsied (path: tubular adenoma x6)-Dr. Zak De Jesus, [facility]    ENDOSCOPY  10/11/2019    Procedure: ESOPHAGOGASTRODUODENOSCOPY with hot snare polypectomy;  Surgeon: Haile Handley MD;  Location: [facility] ENDOSCOPY;  Service: Gastroenterology    ENDOSCOPY N/A 2020    Procedure: ESOPHAGOGASTRODUODENOSCOPY;  Surgeon: Haile Handley MD;  Location: [facility] ENDOSCOPY;  Service: Gastroenterology    ENDOSCOPY N/A 2020    Procedure: ESOPHAGOGASTRODUODENOSCOPY WITH BIOPSIES AND COLD BIOPSY POLYPECTOMY;  Surgeon: Haile Handley MD;  Location: [facility] ENDOSCOPY;  Service: Gastroenterology;  Laterality: N/A;  pre:  dyspepsia and diarrhea  post: duodenal polyp and gastritis    ENDOSCOPY N/A 07/23/2024    Procedure: ESOPHAGOGASTRODUODENOSCOPY WITH hot snare polypectomy with clip placement x 2BIOPSY;  Surgeon: Ashwin Alvarado MD;  Location: [facility] ENDOSCOPY;  Service: General;  Laterality: N/A;  pre-hx duoedenal polyp  post-duodenal polyp; gastritis    EYE SURGERY      MASTECTOMY WITH SENTINEL NODE BIOPSY AND AXILLARY NODE DISSECTION Left 07/03/2019    Procedure: LEFT BREAST MASTECTOMY WITH SENTINEL NODE BIOPSY AND , v-y plasty closure;  Surgeon: Tahmina James MD;  Location: [facility] MAIN OR;  Service: General    SUBTOTAL HYSTERECTOMY Bilateral     ovaries still in tact    TEMPORAL ARTERY BIOPSY Bilateral 12/05/2019         Visit Dx:    ICD-10-CM ICD-9-CM   1. History of stroke  Z86.73 V12.54   2. Decreased  strength of right hand  R29.898 729.89                    OT Assessment/Plan       Row Name 06/30/25 1343          OT Assessment    Assessment Comments Pt tolerated OT session well with improved shoulder range/practice today prior to functional reaching. Completed practice home mgmt task to gather clothes and fold standing walker level. Declined use or need interpretor today. Continue with OT POC. Supine shoulder range improved with cont'd reps/practice with no c/o pain. Plan for cooking task in the future POC.  -BC               User Key  (r) = Recorded By, (t) = Taken By, (c) = Cosigned By      Initials Name Provider Type    Maury Albrecht OT Occupational Therapist                               Therapy Education  Education Details: houshold items, safety with rollator and use of reacher to  items from the floor.  Given: Fall prevention and home safety  Program: Progressed  How Provided: Verbal  Provided to: Patient  Level of Understanding: Teach back education performed, Verbalized       OT Exercises       Row Name 06/30/25 1300             Precautions    Existing Precautions/Restrictions fall  -BC          Subjective Comments    Subjective Comments Pt vocalizing no issues  issues, spoke  -BC         Exercise 1    Exercise Name 1 Supine sh stretching/strengthening program with dowel for increased RUE function and reaching for household items. Sh FF, chest press and horizontal abd/add  -BC      Cueing 1 Verbal  -BC      Equipment 1 Dowel  -BC      Weights/Plates 1 4  -BC      Sets 1 3  -BC      Reps 1 10  -BC         Exercise 2    Exercise Name 2 Pt standing from EOM and cont'd w/ rollator around the clinic to  laundry from various positions, including waist and floor level with and without reacher using R hand to  for increased balance and iADL (I). Pt performed SBA for performance w/ increased time and min cues for safety; prefer use of reacher to  from the floor. Pt distractible somewhat and increased time for completion of tasks  -BC      Cueing 2 Verbal  -BC      Time (Minutes) 2 18  -BC         Exercise 3    Exercise Name 3 seated coordination and  strenghtening to  marbles using tennis ball w/ slit in it and place in bucket w less cues for hand placement to . R hand only  -BC      Cueing 3 Verbal  -BC      Sets 3 1  -BC      Reps 3 25  -BC                User Key  (r) = Recorded By, (t) = Taken By, (c) = Cosigned By      Initials Name Provider Type    BC Maury lOvera OT Occupational Therapist                                Time Calculation:   OT Start Time: 1330  OT Stop Time: 1415  OT Time Calculation (min): 45 min  Total Timed Code Minutes- OT: 45 minute(s)  Timed Charges  90907 -  OT Neuromuscular Reeducation Minutes: 20  55044 - OT Therapeutic Activity Minutes: 25  Total Minutes  Timed Charges Total Minutes: 45   Total Minutes: 45     Therapy Charges for Today       Code Description Service Date Service Provider Modifiers Qty    71335316524  OT NEUROMUSC RE EDUCATION EA 15 MIN 6/30/2025 Maury Olvera OT GO 1    [id number]  OT THERAPEUTIC ACT EA 15 MIN  6/30/2025 Maury Olvera, OT GO 2                      Maury Olvera, ROOPA  6/30/2025

## 2025-06-30 NOTE — THERAPY EVALUATION
Outpatient Speech Language Pathology   Adult Speech Language Cognitive Initial Evaluation  [facility]     Patient Name: Blanca Harden  : 1945  MRN: [id number]  Today's Date: 2025        Visit Date: 2025   Patient Active Problem List   Diagnosis    Osteoporosis    Breast neoplasm, Tis (DCIS), left    Iron deficiency anemia    CKD (chronic kidney disease)    Diabetic nephropathy associated with type 2 diabetes mellitus    Temporal arteritis    Immunosuppression    Anemia    Duodenal adenoma    Gastritis without bleeding    Polyp of duodenum    Morbidly obese    Dyspnea on exertion    Hyperlipidemia    Type 2 diabetes mellitus with stage 3b chronic kidney disease, with long-term current use of insulin    Essential hypertension    Bilateral lower extremity edema    Pulmonary hypertension    Obstructive sleep apnea on CPAP    Stage 3a chronic kidney disease    Iron malabsorption    Respiratory distress    Hypoglycemia due to insulin    Delirium    Hypomagnesemia    Severe malnutrition    Leg swelling    Venous (peripheral) insufficiency    Polyp of colon    Obesity (BMI 30.0-34.9)    TIA (transient ischemic attack)    Difficulty with speech    Multiple rib fractures    Coronary artery disease involving native coronary artery of native heart without angina pectoris        Past Medical History:   Diagnosis Date    Anemia     Anxiety and depression     Asthma     Balance problem     Breast cancer 2019    CKD (chronic kidney disease), stage III     Colon polyp 2019    Colonic mass     COPD (chronic obstructive pulmonary disease)     Coronary artery disease     FOLLOWED BY DR GUNN    Diabetes mellitus, type 2     History of COVID-19     History of left breast cancer     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, grade III, ER/[place] negative    Hypertension     Liver disease     Lower extremity edema     Moderate persistent asthma     On home O2      3L NC PRN    Pulmonary hypertension     Sleep apnea     Swelling     IN LOWER EXTREMITIES    Temporal arteritis     Varicose veins of unspecified lower extremity with ulcer of calf 2022    Venous insufficiency (chronic) (peripheral)     Vitamin D deficiency         Past Surgical History:   Procedure Laterality Date    AMPUTATION  Breast    BRAIN SURGERY      BREAST BIOPSY Left  approx    benign pathology    BREAST BIOPSY Left 2019    Ultasound guided mammotome vacuum assisted left breast biopsy with placement of a metallic clip-Dr. Daniel Gutierrez, [facility]    CARDIAC CATHETERIZATION       SECTION N/A     x3    CHOLECYSTECTOMY N/A     COLON RESECTION Right 2025    Procedure: COLON RESECTION LAPAROSCOPIC RIGHT WITH DAVINCI ROBOT;  Surgeon: Ashwin Alvarado MD;  Location: [facility] MAIN OR;  Service: Robotics - DaVinci;  Laterality: Right;    COLONOSCOPY      COLONOSCOPY N/A 2024    Procedure: COLONOSCOPY into the cecum and TI with hot snare polypectomy;  Surgeon: Ashwin Alvarado MD;  Location: [facility] ENDOSCOPY;  Service: General;  Laterality: N/A;  pre-colon polyps  post-colon mass    COLONOSCOPY W/ POLYPECTOMY N/A 2019    Enlarged folds in the antrum: biopsied; duodenal, transverse colon x2, splenic flexure, descending colon and sigmoid colon polyps: biopsied (path: tubular adenoma x6)-Dr. Zak De Jesus, [facility]    ENDOSCOPY  10/11/2019    Procedure: ESOPHAGOGASTRODUODENOSCOPY with hot snare polypectomy;  Surgeon: Haile Handley MD;  Location: [facility] ENDOSCOPY;  Service: Gastroenterology    ENDOSCOPY N/A 2020    Procedure: ESOPHAGOGASTRODUODENOSCOPY;  Surgeon: Haile Handley MD;  Location: [facility] ENDOSCOPY;  Service: Gastroenterology    ENDOSCOPY N/A 2020    Procedure: ESOPHAGOGASTRODUODENOSCOPY WITH BIOPSIES AND COLD BIOPSY POLYPECTOMY;  Surgeon: Haile Handley MD;  Location: [facility] ENDOSCOPY;  Service:  Gastroenterology;  Laterality: N/A;  pre: dyspepsia and diarrhea  post: duodenal polyp and gastritis    ENDOSCOPY N/A 07/23/2024    Procedure: ESOPHAGOGASTRODUODENOSCOPY WITH hot snare polypectomy with clip placement x 2BIOPSY;  Surgeon: Ashwin Alvarado MD;  Location: [facility] ENDOSCOPY;  Service: General;  Laterality: N/A;  pre-hx duoedenal polyp  post-duodenal polyp; gastritis    EYE SURGERY      MASTECTOMY WITH SENTINEL NODE BIOPSY AND AXILLARY NODE DISSECTION Left 07/03/2019    Procedure: LEFT BREAST MASTECTOMY WITH SENTINEL NODE BIOPSY AND , v-y plasty closure;  Surgeon: Tahmina James MD;  Location: [facility] MAIN OR;  Service: General    SUBTOTAL HYSTERECTOMY Bilateral     ovaries still in tact    TEMPORAL ARTERY BIOPSY Bilateral 12/05/2019         Visit Dx:    ICD-10-CM ICD-9-CM   1. History of stroke  Z86.73 V12.54   2. Difficulty with speech  R47.9 784.59            OP SLP Assessment/Plan - 06/30/25 1455          SLP Assessment    Functional Problems Speech Language- Adult/Cognition  -CR    Clinical Impression: Speech Language-Adult/Congnition Mild:;Cognitive Communication Impairment  -CR    Prognosis Excellent (comment)  -CR    Patient/caregiver participated in establishment of treatment plan and goals Yes  -CR    Patient would benefit from skilled therapy intervention Yes  -CR       SLP Plan    Frequency 1x weekly  -CR    Duration 6 weeks  -CR    Planned CPT's? SLP DEV COG SKILLS INITIAL (15 MIN) : 25031;SLP DEV COG SKILLS ADD (15 MIN) : 92556;SLP INDIVIDUAL SPEECH THERAPY: 16706  -CR    Expected Duration of Therapy Session (SLP Eval) 45  -CR              User Key  (r) = Recorded By, (t) = Taken By, (c) = Cosigned By      Initials Name Provider Type    Shayna Levine, SLP Speech and Language Pathologist                     SLP SLC Evaluation - 06/30/25 0800          Communication Assessment/Intervention    Document Type evaluation  -CR    Subjective Information no complaints  -CR    Patient  Observations alert;cooperative  -CR    Patient Effort excellent  -CR    Comment Pt pleasantly refusing iPad  for therapy. Would like therapy to be completed in English.  -CR    Symptoms Noted During/After Treatment none  -CR       General Information    Patient Profile Reviewed yes  -CR    Pertinent History Of Current Problem 81 y/o female referred for outpatient speech therapy due to history of stroke and fall. L basal ganglia stroke 2/2025 with [facility] rehab stay from 2/21-3/6/25. While at home, patient fell on 3/9/25 resulting in additional [facility] hospitalization followed by second [facility] rehab stay from 3/12-3/26/25. Pt was upgraded to regular/thin diet during rehab stay. Pt current with PT/OT outpatient services.  reported difficulties with expressive communication during OT evaluation therefore speech therapy referral was requested and placed by Neurology.  -CR    Precautions/Limitations, Vision WFL;for purposes of eval  -CR    Precautions/Limitations, Hearing WFL;for purposes of eval  -CR    Prior Level of Function-Communication WFL  -CR    Plans/Goals Discussed with patient;spouse/S.O.;agreed upon  -CR    Barriers to Rehab none identified  -CR       Pain    Pretreatment Pain Rating 0/10 - no pain  -CR    Posttreatment Pain Rating 0/10 - no pain  -CR       Standardized Tests    Cognitive/Memory Tests CLQT: Cognitive Linguistic Quick Test  -CR       CLQT (The Cognitive Linguistic Quick Test)    Attention Domain Score 65  -CR    Attention Severity Rating 2: Moderate  -CR    Memory Domain Score 138  -CR    Memory Severity Rating 3: Mild  -CR    Executive Function Domain Score 9  -CR    Executive Function Severity Rating 2: Moderate  -CR    Language Domain Score 30  -CR    Language Severity Rating 4: WNL  -CR    Visuospatial Domain Score 29  -CR    Visuospatial Severity Rating 2: Moderate  -CR    Clock Drawing Total Score 11  -CR    Clock Drawing Severity Rating WNL  -CR     Composite Severity Rating 2.6  -CR    Composite Severity Rating Range 3.4 - 2.5: Mild  -CR       SLP Evaluation Clinical Impressions    SLP Diagnosis mild;cognitive-linguistic disorder  -CR    SLP Diagnosis Comments Cognitive-linguistic evaluation completed. CLQT administered. Pt scored a 2.6/4.0 indicating mild cognitive-linguistic impairment characterzied by moderately impaired attention, executive function, and visuospatial skills, and mildly impaired memory. Pt scored WNL in the language domain, however, SLP observed halting speech with word retrieval difficulties in unstructured conversation. Given English as a second language and pt pleasantly refusing therapy in [place] with iPad , unable to determine aphasia versus language barrier. Recommend ongoing speech therapy to target functional cognitive-communication skills.  -CR    Rehab Potential/Prognosis good  -CR    SLC Criteria for Skilled Therapy Interventions Met yes  -CR    Functional Impact difficulty completing home management task;difficulty completing vocational tasks  -CR       Recommendations    Therapy Frequency (SLP SLC) 1 day per week  -CR    Predicted Duration Therapy Intervention (Days) 4 weeks  -CR    Anticipated Discharge Disposition (SLP) home with assist  -CR              User Key  (r) = Recorded By, (t) = Taken By, (c) = Cosigned By      Initials Name Provider Type    CR Shayna Odonnell, SLP Speech and Language Pathologist                                    OP SLP Education       Row Name 06/30/25 0324       Education    Barriers to Learning English is secondary language  -CR    Education Provided Described results of evaluation;Patient expressed understanding of evaluation;Patient participated in establishing goals and treatment plan  -CR    Assessed Learning needs;Learning motivation;Learning preferences;Learning readiness  -CR    Learning Motivation Strong  -CR    Learning Method Explanation  -CR    Teaching Response  Verbalized understanding  -CR              User Key  (r) = Recorded By, (t) = Taken By, (c) = Cosigned By      Initials Name Effective Dates    CR Shayna Odonnell, ERIN 12/03/24 -                    SLP OP Goals       Row Name 06/30/25 1400          Goal Type Needed    Goal Type Needed Memory;Verbal Expression;Probelm Solving  -CR        Verbal Expression Goals    Verbal Expression STG's Patient will improve verbal expressive language skills by describing attributes, function, action and/or uses of an object/item;Patient will improve verbal expressive language skills by providing simple/complex target word when given its definition, meaning, attributes, function, or use;Patient will improve verbal expressive language skills by completing divergent naming tasks;Patient will improve verbal expressive language skills by completing convergent naming tasks  -CR     Patient will improve verbal expressive language skills by completing divergent naming tasks 90%:;with cues  -CR     Status: Patient will improve verbal expressive language skills by completing divergent naming tasks New  -CR     Patient will improve verbal expressive language skills by describing attributes, function, action and/or uses of an object/item 90%:;with cues  -CR     Status: Patient will improve verbal expressive language skills by describing attributes, function, action and/or uses of an object/item New  -CR     Patient will improve verbal expressive language skills by completing convergent naming tasks 90%:;with cues  -CR     Status: Patient will improve verbal expressive language skills by completing convergent naming tasks New  -CR     Patient will improve verbal expressive language skills by providing simple/complex target word when given its definition, meaning, attributes, function, or use 90%:;with cues  -CR     Status: Patient will improve verbal expressive language skills by providing simple/complex target word when given its definition,  meaning, attributes, function, or use New  -CR        Memory Goals    Memory STG's Patient’s memory skills will be enhanced as reported by patient by utilizing internal memory strategies to recall up to 3 pieces of information after a 5- minute delay;Patient’s memory skills will be enhanced as reported by patient by using external memory aides  -CR     Patient’s memory skills will be enhanced as reported by patient by utilizing internal memory strategies to recall up to 3 pieces of information after a 5- minute delay 90%:;with cues  -CR     Status: Patient’s memory skills will be enhanced as reported by patient by utilizing internal memory strategies to recall up to 3 pieces of information after a 5- minute delay New  -CR     Patient’s memory skills will be enhanced as reported by patient by using external memory aides 90%:;with cues  -CR     Status: Patient’s memory skills will be enhanced as reported by patient by using external memory aides New  -CR        Problem Solving Goals    Problem Solving STG's Patient will improve ability to analyze problems and determine solutions by completing a visual representation/filling in a chart by following  written directions  -CR     Patient will improve ability to analyze problems and determine solutions by completing a visual representation/filling in a chart by following  written directions 90%:;with cues  -CR     Status: Patient will improve ability to analyze problems and determine solutions by completing a visual representation/filling in a chart by following  written directions New  -CR               User Key  (r) = Recorded By, (t) = Taken By, (c) = Cosigned By      Initials Name Provider Type    Shayna Levine, SLP Speech and Language Pathologist                    SLP NOMS Scores (Last 72 Hours)       SLP NOMS Scores       Row Name 06/30/25 14:58:53             NOMS Scores    NOMS Record Number [id number]  Filed via [facility] NOMS  -CR      Form type Admission  Filed  via [facility]  -CR      Form Submission Date 25  Filed via [facility]  -CR      Cognition (6+) FCM 44%  Filed via [facility] NOMS  -CR                User Key  (r) = Recorded By, (t) = Taken By, (c) = Cosigned By      Initials Name Effective Dates    Shayna Levine SLP 24 -                        Time Calculation:   SLP Start Time: 1245  SLP Stop Time: 1330  SLP Time Calculation (min): 45 min  Total Timed Code Minutes- SLP: 105 minute(s) (2871-0274, 0573-2964)  Timed Charges  96125-Standardized cognitive performance testin  Total Minutes  Timed Charges Total Minutes: 105   Total Minutes: 105    Therapy Charges for Today       Code Description Service Date Service Provider Modifiers Qty    93619201625 HC ST STD COG PERF TEST PER HOUR 2025 Shayna Odonnell, SLP 59, GN 2                     ERIN Sozua  2025

## 2025-07-02 ENCOUNTER — HOSPITAL ENCOUNTER (OUTPATIENT)
Dept: PHYSICAL THERAPY | Facility: HOSPITAL | Age: 80
Setting detail: THERAPIES SERIES
Discharge: HOME | End: 2025-07-02
Payer: MEDICARE

## 2025-07-02 ENCOUNTER — HOSPITAL ENCOUNTER (OUTPATIENT)
Dept: OCCUPATIONAL THERAPY | Facility: HOSPITAL | Age: 80
Setting detail: THERAPIES SERIES
Discharge: HOME | End: 2025-07-02
Payer: MEDICARE

## 2025-07-02 DIAGNOSIS — R29.898 DECREASED GRIP STRENGTH OF RIGHT HAND: ICD-10-CM

## 2025-07-02 DIAGNOSIS — Z74.09 IMPAIRED FUNCTIONAL MOBILITY, BALANCE, GAIT, AND ENDURANCE: ICD-10-CM

## 2025-07-02 DIAGNOSIS — Z86.73 HISTORY OF STROKE: Primary | ICD-10-CM

## 2025-07-02 DIAGNOSIS — R53.1 RIGHT SIDED WEAKNESS: ICD-10-CM

## 2025-07-02 DIAGNOSIS — Z91.81 HISTORY OF FALL: ICD-10-CM

## 2025-07-02 PROCEDURE — 97112 NEUROMUSCULAR REEDUCATION: CPT

## 2025-07-02 PROCEDURE — 97530 THERAPEUTIC ACTIVITIES: CPT

## 2025-07-02 NOTE — THERAPY TREATMENT NOTE
Outpatient Physical Therapy Neuro Treatment Note  [facility]     Patient Name: Blanca Harden  : 1945  MRN: [id number]  Today's Date: 2025      Visit Date: 2025    Visit Dx:    ICD-10-CM ICD-9-CM   1. History of stroke  Z86.73 V12.54   2. Right sided weakness  R53.1 728.87   3. History of fall  Z91.81 V15.88   4. Impaired functional mobility, balance, gait, and endurance  Z74.09 V49.89       Patient Active Problem List   Diagnosis    Osteoporosis    Breast neoplasm, Tis (DCIS), left    Iron deficiency anemia    CKD (chronic kidney disease)    Diabetic nephropathy associated with type 2 diabetes mellitus    Temporal arteritis    Immunosuppression    Anemia    Duodenal adenoma    Gastritis without bleeding    Polyp of duodenum    Morbidly obese    Dyspnea on exertion    Hyperlipidemia    Type 2 diabetes mellitus with stage 3b chronic kidney disease, with long-term current use of insulin    Essential hypertension    Bilateral lower extremity edema    Pulmonary hypertension    Obstructive sleep apnea on CPAP    Stage 3a chronic kidney disease    Iron malabsorption    Respiratory distress    Hypoglycemia due to insulin    Delirium    Hypomagnesemia    Severe malnutrition    Leg swelling    Venous (peripheral) insufficiency    Polyp of colon    Obesity (BMI 30.0-34.9)    TIA (transient ischemic attack)    Difficulty with speech    Multiple rib fractures    Coronary artery disease involving native coronary artery of native heart without angina pectoris            PT Neuro       Row Name 25 1418             Subjective    Subjective Comments Tired after NuStep at beginning of session. Using rollator for several years.  -SARA         Precautions and Contraindications    Precautions/Limitations fall precautions  -SARA         Subjective Pain    Able to rate subjective pain? yes  -SARA      Pre-Treatment Pain Level 0  -SARA      Post-Treatment Pain Level 0  -SARA         Cognition    Overall Cognitive  "Status WFL  -         Posture/Observations    Posture/Observations Comments Patient arrived to PT clinic ambulating with rollator.  drove.  stayed in waiting room.  -SARA         Transfers    Sit-Stand Deane (Transfers) modified independence  -SARA      Stand-Sit [place] (Transfers) modified independence  -SARA      Transfers, Sit-Stand-Sit, Assist Device four wheeled walker  -SARA      Comment, (Transfers) using UE's  -SARA         Gait/Stairs (Locomotion)    Deane Level (Gait) modified independence  -SARA      Assistive Device (Gait) walker, 4-wheeled  -SARA      Distance in Feet (Gait) 180  80'  -SARA      Deviations/Abnormal Patterns (Gait) bilateral deviations;gait speed decreased  -SARA      Bilateral Gait Deviations forward flexed posture  -SARA         Balance Skills Training    Gait Balance-Level of Assistance Close supervision;Contact guard  -SARA      Gait Balance Support Right upper extremity supported;Left upper extremity supported;parallel bars  -      Gait Balance Activities stepping over object  step over 1/2 roll and noodles  -                User Key  (r) = Recorded By, (t) = Taken By, (c) = Cosigned By      Initials Name Provider Type    Calli Davis, PT Physical Therapist                             PT Assessment/Plan       Row Name 07/02/25 1635          PT Assessment    Assessment Comments Pt is improving with gait with rollator as no R footdrag and ambulating longer distances.  Patient reports using rollator for several years to ambulate.  \"I cannot use a cane.\"  However, she continues to demonstrate weakness of R LE, mostly hip flexors and abductors. She requires at least 3 sit down rest breaks due to fatigue.  -               User Key  (r) = Recorded By, (t) = Taken By, (c) = Cosigned By      Initials Name Provider Type    Calli Davis, PT Physical Therapist                        OP Exercises       Row Name 07/02/25 1638 07/02/25 1418          Subjective "    Subjective Comments -- Tired after NuStep at beginning of session. Using rollator for several years.  -SARA        Subjective Pain    Able to rate subjective pain? -- yes  -SARA     Pre-Treatment Pain Level -- 0  -SARA     Post-Treatment Pain Level -- 0  -SARA        Total Minutes    69528 -  PT Neuromuscular Reeducation Minutes 15  -SARA --     95480 - PT Therapeutic Activity Minutes 27  -SARA --        Exercise 5    Exercise Name 5 -- Standing with bilateral upper extremity support: Alternate tap ups forward and then lateral to 4 inch step  -SARA     Cueing 5 -- Verbal;Tactile;Demo  -SARA     Reps 5 -- 10 each  -SARA        Exercise 6    Exercise Name 6 -- Standing with bilateral upper extremity support: Forward step up to 4 inch step  -SARA     Cueing 6 -- Verbal;Tactile;Demo  -SARA     Reps 6 -- 10 each  -SARA        Exercise 7    Exercise Name 7 -- Standing with bar: 1.5 # ankle weights for knee flexion  -SARA     Cueing 7 -- Verbal;Tactile;Demo  -SARA     Reps 7 -- 10 each  -SARA        Exercise 8    Exercise Name 8 -- Seated: LAQ and hip flexion with 1.5# ankle weights  -SARA     Cueing 8 -- Verbal;Demo  -SARA     Reps 8 -- 20 each stuggles with R hip flexion  -SARA     Additional Comments -- seated hip adduction with ball squeeze x 20  -SARA        Exercise 11    Exercise Name 11 -- NuStep all E's at 4  -SARA     Cueing 11 -- Verbal  -SARA     Time 11 -- 6 minutes  -SARA               User Key  (r) = Recorded By, (t) = Taken By, (c) = Cosigned By      Initials Name Provider Type    Calli Davis PT Physical Therapist                                    Therapy Education  Given: Fall prevention and home safety, Mobility training  How Provided: Verbal  Provided to: Patient  Level of Understanding: Verbalized, Demonstrated, Teach back education performed              Time Calculation:   Start Time: 1418  Stop Time: 1500  Time Calculation (min): 42 min  Total Timed Code Minutes- PT: 42 minute(s)  Timed Charges  37547 -  PT Neuromuscular  Reeducation Minutes: 15  21511 - PT Therapeutic Activity Minutes: 27  Total Minutes  Timed Charges Total Minutes: 42   Total Minutes: 42   Therapy Charges for Today       Code Description Service Date Service Provider Modifiers Qty    [id number]  PT NEUROMUSC RE EDUCATION EA 15 MIN 7/2/2025 Calli Mcguire, PT GP 1    [id number]  PT THERAPEUTIC ACT EA 15 MIN 7/2/2025 Calli Mcguire, PT GP 2                      Calli Mcguire, PT  7/2/2025

## 2025-07-02 NOTE — THERAPY TREATMENT NOTE
Outpatient Occupational Therapy Neuro Treatment Note  [facility]     Patient Name: Blanca Harden  : 1945  MRN: [id number]  Today's Date: 2025       Visit Date: 2025    Patient Active Problem List   Diagnosis    Osteoporosis    Breast neoplasm, Tis (DCIS), left    Iron deficiency anemia    CKD (chronic kidney disease)    Diabetic nephropathy associated with type 2 diabetes mellitus    Temporal arteritis    Immunosuppression    Anemia    Duodenal adenoma    Gastritis without bleeding    Polyp of duodenum    Morbidly obese    Dyspnea on exertion    Hyperlipidemia    Type 2 diabetes mellitus with stage 3b chronic kidney disease, with long-term current use of insulin    Essential hypertension    Bilateral lower extremity edema    Pulmonary hypertension    Obstructive sleep apnea on CPAP    Stage 3a chronic kidney disease    Iron malabsorption    Respiratory distress    Hypoglycemia due to insulin    Delirium    Hypomagnesemia    Severe malnutrition    Leg swelling    Venous (peripheral) insufficiency    Polyp of colon    Obesity (BMI 30.0-34.9)    TIA (transient ischemic attack)    Difficulty with speech    Multiple rib fractures    Coronary artery disease involving native coronary artery of native heart without angina pectoris        Past Medical History:   Diagnosis Date    Anemia     Anxiety and depression     Asthma     Balance problem     Breast cancer 2019    CKD (chronic kidney disease), stage III     Colon polyp 2019    Colonic mass     COPD (chronic obstructive pulmonary disease)     Coronary artery disease     FOLLOWED BY DR GUNN    Diabetes mellitus, type 2     History of COVID-19     History of left breast cancer     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, grade III, 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    Hypertension     Liver disease     Lower extremity edema     Moderate persistent asthma     On home O2     3L NC PRN    Pulmonary hypertension      Sleep apnea     Swelling     IN LOWER EXTREMITIES    Temporal arteritis     Varicose veins of unspecified lower extremity with ulcer of calf 2022    Venous insufficiency (chronic) (peripheral)     Vitamin D deficiency         Past Surgical History:   Procedure Laterality Date    AMPUTATION  Breast    BRAIN SURGERY      BREAST BIOPSY Left  approx    benign pathology    BREAST BIOPSY Left 2019    Ultasound guided mammotome vacuum assisted left breast biopsy with placement of a metallic clip-Dr. Daniel Gutierrez, [facility]    CARDIAC CATHETERIZATION       SECTION N/A     x3    CHOLECYSTECTOMY N/A     COLON RESECTION Right 2025    Procedure: COLON RESECTION LAPAROSCOPIC RIGHT WITH DAVINCI ROBOT;  Surgeon: Ashwin Alvarado MD;  Location: [facility] MAIN OR;  Service: Robotics - DaVinci;  Laterality: Right;    COLONOSCOPY      COLONOSCOPY N/A 2024    Procedure: COLONOSCOPY into the cecum and TI with hot snare polypectomy;  Surgeon: Ashwin Alvarado MD;  Location: [facility] ENDOSCOPY;  Service: General;  Laterality: N/A;  pre-colon polyps  post-colon mass    COLONOSCOPY W/ POLYPECTOMY N/A 2019    Enlarged folds in the antrum: biopsied; duodenal, transverse colon x2, splenic flexure, descending colon and sigmoid colon polyps: biopsied (path: tubular adenoma x6)-Dr. Zak De Jesus, [facility]    ENDOSCOPY  10/11/2019    Procedure: ESOPHAGOGASTRODUODENOSCOPY with hot snare polypectomy;  Surgeon: Haile Handley MD;  Location: [facility] ENDOSCOPY;  Service: Gastroenterology    ENDOSCOPY N/A 2020    Procedure: ESOPHAGOGASTRODUODENOSCOPY;  Surgeon: Haile Handley MD;  Location: [facility] ENDOSCOPY;  Service: Gastroenterology    ENDOSCOPY N/A 2020    Procedure: ESOPHAGOGASTRODUODENOSCOPY WITH BIOPSIES AND COLD BIOPSY POLYPECTOMY;  Surgeon: Haile Handley MD;  Location: [facility] ENDOSCOPY;  Service: Gastroenterology;  Laterality: N/A;  pre:  dyspepsia and diarrhea  post: duodenal polyp and gastritis    ENDOSCOPY N/A 07/23/2024    Procedure: ESOPHAGOGASTRODUODENOSCOPY WITH hot snare polypectomy with clip placement x 2BIOPSY;  Surgeon: Ashwin Alvarado MD;  Location: [facility] ENDOSCOPY;  Service: General;  Laterality: N/A;  pre-hx duoedenal polyp  post-duodenal polyp; gastritis    EYE SURGERY      MASTECTOMY WITH SENTINEL NODE BIOPSY AND AXILLARY NODE DISSECTION Left 07/03/2019    Procedure: LEFT BREAST MASTECTOMY WITH SENTINEL NODE BIOPSY AND , v-y plasty closure;  Surgeon: Tahmina James MD;  Location: [facility] MAIN OR;  Service: General    SUBTOTAL HYSTERECTOMY Bilateral     ovaries still in tact    TEMPORAL ARTERY BIOPSY Bilateral 12/05/2019         Visit Dx:    ICD-10-CM ICD-9-CM   1. History of stroke  Z86.73 V12.54   2. Decreased  strength of right hand  R29.898 729.89                    OT Assessment/Plan       Row Name 07/02/25 1416          OT Assessment    Assessment Comments OT treatment today focused on R shoulder strength and ROM for functional reaching followed by engagement in R hand fine motor and reaching tasks as well as address balance for household ADL engagement with rollator.  -SM               User Key  (r) = Recorded By, (t) = Taken By, (c) = Cosigned By      Initials Name Provider Type    Lexy Craven OTR/L, CSRS Occupational Therapist                               Therapy Education  Education Details: watching foot placement with rollator when working on household ADL laundy task, at times stepping outside of AD and loosing balance.  Given: Fall prevention and home safety  Program: New  How Provided: Verbal  Provided to: Patient  Level of Understanding: Verbalized, Demonstrated       OT Exercises       Row Name 07/02/25 1300             Precautions    Existing Precautions/Restrictions fall  -SM         Subjective Comments    Subjective Comments Pt denies needing  today.  -SM          Subjective Pain    Able to rate subjective pain? yes  -SM      Pre-Treatment Pain Level 0  -SM      Post-Treatment Pain Level 0  -SM         Exercise 1    Exercise Name 1 Supine sh stretching/strengthening program with dowel for increased RUE function and reaching for household items. Sh FF, chest press and horizontal abd/add  -SM      Cueing 1 Verbal  -SM      Equipment 1 Dowel  -SM      Weights/Plates 1 4  -SM      Sets 1 3  -SM      Reps 1 10  -SM         Exercise 2    Exercise Name 2 R hand gross grasp with jerman  with tennis ball with slit. High reps completed.  -SM      Cueing 2 Verbal  -SM         Exercise 3    Exercise Name 3 Seated R hand FMC activity with lacing cards. Extra time to complete, cues to attempt using R hand.  -SM      Cueing 3 Verbal  -SM         Exercise 4    Exercise Name 4 Standing balance working on RUE coordination reaching overhead to hand clothespins on clothing line while standing unsupported with rollator close by as needed.  -SM      Cueing 4 Verbal  -SM         Exercise 5    Exercise Name 5 Seated FMC activity with grooved pegboard with difficulty with in hand rotation.  -SM      Cueing 5 Verbal  -SM                User Key  (r) = Recorded By, (t) = Taken By, (c) = Cosigned By      Initials Name Provider Type     Lexy Denson OTR/L, CSRS Occupational Therapist                                Time Calculation:   OT Start Time: 1330  OT Stop Time: 1415  OT Time Calculation (min): 45 min  Timed Charges  60827 -  OT Neuromuscular Reeducation Minutes: 45  Total Minutes  Timed Charges Total Minutes: 45   Total Minutes: 45     Therapy Charges for Today       Code Description Service Date Service Provider Modifiers Qty    [id number]  OT NEUROMUSC RE EDUCATION EA 15 MIN 7/2/2025 Lexy Denson OTR/L, CSRS GO 3                      RUBEN Nelson/L, CSRS  7/2/2025

## 2025-07-07 ENCOUNTER — HOSPITAL ENCOUNTER (OUTPATIENT)
Dept: OCCUPATIONAL THERAPY | Facility: HOSPITAL | Age: 80
Setting detail: THERAPIES SERIES
Discharge: HOME | End: 2025-07-07
Payer: MEDICARE

## 2025-07-07 ENCOUNTER — HOSPITAL ENCOUNTER (OUTPATIENT)
Dept: SPEECH THERAPY | Facility: HOSPITAL | Age: 80
Setting detail: THERAPIES SERIES
Discharge: HOME | End: 2025-07-07
Payer: MEDICARE

## 2025-07-07 DIAGNOSIS — Z86.73 HISTORY OF STROKE: Primary | ICD-10-CM

## 2025-07-07 DIAGNOSIS — R47.9 DIFFICULTY WITH SPEECH: ICD-10-CM

## 2025-07-07 DIAGNOSIS — R29.898 DECREASED GRIP STRENGTH OF RIGHT HAND: ICD-10-CM

## 2025-07-07 PROCEDURE — 97112 NEUROMUSCULAR REEDUCATION: CPT

## 2025-07-07 PROCEDURE — 92507 TX SP LANG VOICE COMM INDIV: CPT

## 2025-07-07 PROCEDURE — 97530 THERAPEUTIC ACTIVITIES: CPT

## 2025-07-07 PROCEDURE — 97110 THERAPEUTIC EXERCISES: CPT

## 2025-07-07 NOTE — THERAPY TREATMENT NOTE
Outpatient Speech Language Pathology   Adult Speech Language Cognitive Treatment Note  [facility]     Patient Name: Blanca Harden  : 1945  MRN: [id number]  Today's Date: 2025         Visit Date: 2025   Patient Active Problem List   Diagnosis    Osteoporosis    Breast neoplasm, Tis (DCIS), left    Iron deficiency anemia    CKD (chronic kidney disease)    Diabetic nephropathy associated with type 2 diabetes mellitus    Temporal arteritis    Immunosuppression    Anemia    Duodenal adenoma    Gastritis without bleeding    Polyp of duodenum    Morbidly obese    Dyspnea on exertion    Hyperlipidemia    Type 2 diabetes mellitus with stage 3b chronic kidney disease, with long-term current use of insulin    Essential hypertension    Bilateral lower extremity edema    Pulmonary hypertension    Obstructive sleep apnea on CPAP    Stage 3a chronic kidney disease    Iron malabsorption    Respiratory distress    Hypoglycemia due to insulin    Delirium    Hypomagnesemia    Severe malnutrition    Leg swelling    Venous (peripheral) insufficiency    Polyp of colon    Obesity (BMI 30.0-34.9)    TIA (transient ischemic attack)    Difficulty with speech    Multiple rib fractures    Coronary artery disease involving native coronary artery of native heart without angina pectoris          Visit Dx:    ICD-10-CM ICD-9-CM   1. History of stroke  Z86.73 V12.54   2. Difficulty with speech  R47.9 784.59                              SLP OP Goals       Row Name 25 1500          Subjective Comments    Subjective Comments Patient is pleasant and cooperative. Pt pleasantly refusing iPad  again this date. Noted to code switch between English/Maltese languages frequently throughout session. Difficult to determine aphasia versus cognitive deficits. This SLP planning for bilingual SLP to tx next session.  -CR        Subjective Pain    Able to rate subjective pain? yes  -CR     Pre-Treatment Pain Level 0  -CR      Post-Treatment Pain Level 0  -CR        Verbal Expression Goals    Patient will improve verbal expressive language skills by completing divergent naming tasks 90%:;with cues  -CR     Status: Patient will improve verbal expressive language skills by completing divergent naming tasks Progressing as expected  -CR     Comments: Patient will improve verbal expressive language skills by completing divergent naming tasks Divergent naming task completed. 5 items named in concrete category without cues in one minute. 2 items named in abstract category without cues in one minute. Increased to 10 items given extended time and MIN-MOD verbal cues for thought organization and mental flexibility.  -CR     Patient will improve verbal expressive language skills by completing convergent naming tasks 90%:;with cues  -CR     Status: Patient will improve verbal expressive language skills by completing convergent naming tasks Progressing as expected  -CR     Comments: Patient will improve verbal expressive language skills by completing convergent naming tasks [place] convergent naming task completed with 80% accuracy without cues, increased to 100% accuracy given phonemic cues.  -CR        Memory Goals    Patient’s memory skills will be enhanced as reported by patient by using external memory aides 90%:;with cues  -CR     Status: Patient’s memory skills will be enhanced as reported by patient by using external memory aides Progressing as expected  -CR     Comments: Patient’s memory skills will be enhanced as reported by patient by using external memory aides Reviewed external memory aides this date. Pt reports using calendar prior to stroke, would like to re-attempt using calendar at home for recall of appointments, important dates, etc.  -CR               User Key  (r) = Recorded By, (t) = Taken By, (c) = Cosigned By      Initials Name Provider Type    Shayna Levine, SLP Speech and Language Pathologist                            Time Calculation:   SLP Start Time: 1502  SLP Stop Time: 1545  SLP Time Calculation (min): 43 min  Untimed Charges  90067-[facility] Treatment/ST Modification Prosth Aug Alter : 43  Total Minutes  Untimed Charges Total Minutes: 43   Total Minutes: 43    Therapy Charges for Today       Code Description Service Date Service Provider Modifiers Qty    [id number] HC ST TREATMENT SPEECH 3 7/7/2025 Shayna Odonnell, SLP 59, GN 1                     ERIN Souza  7/7/2025

## 2025-07-07 NOTE — THERAPY TREATMENT NOTE
Outpatient Occupational Therapy Neuro Treatment Note  [facility]     Patient Name: Blanca Harden  : 1945  MRN: [id number]  Today's Date: 2025       Visit Date: 2025    Patient Active Problem List   Diagnosis    Osteoporosis    Breast neoplasm, Tis (DCIS), left    Iron deficiency anemia    CKD (chronic kidney disease)    Diabetic nephropathy associated with type 2 diabetes mellitus    Temporal arteritis    Immunosuppression    Anemia    Duodenal adenoma    Gastritis without bleeding    Polyp of duodenum    Morbidly obese    Dyspnea on exertion    Hyperlipidemia    Type 2 diabetes mellitus with stage 3b chronic kidney disease, with long-term current use of insulin    Essential hypertension    Bilateral lower extremity edema    Pulmonary hypertension    Obstructive sleep apnea on CPAP    Stage 3a chronic kidney disease    Iron malabsorption    Respiratory distress    Hypoglycemia due to insulin    Delirium    Hypomagnesemia    Severe malnutrition    Leg swelling    Venous (peripheral) insufficiency    Polyp of colon    Obesity (BMI 30.0-34.9)    TIA (transient ischemic attack)    Difficulty with speech    Multiple rib fractures    Coronary artery disease involving native coronary artery of native heart without angina pectoris        Past Medical History:   Diagnosis Date    Anemia     Anxiety and depression     Asthma     Balance problem     Breast cancer 2019    CKD (chronic kidney disease), stage III     Colon polyp 2019    Colonic mass     COPD (chronic obstructive pulmonary disease)     Coronary artery disease     FOLLOWED BY DR GUNN    Diabetes mellitus, type 2     History of COVID-19     History of left breast cancer     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, grade III, ER/AZ negative    Hypertension     Liver disease     Lower extremity edema     Moderate persistent asthma     On home O2     3L NC PRN    Pulmonary hypertension      Sleep apnea     Swelling     IN LOWER EXTREMITIES    Temporal arteritis     Varicose veins of unspecified lower extremity with ulcer of calf 2022    Venous insufficiency (chronic) (peripheral)     Vitamin D deficiency         Past Surgical History:   Procedure Laterality Date    AMPUTATION  Breast    BRAIN SURGERY      BREAST BIOPSY Left  approx    benign pathology    BREAST BIOPSY Left 2019    Ultasound guided mammotome vacuum assisted left breast biopsy with placement of a metallic clip-Dr. Daniel Gutierrez, [facility]    CARDIAC CATHETERIZATION       SECTION N/A     x3    CHOLECYSTECTOMY N/A     COLON RESECTION Right 2025    Procedure: COLON RESECTION LAPAROSCOPIC RIGHT WITH DAVINCI ROBOT;  Surgeon: Ashwin Alvarado MD;  Location: [facility] MAIN OR;  Service: Robotics - DaVinci;  Laterality: Right;    COLONOSCOPY      COLONOSCOPY N/A 2024    Procedure: COLONOSCOPY into the cecum and TI with hot snare polypectomy;  Surgeon: Ashwin Alvarado MD;  Location: [facility] ENDOSCOPY;  Service: General;  Laterality: N/A;  pre-colon polyps  post-colon mass    COLONOSCOPY W/ POLYPECTOMY N/A 2019    Enlarged folds in the antrum: biopsied; duodenal, transverse colon x2, splenic flexure, descending colon and sigmoid colon polyps: biopsied (path: tubular adenoma x6)-Dr. Zak De Jesus, [facility]    ENDOSCOPY  10/11/2019    Procedure: ESOPHAGOGASTRODUODENOSCOPY with hot snare polypectomy;  Surgeon: Haile Handley MD;  Location: [facility] ENDOSCOPY;  Service: Gastroenterology    ENDOSCOPY N/A 2020    Procedure: ESOPHAGOGASTRODUODENOSCOPY;  Surgeon: Haile Handley MD;  Location: [facility] ENDOSCOPY;  Service: Gastroenterology    ENDOSCOPY N/A 2020    Procedure: ESOPHAGOGASTRODUODENOSCOPY WITH BIOPSIES AND COLD BIOPSY POLYPECTOMY;  Surgeon: Haile Handley MD;  Location: [facility] ENDOSCOPY;  Service: Gastroenterology;  Laterality: N/A;  pre:  dyspepsia and diarrhea  post: duodenal polyp and gastritis    ENDOSCOPY N/A 07/23/2024    Procedure: ESOPHAGOGASTRODUODENOSCOPY WITH hot snare polypectomy with clip placement x 2BIOPSY;  Surgeon: Ashwin Alvarado MD;  Location: [facility] ENDOSCOPY;  Service: General;  Laterality: N/A;  pre-hx duoedenal polyp  post-duodenal polyp; gastritis    EYE SURGERY      MASTECTOMY WITH SENTINEL NODE BIOPSY AND AXILLARY NODE DISSECTION Left 07/03/2019    Procedure: LEFT BREAST MASTECTOMY WITH SENTINEL NODE BIOPSY AND , v-y plasty closure;  Surgeon: Tahmina James MD;  Location: [facility] MAIN OR;  Service: General    SUBTOTAL HYSTERECTOMY Bilateral     ovaries still in tact    TEMPORAL ARTERY BIOPSY Bilateral 12/05/2019         Visit Dx:    ICD-10-CM ICD-9-CM   1. History of stroke  Z86.73 V12.54   2. Decreased  strength of right hand  R29.898 729.89                    OT Assessment/Plan       Row Name 07/07/25 1424          OT Assessment    Assessment Comments Pt tolerated OT session well with plans to bring food items for cooking task next session to increase her independence/safety in the home for IADLS. pt completed supine sh strengthening/stretching w/ no increased pain. Pt requested to bring food from her home to work on cooking in therapy when inquired what meals to make.  -BC               User Key  (r) = Recorded By, (t) = Taken By, (c) = Cosigned By      Initials Name Provider Type    Maury Albrecht, OT Occupational Therapist                               Therapy Education  Education Details: sh stretching, AROM, coordination and goal for cooking task next session.  Given: HEP  Program: New, Reinforced  How Provided: Verbal  Provided to: Patient  Level of Understanding: Verbalized, Demonstrated, Teach back education performed       OT Exercises       Row Name 07/07/25 1400             Precautions    Existing Precautions/Restrictions fall  -BC         Subjective Comments    Subjective Comments I know what I  "want to say, but cant get them out\" Pt agreeable  -BC         Subjective Pain    Able to rate subjective pain? yes  -BC      Pre-Treatment Pain Level 0  -BC      Post-Treatment Pain Level 0  -BC         Exercise 1    Exercise Name 1 Supine sh stretching/strengthening program with dowel for increased RUE function and reaching for household items. Sh FF, chest press and horizontal abd/add  -BC      Cueing 1 Verbal  -BC      Equipment 1 Dowel  -BC      Weights/Plates 1 4  -BC      Sets 1 3  -BC      Reps 1 10  -BC         Exercise 2    Exercise Name 2 seated R hand coordination task to  marbles with large wooden chopsticks and place on board for increased coordination and sequence for fxnl use. 8 color pattern w difficulty with sequence, moderate errors initially but improved with practice/time and cues. Pt still having a difficult time sequencing correct color to pattern needing increased time for task completion  -BC      Cueing 2 Verbal  -BC      Time (Minutes) 2 20  -BC         Exercise 3    Exercise Name 3 lacing cards using RUE as dominant to improve coordination and in hand manipulation with min to mod difficulty w/ sequence/instructions initially, improved w/ time/practice and fair performance. Unable to finish task in alloted time  -BC      Cueing 3 Tactile;Demo;Verbal  -BC      Time (Minutes) 3 12  -BC                User Key  (r) = Recorded By, (t) = Taken By, (c) = Cosigned By      Initials Name Provider Type    Maury Albrecht OT Occupational Therapist                                Time Calculation:   OT Start Time: 1415  OT Stop Time: 1500  OT Time Calculation (min): 45 min  Total Timed Code Minutes- OT: 45 minute(s)  Timed Charges  11424 - OT Therapeutic Exercise Minutes: 15  12332 -  OT Neuromuscular Reeducation Minutes: 15  86633 - OT Therapeutic Activity Minutes: 15  Total Minutes  Timed Charges Total Minutes: 45   Total Minutes: 45     Therapy Charges for Today       Code Description " Service Date Service Provider Modifiers Qty    [id number]  OT NEUROMUSC RE EDUCATION EA 15 MIN 7/7/2025 Maury Olvera OT GO 1    [id number]  OT THER PROC EA 15 MIN 7/7/2025 Maury Olvera OT GO 1    [id number]  OT THERAPEUTIC ACT EA 15 MIN 7/7/2025 Maury Olvera OT GO 1                      Maury Olvera OT  7/7/2025

## 2025-07-09 ENCOUNTER — HOSPITAL ENCOUNTER (OUTPATIENT)
Dept: PHYSICAL THERAPY | Facility: HOSPITAL | Age: 80
Setting detail: THERAPIES SERIES
Discharge: HOME | End: 2025-07-09
Payer: MEDICARE

## 2025-07-09 ENCOUNTER — HOSPITAL ENCOUNTER (OUTPATIENT)
Dept: OCCUPATIONAL THERAPY | Facility: HOSPITAL | Age: 80
Setting detail: THERAPIES SERIES
Discharge: HOME | End: 2025-07-09
Payer: MEDICARE

## 2025-07-09 DIAGNOSIS — Z86.73 HISTORY OF STROKE: Primary | ICD-10-CM

## 2025-07-09 DIAGNOSIS — R29.898 DECREASED GRIP STRENGTH OF RIGHT HAND: ICD-10-CM

## 2025-07-09 PROCEDURE — 97535 SELF CARE MNGMENT TRAINING: CPT

## 2025-07-09 PROCEDURE — 97110 THERAPEUTIC EXERCISES: CPT

## 2025-07-09 PROCEDURE — 97530 THERAPEUTIC ACTIVITIES: CPT

## 2025-07-09 NOTE — THERAPY TREATMENT NOTE
Outpatient Occupational Therapy Neuro Treatment Note  [facility]     Patient Name: Blanca Harden  : 1945  MRN: [id number]  Today's Date: 2025       Visit Date: 2025    Patient Active Problem List   Diagnosis    Osteoporosis    Breast neoplasm, Tis (DCIS), left    Iron deficiency anemia    CKD (chronic kidney disease)    Diabetic nephropathy associated with type 2 diabetes mellitus    Temporal arteritis    Immunosuppression    Anemia    Duodenal adenoma    Gastritis without bleeding    Polyp of duodenum    Morbidly obese    Dyspnea on exertion    Hyperlipidemia    Type 2 diabetes mellitus with stage 3b chronic kidney disease, with long-term current use of insulin    Essential hypertension    Bilateral lower extremity edema    Pulmonary hypertension    Obstructive sleep apnea on CPAP    Stage 3a chronic kidney disease    Iron malabsorption    Respiratory distress    Hypoglycemia due to insulin    Delirium    Hypomagnesemia    Severe malnutrition    Leg swelling    Venous (peripheral) insufficiency    Polyp of colon    Obesity (BMI 30.0-34.9)    TIA (transient ischemic attack)    Difficulty with speech    Multiple rib fractures    Coronary artery disease involving native coronary artery of native heart without angina pectoris        Past Medical History:   Diagnosis Date    Anemia     Anxiety and depression     Asthma     Balance problem     Breast cancer 2019    CKD (chronic kidney disease), stage III     Colon polyp 2019    Colonic mass     COPD (chronic obstructive pulmonary disease)     Coronary artery disease     FOLLOWED BY DR GUNN    Diabetes mellitus, type 2     History of COVID-19     History of left breast cancer     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, grade III, 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    Hypertension     Liver disease     Lower extremity edema     Moderate persistent asthma     On home O2     3L NC PRN    Pulmonary hypertension      Sleep apnea     Swelling     IN LOWER EXTREMITIES    Temporal arteritis     Varicose veins of unspecified lower extremity with ulcer of calf 2022    Venous insufficiency (chronic) (peripheral)     Vitamin D deficiency         Past Surgical History:   Procedure Laterality Date    AMPUTATION  Breast    BRAIN SURGERY      BREAST BIOPSY Left  approx    benign pathology    BREAST BIOPSY Left 2019    Ultasound guided mammotome vacuum assisted left breast biopsy with placement of a metallic clip-Dr. Daniel Gutierrez, [facility]    CARDIAC CATHETERIZATION       SECTION N/A     x3    CHOLECYSTECTOMY N/A     COLON RESECTION Right 2025    Procedure: COLON RESECTION LAPAROSCOPIC RIGHT WITH DAVINCI ROBOT;  Surgeon: Ashwin Alvarado MD;  [place] [facility] [place];  Service: Robotics - DaVinci;  Laterality: Right;    COLONOSCOPY      COLONOSCOPY N/A 2024    Procedure: COLONOSCOPY into the cecum and TI with hot snare polypectomy;  Surgeon: Ashwin Alvarado MD;  [place] [facility] [place];  Service: General;  Laterality: N/A;  pre-colon polyps  post-colon mass    COLONOSCOPY W/ POLYPECTOMY N/A 2019    Enlarged folds in the antrum: biopsied; duodenal, transverse colon x2, splenic flexure, descending colon and sigmoid colon polyps: biopsied (path: tubular adenoma x6)-Dr. Zak De Jesus, [facility]    ENDOSCOPY  10/11/2019    Procedure: ESOPHAGOGASTRODUODENOSCOPY with hot snare polypectomy;  Surgeon: Haile Handley MD;  [place] [facility];  Service: Gastroenterology    ENDOSCOPY N/A 2020    Procedure: ESOPHAGOGASTRODUODENOSCOPY;  Surgeon: Haile Handley MD;  Location: [facility];  Service: Gastroenterology    ENDOSCOPY N/A 2020    Procedure: ESOPHAGOGASTRODUODENOSCOPY WITH BIOPSIES AND COLD BIOPSY POLYPECTOMY;  Surgeon: Haile Handley MD;  Location: [facility];  Service: Gastroenterology;  Laterality: N/A;  pre:  dyspepsia and diarrhea  post: duodenal polyp and gastritis    ENDOSCOPY N/A 07/23/2024    Procedure: ESOPHAGOGASTRODUODENOSCOPY WITH hot snare polypectomy with clip placement x 2BIOPSY;  Surgeon: Ashwin Alvarado MD;  Location: [facility] ENDOSCOPY;  Service: General;  Laterality: N/A;  pre-hx duoedenal polyp  post-duodenal polyp; gastritis    EYE SURGERY      MASTECTOMY WITH SENTINEL NODE BIOPSY AND AXILLARY NODE DISSECTION Left 07/03/2019    Procedure: LEFT BREAST MASTECTOMY WITH SENTINEL NODE BIOPSY AND , v-y plasty closure;  Surgeon: Tahmina James MD;  Location: [facility] MAIN OR;  Service: General    SUBTOTAL HYSTERECTOMY Bilateral     ovaries still in tact    TEMPORAL ARTERY BIOPSY Bilateral 12/05/2019         Visit Dx:    ICD-10-CM ICD-9-CM   1. History of stroke  Z86.73 V12.54   2. Decreased  strength of right hand  R29.898 729.89                    OT Assessment/Plan       Row Name 07/09/25 1607          OT Assessment    Assessment Comments Pt performed iADL cooking task today for increased home safety and independence. Difficulty determining language vs cognitive barrier as Pt required assistance for mixing the ingredients and using the correct measuring cups. Good endurance to stand the entire task. Assist needed for clean up as well. Continue with POC. May benefit from spouse being present to assist w/ interpretation of instructions if cooking is done again in the future. Pt also needed reminders to flip pancakes to prevent burning.  -BC               User Key  (r) = Recorded By, (t) = Taken By, (c) = Cosigned By      Initials Name Provider Type    Maury Albrecht OT Occupational Therapist                               Therapy Education  Education Details: safety w/ cooking atsk, visual, tactile cues for techniques.  Given: Fall prevention and home safety  Program: Reinforced, New  How Provided: Verbal, Demonstration  Provided to: Patient  Level of Understanding: Verbalized, Demonstrated,  Teach back education performed  77318 - OT Self Care/Mgmt Minutes: 45       OT Exercises       Row Name 07/09/25 1500             Precautions    Existing Precautions/Restrictions fall  -BC         Subjective Comments    Subjective Comments Pt with no complaints  -BC         Subjective Pain    Able to rate subjective pain? yes  -BC      Pre-Treatment Pain Level 0  -BC      Post-Treatment Pain Level 0  -BC         Exercise 1    Exercise Name 1 cooking task completed today for increased home iADL (I). english to  for written instructions for how to divide and complete recipe for making pancakes. Difficulty determining language barrier vs cognition as difficulty with Pt complrehending and carrying out correct measurements requiring assist from OT. Assist w/ turning griddle on. Pt stood the entirety of session but leaning forward into the counter top for stability throughout. Increased time to mix the batter up with assist needed. Pt needed min A, cues overall and handing Pt proper items. Also help w/ flipping pancakes w/ difficulty using RUE to perform task but able to complete ~25%. Assist w/ c/u for time mgmt but Pt was able to clean 3/6 items in alloted time SBA.  -BC      Cueing 1 Verbal  -BC      Time (Minutes) 1 45  -BC                User Key  (r) = Recorded By, (t) = Taken By, (c) = Cosigned By      Initials Name Provider Type    BC Maury Olvera OT Occupational Therapist                                Time Calculation:   OT Start Time: 1545  OT Stop Time: 1630  OT Time Calculation (min): 45 min  Total Timed Code Minutes- OT: 45 minute(s)  Timed Charges  48823 - OT Self Care/Mgmt Minutes: 45  Total Minutes  Timed Charges Total Minutes: 45   Total Minutes: 45     Therapy Charges for Today       Code Description Service Date Service Provider Modifiers Qty    [id number]  OT SELF CARE/MGMT/TRAIN EA 15 MIN 7/9/2025 Maury Olvera OT GO 3                      Maury Olvera OT  7/9/2025

## 2025-07-09 NOTE — THERAPY TREATMENT NOTE
Outpatient Physical Therapy Neuro Treatment Note  [facility]     Patient Name: Blanca Harden  : 1945  MRN: [id number]  Today's Date: 2025      Visit Date: 2025    Visit Dx:  No diagnosis found.    Patient Active Problem List   Diagnosis    Osteoporosis    Breast neoplasm, Tis (DCIS), left    Iron deficiency anemia    CKD (chronic kidney disease)    Diabetic nephropathy associated with type 2 diabetes mellitus    Temporal arteritis    Immunosuppression    Anemia    Duodenal adenoma    Gastritis without bleeding    Polyp of duodenum    Morbidly obese    Dyspnea on exertion    Hyperlipidemia    Type 2 diabetes mellitus with stage 3b chronic kidney disease, with long-term current use of insulin    Essential hypertension    Bilateral lower extremity edema    Pulmonary hypertension    Obstructive sleep apnea on CPAP    Stage 3a chronic kidney disease    Iron malabsorption    Respiratory distress    Hypoglycemia due to insulin    Delirium    Hypomagnesemia    Severe malnutrition    Leg swelling    Venous (peripheral) insufficiency    Polyp of colon    Obesity (BMI 30.0-34.9)    TIA (transient ischemic attack)    Difficulty with speech    Multiple rib fractures    Coronary artery disease involving native coronary artery of native heart without angina pectoris            PT Neuro       Row Name 25 4603             Subjective    Subjective Comments Reported she felt tired today.  -KP         Precautions and Contraindications    Precautions/Limitations fall precautions  -KP         Subjective Pain    Able to rate subjective pain? yes  -KP      Pre-Treatment Pain Level 0  -KP      Post-Treatment Pain Level 0  -KP         Cognition    Overall Cognitive Status WFL  -KP      Orientation Level Oriented to person;Oriented to situation;Oriented to place  -KP      Safety Judgment Good awareness of safety precautions  -KP      Deficits Fully aware of deficits  -KP      Comments Word finding  -KP          Posture/Observations    Posture/Observations Comments Pt arrived to PT using rollator, spouse accompanied pt to therapy - waited in waiting room during session.  -         Bed Mobility    Supine-Sit Lamar (Bed Mobility) minimum assist (75% patient effort);verbal cues  -      Sit-Supine Lamar (Bed Mobility) standby assist  -      Bed Mobility, Safety Issues decreased use of arms for pushing/pulling;decreased use of legs for bridging/pushing;impaired trunk control for bed mobility  -      Comment, (Bed Mobility) labored  -         Transfers    Sit-Stand Lamar (Transfers) modified independence  -      Stand-Sit Lamar (Transfers) modified independence  -      Transfers, Sit-Stand-Sit, Assist Device four wheeled walker  -      Transfer, Safety Issues weight-shifting ability decreased  -      Transfer, Impairments strength decreased;impaired balance  -      Comment, (Transfers) VC for hand placement  -         Gait/Stairs (Locomotion)    Lamar Level (Gait) modified independence  -      Assistive Device (Gait) walker, 4-wheeled  -      Distance in Feet (Gait) 120  100  -KP      Deviations/Abnormal Patterns (Gait) bilateral deviations;gait speed decreased  -      Bilateral Gait Deviations forward flexed posture  -      Right Sided Gait Deviations foot drop/toe drag  x 3, LE ER  -         Balance Skills Training    Gait Balance-Level of Assistance Close supervision  -      Gait Balance Support Right upper extremity supported;Left upper extremity supported;gina bar  -      Gait Balance Activities side-stepping  -                User Key  (r) = Recorded By, (t) = Taken By, (c) = Cosigned By      Initials Name Provider Type     Yasmin Garvey PT Physical Therapist                             PT Assessment/Plan       Row Name 07/09/25 1713          PT Assessment    Assessment Comments Pt continues to verbalize desire to walk without AD but agrees  that it is necessary now.  FOcused on strength and balance today but do note that KIMBER KING fatigues quickly and that gait quality deteriorates when she fatigues.  -KP               User Key  (r) = Recorded By, (t) = Taken By, (c) = Cosigned By      Initials Name Provider Type    Yasmin Cuenca, PT Physical Therapist                        OP Exercises       Row Name 07/09/25 1557 07/09/25 7921          Subjective    Subjective Comments -- Reported she felt tired today.  -KP        Subjective Pain    Able to rate subjective pain? -- yes  -KP     Pre-Treatment Pain Level -- 0  -KP     Post-Treatment Pain Level -- 0  -KP        Total Minutes    50075 - PT Therapeutic Exercise Minutes 33  -KP --     87752 - PT Therapeutic Activity Minutes 17  -KP --        Exercise 2    Exercise Name 2 -- Supine: B bridge  -KP     Cueing 2 -- Verbal;Tactile  -KP     Sets 2 -- 2  -KP     Reps 2 -- 10  -KP        Exercise 3    Exercise Name 3 -- Supine: knee fall outs  -KP     Cueing 3 -- Verbal;Tactile  -KP     Reps 3 -- 10  -KP        Exercise 4    Exercise Name 4 -- Standing: B HR at hemibar  -KP     Cueing 4 -- Verbal  -KP     Reps 4 -- 15  -KP        Exercise 6    Exercise Name 6 -- Standing with bilateral upper extremity support: Forward step up to 4 inch step  -KP     Cueing 6 -- Verbal;Tactile;Demo  -KP     Reps 6 -- 10  -KP        Exercise 7    Exercise Name 7 -- Standing with bar: 1.5 # ankle weights for knee flexion  -KP     Cueing 7 -- Verbal;Tactile;Demo  -KP     Reps 7 -- 10  -KP        Exercise 10    Exercise Name 10 -- Standing: sidestep with RTB around thighs  -KP     Cueing 10 -- Verbal;Tactile  -KP     Reps 10 -- 8 x 2  -KP     Additional Comments -- VC  -KP        Exercise 13    Exercise Name 13 -- SUpine HS, ABD, SAQ(1.5 lb)  -KP     Cueing 13 -- Verbal;Tactile  -KP     Sets 13 -- 2  -KP               User Key  (r) = Recorded By, (t) = Taken By, (c) = Cosigned By      Initials Name Provider Type    aYsmin Cuenca  HOA, PT Physical Therapist                                    Therapy Education  Education Details: encouraged inc mobility at home.  Given: HEP  Program: Reinforced, New  How Provided: Verbal  Provided to: Patient  Level of Understanding: Verbalized, Demonstrated, Teach back education performed              Time Calculation:   Start Time: 1450  Stop Time: 1540  Time Calculation (min): 50 min  Timed Charges  41137 - PT Therapeutic Exercise Minutes: 33  16881 - PT Therapeutic Activity Minutes: 17  Total Minutes  Timed Charges Total Minutes: 50   Total Minutes: 50   Therapy Charges for Today       Code Description Service Date Service Provider Modifiers Qty    63098810744 HC PT THERAPEUTIC ACT EA 15 MIN 7/9/2025 Yasmin Garvey, PT GP 1    08368656308 HC PT THER PROC EA 15 MIN 7/9/2025 Yasmin Garvey, PT GP 2                      Yasmin Garvey, PT  7/9/2025

## 2025-07-14 ENCOUNTER — HOSPITAL ENCOUNTER (OUTPATIENT)
Dept: OCCUPATIONAL THERAPY | Facility: HOSPITAL | Age: 80
Setting detail: THERAPIES SERIES
Discharge: HOME | End: 2025-07-14
Payer: MEDICARE

## 2025-07-14 ENCOUNTER — HOSPITAL ENCOUNTER (OUTPATIENT)
Dept: PHYSICAL THERAPY | Facility: HOSPITAL | Age: 80
Setting detail: THERAPIES SERIES
Discharge: HOME | End: 2025-07-14
Payer: MEDICARE

## 2025-07-14 ENCOUNTER — HOSPITAL ENCOUNTER (OUTPATIENT)
Dept: SPEECH THERAPY | Facility: HOSPITAL | Age: 80
Setting detail: THERAPIES SERIES
Discharge: HOME | End: 2025-07-14
Payer: MEDICARE

## 2025-07-14 DIAGNOSIS — R29.898 DECREASED GRIP STRENGTH OF RIGHT HAND: ICD-10-CM

## 2025-07-14 DIAGNOSIS — R47.9 DIFFICULTY WITH SPEECH: ICD-10-CM

## 2025-07-14 DIAGNOSIS — Z86.73 HISTORY OF STROKE: Primary | ICD-10-CM

## 2025-07-14 PROCEDURE — 92507 TX SP LANG VOICE COMM INDIV: CPT

## 2025-07-14 PROCEDURE — 97530 THERAPEUTIC ACTIVITIES: CPT

## 2025-07-14 PROCEDURE — 97110 THERAPEUTIC EXERCISES: CPT

## 2025-07-14 NOTE — THERAPY TREATMENT NOTE
Outpatient Physical Therapy Neuro Treatment Note  [facility]     Patient Name: Blanca Harden  : 1945  MRN: [id number]  Today's Date: 2025      Visit Date: 2025    Visit Dx:  No diagnosis found.    Patient Active Problem List   Diagnosis    Osteoporosis    Breast neoplasm, Tis (DCIS), left    Iron deficiency anemia    CKD (chronic kidney disease)    Diabetic nephropathy associated with type 2 diabetes mellitus    Temporal arteritis    Immunosuppression    Anemia    Duodenal adenoma    Gastritis without bleeding    Polyp of duodenum    Morbidly obese    Dyspnea on exertion    Hyperlipidemia    Type 2 diabetes mellitus with stage 3b chronic kidney disease, with long-term current use of insulin    Essential hypertension    Bilateral lower extremity edema    Pulmonary hypertension    Obstructive sleep apnea on CPAP    Stage 3a chronic kidney disease    Iron malabsorption    Respiratory distress    Hypoglycemia due to insulin    Delirium    Hypomagnesemia    Severe malnutrition    Leg swelling    Venous (peripheral) insufficiency    Polyp of colon    Obesity (BMI 30.0-34.9)    TIA (transient ischemic attack)    Difficulty with speech    Multiple rib fractures    Coronary artery disease involving native coronary artery of native heart without angina pectoris            PT Neuro       Row Name 25 1510             Subjective    Subjective Comments Pt agreeable to PT  -KP         Precautions and Contraindications    Precautions/Limitations fall precautions  -KP         Subjective Pain    Able to rate subjective pain? yes  -KP      Pre-Treatment Pain Level 0  -KP      Post-Treatment Pain Level 0  -KP         Cognition    Overall Cognitive Status WFL  -KP      Safety Judgment Good awareness of safety precautions  -KP      Deficits Fully aware of deficits  -KP      Comments intermingles english and Syriac during conversation  -KP         Posture/Observations    Posture/Observations Comments  To PT from SLP, using rollator  -         Transfers    Sit-Stand Ponder (Transfers) modified independence  -      Stand-Sit Ponder (Transfers) modified independence  -      Transfers, Sit-Stand-Sit, Assist Device four wheeled walker  -      Transfer, Safety Issues weight-shifting ability decreased  -      Transfer, Impairments strength decreased;impaired balance  -      Comment, (Transfers) dec control ondescent at times  -         Gait/Stairs (Locomotion)    Ponder Level (Gait) modified independence  -      Assistive Device (Gait) walker, 4-wheeled  -      Distance in Feet (Gait) 120  x2, 45  -KP      Deviations/Abnormal Patterns (Gait) bilateral deviations;gait speed decreased  -      Bilateral Gait Deviations forward flexed posture  -      Right Sided Gait Deviations foot drop/toe drag;heel strike decreased  LE ER  -      Gait Assessment/Intervention Trial gait with cane today, HHJONNY R, GLORIA L - struggled with sequencing, very nervous to try.  25 ft, Min A  -         Balance Skills Training    Standing-Level of Assistance Contact guard  -      Static Standing Balance Support Left upper extremity supported  -      Standing Balance # of Minutes stepping over and back 1/2 roll - B  x 10  -KP      Gait Balance-Level of Assistance Close supervision  -      Gait Balance Support Left upper extremity supported;parallel bars  -      Gait Balance Activities backwards  -      Balance Comments [place]  - 4 in semi [place] in front of pt with toe tap on ipsi lateral side 1 min x 2 with single UE support on L  -KP                User Key  (r) = Recorded By, (t) = Taken By, (c) = Cosigned By      Initials Name Provider Type     Yasmin Garvey, PT Physical Therapist                             PT Assessment/Plan       Row Name 07/14/25 6536          PT Assessment    Assessment Comments Trial STC with pt to assess balance and pt struggled with sequencing and was anxious re  trial.  Did not have LOB but gait was very guarded and slow.  pt tolerated rest of session well.  Took less frequent rest breaks today.  -KP               User Key  (r) = Recorded By, (t) = Taken By, (c) = Cosigned By      Initials Name Provider Type    Yasmin Cuenca, PT Physical Therapist                        OP Exercises       Row Name 07/14/25 1555 07/14/25 1510          Subjective    Subjective Comments -- Pt agreeable to PT  -KP        Subjective Pain    Able to rate subjective pain? -- yes  -KP     Pre-Treatment Pain Level -- 0  -KP     Post-Treatment Pain Level -- 0  -KP        Total Minutes    54057 - PT Therapeutic Exercise Minutes 10  -KP --     94932 - PT Therapeutic Activity Minutes 33  -KP --        Exercise 1    Exercise Name 1 -- Sitting: hip abd with YTB and hip flexion with RTB  -KP     Cueing 1 -- Verbal;Tactile;Demo  -KP     Sets 1 -- 2  -KP     Reps 1 -- 10  -KP        Exercise 4    Exercise Name 4 -- Standing: B HR at hemibar  -KP     Cueing 4 -- Verbal;Demo  -KP     Reps 4 -- 10  -KP        Exercise 5    Exercise Name 5 -- step taps to 1/2 roll  -KP     Cueing 5 -- Verbal;Demo  -KP     Reps 5 -- 10  -KP     Additional Comments -- B UE support  -KP        Exercise 6    Exercise Name 6 -- Standing with bilateral upper extremity support: Forward step up to 4 inch step  -KP     Cueing 6 -- Verbal;Tactile;Demo  -KP     Reps 6 -- 10  -KP        Exercise 8    Exercise Name 8 -- Seated: B LAQ with ball between ankles.  -KP     Cueing 8 -- Verbal;Demo  -KP     Sets 8 -- 2  -KP     Reps 8 -- 10  -KP        Exercise 10    Exercise Name 10 -- Standing: sidestep with RTB around thighs  -KP     Cueing 10 -- Verbal;Tactile  -KP     Reps 10 -- 8 x 2  -KP     Additional Comments -- VC  -KP               User Key  (r) = Recorded By, (t) = Taken By, (c) = Cosigned By      Initials Name Provider Type    Yasmin Cuenca, PT Physical Therapist                                    Therapy  Education  Education Details: trial STC sequencing  Given: Mobility training  Program: New  How Provided: Verbal, Demonstration  Provided to: Patient  Level of Understanding: Demonstrated, Teach back education performed (needs more practice)              Time Calculation:   Start Time: 1502  Stop Time: 1545  Time Calculation (min): 43 min  Timed Charges  09731 - PT Therapeutic Exercise Minutes: 10  75164 - PT Therapeutic Activity Minutes: 33  Total Minutes  Timed Charges Total Minutes: 43   Total Minutes: 43   Therapy Charges for Today       Code Description Service Date Service Provider Modifiers Qty    59912439558  PT THER PROC EA 15 MIN 7/14/2025 Yasmin Garvey, PT GP 1    [id number]  PT THERAPEUTIC ACT EA 15 MIN 7/14/2025 Yasmin Garvey, PT GP 2                      Yasmin Garvey, PT  7/14/2025

## 2025-07-14 NOTE — THERAPY TREATMENT NOTE
Outpatient Speech Language Pathology   Adult Speech Language Cognitive Treatment Note  [facility]     Patient Name: Blanca Harden  : 1945  MRN: [id number]  Today's Date: 2025         Visit Date: 2025   Patient Active Problem List   Diagnosis    Osteoporosis    Breast neoplasm, Tis (DCIS), left    Iron deficiency anemia    CKD (chronic kidney disease)    Diabetic nephropathy associated with type 2 diabetes mellitus    Temporal arteritis    Immunosuppression    Anemia    Duodenal adenoma    Gastritis without bleeding    Polyp of duodenum    Morbidly obese    Dyspnea on exertion    Hyperlipidemia    Type 2 diabetes mellitus with stage 3b chronic kidney disease, with long-term current use of insulin    Essential hypertension    Bilateral lower extremity edema    Pulmonary hypertension    Obstructive sleep apnea on CPAP    Stage 3a chronic kidney disease    Iron malabsorption    Respiratory distress    Hypoglycemia due to insulin    Delirium    Hypomagnesemia    Severe malnutrition    Leg swelling    Venous (peripheral) insufficiency    Polyp of colon    Obesity (BMI 30.0-34.9)    TIA (transient ischemic attack)    Difficulty with speech    Multiple rib fractures    Coronary artery disease involving native coronary artery of native heart without angina pectoris          Visit Dx:    ICD-10-CM ICD-9-CM   1. History of stroke  Z86.73 V12.54   2. Difficulty with speech  R47.9 784.59                              SLP OP Goals       Row Name 25 1400          Subjective Comments    Subjective Comments Pt agreeable to complete SLP session in Greek and English with this SLP who speaks Greek.  -BB        Verbal Expression Goals    Patient will improve verbal expressive language skills by completing divergent naming tasks 90%:;with cues  -BB     Status: Patient will improve verbal expressive language skills by completing divergent naming tasks Progressing as expected  -BB     Comments:  Patient will improve verbal expressive language skills by completing divergent naming tasks Divergent naming task (target 5 items) - pt prompted to utilize Kuwaiti or English in responses: 40% acc wo cues and 90% acc w min cues. Training/education provided regarding visualization strategy: min A.  -BB               User Key  (r) = Recorded By, (t) = Taken By, (c) = Cosigned By      Initials Name Provider Type    Wilbur Bhatti, SLP Speech and Language Pathologist                           Time Calculation:   SLP Start Time: 1405  SLP Stop Time: 1450  SLP Time Calculation (min): 45 min  Untimed Charges  94142-UK Treatment/ST Modification Prosth Aug Alter : 45  Total Minutes  Untimed Charges Total Minutes: 45   Total Minutes: 45    Therapy Charges for Today       Code Description Service Date Service Provider Modifiers Qty    [id number]  ST TREATMENT SPEECH 3 7/14/2025 Wilbur Bridges, SLP GN 1                     ERIN Echevarria  7/14/2025

## 2025-07-14 NOTE — THERAPY TREATMENT NOTE
Outpatient Occupational Therapy Neuro Treatment Note  [facility]     Patient Name: Blanca Harden  : 1945  MRN: [id number]  Today's Date: 2025       Visit Date: 2025    Patient Active Problem List   Diagnosis    Osteoporosis    Breast neoplasm, Tis (DCIS), left    Iron deficiency anemia    CKD (chronic kidney disease)    Diabetic nephropathy associated with type 2 diabetes mellitus    Temporal arteritis    Immunosuppression    Anemia    Duodenal adenoma    Gastritis without bleeding    Polyp of duodenum    Morbidly obese    Dyspnea on exertion    Hyperlipidemia    Type 2 diabetes mellitus with stage 3b chronic kidney disease, with long-term current use of insulin    Essential hypertension    Bilateral lower extremity edema    Pulmonary hypertension    Obstructive sleep apnea on CPAP    Stage 3a chronic kidney disease    Iron malabsorption    Respiratory distress    Hypoglycemia due to insulin    Delirium    Hypomagnesemia    Severe malnutrition    Leg swelling    Venous (peripheral) insufficiency    Polyp of colon    Obesity (BMI 30.0-34.9)    TIA (transient ischemic attack)    Difficulty with speech    Multiple rib fractures    Coronary artery disease involving native coronary artery of native heart without angina pectoris        Past Medical History:   Diagnosis Date    Anemia     Anxiety and depression     Asthma     Balance problem     Breast cancer 2019    CKD (chronic kidney disease), stage III     Colon polyp 2019    Colonic mass     COPD (chronic obstructive pulmonary disease)     Coronary artery disease     FOLLOWED BY DR GUNN    Diabetes mellitus, type 2     History of COVID-19     History of left breast cancer     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, grade III, 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    Hypertension     Liver disease     Lower extremity edema     Moderate persistent asthma     On home O2     3L NC PRN    Pulmonary hypertension      Sleep apnea     Swelling     IN LOWER EXTREMITIES    Temporal arteritis     Varicose veins of unspecified lower extremity with ulcer of calf 2022    Venous insufficiency (chronic) (peripheral)     Vitamin D deficiency         Past Surgical History:   Procedure Laterality Date    AMPUTATION  Breast    BRAIN SURGERY      BREAST BIOPSY Left  approx    benign pathology    BREAST BIOPSY Left 2019    Ultasound guided mammotome vacuum assisted left breast biopsy with placement of a metallic clip-Dr. Daniel Gutierrez, [facility]    CARDIAC CATHETERIZATION       SECTION N/A     x3    CHOLECYSTECTOMY N/A     COLON RESECTION Right 2025    Procedure: COLON RESECTION LAPAROSCOPIC RIGHT WITH DAVINCI ROBOT;  Surgeon: Ashwin Alvarado MD;  Location: [facility] MAIN OR;  Service: Robotics - DaVinci;  Laterality: Right;    COLONOSCOPY      COLONOSCOPY N/A 2024    Procedure: COLONOSCOPY into the cecum and TI with hot snare polypectomy;  Surgeon: Ashwin Alvarado MD;  Location: [facility] ENDOSCOPY;  Service: General;  Laterality: N/A;  pre-colon polyps  post-colon mass    COLONOSCOPY W/ POLYPECTOMY N/A 2019    Enlarged folds in the antrum: biopsied; duodenal, transverse colon x2, splenic flexure, descending colon and sigmoid colon polyps: biopsied (path: tubular adenoma x6)-Dr. Zak De Jesus, [facility]    ENDOSCOPY  10/11/2019    Procedure: ESOPHAGOGASTRODUODENOSCOPY with hot snare polypectomy;  Surgeon: Haile Handley MD;  Location: [facility] ENDOSCOPY;  Service: Gastroenterology    ENDOSCOPY N/A 2020    Procedure: ESOPHAGOGASTRODUODENOSCOPY;  Surgeon: Haile Handley MD;  Location: [facility] ENDOSCOPY;  Service: Gastroenterology    ENDOSCOPY N/A 2020    Procedure: ESOPHAGOGASTRODUODENOSCOPY WITH BIOPSIES AND COLD BIOPSY POLYPECTOMY;  Surgeon: Haile Handley MD;  Location: [facility] ENDOSCOPY;  Service: Gastroenterology;  Laterality: N/A;  pre:  dyspepsia and diarrhea  post: duodenal polyp and gastritis    ENDOSCOPY N/A 07/23/2024    Procedure: ESOPHAGOGASTRODUODENOSCOPY WITH hot snare polypectomy with clip placement x 2BIOPSY;  Surgeon: Ashwin Alvarado MD;  Location: [facility] ENDOSCOPY;  Service: General;  Laterality: N/A;  pre-hx duoedenal polyp  post-duodenal polyp; gastritis    EYE SURGERY      MASTECTOMY WITH SENTINEL NODE BIOPSY AND AXILLARY NODE DISSECTION Left 07/03/2019    Procedure: LEFT BREAST MASTECTOMY WITH SENTINEL NODE BIOPSY AND , v-y plasty closure;  Surgeon: Tahmina James MD;  Location: [facility] MAIN OR;  Service: General    SUBTOTAL HYSTERECTOMY Bilateral     ovaries still in tact    TEMPORAL ARTERY BIOPSY Bilateral 12/05/2019         Visit Dx:    ICD-10-CM ICD-9-CM   1. History of stroke  Z86.73 V12.54   2. Decreased  strength of right hand  R29.898 729.89                    OT Assessment/Plan       Row Name 07/14/25 1631          OT Assessment    Assessment Comments Pt performed therapy well w/ good participation overall, completed >15 mins standing for home cleaning simulation to transfer within therapy clinic and wipe down items, clean up linens and apply pillow cases to pillows. Pt participated well, still has decreased RUE coordination of tasks and reports of some pain w/ FMC coloring activity today. Cont w/ OT POC.  -BC               User Key  (r) = Recorded By, (t) = Taken By, (c) = Cosigned By      Initials Name Provider Type    Maury Albrecht, OT Occupational Therapist                               Therapy Education  Education Details: some RLE decreased step length w/ prolonged standing. FMC tasks. Recommend she perform more home mgmt tasks to spouse, he v/u  Given: Symptoms/condition management, Posture/body mechanics  Program: Reinforced  How Provided: Verbal  Provided to: Patient  Level of Understanding: Demonstrated, Teach back education performed       OT Exercises       Row Name 07/14/25 2032              Precautions    Existing Precautions/Restrictions fall  -BC         Subjective Comments    Subjective Comments Spouse verb'd she cooked a meal over the weekend, rice and beans.  -BC         Subjective Pain    Able to rate subjective pain? yes  -BC      Pre-Treatment Pain Level 0  -BC      Post-Treatment Pain Level 0  -BC         Exercise 1    Exercise Name 1 home mgmt tasks to straighten up the gym and spray/wipe down tables, remove linens and put up pillows/apply pillow cases for simulate home iADLs. Close SBA w rollator, min cues for sequence  -BC      Cueing 1 Verbal  -BC      Time (Minutes) 1 18  -BC         Exercise 2    Exercise Name 2 Coloring R hand for increased FMC w pt reports of fatigue w/ prolonged practice and some w/o pain at clavicle site. Min difficulty and accuracy with task  -BC      Cueing 2 Verbal  -BC      Time (Minutes) 2 15  -BC         Exercise 3    Exercise Name 3 RUE FMC to  ping pong balls and place on upright gustavo board with mod difficulty, increased time and falling off multiple times. Able to get 18/23 in alloted time  -BC      Cueing 3 Verbal  -BC      Time (Minutes) 3 10  -BC                User Key  (r) = Recorded By, (t) = Taken By, (c) = Cosigned By      Initials Name Provider Type    BC Maury Olvera OT Occupational Therapist                                Time Calculation:   OT Start Time: 1545  OT Stop Time: 1628  OT Time Calculation (min): 43 min  Total Timed Code Minutes- OT: 43 minute(s)  Timed Charges  83588 - OT Therapeutic Activity Minutes: 43  Total Minutes  Timed Charges Total Minutes: 43   Total Minutes: 43     Therapy Charges for Today       Code Description Service Date Service Provider Modifiers Qty    [id number]  OT THERAPEUTIC ACT EA 15 MIN 7/14/2025 Maury Olvera OT GO 3                      Maury Olvera OT  7/14/2025

## 2025-07-16 ENCOUNTER — HOSPITAL ENCOUNTER (OUTPATIENT)
Dept: OCCUPATIONAL THERAPY | Facility: HOSPITAL | Age: 80
Setting detail: THERAPIES SERIES
Discharge: HOME | End: 2025-07-16
Payer: MEDICARE

## 2025-07-16 ENCOUNTER — HOSPITAL ENCOUNTER (OUTPATIENT)
Dept: PHYSICAL THERAPY | Facility: HOSPITAL | Age: 80
Setting detail: THERAPIES SERIES
Discharge: HOME | End: 2025-07-16
Payer: MEDICARE

## 2025-07-16 DIAGNOSIS — Z74.09 IMPAIRED FUNCTIONAL MOBILITY, BALANCE, GAIT, AND ENDURANCE: ICD-10-CM

## 2025-07-16 DIAGNOSIS — Z91.81 HISTORY OF FALL: ICD-10-CM

## 2025-07-16 DIAGNOSIS — Z86.73 HISTORY OF STROKE: ICD-10-CM

## 2025-07-16 DIAGNOSIS — R53.1 RIGHT SIDED WEAKNESS: ICD-10-CM

## 2025-07-16 DIAGNOSIS — Z86.73 HISTORY OF STROKE: Primary | ICD-10-CM

## 2025-07-16 DIAGNOSIS — R29.898 DECREASED GRIP STRENGTH OF RIGHT HAND: Primary | ICD-10-CM

## 2025-07-16 PROCEDURE — 97112 NEUROMUSCULAR REEDUCATION: CPT

## 2025-07-16 PROCEDURE — 97530 THERAPEUTIC ACTIVITIES: CPT

## 2025-07-16 PROCEDURE — 97110 THERAPEUTIC EXERCISES: CPT

## 2025-07-16 NOTE — THERAPY TREATMENT NOTE
Outpatient Occupational Therapy Neuro Treatment Note  [facility]     Patient Name: Blanca Harden  : 1945  MRN: [id number]  Today's Date: 2025       Visit Date: 2025    Patient Active Problem List   Diagnosis    Osteoporosis    Breast neoplasm, Tis (DCIS), left    Iron deficiency anemia    CKD (chronic kidney disease)    Diabetic nephropathy associated with type 2 diabetes mellitus    Temporal arteritis    Immunosuppression    Anemia    Duodenal adenoma    Gastritis without bleeding    Polyp of duodenum    Morbidly obese    Dyspnea on exertion    Hyperlipidemia    Type 2 diabetes mellitus with stage 3b chronic kidney disease, with long-term current use of insulin    Essential hypertension    Bilateral lower extremity edema    Pulmonary hypertension    Obstructive sleep apnea on CPAP    Stage 3a chronic kidney disease    Iron malabsorption    Respiratory distress    Hypoglycemia due to insulin    Delirium    Hypomagnesemia    Severe malnutrition    Leg swelling    Venous (peripheral) insufficiency    Polyp of colon    Obesity (BMI 30.0-34.9)    TIA (transient ischemic attack)    Difficulty with speech    Multiple rib fractures    Coronary artery disease involving native coronary artery of native heart without angina pectoris        Past Medical History:   Diagnosis Date    Anemia     Anxiety and depression     Asthma     Balance problem     Breast cancer 2019    CKD (chronic kidney disease), stage III     Colon polyp 2019    Colonic mass     COPD (chronic obstructive pulmonary disease)     Coronary artery disease     FOLLOWED BY DR GUNN    Diabetes mellitus, type 2     History of COVID-19     History of left breast cancer     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, grade III, ER/IL negative    Hypertension     Liver disease     Lower extremity edema     Moderate persistent asthma     On home O2     3L NC PRN    Pulmonary hypertension      Sleep apnea     Swelling     IN LOWER EXTREMITIES    Temporal arteritis     Varicose veins of unspecified lower extremity with ulcer of calf 2022    Venous insufficiency (chronic) (peripheral)     Vitamin D deficiency         Past Surgical History:   Procedure Laterality Date    AMPUTATION  Breast    BRAIN SURGERY      BREAST BIOPSY Left  approx    benign pathology    BREAST BIOPSY Left 2019    Ultasound guided mammotome vacuum assisted left breast biopsy with placement of a metallic clip-Dr. Daniel Gutierrez, [facility]    CARDIAC CATHETERIZATION       SECTION N/A     x3    CHOLECYSTECTOMY N/A     COLON RESECTION Right 2025    Procedure: COLON RESECTION LAPAROSCOPIC RIGHT WITH DAVINCI ROBOT;  Surgeon: Ashwin Alvarado MD;  Location: [facility] MAIN OR;  Service: Robotics - DaVinci;  Laterality: Right;    COLONOSCOPY      COLONOSCOPY N/A 2024    Procedure: COLONOSCOPY into the cecum and TI with hot snare polypectomy;  Surgeon: Ashwin Alvarado MD;  Location: [facility] ENDOSCOPY;  Service: General;  Laterality: N/A;  pre-colon polyps  post-colon mass    COLONOSCOPY W/ POLYPECTOMY N/A 2019    Enlarged folds in the antrum: biopsied; duodenal, transverse colon x2, splenic flexure, descending colon and sigmoid colon polyps: biopsied (path: tubular adenoma x6)-Dr. Zak De Jesus, [facility]    ENDOSCOPY  10/11/2019    Procedure: ESOPHAGOGASTRODUODENOSCOPY with hot snare polypectomy;  Surgeon: Haile Handley MD;  Location: [facility] ENDOSCOPY;  Service: Gastroenterology    ENDOSCOPY N/A 2020    Procedure: ESOPHAGOGASTRODUODENOSCOPY;  Surgeon: Haile Handley MD;  Location: [facility] ENDOSCOPY;  Service: Gastroenterology    ENDOSCOPY N/A 2020    Procedure: ESOPHAGOGASTRODUODENOSCOPY WITH BIOPSIES AND COLD BIOPSY POLYPECTOMY;  Surgeon: Haile Handley MD;  Location: [facility] ENDOSCOPY;  Service: Gastroenterology;  Laterality: N/A;  pre:  dyspepsia and diarrhea  post: duodenal polyp and gastritis    ENDOSCOPY N/A 07/23/2024    Procedure: ESOPHAGOGASTRODUODENOSCOPY WITH hot snare polypectomy with clip placement x 2BIOPSY;  Surgeon: Ashwin Alvarado MD;  Location: [facility] ENDOSCOPY;  Service: General;  Laterality: N/A;  pre-hx duoedenal polyp  post-duodenal polyp; gastritis    EYE SURGERY      MASTECTOMY WITH SENTINEL NODE BIOPSY AND AXILLARY NODE DISSECTION Left 07/03/2019    Procedure: LEFT BREAST MASTECTOMY WITH SENTINEL NODE BIOPSY AND , v-y plasty closure;  Surgeon: Tahmina James MD;  Location: [facility] MAIN OR;  Service: General    SUBTOTAL HYSTERECTOMY Bilateral     ovaries still in tact    TEMPORAL ARTERY BIOPSY Bilateral 12/05/2019         Visit Dx:    ICD-10-CM ICD-9-CM   1. Decreased  strength of right hand  R29.898 729.89   2. History of stroke  Z86.73 V12.54                    OT Assessment/Plan       Row Name 07/16/25 1613          OT Assessment    Assessment Comments Pt with continued progress towards therapy goals and performance today. Continued shoulder and hand strengthening exercises, supine dumbell exercises and seated fine motor coordination tasks. Continue with OT POC. Continued encouragement for her to complete more tasks in the home and use of RUE more.  -BC               User Key  (r) = Recorded By, (t) = Taken By, (c) = Cosigned By      Initials Name Provider Type    Maury Albrecht, OT Occupational Therapist                               Therapy Education  Education Details: in hand manipulation skills/difficulty w/ pornation/supination w/ reaching tasks but continue to practice. shoulder range tasks supine. practice handwriting at home  Given: Fall prevention and home safety, Mobility training  Program: Reinforced  How Provided: Verbal  Provided to: Patient  Level of Understanding: Demonstrated, Teach back education performed       OT Exercises       Row Name 07/16/25 1553             Precautions    Existing  Precautions/Restrictions fall  -BC         Subjective Comments    Subjective Comments Pt reports her  still does a lot of the housework  -BC         Subjective Pain    Able to rate subjective pain? yes  -BC      Pre-Treatment Pain Level 0  -BC      Post-Treatment Pain Level 0  -BC         Exercise 1    Exercise Name 1 supine ther ex with free weights for increased strengthening for home mgmt tasks. Sh FF, chest press, rows, bicep curls. Less range at sh slightly R versus L. some c/o discomfort >100* but tolerated well  -BC      Cueing 1 Verbal;Tactile;Demo  -BC      Equipment 1 Dumbell  -BC      Weights/Plates 1 2  -BC      Sets 1 2  -BC      Reps 1 10  -BC      Time (Minutes) 1 22  -BC         Exercise 2    Exercise Name 2 seated R  and pinch strengthening with coordination componete to  tees w/ 6# pinch clip and translate/manipulate to place upside down on table. Pt required demo/visual aides for techniques, fair performance  -BC      Cueing 2 Verbal;Tactile;Demo  -BC      Sets 2 2  -BC      Reps 2 15  -BC         Exercise 3    Exercise Name 3 Seated RUE FMC to perform lacing cards with fair performance  -BC      Cueing 3 Verbal  -BC         Exercise 4    Exercise Name 4 RUE FMC to  marbles and place on tees for increased coordination, dropping a couple times but overall fair, continued cues to not use L hand to complete as forgetful. fair performance overall  -BC      Cueing 4 Verbal;Tactile  -BC      Sets 4 1  -BC      Reps 4 15  -BC         Exercise 5    Exercise Name 5 handwriting RUE to practice copying sentances. moderate challege and decreased legibility at this time. Continue to work on it in future POC, cues for larger font  -BC      Cueing 5 Verbal  -BC                User Key  (r) = Recorded By, (t) = Taken By, (c) = Cosigned By      Initials Name Provider Type    Maury Albrecht OT Occupational Therapist                                Time Calculation:   OT Start Time:  1546  OT Stop Time: 1630  OT Time Calculation (min): 44 min  Total Timed Code Minutes- OT: 45 minute(s)  Timed Charges  62520 - OT Therapeutic Exercise Minutes: 20  97530 - OT Therapeutic Activity Minutes: 25  Total Minutes  Timed Charges Total Minutes: 45   Total Minutes: 45     Therapy Charges for Today       Code Description Service Date Service Provider Modifiers Qty    [id number]  OT THER PROC EA 15 MIN 7/16/2025 Maury Olvera OT GO 1    [id number]  OT THERAPEUTIC ACT EA 15 MIN 7/16/2025 Maury Olvera OT GO 2                      Maury Olvera OT  7/16/2025

## 2025-07-16 NOTE — THERAPY TREATMENT NOTE
Outpatient Physical Therapy Neuro Treatment Note  [facility]     Patient Name: Blanca Harden  : 1945  MRN: [id number]  Today's Date: 2025      Visit Date: 2025    Visit Dx:    ICD-10-CM ICD-9-CM   1. History of stroke  Z86.73 V12.54   2. Right sided weakness  R53.1 728.87   3. History of fall  Z91.81 V15.88   4. Impaired functional mobility, balance, gait, and endurance  Z74.09 V49.89       Patient Active Problem List   Diagnosis    Osteoporosis    Breast neoplasm, Tis (DCIS), left    Iron deficiency anemia    CKD (chronic kidney disease)    Diabetic nephropathy associated with type 2 diabetes mellitus    Temporal arteritis    Immunosuppression    Anemia    Duodenal adenoma    Gastritis without bleeding    Polyp of duodenum    Morbidly obese    Dyspnea on exertion    Hyperlipidemia    Type 2 diabetes mellitus with stage 3b chronic kidney disease, with long-term current use of insulin    Essential hypertension    Bilateral lower extremity edema    Pulmonary hypertension    Obstructive sleep apnea on CPAP    Stage 3a chronic kidney disease    Iron malabsorption    Respiratory distress    Hypoglycemia due to insulin    Delirium    Hypomagnesemia    Severe malnutrition    Leg swelling    Venous (peripheral) insufficiency    Polyp of colon    Obesity (BMI 30.0-34.9)    TIA (transient ischemic attack)    Difficulty with speech    Multiple rib fractures    Coronary artery disease involving native coronary artery of native heart without angina pectoris            PT Neuro       Row Name 25 1502             Subjective    Subjective Comments Pt reports no falls.  -SARA         Precautions and Contraindications    Precautions/Limitations fall precautions  -SARA         Subjective Pain    Able to rate subjective pain? yes  -SARA      Pre-Treatment Pain Level 0  -SARA      Post-Treatment Pain Level 0  -SARA         Cognition    Overall Cognitive Status WFL  -SARA         Posture/Observations     Posture/Observations Comments Pt arrived with rollator.  drove.  -SARA         Transfers    Sit-Stand [place] (Transfers) modified independence  -SARA      Stand-Sit [place] (Transfers) modified independence  -SARA      Transfers, Sit-Stand-Sit, Assist Device four wheeled walker  -SARA      Transfer, Impairments strength decreased  -SARA         Gait/Stairs (Locomotion)    [place] Level (Gait) modified independence  -SARA      Assistive Device (Gait) walker, 4-wheeled  -SARA      Distance in Feet (Gait) 100  x 2  -SARA      Deviations/Abnormal Patterns (Gait) bilateral deviations;gait speed decreased  -SARA      Bilateral Gait Deviations forward flexed posture  -SARA      Gait Assessment/Intervention Pt did not like cane used last session. Doesn't feel comfortable. Used rollator for many years.  -SARA      [place] Level (Stairs) contact guard;verbal cues;nonverbal cues (demo/gesture)  -SARA      Handrail Location (Stairs) both sides  -SARA      Number of Steps (Stairs) 4 x 2  -SARA      Ascending Technique (Stairs) step-over-step;step-to-step  difficulty leading up with R LE  -SARA      Descending Technique (Stairs) step-over-step;step-to-step  -SARA      Stairs, Impairments strength decreased;impaired balance  -SARA         Balance Skills Training    Standing-Level of Assistance Close supervision;Contact guard  -SARA      Static Standing Balance Support Right upper extremity supported;Left upper extremity supported;parallel bars  -SRAA      Standing-Balance Activities --  stepping forward and back over noodle and then side step over and back  x 10 each LE  -SARA                User Key  (r) = Recorded By, (t) = Taken By, (c) = Cosigned By      Initials Name Provider Type    Calli Davis PT Physical Therapist                             PT Assessment/Plan       Row Name 07/16/25 6616          PT Assessment    Assessment Comments Pt reports she did not like cane and has used rollator for many years. This PT does not feel  "progressing to cane is appropriate or safe and recommend staying with rollator. Weakness of R hip remains with pt struggling with alternate toe taps and hip abduction.  -SARA               User Key  (r) = Recorded By, (t) = Taken By, (c) = Cosigned By      Initials Name Provider Type    Calli Davis PT Physical Therapist                        OP Exercises       Row Name 07/16/25 1603 07/16/25 1502          Subjective    Subjective Comments -- Pt reports no falls.  -SARA        Subjective Pain    Able to rate subjective pain? -- yes  -SARA     Pre-Treatment Pain Level -- 0  -SARA     Post-Treatment Pain Level -- 0  -SARA        Total Minutes    78914 -  PT Neuromuscular Reeducation Minutes 12  -SARA --     06891 - PT Therapeutic Activity Minutes 31  -SARA --        Exercise 1    Exercise Name 1 -- Sitting: hip abduction, hip flexion, LAQ using 1.5# ankle weights  -SARA     Cueing 1 -- Verbal;Tactile;Demo  -SARA     Reps 1 -- 10  -SARA        Exercise 4    Exercise Name 4 -- Standing: B HR at hemibar  -SARA     Cueing 4 -- Verbal;Demo  -SARA     Reps 4 -- 10  -SARA        Exercise 5    Exercise Name 5 -- step taps to 4\" step with B UE support using 1.5# ankle weights  -SARA     Cueing 5 -- Verbal;Demo  -SARA     Reps 5 -- 10  -SARA        Exercise 7    Exercise Name 7 -- Standing with bar: 1.5 # ankle weights for knee flexion, hip abduction  -SARA        Exercise 12    Exercise Name 12 -- Sitting: hip adduction with ball squeeze  -SARA     Cueing 12 -- Verbal;Demo  -SARA     Reps 12 -- 10  -SARA               User Key  (r) = Recorded By, (t) = Taken By, (c) = Cosigned By      Initials Name Provider Type    Calli Davis, PT Physical Therapist                                    Therapy Education  Given: Fall prevention and home safety, Mobility training  How Provided: Verbal  Provided to: Patient  Level of Understanding: Demonstrated, Teach back education performed              Time Calculation:   Start Time: 1502  Stop Time: 1545  Time " Calculation (min): 43 min  Total Timed Code Minutes- PT: 43 minute(s)  Timed Charges  28650 -  PT Neuromuscular Reeducation Minutes: 12  83628 - PT Therapeutic Activity Minutes: 31  Total Minutes  Timed Charges Total Minutes: 43   Total Minutes: 43   Therapy Charges for Today       Code Description Service Date Service Provider Modifiers Qty    [id number] HC PT NEUROMUSC RE EDUCATION EA 15 MIN 7/16/2025 Calli Mcguire, PT GP 1    [id number] HC PT THERAPEUTIC ACT EA 15 MIN 7/16/2025 Calli Mcguire, PT GP 2                      Calli Mcguire, PT  7/16/2025

## 2025-07-21 ENCOUNTER — HOSPITAL ENCOUNTER (OUTPATIENT)
Dept: PHYSICAL THERAPY | Facility: HOSPITAL | Age: 80
Setting detail: THERAPIES SERIES
Discharge: HOME | End: 2025-07-21
Payer: MEDICARE

## 2025-07-21 ENCOUNTER — HOSPITAL ENCOUNTER (OUTPATIENT)
Dept: OCCUPATIONAL THERAPY | Facility: HOSPITAL | Age: 80
Setting detail: THERAPIES SERIES
Discharge: HOME | End: 2025-07-21
Payer: MEDICARE

## 2025-07-21 DIAGNOSIS — Z86.73 HISTORY OF STROKE: Primary | ICD-10-CM

## 2025-07-21 DIAGNOSIS — Z86.73 HISTORY OF STROKE: ICD-10-CM

## 2025-07-21 DIAGNOSIS — Z91.81 HISTORY OF FALL: ICD-10-CM

## 2025-07-21 DIAGNOSIS — R29.898 DECREASED GRIP STRENGTH OF RIGHT HAND: Primary | ICD-10-CM

## 2025-07-21 DIAGNOSIS — Z74.09 IMPAIRED FUNCTIONAL MOBILITY, BALANCE, GAIT, AND ENDURANCE: ICD-10-CM

## 2025-07-21 DIAGNOSIS — R53.1 RIGHT SIDED WEAKNESS: ICD-10-CM

## 2025-07-21 PROCEDURE — 97112 NEUROMUSCULAR REEDUCATION: CPT

## 2025-07-21 PROCEDURE — 97530 THERAPEUTIC ACTIVITIES: CPT

## 2025-07-21 NOTE — THERAPY TREATMENT NOTE
Outpatient Occupational Therapy Neuro Treatment Note  [facility]     Patient Name: Blanca Harden  : 1945  MRN: [id number]  Today's Date: 2025       Visit Date: 2025    Patient Active Problem List   Diagnosis    Osteoporosis    Breast neoplasm, Tis (DCIS), left    Iron deficiency anemia    CKD (chronic kidney disease)    Diabetic nephropathy associated with type 2 diabetes mellitus    Temporal arteritis    Immunosuppression    Anemia    Duodenal adenoma    Gastritis without bleeding    Polyp of duodenum    Morbidly obese    Dyspnea on exertion    Hyperlipidemia    Type 2 diabetes mellitus with stage 3b chronic kidney disease, with long-term current use of insulin    Essential hypertension    Bilateral lower extremity edema    Pulmonary hypertension    Obstructive sleep apnea on CPAP    Stage 3a chronic kidney disease    Iron malabsorption    Respiratory distress    Hypoglycemia due to insulin    Delirium    Hypomagnesemia    Severe malnutrition    Leg swelling    Venous (peripheral) insufficiency    Polyp of colon    Obesity (BMI 30.0-34.9)    TIA (transient ischemic attack)    Difficulty with speech    Multiple rib fractures    Coronary artery disease involving native coronary artery of native heart without angina pectoris        Past Medical History:   Diagnosis Date    Anemia     Anxiety and depression     Asthma     Balance problem     Breast cancer 2019    CKD (chronic kidney disease), stage III     Colon polyp 2019    Colonic mass     COPD (chronic obstructive pulmonary disease)     Coronary artery disease     FOLLOWED BY DR GUNN    Diabetes mellitus, type 2     History of COVID-19     History of left breast cancer     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, grade III, 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    Hypertension     Liver disease     Lower extremity edema     Moderate persistent asthma     On home O2     3L NC PRN    Pulmonary hypertension      Sleep apnea     Swelling     IN LOWER EXTREMITIES    Temporal arteritis     Varicose veins of unspecified lower extremity with ulcer of calf 2022    Venous insufficiency (chronic) (peripheral)     Vitamin D deficiency         Past Surgical History:   Procedure Laterality Date    AMPUTATION  Breast    BRAIN SURGERY      BREAST BIOPSY Left  approx    benign pathology    BREAST BIOPSY Left 2019    Ultasound guided mammotome vacuum assisted left breast biopsy with placement of a metallic clip-Dr. Daniel Gutierrez, [facility]    CARDIAC CATHETERIZATION       SECTION N/A     x3    CHOLECYSTECTOMY N/A     COLON RESECTION Right 2025    Procedure: COLON RESECTION LAPAROSCOPIC RIGHT WITH DAVINCI ROBOT;  Surgeon: Ashwin Alvarado MD;  Location: [facility] MAIN OR;  Service: Robotics - DaVinci;  Laterality: Right;    COLONOSCOPY      COLONOSCOPY N/A 2024    Procedure: COLONOSCOPY into the cecum and TI with hot snare polypectomy;  Surgeon: Ashwin Alvarado MD;  Location: [facility] ENDOSCOPY;  Service: General;  Laterality: N/A;  pre-colon polyps  post-colon mass    COLONOSCOPY W/ POLYPECTOMY N/A 2019    Enlarged folds in the antrum: biopsied; duodenal, transverse colon x2, splenic flexure, descending colon and sigmoid colon polyps: biopsied (path: tubular adenoma x6)-Dr. Zak De Jesus, [facility]    ENDOSCOPY  10/11/2019    Procedure: ESOPHAGOGASTRODUODENOSCOPY with hot snare polypectomy;  Surgeon: Haile Handley MD;  Location: [facility] ENDOSCOPY;  Service: Gastroenterology    ENDOSCOPY N/A 2020    Procedure: ESOPHAGOGASTRODUODENOSCOPY;  Surgeon: Haile Handley MD;  Location: [facility] ENDOSCOPY;  Service: Gastroenterology    ENDOSCOPY N/A 2020    Procedure: ESOPHAGOGASTRODUODENOSCOPY WITH BIOPSIES AND COLD BIOPSY POLYPECTOMY;  Surgeon: Haile Handley MD;  Location: [facility] ENDOSCOPY;  Service: Gastroenterology;  Laterality: N/A;  pre:  dyspepsia and diarrhea  post: duodenal polyp and gastritis    ENDOSCOPY N/A 07/23/2024    Procedure: ESOPHAGOGASTRODUODENOSCOPY WITH hot snare polypectomy with clip placement x 2BIOPSY;  Surgeon: Ashwin Alvarado MD;  Location: [facility] ENDOSCOPY;  Service: General;  Laterality: N/A;  pre-hx duoedenal polyp  post-duodenal polyp; gastritis    EYE SURGERY      MASTECTOMY WITH SENTINEL NODE BIOPSY AND AXILLARY NODE DISSECTION Left 07/03/2019    Procedure: LEFT BREAST MASTECTOMY WITH SENTINEL NODE BIOPSY AND , v-y plasty closure;  Surgeon: Tahmina James MD;  Location: [facility] MAIN OR;  Service: General    SUBTOTAL HYSTERECTOMY Bilateral     ovaries still in tact    TEMPORAL ARTERY BIOPSY Bilateral 12/05/2019         Visit Dx:    ICD-10-CM ICD-9-CM   1. Decreased  strength of right hand  R29.898 729.89   2. History of stroke  Z86.73 V12.54                    OT Assessment/Plan       Row Name 07/21/25 1525          OT Assessment    Assessment Comments Pt completed seated FMC and sequence to perform lego block building task. Pt endorses feeling tired at the start of session but participated well. She had multiple errors and increased difficulty to complete today. Pt needed cues and visual aides for following the picture directions, and took the entire session to work on task, unable to finish in alloted time. Concerns w vision and depth perception as multiple errors with finding/picking up correct blocks and putting in correct sequence. Hopeful this will carryout for direction with cooking tasks.  -BC               User Key  (r) = Recorded By, (t) = Taken By, (c) = Cosigned By      Initials Name Provider Type    Maury Albrecht, OT Occupational Therapist                               Therapy Education  Education Details: squence and visual aides for lego task today.  Given: Symptoms/condition management  Program: Reinforced  How Provided: Verbal  Provided to: Patient  Level of Understanding: Demonstrated,  Teach back education performed       OT Exercises       Row Name 07/21/25 1500             Precautions    Existing Precautions/Restrictions fall  -BC         Subjective Pain    Able to rate subjective pain? yes  -BC      Pre-Treatment Pain Level 0  -BC      Post-Treatment Pain Level 0  -BC         Exercise 1    Exercise Name 1 seated lego multi step sequence to follow visual aides for making design. Goal to improve in hand coordination and sequence for household cooking tasks. mod cues and incresed time for completion of task, Pt with multiple errors and min to mod A, did not finish task in alloted time  -BC      Cueing 1 Verbal;Tactile;Demo  -BC      Time (Minutes) 1 45  -BC                User Key  (r) = Recorded By, (t) = Taken By, (c) = Cosigned By      Initials Name Provider Type    BC Maury Olvera OT Occupational Therapist                                Time Calculation:   OT Start Time: 1500  OT Stop Time: 1545  OT Time Calculation (min): 45 min  Total Timed Code Minutes- OT: 45 minute(s)  Timed Charges  86223 - OT Therapeutic Activity Minutes: 45  Total Minutes  Timed Charges Total Minutes: 45   Total Minutes: 45     Therapy Charges for Today       Code Description Service Date Service Provider Modifiers Qty    [id number]  OT THERAPEUTIC ACT EA 15 MIN 7/21/2025 Maury Olvera OT GO 3                      Maury Olvera OT  7/21/2025

## 2025-07-21 NOTE — THERAPY TREATMENT NOTE
Outpatient Physical Therapy Neuro Treatment Note  [facility]     Patient Name: Blanca Harden  : 1945  MRN: [id number]  Today's Date: 2025      Visit Date: 2025    Visit Dx:    ICD-10-CM ICD-9-CM   1. History of stroke  Z86.73 V12.54   2. Right sided weakness  R53.1 728.87   3. History of fall  Z91.81 V15.88   4. Impaired functional mobility, balance, gait, and endurance  Z74.09 V49.89       Patient Active Problem List   Diagnosis    Osteoporosis    Breast neoplasm, Tis (DCIS), left    Iron deficiency anemia    CKD (chronic kidney disease)    Diabetic nephropathy associated with type 2 diabetes mellitus    Temporal arteritis    Immunosuppression    Anemia    Duodenal adenoma    Gastritis without bleeding    Polyp of duodenum    Morbidly obese    Dyspnea on exertion    Hyperlipidemia    Type 2 diabetes mellitus with stage 3b chronic kidney disease, with long-term current use of insulin    Essential hypertension    Bilateral lower extremity edema    Pulmonary hypertension    Obstructive sleep apnea on CPAP    Stage 3a chronic kidney disease    Iron malabsorption    Respiratory distress    Hypoglycemia due to insulin    Delirium    Hypomagnesemia    Severe malnutrition    Leg swelling    Venous (peripheral) insufficiency    Polyp of colon    Obesity (BMI 30.0-34.9)    TIA (transient ischemic attack)    Difficulty with speech    Multiple rib fractures    Coronary artery disease involving native coronary artery of native heart without angina pectoris            PT Neuro       Row Name 25 1416             Subjective    Subjective Comments Pt reports her strength in R LE is improving.  Patient stated she did not want to progress to cane but to remain with rollator.  -SARA         Precautions and Contraindications    Precautions/Limitations fall precautions  -SARA         Subjective Pain    Able to rate subjective pain? yes  -SARA      Pre-Treatment Pain Level 0  -SARA      Post-Treatment Pain  Level 0  -SARA         Vision-Basic Assessment    Current Vision No visual deficits  -SARA         Cognition    Overall Cognitive Status WFL  -SARA         Posture/Observations    Posture/Observations Comments Pt arrived with rollator.  drove.  -SARA         Transfers    Sit-Stand [place] (Transfers) modified independence  -SARA      Stand-Sit [place] (Transfers) modified independence  -SARA      Transfers, Sit-Stand-Sit, Assist Device four wheeled walker  -SARA         Gait/Stairs (Locomotion)    [place] Level (Gait) modified independence  -SARA      Assistive Device (Gait) walker, 4-wheeled  -SARA      Distance in Feet (Gait) 120  70', 60'  -SARA      Deviations/Abnormal Patterns (Gait) bilateral deviations;gait speed decreased  -SARA      Bilateral Gait Deviations forward flexed posture  -SARA                User Key  (r) = Recorded By, (t) = Taken By, (c) = Cosigned By      Initials Name Provider Type    Calli Davis, PT Physical Therapist                             PT Assessment/Plan       Row Name 07/21/25 1608          PT Assessment    Assessment Comments She reports that the strength is improving on her right lower extremity.  She also reports she would like to remain using rollator and not progress to cane. Pt required less sit down rest breaks today a sign of improving endurance.  -SARA               User Key  (r) = Recorded By, (t) = Taken By, (c) = Cosigned By      Initials Name Provider Type    Calli Davis, PT Physical Therapist                        OP Exercises       Row Name 07/21/25 5018 07/21/25 1416          Subjective    Subjective Comments -- Pt reports her strength in R LE is improving.  Patient stated she did not want to progress to cane but to remain with rollator.  -SARA        Subjective Pain    Able to rate subjective pain? -- yes  -SARA     Pre-Treatment Pain Level -- 0  -SARA     Post-Treatment Pain Level -- 0  -SARA        Total Minutes    92348 -  PT Neuromuscular Reeducation  "Minutes 18  -SARA --     42487 - PT Therapeutic Activity Minutes 25  -SARA --        Exercise 4    Exercise Name 4 -- Standing: B HR at hemibar  -SARA     Cueing 4 -- Verbal;Demo  -SARA     Reps 4 -- 10  -SARA        Exercise 5    Exercise Name 5 -- step taps forward and then laterally to 4\" step with B UE support using 1.5# ankle weights  -SARA     Cueing 5 -- Verbal;Demo  -SARA     Reps 5 -- 10  -SARA     Additional Comments -- B UE support  -SARA        Exercise 7    Exercise Name 7 -- Standing with bar: 1.5 # ankle weights for knee flexion, hip abduction  -SARA     Cueing 7 -- Verbal;Tactile;Demo  -SARA     Reps 7 -- 10  -SARA        Exercise 8    Exercise Name 8 -- Seated: B LAQ with ball between ankles.  -SARA     Cueing 8 -- Verbal;Demo  -SARA     Reps 8 -- 10  -SARA     Additional Comments -- seated hip adduction with ball squeeze x 20  -SARA        Exercise 11    Exercise Name 11 -- NuStep all E's at 4  -SARA     Cueing 11 -- Verbal  -SARA     Time 11 -- 6 minutes  -SARA        Exercise 12    Exercise Name 12 -- Sitting: hip adduction with ball squeeze  -SARA     Cueing 12 -- Verbal;Demo  -SARA     Reps 12 -- 10  -SARA        Exercise 14    Exercise Name 14 -- STS from mat  -SARA     Cueing 14 -- Verbal;Tactile  -SARA     Reps 14 -- 10  -SARA               User Key  (r) = Recorded By, (t) = Taken By, (c) = Cosigned By      Initials Name Provider Type    Calli Davis PT Physical Therapist                                    Therapy Education  Education Details: Discussed pt's progress in therapy.  Given: Fall prevention and home safety, Mobility training  How Provided: Verbal  Provided to: Patient  Level of Understanding: Teach back education performed, Verbalized              Time Calculation:   Start Time: 1416  Stop Time: 1459  Time Calculation (min): 43 min  Total Timed Code Minutes- PT: 43 minute(s)  Timed Charges  55610 -  PT Neuromuscular Reeducation Minutes: 18  68306 - PT Therapeutic Activity Minutes: 25  Total Minutes  Timed Charges Total " Minutes: 43   Total Minutes: 43   Therapy Charges for Today       Code Description Service Date Service Provider Modifiers Qty    57715279312  PT NEUROMUSC RE EDUCATION EA 15 MIN 7/21/2025 Calli Mcguire, PT GP 1    [id number]  PT THERAPEUTIC ACT EA 15 MIN 7/21/2025 Calli Mcguire, PT GP 2                      Calli Mcguire, PT  7/21/2025

## 2025-07-23 ENCOUNTER — HOSPITAL ENCOUNTER (OUTPATIENT)
Dept: OCCUPATIONAL THERAPY | Facility: HOSPITAL | Age: 80
Setting detail: THERAPIES SERIES
Discharge: HOME | End: 2025-07-23
Payer: MEDICARE

## 2025-07-23 ENCOUNTER — HOSPITAL ENCOUNTER (OUTPATIENT)
Dept: PHYSICAL THERAPY | Facility: HOSPITAL | Age: 80
Setting detail: THERAPIES SERIES
Discharge: HOME | End: 2025-07-23
Payer: MEDICARE

## 2025-07-23 DIAGNOSIS — Z86.73 HISTORY OF STROKE: Primary | ICD-10-CM

## 2025-07-23 DIAGNOSIS — R53.1 RIGHT SIDED WEAKNESS: ICD-10-CM

## 2025-07-23 DIAGNOSIS — R29.898 DECREASED GRIP STRENGTH OF RIGHT HAND: ICD-10-CM

## 2025-07-23 DIAGNOSIS — Z74.09 IMPAIRED FUNCTIONAL MOBILITY, BALANCE, GAIT, AND ENDURANCE: ICD-10-CM

## 2025-07-23 DIAGNOSIS — Z91.81 HISTORY OF FALL: ICD-10-CM

## 2025-07-23 PROCEDURE — 97112 NEUROMUSCULAR REEDUCATION: CPT

## 2025-07-23 PROCEDURE — 97530 THERAPEUTIC ACTIVITIES: CPT

## 2025-07-23 NOTE — THERAPY TREATMENT NOTE
Outpatient Physical Therapy Neuro Treatment Note  [facility]     Patient Name: Blanca Harden  : 1945  MRN: [id number]  Today's Date: 2025      Visit Date: 2025    Visit Dx:    ICD-10-CM ICD-9-CM   1. History of stroke  Z86.73 V12.54   2. Right sided weakness  R53.1 728.87   3. History of fall  Z91.81 V15.88   4. Impaired functional mobility, balance, gait, and endurance  Z74.09 V49.89       Patient Active Problem List   Diagnosis    Osteoporosis    Breast neoplasm, Tis (DCIS), left    Iron deficiency anemia    CKD (chronic kidney disease)    Diabetic nephropathy associated with type 2 diabetes mellitus    Temporal arteritis    Immunosuppression    Anemia    Duodenal adenoma    Gastritis without bleeding    Polyp of duodenum    Morbidly obese    Dyspnea on exertion    Hyperlipidemia    Type 2 diabetes mellitus with stage 3b chronic kidney disease, with long-term current use of insulin    Essential hypertension    Bilateral lower extremity edema    Pulmonary hypertension    Obstructive sleep apnea on CPAP    Stage 3a chronic kidney disease    Iron malabsorption    Respiratory distress    Hypoglycemia due to insulin    Delirium    Hypomagnesemia    Severe malnutrition    Leg swelling    Venous (peripheral) insufficiency    Polyp of colon    Obesity (BMI 30.0-34.9)    TIA (transient ischemic attack)    Difficulty with speech    Multiple rib fractures    Coronary artery disease involving native coronary artery of native heart without angina pectoris            PT Neuro       Row Name 25 1418             Subjective    Subjective Comments Pt quiet today stating something from home is bothering her.  reports pt isn't doing much at home despite his encouragement. Pt afraid she might fall.  -SARA         Precautions and Contraindications    Precautions/Limitations fall precautions  -SARA         Subjective Pain    Able to rate subjective pain? yes  -SARA      Pre-Treatment Pain Level 0   -SARA      Post-Treatment Pain Level 0  -SARA         Cognition    Overall Cognitive Status WFL  -SARA         Posture/Observations    Posture/Observations Comments Pt arrived with rollator independently.  drove.  -SARA         Transfers    Sit-Stand [place] (Transfers) modified independence  -SARA      Stand-Sit [place] (Transfers) modified independence  -SARA      Transfers, Sit-Stand-Sit, Assist Device four wheeled walker  -SARA      Transfer, Impairments strength decreased  -SARA         Gait/Stairs (Locomotion)    [place] Level (Gait) modified independence  -SARA      Assistive Device (Gait) walker, 4-wheeled  -SARA      Distance in Feet (Gait) 100  80'  -SARA      Deviations/Abnormal Patterns (Gait) bilateral deviations;gait speed decreased  -SARA      Bilateral Gait Deviations forward flexed posture  -SARA      Right Sided Gait Deviations foot drop/toe drag;heel strike decreased  when fatigued near end of session.  -SARA         Balance Skills Training    Standing-Level of Assistance Close supervision;Contact guard  -SARA      Static Standing Balance Support Right upper extremity supported;Left upper extremity supported;parallel bars  -SARA      Standing Balance # of Minutes stepping over and back noodle - each LE  x 10    Tends to ER R LE with each movement. Lateral step over and back noodle x 10 each LE  -SARA      Gait Balance-Level of Assistance Contact guard  PT had to assist to keep hurdles stationary as pt kept clipping them with step over.  -SARA      Gait Balance Support Right upper extremity supported;Left upper extremity supported;parallel bars  -SARA      Gait Balance Activities hurdles  forward and then laterally B UE support  -SARA                User Key  (r) = Recorded By, (t) = Taken By, (c) = Cosigned By      Initials Name Provider Type    Calli Davis PT Physical Therapist                             PT Assessment/Plan       Row Name 07/23/25 1526          PT Assessment    Assessment Comments Per  , pt is not very active at home out of fear of falling. Pt is safe with rollator and encouraged her to be more active using rollator at home as well as doing HEP. Pt struggles to lift R LE up and over hurdles or noodles due to weakness.  -SARA               User Key  (r) = Recorded By, (t) = Taken By, (c) = Cosigned By      Initials Name Provider Type    Calli Davis, PT Physical Therapist                        OP Exercises       Row Name 07/23/25 1527 07/23/25 1418          Subjective    Subjective Comments -- Pt quiet today stating something from home is bothering her.  reports pt isn't doing much at home despite his encouragement. Pt afraid she might fall.  -SARA        Subjective Pain    Able to rate subjective pain? -- yes  -SARA     Pre-Treatment Pain Level -- 0  -SARA     Post-Treatment Pain Level -- 0  -SARA        Total Minutes    25403 -  PT Neuromuscular Reeducation Minutes 15  -SARA --     16304 - PT Therapeutic Activity Minutes 27  -SARA --        Exercise 1    Exercise Name 1 -- Sitting:  hip flexion, LAQ using 1.5# ankle weights  -SARA     Cueing 1 -- Verbal;Tactile;Demo  -SARA     Reps 1 -- 10  -SARA     Additional Comments -- weakness R LE remains  -SARA        Exercise 4    Exercise Name 4 -- Standing: B HR at hemibar  -SARA     Cueing 4 -- Verbal;Demo  -SARA     Reps 4 -- 10  -SARA        Exercise 7    Exercise Name 7 -- Standing with bar: 1.5 # ankle weights for knee flexion  -SARA     Cueing 7 -- Verbal;Demo  -SARA     Reps 7 -- 10  -SARA        Exercise 8    Exercise Name 8 -- Seated: B LAQ with ball between ankles.  -SARA     Cueing 8 -- Verbal;Demo  -SARA     Reps 8 -- 10  -SARA        Exercise 12    Exercise Name 12 -- Sitting: hip adduction with ball squeeze  -SARA     Cueing 12 -- Verbal;Demo  -SARA     Reps 12 -- 10  -SARA               User Key  (r) = Recorded By, (t) = Taken By, (c) = Cosigned By      Initials Name Provider Type    Calli Davis, PT Physical Therapist                                     Therapy Education  Education Details: Discussed with pt and  progress in therapy possible d/c soon. Encouraged pt to exercise and walk more at home.  Given: Fall prevention and home safety, Mobility training, HEP  Program: Reinforced  How Provided: Verbal  Provided to: Patient, Caregiver  Level of Understanding: Teach back education performed, Verbalized              Time Calculation:   Start Time: 1418  Stop Time: 1500  Time Calculation (min): 42 min  Total Timed Code Minutes- PT: 42 minute(s)  Timed Charges  97990 -  PT Neuromuscular Reeducation Minutes: 15  06779 - PT Therapeutic Activity Minutes: 27  Total Minutes  Timed Charges Total Minutes: 42   Total Minutes: 42   Therapy Charges for Today       Code Description Service Date Service Provider Modifiers Qty    73241599591 HC PT NEUROMUSC RE EDUCATION EA 15 MIN 7/23/2025 Calli Mcguire, PT GP 1    91084349764 HC PT THERAPEUTIC ACT EA 15 MIN 7/23/2025 Calli Mcguire, PT GP 2                      Calli Mcguire PT  7/23/2025

## 2025-07-23 NOTE — THERAPY PROGRESS REPORT/RE-CERT
Outpatient Occupational Therapy Neuro Progress Note  [facility]     Patient Name: Blanca Harden  : 1945  MRN: [id number]  Today's Date: 2025       Visit Date: 2025    Patient Active Problem List   Diagnosis    Osteoporosis    Breast neoplasm, Tis (DCIS), left    Iron deficiency anemia    CKD (chronic kidney disease)    Diabetic nephropathy associated with type 2 diabetes mellitus    Temporal arteritis    Immunosuppression    Anemia    Duodenal adenoma    Gastritis without bleeding    Polyp of duodenum    Morbidly obese    Dyspnea on exertion    Hyperlipidemia    Type 2 diabetes mellitus with stage 3b chronic kidney disease, with long-term current use of insulin    Essential hypertension    Bilateral lower extremity edema    Pulmonary hypertension    Obstructive sleep apnea on CPAP    Stage 3a chronic kidney disease    Iron malabsorption    Respiratory distress    Hypoglycemia due to insulin    Delirium    Hypomagnesemia    Severe malnutrition    Leg swelling    Venous (peripheral) insufficiency    Polyp of colon    Obesity (BMI 30.0-34.9)    TIA (transient ischemic attack)    Difficulty with speech    Multiple rib fractures    Coronary artery disease involving native coronary artery of native heart without angina pectoris        Past Medical History:   Diagnosis Date    Anemia     Anxiety and depression     Asthma     Balance problem     Breast cancer 2019    CKD (chronic kidney disease), stage III     Colon polyp 2019    Colonic mass     COPD (chronic obstructive pulmonary disease)     Coronary artery disease     FOLLOWED BY DR GUNN    Diabetes mellitus, type 2     History of COVID-19     History of left breast cancer     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, grade III, 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    Hypertension     Liver disease     Lower extremity edema     Moderate persistent asthma     On home O2     3L NC PRN    Pulmonary hypertension      Sleep apnea     Swelling     IN LOWER EXTREMITIES    Temporal arteritis     Varicose veins of unspecified lower extremity with ulcer of calf 2022    Venous insufficiency (chronic) (peripheral)     Vitamin D deficiency         Past Surgical History:   Procedure Laterality Date    AMPUTATION  Breast    BRAIN SURGERY      BREAST BIOPSY Left  approx    benign pathology    BREAST BIOPSY Left 2019    Ultasound guided mammotome vacuum assisted left breast biopsy with placement of a metallic clip-Dr. Daniel Gutierrez, [facility]    CARDIAC CATHETERIZATION       SECTION N/A     x3    CHOLECYSTECTOMY N/A     COLON RESECTION Right 2025    Procedure: COLON RESECTION LAPAROSCOPIC RIGHT WITH DAVINCI ROBOT;  Surgeon: Ashwin Alvarado MD;  Location: [facility] MAIN OR;  Service: Robotics - DaVinci;  Laterality: Right;    COLONOSCOPY      COLONOSCOPY N/A 2024    Procedure: COLONOSCOPY into the cecum and TI with hot snare polypectomy;  Surgeon: Ashwin Alvarado MD;  Location: [facility] ENDOSCOPY;  Service: General;  Laterality: N/A;  pre-colon polyps  post-colon mass    COLONOSCOPY W/ POLYPECTOMY N/A 2019    Enlarged folds in the antrum: biopsied; duodenal, transverse colon x2, splenic flexure, descending colon and sigmoid colon polyps: biopsied (path: tubular adenoma x6)-Dr. Zak De Jesus, [facility]    ENDOSCOPY  10/11/2019    Procedure: ESOPHAGOGASTRODUODENOSCOPY with hot snare polypectomy;  Surgeon: Haile Handley MD;  Location: [facility] ENDOSCOPY;  Service: Gastroenterology    ENDOSCOPY N/A 2020    Procedure: ESOPHAGOGASTRODUODENOSCOPY;  Surgeon: Haile Handley MD;  Location: [facility] ENDOSCOPY;  Service: Gastroenterology    ENDOSCOPY N/A 2020    Procedure: ESOPHAGOGASTRODUODENOSCOPY WITH BIOPSIES AND COLD BIOPSY POLYPECTOMY;  Surgeon: Haile Handley MD;  Location: [facility] ENDOSCOPY;  Service: Gastroenterology;  Laterality: N/A;  pre:  dyspepsia and diarrhea  post: duodenal polyp and gastritis    ENDOSCOPY N/A 07/23/2024    Procedure: ESOPHAGOGASTRODUODENOSCOPY WITH hot snare polypectomy with clip placement x 2BIOPSY;  Surgeon: Ashwin Alvarado MD;  Location: [facility] ENDOSCOPY;  Service: General;  Laterality: N/A;  pre-hx duoedenal polyp  post-duodenal polyp; gastritis    EYE SURGERY      MASTECTOMY WITH SENTINEL NODE BIOPSY AND AXILLARY NODE DISSECTION Left 07/03/2019    Procedure: LEFT BREAST MASTECTOMY WITH SENTINEL NODE BIOPSY AND , v-y plasty closure;  Surgeon: Tahmina James MD;  Location: [facility] MAIN OR;  Service: General    SUBTOTAL HYSTERECTOMY Bilateral     ovaries still in tact    TEMPORAL ARTERY BIOPSY Bilateral 12/05/2019         Visit Dx:    ICD-10-CM ICD-9-CM   1. History of stroke  Z86.73 V12.54   2. Decreased  strength of right hand  R29.898 729.89        OT Neuro       Row Name 07/23/25 1500             Coordination    Box and Blocks Right 35  complted twice for increased coordination practice. 35 1st time and 2nd  -BC      9-Hole Peg Left 45  -BC      9-Hole Peg Right 42.46  -BC         Right Upper Ext    Rt Shoulder Abduction AROM 103  all assessed seated EOM  -BC      Rt Shoulder Abduction PROM 116  -BC      Rt Shoulder Flexion AROM 90  -BC      Rt Shoulder Flexion PROM 109  -BC      Rt Shoulder Internal Rotation AROM WFLs  -BC      Rt Elbow Supination AROM 72  multiple reps and practice sup to get near range  -BC                User Key  (r) = Recorded By, (t) = Taken By, (c) = Cosigned By      Initials Name Provider Type    Maury Albrecht OT Occupational Therapist                    Hand Therapy (Last 24 Hours)       Hand Eval       Row Name 07/23/25 1500              Strength Right    # Reps 3  -BC      Right Rung 2  -BC      Right  Test 1 33  -BC      Right  Test 2 35  -BC      Right  Test 3 36  -BC       Strength Average Right 34.67  -BC          Strength Left    # Reps 3  -BC       Left Rung 2  -BC      Left  Test 1 35  -BC      Left  Test 2 32  -BC      Left  Test 3 32  -BC       Strength Average Left 33  -BC         Right Hand Strength - Pinch (lbs)    Lateral 3 lbs  -BC      Tip (2 point) 0 lbs  -BC         Left Hand Strength - Pinch (lbs)    Lateral 1 lbs  some difficulty with comprehending direction with pinch strength  -BC      Tip (2 point) 0 lbs  -BC                User Key  (r) = Recorded By, (t) = Taken By, (c) = Cosigned By      Initials Name Provider Type    BC Maury Olvera OT Occupational Therapist                         OT Assessment/Plan       Row Name 07/23/25 1541          OT Assessment    Functional Limitations Decreased safety during functional activities;Limitation in home management;Limitations in functional capacity and performance;Performance in self-care ADL  -BC     Impairments Balance;Endurance;Muscle strength;Range of motion  -BC     Assessment Comments Pt seen today for reassessment with initial evaluation on 6/23 + attending 8 treatment sessions. Progress with functional mobility and rollator around the clinic to increase home safety, and for improved iADL participation. Pt has completed a cooking task but requiring increased time, assistance, and cues for carryout of steps. Spoke with spouse regarding cooking goal, he endorses she is doing it around the house some but has never enjoyed it. Also could be a language barrier as she declines use of interpretor, goal discontinued after speaking with spouse/pt about it. Pt has progressed with increased use of RUE for daily activities, exercises and demo'd w/ improved fine motor coordination. 3/4 STGs and 3/5 LTGs met with one goal discontinued. Continue with OT POC.  -BC     Please refer to paper survey for additional self-reported information Yes  -BC     OT Diagnosis OT following CVA for balance, strengthening, and coordination tasks for increased RUE function/use.  -BC     OT Rehab Potential  Good  -BC     Patient/caregiver participated in establishment of treatment plan and goals Yes  -BC     Patient would benefit from skilled therapy intervention Yes  -BC        OT Plan    OT Frequency 2x/week  -BC     Predicted Duration of Therapy Intervention (OT) 3 weeks  -BC     Planned CPT's? OT THER ACT EA 15 MIN: [id number];OT SELF CARE/MGMT/TRAIN 15 MIN: 50677;OT NEUROMUSC RE EDUCATION EA 15 MIN: 77425;OT HOT/COLD PACK  -BC     Planned Therapy Interventions (Optional Details) balance training;home exercise program;neuromuscular re-education;bed mobility training;patient/family education;ROM (Range of Motion);strengthening;stretching;transfer training;postural re-education;joint mobilization;motor coordination training  -BC               User Key  (r) = Recorded By, (t) = Taken By, (c) = Cosigned By      Initials Name Provider Type    BC Maury Olvera, OT Occupational Therapist                     OT Goals       Row Name 07/23/25 1500          OT Short Term Goals    STG Date to Achieve 07/30/25  -BC     STG 1 Pt to increase gavino hand  strength to 30 pounds to increase improvement of strength for ADL, iADL performance.  -BC     STG 1 Progress Met  -BC     STG 2 Pt will increased RUE AROM to 120* at sh to increased (I) to be able to reach into close to gather clothes for bathing tasks at home  -BC     STG 2 Progress Ongoing;Progressing  -BC     STG 3 Pt will increase R in hand manipulation skills through improvement of box and blocks score to 35 or more to increase coordination for ADL, iADLs.  -BC     STG 3 Progress Met  -BC     STG 4 Pt will be (I) with theraputty HEP for increased gavino  and pinch for functional use  -BC     STG 4 Progress Partially Met;Ongoing;Progressing  -BC        Long Term Goals    LTG Date to Achieve 08/13/25  -BC     LTG 1 Pt will be able to complete a simple cooking task w/ supervision A with rollator and no cues for safety for increased balance and home iADL (I).  -BC     LTG 1  Progress Not Met  discontinue goal per Pt/spouse.  -BC     LTG 2 Pt to illustrate an improvement of gavino hand fine motor coordination skills through improvement of nine-hole peg test score to 45 seconds or less  -BC     LTG 2 Progress Met  -BC     LTG 3 Pt will be (I) with RUE HEP for increased sh>hand use and function for daily activities  -BC     LTG 3 Progress Ongoing;Progressing  -BC     LTG 4 Pt will be able to complete a ADL or UE leisure activity in standing with rollator for 8 minutes with Sup A for balance to increase safety for ADLs.  -BC     LTG 4 Progress Met  -BC     LTG 5 Pt with increase MMT to 4/5 RUE to increase ability to complete kitchen management tasks.  -BC     LTG 5 Progress Ongoing;Progressing  -BC     LTG 6 Pt will be (I) with gather clothing for (I) bathing task including reaching w/ RUE into closet (I)ly rollator level per spouse report  -BC     LTG 6 Progress Met  -BC        Time Calculation    OT Goal Re-Cert Due Date 07/23/25  -BC               User Key  (r) = Recorded By, (t) = Taken By, (c) = Cosigned By      Initials Name Provider Type    Maury Albrecht, OT Occupational Therapist                           Therapy Education  Education Details: theraputty HEP, printed handout discussed POC, discharge recommendations for LTG planning. reviewed all goals  Given: Fall prevention and home safety, Mobility training, HEP  Program: Reinforced, New  How Provided: Verbal, Written, Demonstration  Provided to: Patient, Caregiver       OT Exercises       Row Name 07/23/25 1500             Precautions    Existing Precautions/Restrictions fall  -BC         Subjective Comments    Subjective Comments Pt verbalized she enjoys going to therapy, spoke w spouse regarding DC plan/goals. Pt and spouse in agreement  -BC         Subjective Pain    Able to rate subjective pain? yes  -BC      Pre-Treatment Pain Level 0  -BC      Post-Treatment Pain Level 0  -BC         Exercise 1    Exercise Name 1 therputty  HEP initiated for increased /pinch, and gavino hand use/strength for household tasks/ADLS. Fair performance w/ min cues, education and provided to spouse to complete at home  -BC      Cueing 1 Verbal  -BC      Time (Minutes) 1 20  -BC                User Key  (r) = Recorded By, (t) = Taken By, (c) = Cosigned By      Initials Name Provider Type    BC Maury Olvera OT Occupational Therapist                      9 Hole Peg  9-Hole Peg Left: 45  9-Hole Peg Right: 42.46  Box and Blocks  Box and Blocks Right: 35 (complted twice for increased coordination practice. 35 1st time and 2nd)         Time Calculation:   OT Start Time: 1500  OT Stop Time: 1545  OT Time Calculation (min): 45 min  Total Timed Code Minutes- OT: 45 minute(s)  Timed Charges  43430 -  OT Neuromuscular Reeducation Minutes: 20  32065 - OT Therapeutic Activity Minutes: 25  Total Minutes  Timed Charges Total Minutes: 45   Total Minutes: 45     Therapy Charges for Today       Code Description Service Date Service Provider Modifiers Qty    [id number]  OT NEUROMUSC RE EDUCATION EA 15 MIN 7/23/2025 Maury Olvera OT GO 1    [id number]  OT THERAPEUTIC ACT EA 15 MIN 7/23/2025 Maury Olvera OT GO 2                      Maury Olvera OT  7/23/2025

## 2025-07-28 ENCOUNTER — HOSPITAL ENCOUNTER (OUTPATIENT)
Dept: OCCUPATIONAL THERAPY | Facility: HOSPITAL | Age: 80
Setting detail: THERAPIES SERIES
Discharge: HOME | End: 2025-07-28
Payer: MEDICARE

## 2025-07-28 ENCOUNTER — HOSPITAL ENCOUNTER (OUTPATIENT)
Dept: PHYSICAL THERAPY | Facility: HOSPITAL | Age: 80
Setting detail: THERAPIES SERIES
Discharge: HOME | End: 2025-07-28
Payer: MEDICARE

## 2025-07-28 ENCOUNTER — HOSPITAL ENCOUNTER (OUTPATIENT)
Dept: SPEECH THERAPY | Facility: HOSPITAL | Age: 80
Setting detail: THERAPIES SERIES
Discharge: HOME | End: 2025-07-28
Payer: MEDICARE

## 2025-07-28 DIAGNOSIS — Z86.73 HISTORY OF STROKE: Primary | ICD-10-CM

## 2025-07-28 DIAGNOSIS — Z74.09 IMPAIRED FUNCTIONAL MOBILITY, BALANCE, GAIT, AND ENDURANCE: ICD-10-CM

## 2025-07-28 DIAGNOSIS — R47.9 DIFFICULTY WITH SPEECH: ICD-10-CM

## 2025-07-28 DIAGNOSIS — R53.1 RIGHT SIDED WEAKNESS: ICD-10-CM

## 2025-07-28 DIAGNOSIS — Z91.81 HISTORY OF FALL: ICD-10-CM

## 2025-07-28 DIAGNOSIS — R29.898 DECREASED GRIP STRENGTH OF RIGHT HAND: ICD-10-CM

## 2025-07-28 PROCEDURE — 97530 THERAPEUTIC ACTIVITIES: CPT

## 2025-07-28 PROCEDURE — 97110 THERAPEUTIC EXERCISES: CPT

## 2025-07-28 PROCEDURE — 97112 NEUROMUSCULAR REEDUCATION: CPT

## 2025-07-28 PROCEDURE — 92507 TX SP LANG VOICE COMM INDIV: CPT

## 2025-07-28 NOTE — THERAPY TREATMENT NOTE
Outpatient Occupational Therapy Neuro Treatment Note  [facility]     Patient Name: Blanca Harden  : 1945  MRN: [id number]  Today's Date: 2025       Visit Date: 2025    Patient Active Problem List   Diagnosis    Osteoporosis    Breast neoplasm, Tis (DCIS), left    Iron deficiency anemia    CKD (chronic kidney disease)    Diabetic nephropathy associated with type 2 diabetes mellitus    Temporal arteritis    Immunosuppression    Anemia    Duodenal adenoma    Gastritis without bleeding    Polyp of duodenum    Morbidly obese    Dyspnea on exertion    Hyperlipidemia    Type 2 diabetes mellitus with stage 3b chronic kidney disease, with long-term current use of insulin    Essential hypertension    Bilateral lower extremity edema    Pulmonary hypertension    Obstructive sleep apnea on CPAP    Stage 3a chronic kidney disease    Iron malabsorption    Respiratory distress    Hypoglycemia due to insulin    Delirium    Hypomagnesemia    Severe malnutrition    Leg swelling    Venous (peripheral) insufficiency    Polyp of colon    Obesity (BMI 30.0-34.9)    TIA (transient ischemic attack)    Difficulty with speech    Multiple rib fractures    Coronary artery disease involving native coronary artery of native heart without angina pectoris        Past Medical History:   Diagnosis Date    Anemia     Anxiety and depression     Asthma     Balance problem     Breast cancer 2019    CKD (chronic kidney disease), stage III     Colon polyp 2019    Colonic mass     COPD (chronic obstructive pulmonary disease)     Coronary artery disease     FOLLOWED BY DR GUNN    Diabetes mellitus, type 2     History of COVID-19     History of left breast cancer     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, grade III, ER/VA negative    Hypertension     Liver disease     Lower extremity edema     Moderate persistent asthma     On home O2     3L NC PRN    Pulmonary hypertension      Sleep apnea     Swelling     IN LOWER EXTREMITIES    Temporal arteritis     Varicose veins of unspecified lower extremity with ulcer of calf 2022    Venous insufficiency (chronic) (peripheral)     Vitamin D deficiency         Past Surgical History:   Procedure Laterality Date    AMPUTATION  Breast    BRAIN SURGERY      BREAST BIOPSY Left  approx    benign pathology    BREAST BIOPSY Left 2019    Ultasound guided mammotome vacuum assisted left breast biopsy with placement of a metallic clip-Dr. Daniel Gutierrez, [facility]    CARDIAC CATHETERIZATION       SECTION N/A     x3    CHOLECYSTECTOMY N/A     COLON RESECTION Right 2025    Procedure: COLON RESECTION LAPAROSCOPIC RIGHT WITH DAVINCI ROBOT;  Surgeon: Ashwin Alvarado MD;  Location: [facility] MAIN OR;  Service: Robotics - DaVinci;  Laterality: Right;    COLONOSCOPY      COLONOSCOPY N/A 2024    Procedure: COLONOSCOPY into the cecum and TI with hot snare polypectomy;  Surgeon: Ashwin Alvarado MD;  Location: [facility] ENDOSCOPY;  Service: General;  Laterality: N/A;  pre-colon polyps  post-colon mass    COLONOSCOPY W/ POLYPECTOMY N/A 2019    Enlarged folds in the antrum: biopsied; duodenal, transverse colon x2, splenic flexure, descending colon and sigmoid colon polyps: biopsied (path: tubular adenoma x6)-Dr. Zak De Jesus, [facility]    ENDOSCOPY  10/11/2019    Procedure: ESOPHAGOGASTRODUODENOSCOPY with hot snare polypectomy;  Surgeon: Haile Handley MD;  Location: [facility] ENDOSCOPY;  Service: Gastroenterology    ENDOSCOPY N/A 2020    Procedure: ESOPHAGOGASTRODUODENOSCOPY;  Surgeon: Haile Handley MD;  Location: [facility] ENDOSCOPY;  Service: Gastroenterology    ENDOSCOPY N/A 2020    Procedure: ESOPHAGOGASTRODUODENOSCOPY WITH BIOPSIES AND COLD BIOPSY POLYPECTOMY;  Surgeon: Haile Handley MD;  Location: [facility] ENDOSCOPY;  Service: Gastroenterology;  Laterality: N/A;  pre:  dyspepsia and diarrhea  post: duodenal polyp and gastritis    ENDOSCOPY N/A 07/23/2024    Procedure: ESOPHAGOGASTRODUODENOSCOPY WITH hot snare polypectomy with clip placement x 2BIOPSY;  Surgeon: Ashwin Alvarado MD;  Location: [facility] ENDOSCOPY;  Service: General;  Laterality: N/A;  pre-hx duoedenal polyp  post-duodenal polyp; gastritis    EYE SURGERY      MASTECTOMY WITH SENTINEL NODE BIOPSY AND AXILLARY NODE DISSECTION Left 07/03/2019    Procedure: LEFT BREAST MASTECTOMY WITH SENTINEL NODE BIOPSY AND , v-y plasty closure;  Surgeon: Tahmina James MD;  Location: [facility] MAIN OR;  Service: General    SUBTOTAL HYSTERECTOMY Bilateral     ovaries still in tact    TEMPORAL ARTERY BIOPSY Bilateral 12/05/2019         Visit Dx:    ICD-10-CM ICD-9-CM   1. History of stroke  Z86.73 V12.54   2. Decreased  strength of right hand  R29.898 729.89                    OT Assessment/Plan       Row Name 07/28/25 1424          OT Assessment    Assessment Comments Patient participated well in OT OP therapy today. To address STG practiced/continued supine ther ex for increased R shoulder AROM for overhead reaching for daily tasks. Pt was able to bring R shoulder range to 145* during gravity reduced supine exer for increased function following dowel kenyatta exer/stretching. Good performance. Also cont'd w/ lego puzzle from previous session for increased coordination, attention, and sequence for tasks. Plans to continue to address goals with upcoming DC in ~2 weeks.  -BC               User Key  (r) = Recorded By, (t) = Taken By, (c) = Cosigned By      Initials Name Provider Type    BC Maury Olvera, OT Occupational Therapist                     OT Goals       Row Name 07/28/25 1300          OT Short Term Goals    STG Date to Achieve 07/30/25  -BC     STG 1 Pt to increase gavino hand  strength to 30 pounds to increase improvement of strength for ADL, iADL performance.  -BC     STG 1 Progress Met  -BC     STG 2 Pt will increased  RUE AROM to 120* at sh to increased (I) to be able to reach into close to gather clothes for bathing tasks at home  -BC     STG 2 Progress Met  145* supine gravity reduced measured  -BC     STG 3 Pt will increase R in hand manipulation skills through improvement of box and blocks score to 35 or more to increase coordination for ADL, iADLs.  -BC     STG 3 Progress Met  -BC     STG 4 Pt will be (I) with theraputty HEP for increased gavino  and pinch for functional use  -BC     STG 4 Progress Partially Met;Met  ongoing work on theraputty but  verb understanding of program  -BC        Long Term Goals    LTG Date to Achieve 08/13/25  -BC     LTG 1 Pt will be able to complete a simple cooking task w/ supervision A with rollator and no cues for safety for increased balance and home iADL (I).  -BC     LTG 1 Progress Not Met  -BC     LTG 2 Pt to illustrate an improvement of gavino hand fine motor coordination skills through improvement of nine-hole peg test score to 45 seconds or less  -BC     LTG 2 Progress Met  -BC     LTG 3 Pt will be (I) with RUE HEP for increased sh>hand use and function for daily activities  -BC     LTG 3 Progress Ongoing;Progressing  -BC     LTG 4 Pt will be able to complete a ADL or UE leisure activity in standing with rollator for 8 minutes with Sup A for balance to increase safety for ADLs.  -BC     LTG 4 Progress Met  -BC     LTG 5 Pt with increase MMT to 4/5 RUE to increase ability to complete kitchen management tasks.  -BC     LTG 5 Progress Ongoing;Progressing  -BC     LTG 6 Pt will be (I) with gather clothing for (I) bathing task including reaching w/ RUE into closet (I)ly rollator level per spouse report  -BC     LTG 6 Progress Met  -BC        Time Calculation    OT Goal Re-Cert Due Date 07/30/25  -BC               User Key  (r) = Recorded By, (t) = Taken By, (c) = Cosigned By      Initials Name Provider Type    Maury Albrecht OT Occupational Therapist                           Therapy  Education  Education Details: reviewed theraputty to continue at home, cont'd ther ex and use of cane for supine exer.  Given: HEP, Symptoms/condition management  Program: Reinforced, Progressed  How Provided: Verbal, Demonstration  Provided to: Patient  Level of Understanding: Teach back education performed, Verbalized       OT Exercises       Row Name 07/28/25 1400             Exercise 1    Exercise Name 1 supine dowel kenyatta exercise for increased AROM/strength RUE, sh FF overhead, chest press  -BC      Cueing 1 Verbal  -BC      Equipment 1 Dowel  -BC      Weights/Plates 1 4  -BC      Sets 1 2  -BC      Reps 1 15  -BC         Exercise 2    Exercise Name 2 continued from previous session seated lego task for increased gavino FMC, problem solving skills, attention,and sequence for home tasks. Mod to max A for completion of task in alloted time with visual aides to assist w/ mvmts. Increased difficulty finding correct lego piece out of bag requiring assist for this portion of task as well. questionable depth perception with multiple errors  -BC      Cueing 2 Verbal;Demo;Tactile  -BC      Time (Minutes) 2 30  -BC                User Key  (r) = Recorded By, (t) = Taken By, (c) = Cosigned By      Initials Name Provider Type    BC Maury Olvera OT Occupational Therapist                                Time Calculation:   OT Start Time: 1415  OT Stop Time: 1500  OT Time Calculation (min): 45 min  Total Timed Code Minutes- OT: 45 minute(s)  Timed Charges  88427 - OT Therapeutic Exercise Minutes: 15  98973 - OT Therapeutic Activity Minutes: 30  Total Minutes  Timed Charges Total Minutes: 45   Total Minutes: 45     Therapy Charges for Today       Code Description Service Date Service Provider Modifiers Qty    [id number]  OT THER PROC EA 15 MIN 7/28/2025 Maury Olvera OT GO 1    [id number]  OT THERAPEUTIC ACT EA 15 MIN 7/28/2025 Maury Olvera OT GO 2                      Maury Olvera OT  7/28/2025

## 2025-07-28 NOTE — THERAPY TREATMENT NOTE
Outpatient Speech Language Pathology   Adult Speech Language Cognitive Treatment Note  [facility]     Patient Name: Blanca Harden  : 1945  MRN: [id number]  Today's Date: 2025         Visit Date: 2025   Patient Active Problem List   Diagnosis    Osteoporosis    Breast neoplasm, Tis (DCIS), left    Iron deficiency anemia    CKD (chronic kidney disease)    Diabetic nephropathy associated with type 2 diabetes mellitus    Temporal arteritis    Immunosuppression    Anemia    Duodenal adenoma    Gastritis without bleeding    Polyp of duodenum    Morbidly obese    Dyspnea on exertion    Hyperlipidemia    Type 2 diabetes mellitus with stage 3b chronic kidney disease, with long-term current use of insulin    Essential hypertension    Bilateral lower extremity edema    Pulmonary hypertension    Obstructive sleep apnea on CPAP    Stage 3a chronic kidney disease    Iron malabsorption    Respiratory distress    Hypoglycemia due to insulin    Delirium    Hypomagnesemia    Severe malnutrition    Leg swelling    Venous (peripheral) insufficiency    Polyp of colon    Obesity (BMI 30.0-34.9)    TIA (transient ischemic attack)    Difficulty with speech    Multiple rib fractures    Coronary artery disease involving native coronary artery of native heart without angina pectoris          Visit Dx:    ICD-10-CM ICD-9-CM   1. History of stroke  Z86.73 V12.54   2. Difficulty with speech  R47.9 784.59                              SLP OP Goals       Row Name 25 1300          Subjective Comments    Subjective Comments Pt reports that her family is concerned that she has AD. She feels that she does not, but she is sad that she is having trouble finding words. She denies memory difficulties.  -BB        Verbal Expression Goals    Patient will improve verbal expressive language skills by completing divergent naming tasks 90%:;with cues  -BB     Status: Patient will improve verbal expressive language skills by  completing divergent naming tasks Progressing as expected  -BB     Comments: Patient will improve verbal expressive language skills by completing divergent naming tasks Divergent naming task (target 5 items) - pt prompted to utilize [place]ian or English in responses: 60% acc wo cues and 100% acc w min-mod cues. Training/education provided regarding visualization strategy: min A. Category naming task: 60% acc w min cues.  -BB     Patient will improve verbal expressive language skills by describing attributes, function, action and/or uses of an object/item 90%:;with cues  -BB     Status: Patient will improve verbal expressive language skills by describing attributes, function, action and/or uses of an object/item Progressing as expected  -BB     Comments: Patient will improve verbal expressive language skills by describing attributes, function, action and/or uses of an object/item Describing similarities between related objects (target 5): 60% acc. Describing differences between related objects (target 15): 66% acc wo cues and 80% acc w mod-min cues. Attributes: 60% acc w min cues  -BB               User Key  (r) = Recorded By, (t) = Taken By, (c) = Cosigned By      Initials Name Provider Type    Wilbur Bhatti SLP Speech and Language Pathologist                           Time Calculation:   SLP Start Time: 1245  SLP Stop Time: 1330  SLP Time Calculation (min): 45 min  Untimed Charges  54927-RT Treatment/ST Modification Prosth Aug Alter : 45  Total Minutes  Untimed Charges Total Minutes: 45   Total Minutes: 45    Therapy Charges for Today       Code Description Service Date Service Provider Modifiers Qty    57426504587  ST TREATMENT SPEECH 3 7/28/2025 Wilbur Bridges, SLP GN 1                     ERIN Echevarria  7/28/2025

## 2025-07-28 NOTE — THERAPY PROGRESS REPORT/RE-CERT
.Outpatient Physical Therapy Neuro Progress Note  [facility]     Patient Name: Blanca Harden  : 1945  MRN: [id number]  Today's Date: 2025      Visit Date: 2025    Patient Active Problem List   Diagnosis    Osteoporosis    Breast neoplasm, Tis (DCIS), left    Iron deficiency anemia    CKD (chronic kidney disease)    Diabetic nephropathy associated with type 2 diabetes mellitus    Temporal arteritis    Immunosuppression    Anemia    Duodenal adenoma    Gastritis without bleeding    Polyp of duodenum    Morbidly obese    Dyspnea on exertion    Hyperlipidemia    Type 2 diabetes mellitus with stage 3b chronic kidney disease, with long-term current use of insulin    Essential hypertension    Bilateral lower extremity edema    Pulmonary hypertension    Obstructive sleep apnea on CPAP    Stage 3a chronic kidney disease    Iron malabsorption    Respiratory distress    Hypoglycemia due to insulin    Delirium    Hypomagnesemia    Severe malnutrition    Leg swelling    Venous (peripheral) insufficiency    Polyp of colon    Obesity (BMI 30.0-34.9)    TIA (transient ischemic attack)    Difficulty with speech    Multiple rib fractures    Coronary artery disease involving native coronary artery of native heart without angina pectoris        Past Medical History:   Diagnosis Date    Anemia     Anxiety and depression     Asthma     Balance problem     Breast cancer 2019    CKD (chronic kidney disease), stage III     Colon polyp 2019    Colonic mass     COPD (chronic obstructive pulmonary disease)     Coronary artery disease     FOLLOWED BY DR GUNN    Diabetes mellitus, type 2     History of COVID-19     History of left breast cancer     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, grade III, ER/[place] negative    Hypertension     Liver disease     Lower extremity edema     Moderate persistent asthma     On home O2     3L NC PRN    Pulmonary hypertension      Sleep apnea     Swelling     IN LOWER EXTREMITIES    Temporal arteritis     Varicose veins of unspecified lower extremity with ulcer of calf 2022    Venous insufficiency (chronic) (peripheral)     Vitamin D deficiency         Past Surgical History:   Procedure Laterality Date    AMPUTATION  Breast    BRAIN SURGERY      BREAST BIOPSY Left  approx    benign pathology    BREAST BIOPSY Left 2019    Ultasound guided mammotome vacuum assisted left breast biopsy with placement of a metallic clip-Dr. Daniel Gutierrez, [facility]    CARDIAC CATHETERIZATION       SECTION N/A     x3    CHOLECYSTECTOMY N/A     COLON RESECTION Right 2025    Procedure: COLON RESECTION LAPAROSCOPIC RIGHT WITH DAVINCI ROBOT;  Surgeon: Ashwin Alvarado MD;  Location: [facility] MAIN OR;  Service: Robotics - DaVinci;  Laterality: Right;    COLONOSCOPY      COLONOSCOPY N/A 2024    Procedure: COLONOSCOPY into the cecum and TI with hot snare polypectomy;  Surgeon: Ashwin Alvarado MD;  Location: [facility] ENDOSCOPY;  Service: General;  Laterality: N/A;  pre-colon polyps  post-colon mass    COLONOSCOPY W/ POLYPECTOMY N/A 2019    Enlarged folds in the antrum: biopsied; duodenal, transverse colon x2, splenic flexure, descending colon and sigmoid colon polyps: biopsied (path: tubular adenoma x6)-Dr. Zak De Jesus, [facility]    ENDOSCOPY  10/11/2019    Procedure: ESOPHAGOGASTRODUODENOSCOPY with hot snare polypectomy;  Surgeon: Haile Handley MD;  Location: [facility] ENDOSCOPY;  Service: Gastroenterology    ENDOSCOPY N/A 2020    Procedure: ESOPHAGOGASTRODUODENOSCOPY;  Surgeon: Haile Handley MD;  Location: [facility] ENDOSCOPY;  Service: Gastroenterology    ENDOSCOPY N/A 2020    Procedure: ESOPHAGOGASTRODUODENOSCOPY WITH BIOPSIES AND COLD BIOPSY POLYPECTOMY;  Surgeon: Haile Handley MD;  Location: [facility] ENDOSCOPY;  Service: Gastroenterology;  Laterality: N/A;  pre:  "dyspepsia and diarrhea  post: duodenal polyp and gastritis    ENDOSCOPY N/A 2024    Procedure: ESOPHAGOGASTRODUODENOSCOPY WITH hot snare polypectomy with clip placement x 2BIOPSY;  Surgeon: Ashwin Alvarado MD;  Location: [facility] ENDOSCOPY;  Service: General;  Laterality: N/A;  pre-hx duoedenal polyp  post-duodenal polyp; gastritis    EYE SURGERY      MASTECTOMY WITH SENTINEL NODE BIOPSY AND AXILLARY NODE DISSECTION Left 2019    Procedure: LEFT BREAST MASTECTOMY WITH SENTINEL NODE BIOPSY AND , v-y plasty closure;  Surgeon: Tahmina James MD;  Location: [facility] MAIN OR;  Service: General    SUBTOTAL HYSTERECTOMY Bilateral     ovaries still in tact    TEMPORAL ARTERY BIOPSY Bilateral 2019         Visit Dx:     ICD-10-CM ICD-9-CM   1. History of stroke  Z86.73 V12.54   2. Right sided weakness  R53.1 728.87   3. History of fall  Z91.81 V15.88   4. Impaired functional mobility, balance, gait, and endurance  Z74.09 V49.89                PT Neuro       Row Name 25 1332             Subjective    Subjective Comments Pt c/o feeling \"dizzy\" after she sits down after a walk or supine to sit.  states pt was recently taken off BP meds due to BP being low.  -SARA         Precautions and Contraindications    Precautions/Limitations fall precautions  -SARA         Subjective Pain    Able to rate subjective pain? yes  -SARA      Pre-Treatment Pain Level 0  -SARA      Post-Treatment Pain Level 0  -SARA         Vital Signs    Vitals Comment BP checked in supine 155/76; sitting 134/78, standin/75  -SARA         Cognition    Overall Cognitive Status WFL  -SARA         Posture/Observations    Posture/Observations Comments Pt arrived with rollator independently.  drove.  -SARA         Bed Mobility    Supine-Sit [place] (Bed Mobility) standby assist  -SARA      Sit-Supine [place] (Bed Mobility) modified independence  -SARA      Comment, (Bed Mobility) paused once sitting due to \"dizziness\" BP " "dropped from 155/76 to 134/78  -SARA         Transfers    Sit-Stand [place] (Transfers) modified independence  -SARA      Stand-Sit Kay (Transfers) modified independence  -SARA      Transfers, Sit-Stand-Sit, Assist Device four wheeled walker  -SARA      Transfer, Impairments strength decreased  -SARA      Comment, (Transfers) good control to sit and demonstrates good forward weight shift consistently  -SARA         Gait/Stairs (Locomotion)    Kay Level (Gait) modified independence  -SARA      Assistive Device (Gait) walker, 4-wheeled  -SARA      Distance in Feet (Gait) 20  x 2, 40', 100'  -SARA      Deviations/Abnormal Patterns (Gait) bilateral deviations;gait speed decreased  -SARA      Bilateral Gait Deviations forward flexed posture  -SARA      Right Sided Gait Deviations heel strike decreased  -SARA         Balance Skills Training    Standing-Level of Assistance Distant supervision;Independent  -SARA      Static Standing Balance Support assistive device  -SARA      Standing-Balance Activities --  static stance  -SARA                User Key  (r) = Recorded By, (t) = Taken By, (c) = Cosigned By      Initials Name Provider Type    SARA Calli Mcguire, PT Physical Therapist                            Therapy Education  Education Details: Reviewed results of BP taken in supine, sitting and standing and that BP dropped which might be reason for feeling \"dizzy\" and to pause and get balance prior to walking.  Given: Symptoms/condition management  How Provided: Verbal, Demonstration  Provided to: Patient  Level of Understanding: Teach back education performed, Verbalized     PT OP Goals       Row Name 07/28/25 1332          PT Short Term Goals    STG Date to Achieve 08/22/25  -SARA     STG 4 Pt will perform static standing for 2 minutes with 1 hand on table for improved standing balance with activities.  -SARA     STG 4 Progress Ongoing  -SARA        Long Term Goals    LTG Date to Achieve 08/22/25  -SARA     LTG 1 Patient will be " independent with advanced HEP to improve strength and balance.  -SARA     LTG 1 Progress Ongoing;Progressing  -SARA     LTG 2 Patient will have increase strength R hip flexion and knee extension by 1/2 muscle grade.  -SARA     LTG 2 Progress Ongoing;Progressing  -SARA     LTG 3 Patient to score 20/28 on the Tinetti balance test for improved balance and reduced risk for falls.  -SARA     LTG 3 Progress Ongoing  -SARA     LTG 4 Patient will ambulate in the community 150' feet with least restrictive device SBA.  -SARA     LTG 4 Progress Ongoing;Progressing  -SARA     LTG 5 Pt to complete TUG in < or equal to 18  seconds for improved balance with gait related task.  -SARA     LTG 5 Progress Met  -SARA     LTG 6 Pt will be independent with supine to sit and vice versa.  -SARA     LTG 6 Progress Ongoing;Progressing  -        Time Calculation    PT Goal Re-Cert Due Date 08/22/25  -               User Key  (r) = Recorded By, (t) = Taken By, (c) = Cosigned By      Initials Name Provider Type    Calli Davis, PT Physical Therapist                     PT Assessment/Plan       Row Name 07/28/25 1542          PT Assessment    Functional Limitations Decreased safety during functional activities;Impaired gait;Limitation in home management;Performance in self-care ADL;Performance in leisure activities;Limitations in functional capacity and performance;Limitations in community activities  -     Impairments Balance;Gait;Endurance;Muscle strength  -     Assessment Comments Pt has been seen by for past month with therapy focusing on strengthening, ambulation and balance.  Patient continues to have some weakness of right lower extremity but is improving to where she is not demonstrating foot drag on the right.  Patient ambulates independently with rollator at this time.  She completed the tug in 16 seconds today with rollator.  Patient complained of feeling dizzy after she had been up walking or transferring supine to sit.  PT checked her  "blood pressure in supine (155/78), sitting (134/78) and standing (123/75).   provided interpretation today stating that patient was taken off her blood pressure medicine occasions recently because her blood pressures been running low.  Her blood pressure did drop with change of positions today which could be reason for having\" dizziness\" symptoms. Pt will be seen for ~ 2 more weeks for PT to revise/upgrade HEP, bed mobility, ambulation and balance and monitor BP with change of positions.  -SARA     Rehab Potential Good  -SARA     Patient/caregiver participated in establishment of treatment plan and goals Yes  -SARA     Patient would benefit from skilled therapy intervention Yes  -SARA        PT Plan    PT Frequency 2x/week  -SARA     Predicted Duration of Therapy Intervention (PT) 2 more weeks  -SARA               User Key  (r) = Recorded By, (t) = Taken By, (c) = Cosigned By      Initials Name Provider Type    Calli Davis, PT Physical Therapist                        OP Exercises       Row Name 07/28/25 1546 07/28/25 1332          Subjective    Subjective Comments -- Pt c/o feeling \"dizzy\" after she sits down after a walk or supine to sit.  states pt was recently taken off BP meds due to BP being low.  -SARA        Subjective Pain    Able to rate subjective pain? -- yes  -SARA     Pre-Treatment Pain Level -- 0  -SARA     Post-Treatment Pain Level -- 0  -SARA        Total Minutes    89697 -  PT Neuromuscular Reeducation Minutes 13  -SARA --     84611 - PT Therapeutic Activity Minutes 30  -SARA --        Exercise 4    Exercise Name 4 -- Standing: B HR at hemibar  -SARA     Cueing 4 -- Verbal;Demo  -SARA     Reps 4 -- 20  -SARA     Additional Comments -- B UE support  -        Exercise 9    Exercise Name 9 -- parallel bars with support of B UE's: resisted forward ambulation and sidestep with green TB around thighs  -SARA     Cueing 9 -- Verbal;Tactile  -SARA               User Key  (r) = Recorded By, (t) = Taken By, (c) = " "Cosigned By      Initials Name Provider Type    Calli Davis, PT Physical Therapist                                Outcome Measure Options: Timed Up and Go (TUG)  Timed Up and Go (TUG)  TUG Test 1: 17 seconds (using rollator; c/o feeling \"dizzy\" when first sat down)    Time Calculation:   Start Time: 1332  Stop Time: 1415  Time Calculation (min): 43 min  Total Timed Code Minutes- PT: 43 minute(s)  Timed Charges  76382 -  PT Neuromuscular Reeducation Minutes: 13 97530 - PT Therapeutic Activity Minutes: 30  Total Minutes  Timed Charges Total Minutes: 43   Total Minutes: 43   Therapy Charges for Today       Code Description Service Date Service Provider Modifiers Qty    66943590868 HC PT NEUROMUSC RE EDUCATION EA 15 MIN 7/28/2025 Calli Mcguire, PT GP 1    08949631725 HC PT THERAPEUTIC ACT EA 15 MIN 7/28/2025 Calli Mcguire, PT GP 2            PT G-Codes  Outcome Measure Options: Timed Up and Go (TUG)  TUG Test 1: 17 seconds (using rollator; c/o feeling \"dizzy\" when first sat down)         Calli Mcguire, PT  7/28/2025     "

## 2025-07-30 ENCOUNTER — APPOINTMENT (OUTPATIENT)
Dept: PHYSICAL THERAPY | Facility: HOSPITAL | Age: 80
End: 2025-07-30
Payer: MEDICARE

## 2025-07-30 ENCOUNTER — APPOINTMENT (OUTPATIENT)
Dept: OCCUPATIONAL THERAPY | Facility: HOSPITAL | Age: 80
End: 2025-07-30
Payer: MEDICARE

## 2025-07-30 ENCOUNTER — OFFICE VISIT (OUTPATIENT)
Dept: ENDOCRINOLOGY | Age: 80
End: 2025-07-30
Payer: MEDICARE

## 2025-07-30 VITALS
SYSTOLIC BLOOD PRESSURE: 112 MMHG | DIASTOLIC BLOOD PRESSURE: 64 MMHG | HEIGHT: 60 IN | WEIGHT: 143 LBS | OXYGEN SATURATION: 97 % | HEART RATE: 87 BPM | BODY MASS INDEX: 28.07 KG/M2

## 2025-07-30 DIAGNOSIS — E11.22 TYPE 2 DIABETES MELLITUS WITH STAGE 3B CHRONIC KIDNEY DISEASE, WITH LONG-TERM CURRENT USE OF INSULIN: ICD-10-CM

## 2025-07-30 DIAGNOSIS — E78.5 HYPERLIPIDEMIA, UNSPECIFIED HYPERLIPIDEMIA TYPE: Primary | ICD-10-CM

## 2025-07-30 DIAGNOSIS — N18.32 TYPE 2 DIABETES MELLITUS WITH STAGE 3B CHRONIC KIDNEY DISEASE, WITH LONG-TERM CURRENT USE OF INSULIN: ICD-10-CM

## 2025-07-30 DIAGNOSIS — Z79.4 TYPE 2 DIABETES MELLITUS WITH STAGE 3B CHRONIC KIDNEY DISEASE, WITH LONG-TERM CURRENT USE OF INSULIN: ICD-10-CM

## 2025-07-30 PROCEDURE — 3078F DIAST BP <80 MM HG: CPT | Performed by: INTERNAL MEDICINE

## 2025-07-30 PROCEDURE — 3074F SYST BP LT 130 MM HG: CPT | Performed by: INTERNAL MEDICINE

## 2025-07-30 PROCEDURE — 99214 OFFICE O/P EST MOD 30 MIN: CPT | Performed by: INTERNAL MEDICINE

## 2025-07-30 PROCEDURE — 1160F RVW MEDS BY RX/DR IN RCRD: CPT | Performed by: INTERNAL MEDICINE

## 2025-07-30 PROCEDURE — 95251 CONT GLUC MNTR ANALYSIS I&R: CPT | Performed by: INTERNAL MEDICINE

## 2025-07-30 PROCEDURE — 1159F MED LIST DOCD IN RCRD: CPT | Performed by: INTERNAL MEDICINE

## 2025-07-30 RX ORDER — PEN NEEDLE, DIABETIC 32GX 5/32"
1 NEEDLE, DISPOSABLE MISCELLANEOUS 4 TIMES DAILY
Qty: 400 EACH | Refills: 1 | Status: SHIPPED | OUTPATIENT
Start: 2025-07-30 | End: 2025-07-31 | Stop reason: SDUPTHER

## 2025-07-30 RX ORDER — FAMOTIDINE 40 MG/1
40 TABLET, FILM COATED ORAL 2 TIMES DAILY
COMMUNITY
Start: 2025-06-06

## 2025-07-30 NOTE — PROGRESS NOTES
Chief complaint/Reason for consult: T2DM    HPI:   - 80 year old female here for management of diabetes mellitus type 2  - Last seen in 4/2025  - No change since last visit  - Has had diabetes for 30 years  - Complications include CAD, CVA  - No known complications to date  - Is currently taking Toujeo 20 units daily, Humalog 4-6 units with meals, and Jardiance 25 units  - Reports hypoglycemia overnight  - She does have a [employer] CGM    The following portions of the patient's history were reviewed and updated as appropriate: allergies, current medications, past family history, past medical history, past social history, past surgical history, and problem list.      Objective     Vitals:    07/30/25 1341   BP: 112/64   Pulse: 87   SpO2: 97%        Physical Exam  Vitals reviewed.   Constitutional:       Appearance: Normal appearance.   HENT:      Head: Normocephalic and atraumatic.   Eyes:      General: No scleral icterus.  Pulmonary:      Effort: Pulmonary effort is normal. No respiratory distress.   Neurological:      Mental Status: She is alert.      Gait: Gait normal.   Psychiatric:         Mood and Affect: Mood normal.         Behavior: Behavior normal.         Thought Content: Thought content normal.         Judgment: Judgment normal.     CGM interpretation  Dates reviewed: 7/17-7/30/25  Data:  Avg of 193, 14% very high, 43% high, 0% in range  Interpretation: Postprandial hyperglycemia      Assessment & Plan   T2DM, uncontrolled due to hyperglycemia, complicated by CAD and CVA  - Increase Humalog 6-8 units qac  - Cont. Jardiance 25 units  - Reduce Toujeo 15 units daily     2. Hyperlipidemia  - Cont. Crestor 5 mg daily  - She states she had nausea with atorvastatin     - Return to clinic in 3 months

## 2025-07-31 ENCOUNTER — HOSPITAL ENCOUNTER (OUTPATIENT)
Dept: OCCUPATIONAL THERAPY | Facility: HOSPITAL | Age: 80
Setting detail: THERAPIES SERIES
Discharge: HOME | End: 2025-07-31
Payer: MEDICARE

## 2025-07-31 ENCOUNTER — HOSPITAL ENCOUNTER (OUTPATIENT)
Dept: PHYSICAL THERAPY | Facility: HOSPITAL | Age: 80
Setting detail: THERAPIES SERIES
Discharge: HOME | End: 2025-07-31
Payer: MEDICARE

## 2025-07-31 DIAGNOSIS — R29.898 DECREASED GRIP STRENGTH OF RIGHT HAND: ICD-10-CM

## 2025-07-31 DIAGNOSIS — Z79.4 TYPE 2 DIABETES MELLITUS WITH STAGE 3B CHRONIC KIDNEY DISEASE, WITH LONG-TERM CURRENT USE OF INSULIN: ICD-10-CM

## 2025-07-31 DIAGNOSIS — Z86.73 HISTORY OF STROKE: Primary | ICD-10-CM

## 2025-07-31 DIAGNOSIS — E78.5 HYPERLIPIDEMIA, UNSPECIFIED HYPERLIPIDEMIA TYPE: ICD-10-CM

## 2025-07-31 DIAGNOSIS — R53.1 RIGHT SIDED WEAKNESS: ICD-10-CM

## 2025-07-31 DIAGNOSIS — E11.22 TYPE 2 DIABETES MELLITUS WITH STAGE 3B CHRONIC KIDNEY DISEASE, WITH LONG-TERM CURRENT USE OF INSULIN: ICD-10-CM

## 2025-07-31 DIAGNOSIS — Z74.09 IMPAIRED FUNCTIONAL MOBILITY, BALANCE, GAIT, AND ENDURANCE: ICD-10-CM

## 2025-07-31 DIAGNOSIS — Z91.81 HISTORY OF FALL: ICD-10-CM

## 2025-07-31 DIAGNOSIS — N18.32 TYPE 2 DIABETES MELLITUS WITH STAGE 3B CHRONIC KIDNEY DISEASE, WITH LONG-TERM CURRENT USE OF INSULIN: ICD-10-CM

## 2025-07-31 PROCEDURE — 97530 THERAPEUTIC ACTIVITIES: CPT

## 2025-07-31 PROCEDURE — 97112 NEUROMUSCULAR REEDUCATION: CPT

## 2025-07-31 PROCEDURE — 97110 THERAPEUTIC EXERCISES: CPT

## 2025-07-31 RX ORDER — PEN NEEDLE, DIABETIC 32GX 5/32"
1 NEEDLE, DISPOSABLE MISCELLANEOUS 4 TIMES DAILY
Qty: 400 EACH | Refills: 1 | Status: SHIPPED | OUTPATIENT
Start: 2025-07-31

## 2025-07-31 NOTE — THERAPY TREATMENT NOTE
Outpatient Occupational Therapy Neuro Treatment Note  [facility]     Patient Name: Blanca Harden  : 1945  MRN: [id number]  Today's Date: 2025       Visit Date: 2025    Patient Active Problem List   Diagnosis    Osteoporosis    Breast neoplasm, Tis (DCIS), left    Iron deficiency anemia    CKD (chronic kidney disease)    Diabetic nephropathy associated with type 2 diabetes mellitus    Temporal arteritis    Immunosuppression    Anemia    Duodenal adenoma    Gastritis without bleeding    Polyp of duodenum    Morbidly obese    Dyspnea on exertion    Hyperlipidemia    Type 2 diabetes mellitus with stage 3b chronic kidney disease, with long-term current use of insulin    Essential hypertension    Bilateral lower extremity edema    Pulmonary hypertension    Obstructive sleep apnea on CPAP    Stage 3a chronic kidney disease    Iron malabsorption    Respiratory distress    Hypoglycemia due to insulin    Delirium    Hypomagnesemia    Severe malnutrition    Leg swelling    Venous (peripheral) insufficiency    Polyp of colon    Obesity (BMI 30.0-34.9)    TIA (transient ischemic attack)    Difficulty with speech    Multiple rib fractures    Coronary artery disease involving native coronary artery of native heart without angina pectoris        Past Medical History:   Diagnosis Date    Anemia     Anxiety and depression     Asthma     Balance problem     Breast cancer 2019    CKD (chronic kidney disease), stage III     Colon polyp 2019    Colonic mass     COPD (chronic obstructive pulmonary disease)     Coronary artery disease     FOLLOWED BY DR GUNN    Diabetes mellitus, type 2     History of COVID-19     History of left breast cancer     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, grade III, 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    Hypertension     Liver disease     Lower extremity edema     Moderate persistent asthma     On home O2     3L NC PRN    Pulmonary hypertension      Sleep apnea     Swelling     IN LOWER EXTREMITIES    Temporal arteritis     Varicose veins of unspecified lower extremity with ulcer of calf 2022    Venous insufficiency (chronic) (peripheral)     Vitamin D deficiency         Past Surgical History:   Procedure Laterality Date    AMPUTATION  Breast    BRAIN SURGERY      BREAST BIOPSY Left  approx    benign pathology    BREAST BIOPSY Left 2019    Ultasound guided mammotome vacuum assisted left breast biopsy with placement of a metallic clip-Dr. Daniel Gutierrez, [facility]    CARDIAC CATHETERIZATION       SECTION N/A     x3    CHOLECYSTECTOMY N/A     COLON RESECTION Right 2025    Procedure: COLON RESECTION LAPAROSCOPIC RIGHT WITH DAVINCI ROBOT;  Surgeon: Ashwin Alvarado MD;  Location: [facility] MAIN OR;  Service: Robotics - DaVinci;  Laterality: Right;    COLONOSCOPY      COLONOSCOPY N/A 2024    Procedure: COLONOSCOPY into the cecum and TI with hot snare polypectomy;  Surgeon: Ashwin Alvarado MD;  Location: [facility] ENDOSCOPY;  Service: General;  Laterality: N/A;  pre-colon polyps  post-colon mass    COLONOSCOPY W/ POLYPECTOMY N/A 2019    Enlarged folds in the antrum: biopsied; duodenal, transverse colon x2, splenic flexure, descending colon and sigmoid colon polyps: biopsied (path: tubular adenoma x6)-Dr. Zak De Jesus, [facility]    ENDOSCOPY  10/11/2019    Procedure: ESOPHAGOGASTRODUODENOSCOPY with hot snare polypectomy;  Surgeon: Haile Handley MD;  Location: [facility] ENDOSCOPY;  Service: Gastroenterology    ENDOSCOPY N/A 2020    Procedure: ESOPHAGOGASTRODUODENOSCOPY;  Surgeon: Haile Handley MD;  Location: [facility] ENDOSCOPY;  Service: Gastroenterology    ENDOSCOPY N/A 2020    Procedure: ESOPHAGOGASTRODUODENOSCOPY WITH BIOPSIES AND COLD BIOPSY POLYPECTOMY;  Surgeon: Haile Handley MD;  Location: [facility] ENDOSCOPY;  Service: Gastroenterology;  Laterality: N/A;  pre:  dyspepsia and diarrhea  post: duodenal polyp and gastritis    ENDOSCOPY N/A 07/23/2024    Procedure: ESOPHAGOGASTRODUODENOSCOPY WITH hot snare polypectomy with clip placement x 2BIOPSY;  Surgeon: Ashwin Alvarado MD;  Location: [facility] ENDOSCOPY;  Service: General;  Laterality: N/A;  pre-hx duoedenal polyp  post-duodenal polyp; gastritis    EYE SURGERY      MASTECTOMY WITH SENTINEL NODE BIOPSY AND AXILLARY NODE DISSECTION Left 07/03/2019    Procedure: LEFT BREAST MASTECTOMY WITH SENTINEL NODE BIOPSY AND , v-y plasty closure;  Surgeon: Tahmina James MD;  Location: [facility] MAIN OR;  Service: General    SUBTOTAL HYSTERECTOMY Bilateral     ovaries still in tact    TEMPORAL ARTERY BIOPSY Bilateral 12/05/2019         Visit Dx:    ICD-10-CM ICD-9-CM   1. History of stroke  Z86.73 V12.54   2. Decreased  strength of right hand  R29.898 729.89                    OT Assessment/Plan       Row Name 07/31/25 1455          OT Assessment    Assessment Comments Pt with good participation with session to focus on RUE strengthening, fine motor skills, balance to improve ADL independence.  -SM               User Key  (r) = Recorded By, (t) = Taken By, (c) = Cosigned By      Initials Name Provider Type    Lexy Craven, OTR/L, CSRS Occupational Therapist                               Therapy Education  Education Details: safety with reaching from rollator, recommend turning to the side and not reaching over AD  Given: Fall prevention and home safety  Program: Reinforced  How Provided: Verbal  Provided to: Patient  Level of Understanding: Verbalized       OT Exercises       Row Name 07/31/25 1430             Precautions    Existing Precautions/Restrictions fall  -SM         Subjective Comments    Subjective Comments Pt without complaints today. Per primary therapist- pt plans to dc next week.  -SM         Subjective Pain    Able to rate subjective pain? yes  -SM      Pre-Treatment Pain Level 0  -SM      Post-Treatment  Pain Level 0  -SM         Exercise 1    Exercise Name 1 supine dowel kenyatta exercise for increased AROM/strength RUE, sh FF overhead, chest press  -SM      Cueing 1 Verbal  -SM      Sets 1 2  -SM      Reps 1 15  -SM         Exercise 2    Exercise Name 2 Standing shoulder rows, shoulder ext.  -SM      Cueing 2 Verbal;Demo;Tactile  -SM      Equipment 2 Pulley  -SM      Weights/Plates 2 4 Plates  -SM      Sets 2 2  -SM      Reps 2 12  -SM         Exercise 3    Exercise Name 3 Seated theraband exercise- R shoulder IR/ER  -SM      Cueing 3 Verbal;Tactile;Demo  -SM      Equipment 3 Theraband  -SM      Resistance 3 Red  -SM      Sets 3 2  -SM      Reps 3 10  -SM         Exercise 4    Exercise Name 4 Standing reaching activity requiring reaching above shoulder height and side stepping along countertop with rollator to reach and remove suction squiz. Pt beneifts from cues on safer reaching positions with use of rollator. Fatique after a few min of engagement.  -SM      Cueing 4 Verbal;Demo  -SM         Exercise 5    Exercise Name 5 Seated R hand FMC using tweezers to  and place marble size balls into activity board.  -SM      Cueing 5 Verbal;Demo  -SM                User Key  (r) = Recorded By, (t) = Taken By, (c) = Cosigned By      Initials Name Provider Type     Lexy Denson OTKIMBER/L, CSRS Occupational Therapist                                Time Calculation:   OT Start Time: 1515  OT Stop Time: 1555  OT Time Calculation (min): 40 min  Timed Charges  88174 - OT Therapeutic Exercise Minutes: 25  40507 - OT Therapeutic Activity Minutes: 20  Total Minutes  Timed Charges Total Minutes: 45   Total Minutes: 45     Therapy Charges for Today       Code Description Service Date Service Provider Modifiers Qty    75472170092 HC OT THER PROC EA 15 MIN 7/31/2025 Lexy Denson OTR/L, KATIE KOROMA KX 2    22999974162 HC OT THERAPEUTIC ACT EA 15 MIN 7/31/2025 Lexy Denson OTR/L, KATIE KOROMA KMATTIE 1                      Lexy  RUBEN Denson/L, CSRS  7/31/2025

## 2025-07-31 NOTE — THERAPY TREATMENT NOTE
Outpatient Physical Therapy Neuro Treatment Note  [facility]     Patient Name: Blanca Harden  : 1945  MRN: [id number]  Today's Date: 2025      Visit Date: 2025    Visit Dx:    ICD-10-CM ICD-9-CM   1. History of stroke  Z86.73 V12.54   2. Right sided weakness  R53.1 728.87   3. History of fall  Z91.81 V15.88   4. Impaired functional mobility, balance, gait, and endurance  Z74.09 V49.89       Patient Active Problem List   Diagnosis    Osteoporosis    Breast neoplasm, Tis (DCIS), left    Iron deficiency anemia    CKD (chronic kidney disease)    Diabetic nephropathy associated with type 2 diabetes mellitus    Temporal arteritis    Immunosuppression    Anemia    Duodenal adenoma    Gastritis without bleeding    Polyp of duodenum    Morbidly obese    Dyspnea on exertion    Hyperlipidemia    Type 2 diabetes mellitus with stage 3b chronic kidney disease, with long-term current use of insulin    Essential hypertension    Bilateral lower extremity edema    Pulmonary hypertension    Obstructive sleep apnea on CPAP    Stage 3a chronic kidney disease    Iron malabsorption    Respiratory distress    Hypoglycemia due to insulin    Delirium    Hypomagnesemia    Severe malnutrition    Leg swelling    Venous (peripheral) insufficiency    Polyp of colon    Obesity (BMI 30.0-34.9)    TIA (transient ischemic attack)    Difficulty with speech    Multiple rib fractures    Coronary artery disease involving native coronary artery of native heart without angina pectoris            PT Neuro       Row Name 25 2127             Subjective    Subjective Comments SawPCP yesterday and was prescribed some medicine for anxiety. He also agreed pt's BP was a little low.  -SARA         Precautions and Contraindications    Precautions/Limitations fall precautions  -SARA         Subjective Pain    Able to rate subjective pain? yes  -SARA      Pre-Treatment Pain Level 0  -SARA      Post-Treatment Pain Level 0  -SARA          Vision-Basic Assessment    Current Vision No visual deficits  -SARA         Cognition    Overall Cognitive Status WFL  -SARA         Posture/Observations    Posture/Observations Comments Pt arrived with rollator independently.  drove.  -SARA         Bed Mobility    Supine-Sit Linn (Bed Mobility) modified independence  -SARA      Sit-Supine Linn (Bed Mobility) modified independence  -SARA      Comment, (Bed Mobility) c/o feeling dizzy when first sat up. Explained to pt to do ankle pumps to relieve symptoms.  -SARA         Transfers    Sit-Stand Linn (Transfers) modified independence  -SARA      Stand-Sit Linn (Transfers) modified independence  -SARA      Transfers, Sit-Stand-Sit, Assist Device four wheeled walker  -SARA         Gait/Stairs (Locomotion)    Linn Level (Gait) modified independence  -SARA      Assistive Device (Gait) walker, 4-wheeled  -SARA      Deviations/Abnormal Patterns (Gait) bilateral deviations;gait speed decreased  -SARA      Bilateral Gait Deviations forward flexed posture  -SARA      Right Sided Gait Deviations heel strike decreased  -SARA         Balance Skills Training    Standing-Level of Assistance Contact guard  -SARA      Static Standing Balance Support No upper extremity supported  -SARA      Standing-Balance Activities Balloon tapping  alternate tap to colored discs with short quick steps forward.  -SARA      Standing Balance # of Minutes 2 minuter balloon tapping with 1 hand on rollator  -SARA      Gait Balance-Level of Assistance Contact guard  -SARA      Gait Balance Support gina bar  1 hand on bar for backward, 2 for sidestep  -SARA      Gait Balance Activities backwards;side-stepping  -SARA                User Key  (r) = Recorded By, (t) = Taken By, (c) = Cosigned By      Initials Name Provider Type    Calli Davis, PT Physical Therapist                             PT Assessment/Plan       Row Name 07/31/25 2201          PT Assessment    Assessment Comments Pt  "tolerated session of strengthening of LE's and really liked balloon tapping in standing.  -SARA               User Key  (r) = Recorded By, (t) = Taken By, (c) = Cosigned By      Initials Name Provider Type    Calli Davis, PT Physical Therapist                        OP Exercises       Row Name 07/31/25 1416 07/31/25 1335          Subjective    Subjective Comments -- SawPCP yesterday and was prescribed some medicine for anxiety. He also agreed pt's BP was a little low.  -SARA        Subjective Pain    Able to rate subjective pain? -- yes  -SARA     Pre-Treatment Pain Level -- 0  -SARA     Post-Treatment Pain Level -- 0  -SARA        Total Minutes    04150 -  PT Neuromuscular Reeducation Minutes 15  -SARA --     83350 - PT Therapeutic Activity Minutes 25  -SARA --        Exercise 1    Exercise Name 1 -- Sitting:  hip flexion, LAQ using 1.5# ankle weights  -SARA     Cueing 1 -- Verbal  -SARA     Reps 1 -- 10  -SARA        Exercise 2    Exercise Name 2 -- Supine: B bridge  -SARA     Cueing 2 -- Verbal  -SARA     Sets 2 -- 2  -SARA     Reps 2 -- 10  -SARA        Exercise 4    Exercise Name 4 -- Standing: B HR at hemibar  -SARA     Cueing 4 -- Verbal  -SARA     Reps 4 -- 10  -SARA     Additional Comments -- Bilateral upper extremity support  -SARA        Exercise 5    Exercise Name 5 -- step taps forward and then laterally to 4\" step with B UE support using 1.5# ankle weights  -SARA     Cueing 5 -- Verbal;Demo  -SARA     Reps 5 -- 10  -SARA     Additional Comments -- B UE support; dragging R foot off step due to weakness  -SARA        Exercise 7    Exercise Name 7 -- Standing with bar: 1.5 # ankle weights for knee flexion, hip abduction  -SARA     Cueing 7 -- Verbal;Demo  -SARA     Reps 7 -- 10 each  -SARA        Exercise 11    Exercise Name 11 -- NuStep all E's at 3  -SARA     Cueing 11 -- Verbal  -SARA     Time 11 -- 3 minutes  -SARA               User Key  (r) = Recorded By, (t) = Taken By, (c) = Cosigned By      Initials Name Provider Type    Calli Davis, " "PT Physical Therapist                                    Therapy Education  Education Details: Instructed pt to work on ankle pumps when first comes to sit to might help reduce \"dizziness\".  Given: Symptoms/condition management  Program: Reinforced  How Provided: Verbal  Provided to: Patient  Level of Understanding: Teach back education performed, Verbalized, Demonstrated              Time Calculation:   Start Time: 1335  Stop Time: 1415  Time Calculation (min): 40 min  Total Timed Code Minutes- PT: 40 minute(s)  Timed Charges  22447 -  PT Neuromuscular Reeducation Minutes: 15  06788 - PT Therapeutic Activity Minutes: 25  Total Minutes  Timed Charges Total Minutes: 40   Total Minutes: 40   Therapy Charges for Today       Code Description Service Date Service Provider Modifiers Qty    70644453737 HC PT NEUROMUSC RE EDUCATION EA 15 MIN 7/31/2025 Calli Mcguire, PT GP 1    [id number]  PT THERAPEUTIC ACT EA 15 MIN 7/31/2025 Calli Mcguire, PT GP 2                      Calli Mcguire, PT  7/31/2025     "

## 2025-08-04 ENCOUNTER — HOSPITAL ENCOUNTER (OUTPATIENT)
Dept: PHYSICAL THERAPY | Facility: HOSPITAL | Age: 80
Setting detail: THERAPIES SERIES
Discharge: HOME | End: 2025-08-04
Payer: MEDICARE

## 2025-08-04 ENCOUNTER — HOSPITAL ENCOUNTER (OUTPATIENT)
Dept: OCCUPATIONAL THERAPY | Facility: HOSPITAL | Age: 80
Setting detail: THERAPIES SERIES
Discharge: HOME | End: 2025-08-04
Payer: MEDICARE

## 2025-08-04 ENCOUNTER — HOSPITAL ENCOUNTER (OUTPATIENT)
Dept: SPEECH THERAPY | Facility: HOSPITAL | Age: 80
Setting detail: THERAPIES SERIES
Discharge: HOME | End: 2025-08-04
Payer: MEDICARE

## 2025-08-04 DIAGNOSIS — R47.9 DIFFICULTY WITH SPEECH: ICD-10-CM

## 2025-08-04 DIAGNOSIS — Z86.73 HISTORY OF STROKE: Primary | ICD-10-CM

## 2025-08-04 DIAGNOSIS — R29.898 DECREASED GRIP STRENGTH OF RIGHT HAND: ICD-10-CM

## 2025-08-04 PROCEDURE — 97112 NEUROMUSCULAR REEDUCATION: CPT

## 2025-08-04 PROCEDURE — 97110 THERAPEUTIC EXERCISES: CPT

## 2025-08-04 PROCEDURE — 92507 TX SP LANG VOICE COMM INDIV: CPT

## 2025-08-04 PROCEDURE — 97530 THERAPEUTIC ACTIVITIES: CPT

## 2025-08-06 ENCOUNTER — HOSPITAL ENCOUNTER (OUTPATIENT)
Dept: OCCUPATIONAL THERAPY | Facility: HOSPITAL | Age: 80
Setting detail: THERAPIES SERIES
Discharge: HOME | End: 2025-08-06
Payer: MEDICARE

## 2025-08-06 ENCOUNTER — HOSPITAL ENCOUNTER (OUTPATIENT)
Dept: PHYSICAL THERAPY | Facility: HOSPITAL | Age: 80
Setting detail: THERAPIES SERIES
Discharge: HOME | End: 2025-08-06
Payer: MEDICARE

## 2025-08-06 DIAGNOSIS — Z86.73 HISTORY OF STROKE: Primary | ICD-10-CM

## 2025-08-06 DIAGNOSIS — R53.1 RIGHT SIDED WEAKNESS: ICD-10-CM

## 2025-08-06 DIAGNOSIS — Z74.09 IMPAIRED FUNCTIONAL MOBILITY, BALANCE, GAIT, AND ENDURANCE: ICD-10-CM

## 2025-08-06 DIAGNOSIS — Z91.81 HISTORY OF FALL: ICD-10-CM

## 2025-08-06 DIAGNOSIS — R29.898 DECREASED GRIP STRENGTH OF RIGHT HAND: ICD-10-CM

## 2025-08-06 PROCEDURE — 97110 THERAPEUTIC EXERCISES: CPT

## 2025-08-06 PROCEDURE — 97530 THERAPEUTIC ACTIVITIES: CPT

## 2025-08-06 PROCEDURE — 97112 NEUROMUSCULAR REEDUCATION: CPT

## 2025-08-10 ENCOUNTER — HOSPITAL ENCOUNTER (OUTPATIENT)
Facility: HOSPITAL | Age: 80
Setting detail: OBSERVATION
Discharge: HOME | End: 2025-08-11
Attending: EMERGENCY MEDICINE | Admitting: EMERGENCY MEDICINE
Payer: MEDICARE

## 2025-08-10 ENCOUNTER — APPOINTMENT (OUTPATIENT)
Dept: CT IMAGING | Facility: HOSPITAL | Age: 80
End: 2025-08-10
Payer: MEDICARE

## 2025-08-10 ENCOUNTER — APPOINTMENT (OUTPATIENT)
Dept: GENERAL RADIOLOGY | Facility: HOSPITAL | Age: 80
End: 2025-08-10
Payer: MEDICARE

## 2025-08-10 DIAGNOSIS — R73.9 HYPERGLYCEMIA: ICD-10-CM

## 2025-08-10 DIAGNOSIS — I10 HYPERTENSION, UNSPECIFIED TYPE: ICD-10-CM

## 2025-08-10 DIAGNOSIS — I25.10 CORONARY ARTERY DISEASE INVOLVING NATIVE HEART, UNSPECIFIED VESSEL OR LESION TYPE, UNSPECIFIED WHETHER ANGINA PRESENT: ICD-10-CM

## 2025-08-10 DIAGNOSIS — R79.89 ELEVATED TROPONIN: ICD-10-CM

## 2025-08-10 DIAGNOSIS — R07.9 CHEST PAIN, UNSPECIFIED TYPE: Primary | ICD-10-CM

## 2025-08-10 DIAGNOSIS — F32.A MILD DEPRESSION: ICD-10-CM

## 2025-08-10 DIAGNOSIS — N18.9 CHRONIC KIDNEY DISEASE, UNSPECIFIED CKD STAGE: ICD-10-CM

## 2025-08-10 LAB
ALBUMIN SERPL-MCNC: 3.7 G/DL (ref 3.5–5.2)
ALBUMIN/GLOB SERPL: 1.1 G/DL
ALP SERPL-CCNC: 111 U/L (ref 39–117)
ALT SERPL W P-5'-P-CCNC: 7 U/L (ref 1–33)
ANION GAP SERPL CALCULATED.3IONS-SCNC: 14 MMOL/L (ref 5–15)
AST SERPL-CCNC: 15 U/L (ref 1–32)
BASOPHILS # BLD AUTO: 0.03 10*3/MM3 (ref 0–0.2)
BASOPHILS NFR BLD AUTO: 0.3 % (ref 0–1.5)
BILIRUB SERPL-MCNC: 0.5 MG/DL (ref 0–1.2)
BUN SERPL-MCNC: 21 MG/DL (ref 8–23)
BUN/CREAT SERPL: 15.4 (ref 7–25)
CALCIUM SPEC-SCNC: 9.1 MG/DL (ref 8.6–10.5)
CHLORIDE SERPL-SCNC: 99 MMOL/L (ref 98–107)
CHOLEST SERPL-MCNC: 107 MG/DL (ref 0–200)
CO2 SERPL-SCNC: 21 MMOL/L (ref 22–29)
CREAT SERPL-MCNC: 1.36 MG/DL (ref 0.57–1)
D DIMER PPP FEU-MCNC: 1.26 MCGFEU/ML (ref 0–0.79)
DEPRECATED RDW RBC AUTO: 45.5 FL (ref 37–54)
EGFRCR SERPLBLD CKD-EPI 2021: 39.7 ML/MIN/1.73
EOSINOPHIL # BLD AUTO: 0.13 10*3/MM3 (ref 0–0.4)
EOSINOPHIL NFR BLD AUTO: 1.4 % (ref 0.3–6.2)
ERYTHROCYTE [DISTWIDTH] IN BLOOD BY AUTOMATED COUNT: 12.6 % (ref 12.3–15.4)
GEN 5 1HR TROPONIN T REFLEX: 38 NG/L
GLOBULIN UR ELPH-MCNC: 3.3 GM/DL
GLUCOSE BLDC GLUCOMTR-MCNC: 109 MG/DL (ref 65–99)
GLUCOSE BLDC GLUCOMTR-MCNC: 76 MG/DL (ref 65–99)
GLUCOSE SERPL-MCNC: 146 MG/DL (ref 65–99)
HBA1C MFR BLD: 9.3 % (ref 4.8–5.6)
HCT VFR BLD AUTO: 38.1 % (ref 34–46.6)
HDLC SERPL-MCNC: 33 MG/DL (ref 40–60)
HGB BLD-MCNC: 11.9 G/DL (ref 12–15.9)
HOLD SPECIMEN: NORMAL
HOLD SPECIMEN: NORMAL
IMM GRANULOCYTES # BLD AUTO: 0.04 10*3/MM3 (ref 0–0.05)
IMM GRANULOCYTES NFR BLD AUTO: 0.4 % (ref 0–0.5)
LDLC SERPL CALC-MCNC: 44 MG/DL (ref 0–100)
LDLC/HDLC SERPL: 1.14 {RATIO}
LIPASE SERPL-CCNC: 16 U/L (ref 13–60)
LYMPHOCYTES # BLD AUTO: 0.89 10*3/MM3 (ref 0.7–3.1)
LYMPHOCYTES NFR BLD AUTO: 9.8 % (ref 19.6–45.3)
MCH RBC QN AUTO: 30.7 PG (ref 26.6–33)
MCHC RBC AUTO-ENTMCNC: 31.2 G/DL (ref 31.5–35.7)
MCV RBC AUTO: 98.2 FL (ref 79–97)
MONOCYTES # BLD AUTO: 0.62 10*3/MM3 (ref 0.1–0.9)
MONOCYTES NFR BLD AUTO: 6.8 % (ref 5–12)
NEUTROPHILS NFR BLD AUTO: 7.36 10*3/MM3 (ref 1.7–7)
NEUTROPHILS NFR BLD AUTO: 81.3 % (ref 42.7–76)
NRBC BLD AUTO-RTO: 0 /100 WBC (ref 0–0.2)
PLATELET # BLD AUTO: 275 10*3/MM3 (ref 140–450)
PMV BLD AUTO: 9.5 FL (ref 6–12)
POTASSIUM SERPL-SCNC: 4.5 MMOL/L (ref 3.5–5.2)
PROT SERPL-MCNC: 7 G/DL (ref 6–8.5)
QT INTERVAL: 333 MS
QTC INTERVAL: 422 MS
RBC # BLD AUTO: 3.88 10*6/MM3 (ref 3.77–5.28)
SODIUM SERPL-SCNC: 134 MMOL/L (ref 136–145)
TRIGL SERPL-MCNC: 182 MG/DL (ref 0–150)
TROPONIN T % DELTA: 0
TROPONIN T NUMERIC DELTA: 0 NG/L
TROPONIN T SERPL HS-MCNC: 38 NG/L
VLDLC SERPL-MCNC: 30 MG/DL (ref 5–40)
WBC NRBC COR # BLD AUTO: 9.07 10*3/MM3 (ref 3.4–10.8)
WHOLE BLOOD HOLD COAG: NORMAL
WHOLE BLOOD HOLD SPECIMEN: NORMAL

## 2025-08-10 PROCEDURE — 71275 CT ANGIOGRAPHY CHEST: CPT

## 2025-08-10 PROCEDURE — 71045 X-RAY EXAM CHEST 1 VIEW: CPT

## 2025-08-10 PROCEDURE — 83036 HEMOGLOBIN GLYCOSYLATED A1C: CPT

## 2025-08-10 PROCEDURE — 36415 COLL VENOUS BLD VENIPUNCTURE: CPT

## 2025-08-10 PROCEDURE — 25510000001 IOPAMIDOL PER 1 ML: Performed by: EMERGENCY MEDICINE

## 2025-08-10 PROCEDURE — G0378 HOSPITAL OBSERVATION PER HR: HCPCS

## 2025-08-10 PROCEDURE — 84484 ASSAY OF TROPONIN QUANT: CPT | Performed by: EMERGENCY MEDICINE

## 2025-08-10 PROCEDURE — 80061 LIPID PANEL: CPT

## 2025-08-10 PROCEDURE — 25810000003 SODIUM CHLORIDE 0.9 % SOLUTION: Performed by: EMERGENCY MEDICINE

## 2025-08-10 PROCEDURE — 82948 REAGENT STRIP/BLOOD GLUCOSE: CPT

## 2025-08-10 PROCEDURE — 93010 ELECTROCARDIOGRAM REPORT: CPT | Performed by: INTERNAL MEDICINE

## 2025-08-10 PROCEDURE — 93005 ELECTROCARDIOGRAM TRACING: CPT | Performed by: EMERGENCY MEDICINE

## 2025-08-10 PROCEDURE — 85379 FIBRIN DEGRADATION QUANT: CPT | Performed by: EMERGENCY MEDICINE

## 2025-08-10 PROCEDURE — 63710000001 INSULIN GLARGINE PER 5 UNITS

## 2025-08-10 PROCEDURE — 93005 ELECTROCARDIOGRAM TRACING: CPT

## 2025-08-10 PROCEDURE — 85025 COMPLETE CBC W/AUTO DIFF WBC: CPT | Performed by: EMERGENCY MEDICINE

## 2025-08-10 PROCEDURE — 99285 EMERGENCY DEPT VISIT HI MDM: CPT

## 2025-08-10 PROCEDURE — 80053 COMPREHEN METABOLIC PANEL: CPT | Performed by: EMERGENCY MEDICINE

## 2025-08-10 PROCEDURE — 96360 HYDRATION IV INFUSION INIT: CPT

## 2025-08-10 PROCEDURE — 83690 ASSAY OF LIPASE: CPT | Performed by: EMERGENCY MEDICINE

## 2025-08-10 PROCEDURE — 94640 AIRWAY INHALATION TREATMENT: CPT

## 2025-08-10 RX ORDER — ONDANSETRON 2 MG/ML
4 INJECTION INTRAMUSCULAR; INTRAVENOUS EVERY 6 HOURS PRN
Status: DISCONTINUED | OUTPATIENT
Start: 2025-08-10 | End: 2025-08-11 | Stop reason: HOSPADM

## 2025-08-10 RX ORDER — SODIUM CHLORIDE 9 MG/ML
40 INJECTION, SOLUTION INTRAVENOUS AS NEEDED
Status: DISCONTINUED | OUTPATIENT
Start: 2025-08-10 | End: 2025-08-11 | Stop reason: HOSPADM

## 2025-08-10 RX ORDER — AMOXICILLIN 250 MG
2 CAPSULE ORAL 2 TIMES DAILY PRN
Status: DISCONTINUED | OUTPATIENT
Start: 2025-08-10 | End: 2025-08-11 | Stop reason: HOSPADM

## 2025-08-10 RX ORDER — LISINOPRIL 20 MG/1
40 TABLET ORAL DAILY
Status: DISCONTINUED | OUTPATIENT
Start: 2025-08-11 | End: 2025-08-11 | Stop reason: HOSPADM

## 2025-08-10 RX ORDER — BISOPROLOL FUMARATE 5 MG/1
20 TABLET, FILM COATED ORAL NIGHTLY
Status: DISCONTINUED | OUTPATIENT
Start: 2025-08-10 | End: 2025-08-11 | Stop reason: HOSPADM

## 2025-08-10 RX ORDER — PREDNISONE 5 MG/1
5 TABLET ORAL EVERY EVENING
Status: DISCONTINUED | OUTPATIENT
Start: 2025-08-11 | End: 2025-08-11 | Stop reason: HOSPADM

## 2025-08-10 RX ORDER — POLYETHYLENE GLYCOL 3350 17 G/17G
17 POWDER, FOR SOLUTION ORAL DAILY PRN
Status: DISCONTINUED | OUTPATIENT
Start: 2025-08-10 | End: 2025-08-11 | Stop reason: HOSPADM

## 2025-08-10 RX ORDER — ASPIRIN 81 MG/1
324 TABLET, CHEWABLE ORAL ONCE
Status: DISCONTINUED | OUTPATIENT
Start: 2025-08-10 | End: 2025-08-11 | Stop reason: HOSPADM

## 2025-08-10 RX ORDER — BISACODYL 10 MG
10 SUPPOSITORY, RECTAL RECTAL DAILY PRN
Status: DISCONTINUED | OUTPATIENT
Start: 2025-08-10 | End: 2025-08-11 | Stop reason: HOSPADM

## 2025-08-10 RX ORDER — FAMOTIDINE 20 MG/1
40 TABLET, FILM COATED ORAL 2 TIMES DAILY
Status: DISCONTINUED | OUTPATIENT
Start: 2025-08-10 | End: 2025-08-11 | Stop reason: HOSPADM

## 2025-08-10 RX ORDER — SODIUM CHLORIDE 0.9 % (FLUSH) 0.9 %
10 SYRINGE (ML) INJECTION AS NEEDED
Status: DISCONTINUED | OUTPATIENT
Start: 2025-08-10 | End: 2025-08-11 | Stop reason: HOSPADM

## 2025-08-10 RX ORDER — ROSUVASTATIN CALCIUM 40 MG/1
40 TABLET, COATED ORAL NIGHTLY
Status: DISCONTINUED | OUTPATIENT
Start: 2025-08-10 | End: 2025-08-11 | Stop reason: HOSPADM

## 2025-08-10 RX ORDER — IBUPROFEN 600 MG/1
1 TABLET ORAL
Status: DISCONTINUED | OUTPATIENT
Start: 2025-08-10 | End: 2025-08-11 | Stop reason: HOSPADM

## 2025-08-10 RX ORDER — ASPIRIN 325 MG
325 TABLET ORAL ONCE
Status: DISCONTINUED | OUTPATIENT
Start: 2025-08-10 | End: 2025-08-10

## 2025-08-10 RX ORDER — BISACODYL 5 MG/1
5 TABLET, DELAYED RELEASE ORAL DAILY PRN
Status: DISCONTINUED | OUTPATIENT
Start: 2025-08-10 | End: 2025-08-11 | Stop reason: HOSPADM

## 2025-08-10 RX ORDER — NICOTINE POLACRILEX 4 MG
15 LOZENGE BUCCAL
Status: DISCONTINUED | OUTPATIENT
Start: 2025-08-10 | End: 2025-08-11 | Stop reason: HOSPADM

## 2025-08-10 RX ORDER — ASPIRIN 81 MG/1
81 TABLET ORAL DAILY
Status: DISCONTINUED | OUTPATIENT
Start: 2025-08-11 | End: 2025-08-11 | Stop reason: HOSPADM

## 2025-08-10 RX ORDER — DEXTROSE MONOHYDRATE 25 G/50ML
25 INJECTION, SOLUTION INTRAVENOUS
Status: DISCONTINUED | OUTPATIENT
Start: 2025-08-10 | End: 2025-08-11 | Stop reason: HOSPADM

## 2025-08-10 RX ORDER — ONDANSETRON 4 MG/1
4 TABLET, ORALLY DISINTEGRATING ORAL EVERY 6 HOURS PRN
Status: DISCONTINUED | OUTPATIENT
Start: 2025-08-10 | End: 2025-08-11 | Stop reason: HOSPADM

## 2025-08-10 RX ORDER — BUDESONIDE AND FORMOTEROL FUMARATE DIHYDRATE 160; 4.5 UG/1; UG/1
2 AEROSOL RESPIRATORY (INHALATION) 2 TIMES DAILY
Status: DISCONTINUED | OUTPATIENT
Start: 2025-08-10 | End: 2025-08-11 | Stop reason: HOSPADM

## 2025-08-10 RX ORDER — IOPAMIDOL 755 MG/ML
100 INJECTION, SOLUTION INTRAVASCULAR
Status: COMPLETED | OUTPATIENT
Start: 2025-08-10 | End: 2025-08-10

## 2025-08-10 RX ORDER — NITROGLYCERIN 0.4 MG/1
0.4 TABLET SUBLINGUAL
Status: DISCONTINUED | OUTPATIENT
Start: 2025-08-10 | End: 2025-08-11 | Stop reason: HOSPADM

## 2025-08-10 RX ORDER — INSULIN LISPRO 100 [IU]/ML
2-7 INJECTION, SOLUTION INTRAVENOUS; SUBCUTANEOUS
Status: DISCONTINUED | OUTPATIENT
Start: 2025-08-10 | End: 2025-08-11 | Stop reason: HOSPADM

## 2025-08-10 RX ORDER — SODIUM CHLORIDE 0.9 % (FLUSH) 0.9 %
10 SYRINGE (ML) INJECTION EVERY 12 HOURS SCHEDULED
Status: DISCONTINUED | OUTPATIENT
Start: 2025-08-10 | End: 2025-08-11 | Stop reason: HOSPADM

## 2025-08-10 RX ADMIN — BUDESONIDE AND FORMOTEROL FUMARATE DIHYDRATE 2 PUFF: 160; 4.5 AEROSOL RESPIRATORY (INHALATION) at 23:24

## 2025-08-10 RX ADMIN — FAMOTIDINE 40 MG: 20 TABLET, FILM COATED ORAL at 22:30

## 2025-08-10 RX ADMIN — Medication 10 ML: at 21:41

## 2025-08-10 RX ADMIN — SODIUM CHLORIDE 1000 ML: 9 INJECTION, SOLUTION INTRAVENOUS at 16:36

## 2025-08-10 RX ADMIN — INSULIN GLARGINE 16 UNITS: 100 INJECTION, SOLUTION SUBCUTANEOUS at 22:31

## 2025-08-10 RX ADMIN — IOPAMIDOL 85 ML: 755 INJECTION, SOLUTION INTRAVENOUS at 17:11

## 2025-08-10 RX ADMIN — ROSUVASTATIN 40 MG: 40 TABLET, FILM COATED ORAL at 22:30

## 2025-08-10 RX ADMIN — BISOPROLOL FUMARATE 20 MG: 5 TABLET ORAL at 22:30

## 2025-08-11 ENCOUNTER — READMISSION MANAGEMENT (OUTPATIENT)
Dept: CALL CENTER | Facility: HOSPITAL | Age: 80
End: 2025-08-11
Payer: MEDICARE

## 2025-08-11 VITALS
TEMPERATURE: 97.9 F | WEIGHT: 158 LBS | BODY MASS INDEX: 31.02 KG/M2 | HEIGHT: 60 IN | DIASTOLIC BLOOD PRESSURE: 76 MMHG | SYSTOLIC BLOOD PRESSURE: 157 MMHG | RESPIRATION RATE: 16 BRPM | HEART RATE: 88 BPM | OXYGEN SATURATION: 97 %

## 2025-08-11 LAB
ANION GAP SERPL CALCULATED.3IONS-SCNC: 9.7 MMOL/L (ref 5–15)
BUN SERPL-MCNC: 14 MG/DL (ref 8–23)
BUN/CREAT SERPL: 12.6 (ref 7–25)
CALCIUM SPEC-SCNC: 8.9 MG/DL (ref 8.6–10.5)
CHLORIDE SERPL-SCNC: 101 MMOL/L (ref 98–107)
CO2 SERPL-SCNC: 26.3 MMOL/L (ref 22–29)
CREAT SERPL-MCNC: 1.11 MG/DL (ref 0.57–1)
DEPRECATED RDW RBC AUTO: 45.4 FL (ref 37–54)
EGFRCR SERPLBLD CKD-EPI 2021: 50.7 ML/MIN/1.73
ERYTHROCYTE [DISTWIDTH] IN BLOOD BY AUTOMATED COUNT: 12.6 % (ref 12.3–15.4)
GLUCOSE BLDC GLUCOMTR-MCNC: 84 MG/DL (ref 65–99)
GLUCOSE SERPL-MCNC: 79 MG/DL (ref 65–99)
HCT VFR BLD AUTO: 35.6 % (ref 34–46.6)
HGB BLD-MCNC: 11 G/DL (ref 12–15.9)
MCH RBC QN AUTO: 30.6 PG (ref 26.6–33)
MCHC RBC AUTO-ENTMCNC: 30.9 G/DL (ref 31.5–35.7)
MCV RBC AUTO: 98.9 FL (ref 79–97)
PLATELET # BLD AUTO: 242 10*3/MM3 (ref 140–450)
PMV BLD AUTO: 10 FL (ref 6–12)
POTASSIUM SERPL-SCNC: 4.7 MMOL/L (ref 3.5–5.2)
QT INTERVAL: 360 MS
QTC INTERVAL: 422 MS
RBC # BLD AUTO: 3.6 10*6/MM3 (ref 3.77–5.28)
SODIUM SERPL-SCNC: 137 MMOL/L (ref 136–145)
TROPONIN T SERPL HS-MCNC: 36 NG/L
WBC NRBC COR # BLD AUTO: 7.62 10*3/MM3 (ref 3.4–10.8)

## 2025-08-11 PROCEDURE — 80048 BASIC METABOLIC PNL TOTAL CA: CPT

## 2025-08-11 PROCEDURE — 82948 REAGENT STRIP/BLOOD GLUCOSE: CPT

## 2025-08-11 PROCEDURE — G0378 HOSPITAL OBSERVATION PER HR: HCPCS

## 2025-08-11 PROCEDURE — 94799 UNLISTED PULMONARY SVC/PX: CPT

## 2025-08-11 PROCEDURE — 93010 ELECTROCARDIOGRAM REPORT: CPT | Performed by: INTERNAL MEDICINE

## 2025-08-11 PROCEDURE — 85027 COMPLETE CBC AUTOMATED: CPT

## 2025-08-11 PROCEDURE — 94760 N-INVAS EAR/PLS OXIMETRY 1: CPT

## 2025-08-11 PROCEDURE — 93005 ELECTROCARDIOGRAM TRACING: CPT

## 2025-08-11 PROCEDURE — 99214 OFFICE O/P EST MOD 30 MIN: CPT | Performed by: STUDENT IN AN ORGANIZED HEALTH CARE EDUCATION/TRAINING PROGRAM

## 2025-08-11 PROCEDURE — 25810000003 DEXTROSE 5% IN LACTATED RINGERS PER 1000 ML: Performed by: EMERGENCY MEDICINE

## 2025-08-11 PROCEDURE — 84484 ASSAY OF TROPONIN QUANT: CPT

## 2025-08-11 PROCEDURE — 94664 DEMO&/EVAL PT USE INHALER: CPT

## 2025-08-11 RX ORDER — RANOLAZINE 500 MG/1
500 TABLET, EXTENDED RELEASE ORAL EVERY 12 HOURS SCHEDULED
Qty: 60 TABLET | Refills: 0 | Status: ON HOLD | OUTPATIENT
Start: 2025-08-11

## 2025-08-11 RX ORDER — RANOLAZINE 500 MG/1
500 TABLET, EXTENDED RELEASE ORAL EVERY 12 HOURS SCHEDULED
Status: DISCONTINUED | OUTPATIENT
Start: 2025-08-11 | End: 2025-08-11 | Stop reason: HOSPADM

## 2025-08-11 RX ORDER — DEXTROSE, SODIUM CHLORIDE, SODIUM LACTATE, POTASSIUM CHLORIDE, AND CALCIUM CHLORIDE 5; .6; .31; .03; .02 G/100ML; G/100ML; G/100ML; G/100ML; G/100ML
20 INJECTION, SOLUTION INTRAVENOUS ONCE
Status: COMPLETED | OUTPATIENT
Start: 2025-08-11 | End: 2025-08-11

## 2025-08-11 RX ADMIN — ASPIRIN 81 MG: 81 TABLET, COATED ORAL at 11:01

## 2025-08-11 RX ADMIN — EMPAGLIFLOZIN 25 MG: 25 TABLET, FILM COATED ORAL at 12:28

## 2025-08-11 RX ADMIN — FAMOTIDINE 40 MG: 20 TABLET, FILM COATED ORAL at 11:10

## 2025-08-11 RX ADMIN — BUDESONIDE AND FORMOTEROL FUMARATE DIHYDRATE 2 PUFF: 160; 4.5 AEROSOL RESPIRATORY (INHALATION) at 07:19

## 2025-08-11 RX ADMIN — Medication 10 ML: at 10:47

## 2025-08-11 RX ADMIN — SODIUM CHLORIDE, SODIUM LACTATE, POTASSIUM CHLORIDE, CALCIUM CHLORIDE AND DEXTROSE MONOHYDRATE 20 ML/HR: 5; 600; 310; 30; 20 INJECTION, SOLUTION INTRAVENOUS at 10:47

## 2025-08-12 ENCOUNTER — LAB (OUTPATIENT)
Dept: LAB | Facility: HOSPITAL | Age: 80
End: 2025-08-12
Payer: MEDICARE

## 2025-08-12 ENCOUNTER — OFFICE VISIT (OUTPATIENT)
Dept: ONCOLOGY | Facility: CLINIC | Age: 80
End: 2025-08-12
Payer: MEDICARE

## 2025-08-12 VITALS
SYSTOLIC BLOOD PRESSURE: 106 MMHG | TEMPERATURE: 97.7 F | BODY MASS INDEX: 27.27 KG/M2 | RESPIRATION RATE: 16 BRPM | WEIGHT: 138.9 LBS | HEART RATE: 89 BPM | HEIGHT: 60 IN | OXYGEN SATURATION: 97 % | DIASTOLIC BLOOD PRESSURE: 69 MMHG

## 2025-08-12 DIAGNOSIS — D53.9 MACROCYTIC ANEMIA: Primary | ICD-10-CM

## 2025-08-12 DIAGNOSIS — D50.8 OTHER IRON DEFICIENCY ANEMIA: ICD-10-CM

## 2025-08-12 LAB
ALBUMIN SERPL-MCNC: 3.6 G/DL (ref 3.5–5.2)
ALBUMIN/GLOB SERPL: 1.3 G/DL
ALP SERPL-CCNC: 105 U/L (ref 39–117)
ALT SERPL W P-5'-P-CCNC: 12 U/L (ref 1–33)
ANION GAP SERPL CALCULATED.3IONS-SCNC: 13.6 MMOL/L (ref 5–15)
AST SERPL-CCNC: 15 U/L (ref 1–32)
BASOPHILS # BLD AUTO: 0.04 10*3/MM3 (ref 0–0.2)
BASOPHILS NFR BLD AUTO: 0.5 % (ref 0–1.5)
BILIRUB SERPL-MCNC: 0.4 MG/DL (ref 0–1.2)
BUN SERPL-MCNC: 21.5 MG/DL (ref 8–23)
BUN/CREAT SERPL: 12.1 (ref 7–25)
CALCIUM SPEC-SCNC: 8.9 MG/DL (ref 8.6–10.5)
CHLORIDE SERPL-SCNC: 98 MMOL/L (ref 98–107)
CO2 SERPL-SCNC: 23.4 MMOL/L (ref 22–29)
CREAT SERPL-MCNC: 1.77 MG/DL (ref 0.57–1)
DEPRECATED RDW RBC AUTO: 47.8 FL (ref 37–54)
EGFRCR SERPLBLD CKD-EPI 2021: 28.9 ML/MIN/1.73
EOSINOPHIL # BLD AUTO: 0.12 10*3/MM3 (ref 0–0.4)
EOSINOPHIL NFR BLD AUTO: 1.4 % (ref 0.3–6.2)
ERYTHROCYTE [DISTWIDTH] IN BLOOD BY AUTOMATED COUNT: 13.2 % (ref 12.3–15.4)
FERRITIN SERPL-MCNC: 351 NG/ML (ref 13–150)
GLOBULIN UR ELPH-MCNC: 2.8 GM/DL
GLUCOSE BLDC GLUCOMTR-MCNC: 57 MG/DL (ref 65–99)
GLUCOSE BLDC GLUCOMTR-MCNC: 84 MG/DL (ref 65–99)
GLUCOSE SERPL-MCNC: 232 MG/DL (ref 65–99)
HCT VFR BLD AUTO: 38.1 % (ref 34–46.6)
HGB BLD-MCNC: 11.6 G/DL (ref 12–15.9)
HGB RETIC QN AUTO: 35.5 PG (ref 29.8–36.1)
IMM GRANULOCYTES # BLD AUTO: 0.05 10*3/MM3 (ref 0–0.05)
IMM GRANULOCYTES NFR BLD AUTO: 0.6 % (ref 0–0.5)
IMM RETICS NFR: 15.2 % (ref 3–15.8)
IRON 24H UR-MRATE: 37 MCG/DL (ref 37–145)
IRON SATN MFR SERPL: 15 % (ref 20–50)
LYMPHOCYTES # BLD AUTO: 0.92 10*3/MM3 (ref 0.7–3.1)
LYMPHOCYTES NFR BLD AUTO: 10.8 % (ref 19.6–45.3)
MCH RBC QN AUTO: 30.4 PG (ref 26.6–33)
MCHC RBC AUTO-ENTMCNC: 30.4 G/DL (ref 31.5–35.7)
MCV RBC AUTO: 100 FL (ref 79–97)
MONOCYTES # BLD AUTO: 0.59 10*3/MM3 (ref 0.1–0.9)
MONOCYTES NFR BLD AUTO: 6.9 % (ref 5–12)
NEUTROPHILS NFR BLD AUTO: 6.78 10*3/MM3 (ref 1.7–7)
NEUTROPHILS NFR BLD AUTO: 79.8 % (ref 42.7–76)
NRBC BLD AUTO-RTO: 0 /100 WBC (ref 0–0.2)
PLATELET # BLD AUTO: 285 10*3/MM3 (ref 140–450)
PMV BLD AUTO: 10 FL (ref 6–12)
POTASSIUM SERPL-SCNC: 5 MMOL/L (ref 3.5–5.2)
PROT SERPL-MCNC: 6.4 G/DL (ref 6–8.5)
RBC # BLD AUTO: 3.81 10*6/MM3 (ref 3.77–5.28)
RETICS # AUTO: 0.07 10*6/MM3 (ref 0.02–0.13)
RETICS/RBC NFR AUTO: 1.74 % (ref 0.7–1.9)
SODIUM SERPL-SCNC: 135 MMOL/L (ref 136–145)
TIBC SERPL-MCNC: 250 MCG/DL (ref 298–536)
TRANSFERRIN SERPL-MCNC: 168 MG/DL (ref 200–360)
WBC NRBC COR # BLD AUTO: 8.5 10*3/MM3 (ref 3.4–10.8)

## 2025-08-12 PROCEDURE — 82728 ASSAY OF FERRITIN: CPT

## 2025-08-12 PROCEDURE — 80053 COMPREHEN METABOLIC PANEL: CPT

## 2025-08-12 PROCEDURE — 85046 RETICYTE/HGB CONCENTRATE: CPT

## 2025-08-12 PROCEDURE — 83540 ASSAY OF IRON: CPT

## 2025-08-12 PROCEDURE — 84466 ASSAY OF TRANSFERRIN: CPT

## 2025-08-12 PROCEDURE — 36415 COLL VENOUS BLD VENIPUNCTURE: CPT

## 2025-08-12 PROCEDURE — 85025 COMPLETE CBC W/AUTO DIFF WBC: CPT

## 2025-08-13 ENCOUNTER — READMISSION MANAGEMENT (OUTPATIENT)
Dept: CALL CENTER | Facility: HOSPITAL | Age: 80
End: 2025-08-13
Payer: MEDICARE

## 2025-08-17 PROBLEM — R11.2 INTRACTABLE NAUSEA AND VOMITING: Status: ACTIVE | Noted: 2025-08-17

## 2025-08-18 ENCOUNTER — READMISSION MANAGEMENT (OUTPATIENT)
Dept: CALL CENTER | Facility: HOSPITAL | Age: 80
End: 2025-08-18
Payer: MEDICARE

## 2025-08-26 DIAGNOSIS — E11.22 TYPE 2 DIABETES MELLITUS WITH STAGE 3B CHRONIC KIDNEY DISEASE, WITH LONG-TERM CURRENT USE OF INSULIN: ICD-10-CM

## 2025-08-26 DIAGNOSIS — Z79.4 TYPE 2 DIABETES MELLITUS WITH STAGE 3B CHRONIC KIDNEY DISEASE, WITH LONG-TERM CURRENT USE OF INSULIN: ICD-10-CM

## 2025-08-26 DIAGNOSIS — N18.32 TYPE 2 DIABETES MELLITUS WITH STAGE 3B CHRONIC KIDNEY DISEASE, WITH LONG-TERM CURRENT USE OF INSULIN: ICD-10-CM

## 2025-08-26 DIAGNOSIS — E78.5 HYPERLIPIDEMIA, UNSPECIFIED HYPERLIPIDEMIA TYPE: ICD-10-CM

## 2025-08-26 RX ORDER — EMPAGLIFLOZIN 25 MG/1
25 TABLET, FILM COATED ORAL DAILY
Qty: 30 TABLET | Refills: 0 | Status: SHIPPED | OUTPATIENT
Start: 2025-08-26

## (undated) DEVICE — DRSNG WND BORDR/ADHS NONADHR/GZ LF 4X14IN STRL

## (undated) DEVICE — SYS PERFUS SEP PLATLT W TIPS CUST

## (undated) DEVICE — Device

## (undated) DEVICE — KT VLV BIOGUARD SXN BIOP AIR/H20 CONN 4PC DISP

## (undated) DEVICE — SOL ANTISTICK CAUTRY ELECTROLUBE LF

## (undated) DEVICE — SENSR O2 OXIMAX FNGR A/ 18IN NONSTR

## (undated) DEVICE — APPL CHLORAPREP W/TINT 26ML ORNG

## (undated) DEVICE — ADAPT CLN BIOGUARD AIR/H2O DISP

## (undated) DEVICE — BIOPATCH™ ANTIMICROBIAL DRESSING WITH CHLORHEXIDINE GLUCONATE IS A HYDROPHILLIC POLYURETHANE ABSORPTIVE FOAM WITH CHLORHEXIDINE GLUCONATE (CHG) WHICH INHIBITS BACTERIAL GROWTH UNDER THE DRESSING. THE DRESSING IS INTENDED TO BE USED TO ABSORB EXUDATE, COVER A WOUND CAUSED BY VASCULAR AND NONVASCULAR PERCUTANEOUS MEDICAL DEVICES DURING SURGERY, AS WELL AS REDUCE LOCAL INFECTION AND COLONIZATION OF MICROORGANISMS.: Brand: BIOPATCH

## (undated) DEVICE — JACKSON-PRATT 100CC BULB RESERVOIR: Brand: CARDINAL HEALTH

## (undated) DEVICE — ERBE NESSY®PLATE 170 SPLIT; 168CM²; CABLE 3M: Brand: ERBE

## (undated) DEVICE — SPHINCTEROTOME: Brand: HYDRATOME RX 44

## (undated) DEVICE — CANN O2 ETCO2 FITS ALL CONN CO2 SMPL A/ 7IN DISP LF

## (undated) DEVICE — 3M™ STERI-DRAPE™ INSTRUMENT POUCH 1018L: Brand: STERI-DRAPE™

## (undated) DEVICE — SNAR POLYP SENSATION STDOVL 27 240 BX40

## (undated) DEVICE — SUT VIC 5/0 PS2 18IN J495H

## (undated) DEVICE — KT ORCA ORCAPOD DISP STRL

## (undated) DEVICE — FRCP BX RADJAW4 NDL 2.8 240CM LG OG BX40

## (undated) DEVICE — ST TBG AIRSEAL FLTR TRI LUM

## (undated) DEVICE — DEV LK WIREGUIDE FUSN OLYMP SCP

## (undated) DEVICE — BITEBLOCK OMNI BLOC

## (undated) DEVICE — LN SMPL CO2 SHTRM SD STREAM W/M LUER

## (undated) DEVICE — 1010 S-DRAPE TOWEL DRAPE 10/BX: Brand: STERI-DRAPE™

## (undated) DEVICE — CVR PROB 96IN LF STRL

## (undated) DEVICE — LAPAROVUE VISIBILITY SYSTEM LAPAROSCOPIC SOLUTIONS: Brand: LAPAROVUE

## (undated) DEVICE — SUT VIC 3/0 CT2 27IN J232H

## (undated) DEVICE — COLUMN DRAPE

## (undated) DEVICE — TUBING, SUCTION, 1/4" X 10', STRAIGHT: Brand: MEDLINE

## (undated) DEVICE — BLADELESS OBTURATOR: Brand: WECK VISTA

## (undated) DEVICE — NDL HYPO ECLPS SFTY 22G 1 1/2IN

## (undated) DEVICE — VESSEL SEALER EXTEND: Brand: ENDOWRIST

## (undated) DEVICE — THE STERILE LIGHT HANDLE COVER IS USED WITH STERIS SURGICAL LIGHTING AND VISUALIZATION SYSTEMS.

## (undated) DEVICE — THE SINGLE USE ETRAP – POLYP TRAP IS USED FOR SUCTION RETRIEVAL OF ENDOSCOPICALLY REMOVED POLYPS.: Brand: ETRAP

## (undated) DEVICE — GLV SURG BIOGEL LTX PF 7 1/2

## (undated) DEVICE — STPLR SKIN VISISTAT WD 35CT

## (undated) DEVICE — SUT SILK 2/0 SH 30IN K833H

## (undated) DEVICE — SUT VIC 3/0 TIES 18IN J110T

## (undated) DEVICE — ARM DRAPE

## (undated) DEVICE — DRAPE,REIN 53X77,STERILE: Brand: MEDLINE

## (undated) DEVICE — TUBING, SUCTION, 1/4" X 20', STRAIGHT: Brand: MEDLINE INDUSTRIES, INC.

## (undated) DEVICE — CONTAINER,SPECIMEN,OR STERILE,4OZ: Brand: MEDLINE

## (undated) DEVICE — TROCAR SITE CLOSURE DEVICE: Brand: ENDO CLOSE

## (undated) DEVICE — PK UNIV COMPL 40

## (undated) DEVICE — YANKAUER,BULB TIP,W/O VENT,RIGID,STERILE: Brand: MEDLINE

## (undated) DEVICE — MSK PROC CURAPLEX O2 2/ADAPT 7FT

## (undated) DEVICE — BLADELESS OBTURATOR, LONG: Brand: WECK VISTA

## (undated) DEVICE — CANN NASL CO2 TRULINK W/O2 A/

## (undated) DEVICE — SUT SILK 2/0 FS BLK 18IN 685G

## (undated) DEVICE — INTENDED FOR TISSUE SEPARATION, AND OTHER PROCEDURES THAT REQUIRE A SHARP SURGICAL BLADE TO PUNCTURE OR CUT.: Brand: BARD-PARKER ® CARBON RIB-BACK BLADES

## (undated) DEVICE — SOL NACL 0.9PCT 1000ML

## (undated) DEVICE — TROC BLADLES ANCHORPORT/OPTI LP 8X120MM 1P/U

## (undated) DEVICE — TIP COVER ACCESSORY

## (undated) DEVICE — STAPLER 60: Brand: SUREFORM

## (undated) DEVICE — SUT MNCRYL PLS ANTIB UD 4/0 PS2 18IN

## (undated) DEVICE — PENCL ES MEGADINE EZ/CLEAN BUTN W/HOLSTR 10FT

## (undated) DEVICE — NDL BLNT 18G 1 1/2IN

## (undated) DEVICE — GLV SURG BIOGEL LTX PF 6 1/2

## (undated) DEVICE — BLCK/BITE BLOX W/DENTL/RIM W/STRAP 54F

## (undated) DEVICE — ST. SORBAVIEW ULTIMATE IJ SYSTEM A,C: Brand: CENTURION

## (undated) DEVICE — SUT VIC 0 TIES 18IN J912G

## (undated) DEVICE — ANTIBACTERIAL UNDYED BRAIDED (POLYGLACTIN 910), SYNTHETIC ABSORBABLE SUTURE: Brand: COATED VICRYL

## (undated) DEVICE — ELECTRD BLD EZ CLN MOD XLNG 2.75IN

## (undated) DEVICE — DISPOSABLE GRASPER CARTRIDGE: Brand: DIRECT DRIVE REPOSABLE GRASPERS

## (undated) DEVICE — ENDOPATH XCEL BLADELESS TROCARS WITH STABILITY SLEEVES: Brand: ENDOPATH XCEL

## (undated) DEVICE — SINGLE-USE BIOPSY FORCEPS: Brand: RADIAL JAW 4

## (undated) DEVICE — ELECTRD BLD EDGE/INSUL1P 2.4X5.1MM STRL

## (undated) DEVICE — SYR LL TP 10ML STRL

## (undated) DEVICE — SYR LUERLOK 5CC

## (undated) DEVICE — SEAL

## (undated) DEVICE — LOU GENERAL ROBOT: Brand: MEDLINE INDUSTRIES, INC.